# Patient Record
Sex: FEMALE | Race: BLACK OR AFRICAN AMERICAN | Employment: OTHER | URBAN - METROPOLITAN AREA
[De-identification: names, ages, dates, MRNs, and addresses within clinical notes are randomized per-mention and may not be internally consistent; named-entity substitution may affect disease eponyms.]

---

## 2017-02-07 ENCOUNTER — HOSPITAL ENCOUNTER (EMERGENCY)
Age: 67
Discharge: HOME | End: 2017-02-07
Attending: EMERGENCY MEDICINE
Payer: MEDICARE

## 2017-02-07 VITALS
BODY MASS INDEX: 37.49 KG/M2 | SYSTOLIC BLOOD PRESSURE: 134 MMHG | WEIGHT: 225 LBS | DIASTOLIC BLOOD PRESSURE: 61 MMHG | OXYGEN SATURATION: 99 % | RESPIRATION RATE: 17 BRPM | TEMPERATURE: 98.9 F | HEART RATE: 86 BPM | HEIGHT: 65 IN

## 2017-02-07 DIAGNOSIS — N18.6 ESRD ON HEMODIALYSIS (HCC): ICD-10-CM

## 2017-02-07 DIAGNOSIS — Z99.2 ESRD ON HEMODIALYSIS (HCC): ICD-10-CM

## 2017-02-07 DIAGNOSIS — R19.7 DIARRHEA, UNSPECIFIED TYPE: Primary | ICD-10-CM

## 2017-02-07 DIAGNOSIS — R51.9 HEADACHE, UNSPECIFIED HEADACHE TYPE: ICD-10-CM

## 2017-02-07 LAB
ALBUMIN SERPL BCP-MCNC: 4 G/DL (ref 3.2–4.6)
ALBUMIN/GLOB SERPL: 1.1 {RATIO} (ref 1.2–3.5)
ALP SERPL-CCNC: 113 U/L (ref 50–136)
ALT SERPL-CCNC: 15 U/L (ref 12–65)
ANION GAP BLD CALC-SCNC: 11 MMOL/L (ref 7–16)
AST SERPL W P-5'-P-CCNC: 23 U/L (ref 15–37)
BASOPHILS # BLD AUTO: 0 K/UL (ref 0–0.2)
BASOPHILS # BLD: 0 % (ref 0–2)
BILIRUB SERPL-MCNC: 0.8 MG/DL (ref 0.2–1.1)
BUN SERPL-MCNC: 50 MG/DL (ref 8–23)
CALCIUM SERPL-MCNC: 8.7 MG/DL (ref 8.3–10.4)
CHLORIDE SERPL-SCNC: 101 MMOL/L (ref 98–107)
CO2 SERPL-SCNC: 29 MMOL/L (ref 21–32)
CREAT SERPL-MCNC: 8.14 MG/DL (ref 0.6–1)
DIFFERENTIAL METHOD BLD: ABNORMAL
EOSINOPHIL # BLD: 0.2 K/UL (ref 0–0.8)
EOSINOPHIL NFR BLD: 2 % (ref 0.5–7.8)
ERYTHROCYTE [DISTWIDTH] IN BLOOD BY AUTOMATED COUNT: 15.4 % (ref 11.9–14.6)
GLOBULIN SER CALC-MCNC: 3.6 G/DL (ref 2.3–3.5)
GLUCOSE SERPL-MCNC: 168 MG/DL (ref 65–100)
HCT VFR BLD AUTO: 29.4 % (ref 35.8–46.3)
HGB BLD-MCNC: 10.2 G/DL (ref 11.7–15.4)
IMM GRANULOCYTES # BLD: 0 K/UL (ref 0–0.5)
IMM GRANULOCYTES NFR BLD AUTO: 0.3 % (ref 0–5)
LYMPHOCYTES # BLD AUTO: 15 % (ref 13–44)
LYMPHOCYTES # BLD: 1.5 K/UL (ref 0.5–4.6)
MCH RBC QN AUTO: 33.6 PG (ref 26.1–32.9)
MCHC RBC AUTO-ENTMCNC: 34.7 G/DL (ref 31.4–35)
MCV RBC AUTO: 96.7 FL (ref 79.6–97.8)
MONOCYTES # BLD: 0.7 K/UL (ref 0.1–1.3)
MONOCYTES NFR BLD AUTO: 7 % (ref 4–12)
NEUTS SEG # BLD: 7.4 K/UL (ref 1.7–8.2)
NEUTS SEG NFR BLD AUTO: 76 % (ref 43–78)
PLATELET # BLD AUTO: 219 K/UL (ref 150–450)
PMV BLD AUTO: 10.1 FL (ref 10.8–14.1)
POTASSIUM SERPL-SCNC: 3.8 MMOL/L (ref 3.5–5.1)
PROT SERPL-MCNC: 7.6 G/DL (ref 6.3–8.2)
RBC # BLD AUTO: 3.04 M/UL (ref 4.05–5.25)
SODIUM SERPL-SCNC: 141 MMOL/L (ref 136–145)
WBC # BLD AUTO: 9.8 K/UL (ref 4.3–11.1)

## 2017-02-07 PROCEDURE — 96374 THER/PROPH/DIAG INJ IV PUSH: CPT | Performed by: EMERGENCY MEDICINE

## 2017-02-07 PROCEDURE — 80053 COMPREHEN METABOLIC PANEL: CPT | Performed by: EMERGENCY MEDICINE

## 2017-02-07 PROCEDURE — 85025 COMPLETE CBC W/AUTO DIFF WBC: CPT | Performed by: EMERGENCY MEDICINE

## 2017-02-07 PROCEDURE — 74011250637 HC RX REV CODE- 250/637: Performed by: EMERGENCY MEDICINE

## 2017-02-07 PROCEDURE — 74011250636 HC RX REV CODE- 250/636: Performed by: EMERGENCY MEDICINE

## 2017-02-07 PROCEDURE — 99284 EMERGENCY DEPT VISIT MOD MDM: CPT | Performed by: EMERGENCY MEDICINE

## 2017-02-07 PROCEDURE — 96361 HYDRATE IV INFUSION ADD-ON: CPT | Performed by: EMERGENCY MEDICINE

## 2017-02-07 RX ORDER — DICYCLOMINE HYDROCHLORIDE 20 MG/1
20 TABLET ORAL EVERY 6 HOURS
Qty: 20 TAB | Refills: 0 | Status: SHIPPED | OUTPATIENT
Start: 2017-02-07 | End: 2017-02-12

## 2017-02-07 RX ORDER — DICYCLOMINE HYDROCHLORIDE 10 MG/1
20 CAPSULE ORAL
Status: COMPLETED | OUTPATIENT
Start: 2017-02-07 | End: 2017-02-07

## 2017-02-07 RX ORDER — PROCHLORPERAZINE EDISYLATE 5 MG/ML
10 INJECTION INTRAMUSCULAR; INTRAVENOUS
Status: COMPLETED | OUTPATIENT
Start: 2017-02-07 | End: 2017-02-07

## 2017-02-07 RX ORDER — BUTALBITAL, ACETAMINOPHEN AND CAFFEINE 300; 40; 50 MG/1; MG/1; MG/1
1-2 CAPSULE ORAL
Qty: 20 CAP | Refills: 0 | Status: SHIPPED | OUTPATIENT
Start: 2017-02-07 | End: 2018-11-07

## 2017-02-07 RX ORDER — DIPHENOXYLATE HYDROCHLORIDE AND ATROPINE SULFATE 2.5; .025 MG/1; MG/1
2 TABLET ORAL
Qty: 20 TAB | Refills: 0 | Status: SHIPPED | OUTPATIENT
Start: 2017-02-07 | End: 2018-05-10

## 2017-02-07 RX ORDER — SODIUM CHLORIDE 0.9 % (FLUSH) 0.9 %
5-10 SYRINGE (ML) INJECTION AS NEEDED
Status: DISCONTINUED | OUTPATIENT
Start: 2017-02-07 | End: 2017-02-07 | Stop reason: HOSPADM

## 2017-02-07 RX ORDER — PROMETHAZINE HYDROCHLORIDE 25 MG/1
25 TABLET ORAL
Qty: 12 TAB | Refills: 0 | Status: SHIPPED | OUTPATIENT
Start: 2017-02-07 | End: 2018-03-08 | Stop reason: CLARIF

## 2017-02-07 RX ORDER — BUTALBITAL, ACETAMINOPHEN AND CAFFEINE 50; 325; 40 MG/1; MG/1; MG/1
2 TABLET ORAL
Status: COMPLETED | OUTPATIENT
Start: 2017-02-07 | End: 2017-02-07

## 2017-02-07 RX ORDER — DIPHENOXYLATE HYDROCHLORIDE AND ATROPINE SULFATE 2.5; .025 MG/1; MG/1
2 TABLET ORAL
Status: COMPLETED | OUTPATIENT
Start: 2017-02-07 | End: 2017-02-07

## 2017-02-07 RX ORDER — ONDANSETRON HYDROCHLORIDE 8 MG/1
8 TABLET, FILM COATED ORAL
Qty: 10 TAB | Refills: 0 | Status: SHIPPED | OUTPATIENT
Start: 2017-02-07 | End: 2018-03-08 | Stop reason: CLARIF

## 2017-02-07 RX ORDER — SODIUM CHLORIDE 0.9 % (FLUSH) 0.9 %
5-10 SYRINGE (ML) INJECTION EVERY 8 HOURS
Status: DISCONTINUED | OUTPATIENT
Start: 2017-02-07 | End: 2017-02-07 | Stop reason: HOSPADM

## 2017-02-07 RX ADMIN — PROCHLORPERAZINE EDISYLATE 10 MG: 5 INJECTION INTRAMUSCULAR; INTRAVENOUS at 19:00

## 2017-02-07 RX ADMIN — SODIUM CHLORIDE 250 ML: 900 INJECTION, SOLUTION INTRAVENOUS at 19:00

## 2017-02-07 RX ADMIN — DIPHENOXYLATE HYDROCHLORIDE AND ATROPINE SULFATE 2 TABLET: 2.5; .025 TABLET ORAL at 19:37

## 2017-02-07 RX ADMIN — DICYCLOMINE HYDROCHLORIDE 20 MG: 10 CAPSULE ORAL at 19:37

## 2017-02-07 RX ADMIN — BUTALBITAL, ACETAMINOPHEN AND CAFFEINE 2 TABLET: 50; 325; 40 TABLET ORAL at 19:00

## 2017-02-07 NOTE — ED PROVIDER NOTES
HPI Comments: Severe diarrhea for 2 days stomach cramping. No vomiting. Patient had a full run of dialysis yesterday. Grandson had a stomach bug about 2 weeks ago. No recent antibiotics    Patient is a 73 y.o. female presenting with diarrhea. The history is provided by the patient. Diarrhea    This is a new problem. The current episode started 2 days ago. The problem occurs constantly (At least a dozen times per day). The problem has not changed since onset. The pain is moderate. Associated symptoms include diarrhea, nausea and headaches. Pertinent negatives include no fever, no vomiting, no constipation, no dysuria, no frequency, no arthralgias, no chest pain and no back pain. The pain is worsened by eating. The pain is relieved by nothing. The patient's surgical history non-contributory. Past Medical History:   Diagnosis Date    Arthritis     AVF (arteriovenous fistula) ([facility] [address] 12/20/2016 12/6/16 ([facility]) Right AVF revision and thrombectomy    Cancer ([facility] [address] 2015     L kidney    Chronic kidney disease      HD in M-W-F- at [place] [facility]    Degenerative joint disease     Diabetes ([facility] [address]      type 2, average range 100-120 fasting, symptomatic below 90; last A1C?    ESRD (end stage renal disease) ([facility] [address] Nov 2006     ESRD.  MWF dialysis     Hypertension     Obesity     Transient ischemic attack 8/29/2015       Past Surgical History:   Procedure Laterality Date    Hx gyn       stephan    Hx vascular access      Pr breast surgery procedure unlisted Right      cyst removed    Hx other surgical       dialysis fistula, several permcaths    Hx urological Left July 2015     nephrectomy    Hx gi       colon resection resulting in temporary colostomy reversal         Family History:   Problem Relation Age of Onset    Diabetes Mother     Heart Disease Mother     Hypertension Mother     Heart Disease Father     Hypertension Father     Malignant Hyperthermia Neg Hx     Pseudocholinesterase Deficiency Neg Hx     Delayed Awakening Neg Hx     Post-op Nausea/Vomiting Neg Hx     Emergence Delirium Neg Hx     Other Neg Hx     Post-op Cognitive Dysfunction Neg Hx        Social History     Social History    Marital status:      Spouse name: N/A    Number of children: N/A    Years of education: N/A     Occupational History    Not on file. Social History Main Topics    Smoking status: Never Smoker    Smokeless tobacco: Never Used    Alcohol use No    Drug use: No    Sexual activity: Not Currently     Other Topics Concern    Not on file     Social History Narrative         ALLERGIES: Pcn [penicillins] and Vancomycin    Review of Systems   Constitutional: Negative for chills and fever. HENT: Negative for rhinorrhea and sore throat. Eyes: Negative for discharge and redness. Respiratory: Positive for shortness of breath. Negative for cough. Cardiovascular: Negative for chest pain and palpitations. Gastrointestinal: Positive for abdominal pain, diarrhea and nausea. Negative for constipation and vomiting. Genitourinary: Negative for dysuria and frequency. Musculoskeletal: Negative for arthralgias and back pain. Skin: Negative for rash. Neurological: Positive for headaches. Negative for dizziness. All other systems reviewed and are negative. Vitals:    02/07/17 1609   BP: 122/57   Pulse: 89   Resp: 20   Temp: 99 °F (37.2 °C)   SpO2: 98%   Weight: 102.1 kg (225 lb)   Height: 5' 5\" (1.651 m)            Physical Exam   Constitutional: She is oriented to person, place, and time. She appears well-developed and well-nourished. No distress. HENT:   Head: Normocephalic and atraumatic. Eyes: Conjunctivae and EOM are normal. Pupils are equal, round, and reactive to light. Right eye exhibits no discharge. Left eye exhibits no discharge. No scleral icterus. Neck: Normal range of motion. Neck supple. Cardiovascular: Normal rate, regular rhythm and normal heart sounds.   Exam reveals no gallop. No murmur heard. Pulmonary/Chest: Effort normal and breath sounds normal. No respiratory distress. She has no wheezes. She has no rales. Abdominal: Soft. Bowel sounds are normal. There is tenderness. There is no guarding. Musculoskeletal: Normal range of motion. She exhibits no edema. Neurological: She is alert and oriented to person, place, and time. She exhibits normal muscle tone. cni 2-12 grossly   Skin: Skin is warm and dry. She is not diaphoretic. Psychiatric: She has a normal mood and affect. Her behavior is normal.   Nursing note and vitals reviewed. MDM  Number of Diagnoses or Management Options  Diarrhea, unspecified type:   ESRD on hemodialysis [facility]):   Headache, unspecified headache type:   Diagnosis management comments: Medical decision making note:  Dialysis patient with 2 days of moderate to severe diarrhea, no blood  Check labs to include C. Difficile, if she can procure a sample    Treatment headache and nausea for now   we will withhold narcotics and antispasmodics to hopefully not reduce the chances of collecting a stool specimen      This concludes the \"medical decision making note\" part of this emergency department visit note.       ED Course       Procedures

## 2017-02-08 NOTE — DISCHARGE INSTRUCTIONS
Use the Lomotil for diarrhea  Use either Phenergan or Zofran for nausea  Use the Fioricet for headaches  Use the Bentyl for stomach cramps    Follow-up with dialysis tomorrow  Return to the ER if worse in any way           Diarrhea: Care Instructions  Your Care Instructions    Diarrhea is loose, watery stools (bowel movements). The exact cause is often hard to find. Sometimes diarrhea is your body's way of getting rid of what caused an upset stomach. Viruses, food poisoning, and many medicines can cause diarrhea. Some people get diarrhea in response to emotional stress, anxiety, or certain foods. Almost everyone has diarrhea now and then. It usually isn't serious, and your stools will return to normal soon. The important thing to do is replace the fluids you have lost, so you can prevent dehydration. The doctor has checked you carefully, but problems can develop later. If you notice any problems or new symptoms, get medical treatment right away. Follow-up care is a key part of your treatment and safety. Be sure to make and go to all appointments, and call your doctor if you are having problems. It's also a good idea to know your test results and keep a list of the medicines you take. How can you care for yourself at home? · Watch for signs of dehydration, which means your body has lost too much water. Dehydration is a serious condition and should be treated right away. Signs of dehydration are:  ¨ Increasing thirst and dry eyes and mouth. ¨ Feeling faint or lightheaded. ¨ Darker urine, and a smaller amount of urine than normal.  · To prevent dehydration, drink plenty of fluids, enough so that your urine is light yellow or clear like water. Choose water and other caffeine-free clear liquids until you feel better. If you have kidney, heart, or liver disease and have to limit fluids, talk with your doctor before you increase the amount of fluids you drink.   · Begin eating small amounts of mild foods the next day, if you feel like it. ¨ Try yogurt that has live cultures of Lactobacillus. (Check the label.)  ¨ Avoid spicy foods, fruits, alcohol, and caffeine until 48 hours after all symptoms are gone. ¨ Avoid chewing gum that contains sorbitol. ¨ Avoid dairy products (except for yogurt with Lactobacillus) while you have diarrhea and for 3 days after symptoms are gone. · The doctor may recommend that you take over-the-counter medicine, such as loperamide (Imodium), if you still have diarrhea after 6 hours. Read and follow all instructions on the label. Do not use this medicine if you have bloody diarrhea, a high fever, or other signs of serious illness. Call your doctor if you think you are having a problem with your medicine. When should you call for help? Call 911 anytime you think you may need emergency care. For example, call if:  · You passed out (lost consciousness). · Your stools are maroon or very bloody. Call your doctor now or seek immediate medical care if:  · You are dizzy or lightheaded, or you feel like you may faint. · Your stools are black and look like tar, or they have streaks of blood. · You have new or worse belly pain. · You have symptoms of dehydration, such as:  ¨ Dry eyes and a dry mouth. ¨ Passing only a little dark urine. ¨ Feeling thirstier than usual.  · You have a new or higher fever. Watch closely for changes in your health, and be sure to contact your doctor if:  · Your diarrhea is getting worse. · You see pus in the diarrhea. · You are not getting better after 2 days (48 hours). Where can you learn more? Go to http://felisa-radha.info/. Enter R256 in the search box to learn more about \"Diarrhea: Care Instructions. \"  Current as of: May 27, 2016  Content Version: 11.1  © 8123-7596 [employer]. Care instructions adapted under license by [employer] (which disclaims liability or warranty for this information).  If you have questions about a medical condition or this instruction, always ask your healthcare professional. [place] [address] any warranty or liability for your use of this information.

## 2017-02-08 NOTE — ED NOTES
Patient D/Orion with all prescriptions, understands al discharge instructions. Ambulated out of ED steady on feet.

## 2017-04-13 ENCOUNTER — HOSPITAL ENCOUNTER (OUTPATIENT)
Dept: INTERVENTIONAL RADIOLOGY/VASCULAR | Age: 67
Discharge: HOME | End: 2017-04-13
Attending: RADIOLOGY
Payer: MEDICARE

## 2017-04-13 VITALS
OXYGEN SATURATION: 99 % | RESPIRATION RATE: 18 BRPM | HEIGHT: 65 IN | BODY MASS INDEX: 37.49 KG/M2 | DIASTOLIC BLOOD PRESSURE: 43 MMHG | TEMPERATURE: 98.2 F | HEART RATE: 78 BPM | SYSTOLIC BLOOD PRESSURE: 126 MMHG | WEIGHT: 225 LBS

## 2017-04-13 DIAGNOSIS — N18.6 ESRD (END STAGE RENAL DISEASE) (HCC): ICD-10-CM

## 2017-04-13 LAB — GLUCOSE BLD STRIP.AUTO-MCNC: 113 MG/DL (ref 65–100)

## 2017-04-13 PROCEDURE — 74011250636 HC RX REV CODE- 250/636

## 2017-04-13 PROCEDURE — C1725 CATH, TRANSLUMIN NON-LASER: HCPCS

## 2017-04-13 PROCEDURE — 74011636320 HC RX REV CODE- 636/320: Performed by: RADIOLOGY

## 2017-04-13 PROCEDURE — 74011000250 HC RX REV CODE- 250: Performed by: RADIOLOGY

## 2017-04-13 PROCEDURE — C1894 INTRO/SHEATH, NON-LASER: HCPCS

## 2017-04-13 PROCEDURE — C1769 GUIDE WIRE: HCPCS

## 2017-04-13 PROCEDURE — 82962 GLUCOSE BLOOD TEST: CPT

## 2017-04-13 PROCEDURE — 36902 INTRO CATH DIALYSIS CIRCUIT: CPT

## 2017-04-13 PROCEDURE — 74011250636 HC RX REV CODE- 250/636: Performed by: RADIOLOGY

## 2017-04-13 RX ORDER — HEPARIN SODIUM 200 [USP'U]/100ML
1000 INJECTION, SOLUTION INTRAVENOUS CONTINUOUS
Status: DISCONTINUED | OUTPATIENT
Start: 2017-04-13 | End: 2017-04-17 | Stop reason: HOSPADM

## 2017-04-13 RX ORDER — DIPHENHYDRAMINE HYDROCHLORIDE 50 MG/ML
25-50 INJECTION, SOLUTION INTRAMUSCULAR; INTRAVENOUS ONCE
Status: COMPLETED | OUTPATIENT
Start: 2017-04-13 | End: 2017-04-13

## 2017-04-13 RX ORDER — LIDOCAINE HYDROCHLORIDE 20 MG/ML
.5-2 INJECTION, SOLUTION INFILTRATION; PERINEURAL ONCE
Status: COMPLETED | OUTPATIENT
Start: 2017-04-13 | End: 2017-04-13

## 2017-04-13 RX ORDER — HEPARIN SODIUM 1000 [USP'U]/ML
1000-8000 INJECTION, SOLUTION INTRAVENOUS; SUBCUTANEOUS ONCE
Status: DISPENSED | OUTPATIENT
Start: 2017-04-13 | End: 2017-04-13

## 2017-04-13 RX ORDER — MIDAZOLAM HYDROCHLORIDE 1 MG/ML
.5-2 INJECTION, SOLUTION INTRAMUSCULAR; INTRAVENOUS
Status: DISCONTINUED | OUTPATIENT
Start: 2017-04-13 | End: 2017-04-17 | Stop reason: HOSPADM

## 2017-04-13 RX ORDER — FENTANYL CITRATE 50 UG/ML
25-50 INJECTION, SOLUTION INTRAMUSCULAR; INTRAVENOUS
Status: DISCONTINUED | OUTPATIENT
Start: 2017-04-13 | End: 2017-04-17 | Stop reason: HOSPADM

## 2017-04-13 RX ORDER — SODIUM CHLORIDE 9 MG/ML
25 INJECTION, SOLUTION INTRAVENOUS ONCE
Status: ACTIVE | OUTPATIENT
Start: 2017-04-13 | End: 2017-04-13

## 2017-04-13 RX ADMIN — IOPAMIDOL 15 ML: 755 INJECTION, SOLUTION INTRAVENOUS at 08:57

## 2017-04-13 RX ADMIN — HEPARIN SODIUM 2000 UNITS: 200 INJECTION, SOLUTION INTRAVENOUS at 08:54

## 2017-04-13 RX ADMIN — LIDOCAINE HYDROCHLORIDE 150 MG: 20 INJECTION, SOLUTION INFILTRATION; PERINEURAL at 08:51

## 2017-04-13 RX ADMIN — DIPHENHYDRAMINE HYDROCHLORIDE 25 MG: 50 INJECTION, SOLUTION INTRAMUSCULAR; INTRAVENOUS at 08:55

## 2017-04-13 NOTE — PROGRESS NOTES
Recovery period without difficulty. Pt alert and oriented and denies pain. Dressing is clean, dry, and intact. Reviewed discharge instructions with patient, both verbalized understanding. Pt escorted to lobby discharge area via wheelchair. Vital signs and Luis score completed.

## 2017-04-13 NOTE — DISCHARGE INSTRUCTIONS
[address]  Department of Interventional Radiology  [phone] Office  [phone] Fax       ARTERIOVENOUS FISTULA, GRAFT ACCESS, REVISION, OR DECLOT    ACTIVITY:  Limited activity today. Keep access arm straight and raised above the level of your heart for 24 hours or until the swelling goes away. DIET:  May have your usual food, unless told otherwise by your doctor. PAIN:  Use the prescription for pain medicine your doctor gave you. If you do not have one, usual your normal pain medicine. If this does not control your pain, call your doctor. DO NOT TAKE ASPIRIN OR MEDICINE WITH ASPIRIN IN IT, unless your doctor says you may. DRESSING CARE:   Keep your dressing clean and dry. Leave your dressing on until the Dialysis Nurse or your doctor takes it off. BLEEDING:  If you have any bleeding put pressure over the bleeding area and call your doctor. CARE OF YOUR ACCESS ARM:  You may have some bruising, swelling, and discoloration for several weeks. After the swelling has gone down, you will be able to see the outline of the graft. By placing your fingertips over the graft you will be able to feel a vibration (thrill) that means blood is flowing and the graft is working. DO:  1. Make sure your arm is washed with soap and water every day. 2. Feel for the [address] [address] at area marked in the picture. 3. Call your Kidney doctor if you do not feel the [address] or for any problems like swelling, redness, tingling, or numbness in access arm, pus, or fever over 101. DO NOT:  1. Wear tight sleeves, watches, belts, or bracelets over the graft. 2. Carry heavy bags across the graft or fistula. 3. Sleep on your graft side. 4. Let your blood pressure be taken in your access arm. 5. Let blood be drawn from your access arm. For the next 24 hours, DO NOT Drive, Drink Alcoholic Beverages, or Make IMPORTANT Decisions.     Follow-Up Instructions:  Please see your ordering doctor as he/she has requested. To Reach Us:   THROMBECTOMY/FISTULAPLASTY DISCHARGE INSTRUCTIONS    General Information: This procedure has been done in order to improve blood flow through your dialysis access. The procedure is designed to remove clots and/or to enlarge the narrowed areas of your dialysis access. Home Care Instructions: You can resume your regular diet and medication regimen. Do not drink alcohol, drive, or make any important legal decisions in the next 24 hours. Watch the site carefully for signs of infection. You may have some tenderness at your graft site. You make take Tylenol (acetaminophen) for this discomfort. Do not carry anything heavier than a gallon of milk. Do not wear any tight clothing on this arm, including elastic cuffs and/or tight watches. Do not allow anyone to take a blood pressure or draw blood from this extremity. You should elevate this arm on pillows to help with any swelling. Do not sleep on this arm with the graft, or fold it under your head while you sleep. Feel your graft every day to see if you can feel a thrill (pulsation). Call If:     You should call your Physician and/or the Radiology Nurse if you have any signs of infection like fever, drainage, redness, or increased pain at the graft site. Call your doctor or dialysis center immediately if you do not feel a thrill on your graft/fistula, or if you have a change in color of this extremity. Call 911 and seek immediate medical attention if you start bleeding from your graft or fistula. Apply pressure to the graft, but only enough pressure to control the bleeding. Armand the time you start holding pressure and let medical personnel know. Follow-Up Instructions: Please see your ordering doctor as he/she has requested. You may not go to dialysis today, except with special written permission from you doctor. You may go to dialysis tomorrow, and resume your normal dialysis schedule.     To Reach Us: If you have any questions about your procedure, please call the Interventional Radiology department at [phone]. After business hours (5pm) and weekends, call the answering service at [phone] and ask for the Radiologist on call to be paged. Patient Signature:  Date: 4/13/2017  Discharging Nurse:  Lauren Schmitz RN

## 2017-04-13 NOTE — PROGRESS NOTES
TRANSFER - OUT REPORT:    Verbal report given to [facility]) on Kecia Fischer  being transferred to [facility] recovery(unit) for routine progression of care       Report consisted of patients Situation, Background, Assessment and   Recommendations(SBAR). Information from the following report(s) SBAR, Procedure Summary and MAR was reviewed with the receiving nurse. Lines:   Peripheral IV 11/11/16 Left Forearm (Active)        Opportunity for questions and clarification was provided.

## 2017-04-13 NOTE — H&P
Department of Interventional Radiology  [phone]    History and Physical    Patient: Donato Paige MRN:  [id number]  SSN:  [id number]    YOB: 1950  Age:  73 y.o. Sex:  female      Primary Care Provider:  Nu Faith MD  Referring Physician:  Logan Newton MD    Subjective:     Chief Complaint: fistulogram    History of the Present Illness: The patient is a 73 y.o. female who presents for AV fistulogram.  RUE AV fistula with recurrent cephalic vein stenosis. Last dialysis yesterday was uneventful. NPO x BP meds. Past Medical History:   Diagnosis Date    Arthritis     AVF (arteriovenous fistula) ([facility] [address] 12/20/2016 12/6/16 ([facility]) Right AVF revision and thrombectomy    Cancer ([facility] [address] 2015    L kidney    Chronic kidney disease     HD in M-W-F- at [place] [facility]    Degenerative joint disease     Diabetes ([facility] [address]     type 2, average range 100-120 fasting, symptomatic below 90; last A1C?    ESRD (end stage renal disease) ([facility] [address] Nov 2006    ESRD. MWF dialysis     Hypertension     Obesity     Transient ischemic attack 8/29/2015     Past Surgical History:   Procedure Laterality Date    BREAST SURGERY PROCEDURE UNLISTED Right     cyst removed    HX GI      colon resection resulting in temporary colostomy reversal    HX GYN      stephan    HX OTHER SURGICAL      dialysis fistula, several permcaths    HX UROLOGICAL Left July 2015    nephrectomy    HX VASCULAR ACCESS          Review of Systems:    Pertinent items are noted in the History of Present Illness. Current Outpatient Prescriptions   Medication Sig    amLODIPine (NORVASC) 10 mg tablet Take  by mouth daily.  VIT C/VIT E/LUTEIN/MIN/OMEGA-3 (OCUVITE PO) Take  by mouth nightly.  cinacalcet (SENSIPAR) 30 mg tablet Take 30 mg by mouth nightly.  minoxidil (LONITEN) 2.5 mg tablet Take 7.5 mg by mouth two (2) times a day.  Indications: HYPERTENSION    aspirin 81 mg chewable tablet Take 1 Tab by mouth daily. (Patient taking differently: Take 81 mg by mouth every morning.)    sevelamer carbonate (RENVELA) 800 mg tab tab Take 800 mg by mouth three (3) times daily (with meals). 5 tablets with meals/ 2 with snacks  Sometimes only eats 2 meals/d    sitaGLIPtin (JANUVIA) 100 mg tablet Take 50 mg by mouth every morning. Indications: TYPE 2 DIABETES MELLITUS    furosemide (LASIX) 80 mg tablet Take 80 mg by mouth two (2) times a day. Indications: EDEMA, HYPERTENSION    promethazine (PHENERGAN) 25 mg tablet Take 1 Tab by mouth every six (6) hours as needed for Nausea.  diphenoxylate-atropine (LOMOTIL) 2.5-0.025 mg per tablet Take 2 Tabs by mouth four (4) times daily as needed for Diarrhea. Max Daily Amount: 8 Tabs.  ondansetron hcl (ZOFRAN, AS HYDROCHLORIDE,) 8 mg tablet Take 1 Tab by mouth every eight (8) hours as needed for Nausea.  butalbital-acetaminophen-caff (FIORICET) -40 mg per capsule Take 1-2 Caps by mouth every six (6) hours as needed for Pain. Max Daily Amount: 8 Caps.  HYDROcodone-acetaminophen (NORCO) 5-325 mg per tablet Take 1 Tab by mouth every four (4) hours as needed for Pain. Max Daily Amount: 6 Tabs.  lisinopril (PRINIVIL, ZESTRIL) 40 mg tablet Take 40 mg by mouth nightly.  Indications: HYPERTENSION     Current Facility-Administered Medications   Medication Dose Route Frequency    lidocaine (XYLOCAINE) 20 mg/mL (2 %) injection  mg  0.5-20 mL IntraDERMal ONCE    heparin (PF) 2 units/ml in NS infusion 2,000 Units  1,000 mL Irrigation CONTINUOUS    heparin (porcine) 1,000 unit/mL injection 1,000-8,000 Units  1,000-8,000 Units Other ONCE    0.9% sodium chloride infusion  25 mL/hr IntraVENous ONCE    diphenhydrAMINE (BENADRYL) injection 25-50 mg  25-50 mg IntraVENous ONCE    fentaNYL citrate (PF) injection 25-50 mcg  25-50 mcg IntraVENous Multiple    midazolam (VERSED) injection 0.5-2 mg  0.5-2 mg IntraVENous Multiple    iopamidol (ISOVUE-370) 76 % injection 1-300 mL 1-300 mL IntraVENous RAD ONCE        Allergies   Allergen Reactions    Pcn [Penicillins] Rash    Vancomycin Rash       Family History   Problem Relation Age of Onset    Diabetes Mother     Heart Disease Mother     Hypertension Mother     Heart Disease Father     Hypertension Father     Malignant Hyperthermia Neg Hx     Pseudocholinesterase Deficiency Neg Hx     Delayed Awakening Neg Hx     Post-op Nausea/Vomiting Neg Hx     Emergence Delirium Neg Hx     Other Neg Hx     Post-op Cognitive Dysfunction Neg Hx      Social History   Substance Use Topics    Smoking status: Never Smoker    Smokeless tobacco: Never Used    Alcohol use No        Objective:       Physical Examination:    Vitals:    04/13/17 0731   BP: (!) 151/100   Pulse: 80   Temp: 98.2 °F (36.8 °C)   SpO2: 98%   Weight: 102.1 kg (225 lb)   Height: 5' 5\" (1.651 m)     Blood pressure (!) 151/100, pulse 80, temperature 98.2 °F (36.8 °C), height 5' 5\" (1.651 m), weight 102.1 kg (225 lb), SpO2 98 %.   HEART: regular rate and rhythm  LUNG: clear to auscultation bilaterally  ABDOMEN: normal findings: soft, non-tender  EXTREMITIES: warm, + RUE thrill    Laboratory:     Lab Results   Component Value Date/Time    Sodium 141 02/07/2017 04:15 PM    Sodium 140 10/20/2016 04:10 PM    Potassium 3.8 02/07/2017 04:15 PM    Potassium 4.1 10/20/2016 04:10 PM    Chloride 101 02/07/2017 04:15 PM    Chloride 104 10/20/2016 04:10 PM    CO2 29 02/07/2017 04:15 PM    CO2 29 10/20/2016 04:10 PM    Anion gap 11 02/07/2017 04:15 PM    Anion gap 7 10/20/2016 04:10 PM    Glucose 168 02/07/2017 04:15 PM    Glucose 96 10/20/2016 04:10 PM    BUN 50 02/07/2017 04:15 PM    BUN 33 10/20/2016 04:10 PM    Creatinine 8.14 02/07/2017 04:15 PM    Creatinine 6.46 10/20/2016 04:10 PM    GFR est AA 6 02/07/2017 04:15 PM    GFR est AA 8 10/20/2016 04:10 PM    GFR est non-AA 5 02/07/2017 04:15 PM    GFR est non-AA 7 10/20/2016 04:10 PM    Calcium 8.7 02/07/2017 04:15 PM    Calcium 8.7 10/20/2016 04:10 PM    Magnesium 2.3 06/22/2014 04:59 PM    Magnesium 2.0 02/08/2013 05:25 AM    Phosphorus 4.2 06/22/2014 04:59 PM    Phosphorus 4.4 12/12/2008 09:03 PM    Albumin 4.0 02/07/2017 04:15 PM    Albumin 3.9 10/20/2016 04:10 PM    Protein, total 7.6 02/07/2017 04:15 PM    Protein, total 7.5 10/20/2016 04:10 PM    Globulin 3.6 02/07/2017 04:15 PM    Globulin 3.6 10/20/2016 04:10 PM    A-G Ratio 1.1 02/07/2017 04:15 PM    A-G Ratio 1.1 10/20/2016 04:10 PM    AST (SGOT) 23 02/07/2017 04:15 PM    AST (SGOT) 19 10/20/2016 04:10 PM    ALT (SGPT) 15 02/07/2017 04:15 PM    ALT (SGPT) 13 10/20/2016 04:10 PM     Lab Results   Component Value Date/Time    WBC 9.8 02/07/2017 04:15 PM    WBC 7.3 10/20/2016 04:10 PM    HGB 10.2 02/07/2017 04:15 PM    HGB 9.2 10/20/2016 04:10 PM    HCT 29.4 02/07/2017 04:15 PM    HCT 26.6 10/20/2016 04:10 PM    PLATELET 007 47/95/8227 04:15 PM    PLATELET 380 13/89/8153 04:10 PM     Lab Results   Component Value Date/Time    aPTT 22.2 11/16/2015 12:00 AM    aPTT 27.2 01/29/2013 12:20 PM    Prothrombin time 10.2 11/16/2015 12:00 AM    Prothrombin time 10.5 08/29/2015 04:30 PM    INR 0.9 11/16/2015 12:00 AM    INR 1.0 08/29/2015 04:30 PM       Assessment:     ESRD, recurrent AV fistula stenosis    Plan:     Planned Procedure:  fistulogram    Risks, benefits, and alternatives reviewed with patient and she agrees to proceed with the procedure.       Signed By: Linzy Homans, PA-C     April 13, 2017

## 2017-04-13 NOTE — PROCEDURES
Date of Procedure: 4/13/2017    Pre-Procedure Diagnosis: Renal failure with recurrent stenosis at the right upper extremity subclavian outflow    Post-Procedure Diagnosis: SAME    Procedure(s): Right upper extremity dialysis fistulogram and angioplasty    Brief Description of Procedure: 8 mm balloon angioplasty    Performed By: Maikel Hicks MD     Assistants: None    Anesthesia: Local anesthesia    Estimated Blood Loss: Minimal    Specimens: None    Implants: None    Findings: Recurrent stenosis at the right subclavian vein outflow    Complications: None    Recommendations: Technically successful balloon angioplasty    Follow Up: Routine maintenance 3 months.

## 2017-04-13 NOTE — PROGRESS NOTES
Interventional Radiology Prep Area Handoff      Allergies   Allergen Reactions    Pcn [Penicillins] Rash    Vancomycin Rash       IRSummary reviewed. Pt identified with two identifiers. Verified procedure with Patient and IR Provider as fistulogram.    Consent obtained: yes H&P complete/updated: yes MD airway complete: n/a    Sedation:no   NPO: yes   Anticoagulation: no     Pt shaved:     Antibiotics:   Anesthesia notified: n/a    Additional Notes:       Lines:  Peripherally Inserted Central Catheter:     Central Venous Catheter:     Venous Access Device:     Peripheral Intravenous Line:  Peripheral IV 11/11/16 Left Forearm (Active)     Hemodialysis Catheter:     Drain(s):       Labs verified:   Recent Results (from the past 24 hour(s))   GLUCOSE, POC    Collection Time: 04/13/17  7:52 AM   Result Value Ref Range    Glucose (POC) 113 (H) 65 - 100 mg/dL     Patient Vitals for the past 8 hrs:   Temp Pulse BP SpO2   04/13/17 0731 98.2 °F (36.8 °C) 80 (!) 151/100 98 %

## 2017-04-13 NOTE — IP AVS SNAPSHOT
[address] 
[phone] Patient: Jem Fischer MRN: [id number] APK:84/02/[birth year]23 You are allergic to the following Allergen Reactions Pcn (Penicillins) Rash Vancomycin Rash Recent Documentation Height Weight BMI OB Status Smoking Status 1.651 m 102.1 kg 37.44 kg/m2 Hysterectomy Never Smoker Emergency Contacts Name Discharge Info Relation Home Work Mobile Marybel Thornton  Child [2]   [phone] Jeimy Bray  Sister [23] [phone] About your hospitalization You were admitted on:  April 13, 2017 You last received care in the:  [facility] Radiology Specials You were discharged on:  April 13, 2017 Unit phone number:  [phone] Why you were hospitalized Your primary diagnosis was:  Not on File Providers Seen During Your Hospitalizations Provider Role Specialty Primary office phone Juan C Bartlett MD Attending Provider Radiology [phone] Your Primary Care Physician (PCP) Primary Care Physician Office Phone Office Fax Nancy Figueroa [phone] Follow-up Information None Current Discharge Medication List  
  
ASK your doctor about these medications Dose & Instructions Dispensing Information Comments Morning Noon Evening Bedtime  
 amLODIPine 10 mg tablet Commonly known as:  Latoya Suazo Your last dose was: Your next dose is: Take  by mouth daily. Refills:  0  
     
   
   
   
  
 aspirin 81 mg chewable tablet Your last dose was: Your next dose is:    
   
   
 Dose:  81 mg Take 1 Tab by mouth daily. Quantity:  1 Tab Refills:  0  
     
   
   
   
  
 butalbital-acetaminophen-caff -40 mg per capsule Commonly known as:  School[employer] Your last dose was: Your next dose is: Dose:  1-2 Cap Take 1-2 Caps by mouth every six (6) hours as needed for Pain. Max Daily Amount: 8 Caps. Quantity:  20 Cap Refills:  0  
     
   
   
   
  
 cinacalcet 30 mg tablet Commonly known as:  SENSIPAR Your last dose was: Your next dose is:    
   
   
 Dose:  30 mg Take 30 mg by mouth nightly. Refills:  0  
     
   
   
   
  
 diphenoxylate-atropine 2.5-0.025 mg per tablet Commonly known as:  LOMOTIL Your last dose was: Your next dose is:    
   
   
 Dose:  2 Tab Take 2 Tabs by mouth four (4) times daily as needed for Diarrhea. Max Daily Amount: 8 Tabs. Quantity:  20 Tab Refills:  0  
     
   
   
   
  
 furosemide 80 mg tablet Commonly known as:  LASIX Your last dose was: Your next dose is:    
   
   
 Dose:  80 mg Take 80 mg by mouth two (2) times a day. Indications: EDEMA, HYPERTENSION Refills:  0 HYDROcodone-acetaminophen 5-325 mg per tablet Commonly known as:  Simluis fernando Jeaner Your last dose was: Your next dose is:    
   
   
 Dose:  1 Tab Take 1 Tab by mouth every four (4) hours as needed for Pain. Max Daily Amount: 6 Tabs. Quantity:  12 Tab Refills:  0 JANUVIA 100 mg tablet Generic drug:  SITagliptin Your last dose was: Your next dose is:    
   
   
 Dose:  50 mg Take 50 mg by mouth every morning. Indications: TYPE 2 DIABETES MELLITUS Refills:  0  
     
   
   
   
  
 lisinopril 40 mg tablet Commonly known as:  Fang Cordova Your last dose was: Your next dose is:    
   
   
 Dose:  40 mg Take 40 mg by mouth nightly. Indications: HYPERTENSION Refills:  0  
     
   
   
   
  
 minoxidil 2.5 mg tablet Commonly known as:  Joli Cowden Your last dose was: Your next dose is:    
   
   
 Dose:  7.5 mg Take 7.5 mg by mouth two (2) times a day. Indications: HYPERTENSION Refills:  0 OCUVITE PO Your last dose was: Your next dose is: Take  by mouth nightly. Refills:  0  
     
   
   
   
  
 ondansetron hcl 8 mg tablet Commonly known as:  ZOFRAN (AS HYDROCHLORIDE) Your last dose was: Your next dose is:    
   
   
 Dose:  8 mg Take 1 Tab by mouth every eight (8) hours as needed for Nausea. Quantity:  10 Tab Refills:  0  
     
   
   
   
  
 promethazine 25 mg tablet Commonly known as:  PHENERGAN Your last dose was: Your next dose is:    
   
   
 Dose:  25 mg Take 1 Tab by mouth every six (6) hours as needed for Nausea. Quantity:  12 Tab Refills:  0  
     
   
   
   
  
 RENVELA 800 mg Tab tab Generic drug:  sevelamer carbonate Your last dose was: Your next dose is:    
   
   
 Dose:  800 mg Take 800 mg by mouth three (3) times daily (with meals). 5 tablets with meals/ 2 with snacks Sometimes only eats 2 meals/d Refills:  0 Discharge Instructions [place] [address] Department of Interventional Radiology 
[phone] Office 
[phone] Fax ARTERIOVENOUS FISTULA, GRAFT ACCESS, REVISION, OR DECLOT 
 
ACTIVITY: 
Limited activity today. Keep access arm straight and raised above the level of your heart for 24 hours or until the swelling goes away. DIET: 
May have your usual food, unless told otherwise by your doctor. PAIN: 
Use the prescription for pain medicine your doctor gave you. If you do not have one, usual your normal pain medicine. If this does not control your pain, call your doctor. DO NOT TAKE ASPIRIN OR MEDICINE WITH ASPIRIN IN IT, unless your doctor says you may. DRESSING CARE:  
Keep your dressing clean and dry. Leave your dressing on until the Dialysis Nurse or your doctor takes it off. BLEEDING: If you have any bleeding put pressure over the bleeding area and call your doctor. CARE OF YOUR ACCESS ARM: 
You may have some bruising, swelling, and discoloration for several weeks. After the swelling has gone down, you will be able to see the outline of the graft. By placing your fingertips over the graft you will be able to feel a vibration (thrill) that means blood is flowing and the graft is working. DO: 
1. Make sure your arm is washed with soap and water every day. 2. Feel for the [address] at area marked in the picture. 3. Call your Kidney doctor if you do not feel the [address] or for any problems like swelling, redness, tingling, or numbness in access arm, pus, or fever over 101. DO NOT: 
1. Wear tight sleeves, watches, belts, or bracelets over the graft. 2. Carry heavy bags across the graft or fistula. 3. Sleep on your graft side. 4. Let your blood pressure be taken in your access arm. 5. Let blood be drawn from your access arm. For the next 24 hours, DO NOT Drive, Drink Alcoholic Beverages, or Make IMPORTANT Decisions. Follow-Up Instructions:  Please see your ordering doctor as he/she has requested. To Reach Us:  
THROMBECTOMY/FISTULAPLASTY DISCHARGE INSTRUCTIONS General Information: This procedure has been done in order to improve blood flow through your dialysis access. The procedure is designed to remove clots and/or to enlarge the narrowed areas of your dialysis access. Home Care Instructions: You can resume your regular diet and medication regimen. Do not drink alcohol, drive, or make any important legal decisions in the next 24 hours. Watch the site carefully for signs of infection. You may have some tenderness at your graft site. You make take Tylenol (acetaminophen) for this discomfort. Do not carry anything heavier than a gallon of milk. Do not wear any tight clothing on this arm, including elastic cuffs and/or tight watches.  Do not allow anyone to take a blood pressure or draw blood from this extremity. You should elevate this arm on pillows to help with any swelling. Do not sleep on this arm with the graft, or fold it under your head while you sleep. Feel your graft every day to see if you can feel a thrill (pulsation). Call If: 
   You should call your Physician and/or the Radiology Nurse if you have any signs of infection like fever, drainage, redness, or increased pain at the graft site. Call your doctor or dialysis center immediately if you do not feel a thrill on your graft/fistula, or if you have a change in color of this extremity. Call 911 and seek immediate medical attention if you start bleeding from your graft or fistula. Apply pressure to the graft, but only enough pressure to control the bleeding. Armand the time you start holding pressure and let medical personnel know. Follow-Up Instructions: Please see your ordering doctor as he/she has requested. You may not go to dialysis today, except with special written permission from you doctor. You may go to dialysis tomorrow, and resume your normal dialysis schedule. To Reach Us: If you have any questions about your procedure, please call the Interventional Radiology department at [phone]. After business hours (5pm) and weekends, call the answering service at [phone] and ask for the Radiologist on call to be paged. Patient Signature: 
Date: 4/13/2017 Discharging Nurse: Bonnie Thornton RN Discharge Orders None Introducing [facility] SERVICES! Graciela Jimenez introduces T[employer] patient portal. Now you can access parts of your medical record, email your doctor's office, and request medication refills online. 1. In your internet browser, go to [email or web address] 2. Click on the First Time User? Click Here link in the Sign In box. You will see the New Member Sign Up page. 3. Enter your TMS Access Code exactly as it appears below.  You will not need to use this code after youve completed the sign-up process. If you do not sign up before the expiration date, you must request a new code. · [employer] Access Code: [email or web address] Expires: 5/8/2017  4:35 PM 
 
4. Enter the last four digits of your Social Security Number (xxxx) and Date of Birth (mm/dd/yyyy) as indicated and click Submit. You will be taken to the next sign-up page. 5. Create a [employer] ID. This will be your [employer] login ID and cannot be changed, so think of one that is secure and easy to remember. 6. Create a [employer] password. You can change your password at any time. 7. Enter your Password Reset Question and Answer. This can be used at a later time if you forget your password. 8. Enter your e-mail address. You will receive e-mail notification when new information is available in [address] 9. Click Sign Up. You can now view and download portions of your medical record. 10. Click the Download Summary menu link to download a portable copy of your medical information. If you have questions, please visit the Frequently Asked Questions section of the [employer] website. Remember, [employer] is NOT to be used for urgent needs. For medical emergencies, dial 911. Now available from your iPhone and Android! General Information Please provide this summary of care documentation to your next provider. Patient Signature:  ____________________________________________________________ Date:  ____________________________________________________________  
  
Charanjit Harrington Provider Signature:  ____________________________________________________________ Date:  ____________________________________________________________

## 2017-07-25 ENCOUNTER — HOSPITAL ENCOUNTER (OUTPATIENT)
Dept: INTERVENTIONAL RADIOLOGY/VASCULAR | Age: 67
Discharge: HOME | End: 2017-07-25
Attending: RADIOLOGY
Payer: MEDICARE

## 2017-07-25 VITALS
RESPIRATION RATE: 17 BRPM | BODY MASS INDEX: 36.96 KG/M2 | OXYGEN SATURATION: 97 % | WEIGHT: 230 LBS | SYSTOLIC BLOOD PRESSURE: 117 MMHG | DIASTOLIC BLOOD PRESSURE: 58 MMHG | TEMPERATURE: 98.8 F | HEART RATE: 80 BPM | HEIGHT: 66 IN

## 2017-07-25 DIAGNOSIS — N18.6 ESRD (END STAGE RENAL DISEASE) (HCC): ICD-10-CM

## 2017-07-25 PROCEDURE — 99153 MOD SED SAME PHYS/QHP EA: CPT

## 2017-07-25 PROCEDURE — C1894 INTRO/SHEATH, NON-LASER: HCPCS

## 2017-07-25 PROCEDURE — 74011636320 HC RX REV CODE- 636/320: Performed by: RADIOLOGY

## 2017-07-25 PROCEDURE — 36902 INTRO CATH DIALYSIS CIRCUIT: CPT

## 2017-07-25 PROCEDURE — C1725 CATH, TRANSLUMIN NON-LASER: HCPCS

## 2017-07-25 PROCEDURE — 77030021532 HC CATH ANGI DX IMPRS MRTM -B

## 2017-07-25 PROCEDURE — C1769 GUIDE WIRE: HCPCS

## 2017-07-25 PROCEDURE — 74011250636 HC RX REV CODE- 250/636

## 2017-07-25 PROCEDURE — 99152 MOD SED SAME PHYS/QHP 5/>YRS: CPT

## 2017-07-25 PROCEDURE — 74011250636 HC RX REV CODE- 250/636: Performed by: RADIOLOGY

## 2017-07-25 PROCEDURE — 74011000250 HC RX REV CODE- 250: Performed by: RADIOLOGY

## 2017-07-25 RX ORDER — DIPHENHYDRAMINE HYDROCHLORIDE 50 MG/ML
25-50 INJECTION, SOLUTION INTRAMUSCULAR; INTRAVENOUS ONCE
Status: COMPLETED | OUTPATIENT
Start: 2017-07-25 | End: 2017-07-25

## 2017-07-25 RX ORDER — LIDOCAINE HYDROCHLORIDE 20 MG/ML
1-10 INJECTION, SOLUTION INFILTRATION; PERINEURAL ONCE
Status: COMPLETED | OUTPATIENT
Start: 2017-07-25 | End: 2017-07-25

## 2017-07-25 RX ORDER — HYDROCODONE BITARTRATE AND ACETAMINOPHEN 7.5; 325 MG/1; MG/1
1 TABLET ORAL
Status: DISCONTINUED | OUTPATIENT
Start: 2017-07-25 | End: 2017-07-29 | Stop reason: HOSPADM

## 2017-07-25 RX ORDER — FENTANYL CITRATE 50 UG/ML
25-50 INJECTION, SOLUTION INTRAMUSCULAR; INTRAVENOUS
Status: DISCONTINUED | OUTPATIENT
Start: 2017-07-25 | End: 2017-07-29 | Stop reason: HOSPADM

## 2017-07-25 RX ORDER — MIDAZOLAM HYDROCHLORIDE 1 MG/ML
.5-2 INJECTION, SOLUTION INTRAMUSCULAR; INTRAVENOUS
Status: DISCONTINUED | OUTPATIENT
Start: 2017-07-25 | End: 2017-07-29 | Stop reason: HOSPADM

## 2017-07-25 RX ORDER — HEPARIN SODIUM 200 [USP'U]/100ML
1000 INJECTION, SOLUTION INTRAVENOUS CONTINUOUS
Status: DISCONTINUED | OUTPATIENT
Start: 2017-07-25 | End: 2017-07-29 | Stop reason: HOSPADM

## 2017-07-25 RX ADMIN — HEPARIN SODIUM 2000 UNITS: 200 INJECTION, SOLUTION INTRAVENOUS at 12:50

## 2017-07-25 RX ADMIN — MIDAZOLAM HYDROCHLORIDE 1 MG: 1 INJECTION, SOLUTION INTRAMUSCULAR; INTRAVENOUS at 13:06

## 2017-07-25 RX ADMIN — FENTANYL CITRATE 50 MCG: 50 INJECTION, SOLUTION INTRAMUSCULAR; INTRAVENOUS at 12:56

## 2017-07-25 RX ADMIN — MIDAZOLAM HYDROCHLORIDE 1 MG: 1 INJECTION, SOLUTION INTRAMUSCULAR; INTRAVENOUS at 12:56

## 2017-07-25 RX ADMIN — SODIUM BICARBONATE 1 ML: 0.2 INJECTION, SOLUTION INTRAVENOUS at 12:49

## 2017-07-25 RX ADMIN — FENTANYL CITRATE 50 MCG: 50 INJECTION, SOLUTION INTRAMUSCULAR; INTRAVENOUS at 12:46

## 2017-07-25 RX ADMIN — DIPHENHYDRAMINE HYDROCHLORIDE 50 MG: 50 INJECTION, SOLUTION INTRAMUSCULAR; INTRAVENOUS at 12:47

## 2017-07-25 RX ADMIN — FENTANYL CITRATE 50 MCG: 50 INJECTION, SOLUTION INTRAMUSCULAR; INTRAVENOUS at 13:07

## 2017-07-25 RX ADMIN — MIDAZOLAM HYDROCHLORIDE 1 MG: 1 INJECTION, SOLUTION INTRAMUSCULAR; INTRAVENOUS at 12:45

## 2017-07-25 RX ADMIN — LIDOCAINE HYDROCHLORIDE 40 MG: 20 INJECTION, SOLUTION INFILTRATION; PERINEURAL at 12:47

## 2017-07-25 RX ADMIN — IOPAMIDOL 50 ML: 755 INJECTION, SOLUTION INTRAVENOUS at 13:10

## 2017-07-25 NOTE — IP AVS SNAPSHOT
Kal Young 
 
 
 [address] 
[phone] Patient: Ariana Fischer MRN: [id number] VANDANA:41[birth year] You are allergic to the following Allergen Reactions Pcn (Penicillins) Rash Vancomycin Rash Recent Documentation Height Weight BMI OB Status Smoking Status 1.664 m 104.3 kg 37.69 kg/m2 Hysterectomy Never Smoker Emergency Contacts Name Discharge Info Relation Home Work Mobile [place] Luci  Child [2]   [phone] Nicky Cedeno  Sister [23] [phone] About your hospitalization You were admitted on:  July 25, 2017 You last received care in the:  [facility] Radiology Specials You were discharged on:  July 25, 2017 Unit phone number:  [phone] Why you were hospitalized Your primary diagnosis was:  Not on File Providers Seen During Your Hospitalizations Provider Role Specialty Primary office phone Sienna Nielsen MD Attending Provider Radiology [phone] Your Primary Care Physician (PCP) Primary Care Physician Office Phone Office Fax [place] [phone] Follow-up Information None Current Discharge Medication List  
  
ASK your doctor about these medications Dose & Instructions Dispensing Information Comments Morning Noon Evening Bedtime  
 amLODIPine 10 mg tablet Commonly known as:  Yoselin Kaminski Your last dose was: Your next dose is: Take  by mouth daily. Refills:  0  
     
   
   
   
  
 aspirin 81 mg chewable tablet Your last dose was: Your next dose is:    
   
   
 Dose:  81 mg Take 1 Tab by mouth daily. Quantity:  1 Tab Refills:  0  
     
   
   
   
  
 butalbital-acetaminophen-caff -40 mg per capsule Commonly known as:  [employer] Your last dose was: Your next dose is:    
   
   
 Dose:  1-2 Cap Take 1-2 Caps by mouth every six (6) hours as needed for Pain. Max Daily Amount: 8 Caps. Quantity:  20 Cap Refills:  0  
     
   
   
   
  
 cinacalcet 30 mg tablet Commonly known as:  SENSIPAR Your last dose was: Your next dose is:    
   
   
 Dose:  30 mg Take 30 mg by mouth nightly. Refills:  0  
     
   
   
   
  
 diphenoxylate-atropine 2.5-0.025 mg per tablet Commonly known as:  LOMOTIL Your last dose was: Your next dose is:    
   
   
 Dose:  2 Tab Take 2 Tabs by mouth four (4) times daily as needed for Diarrhea. Max Daily Amount: 8 Tabs. Quantity:  20 Tab Refills:  0  
     
   
   
   
  
 furosemide 80 mg tablet Commonly known as:  LASIX Your last dose was: Your next dose is:    
   
   
 Dose:  80 mg Take 80 mg by mouth two (2) times a day. Indications: EDEMA, HYPERTENSION Refills:  0 HYDROcodone-acetaminophen 5-325 mg per tablet Commonly known as:  Marvelyn Code Your last dose was: Your next dose is:    
   
   
 Dose:  1 Tab Take 1 Tab by mouth every four (4) hours as needed for Pain. Max Daily Amount: 6 Tabs. Quantity:  12 Tab Refills:  0 JANUVIA 100 mg tablet Generic drug:  SITagliptin Your last dose was: Your next dose is:    
   
   
 Dose:  50 mg Take 50 mg by mouth every morning. Indications: TYPE 2 DIABETES MELLITUS Refills:  0  
     
   
   
   
  
 lisinopril 40 mg tablet Commonly known as:  [place] Debra Your last dose was: Your next dose is:    
   
   
 Dose:  40 mg Take 40 mg by mouth nightly. Indications: HYPERTENSION Refills:  0  
     
   
   
   
  
 minoxidil 2.5 mg tablet Commonly known as:  Radha American Your last dose was: Your next dose is:    
   
   
 Dose:  7.5 mg Take 7.5 mg by mouth two (2) times a day. Indications: HYPERTENSION  Refills:  0  
     
   
   
   
  
 OCUVITE PO  
 Your last dose was: Your next dose is: Take  by mouth nightly. Refills:  0  
     
   
   
   
  
 ondansetron hcl 8 mg tablet Commonly known as:  ZOFRAN (AS HYDROCHLORIDE) Your last dose was: Your next dose is:    
   
   
 Dose:  8 mg Take 1 Tab by mouth every eight (8) hours as needed for Nausea. Quantity:  10 Tab Refills:  0  
     
   
   
   
  
 promethazine 25 mg tablet Commonly known as:  PHENERGAN Your last dose was: Your next dose is:    
   
   
 Dose:  25 mg Take 1 Tab by mouth every six (6) hours as needed for Nausea. Quantity:  12 Tab Refills:  0  
     
   
   
   
  
 RENVELA 800 mg Tab tab Generic drug:  sevelamer carbonate Your last dose was: Your next dose is:    
   
   
 Dose:  800 mg Take 800 mg by mouth three (3) times daily (with meals). 5 tablets with meals/ 2 with snacks Sometimes only eats 2 meals/d Refills:  0 Discharge Instructions [address] Department of Interventional Radiology 
[phone] Office 
[phone] Fax ARTERIOVENOUS FISTULA, GRAFT ACCESS, REVISION, OR DECLOT 
 
ACTIVITY: 
Limited activity today. Keep access arm straight and raised above the level of your heart for 24 hours or until the swelling goes away. DIET: 
May have your usual food, unless told otherwise by your doctor. PAIN: 
Use the prescription for pain medicine your doctor gave you. If you do not have one, usual your normal pain medicine. If this does not control your pain, call your doctor. DO NOT TAKE ASPIRIN OR MEDICINE WITH ASPIRIN IN IT, unless your doctor says you may. DRESSING CARE:  
Keep your dressing clean and dry. Leave your dressing on until the Dialysis Nurse or your doctor takes it off. BLEEDING: If you have any bleeding put pressure over the bleeding area and call your doctor. CARE OF YOUR ACCESS ARM: 
You may have some bruising, swelling, and discoloration for several weeks. After the swelling has gone down, you will be able to see the outline of the graft. By placing your fingertips over the graft you will be able to feel a vibration (thrill) that means blood is flowing and the graft is working. DO: 
1. Make sure your arm is washed with soap and water every day. 2. Feel for the [address] at area marked in the picture. 3. Call your Kidney doctor if you do not feel the [address] or for any problems like swelling, redness, tingling, or numbness in access arm, pus, or fever over 101. DO NOT: 
1. Wear tight sleeves, watches, belts, or bracelets over the graft. 2. Carry heavy bags across the graft or fistula. 3. Sleep on your graft side. 4. Let your blood pressure be taken in your access arm. 5. Let blood be drawn from your access arm. For the next 24 hours, DO NOT Drive, Drink Alcoholic Beverages, or Make IMPORTANT Decisions. Follow-Up Instructions:  Please see your ordering doctor as he/she has requested. To Reach Us: If you have any questions about your procedure, please call the Interventional Radiology department at [phone]. After business hours (5pm) and weekends, call the answering service at [phone] and ask for the Radiologist on call to be paged. Interventional Radiology General Nurse Discharge After general anesthesia or intravenous sedation, for 24 hours or while taking prescription Narcotics: · Limit your activities · Do not drive and operate hazardous machinery · Do not make important personal or business decisions · Do  not drink alcoholic beverages · If you have not urinated within 8 hours after discharge, please contact your surgeon on call. * Please give a list of your current medications to your Primary Care Provider.  
* Please update this list whenever your medications are discontinued, doses are 
 changed, or new medications (including over-the-counter products) are added. * Please carry medication information at all times in case of emergency situations. These are general instructions for a healthy lifestyle: No smoking/ No tobacco products/ Avoid exposure to second hand smoke Surgeon General's Warning:  Quitting smoking now greatly reduces serious risk to your health. Obesity, smoking, and sedentary lifestyle greatly increases your risk for illness A healthy diet, regular physical exercise & weight monitoring are important for maintaining a healthy lifestyle You may be retaining fluid if you have a history of heart failure or if you experience any of the following symptoms:  Weight gain of 3 pounds or more overnight or 5 pounds in a week, increased swelling in our hands or feet or shortness of breath while lying flat in bed. Please call your doctor as soon as you notice any of these symptoms; do not wait until your next office visit. Recognize signs and symptoms of STROKE: 
F-face looks uneven A-arms unable to move or move unevenly S-speech slurred or non-existent T-time-call 911 as soon as signs and symptoms begin-DO NOT go Back to bed or wait to see if you get better-TIME IS BRAIN. Discharge Orders None Introducing [facility] & HEALTH SERVICES! Alpa Lynch introduces [employer] patient portal. Now you can access parts of your medical record, email your doctor's office, and request medication refills online. 1. In your internet browser, go to [email or web address] 2. Click on the First Time User? Click Here link in the Sign In box. You will see the New Member Sign Up page. 3. Enter your [employer] Access Code exactly as it appears below. You will not need to use this code after youve completed the sign-up process. If you do not sign up before the expiration date, you must request a new code.  
 
· [employer] Access Code: [email or web address] 
 Expires: 8/5/2017  4:20 PM 
 
4. Enter the last four digits of your Social Security Number (xxxx) and Date of Birth (mm/dd/yyyy) as indicated and click Submit. You will be taken to the next sign-up page. 5. Create a [employer] ID. This will be your [employer] login ID and cannot be changed, so think of one that is secure and easy to remember. 6. Create a [employer] password. You can change your password at any time. 7. Enter your Password Reset Question and Answer. This can be used at a later time if you forget your password. 8. Enter your e-mail address. You will receive e-mail notification when new information is available in [address] 9. Click Sign Up. You can now view and download portions of your medical record. 10. Click the Download Summary menu link to download a portable copy of your medical information. If you have questions, please visit the Frequently Asked Questions section of the [employer] website. Remember, [employer] is NOT to be used for urgent needs. For medical emergencies, dial 911. Now available from your iPhone and Android! General Information Please provide this summary of care documentation to your next provider. Patient Signature:  ____________________________________________________________ Date:  ____________________________________________________________  
  
Arna Hossein Provider Signature:  ____________________________________________________________ Date:  ____________________________________________________________

## 2017-07-25 NOTE — H&P
Department of Interventional Radiology  [phone]    History and Physical    Patient: Pita Walls MRN:  [id number]  SSN:  [id number]    YOB: 1950  Age:  73 y.o. Sex:  female      Primary Care Provider:  Cecilia Cintron MD  Referring Physician:  Kaushik Cho MD    Subjective:     Chief Complaint: ESRD    History of the Present Illness: The patient is a 73 y.o. female who presents for RUE fistulogram.  Recurrent outflow stenosis requiring angioplasty. Prolong bleeding yesterday following dialysis. NPO. Past Medical History:   Diagnosis Date    Arthritis     AVF (arteriovenous fistula) ([facility] [address] 12/20/2016 12/6/16 ([facility]) Right AVF revision and thrombectomy    Cancer ([facility] [address] 2015    L kidney    Chronic kidney disease     HD in M-W-F- at [place] dialysis    Degenerative joint disease     Diabetes ([facility] [address]     type 2, average range 100-120 fasting, symptomatic below 90; last A1C?    ESRD (end stage renal disease) ([facility] [address] Nov 2006    ESRD. MWF dialysis     Hypertension     Obesity     Transient ischemic attack 8/29/2015     Past Surgical History:   Procedure Laterality Date    BREAST SURGERY PROCEDURE UNLISTED Right     cyst removed    HX GI      colon resection resulting in temporary colostomy reversal    HX GYN      stephan    HX OTHER SURGICAL      dialysis fistula, several permcaths    HX UROLOGICAL Left July 2015    nephrectomy    HX VASCULAR ACCESS          Review of Systems:    Pertinent items are noted in the History of Present Illness. Current Outpatient Prescriptions   Medication Sig    amLODIPine (NORVASC) 10 mg tablet Take  by mouth daily.  VIT C/VIT E/LUTEIN/MIN/OMEGA-3 (OCUVITE PO) Take  by mouth nightly.  cinacalcet (SENSIPAR) 30 mg tablet Take 30 mg by mouth nightly.  minoxidil (LONITEN) 2.5 mg tablet Take 7.5 mg by mouth two (2) times a day. Indications: HYPERTENSION    aspirin 81 mg chewable tablet Take 1 Tab by mouth daily.  (Patient taking differently: Take 81 mg by mouth every morning.)    sevelamer carbonate (RENVELA) 800 mg tab tab Take 800 mg by mouth three (3) times daily (with meals). 5 tablets with meals/ 2 with snacks  Sometimes only eats 2 meals/d    lisinopril (PRINIVIL, ZESTRIL) 40 mg tablet Take 40 mg by mouth nightly. Indications: HYPERTENSION    sitaGLIPtin (JANUVIA) 100 mg tablet Take 50 mg by mouth every morning. Indications: TYPE 2 DIABETES MELLITUS    furosemide (LASIX) 80 mg tablet Take 80 mg by mouth two (2) times a day. Indications: EDEMA, HYPERTENSION    promethazine (PHENERGAN) 25 mg tablet Take 1 Tab by mouth every six (6) hours as needed for Nausea.  diphenoxylate-atropine (LOMOTIL) 2.5-0.025 mg per tablet Take 2 Tabs by mouth four (4) times daily as needed for Diarrhea. Max Daily Amount: 8 Tabs.  ondansetron hcl (ZOFRAN, AS HYDROCHLORIDE,) 8 mg tablet Take 1 Tab by mouth every eight (8) hours as needed for Nausea.  butalbital-acetaminophen-caff (FIORICET) -40 mg per capsule Take 1-2 Caps by mouth every six (6) hours as needed for Pain. Max Daily Amount: 8 Caps.  HYDROcodone-acetaminophen (NORCO) 5-325 mg per tablet Take 1 Tab by mouth every four (4) hours as needed for Pain. Max Daily Amount: 6 Tabs. No current facility-administered medications for this encounter.          Allergies   Allergen Reactions    Pcn [Penicillins] Rash    Vancomycin Rash       Family History   Problem Relation Age of Onset    Diabetes Mother     Heart Disease Mother     Hypertension Mother     Heart Disease Father     Hypertension Father     Malignant Hyperthermia Neg Hx     Pseudocholinesterase Deficiency Neg Hx     Delayed Awakening Neg Hx     Post-op Nausea/Vomiting Neg Hx     Emergence Delirium Neg Hx     Other Neg Hx     Post-op Cognitive Dysfunction Neg Hx      Social History   Substance Use Topics    Smoking status: Never Smoker    Smokeless tobacco: Never Used    Alcohol use No Objective:       Physical Examination:    Vitals:    07/25/17 1059   BP: 133/69   Pulse: 82   Temp: 98.8 °F (37.1 °C)   SpO2: 97%   Weight: 104.3 kg (230 lb)   Height: 5' 5.5\" (1.664 m)     Blood pressure 133/69, pulse 82, temperature 98.8 °F (37.1 °C), height 5' 5.5\" (1.664 m), weight 104.3 kg (230 lb), SpO2 97 %. HEART: regular rate and rhythm  LUNG: clear to auscultation bilaterally  ABDOMEN: normal findings: soft, non-tender  EXTREMITIES: warm, no edema.      Laboratory:     Lab Results   Component Value Date/Time    Sodium 141 02/07/2017 04:15 PM    Sodium 140 10/20/2016 04:10 PM    Potassium 3.8 02/07/2017 04:15 PM    Potassium 4.1 10/20/2016 04:10 PM    Chloride 101 02/07/2017 04:15 PM    Chloride 104 10/20/2016 04:10 PM    CO2 29 02/07/2017 04:15 PM    CO2 29 10/20/2016 04:10 PM    Anion gap 11 02/07/2017 04:15 PM    Anion gap 7 10/20/2016 04:10 PM    Glucose 168 02/07/2017 04:15 PM    Glucose 96 10/20/2016 04:10 PM    BUN 50 02/07/2017 04:15 PM    BUN 33 10/20/2016 04:10 PM    Creatinine 8.14 02/07/2017 04:15 PM    Creatinine 6.46 10/20/2016 04:10 PM    GFR est AA 6 02/07/2017 04:15 PM    GFR est AA 8 10/20/2016 04:10 PM    GFR est non-AA 5 02/07/2017 04:15 PM    GFR est non-AA 7 10/20/2016 04:10 PM    Calcium 8.7 02/07/2017 04:15 PM    Calcium 8.7 10/20/2016 04:10 PM    Magnesium 2.3 06/22/2014 04:59 PM    Magnesium 2.0 02/08/2013 05:25 AM    Phosphorus 4.2 06/22/2014 04:59 PM    Phosphorus 4.4 12/12/2008 09:03 PM    Albumin 4.0 02/07/2017 04:15 PM    Albumin 3.9 10/20/2016 04:10 PM    Protein, total 7.6 02/07/2017 04:15 PM    Protein, total 7.5 10/20/2016 04:10 PM    Globulin 3.6 02/07/2017 04:15 PM    Globulin 3.6 10/20/2016 04:10 PM    A-G Ratio 1.1 02/07/2017 04:15 PM    A-G Ratio 1.1 10/20/2016 04:10 PM    AST (SGOT) 23 02/07/2017 04:15 PM    AST (SGOT) 19 10/20/2016 04:10 PM    ALT (SGPT) 15 02/07/2017 04:15 PM    ALT (SGPT) 13 10/20/2016 04:10 PM     Lab Results   Component Value Date/Time WBC 9.8 02/07/2017 04:15 PM    WBC 7.3 10/20/2016 04:10 PM    HGB 10.2 02/07/2017 04:15 PM    HGB 9.2 10/20/2016 04:10 PM    HCT 29.4 02/07/2017 04:15 PM    HCT 26.6 10/20/2016 04:10 PM    PLATELET 602 96/84/2560 04:15 PM    PLATELET 448 65/47/4752 04:10 PM     Lab Results   Component Value Date/Time    aPTT 22.2 11/16/2015 12:00 AM    aPTT 27.2 01/29/2013 12:20 PM    Prothrombin time 10.2 11/16/2015 12:00 AM    Prothrombin time 10.5 08/29/2015 04:30 PM    INR 0.9 11/16/2015 12:00 AM    INR 1.0 08/29/2015 04:30 PM       Assessment:     ESRD, AV access malfunction    Plan:     Planned Procedure:  Fistulogram, probable angioplasty    Risks, benefits, and alternatives reviewed with patient and she agrees to proceed with the procedure.       Signed By: Ran Rock PA-C     July 25, 2017

## 2017-07-25 NOTE — IP AVS SNAPSHOT
Dominick Terry 
 
 
 [address] [facility] [address] [facility] 
[phone] Patient: Henna Fischer MRN: [id number] NCV:[id number] Current Discharge Medication List  
  
ASK your doctor about these medications Dose & Instructions Dispensing Information Comments Morning Noon Evening Bedtime  
 amLODIPine 10 mg tablet Commonly known as:  Kei Turner Your last dose was: Your next dose is: Take  by mouth daily. Refills:  0  
     
   
   
   
  
 aspirin 81 mg chewable tablet Your last dose was: Your next dose is:    
   
   
 Dose:  81 mg Take 1 Tab by mouth daily. Quantity:  1 Tab Refills:  0  
     
   
   
   
  
 butalbital-acetaminophen-caff -40 mg per capsule Commonly known as:  [employer] Your last dose was: Your next dose is:    
   
   
 Dose:  1-2 Cap Take 1-2 Caps by mouth every six (6) hours as needed for Pain. Max Daily Amount: 8 Caps. Quantity:  20 Cap Refills:  0  
     
   
   
   
  
 cinacalcet 30 mg tablet Commonly known as:  SENSIPAR Your last dose was: Your next dose is:    
   
   
 Dose:  30 mg Take 30 mg by mouth nightly. Refills:  0  
     
   
   
   
  
 diphenoxylate-atropine 2.5-0.025 mg per tablet Commonly known as:  LOMOTIL Your last dose was: Your next dose is:    
   
   
 Dose:  2 Tab Take 2 Tabs by mouth four (4) times daily as needed for Diarrhea. Max Daily Amount: 8 Tabs. Quantity:  20 Tab Refills:  0  
     
   
   
   
  
 furosemide 80 mg tablet Commonly known as:  LASIX Your last dose was: Your next dose is:    
   
   
 Dose:  80 mg Take 80 mg by mouth two (2) times a day. Indications: EDEMA, HYPERTENSION Refills:  0 HYDROcodone-acetaminophen 5-325 mg per tablet Commonly known as:  Billye Almira Your last dose was: Your next dose is:    
   
   
 Dose:  1 Tab Take 1 Tab by mouth every four (4) hours as needed for Pain. Max Daily Amount: 6 Tabs. Quantity:  12 Tab Refills:  0 JANUVIA 100 mg tablet Generic drug:  SITagliptin Your last dose was: Your next dose is:    
   
   
 Dose:  50 mg Take 50 mg by mouth every morning. Indications: TYPE 2 DIABETES MELLITUS Refills:  0  
     
   
   
   
  
 lisinopril 40 mg tablet Commonly known as:  Roseanna Husbands Your last dose was: Your next dose is:    
   
   
 Dose:  40 mg Take 40 mg by mouth nightly. Indications: HYPERTENSION Refills:  0  
     
   
   
   
  
 minoxidil 2.5 mg tablet Commonly known as:  Giovana Vega Your last dose was: Your next dose is:    
   
   
 Dose:  7.5 mg Take 7.5 mg by mouth two (2) times a day. Indications: HYPERTENSION Refills:  0  
     
   
   
   
  
 OCUVITE PO Your last dose was: Your next dose is: Take  by mouth nightly. Refills:  0  
     
   
   
   
  
 ondansetron hcl 8 mg tablet Commonly known as:  ZOFRAN (AS HYDROCHLORIDE) Your last dose was: Your next dose is:    
   
   
 Dose:  8 mg Take 1 Tab by mouth every eight (8) hours as needed for Nausea. Quantity:  10 Tab Refills:  0  
     
   
   
   
  
 promethazine 25 mg tablet Commonly known as:  PHENERGAN Your last dose was: Your next dose is:    
   
   
 Dose:  25 mg Take 1 Tab by mouth every six (6) hours as needed for Nausea. Quantity:  12 Tab Refills:  0  
     
   
   
   
  
 RENVELA 800 mg Tab tab Generic drug:  sevelamer carbonate Your last dose was: Your next dose is:    
   
   
 Dose:  800 mg Take 800 mg by mouth three (3) times daily (with meals). 5 tablets with meals/ 2 with snacks Sometimes only eats 2 meals/d Refills:  0

## 2017-07-25 NOTE — DISCHARGE INSTRUCTIONS
[address]  Department of Interventional Radiology  [phone] Office  [phone] Fax       ARTERIOVENOUS FISTULA, GRAFT ACCESS, REVISION, OR DECLOT    ACTIVITY:  Limited activity today. Keep access arm straight and raised above the level of your heart for 24 hours or until the swelling goes away. DIET:  May have your usual food, unless told otherwise by your doctor. PAIN:  Use the prescription for pain medicine your doctor gave you. If you do not have one, usual your normal pain medicine. If this does not control your pain, call your doctor. DO NOT TAKE ASPIRIN OR MEDICINE WITH ASPIRIN IN IT, unless your doctor says you may. DRESSING CARE:   Keep your dressing clean and dry. Leave your dressing on until the Dialysis Nurse or your doctor takes it off. BLEEDING:  If you have any bleeding put pressure over the bleeding area and call your doctor. CARE OF YOUR ACCESS ARM:  You may have some bruising, swelling, and discoloration for several weeks. After the swelling has gone down, you will be able to see the outline of the graft. By placing your fingertips over the graft you will be able to feel a vibration (thrill) that means blood is flowing and the graft is working. DO:  1. Make sure your arm is washed with soap and water every day. 2. Feel for the [address] at area marked in the picture. 3. Call your Kidney doctor if you do not feel the [address] or for any problems like swelling, redness, tingling, or numbness in access arm, pus, or fever over 101. DO NOT:  1. Wear tight sleeves, watches, belts, or bracelets over the graft. 2. Carry heavy bags across the graft or fistula. 3. Sleep on your graft side. 4. Let your blood pressure be taken in your access arm. 5. Let blood be drawn from your access arm. For the next 24 hours, DO NOT Drive, Drink Alcoholic Beverages, or Make IMPORTANT Decisions.     Follow-Up Instructions:  Please see your ordering doctor as he/she has requested. To Reach Us: If you have any questions about your procedure, please call the Interventional Radiology department at [phone]. After business hours (5pm) and weekends, call the answering service at [phone] and ask for the Radiologist on call to be paged. Interventional Radiology General Nurse Discharge    After general anesthesia or intravenous sedation, for 24 hours or while taking prescription Narcotics:  · Limit your activities  · Do not drive and operate hazardous machinery  · Do not make important personal or business decisions  · Do  not drink alcoholic beverages  · If you have not urinated within 8 hours after discharge, please contact your surgeon on call. * Please give a list of your current medications to your Primary Care Provider. * Please update this list whenever your medications are discontinued, doses are     changed, or new medications (including over-the-counter products) are added. * Please carry medication information at all times in case of emergency situations. These are general instructions for a healthy lifestyle:    No smoking/ No tobacco products/ Avoid exposure to second hand smoke  Surgeon General's Warning:  Quitting smoking now greatly reduces serious risk to your health. Obesity, smoking, and sedentary lifestyle greatly increases your risk for illness  A healthy diet, regular physical exercise & weight monitoring are important for maintaining a healthy lifestyle    You may be retaining fluid if you have a history of heart failure or if you experience any of the following symptoms:  Weight gain of 3 pounds or more overnight or 5 pounds in a week, increased swelling in our hands or feet or shortness of breath while lying flat in bed. Please call your doctor as soon as you notice any of these symptoms; do not wait until your next office visit.     Recognize signs and symptoms of STROKE:  F-face looks uneven    A-arms unable to move or move unevenly    S-speech slurred or non-existent    T-time-call 911 as soon as signs and symptoms begin-DO NOT go       Back to bed or wait to see if you get better-TIME IS BRAIN.

## 2017-07-25 NOTE — PROGRESS NOTES
IR Nurse Pre-Procedure Checklist Part 2          Consent signed: Yes    H&P complete:  Yes    Antibiotics: Not applicable    Airway/Mallampati Done: Yes    Shaved: Yes    Pregnancy Form:Not applicable    Patient Position: Yes    MD Side: Not applicable     Biopsy Worksheet: Not applicable    Specimen Medium: Not applicable

## 2017-07-25 NOTE — PROCEDURES
Interventional Radiology Brief Procedure Note    Patient: Jayce Douglas MRN: [id number]  SSN: [id number]    YOB: 1950  Age: 73 y.o. Sex: female      Date of Procedure: 7/25/2017    Pre-Procedure Diagnosis: ESRD. Recurrent stenosis in the right cephalic and brachiocephalic veins. Post-Procedure Diagnosis: SAME    Procedure(s): Venogram w PTA    Brief Description of Procedure: as above    Performed By: Josseline Rodriguez MD     Assistants: None    Anesthesia: Moderate Sedation    Estimated Blood Loss: Less than 10ml    Specimens: None    Implants: None    Findings: stenosis in the AVgraft, cephalic vein, brachiocephalic vein. Complications: None    Recommendations: 1 hour bedrest.       Follow Up: 4 months.       Signed By: Josseline Rodriguez MD     July 25, 2017

## 2017-07-25 NOTE — PROGRESS NOTES
After having reviewed written discharge instructions and not having any questions patient was wheeled to lobby to waiting vehicle being driven by family for ride home

## 2017-07-25 NOTE — PROGRESS NOTES
TRANSFER - OUT REPORT:    Verbal report given to Holly Blum RN on [address]  being transferred to IR Recovery for routine post - op       Report consisted of patients Situation, Background, Assessment and   Recommendations(SBAR). Information from the following report(s) SBAR, Kardex, Procedure Summary, Intake/Output and MAR was reviewed with the receiving nurse. Lines:   Peripheral IV 11/11/16 Left Forearm (Active)        Opportunity for questions and clarification was provided.

## 2017-08-29 ENCOUNTER — HOSPITAL ENCOUNTER (EMERGENCY)
Age: 67
Discharge: HOME | End: 2017-08-29
Attending: EMERGENCY MEDICINE
Payer: MEDICARE

## 2017-08-29 VITALS
SYSTOLIC BLOOD PRESSURE: 125 MMHG | WEIGHT: 230 LBS | OXYGEN SATURATION: 97 % | TEMPERATURE: 98.4 F | HEART RATE: 92 BPM | RESPIRATION RATE: 18 BRPM | DIASTOLIC BLOOD PRESSURE: 68 MMHG | BODY MASS INDEX: 38.32 KG/M2 | HEIGHT: 65 IN

## 2017-08-29 DIAGNOSIS — R59.9 ADENOPATHY: Primary | ICD-10-CM

## 2017-08-29 PROCEDURE — 99283 EMERGENCY DEPT VISIT LOW MDM: CPT | Performed by: EMERGENCY MEDICINE

## 2017-08-30 NOTE — DISCHARGE INSTRUCTIONS
Swollen Lymph Nodes: Care Instructions  Your Care Instructions  Lymph nodes are small, bean-shaped glands throughout the body. They help your body fight germs and infections. Lymph nodes often swell when there is a problem such as an injury, infection, or tumor. · The nodes in your neck, under your chin, or behind your ears may swell when you have a cold or sore throat. · An injury or infection in a leg or foot can make the nodes in your groin swell. · Sometimes medicine can make lymph nodes swell, but this is rare. Treatment depends on what caused your nodes to swell. Usually the nodes return to normal size without a problem. Follow-up care is a key part of your treatment and safety. Be sure to make and go to all appointments, and call your doctor if you are having problems. Its also a good idea to know your test results and keep a list of the medicines you take. How can you care for yourself at home? · Take your medicines exactly as prescribed. Call your doctor if you think you are having a problem with your medicine. · Avoid irritation. ¨ Do not squeeze or pick at the lump. ¨ Do not stick a needle in it. · Prevent infection. Do not squeeze, drain, or puncture a painful lump. Doing this can irritate or inflame the lump, push any existing infection deeper into the skin, or cause severe bleeding. · Get extra rest. Slow down just a little from your usual routine. · Drink plenty of fluids, enough so that your urine is light yellow or clear like water. If you have kidney, heart, or liver disease and have to limit fluids, talk with your doctor before you increase the amount of fluids you drink. · Take an over-the-counter pain medicine, such as acetaminophen (Tylenol), ibuprofen (Advil, Motrin), or naproxen (Aleve). Read and follow all instructions on the label. · Do not take two or more pain medicines at the same time unless the doctor told you to.  Many pain medicines have acetaminophen, which is Tylenol. Too much acetaminophen (Tylenol) can be harmful. When should you call for help? Watch closely for changes in your health, and be sure to contact your doctor if:  · Your lymph nodes do not get smaller, or they get bigger. · The area becomes red and feels tender. · The nodes feel hard and do not move when you push on them. · You have a fever of 100° F.  · You have night sweats. · You lose weight without trying. Where can you learn more? Go to http://felisa-radha.info/. Enter O816 in the search box to learn more about \"Swollen Lymph Nodes: Care Instructions. \"  Current as of: March 3, 2017  Content Version: 11.3  © 0714-6211 [employer]. Care instructions adapted under license by [employer] (which disclaims liability or warranty for this information). If you have questions about a medical condition or this instruction, always ask your healthcare professional. [place] [address] any warranty or liability for your use of this information.

## 2017-08-30 NOTE — ED PROVIDER NOTES
HPI Comments: Patient complains of a knot in the back of her neck and scalp that is painful and causing sharp intermittent pains up into her left side of her head. Denies any fever. Denies any injury. Patient is a 73 y.o. female presenting with cyst. The history is provided by the patient. Cyst    This is a new problem. The current episode started more than 1 week ago. The problem has not changed since onset. The problem is associated with an unknown factor. There has been no fever. Affected Location: left side of the neck posteriorly and in the scalp region. The pain is moderate. The pain has been constant since onset. Associated symptoms include pain. Past Medical History:   Diagnosis Date    Arthritis     AVF (arteriovenous fistula) ([facility] [address] 12/20/2016 12/6/16 ([facility]) Right AVF revision and thrombectomy    Cancer ([facility] [address] 2015    L kidney    Chronic kidney disease     HD in M-W-F- at [place] dialysis    Degenerative joint disease     Diabetes ([facility] [address]     type 2, average range 100-120 fasting, symptomatic below 90; last A1C?    ESRD (end stage renal disease) ([facility] [address] Nov 2006    ESRD.  MWF dialysis     Hypertension     Obesity     Transient ischemic attack 8/29/2015       Past Surgical History:   Procedure Laterality Date    BREAST SURGERY PROCEDURE UNLISTED Right     cyst removed    HX GI      colon resection resulting in temporary colostomy reversal    HX GYN      stepahn    HX OTHER SURGICAL      dialysis fistula, several permcaths    HX UROLOGICAL Left July 2015    nephrectomy    HX VASCULAR ACCESS           Family History:   Problem Relation Age of Onset    Diabetes Mother     Heart Disease Mother     Hypertension Mother     Heart Disease Father     Hypertension Father     Malignant Hyperthermia Neg Hx     Pseudocholinesterase Deficiency Neg Hx     Delayed Awakening Neg Hx     Post-op Nausea/Vomiting Neg Hx     Emergence Delirium Neg Hx     Other Neg Hx     Post-op Cognitive Dysfunction Neg Hx        Social History     Social History    Marital status:      Spouse name: N/A    Number of children: N/A    Years of education: N/A     Occupational History    Not on file. Social History Main Topics    Smoking status: Never Smoker    Smokeless tobacco: Never Used    Alcohol use No    Drug use: No    Sexual activity: Not Currently     Other Topics Concern    Not on file     Social History Narrative         ALLERGIES: Pcn [penicillins] and Vancomycin    Review of Systems   Constitutional: Negative for fever. HENT: Negative for congestion and rhinorrhea. Gastrointestinal: Negative for nausea and vomiting. Musculoskeletal: Positive for neck pain. Negative for back pain. Neurological: Positive for headaches. Hematological: Negative for adenopathy (denies other knot's or swollen nodes). Does not bruise/bleed easily. Vitals:    08/29/17 2012   BP: 127/67   Pulse: 94   Resp: 18   Temp: 98.4 °F (36.9 °C)   SpO2: 96%   Weight: 104.3 kg (230 lb)   Height: 5' 5\" (1.651 m)            Physical Exam   Constitutional: She is oriented to person, place, and time. She appears well-developed and well-nourished. Patient in no distress as I approach her in the mooney that seems to appear more distressed when I ask her name and asked her what the problem is. HENT:   Mouth/Throat: Oropharynx is clear and moist. No oropharyngeal exudate. Eyes: Pupils are equal, round, and reactive to light. Neck: Normal range of motion. Neck supple. 1 1.5 cm posterior auricular or occipital lymph node versus cyst present. No warmth or erythema. Tender to palpation. Cardiovascular: Normal rate, regular rhythm, normal heart sounds and intact distal pulses. Pulmonary/Chest: Effort normal and breath sounds normal.   Abdominal: Soft. She exhibits no distension. There is no tenderness. Musculoskeletal: Normal range of motion. Lymphadenopathy:     She has no cervical adenopathy. Neurological: She is alert and oriented to person, place, and time. She has normal strength. No cranial nerve deficit or sensory deficit. Nursing note and vitals reviewed. MDM  Number of Diagnoses or Management Options  Diagnosis management comments: Subcutaneous cyst or inclusion cyst versus isolated lymph node. No other lymphadenitis in cervical spine axilla femoral region or epitrochlear region is noted. No supraclavicular nodes noted. Intermittent sharp pains admitting from this area and radiating to the head but not currently. Needs continued monitoring with her PCP. Tylenol for pain.     ED Course       Procedures

## 2017-08-30 NOTE — ED NOTES
Pt had to be woke up to be discharged. Pt discharged instructions given pt v\u however is not happy with the care she received because she did not get any pain medication. Pt in not acute distress at discharge. Pt ambulatory on discharge.

## 2017-09-01 ENCOUNTER — HOSPITAL ENCOUNTER (EMERGENCY)
Age: 67
Discharge: HOME | End: 2017-09-01
Attending: EMERGENCY MEDICINE
Payer: MEDICARE

## 2017-09-01 VITALS
HEIGHT: 65 IN | DIASTOLIC BLOOD PRESSURE: 89 MMHG | WEIGHT: 230 LBS | BODY MASS INDEX: 38.32 KG/M2 | HEART RATE: 93 BPM | OXYGEN SATURATION: 100 % | RESPIRATION RATE: 20 BRPM | SYSTOLIC BLOOD PRESSURE: 139 MMHG | TEMPERATURE: 98.3 F

## 2017-09-01 DIAGNOSIS — B02.9 HERPES ZOSTER WITHOUT COMPLICATION: Primary | ICD-10-CM

## 2017-09-01 PROCEDURE — 74011250637 HC RX REV CODE- 250/637: Performed by: NURSE PRACTITIONER

## 2017-09-01 PROCEDURE — 99283 EMERGENCY DEPT VISIT LOW MDM: CPT | Performed by: NURSE PRACTITIONER

## 2017-09-01 RX ORDER — HYDROCODONE BITARTRATE AND ACETAMINOPHEN 5; 325 MG/1; MG/1
1 TABLET ORAL
Status: COMPLETED | OUTPATIENT
Start: 2017-09-01 | End: 2017-09-01

## 2017-09-01 RX ORDER — SENNOSIDES 25 MG/1
TABLET, FILM COATED ORAL
Qty: 1 TUBE | Refills: 0 | Status: ON HOLD | OUTPATIENT
Start: 2017-09-01 | End: 2022-02-03

## 2017-09-01 RX ORDER — HYDROCODONE BITARTRATE AND ACETAMINOPHEN 5; 325 MG/1; MG/1
1 TABLET ORAL
Qty: 20 TAB | Refills: 0 | Status: ON HOLD | OUTPATIENT
Start: 2017-09-01 | End: 2017-09-16

## 2017-09-01 RX ORDER — ACYCLOVIR 800 MG/1
800 TABLET ORAL
Qty: 35 TAB | Refills: 0 | Status: SHIPPED | OUTPATIENT
Start: 2017-09-01 | End: 2017-09-08

## 2017-09-01 RX ADMIN — HYDROCODONE BITARTRATE AND ACETAMINOPHEN 1 TABLET: 5; 325 TABLET ORAL at 12:59

## 2017-09-01 NOTE — Clinical Note
Take medication as prescribed Mancil Wing will make you drowsy. Please do not take medication and drive, operate machinery, or try to perform activities that require mental alertness until you know how medication will affect you Follow up with your primary c are provider or return to the Emergency Department for any new or worse symptoms.

## 2017-09-01 NOTE — ED TRIAGE NOTES
Pt arrive c/o skin problem/blisters to left side of neck and left side of head with severe pain. Pt states symptoms started Wednesday.

## 2017-09-01 NOTE — DISCHARGE INSTRUCTIONS
Shingles: Care Instructions  Your Care Instructions    Shingles (herpes zoster) causes pain and a blistered rash. The rash can appear anywhere on the body but will be on only one side of the body, the left or right. It will be in a band, a strip, or a small area. The pain can be very severe. Shingles can also cause tingling or itching in the area of the rash. The blisters scab over after a few days and heal in 2 to 4 weeks. Medicines can help you feel better and may help prevent more serious problems caused by shingles. Shingles is caused by the same virus that causes chickenpox. When you have chickenpox, the virus gets into your nerve roots and stays there (becomes dormant) long after you get over the chickenpox. If the virus becomes active again, it can cause shingles. Follow-up care is a key part of your treatment and safety. Be sure to make and go to all appointments, and call your doctor if you are having problems. It's also a good idea to know your test results and keep a list of the medicines you take. How can you care for yourself at home? · Be safe with medicines. Take your medicines exactly as prescribed. Call your doctor if you think you are having a problem with your medicine. Antiviral medicine helps you get better faster. · Try not to scratch or pick at the blisters. They will crust over and fall off on their own if you leave them alone. · Put cool, wet cloths on the area to relieve pain and itching. You can also use calamine lotion. Try not to use so much lotion that it cakes and is hard to get off. · Put cornstarch or baking soda on the sores to help dry them out so they heal faster. · Do not use thick ointment, such as petroleum jelly, on the sores. This will keep them from drying and healing. · To help remove loose crusts, soak them in tap water. This can help decrease oozing, and dry and soothe the skin.   · Take an over-the-counter pain medicine, such as acetaminophen (Tylenol), ibuprofen (Advil, Motrin), or naproxen (Aleve). Read and follow all instructions on the label. · Avoid close contact with people until the blisters have healed. It is very important for you to avoid contact with anyone who has never had chickenpox or the chickenpox vaccine. Pregnant women, young babies, and anyone else who has a hard time fighting infection (such as someone with HIV, diabetes, or cancer) is especially at risk. When should you call for help? Call your doctor now or seek immediate medical care if:  · You have a new or higher fever. · You have a severe headache and a stiff neck. · You lose the ability to think clearly. · The rash spreads to your forehead, nose, eyes, or eyelids. · You have eye pain, or your vision gets worse. · You have new pain in your face, or you cannot move the muscles in your face. · Blisters spread to new parts of your body. Watch closely for changes in your health, and be sure to contact your doctor if:  · The rash has not healed after 2 to 4 weeks. · You still have pain after the rash has healed. Where can you learn more? Go to http://felisa-radha.info/. Ervin Rust in the search box to learn more about \"Shingles: Care Instructions. \"  Current as of: March 3, 2017  Content Version: 11.3  © 4959-8795 [employer]. Care instructions adapted under license by [employer] (which disclaims liability or warranty for this information). If you have questions about a medical condition or this instruction, always ask your healthcare professional. [place] [address] any warranty or liability for your use of this information.

## 2017-09-01 NOTE — ED PROVIDER NOTES
HPI Comments: Patient presents with linear rash to the right side of her neck and the back of her head. She states rash and pain started yesterday. She denies fever. Patient is a 73 y.o. female presenting with skin problem. The history is provided by the patient. Skin Problem    This is a new problem. The current episode started yesterday. The problem has been gradually worsening. The problem is associated with nothing. There has been no fever. The rash is present on the neck and head. The pain is at a severity of 10/10. The pain is moderate. The pain has been constant since onset. Associated symptoms include blisters and pain. She has tried nothing for the symptoms. Past Medical History:   Diagnosis Date    Arthritis     AVF [facility]arteriovenous fistula) ([facility] [address] 12/20/2016 12/6/16 (S) Right AVF revision and thrombectomy    Cancer ([facility] [address] 2015    L kidney    Chronic kidney disease     HD in M-W-F- at [place] dialysis    Degenerative joint disease     Diabetes ([facility] [address]     type 2, average range 100-120 fasting, symptomatic below 90; last A1C?    ESRD (end stage renal disease) ([facility] [address] Nov 2006    ESRD.  MWF dialysis     Hypertension     Obesity     Transient ischemic attack 8/29/2015       Past Surgical History:   Procedure Laterality Date    BREAST SURGERY PROCEDURE UNLISTED Right     cyst removed    HX GI      colon resection resulting in temporary colostomy reversal    HX GYN      stephan    HX OTHER SURGICAL      dialysis fistula, several permcaths    HX UROLOGICAL Left July 2015    nephrectomy    HX VASCULAR ACCESS           Family History:   Problem Relation Age of Onset    Diabetes Mother     Heart Disease Mother     Hypertension Mother     Heart Disease Father     Hypertension Father     Malignant Hyperthermia Neg Hx     Pseudocholinesterase Deficiency Neg Hx     Delayed Awakening Neg Hx     Post-op Nausea/Vomiting Neg Hx     Emergence Delirium Neg Hx     Other Neg Hx     Post-op Cognitive Dysfunction Neg Hx        Social History     Social History    Marital status:      Spouse name: N/A    Number of children: N/A    Years of education: N/A     Occupational History    Not on file. Social History Main Topics    Smoking status: Never Smoker    Smokeless tobacco: Never Used    Alcohol use No    Drug use: No    Sexual activity: Not Currently     Other Topics Concern    Not on file     Social History Narrative         ALLERGIES: Pcn [penicillins] and Vancomycin    Review of Systems   Constitutional: Negative for chills and fever. Respiratory: Negative for cough and shortness of breath. Cardiovascular: Negative for chest pain. Gastrointestinal: Negative for abdominal pain. Skin: Positive for rash. Neurological: Positive for headaches. Negative for dizziness, syncope and weakness. Psychiatric/Behavioral: The patient is nervous/anxious. Vitals:    09/01/17 1036 09/01/17 1223   BP: 139/89    Pulse: 93    Resp: 20    Temp: 98.3 °F (36.8 °C)    SpO2: 100% 100%   Weight: 104.3 kg (230 lb)    Height: 5' 5\" (1.651 m)             Physical Exam   Constitutional: She appears well-developed and well-nourished. No distress. Neck: Muscular tenderness present. No spinous process tenderness present. No rigidity. Erythema present. No Brudzinski's sign and no Kernig's sign noted. Cardiovascular: Normal rate and regular rhythm. No murmur heard. Pulmonary/Chest: Effort normal and breath sounds normal. No respiratory distress. She has no wheezes. Neurological: She is alert. Skin: Skin is warm, dry and intact. Rash noted. Rash is vesicular. She is not diaphoretic. There is erythema. No pallor. Psychiatric: She has a normal mood and affect. Her behavior is normal.   Nursing note and vitals reviewed.        MDM  Number of Diagnoses or Management Options  Herpes zoster without complication:   Diagnosis management comments: Patient given prescriptions for norco, lidocaine cream, and acyclovir. Amount and/or Complexity of Data Reviewed  Discuss the patient with other providers: yes (ta  )    Patient Progress  Patient progress: stable    ED Course   Attending Note - ATTENDING ATTESTATION:  Patient independently seen by PA. I did not see patient. Was available for discussion and evaluation.   _  < Electronically signed by Rosanna Shone, MD >      Procedures

## 2017-09-12 ENCOUNTER — HOSPITAL ENCOUNTER (INPATIENT)
Age: 67
LOS: 2 days | Discharge: HOME | DRG: 686 | End: 2017-09-16
Attending: EMERGENCY MEDICINE | Admitting: INTERNAL MEDICINE
Payer: MEDICARE

## 2017-09-12 ENCOUNTER — APPOINTMENT (OUTPATIENT)
Dept: ULTRASOUND IMAGING | Age: 67
DRG: 686 | End: 2017-09-12
Attending: INTERNAL MEDICINE
Payer: MEDICARE

## 2017-09-12 ENCOUNTER — APPOINTMENT (OUTPATIENT)
Dept: CT IMAGING | Age: 67
DRG: 686 | End: 2017-09-12
Attending: EMERGENCY MEDICINE
Payer: MEDICARE

## 2017-09-12 DIAGNOSIS — N28.1 RENAL CYST: ICD-10-CM

## 2017-09-12 DIAGNOSIS — N20.9 URINARY CALCULUS: ICD-10-CM

## 2017-09-12 DIAGNOSIS — N28.1 ACQUIRED CYST OF KIDNEY: ICD-10-CM

## 2017-09-12 DIAGNOSIS — R10.9 FLANK PAIN: ICD-10-CM

## 2017-09-12 DIAGNOSIS — C64.9 RENAL CELL CARCINOMA, UNSPECIFIED LATERALITY (HCC): ICD-10-CM

## 2017-09-12 DIAGNOSIS — N28.89 RENAL MASS: ICD-10-CM

## 2017-09-12 DIAGNOSIS — R10.9 RIGHT FLANK PAIN: ICD-10-CM

## 2017-09-12 DIAGNOSIS — R10.84 ABDOMINAL PAIN, GENERALIZED: Primary | ICD-10-CM

## 2017-09-12 DIAGNOSIS — N29 OTHER DISORDERS OF KIDNEY AND URETER IN DISEASES CLASSIFIED ELSEWHERE: ICD-10-CM

## 2017-09-12 DIAGNOSIS — R19.04 LEFT LOWER QUADRANT ABDOMINAL MASS: ICD-10-CM

## 2017-09-12 DIAGNOSIS — R10.9 FLANK PAIN, ACUTE: ICD-10-CM

## 2017-09-12 DIAGNOSIS — R10.9 RT FLANK PAIN: ICD-10-CM

## 2017-09-12 PROBLEM — R19.00 ABDOMINAL MASS: Status: ACTIVE | Noted: 2017-09-12

## 2017-09-12 LAB
ALBUMIN SERPL-MCNC: 3 G/DL (ref 3.2–4.6)
ALBUMIN/GLOB SERPL: 0.6 {RATIO} (ref 1.2–3.5)
ALP SERPL-CCNC: 77 U/L (ref 50–136)
ALT SERPL-CCNC: 39 U/L (ref 12–65)
ANION GAP SERPL CALC-SCNC: 8 MMOL/L (ref 7–16)
AST SERPL-CCNC: 174 U/L (ref 15–37)
BASOPHILS # BLD: 0 K/UL (ref 0–0.2)
BASOPHILS NFR BLD: 0 % (ref 0–2)
BILIRUB SERPL-MCNC: <0.1 MG/DL (ref 0.2–1.1)
BUN SERPL-MCNC: 27 MG/DL (ref 8–23)
CALCIUM SERPL-MCNC: 8.3 MG/DL (ref 8.3–10.4)
CHLORIDE SERPL-SCNC: 103 MMOL/L (ref 98–107)
CO2 SERPL-SCNC: 25 MMOL/L (ref 21–32)
CREAT SERPL-MCNC: 7.05 MG/DL (ref 0.6–1)
DIFFERENTIAL METHOD BLD: ABNORMAL
EOSINOPHIL # BLD: 0.1 K/UL (ref 0–0.8)
EOSINOPHIL NFR BLD: 2 % (ref 0.5–7.8)
ERYTHROCYTE [DISTWIDTH] IN BLOOD BY AUTOMATED COUNT: 18.8 % (ref 11.9–14.6)
EST. AVERAGE GLUCOSE BLD GHB EST-MCNC: ABNORMAL MG/DL
GLOBULIN SER CALC-MCNC: 5.4 G/DL (ref 2.3–3.5)
GLUCOSE BLD STRIP.AUTO-MCNC: 103 MG/DL (ref 65–100)
GLUCOSE SERPL-MCNC: 94 MG/DL (ref 65–100)
HBA1C MFR BLD: 4.7 % (ref 4.8–6)
HCT VFR BLD AUTO: 29.5 % (ref 35.8–46.3)
HGB BLD-MCNC: 9.9 G/DL (ref 11.7–15.4)
IMM GRANULOCYTES # BLD: 0 K/UL (ref 0–0.5)
IMM GRANULOCYTES NFR BLD: 0.5 % (ref 0–5)
LIPASE SERPL-CCNC: 139 U/L (ref 73–393)
LYMPHOCYTES # BLD: 1.9 K/UL (ref 0.5–4.6)
LYMPHOCYTES NFR BLD: 23 % (ref 13–44)
MCH RBC QN AUTO: 34.6 PG (ref 26.1–32.9)
MCHC RBC AUTO-ENTMCNC: 33.6 G/DL (ref 31.4–35)
MCV RBC AUTO: 103.1 FL (ref 79.6–97.8)
MONOCYTES # BLD: 0.5 K/UL (ref 0.1–1.3)
MONOCYTES NFR BLD: 6 % (ref 4–12)
NEUTS SEG # BLD: 5.6 K/UL (ref 1.7–8.2)
NEUTS SEG NFR BLD: 69 % (ref 43–78)
PLATELET # BLD AUTO: 233 K/UL (ref 150–450)
PMV BLD AUTO: 10.9 FL (ref 10.8–14.1)
POTASSIUM SERPL-SCNC: 4.4 MMOL/L (ref 3.5–5.1)
POTASSIUM SERPL-SCNC: >10 MMOL/L (ref 3.5–5.1)
PROT SERPL-MCNC: 8.4 G/DL (ref 6.3–8.2)
RBC # BLD AUTO: 2.86 M/UL (ref 4.05–5.25)
SODIUM SERPL-SCNC: 136 MMOL/L (ref 136–145)
TROPONIN I SERPL-MCNC: <0.02 NG/ML (ref 0.02–0.05)
WBC # BLD AUTO: 8.3 K/UL (ref 4.3–11.1)

## 2017-09-12 PROCEDURE — 74011000258 HC RX REV CODE- 258: Performed by: EMERGENCY MEDICINE

## 2017-09-12 PROCEDURE — 84484 ASSAY OF TROPONIN QUANT: CPT | Performed by: INTERNAL MEDICINE

## 2017-09-12 PROCEDURE — 80053 COMPREHEN METABOLIC PANEL: CPT | Performed by: INTERNAL MEDICINE

## 2017-09-12 PROCEDURE — 93005 ELECTROCARDIOGRAM TRACING: CPT | Performed by: EMERGENCY MEDICINE

## 2017-09-12 PROCEDURE — 74011250636 HC RX REV CODE- 250/636: Performed by: INTERNAL MEDICINE

## 2017-09-12 PROCEDURE — 94762 N-INVAS EAR/PLS OXIMTRY CONT: CPT | Performed by: EMERGENCY MEDICINE

## 2017-09-12 PROCEDURE — 96374 THER/PROPH/DIAG INJ IV PUSH: CPT | Performed by: EMERGENCY MEDICINE

## 2017-09-12 PROCEDURE — 74011636320 HC RX REV CODE- 636/320: Performed by: EMERGENCY MEDICINE

## 2017-09-12 PROCEDURE — 93005 ELECTROCARDIOGRAM TRACING: CPT | Performed by: INTERNAL MEDICINE

## 2017-09-12 PROCEDURE — 96375 TX/PRO/DX INJ NEW DRUG ADDON: CPT | Performed by: EMERGENCY MEDICINE

## 2017-09-12 PROCEDURE — 74011250636 HC RX REV CODE- 250/636: Performed by: EMERGENCY MEDICINE

## 2017-09-12 PROCEDURE — 74178 CT ABD&PLV WO CNTR FLWD CNTR: CPT

## 2017-09-12 PROCEDURE — 96372 THER/PROPH/DIAG INJ SC/IM: CPT | Performed by: EMERGENCY MEDICINE

## 2017-09-12 PROCEDURE — 74011250637 HC RX REV CODE- 250/637: Performed by: INTERNAL MEDICINE

## 2017-09-12 PROCEDURE — 99218 HC RM OBSERVATION: CPT

## 2017-09-12 PROCEDURE — 85025 COMPLETE CBC W/AUTO DIFF WBC: CPT | Performed by: EMERGENCY MEDICINE

## 2017-09-12 PROCEDURE — 82962 GLUCOSE BLOOD TEST: CPT

## 2017-09-12 PROCEDURE — 36415 COLL VENOUS BLD VENIPUNCTURE: CPT | Performed by: EMERGENCY MEDICINE

## 2017-09-12 PROCEDURE — 87641 MR-STAPH DNA AMP PROBE: CPT | Performed by: INTERNAL MEDICINE

## 2017-09-12 PROCEDURE — 83036 HEMOGLOBIN GLYCOSYLATED A1C: CPT | Performed by: INTERNAL MEDICINE

## 2017-09-12 PROCEDURE — 84132 ASSAY OF SERUM POTASSIUM: CPT | Performed by: EMERGENCY MEDICINE

## 2017-09-12 PROCEDURE — 99284 EMERGENCY DEPT VISIT MOD MDM: CPT | Performed by: EMERGENCY MEDICINE

## 2017-09-12 PROCEDURE — 83690 ASSAY OF LIPASE: CPT | Performed by: INTERNAL MEDICINE

## 2017-09-12 RX ORDER — ONDANSETRON 2 MG/ML
4 INJECTION INTRAMUSCULAR; INTRAVENOUS
Status: COMPLETED | OUTPATIENT
Start: 2017-09-12 | End: 2017-09-12

## 2017-09-12 RX ORDER — SODIUM CHLORIDE 0.9 % (FLUSH) 0.9 %
5-10 SYRINGE (ML) INJECTION AS NEEDED
Status: DISCONTINUED | OUTPATIENT
Start: 2017-09-12 | End: 2017-09-16 | Stop reason: HOSPADM

## 2017-09-12 RX ORDER — DIPHENHYDRAMINE HYDROCHLORIDE 50 MG/ML
12.5 INJECTION, SOLUTION INTRAMUSCULAR; INTRAVENOUS
Status: DISCONTINUED | OUTPATIENT
Start: 2017-09-12 | End: 2017-09-16 | Stop reason: HOSPADM

## 2017-09-12 RX ORDER — DIPHENOXYLATE HYDROCHLORIDE AND ATROPINE SULFATE 2.5; .025 MG/1; MG/1
2 TABLET ORAL
Status: DISCONTINUED | OUTPATIENT
Start: 2017-09-12 | End: 2017-09-16 | Stop reason: HOSPADM

## 2017-09-12 RX ORDER — FUROSEMIDE 40 MG/1
80 TABLET ORAL 2 TIMES DAILY
Status: DISCONTINUED | OUTPATIENT
Start: 2017-09-13 | End: 2017-09-16 | Stop reason: HOSPADM

## 2017-09-12 RX ORDER — ONDANSETRON 2 MG/ML
4 INJECTION INTRAMUSCULAR; INTRAVENOUS
Status: DISCONTINUED | OUTPATIENT
Start: 2017-09-12 | End: 2017-09-16 | Stop reason: HOSPADM

## 2017-09-12 RX ORDER — BISACODYL 5 MG
5 TABLET, DELAYED RELEASE (ENTERIC COATED) ORAL DAILY PRN
Status: DISCONTINUED | OUTPATIENT
Start: 2017-09-12 | End: 2017-09-16 | Stop reason: HOSPADM

## 2017-09-12 RX ORDER — LISINOPRIL 20 MG/1
40 TABLET ORAL
Status: DISCONTINUED | OUTPATIENT
Start: 2017-09-12 | End: 2017-09-16 | Stop reason: HOSPADM

## 2017-09-12 RX ORDER — AMLODIPINE BESYLATE 10 MG/1
10 TABLET ORAL
Status: DISCONTINUED | OUTPATIENT
Start: 2017-09-12 | End: 2017-09-16 | Stop reason: HOSPADM

## 2017-09-12 RX ORDER — MINOXIDIL 2.5 MG/1
7.5 TABLET ORAL 2 TIMES DAILY
Status: CANCELLED | OUTPATIENT
Start: 2017-09-12

## 2017-09-12 RX ORDER — HYDROCODONE BITARTRATE AND ACETAMINOPHEN 7.5; 325 MG/1; MG/1
1 TABLET ORAL
Status: DISCONTINUED | OUTPATIENT
Start: 2017-09-12 | End: 2017-09-15

## 2017-09-12 RX ORDER — LORAZEPAM 1 MG/1
1 TABLET ORAL
Status: DISCONTINUED | OUTPATIENT
Start: 2017-09-12 | End: 2017-09-16 | Stop reason: HOSPADM

## 2017-09-12 RX ORDER — PROMETHAZINE HYDROCHLORIDE 25 MG/1
25 TABLET ORAL
Status: DISCONTINUED | OUTPATIENT
Start: 2017-09-12 | End: 2017-09-16 | Stop reason: HOSPADM

## 2017-09-12 RX ORDER — HEPARIN SODIUM 5000 [USP'U]/ML
5000 INJECTION, SOLUTION INTRAVENOUS; SUBCUTANEOUS EVERY 8 HOURS
Status: DISCONTINUED | OUTPATIENT
Start: 2017-09-12 | End: 2017-09-16 | Stop reason: HOSPADM

## 2017-09-12 RX ORDER — SODIUM CHLORIDE 0.9 % (FLUSH) 0.9 %
5-10 SYRINGE (ML) INJECTION EVERY 8 HOURS
Status: DISCONTINUED | OUTPATIENT
Start: 2017-09-12 | End: 2017-09-16 | Stop reason: HOSPADM

## 2017-09-12 RX ORDER — MORPHINE SULFATE 2 MG/ML
2 INJECTION, SOLUTION INTRAMUSCULAR; INTRAVENOUS
Status: DISCONTINUED | OUTPATIENT
Start: 2017-09-12 | End: 2017-09-14

## 2017-09-12 RX ORDER — HYDROMORPHONE HYDROCHLORIDE 1 MG/ML
0.5 INJECTION, SOLUTION INTRAMUSCULAR; INTRAVENOUS; SUBCUTANEOUS
Status: COMPLETED | OUTPATIENT
Start: 2017-09-12 | End: 2017-09-12

## 2017-09-12 RX ORDER — FUROSEMIDE 40 MG/1
80 TABLET ORAL ONCE
Status: COMPLETED | OUTPATIENT
Start: 2017-09-13 | End: 2017-09-12

## 2017-09-12 RX ORDER — ACETAMINOPHEN 325 MG/1
650 TABLET ORAL
Status: DISCONTINUED | OUTPATIENT
Start: 2017-09-12 | End: 2017-09-16 | Stop reason: HOSPADM

## 2017-09-12 RX ORDER — SEVELAMER HYDROCHLORIDE 400 MG/1
800 TABLET, FILM COATED ORAL
Status: DISCONTINUED | OUTPATIENT
Start: 2017-09-13 | End: 2017-09-16 | Stop reason: HOSPADM

## 2017-09-12 RX ORDER — NALOXONE HYDROCHLORIDE 0.4 MG/ML
0.4 INJECTION, SOLUTION INTRAMUSCULAR; INTRAVENOUS; SUBCUTANEOUS AS NEEDED
Status: DISCONTINUED | OUTPATIENT
Start: 2017-09-12 | End: 2017-09-16 | Stop reason: HOSPADM

## 2017-09-12 RX ORDER — SODIUM CHLORIDE 0.9 % (FLUSH) 0.9 %
10 SYRINGE (ML) INJECTION
Status: COMPLETED | OUTPATIENT
Start: 2017-09-12 | End: 2017-09-12

## 2017-09-12 RX ORDER — CINACALCET 30 MG/1
30 TABLET, FILM COATED ORAL
Status: DISCONTINUED | OUTPATIENT
Start: 2017-09-12 | End: 2017-09-16 | Stop reason: HOSPADM

## 2017-09-12 RX ORDER — BUTALBITAL, ACETAMINOPHEN AND CAFFEINE 50; 325; 40 MG/1; MG/1; MG/1
1 TABLET ORAL
Status: DISCONTINUED | OUTPATIENT
Start: 2017-09-12 | End: 2017-09-16 | Stop reason: HOSPADM

## 2017-09-12 RX ORDER — GUAIFENESIN 100 MG/5ML
81 LIQUID (ML) ORAL DAILY
Status: DISCONTINUED | OUTPATIENT
Start: 2017-09-12 | End: 2017-09-16 | Stop reason: HOSPADM

## 2017-09-12 RX ORDER — INSULIN LISPRO 100 [IU]/ML
INJECTION, SOLUTION INTRAVENOUS; SUBCUTANEOUS
Status: DISCONTINUED | OUTPATIENT
Start: 2017-09-12 | End: 2017-09-16 | Stop reason: HOSPADM

## 2017-09-12 RX ADMIN — ONDANSETRON 4 MG: 2 INJECTION INTRAMUSCULAR; INTRAVENOUS at 16:47

## 2017-09-12 RX ADMIN — HYDROMORPHONE HYDROCHLORIDE 0.5 MG: 1 INJECTION, SOLUTION INTRAMUSCULAR; INTRAVENOUS; SUBCUTANEOUS at 16:47

## 2017-09-12 RX ADMIN — Medication 10 ML: at 23:23

## 2017-09-12 RX ADMIN — CINACALCET HYDROCHLORIDE 30 MG: 30 TABLET, COATED ORAL at 23:23

## 2017-09-12 RX ADMIN — HEPARIN SODIUM 5000 UNITS: 5000 INJECTION, SOLUTION INTRAVENOUS; SUBCUTANEOUS at 20:29

## 2017-09-12 RX ADMIN — MORPHINE SULFATE 2 MG: 2 INJECTION, SOLUTION INTRAMUSCULAR; INTRAVENOUS at 20:21

## 2017-09-12 RX ADMIN — LISINOPRIL 40 MG: 20 TABLET ORAL at 23:23

## 2017-09-12 RX ADMIN — IOPAMIDOL 100 ML: 755 INJECTION, SOLUTION INTRAVENOUS at 17:22

## 2017-09-12 RX ADMIN — LORAZEPAM 1 MG: 1 TABLET ORAL at 21:09

## 2017-09-12 RX ADMIN — AMLODIPINE BESYLATE 10 MG: 10 TABLET ORAL at 23:23

## 2017-09-12 RX ADMIN — SODIUM CHLORIDE 100 ML: 900 INJECTION, SOLUTION INTRAVENOUS at 17:22

## 2017-09-12 RX ADMIN — Medication 10 ML: at 17:22

## 2017-09-12 RX ADMIN — FUROSEMIDE 80 MG: 40 TABLET ORAL at 23:45

## 2017-09-12 RX ADMIN — ASPIRIN 81 MG 81 MG: 81 TABLET ORAL at 23:23

## 2017-09-12 NOTE — IP AVS SNAPSHOT
[address] 
[phone] Patient: Devon Fischer MRN: [id number] Current Discharge Medication List  
  
CONTINUE these medications which have CHANGED Dose & Instructions Dispensing Information Comments Morning Noon Evening Bedtime  
 aspirin 81 mg chewable tablet What changed:  when to take this Dose:  81 mg Take 1 Tab by mouth daily. Quantity:  1 Tab Refills:  0  
     
   
   
   
  
 * HYDROcodone-acetaminophen  mg tablet Commonly known as:  Gleda Ridgel What changed: You were already taking a medication with the same name, and this prescription was added. Make sure you understand how and when to take each. Dose:  1 Tab Take 1 Tab by mouth every four (4) hours as needed. Max Daily Amount: 6 Tabs. Quantity:  15 Tab Refills:  0  
     
   
   
   
  
 * HYDROcodone-acetaminophen 5-325 mg per tablet Commonly known as:  Gleda Ridgel Start taking on:  9/19/2017 What changed:  Another medication with the same name was added. Make sure you understand how and when to take each. Dose:  1 Tab Take 1 Tab by mouth every four (4) hours as needed for Pain. Max Daily Amount: 6 Tabs. Quantity:  20 Tab Refills:  0  
     
   
   
   
  
 * lidocaine 5 % topical cream  
What changed:  Another medication with the same name was added. Make sure you understand how and when to take each. Apply  to affected area two (2) times daily as needed for Pain. Quantity:  1 Tube Refills:  0  
     
   
   
   
  
 * lidocaine 5 % Commonly known as:  Jaycee Chriss What changed: You were already taking a medication with the same name, and this prescription was added. Make sure you understand how and when to take each. Dose:  1 Patch 1 Patch by TransDERmal route every twenty-four (24) hours. Apply patch to the affected area for 12 hours a day and remove for 12 hours a day. Quantity:  15 Each Refills:  0  
     
   
   
   
  
 * Notice: This list has 4 medication(s) that are the same as other medications prescribed for you. Read the directions carefully, and ask your doctor or other care provider to review them with you. CONTINUE these medications which have NOT CHANGED Dose & Instructions Dispensing Information Comments Morning Noon Evening Bedtime  
 amLODIPine 10 mg tablet Commonly known as:  Unknown Raysal Take  by mouth daily. Refills:  0  
     
   
   
   
  
 butalbital-acetaminophen-caff -40 mg per capsule Commonly known as:  [employer] Dose:  1-2 Cap Take 1-2 Caps by mouth every six (6) hours as needed for Pain. Max Daily Amount: 8 Caps. Quantity:  20 Cap Refills:  0  
     
   
   
   
  
 cinacalcet 30 mg tablet Commonly known as:  SENSIPAR Dose:  30 mg Take 30 mg by mouth nightly. Refills:  0  
     
   
   
   
  
 diphenoxylate-atropine 2.5-0.025 mg per tablet Commonly known as:  LOMOTIL Dose:  2 Tab Take 2 Tabs by mouth four (4) times daily as needed for Diarrhea. Max Daily Amount: 8 Tabs. Quantity:  20 Tab Refills:  0  
     
   
   
   
  
 furosemide 80 mg tablet Commonly known as:  LASIX Dose:  80 mg Take 80 mg by mouth two (2) times a day. Indications: EDEMA, HYPERTENSION Refills:  0 JANUVIA 100 mg tablet Generic drug:  SITagliptin Dose:  50 mg Take 50 mg by mouth every morning. Indications: TYPE 2 DIABETES MELLITUS Refills:  0  
     
   
   
   
  
 lisinopril 40 mg tablet Commonly known as:  Jalil Parmarver Dose:  40 mg Take 40 mg by mouth nightly. Indications: HYPERTENSION Refills:  0  
     
   
   
   
  
 minoxidil 2.5 mg tablet Commonly known as:  Jackye Baize Dose:  7.5 mg Take 7.5 mg by mouth two (2) times a day. Indications: HYPERTENSION  Refills:  0  
     
   
   
   
  
 OCUVITE PO  
   
 Take  by mouth nightly. Refills:  0  
     
   
   
   
  
 ondansetron hcl 8 mg tablet Commonly known as:  ZOFRAN (AS HYDROCHLORIDE) Dose:  8 mg Take 1 Tab by mouth every eight (8) hours as needed for Nausea. Quantity:  10 Tab Refills:  0  
     
   
   
   
  
 promethazine 25 mg tablet Commonly known as:  PHENERGAN Dose:  25 mg Take 1 Tab by mouth every six (6) hours as needed for Nausea. Quantity:  12 Tab Refills:  0  
     
   
   
   
  
 RENVELA 800 mg Tab tab Generic drug:  sevelamer carbonate Dose:  800 mg Take 800 mg by mouth three (3) times daily (with meals). 5 tablets with meals/ 2 with snacks Sometimes only eats 2 meals/d Refills:  0 Where to Get Your Medications Information on where to get these meds will be given to you by the nurse or doctor. ! Ask your nurse or doctor about these medications HYDROcodone-acetaminophen  mg tablet HYDROcodone-acetaminophen 5-325 mg per tablet  
 lidocaine 5 %

## 2017-09-12 NOTE — ED NOTES
Third attempt of a green tube was sent to lab for potassium. Lab states sample is hemolyzed. Lab will be sending someone down to re stick patient for green top.

## 2017-09-12 NOTE — ED NOTES
PT went over for outpatient ultrasound and is back due to pain in the flank area.   Ultrasound was completed and she is brought back to the dept to be seen by ER MD>

## 2017-09-12 NOTE — ED PROVIDER NOTES
HPI Comments: Patient is a 18-year-old female who presents with right flank pain. Patient states she has had pain since yesterday, states feels like kidney stone she's had in the past, states is located in her right flank around to her right lower abdomen. Denies any chest pain, no shortness of breath, no nausea or vomiting, no fevers or chills, states just severe pain. Patient is a 73 y.o. female presenting with flank pain. The history is provided by the patient. No  was used. Flank Pain    Pertinent negatives include no chest pain, no fever, no headaches, no abdominal pain, no dysuria and no weakness. Past Medical History:   Diagnosis Date    Arthritis     AVF (arteriovenous fistula) ([facility] [address] 12/20/2016 12/6/16 (S) Right AVF revision and thrombectomy    Cancer ([facility] [address] 2015    L kidney    Chronic kidney disease     HD in M-W-F- at [place] dialysis    Degenerative joint disease     Diabetes ([facility] [address]     type 2, average range 100-120 fasting, symptomatic below 90; last A1C?    ESRD (end stage renal disease) ([facility] [address] Nov 2006    ESRD.  MWF dialysis     Hypertension     Obesity     Transient ischemic attack 8/29/2015       Past Surgical History:   Procedure Laterality Date    BREAST SURGERY PROCEDURE UNLISTED Right     cyst removed    HX GI      colon resection resulting in temporary colostomy reversal    HX GYN      stephan    HX OTHER SURGICAL      dialysis fistula, several permcaths    HX UROLOGICAL Left July 2015    nephrectomy    HX VASCULAR ACCESS           Family History:   Problem Relation Age of Onset    Diabetes Mother     Heart Disease Mother     Hypertension Mother     Heart Disease Father     Hypertension Father     Malignant Hyperthermia Neg Hx     Pseudocholinesterase Deficiency Neg Hx     Delayed Awakening Neg Hx     Post-op Nausea/Vomiting Neg Hx     Emergence Delirium Neg Hx     Other Neg Hx     Post-op Cognitive Dysfunction Neg Hx        Social History     Social History    Marital status:      Spouse name: N/A    Number of children: N/A    Years of education: N/A     Occupational History    Not on file. Social History Main Topics    Smoking status: Never Smoker    Smokeless tobacco: Never Used    Alcohol use No    Drug use: No    Sexual activity: Not Currently     Other Topics Concern    Not on file     Social History Narrative         ALLERGIES: Pcn [penicillins] and Vancomycin    Review of Systems   Constitutional: Negative for chills and fever. HENT: Negative for rhinorrhea and sore throat. Eyes: Negative for visual disturbance. Respiratory: Negative for cough and shortness of breath. Cardiovascular: Negative for chest pain and leg swelling. Gastrointestinal: Negative for abdominal pain, diarrhea, nausea and vomiting. Genitourinary: Positive for flank pain. Negative for dysuria. Musculoskeletal: Negative for back pain and neck pain. Skin: Negative for rash. Neurological: Negative for weakness and headaches. Psychiatric/Behavioral: The patient is not nervous/anxious. Vitals:    09/12/17 1529   BP: 128/58   Pulse: 83   SpO2: 97%            Physical Exam   Constitutional: She is oriented to person, place, and time. She appears well-developed and well-nourished. HENT:   Head: Normocephalic. Right Ear: External ear normal.   Left Ear: External ear normal.   Eyes: Conjunctivae and EOM are normal. Pupils are equal, round, and reactive to light. Neck: Normal range of motion. Neck supple. No tracheal deviation present. Cardiovascular: Normal rate, regular rhythm, normal heart sounds and intact distal pulses. No murmur heard. Pulmonary/Chest: Effort normal and breath sounds normal. No respiratory distress. Abdominal: Soft. There is tenderness (mild right lower abdominal pain with right CVA tenderness. ). Musculoskeletal: Normal range of motion.    Neurological: She is alert and oriented to person, place, and time. No cranial nerve deficit. Skin: No rash noted. Nursing note and vitals reviewed. MDM  Number of Diagnoses or Management Options  Abdominal pain, generalized:   Renal mass:   Diagnosis management comments: Lab called me with potassium greater than 10 but they stated it was very hemolyzed we will collect a new sample and get an EKG. Jamie Boyle MD; 9/12/2017 @5:35 PM Voice dictation software was used during the making of this note. This software is not perfect and grammatical and other typographical errors may be present. This note has not been proofread for errors.  ===================================================================   Repeat potassium normal at 4.4 EKG is normal.  Patient likely does have new mass seen in kidney bed. She was uncomfortable going home and is tearful with diagnosis. Jamie Boyle MD 7:35 PM           Amount and/or Complexity of Data Reviewed  Clinical lab tests: ordered and reviewed  Tests in the radiology section of CPT®: ordered and reviewed  Tests in the medicine section of CPT®: ordered and reviewed  Review and summarize past medical records: yes    Risk of Complications, Morbidity, and/or Mortality  Presenting problems: high  Diagnostic procedures: high  Management options: high    Patient Progress  Patient progress: stable    ED Course   Comment By Time   Henry Fischer is a 73 y.o. female here for back pain. Rapid assessment performed. Patient here with an outpatient order for renal US written by Dr. Jessie Moran. Called him, he did not want her in the ED but to go to outpatient US to have an [address] [facility]. --- Orders were placed. --- Patient will be placed in the waiting room.   Signed By: ABBY Reynolds    September 12, 2017 Howei Degrootma 09/12 1317       Procedures    Recent Results (from the past 12 hour(s))   CBC WITH AUTOMATED DIFF    Collection Time: 09/12/17  4:13 PM   Result Value Ref Range    WBC 8.3 4.3 - 11.1 K/uL RBC 2.86 (L) 4.05 - 5.25 M/uL    HGB 9.9 (L) 11.7 - 15.4 g/dL    HCT 29.5 (L) 35.8 - 46.3 %    .1 (H) 79.6 - 97.8 FL    MCH 34.6 (H) 26.1 - 32.9 PG    MCHC 33.6 31.4 - 35.0 g/dL    RDW 18.8 (H) 11.9 - 14.6 %    PLATELET 727 859 - 878 K/uL    MPV 10.9 10.8 - 14.1 FL    DF AUTOMATED      NEUTROPHILS 69 43 - 78 %    LYMPHOCYTES 23 13 - 44 %    MONOCYTES 6 4.0 - 12.0 %    EOSINOPHILS 2 0.5 - 7.8 %    BASOPHILS 0 0.0 - 2.0 %    IMMATURE GRANULOCYTES 0.5 0.0 - 5.0 %    ABS. NEUTROPHILS 5.6 1.7 - 8.2 K/UL    ABS. LYMPHOCYTES 1.9 0.5 - 4.6 K/UL    ABS. MONOCYTES 0.5 0.1 - 1.3 K/UL    ABS. EOSINOPHILS 0.1 0.0 - 0.8 K/UL    ABS. BASOPHILS 0.0 0.0 - 0.2 K/UL    ABS. IMM. GRANS. 0.0 0.0 - 0.5 K/UL     Us Retroperitoneum Comp    Result Date: 9/12/2017  Renal ultrasound. CLINICAL INDICATION:  Right-sided flank pain, status post left nephrectomy PROCEDURE: Realtime grayscale color Doppler evaluation of the kidneys and bladder. COMPARISON: No prior similar studies available for direct comparison. FINDINGS: The right kidney is small in size measuring 7.2 cm with diffuse renal cortical increased echogenicity. There is no hydronephrosis. A 2.5 cm septated cyst is noted off the lower pole. The left kidney has been removed the bladder is not distended. The aorta and IVC are unremarkable. IMPRESSION: 1. Mildly atrophic right kidney with increased renal cortical echogenicity can be seen with medical renal disease. 2. 2.5 cm mildly complex lower pole cyst with a thin internal septation. 3. Status post left nephrectomy      43-year-old female with right flank pain:       Patient handed off to Dr. Pilo Byrd for follow up on results of CT abd and labs for further disposition at that time.

## 2017-09-12 NOTE — ED TRIAGE NOTES
Pt states that she has back and flank pain, similar to when she had to have a kidney removed in the past.

## 2017-09-12 NOTE — IP AVS SNAPSHOT
Chadd Elders 
 
 
 [address] [facility] 322 [facility] 
[phone] Patient: Betty Fischer MRN: [id number] XPL:86/[id number] You are allergic to the following Allergen Reactions Pcn (Penicillins) Rash Vancomycin Rash Recent Documentation Weight Breastfeeding? BMI OB Status Smoking Status 107.4 kg No 39.41 kg/m2 Hysterectomy Never Smoker Emergency Contacts Name Discharge Info Relation Home Work Mobile Jensen Aceves  Child [2]   [phone] Foster Roshni  Sister [23] [phone] About your hospitalization You were admitted on:  September 12, 2017 You last received care in the:  [address] You were discharged on:  September 16, 2017 Unit phone number:  [phone] Why you were hospitalized Your primary diagnosis was:  Abdominal Mass Your diagnoses also included:  Htn (Hypertension), Esrd (End Stage Renal Disease) (Hcc), Dm (Diabetes Mellitus) (Hcc), Renal Cell Carcinoma (Hcc), Flank Pain Providers Seen During Your Hospitalizations Provider Role Specialty Primary office phone Larue Bence, MD Attending Provider Emergency Medicine [phone] Mehran Price MD Attending Provider Emergency Medicine [phone] Bronson Alonzo MD Attending Provider Internal Medicine [phone] Your Primary Care Physician (PCP) Primary Care Physician Office Phone Office Fax Yajaira Lord [phone] Follow-up Information Follow up With Details Comments Contact Info Tati Davis MD  left message for Renata, [address] [address] 
[phone] Thalia Bobo MD   [address] [place] [address] 
[phone] Current Discharge Medication List  
  
CONTINUE these medications which have CHANGED Dose & Instructions Dispensing Information Comments Morning Noon Evening Bedtime  
 aspirin 81 mg chewable tablet What changed:  when to take this Dose:  81 mg Take 1 Tab by mouth daily. Quantity:  1 Tab Refills:  0  
     
   
   
   
  
 * HYDROcodone-acetaminophen  mg tablet Commonly known as:  Kay Altman What changed: You were already taking a medication with the same name, and this prescription was added. Make sure you understand how and when to take each. Dose:  1 Tab Take 1 Tab by mouth every four (4) hours as needed. Max Daily Amount: 6 Tabs. Quantity:  15 Tab Refills:  0  
     
   
   
   
  
 * HYDROcodone-acetaminophen 5-325 mg per tablet Commonly known as:  Kay Altman Start taking on:  9/19/2017 What changed:  Another medication with the same name was added. Make sure you understand how and when to take each. Dose:  1 Tab Take 1 Tab by mouth every four (4) hours as needed for Pain. Max Daily Amount: 6 Tabs. Quantity:  20 Tab Refills:  0  
     
   
   
   
  
 * lidocaine 5 % topical cream  
What changed:  Another medication with the same name was added. Make sure you understand how and when to take each. Apply  to affected area two (2) times daily as needed for Pain. Quantity:  1 Tube Refills:  0  
     
   
   
   
  
 * lidocaine 5 % Commonly known as:  Mara Ortiz What changed: You were already taking a medication with the same name, and this prescription was added. Make sure you understand how and when to take each. Dose:  1 Patch 1 Patch by TransDERmal route every twenty-four (24) hours. Apply patch to the affected area for 12 hours a day and remove for 12 hours a day. Quantity:  15 Each Refills:  0  
     
   
   
   
  
 * Notice: This list has 4 medication(s) that are the same as other medications prescribed for you.  Read the directions carefully, and ask your doctor or other care provider to review them with you. CONTINUE these medications which have NOT CHANGED Dose & Instructions Dispensing Information Comments Morning Noon Evening Bedtime  
 amLODIPine 10 mg tablet Commonly known as:  Marion Rizvi Take  by mouth daily. Refills:  0  
     
   
   
   
  
 butalbital-acetaminophen-caff -40 mg per capsule Commonly known as:  [employer] Dose:  1-2 Cap Take 1-2 Caps by mouth every six (6) hours as needed for Pain. Max Daily Amount: 8 Caps. Quantity:  20 Cap Refills:  0  
     
   
   
   
  
 cinacalcet 30 mg tablet Commonly known as:  SENSIPAR Dose:  30 mg Take 30 mg by mouth nightly. Refills:  0  
     
   
   
   
  
 diphenoxylate-atropine 2.5-0.025 mg per tablet Commonly known as:  LOMOTIL Dose:  2 Tab Take 2 Tabs by mouth four (4) times daily as needed for Diarrhea. Max Daily Amount: 8 Tabs. Quantity:  20 Tab Refills:  0  
     
   
   
   
  
 furosemide 80 mg tablet Commonly known as:  LASIX Dose:  80 mg Take 80 mg by mouth two (2) times a day. Indications: EDEMA, HYPERTENSION Refills:  0 JANUVIA 100 mg tablet Generic drug:  SITagliptin Dose:  50 mg Take 50 mg by mouth every morning. Indications: TYPE 2 DIABETES MELLITUS Refills:  0  
     
   
   
   
  
 lisinopril 40 mg tablet Commonly known as:  Kobi Meagher Dose:  40 mg Take 40 mg by mouth nightly. Indications: HYPERTENSION Refills:  0  
     
   
   
   
  
 minoxidil 2.5 mg tablet Commonly known as:  Naren Morales Dose:  7.5 mg Take 7.5 mg by mouth two (2) times a day. Indications: HYPERTENSION Refills:  0  
     
   
   
   
  
 OCUVITE PO Take  by mouth nightly. Refills:  0  
     
   
   
   
  
 ondansetron hcl 8 mg tablet Commonly known as:  ZOFRAN (AS HYDROCHLORIDE) Dose:  8 mg Take 1 Tab by mouth every eight (8) hours as needed for Nausea. Quantity:  10 Tab Refills:  0  
     
   
   
   
  
 promethazine 25 mg tablet Commonly known as:  PHENERGAN Dose:  25 mg Take 1 Tab by mouth every six (6) hours as needed for Nausea. Quantity:  12 Tab Refills:  0  
     
   
   
   
  
 RENVELA 800 mg Tab tab Generic drug:  sevelamer carbonate Dose:  800 mg Take 800 mg by mouth three (3) times daily (with meals). 5 tablets with meals/ 2 with snacks Sometimes only eats 2 meals/d Refills:  0 Where to Get Your Medications Information on where to get these meds will be given to you by the nurse or doctor. ! Ask your nurse or doctor about these medications HYDROcodone-acetaminophen  mg tablet HYDROcodone-acetaminophen 5-325 mg per tablet  
 lidocaine 5 % Discharge Instructions None Discharge Orders None Adform Announcement We are excited to announce that we are making your provider's discharge notes available to you in Adform. You will see these notes when they are completed and signed by the physician that discharged you from your recent hospital stay. If you have any questions or concerns about any information you see in Adform, please call the Health Information Department where you were seen or reach out to your Primary Care Provider for more information about your plan of care. Introducing [facility]! [facility] introduces Adform patient portal. Now you can access parts of your medical record, email your doctor's office, and request medication refills online. 1. In your internet browser, go to [email or web address] 2. Click on the First Time User? Click Here link in the Sign In box. You will see the New Member Sign Up page. 3. Enter your Adform Access Code exactly as it appears below.  You will not need to use this code after youve completed the sign-up process. If you do not sign up before the expiration date, you must request a new code. · to-BBB Access Code: [email or web address] Expires: 11/18/2017  9:12 AM 
 
4. Enter the last four digits of your Social Security Number (xxxx) and Date of Birth (mm/dd/yyyy) as indicated and click Submit. You will be taken to the next sign-up page. 5. Create a to-BBB ID. This will be your to-BBB login ID and cannot be changed, so think of one that is secure and easy to remember. 6. Create a to-BBB password. You can change your password at any time. 7. Enter your Password Reset Question and Answer. This can be used at a later time if you forget your password. 8. Enter your e-mail address. You will receive e-mail notification when new information is available in [address] 9. Click Sign Up. You can now view and download portions of your medical record. 10. Click the Download Summary menu link to download a portable copy of your medical information. If you have questions, please visit the Frequently Asked Questions section of the to-BBB website. Remember, to-BBB is NOT to be used for urgent needs. For medical emergencies, dial 911. Now available from your iPhone and Android! General Information Please provide this summary of care documentation to your next provider. Patient Signature:  ____________________________________________________________ Date:  ____________________________________________________________  
  
Gearldine Moulds Provider Signature:  ____________________________________________________________ Date:  ____________________________________________________________

## 2017-09-13 LAB
ANION GAP SERPL CALC-SCNC: 11 MMOL/L (ref 7–16)
ATRIAL RATE: 82 BPM
ATRIAL RATE: 83 BPM
BACTERIA SPEC CULT: NORMAL
BUN SERPL-MCNC: 35 MG/DL (ref 8–23)
CALCIUM SERPL-MCNC: 8.7 MG/DL (ref 8.3–10.4)
CALCULATED P AXIS, ECG09: 45 DEGREES
CALCULATED P AXIS, ECG09: 54 DEGREES
CALCULATED R AXIS, ECG10: 44 DEGREES
CALCULATED R AXIS, ECG10: 50 DEGREES
CALCULATED T AXIS, ECG11: 41 DEGREES
CALCULATED T AXIS, ECG11: 41 DEGREES
CHLORIDE SERPL-SCNC: 102 MMOL/L (ref 98–107)
CO2 SERPL-SCNC: 29 MMOL/L (ref 21–32)
CREAT SERPL-MCNC: 8.21 MG/DL (ref 0.6–1)
DIAGNOSIS, 93000: NORMAL
DIAGNOSIS, 93000: NORMAL
ERYTHROCYTE [DISTWIDTH] IN BLOOD BY AUTOMATED COUNT: 13.9 % (ref 11.9–14.6)
GLUCOSE BLD STRIP.AUTO-MCNC: 100 MG/DL (ref 65–100)
GLUCOSE BLD STRIP.AUTO-MCNC: 116 MG/DL (ref 65–100)
GLUCOSE BLD STRIP.AUTO-MCNC: 140 MG/DL (ref 65–100)
GLUCOSE BLD STRIP.AUTO-MCNC: 178 MG/DL (ref 65–100)
GLUCOSE SERPL-MCNC: 88 MG/DL (ref 65–100)
HCT VFR BLD AUTO: 31 % (ref 35.8–46.3)
HGB BLD-MCNC: 11 G/DL (ref 11.7–15.4)
MCH RBC QN AUTO: 34.3 PG (ref 26.1–32.9)
MCHC RBC AUTO-ENTMCNC: 35.5 G/DL (ref 31.4–35)
MCV RBC AUTO: 96.6 FL (ref 79.6–97.8)
P-R INTERVAL, ECG05: 154 MS
P-R INTERVAL, ECG05: 154 MS
PLATELET # BLD AUTO: 222 K/UL (ref 150–450)
PMV BLD AUTO: 9.7 FL (ref 10.8–14.1)
POTASSIUM SERPL-SCNC: 4.1 MMOL/L (ref 3.5–5.1)
Q-T INTERVAL, ECG07: 392 MS
Q-T INTERVAL, ECG07: 412 MS
QRS DURATION, ECG06: 66 MS
QRS DURATION, ECG06: 68 MS
QTC CALCULATION (BEZET), ECG08: 460 MS
QTC CALCULATION (BEZET), ECG08: 481 MS
RBC # BLD AUTO: 3.21 M/UL (ref 4.05–5.25)
SERVICE CMNT-IMP: NORMAL
SODIUM SERPL-SCNC: 142 MMOL/L (ref 136–145)
VENTRICULAR RATE, ECG03: 82 BPM
VENTRICULAR RATE, ECG03: 83 BPM
WBC # BLD AUTO: 5.8 K/UL (ref 4.3–11.1)

## 2017-09-13 PROCEDURE — 99218 HC RM OBSERVATION: CPT

## 2017-09-13 PROCEDURE — 5A1D60Z PERFORMANCE OF URINARY FILTRATION, MULTIPLE: ICD-10-PCS | Performed by: INTERNAL MEDICINE

## 2017-09-13 PROCEDURE — 82962 GLUCOSE BLOOD TEST: CPT

## 2017-09-13 PROCEDURE — 99223 1ST HOSP IP/OBS HIGH 75: CPT | Performed by: INTERNAL MEDICINE

## 2017-09-13 PROCEDURE — 74011636637 HC RX REV CODE- 636/637: Performed by: INTERNAL MEDICINE

## 2017-09-13 PROCEDURE — 85027 COMPLETE CBC AUTOMATED: CPT | Performed by: INTERNAL MEDICINE

## 2017-09-13 PROCEDURE — 90935 HEMODIALYSIS ONE EVALUATION: CPT

## 2017-09-13 PROCEDURE — 76450000000

## 2017-09-13 PROCEDURE — 74011250636 HC RX REV CODE- 250/636: Performed by: INTERNAL MEDICINE

## 2017-09-13 PROCEDURE — 36415 COLL VENOUS BLD VENIPUNCTURE: CPT | Performed by: INTERNAL MEDICINE

## 2017-09-13 PROCEDURE — 80048 BASIC METABOLIC PNL TOTAL CA: CPT | Performed by: INTERNAL MEDICINE

## 2017-09-13 PROCEDURE — 74011250637 HC RX REV CODE- 250/637: Performed by: INTERNAL MEDICINE

## 2017-09-13 RX ORDER — ZOLPIDEM TARTRATE 5 MG/1
5 TABLET ORAL
Status: DISCONTINUED | OUTPATIENT
Start: 2017-09-13 | End: 2017-09-16 | Stop reason: HOSPADM

## 2017-09-13 RX ADMIN — HYDROCODONE BITARTRATE AND ACETAMINOPHEN 1 TABLET: 7.5; 325 TABLET ORAL at 10:03

## 2017-09-13 RX ADMIN — MORPHINE SULFATE 2 MG: 2 INJECTION, SOLUTION INTRAMUSCULAR; INTRAVENOUS at 07:03

## 2017-09-13 RX ADMIN — LISINOPRIL 40 MG: 20 TABLET ORAL at 22:02

## 2017-09-13 RX ADMIN — HEPARIN SODIUM 5000 UNITS: 5000 INJECTION, SOLUTION INTRAVENOUS; SUBCUTANEOUS at 16:52

## 2017-09-13 RX ADMIN — MORPHINE SULFATE 2 MG: 2 INJECTION, SOLUTION INTRAMUSCULAR; INTRAVENOUS at 18:34

## 2017-09-13 RX ADMIN — FUROSEMIDE 80 MG: 40 TABLET ORAL at 08:20

## 2017-09-13 RX ADMIN — INSULIN LISPRO 2 UNITS: 100 INJECTION, SOLUTION INTRAVENOUS; SUBCUTANEOUS at 16:55

## 2017-09-13 RX ADMIN — ZOLPIDEM TARTRATE 5 MG: 5 TABLET ORAL at 22:02

## 2017-09-13 RX ADMIN — RENAGEL 800 MG: 400 TABLET ORAL at 16:52

## 2017-09-13 RX ADMIN — HEPARIN SODIUM 5000 UNITS: 5000 INJECTION, SOLUTION INTRAVENOUS; SUBCUTANEOUS at 22:02

## 2017-09-13 RX ADMIN — Medication 10 ML: at 05:31

## 2017-09-13 RX ADMIN — SITAGLIPTIN 25 MG: 25 TABLET, FILM COATED ORAL at 08:20

## 2017-09-13 RX ADMIN — CINACALCET HYDROCHLORIDE 30 MG: 30 TABLET, COATED ORAL at 22:02

## 2017-09-13 RX ADMIN — FUROSEMIDE 80 MG: 40 TABLET ORAL at 16:52

## 2017-09-13 RX ADMIN — RENAGEL 800 MG: 400 TABLET ORAL at 08:22

## 2017-09-13 RX ADMIN — Medication 10 ML: at 22:03

## 2017-09-13 RX ADMIN — MORPHINE SULFATE 2 MG: 2 INJECTION, SOLUTION INTRAMUSCULAR; INTRAVENOUS at 13:36

## 2017-09-13 RX ADMIN — AMLODIPINE BESYLATE 10 MG: 10 TABLET ORAL at 08:20

## 2017-09-13 RX ADMIN — LORAZEPAM 1 MG: 1 TABLET ORAL at 22:08

## 2017-09-13 RX ADMIN — MORPHINE SULFATE 2 MG: 2 INJECTION, SOLUTION INTRAMUSCULAR; INTRAVENOUS at 22:53

## 2017-09-13 RX ADMIN — HEPARIN SODIUM 5000 UNITS: 5000 INJECTION, SOLUTION INTRAVENOUS; SUBCUTANEOUS at 04:55

## 2017-09-13 NOTE — CONSULTS
[facility]        Inpatient Hematology / Oncology Consult Note    Reason for Consult:  Abdominal mass  Referring Physician:  Lorri Richardson MD    History of Present Illness:  Ms.Bobbie Fischer is a 72 yo with PMH of Left RCC (Clear cell carcinoma) s/p left radical nephrectomy by Dr. Queta Mullins in 2015, ESRD on HD MWF, DM2, HTN was sent to the ER by her doctor for Rt flank pain and to get US. She has had flank pain for 2 days, severe, sharp pain, episodic a/w nausea. No fever, chills, Vomiting, Diarrhea, chest pain, SOB, bladder symptoms. US showed mildly atrophic kidney. CT ordered which revealed 3.4 x 3.5 cm recurrent mass left renal bed. Urology was consulted-no surgical intervention hence consult oncology. Review of Systems:  Constitutional Denies fever, chills, weight loss, appetite changes, fatigue. Crying in pain. HEENT Denies trauma, blurry vision, hearing loss, ear pain, nosebleeds, sore throat, neck pain    Skin Denies lesions or rashes. Lungs Denies dyspnea, cough, sputum production or hemoptysis. Cardiovascular Denies chest pain, palpitations, or lower extremity edema. Gastrointestinal Denies nausea, vomiting, changes in bowel habits, bloody or black stools, abdominal pain.  Denies dysuria, frequency or hesitancy of urination. Neuro Denies headaches, visual changes or ataxia. Denies dizziness, tingling, tremors, sensory change, speech change, focal weakness or headaches. MSK Right flank pain     Psychiatric/Behavioral The patient is  nervous/anxious.          Allergies   Allergen Reactions    Pcn [Penicillins] Rash    Vancomycin Rash     Past Medical History:   Diagnosis Date    Arthritis     AVF (arteriovenous fistula) ([address] 12/20/2016 12/6/16 (S) Right AVF revision and thrombectomy    Cancer ([address] 2015    L kidney    Chronic kidney disease     HD in M-W-F- at [place] dialysis    Degenerative joint disease     Diabetes ([address]     type 2, average range 100-120 fasting, symptomatic below 90; last A1C?    ESRD (end stage renal disease) [facility]) Nov 2006    ESRD. MWF dialysis     Hypertension     Obesity     Transient ischemic attack 8/29/2015     Past Surgical History:   Procedure Laterality Date    BREAST SURGERY PROCEDURE UNLISTED Right     cyst removed    HX GI      colon resection resulting in temporary colostomy reversal    HX GYN      stephan    HX OTHER SURGICAL      dialysis fistula, several permcaths    HX UROLOGICAL Left July 2015    nephrectomy    HX VASCULAR ACCESS       Family History   Problem Relation Age of Onset    Diabetes Mother     Heart Disease Mother     Hypertension Mother     Heart Disease Father     Hypertension Father     Malignant Hyperthermia Neg Hx     Pseudocholinesterase Deficiency Neg Hx     Delayed Awakening Neg Hx     Post-op Nausea/Vomiting Neg Hx     Emergence Delirium Neg Hx     Other Neg Hx     Post-op Cognitive Dysfunction Neg Hx      Social History     Social History    Marital status:      Spouse name: N/A    Number of children: N/A    Years of education: N/A     Occupational History    Not on file.      Social History Main Topics    Smoking status: Never Smoker    Smokeless tobacco: Never Used    Alcohol use No    Drug use: No    Sexual activity: Not Currently     Other Topics Concern    Not on file     Social History Narrative     Current Facility-Administered Medications   Medication Dose Route Frequency Provider Last Rate Last Dose    sodium chloride (NS) flush 5-10 mL  5-10 mL IntraVENous Q8H Lucien Thompson MD   10 mL at 09/13/17 0531    sodium chloride (NS) flush 5-10 mL  5-10 mL IntraVENous PRN Keith Juárez MD        acetaminophen (TYLENOL) tablet 650 mg  650 mg Oral Q4H PRN Keith Juárez MD        HYDROcodone-acetaminophen (NORCO) 7.5-325 mg per tablet 1 Tab  1 Tab Oral Q4H PRROBIN Juárez MD        morphine injection 2 mg  2 mg IntraVENous Q4H PRROBIN Juárez MD   2 mg at 09/13/17 0703    naloxone (NARCAN) injection 0.4 mg  0.4 mg IntraVENous PRN Silvia Rivera MD        diphenhydrAMINE (BENADRYL) injection 12.5 mg  12.5 mg IntraVENous Q4H PRN Kendricki MD Nicole        ondansetron (ZOFRAN) injection 4 mg  4 mg IntraVENous Q4H PRN Kendricki MD Nicole        bisacodyl (DULCOLAX) tablet 5 mg  5 mg Oral DAILY PRN Kendricki MD Nicole        heparin (porcine) injection 5,000 Units  5,000 Units SubCUTAneous Cheo Benton MD   5,000 Units at 09/13/17 0455    amLODIPine (NORVASC) tablet 10 mg  10 mg Oral DAILY AFTER BREAKFAST Silvia Rivera MD   10 mg at 09/13/17 0820    aspirin chewable tablet 81 mg  81 mg Oral DAILY Silvia Rivera MD   81 mg at 09/12/17 2323    butalbital-acetaminophen-caffeine (FIORICET, ESGIC) -40 mg per tablet 1 Tab  1 Tab Oral Q6H PRN Silvia Rivera MD        cinacalcet (SENSIPAR) tablet 30 mg  30 mg Oral QHS Silvia Rivera MD   30 mg at 09/12/17 2323    diphenoxylate-atropine (LOMOTIL) tablet 2 Tab  2 Tab Oral QID PRN Silvia Rivera MD        furosemide (LASIX) tablet 80 mg  80 mg Oral BID Silvai Rivera MD   80 mg at 09/13/17 0820    lisinopril (PRINIVIL, ZESTRIL) tablet 40 mg  40 mg Oral QHS Silvia Rivera MD   40 mg at 09/12/17 2323    promethazine (PHENERGAN) tablet 25 mg  25 mg Oral Q6H PRN Silvia Rivera MD        sevelamer (RENAGEL) tablet 800 mg  800 mg Oral TID WITH MEALS Silvia Rivera MD   800 mg at 09/13/17 0448    SITagliptin (JANUVIA) tablet tab 25 mg  25 mg Oral DAILY Silvia Rivera MD   25 mg at 09/13/17 0820    insulin lispro (HUMALOG) injection   SubCUTAneous AC&HS Silvia Rivera MD   Stopped at 09/12/17 2028    LORazepam (ATIVAN) tablet 1 mg  1 mg Oral BID PRN Silvia Rivera MD   1 mg at 09/12/17 2109    influenza vaccine 2015-16 (36mos+)(PF) (FLUZONE/FLUARIX QUAD) injection 0.5 mL  0.5 mL IntraMUSCular PRIOR TO DISCHARGE Silvia Rivera MD           OBJECTIVE:  Patient Vitals for the past 8 hrs:   BP Temp Pulse Resp SpO2   09/13/17 0725 138/58 98.9 °F (37.2 °C) 83 18 94 %   17 0412 121/55 98.8 °F (37.1 °C) 77 20 95 %     Temp (24hrs), Av.6 °F (37 °C), Min:98.1 °F (36.7 °C), Max:98.9 °F (37.2 °C)         Physical Exam:  Constitutional: Well developed, well nourished female in no acute distress, lying in the hospital bed complaining of pain. HEENT: Normocephalic and atraumatic. Oropharynx is clear, mucous membranes are moist. Extraocular muscles are intact. Sclerae anicteric. Lymph node   No palpable submandibular, cervical, supraclavicular, axillary lymph nodes. Skin Warm and dry. No bruising and no rash noted. No erythema. No pallor. Respiratory Lungs are clear to auscultation bilaterally without wheezes, rales or rhonchi, normal air exchange without accessory muscle use. CVS Normal rate, regular rhythm and normal S1 and S2. No murmurs, gallops, or rubs. Abdomen Soft, nontender and nondistended, normoactive bowel sounds. Right flank pain   Neuro Grossly nonfocal with no obvious sensory or motor deficits. MSK Normal range of motion in general.     Psych Tearful due to pain and concern of new found mass. Labs:    Recent Results (from the past 24 hour(s))   CBC WITH AUTOMATED DIFF    Collection Time: 17  4:13 PM   Result Value Ref Range    WBC 8.3 4.3 - 11.1 K/uL    RBC 2.86 (L) 4.05 - 5.25 M/uL    HGB 9.9 (L) 11.7 - 15.4 g/dL    HCT 29.5 (L) 35.8 - 46.3 %    .1 (H) 79.6 - 97.8 FL    MCH 34.6 (H) 26.1 - 32.9 PG    MCHC 33.6 31.4 - 35.0 g/dL    RDW 18.8 (H) 11.9 - 14.6 %    PLATELET 486 544 - 805 K/uL    MPV 10.9 10.8 - 14.1 FL    DF AUTOMATED      NEUTROPHILS 69 43 - 78 %    LYMPHOCYTES 23 13 - 44 %    MONOCYTES 6 4.0 - 12.0 %    EOSINOPHILS 2 0.5 - 7.8 %    BASOPHILS 0 0.0 - 2.0 %    IMMATURE GRANULOCYTES 0.5 0.0 - 5.0 %    ABS. NEUTROPHILS 5.6 1.7 - 8.2 K/UL    ABS. LYMPHOCYTES 1.9 0.5 - 4.6 K/UL    ABS. MONOCYTES 0.5 0.1 - 1.3 K/UL    ABS. EOSINOPHILS 0.1 0.0 - 0.8 K/UL    ABS. BASOPHILS 0.0 0.0 - 0.2 K/UL    ABS. IMM. GRANS. 0.0 0.0 - 0.5 K/UL   HEMOGLOBIN A1C WITH EAG    Collection Time: 09/12/17  4:35 PM   Result Value Ref Range    Hemoglobin A1c 4.7 (L) 4.8 - 6.0 %    Est. average glucose Cannot be calculated mg/dL   LIPASE    Collection Time: 09/12/17  4:52 PM   Result Value Ref Range    Lipase 139 73 - 079 U/L   METABOLIC PANEL, COMPREHENSIVE    Collection Time: 09/12/17  4:52 PM   Result Value Ref Range    Sodium 136 136 - 145 mmol/L    Potassium >10.0 (HH) 3.5 - 5.1 mmol/L    Chloride 103 98 - 107 mmol/L    CO2 25 21 - 32 mmol/L    Anion gap 8 7 - 16 mmol/L    Glucose 94 65 - 100 mg/dL    BUN 27 (H) 8 - 23 MG/DL    Creatinine 7.05 (H) 0.6 - 1.0 MG/DL    GFR est AA 8 (L) >60 ml/min/1.73m2    GFR est non-AA 6 (L) >60 ml/min/1.73m2    Calcium 8.3 8.3 - 10.4 MG/DL    Bilirubin, total <0.1 (L) 0.2 - 1.1 MG/DL    ALT (SGPT) 39 12 - 65 U/L    AST (SGOT) 174 (H) 15 - 37 U/L    Alk. phosphatase 77 50 - 136 U/L    Protein, total 8.4 (H) 6.3 - 8.2 g/dL    Albumin 3.0 (L) 3.2 - 4.6 g/dL    Globulin 5.4 (H) 2.3 - 3.5 g/dL    A-G Ratio 0.6 (L) 1.2 - 3.5     TROPONIN I    Collection Time: 09/12/17  4:52 PM   Result Value Ref Range    Troponin-I, Qt. <0.02 (L) 0.02 - 0.05 NG/ML   EKG, 12 LEAD, INITIAL    Collection Time: 09/12/17  4:55 PM   Result Value Ref Range    Ventricular Rate 82 BPM    Atrial Rate 82 BPM    P-R Interval 154 ms    QRS Duration 66 ms    Q-T Interval 412 ms    QTC Calculation (Bezet) 481 ms    Calculated P Axis 54 degrees    Calculated R Axis 50 degrees    Calculated T Axis 41 degrees    Diagnosis       !! AGE AND GENDER SPECIFIC ECG ANALYSIS !!   Normal sinus rhythm  Low voltage QRS  Abnormal ECG  When compared with ECG of 12-SEP-2017 16:20,  No significant change was found  Confirmed by LA LANDEROS (), AYSE CRAWFORD ([id number]) on 9/13/2017 8:10:01 AM     EKG, 12 LEAD, SUBSEQUENT    Collection Time: 09/12/17  6:01 PM   Result Value Ref Range    Ventricular Rate 83 BPM    Atrial Rate 83 BPM    P-R Interval 154 ms    QRS Duration 68 ms    Q-T Interval 392 ms    QTC Calculation (Bezet) 460 ms    Calculated P Axis 45 degrees    Calculated R Axis 44 degrees    Calculated T Axis 41 degrees    Diagnosis       !! AGE AND GENDER SPECIFIC ECG ANALYSIS !!   Normal sinus rhythm  Cannot rule out Anterior infarct (cited on or before 12-SEP-2017)  Abnormal ECG  When compared with ECG of 12-SEP-2017 16:55,  No significant change was found  Confirmed by LA LANDEROS (), Latanya Mace ([id number]) on 9/13/2017 8:13:59 AM     POTASSIUM    Collection Time: 09/12/17  7:05 PM   Result Value Ref Range    Potassium 4.4 3.5 - 5.1 mmol/L   GLUCOSE, POC    Collection Time: 09/12/17  8:27 PM   Result Value Ref Range    Glucose (POC) 103 (H) 65 - 100 mg/dL   MRSA SCREEN - PCR (NASAL)    Collection Time: 09/12/17 11:47 PM   Result Value Ref Range    Special Requests: NO SPECIAL REQUESTS      Culture result:        MRSA target DNA is not detected (presumptive not colonized with MRSA)   METABOLIC PANEL, BASIC    Collection Time: 09/13/17  4:19 AM   Result Value Ref Range    Sodium 142 136 - 145 mmol/L    Potassium 4.1 3.5 - 5.1 mmol/L    Chloride 102 98 - 107 mmol/L    CO2 29 21 - 32 mmol/L    Anion gap 11 7 - 16 mmol/L    Glucose 88 65 - 100 mg/dL    BUN 35 (H) 8 - 23 MG/DL    Creatinine 8.21 (H) 0.6 - 1.0 MG/DL    GFR est AA 6 (L) >60 ml/min/1.73m2    GFR est non-AA 5 (L) >60 ml/min/1.73m2    Calcium 8.7 8.3 - 10.4 MG/DL   CBC W/O DIFF    Collection Time: 09/13/17  4:19 AM   Result Value Ref Range    WBC 5.8 4.3 - 11.1 K/uL    RBC 3.21 (L) 4.05 - 5.25 M/uL    HGB 11.0 (L) 11.7 - 15.4 g/dL    HCT 31.0 (L) 35.8 - 46.3 %    MCV 96.6 79.6 - 97.8 FL    MCH 34.3 (H) 26.1 - 32.9 PG    MCHC 35.5 (H) 31.4 - 35.0 g/dL    RDW 13.9 11.9 - 14.6 %    PLATELET 835 038 - 489 K/uL    MPV 9.7 (L) 10.8 - 14.1 FL   GLUCOSE, POC    Collection Time: 09/13/17  7:45 AM   Result Value Ref Range    Glucose (POC) 100 65 - 100 mg/dL         ASSESSMENT:  Problem List  Date Reviewed: 12/20/2016          Codes Class Noted    * (Principal)Abdominal mass ICD-10-CM: R19.00  ICD-9-CM: 789.30  9/12/2017    Overview Signed 9/12/2017  9:01 PM by Shane Franklin MD     Of Left renal bed             Renal cell carcinoma ([facility] ICD-10-CM: C64.9  ICD-9-CM: 189.0  9/12/2017        AVF (arteriovenous fistula) (HCC) (Chronic) ICD-10-CM: I77.0  ICD-9-CM: 447.0  12/20/2016    Overview Signed 12/20/2016  2:19 PM by Rk Castanon NP       12/6/16 ([address]) Right AVF revision and thrombectomy             Transient ischemic attack ICD-10-CM: G45.9  ICD-9-CM: 435.9  8/29/2015        Renal mass ICD-10-CM: N28.89  ICD-9-CM: 593.9  7/2/2015        Chronic renal failure, stage 5 (HCC) ICD-10-CM: N18.5  ICD-9-CM: 585.5  6/15/2015        Hypotension ICD-10-CM: I95.9  ICD-9-CM: 458.9  12/1/2014        Dizziness ICD-10-CM: R42  ICD-9-CM: 780.4  12/1/2014        Osteoarthritis of right knee ICD-10-CM: M17.11  ICD-9-CM: 715.96  2/7/2013        Total knee replacement status ICD-10-CM: S09.371  ICD-9-CM: V43.65  2/7/2013        DM (diabetes mellitus) ([facility] ICD-10-CM: E11.9  ICD-9-CM: 250.00  2/7/2013        ESRD (end stage renal disease) ([facility] ICD-10-CM: N18.6  ICD-9-CM: 585.6  2/7/2013        HTN (hypertension) ICD-10-CM: I10  ICD-9-CM: 401.9  2/7/2013              RECOMMENDATIONS:  Left renal bed mass  9/13 Ask IR for CT guided biopsy    Uncontrolled Pain  9/13 Consult to [facility] for assistance    Lab studies and imaging studies were personally reviewed. Pertinent old records were reviewed. Thank you for allowing us to participate in the care of Ms. Fischer. Plan: Ms. Pavel Amanda has history of left RCC 2015 sp nephrectomy by Dr. Soundra Crigler. We have asked for CT guided biopsy of recurrent left renal bed mass. We have asked PC to assist with her uncontrolled pain. We will follow along closely for biopsy and results.           JENNI Tee [facility]  [address] [place] [address]  Office : [phone]  Fax : [phone] Attending Addendum:  I personally evaluated the patient with ADRIANNE MortensenP.,  and agree with the assessment, findings and plan as documented. Appears stable, she needs a biopsy of her left renal bed mass.               Radha Cartagena MD  [address]  Office : [phone]  Fax : [phone]

## 2017-09-13 NOTE — PROGRESS NOTES
TRANSFER - IN REPORT:    Verbal report received from Buster Chen RN on [address]  being received from ED for routine progression of care      Report consisted of patients Situation, Background, Assessment and   Recommendations(SBAR). Information from the following report(s) SBAR, Kardex, Intake/Output and MAR was reviewed with the receiving nurse. Opportunity for questions and clarification was provided. Assessment will be completed upon patients arrival to unit and care will be assumed.

## 2017-09-13 NOTE — CONSULTS
Discussed with Dr. Karlos Corrigan. He is addressing patient's pain, anxiety and symptom management. Please reconsult as needed.

## 2017-09-13 NOTE — DIALYSIS
Hemodialysis begun via right  Arm fistula without difficulty by Valeria Chavez RN. All machine alarms verified and working properly. Patient is alert and denies complaints. Will continue to monitor while on dialysis.

## 2017-09-13 NOTE — PROGRESS NOTES
's initial visit. Ms. Kim Knowles was receiving care from staff. Provided card to staff for sharing with patient.      Chang Velazquez 69  Board Certified

## 2017-09-13 NOTE — PROGRESS NOTES
Hospitalist Progress Note    2017  Admit Date: 2017  3:16 PM   NAME: Janice Clifton   :  1950   MRN:  [id number]   Attending: Angelica Ruby MD  PCP:  Mariana Nunez MD    SUBJECTIVE:   Patient presented with right flank pain. Ct noted left mass post nephrectomy. Flank pain has improved since admission, unclear etiology. Plans for biopsy in am regarding left mass in renal bed. Review of Systems negative with exception of pertinent positives noted above  PHYSICAL EXAM     Visit Vitals    /57    Pulse 82    Temp 98.9 °F (37.2 °C)    Resp 18    Wt 108 kg (238 lb 3.2 oz)    SpO2 94%    Breastfeeding No    BMI 39.64 kg/m2      Temp (24hrs), Av.6 °F (37 °C), Min:98.1 °F (36.7 °C), Max:98.9 °F (37.2 °C)    Oxygen Therapy  O2 Sat (%): 94 % (17 0725)  Pulse via Oximetry: 96 beats per minute (17 2100)  O2 Device: Room air (17 0725)    Intake/Output Summary (Last 24 hours) at 17 1452  Last data filed at 17 1403   Gross per 24 hour   Intake              600 ml   Output             2000 ml   Net            -1400 ml      General: No acute distress    Lungs:  CTA Bilaterally. Heart:  Regular rate and rhythm,  No murmur, rub, or gallop  Abdomen: Soft, Non distended, Non tender, Positive bowel sounds  Extremities: No cyanosis, clubbing or edema  Neurologic:  No focal deficits    ASSESSMENT      Active Hospital Problems    Diagnosis Date Noted    Abdominal mass 2017     Of Left renal bed      [facility] cell carcinoma [facility] [address] 2017    HTN (hypertension) 2013    ESRD (end stage renal disease) ([facility] [address] 2013    DM (diabetes mellitus) ([facility] [address] 2013     Plan:  · IR consulted for biopsy in am  · Monitor glucose.   Cover with ssi.  Makenzie Morris continued  · HD per nephrology  · norco prn pain   · Ativan for anxiety  · Appreciate specialist input    DVT Prophylaxis: heparin    Signed By: Ajay Louis MD     2017

## 2017-09-13 NOTE — ED NOTES
TRANSFER - OUT REPORT:    Verbal report given to Jalil Albarran RN (name) on Sarah Stafford  being transferred to [address] [place] [address], rm [phone] (unit) for routine progression of care       Report consisted of patients Situation, Background, Assessment and   Recommendations(SBAR). Information from the following report(s) SBAR, Kardex, ED Summary and MAR was reviewed with the receiving nurse. Lines:   Peripheral IV 11/11/16 Left Forearm (Active)       Peripheral IV 09/12/17 Right External jugular (Active)   Site Assessment Clean, dry, & intact 9/12/2017  6:14 PM   Phlebitis Assessment 0 9/12/2017  6:14 PM   Infiltration Assessment 0 9/12/2017  6:14 PM   Dressing Status Clean, dry, & intact 9/12/2017  6:14 PM        Opportunity for questions and clarification was provided.       Patient transported with:   [employer]Art

## 2017-09-13 NOTE — H&P
HOSPITALIST H&P/CONSULT  NAME:  Sarah Stafford   Age:  73 y.o.  :   1950   MRN:   [id number]  PCP: Alexandrea Roy MD  Consulting MD:  Treatment Team: Attending Provider: Julio C Collins MD; Primary Nurse: Federico Edwards RN  HPI:   73 F with PMH of Left RCC (Clear cell carcinoma) s/p left radical nephrectomy by Dr. Nikunj Bailey in , ESRD on HD MWF, DM2, HTN was sent to the ER by her doctor for Rt flank pain and to get US. She has had flank pain for 2 days, severe, sharp pain, episodic a/w nausea. No fever, chills, Vomiting, Diarrhea, chest pain, SOB, bladder symptoms. Had HD yesterday. In ER CT Scan showed Recurrent mass in Left renal bed and no clear Rt sided pathology. She still has some Rt flank pain ajnd very tearful and distressed after knowing the CT results. Hospitalist asked to admit pt for further workup. States pain is better after IV morphine. Takes Lasix Bid and makes urine. Asking if she could get something for anxiety. 10 point ROS done and is negative except as noted in HPI. Past Medical History:   Diagnosis Date    Arthritis     AVF (arteriovenous fistula) ([facility] [address] 2016 (S) Right AVF revision and thrombectomy    Cancer ([facility] [address]     L kidney    Chronic kidney disease     HD in - at [place] [facility]    Degenerative joint disease     Diabetes ([facility] [address]     type 2, average range 100-120 fasting, symptomatic below 90; last A1C?    ESRD (end stage renal disease) ([facility] [address] 2006    ESRD.  MWF dialysis     Hypertension     Obesity     Transient ischemic attack 2015      Past Surgical History:   Procedure Laterality Date    BREAST SURGERY PROCEDURE UNLISTED Right     cyst removed    HX GI      colon resection resulting in temporary colostomy reversal    HX GYN      stephan    HX OTHER SURGICAL      dialysis fistula, several permcaths    HX UROLOGICAL Left 2015    nephrectomy    HX VASCULAR ACCESS        Prior to Admission Medications   Prescriptions Last Dose Informant Patient Reported? Taking? HYDROcodone-acetaminophen (NORCO) 5-325 mg per tablet   No No   Sig: Take 1 Tab by mouth every four (4) hours as needed for Pain. Max Daily Amount: 6 Tabs. VIT C/VIT E/LUTEIN/MIN/OMEGA-3 (OCUVITE PO)   Yes No   Sig: Take  by mouth nightly. amLODIPine (NORVASC) 10 mg tablet   Yes No   Sig: Take  by mouth daily. aspirin 81 mg chewable tablet   No No   Sig: Take 1 Tab by mouth daily. Patient taking differently: Take 81 mg by mouth every morning. butalbital-acetaminophen-caff (FIORICET) -40 mg per capsule   No No   Sig: Take 1-2 Caps by mouth every six (6) hours as needed for Pain. Max Daily Amount: 8 Caps. cinacalcet (SENSIPAR) 30 mg tablet   Yes No   Sig: Take 30 mg by mouth nightly. diphenoxylate-atropine (LOMOTIL) 2.5-0.025 mg per tablet   No No   Sig: Take 2 Tabs by mouth four (4) times daily as needed for Diarrhea. Max Daily Amount: 8 Tabs. furosemide (LASIX) 80 mg tablet  Self Yes No   Sig: Take 80 mg by mouth two (2) times a day. Indications: EDEMA, HYPERTENSION   lidocaine 5 % topical cream   No No   Sig: Apply  to affected area two (2) times daily as needed for Pain. lisinopril (PRINIVIL, ZESTRIL) 40 mg tablet   Yes No   Sig: Take 40 mg by mouth nightly. Indications: HYPERTENSION   minoxidil (LONITEN) 2.5 mg tablet   Yes No   Sig: Take 7.5 mg by mouth two (2) times a day. Indications: HYPERTENSION   ondansetron hcl (ZOFRAN, AS HYDROCHLORIDE,) 8 mg tablet   No No   Sig: Take 1 Tab by mouth every eight (8) hours as needed for Nausea. promethazine (PHENERGAN) 25 mg tablet   No No   Sig: Take 1 Tab by mouth every six (6) hours as needed for Nausea. sevelamer carbonate (RENVELA) 800 mg tab tab   Yes No   Sig: Take 800 mg by mouth three (3) times daily (with meals). 5 tablets with meals/ 2 with snacks  Sometimes only eats 2 meals/d   sitaGLIPtin (JANUVIA) 100 mg tablet   Yes No   Sig: Take 50 mg by mouth every morning.  Indications: TYPE 2 DIABETES MELLITUS      Facility-Administered Medications: None     Home meds reconciled. Allergies   Allergen Reactions    Pcn [Penicillins] Rash    Vancomycin Rash      Social History   Substance Use Topics    Smoking status: Never Smoker    Smokeless tobacco: Never Used    Alcohol use No      Family History   Problem Relation Age of Onset    Diabetes Mother     Heart Disease Mother     Hypertension Mother     Heart Disease Father     Hypertension Father     Malignant Hyperthermia Neg Hx     Pseudocholinesterase Deficiency Neg Hx     Delayed Awakening Neg Hx     Post-op Nausea/Vomiting Neg Hx     Emergence Delirium Neg Hx     Other Neg Hx     Post-op Cognitive Dysfunction Neg Hx       Immunization History   Administered Date(s) Administered    TB Skin Test (PPD) Intradermal 02/08/2013    TD Vaccine 07/01/2008    TDAP Vaccine 03/30/2012     Objective:     Visit Vitals    /70    Pulse 83    Resp 19    SpO2 91%      No data recorded. Oxygen Therapy  O2 Sat (%): 91 % (09/12/17 1829)  Pulse via Oximetry: 84 beats per minute (09/12/17 1829)  Physical Exam:  General:    Alert, cooperative, no distress   Head:   NCAT. No obvious deformity  Nose:  Nares normal. No drainage  Lungs:   CTABL. No wheezing/rhonchi/rales  Heart:   RRR. No m/r/g. Abdomen:   S/nt/nd. Bowel sounds normal.   Extremities: No cyanosis. Rt arm AV Graft  Skin:     No rashes or lesions. Not Jaundiced  Neurologic: Moves all extremities.   no gross focal deficits      Data Review:   Recent Results (from the past 24 hour(s))   CBC WITH AUTOMATED DIFF    Collection Time: 09/12/17  4:13 PM   Result Value Ref Range    WBC 8.3 4.3 - 11.1 K/uL    RBC 2.86 (L) 4.05 - 5.25 M/uL    HGB 9.9 (L) 11.7 - 15.4 g/dL    HCT 29.5 (L) 35.8 - 46.3 %    .1 (H) 79.6 - 97.8 FL    MCH 34.6 (H) 26.1 - 32.9 PG    MCHC 33.6 31.4 - 35.0 g/dL    RDW 18.8 (H) 11.9 - 14.6 %    PLATELET 818 010 - 333 K/uL    MPV 10.9 10.8 - 14.1 FL    DF AUTOMATED      NEUTROPHILS 69 43 - 78 %    LYMPHOCYTES 23 13 - 44 %    MONOCYTES 6 4.0 - 12.0 %    EOSINOPHILS 2 0.5 - 7.8 %    BASOPHILS 0 0.0 - 2.0 %    IMMATURE GRANULOCYTES 0.5 0.0 - 5.0 %    ABS. NEUTROPHILS 5.6 1.7 - 8.2 K/UL    ABS. LYMPHOCYTES 1.9 0.5 - 4.6 K/UL    ABS. MONOCYTES 0.5 0.1 - 1.3 K/UL    ABS. EOSINOPHILS 0.1 0.0 - 0.8 K/UL    ABS. BASOPHILS 0.0 0.0 - 0.2 K/UL    ABS. IMM. GRANS. 0.0 0.0 - 0.5 K/UL   HEMOGLOBIN A1C WITH EAG    Collection Time: 09/12/17  4:35 PM   Result Value Ref Range    Hemoglobin A1c 4.7 (L) 4.8 - 6.0 %    Est. average glucose Cannot be calculated mg/dL   LIPASE    Collection Time: 09/12/17  4:52 PM   Result Value Ref Range    Lipase 139 73 - 037 U/L   METABOLIC PANEL, COMPREHENSIVE    Collection Time: 09/12/17  4:52 PM   Result Value Ref Range    Sodium 136 136 - 145 mmol/L    Potassium >10.0 (HH) 3.5 - 5.1 mmol/L    Chloride 103 98 - 107 mmol/L    CO2 25 21 - 32 mmol/L    Anion gap 8 7 - 16 mmol/L    Glucose 94 65 - 100 mg/dL    BUN 27 (H) 8 - 23 MG/DL    Creatinine 7.05 (H) 0.6 - 1.0 MG/DL    GFR est AA 8 (L) >60 ml/min/1.73m2    GFR est non-AA 6 (L) >60 ml/min/1.73m2    Calcium 8.3 8.3 - 10.4 MG/DL    Bilirubin, total <0.1 (L) 0.2 - 1.1 MG/DL    ALT (SGPT) 39 12 - 65 U/L    AST (SGOT) 174 (H) 15 - 37 U/L    Alk.  phosphatase 77 50 - 136 U/L    Protein, total 8.4 (H) 6.3 - 8.2 g/dL    Albumin 3.0 (L) 3.2 - 4.6 g/dL    Globulin 5.4 (H) 2.3 - 3.5 g/dL    A-G Ratio 0.6 (L) 1.2 - 3.5     TROPONIN I    Collection Time: 09/12/17  4:52 PM   Result Value Ref Range    Troponin-I, Qt. <0.02 (L) 0.02 - 0.05 NG/ML   EKG, 12 LEAD, INITIAL    Collection Time: 09/12/17  4:55 PM   Result Value Ref Range    Ventricular Rate 82 BPM    Atrial Rate 82 BPM    P-R Interval 154 ms    QRS Duration 66 ms    Q-T Interval 412 ms    QTC Calculation (Bezet) 481 ms    Calculated P Axis 54 degrees    Calculated R Axis 50 degrees    Calculated T Axis 41 degrees    Diagnosis       !! AGE AND GENDER SPECIFIC ECG ANALYSIS !! Normal sinus rhythm  Low voltage QRS  Cannot rule out Anterior infarct (cited on or before 12-SEP-2017)  Abnormal ECG  When compared with ECG of 12-SEP-2017 16:20,  No significant change was found     POTASSIUM    Collection Time: 09/12/17  7:05 PM   Result Value Ref Range    Potassium 4.4 3.5 - 5.1 mmol/L   GLUCOSE, POC    Collection Time: 09/12/17  8:27 PM   Result Value Ref Range    Glucose (POC) 103 (H) 65 - 100 mg/dL     Imaging /Procedures /Studies:  I personally reviewed all labs, imaging, and other studies this admission:  CXR Results  (Last 48 hours)    None        CT Results  (Last 48 hours)               09/12/17 1732  CT ABD PELV W WO CONT Final result    Impression:  IMPRESSION:  There is a new 3.4 x 3.5 cm irregular mass in the left nephrectomy   bed. This is suspicious for neoplasm. No acute abnormality to explain the   right-sided flank pain. Other chronic changes as above. Narrative:  CT ABDOMEN WITHOUT AND WITH INTRAVENOUS CONTRAST AND PELVIS WITH CONTRAST. HISTORY: Right-sided flank pain. Status post left nephrectomy. COMPARISON: None       TECHNIQUE: 5 mm axial scans from above the diaphragms to the iliac crest without   contrast were followed by 100 cc intravenous contrast and repeat images from the   diaphragm to the pubic symphysis. Intravenous contrast was given to increase the   sensitivity to acute inflammation. Radiation dose reduction techniques were   used for this study. Our CT scanners use one or more of the following:    Automated exposure control, adjustment of the mA and or kV according to patient   size, iterative reconstruction. FINDINGS:    Abdomen: The lung bases are clear. No radiopaque urinary tract calculi in the   right kidney or upper right ureter. No gross focal parenchymal abnormality   identified within the liver or spleen. No calcified gallstones. The biliary tree   is not dilated. The pancreas is unremarkable.  No free fluid, acute inflammatory   changes or adenopathy. Bowel loops are not dilated. Right kidney enhances and   demonstrates several cysts. No hydronephrosis. In the left renal bed there is a   new 3.4 x 3.5 cm irregular soft tissue density which enhances mildly with   contrast. The adrenal glands are normal size. Aorta is normal caliber and   calcified. Pelvis: No free fluid or acute inflammatory changes. The urinary bladder is   fairly empty and unremarkable. No pelvic adenopathy. No gross bony lesions. Postsurgical changes anterior abdominal wall. Assessment and Plan: Active Hospital Problems    Diagnosis Date Noted    Abdominal mass 09/12/2017     Of Left renal bed      Renal cell carcinoma ([facility] [address] 09/12/2017    HTN (hypertension) 02/07/2013    ESRD (end stage renal disease) ([facility] [address] 02/07/2013    DM (diabetes mellitus) ([facility] 02/07/2013   -Anxiety    PLAN    · Admit to Obs medical floor  · Consult Urology and Onc. She had Left Radical Nephrectomy in 2015 with histology showing Clear Cell Cancer with no extracapsular extension and clear margins. Unsure if this is recurrent RCC or other malignancy. · Rt flank pain etiology is unclear, probably a small stone, will send UA to r/o UTI. Norco and Morphine prn pain. · Nephrology consult for HD needs  · Ativan 1mg PO BID prn anxiety, pt is tearful after knowing about recurrent mass.   · ISS, januvia and diabetic/cardiac diet  · Resume pertinent home meds      DVT ppx:  hsq  Code status:  full  Estimated LOS: less than 48 hrs     Plan of care discussed with: patient     Signed By: Navneet Tong MD     September 12, 2017

## 2017-09-13 NOTE — CONSULTS
Patient admitted with RIGHT flank/back pain. She has ESRD and is on HD. She is s/p LEFT open radical nephrectomy 7/2/15 by Dr. Nick Blanc. Final pathology revealed CLEAR CELL CARCINOMA, ANTONIO GRADE 2/4 MEASURING APPROXIMATELY 4.4 X 3.5 X 3 CM WITH FOCAL NECROSIS, HEMORRHAGE AND FIBROSIS. EXTRACAPSULAR EXTENSION IS NOT DEMONSTRATED. LYMPHOVASCULAR INVASION IS  NOT IDENTIFIED. URETERAL AND VASCULAR MARGINS OF RESECTION ARE NEGATIVE FOR  MALIGNANT TUMOR. pT1 pNX. Patient was lost to follow up soon after surgery despite phone calls and letter from Dr. Nick Blanc. CT abd/pelvis without/with contrast performed 9/12/17 reveals there is a new 3.4 x 3.5 cm irregular mass in the left nephrectomy bed. This is suspicious for neoplasm. No acute abnormality to explain the right-sided flank pain. Other chronic changes as above. Exam: LEFT subcostal incision well healed with no hernia     A: New LEFT sided mass in nephrectomy bed. P: Agree with Medical Oncology consult. There is no role for surgical removal.  I will make Dr. Nick Blanc aware of admission.

## 2017-09-13 NOTE — PROGRESS NOTES
END OF SHIFT NOTE:    Intake/Output  09/12 1901 - 09/13 0700  In: 360 [P.O.:360]  Out: -    Voiding: NO  Catheter: NO  Drain:              Stool:  0 occurrences. Emesis:  0 occurrences. VITAL SIGNS  Patient Vitals for the past 12 hrs:   Temp Pulse Resp BP SpO2   09/13/17 0412 98.8 °F (37.1 °C) 77 20 121/55 95 %   09/12/17 2217 98.1 °F (36.7 °C) 86 20 149/56 96 %   09/12/17 2100 98.4 °F (36.9 °C) 95 20 141/63 93 %   09/12/17 2030 - 96 - 151/65 92 %   09/12/17 1959 - 91 24 155/68 91 %   09/12/17 1929 - 87 14 160/72 90 %   09/12/17 1829 - 83 19 157/70 91 %       Pain Assessment  Pain 1  Pain Scale 1: Visual (09/13/17 0148)  Pain Intensity 1: 0 (09/13/17 0148)  Patient Stated Pain Goal: 0 (09/13/17 0148)  Pain Reassessment 1: Patient sleeping (09/13/17 0148)  Pain Location 1: Flank (09/12/17 2217)  Pain Orientation 1: Posterior (09/12/17 2217)  Pain Description 1: Aching (09/12/17 2217)  Pain Intervention(s) 1: Medication (see MAR) (09/12/17 2217)    Ambulating  Yes with TWO assist. Pt states she does not use a walker or cane at home, denies any falls, lives at home with daughter. Additional Information:   Pt rested well through the night, denies any pain when she came to the floor. Oriented to room and call light. MRSA nares: negative  Red arm band placed to R arm due to having RUE shunt for dialysis. Shift report given to oncoming nurse at the bedside.     Jeana Morgan RN

## 2017-09-13 NOTE — PROGRESS NOTES
Pt's daughter called and provided privacy code. Updated her on pt's condition and plan for biopsy tomorrow.

## 2017-09-13 NOTE — PROGRESS NOTES
Spoke with patient's floor nurse and requested that patient be made NPO and hold 0400 dose of blood thinner.

## 2017-09-13 NOTE — PHYSICIAN ADVISORY
Letter of Determination:  Outpatient states receiving Observation Services    This case was reviewed, and I concur with Outpatient status receiving observation services. This determination may change depending on further medical information, condition changes of the patient, or treatment requirements.       Juanito Salas MD, DIANNA,   Physician [place].

## 2017-09-13 NOTE — DIALYSIS
Hemodialysis ended without problems. B/P- 140/57, HR- 82, 2.0 kg's removed. Patient tolerated treatment well and is awaiting transport to her room.

## 2017-09-13 NOTE — CONSULTS
RENAL H&P/CONSULT    Subjective:       As Per HPI   73 F with PMH of Left RCC (Clear cell carcinoma) s/p left radical nephrectomy by Dr. Adriane Trinidad in 2015, ESRD on HD MWF, DM2, HTN was sent to the ER by her doctor for Rt flank pain and to get US. She has had flank pain for 2 days, severe, sharp pain, episodic a/w nausea. No fever, chills, Vomiting, Diarrhea, chest pain, SOB, bladder symptoms. Had HD yesterday. In ER CT Scan showed Recurrent mass in Left renal bed and no clear Rt sided pathology. She still has some Rt flank pain ajnd very tearful and distressed after knowing the CT results. Hospitalist asked to admit pt for further workup.    s- Nephrology consulted for HD needs   Ms. Fischer very emotional and tearful about the new diagnosis . Past Medical History:   Diagnosis Date    Arthritis     [facility]F ([facility]iovenous fistula) ([facility] 12/20/2016 12/6/16 ([facility]) Right AVF revision and thrombectomy    Cancer ([address] 2015    L kidney    Chronic kidney disease     HD in M-W-F- at [place] [facility]    Degenerative joint disease     Diabetes ([address]     type 2, average range 100-120 fasting, symptomatic below 90; last A1C?    ESRD (end stage renal disease) ([address] Nov 2006    ESRD. MWF dialysis     Hypertension     Obesity     Transient ischemic attack 8/29/2015      Past Surgical History:   Procedure Laterality Date    BREAST SURGERY PROCEDURE UNLISTED Right     cyst removed    HX GI      colon resection resulting in temporary colostomy reversal    HX GYN      stephan    HX OTHER SURGICAL      dialysis fistula, several permcaths    HX UROLOGICAL Left July 2015    nephrectomy    HX VASCULAR ACCESS        Prior to Admission medications    Medication Sig Start Date End Date Taking? Authorizing Provider   HYDROcodone-acetaminophen (NORCO) 5-325 mg per tablet Take 1 Tab by mouth every four (4) hours as needed for Pain. Max Daily Amount: 6 Tabs.  9/1/17  Yes FRANCISCO Stoll   butalbital-acetaminophen-caff (FIORICET) -40 mg per capsule Take 1-2 Caps by mouth every six (6) hours as needed for Pain. Max Daily Amount: 8 Caps. 2/7/17  Yes Arturo Dumont MD   amLODIPine (NORVASC) 10 mg tablet Take  by mouth daily. Yes Historical Provider   VIT C/VIT E/LUTEIN/MIN/OMEGA-3 (OCUVITE PO) Take  by mouth nightly. Yes Historical Provider   cinacalcet (SENSIPAR) 30 mg tablet Take 30 mg by mouth nightly. Yes Historical Provider   minoxidil (LONITEN) 2.5 mg tablet Take 7.5 mg by mouth two (2) times a day. Indications: HYPERTENSION   Yes Jaylon Cool MD   aspirin 81 mg chewable tablet Take 1 Tab by mouth daily. Patient taking differently: Take 81 mg by mouth every morning. 9/1/15  Yes Thao Singh MD   sevelamer carbonate (RENVELA) 800 mg tab tab Take 800 mg by mouth three (3) times daily (with meals). 5 tablets with meals/ 2 with snacks  Sometimes only eats 2 meals/d   Yes Historical Provider   lisinopril (PRINIVIL, ZESTRIL) 40 mg tablet Take 40 mg by mouth nightly. Indications: HYPERTENSION 6/8/15  Yes Historical Provider   sitaGLIPtin (JANUVIA) 100 mg tablet Take 50 mg by mouth every morning. Indications: TYPE 2 DIABETES MELLITUS   Yes Historical Provider   lidocaine 5 % topical cream Apply  to affected area two (2) times daily as needed for Pain. 9/1/17   FRANCISCO Tate   promethazine (PHENERGAN) 25 mg tablet Take 1 Tab by mouth every six (6) hours as needed for Nausea. 2/7/17   Arturo Dumont MD   diphenoxylate-atropine (LOMOTIL) 2.5-0.025 mg per tablet Take 2 Tabs by mouth four (4) times daily as needed for Diarrhea. Max Daily Amount: 8 Tabs. 2/7/17   Arturo Dumont MD   ondansetron hcl (ZOFRAN, AS HYDROCHLORIDE,) 8 mg tablet Take 1 Tab by mouth every eight (8) hours as needed for Nausea. 2/7/17   Arturo Dumont MD   furosemide (LASIX) 80 mg tablet Take 80 mg by mouth two (2) times a day.  Indications: EDEMA, HYPERTENSION    Historical Provider     Allergies   Allergen Reactions    Pcn [Penicillins] Rash  Vancomycin Rash      Social History   Substance Use Topics    Smoking status: Never Smoker    Smokeless tobacco: Never Used    Alcohol use No      Family History   Problem Relation Age of Onset    Diabetes Mother     Heart Disease Mother     Hypertension Mother     Heart Disease Father     Hypertension Father     Malignant Hyperthermia Neg Hx     Pseudocholinesterase Deficiency Neg Hx     Delayed Awakening Neg Hx     Post-op Nausea/Vomiting Neg Hx     Emergence Delirium Neg Hx     Other Neg Hx     Post-op Cognitive Dysfunction Neg Hx           Review of Systems    Constitutional: no fever, negative  Eyes: fair vision, negative  Ears, nose, mouth, throat, and face:fair hearing, negative  Respiratory: no asthma, negative  Cardiovascular:no chest pain, negative  Gastrointestinal:no diarrhea, negative  Genitourinary: no dysuria,negative  Hematologic/lymphatic: no bleeding tendency, negative  Neurological: no seizures, negative  Behvioral/Psych: no psych hospitalization negative  Endocrine: no goiter, negative      Objective:       Visit Vitals    /65    Pulse 65    Temp 98.9 °F (37.2 °C)    Resp 18    Wt 108 kg (238 lb 3.2 oz)    SpO2 94%    Breastfeeding No    BMI 39.64 kg/m2       09/13 0701 - 09/13 1900  In: 240 [P.O.:240]  Out: -   09/11 1901 - 09/13 0700  In: 360 [P.O.:360]  Out: -     Visit Vitals    /65    Pulse 65    Temp 98.9 °F (37.2 °C)    Resp 18    Wt 108 kg (238 lb 3.2 oz)    SpO2 94%    Breastfeeding No    BMI 39.64 kg/m2     General:  Alert, cooperative, no distress, appears stated age. Head:  Normocephalic, without obvious abnormality, atraumatic. Eyes:  Conjunctivae/corneas clear. PERRL, EOMs intact. Ears:  Normal external ear canals both ears. Nose: Nares normal. Septum midline. Mucosa normal. No drainage or sinus tenderness.    Throat: Lips, mucosa, and tongue normal. Teeth and gums normal.   Neck: Supple, symmetrical, trachea midline, no adenopathy, thyroid: no enlargement/tenderness/nodules, no JVD. Back:   Symmetric, no curvature. ROM normal. No CVA tenderness. Lungs:   Clear to auscultation bilaterally. Chest wall:  No tenderness or deformity. Heart:  Regular rate and rhythm, S1, S2 normal, no murmur,  rub or gallop. Abdomen:   Soft, non-tender. Bowel sounds normal. No masses,  No organomegaly. No renal bruit. Extremities: Extremities normal, atraumatic, no cyanosis or edema. Pulses: 2+ and symmetric all extremities. Skin: Skin color, texture, turgor normal. No rashes or lesions. Lymph nodes: Cervical and supraclavicular nodes normal.   Neurologic: Grossly intact. No asterixis. ECG: normal EKG, normal sinus rhythm, unchanged from previous tracings     Data Review:     Recent Results (from the past 24 hour(s))   CBC WITH AUTOMATED DIFF    Collection Time: 09/12/17  4:13 PM   Result Value Ref Range    WBC 8.3 4.3 - 11.1 K/uL    RBC 2.86 (L) 4.05 - 5.25 M/uL    HGB 9.9 (L) 11.7 - 15.4 g/dL    HCT 29.5 (L) 35.8 - 46.3 %    .1 (H) 79.6 - 97.8 FL    MCH 34.6 (H) 26.1 - 32.9 PG    MCHC 33.6 31.4 - 35.0 g/dL    RDW 18.8 (H) 11.9 - 14.6 %    PLATELET 770 234 - 768 K/uL    MPV 10.9 10.8 - 14.1 FL    DF AUTOMATED      NEUTROPHILS 69 43 - 78 %    LYMPHOCYTES 23 13 - 44 %    MONOCYTES 6 4.0 - 12.0 %    EOSINOPHILS 2 0.5 - 7.8 %    BASOPHILS 0 0.0 - 2.0 %    IMMATURE GRANULOCYTES 0.5 0.0 - 5.0 %    ABS. NEUTROPHILS 5.6 1.7 - 8.2 K/UL    ABS. LYMPHOCYTES 1.9 0.5 - 4.6 K/UL    ABS. MONOCYTES 0.5 0.1 - 1.3 K/UL    ABS. EOSINOPHILS 0.1 0.0 - 0.8 K/UL    ABS. BASOPHILS 0.0 0.0 - 0.2 K/UL    ABS. IMM.  GRANS. 0.0 0.0 - 0.5 K/UL   HEMOGLOBIN A1C WITH EAG    Collection Time: 09/12/17  4:35 PM   Result Value Ref Range    Hemoglobin A1c 4.7 (L) 4.8 - 6.0 %    Est. average glucose Cannot be calculated mg/dL   LIPASE    Collection Time: 09/12/17  4:52 PM   Result Value Ref Range    Lipase 139 73 - 724 U/L   METABOLIC PANEL, COMPREHENSIVE Collection Time: 09/12/17  4:52 PM   Result Value Ref Range    Sodium 136 136 - 145 mmol/L    Potassium >10.0 (HH) 3.5 - 5.1 mmol/L    Chloride 103 98 - 107 mmol/L    CO2 25 21 - 32 mmol/L    Anion gap 8 7 - 16 mmol/L    Glucose 94 65 - 100 mg/dL    BUN 27 (H) 8 - 23 MG/DL    Creatinine 7.05 (H) 0.6 - 1.0 MG/DL    GFR est AA 8 (L) >60 ml/min/1.73m2    GFR est non-AA 6 (L) >60 ml/min/1.73m2    Calcium 8.3 8.3 - 10.4 MG/DL    Bilirubin, total <0.1 (L) 0.2 - 1.1 MG/DL    ALT (SGPT) 39 12 - 65 U/L    AST (SGOT) 174 (H) 15 - 37 U/L    Alk. phosphatase 77 50 - 136 U/L    Protein, total 8.4 (H) 6.3 - 8.2 g/dL    Albumin 3.0 (L) 3.2 - 4.6 g/dL    Globulin 5.4 (H) 2.3 - 3.5 g/dL    A-G Ratio 0.6 (L) 1.2 - 3.5     TROPONIN I    Collection Time: 09/12/17  4:52 PM   Result Value Ref Range    Troponin-I, Qt. <0.02 (L) 0.02 - 0.05 NG/ML   EKG, 12 LEAD, INITIAL    Collection Time: 09/12/17  4:55 PM   Result Value Ref Range    Ventricular Rate 82 BPM    Atrial Rate 82 BPM    P-R Interval 154 ms    QRS Duration 66 ms    Q-T Interval 412 ms    QTC Calculation (Bezet) 481 ms    Calculated P Axis 54 degrees    Calculated R Axis 50 degrees    Calculated T Axis 41 degrees    Diagnosis       !! AGE AND GENDER SPECIFIC ECG ANALYSIS !! Normal sinus rhythm  Low voltage QRS  Abnormal ECG  When compared with ECG of 12-SEP-2017 16:20,  No significant change was found  Confirmed by LA LANDEROS (), AYSE CRAWFORD ([id number]) on 9/13/2017 8:10:01 AM     EKG, 12 LEAD, SUBSEQUENT    Collection Time: 09/12/17  6:01 PM   Result Value Ref Range    Ventricular Rate 83 BPM    Atrial Rate 83 BPM    P-R Interval 154 ms    QRS Duration 68 ms    Q-T Interval 392 ms    QTC Calculation (Bezet) 460 ms    Calculated P Axis 45 degrees    Calculated R Axis 44 degrees    Calculated T Axis 41 degrees    Diagnosis       !! AGE AND GENDER SPECIFIC ECG ANALYSIS !!   Normal sinus rhythm  Cannot rule out Anterior infarct (cited on or before 12-SEP-2017)  Abnormal ECG  When compared with ECG of 12-SEP-2017 16:55,  No significant change was found  Confirmed by LA LANDEROS (), Selvin Bond ([id number]) on 9/13/2017 8:13:59 AM     POTASSIUM    Collection Time: 09/12/17  7:05 PM   Result Value Ref Range    Potassium 4.4 3.5 - 5.1 mmol/L   GLUCOSE, POC    Collection Time: 09/12/17  8:27 PM   Result Value Ref Range    Glucose (POC) 103 (H) 65 - 100 mg/dL   MRSA SCREEN - PCR (NASAL)    Collection Time: 09/12/17 11:47 PM   Result Value Ref Range    Special Requests: NO SPECIAL REQUESTS      Culture result:        MRSA target DNA is not detected (presumptive not colonized with MRSA)   METABOLIC PANEL, BASIC    Collection Time: 09/13/17  4:19 AM   Result Value Ref Range    Sodium 142 136 - 145 mmol/L    Potassium 4.1 3.5 - 5.1 mmol/L    Chloride 102 98 - 107 mmol/L    CO2 29 21 - 32 mmol/L    Anion gap 11 7 - 16 mmol/L    Glucose 88 65 - 100 mg/dL    BUN 35 (H) 8 - 23 MG/DL    Creatinine 8.21 (H) 0.6 - 1.0 MG/DL    GFR est AA 6 (L) >60 ml/min/1.73m2    GFR est non-AA 5 (L) >60 ml/min/1.73m2    Calcium 8.7 8.3 - 10.4 MG/DL   CBC W/O DIFF    Collection Time: 09/13/17  4:19 AM   Result Value Ref Range    WBC 5.8 4.3 - 11.1 K/uL    RBC 3.21 (L) 4.05 - 5.25 M/uL    HGB 11.0 (L) 11.7 - 15.4 g/dL    HCT 31.0 (L) 35.8 - 46.3 %    MCV 96.6 79.6 - 97.8 FL    MCH 34.3 (H) 26.1 - 32.9 PG    MCHC 35.5 (H) 31.4 - 35.0 g/dL    RDW 13.9 11.9 - 14.6 %    PLATELET 789 185 - 131 K/uL    MPV 9.7 (L) 10.8 - 14.1 FL   GLUCOSE, POC    Collection Time: 09/13/17  7:45 AM   Result Value Ref Range    Glucose (POC) 100 65 - 100 mg/dL   GLUCOSE, POC    Collection Time: 09/13/17 10:47 AM   Result Value Ref Range    Glucose (POC) 140 (H) 65 - 100 mg/dL       was negative for infiltrate, effusion, pneumothorax, or wide mediastinum    Principal Problem:    Abdominal mass (9/12/2017)      Overview: Of Left renal bed    Active Problems:    DM [facility]di[facility] mellitus[facility] (2/7/2013)      ESRD (end stage renal disease) ([facility] (2/7/2013)      HTN (hypertension) (2/7/2013)      Renal cell carcinoma ([facility] (9/12/2017)        Assessment:     1. ? RCC on the Left renal bed   PMH of Left RCC (Clear cell carcinoma) s/p left radical nephrectomy   Per urology and oncology     2. ESRD on HD   Will do Hd today to keep up with her MWF schedule     3. HTN   Continue home meds     4. DM   Continue home meds     5.  MBD   Continue binders         Plan:

## 2017-09-14 ENCOUNTER — APPOINTMENT (OUTPATIENT)
Dept: CT IMAGING | Age: 67
DRG: 686 | End: 2017-09-14
Attending: NURSE PRACTITIONER
Payer: MEDICARE

## 2017-09-14 PROBLEM — R10.9 FLANK PAIN: Status: ACTIVE | Noted: 2017-09-14

## 2017-09-14 LAB
APPEARANCE UR: ABNORMAL
BACTERIA URNS QL MICRO: ABNORMAL /HPF
BILIRUB UR QL: ABNORMAL
CASTS URNS QL MICRO: 0 /LPF
COLOR UR: YELLOW
CRYSTALS URNS QL MICRO: 0 /LPF
EPI CELLS #/AREA URNS HPF: ABNORMAL /HPF
GLUCOSE BLD STRIP.AUTO-MCNC: 126 MG/DL (ref 65–100)
GLUCOSE BLD STRIP.AUTO-MCNC: 151 MG/DL (ref 65–100)
GLUCOSE BLD STRIP.AUTO-MCNC: 97 MG/DL (ref 65–100)
GLUCOSE UR STRIP.AUTO-MCNC: NEGATIVE MG/DL
HGB UR QL STRIP: NEGATIVE
KETONES UR QL STRIP.AUTO: ABNORMAL MG/DL
LEUKOCYTE ESTERASE UR QL STRIP.AUTO: NEGATIVE
MUCOUS THREADS URNS QL MICRO: 0 /LPF
NITRITE UR QL STRIP.AUTO: NEGATIVE
PH UR STRIP: 6 [PH] (ref 5–9)
PROT UR STRIP-MCNC: 100 MG/DL
RBC #/AREA URNS HPF: 0 /HPF
SP GR UR REFRACTOMETRY: 1.02 (ref 1–1.02)
UROBILINOGEN UR QL STRIP.AUTO: 0.2 EU/DL (ref 0.2–1)
WBC URNS QL MICRO: ABNORMAL /HPF
YEAST URNS QL MICRO: ABNORMAL

## 2017-09-14 PROCEDURE — 74011636637 HC RX REV CODE- 636/637: Performed by: INTERNAL MEDICINE

## 2017-09-14 PROCEDURE — 99233 SBSQ HOSP IP/OBS HIGH 50: CPT | Performed by: INTERNAL MEDICINE

## 2017-09-14 PROCEDURE — 77012 CT SCAN FOR NEEDLE BIOPSY: CPT

## 2017-09-14 PROCEDURE — 99152 MOD SED SAME PHYS/QHP 5/>YRS: CPT

## 2017-09-14 PROCEDURE — 74011000250 HC RX REV CODE- 250: Performed by: RADIOLOGY

## 2017-09-14 PROCEDURE — 74011250636 HC RX REV CODE- 250/636

## 2017-09-14 PROCEDURE — 82962 GLUCOSE BLOOD TEST: CPT

## 2017-09-14 PROCEDURE — 0WBH3ZX EXCISION OF RETROPERITONEUM, PERCUTANEOUS APPROACH, DIAGNOSTIC: ICD-10-PCS | Performed by: RADIOLOGY

## 2017-09-14 PROCEDURE — 74011250637 HC RX REV CODE- 250/637: Performed by: INTERNAL MEDICINE

## 2017-09-14 PROCEDURE — 88305 TISSUE EXAM BY PATHOLOGIST: CPT | Performed by: INTERNAL MEDICINE

## 2017-09-14 PROCEDURE — 74011250636 HC RX REV CODE- 250/636: Performed by: INTERNAL MEDICINE

## 2017-09-14 PROCEDURE — 81003 URINALYSIS AUTO W/O SCOPE: CPT | Performed by: INTERNAL MEDICINE

## 2017-09-14 PROCEDURE — 81015 MICROSCOPIC EXAM OF URINE: CPT | Performed by: INTERNAL MEDICINE

## 2017-09-14 PROCEDURE — 99153 MOD SED SAME PHYS/QHP EA: CPT

## 2017-09-14 PROCEDURE — 65270000029 HC RM PRIVATE

## 2017-09-14 PROCEDURE — 99218 HC RM OBSERVATION: CPT

## 2017-09-14 RX ORDER — HYDROMORPHONE HYDROCHLORIDE 1 MG/ML
1 INJECTION, SOLUTION INTRAMUSCULAR; INTRAVENOUS; SUBCUTANEOUS
Status: DISCONTINUED | OUTPATIENT
Start: 2017-09-14 | End: 2017-09-16 | Stop reason: HOSPADM

## 2017-09-14 RX ORDER — FENTANYL CITRATE 50 UG/ML
25-50 INJECTION, SOLUTION INTRAMUSCULAR; INTRAVENOUS
Status: DISCONTINUED | OUTPATIENT
Start: 2017-09-14 | End: 2017-09-14

## 2017-09-14 RX ORDER — MIDAZOLAM HYDROCHLORIDE 1 MG/ML
.5-2 INJECTION, SOLUTION INTRAMUSCULAR; INTRAVENOUS
Status: DISCONTINUED | OUTPATIENT
Start: 2017-09-14 | End: 2017-09-14

## 2017-09-14 RX ORDER — LIDOCAINE HYDROCHLORIDE 20 MG/ML
2-20 INJECTION, SOLUTION INFILTRATION; PERINEURAL
Status: DISCONTINUED | OUTPATIENT
Start: 2017-09-14 | End: 2017-09-16 | Stop reason: HOSPADM

## 2017-09-14 RX ORDER — MORPHINE SULFATE 2 MG/ML
2 INJECTION, SOLUTION INTRAMUSCULAR; INTRAVENOUS ONCE
Status: COMPLETED | OUTPATIENT
Start: 2017-09-14 | End: 2017-09-14

## 2017-09-14 RX ADMIN — HYDROMORPHONE HYDROCHLORIDE 1 MG: 1 INJECTION, SOLUTION INTRAMUSCULAR; INTRAVENOUS; SUBCUTANEOUS at 20:34

## 2017-09-14 RX ADMIN — AMLODIPINE BESYLATE 10 MG: 10 TABLET ORAL at 13:38

## 2017-09-14 RX ADMIN — MORPHINE SULFATE 2 MG: 2 INJECTION, SOLUTION INTRAMUSCULAR; INTRAVENOUS at 16:56

## 2017-09-14 RX ADMIN — MIDAZOLAM HYDROCHLORIDE 1 MG: 1 INJECTION, SOLUTION INTRAMUSCULAR; INTRAVENOUS at 11:27

## 2017-09-14 RX ADMIN — Medication 10 ML: at 13:49

## 2017-09-14 RX ADMIN — MIDAZOLAM HYDROCHLORIDE 0.5 MG: 1 INJECTION, SOLUTION INTRAMUSCULAR; INTRAVENOUS at 11:36

## 2017-09-14 RX ADMIN — CINACALCET HYDROCHLORIDE 30 MG: 30 TABLET, COATED ORAL at 22:34

## 2017-09-14 RX ADMIN — RENAGEL 800 MG: 400 TABLET ORAL at 13:38

## 2017-09-14 RX ADMIN — FENTANYL CITRATE 25 MCG: 50 INJECTION, SOLUTION INTRAMUSCULAR; INTRAVENOUS at 11:36

## 2017-09-14 RX ADMIN — HEPARIN SODIUM 5000 UNITS: 5000 INJECTION, SOLUTION INTRAVENOUS; SUBCUTANEOUS at 13:40

## 2017-09-14 RX ADMIN — MORPHINE SULFATE 2 MG: 2 INJECTION, SOLUTION INTRAMUSCULAR; INTRAVENOUS at 05:42

## 2017-09-14 RX ADMIN — MORPHINE SULFATE 2 MG: 2 INJECTION, SOLUTION INTRAMUSCULAR; INTRAVENOUS at 12:10

## 2017-09-14 RX ADMIN — MIDAZOLAM HYDROCHLORIDE 1 MG: 1 INJECTION, SOLUTION INTRAMUSCULAR; INTRAVENOUS at 11:14

## 2017-09-14 RX ADMIN — Medication 10 ML: at 22:38

## 2017-09-14 RX ADMIN — SITAGLIPTIN 25 MG: 25 TABLET, FILM COATED ORAL at 13:38

## 2017-09-14 RX ADMIN — LIDOCAINE HYDROCHLORIDE 100 MG: 20 INJECTION, SOLUTION INFILTRATION; PERINEURAL at 11:29

## 2017-09-14 RX ADMIN — FENTANYL CITRATE 50 MCG: 50 INJECTION, SOLUTION INTRAMUSCULAR; INTRAVENOUS at 11:20

## 2017-09-14 RX ADMIN — HYDROCODONE BITARTRATE AND ACETAMINOPHEN 1 TABLET: 7.5; 325 TABLET ORAL at 13:38

## 2017-09-14 RX ADMIN — HEPARIN SODIUM 5000 UNITS: 5000 INJECTION, SOLUTION INTRAVENOUS; SUBCUTANEOUS at 20:34

## 2017-09-14 RX ADMIN — FENTANYL CITRATE 50 MCG: 50 INJECTION, SOLUTION INTRAMUSCULAR; INTRAVENOUS at 11:27

## 2017-09-14 RX ADMIN — FUROSEMIDE 80 MG: 40 TABLET ORAL at 16:59

## 2017-09-14 RX ADMIN — FENTANYL CITRATE 50 MCG: 50 INJECTION, SOLUTION INTRAMUSCULAR; INTRAVENOUS at 11:14

## 2017-09-14 RX ADMIN — INSULIN LISPRO 2 UNITS: 100 INJECTION, SOLUTION INTRAVENOUS; SUBCUTANEOUS at 22:33

## 2017-09-14 RX ADMIN — MIDAZOLAM HYDROCHLORIDE 1 MG: 1 INJECTION, SOLUTION INTRAMUSCULAR; INTRAVENOUS at 11:20

## 2017-09-14 RX ADMIN — MORPHINE SULFATE 2 MG: 2 INJECTION, SOLUTION INTRAMUSCULAR; INTRAVENOUS at 13:46

## 2017-09-14 RX ADMIN — RENAGEL 800 MG: 400 TABLET ORAL at 16:57

## 2017-09-14 RX ADMIN — Medication 10 ML: at 05:42

## 2017-09-14 RX ADMIN — HYDROMORPHONE HYDROCHLORIDE 1 MG: 1 INJECTION, SOLUTION INTRAMUSCULAR; INTRAVENOUS; SUBCUTANEOUS at 23:41

## 2017-09-14 NOTE — PHYSICIAN ADVISORY
Letter of Determination: Upgrade from Observation to Inpatient Status    This patient was originally hospitalized as observation status on 9/12/2017 for flank pain. This patient now meets for Inpatient Admission in accordance with CMS regulation Section 43 .3. Specifically, patient's stay is now over Two Midnights and was medically necessary. The patient's stay was medically necessary based on medical history for prior right nephrectomy for renal cell carcinoma, and end stage renal disease with a creatinine of 8.14 mg/dl. During the hospitalization pt was found to have a new 3.4 x 3.5 cm irregular mass in the left nephrectomy bed conspicuous for neoplasm. Consistent with CMS guidelines, patient meets for inpatient status. It is our recommendation that this patient's hospitalization status should be upgraded from OBSERVATION to INPATIENT status.      The final decision regarding the patient's hospitalization status depends on the attending physician's judgement.     Elvin Burciaga MD, DIANNA,   Physician [place].

## 2017-09-14 NOTE — PROGRESS NOTES
0655-Bedside report received from [facility]. Resting in bed. No needs voiced. No s/s of acute distress. 1806-  END OF SHIFT NOTE:    Intake/Output      Voiding: YES  Catheter: NO  Drain:              Stool:  0 occurrences. Emesis:  0 occurrences. VITAL SIGNS  Patient Vitals for the past 12 hrs:   Temp Pulse Resp BP SpO2   09/14/17 1535 98.3 °F (36.8 °C) 76 19 124/49 92 %   09/14/17 1314 - 83 18 152/61 91 %   09/14/17 1259 - 84 17 160/68 90 %   09/14/17 1244 - 85 17 161/68 90 %   09/14/17 1228 - 84 15 165/70 90 %   09/14/17 1213 - 82 15 134/64 99 %   09/14/17 1208 - 88 16 165/73 92 %   09/14/17 1158 - 92 15 165/53 93 %   09/14/17 1144 - 93 14 115/59 100 %   09/14/17 1139 - 92 14 118/58 100 %   09/14/17 1134 - 87 15 126/62 100 %   09/14/17 1129 - 92 14 121/58 100 %   09/14/17 1124 - 84 15 130/60 100 %   09/14/17 1119 - 88 14 144/69 100 %   09/14/17 1114 - 92 15 151/69 100 %   09/14/17 1109 - 85 16 165/74 99 %   09/14/17 0924 98.3 °F (36.8 °C) 77 18 147/67 99 %   09/14/17 0756 98.4 °F (36.9 °C) 80 17 145/50 97 %       Pain Assessment  Pain 1  Pain Scale 1: Numeric (0 - 10) (09/14/17 1734)  Pain Intensity 1: 2 (09/14/17 1734)  Patient Stated Pain Goal: 0 (09/14/17 1208)  Pain Reassessment 1: Yes (09/14/17 1734)  Pain Location 1: Abdomen (09/14/17 1657)  Pain Orientation 1: Left (09/14/17 1657)  Pain Description 1: Aching (09/14/17 1657)  Pain Intervention(s) 1: Medication (see MAR) (09/14/17 1657)    Ambulating  Yes    Additional Information: Pts VSS and is in no acute distress. Pt had bx of left retroperitoneal mass today in IR. Pt to have dialysis tomorrow. Pt receiving morphine for pain. Shift report given to oncoming nurse at the bedside.     Karma Makayla Murray

## 2017-09-14 NOTE — PROGRESS NOTES
Problem: Falls - Risk of  Goal: *Absence of Falls  Document Loyd Fall Risk and appropriate interventions in the flowsheet.    Outcome: Progressing Towards Goal  Fall Risk Interventions:  Mobility Interventions: Patient to call before getting OOB           Medication Interventions: Patient to call before getting OOB     Elimination Interventions: Call light in reach, Patient to call for help with toileting needs

## 2017-09-14 NOTE — PROGRESS NOTES
TRANSFER - OUT REPORT:    Verbal report given to [id number] RN(name) on Brennen Dow  being transferred to [facility](unit) for routine progression of care       Report consisted of patients Situation, Background, Assessment and   Recommendations(SBAR). Information from the following report(s) SBAR, Procedure Summary and MAR was reviewed with the receiving nurse. Lines:   Peripheral IV 11/11/16 Left Forearm (Active)       Peripheral IV 09/12/17 Right External jugular (Active)   Site Assessment Clean, dry, & intact 9/14/2017  7:10 AM   Phlebitis Assessment 0 9/14/2017  7:10 AM   Infiltration Assessment 0 9/14/2017  7:10 AM   Dressing Status Clean, dry, & intact 9/14/2017  7:10 AM   Dressing Type Transparent;Tape 9/14/2017  7:10 AM   Hub Color/Line Status Capped; Patent;Pink 9/14/2017  7:10 AM   Alcohol Cap Used No 9/13/2017  3:05 PM        Opportunity for questions and clarification was provided.       Patient transported with:   Transport

## 2017-09-14 NOTE — PROGRESS NOTES
END OF SHIFT NOTE:    Intake/Output  09/13 1901 - 09/14 0700  In: -   Out: 5 [Urine:5]   Voiding: YES x1, a very small about (5mls)  Catheter: NO  Drain:              Stool:  0 occurrences. Emesis:  0 occurrences. Pt complaining of constipation, states she wants to wait until she comes back from her biopsy to take anything to help her have a BM. VITAL SIGNS  Patient Vitals for the past 12 hrs:   Temp Pulse Resp BP SpO2   09/14/17 0321 98.7 °F (37.1 °C) 84 16 169/69 95 %   09/14/17 0006 98.2 °F (36.8 °C) 84 18 133/56 92 %   09/13/17 1935 99 °F (37.2 °C) 86 18 123/52 97 %       Pain Assessment  Pain 1  Pain Scale 1: Numeric (0 - 10) (09/14/17 0542)  Pain Intensity 1: 10 (09/14/17 0542)  Patient Stated Pain Goal: 0 (09/14/17 0202)  Pain Reassessment 1: Patient sleeping (09/14/17 0202)  Pain Location 1: Flank (09/14/17 0542)  Pain Orientation 1: Right (09/14/17 0542)  Pain Description 1: Aching;Constant (09/14/17 0542)  Pain Intervention(s) 1: Medication (see MAR) (09/14/17 0542)    Ambulating  Yes with 2 assist.     Additional Information:   Pt rested well through the night, she did take Ativan and Ambien to help her sleep. Pt states rested well. Pt states she needed to void this AM, assisted to the bathroom by this RN and assistant radhames Smalls in a lot of pain located in R flank. Pt able to void a very small amount, urine sent to lab to see if they can run a UA. Pt also attempted to have a BM and she was unable, pt states she usually does an enema at home when she hasn't gone but states she would like to wait until she came back from IR before we attempt an enema. Morphine given this AM for pain, see MAR. Pt denies any needs at this time. Shift report given to oncoming nurse at the bedside.     Karen Dexter RN

## 2017-09-14 NOTE — PROGRESS NOTES
Hospitalist Progress Note    2017  Admit Date: 2017  3:16 PM   NAME: Natacha Gannon   :  1950   MRN:  [id number]   Attending: Chico Liu MD  PCP:  Savi Marcelo MD    SUBJECTIVE:   Patient presented with right flank pain. Ct noted left mass post nephrectomy. Flank pain has improved since admission, unclear etiology. :  Pt s/p biopsy. Notes some pain from site otherwise in good spirits. Review of Systems negative with exception of pertinent positives noted above  PHYSICAL EXAM     Visit Vitals    /61    Pulse 83    Temp 98.3 °F (36.8 °C)    Resp 18    Wt 108.7 kg (239 lb 11.2 oz)    SpO2 91%    Breastfeeding No    BMI 39.89 kg/m2      Temp (24hrs), Av.5 °F (36.9 °C), Min:98.2 °F (36.8 °C), Max:99 °F (37.2 °C)    Oxygen Therapy  O2 Sat (%): 91 % (17 1314)  Pulse via Oximetry: 78 beats per minute (17 1314)  O2 Device: Room air (17 1314)  O2 Flow Rate (L/min): 0 l/min (17 1303)  ETCO2 (mmHg): 49 mmHg (17 1144)    Intake/Output Summary (Last 24 hours) at 17 1454  Last data filed at 17 1323   Gross per 24 hour   Intake                0 ml   Output                5 ml   Net               -5 ml      General: No acute distress    Lungs:  CTA Bilaterally. Heart:  Regular rate and rhythm,  No murmur, rub, or gallop  Abdomen: Soft, Non distended, Non tender, Positive bowel sounds  Extremities: No cyanosis, clubbing or edema  Neurologic:  No focal deficits    ASSESSMENT      Active Hospital Problems    Diagnosis Date Noted    Flank pain 2017    Abdominal mass 2017     Of Left renal bed      [facility]    HTN (hypertension) 2013    ESRD (end stage renal disease) ([facility] 2013    DM (diabetes mellitus) ([facility] 2013     Plan:  · Biopsy performed . · Monitor glucose.   Cover with ssi.  [facility] continued  · HD per nephrology  · norco prn pain  · Ativan for anxiety  · Appreciate specialist input  · If remains stable may go home tomorrow    DVT Prophylaxis: heparin    Signed By: Ian Bansal MD     September 14, 2017

## 2017-09-14 NOTE — PROCEDURES
Interventional Radiology Brief Procedure Note    Patient: Saundra Bosworth MRN: [id number]  SSN: [id number]    YOB: 1950  Age: 73 y.o. Sex: female      Date of Procedure: 9/14/2017    Pre-Procedure Diagnosis: left retroperitoneal mass    Post-Procedure Diagnosis: SAME    Procedure(s): Image Guided Biopsy    Brief Description of Procedure: 18 g core bx    Performed By: Dakota Vázquez MD     Assistants: None    Anesthesia: Moderate Sedation    Estimated Blood Loss: 30 ml.l    Specimens: None    Implants: Gelfoam slurry    Findings: vascular mass.  Cores in formalin    Complications: None    Recommendations: back to room      Follow Up: oncology    Signed By: Dakota Vázquez MD     September 14, 2017

## 2017-09-15 LAB
GLUCOSE BLD STRIP.AUTO-MCNC: 114 MG/DL (ref 65–100)
GLUCOSE BLD STRIP.AUTO-MCNC: 130 MG/DL (ref 65–100)
GLUCOSE BLD STRIP.AUTO-MCNC: 138 MG/DL (ref 65–100)
GLUCOSE BLD STRIP.AUTO-MCNC: 166 MG/DL (ref 65–100)

## 2017-09-15 PROCEDURE — 94760 N-INVAS EAR/PLS OXIMETRY 1: CPT

## 2017-09-15 PROCEDURE — 90935 HEMODIALYSIS ONE EVALUATION: CPT

## 2017-09-15 PROCEDURE — 74011250637 HC RX REV CODE- 250/637: Performed by: INTERNAL MEDICINE

## 2017-09-15 PROCEDURE — 74011250636 HC RX REV CODE- 250/636: Performed by: INTERNAL MEDICINE

## 2017-09-15 PROCEDURE — 74011636637 HC RX REV CODE- 636/637: Performed by: INTERNAL MEDICINE

## 2017-09-15 PROCEDURE — 65270000029 HC RM PRIVATE

## 2017-09-15 PROCEDURE — 77010033678 HC OXYGEN DAILY

## 2017-09-15 PROCEDURE — 99233 SBSQ HOSP IP/OBS HIGH 50: CPT | Performed by: INTERNAL MEDICINE

## 2017-09-15 PROCEDURE — 82962 GLUCOSE BLOOD TEST: CPT

## 2017-09-15 RX ORDER — HYDROCODONE BITARTRATE AND ACETAMINOPHEN 10; 325 MG/1; MG/1
1 TABLET ORAL
Status: DISCONTINUED | OUTPATIENT
Start: 2017-09-15 | End: 2017-09-16 | Stop reason: HOSPADM

## 2017-09-15 RX ORDER — LIDOCAINE 50 MG/G
1 PATCH TOPICAL EVERY 24 HOURS
Status: DISCONTINUED | OUTPATIENT
Start: 2017-09-15 | End: 2017-09-16 | Stop reason: HOSPADM

## 2017-09-15 RX ADMIN — HYDROMORPHONE HYDROCHLORIDE 1 MG: 1 INJECTION, SOLUTION INTRAMUSCULAR; INTRAVENOUS; SUBCUTANEOUS at 12:21

## 2017-09-15 RX ADMIN — RENAGEL 800 MG: 400 TABLET ORAL at 16:00

## 2017-09-15 RX ADMIN — Medication 10 ML: at 05:00

## 2017-09-15 RX ADMIN — DIPHENHYDRAMINE HYDROCHLORIDE 12.5 MG: 50 INJECTION, SOLUTION INTRAMUSCULAR; INTRAVENOUS at 20:17

## 2017-09-15 RX ADMIN — SITAGLIPTIN 25 MG: 25 TABLET, FILM COATED ORAL at 07:08

## 2017-09-15 RX ADMIN — FUROSEMIDE 80 MG: 40 TABLET ORAL at 16:01

## 2017-09-15 RX ADMIN — RENAGEL 800 MG: 400 TABLET ORAL at 12:21

## 2017-09-15 RX ADMIN — RENAGEL 800 MG: 400 TABLET ORAL at 07:08

## 2017-09-15 RX ADMIN — HYDROMORPHONE HYDROCHLORIDE 1 MG: 1 INJECTION, SOLUTION INTRAMUSCULAR; INTRAVENOUS; SUBCUTANEOUS at 08:09

## 2017-09-15 RX ADMIN — CINACALCET HYDROCHLORIDE 30 MG: 30 TABLET, COATED ORAL at 21:41

## 2017-09-15 RX ADMIN — HYDROCODONE BITARTRATE AND ACETAMINOPHEN 1 TABLET: 10; 325 TABLET ORAL at 20:33

## 2017-09-15 RX ADMIN — Medication 10 ML: at 21:42

## 2017-09-15 RX ADMIN — ASPIRIN 81 MG 81 MG: 81 TABLET ORAL at 07:08

## 2017-09-15 RX ADMIN — INSULIN LISPRO 2 UNITS: 100 INJECTION, SOLUTION INTRAVENOUS; SUBCUTANEOUS at 16:30

## 2017-09-15 RX ADMIN — HYDROCODONE BITARTRATE AND ACETAMINOPHEN 1 TABLET: 7.5; 325 TABLET ORAL at 07:08

## 2017-09-15 RX ADMIN — HYDROMORPHONE HYDROCHLORIDE 1 MG: 1 INJECTION, SOLUTION INTRAMUSCULAR; INTRAVENOUS; SUBCUTANEOUS at 03:47

## 2017-09-15 RX ADMIN — HYDROCODONE BITARTRATE AND ACETAMINOPHEN 1 TABLET: 7.5; 325 TABLET ORAL at 14:53

## 2017-09-15 RX ADMIN — FUROSEMIDE 80 MG: 40 TABLET ORAL at 07:08

## 2017-09-15 RX ADMIN — HEPARIN SODIUM 5000 UNITS: 5000 INJECTION, SOLUTION INTRAVENOUS; SUBCUTANEOUS at 04:40

## 2017-09-15 RX ADMIN — HYDROMORPHONE HYDROCHLORIDE 1 MG: 1 INJECTION, SOLUTION INTRAMUSCULAR; INTRAVENOUS; SUBCUTANEOUS at 16:16

## 2017-09-15 RX ADMIN — HEPARIN SODIUM 5000 UNITS: 5000 INJECTION, SOLUTION INTRAVENOUS; SUBCUTANEOUS at 20:17

## 2017-09-15 RX ADMIN — Medication 10 ML: at 07:13

## 2017-09-15 RX ADMIN — AMLODIPINE BESYLATE 10 MG: 10 TABLET ORAL at 07:08

## 2017-09-15 NOTE — PROGRESS NOTES
Hospitalist Progress Note    9/15/2017  Admit Date: 2017  3:16 PM   NAME: Pari Man   :  1950   MRN:  [id number]   Attending: Keith Juárez MD  PCP:  Maris Fairchild MD    SUBJECTIVE:   Patient presented with right flank pain. Ct noted left mass post nephrectomy. Flank pain has improved since admission, unclear etiology. 9/15:  Pt still with significant pain post biopsy. Denies nausea/vomiting. States nervous regarding possible path results. Review of Systems negative with exception of pertinent positives noted above  PHYSICAL EXAM     Visit Vitals    /44 (BP 1 Location: Left arm, BP Patient Position: Sitting)    Pulse 90    Temp 99.5 °F (37.5 °C)    Resp 19    Wt 108.2 kg (238 lb 8.6 oz)    SpO2 97%    Breastfeeding No    BMI 39.69 kg/m2      Temp (24hrs), Av.9 °F (37.2 °C), Min:98.3 °F (36.8 °C), Max:99.5 °F (37.5 °C)    Oxygen Therapy  O2 Sat (%): 97 % (09/15/17 1537)  Pulse via Oximetry: 78 beats per minute (17 1314)  O2 Device: Nasal cannula (09/15/17 1255)  O2 Flow Rate (L/min): 1.5 l/min (09/15/17 1255)  ETCO2 (mmHg): 49 mmHg (17 1144)    Intake/Output Summary (Last 24 hours) at 09/15/17 1607  Last data filed at 09/15/17 1455   Gross per 24 hour   Intake              927 ml   Output             2000 ml   Net            -1073 ml      General: No acute distress    Lungs:  CTA Bilaterally. Heart:  Regular rate and rhythm,  No murmur, rub, or gallop  Abdomen: Soft, Non distended, Non tender, Positive bowel sounds  Extremities: No cyanosis, clubbing or edema  Neurologic:  No focal deficits    ASSESSMENT      Active Hospital Problems    Diagnosis Date Noted    Flank pain 2017    Abdominal mass 2017     Of Left renal bed      [facility]    HTN (hypertension) 2013    [facility]end stage [facility]) ([facility] 2013    DM (diabetes mellitus) ([facility] 2013     Plan:  · Biopsy performed .     · Monitor glucose.   Cover with ssi.  Beth García continued  · HD per nephrology  · Follow up with oncology in 1 week  · Added lidoderm patch and increased norco to 10 mg.    · Ativan for anxiety  · Appreciate specialist input  · If remains stable may go home tomorrow    DVT Prophylaxis: heparin    Signed By: Whit Scruggs MD     September 15, 2017

## 2017-09-15 NOTE — PROGRESS NOTES
[facility]        Inpatient Hematology / Oncology Progress Note      Admission Date: 2017  3:16 PM  Reason for Admission/Hospital Course: Abdominal mass  Flank pain  Abdominal mass      24 Hour Events:  Seen in HD  Waiting on biopsy results  Planning home today       ROS:  Constitutional: negative for fever, chills, weakness, malaise, fatigue. CV: negative for chest pain, palpitations, edema. Respiratory: negative for dyspnea, cough, wheezing. GI: negative for nausea,  diarrhea. 10 point review of systems is otherwise negative with the exception of the elements mentioned above in the HPI. Allergies   Allergen Reactions    Pcn [Penicillins] Rash    Vancomycin Rash       OBJECTIVE:  Patient Vitals for the past 8 hrs:   BP Temp Pulse Resp SpO2   09/15/17 1141 129/50 - 86 - -   09/15/17 1127 (!) 106/36 - 85 - -   09/15/17 1059 117/42 - 87 - -   09/15/17 1027 108/55 - 83 - -   09/15/17 1002 111/50 - 84 - -   09/15/17 0929 119/48 - 82 - -   09/15/17 0901 114/48 - 83 - -   09/15/17 0827 120/55 - 91 - -   09/15/17 0745 119/57 98.8 °F (37.1 °C) 92 16 93 %     Temp (24hrs), Av.7 °F (37.1 °C), Min:98.3 °F (36.8 °C), Max:99.5 °F (37.5 °C)    09/15 07 - 09/15 1900  In: 240 [P.O.:240]  Out: 2000     Physical Exam:  Constitutional: Well developed, well nourished female in no acute distress, lying comfortably in the hospital bed in HD unit   HEENT: Normocephalic and atraumatic. Oropharynx is clear, mucous membranes are moist.   Skin Warm and dry. No bruising and no rash noted. No erythema. No pallor. Respiratory Lungs are clear to auscultation bilaterally without wheezes, rales or rhonchi, normal air exchange without accessory muscle use. CVS Normal rate, regular rhythm and normal S1 and S2. No murmurs, gallops, or rubs. Abdomen Soft, nontender and nondistended, normoactive bowel sounds. Neuro Grossly nonfocal with no obvious sensory or motor deficits.    MSK Normal range of motion in general.     Psych Appropriate mood and affect.         Labs:      Recent Labs      09/13/17   0419 09/12/17   1613   WBC  5.8  8.3   RBC  3.21*  2.86*   HGB  11.0*  9.9*   HCT  31.0*  29.5*   MCV  96.6  103.1*   MCH  34.3*  34.6*   MCHC  35.5*  33.6   RDW  13.9  18.8*   PLT  222  233   GRANS   --   69   LYMPH   --   23   MONOS   --   6   EOS   --   2   BASOS   --   0   IG   --   0.5   DF   --   AUTOMATED   ANEU   --   5.6   ABL   --   1.9   ABM   --   0.5   TIFFANIE   --   0.1   ABB   --   0.0   AIG   --   0.0        Recent Labs      09/13/17 0419 09/12/17   1905  09/12/17   1652   NA  142   --   136   K  4.1  4.4  >10.0*   CL  102   --   103   CO2  29   --   25   AGAP  11   --   8   GLU  88   --   94   BUN  35*   --   27*   CREA  8.21*   --   7.05*   GFRAA  6*   --   8*   GFRNA  5*   --   6*   CA  8.7   --   8.3   SGOT   --    --   174*   AP   --    --   77   TP   --    --   8.4*   ALB   --    --   3.0*   GLOB   --    --   5.4*   AGRAT   --    --   0.6*           ASSESSMENT:    Problem List  Date Reviewed: 12/20/2016          Codes Class Noted    Flank pain ICD-10-CM: R10.9  ICD-9-CM: 789.09  9/14/2017        * (Principal)Abdominal mass ICD-10-CM: R19.00  ICD-9-CM: 789.30  9/12/2017    Overview Signed 9/12/2017  9:01 PM by Fatoumata Hernandez MD     Of Left renal bed             Renal cell carcinoma ([facility] [address] ICD-10-CM: C64.9  ICD-9-CM: 189.0  9/12/2017        AVF (arteriovenous fistula) (HCC) (Chronic) ICD-10-CM: I77.0  ICD-9-CM: 447.0  12/20/2016    Overview Signed 12/20/2016  2:19 PM by Luisa Luciano NP       12/6/16 ([facility]) Right AVF revision and thrombectomy             Transient ischemic attack ICD-10-CM: G45.9  ICD-9-CM: 435.9  8/29/2015        Renal mass ICD-10-CM: N28.89  ICD-9-CM: 593.9  7/2/2015        Chronic renal failure, stage 5 (HCC) ICD-10-CM: N18.5  ICD-9-CM: 585.5  6/15/2015        Hypotension ICD-10-CM: I95.9  ICD-9-CM: 458.9  12/1/2014        Dizziness ICD-10-CM: R42  ICD-9-CM: 780.4  12/1/2014        Osteoarthritis of right knee ICD-10-CM: M17.11  ICD-9-CM: 715.96  2/7/2013        Total knee replacement status ICD-10-CM: Z96.659  ICD-9-CM: V43.65  2/7/2013        DM (diabetes mellitus) ([facility] ICD-10-CM: E11.9  ICD-9-CM: 250.00  2/7/2013        ESRD (end stage renal disease) ([facility] ICD-10-CM: N18.6  ICD-9-CM: 585.6  2/7/2013        HTN (hypertension) ICD-10-CM: I10  ICD-9-CM: 401.9  2/7/2013            Ms. Christine Galarza has history of left RCC 2015 sp nephrectomy by Dr. Wing Woodward. We have asked for CT guided biopsy of recurrent left renal bed mass. We have asked PC to assist with her uncontrolled pain. We will follow along closely for biopsy and results. PLAN:    Left renal bed mass  9/13 Ask IR for CT guided biopsy  9/14 Biopsy today  9/15 Follow up outpatient when resulted     Uncontrolled Pain  9/13 Consult to [facility] for assistance  9/15 Controlled     Lab studies and imaging studies were personally reviewed. Pertinent old records were reviewed.     Thank you for allowing us to participate in the care of Ms. Fischer. She will follow up with Dr. Roland Cevallos next week for biopsy results and to discuss plan of care. We will sign off and be available as needed.                [facility]  [address] [place] [address]  Office : [phone]  Fax : [phone]                   Attending Addendum:  I personally evaluated the patient with Bobby Jimenez, N.P.,  and agree with the assessment, findings and plan as documented. Appears stable, heart regular without murmur, she should follow-up with Dr. Roland Cevallos in clinic after discharge.               Moisés Tao MD  [address]  Office : [phone]  Fax : [phone]

## 2017-09-15 NOTE — PROGRESS NOTES
1469-Bedside report received from \[facility]\"", [address] [place] [address]. Resting in bed. No needs voiced. No s/s of acute distress. 1606-Nurse paged Dr. Ty Knight about patient's continued pain complaints. Dr. Ty Knight stated to increase patient's Norco from 7.5mg to 10 mg q4hr PRN. 1814-  END OF SHIFT NOTE:    Intake/Output  09/15 0701 - 09/15 1900  In: 587 [P.O.:587]  Out: 2000    Voiding: YES  Catheter: NO  Drain:              Stool:  0 occurrences. Emesis:  0 occurrences. VITAL SIGNS  Patient Vitals for the past 12 hrs:   Temp Pulse Resp BP SpO2   09/15/17 1537 99.5 °F (37.5 °C) 90 19 106/44 97 %   09/15/17 1255 98.8 °F (37.1 °C) 94 18 127/53 97 %   09/15/17 1141 - 86 - 129/50 -   09/15/17 1127 - 85 - (!) 106/36 -   09/15/17 1059 - 87 - 117/42 -   09/15/17 1027 - 83 - 108/55 -   09/15/17 1002 - 84 - 111/50 -   09/15/17 0929 - 82 - 119/48 -   09/15/17 0901 - 83 - 114/48 -   09/15/17 0827 - 91 - 120/55 -   09/15/17 0745 98.8 °F (37.1 °C) 92 16 119/57 93 %       Pain Assessment  Pain 1  Pain Scale 1: Numeric (0 - 10) (09/15/17 1654)  Pain Intensity 1: 3 (09/15/17 1654)  Patient Stated Pain Goal: 0 (09/15/17 0347)  Pain Reassessment 1: Yes (09/15/17 1654)  Pain Location 1: Abdomen (09/15/17 1616)  Pain Orientation 1: Left (09/15/17 1616)  Pain Description 1: Aching (09/15/17 1616)  Pain Intervention(s) 1: Medication (see MAR) (09/15/17 1616)    Ambulating  Yes    Additional Information: Pts VSS and is in no acute distress. Pt had dialysis today. Pt is still having intermittent severe pain to left abd. Pt should go home tomorrow pending pain relief. Shift report given to oncoming nurse at the bedside.     Edelmira Murray

## 2017-09-15 NOTE — PROGRESS NOTES
Problem: Falls - Risk of  Goal: *Absence of Falls  Document Loyd Fall Risk and appropriate interventions in the flowsheet.    Outcome: Progressing Towards Goal  Fall Risk Interventions:  Mobility Interventions: Communicate number of staff needed for ambulation/transfer, Patient to call before getting OOB           Medication Interventions: Patient to call before getting OOB     Elimination Interventions: Call light in reach, Toilet paper/wipes in reach, Patient to call for help with toileting needs

## 2017-09-15 NOTE — PROGRESS NOTES
END OF SHIFT NOTE:    Intake/Output      Voiding: NO  Catheter: NO  Drain:              Stool:  0 occurrences. Emesis:  0 occurrences. VITAL SIGNS  Patient Vitals for the past 12 hrs:   Temp Pulse Resp BP SpO2   09/15/17 0347 99.5 °F (37.5 °C) (!) 104 18 141/74 94 %   09/14/17 2347 98.7 °F (37.1 °C) 70 16 126/53 95 %   09/14/17 2200 - 94 - 110/50 -   09/14/17 2003 98.3 °F (36.8 °C) 93 20 142/66 91 %       Pain Assessment  Pain 1  Pain Scale 1: Numeric (0 - 10) (09/15/17 0438)  Pain Intensity 1: 7 (09/15/17 0438)  Patient Stated Pain Goal: 0 (09/15/17 0347)  Pain Reassessment 1: Yes (09/15/17 0438)  Pain Location 1: Flank (09/15/17 0347)  Pain Orientation 1: Left (09/15/17 0347)  Pain Description 1: Joan Nielsen (09/15/17 0347)  Pain Intervention(s) 1: Medication (see MAR) (09/15/17 0347)    Ambulating  Yes    Additional Information: Patient rested well overnight with IV dilaudid for pain. Uneventful night. Shift report given to oncoming nurse at the bedside.     Trinidad Cole

## 2017-09-15 NOTE — PROGRESS NOTES
RENAL H&P/CONSULT    Subjective:       As Per HPI   73 F with PMH of Left RCC (Clear cell carcinoma) s/p left radical nephrectomy by Dr. Aylin Christensen in 2015, ESRD on HD MWF, DM2, HTN was sent to the ER by her doctor for Rt flank pain and to get US. She has had flank pain for 2 days, severe, sharp pain, episodic a/w nausea. No fever, chills, Vomiting, Diarrhea, chest pain, SOB, bladder symptoms. Had HD yesterday. In ER CT Scan showed Recurrent mass in Left renal bed and no clear Rt sided pathology. She still has some Rt flank pain ajnd very tearful and distressed after knowing the CT results. Hospitalist asked to admit pt for further workup.    s- Nephrology consulted for HD needs   Ms. Fischer very emotional and tearful about the new diagnosis . S/p kidney biopsy . Pain is better today     Past Medical History:   Diagnosis Date    Arthritis     AVF (arteriovenous fistula) ([facility] [address] 12/20/2016 12/6/16 ([facility]) Right AVF revision and thrombectomy    Cancer ([facility] [address] 2015    L kidney    Chronic kidney disease     HD in M-W-F- at [place] dialysis    Degenerative joint disease     Diabetes ([address]     type 2, average range 100-120 fasting, symptomatic below 90; last A1C?    ESRD (end stage renal disease) ([facility] [address] Nov 2006    ESRD. MWF dialysis     Hypertension     Obesity     Transient ischemic attack 8/29/2015      Past Surgical History:   Procedure Laterality Date    BREAST SURGERY PROCEDURE UNLISTED Right     cyst removed    HX GI      colon resection resulting in temporary colostomy reversal    HX GYN      stephan    HX OTHER SURGICAL      dialysis fistula, several permcaths    HX UROLOGICAL Left July 2015    nephrectomy    HX VASCULAR ACCESS        Prior to Admission medications    Medication Sig Start Date End Date Taking? Authorizing Provider   HYDROcodone-acetaminophen (NORCO) 5-325 mg per tablet Take 1 Tab by mouth every four (4) hours as needed for Pain. Max Daily Amount: 6 Tabs.  9/1/17  Yes FRANCISCO Manrique butalbital-acetaminophen-caff (FIORICET) -40 mg per capsule Take 1-2 Caps by mouth every six (6) hours as needed for Pain. Max Daily Amount: 8 Caps. 2/7/17  Yes Julissa West MD   amLODIPine (NORVASC) 10 mg tablet Take  by mouth daily. Yes Historical Provider   VIT C/VIT E/LUTEIN/MIN/OMEGA-3 (OCUVITE PO) Take  by mouth nightly. Yes Historical Provider   cinacalcet (SENSIPAR) 30 mg tablet Take 30 mg by mouth nightly. Yes Historical Provider   minoxidil (LONITEN) 2.5 mg tablet Take 7.5 mg by mouth two (2) times a day. Indications: HYPERTENSION   Yes Jaylon Cool MD   aspirin 81 mg chewable tablet Take 1 Tab by mouth daily. Patient taking differently: Take 81 mg by mouth every morning. 9/1/15  Yes Abdoulaye Blake MD   sevelamer carbonate (RENVELA) 800 mg tab tab Take 800 mg by mouth three (3) times daily (with meals). 5 tablets with meals/ 2 with snacks  Sometimes only eats 2 meals/d   Yes Historical Provider   lisinopril (PRINIVIL, ZESTRIL) 40 mg tablet Take 40 mg by mouth nightly. Indications: HYPERTENSION 6/8/15  Yes Historical Provider   sitaGLIPtin (JANUVIA) 100 mg tablet Take 50 mg by mouth every morning. Indications: TYPE 2 DIABETES MELLITUS   Yes Historical Provider   lidocaine 5 % topical cream Apply  to affected area two (2) times daily as needed for Pain. 9/1/17   FRANCISCO Adames   promethazine (PHENERGAN) 25 mg tablet Take 1 Tab by mouth every six (6) hours as needed for Nausea. 2/7/17   Julissa West MD   diphenoxylate-atropine (LOMOTIL) 2.5-0.025 mg per tablet Take 2 Tabs by mouth four (4) times daily as needed for Diarrhea. Max Daily Amount: 8 Tabs. 2/7/17   Julissa West MD   ondansetron hcl (ZOFRAN, AS HYDROCHLORIDE,) 8 mg tablet Take 1 Tab by mouth every eight (8) hours as needed for Nausea. 2/7/17   Julissa West MD   furosemide (LASIX) 80 mg tablet Take 80 mg by mouth two (2) times a day.  Indications: EDEMA, HYPERTENSION    Historical Provider     Allergies Allergen Reactions    Pcn [Penicillins] Rash    Vancomycin Rash      Social History   Substance Use Topics    Smoking status: Never Smoker    Smokeless tobacco: Never Used    Alcohol use No      Family History   Problem Relation Age of Onset    Diabetes Mother     Heart Disease Mother     Hypertension Mother     Heart Disease Father     Hypertension Father     Malignant Hyperthermia Neg Hx     Pseudocholinesterase Deficiency Neg Hx     Delayed Awakening Neg Hx     Post-op Nausea/Vomiting Neg Hx     Emergence Delirium Neg Hx     Other Neg Hx     Post-op Cognitive Dysfunction Neg Hx           Review of Systems    Constitutional: no fever, negative  Eyes: fair vision, negative  Ears, nose, mouth, throat, and face:fair hearing, negative  Respiratory: no asthma, negative  Cardiovascular:no chest pain, negative  Gastrointestinal:no diarrhea, negative  Genitourinary: no dysuria,negative  Hematologic/lymphatic: no bleeding tendency, negative  Neurological: no seizures, negative  Behvioral/Psych: no psych hospitalization negative  Endocrine: no goiter, negative      Objective:       Visit Vitals    /66 (BP 1 Location: Left arm, BP Patient Position: At rest;Head of bed elevated (Comment degrees))    Pulse 93    Temp 98.3 °F (36.8 °C)    Resp 20    Wt 108.7 kg (239 lb 11.2 oz)    SpO2 91%    Breastfeeding No    BMI 39.89 kg/m2          09/13 0701 - 09/14 1900  In: 580 [P.O.:580]  Out: 2005 [Urine:5]    Visit Vitals    /66 (BP 1 Location: Left arm, BP Patient Position: At rest;Head of bed elevated (Comment degrees))    Pulse 93    Temp 98.3 °F (36.8 °C)    Resp 20    Wt 108.7 kg (239 lb 11.2 oz)    SpO2 91%    Breastfeeding No    BMI 39.89 kg/m2     General:  Alert, cooperative, no distress, appears stated age. Head:  Normocephalic, without obvious abnormality, atraumatic. Eyes:  Conjunctivae/corneas clear. PERRL, EOMs intact. Ears:  Normal external ear canals both ears. Nose: Nares normal. Septum midline. Mucosa normal. No drainage or sinus tenderness. Throat: Lips, mucosa, and tongue normal. Teeth and gums normal.   Neck: Supple, symmetrical, trachea midline, no adenopathy, thyroid: no enlargement/tenderness/nodules, no JVD. Back:   Symmetric, no curvature. ROM normal. No CVA tenderness. Lungs:   Clear to auscultation bilaterally. Chest wall:  No tenderness or deformity. Heart:  Regular rate and rhythm, S1, S2 normal, no murmur,  rub or gallop. Abdomen:   Soft, non-tender. Bowel sounds normal. No masses,  No organomegaly. No renal bruit. Extremities: Extremities normal, atraumatic, no cyanosis or edema. Pulses: 2+ and symmetric all extremities. Skin: Skin color, texture, turgor normal. No rashes or lesions. Lymph nodes: Cervical and supraclavicular nodes normal.   Neurologic: Grossly intact. No asterixis.        ECG: normal EKG, normal sinus rhythm, unchanged from previous tracings     Data Review:     Recent Results (from the past 24 hour(s))   GLUCOSE, POC    Collection Time: 09/13/17  9:32 PM   Result Value Ref Range    Glucose (POC) 116 (H) 65 - 100 mg/dL   URINALYSIS W/ RFLX MICROSCOPIC    Collection Time: 09/14/17  5:54 AM   Result Value Ref Range    Color YELLOW      Appearance HAZY      Specific gravity 1.020 1.001 - 1.023      pH (UA) 6.0 5.0 - 9.0      Protein 100 (A) NEG mg/dL    Glucose NEGATIVE  NEG mg/dL    Ketone TRACE (A) NEG mg/dL    Bilirubin SMALL (A) NEG      Blood NEGATIVE  NEG      Urobilinogen 0.2 0.2 - 1.0 EU/dL    Nitrites NEGATIVE  NEG      Leukocyte Esterase NEGATIVE  NEG     URINE MICROSCOPIC    Collection Time: 09/14/17  5:54 AM   Result Value Ref Range    WBC 0-3 0 /hpf    RBC 0 0 /hpf    Epithelial cells  0 /hpf    Bacteria 4+ (H) 0 /hpf    Casts 0 0 /lpf    Crystals, urine 0 0 /LPF    Mucus 0 0 /lpf    Yeast MODERATE     GLUCOSE, POC    Collection Time: 09/14/17  7:29 AM   Result Value Ref Range    Glucose (POC) 126 (H) 65 - 100 mg/dL   GLUCOSE, POC    Collection Time: 09/14/17  4:46 PM   Result Value Ref Range    Glucose (POC) 97 65 - 100 mg/dL       was negative for infiltrate, effusion, pneumothorax, or wide mediastinum    Principal Problem:    Abdominal mass (9/12/2017)      Overview: Of Left renal bed    Active Problems:    DM (diabetes mellitus) ([facility] [address] (2/7/2013)      ESRD (end stage renal disease) ([facility] [address] (2/7/2013)      HTN (hypertension) (2/7/2013)      Renal cell carcinoma ([facility] (9/12/2017)      Flank pain (9/14/2017)        Assessment:     1. ? RCC on the Left renal bed   PMH of Left RCC (Clear cell carcinoma) s/p left radical nephrectomy   Per urology and oncology   S/p kidney biopsy     2. ESRD on HD   Will do Hd today to keep up with her MWF schedule     3. HTN   Continue home meds     4. DM   Continue home meds     5.  MBD   Continue binders         Plan:

## 2017-09-15 NOTE — PROGRESS NOTES
HD completed without complication. 2.0 kg removed. Needles X 2 removed and pressure dressing applied. Pt alert and VS stable. Pt awaiting transport to return to room.

## 2017-09-15 NOTE — PROGRESS NOTES
RENAL H&P/CONSULT    Subjective:       As Per HPI   73 F with PMH of Left RCC (Clear cell carcinoma) s/p left radical nephrectomy by Dr. Meg Nettles in 2015, ESRD on HD MWF, DM2, HTN was sent to the ER by her doctor for Rt flank pain and to get US. She has had flank pain for 2 days, severe, sharp pain, episodic a/w nausea. No fever, chills, Vomiting, Diarrhea, chest pain, SOB, bladder symptoms. Had HD yesterday. In ER CT Scan showed Recurrent mass in Left renal bed and no clear Rt sided pathology. She still has some Rt flank pain ajnd very tearful and distressed after knowing the CT results. Hospitalist asked to admit pt for further workup.    s- Nephrology consulted for HD needs   Ms. Fischer very emotional and tearful about the new diagnosis . S/p kidney biopsy . Pain is better today   Planning for discharge today     Past Medical History:   Diagnosis Date    Arthritis     AVF (arteriovenous fistula) ([facility] 12/20/2016 12/6/16 ([facility] Right AVF revision and thrombectomy    Cancer ([facility] 2015    L kidney    Chronic kidney disease     HD in M-W-F- at [facility]    Degenerative joint disease     Diabetes ([facility]     type 2, average range 100-120 fasting, symptomatic below 90; last A1C?    ESRD (end stage renal disease) ([facility] Nov 2006    ESRD. MWF dialysis     Hypertension     Obesity     Transient ischemic attack 8/29/2015      Past Surgical History:   Procedure Laterality Date    BREAST SURGERY PROCEDURE UNLISTED Right     cyst removed    HX GI      colon resection resulting in temporary colostomy reversal    HX GYN      stephan    HX OTHER SURGICAL      dialysis fistula, several permcaths    HX UROLOGICAL Left July 2015    nephrectomy    HX VASCULAR ACCESS        Prior to Admission medications    Medication Sig Start Date End Date Taking? Authorizing Provider   HYDROcodone-acetaminophen (NORCO) 5-325 mg per tablet Take 1 Tab by mouth every four (4) hours as needed for Pain. Max Daily Amount: 6 Tabs. 9/1/17  Yes FRANCISCO Hansen   butalbital-acetaminophen-caff (FIORICET) -40 mg per capsule Take 1-2 Caps by mouth every six (6) hours as needed for Pain. Max Daily Amount: 8 Caps. 2/7/17  Yes Naresh Dickinson MD   amLODIPine (NORVASC) 10 mg tablet Take  by mouth daily. Yes Historical Provider   VIT C/VIT E/LUTEIN/MIN/OMEGA-3 (OCUVITE PO) Take  by mouth nightly. Yes Historical Provider   cinacalcet (SENSIPAR) 30 mg tablet Take 30 mg by mouth nightly. Yes Historical Provider   minoxidil (LONITEN) 2.5 mg tablet Take 7.5 mg by mouth two (2) times a day. Indications: HYPERTENSION   Yes Jaylon Cool MD   aspirin 81 mg chewable tablet Take 1 Tab by mouth daily. Patient taking differently: Take 81 mg by mouth every morning. 9/1/15  Yes Pepe Ortiz MD   sevelamer carbonate (RENVELA) 800 mg tab tab Take 800 mg by mouth three (3) times daily (with meals). 5 tablets with meals/ 2 with snacks  Sometimes only eats 2 meals/d   Yes Historical Provider   lisinopril (PRINIVIL, ZESTRIL) 40 mg tablet Take 40 mg by mouth nightly. Indications: HYPERTENSION 6/8/15  Yes Historical Provider   sitaGLIPtin (JANUVIA) 100 mg tablet Take 50 mg by mouth every morning. Indications: TYPE 2 DIABETES MELLITUS   Yes Historical Provider   lidocaine 5 % topical cream Apply  to affected area two (2) times daily as needed for Pain. 9/1/17   FRANCISCO Hansen   promethazine (PHENERGAN) 25 mg tablet Take 1 Tab by mouth every six (6) hours as needed for Nausea. 2/7/17   Naresh Dickinson MD   diphenoxylate-atropine (LOMOTIL) 2.5-0.025 mg per tablet Take 2 Tabs by mouth four (4) times daily as needed for Diarrhea. Max Daily Amount: 8 Tabs. 2/7/17   Naresh Dickinson MD   ondansetron hcl (ZOFRAN, AS HYDROCHLORIDE,) 8 mg tablet Take 1 Tab by mouth every eight (8) hours as needed for Nausea. 2/7/17   Naresh Dickinson MD   furosemide (LASIX) 80 mg tablet Take 80 mg by mouth two (2) times a day.  Indications: EDEMA, HYPERTENSION Historical Provider     Allergies   Allergen Reactions    Pcn [Penicillins] Rash    Vancomycin Rash      Social History   Substance Use Topics    Smoking status: Never Smoker    Smokeless tobacco: Never Used    Alcohol use No      Family History   Problem Relation Age of Onset    Diabetes Mother     Heart Disease Mother     Hypertension Mother     Heart Disease Father     Hypertension Father     Malignant Hyperthermia Neg Hx     Pseudocholinesterase Deficiency Neg Hx     Delayed Awakening Neg Hx     Post-op Nausea/Vomiting Neg Hx     Emergence Delirium Neg Hx     Other Neg Hx     Post-op Cognitive Dysfunction Neg Hx           Review of Systems    Constitutional: no fever, negative  Eyes: fair vision, negative  Ears, nose, mouth, throat, and face:fair hearing, negative  Respiratory: no asthma, negative  Cardiovascular:no chest pain, negative  Gastrointestinal:no diarrhea, negative  Genitourinary: no dysuria,negative  Hematologic/lymphatic: no bleeding tendency, negative  Neurological: no seizures, negative  Behvioral/Psych: no psych hospitalization negative  Endocrine: no goiter, negative      Objective:       Visit Vitals    /53    Pulse 94    Temp 98.8 °F (37.1 °C)    Resp 18    Wt 108.2 kg (238 lb 8.6 oz)    SpO2 97%    Breastfeeding No    BMI 39.69 kg/m2       09/15 0701 - 09/15 1900  In: 240 [P.O.:240]  Out: 2000 09/13 1901 - 09/15 0700  In: 340 [P.O.:340]  Out: 5 [Urine:5]    Visit Vitals    /53    Pulse 94    Temp 98.8 °F (37.1 °C)    Resp 18    Wt 108.2 kg (238 lb 8.6 oz)    SpO2 97%    Breastfeeding No    BMI 39.69 kg/m2     General:  Alert, cooperative, no distress, appears stated age. Head:  Normocephalic, without obvious abnormality, atraumatic. Eyes:  Conjunctivae/corneas clear. PERRL, EOMs intact. Ears:  Normal external ear canals both ears. Nose: Nares normal. Septum midline. Mucosa normal. No drainage or sinus tenderness.    Throat: Lips, mucosa, and tongue normal. Teeth and gums normal.   Neck: Supple, symmetrical, trachea midline, no adenopathy, thyroid: no enlargement/tenderness/nodules, no JVD. Back:   Symmetric, no curvature. ROM normal. No CVA tenderness. Lungs:   Clear to auscultation bilaterally. Chest wall:  No tenderness or deformity. Heart:  Regular rate and rhythm, S1, S2 normal, no murmur,  rub or gallop. Abdomen:   Soft, non-tender. Bowel sounds normal. No masses,  No organomegaly. No renal bruit. Extremities: Extremities normal, atraumatic, no cyanosis or edema. Pulses: 2+ and symmetric all extremities. Skin: Skin color, texture, turgor normal. No rashes or lesions. Lymph nodes: Cervical and supraclavicular nodes normal.   Neurologic: Grossly intact. No asterixis. ECG: normal EKG, normal sinus rhythm, unchanged from previous tracings     Data Review:     Recent Results (from the past 24 hour(s))   GLUCOSE, POC    Collection Time: 09/14/17  4:46 PM   Result Value Ref Range    Glucose (POC) 97 65 - 100 mg/dL   GLUCOSE, POC    Collection Time: 09/14/17  9:23 PM   Result Value Ref Range    Glucose (POC) 151 (H) 65 - 100 mg/dL   GLUCOSE, POC    Collection Time: 09/15/17  7:51 AM   Result Value Ref Range    Glucose (POC) 130 (H) 65 - 100 mg/dL   GLUCOSE, POC    Collection Time: 09/15/17 10:24 AM   Result Value Ref Range    Glucose (POC) 114 (H) 65 - 100 mg/dL       was negative for infiltrate, effusion, pneumothorax, or wide mediastinum    Principal Problem:    Abdominal mass (9/12/2017)      Overview: Of Left renal bed    Active Problems:    DM (diabetes mellitus) ([address] (2/7/2013)      ESRD (end stage renal disease) ([address] (2/7/2013)      HTN (hypertension) (2/7/2013)      Renal cell carcinoma ([address] (9/12/2017)      Flank pain (9/14/2017)        Assessment:     1. ? RCC on the Left renal bed   PMH of Left RCC (Clear cell carcinoma) s/p left radical nephrectomy   Per urology and oncology   S/p kidney biopsy     2.  ESRD on HD S/p hd today     3. HTN   Continue home meds     4. DM   Continue home meds     5.  MBD   Continue binders         Plan:

## 2017-09-15 NOTE — DIALYSIS
On HD via right upper arm AVG using 15 ga needles x2 without difficulty by Lee Bowden RN. No heparin used for this treatment. Vitals signs prior to beginning treatment as follows: HR=91, OG=589/55. Pt noted alert and oriented x 4. Will continue to monitor throughout treatment.

## 2017-09-15 NOTE — PROGRESS NOTES
Problem: Falls - Risk of  Goal: *Absence of Falls  Document Loyd Fall Risk and appropriate interventions in the flowsheet.    Outcome: Progressing Towards Goal  Fall Risk Interventions:  Mobility Interventions: Patient to call before getting OOB           Medication Interventions: Patient to call before getting OOB     Elimination Interventions: Call light in reach

## 2017-09-16 VITALS
WEIGHT: 236.8 LBS | OXYGEN SATURATION: 93 % | DIASTOLIC BLOOD PRESSURE: 48 MMHG | BODY MASS INDEX: 39.41 KG/M2 | TEMPERATURE: 98.2 F | HEART RATE: 85 BPM | RESPIRATION RATE: 18 BRPM | SYSTOLIC BLOOD PRESSURE: 105 MMHG

## 2017-09-16 LAB
GLUCOSE BLD STRIP.AUTO-MCNC: 111 MG/DL (ref 65–100)
GLUCOSE BLD STRIP.AUTO-MCNC: 123 MG/DL (ref 65–100)

## 2017-09-16 PROCEDURE — 74011250637 HC RX REV CODE- 250/637: Performed by: INTERNAL MEDICINE

## 2017-09-16 PROCEDURE — 82962 GLUCOSE BLOOD TEST: CPT

## 2017-09-16 PROCEDURE — 74011250636 HC RX REV CODE- 250/636: Performed by: INTERNAL MEDICINE

## 2017-09-16 RX ORDER — HYDROCODONE BITARTRATE AND ACETAMINOPHEN 10; 325 MG/1; MG/1
1 TABLET ORAL
Qty: 15 TAB | Refills: 0 | Status: SHIPPED | OUTPATIENT
Start: 2017-09-16 | End: 2018-04-13

## 2017-09-16 RX ORDER — HYDROCODONE BITARTRATE AND ACETAMINOPHEN 5; 325 MG/1; MG/1
1 TABLET ORAL
Qty: 20 TAB | Refills: 0 | Status: SHIPPED
Start: 2017-09-19 | End: 2018-04-13

## 2017-09-16 RX ORDER — LIDOCAINE 50 MG/G
1 PATCH TOPICAL EVERY 24 HOURS
Qty: 15 EACH | Refills: 0 | Status: SHIPPED | OUTPATIENT
Start: 2017-09-16 | End: 2019-07-23

## 2017-09-16 RX ADMIN — RENAGEL 800 MG: 400 TABLET ORAL at 12:48

## 2017-09-16 RX ADMIN — ASPIRIN 81 MG 81 MG: 81 TABLET ORAL at 07:58

## 2017-09-16 RX ADMIN — HYDROMORPHONE HYDROCHLORIDE 1 MG: 1 INJECTION, SOLUTION INTRAMUSCULAR; INTRAVENOUS; SUBCUTANEOUS at 10:20

## 2017-09-16 RX ADMIN — HEPARIN SODIUM 5000 UNITS: 5000 INJECTION, SOLUTION INTRAVENOUS; SUBCUTANEOUS at 04:13

## 2017-09-16 RX ADMIN — SITAGLIPTIN 25 MG: 25 TABLET, FILM COATED ORAL at 07:57

## 2017-09-16 RX ADMIN — DIPHENHYDRAMINE HYDROCHLORIDE 12.5 MG: 50 INJECTION, SOLUTION INTRAMUSCULAR; INTRAVENOUS at 10:20

## 2017-09-16 RX ADMIN — RENAGEL 800 MG: 400 TABLET ORAL at 07:56

## 2017-09-16 RX ADMIN — HYDROCODONE BITARTRATE AND ACETAMINOPHEN 1 TABLET: 10; 325 TABLET ORAL at 07:55

## 2017-09-16 RX ADMIN — Medication 10 ML: at 05:59

## 2017-09-16 RX ADMIN — FUROSEMIDE 80 MG: 40 TABLET ORAL at 07:58

## 2017-09-16 NOTE — DISCHARGE SUMMARY
Hospitalist Discharge Summary     Patient ID:  Ed Carla  [id number]  73 y.o.  1950  Admit date: 9/12/2017  3:16 PM  Discharge date and time: 9/16/2017  Attending: Diego Bell MD  PCP:  Coleman Jones MD  Treatment Team: Attending Provider: Diego Bell MD    Principal Diagnosis Abdominal mass   Principal Problem:    Abdominal mass (9/12/2017)      Overview: Of Left renal bed    Active Problems:    DM (diabetes mellitus) ([facility] (2/7/2013)      ESRD ([facility] stage [facility] disease) ([facility] (2/7/2013)      HTN (hypertension) (2/7/2013)      Renal cell carcinoma ([facility] (9/12/2017)      Flank pain (9/14/2017)     HPI: 73 F with PMH of Left RCC (Clear cell carcinoma) s/p left radical nephrectomy by Dr. Aylin Christensen in 2015, ESRD on HD MWF, DM2, HTN was sent to the ER by her doctor for Rt flank pain and to get US. She has had flank pain for 2 days, severe, sharp pain, episodic a/w nausea. No fever, chills, Vomiting, Diarrhea, chest pain, SOB, bladder symptoms. Had HD yesterday. In ER CT Scan showed Recurrent mass in Left renal bed and no clear Rt sided pathology. She still has some Rt flank pain ajnd very tearful and distressed after knowing the CT results. Hospitalist asked to admit pt for further workup.     States pain is better after IV morphine. Takes Lasix Bid and makes urine. Asking if she could get something for anxiety. Hospital Course:  Please refer to the admission H&P for details of presentation. In summary, the patient presented with rt flank pain. CT abd noted mass in left renal bed. Pt had prior left nephrectomy for RCC. Pt was seen by oncology and underwent biopsy of mass by IR with pathology pending. Has outpatient follow up with oncology scheduled. Post biopsy pain control improved with lidoderm patches and norco.  Pt is currently stable for dc to home, will follow up with pcp.            Labs: Results:       Chemistry No results for input(s): GLU, NA, K, CL, CO2, BUN, CREA, CA, AGAP, BUCR, TBIL, GPT, AP, TP, ALB, GLOB, AGRAT in the last 72 hours. CBC w/Diff No results for input(s): WBC, RBC, HGB, HCT, PLT, GRANS, LYMPH, EOS, HGBEXT, HCTEXT, PLTEXT in the last 72 hours. Cardiac Enzymes No results for input(s): CPK, CKND1, EDILSON in the last 72 hours. No lab exists for component: CKRMB, TROIP   Coagulation No results for input(s): PTP, INR, APTT in the last 72 hours. No lab exists for component: INREXT    Lipid Panel Lab Results   Component Value Date/Time    Cholesterol, total 104 08/30/2015 05:45 AM    HDL Cholesterol 43 08/30/2015 05:45 AM    LDL, calculated 46.4 08/30/2015 05:45 AM    VLDL, calculated 14.6 08/30/2015 05:45 AM    Triglyceride 73 08/30/2015 05:45 AM    CHOL/HDL Ratio 2.4 08/30/2015 05:45 AM      BNP No results for input(s): BNPP in the last 72 hours. Liver Enzymes No results for input(s): TP, ALB, TBIL, AP, SGOT, GPT in the last 72 hours. No lab exists for component: DBIL   Thyroid Studies Lab Results   Component Value Date/Time    TSH 2.341 03/26/2012 05:25 PM            Discharge Exam:  Visit Vitals    /47 (BP 1 Location: Left arm, BP Patient Position: At rest)    Pulse 80    Temp 98.9 °F (37.2 °C)    Resp 18    Wt 107.4 kg (236 lb 12.8 oz)    SpO2 95%    Breastfeeding No    BMI 39.41 kg/m2     General appearance: alert, cooperative, no distress, appears stated age  Lungs: clear to auscultation bilaterally  Heart: regular rate and rhythm, S1, S2 normal, no murmur, click, rub or gallop  Abdomen: soft, non-tender. Bowel sounds normal. No masses,  no organomegaly  Extremities: no cyanosis or edema  Neurologic: Grossly normal    Disposition: home  Discharge Condition: stable  Patient Instructions:   Current Discharge Medication List      START taking these medications    Details   HYDROcodone-acetaminophen (NORCO)  mg tablet Take 1 Tab by mouth every four (4) hours as needed. Max Daily Amount: 6 Tabs.   Qty: 15 Tab, Refills: 0      lidocaine (LIDODERM) 5 % 1 Patch by TransDERmal route every twenty-four (24) hours. Apply patch to the affected area for 12 hours a day and remove for 12 hours a day. Qty: 15 Each, Refills: 0         CONTINUE these medications which have CHANGED    Details   HYDROcodone-acetaminophen (NORCO) 5-325 mg per tablet Take 1 Tab by mouth every four (4) hours as needed for Pain. Max Daily Amount: 6 Tabs. Qty: 20 Tab, Refills: 0         CONTINUE these medications which have NOT CHANGED    Details   butalbital-acetaminophen-caff (FIORICET) -40 mg per capsule Take 1-2 Caps by mouth every six (6) hours as needed for Pain. Max Daily Amount: 8 Caps. Qty: 20 Cap, Refills: 0      amLODIPine (NORVASC) 10 mg tablet Take  by mouth daily. Associated Diagnoses: Chronic renal failure, stage 5 (HCC)      VIT C/VIT E/LUTEIN/MIN/OMEGA-3 (OCUVITE PO) Take  by mouth nightly. cinacalcet (SENSIPAR) 30 mg tablet Take 30 mg by mouth nightly. minoxidil (LONITEN) 2.5 mg tablet Take 7.5 mg by mouth two (2) times a day. Indications: HYPERTENSION      aspirin 81 mg chewable tablet Take 1 Tab by mouth daily. Qty: 1 Tab, Refills: 0      sevelamer carbonate (RENVELA) 800 mg tab tab Take 800 mg by mouth three (3) times daily (with meals). 5 tablets with meals/ 2 with snacks  Sometimes only eats 2 meals/d      lisinopril (PRINIVIL, ZESTRIL) 40 mg tablet Take 40 mg by mouth nightly. Indications: HYPERTENSION      sitaGLIPtin (JANUVIA) 100 mg tablet Take 50 mg by mouth every morning. Indications: TYPE 2 DIABETES MELLITUS      lidocaine 5 % topical cream Apply  to affected area two (2) times daily as needed for Pain. Qty: 1 Tube, Refills: 0      promethazine (PHENERGAN) 25 mg tablet Take 1 Tab by mouth every six (6) hours as needed for Nausea. Qty: 12 Tab, Refills: 0      diphenoxylate-atropine (LOMOTIL) 2.5-0.025 mg per tablet Take 2 Tabs by mouth four (4) times daily as needed for Diarrhea. Max Daily Amount: 8 Tabs.   Qty: 20 Tab, Refills: 0      ondansetron hcl (ZOFRAN, AS HYDROCHLORIDE,) 8 mg tablet Take 1 Tab by mouth every eight (8) hours as needed for Nausea. Qty: 10 Tab, Refills: 0      furosemide (LASIX) 80 mg tablet Take 80 mg by mouth two (2) times a day.  Indications: EDEMA, HYPERTENSION             Activity: Activity as tolerated  Diet: Regular Diet  Wound Care: None needed    Follow-up  ·   With pcp, oncology  Time spent to discharge patient 35 minutes  Signed:  Araceli Hill MD  9/16/2017  9:02 AM

## 2017-09-16 NOTE — PROGRESS NOTES
Reviewed discharge instructions and meds with patient. Patient verbalized understanding. Patient given written scrips. Pt awaiting daughter to .

## 2017-09-16 NOTE — PROGRESS NOTES
Thanked patient for allowing us to take care of her. Prayer. Patient had praises for her care.     Leisa Gold [phone]  /   [email or web address]

## 2017-09-16 NOTE — PROGRESS NOTES
END OF SHIFT NOTE:    Intake/Output  09/15 1901 - 09/16 0700  In: 48 [P.O.:50]  Out: 0    Voiding: NO  Catheter: NO  Drain:              Stool:  0 occurrences. Emesis:  0 occurrences. VITAL SIGNS  Patient Vitals for the past 12 hrs:   Temp Pulse Resp BP SpO2   09/16/17 0617 99 °F (37.2 °C) - - - -   09/16/17 0538 99.5 °F (37.5 °C) 82 18 126/58 94 %   09/15/17 2329 98.8 °F (37.1 °C) 82 18 118/52 96 %   09/15/17 2143 - 89 - 100/46 -   09/15/17 2013 - - - - 95 %   09/15/17 1920 99.1 °F (37.3 °C) 93 18 102/46 94 %       Pain Assessment  Pain 1  Pain Scale 1: Visual (09/16/17 0140)  Pain Intensity 1: 0 (09/16/17 0140)  Patient Stated Pain Goal: 0 (09/15/17 0347)  Pain Reassessment 1: Patient sleeping (09/15/17 2100)  Pain Location 1: Abdomen (09/15/17 2020)  Pain Orientation 1: Left (09/15/17 2020)  Pain Description 1: Aching (09/15/17 2020)  Pain Intervention(s) 1: Medication (see MAR) (09/15/17 2020)    Ambulating  Yes    Additional Information: Patient rested well through the evening. Uneventful night. Shift report given to oncoming nurse Kayla Nguyen RN at the bedside.     Yanira Lucas

## 2017-09-16 NOTE — PROGRESS NOTES
Problem: Falls - Risk of  Goal: *Absence of Falls  Document Loyd Fall Risk and appropriate interventions in the flowsheet.    Outcome: Progressing Towards Goal  Fall Risk Interventions:  Mobility Interventions: Communicate number of staff needed for ambulation/transfer           Medication Interventions: Patient to call before getting OOB     Elimination Interventions: Call light in reach

## 2017-09-16 NOTE — PROGRESS NOTES
RENAL H&P/CONSULT    Subjective:       As Per HPI   73 F with PMH of Left RCC (Clear cell carcinoma) s/p left radical nephrectomy by Dr. Queta Mullins in 2015, ESRD on HD MWF, DM2, HTN was sent to the ER by her doctor for Rt flank pain and to get US. She has had flank pain for 2 days, severe, sharp pain, episodic a/w nausea. No fever, chills, Vomiting, Diarrhea, chest pain, SOB, bladder symptoms. Had HD yesterday. In ER CT Scan showed Recurrent mass in Left renal bed and no clear Rt sided pathology. She still has some Rt flank pain ajnd very tearful and distressed after knowing the CT results. Hospitalist asked to admit pt for further workup.    s- Nephrology consulted for HD needs - in better spirits - S/p kidney biopsy with expectation that she will need nephrectomy again - much pain - I wrote pain meds for her to keep controlled meds under one provider Pain is better today     Past Medical History:   Diagnosis Date    Arthritis     AVF (arteriovenous fistula) ([facility] [address] 12/20/2016 12/6/16 [facility]) Right AVF revision and thrombectomy    Cancer ([facility] [address] 2015    L kidney    Chronic kidney disease     HD in M-W-F- at [facility]    Degenerative joint disease     Diabetes ([address]     type 2, average range 100-120 fasting, symptomatic below 90; last A1C?    ESRD (end stage renal disease) ([facility] [address] Nov 2006    ESRD. MWF dialysis     Hypertension     Obesity     Transient ischemic attack 8/29/2015      Past Surgical History:   Procedure Laterality Date    BREAST SURGERY PROCEDURE UNLISTED Right     cyst removed    HX GI      colon resection resulting in temporary colostomy reversal    HX GYN      stephan    HX OTHER SURGICAL      dialysis fistula, several permcaths    HX UROLOGICAL Left July 2015    nephrectomy    HX VASCULAR ACCESS        Prior to Admission medications    Medication Sig Start Date End Date Taking?  Authorizing Provider   HYDROcodone-acetaminophen (NORCO)  mg tablet Take 1 Tab by mouth every four (4) hours as needed. Max Daily Amount: 6 Tabs. 9/16/17  Yes Thao Singh MD   lidocaine (LIDODERM) 5 % 1 Patch by TransDERmal route every twenty-four (24) hours. Apply patch to the affected area for 12 hours a day and remove for 12 hours a day. 9/16/17  Yes Thao Singh MD   HYDROcodone-acetaminophen [facility]) 5-325 mg per tablet Take 1 Tab by mouth every four (4) hours as needed for Pain. Max Daily Amount: 6 Tabs. 9/19/17  Yes Thao Singh MD   butalbital-acetaminophen-caff (FIORICET) -40 mg per capsule Take 1-2 Caps by mouth every six (6) hours as needed for Pain. Max Daily Amount: 8 Caps. 2/7/17  Yes Arturo Dumont MD   amLODIPine (NORVASC) 10 mg tablet Take  by mouth daily. Yes Historical Provider   VIT C/VIT E/LUTEIN/MIN/OMEGA-3 (OCUVITE PO) Take  by mouth nightly. Yes Historical Provider   cinacalcet (SENSIPAR) 30 mg tablet Take 30 mg by mouth nightly. Yes Historical Provider   minoxidil (LONITEN) 2.5 mg tablet Take 7.5 mg by mouth two (2) times a day. Indications: HYPERTENSION   Yes Jaylon Cool MD   aspirin 81 mg chewable tablet Take 1 Tab by mouth daily. Patient taking differently: Take 81 mg by mouth every morning. 9/1/15  Yes Thao Singh MD   sevelamer carbonate (RENVELA) 800 mg tab tab Take 800 mg by mouth three (3) times daily (with meals). 5 tablets with meals/ 2 with snacks  Sometimes only eats 2 meals/d   Yes Historical Provider   lisinopril (PRINIVIL, ZESTRIL) 40 mg tablet Take 40 mg by mouth nightly. Indications: HYPERTENSION 6/8/15  Yes Historical Provider   sitaGLIPtin (JANUVIA) 100 mg tablet Take 50 mg by mouth every morning. Indications: TYPE 2 DIABETES MELLITUS   Yes Historical Provider   lidocaine 5 % topical cream Apply  to affected area two (2) times daily as needed for Pain. 9/1/17   FRANCISCO Tate   promethazine (PHENERGAN) 25 mg tablet Take 1 Tab by mouth every six (6) hours as needed for Nausea.  2/7/17   Arturo Dumont MD diphenoxylate-atropine (LOMOTIL) 2.5-0.025 mg per tablet Take 2 Tabs by mouth four (4) times daily as needed for Diarrhea. Max Daily Amount: 8 Tabs. 2/7/17   Karoline Shelton MD   ondansetron hcl (ZOFRAN, AS HYDROCHLORIDE,) 8 mg tablet Take 1 Tab by mouth every eight (8) hours as needed for Nausea. 2/7/17   Karoline Shelton MD   furosemide (LASIX) 80 mg tablet Take 80 mg by mouth two (2) times a day.  Indications: EDEMA, HYPERTENSION    Historical Provider     Allergies   Allergen Reactions    Pcn [Penicillins] Rash    Vancomycin Rash      Social History   Substance Use Topics    Smoking status: Never Smoker    Smokeless tobacco: Never Used    Alcohol use No      Family History   Problem Relation Age of Onset    Diabetes Mother     Heart Disease Mother     Hypertension Mother     Heart Disease Father     Hypertension Father     Malignant Hyperthermia Neg Hx     Pseudocholinesterase Deficiency Neg Hx     Delayed Awakening Neg Hx     Post-op Nausea/Vomiting Neg Hx     Emergence Delirium Neg Hx     Other Neg Hx     Post-op Cognitive Dysfunction Neg Hx           Review of Systems    Constitutional: no fever, negative  Eyes: fair vision, negative  Ears, nose, mouth, throat, and face:fair hearing, negative  Respiratory: no asthma, negative  Cardiovascular:no chest pain, negative  Gastrointestinal:no diarrhea, negative  Genitourinary: no dysuria,negative  Hematologic/lymphatic: no bleeding tendency, negative  Neurological: no seizures, negative  Behvioral/Psych: no psych hospitalization negative  Endocrine: no goiter, negative      Objective:       Visit Vitals    /48 (BP 1 Location: Left arm, BP Patient Position: At rest)    Pulse 85    Temp 98.2 °F (36.8 °C)    Resp 18    Wt 107.4 kg (236 lb 12.8 oz)    SpO2 93%    Breastfeeding No    BMI 39.41 kg/m2       09/16 0701 - 09/16 1900  In: 400 [P.O.:400]  Out: - 09/14 1901 - 09/16 0700  In: 877 [P.O.:877]  Out: 2000       General:  Alert, cooperative, no distress, appears stated age. Head:  Normocephalic, without obvious abnormality, atraumatic. Eyes:  Conjunctivae/corneas clear. EOMs intact. Throat: Lips, mucosa, and tongue normal. Teeth and gums normal.   Neck: Supple, symmetrical, trachea midline, no adenopathy, thyroid: no enlargement/tenderness/nodules, no JVD. Chest wall:  No tenderness or deformity. Heart:  Regular rate and rhythm, S1, S2 normal, no murmur,  rub or gallop. Abdomen:   Soft, non-tender. No masses,  No organomegaly. .   Extremities: Extremities normal, atraumatic, no cyanosis or edema. Access: Good thrill and bruit   Skin: Skin color, texture, turgor normal. No rashes or lesions. Neurologic: Grossly intact. No asterixis. Data Review:     Recent Results (from the past 24 hour(s))   GLUCOSE, POC    Collection Time: 09/15/17  5:12 PM   Result Value Ref Range    Glucose (POC) 166 (H) 65 - 100 mg/dL   GLUCOSE, POC    Collection Time: 09/15/17  8:48 PM   Result Value Ref Range    Glucose (POC) 138 (H) 65 - 100 mg/dL   GLUCOSE, POC    Collection Time: 09/16/17  7:28 AM   Result Value Ref Range    Glucose (POC) 111 (H) 65 - 100 mg/dL   GLUCOSE, POC    Collection Time: 09/16/17 11:41 AM   Result Value Ref Range    Glucose (POC) 123 (H) 65 - 100 mg/dL            Principal Problem:    Abdominal mass (9/12/2017)      Overview: Of Left renal bed    Active Problems:    DM (diabetes mellitus) ([facility] [address] (2/7/2013)      ESRD (end stage renal disease) ([facility] [address] (2/7/2013)      HTN (hypertension) (2/7/2013)      Renal cell carcinoma ([facility] [address] (9/12/2017)      Flank pain (9/14/2017)        Assessment:     1. ? RCC on the Left renal bed   PMH of Left RCC (Clear cell carcinoma) s/p left radical nephrectomy   Per urology and oncology   S/p kidney biopsy     2. ESRD on HD   S/p HD yesterdat  Next due Monday as outpatient     3. HTN   Continue home meds     4. DM   Continue home meds     5. MBD   Continue binders     6.  Pain control -  - rxed Norco 10 # 60 1 BID prn and Lidoderm 5% patch        Plan:     Okay for discharge from renal point of view

## 2017-09-19 ENCOUNTER — PATIENT OUTREACH (OUTPATIENT)
Dept: CASE MANAGEMENT | Age: 67
End: 2017-09-19

## 2017-09-19 NOTE — PROGRESS NOTES
History of Present Illness:  Ms. Hannah Shirley is a 73 y.o. female who returns today for management of recurrent renal cell carcinoma. She was admitted for right flank pain in September 2017, this was presumed to be due to nephrolithiasis. Although she has ESRD, she does still make a small amount of urine, which stopped while she had pain but now has restarted. In the meantime, her CT abdomen and pelvis showed a mass in the left nephrectomy bed, she had a Pratima grade 2 RCC resected in 2015. Unfortunately, CT guided biopsy confirmed recurrent clear cell carcinoma. She saw Dr. Lisa Martin in the hospital who recommended follow-up with one of the medical oncologists. She is feeling better, flank pain has improved. No symptoms from L flank although she is very anxious and states that she has been worried about recurrence. Copied from summary note. 9/19/17 saw pt today with Dr. Griselda Jeffrey for hospital follow up for recurrent kidney cancer to left nephrectomy bed. Will need CT chest to complete staging work up. PO intake is good. She stated pain has improved. Dialysis MWF. She definitely wants treatment for this. If CT chest is negative will consider radiation or cryoablation to the nephrectomy bed. Pt is agreeable with this plan. We will contact pt with results and treatment. Provided opportunity to ask questions and all were discussed. My contact information was provided. Navigation will continue to follow.

## 2017-09-26 ENCOUNTER — HOSPITAL ENCOUNTER (OUTPATIENT)
Dept: CT IMAGING | Age: 67
Discharge: HOME | End: 2017-09-26
Attending: INTERNAL MEDICINE
Payer: MEDICARE

## 2017-09-26 DIAGNOSIS — C64.9 RENAL CELL CARCINOMA, UNSPECIFIED LATERALITY (HCC): ICD-10-CM

## 2017-09-26 DIAGNOSIS — N28.89 RENAL MASS: ICD-10-CM

## 2017-09-26 DIAGNOSIS — C64.2 MALIGNANT NEOPLASM OF LEFT KIDNEY, EXCEPT RENAL PELVIS (HCC): ICD-10-CM

## 2017-09-26 PROCEDURE — 71250 CT THORAX DX C-: CPT

## 2017-10-12 ENCOUNTER — PATIENT OUTREACH (OUTPATIENT)
Dept: CASE MANAGEMENT | Age: 67
End: 2017-10-12

## 2017-10-12 NOTE — PROGRESS NOTES
10/11/17 spoke with pt this morning - CT chest no disease. Will plan on referral to [facility] for cyber knife. Pt is agreeable with this plan. Will arrange follow up after treatment.

## 2017-11-28 ENCOUNTER — HOSPITAL ENCOUNTER (OUTPATIENT)
Dept: INTERVENTIONAL RADIOLOGY/VASCULAR | Age: 67
Discharge: HOME | End: 2017-11-28
Attending: RADIOLOGY
Payer: MEDICARE

## 2017-11-28 VITALS
BODY MASS INDEX: 36.96 KG/M2 | SYSTOLIC BLOOD PRESSURE: 143 MMHG | RESPIRATION RATE: 16 BRPM | OXYGEN SATURATION: 93 % | DIASTOLIC BLOOD PRESSURE: 67 MMHG | TEMPERATURE: 99.3 F | HEIGHT: 66 IN | HEART RATE: 72 BPM | WEIGHT: 230 LBS

## 2017-11-28 DIAGNOSIS — N18.6 ESRD (END STAGE RENAL DISEASE) (HCC): ICD-10-CM

## 2017-11-28 LAB — GLUCOSE BLD STRIP.AUTO-MCNC: 120 MG/DL (ref 65–100)

## 2017-11-28 PROCEDURE — 74011000250 HC RX REV CODE- 250: Performed by: RADIOLOGY

## 2017-11-28 PROCEDURE — C1894 INTRO/SHEATH, NON-LASER: HCPCS

## 2017-11-28 PROCEDURE — 77030011229 HC DIL VESL COON COOK -A

## 2017-11-28 PROCEDURE — C1725 CATH, TRANSLUMIN NON-LASER: HCPCS

## 2017-11-28 PROCEDURE — 99152 MOD SED SAME PHYS/QHP 5/>YRS: CPT

## 2017-11-28 PROCEDURE — 74011636320 HC RX REV CODE- 636/320: Performed by: RADIOLOGY

## 2017-11-28 PROCEDURE — 36902 INTRO CATH DIALYSIS CIRCUIT: CPT

## 2017-11-28 PROCEDURE — 74011250636 HC RX REV CODE- 250/636

## 2017-11-28 PROCEDURE — 82962 GLUCOSE BLOOD TEST: CPT

## 2017-11-28 PROCEDURE — 99153 MOD SED SAME PHYS/QHP EA: CPT

## 2017-11-28 PROCEDURE — C1769 GUIDE WIRE: HCPCS

## 2017-11-28 PROCEDURE — 74011250636 HC RX REV CODE- 250/636: Performed by: RADIOLOGY

## 2017-11-28 PROCEDURE — 77030002916 HC SUT ETHLN J&J -A

## 2017-11-28 PROCEDURE — 77030021532 HC CATH ANGI DX IMPRS MRTM -B

## 2017-11-28 RX ORDER — MIDAZOLAM HYDROCHLORIDE 1 MG/ML
.5-2 INJECTION, SOLUTION INTRAMUSCULAR; INTRAVENOUS
Status: DISCONTINUED | OUTPATIENT
Start: 2017-11-28 | End: 2017-12-02 | Stop reason: HOSPADM

## 2017-11-28 RX ORDER — HEPARIN SODIUM 200 [USP'U]/100ML
2000 INJECTION, SOLUTION INTRAVENOUS ONCE
Status: ACTIVE | OUTPATIENT
Start: 2017-11-28 | End: 2017-11-28

## 2017-11-28 RX ORDER — FENTANYL CITRATE 50 UG/ML
12.5-5 INJECTION, SOLUTION INTRAMUSCULAR; INTRAVENOUS
Status: DISCONTINUED | OUTPATIENT
Start: 2017-11-28 | End: 2017-12-02 | Stop reason: HOSPADM

## 2017-11-28 RX ORDER — DIPHENHYDRAMINE HYDROCHLORIDE 50 MG/ML
50 INJECTION, SOLUTION INTRAMUSCULAR; INTRAVENOUS ONCE
Status: COMPLETED | OUTPATIENT
Start: 2017-11-28 | End: 2017-11-28

## 2017-11-28 RX ORDER — LIDOCAINE HYDROCHLORIDE 20 MG/ML
100-200 INJECTION, SOLUTION INFILTRATION; PERINEURAL ONCE
Status: COMPLETED | OUTPATIENT
Start: 2017-11-28 | End: 2017-11-28

## 2017-11-28 RX ORDER — HYDROCODONE BITARTRATE AND ACETAMINOPHEN 7.5; 325 MG/1; MG/1
1 TABLET ORAL
Status: DISCONTINUED | OUTPATIENT
Start: 2017-11-28 | End: 2017-12-02 | Stop reason: HOSPADM

## 2017-11-28 RX ADMIN — FENTANYL CITRATE 50 MCG: 50 INJECTION, SOLUTION INTRAMUSCULAR; INTRAVENOUS at 08:34

## 2017-11-28 RX ADMIN — IOPAMIDOL 60 ML: 612 INJECTION, SOLUTION INTRAVENOUS at 09:15

## 2017-11-28 RX ADMIN — LIDOCAINE HYDROCHLORIDE 200 MG: 20 INJECTION, SOLUTION INFILTRATION; PERINEURAL at 08:30

## 2017-11-28 RX ADMIN — MIDAZOLAM HYDROCHLORIDE 1 MG: 1 INJECTION, SOLUTION INTRAMUSCULAR; INTRAVENOUS at 08:34

## 2017-11-28 RX ADMIN — DIPHENHYDRAMINE HYDROCHLORIDE 50 MG: 50 INJECTION, SOLUTION INTRAMUSCULAR; INTRAVENOUS at 08:34

## 2017-11-28 RX ADMIN — MIDAZOLAM HYDROCHLORIDE 1 MG: 1 INJECTION, SOLUTION INTRAMUSCULAR; INTRAVENOUS at 08:47

## 2017-11-28 RX ADMIN — FENTANYL CITRATE 50 MCG: 50 INJECTION, SOLUTION INTRAMUSCULAR; INTRAVENOUS at 08:47

## 2017-11-28 NOTE — IP AVS SNAPSHOT
[address] 
[phone] Patient: Navi Fischer MRN: [id number] NIP:[id number] My Medications ASK your physician about these medications Instructions Each Dose to Equal  
 Morning Noon Evening Bedtime  
 amLODIPine 10 mg tablet Commonly known as:  Hugo López Your last dose was: Your next dose is: Take  by mouth daily. aspirin 81 mg chewable tablet Your last dose was: Your next dose is: Take 1 Tab by mouth daily. 81 mg  
    
   
   
   
  
 butalbital-acetaminophen-caff -40 mg per capsule Commonly known as:  BlueArc Your last dose was: Your next dose is: Take 1-2 Caps by mouth every six (6) hours as needed for Pain. Max Daily Amount: 8 Caps. 1-2 Cap  
    
   
   
   
  
 cinacalcet 30 mg tablet Commonly known as:  SENSIPAR Your last dose was: Your next dose is: Take 30 mg by mouth nightly. 30 mg  
    
   
   
   
  
 diphenoxylate-atropine 2.5-0.025 mg per tablet Commonly known as:  LOMOTIL Your last dose was: Your next dose is: Take 2 Tabs by mouth four (4) times daily as needed for Diarrhea. Max Daily Amount: 8 Tabs. 2 Tab  
    
   
   
   
  
 furosemide 80 mg tablet Commonly known as:  LASIX Your last dose was: Your next dose is: Take 80 mg by mouth two (2) times a day. Indications: EDEMA, HYPERTENSION 80 mg  
    
   
   
   
  
 * HYDROcodone-acetaminophen  mg tablet Commonly known as:  Maribeth Pedroza Your last dose was: Your next dose is: Take 1 Tab by mouth every four (4) hours as needed. Max Daily Amount: 6 Tabs. 1 Tab  
    
   
   
   
  
 * HYDROcodone-acetaminophen 5-325 mg per tablet Commonly known as:  Maribeth Pedroza Your last dose was: Your next dose is: Take 1 Tab by mouth every four (4) hours as needed for Pain. Max Daily Amount: 6 Tabs. 1 Tab JANUVIA 100 mg tablet Generic drug:  SITagliptin Your last dose was: Your next dose is: Take 50 mg by mouth every morning. Indications: TYPE 2 DIABETES MELLITUS  
 50 mg  
    
   
   
   
  
 * lidocaine 5 % topical cream  
   
Your last dose was: Your next dose is:    
   
   
 Apply  to affected area two (2) times daily as needed for Pain. * lidocaine 5 % Commonly known as:  Minor Alt Your last dose was: Your next dose is:    
   
   
 1 Patch by TransDERmal route every twenty-four (24) hours. Apply patch to the affected area for 12 hours a day and remove for 12 hours a day. 1 Patch  
    
   
   
   
  
 lisinopril 40 mg tablet Commonly known as:  Jen Primmer Your last dose was: Your next dose is: Take 40 mg by mouth nightly. Indications: HYPERTENSION 40 mg  
    
   
   
   
  
 minoxidil 2.5 mg tablet Commonly known as:  Illa Camps Your last dose was: Your next dose is: Take 7.5 mg by mouth two (2) times a day. Indications: HYPERTENSION  
 7.5 mg  
    
   
   
   
  
 OCUVITE PO Your last dose was: Your next dose is: Take  by mouth nightly. ondansetron hcl 8 mg tablet Commonly known as:  ZOFRAN (AS HYDROCHLORIDE) Your last dose was: Your next dose is: Take 1 Tab by mouth every eight (8) hours as needed for Nausea. 8 mg  
    
   
   
   
  
 promethazine 25 mg tablet Commonly known as:  PHENERGAN Your last dose was: Your next dose is: Take 1 Tab by mouth every six (6) hours as needed for Nausea. 25 mg  
    
   
   
   
  
 RENVELA 800 mg Tab tab Generic drug:  sevelamer carbonate Your last dose was: Your next dose is: Take 800 mg by mouth three (3) times daily (with meals). 5 tablets with meals/ 2 with snacks Sometimes only eats 2 meals/d  
 800 mg * Notice: This list has 4 medication(s) that are the same as other medications prescribed for you. Read the directions carefully, and ask your doctor or other care provider to review them with you.

## 2017-11-28 NOTE — PROCEDURES
Interventional Radiology Brief Procedure Note    Patient: Tiffany Durham MRN: [id number]  SSN: [id number]    YOB: 1950  Age: 78 y.o. Sex: female      Date of Procedure: 11/28/2017    Pre-Procedure Diagnosis: Dysfunctional AVG. Prolonged bleeding. ESRD. Post-Procedure Diagnosis: SAME    Procedure(s): Arteriovenous Graft angioplasty    Brief Description of Procedure: as above    Performed By: Demarco Small MD     Assistants: None    Anesthesia: Moderate Sedation    Estimated Blood Loss: Less than 10ml    Specimens: None    Implants: None    Findings: Stenosis in the cephalic vein and in the brachiocephalic vein. Complications: None    Recommendations: 1 hour bedrest.       Follow Up: 4-5 months.       Signed By: Demarco Small MD     November 28, 2017

## 2017-11-28 NOTE — DISCHARGE INSTRUCTIONS
[address]  Department of Interventional Radiology  [address] [place] [address] [address] [facility]  [phone] Office  [phone] Fax    THROMBECTOMY/FISTULAPLASTY DISCHARGE INSTRUCTIONS    General Information: This procedure has been done in order to improve blood flow through your dialysis access. The procedure is designed to remove clots and/or to enlarge the narrowed areas of your dialysis access. Home Care Instructions: You can resume your regular diet and medication regimen. Do not drink alcohol, drive, or make any important legal decisions in the next 24 hours. Watch the site carefully for signs of infection. You may have some tenderness at your graft site. You make take Tylenol (acetaminophen) for this discomfort. Do not carry anything heavier than a gallon of milk. Do not wear any tight clothing on this arm, including elastic cuffs and/or tight watches. Do not allow anyone to take a blood pressure or draw blood from this extremity. You should elevate this arm on pillows to help with any swelling. Do not sleep on this arm with the graft, or fold it under your head while you sleep. Feel your graft every day to see if you can feel a thrill (pulsation). Call If:     You should call your Physician and/or the Radiology Nurse if you have any signs of infection like fever, drainage, redness, or increased pain at the graft site. Call your doctor or dialysis center immediately if you do not feel a thrill on your graft/fistula, or if you have a change in color of this extremity. Call 911 and seek immediate medical attention if you start bleeding from your graft or fistula. Apply pressure to the graft, but only enough pressure to control the bleeding. Armand the time you start holding pressure and let medical personnel know. Follow-Up Instructions: Please see your ordering doctor as he/she has requested.  You may not go to dialysis today, except with special written permission from you doctor. You may go to dialysis tomorrow, and resume your normal dialysis schedule. To Reach Us: If you have any questions about your procedure, please call the Interventional Radiology department at [phone]. After business hours (5pm) and weekends, call the answering service at [phone] and ask for the Radiologist on call to be paged. Interventional Radiology General Nurse Discharge    After general anesthesia or intravenous sedation, for 24 hours or while taking prescription Narcotics:  · Limit your activities  · Do not drive and operate hazardous machinery  · Do not make important personal or business decisions  · Do  not drink alcoholic beverages  · If you have not urinated within 8 hours after discharge, please contact your surgeon on call. * Please give a list of your current medications to your Primary Care Provider. * Please update this list whenever your medications are discontinued, doses are     changed, or new medications (including over-the-counter products) are added. * Please carry medication information at all times in case of emergency situations. These are general instructions for a healthy lifestyle:    No smoking/ No tobacco products/ Avoid exposure to second hand smoke  Surgeon General's Warning:  Quitting smoking now greatly reduces serious risk to your health. Obesity, smoking, and sedentary lifestyle greatly increases your risk for illness  A healthy diet, regular physical exercise & weight monitoring are important for maintaining a healthy lifestyle    You may be retaining fluid if you have a history of heart failure or if you experience any of the following symptoms:  Weight gain of 3 pounds or more overnight or 5 pounds in a week, increased swelling in our hands or feet or shortness of breath while lying flat in bed.   Please call your doctor as soon as you notice any of these symptoms; do not wait until your next office visit. Recognize signs and symptoms of STROKE:  F-face looks uneven    A-arms unable to move or move unevenly    S-speech slurred or non-existent    T-time-call 911 as soon as signs and symptoms begin-DO NOT go       Back to bed or wait to see if you get better-TIME IS BRAIN.         Patient Signature:  Date: 11/28/2017  Discharging Nurse: Thomas Le RN

## 2017-11-28 NOTE — IP AVS SNAPSHOT
[address] 
[phone] Patient: Cordelia Fischer MRN: [id number] VOC:[id number]/47/[id number] About your hospitalization You were admitted on:  November 28, 2017 You last received care in the:  [facility]iology [facility] You were discharged on:  November 28, 2017 Why you were hospitalized Your primary diagnosis was:  Not on File Things You Need To Do (next 8 weeks) Friday Dec 08, 2017 LAB with Jorge Vazquez at  2:45 PM  
Where:  Jorge Vazquez ([address]) Follow Up with Fifi Hicks MD at  3:15 PM  
Where: [facility]) Discharge Orders None A check bradly indicates which time of day the medication should be taken. My Medications ASK your physician about these medications Instructions Each Dose to Equal  
 Morning Noon Evening Bedtime  
 amLODIPine 10 mg tablet Commonly known as:  Gonzalez Olayinka Your last dose was: Your next dose is: Take  by mouth daily. aspirin 81 mg chewable tablet Your last dose was: Your next dose is: Take 1 Tab by mouth daily. 81 mg  
    
   
   
   
  
 butalbital-acetaminophen-caff -40 mg per capsule Commonly known as:  [employer] Your last dose was: Your next dose is: Take 1-2 Caps by mouth every six (6) hours as needed for Pain. Max Daily Amount: 8 Caps. 1-2 Cap  
    
   
   
   
  
 cinacalcet 30 mg tablet Commonly known as:  SENSIPAR Your last dose was: Your next dose is: Take 30 mg by mouth nightly. 30 mg  
    
   
   
   
  
 diphenoxylate-atropine 2.5-0.025 mg per tablet Commonly known as:  LOMOTIL Your last dose was: Your next dose is: Take 2 Tabs by mouth four (4) times daily as needed for Diarrhea. Max Daily Amount: 8 Tabs. 2 Tab  
    
   
   
   
  
 furosemide 80 mg tablet Commonly known as:  LASIX Your last dose was: Your next dose is: Take 80 mg by mouth two (2) times a day. Indications: EDEMA, HYPERTENSION 80 mg  
    
   
   
   
  
 * HYDROcodone-acetaminophen  mg tablet Commonly known as:  Mitzy Watson Your last dose was: Your next dose is: Take 1 Tab by mouth every four (4) hours as needed. Max Daily Amount: 6 Tabs. 1 Tab  
    
   
   
   
  
 * HYDROcodone-acetaminophen 5-325 mg per tablet Commonly known as:  Mitzy Watson Your last dose was: Your next dose is: Take 1 Tab by mouth every four (4) hours as needed for Pain. Max Daily Amount: 6 Tabs. 1 Tab JANUVIA 100 mg tablet Generic drug:  SITagliptin Your last dose was: Your next dose is: Take 50 mg by mouth every morning. Indications: TYPE 2 DIABETES MELLITUS  
 50 mg  
    
   
   
   
  
 * lidocaine 5 % topical cream  
   
Your last dose was: Your next dose is:    
   
   
 Apply  to affected area two (2) times daily as needed for Pain. * lidocaine 5 % Commonly known as:  Sourav Jimenez Your last dose was: Your next dose is:    
   
   
 1 Patch by TransDERmal route every twenty-four (24) hours. Apply patch to the affected area for 12 hours a day and remove for 12 hours a day. 1 Patch  
    
   
   
   
  
 lisinopril 40 mg tablet Commonly known as:  Maine Shaw Your last dose was: Your next dose is: Take 40 mg by mouth nightly. Indications: HYPERTENSION 40 mg  
    
   
   
   
  
 minoxidil 2.5 mg tablet Commonly known as:  Shaw Miranda Your last dose was: Your next dose is: tenderness at your graft site. You make take Tylenol (acetaminophen) for this discomfort. Do not carry anything heavier than a gallon of milk. Do not wear any tight clothing on this arm, including elastic cuffs and/or tight watches. Do not allow anyone to take a blood pressure or draw blood from this extremity. You should elevate this arm on pillows to help with any swelling. Do not sleep on this arm with the graft, or fold it under your head while you sleep. Feel your graft every day to see if you can feel a thrill (pulsation). Call If: 
   You should call your Physician and/or the Radiology Nurse if you have any signs of infection like fever, drainage, redness, or increased pain at the graft site. Call your doctor or dialysis center immediately if you do not feel a thrill on your graft/fistula, or if you have a change in color of this extremity. Call 911 and seek immediate medical attention if you start bleeding from your graft or fistula. Apply pressure to the graft, but only enough pressure to control the bleeding. Armand the time you start holding pressure and let medical personnel know. Follow-Up Instructions: Please see your ordering doctor as he/she has requested. You may not go to dialysis today, except with special written permission from you doctor. You may go to dialysis tomorrow, and resume your normal dialysis schedule. To Reach Us: If you have any questions about your procedure, please call the Interventional Radiology department at [phone]. After business hours (5pm) and weekends, call the answering service at [phone] and ask for the Radiologist on call to be paged. Interventional Radiology General Nurse Discharge After general anesthesia or intravenous sedation, for 24 hours or while taking prescription Narcotics: · Limit your activities · Do not drive and operate hazardous machinery · Do not make important personal or business decisions · Do  not drink alcoholic beverages · If you have not urinated within 8 hours after discharge, please contact your surgeon on call. * Please give a list of your current medications to your Primary Care Provider. * Please update this list whenever your medications are discontinued, doses are 
   changed, or new medications (including over-the-counter products) are added. * Please carry medication information at all times in case of emergency situations. These are general instructions for a healthy lifestyle: No smoking/ No tobacco products/ Avoid exposure to second hand smoke Surgeon General's Warning:  Quitting smoking now greatly reduces serious risk to your health. Obesity, smoking, and sedentary lifestyle greatly increases your risk for illness A healthy diet, regular physical exercise & weight monitoring are important for maintaining a healthy lifestyle You may be retaining fluid if you have a history of heart failure or if you experience any of the following symptoms:  Weight gain of 3 pounds or more overnight or 5 pounds in a week, increased swelling in our hands or feet or shortness of breath while lying flat in bed. Please call your doctor as soon as you notice any of these symptoms; do not wait until your next office visit. Recognize signs and symptoms of STROKE: 
F-face looks uneven A-arms unable to move or move unevenly S-speech slurred or non-existent T-time-call 911 as soon as signs and symptoms begin-DO NOT go Back to bed or wait to see if you get better-TIME IS BRAIN. Patient Signature: 
Date: 11/28/2017 Discharging Nurse: Rosa Cárdenas RN Introducing hospitals & HEALTH SERVICES! Yobany Pearl introduces [employer] patient portal. Now you can access parts of your medical record, email your doctor's office, and request medication refills online. 1. In your internet browser, go to [email or web address] 2. Click on the First Time User? Click Here link in the Sign In box. You will see the New Member Sign Up page. 3. Enter your [employer] Access Code exactly as it appears below. You will not need to use this code after youve completed the sign-up process. If you do not sign up before the expiration date, you must request a new code. · [employer] Access Code: [email or web address] Expires: 2/22/2018 11:15 AM 
 
4. Enter the last four digits of your Social Security Number (xxxx) and Date of Birth (mm/dd/yyyy) as indicated and click Submit. You will be taken to the next sign-up page. 5. Create a [employer] ID. This will be your [employer] login ID and cannot be changed, so think of one that is secure and easy to remember. 6. Create a [employer] password. You can change your password at any time. 7. Enter your Password Reset Question and Answer. This can be used at a later time if you forget your password. 8. Enter your e-mail address. You will receive e-mail notification when new information is available in [address] 9. Click Sign Up. You can now view and download portions of your medical record. 10. Click the Download Summary menu link to download a portable copy of your medical information. If you have questions, please visit the Frequently Asked Questions section of the [employer] website. Remember, [employer] is NOT to be used for urgent needs. For medical emergencies, dial 911. Now available from your iPhone and Android! Providers Seen During Your Hospitalization Provider Specialty Primary office phone Paige Anaya MD Radiology [phone] Your Primary Care Physician (PCP) Primary Care Physician Office Phone Office Fax Dorina Osorio [phone] You are allergic to the following Allergen Reactions Pcn (Penicillins) Rash Vancomycin Rash Recent Documentation Height Weight Breastfeeding? BMI OB Status Smoking Status 1.676 m 104.3 kg No 37.12 kg/m2 Hysterectomy Never Smoker Emergency Contacts Name Discharge Info Relation Home Work Mobile Trudy Montes De Oca  Child [2] [phone] Pablo Barthel  Sister [23] [phone] Patient Belongings The following personal items are in your possession at time of discharge: 
     Visual Aid: None Please provide this summary of care documentation to your next provider. Signatures-by signing, you are acknowledging that this After Visit Summary has been reviewed with you and you have received a copy. Patient Signature:  ____________________________________________________________ Date:  ____________________________________________________________  
  
[facility] Provider Signature:  ____________________________________________________________ Date:  ____________________________________________________________

## 2017-11-28 NOTE — H&P
Department of Interventional Radiology  [phone]    History and Physical    Patient: Faizan Yepez MRN:  [id number]  SSN:  [id number]    YOB: 1950  Age:  78 y.o. Sex:  female      Primary Care Provider:  Jose Regalado MD  Referring Physician:  Ting Massey MD    Subjective:     Chief Complaint: graft malfunction    History of the Present Illness: The patient is a 78 y.o. female who presents for evaluation of her RUE AV graft. Last dialysis yesterday. Prolonged bleeding recently, post dialysis. Subclavian and central venous stenosis which have responded to angioplasty in past.  NPO. Past Medical History:   Diagnosis Date    Arthritis     AVF (arteriovenous fistula) ([facility] 12/20/2016 12/6/16 ([facility]) Right AVF revision and thrombectomy    Cancer ([facility] [address] [facility] 2015    L kidney    Chronic kidney disease     HD in M-W-F- at [facility]    Degenerative joint disease     Diabetes ([facility] [address] [facility]     type 2, average range 100-120 fasting, symptomatic below 90; last A1C?    ESRD (end stage renal disease) ([facility] [address] [facility] Nov 2006    ESRD. MWF dialysis     Hypertension     Obesity     Transient ischemic attack 8/29/2015     Past Surgical History:   Procedure Laterality Date    BREAST SURGERY PROCEDURE UNLISTED Right     cyst removed    HX GI      colon resection resulting in temporary colostomy reversal    HX GYN      stephan    HX OTHER SURGICAL      dialysis fistula, several permcaths    HX UROLOGICAL Left July 2015    nephrectomy    HX VASCULAR ACCESS          Review of Systems:    Pertinent items are noted in the History of Present Illness. Current Outpatient Prescriptions   Medication Sig    amLODIPine (NORVASC) 10 mg tablet Take  by mouth daily.  VIT C/VIT E/LUTEIN/MIN/OMEGA-3 (OCUVITE PO) Take  by mouth nightly.  aspirin 81 mg chewable tablet Take 1 Tab by mouth daily.  (Patient taking differently: Take 81 mg by mouth every morning.)    lisinopril (Siri Kidd) 40 mg tablet Take 40 mg by mouth nightly. Indications: HYPERTENSION    sitaGLIPtin (JANUVIA) 100 mg tablet Take 50 mg by mouth every morning. Indications: TYPE 2 DIABETES MELLITUS    furosemide (LASIX) 80 mg tablet Take 80 mg by mouth two (2) times a day. Indications: EDEMA, HYPERTENSION    HYDROcodone-acetaminophen (NORCO)  mg tablet Take 1 Tab by mouth every four (4) hours as needed. Max Daily Amount: 6 Tabs.  lidocaine (LIDODERM) 5 % 1 Patch by TransDERmal route every twenty-four (24) hours. Apply patch to the affected area for 12 hours a day and remove for 12 hours a day.  HYDROcodone-acetaminophen (NORCO) 5-325 mg per tablet Take 1 Tab by mouth every four (4) hours as needed for Pain. Max Daily Amount: 6 Tabs.  lidocaine 5 % topical cream Apply  to affected area two (2) times daily as needed for Pain.  promethazine (PHENERGAN) 25 mg tablet Take 1 Tab by mouth every six (6) hours as needed for Nausea.  diphenoxylate-atropine (LOMOTIL) 2.5-0.025 mg per tablet Take 2 Tabs by mouth four (4) times daily as needed for Diarrhea. Max Daily Amount: 8 Tabs.  ondansetron hcl (ZOFRAN, AS HYDROCHLORIDE,) 8 mg tablet Take 1 Tab by mouth every eight (8) hours as needed for Nausea.  butalbital-acetaminophen-caff (FIORICET) -40 mg per capsule Take 1-2 Caps by mouth every six (6) hours as needed for Pain. Max Daily Amount: 8 Caps.  cinacalcet (SENSIPAR) 30 mg tablet Take 30 mg by mouth nightly.  minoxidil (LONITEN) 2.5 mg tablet Take 7.5 mg by mouth two (2) times a day. Indications: HYPERTENSION    sevelamer carbonate (RENVELA) 800 mg tab tab Take 800 mg by mouth three (3) times daily (with meals). 5 tablets with meals/ 2 with snacks  Sometimes only eats 2 meals/d     No current facility-administered medications for this encounter.          Allergies   Allergen Reactions    Pcn [Penicillins] Rash    Vancomycin Rash       Family History   Problem Relation Age of Onset    Diabetes Mother     Heart Disease Mother     Hypertension Mother     Heart Disease Father     Hypertension Father     Malignant Hyperthermia Neg Hx     Pseudocholinesterase Deficiency Neg Hx     Delayed Awakening Neg Hx     Post-op Nausea/Vomiting Neg Hx     Emergence Delirium Neg Hx     Other Neg Hx     Post-op Cognitive Dysfunction Neg Hx      Social History   Substance Use Topics    Smoking status: Never Smoker    Smokeless tobacco: Never Used    Alcohol use No        Objective:       Physical Examination:    Vitals:    11/28/17 0733   BP: 140/63   Pulse: 87   Resp: 18   Temp: 99.3 °F (37.4 °C)   SpO2: 96%   Weight: 104.3 kg (230 lb)   Height: 5' 6\" (1.676 m)     Blood pressure 140/63, pulse 87, temperature 99.3 °F (37.4 °C), resp. rate 18, height 5' 6\" (1.676 m), weight 104.3 kg (230 lb), SpO2 96 %, not currently breastfeeding.   HEART: regular rate and rhythm  LUNG: clear to auscultation bilaterally  ABDOMEN: normal findings: soft, nontender  EXTREMITIES: warm, no edema, RUE AV graft with aneurysmal dilations    Laboratory:     Lab Results   Component Value Date/Time    Sodium 142 09/13/2017 04:19 AM    Sodium 136 09/12/2017 04:52 PM    Potassium 4.1 09/13/2017 04:19 AM    Potassium 4.4 09/12/2017 07:05 PM    Chloride 102 09/13/2017 04:19 AM    Chloride 103 09/12/2017 04:52 PM    CO2 29 09/13/2017 04:19 AM    CO2 25 09/12/2017 04:52 PM    Anion gap 11 09/13/2017 04:19 AM    Anion gap 8 09/12/2017 04:52 PM    Glucose 88 09/13/2017 04:19 AM    Glucose 94 09/12/2017 04:52 PM    BUN 35 09/13/2017 04:19 AM    BUN 27 09/12/2017 04:52 PM    Creatinine 8.21 09/13/2017 04:19 AM    Creatinine 7.05 09/12/2017 04:52 PM    GFR est AA 6 09/13/2017 04:19 AM    GFR est AA 8 09/12/2017 04:52 PM    GFR est non-AA 5 09/13/2017 04:19 AM    GFR est non-AA 6 09/12/2017 04:52 PM    Calcium 8.7 09/13/2017 04:19 AM    Calcium 8.3 09/12/2017 04:52 PM    Magnesium 2.3 06/22/2014 04:59 PM    Magnesium 2.0 02/08/2013 05:25 AM Phosphorus 4.2 06/22/2014 04:59 PM    Phosphorus 4.4 12/12/2008 09:03 PM    Albumin 3.0 09/12/2017 04:52 PM    Albumin 4.0 02/07/2017 04:15 PM    Protein, total 8.4 09/12/2017 04:52 PM    Protein, total 7.6 02/07/2017 04:15 PM    Globulin 5.4 09/12/2017 04:52 PM    Globulin 3.6 02/07/2017 04:15 PM    A-G Ratio 0.6 09/12/2017 04:52 PM    A-G Ratio 1.1 02/07/2017 04:15 PM    AST (SGOT) 174 09/12/2017 04:52 PM    AST (SGOT) 23 02/07/2017 04:15 PM    ALT (SGPT) 39 09/12/2017 04:52 PM    ALT (SGPT) 15 02/07/2017 04:15 PM     Lab Results   Component Value Date/Time    WBC 5.8 09/13/2017 04:19 AM    WBC 8.3 09/12/2017 04:13 PM    HGB 11.0 09/13/2017 04:19 AM    HGB 9.9 09/12/2017 04:13 PM    HCT 31.0 09/13/2017 04:19 AM    HCT 29.5 09/12/2017 04:13 PM    PLATELET 345 81/30/9553 04:19 AM    PLATELET 298 50/99/3053 04:13 PM     Lab Results   Component Value Date/Time    aPTT 22.2 11/16/2015 12:00 AM    aPTT 27.2 01/29/2013 12:20 PM    Prothrombin time 10.2 11/16/2015 12:00 AM    Prothrombin time 10.5 08/29/2015 04:30 PM    INR 0.9 11/16/2015 12:00 AM    INR 1.0 08/29/2015 04:30 PM       Assessment:     ESRD, graft malfunction    Plan:     Planned Procedure:  AV graft-o-gram, balloon angioplasty    Risks, benefits, and alternatives reviewed with patient and she agrees to proceed with the procedure.       Signed By: Karoline Mccoy PA-C     November 28, 2017

## 2017-11-28 NOTE — PROGRESS NOTES
TRANSFER - OUT REPORT:    Verbal report given to Inna Whittington RN(name) on Kecia Fischer  being transferred to [facility] recovery(unit) for routine progression of care       Report consisted of patients Situation, Background, Assessment and   Recommendations(SBAR). Information from the following report(s) Procedure Summary and MAR was reviewed with the receiving nurse. Lines:       Opportunity for questions and clarification was provided.       Patient transported with:   Registered Nurse

## 2017-11-28 NOTE — PROGRESS NOTES
Recovery period without difficulty. Pt alert and oriented and denies pain. Dressing is clean, dry, and intact. Reviewed discharge instructions with patient and daughter, both verbalized understanding. Pt escorted to lobby discharge area via wheelchair. Vital signs and Luis score completed.

## 2017-11-28 NOTE — PROGRESS NOTES
IR Nurse Pre-Procedure Checklist Part 2          Consent signed: Pending    H&P complete:  Yes    Antibiotics: Not applicable    Airway/Mallampati Done: Yes    Shaved: Not applicable    Pregnancy Form:Not applicable    Patient Position: supine    MD Side: right    Biopsy Worksheet: Not applicable    Specimen Medium: Not applicable

## 2017-12-08 ENCOUNTER — PATIENT OUTREACH (OUTPATIENT)
Dept: CASE MANAGEMENT | Age: 67
End: 2017-12-08

## 2017-12-08 ENCOUNTER — HOSPITAL ENCOUNTER (OUTPATIENT)
Dept: LAB | Age: 67
Discharge: HOME | End: 2017-12-08
Payer: MEDICARE

## 2017-12-08 DIAGNOSIS — C64.9 RENAL CELL CARCINOMA, UNSPECIFIED LATERALITY (HCC): ICD-10-CM

## 2017-12-08 LAB
ALBUMIN SERPL-MCNC: 3.7 G/DL (ref 3.2–4.6)
ALBUMIN/GLOB SERPL: 0.9 {RATIO} (ref 1.2–3.5)
ALP SERPL-CCNC: 100 U/L (ref 50–136)
ALT SERPL-CCNC: 15 U/L (ref 12–65)
ANION GAP SERPL CALC-SCNC: 6 MMOL/L (ref 7–16)
AST SERPL-CCNC: 15 U/L (ref 15–37)
BASOPHILS # BLD: 0.1 K/UL (ref 0–0.2)
BASOPHILS NFR BLD: 1 % (ref 0–2)
BILIRUB SERPL-MCNC: 0.3 MG/DL (ref 0.2–1.1)
BUN SERPL-MCNC: 12 MG/DL (ref 8–23)
CALCIUM SERPL-MCNC: 8.7 MG/DL (ref 8.3–10.4)
CHLORIDE SERPL-SCNC: 103 MMOL/L (ref 98–107)
CO2 SERPL-SCNC: 35 MMOL/L (ref 21–32)
CREAT SERPL-MCNC: 3.67 MG/DL (ref 0.6–1)
DIFFERENTIAL METHOD BLD: ABNORMAL
EOSINOPHIL # BLD: 0.2 K/UL (ref 0–0.8)
EOSINOPHIL NFR BLD: 2 % (ref 0.5–7.8)
ERYTHROCYTE [DISTWIDTH] IN BLOOD BY AUTOMATED COUNT: 15.4 % (ref 11.9–14.6)
GLOBULIN SER CALC-MCNC: 4.1 G/DL (ref 2.3–3.5)
GLUCOSE SERPL-MCNC: 162 MG/DL (ref 65–100)
HCT VFR BLD AUTO: 27.4 % (ref 35.8–46.3)
HGB BLD-MCNC: 9.7 G/DL (ref 11.7–15.4)
LYMPHOCYTES # BLD: 0.8 K/UL (ref 0.5–4.6)
LYMPHOCYTES NFR BLD: 12 % (ref 13–44)
MCH RBC QN AUTO: 33.6 PG (ref 26.1–32.9)
MCHC RBC AUTO-ENTMCNC: 35.4 G/DL (ref 31.4–35)
MCV RBC AUTO: 94.8 FL (ref 79.6–97.8)
MONOCYTES # BLD: 0.6 K/UL (ref 0.1–1.3)
MONOCYTES NFR BLD: 9 % (ref 4–12)
NEUTS SEG # BLD: 4.7 K/UL (ref 1.7–8.2)
NEUTS SEG NFR BLD: 76 % (ref 43–78)
NRBC # BLD: 0 K/UL (ref 0–0.2)
PLATELET # BLD AUTO: 204 K/UL (ref 150–450)
PMV BLD AUTO: 9.7 FL (ref 10.8–14.1)
POTASSIUM SERPL-SCNC: 3.2 MMOL/L (ref 3.5–5.1)
PROT SERPL-MCNC: 7.8 G/DL (ref 6.3–8.2)
RBC # BLD AUTO: 2.89 M/UL (ref 4.05–5.25)
SODIUM SERPL-SCNC: 144 MMOL/L (ref 136–145)
WBC # BLD AUTO: 6.3 K/UL (ref 4.3–11.1)

## 2017-12-08 PROCEDURE — 80053 COMPREHEN METABOLIC PANEL: CPT | Performed by: INTERNAL MEDICINE

## 2017-12-08 PROCEDURE — 36415 COLL VENOUS BLD VENIPUNCTURE: CPT | Performed by: INTERNAL MEDICINE

## 2017-12-08 PROCEDURE — 85025 COMPLETE CBC W/AUTO DIFF WBC: CPT | Performed by: INTERNAL MEDICINE

## 2017-12-08 NOTE — PROGRESS NOTES
12/8/17 saw pt today with Dr. Celsa Street for follow up renal cancer post radiation. She tolerated radiation well. Plan is to repeat CT mid January. PO intake is good. She continues on dialysis MWF. Encouraged to call with any concerns. Navigation will continue to follow.

## 2017-12-15 PROBLEM — E11.21 TYPE 2 DIABETES MELLITUS WITH NEPHROPATHY (HCC): Status: ACTIVE | Noted: 2017-12-15

## 2018-01-16 ENCOUNTER — HOSPITAL ENCOUNTER (OUTPATIENT)
Dept: CT IMAGING | Age: 68
Discharge: HOME | End: 2018-01-16
Attending: INTERNAL MEDICINE
Payer: MEDICARE

## 2018-01-16 VITALS — BODY MASS INDEX: 36.96 KG/M2 | HEIGHT: 66 IN | WEIGHT: 230 LBS

## 2018-01-16 DIAGNOSIS — C64.2 MALIGNANT NEOPLASM OF LEFT KIDNEY, EXCEPT RENAL PELVIS (HCC): ICD-10-CM

## 2018-01-16 DIAGNOSIS — C64.9 RENAL CELL CARCINOMA, UNSPECIFIED LATERALITY (HCC): ICD-10-CM

## 2018-01-16 PROCEDURE — 74177 CT ABD & PELVIS W/CONTRAST: CPT

## 2018-01-16 PROCEDURE — 74011000258 HC RX REV CODE- 258: Performed by: INTERNAL MEDICINE

## 2018-01-16 PROCEDURE — 74011636320 HC RX REV CODE- 636/320: Performed by: INTERNAL MEDICINE

## 2018-01-16 RX ORDER — SODIUM CHLORIDE 0.9 % (FLUSH) 0.9 %
10 SYRINGE (ML) INJECTION
Status: COMPLETED | OUTPATIENT
Start: 2018-01-16 | End: 2018-01-16

## 2018-01-16 RX ADMIN — SODIUM CHLORIDE 100 ML: 900 INJECTION, SOLUTION INTRAVENOUS at 13:37

## 2018-01-16 RX ADMIN — IOPAMIDOL 100 ML: 755 INJECTION, SOLUTION INTRAVENOUS at 13:37

## 2018-01-16 RX ADMIN — Medication 10 ML: at 13:37

## 2018-01-16 RX ADMIN — DIATRIZOATE MEGLUMINE AND DIATRIZOATE SODIUM 15 ML: 660; 100 LIQUID ORAL; RECTAL at 13:37

## 2018-01-18 ENCOUNTER — HOSPITAL ENCOUNTER (EMERGENCY)
Age: 68
Discharge: HOME | End: 2018-01-19
Attending: EMERGENCY MEDICINE
Payer: MEDICARE

## 2018-01-18 ENCOUNTER — APPOINTMENT (OUTPATIENT)
Dept: GENERAL RADIOLOGY | Age: 68
End: 2018-01-18
Attending: EMERGENCY MEDICINE
Payer: MEDICARE

## 2018-01-18 DIAGNOSIS — J20.9 ACUTE BRONCHITIS WITH BRONCHOSPASM: Primary | ICD-10-CM

## 2018-01-18 LAB
ALBUMIN SERPL-MCNC: 3.9 G/DL (ref 3.2–4.6)
ALBUMIN/GLOB SERPL: 1 {RATIO} (ref 1.2–3.5)
ALP SERPL-CCNC: 88 U/L (ref 50–136)
ALT SERPL-CCNC: 12 U/L (ref 12–65)
ANION GAP SERPL CALC-SCNC: 11 MMOL/L (ref 7–16)
AST SERPL-CCNC: 20 U/L (ref 15–37)
BASOPHILS # BLD: 0.1 K/UL (ref 0–0.2)
BASOPHILS NFR BLD: 1 % (ref 0–2)
BILIRUB SERPL-MCNC: 0.7 MG/DL (ref 0.2–1.1)
BUN SERPL-MCNC: 25 MG/DL (ref 8–23)
CALCIUM SERPL-MCNC: 7.7 MG/DL (ref 8.3–10.4)
CHLORIDE SERPL-SCNC: 100 MMOL/L (ref 98–107)
CO2 SERPL-SCNC: 27 MMOL/L (ref 21–32)
CREAT SERPL-MCNC: 6.87 MG/DL (ref 0.6–1)
DIFFERENTIAL METHOD BLD: ABNORMAL
EOSINOPHIL # BLD: 0.2 K/UL (ref 0–0.8)
EOSINOPHIL NFR BLD: 4 % (ref 0.5–7.8)
ERYTHROCYTE [DISTWIDTH] IN BLOOD BY AUTOMATED COUNT: 17.9 % (ref 11.9–14.6)
FLUAV AG NPH QL IA: NEGATIVE
FLUBV AG NPH QL IA: NEGATIVE
GLOBULIN SER CALC-MCNC: 4.1 G/DL (ref 2.3–3.5)
GLUCOSE SERPL-MCNC: 87 MG/DL (ref 65–100)
HCT VFR BLD AUTO: 29.7 % (ref 35.8–46.3)
HGB BLD-MCNC: 10.3 G/DL (ref 11.7–15.4)
IMM GRANULOCYTES # BLD: 0 K/UL (ref 0–0.5)
IMM GRANULOCYTES NFR BLD AUTO: 1 % (ref 0–5)
LYMPHOCYTES # BLD: 0.7 K/UL (ref 0.5–4.6)
LYMPHOCYTES NFR BLD: 11 % (ref 13–44)
MCH RBC QN AUTO: 32.4 PG (ref 26.1–32.9)
MCHC RBC AUTO-ENTMCNC: 34.7 G/DL (ref 31.4–35)
MCV RBC AUTO: 93.4 FL (ref 79.6–97.8)
MONOCYTES # BLD: 0.6 K/UL (ref 0.1–1.3)
MONOCYTES NFR BLD: 10 % (ref 4–12)
NEUTS SEG # BLD: 4.2 K/UL (ref 1.7–8.2)
NEUTS SEG NFR BLD: 73 % (ref 43–78)
PLATELET # BLD AUTO: 192 K/UL (ref 150–450)
PMV BLD AUTO: 9.3 FL (ref 10.8–14.1)
POTASSIUM SERPL-SCNC: 3.7 MMOL/L (ref 3.5–5.1)
PROT SERPL-MCNC: 8 G/DL (ref 6.3–8.2)
RBC # BLD AUTO: 3.18 M/UL (ref 4.05–5.25)
SODIUM SERPL-SCNC: 138 MMOL/L (ref 136–145)
WBC # BLD AUTO: 5.8 K/UL (ref 4.3–11.1)

## 2018-01-18 PROCEDURE — 74011000250 HC RX REV CODE- 250: Performed by: EMERGENCY MEDICINE

## 2018-01-18 PROCEDURE — 87804 INFLUENZA ASSAY W/OPTIC: CPT | Performed by: EMERGENCY MEDICINE

## 2018-01-18 PROCEDURE — 85025 COMPLETE CBC W/AUTO DIFF WBC: CPT | Performed by: EMERGENCY MEDICINE

## 2018-01-18 PROCEDURE — 71045 X-RAY EXAM CHEST 1 VIEW: CPT

## 2018-01-18 PROCEDURE — 99283 EMERGENCY DEPT VISIT LOW MDM: CPT | Performed by: EMERGENCY MEDICINE

## 2018-01-18 PROCEDURE — 94640 AIRWAY INHALATION TREATMENT: CPT

## 2018-01-18 PROCEDURE — 80053 COMPREHEN METABOLIC PANEL: CPT | Performed by: EMERGENCY MEDICINE

## 2018-01-18 RX ORDER — ALBUTEROL SULFATE 0.83 MG/ML
2.5 SOLUTION RESPIRATORY (INHALATION)
Status: COMPLETED | OUTPATIENT
Start: 2018-01-18 | End: 2018-01-18

## 2018-01-18 RX ORDER — SODIUM CHLORIDE 0.9 % (FLUSH) 0.9 %
5-10 SYRINGE (ML) INJECTION AS NEEDED
Status: DISCONTINUED | OUTPATIENT
Start: 2018-01-18 | End: 2018-01-19 | Stop reason: HOSPADM

## 2018-01-18 RX ORDER — SODIUM CHLORIDE 0.9 % (FLUSH) 0.9 %
5-10 SYRINGE (ML) INJECTION EVERY 8 HOURS
Status: DISCONTINUED | OUTPATIENT
Start: 2018-01-18 | End: 2018-01-19 | Stop reason: HOSPADM

## 2018-01-18 RX ADMIN — ALBUTEROL SULFATE 2.5 MG: 2.5 SOLUTION RESPIRATORY (INHALATION) at 23:58

## 2018-01-19 VITALS
HEIGHT: 66 IN | HEART RATE: 94 BPM | OXYGEN SATURATION: 95 % | DIASTOLIC BLOOD PRESSURE: 53 MMHG | RESPIRATION RATE: 20 BRPM | SYSTOLIC BLOOD PRESSURE: 126 MMHG | BODY MASS INDEX: 36.16 KG/M2 | TEMPERATURE: 98.5 F | WEIGHT: 225 LBS

## 2018-01-19 RX ORDER — CODEINE PHOSPHATE AND GUAIFENESIN 10; 100 MG/5ML; MG/5ML
5-10 SOLUTION ORAL
Qty: 150 ML | Refills: 0 | Status: SHIPPED | OUTPATIENT
Start: 2018-01-19 | End: 2018-11-07

## 2018-01-19 RX ORDER — ALBUTEROL SULFATE 90 UG/1
2 AEROSOL, METERED RESPIRATORY (INHALATION)
Qty: 1 INHALER | Refills: 0 | Status: SHIPPED | OUTPATIENT
Start: 2018-01-19 | End: 2018-01-26

## 2018-01-19 NOTE — ED NOTES
I have reviewed medications, follow up provider options, and discharge instructions with the patient. The patient verbalized understanding. Copy of discharge information given to patient upon discharge. Prescription(s) given to patient. Patient discharged in no distress. Patient instructed not to drive while under influence of narcotic cough syrup. Patient ambulatory to waiting area. No questions at this time.

## 2018-01-19 NOTE — ED TRIAGE NOTES
Pt arrived ambulatory to ED with cough x 1 week. Pt states she went to PCP and was dx with a URI and was given and antibiotic.

## 2018-01-19 NOTE — DISCHARGE INSTRUCTIONS
Bronchitis: Care Instructions  Your Care Instructions    Bronchitis is inflammation of the bronchial tubes, which carry air to the lungs. The tubes swell and produce mucus, or phlegm. The mucus and inflamed bronchial tubes make you cough. You may have trouble breathing. Most cases of bronchitis are caused by viruses like those that cause colds. Antibiotics usually do not help and they may be harmful. Bronchitis usually develops rapidly and lasts about 2 to 3 weeks in otherwise healthy people. Follow-up care is a key part of your treatment and safety. Be sure to make and go to all appointments, and call your doctor if you are having problems. It's also a good idea to know your test results and keep a list of the medicines you take. How can you care for yourself at home? · Take all medicines exactly as prescribed. Call your doctor if you think you are having a problem with your medicine. · Get some extra rest.  · Take an over-the-counter pain medicine, such as acetaminophen (Tylenol), ibuprofen (Advil, Motrin), or naproxen (Aleve) to reduce fever and relieve body aches. Read and follow all instructions on the label. · Do not take two or more pain medicines at the same time unless the doctor told you to. Many pain medicines have acetaminophen, which is Tylenol. Too much acetaminophen (Tylenol) can be harmful. · Take an over-the-counter cough medicine that contains dextromethorphan to help quiet a dry, hacking cough so that you can sleep. Avoid cough medicines that have more than one active ingredient. Read and follow all instructions on the label. · Breathe moist air from a humidifier, hot shower, or sink filled with hot water. The heat and moisture will thin mucus so you can cough it out. · Do not smoke. Smoking can make bronchitis worse. If you need help quitting, talk to your doctor about stop-smoking programs and medicines. These can increase your chances of quitting for good.   When should you call for help? Call 911 anytime you think you may need emergency care. For example, call if:  ? · You have severe trouble breathing. ?Call your doctor now or seek immediate medical care if:  ? · You have new or worse trouble breathing. ? · You cough up dark brown or bloody mucus (sputum). ? · You have a new or higher fever. ? · You have a new rash. ? Watch closely for changes in your health, and be sure to contact your doctor if:  ? · You cough more deeply or more often, especially if you notice more mucus or a change in the color of your mucus. ? · You are not getting better as expected. Where can you learn more? Go to http://felisa-radha.info/. Enter H333 in the search box to learn more about \"Bronchitis: Care Instructions. \"  Current as of: May 12, 2017  Content Version: 11.4  © 1387-0170 [employer]. Care instructions adapted under license by [employer] (which disclaims liability or warranty for this information). If you have questions about a medical condition or this instruction, always ask your healthcare professional. [place] [address] any warranty or liability for your use of this information. Reactive Airway Disease: Care Instructions  Your Care Instructions    Reactive airway disease is a breathing problem that appears as wheezing, a whistling noise in your airways. It may be caused by a viral or bacterial infection, allergies, tobacco smoke, or something else in the environment. When you are around these triggers, your body releases chemicals that make the airways get tight. Reactive airway disease is a lot like asthma. Both can cause wheezing. But asthma is ongoing, while reactive airway disease may occur only now and then. Tests can be done to tell whether you have asthma. You may take the same medicines used to treat asthma. Good home care and follow-up care with your doctor can help you recover.   Follow-up care is a key part of your treatment and safety. Be sure to make and go to all appointments, and call your doctor if you are having problems. It's also a good idea to know your test results and keep a list of the medicines you take. How can you care for yourself at home? · Take your medicines exactly as prescribed. Call your doctor if you think you are having a problem with your medicine. · Do not smoke or allow others to smoke around you. If you need help quitting, talk to your doctor about stop-smoking programs and medicines. These can increase your chances of quitting for good. · If you know what caused your wheezing (such as perfume or the odor of household chemicals), try to avoid it in the future. · Wash your hands several times a day, and consider using hand gels or wipes that contain alcohol. This can prevent colds and other infections. When should you call for help? Call 911 anytime you think you may need emergency care. For example, call if:  ? · You have severe trouble breathing. ? Watch closely for changes in your health, and be sure to contact your doctor if:  ? · You cough up yellow, dark brown, or bloody mucus. ? · You have a fever. ? · Your wheezing gets worse. Where can you learn more? Go to http://felisa-radha.info/. Enter R273 in the search box to learn more about \"Reactive Airway Disease: Care Instructions. \"  Current as of: May 12, 2017  Content Version: 11.4  © 8966-9058 [employer]. Care instructions adapted under license by [employer] (which disclaims liability or warranty for this information). If you have questions about a medical condition or this instruction, always ask your healthcare professional. [place] [address] any warranty or liability for your use of this information.

## 2018-01-19 NOTE — ED PROVIDER NOTES
Patient is a 78 y.o. female presenting with cough. The history is provided by the patient. Cough   This is a new problem. The current episode started more than 1 week ago. The problem occurs every few minutes. The problem has been gradually worsening. The cough is productive of sputum. There has been no fever. Associated symptoms include sweats, shortness of breath and wheezing. Pertinent negatives include no chest pain, no chills, no weight loss, no eye redness, no ear congestion, no ear pain, no headaches, no rhinorrhea, no sore throat, no myalgias, no nausea, no vomiting and no confusion. She has tried antibiotics for the symptoms. The treatment provided no relief. She is not a smoker. Her past medical history is significant for bronchitis. Her past medical history does not include pneumonia, bronchiectasis, COPD, asthma, cancer, heart failure or CHF. Past Medical History:   Diagnosis Date    Arthritis     AVF (arteriovenous fistula) ([facility] 12/20/2016 [facility]/16 [facility] Right AVF revision and thrombectomy    Cancer ([facility] [address] 2015    L kidney    Chronic kidney disease     HD in M-W-F- at [facility]    Degenerative joint disease     Diabetes ([facility] [address]     type 2, average range 100-120 fasting, symptomatic below 90; last A1C?    ESRD (end stage renal disease) ([facility] [address] Nov 2006    ESRD.  MWF dialysis     Hypertension     Obesity     Transient ischemic attack 8/29/2015       Past Surgical History:   Procedure Laterality Date    BREAST SURGERY PROCEDURE UNLISTED Right     cyst removed    HX GI      colon resection resulting in temporary colostomy reversal    HX GYN      stephan    HX OTHER SURGICAL      dialysis fistula, several permcaths    HX UROLOGICAL Left July 2015    nephrectomy    HX VASCULAR ACCESS           Family History:   Problem Relation Age of Onset    Diabetes Mother     Heart Disease Mother     Hypertension Mother     Heart Disease Father     Hypertension Father     Malignant Hyperthermia Neg Hx     Pseudocholinesterase Deficiency Neg Hx     Delayed Awakening Neg Hx     Post-op Nausea/Vomiting Neg Hx     Emergence Delirium Neg Hx     Other Neg Hx     Post-op Cognitive Dysfunction Neg Hx        Social History     Social History    Marital status:      Spouse name: N/A    Number of children: N/A    Years of education: N/A     Occupational History    Not on file. Social History Main Topics    Smoking status: Never Smoker    Smokeless tobacco: Never Used    Alcohol use No    Drug use: No    Sexual activity: Not Currently     Other Topics Concern    Not on file     Social History Narrative         ALLERGIES: Pcn [penicillins] and Vancomycin    Review of Systems   Constitutional: Positive for fatigue. Negative for chills, fever and weight loss. HENT: Positive for congestion. Negative for ear pain, rhinorrhea, sore throat and trouble swallowing. Eyes: Negative for redness. Respiratory: Positive for cough, shortness of breath and wheezing. Cardiovascular: Negative for chest pain. Gastrointestinal: Negative for abdominal pain, nausea and vomiting. Musculoskeletal: Negative for myalgias. Neurological: Negative for headaches. Psychiatric/Behavioral: Negative for confusion. All other systems reviewed and are negative. Vitals:    01/18/18 2158 01/18/18 2358 01/19/18 0132   BP: 108/68  126/53   Pulse: 83  94   Resp: 20  20   Temp: 98.5 °F (36.9 °C)  98.5 °F (36.9 °C)   SpO2: 97% 94% 95%   Weight: 102.1 kg (225 lb)     Height: 5' 5.5\" (1.664 m)              Physical Exam   Constitutional: She is oriented to person, place, and time. She appears well-developed and well-nourished. She appears distressed. HENT:   Head: Normocephalic and atraumatic.    Right Ear: Tympanic membrane and external ear normal.   Left Ear: Tympanic membrane and external ear normal.   Mouth/Throat: Oropharynx is clear and moist.   Eyes: Conjunctivae and EOM are normal. Pupils are equal, round, and reactive to light. Neck: Normal range of motion. Neck supple. No tracheal deviation present. Cardiovascular: Normal rate, regular rhythm, normal heart sounds and intact distal pulses. Exam reveals no gallop and no friction rub. No murmur heard. Pulmonary/Chest: Effort normal. No respiratory distress. She has wheezes (mild and scattered). Abdominal: Soft. Bowel sounds are normal. She exhibits no distension and no mass. There is no hepatosplenomegaly. There is no tenderness. There is no rebound and no guarding. Musculoskeletal: Normal range of motion. She exhibits no edema. Lymphadenopathy:     She has no cervical adenopathy. Neurological: She is alert and oriented to person, place, and time. No cranial nerve deficit. Skin: Skin is warm and dry. No rash noted. She is not diaphoretic. No erythema. Psychiatric: She has a normal mood and affect. Nursing note and vitals reviewed.        MDM  Number of Diagnoses or Management Options  Acute bronchitis with bronchospasm: new and requires workup     Amount and/or Complexity of Data Reviewed  Clinical lab tests: reviewed and ordered  Tests in the radiology section of CPT®: ordered and reviewed  Review and summarize past medical records: yes    Risk of Complications, Morbidity, and/or Mortality  Presenting problems: high  Diagnostic procedures: moderate  Management options: moderate    Patient Progress  Patient progress: improved    ED Course       Procedures      Results Reviewed:      Recent Results (from the past 24 hour(s))   INFLUENZA A & B AG (RAPID TEST)    Collection Time: 01/18/18 10:02 PM   Result Value Ref Range    Influenza A Ag NEGATIVE  NEG      Influenza B Ag NEGATIVE  NEG     CBC WITH AUTOMATED DIFF    Collection Time: 01/18/18 11:10 PM   Result Value Ref Range    WBC 5.8 4.3 - 11.1 K/uL    RBC 3.18 (L) 4.05 - 5.25 M/uL    HGB 10.3 (L) 11.7 - 15.4 g/dL    HCT 29.7 (L) 35.8 - 46.3 %    MCV 93.4 79.6 - 97.8 FL    MCH 32.4 26.1 - 32.9 PG    MCHC 34.7 31.4 - 35.0 g/dL    RDW 17.9 (H) 11.9 - 14.6 %    PLATELET 465 077 - 460 K/uL    MPV 9.3 (L) 10.8 - 14.1 FL    DF AUTOMATED      NEUTROPHILS 73 43 - 78 %    LYMPHOCYTES 11 (L) 13 - 44 %    MONOCYTES 10 4.0 - 12.0 %    EOSINOPHILS 4 0.5 - 7.8 %    BASOPHILS 1 0.0 - 2.0 %    IMMATURE GRANULOCYTES 1 0.0 - 5.0 %    ABS. NEUTROPHILS 4.2 1.7 - 8.2 K/UL    ABS. LYMPHOCYTES 0.7 0.5 - 4.6 K/UL    ABS. MONOCYTES 0.6 0.1 - 1.3 K/UL    ABS. EOSINOPHILS 0.2 0.0 - 0.8 K/UL    ABS. BASOPHILS 0.1 0.0 - 0.2 K/UL    ABS. IMM. GRANS. 0.0 0.0 - 0.5 K/UL   METABOLIC PANEL, COMPREHENSIVE    Collection Time: 01/18/18 11:10 PM   Result Value Ref Range    Sodium 138 136 - 145 mmol/L    Potassium 3.7 3.5 - 5.1 mmol/L    Chloride 100 98 - 107 mmol/L    CO2 27 21 - 32 mmol/L    Anion gap 11 7 - 16 mmol/L    Glucose 87 65 - 100 mg/dL    BUN 25 (H) 8 - 23 MG/DL    Creatinine 6.87 (H) 0.6 - 1.0 MG/DL    GFR est AA 8 (L) >60 ml/min/1.73m2    GFR est non-AA 6 (L) >60 ml/min/1.73m2    Calcium 7.7 (L) 8.3 - 10.4 MG/DL    Bilirubin, total 0.7 0.2 - 1.1 MG/DL    ALT (SGPT) 12 12 - 65 U/L    AST (SGOT) 20 15 - 37 U/L    Alk. phosphatase 88 50 - 136 U/L    Protein, total 8.0 6.3 - 8.2 g/dL    Albumin 3.9 3.2 - 4.6 g/dL    Globulin 4.1 (H) 2.3 - 3.5 g/dL    A-G Ratio 1.0 (L) 1.2 - 3.5       XR CHEST PORT   Final Result   IMPRESSION: Normal chest.                I discussed the results of all labs, procedures, radiographs, and treatments with the patient and available family. Treatment plan is agreed upon and the patient is ready for discharge. All voiced understanding of the discharge plan and medication instructions or changes as appropriate. Questions about treatment in the ED were answered. All were encouraged to return should symptoms worsen or new problems develop.

## 2018-03-08 ENCOUNTER — HOSPITAL ENCOUNTER (OUTPATIENT)
Dept: INTERVENTIONAL RADIOLOGY/VASCULAR | Age: 68
Discharge: HOME | End: 2018-03-08
Attending: RADIOLOGY
Payer: MEDICARE

## 2018-03-08 VITALS
SYSTOLIC BLOOD PRESSURE: 161 MMHG | HEIGHT: 65 IN | WEIGHT: 220 LBS | TEMPERATURE: 98 F | BODY MASS INDEX: 36.65 KG/M2 | HEART RATE: 94 BPM | DIASTOLIC BLOOD PRESSURE: 70 MMHG | OXYGEN SATURATION: 94 % | RESPIRATION RATE: 16 BRPM

## 2018-03-08 DIAGNOSIS — N18.6 ESRD (END STAGE RENAL DISEASE) (HCC): ICD-10-CM

## 2018-03-08 PROCEDURE — 77030002916 HC SUT ETHLN J&J -A

## 2018-03-08 PROCEDURE — C1725 CATH, TRANSLUMIN NON-LASER: HCPCS

## 2018-03-08 PROCEDURE — C1769 GUIDE WIRE: HCPCS

## 2018-03-08 PROCEDURE — 74011250636 HC RX REV CODE- 250/636: Performed by: RADIOLOGY

## 2018-03-08 PROCEDURE — 74011250636 HC RX REV CODE- 250/636

## 2018-03-08 PROCEDURE — C1894 INTRO/SHEATH, NON-LASER: HCPCS

## 2018-03-08 PROCEDURE — 76937 US GUIDE VASCULAR ACCESS: CPT

## 2018-03-08 PROCEDURE — 77030021532 HC CATH ANGI DX IMPRS MRTM -B

## 2018-03-08 PROCEDURE — 77030011229 HC DIL VESL COON COOK -A

## 2018-03-08 PROCEDURE — 74011636320 HC RX REV CODE- 636/320: Performed by: RADIOLOGY

## 2018-03-08 PROCEDURE — 74011000250 HC RX REV CODE- 250: Performed by: RADIOLOGY

## 2018-03-08 RX ORDER — LIDOCAINE HYDROCHLORIDE 20 MG/ML
20-300 INJECTION, SOLUTION INFILTRATION; PERINEURAL
Status: DISCONTINUED | OUTPATIENT
Start: 2018-03-08 | End: 2018-03-12 | Stop reason: HOSPADM

## 2018-03-08 RX ORDER — MIDAZOLAM HYDROCHLORIDE 1 MG/ML
.5-2 INJECTION, SOLUTION INTRAMUSCULAR; INTRAVENOUS
Status: DISCONTINUED | OUTPATIENT
Start: 2018-03-08 | End: 2018-03-12 | Stop reason: HOSPADM

## 2018-03-08 RX ORDER — OXYCODONE AND ACETAMINOPHEN 5; 325 MG/1; MG/1
1 TABLET ORAL
Status: DISCONTINUED | OUTPATIENT
Start: 2018-03-08 | End: 2018-03-12 | Stop reason: HOSPADM

## 2018-03-08 RX ORDER — HEPARIN SODIUM 200 [USP'U]/100ML
1000 INJECTION, SOLUTION INTRAVENOUS AS NEEDED
Status: DISCONTINUED | OUTPATIENT
Start: 2018-03-08 | End: 2018-03-12 | Stop reason: HOSPADM

## 2018-03-08 RX ORDER — FENTANYL CITRATE 50 UG/ML
25-100 INJECTION, SOLUTION INTRAMUSCULAR; INTRAVENOUS
Status: DISCONTINUED | OUTPATIENT
Start: 2018-03-08 | End: 2018-03-12 | Stop reason: HOSPADM

## 2018-03-08 RX ORDER — DIPHENHYDRAMINE HYDROCHLORIDE 50 MG/ML
25-50 INJECTION, SOLUTION INTRAMUSCULAR; INTRAVENOUS
Status: DISCONTINUED | OUTPATIENT
Start: 2018-03-08 | End: 2018-03-12 | Stop reason: HOSPADM

## 2018-03-08 RX ADMIN — SODIUM BICARBONATE 2 ML: 0.2 INJECTION, SOLUTION INTRAVENOUS at 09:25

## 2018-03-08 RX ADMIN — MIDAZOLAM HYDROCHLORIDE 1 MG: 1 INJECTION, SOLUTION INTRAMUSCULAR; INTRAVENOUS at 09:24

## 2018-03-08 RX ADMIN — FENTANYL CITRATE 50 MCG: 50 INJECTION, SOLUTION INTRAMUSCULAR; INTRAVENOUS at 09:24

## 2018-03-08 RX ADMIN — MIDAZOLAM HYDROCHLORIDE 1 MG: 1 INJECTION, SOLUTION INTRAMUSCULAR; INTRAVENOUS at 09:32

## 2018-03-08 RX ADMIN — IOPAMIDOL 50 ML: 612 INJECTION, SOLUTION INTRAVENOUS at 10:05

## 2018-03-08 RX ADMIN — MIDAZOLAM HYDROCHLORIDE 1 MG: 1 INJECTION, SOLUTION INTRAMUSCULAR; INTRAVENOUS at 09:44

## 2018-03-08 RX ADMIN — FENTANYL CITRATE 50 MCG: 50 INJECTION, SOLUTION INTRAMUSCULAR; INTRAVENOUS at 09:44

## 2018-03-08 RX ADMIN — MIDAZOLAM HYDROCHLORIDE 1 MG: 1 INJECTION, SOLUTION INTRAMUSCULAR; INTRAVENOUS at 09:53

## 2018-03-08 RX ADMIN — HEPARIN SODIUM 2000 UNITS: 200 INJECTION, SOLUTION INTRAVENOUS at 09:24

## 2018-03-08 RX ADMIN — FENTANYL CITRATE 50 MCG: 50 INJECTION, SOLUTION INTRAMUSCULAR; INTRAVENOUS at 09:32

## 2018-03-08 RX ADMIN — FENTANYL CITRATE 50 MCG: 50 INJECTION, SOLUTION INTRAMUSCULAR; INTRAVENOUS at 09:54

## 2018-03-08 RX ADMIN — DIPHENHYDRAMINE HYDROCHLORIDE 50 MG: 50 INJECTION, SOLUTION INTRAMUSCULAR; INTRAVENOUS at 09:23

## 2018-03-08 RX ADMIN — LIDOCAINE HYDROCHLORIDE 80 MG: 20 INJECTION, SOLUTION INFILTRATION; PERINEURAL at 09:24

## 2018-03-08 NOTE — PROCEDURES
Interventional Radiology Brief Procedure Note    Patient: Demarco Jin MRN: [id number]  SSN: [id number]    YOB: 1950  Age: 78 y.o. Sex: female      Date of Procedure: 3/8/2018    Pre-Procedure Diagnosis: Prolonged hemorrhage after decanalization. Post-Procedure Diagnosis: SAME    Procedure(s): Arteriovenous Fistulogram with PTA    Brief Description of Procedure: as above    Performed By: Maryanne Cardenas MD     Assistants: None    Anesthesia: Moderate Sedation    Estimated Blood Loss: Less than 10ml    Specimens: None    Implants: None    Findings: Mild stenosis in the R Cephalic V, Severe stenosis in the R Brachiocephalic V. Complications: None    Recommendations: 1 hour bedrest.       Follow Up: AVFistulogram in about 4 months.       Signed By: Maryanne Cardenas MD     March 8, 2018

## 2018-03-08 NOTE — IP AVS SNAPSHOT
Guanakito Goodwin 
 
 
 [address] 
[phone] Patient: Yajaira Fischer MRN: [id number] About your hospitalization You were admitted on:  March 8, 2018 You last received care in the:  [facility] Radiology Specials You were discharged on:  March 8, 2018 Why you were hospitalized Your primary diagnosis was:  Not on File Follow-up Information None Your Scheduled Appointments Friday March 09, 2018  1:45 PM EST  
LAB with [address]  
1808 [facility] OUTREACH INSURANCE ([address]) Niyah Pazeddaon [address] [place] [address]  
[phone] Friday March 09, 2018  2:15 PM EST Follow Up with MD Jose Hernandez Going Hematology and Oncology [facility]) WIL/ [address] Gaby Jennifer [address] [id number]  
[phone] Discharge Orders None A check bradly indicates which time of day the medication should be taken. My Medications ASK your doctor about these medications Instructions Each Dose to Equal  
 Morning Noon Evening Bedtime  
 amLODIPine 10 mg tablet Commonly known as:  Aylin Callander Your last dose was: Your next dose is: Take  by mouth daily. aspirin 81 mg chewable tablet Your last dose was: Your next dose is: Take 1 Tab by mouth daily. 81 mg  
    
   
   
   
  
 butalbital-acetaminophen-caff -40 mg per capsule Commonly known as:  [employer] Your last dose was: Your next dose is: Take 1-2 Caps by mouth every six (6) hours as needed for Pain. Max Daily Amount: 8 Caps. 1-2 Cap  
    
   
   
   
  
 cinacalcet 30 mg tablet Commonly known as:  SENSIPAR Your last dose was: Your next dose is: Take 60 mg by mouth nightly.   
 60 mg  
    
   
   
   
  
 diphenoxylate-atropine 2.5-0.025 mg per tablet Commonly known as:  LOMOTIL Your last dose was: Your next dose is: Take 2 Tabs by mouth four (4) times daily as needed for Diarrhea. Max Daily Amount: 8 Tabs. 2 Tab  
    
   
   
   
  
 furosemide 80 mg tablet Commonly known as:  LASIX Your last dose was: Your next dose is: Take 80 mg by mouth two (2) times a day. Indications: EDEMA, HYPERTENSION 80 mg  
    
   
   
   
  
 guaiFENesin-codeine 100-10 mg/5 mL solution Commonly known as:  [employer] Your last dose was: Your next dose is: Take 5-10 mL by mouth three (3) times daily as needed for Cough or Congestion. Max Daily Amount: 30 mL. 5-10 mL  
    
   
   
   
  
 * HYDROcodone-acetaminophen  mg tablet Commonly known as:  Sophia Pont Your last dose was: Your next dose is: Take 1 Tab by mouth every four (4) hours as needed. Max Daily Amount: 6 Tabs. 1 Tab  
    
   
   
   
  
 * HYDROcodone-acetaminophen 5-325 mg per tablet Commonly known as:  Sophia Pont Your last dose was: Your next dose is: Take 1 Tab by mouth every four (4) hours as needed for Pain. Max Daily Amount: 6 Tabs. 1 Tab JANUVIA 100 mg tablet Generic drug:  SITagliptin Your last dose was: Your next dose is: Take 50 mg by mouth every morning. Indications: TYPE 2 DIABETES MELLITUS  
 50 mg  
    
   
   
   
  
 * lidocaine 5 % topical cream  
   
Your last dose was: Your next dose is:    
   
   
 Apply  to affected area two (2) times daily as needed for Pain. * lidocaine 5 % Commonly known as:  Noé Garcia Your last dose was: Your next dose is:    
   
   
 1 Patch by TransDERmal route every twenty-four (24) hours. Apply patch to the affected area for 12 hours a day and remove for 12 hours a day. 1 Patch  
    
   
   
   
  
 lisinopril 40 mg tablet Commonly known as:  Ana Melvin Your last dose was: Your next dose is: Take 40 mg by mouth nightly. Indications: HYPERTENSION 40 mg  
    
   
   
   
  
 minoxidil 2.5 mg tablet Commonly known as:  Arva Heading Your last dose was: Your next dose is: Take 7.5 mg by mouth two (2) times a day. Indications: HYPERTENSION  
 7.5 mg  
    
   
   
   
  
 OCUVITE PO Your last dose was: Your next dose is: Take  by mouth nightly. RENVELA 800 mg Tab tab Generic drug:  sevelamer carbonate Your last dose was: Your next dose is: Take 800 mg by mouth three (3) times daily (with meals). 5 tablets with meals/ 2 with snacks Sometimes only eats 2 meals/d  
 800 mg * Notice: This list has 4 medication(s) that are the same as other medications prescribed for you. Read the directions carefully, and ask your doctor or other care provider to review them with you. Discharge Instructions [address] Department of Interventional Radiology 
[phone] Office 
[phone] Fax ARTERIOVENOUS FISTULA, GRAFT ACCESS, REVISION, OR DECLOT 
 
ACTIVITY: 
Limited activity today. Keep access arm straight and raised above the level of your heart for 24 hours or until the swelling goes away. DIET: 
May have your usual food, unless told otherwise by your doctor. PAIN: 
Use the prescription for pain medicine your doctor gave you. If you do not have one, usual your normal pain medicine. If this does not control your pain, call your doctor. DO NOT TAKE ASPIRIN OR MEDICINE WITH ASPIRIN IN IT, unless your doctor says you may. DRESSING CARE:  
Keep your dressing clean and dry. Leave your dressing on until the Dialysis Nurse or your doctor takes it off. BLEEDING: If you have any bleeding put pressure over the bleeding area and call your doctor. CARE OF YOUR ACCESS ARM: 
You may have some bruising, swelling, and discoloration for several weeks. After the swelling has gone down, you will be able to see the outline of the graft. By placing your fingertips over the graft you will be able to feel a vibration (thrill) that means blood is flowing and the graft is working. DO: 
1. Make sure your arm is washed with soap and water every day. 2. Feel for the [address] [address] at area marked in the picture. 3. Call your Kidney doctor if you do not feel the [address] or for any problems like swelling, redness, tingling, or numbness in access arm, pus, or fever over 101. DO NOT: 
1. Wear tight sleeves, watches, belts, or bracelets over the graft. 2. Carry heavy bags across the graft or fistula. 3. Sleep on your graft side. 4. Let your blood pressure be taken in your access arm. 5. Let blood be drawn from your access arm. For the next 24 hours, DO NOT Drive, Drink Alcoholic Beverages, or Make IMPORTANT Decisions. Follow-Up Instructions:  Please see your ordering doctor as he/she has requested. To Reach Us:   
[address] [phone] Interventional Radiology General Nurse Discharge After general anesthesia or intravenous sedation, for 24 hours or while taking prescription Narcotics: · Limit your activities · Do not drive and operate hazardous machinery · Do not make important personal or business decisions · Do  not drink alcoholic beverages · If you have not urinated within 8 hours after discharge, please contact your surgeon on call. * Please give a list of your current medications to your Primary Care Provider. * Please update this list whenever your medications are discontinued, doses are 
   changed, or new medications (including over-the-counter products) are added. * Please carry medication information at all times in case of emergency situations. These are general instructions for a healthy lifestyle: No smoking/ No tobacco products/ Avoid exposure to second hand smoke Surgeon General's Warning:  Quitting smoking now greatly reduces serious risk to your health. Obesity, smoking, and sedentary lifestyle greatly increases your risk for illness A healthy diet, regular physical exercise & weight monitoring are important for maintaining a healthy lifestyle You may be retaining fluid if you have a history of heart failure or if you experience any of the following symptoms:  Weight gain of 3 pounds or more overnight or 5 pounds in a week, increased swelling in our hands or feet or shortness of breath while lying flat in bed. Please call your doctor as soon as you notice any of these symptoms; do not wait until your next office visit. Recognize signs and symptoms of STROKE: 
F-face looks uneven A-arms unable to move or move unevenly S-speech slurred or non-existent T-time-call 911 as soon as signs and symptoms begin-DO NOT go Back to bed or wait to see if you get better-TIME IS BRAIN. Introducing [facility] [place] [facility]! Debbie Morris introduces [employer] patient portal. Now you can access parts of your medical record, email your doctor's office, and request medication refills online. 1. In your internet browser, go to [email or web address] 2. Click on the First Time User? Click Here link in the Sign In box. You will see the New Member Sign Up page. 3. Enter your [employer] Access Code exactly as it appears below. You will not need to use this code after youve completed the sign-up process. If you do not sign up before the expiration date, you must request a new code. · [employer] Access Code: [email or web address] Expires: 6/3/2018  3:58 PM 
 
4.  Enter the last four digits of your Social Security Number (xxxx) and Date of Birth (mm/dd/yyyy) as indicated and click Submit. You will be taken to the next sign-up page. 5. Create a [employer] ID. This will be your [employer] login ID and cannot be changed, so think of one that is secure and easy to remember. 6. Create a [employer] password. You can change your password at any time. 7. Enter your Password Reset Question and Answer. This can be used at a later time if you forget your password. 8. Enter your e-mail address. You will receive e-mail notification when new information is available in [address] 9. Click Sign Up. You can now view and download portions of your medical record. 10. Click the Download Summary menu link to download a portable copy of your medical information. If you have questions, please visit the Frequently Asked Questions section of the [employer] website. Remember, [employer] is NOT to be used for urgent needs. For medical emergencies, dial 911. Now available from your iPhone and Android! Providers Seen During Your Hospitalization Provider Specialty Primary office phone Miguel Cullen MD Radiology [phone] Your Primary Care Physician (PCP) Primary Care Physician Office Phone Office Fax Silva Pacheco [phone] You are allergic to the following Allergen Reactions Pcn (Penicillins) Rash Vancomycin Rash Recent Documentation Height Weight Breastfeeding? BMI OB Status Smoking Status 1.651 m 99.8 kg No 36.61 kg/m2 Hysterectomy Never Smoker Emergency Contacts Name Discharge Info Relation Home Work Mobile Venkat Carranza  Child [2] [phone] Katina Bonilla  Sister [23] [phone] Patient Belongings The following personal items are in your possession at time of discharge: 
     Visual Aid: Glasses, At home Please provide this summary of care documentation to your next provider. Signatures-by signing, you are acknowledging that this After Visit Summary has been reviewed with you and you have received a copy. Patient Signature:  ____________________________________________________________ Date:  ____________________________________________________________  
  
Radha Keas Provider Signature:  ____________________________________________________________ Date:  ____________________________________________________________

## 2018-03-08 NOTE — DISCHARGE INSTRUCTIONS
[address]  Department of Interventional Radiology  [phone] Office  [phone] Fax       ARTERIOVENOUS FISTULA, GRAFT ACCESS, REVISION, OR DECLOT    ACTIVITY:  Limited activity today. Keep access arm straight and raised above the level of your heart for 24 hours or until the swelling goes away. DIET:  May have your usual food, unless told otherwise by your doctor. PAIN:  Use the prescription for pain medicine your doctor gave you. If you do not have one, usual your normal pain medicine. If this does not control your pain, call your doctor. DO NOT TAKE ASPIRIN OR MEDICINE WITH ASPIRIN IN IT, unless your doctor says you may. DRESSING CARE:   Keep your dressing clean and dry. Leave your dressing on until the Dialysis Nurse or your doctor takes it off. BLEEDING:  If you have any bleeding put pressure over the bleeding area and call your doctor. CARE OF YOUR ACCESS ARM:  You may have some bruising, swelling, and discoloration for several weeks. After the swelling has gone down, you will be able to see the outline of the graft. By placing your fingertips over the graft you will be able to feel a vibration (thrill) that means blood is flowing and the graft is working. DO:  1. Make sure your arm is washed with soap and water every day. 2. Feel for the [address] [address] at area marked in the picture. 3. Call your Kidney doctor if you do not feel the [address] or for any problems like swelling, redness, tingling, or numbness in access arm, pus, or fever over 101. DO NOT:  1. Wear tight sleeves, watches, belts, or bracelets over the graft. 2. Carry heavy bags across the graft or fistula. 3. Sleep on your graft side. 4. Let your blood pressure be taken in your access arm. 5. Let blood be drawn from your access arm. For the next 24 hours, DO NOT Drive, Drink Alcoholic Beverages, or Make IMPORTANT Decisions.     Follow-Up Instructions:  Please see your ordering doctor as he/she has requested. To Reach Us:    [address] [phone]    Interventional Radiology General Nurse Discharge    After general anesthesia or intravenous sedation, for 24 hours or while taking prescription Narcotics:  · Limit your activities  · Do not drive and operate hazardous machinery  · Do not make important personal or business decisions  · Do  not drink alcoholic beverages  · If you have not urinated within 8 hours after discharge, please contact your surgeon on call. * Please give a list of your current medications to your Primary Care Provider. * Please update this list whenever your medications are discontinued, doses are     changed, or new medications (including over-the-counter products) are added. * Please carry medication information at all times in case of emergency situations. These are general instructions for a healthy lifestyle:    No smoking/ No tobacco products/ Avoid exposure to second hand smoke  Surgeon General's Warning:  Quitting smoking now greatly reduces serious risk to your health. Obesity, smoking, and sedentary lifestyle greatly increases your risk for illness  A healthy diet, regular physical exercise & weight monitoring are important for maintaining a healthy lifestyle    You may be retaining fluid if you have a history of heart failure or if you experience any of the following symptoms:  Weight gain of 3 pounds or more overnight or 5 pounds in a week, increased swelling in our hands or feet or shortness of breath while lying flat in bed. Please call your doctor as soon as you notice any of these symptoms; do not wait until your next office visit. Recognize signs and symptoms of STROKE:  F-face looks uneven    A-arms unable to move or move unevenly    S-speech slurred or non-existent    T-time-call 911 as soon as signs and symptoms begin-DO NOT go       Back to bed or wait to see if you get better-TIME IS BRAIN.

## 2018-03-08 NOTE — PROGRESS NOTES
TRANSFER - IN REPORT:    Verbal report received from Francisco Crandall RN (name) on Shahla Peer  being received from IR (unit) for routine progression of care      Report consisted of patients Situation, Background, Assessment and   Recommendations(SBAR). Information from the following report(s) Procedure Summary and MAR was reviewed with the receiving nurse. Opportunity for questions and clarification was provided. Assessment completed upon patients arrival to unit and care assumed.

## 2018-03-08 NOTE — PROGRESS NOTES
TRANSFER - OUT REPORT:    Verbal report given to Cy Li RN(name) on Kecia Fischer  being transferred to [facility] recovery(unit) for routine progression of care       Report consisted of patients Situation, Background, Assessment and   Recommendations(SBAR). Information from the following report(s) SBAR, Procedure Summary and MAR was reviewed with the receiving nurse. Lines:       Opportunity for questions and clarification was provided.       Patient transported with:   Registered Nurse

## 2018-03-08 NOTE — H&P
Department of Interventional Radiology  [phone]    History and Physical    Patient: Ross Flores MRN:  [id number]  SSN:  [id number]    YOB: 1950  Age:  78 y.o. Sex:  female      Primary Care Provider:  Rekha Hoyos MD  Referring Physician:  Camelia Rangel MD    Subjective:     Chief Complaint: ESRD    History of the Present Illness: The patient is a 78 y.o. female who presents for evaluation of her RUE AV graft. Prolonged bleeding proximally with dialysis. Last dialysis yesterday. NPO, forgot to take her medications. Past Medical History:   Diagnosis Date    Arthritis     AVF (arteriovenous fistula) ([facility] 12/20/2016 12/6/16 ([facility]) Right AVF revision and thrombectomy    Cancer ([facility] [address] [facility] 2015    L kidney    Chronic kidney disease     HD in M-W-F- at [place] [facility]    Degenerative joint disease     Diabetes ([facility] [address] [facility]     type 2, average range 100-120 fasting, symptomatic below 90; last A1C?    ESRD (end stage renal disease) ([facility] [address] [facility] Nov 2006    ESRD. MWF dialysis     Hypertension     Obesity     Transient ischemic attack 8/29/2015     Past Surgical History:   Procedure Laterality Date    BREAST SURGERY PROCEDURE UNLISTED Right     cyst removed    HX GI      colon resection resulting in temporary colostomy reversal    HX GYN      stephan    HX OTHER SURGICAL      dialysis fistula, several permcaths    HX UROLOGICAL Left July 2015    nephrectomy    HX VASCULAR ACCESS          Review of Systems:    Pertinent items are noted in the History of Present Illness. Current Outpatient Prescriptions   Medication Sig    amLODIPine (NORVASC) 10 mg tablet Take  by mouth daily.  VIT C/VIT E/LUTEIN/MIN/OMEGA-3 (OCUVITE PO) Take  by mouth nightly.  aspirin 81 mg chewable tablet Take 1 Tab by mouth daily.  (Patient taking differently: Take 81 mg by mouth every morning.)    sevelamer carbonate (RENVELA) 800 mg tab tab Take 800 mg by mouth three (3) times daily (with meals). 5 tablets with meals/ 2 with snacks  Sometimes only eats 2 meals/d    lisinopril (PRINIVIL, ZESTRIL) 40 mg tablet Take 40 mg by mouth nightly. Indications: HYPERTENSION    sitaGLIPtin (JANUVIA) 100 mg tablet Take 50 mg by mouth every morning. Indications: TYPE 2 DIABETES MELLITUS    furosemide (LASIX) 80 mg tablet Take 80 mg by mouth two (2) times a day. Indications: EDEMA, HYPERTENSION    guaiFENesin-codeine (CHERATUSSIN AC) 100-10 mg/5 mL solution Take 5-10 mL by mouth three (3) times daily as needed for Cough or Congestion. Max Daily Amount: 30 mL.  HYDROcodone-acetaminophen (NORCO)  mg tablet Take 1 Tab by mouth every four (4) hours as needed. Max Daily Amount: 6 Tabs.  lidocaine (LIDODERM) 5 % 1 Patch by TransDERmal route every twenty-four (24) hours. Apply patch to the affected area for 12 hours a day and remove for 12 hours a day.  HYDROcodone-acetaminophen (NORCO) 5-325 mg per tablet Take 1 Tab by mouth every four (4) hours as needed for Pain. Max Daily Amount: 6 Tabs.  lidocaine 5 % topical cream Apply  to affected area two (2) times daily as needed for Pain.  diphenoxylate-atropine (LOMOTIL) 2.5-0.025 mg per tablet Take 2 Tabs by mouth four (4) times daily as needed for Diarrhea. Max Daily Amount: 8 Tabs.  butalbital-acetaminophen-caff (FIORICET) -40 mg per capsule Take 1-2 Caps by mouth every six (6) hours as needed for Pain. Max Daily Amount: 8 Caps.  cinacalcet (SENSIPAR) 30 mg tablet Take 60 mg by mouth nightly.  minoxidil (LONITEN) 2.5 mg tablet Take 7.5 mg by mouth two (2) times a day.  Indications: HYPERTENSION     Current Facility-Administered Medications   Medication Dose Route Frequency    lidocaine (XYLOCAINE) 20 mg/mL (2 %) injection  mg   mg SubCUTAneous Multiple    sodium bicarbonate (4%) (NEUT) injection 2 mL  2 mL SubCUTAneous ONCE    diphenhydrAMINE (BENADRYL) injection 25-50 mg  25-50 mg IntraVENous Multiple    fentaNYL citrate (PF) injection  mcg   mcg IntraVENous Multiple    midazolam (VERSED) injection 0.5-2 mg  0.5-2 mg IntraVENous Multiple    heparin (PF) 2 units/ml in NS infusion 2,000 Units  1,000 mL Irrigation PRN        Allergies   Allergen Reactions    Pcn [Penicillins] Rash    Vancomycin Rash       Family History   Problem Relation Age of Onset    Diabetes Mother     Heart Disease Mother     Hypertension Mother     Heart Disease Father     Hypertension Father     Malignant Hyperthermia Neg Hx     Pseudocholinesterase Deficiency Neg Hx     Delayed Awakening Neg Hx     Post-op Nausea/Vomiting Neg Hx     Emergence Delirium Neg Hx     Other Neg Hx     Post-op Cognitive Dysfunction Neg Hx      Social History   Substance Use Topics    Smoking status: Never Smoker    Smokeless tobacco: Never Used    Alcohol use No        Objective:       Physical Examination:    Vitals:    03/08/18 0813   BP: 146/90   Pulse: 82   Resp: 19   Temp: 98 °F (36.7 °C)   SpO2: 99%   Weight: 99.8 kg (220 lb)   Height: 5' 5\" (1.651 m)     Blood pressure 146/90, pulse 82, temperature 98 °F (36.7 °C), resp. rate 19, height 5' 5\" (1.651 m), weight 99.8 kg (220 lb), SpO2 99 %, not currently breastfeeding. HEART: regular rate and rhythm  LUNG: clear to auscultation bilaterally  ABDOMEN: normal findings: soft, non-tender  EXTREMITIES: warm, no edema.  RUE AV access with thrill, aneurysmal.    Laboratory:     Lab Results   Component Value Date/Time    Sodium 138 01/18/2018 11:10 PM    Sodium 144 12/08/2017 02:35 PM    Potassium 3.7 01/18/2018 11:10 PM    Potassium 3.2 (L) 12/08/2017 02:35 PM    Chloride 100 01/18/2018 11:10 PM    Chloride 103 12/08/2017 02:35 PM    CO2 27 01/18/2018 11:10 PM    CO2 35 (H) 12/08/2017 02:35 PM    Anion gap 11 01/18/2018 11:10 PM    Anion gap 6 (L) 12/08/2017 02:35 PM    Glucose 87 01/18/2018 11:10 PM    Glucose 162 (H) 12/08/2017 02:35 PM    BUN 25 (H) 01/18/2018 11:10 PM    BUN 12 12/08/2017 02:35 PM    Creatinine 6.87 (H) 01/18/2018 11:10 PM    Creatinine 3.67 (H) 12/08/2017 02:35 PM    GFR est AA 8 (L) 01/18/2018 11:10 PM    GFR est AA 16 (L) 12/08/2017 02:35 PM    GFR est non-AA 6 (L) 01/18/2018 11:10 PM    GFR est non-AA 13 (L) 12/08/2017 02:35 PM    Calcium 7.7 (L) 01/18/2018 11:10 PM    Calcium 8.7 12/08/2017 02:35 PM    Magnesium 2.3 06/22/2014 04:59 PM    Magnesium 2.0 02/08/2013 05:25 AM    Phosphorus 4.2 (H) 06/22/2014 04:59 PM    Phosphorus 4.4 12/12/2008 09:03 PM    Albumin 3.9 01/18/2018 11:10 PM    Albumin 3.7 12/08/2017 02:35 PM    Protein, total 8.0 01/18/2018 11:10 PM    Protein, total 7.8 12/08/2017 02:35 PM    Globulin 4.1 (H) 01/18/2018 11:10 PM    Globulin 4.1 (H) 12/08/2017 02:35 PM    A-G Ratio 1.0 (L) 01/18/2018 11:10 PM    A-G Ratio 0.9 (L) 12/08/2017 02:35 PM    AST (SGOT) 20 01/18/2018 11:10 PM    AST (SGOT) 15 12/08/2017 02:35 PM    ALT (SGPT) 12 01/18/2018 11:10 PM    ALT (SGPT) 15 12/08/2017 02:35 PM     Lab Results   Component Value Date/Time    WBC 5.8 01/18/2018 11:10 PM    WBC 6.3 12/08/2017 02:35 PM    HGB 10.3 (L) 01/18/2018 11:10 PM    HGB 9.7 (L) 12/08/2017 02:35 PM    HCT 29.7 (L) 01/18/2018 11:10 PM    HCT 27.4 (L) 12/08/2017 02:35 PM    PLATELET 320 69/38/4903 11:10 PM    PLATELET 776 64/35/5870 02:35 PM     Lab Results   Component Value Date/Time    aPTT 22.2 (L) 11/16/2015 12:00 AM    aPTT 27.2 01/29/2013 12:20 PM    Prothrombin time 10.2 11/16/2015 12:00 AM    Prothrombin time 10.5 08/29/2015 04:30 PM    INR 0.9 11/16/2015 12:00 AM    INR 1.0 08/29/2015 04:30 PM       Assessment:     ESRD, Graft malfunction    Plan:     Planned Procedure:  AV graft evaluation, angioplasty    Risks, benefits, and alternatives reviewed with patient and she agrees to proceed with the procedure.       Signed By: Cortney Lira PA-C     March 8, 2018

## 2018-03-09 ENCOUNTER — PATIENT OUTREACH (OUTPATIENT)
Dept: CASE MANAGEMENT | Age: 68
End: 2018-03-09

## 2018-03-09 ENCOUNTER — HOSPITAL ENCOUNTER (OUTPATIENT)
Dept: LAB | Age: 68
Discharge: HOME | End: 2018-03-09
Payer: MEDICARE

## 2018-03-09 DIAGNOSIS — C64.9 RENAL CELL CARCINOMA, UNSPECIFIED LATERALITY (HCC): ICD-10-CM

## 2018-03-09 LAB
ALBUMIN SERPL-MCNC: 3.6 G/DL (ref 3.2–4.6)
ALBUMIN/GLOB SERPL: 1 {RATIO} (ref 1.2–3.5)
ALP SERPL-CCNC: 101 U/L (ref 50–136)
ALT SERPL-CCNC: 13 U/L (ref 12–65)
ANION GAP SERPL CALC-SCNC: 10 MMOL/L (ref 7–16)
AST SERPL-CCNC: 21 U/L (ref 15–37)
BASOPHILS # BLD: 0.1 K/UL (ref 0–0.2)
BASOPHILS NFR BLD: 1 % (ref 0–2)
BILIRUB SERPL-MCNC: 0.2 MG/DL (ref 0.2–1.1)
BUN SERPL-MCNC: 15 MG/DL (ref 8–23)
CALCIUM SERPL-MCNC: 8.7 MG/DL (ref 8.3–10.4)
CHLORIDE SERPL-SCNC: 101 MMOL/L (ref 98–107)
CO2 SERPL-SCNC: 29 MMOL/L (ref 21–32)
CREAT SERPL-MCNC: 4.2 MG/DL (ref 0.6–1)
DIFFERENTIAL METHOD BLD: ABNORMAL
EOSINOPHIL # BLD: 0.1 K/UL (ref 0–0.8)
EOSINOPHIL NFR BLD: 1 % (ref 0.5–7.8)
ERYTHROCYTE [DISTWIDTH] IN BLOOD BY AUTOMATED COUNT: 15.7 % (ref 11.9–14.6)
GLOBULIN SER CALC-MCNC: 3.7 G/DL (ref 2.3–3.5)
GLUCOSE SERPL-MCNC: 197 MG/DL (ref 65–100)
HCT VFR BLD AUTO: 25 % (ref 35.8–46.3)
HGB BLD-MCNC: 9.3 G/DL (ref 11.7–15.4)
LYMPHOCYTES # BLD: 1.1 K/UL (ref 0.5–4.6)
LYMPHOCYTES NFR BLD: 14 % (ref 13–44)
MCH RBC QN AUTO: 33.5 PG (ref 26.1–32.9)
MCHC RBC AUTO-ENTMCNC: 37.2 G/DL (ref 31.4–35)
MCV RBC AUTO: 89.9 FL (ref 79.6–97.8)
MONOCYTES # BLD: 0.5 K/UL (ref 0.1–1.3)
MONOCYTES NFR BLD: 6 % (ref 4–12)
NEUTS SEG # BLD: 6.4 K/UL (ref 1.7–8.2)
NEUTS SEG NFR BLD: 79 % (ref 43–78)
NRBC # BLD: 0.01 K/UL (ref 0–0.2)
NRBC BLD-RTO: 0.1 PER 100 WBC (ref 0–2)
PLATELET # BLD AUTO: 173 K/UL (ref 150–450)
PMV BLD AUTO: 10.7 FL (ref 10.8–14.1)
POTASSIUM SERPL-SCNC: 3.8 MMOL/L (ref 3.5–5.1)
PROT SERPL-MCNC: 7.3 G/DL (ref 6.3–8.2)
RBC # BLD AUTO: 2.78 M/UL (ref 4.05–5.25)
SODIUM SERPL-SCNC: 140 MMOL/L (ref 136–145)
WBC # BLD AUTO: 8.1 K/UL (ref 4.3–11.1)

## 2018-03-09 PROCEDURE — 36415 COLL VENOUS BLD VENIPUNCTURE: CPT | Performed by: INTERNAL MEDICINE

## 2018-03-09 PROCEDURE — 80053 COMPREHEN METABOLIC PANEL: CPT | Performed by: INTERNAL MEDICINE

## 2018-03-09 PROCEDURE — 85025 COMPLETE CBC W/AUTO DIFF WBC: CPT | Performed by: INTERNAL MEDICINE

## 2018-03-09 NOTE — PROGRESS NOTES
3/9/18 saw pt today with Dr. Catalino Travis for follow up renal cancer. She is feeling well. PO intake is good. Denies any issues. Plan is to repeat scans the end of April. Encouraged to call with any concerns. Navigation will continue to follow.

## 2018-04-13 ENCOUNTER — APPOINTMENT (OUTPATIENT)
Dept: CT IMAGING | Age: 68
End: 2018-04-13
Attending: EMERGENCY MEDICINE
Payer: MEDICARE

## 2018-04-13 ENCOUNTER — HOSPITAL ENCOUNTER (EMERGENCY)
Age: 68
Discharge: HOME | End: 2018-04-13
Attending: EMERGENCY MEDICINE
Payer: MEDICARE

## 2018-04-13 VITALS
RESPIRATION RATE: 18 BRPM | BODY MASS INDEX: 36.96 KG/M2 | TEMPERATURE: 98.4 F | HEART RATE: 90 BPM | OXYGEN SATURATION: 99 % | WEIGHT: 230 LBS | DIASTOLIC BLOOD PRESSURE: 76 MMHG | SYSTOLIC BLOOD PRESSURE: 126 MMHG | HEIGHT: 66 IN

## 2018-04-13 DIAGNOSIS — C64.2 CLEAR CELL CARCINOMA OF LEFT KIDNEY (HCC): ICD-10-CM

## 2018-04-13 DIAGNOSIS — K29.00 ACUTE GASTRITIS WITHOUT HEMORRHAGE, UNSPECIFIED GASTRITIS TYPE: Primary | ICD-10-CM

## 2018-04-13 LAB
ALBUMIN SERPL-MCNC: 3.8 G/DL (ref 3.2–4.6)
ALBUMIN/GLOB SERPL: 0.8 {RATIO} (ref 1.2–3.5)
ALP SERPL-CCNC: 100 U/L (ref 50–136)
ALT SERPL-CCNC: 28 U/L (ref 12–65)
ANION GAP SERPL CALC-SCNC: 9 MMOL/L (ref 7–16)
AST SERPL-CCNC: 89 U/L (ref 15–37)
BASOPHILS # BLD: 0 K/UL (ref 0–0.2)
BASOPHILS NFR BLD: 1 % (ref 0–2)
BILIRUB SERPL-MCNC: 0.3 MG/DL (ref 0.2–1.1)
BUN SERPL-MCNC: 12 MG/DL (ref 8–23)
CALCIUM SERPL-MCNC: 9.1 MG/DL (ref 8.3–10.4)
CHLORIDE SERPL-SCNC: 99 MMOL/L (ref 98–107)
CO2 SERPL-SCNC: 29 MMOL/L (ref 21–32)
CREAT SERPL-MCNC: 3.53 MG/DL (ref 0.6–1)
DIFFERENTIAL METHOD BLD: ABNORMAL
EOSINOPHIL # BLD: 0.1 K/UL (ref 0–0.8)
EOSINOPHIL NFR BLD: 1 % (ref 0.5–7.8)
ERYTHROCYTE [DISTWIDTH] IN BLOOD BY AUTOMATED COUNT: 18.5 % (ref 11.9–14.6)
GLOBULIN SER CALC-MCNC: 4.9 G/DL (ref 2.3–3.5)
GLUCOSE SERPL-MCNC: 164 MG/DL (ref 65–100)
HCT VFR BLD AUTO: 28.9 % (ref 35.8–46.3)
HGB BLD-MCNC: 10.2 G/DL (ref 11.7–15.4)
IMM GRANULOCYTES # BLD: 0 K/UL (ref 0–0.5)
IMM GRANULOCYTES NFR BLD AUTO: 0 % (ref 0–5)
LYMPHOCYTES # BLD: 0.9 K/UL (ref 0.5–4.6)
LYMPHOCYTES NFR BLD: 15 % (ref 13–44)
MCH RBC QN AUTO: 33.6 PG (ref 26.1–32.9)
MCHC RBC AUTO-ENTMCNC: 35.3 G/DL (ref 31.4–35)
MCV RBC AUTO: 95.1 FL (ref 79.6–97.8)
MONOCYTES # BLD: 0.5 K/UL (ref 0.1–1.3)
MONOCYTES NFR BLD: 9 % (ref 4–12)
NEUTS SEG # BLD: 4.7 K/UL (ref 1.7–8.2)
NEUTS SEG NFR BLD: 74 % (ref 43–78)
PLATELET # BLD AUTO: 266 K/UL (ref 150–450)
PMV BLD AUTO: 10.5 FL (ref 10.8–14.1)
POTASSIUM SERPL-SCNC: 5.7 MMOL/L (ref 3.5–5.1)
PROT SERPL-MCNC: 8.7 G/DL (ref 6.3–8.2)
RBC # BLD AUTO: 3.04 M/UL (ref 4.05–5.25)
SODIUM SERPL-SCNC: 137 MMOL/L (ref 136–145)
WBC # BLD AUTO: 6.3 K/UL (ref 4.3–11.1)

## 2018-04-13 PROCEDURE — 96375 TX/PRO/DX INJ NEW DRUG ADDON: CPT | Performed by: EMERGENCY MEDICINE

## 2018-04-13 PROCEDURE — 99283 EMERGENCY DEPT VISIT LOW MDM: CPT | Performed by: EMERGENCY MEDICINE

## 2018-04-13 PROCEDURE — 74011250636 HC RX REV CODE- 250/636: Performed by: EMERGENCY MEDICINE

## 2018-04-13 PROCEDURE — 96374 THER/PROPH/DIAG INJ IV PUSH: CPT | Performed by: EMERGENCY MEDICINE

## 2018-04-13 PROCEDURE — 80053 COMPREHEN METABOLIC PANEL: CPT | Performed by: EMERGENCY MEDICINE

## 2018-04-13 PROCEDURE — 74011636320 HC RX REV CODE- 636/320: Performed by: EMERGENCY MEDICINE

## 2018-04-13 PROCEDURE — 74011000258 HC RX REV CODE- 258: Performed by: EMERGENCY MEDICINE

## 2018-04-13 PROCEDURE — 74177 CT ABD & PELVIS W/CONTRAST: CPT

## 2018-04-13 PROCEDURE — 85025 COMPLETE CBC W/AUTO DIFF WBC: CPT | Performed by: EMERGENCY MEDICINE

## 2018-04-13 RX ORDER — MORPHINE SULFATE 4 MG/ML
4 INJECTION, SOLUTION INTRAMUSCULAR; INTRAVENOUS ONCE
Status: COMPLETED | OUTPATIENT
Start: 2018-04-13 | End: 2018-04-13

## 2018-04-13 RX ORDER — ONDANSETRON 2 MG/ML
4 INJECTION INTRAMUSCULAR; INTRAVENOUS
Status: COMPLETED | OUTPATIENT
Start: 2018-04-13 | End: 2018-04-13

## 2018-04-13 RX ORDER — SODIUM CHLORIDE 0.9 % (FLUSH) 0.9 %
10 SYRINGE (ML) INJECTION
Status: COMPLETED | OUTPATIENT
Start: 2018-04-13 | End: 2018-04-13

## 2018-04-13 RX ORDER — HYDROGEN PEROXIDE 3 %
40 SOLUTION, NON-ORAL MISCELLANEOUS DAILY
Qty: 20 CAP | Refills: 0 | Status: SHIPPED | OUTPATIENT
Start: 2018-04-13 | End: 2019-02-13

## 2018-04-13 RX ORDER — HYDROCODONE BITARTRATE AND ACETAMINOPHEN 10; 325 MG/1; MG/1
1 TABLET ORAL
Qty: 15 TAB | Refills: 0 | Status: SHIPPED | OUTPATIENT
Start: 2018-04-13 | End: 2018-08-09 | Stop reason: SDUPTHER

## 2018-04-13 RX ADMIN — DIATRIZOATE MEGLUMINE AND DIATRIZOATE SODIUM 15 ML: 600; 100 SOLUTION ORAL; RECTAL at 15:54

## 2018-04-13 RX ADMIN — Medication 10 ML: at 17:44

## 2018-04-13 RX ADMIN — MORPHINE SULFATE 4 MG: 4 INJECTION, SOLUTION INTRAMUSCULAR; INTRAVENOUS at 15:56

## 2018-04-13 RX ADMIN — SODIUM CHLORIDE 100 ML: 900 INJECTION, SOLUTION INTRAVENOUS at 17:44

## 2018-04-13 RX ADMIN — ONDANSETRON 4 MG: 2 INJECTION INTRAMUSCULAR; INTRAVENOUS at 15:56

## 2018-04-13 RX ADMIN — IOPAMIDOL 100 ML: 755 INJECTION, SOLUTION INTRAVENOUS at 17:44

## 2018-04-13 NOTE — ED NOTES
I have reviewed discharge instructions with the patient. The patient verbalized understanding. Patient left ED via Discharge Method: ambulatory to Home with self. Opportunity for questions and clarification provided. Patient given 1 scripts. To continue your aftercare when you leave the hospital, you may receive an automated call from our care team to check in on how you are doing. This is a free service and part of our promise to provide the best care and service to meet your aftercare needs.  If you have questions, or wish to unsubscribe from this service please call [phone]. Thank you for Choosing our [facility] Department.

## 2018-04-13 NOTE — ED PROVIDER NOTES
HPI Comments: Patient with past medical history is for renal carcinoma and end-stage renal disease presents complaining of left flank pain sharp and stabbing in nature that began yesterday. She states this is similar pain she's had in the past with her renal cell tumor in the left renal bed. She had recurrence of this tumor last year and subsequently underwent radiation therapy. Repeat CAT scan in March of this year did not demonstrate any recurrence of the tumor at that time. She states the pain is worse with movement she denies any fever or chills she denies any vomiting diarrhea or constipation. Patient is a 78 y.o. female presenting with flank pain. The history is provided by the patient. Flank Pain    This is a recurrent problem. The current episode started yesterday. The problem has not changed since onset. The problem occurs constantly. Patient reports not work related injury. The pain is associated with no known injury. The pain is present in the left side. The quality of the pain is described as stabbing and sharp. The pain does not radiate. The pain is at a severity of 10/10. The pain is severe. The pain is the same all the time. Pertinent negatives include no fever. Treatments tried: patient was given Tylenol at dialysis with no relief. The treatment provided no relief. History of left nephrectomy       Past Medical History:   Diagnosis Date    Arthritis     AVF [facility]arteriovenous fistula) [facility] [address] 12/20/2016 12/6/16 ([facility]) Right AVF revision and thrombectomy    Cancer ([facility] [address] 2015    L kidney    Chronic kidney disease     HD in M-W-F- at [place] [facility]    Degenerative joint disease     Diabetes ([facility] [address]     type 2, average range 100-120 fasting, symptomatic below 90; last A1C?    ESRD (end stage renal disease) ([facility] [address] Nov 2006    ESRD.  MWF dialysis     Hypertension     Obesity     Transient ischemic attack 8/29/2015       Past Surgical History:   Procedure Laterality Date    BREAST SURGERY PROCEDURE UNLISTED Right     cyst removed    HX GI      colon resection resulting in temporary colostomy reversal    HX GYN      stephan    HX OTHER SURGICAL      dialysis fistula, several permcaths    HX UROLOGICAL Left July 2015    nephrectomy    HX VASCULAR ACCESS           Family History:   Problem Relation Age of Onset    Diabetes Mother     Heart Disease Mother     Hypertension Mother     Heart Disease Father     Hypertension Father     Malignant Hyperthermia Neg Hx     Pseudocholinesterase Deficiency Neg Hx     Delayed Awakening Neg Hx     Post-op Nausea/Vomiting Neg Hx     Emergence Delirium Neg Hx     Other Neg Hx     Post-op Cognitive Dysfunction Neg Hx        Social History     Social History    Marital status:      Spouse name: N/A    Number of children: N/A    Years of education: N/A     Occupational History    Not on file. Social History Main Topics    Smoking status: Never Smoker    Smokeless tobacco: Never Used    Alcohol use No    Drug use: No    Sexual activity: Not Currently     Other Topics Concern    Not on file     Social History Narrative         ALLERGIES: Pcn [penicillins] and Vancomycin    Review of Systems   Constitutional: Negative for chills and fever. Genitourinary: Positive for flank pain. All other systems reviewed and are negative. Vitals:    04/13/18 1222   BP: 118/77   Pulse: 93   Resp: 18   Temp: 98.5 °F (36.9 °C)   SpO2: 99%   Weight: 104.3 kg (230 lb)   Height: 5' 5.5\" (1.664 m)            Physical Exam   Constitutional: She is oriented to person, place, and time. She appears well-developed and well-nourished. No distress. HENT:   Head: Normocephalic and atraumatic. Eyes: Conjunctivae and EOM are normal. Pupils are equal, round, and reactive to light. Neck: Normal range of motion. Neck supple. Cardiovascular: Normal rate, regular rhythm and normal heart sounds.     Pulmonary/Chest: Effort normal and breath sounds normal. Abdominal: Soft. Bowel sounds are normal. She exhibits no distension. There is no tenderness. There is no rebound. Musculoskeletal: Normal range of motion. She exhibits tenderness. She exhibits no edema. Positive left flank tenderness   Neurological: She is alert and oriented to person, place, and time. Skin: Skin is warm and dry. Psychiatric: She has a normal mood and affect. Her behavior is normal.   Nursing note and vitals reviewed.        MDM  Number of Diagnoses or Management Options     Amount and/or Complexity of Data Reviewed  Clinical lab tests: ordered and reviewed  Tests in the radiology section of CPT®: ordered and reviewed    Risk of Complications, Morbidity, and/or Mortality  Presenting problems: moderate  Diagnostic procedures: moderate  Management options: moderate    Patient Progress  Patient progress: stable        ED Course       Procedures

## 2018-04-13 NOTE — PROGRESS NOTES
Visit with patient in ER. Calm.     Jf Ayers, staff   C: [phone] /  [email or web address]

## 2018-04-13 NOTE — PROGRESS NOTES
Initial visit to assess pt's spiritual needs. Ministry of presence & prayer to demonstrate caring & concern, convey emotional & spiritual support.     nils Lozoya MDiv,ThM,PhD

## 2018-04-13 NOTE — ED TRIAGE NOTES
Pt to ED c/o left flank pain. States left nephrectomy 3 years ago and she had chemo/radiation tx. Pain started yesterday. States \"the tumor came back. \" Pt went to dialysis and finished her run this morning. No relief with tylenol.

## 2018-04-13 NOTE — DISCHARGE INSTRUCTIONS
Gastritis: Care Instructions  Your Care Instructions    Gastritis is a sore and upset stomach. It happens when something irritates the stomach lining. Many things can cause it. These include an infection such as the flu or something you ate or drank. Medicines or a sore on the lining of the stomach (ulcer) also can cause it. Your belly may bloat and ache. You may belch, vomit, and feel sick to your stomach. You should be able to relieve the problem by taking medicine. And it may help to change your diet. If gastritis lasts, your doctor may prescribe medicine. Follow-up care is a key part of your treatment and safety. Be sure to make and go to all appointments, and call your doctor if you are having problems. It's also a good idea to know your test results and keep a list of the medicines you take. How can you care for yourself at home? · If your doctor prescribed antibiotics, take them as directed. Do not stop taking them just because you feel better. You need to take the full course of antibiotics. · Be safe with medicines. If your doctor prescribed medicine to decrease stomach acid, take it as directed. Call your doctor if you think you are having a problem with your medicine. · Do not take any other medicine, including over-the-counter pain relievers, without talking to your doctor first.  · If your doctor recommends over-the-counter medicine to reduce stomach acid, such as Pepcid AC, Prilosec, Tagamet HB, or Zantac 75, follow the directions on the label. · Drink plenty of fluids (enough so that your urine is light yellow or clear like water) to prevent dehydration. Choose water and other caffeine-free clear liquids. If you have kidney, heart, or liver disease and have to limit fluids, talk with your doctor before you increase the amount of fluids you drink. · Limit how much alcohol you drink. · Avoid coffee, tea, cola drinks, chocolate, and other foods with caffeine.  They increase stomach acid.  When should you call for help? Call 911 anytime you think you may need emergency care. For example, call if:  ? · You vomit blood or what looks like coffee grounds. ? · You pass maroon or very bloody stools. ?Call your doctor now or seek immediate medical care if:  ? · You start breathing fast and have not produced urine in the last 8 hours. ? · You cannot keep fluids down. ? Watch closely for changes in your health, and be sure to contact your doctor if:  ? · You do not get better as expected. Where can you learn more? Go to http://felisa-radha.info/. Enter [id number] in the search box to learn more about \"Gastritis: Care Instructions. \"  Current as of: May 12, 2017  Content Version: 11.4  © 6754-3514 [employer]. Care instructions adapted under license by [employer] (which disclaims liability or warranty for this information). If you have questions about a medical condition or this instruction, always ask your healthcare professional. [place] [address] any warranty or liability for your use of this information. Kidney Cancer: Care Instructions  Your Care Instructions  Kidney cancer is when abnormal cells grow out of control in one or both of the kidneys. Your doctor will do tests to see if the cancer is in the kidney only or has spread to other parts of the body. Then you and your doctor can decide on treatment. Surgery may be used to remove the cancer and all or part of the kidney. If you cannot have surgery, you may have radiation or treatment that cuts off the blood supply to the cancer (arterial embolization). You also may have medicine that kills cancer cells (chemotherapy). And your doctor may recommend immunotherapy, which uses the body's own immune system to treat an illness. Many people still have the use of one or both kidneys after treatment for early kidney cancer.  If you do not have a working kidney after treatment, you will need to have your blood cleaned by a machine (dialysis) or have a kidney transplant. Being treated for cancer can weaken your body, and you may feel very tired. Home treatment can help you feel better. Finding out that you have cancer is scary. You may feel many emotions and may need some help coping. Seek out family, friends, and counselors for support. You also can do things at home to make yourself feel better while you go through treatment. Call the [employer] (6-504.539.8861) or visit its website at [address] [employer] for more information. Follow-up care is a key part of your treatment and safety. Be sure to make and go to all appointments, and call your doctor if you are having problems. It's also a good idea to know your test results and keep a list of the medicines you take. How can you care for yourself at home? · Take your medicines exactly as prescribed. Call your doctor if you think you are having a problem with your medicine. You may get medicine for nausea and vomiting if you have these side effects. · Follow your doctor's instructions to relieve pain. Pain from cancer and surgery can almost always be controlled. Use pain medicine when you first notice pain, before it becomes severe. · Eat healthy food. If you do not feel like eating, try to eat food that has protein and extra calories to keep up your strength and prevent weight loss. Drink liquid meal replacements for extra calories and protein. Try to eat your main meal early. Your doctor also may recommend a special diet. · Get some physical activity every day, but do not get too tired. Keep doing the hobbies you enjoy as your energy allows. · Get enough sleep. Try using a sleep mask and earplugs at night, and keep your bedroom dark, cool, and quiet. · Do not smoke. Smoking can make kidney cancer worse. If you need help quitting, talk to your doctor about stop-smoking programs and medicines.  These can increase your chances of quitting for good. · Take steps to control your stress and workload. Learn relaxation techniques. ¨ Share your feelings. Stress and tension affect our emotions. By expressing your feelings to others, you may be able to understand and cope with them. ¨ Consider joining a support group. Talking about a problem with your spouse, a good friend, or other people with similar problems is a good way to reduce tension and stress. ¨ Express yourself through art. Try writing, crafts, dance, or art to relieve stress. Some dance, writing, or art groups may be available just for people who have cancer. ¨ Be kind to your body and mind. Getting enough sleep, eating a healthy diet, and taking time to do things you enjoy can contribute to an overall feeling of balance in your life and help reduce stress. ¨ Get help if you need it. Discuss your concerns with your doctor or counselor. · If you are vomiting or have diarrhea:  ¨ Drink plenty of fluids (enough so that your urine is light yellow or clear like water) to prevent dehydration. Choose water and other caffeine-free clear liquids. If you have kidney, heart, or liver disease and have to limit fluids, talk with your doctor before you increase the amount of fluids you drink. ¨ When you are able to eat, try clear soups, mild foods, and liquids until all symptoms are gone for 12 to 48 hours. Other good choices include dry toast, crackers, cooked cereal, and gelatin dessert, such as Jell-O.  · If you have not already done so, prepare a list of advance directives. Advance directives are instructions to your doctor and family members about what kind of care you want if you become unable to speak or express yourself. When should you call for help? Call your doctor now or seek immediate medical care if:  ? · You have pain that does not get better after you take pain medicine. ? · You have symptoms of a urinary infection.  These may include:  ¨ Pain or burning when you urinate. ¨ A frequent need to urinate without being able to pass much urine. ¨ Pain in the flank, which is just below the rib cage and above the waist on either side of the back. ¨ Blood in your urine. ¨ A fever. ? Watch closely for changes in your health, and be sure to contact your doctor if:  ? · You do not get better as expected. Where can you learn more? Go to http://felisa-radha.info/. Enter N160 in the search box to learn more about \"Kidney Cancer: Care Instructions. \"  Current as of: May 12, 2017  Content Version: 11.4  © 4165-6500 [employer]. Care instructions adapted under license by [employer] (which disclaims liability or warranty for this information). If you have questions about a medical condition or this instruction, always ask your healthcare professional. [address] any warranty or liability for your use of this information.

## 2018-04-26 ENCOUNTER — HOSPITAL ENCOUNTER (OUTPATIENT)
Dept: CT IMAGING | Age: 68
Discharge: HOME | End: 2018-04-26
Attending: INTERNAL MEDICINE
Payer: MEDICARE

## 2018-04-26 VITALS — WEIGHT: 230 LBS | BODY MASS INDEX: 38.32 KG/M2 | HEIGHT: 65 IN

## 2018-04-26 DIAGNOSIS — C64.2 MALIGNANT NEOPLASM OF LEFT KIDNEY, EXCEPT RENAL PELVIS (HCC): ICD-10-CM

## 2018-04-26 DIAGNOSIS — N28.89 RENAL MASS: ICD-10-CM

## 2018-04-26 DIAGNOSIS — C64.9 RENAL CELL CARCINOMA, UNSPECIFIED LATERALITY (HCC): ICD-10-CM

## 2018-04-26 PROCEDURE — 74011636320 HC RX REV CODE- 636/320: Performed by: INTERNAL MEDICINE

## 2018-04-26 PROCEDURE — 74176 CT ABD & PELVIS W/O CONTRAST: CPT

## 2018-04-26 RX ORDER — SODIUM CHLORIDE 0.9 % (FLUSH) 0.9 %
10 SYRINGE (ML) INJECTION
Status: COMPLETED | OUTPATIENT
Start: 2018-04-26 | End: 2018-04-26

## 2018-04-26 RX ADMIN — Medication 10 ML: at 10:00

## 2018-04-26 RX ADMIN — DIATRIZOATE MEGLUMINE AND DIATRIZOATE SODIUM 15 ML: 660; 100 LIQUID ORAL; RECTAL at 10:00

## 2018-04-26 NOTE — PROGRESS NOTES
Asked to start a PIV for CT. Using U/S assistance, attempted to access left upper arm brachial vein. Unsuccessful x 2. Ginny Cooley RN informed that access was not possible.   Gaudencio Aguilar RN, VAT

## 2018-05-04 ENCOUNTER — PATIENT OUTREACH (OUTPATIENT)
Dept: CASE MANAGEMENT | Age: 68
End: 2018-05-04

## 2018-05-04 PROBLEM — E66.01 SEVERE OBESITY (BMI 35.0-39.9) WITH COMORBIDITY (HCC): Status: ACTIVE | Noted: 2018-05-04

## 2018-05-04 NOTE — PROGRESS NOTES
5/4/2018 Saw the patient with Dr Isabel Ortiz. She recently had a CT and it does show improvement. She will have a CT end of August/early Sept. Will continue to follow.

## 2018-05-10 ENCOUNTER — HOSPITAL ENCOUNTER (EMERGENCY)
Age: 68
Discharge: HOME | End: 2018-05-10
Attending: EMERGENCY MEDICINE
Payer: MEDICARE

## 2018-05-10 VITALS
OXYGEN SATURATION: 98 % | WEIGHT: 213 LBS | DIASTOLIC BLOOD PRESSURE: 59 MMHG | SYSTOLIC BLOOD PRESSURE: 124 MMHG | BODY MASS INDEX: 35.49 KG/M2 | TEMPERATURE: 98.4 F | HEIGHT: 65 IN | RESPIRATION RATE: 20 BRPM | HEART RATE: 81 BPM

## 2018-05-10 DIAGNOSIS — K52.9 GASTROENTERITIS, ACUTE: Primary | ICD-10-CM

## 2018-05-10 LAB
ALBUMIN SERPL-MCNC: 3.8 G/DL (ref 3.2–4.6)
ALBUMIN/GLOB SERPL: 0.9 {RATIO} (ref 1.2–3.5)
ALP SERPL-CCNC: 106 U/L (ref 50–136)
ALT SERPL-CCNC: 13 U/L (ref 12–65)
ANION GAP SERPL CALC-SCNC: 11 MMOL/L (ref 7–16)
AST SERPL-CCNC: 21 U/L (ref 15–37)
BASOPHILS # BLD: 0 K/UL (ref 0–0.2)
BASOPHILS NFR BLD: 1 % (ref 0–2)
BILIRUB SERPL-MCNC: 0.5 MG/DL (ref 0.2–1.1)
BUN SERPL-MCNC: 17 MG/DL (ref 8–23)
CALCIUM SERPL-MCNC: 9.4 MG/DL (ref 8.3–10.4)
CHLORIDE SERPL-SCNC: 100 MMOL/L (ref 98–107)
CO2 SERPL-SCNC: 28 MMOL/L (ref 21–32)
CREAT SERPL-MCNC: 6.65 MG/DL (ref 0.6–1)
DIFFERENTIAL METHOD BLD: ABNORMAL
EOSINOPHIL # BLD: 0.2 K/UL (ref 0–0.8)
EOSINOPHIL NFR BLD: 2 % (ref 0.5–7.8)
ERYTHROCYTE [DISTWIDTH] IN BLOOD BY AUTOMATED COUNT: 16.7 % (ref 11.9–14.6)
GLOBULIN SER CALC-MCNC: 4.4 G/DL (ref 2.3–3.5)
GLUCOSE SERPL-MCNC: 151 MG/DL (ref 65–100)
HCT VFR BLD AUTO: 32.1 % (ref 35.8–46.3)
HGB BLD-MCNC: 11.1 G/DL (ref 11.7–15.4)
IMM GRANULOCYTES # BLD: 0 K/UL (ref 0–0.5)
IMM GRANULOCYTES NFR BLD AUTO: 0 % (ref 0–5)
LIPASE SERPL-CCNC: 213 U/L (ref 73–393)
LYMPHOCYTES # BLD: 1.1 K/UL (ref 0.5–4.6)
LYMPHOCYTES NFR BLD: 12 % (ref 13–44)
MCH RBC QN AUTO: 33.4 PG (ref 26.1–32.9)
MCHC RBC AUTO-ENTMCNC: 34.6 G/DL (ref 31.4–35)
MCV RBC AUTO: 96.7 FL (ref 79.6–97.8)
MONOCYTES # BLD: 0.7 K/UL (ref 0.1–1.3)
MONOCYTES NFR BLD: 8 % (ref 4–12)
NEUTS SEG # BLD: 6.9 K/UL (ref 1.7–8.2)
NEUTS SEG NFR BLD: 77 % (ref 43–78)
PLATELET # BLD AUTO: 232 K/UL (ref 150–450)
PMV BLD AUTO: 9.9 FL (ref 10.8–14.1)
POTASSIUM SERPL-SCNC: 3.9 MMOL/L (ref 3.5–5.1)
PROT SERPL-MCNC: 8.2 G/DL (ref 6.3–8.2)
RBC # BLD AUTO: 3.32 M/UL (ref 4.05–5.25)
SODIUM SERPL-SCNC: 139 MMOL/L (ref 136–145)
WBC # BLD AUTO: 8.9 K/UL (ref 4.3–11.1)

## 2018-05-10 PROCEDURE — 74011250637 HC RX REV CODE- 250/637: Performed by: EMERGENCY MEDICINE

## 2018-05-10 PROCEDURE — 80053 COMPREHEN METABOLIC PANEL: CPT | Performed by: EMERGENCY MEDICINE

## 2018-05-10 PROCEDURE — 85025 COMPLETE CBC W/AUTO DIFF WBC: CPT | Performed by: EMERGENCY MEDICINE

## 2018-05-10 PROCEDURE — 96374 THER/PROPH/DIAG INJ IV PUSH: CPT | Performed by: EMERGENCY MEDICINE

## 2018-05-10 PROCEDURE — 99283 EMERGENCY DEPT VISIT LOW MDM: CPT | Performed by: EMERGENCY MEDICINE

## 2018-05-10 PROCEDURE — 83690 ASSAY OF LIPASE: CPT | Performed by: EMERGENCY MEDICINE

## 2018-05-10 PROCEDURE — 74011250636 HC RX REV CODE- 250/636: Performed by: EMERGENCY MEDICINE

## 2018-05-10 RX ORDER — ONDANSETRON 8 MG/1
8 TABLET, ORALLY DISINTEGRATING ORAL
Qty: 12 TAB | Refills: 0 | Status: SHIPPED | OUTPATIENT
Start: 2018-05-10 | End: 2019-02-13

## 2018-05-10 RX ORDER — ONDANSETRON 2 MG/ML
4 INJECTION INTRAMUSCULAR; INTRAVENOUS
Status: COMPLETED | OUTPATIENT
Start: 2018-05-10 | End: 2018-05-10

## 2018-05-10 RX ORDER — HYOSCYAMINE SULFATE 0.125 MG
125 TABLET ORAL
Qty: 20 TAB | Refills: 0 | Status: SHIPPED | OUTPATIENT
Start: 2018-05-10 | End: 2018-11-07

## 2018-05-10 RX ORDER — HYOSCYAMINE SULFATE 0.12 MG/1
0.12 TABLET SUBLINGUAL
Status: COMPLETED | OUTPATIENT
Start: 2018-05-10 | End: 2018-05-10

## 2018-05-10 RX ADMIN — ONDANSETRON 4 MG: 2 INJECTION INTRAMUSCULAR; INTRAVENOUS at 09:59

## 2018-05-10 RX ADMIN — HYOSCYAMINE SULFATE 0.12 MG: 0.12 TABLET ORAL at 10:00

## 2018-05-10 NOTE — ED PROVIDER NOTES
HPI Comments: Patient presents to the ER  Plan of generalized abdominal pain for the past week. Reports crampy abdominal pain associated with some nausea, vomiting as well as diarrhea. Denies any fevers, hematemesis or melena. Reports abdominal pain is made worse with trying to eat. Patient is a 78 y.o. female presenting with abdominal pain. The history is provided by the patient. Abdominal Pain    This is a new problem. The current episode started more than 1 week ago. The problem occurs constantly. The problem has not changed since onset. The pain is located in the generalized abdominal region. The quality of the pain is aching and cramping. The pain is at a severity of 5/10. The pain is moderate. Associated symptoms include diarrhea, nausea and vomiting. Pertinent negatives include no fever, no hematochezia, no melena, no frequency and no back pain. Nothing worsens the pain. The pain is relieved by nothing. Past Medical History:   Diagnosis Date    Arthritis     AVF (arteriovenous fistula) ([facility] [address] 12/20/2016 [facility]/16 ([facility]) Right AVF revision and thrombectomy    Cancer ([address] 2015    L kidney    Chronic kidney disease     HD in M-W-F- at [place] [facility]    Degenerative joint disease     Diabetes ([address]     type 2, average range 100-120 fasting, symptomatic below 90; last A1C?    ESRD (end stage renal disease) ([address] Nov 2006    ESRD.  MWF dialysis     Hypertension     Obesity     Transient ischemic attack 8/29/2015       Past Surgical History:   Procedure Laterality Date    BREAST SURGERY PROCEDURE UNLISTED Right     cyst removed    HX GI      colon resection resulting in temporary colostomy reversal    HX GYN      stephan    HX OTHER SURGICAL      dialysis fistula, several permcaths    HX UROLOGICAL Left July 2015    nephrectomy    HX VASCULAR ACCESS           Family History:   Problem Relation Age of Onset    Diabetes Mother     Heart Disease Mother     Hypertension Mother    [facility] Heart Disease Father     Hypertension Father     Malignant Hyperthermia Neg Hx     Pseudocholinesterase Deficiency Neg Hx     Delayed Awakening Neg Hx     Post-op Nausea/Vomiting Neg Hx     Emergence Delirium Neg Hx     Other Neg Hx     Post-op Cognitive Dysfunction Neg Hx        Social History     Social History    Marital status:      Spouse name: N/A    Number of children: N/A    Years of education: N/A     Occupational History    Not on file. Social History Main Topics    Smoking status: Never Smoker    Smokeless tobacco: Never Used    Alcohol use No    Drug use: No    Sexual activity: Not Currently     Other Topics Concern    Not on file     Social History Narrative         ALLERGIES: Pcn [penicillins] and Vancomycin    Review of Systems   Constitutional: Negative for fatigue, fever and unexpected weight change. HENT: Negative for congestion and dental problem. Eyes: Negative for photophobia, redness and visual disturbance. Respiratory: Negative for cough and chest tightness. Cardiovascular: Negative for palpitations and leg swelling. Gastrointestinal: Positive for abdominal pain, diarrhea, nausea and vomiting. Negative for hematochezia and melena. Endocrine: Negative for polydipsia, polyphagia and polyuria. Genitourinary: Negative for flank pain, frequency and urgency. Musculoskeletal: Negative for back pain and gait problem. Skin: Negative for color change and pallor. Allergic/Immunologic: Negative for food allergies and immunocompromised state. Neurological: Negative for light-headedness. Hematological: Negative for adenopathy. Does not bruise/bleed easily. Psychiatric/Behavioral: Negative for behavioral problems and confusion. All other systems reviewed and are negative.       Vitals:    05/10/18 0946 05/10/18 1004   BP: 122/67    Resp: 20    Temp: 98.9 °F (37.2 °C)    SpO2: 98% 98%   Weight: 96.6 kg (213 lb)    Height: 5' 5\" (1.651 m) Physical Exam   Constitutional: She is oriented to person, place, and time. She appears well-developed and well-nourished. HENT:   Head: Normocephalic and atraumatic. Mouth/Throat: Oropharynx is clear and moist.   Eyes: Conjunctivae and EOM are normal. Pupils are equal, round, and reactive to light. No scleral icterus. Neck: Normal range of motion. Neck supple. No tracheal deviation present. No thyromegaly present. Cardiovascular: Normal rate, regular rhythm and intact distal pulses. Pulmonary/Chest: Effort normal and breath sounds normal. No respiratory distress. She has no rales. Abdominal: Soft. Bowel sounds are normal. She exhibits no distension. There is generalized tenderness. Musculoskeletal: Normal range of motion. She exhibits no edema or deformity. Neurological: She is alert and oriented to person, place, and time. She has normal reflexes. Nursing note and vitals reviewed. MDM  Number of Diagnoses or Management Options  Diagnosis management comments: We'll obtain basic labs here as well as lipase  And LFTs  Treat symptomatically    11:18 AM  Labs fairly stable. Normal white blood cell count. Normal hemoglobin. Normal chemistry panel and normal lipase    She reports symptoms have improved. She is tolerating by mouth. We'll discharge home.        Amount and/or Complexity of Data Reviewed  Clinical lab tests: ordered and reviewed  Tests in the radiology section of CPT®: ordered and reviewed    Risk of Complications, Morbidity, and/or Mortality  Presenting problems: moderate  Diagnostic procedures: moderate  Management options: moderate    Patient Progress  Patient progress: stable        ED Course       Procedures

## 2018-05-10 NOTE — DISCHARGE INSTRUCTIONS
Gastroenteritis: Care Instructions  Your Care Instructions    Gastroenteritis is an illness that may cause nausea, vomiting, and diarrhea. It is sometimes called \"stomach flu. \" It can be caused by bacteria or a virus. You will probably begin to feel better in 1 to 2 days. In the meantime, get plenty of rest and make sure you do not become dehydrated. Dehydration occurs when your body loses too much fluid. Follow-up care is a key part of your treatment and safety. Be sure to make and go to all appointments, and call your doctor if you are having problems. It's also a good idea to know your test results and keep a list of the medicines you take. How can you care for yourself at home? · If your doctor prescribed antibiotics, take them as directed. Do not stop taking them just because you feel better. You need to take the full course of antibiotics. · Drink plenty of fluids to prevent dehydration, enough so that your urine is light yellow or clear like water. Choose water and other caffeine-free clear liquids until you feel better. If you have kidney, heart, or liver disease and have to limit fluids, talk with your doctor before you increase your fluid intake. · Drink fluids slowly, in frequent, small amounts, because drinking too much too fast can cause vomiting. · Begin eating mild foods, such as dry toast, yogurt, applesauce, bananas, and rice. Avoid spicy, hot, or high-fat foods, and do not drink alcohol or caffeine for a day or two. Do not drink milk or eat ice cream until you are feeling better. How to prevent gastroenteritis  · Keep hot foods hot and cold foods cold. · Do not eat meats, dressings, salads, or other foods that have been kept at room temperature for more than 2 hours. · Use a thermometer to check your refrigerator. It should be between 34°F and 40°F.  · Defrost meats in the refrigerator or microwave, not on the kitchen counter. · Keep your hands and your kitchen clean.  Wash your hands, cutting boards, and countertops with hot soapy water frequently. · Cook meat until it is well done. · Do not eat raw eggs or uncooked sauces made with raw eggs. · Do not take chances. If food looks or tastes spoiled, throw it out. When should you call for help? Call 911 anytime you think you may need emergency care. For example, call if:  ? · You vomit blood or what looks like coffee grounds. ? · You passed out (lost consciousness). ? · You pass maroon or very bloody stools. ?Call your doctor now or seek immediate medical care if:  ? · You have severe belly pain. ? · You have signs of needing more fluids. You have sunken eyes, a dry mouth, and pass only a little dark urine. ? · You feel like you are going to faint. ? · You have increased belly pain that does not go away in 1 to 2 days. ? · You have new or increased nausea, or you are vomiting. ? · You have a new or higher fever. ? · Your stools are black and tarlike or have streaks of blood. ? Watch closely for changes in your health, and be sure to contact your doctor if:  ? · You are dizzy or lightheaded. ? · You urinate less than usual, or your urine is dark yellow or brown. ? · You do not feel better with each day that goes by. Where can you learn more? Go to http://felisa-radha.info/. Enter N142 in the search box to learn more about \"Gastroenteritis: Care Instructions. \"  Current as of: March 3, 2017  Content Version: 11.4  © 6199-7427 [employer]. Care instructions adapted under license by [employer] (which disclaims liability or warranty for this information). If you have questions about a medical condition or this instruction, always ask your healthcare professional. [address] any warranty or liability for your use of this information.

## 2018-05-10 NOTE — ED NOTES
I have reviewed discharge instructions with the patient. The patient verbalized understanding. Patient left ED via Discharge Method: ambulatory to Home with son in law. Opportunity for questions and clarification provided. Patient given 2 scripts. To continue your aftercare when you leave the hospital, you may receive an automated call from our care team to check in on how you are doing. This is a free service and part of our promise to provide the best care and service to meet your aftercare needs.  If you have questions, or wish to unsubscribe from this service please call [phone]. Thank you for Choosing our [facility].

## 2018-05-10 NOTE — ED TRIAGE NOTES
Patient states she thinks she has food poisoning. She said she ate a hotdog last Thursay and diarrhea began along with stomach cramps.

## 2018-07-12 ENCOUNTER — HOSPITAL ENCOUNTER (OUTPATIENT)
Dept: INTERVENTIONAL RADIOLOGY/VASCULAR | Age: 68
Discharge: HOME | End: 2018-07-12
Attending: RADIOLOGY
Payer: MEDICARE

## 2018-07-12 VITALS
SYSTOLIC BLOOD PRESSURE: 147 MMHG | TEMPERATURE: 97.5 F | WEIGHT: 225 LBS | DIASTOLIC BLOOD PRESSURE: 66 MMHG | BODY MASS INDEX: 37.49 KG/M2 | HEART RATE: 84 BPM | RESPIRATION RATE: 16 BRPM | HEIGHT: 65 IN | OXYGEN SATURATION: 93 %

## 2018-07-12 DIAGNOSIS — N18.6 ESRD (END STAGE RENAL DISEASE) (HCC): ICD-10-CM

## 2018-07-12 PROCEDURE — C1894 INTRO/SHEATH, NON-LASER: HCPCS

## 2018-07-12 PROCEDURE — 74011250636 HC RX REV CODE- 250/636: Performed by: RADIOLOGY

## 2018-07-12 PROCEDURE — 74011000250 HC RX REV CODE- 250: Performed by: RADIOLOGY

## 2018-07-12 PROCEDURE — 74011636320 HC RX REV CODE- 636/320: Performed by: RADIOLOGY

## 2018-07-12 PROCEDURE — 76937 US GUIDE VASCULAR ACCESS: CPT

## 2018-07-12 PROCEDURE — 36901 INTRO CATH DIALYSIS CIRCUIT: CPT

## 2018-07-12 PROCEDURE — C1725 CATH, TRANSLUMIN NON-LASER: HCPCS

## 2018-07-12 PROCEDURE — 74011250636 HC RX REV CODE- 250/636

## 2018-07-12 PROCEDURE — C1769 GUIDE WIRE: HCPCS

## 2018-07-12 RX ORDER — LIDOCAINE HYDROCHLORIDE 20 MG/ML
20-200 INJECTION, SOLUTION INFILTRATION; PERINEURAL
Status: DISCONTINUED | OUTPATIENT
Start: 2018-07-12 | End: 2018-07-16 | Stop reason: HOSPADM

## 2018-07-12 RX ORDER — DIPHENHYDRAMINE HYDROCHLORIDE 50 MG/ML
12.5-5 INJECTION, SOLUTION INTRAMUSCULAR; INTRAVENOUS ONCE
Status: COMPLETED | OUTPATIENT
Start: 2018-07-12 | End: 2018-07-12

## 2018-07-12 RX ORDER — HEPARIN SODIUM 200 [USP'U]/100ML
1000 INJECTION, SOLUTION INTRAVENOUS
Status: DISCONTINUED | OUTPATIENT
Start: 2018-07-12 | End: 2018-07-16 | Stop reason: HOSPADM

## 2018-07-12 RX ORDER — HEPARIN SODIUM 1000 [USP'U]/ML
3000 INJECTION, SOLUTION INTRAVENOUS; SUBCUTANEOUS ONCE
Status: COMPLETED | OUTPATIENT
Start: 2018-07-12 | End: 2018-07-12

## 2018-07-12 RX ADMIN — HEPARIN SODIUM 2000 UNITS: 200 INJECTION, SOLUTION INTRAVENOUS at 08:46

## 2018-07-12 RX ADMIN — IOPAMIDOL 35 ML: 612 INJECTION, SOLUTION INTRAVENOUS at 09:04

## 2018-07-12 RX ADMIN — LIDOCAINE HYDROCHLORIDE 20 MG: 20 INJECTION, SOLUTION INFILTRATION; PERINEURAL at 08:35

## 2018-07-12 RX ADMIN — SODIUM BICARBONATE 1 ML: 0.2 INJECTION, SOLUTION INTRAVENOUS at 08:35

## 2018-07-12 RX ADMIN — DIPHENHYDRAMINE HYDROCHLORIDE 50 MG: 50 INJECTION, SOLUTION INTRAMUSCULAR; INTRAVENOUS at 08:39

## 2018-07-12 RX ADMIN — HEPARIN SODIUM 3000 UNITS: 1000 INJECTION, SOLUTION INTRAVENOUS; SUBCUTANEOUS at 08:42

## 2018-07-12 NOTE — PROCEDURES
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

## 2018-07-12 NOTE — PROGRESS NOTES
I have reviewed discharge instructions with the patient. The patient verbalized understanding. Opportunities given for questions and clarification. Patient assisted to lobby via wheelchair to wait for her family to pick her up.

## 2018-07-12 NOTE — IP AVS SNAPSHOT
[address] [facility] 
 
 
 [address] 
[phone] Patient: Kecia Fischer MRN: [id number] IPL:[id number] About your hospitalization You were admitted on:  July 12, 2018 You last received care in the:  [facility] Radiology Specials You were discharged on:  July 12, 2018 Why you were hospitalized Your primary diagnosis was:  Not on File Follow-up Information None Your Scheduled Appointments Tuesday September 04, 2018  9:00 AM EDT  
CT CHEST WO CONT with [facility] CT 59 SLICE UNIT 1  
[facility] RADIOLOGY CT SCAN ([address] [place] [address]) [address]  
[phone] PATIENT ARRIVAL Please report 30 minutes early to check in.  
  
    
[facility] [address] Griffin, [address] [place] [address]- 2nd floor of Outpatient Medical Office Building Discharge Orders None A check bradly indicates which time of day the medication should be taken. My Medications ASK your doctor about these medications Instructions Each Dose to Equal  
 Morning Noon Evening Bedtime  
 amLODIPine 10 mg tablet Commonly known as:  Sonia Bello Your last dose was: Your next dose is: Take  by mouth daily. aspirin 81 mg chewable tablet Your last dose was: Your next dose is: Take 1 Tab by mouth daily. 81 mg  
    
   
   
   
  
 butalbital-acetaminophen-caff -40 mg per capsule Commonly known as:  [employer] Your last dose was: Your next dose is: Take 1-2 Caps by mouth every six (6) hours as needed for Pain. Max Daily Amount: 8 Caps. 1-2 Cap  
    
   
   
   
  
 cinacalcet 30 mg tablet Commonly known as:  SENSIPAR Your last dose was: Your next dose is: Take 60 mg by mouth nightly. 60 mg  
    
   
   
   
  
 esomeprazole 20 mg capsule Commonly known as:  Julio Patricia Your last dose was: Your next dose is: Take 2 Caps by mouth daily. 40 mg  
    
   
   
   
  
 furosemide 80 mg tablet Commonly known as:  LASIX Your last dose was: Your next dose is: Take 80 mg by mouth two (2) times a day. Indications: EDEMA, HYPERTENSION 80 mg  
    
   
   
   
  
 guaiFENesin-codeine 100-10 mg/5 mL solution Commonly known as:  Qi Buitrago Your last dose was: Your next dose is: Take 5-10 mL by mouth three (3) times daily as needed for Cough or Congestion. Max Daily Amount: 30 mL. 5-10 mL HYDROcodone-acetaminophen  mg tablet Commonly known as:  Christine Salinas Your last dose was: Your next dose is: Take 1 Tab by mouth every four (4) hours as needed. Max Daily Amount: 6 Tabs. 1 Tab  
    
   
   
   
  
 hyoscyamine 0.125 mg tablet Commonly known as:  Isaias Co Your last dose was: Your next dose is: Take 1 Tab by mouth every six (6) hours as needed for Cramping or Diarrhea. 125 mcg JANUVIA 100 mg tablet Generic drug:  SITagliptin Your last dose was: Your next dose is: Take 50 mg by mouth every morning. Indications: TYPE 2 DIABETES MELLITUS  
 50 mg  
    
   
   
   
  
 * lidocaine 5 % topical cream  
   
Your last dose was: Your next dose is:    
   
   
 Apply  to affected area two (2) times daily as needed for Pain. * lidocaine 5 % Commonly known as:  Matt Halt Your last dose was: Your next dose is:    
   
   
 1 Patch by TransDERmal route every twenty-four (24) hours. Apply patch to the affected area for 12 hours a day and remove for 12 hours a day. 1 Patch  
    
   
   
   
  
 lisinopril 40 mg tablet Commonly known as:  Kavitha Fletcher Your last dose was: Your next dose is: Take 40 mg by mouth nightly. Indications: HYPERTENSION 40 mg  
    
   
   
   
  
 minoxidil 2.5 mg tablet Commonly known as:  Joon Alexandrea Your last dose was: Your next dose is: Take 7.5 mg by mouth two (2) times a day. Indications: HYPERTENSION  
 7.5 mg  
    
   
   
   
  
 OCUVITE PO Your last dose was: Your next dose is: Take  by mouth nightly. ondansetron 8 mg disintegrating tablet Commonly known as:  ZOFRAN ODT Your last dose was: Your next dose is: Take 1 Tab by mouth every eight (8) hours as needed for Nausea. 8 mg RENVELA 800 mg Tab tab Generic drug:  sevelamer carbonate Your last dose was: Your next dose is: Take 800 mg by mouth three (3) times daily (with meals). 5 tablets with meals/ 2 with snacks Sometimes only eats 2 meals/d  
 800 mg * Notice: This list has 2 medication(s) that are the same as other medications prescribed for you. Read the directions carefully, and ask your doctor or other care provider to review them with you. Opioid Education Prescription Opioids: What You Need to Know: 
 
Prescription opioids can be used to help relieve moderate-to-severe pain and are often prescribed following a surgery or injury, or for certain health conditions. These medications can be an important part of treatment but also come with serious risks. Opioids are strong pain medicines. Examples include hydrocodone, oxycodone, fentanyl, and morphine. Heroin is an example of an illegal opioid. It is important to work with your health care provider to make sure you are getting the safest, most effective care. WHAT ARE THE RISKS AND SIDE EFFECTS OF OPIOID USE? Prescription opioids carry serious risks of addiction and overdose, especially with prolonged use.   An opioid overdose, often marked by slow breathing, can cause sudden death. The use of prescription opioids can have a number of side effects as well, even when taken as directed. · Tolerance-meaning you might need to take more of a medication for the same pain relief · Physical dependence-meaning you have symptoms of withdrawal when the medication is stopped. Withdrawal symptoms can include nausea, sweating, chills, diarrhea, stomach cramps, and muscle aches. Withdrawal can last up to several weeks, depending on which drug you took and how long you took it. · Increased sensitivity to pain · Constipation · Nausea, vomiting, and dry mouth · Sleepiness and dizziness · Confusion · Depression · Low levels of testosterone that can result in lower sex drive, energy, and strength · Itching and sweating RISKS ARE GREATER WITH:      
· History of drug misuse, substance use disorder, or overdose · Mental health conditions (such as depression or anxiety) · Sleep apnea · Older age (72 years or older) · Pregnancy Avoid alcohol while taking prescription opioids. Also, unless specifically advised by your health care provider, medications to avoid include: · Benzodiazepines (such as Xanax or Valium) · Muscle relaxants (such as Soma or Flexeril) · Hypnotics (such as Ambien or Lunesta) · Other prescription opioids KNOW YOUR OPTIONS Talk to your health care provider about ways to manage your pain that don't involve prescription opioids. Some of these options may actually work better and have fewer risks and side effects. Options may include: 
· Pain relievers such as acetaminophen, ibuprofen, and naproxen · Some medications that are also used for depression or seizures · Physical therapy and exercise · Counseling to help patients learn how to cope better with triggers of pain and stress. · Application of heat or cold compress · Massage therapy · Relaxation techniques Be Informed Make sure you know the name of your medication, how much and how often to take it, and its potential risks & side effects. IF YOU ARE PRESCRIBED OPIOIDS FOR PAIN: 
· Never take opioids in greater amounts or more often than prescribed. Remember the goal is not to be pain-free but to manage your pain at a tolerable level. · Follow up with your primary care provider to: · Work together to create a plan on how to manage your pain. · Talk about ways to help manage your pain that don't involve prescription opioids. · Talk about any and all concerns and side effects. · Help prevent misuse and abuse. · Never sell or share prescription opioids · Help prevent misuse and abuse. · Store prescription opioids in a secure place and out of reach of others (this may include visitors, children, friends, and family). · Safely dispose of unused/unwanted prescription opioids: Find your community drug take-back program or your pharmacy mail-back program, or flush them down the toilet, following guidance from the Food and Drug Administration (www.fda.gov/Drugs/ResourcesForYou). · Visit www.cdc.gov/drugoverdose to learn about the risks of opioid abuse and overdose. · If you believe you may be struggling with addiction, tell your health care provider and ask for guidance or call [address] [place] [address] at 0-688-196-RIOB. Discharge Instructions [address] Department of Interventional Radiology [address] [place] [address] [facility] 
[phone] Office 
[phone] Fax THROMBECTOMY/FISTULAPLASTY DISCHARGE INSTRUCTIONS General Information: This procedure has been done in order to improve blood flow through your dialysis access. The procedure is designed to remove clots and/or to enlarge the narrowed areas of your dialysis access. Home Care Instructions: You can resume your regular diet and medication regimen. Do not drink alcohol, drive, or make any important legal decisions in the next 24 hours. Watch the site carefully for signs of infection. You may have some tenderness at your graft site. You make take Tylenol (acetaminophen) for this discomfort. Do not carry anything heavier than a gallon of milk. Do not wear any tight clothing on this arm, including elastic cuffs and/or tight watches. Do not allow anyone to take a blood pressure or draw blood from this extremity. You should elevate this arm on pillows to help with any swelling. Do not sleep on this arm with the graft, or fold it under your head while you sleep. Feel your graft every day to see if you can feel a thrill (pulsation). Call If: 
   You should call your Physician and/or the Radiology Nurse if you have any signs of infection like fever, drainage, redness, or increased pain at the graft site. Call your doctor or dialysis center immediately if you do not feel a thrill on your graft/fistula, or if you have a change in color of this extremity. Call 911 and seek immediate medical attention if you start bleeding from your graft or fistula. Apply pressure to the graft, but only enough pressure to control the bleeding. Armand the time you start holding pressure and let medical personnel know. Follow-Up Instructions: Please see your ordering doctor as he/she has requested. You may not go to dialysis today, except with special written permission from you doctor. You may go to dialysis tomorrow, and resume your normal dialysis schedule. To Reach Us: If you have any questions about your procedure, please call the Interventional Radiology department at [phone]. After business hours (5pm) and weekends, call the answering service at [phone] and ask for the Radiologist on call to be paged. Interventional Radiology General Nurse Discharge After general anesthesia or intravenous sedation, for 24 hours or while taking prescription Narcotics: · Limit your activities · Do not drive and operate hazardous machinery · Do not make important personal or business decisions · Do  not drink alcoholic beverages · If you have not urinated within 8 hours after discharge, please contact your surgeon on call. * Please give a list of your current medications to your Primary Care Provider. * Please update this list whenever your medications are discontinued, doses are 
   changed, or new medications (including over-the-counter products) are added. * Please carry medication information at all times in case of emergency situations. These are general instructions for a healthy lifestyle: No smoking/ No tobacco products/ Avoid exposure to second hand smoke Surgeon General's Warning:  Quitting smoking now greatly reduces serious risk to your health. Obesity, smoking, and sedentary lifestyle greatly increases your risk for illness A healthy diet, regular physical exercise & weight monitoring are important for maintaining a healthy lifestyle You may be retaining fluid if you have a history of heart failure or if you experience any of the following symptoms:  Weight gain of 3 pounds or more overnight or 5 pounds in a week, increased swelling in our hands or feet or shortness of breath while lying flat in bed. Please call your doctor as soon as you notice any of these symptoms; do not wait until your next office visit. Recognize signs and symptoms of STROKE: 
F-face looks uneven A-arms unable to move or move unevenly S-speech slurred or non-existent T-time-call 911 as soon as signs and symptoms begin-DO NOT go Back to bed or wait to see if you get better-TIME IS BRAIN. Patient Signature: 
Date: 7/12/2018 Discharging Nurse: Juma Bass RN 
 
 
O Transitions of Care Introducing [facility] [address] Chichi Andino offers a voluntary care coordination program to provide high quality service and care to [facility] fee-for-service beneficiaries. Rosalia Lan was designed to help you enhance your health and well-being through the following services: ? Transitions of Care  support for individuals who are transitioning from one care setting to another (example: Hospital to home). ? Chronic and Complex Care Coordination  support for individuals and caregivers of those with serious or chronic illnesses or with more than one chronic (ongoing) condition and those who take a number of different medications. If you meet specific medical criteria, a [address] [place] [address] Rd may call you directly to coordinate your care with your primary care physician and your other care providers. For questions about the [facility] programs, please, contact your physicians office. For general questions or additional information about Accountable Care Organizations: 
Please visit www.medicare.gov/acos. html or call 1-800-MEDICARE (4-756.501.5499) TTY users should call 4-545.651.9912. Introducing [facility]! Shruthi Ramos introduces Me[employer] patient portal. Now you can access parts of your medical record, email your doctor's office, and request medication refills online. 1. In your internet browser, go to [email or web address]. [email or web address]/[employer] 2. Click on the First Time User? Click Here link in the Sign In box. You will see the New Member Sign Up page. 3. Enter your Meal Sharing Access Code exactly as it appears below. You will not need to use this code after youve completed the sign-up process. If you do not sign up before the expiration date, you must request a new code. · Me[employer] Access Code: [email or web address] Expires: 9/1/2018  5:20 AM 
 
 4. Enter the last four digits of your Social Security Number (xxxx) and Date of Birth (mm/dd/yyyy) as indicated and click Submit. You will be taken to the next sign-up page. 5. Create a [employer] ID. This will be your [employer] login ID and cannot be changed, so think of one that is secure and easy to remember. 6. Create a [employer] password. You can change your password at any time. 7. Enter your Password Reset Question and Answer. This can be used at a later time if you forget your password. 8. Enter your e-mail address. You will receive e-mail notification when new information is available in [address] 9. Click Sign Up. You can now view and download portions of your medical record. 10. Click the Download Summary menu link to download a portable copy of your medical information. If you have questions, please visit the Frequently Asked Questions section of the [employer] website. Remember, [employer] is NOT to be used for urgent needs. For medical emergencies, dial 911. Now available from your iPhone and Android! Introducing Josue Lee As a [employer] [facility] patient, I wanted to make you aware of our electronic visit tool called Josue Hewittfin. [employer] [facility] 24/7 allows you to connect within minutes with a medical provider 24 hours a day, seven days a week via a mobile device or tablet or logging into a secure website from your computer. You can access Josue Tyejaydenfin from anywhere in the [place]. A virtual visit might be right for you when you have a simple condition and feel like you just dont want to get out of bed, or cant get away from work for an appointment, when your regular [facility] provider is not available (evenings, weekends or holidays), or when youre out of town and need minor care.   Electronic visits cost only $49 and if the Josue Lee provider determines a prescription is needed to treat your condition, one can be electronically transmitted to a nearby pharmacy*. Please take a moment to enroll today if you have not already done so. The enrollment process is free and takes just a few minutes. To enroll, please download the [employer] 24/7 salty to your tablet or phone, or visit [email or web address] to enroll on your computer. And, as an [address] [place] [address] patient with a [employer] account, the results of your visits will be scanned into your electronic medical record and your primary care provider will be able to view the scanned results. We urge you to continue to see your regular [employer] provider for your ongoing medical care. And while your primary care provider may not be the one available when you seek a [employer] virtual visit, the peace of mind you get from getting a real diagnosis real time can be priceless. For more information on [employer], view our Frequently Asked Questions (FAQs) at [email or web address]. Sincerely, 
 
Lupe Rondon MD 
Chief Medical Officer [address] [place] [address] *:  certain medications cannot be prescribed via [employer] Unresulted Labs-Please follow up with your PCP about these lab tests Order Current Status IR ANGIO AV SHNT HEMODYALYSIS VIA EXT CATH In process Providers Seen During Your Hospitalization Provider Specialty Primary office phone Lionel Gonsalves MD Radiology [phone] Your Primary Care Physician (PCP) Primary Care Physician Office Phone Office Fax Rik Cano [phone] You are allergic to the following Allergen Reactions Pcn (Penicillins) Rash Vancomycin Rash Recent Documentation Height Weight Breastfeeding? BMI OB Status Smoking Status 1.651 m 102.1 kg No 37.44 kg/m2 Hysterectomy Never Smoker Emergency Contacts Name Discharge Info Relation Home Work Mobile Santhosh Lantigua  Child [2] [phone] Tori Anaya  Sister [23] [phone] Patient Belongings The following personal items are in your possession at time of discharge: 
     Visual Aid: None Please provide this summary of care documentation to your next provider. Signatures-by signing, you are acknowledging that this After Visit Summary has been reviewed with you and you have received a copy. Patient Signature:  ____________________________________________________________ Date:  ____________________________________________________________  
  
[facility] Provider Signature:  ____________________________________________________________ Date:  ____________________________________________________________

## 2018-07-12 NOTE — DISCHARGE INSTRUCTIONS
[address]  Department of Interventional Radiology  [facility]  [phone] Office  [phone] Fax    THROMBECTOMY/FISTULAPLASTY DISCHARGE INSTRUCTIONS    General Information: This procedure has been done in order to improve blood flow through your dialysis access. The procedure is designed to remove clots and/or to enlarge the narrowed areas of your dialysis access. Home Care Instructions: You can resume your regular diet and medication regimen. Do not drink alcohol, drive, or make any important legal decisions in the next 24 hours. Watch the site carefully for signs of infection. You may have some tenderness at your graft site. You make take Tylenol (acetaminophen) for this discomfort. Do not carry anything heavier than a gallon of milk. Do not wear any tight clothing on this arm, including elastic cuffs and/or tight watches. Do not allow anyone to take a blood pressure or draw blood from this extremity. You should elevate this arm on pillows to help with any swelling. Do not sleep on this arm with the graft, or fold it under your head while you sleep. Feel your graft every day to see if you can feel a thrill (pulsation). Call If:     You should call your Physician and/or the Radiology Nurse if you have any signs of infection like fever, drainage, redness, or increased pain at the graft site. Call your doctor or dialysis center immediately if you do not feel a thrill on your graft/fistula, or if you have a change in color of this extremity. Call 911 and seek immediate medical attention if you start bleeding from your graft or fistula. Apply pressure to the graft, but only enough pressure to control the bleeding. Armand the time you start holding pressure and let medical personnel know. Follow-Up Instructions: Please see your ordering doctor as he/she has requested.  You may not go to dialysis today, except with special written permission from you doctor. You may go to dialysis tomorrow, and resume your normal dialysis schedule. To Reach Us: If you have any questions about your procedure, please call the Interventional Radiology department at [phone]. After business hours (5pm) and weekends, call the answering service at [phone] and ask for the Radiologist on call to be paged. Interventional Radiology General Nurse Discharge    After general anesthesia or intravenous sedation, for 24 hours or while taking prescription Narcotics:  · Limit your activities  · Do not drive and operate hazardous machinery  · Do not make important personal or business decisions  · Do  not drink alcoholic beverages  · If you have not urinated within 8 hours after discharge, please contact your surgeon on call. * Please give a list of your current medications to your Primary Care Provider. * Please update this list whenever your medications are discontinued, doses are     changed, or new medications (including over-the-counter products) are added. * Please carry medication information at all times in case of emergency situations. These are general instructions for a healthy lifestyle:    No smoking/ No tobacco products/ Avoid exposure to second hand smoke  Surgeon General's Warning:  Quitting smoking now greatly reduces serious risk to your health. Obesity, smoking, and sedentary lifestyle greatly increases your risk for illness  A healthy diet, regular physical exercise & weight monitoring are important for maintaining a healthy lifestyle    You may be retaining fluid if you have a history of heart failure or if you experience any of the following symptoms:  Weight gain of 3 pounds or more overnight or 5 pounds in a week, increased swelling in our hands or feet or shortness of breath while lying flat in bed.   Please call your doctor as soon as you notice any of these symptoms; do not wait until your next office visit. Recognize signs and symptoms of STROKE:  F-face looks uneven    A-arms unable to move or move unevenly    S-speech slurred or non-existent    T-time-call 911 as soon as signs and symptoms begin-DO NOT go       Back to bed or wait to see if you get better-TIME IS BRAIN.          Patient Signature:  Date: 7/12/2018  Discharging Nurse: Avinash Esquivel RN

## 2018-07-12 NOTE — PROGRESS NOTES
TRANSFER - OUT REPORT:    Verbal report given to Isra Reed RN (name) on Heather Girard  being transferred to IR recovery (unit) for routine progression of care       Report consisted of patients Situation, Background, Assessment and   Recommendations(SBAR). Information from the following report(s) Procedure Summary and MAR was reviewed with the receiving nurse. Lines:       Opportunity for questions and clarification was provided.       Patient transported with:   Registered Nurse

## 2018-08-03 ENCOUNTER — APPOINTMENT (OUTPATIENT)
Dept: GENERAL RADIOLOGY | Age: 68
DRG: 356 | End: 2018-08-03
Attending: EMERGENCY MEDICINE
Payer: MEDICARE

## 2018-08-03 ENCOUNTER — APPOINTMENT (OUTPATIENT)
Dept: CT IMAGING | Age: 68
DRG: 356 | End: 2018-08-03
Attending: EMERGENCY MEDICINE
Payer: MEDICARE

## 2018-08-03 ENCOUNTER — HOSPITAL ENCOUNTER (INPATIENT)
Age: 68
LOS: 5 days | Discharge: HOME HEALTH | DRG: 356 | End: 2018-08-08
Attending: EMERGENCY MEDICINE | Admitting: HOSPITALIST
Payer: MEDICARE

## 2018-08-03 DIAGNOSIS — K85.90 ACUTE PANCREATITIS, UNSPECIFIED COMPLICATION STATUS, UNSPECIFIED PANCREATITIS TYPE: ICD-10-CM

## 2018-08-03 DIAGNOSIS — K56.600 PARTIAL INTESTINAL OBSTRUCTION, UNSPECIFIED CAUSE (HCC): Primary | ICD-10-CM

## 2018-08-03 PROBLEM — K56.609 SMALL BOWEL OBSTRUCTION (HCC): Status: ACTIVE | Noted: 2018-08-03

## 2018-08-03 LAB
ALBUMIN SERPL-MCNC: 4.1 G/DL (ref 3.2–4.6)
ALBUMIN/GLOB SERPL: 0.9 {RATIO} (ref 1.2–3.5)
ALP SERPL-CCNC: 121 U/L (ref 50–136)
ALT SERPL-CCNC: 24 U/L (ref 12–65)
ANION GAP SERPL CALC-SCNC: 9 MMOL/L (ref 7–16)
AST SERPL-CCNC: 68 U/L (ref 15–37)
ATRIAL RATE: 100 BPM
BASOPHILS # BLD: 0 K/UL (ref 0–0.2)
BASOPHILS NFR BLD: 0 % (ref 0–2)
BILIRUB SERPL-MCNC: 0.6 MG/DL (ref 0.2–1.1)
BUN SERPL-MCNC: 12 MG/DL (ref 8–23)
CALCIUM SERPL-MCNC: 9.3 MG/DL (ref 8.3–10.4)
CALCULATED P AXIS, ECG09: 72 DEGREES
CALCULATED R AXIS, ECG10: 43 DEGREES
CALCULATED T AXIS, ECG11: 52 DEGREES
CHLORIDE SERPL-SCNC: 98 MMOL/L (ref 98–107)
CO2 SERPL-SCNC: 32 MMOL/L (ref 21–32)
CREAT SERPL-MCNC: 3.17 MG/DL (ref 0.6–1)
DIAGNOSIS, 93000: NORMAL
DIFFERENTIAL METHOD BLD: ABNORMAL
EOSINOPHIL # BLD: 0 K/UL (ref 0–0.8)
EOSINOPHIL NFR BLD: 0 % (ref 0.5–7.8)
ERYTHROCYTE [DISTWIDTH] IN BLOOD BY AUTOMATED COUNT: 16.2 % (ref 11.9–14.6)
GLOBULIN SER CALC-MCNC: 4.7 G/DL (ref 2.3–3.5)
GLUCOSE BLD STRIP.AUTO-MCNC: 129 MG/DL (ref 65–100)
GLUCOSE BLD STRIP.AUTO-MCNC: 154 MG/DL (ref 65–100)
GLUCOSE SERPL-MCNC: 142 MG/DL (ref 65–100)
HCT VFR BLD AUTO: 29.4 % (ref 35.8–46.3)
HGB BLD-MCNC: 10.1 G/DL (ref 11.7–15.4)
IMM GRANULOCYTES # BLD: 0 K/UL (ref 0–0.5)
IMM GRANULOCYTES NFR BLD AUTO: 0 % (ref 0–5)
LACTATE SERPL-SCNC: 2.5 MMOL/L (ref 0.4–2)
LIPASE SERPL-CCNC: 521 U/L (ref 73–393)
LYMPHOCYTES # BLD: 1.3 K/UL (ref 0.5–4.6)
LYMPHOCYTES NFR BLD: 12 % (ref 13–44)
MAGNESIUM SERPL-MCNC: 2.3 MG/DL (ref 1.8–2.4)
MCH RBC QN AUTO: 32.9 PG (ref 26.1–32.9)
MCHC RBC AUTO-ENTMCNC: 34.4 G/DL (ref 31.4–35)
MCV RBC AUTO: 95.8 FL (ref 79.6–97.8)
MONOCYTES # BLD: 0.4 K/UL (ref 0.1–1.3)
MONOCYTES NFR BLD: 4 % (ref 4–12)
NEUTS SEG # BLD: 9.1 K/UL (ref 1.7–8.2)
NEUTS SEG NFR BLD: 84 % (ref 43–78)
P-R INTERVAL, ECG05: 142 MS
PHOSPHATE SERPL-MCNC: 2.1 MG/DL (ref 2.3–3.7)
PLATELET # BLD AUTO: 219 K/UL (ref 150–450)
PMV BLD AUTO: 9.8 FL (ref 10.8–14.1)
POTASSIUM SERPL-SCNC: 5.3 MMOL/L (ref 3.5–5.1)
PROT SERPL-MCNC: 8.8 G/DL (ref 6.3–8.2)
Q-T INTERVAL, ECG07: 314 MS
QRS DURATION, ECG06: 62 MS
QTC CALCULATION (BEZET), ECG08: 405 MS
RBC # BLD AUTO: 3.07 M/UL (ref 4.05–5.25)
SODIUM SERPL-SCNC: 139 MMOL/L (ref 136–145)
VENTRICULAR RATE, ECG03: 100 BPM
WBC # BLD AUTO: 10.9 K/UL (ref 4.3–11.1)

## 2018-08-03 PROCEDURE — 74011250636 HC RX REV CODE- 250/636: Performed by: NURSE PRACTITIONER

## 2018-08-03 PROCEDURE — 74011250636 HC RX REV CODE- 250/636: Performed by: INTERNAL MEDICINE

## 2018-08-03 PROCEDURE — 77030008771 HC TU NG SALEM SUMP -A

## 2018-08-03 PROCEDURE — 86580 TB INTRADERMAL TEST: CPT | Performed by: NURSE PRACTITIONER

## 2018-08-03 PROCEDURE — 74011636320 HC RX REV CODE- 636/320: Performed by: EMERGENCY MEDICINE

## 2018-08-03 PROCEDURE — 96374 THER/PROPH/DIAG INJ IV PUSH: CPT | Performed by: EMERGENCY MEDICINE

## 2018-08-03 PROCEDURE — 96361 HYDRATE IV INFUSION ADD-ON: CPT | Performed by: EMERGENCY MEDICINE

## 2018-08-03 PROCEDURE — 65270000029 HC RM PRIVATE

## 2018-08-03 PROCEDURE — 74176 CT ABD & PELVIS W/O CONTRAST: CPT

## 2018-08-03 PROCEDURE — 84100 ASSAY OF PHOSPHORUS: CPT

## 2018-08-03 PROCEDURE — 74011250636 HC RX REV CODE- 250/636: Performed by: EMERGENCY MEDICINE

## 2018-08-03 PROCEDURE — 93005 ELECTROCARDIOGRAM TRACING: CPT | Performed by: EMERGENCY MEDICINE

## 2018-08-03 PROCEDURE — 83605 ASSAY OF LACTIC ACID: CPT

## 2018-08-03 PROCEDURE — 96375 TX/PRO/DX INJ NEW DRUG ADDON: CPT | Performed by: EMERGENCY MEDICINE

## 2018-08-03 PROCEDURE — 85025 COMPLETE CBC W/AUTO DIFF WBC: CPT

## 2018-08-03 PROCEDURE — 99284 EMERGENCY DEPT VISIT MOD MDM: CPT | Performed by: EMERGENCY MEDICINE

## 2018-08-03 PROCEDURE — 82962 GLUCOSE BLOOD TEST: CPT

## 2018-08-03 PROCEDURE — 0D9770Z DRAINAGE OF STOMACH, PYLORUS WITH DRAINAGE DEVICE, VIA NATURAL OR ARTIFICIAL OPENING: ICD-10-PCS | Performed by: SURGERY

## 2018-08-03 PROCEDURE — 36415 COLL VENOUS BLD VENIPUNCTURE: CPT

## 2018-08-03 PROCEDURE — 71045 X-RAY EXAM CHEST 1 VIEW: CPT

## 2018-08-03 PROCEDURE — 80053 COMPREHEN METABOLIC PANEL: CPT

## 2018-08-03 PROCEDURE — 74011000302 HC RX REV CODE- 302: Performed by: NURSE PRACTITIONER

## 2018-08-03 PROCEDURE — 77030020263 HC SOL INJ SOD CL0.9% LFCR 1000ML

## 2018-08-03 PROCEDURE — 77030033269 HC SLV COMPR SCD KNE2 CARD -B

## 2018-08-03 PROCEDURE — 83735 ASSAY OF MAGNESIUM: CPT

## 2018-08-03 PROCEDURE — 83690 ASSAY OF LIPASE: CPT

## 2018-08-03 RX ORDER — HYDROMORPHONE HYDROCHLORIDE 2 MG/ML
1 INJECTION, SOLUTION INTRAMUSCULAR; INTRAVENOUS; SUBCUTANEOUS
Status: COMPLETED | OUTPATIENT
Start: 2018-08-03 | End: 2018-08-03

## 2018-08-03 RX ORDER — NALOXONE HYDROCHLORIDE 0.4 MG/ML
0.4 INJECTION, SOLUTION INTRAMUSCULAR; INTRAVENOUS; SUBCUTANEOUS AS NEEDED
Status: DISCONTINUED | OUTPATIENT
Start: 2018-08-03 | End: 2018-08-08 | Stop reason: HOSPADM

## 2018-08-03 RX ORDER — ONDANSETRON 2 MG/ML
4 INJECTION INTRAMUSCULAR; INTRAVENOUS
Status: COMPLETED | OUTPATIENT
Start: 2018-08-03 | End: 2018-08-03

## 2018-08-03 RX ORDER — SODIUM CHLORIDE 9 MG/ML
150 INJECTION, SOLUTION INTRAVENOUS CONTINUOUS
Status: DISCONTINUED | OUTPATIENT
Start: 2018-08-03 | End: 2018-08-03

## 2018-08-03 RX ORDER — SODIUM CHLORIDE 0.9 % (FLUSH) 0.9 %
5-10 SYRINGE (ML) INJECTION AS NEEDED
Status: DISCONTINUED | OUTPATIENT
Start: 2018-08-03 | End: 2018-08-08 | Stop reason: HOSPADM

## 2018-08-03 RX ORDER — MORPHINE SULFATE 2 MG/ML
2 INJECTION, SOLUTION INTRAMUSCULAR; INTRAVENOUS
Status: DISCONTINUED | OUTPATIENT
Start: 2018-08-03 | End: 2018-08-03

## 2018-08-03 RX ORDER — MORPHINE SULFATE 2 MG/ML
4 INJECTION, SOLUTION INTRAMUSCULAR; INTRAVENOUS
Status: DISCONTINUED | OUTPATIENT
Start: 2018-08-03 | End: 2018-08-08 | Stop reason: HOSPADM

## 2018-08-03 RX ORDER — INSULIN LISPRO 100 [IU]/ML
INJECTION, SOLUTION INTRAVENOUS; SUBCUTANEOUS
Status: DISCONTINUED | OUTPATIENT
Start: 2018-08-03 | End: 2018-08-08 | Stop reason: HOSPADM

## 2018-08-03 RX ORDER — ONDANSETRON 2 MG/ML
4 INJECTION INTRAMUSCULAR; INTRAVENOUS
Status: DISCONTINUED | OUTPATIENT
Start: 2018-08-03 | End: 2018-08-08 | Stop reason: HOSPADM

## 2018-08-03 RX ORDER — SODIUM CHLORIDE 0.9 % (FLUSH) 0.9 %
5-10 SYRINGE (ML) INJECTION EVERY 8 HOURS
Status: DISCONTINUED | OUTPATIENT
Start: 2018-08-03 | End: 2018-08-07

## 2018-08-03 RX ADMIN — DIATRIZOATE MEGLUMINE AND DIATRIZOATE SODIUM 30 ML: 660; 100 LIQUID ORAL; RECTAL at 12:04

## 2018-08-03 RX ADMIN — HYDROMORPHONE HYDROCHLORIDE 1 MG: 2 INJECTION, SOLUTION INTRAMUSCULAR; INTRAVENOUS; SUBCUTANEOUS at 12:04

## 2018-08-03 RX ADMIN — ONDANSETRON 4 MG: 2 INJECTION, SOLUTION INTRAMUSCULAR; INTRAVENOUS at 12:04

## 2018-08-03 RX ADMIN — MORPHINE SULFATE 4 MG: 2 INJECTION, SOLUTION INTRAMUSCULAR; INTRAVENOUS at 22:39

## 2018-08-03 RX ADMIN — ONDANSETRON 4 MG: 2 INJECTION, SOLUTION INTRAMUSCULAR; INTRAVENOUS at 22:06

## 2018-08-03 RX ADMIN — Medication 10 ML: at 22:00

## 2018-08-03 RX ADMIN — Medication 10 ML: at 15:55

## 2018-08-03 RX ADMIN — MORPHINE SULFATE 4 MG: 2 INJECTION, SOLUTION INTRAMUSCULAR; INTRAVENOUS at 19:15

## 2018-08-03 RX ADMIN — SODIUM CHLORIDE 150 ML/HR: 900 INJECTION, SOLUTION INTRAVENOUS at 12:06

## 2018-08-03 RX ADMIN — TUBERCULIN PURIFIED PROTEIN DERIVATIVE 5 UNITS: 5 INJECTION, SOLUTION INTRADERMAL at 15:55

## 2018-08-03 RX ADMIN — MORPHINE SULFATE 2 MG: 2 INJECTION, SOLUTION INTRAMUSCULAR; INTRAVENOUS at 15:32

## 2018-08-03 NOTE — CONSULTS
RENAL H&P/CONSULT    Subjective:     Patient is a 79 y/o AAF with ESRD on HD MWF at [facility] under my medical care due to diabetic nephropathy,  HTN,  history of Left RCC (Clear cell carcinoma) s/p left radical nephrectomy by Dr. Kimmie Pal in 2015 with history of recurrent mass at the site of nephrectomy was in her usual state of health until last night when she developed abdominal pain. She had nausea, vomiting and worsening of abdominal pain on HD, but completed her treatment today. No fever or chills, no diarrhea, no chest pain,no dyspnea. She went to the ED when her symptoms progressed after she returned home from dialysis. She c/o morphine not providing adequate pain relief. NGT was placed and she is NPO. She c/o feeling constipated, but reports having had a BM this am.     Past Medical History:   Diagnosis Date    Arthritis     AVF (arteriovenous fistula) ([facility] 12/20/2016 12/6/16 (S) Right AVF revision and thrombectomy    Cancer ([facility] [address] 2015    L kidney    Chronic kidney disease     HD in M-W-F- at [facility]    Degenerative joint disease     Diabetes ([facility]     type 2, average range 100-120 fasting, symptomatic below 90; last A1C?    ESRD (end stage renal disease) ([facility] Nov 2006    ESRD. MWF dialysis     Hypertension     Obesity     Transient ischemic attack 8/29/2015      Past Surgical History:   Procedure Laterality Date    BREAST SURGERY PROCEDURE UNLISTED Right     cyst removed    HX GI      colon resection resulting in temporary colostomy reversal    HX GYN      stephan    HX OTHER SURGICAL      dialysis fistula, several permcaths    HX UROLOGICAL Left July 2015    nephrectomy    HX VASCULAR ACCESS        Prior to Admission medications    Medication Sig Start Date End Date Taking? Authorizing Provider   hyoscyamine (LEVSIN) 0.125 mg tablet Take 1 Tab by mouth every six (6) hours as needed for Cramping or Diarrhea.  5/10/18   Madan Fuller MD   ondansetron (ZOFRAN ODT) 8 mg disintegrating tablet Take 1 Tab by mouth every eight (8) hours as needed for Nausea. 5/10/18   Portia Greco MD   HYDROcodone-acetaminophen [facility])  mg tablet Take 1 Tab by mouth every four (4) hours as needed. Max Daily Amount: 6 Tabs. 4/13/18   Mitzi Moreno, DO   esomeprazole (NEXIUM) 20 mg capsule Take 2 Caps by mouth daily. 4/13/18   Mitzi Moreno, DO   guaiFENesin-codeine (CHERATUSSIN AC) 100-10 mg/5 mL solution Take 5-10 mL by mouth three (3) times daily as needed for Cough or Congestion. Max Daily Amount: 30 mL. 1/19/18   Skye Mejia MD   lidocaine (LIDODERM) 5 % 1 Patch by TransDERmal route every twenty-four (24) hours. Apply patch to the affected area for 12 hours a day and remove for 12 hours a day. 9/16/17   Luba Vazquez MD   lidocaine 5 % topical cream Apply  to affected area two (2) times daily as needed for Pain. 9/1/17   FRANCISCO Farrell   butalbital-acetaminophen-caff (FIORICET) -40 mg per capsule Take 1-2 Caps by mouth every six (6) hours as needed for Pain. Max Daily Amount: 8 Caps. 2/7/17   Cindy Torres MD   amLODIPine (NORVASC) 10 mg tablet Take  by mouth daily. Historical Provider   VIT C/VIT E/LUTEIN/MIN/OMEGA-3 (OCUVITE PO) Take  by mouth nightly. Historical Provider   cinacalcet (SENSIPAR) 30 mg tablet Take 60 mg by mouth nightly. Historical Provider   minoxidil (LONITEN) 2.5 mg tablet Take 7.5 mg by mouth two (2) times a day. Indications: HYPERTENSION    Jaylon Cool, MD   aspirin 81 mg chewable tablet Take 1 Tab by mouth daily. Patient taking differently: Take 81 mg by mouth every morning. 9/1/15   Luba Vazquez MD   sevelamer carbonate (RENVELA) 800 mg tab tab Take 800 mg by mouth three (3) times daily (with meals). 5 tablets with meals/ 2 with snacks  Sometimes only eats 2 meals/d    Historical Provider   lisinopril (PRINIVIL, ZESTRIL) 40 mg tablet Take 40 mg by mouth nightly.  Indications: HYPERTENSION 6/8/15 Historical Provider   sitaGLIPtin (JANUVIA) 100 mg tablet Take 50 mg by mouth every morning. Indications: TYPE 2 DIABETES MELLITUS    Historical Provider   furosemide (LASIX) 80 mg tablet Take 80 mg by mouth two (2) times a day. Indications: EDEMA, HYPERTENSION    Historical Provider     Allergies   Allergen Reactions    Pcn [Penicillins] Rash    Vancomycin Rash      Social History   Substance Use Topics    Smoking status: Never Smoker    Smokeless tobacco: Never Used    Alcohol use No      Family History   Problem Relation Age of Onset    Diabetes Mother     Heart Disease Mother     Hypertension Mother     Heart Disease Father     Hypertension Father     Malignant Hyperthermia Neg Hx     Pseudocholinesterase Deficiency Neg Hx     Delayed Awakening Neg Hx     Post-op Nausea/Vomiting Neg Hx     Emergence Delirium Neg Hx     Other Neg Hx     Post-op Cognitive Dysfunction Neg Hx           Review of Systems    Constitutional: no fever, negative  Eyes: fair vision, negative  Ears, nose, mouth, throat, and face:fair hearing, negative  Respiratory: no asthma, negative  Cardiovascular:no chest pain, negative  Gastrointestinal:no diarrhea, see HPI  Genitourinary: no dysuria, no hematuria  Hematologic/lymphatic: no bleeding tendency,    Neurological: no seizures,    Behvioral/Psych: no psych hospitalization   Endocrine: no goiter,  The remainder is negative      Objective:       Visit Vitals    /55    Pulse (!) 103    Temp 97.6 °F (36.4 °C)    Resp 22    Ht 5' 5\" (1.651 m)    Wt 102.6 kg (226 lb 1.6 oz)    SpO2 94%    BMI 37.63 kg/m2     08/03 0701 - 08/03 1900  In: -   Out: 500        General:  Alert, cooperative, no distress, appears stated age. Head:  Normocephalic, without obvious abnormality, atraumatic. Eyes:  Conjunctivae/corneas clear. EOMs intact. Ears:  Normal external ear canals both ears. Nose: Nares normal. Septum midline. Mucosa normal. No drainage or sinus tenderness. NGT in place. Throat: Lips, mucosa, and tongue normal. Teeth and gums normal.   Neck: Supple, symmetrical, trachea midline, no adenopathy,  no JVD. Back:   Symmetric, no curvature. ROM normal. No CVA tenderness. Lungs:   Clear to auscultation bilaterally. Chest wall:  No tenderness or deformity. Heart:  Regular rate and rhythm, S1, S2 normal, no murmur,  rub or gallop. Abdomen:   Soft,  Diffusely tender with voluntary guarding. Obese. No masses,  No organomegaly. No renal bruit. Extremities: Extremities normal, atraumatic, no cyanosis or edema. Access: AV fistula in JACOB is open and examines well. Skin: Skin color, texture, turgor normal. No rashes or lesions. Lymph nodes: Cervical and supraclavicular nodes normal.   Neurologic: Grossly intact. No asterixis. Data Review:     Recent Results (from the past 24 hour(s))   CBC WITH AUTOMATED DIFF    Collection Time: 08/03/18 11:11 AM   Result Value Ref Range    WBC 10.9 4.3 - 11.1 K/uL    RBC 3.07 (L) 4.05 - 5.25 M/uL    HGB 10.1 (L) 11.7 - 15.4 g/dL    HCT 29.4 (L) 35.8 - 46.3 %    MCV 95.8 79.6 - 97.8 FL    MCH 32.9 26.1 - 32.9 PG    MCHC 34.4 31.4 - 35.0 g/dL    RDW 16.2 (H) 11.9 - 14.6 %    PLATELET 160 225 - 438 K/uL    MPV 9.8 (L) 10.8 - 14.1 FL    DF AUTOMATED      NEUTROPHILS 84 (H) 43 - 78 %    LYMPHOCYTES 12 (L) 13 - 44 %    MONOCYTES 4 4.0 - 12.0 %    EOSINOPHILS 0 (L) 0.5 - 7.8 %    BASOPHILS 0 0.0 - 2.0 %    IMMATURE GRANULOCYTES 0 0.0 - 5.0 %    ABS. NEUTROPHILS 9.1 (H) 1.7 - 8.2 K/UL    ABS. LYMPHOCYTES 1.3 0.5 - 4.6 K/UL    ABS. MONOCYTES 0.4 0.1 - 1.3 K/UL    ABS. EOSINOPHILS 0.0 0.0 - 0.8 K/UL    ABS. BASOPHILS 0.0 0.0 - 0.2 K/UL    ABS. IMM.  GRANS. 0.0 0.0 - 0.5 K/UL   METABOLIC PANEL, COMPREHENSIVE    Collection Time: 08/03/18 11:11 AM   Result Value Ref Range    Sodium 139 136 - 145 mmol/L    Potassium 5.3 (H) 3.5 - 5.1 mmol/L    Chloride 98 98 - 107 mmol/L    CO2 32 21 - 32 mmol/L    Anion gap 9 7 - 16 mmol/L    Glucose 142 (H) 65 - 100 mg/dL    BUN 12 8 - 23 MG/DL    Creatinine 3.17 (H) 0.6 - 1.0 MG/DL    GFR est AA 19 (L) >60 ml/min/1.73m2    GFR est non-AA 16 (L) >60 ml/min/1.73m2    Calcium 9.3 8.3 - 10.4 MG/DL    Bilirubin, total 0.6 0.2 - 1.1 MG/DL    ALT (SGPT) 24 12 - 65 U/L    AST (SGOT) 68 (H) 15 - 37 U/L    Alk. phosphatase 121 50 - 136 U/L    Protein, total 8.8 (H) 6.3 - 8.2 g/dL    Albumin 4.1 3.2 - 4.6 g/dL    Globulin 4.7 (H) 2.3 - 3.5 g/dL    A-G Ratio 0.9 (L) 1.2 - 3.5     LIPASE    Collection Time: 08/03/18 11:11 AM   Result Value Ref Range    Lipase 521 (H) 73 - 393 U/L   MAGNESIUM    Collection Time: 08/03/18 11:11 AM   Result Value Ref Range    Magnesium 2.3 1.8 - 2.4 mg/dL   PHOSPHORUS    Collection Time: 08/03/18 11:11 AM   Result Value Ref Range    Phosphorus 2.1 (L) 2.3 - 3.7 MG/DL   EKG, 12 LEAD, INITIAL    Collection Time: 08/03/18 11:22 AM   Result Value Ref Range    Ventricular Rate 100 BPM    Atrial Rate 100 BPM    P-R Interval 142 ms    QRS Duration 62 ms    Q-T Interval 314 ms    QTC Calculation (Bezet) 405 ms    Calculated P Axis 72 degrees    Calculated R Axis 43 degrees    Calculated T Axis 52 degrees    Diagnosis       !! AGE AND GENDER SPECIFIC ECG ANALYSIS !! Normal sinus rhythm  Nonspecific ST and T wave abnormality  Abnormal ECG  When compared with ECG of 12-SEP-2017 18:01,  Nonspecific T wave abnormality now evident in Inferior leads  QT has shortened  Confirmed by LA LANDEROS (), Lisandro Jean ([id number]) on 8/3/2018 1:21:51 PM     GLUCOSE, POC    Collection Time: 08/03/18  4:51 PM   Result Value Ref Range    Glucose (POC) 129 (H) 65 - 100 mg/dL       CT abdomen -  Basilar lungs and pleural spaces clear except for minimalatelectasis. There is abnormal dilatation of proximal mid small bowel loops. The small bowel  loops are dilated to the ventral hernia lower abdominal wall with primarily  collapsed bowel loops distally. This appears to be a site of relative  obstruction.  There is gas and stool in the colon suggesting early or partial  obstruction. There is no free intraperitoneal air. The soft tissue nodule at the left nephrectomy bed is unchanged. Atrophic right  kidney. CXR - Impression:    No acute findings or changes in the chest.      Principal Problem:    Small bowel obstruction ([facility] [address] (8/3/2018)    Active Problems:    DM (diabetes mellitus) ([facility] [address] (2/7/2013)      HTN (hypertension) (2/7/2013)      Chronic renal failure, stage 5 (HCC) (6/15/2015)      Severe obesity (BMI 35.0-39. 9) with comorbidity ([facility] [address] (5/4/2018)        Assessment:     1. Abdominal pain -  - small bowel obstruction, working on decompression and medical management  - adjust pain meds    2. ESRD -  - S/P HD earlier today  - will consider HD tomorrow if potassium is truly increased    3. DM II -  - continue current meds    4. HTN -  - may need transdermal meds if BP increases    5. Elevated lipase -  - this level is within normal for a renal patient with vomiting    6. An emia -  - adequate control    7.  Hypophosphatemia -  - as expected for S/P dialysis today    Plan:     As above    Ki Metzger M.D.

## 2018-08-03 NOTE — PROGRESS NOTES
1945: received critical lactic acid of 2.5 from lab. Notified hospitalist, Dr Judy Miranda, ordered repeat lactic acid in 6 hours (0200). Afebrile, V.S.S., Will continue to monitor S/S of sepsis/infection.

## 2018-08-03 NOTE — CONSULTS
H&P/Consult Note/Progress Note/Office Note:   Diana Marmolejo  MRN: [id number]  :1950  Age:67 y.o. General Surgery Consult ordered by: Theron Diaz NP  Reason for Consult: Abd pain/ SBO    As previously noted HPI: Diana Marmolejo is a 78 y.o. female with PMH of ESRD on MWF HD (Dr. Meredith Zurita), HTN, DM II, hx SBO, hx nephrectomy, colon resection with colostomy (then reversal), TIA, renal cell carcinoma who presented with c/o abd pain, n/v.  Reports had onset of mid abd pain sharp in nature starting suddenly around 0200 this morning. She started vomiting in dialysis. She did complete her HD session completely. She was found to have evidence of a mechanical small bowel obstruction, lower abd ventral hernia. Gas and stool in the colon indicate obstruction is partial or early. Last BM this morning that is described as \"constipated stool\". Pt denies CP, SOB.      8/3/18: Awake in bed. C/o pain epigastric region. Denies Hx of bowel obstruction. NG to LIS. Past Medical History:   Diagnosis Date    Arthritis     AVF (arteriovenous fistula) ([facility] [address] 2016 ([facility]) Right AVF revision and thrombectomy    Cancer ([facility] [address]     L kidney    Chronic kidney disease     HD in - at [place] [facility]    Degenerative joint disease     Diabetes ([facility] [address]     type 2, average range 100-120 fasting, symptomatic below 90; last A1C?    ESRD (end stage renal disease) ([facility] [address] 2006    ESRD.  MWF dialysis     Hypertension     Obesity     Transient ischemic attack 2015     Past Surgical History:   Procedure Laterality Date    BREAST SURGERY PROCEDURE UNLISTED Right     cyst removed    HX GI      colon resection resulting in temporary colostomy reversal    HX GYN      stephan    HX OTHER SURGICAL      dialysis fistula, several permcaths    HX UROLOGICAL Left 2015    nephrectomy    HX VASCULAR ACCESS       Current Facility-Administered Medications   Medication Dose Route Frequency    0.9% sodium chloride infusion  150 mL/hr IntraVENous CONTINUOUS    sodium chloride (NS) flush 5-10 mL  5-10 mL IntraVENous Q8H    sodium chloride (NS) flush 5-10 mL  5-10 mL IntraVENous PRN    morphine injection 2 mg  2 mg IntraVENous Q4H PRN    naloxone (NARCAN) injection 0.4 mg  0.4 mg IntraVENous PRN    ondansetron (ZOFRAN) injection 4 mg  4 mg IntraVENous Q4H PRN    tuberculin injection 5 Units  5 Units IntraDERMal ONCE    benzocaine (HURRICANE) 20 % spray   Mucous Membrane PRN    insulin lispro (HUMALOG) injection   SubCUTAneous TIDAC     Pcn [penicillins] and Vancomycin  Social History     Social History    Marital status:      Spouse name: N/A    Number of children: N/A    Years of education: N/A     Social History Main Topics    Smoking status: Never Smoker    Smokeless tobacco: Never Used    Alcohol use No    Drug use: No    Sexual activity: Not Currently     Other Topics Concern    None     Social History Narrative     History   Smoking Status    Never Smoker   Smokeless Tobacco    Never Used     Family History   Problem Relation Age of Onset    Diabetes Mother     Heart Disease Mother     Hypertension Mother     Heart Disease Father     Hypertension Father     Malignant Hyperthermia Neg Hx     Pseudocholinesterase Deficiency Neg Hx     Delayed Awakening Neg Hx     Post-op Nausea/Vomiting Neg Hx     Emergence Delirium Neg Hx     Other Neg Hx     Post-op Cognitive Dysfunction Neg Hx      ROS:  Comprehensive review of systems was otherwise unremarkable except as noted above. Physical Exam:   Visit Vitals    /55    Pulse (!) 103    Temp 97.6 °F (36.4 °C)    Resp 22    Ht 5' 5\" (1.651 m)    Wt 220 lb (99.8 kg)    SpO2 94%    BMI 36.61 kg/m2     Constitutional: Alert, cooperative, no acute distress; appears stated age    Eyes:Sclera are clear.    ENMT: no external lesions gross hearing normal; no obvious neck masses, no ear or lip lesions, nares normal, NG  CV: RRR. Normal perfusion  Resp: No JVD. Breathing is  non-labored; no audible wheezing. GI: soft, non-distended, ttp epigastric region, BS hypo   Musculoskeletal: No embolic signs or cyanosis. Neuro:  Oriented; no focal deficits  Psychiatric: normal affect and mood    Recent vitals (if inpt):  Patient Vitals for the past 24 hrs:   BP Temp Pulse Resp SpO2 Height Weight   08/03/18 1541 151/55 97.6 °F (36.4 °C) (!) 103 22 94 % - -   08/03/18 1059 133/61 98.5 °F (36.9 °C) (!) 109 18 100 % 5' 5\" (1.651 m) 220 lb (99.8 kg)       Labs:  Recent Labs      08/03/18   1111   WBC  10.9   HGB  10.1*   PLT  219   NA  139   K  5.3*   CL  98   CO2  32   BUN  12   CREA  3.17*   GLU  142*   TBILI  0.6   SGOT  68*   ALT  24   AP  121   LPSE  521*       Lab Results   Component Value Date/Time    WBC 10.9 08/03/2018 11:11 AM    HGB 10.1 (L) 08/03/2018 11:11 AM    PLATELET 262 22/89/8986 11:11 AM    Sodium 139 08/03/2018 11:11 AM    Potassium 5.3 (H) 08/03/2018 11:11 AM    Chloride 98 08/03/2018 11:11 AM    CO2 32 08/03/2018 11:11 AM    BUN 12 08/03/2018 11:11 AM    Creatinine 3.17 (H) 08/03/2018 11:11 AM    Glucose 142 (H) 08/03/2018 11:11 AM    INR 0.9 11/16/2015 12:00 AM    aPTT 22.2 (L) 11/16/2015 12:00 AM    Bilirubin, total 0.6 08/03/2018 11:11 AM    AST (SGOT) 68 (H) 08/03/2018 11:11 AM    ALT (SGPT) 24 08/03/2018 11:11 AM    Alk. phosphatase 121 08/03/2018 11:11 AM    Lipase 521 (H) 08/03/2018 11:11 AM    Lactic acid 1.2 08/21/2015 08:22 PM    Troponin-I <0.05 03/26/2012 05:50 PM    Troponin-I, Qt. <0.02 (L) 09/12/2017 04:52 PM     8/3/18: CT  ABDOMEN AND PELVIS WITHOUT CONTRAST      HISTORY:  Severe abdominal pain. Renal dialysis patient.     COMPARISON: April 26, 2018      TECHNIQUE:  Helical scans through the abdomen and pelvis without contrast.   Radiation dose reduction techniques were used for this study:   All CT scans  performed at this facility use one or all of the following: Automated exposure  control, adjustment of the mA and/or kVp according to patient's size, iterative  reconstruction.        CT ABDOMEN:  Basilar lungs and pleural spaces clear except for minimal  atelectasis. There is abnormal dilatation of proximal mid small bowel loops. The small bowel  loops are dilated to the ventral hernia lower abdominal wall with primarily  collapsed bowel loops distally. This appears to be a site of relative  obstruction. There is gas and stool in the colon suggesting early or partial  obstruction. There is no free intraperitoneal air. The soft tissue nodule at the left nephrectomy bed is unchanged. Atrophic right  kidney.     CT PELVIS:   No free fluid.     IMPRESSION:    1. Evidence of mechanical small bowel obstruction, the site of the obstruction  appears to be the lower abdominal ventral hernia. 2. Gas and stool in the colon indicates obstruction is partial or early.         I reviewed recent labs and recent radiologic studies. I independently reviewed radiology images for studies I described above or studies I have ordered. Admission date (for inpatients): 8/3/2018   * No surgery found *  * No surgery found *    ASSESSMENT/PLAN:  Problem List  Date Reviewed: 5/4/2018          Codes Class Noted    * (Principal)Small bowel obstruction ([facility] [address] ICD-10-CM: H57.951  ICD-9-CM: 560.9  8/3/2018        Severe obesity (BMI 35.0-39. 9) with comorbidity ([facility] [address] ICD-10-CM: E66.01  ICD-9-CM: 278.01  5/4/2018        Type 2 diabetes mellitus with nephropathy ([facility] [address] ICD-10-CM: E11.21  ICD-9-CM: 250.40, 583.81  12/15/2017        Flank pain ICD-10-CM: R10.9  ICD-9-CM: 789.09  9/14/2017        Abdominal mass ICD-10-CM: R19.00  ICD-9-CM: 789.30  9/12/2017    Overview Signed 9/12/2017  9:01 PM by Warden Hawk MD     Of Left renal bed             Renal cell carcinoma ([facility] [address] ICD-10-CM: C64.9  ICD-9-CM: 189.0  9/12/2017        AVF (arteriovenous fistula) (HCC) (Chronic) ICD-10-CM: I77.0  ICD-9-CM: 447.0  12/20/2016    Overview Signed 12/20/2016  2:19 PM by Junior Mckinney NP       12/6/16 ([facility]) Right AVF revision and thrombectomy             Transient ischemic attack ICD-10-CM: G45.9  ICD-9-CM: 435.9  8/29/2015        Renal mass ICD-10-CM: N28.89  ICD-9-CM: 593.9  7/2/2015        Chronic renal failure, stage 5 (HCC) ICD-10-CM: N18.5  ICD-9-CM: 585.5  6/15/2015        Hypotension ICD-10-CM: I95.9  ICD-9-CM: 458.9  12/1/2014        Dizziness ICD-10-CM: G28  ICD-9-CM: 780.4  12/1/2014        Osteoarthritis of right knee ICD-10-CM: M17.11  ICD-9-CM: 715.96  2/7/2013        Total knee replacement status ICD-10-CM: S59.857  ICD-9-CM: V43.65  2/7/2013        DM (diabetes mellitus) ([facility] ICD-10-CM: E11.9  ICD-9-CM: 250.00  2/7/2013        ESRD (end stage renal disease) ([facility] [address] ICD-10-CM: N18.6  ICD-9-CM: 585.6  2/7/2013        HTN (hypertension) ICD-10-CM: I10  ICD-9-CM: 401.9  2/7/2013            Principal Problem:    Small bowel obstruction ([facility] [address] (8/3/2018)    Active Problems:    DM (diabetes mellitus) ([facility] [address] (2/7/2013)      HTN (hypertension) (2/7/2013)      Chronic renal failure, stage 5 (HCC) (6/15/2015)      Severe obesity (BMI 35.0-39. 9) with comorbidity ([facility] [address] (5/4/2018)       Plan:  Care management per Hospitalist  Nephrology consulted - ESRD on HD  NPO  NG to LIS  IVF  Will Follow      Signed:  NALLELY Moon-BC    CT reviewed. Continue NG tube decompression.   Will see in AM.    Geo Stella Robertson, DO

## 2018-08-03 NOTE — H&P
History & Physcial   NAME:  Lars Fischer   Age:  78 y.o.  :   1950   MRN:   [id number]  PCP: Cary Hanna MD  Treatment Team: Attending Provider: Jose Juan Rey MD; Consulting Provider: Conchita Panda DO; Consulting Provider: Cary Hanna MD  HPI:   Ms. Marcy Orantes is a 79 yo female with PMH of ESRD on MWF HD (Dr. Nara Collins), HTN, DM II, hx SBO, hx nephrectomy, colon resection with colostomy (then reversal), TIA, renal cell carcinoma who presented with c/o abd pain, n/v.  Reports had onset of mid abd pain sharp in nature starting suddenly around 0200 this morning. She started vomiting in dialysis. She did complete her HD session completely. She was found to have evidence of a mechanical small bowel obstruction, lower abd ventral hernia. Gas and stool in the colon indicate obstruction is partial or early. Last BM this morning that is described as \"constipated stool\". Pt denies CP, SOB. Results summary of Diagnostic Studies/Procedures copied from within [facility] EMR:  CT Results  (Last 48 hours)               18 1400  CT ABD PELV WO CONT Final result    Impression:  IMPRESSION:     1. Evidence of mechanical small bowel obstruction, the site of the obstruction   appears to be the lower abdominal ventral hernia. 2. Gas and stool in the colon indicates obstruction is partial or early. Narrative:  CT  ABDOMEN AND PELVIS WITHOUT CONTRAST        HISTORY:  Severe abdominal pain. Renal dialysis patient. COMPARISON: 2018        TECHNIQUE:  Helical scans through the abdomen and pelvis without contrast.    Radiation dose reduction techniques were used for this study: All CT scans   performed at this facility use one or all of the following: Automated exposure   control, adjustment of the mA and/or kVp according to patient's size, iterative   reconstruction.            CT ABDOMEN:  Basilar lungs and pleural spaces clear except for minimal atelectasis. There is abnormal dilatation of proximal mid small bowel loops. The small bowel   loops are dilated to the ventral hernia lower abdominal wall with primarily   collapsed bowel loops distally. This appears to be a site of relative   obstruction. There is gas and stool in the colon suggesting early or partial   obstruction. There is no free intraperitoneal air. The soft tissue nodule at the left nephrectomy bed is unchanged. Atrophic right   kidney. CT PELVIS:   No free fluid. Complete ROS done and is as stated in HPI or otherwise negative  Past Medical History:   Diagnosis Date    Arthritis     AVF (arteriovenous fistula) ([facility] 12/20/2016 12/6/16 ([facility]) Right AVF revision and thrombectomy    Cancer ([facility] [address] 2015    L kidney    Chronic kidney disease     HD in M-W-F- at [place] dialysis    Degenerative joint disease     Diabetes ([facility] [address]     type 2, average range 100-120 fasting, symptomatic below 90; last A1C?    ESRD (end stage renal disease) ([facility] [facility] Nov 2006    ESRD.  MWF dialysis     Hypertension     Obesity     Transient ischemic attack 8/29/2015      Past Surgical History:   Procedure Laterality Date    BREAST SURGERY PROCEDURE UNLISTED Right     cyst removed    HX GI      colon resection resulting in temporary colostomy reversal    HX GYN      stephan    HX OTHER SURGICAL      dialysis fistula, several permcaths    HX UROLOGICAL Left July 2015    nephrectomy    HX VASCULAR ACCESS        Allergies   Allergen Reactions    Pcn [Penicillins] Rash    Vancomycin Rash      Social History   Substance Use Topics    Smoking status: Never Smoker    Smokeless tobacco: Never Used    Alcohol use No      Family History   Problem Relation Age of Onset    Diabetes Mother     Heart Disease Mother     Hypertension Mother     Heart Disease Father     Hypertension Father     Malignant Hyperthermia Neg Hx     Pseudocholinesterase Deficiency Neg Hx     Delayed Awakening Neg Hx     Post-op Nausea/Vomiting Neg Hx     Emergence Delirium Neg Hx     Other Neg Hx     Post-op Cognitive Dysfunction Neg Hx         Objective:     Visit Vitals    /55    Pulse (!) 103    Temp 97.6 °F (36.4 °C)    Resp 22    Ht 5' 5\" (1.651 m)    Wt 99.8 kg (220 lb)    SpO2 94%    BMI 36.61 kg/m2      Temp (24hrs), Av.1 °F (36.7 °C), Min:97.6 °F (36.4 °C), Max:98.5 °F (36.9 °C)    Oxygen Therapy  O2 Sat (%): 94 % (18 1541)  O2 Device: Room air (18 1600)  Physical Exam:  General:    Alert, cooperative, no distress, appears stated age. Head:   Normocephalic, without obvious abnormality, atraumatic. Nose:  Nares normal. No drainage or sinus tenderness. Lungs:   CTA, resp even and non labored  Heart:  S1S2 present without murmurs rubs gallops. RRR. No edema  Abdomen:   Soft, mid abd tenderness on palpation. Not distended. Bowel sounds decreased. No masses  Extremities: No cyanosis. Moves ext spontaneously   Skin:     Texture, turgor normal. No rashes or lesions. Not Jaundiced  Neurologic: Alert and oriented X4. No focal deficits    Data Review:   Recent Results (from the past 24 hour(s))   CBC WITH AUTOMATED DIFF    Collection Time: 18 11:11 AM   Result Value Ref Range    WBC 10.9 4.3 - 11.1 K/uL    RBC 3.07 (L) 4.05 - 5.25 M/uL    HGB 10.1 (L) 11.7 - 15.4 g/dL    HCT 29.4 (L) 35.8 - 46.3 %    MCV 95.8 79.6 - 97.8 FL    MCH 32.9 26.1 - 32.9 PG    MCHC 34.4 31.4 - 35.0 g/dL    RDW 16.2 (H) 11.9 - 14.6 %    PLATELET 384 193 - 886 K/uL    MPV 9.8 (L) 10.8 - 14.1 FL    DF AUTOMATED      NEUTROPHILS 84 (H) 43 - 78 %    LYMPHOCYTES 12 (L) 13 - 44 %    MONOCYTES 4 4.0 - 12.0 %    EOSINOPHILS 0 (L) 0.5 - 7.8 %    BASOPHILS 0 0.0 - 2.0 %    IMMATURE GRANULOCYTES 0 0.0 - 5.0 %    ABS. NEUTROPHILS 9.1 (H) 1.7 - 8.2 K/UL    ABS. LYMPHOCYTES 1.3 0.5 - 4.6 K/UL    ABS. MONOCYTES 0.4 0.1 - 1.3 K/UL    ABS. EOSINOPHILS 0.0 0.0 - 0.8 K/UL    ABS.  BASOPHILS 0.0 0.0 - 0.2 K/UL    ABS. IMM. GRANS. 0.0 0.0 - 0.5 K/UL   METABOLIC PANEL, COMPREHENSIVE    Collection Time: 08/03/18 11:11 AM   Result Value Ref Range    Sodium 139 136 - 145 mmol/L    Potassium 5.3 (H) 3.5 - 5.1 mmol/L    Chloride 98 98 - 107 mmol/L    CO2 32 21 - 32 mmol/L    Anion gap 9 7 - 16 mmol/L    Glucose 142 (H) 65 - 100 mg/dL    BUN 12 8 - 23 MG/DL    Creatinine 3.17 (H) 0.6 - 1.0 MG/DL    GFR est AA 19 (L) >60 ml/min/1.73m2    GFR est non-AA 16 (L) >60 ml/min/1.73m2    Calcium 9.3 8.3 - 10.4 MG/DL    Bilirubin, total 0.6 0.2 - 1.1 MG/DL    ALT (SGPT) 24 12 - 65 U/L    AST (SGOT) 68 (H) 15 - 37 U/L    Alk. phosphatase 121 50 - 136 U/L    Protein, total 8.8 (H) 6.3 - 8.2 g/dL    Albumin 4.1 3.2 - 4.6 g/dL    Globulin 4.7 (H) 2.3 - 3.5 g/dL    A-G Ratio 0.9 (L) 1.2 - 3.5     LIPASE    Collection Time: 08/03/18 11:11 AM   Result Value Ref Range    Lipase 521 (H) 73 - 393 U/L   MAGNESIUM    Collection Time: 08/03/18 11:11 AM   Result Value Ref Range    Magnesium 2.3 1.8 - 2.4 mg/dL   PHOSPHORUS    Collection Time: 08/03/18 11:11 AM   Result Value Ref Range    Phosphorus 2.1 (L) 2.3 - 3.7 MG/DL   EKG, 12 LEAD, INITIAL    Collection Time: 08/03/18 11:22 AM   Result Value Ref Range    Ventricular Rate 100 BPM    Atrial Rate 100 BPM    P-R Interval 142 ms    QRS Duration 62 ms    Q-T Interval 314 ms    QTC Calculation (Bezet) 405 ms    Calculated P Axis 72 degrees    Calculated R Axis 43 degrees    Calculated T Axis 52 degrees    Diagnosis       !! AGE AND GENDER SPECIFIC ECG ANALYSIS !! Normal sinus rhythm  Nonspecific ST and T wave abnormality  Abnormal ECG  When compared with ECG of 12-SEP-2017 18:01,  Nonspecific T wave abnormality now evident in Inferior leads  QT has shortened  Confirmed by LA LANDEROS (), Maty Lopess ([id number]) on 8/3/2018 1:21:51 PM             Assessment and Plan: Active Hospital Problems    Diagnosis Date Noted    Small bowel obstruction ([address] 08/03/2018    Severe obesity (BMI 35.0-39. 9) with comorbidity ([facility] 05/04/2018    Chronic renal failure, stage 5 ([facility] 06/15/2015    DM (diabetes mellitus) ([facility] 02/07/2013    HTN (hypertension) 02/07/2013     Admit to surgical bed    SBO:  General Surgery consulted, confirmed with Surgery that Ivis Maurer NP was contacted. Place NGT to LIS. NPO. IVF. HTN:  Holding anti-hypertensives, pt NPO. Monitor    DM II:  SSI    ESRD on HD:  Consult Nephrology for MWF HD management.  Pt sees Dr. Joni Rodriguez      Notes, labs, VS, diagnostic testing reviewed  Time spent with pt 30 min  DVT prophylaxis:  SCDs  Case discussed with pt, care team, Dr. Rosales Eye  Code full  Surrogate decision maker:  daughter  Estimated length of stay: >2MN      Alen Sibley NP

## 2018-08-03 NOTE — IP AVS SNAPSHOT
303 [facility] 
 
 
 [address] 
[phone] Patient: Kecia Fischer MRN: [id number] SBO:14[birth year] About your hospitalization You were admitted on:  August 3, 2018 You last received care in the:  [facility] 2 SURGICAL You were discharged on:  August 8, 2018 Why you were hospitalized Your primary diagnosis was:  Small Bowel Obstruction (Hcc) Your diagnoses also included:  Chronic Renal Failure, Stage 5 (Hcc), Dm (Diabetes Mellitus) (Hcc), Htn (Hypertension), Severe Obesity (Bmi 35.0-39. 9) With Comorbidity (Hcc) Follow-up Information Follow up With Details Comments Contact Info Dat Medley MD   [address] [address] 
[phone] Carolina Robertson DO On 8/16/2018 2:15 2700 [facility] [address] [facility] [address] 
[phone] Your Scheduled Appointments Thursday August 16, 2018  2:15 PM EDT Global Post Op with Carolina Robertson DO  
[facility] ([facility]) [address] [place] [id number] [phone] Tuesday September 04, 2018  9:00 AM EDT  
CT CHEST WO CONT with S[facility] CT 59 SLICE UNIT 1  
SFD RADIOLOGY CT SCAN ([address] [place] [address]) [address]  
[phone] PATIENT ARRIVAL Please report 30 minutes early to check in.  
  
    
5507 Aroldo Griffin, [address] [place] [address]- 2nd floor of Outpatient Medical Office Building Friday September 07, 2018 10:45 AM EDT  
LAB with [address]  
7588 [facility] OUTREACH INSURANCE [facility]) Niyah Dalalon [address] [place] [address]  
[phone] Friday September 07, 2018 11:15 AM EDT Follow Up with MD Vinicius Bejarano Hematology and Oncology [facility] KIAH Te[address] [facility] [address] [phone] Discharge Orders None A check bradly indicates which time of day the medication should be taken. My Medications CHANGE how you take these medications Instructions Each Dose to Equal  
 Morning Noon Evening Bedtime  
 aspirin 81 mg chewable tablet What changed:  when to take this Take 1 Tab by mouth daily. 81 mg CONTINUE taking these medications Instructions Each Dose to Equal  
 Morning Noon Evening Bedtime  
 amLODIPine 10 mg tablet Commonly known as:  Comfort Pace Take  by mouth daily. butalbital-acetaminophen-caff -40 mg per capsule Commonly known as:  EXPO Notes to Patient:  Take on as needed schedule Take 1-2 Caps by mouth every six (6) hours as needed for Pain. Max Daily Amount: 8 Caps. 1-2 Cap  
    
   
   
   
  
 cinacalcet 30 mg tablet Commonly known as:  SENSIPAR Take 60 mg by mouth nightly. 60 mg  
    
   
   
   
  
  
 esomeprazole 20 mg capsule Commonly known as:  Reanna Calkin Take 2 Caps by mouth daily. 40 mg  
    
  
   
   
   
  
 furosemide 80 mg tablet Commonly known as:  LASIX Take 80 mg by mouth two (2) times a day. Indications: EDEMA, HYPERTENSION 80 mg  
    
  
   
   
  
   
  
 guaiFENesin-codeine 100-10 mg/5 mL solution Commonly known as:  Sharri Verde Notes to Patient:  Take on as needed schedule Take 5-10 mL by mouth three (3) times daily as needed for Cough or Congestion. Max Daily Amount: 30 mL. 5-10 mL HYDROcodone-acetaminophen  mg tablet Commonly known as:  Nadiaher Aldana Notes to Patient:  Take on as needed schedule Take 1 Tab by mouth every four (4) hours as needed. Max Daily Amount: 6 Tabs. 1 Tab  
    
   
   
   
  
 hyoscyamine 0.125 mg tablet Commonly known as:  [place] Notes to Patient:  Take on as needed schedule Take 1 Tab by mouth every six (6) hours as needed for Cramping or Diarrhea. 125 mcg JANUVIA 100 mg tablet Generic drug:  SITagliptin Take 50 mg by mouth every morning. Indications: TYPE 2 DIABETES MELLITUS  
 50 mg  
    
  
   
   
   
  
 * lidocaine 5 % topical cream  
Notes to Patient:  As directed Apply  to affected area two (2) times daily as needed for Pain. * lidocaine 5 % Commonly known as:  LIDODERM  
   
 1 Patch by TransDERmal route every twenty-four (24) hours. Apply patch to the affected area for 12 hours a day and remove for 12 hours a day. 1 Patch  
    
   
   
   
  
 lisinopril 40 mg tablet Commonly known as:  Sarha Escort Take 40 mg by mouth nightly. Indications: HYPERTENSION 40 mg  
    
   
   
   
  
  
 minoxidil 2.5 mg tablet Commonly known as:  Jesse Emms Take 7.5 mg by mouth two (2) times a day. Indications: HYPERTENSION  
 7.5 mg  
    
  
   
   
  
   
  
 OCUVITE PO Take  by mouth nightly. ondansetron 8 mg disintegrating tablet Commonly known as:  ZOFRAN ODT Notes to Patient:  Take on as needed schedule Take 1 Tab by mouth every eight (8) hours as needed for Nausea. 8 mg RENVELA 800 mg Tab tab Generic drug:  sevelamer carbonate Take 800 mg by mouth three (3) times daily (with meals). 5 tablets with meals/ 2 with snacks Sometimes only eats 2 meals/d  
 800 mg * Notice: This list has 2 medication(s) that are the same as other medications prescribed for you. Read the directions carefully, and ask your doctor or other care provider to review them with you. Opioid Education  Prescription Opioids: What You Need to Know: 
 
 
After general anesthesia or intravenous sedation, for 24 hours or while taking prescription Narcotics: · Limit your activities · Do not drive and operate hazardous machinery · Do not make important personal or business decisions · Do  not drink alcoholic beverages · If you have not urinated within 8 hours after discharge, please contact your surgeon on call. Report the following to your surgeon: 
· Excessive pain, swelling, redness or odor of or around the surgical area · Temperature over 100.5 · Nausea and vomiting lasting longer than 4 hours or if unable to take medications · Any signs of decreased circulation or nerve impairment to extremity: change in color, persistent  numbness, tingling, coldness or increase pain · Any questions What to do at Home: 
Recommended activity: Activity as tolerated, per MD instructions If you experience any of the following symptoms fever > 100.5, nausea, vomiting, pain, chest pain and/or shortness of breath to ER please follow up with MD. 
 
*  Please give a list of your current medications to your Primary Care Provider. *  Please update this list whenever your medications are discontinued, doses are 
    changed, or new medications (including over-the-counter products) are added. *  Please carry medication information at all times in case of emergency situations. These are general instructions for a healthy lifestyle: No smoking/ No tobacco products/ Avoid exposure to second hand smoke Surgeon General's Warning:  Quitting smoking now greatly reduces serious risk to your health. Obesity, smoking, and sedentary lifestyle greatly increases your risk for illness A healthy diet, regular physical exercise & weight monitoring are important for maintaining a healthy lifestyle You may be retaining fluid if you have a history of heart failure or if you experience any of the following symptoms:  Weight gain of 3 pounds or more overnight or 5 pounds in a week, increased swelling in our hands or feet or shortness of breath while lying flat in bed. Please call your doctor as soon as you notice any of these symptoms; do not wait until your next office visit. Recognize signs and symptoms of STROKE: 
 
F-face looks uneven A-arms unable to move or move unevenly S-speech slurred or non-existent T-time-call 911 as soon as signs and symptoms begin-DO NOT go Back to bed or wait to see if you get better-TIME IS BRAIN. Warning Signs of HEART ATTACK Call 911 if you have these symptoms: 
? Chest discomfort. Most heart attacks involve discomfort in the center of the chest that lasts more than a few minutes, or that goes away and comes back. It can feel like uncomfortable pressure, squeezing, fullness, or pain. ? Discomfort in other areas of the upper body. Symptoms can include pain or discomfort in one or both arms, the back, neck, jaw, or stomach. ? Shortness of breath with or without chest discomfort. ? Other signs may include breaking out in a cold sweat, nausea, or lightheadedness. Don't wait more than five minutes to call [address]! Fast action can save your life. Calling 911 is almost always the fastest way to get lifesaving treatment. Emergency Medical Services staff can begin treatment when they arrive  up to an hour sooner than if someone gets to the hospital by car. The discharge information has been reviewed with the patient. The patient verbalized understanding. Discharge medications reviewed with the patient and appropriate educational materials and side effects teaching were provided. ___________________________________________________________________________________________________________________________________ ACO Transitions of Care Introducing [employer] [address] Chichi Andino offers a voluntary care coordination program to provide high quality service and care to [facility] fee-for-service beneficiaries. Marlenmirlande Smith was designed to help you enhance your health and well-being through the following services: ? Transitions of Care  support for individuals who are transitioning from one care setting to another (example: Hospital to home). ? Chronic and Complex Care Coordination  support for individuals and caregivers of those with serious or chronic illnesses or with more than one chronic (ongoing) condition and those who take a number of different medications. If you meet specific medical criteria, a Critical access hospital [address] may call you directly to coordinate your care with your primary care physician and your other care providers. For questions about the [facility] programs, please, contact your physicians office. For general questions or additional information about Accountable Care Organizations: 
Please visit www.medicare.gov/acos. html or call 1-800-MEDICARE (3-702.449.6424) TTY users should call 6-157.668.1819. SosseeharPikum Announcement We are excited to announce that we are making your provider's discharge notes available to you in Sosseehart. You will see these notes when they are completed and signed by the physician that discharged you from your recent hospital stay. If you have any questions or concerns about any information you see in Sosseehart, please call the Health Information Department where you were seen or reach out to your Primary Care Provider for more information about your plan of care. Introducing [facility] HE[facility]! [employer] introduces Finderlyt patient portal. Now you can access parts of your medical record, email your doctor's office, and request medication refills online. 1. In your internet browser, go to [email or web address] 2. Click on the First Time User? Click Here link in the Sign In box. You will see the New Member Sign Up page. 3. Enter your Friend Trusted Access Code exactly as it appears below. You will not need to use this code after youve completed the sign-up process. If you do not sign up before the expiration date, you must request a new code. · Friend Trusted Access Code: [email or web address] Expires: 9/1/2018  5:20 AM 
 
4. Enter the last four digits of your Social Security Number (xxxx) and Date of Birth (mm/dd/yyyy) as indicated and click Submit. You will be taken to the next sign-up page. 5. Create a Friend Trusted ID. This will be your Friend Trusted login ID and cannot be changed, so think of one that is secure and easy to remember. 6. Create a Friend Trusted password. You can change your password at any time. 7. Enter your Password Reset Question and Answer. This can be used at a later time if you forget your password. 8. Enter your e-mail address. You will receive e-mail notification when new information is available in [address] 9. Click Sign Up. You can now view and download portions of your medical record. 10. Click the Download Summary menu link to download a portable copy of your medical information. If you have questions, please visit the Frequently Asked Questions section of the Friend Trusted website. Remember, Friend Trusted is NOT to be used for urgent needs. For medical emergencies, dial 911. Now available from your iPhone and Android! Introducing Josue Lee As a [employer] patient, I wanted to make you aware of our electronic visit tool called Josue Lee. [employer] 24/7 allows you to connect within minutes with a medical provider 24 hours a day, seven days a week via a mobile device or tablet or logging into a secure website from your computer. You can access Twitter from anywhere in the [place]. A virtual visit might be right for you when you have a simple condition and feel like you just dont want to get out of bed, or cant get away from work for an appointment, when your regular [facility] provider is not available (evenings, weekends or holidays), or when youre out of town and need minor care. Electronic visits cost only $49 and if the [employer] 24/Sunbeam provider determines a prescription is needed to treat your condition, one can be electronically transmitted to a nearby pharmacy*. Please take a moment to enroll today if you have not already done so. The enrollment process is free and takes just a few minutes. To enroll, please download the Viddyad salty to your tablet or phone, or visit [email or web address] to enroll on your computer. And, as an [address] [place] [address] patient with a [employer] account, the results of your visits will be scanned into your electronic medical record and your primary care provider will be able to view the scanned results. We urge you to continue to see your regular [facility] provider for your ongoing medical care. And while your primary care provider may not be the one available when you seek a Josue Lee virtual visit, the peace of mind you get from getting a real diagnosis real time can be priceless. For more information on Josue COSMIC COLORjaydenfin, view our Frequently Asked Questions (FAQs) at [email or web address]. Sincerely, 
 
Jocelyn Christina MD 
Chief Medical Officer Chayo Andino *:  certain medications cannot be prescribed via COVEGAsarahi Providers Seen During Your Hospitalization Provider Specialty Primary office phone Brian Delvalle MD Emergency Medicine [phone] Luisito Soto MD [facility] [phone] Immunizations Administered for This Admission Name Date  
 TB Skin Test (PPD) Intradermal 8/3/2018 Your Primary Care Physician (PCP) Primary Care Physician Office Phone Office Fax Jessika Chambers [phone] You are allergic to the following Allergen Reactions Pcn (Penicillins) Rash Vancomycin Rash Recent Documentation Height Weight BMI OB Status Smoking Status 1.651 m 99.2 kg 36.38 kg/m2 Hysterectomy Never Smoker Emergency Contacts Name Discharge Info Relation Home Work Mobile Trudy Montes De Oca  Child [2] [phone] Pablo Barthel  Sister [23] [phone] Patient Belongings The following personal items are in your possession at time of discharge: 
  Dental Appliances: None  Visual Aid: Glasses      Home Medications: None   Jewelry: None  Clothing: None    Other Valuables: None Please provide this summary of care documentation to your next provider. Signatures-by signing, you are acknowledging that this After Visit Summary has been reviewed with you and you have received a copy. Patient Signature:  ____________________________________________________________ Date:  ____________________________________________________________  
  
Jennifer Peñaloza Provider Signature:  ____________________________________________________________ Date:  ____________________________________________________________

## 2018-08-03 NOTE — ED PROVIDER NOTES
Patient is a 78 y.o. female presenting with abdominal pain. The history is provided by the patient. Abdominal Pain    This is a new problem. The current episode started 6 to 12 hours ago. The problem occurs constantly. The problem has been gradually worsening. The pain is associated with vomiting (pain increased when the Patient ate some pepperoni during dialysis). The pain is located in the epigastric region and RUQ. The quality of the pain is cramping, shooting and colicky. The pain is severe. Associated symptoms include nausea and vomiting. Pertinent negatives include no fever, no diarrhea, no constipation, no dysuria, no headaches, no arthralgias, no myalgias, no trauma, no chest pain and no back pain. The pain is worsened by activity and eating. The pain is relieved by nothing. Her past medical history is significant for diverticulitis. The patient's surgical history includes colectomy. Past Medical History:   Diagnosis Date    Arthritis     AVF (arteriovenous fistula) ([facility] [address] [facility] 12/20/2016 12/6/16 ([facility]) Right AVF revision and thrombectomy    Cancer ([facility] [address] 2015    L kidney    Chronic kidney disease     HD in M-W-F- at [facility]    Degenerative joint disease     Diabetes ([facility] [address]     type 2, average range 100-120 fasting, symptomatic below 90; last A1C?    ESRD (end stage renal disease) ([facility] [address] Nov 2006    ESRD.  MWF dialysis     Hypertension     Obesity     Transient ischemic attack 8/29/2015       Past Surgical History:   Procedure Laterality Date    BREAST SURGERY PROCEDURE UNLISTED Right     cyst removed    HX GI      colon resection resulting in temporary colostomy reversal    HX GYN      stephan    HX OTHER SURGICAL      dialysis fistula, several permcaths    HX UROLOGICAL Left July 2015    nephrectomy    HX VASCULAR ACCESS           Family History:   Problem Relation Age of Onset    Diabetes Mother     Heart Disease Mother     Hypertension Mother     Heart Disease Father    Karlamaxwell Hernandezlana Hypertension Father     Malignant Hyperthermia Neg Hx     Pseudocholinesterase Deficiency Neg Hx     Delayed Awakening Neg Hx     Post-op Nausea/Vomiting Neg Hx     Emergence Delirium Neg Hx     Other Neg Hx     Post-op Cognitive Dysfunction Neg Hx        Social History     Social History    Marital status:      Spouse name: N/A    Number of children: N/A    Years of education: N/A     Occupational History    Not on file. Social History Main Topics    Smoking status: Never Smoker    Smokeless tobacco: Never Used    Alcohol use No    Drug use: No    Sexual activity: Not Currently     Other Topics Concern    Not on file     Social History Narrative         ALLERGIES: Pcn [penicillins] and Vancomycin    Review of Systems   Constitutional: Negative for chills and fever. HENT: Negative for congestion, ear pain and rhinorrhea. Eyes: Negative for photophobia and discharge. Respiratory: Negative for cough and shortness of breath. Cardiovascular: Negative for chest pain and palpitations. Gastrointestinal: Positive for abdominal pain, nausea and vomiting. Negative for constipation and diarrhea. Endocrine: Negative for cold intolerance and heat intolerance. Genitourinary: Negative for dysuria and flank pain. Musculoskeletal: Negative for arthralgias, back pain, myalgias and neck pain. Skin: Negative for rash and wound. Allergic/Immunologic: Negative for environmental allergies and food allergies. Neurological: Negative for syncope and headaches. Hematological: Negative for adenopathy. Does not bruise/bleed easily. Psychiatric/Behavioral: Negative for dysphoric mood. The patient is not nervous/anxious. All other systems reviewed and are negative.       Vitals:    08/03/18 1059   BP: 133/61   Pulse: (!) 109   Resp: 18   Temp: 98.5 °F (36.9 °C)   SpO2: 100%   Weight: 99.8 kg (220 lb)   Height: 5' 5\" (1.651 m)            Physical Exam   Constitutional: She is oriented to person, place, and time. She appears well-developed and well-nourished. She appears distressed. HENT:   Head: Normocephalic and atraumatic. Mouth/Throat: Oropharynx is clear and moist.   Eyes: Conjunctivae and EOM are normal. Pupils are equal, round, and reactive to light. Right eye exhibits no discharge. Left eye exhibits no discharge. No scleral icterus. Neck: Normal range of motion. Neck supple. No JVD present. No thyromegaly present. Cardiovascular: Normal rate, regular rhythm, normal heart sounds and intact distal pulses. Exam reveals no gallop and no friction rub. No murmur heard. Pulmonary/Chest: Effort normal and breath sounds normal. No respiratory distress. She has no wheezes. She exhibits no tenderness. Abdominal: Soft. Normal appearance and bowel sounds are normal. She exhibits no distension. There is no hepatosplenomegaly. There is tenderness in the right upper quadrant and epigastric area. There is no rebound and no guarding. No hernia. Musculoskeletal: Normal range of motion. She exhibits no edema or tenderness. Neurological: She is alert and oriented to person, place, and time. No cranial nerve deficit. She exhibits normal muscle tone. Skin: Skin is warm and dry. No rash noted. She is not diaphoretic. No erythema. Psychiatric: She has a normal mood and affect. Her behavior is normal. Judgment and thought content normal.   Nursing note and vitals reviewed.        MDM  Number of Diagnoses or Management Options  Acute pancreatitis, unspecified complication status, unspecified pancreatitis type: new and requires workup  Partial intestinal obstruction, unspecified cause [facility]): new and requires workup  Diagnosis management comments: EKG reviewed  Sinus rhythm,, normal intervals, axis, no ectopy  No acute ischemic changes    ccase discussed with hospitalist  We'll evaluate for admission       Amount and/or Complexity of Data Reviewed  Clinical lab tests: ordered and reviewed  Tests in the radiology section of CPT®: ordered and reviewed  Tests in the medicine section of CPT®: reviewed and ordered  Review and summarize past medical records: yes  Discuss the patient with other providers: yes  Independent visualization of images, tracings, or specimens: yes    Risk of Complications, Morbidity, and/or Mortality  Presenting problems: high  Diagnostic procedures: high  Management options: moderate  General comments: Elements of this note have been dictated via voice recognition software. Text and phrases may be limited by the accuracy of the software. The chart has been reviewed, but errors may still be present.       Patient Progress  Patient progress: improved        ED Course       Procedures

## 2018-08-03 NOTE — ED TRIAGE NOTES
Pt states upper abdominal pain midline that started last night. States she vomited x3 at dialysis today, but was able to complete her full run of dialysis. Pt denies diarrhea/constipation or recent fevers. Pt states she does not make much urine. States she still has all of her abdominal organs except left kidney.

## 2018-08-03 NOTE — PROGRESS NOTES
TRANSFER - IN REPORT:    Verbal report received from [facility] (name) on [address]  being received from ED (unit) for routine progression of care      Report consisted of patients Situation, Background, Assessment and   Recommendations(SBAR). Information from the following report(s) SBAR was reviewed with the receiving nurse. Opportunity for questions and clarification was provided. Assessment completed upon patients arrival to unit and care assumed.

## 2018-08-03 NOTE — PROGRESS NOTES
08/03/18 1600 Dual Skin Pressure Injury Assessment Dual Skin Pressure Injury Assessment [facility] Second Care Provider (Based on [address] [place] [address]) Lorelee Bence

## 2018-08-03 NOTE — ED NOTES
TRANSFER - OUT REPORT:    Verbal report given to Nilay Brush on [address]  being transferred to [facility]  for routine progression of care       Report consisted of patients Situation, Background, Assessment and   Recommendations(SBAR). Information from the following report(s) SBAR, MAR and Recent Results was reviewed with the receiving nurse. Lines:   Peripheral IV Left External jugular (Active)   Site Assessment Clean, dry, & intact 8/3/2018 12:15 PM   Phlebitis Assessment 0 8/3/2018 12:15 PM   Infiltration Assessment 0 8/3/2018 12:15 PM   Dressing Status Clean, dry, & intact 8/3/2018 12:15 PM   Dressing Type Transparent 8/3/2018 12:15 PM        Opportunity for questions and clarification was provided.

## 2018-08-04 ENCOUNTER — APPOINTMENT (OUTPATIENT)
Dept: GENERAL RADIOLOGY | Age: 68
DRG: 356 | End: 2018-08-04
Attending: NURSE PRACTITIONER
Payer: MEDICARE

## 2018-08-04 LAB
ALBUMIN SERPL-MCNC: 3.3 G/DL (ref 3.2–4.6)
ALBUMIN/GLOB SERPL: 0.8 {RATIO} (ref 1.2–3.5)
ALP SERPL-CCNC: 100 U/L (ref 50–136)
ALT SERPL-CCNC: 19 U/L (ref 12–65)
ANION GAP SERPL CALC-SCNC: 6 MMOL/L (ref 7–16)
AST SERPL-CCNC: 27 U/L (ref 15–37)
BASOPHILS # BLD: 0 K/UL (ref 0–0.2)
BASOPHILS NFR BLD: 0 % (ref 0–2)
BILIRUB SERPL-MCNC: 0.6 MG/DL (ref 0.2–1.1)
BUN SERPL-MCNC: 16 MG/DL (ref 8–23)
CALCIUM SERPL-MCNC: 9.2 MG/DL (ref 8.3–10.4)
CHLORIDE SERPL-SCNC: 100 MMOL/L (ref 98–107)
CO2 SERPL-SCNC: 37 MMOL/L (ref 21–32)
CREAT SERPL-MCNC: 5.07 MG/DL (ref 0.6–1)
DIFFERENTIAL METHOD BLD: ABNORMAL
EOSINOPHIL # BLD: 0.1 K/UL (ref 0–0.8)
EOSINOPHIL NFR BLD: 1 % (ref 0.5–7.8)
ERYTHROCYTE [DISTWIDTH] IN BLOOD BY AUTOMATED COUNT: 16.3 % (ref 11.9–14.6)
GLOBULIN SER CALC-MCNC: 4 G/DL (ref 2.3–3.5)
GLUCOSE BLD STRIP.AUTO-MCNC: 112 MG/DL (ref 65–100)
GLUCOSE BLD STRIP.AUTO-MCNC: 115 MG/DL (ref 65–100)
GLUCOSE BLD STRIP.AUTO-MCNC: 115 MG/DL (ref 65–100)
GLUCOSE BLD STRIP.AUTO-MCNC: 130 MG/DL (ref 65–100)
GLUCOSE SERPL-MCNC: 122 MG/DL (ref 65–100)
HCT VFR BLD AUTO: 27.4 % (ref 35.8–46.3)
HGB BLD-MCNC: 9.4 G/DL (ref 11.7–15.4)
IMM GRANULOCYTES # BLD: 0 K/UL (ref 0–0.5)
IMM GRANULOCYTES NFR BLD AUTO: 0 % (ref 0–5)
LACTATE SERPL-SCNC: 1 MMOL/L (ref 0.4–2)
LYMPHOCYTES # BLD: 1.4 K/UL (ref 0.5–4.6)
LYMPHOCYTES NFR BLD: 15 % (ref 13–44)
MCH RBC QN AUTO: 33.3 PG (ref 26.1–32.9)
MCHC RBC AUTO-ENTMCNC: 34.3 G/DL (ref 31.4–35)
MCV RBC AUTO: 97.2 FL (ref 79.6–97.8)
MONOCYTES # BLD: 0.5 K/UL (ref 0.1–1.3)
MONOCYTES NFR BLD: 6 % (ref 4–12)
NEUTS SEG # BLD: 7.6 K/UL (ref 1.7–8.2)
NEUTS SEG NFR BLD: 78 % (ref 43–78)
PLATELET # BLD AUTO: 218 K/UL (ref 150–450)
PMV BLD AUTO: 9.6 FL (ref 10.8–14.1)
POTASSIUM SERPL-SCNC: 4.3 MMOL/L (ref 3.5–5.1)
PROT SERPL-MCNC: 7.3 G/DL (ref 6.3–8.2)
RBC # BLD AUTO: 2.82 M/UL (ref 4.05–5.25)
SODIUM SERPL-SCNC: 143 MMOL/L (ref 136–145)
WBC # BLD AUTO: 9.6 K/UL (ref 4.3–11.1)

## 2018-08-04 PROCEDURE — 65270000029 HC RM PRIVATE

## 2018-08-04 PROCEDURE — 82962 GLUCOSE BLOOD TEST: CPT

## 2018-08-04 PROCEDURE — 36415 COLL VENOUS BLD VENIPUNCTURE: CPT

## 2018-08-04 PROCEDURE — 74011250636 HC RX REV CODE- 250/636: Performed by: INTERNAL MEDICINE

## 2018-08-04 PROCEDURE — 83605 ASSAY OF LACTIC ACID: CPT

## 2018-08-04 PROCEDURE — 80053 COMPREHEN METABOLIC PANEL: CPT

## 2018-08-04 PROCEDURE — 85025 COMPLETE CBC W/AUTO DIFF WBC: CPT

## 2018-08-04 PROCEDURE — 74018 RADEX ABDOMEN 1 VIEW: CPT

## 2018-08-04 PROCEDURE — 74011250636 HC RX REV CODE- 250/636: Performed by: FAMILY MEDICINE

## 2018-08-04 RX ORDER — DIPHENHYDRAMINE HYDROCHLORIDE 50 MG/ML
25 INJECTION, SOLUTION INTRAMUSCULAR; INTRAVENOUS
Status: DISCONTINUED | OUTPATIENT
Start: 2018-08-04 | End: 2018-08-08 | Stop reason: HOSPADM

## 2018-08-04 RX ADMIN — MORPHINE SULFATE 4 MG: 2 INJECTION, SOLUTION INTRAMUSCULAR; INTRAVENOUS at 09:21

## 2018-08-04 RX ADMIN — MORPHINE SULFATE 4 MG: 2 INJECTION, SOLUTION INTRAMUSCULAR; INTRAVENOUS at 21:06

## 2018-08-04 RX ADMIN — DIPHENHYDRAMINE HYDROCHLORIDE 25 MG: 50 INJECTION, SOLUTION INTRAMUSCULAR; INTRAVENOUS at 21:05

## 2018-08-04 RX ADMIN — MORPHINE SULFATE 2 MG: 2 INJECTION, SOLUTION INTRAMUSCULAR; INTRAVENOUS at 14:45

## 2018-08-04 RX ADMIN — Medication 10 ML: at 06:00

## 2018-08-04 RX ADMIN — Medication 10 ML: at 21:06

## 2018-08-04 NOTE — PROGRESS NOTES
Hospitalist Progress Note    Subjective:   Daily Progress Note: 8/4/2018 3:50 PM    HD patient with sudden onset early am 8/3 of epigastric and right upper abdominal pain described as sharp, shooting, crampy and colicky, awakened from sound sleep by same. Also reports nausea and vomiting x 3 presented to ER 8/3 late am after dialysis. Pain increased with eating (pepperoni during HD) and motion. No prior history of same. History of diverticulitis with colostomy and reversal.  Also history of left renal cancer with nephrectomy and recurrent mass at site of the nephrectomy. CT with partial vs early SBO and ventral hernia as noted below. Last BM 8/3 am, constipated. Nephrology and General surgery consulted. Admitted with N/G to low intermittent suction, NPO, IVF, pain meds. Lipase 521 on arrival, lactic acid: 2.5. Today:  Resting quietly in bed talking with sister. Approx 900 ml dark gastric output from N/G today. Still having a lot of epigastric pain, miserable. Nausea fairly well controlled. Unable to auscultate bowel sounds, abdomen with multiple old surgical scars, approx 8 cm hernia palpable. States she is \"ready to get it over with. \"     ADDITIONAL HISTORY:  ESRD 2006 on HD M-W-F at [place] dialysis, left nephrectomy due to cancer, diverticulitis with colostomy and reversal 2002, AVF with revision, NIDDM on januvia with A1C 4.7 9/17, DJD, hypertension, TIA, obesity.      Current Facility-Administered Medications   Medication Dose Route Frequency    sodium chloride (NS) flush 5-10 mL  5-10 mL IntraVENous Q8H    sodium chloride (NS) flush 5-10 mL  5-10 mL IntraVENous PRN    naloxone (NARCAN) injection 0.4 mg  0.4 mg IntraVENous PRN    ondansetron (ZOFRAN) injection 4 mg  4 mg IntraVENous Q4H PRN    tuberculin injection 5 Units  5 Units IntraDERMal ONCE    benzocaine (HURRICANE) 20 % spray   Mucous Membrane PRN    insulin lispro (HUMALOG) injection   SubCUTAneous TIDAC    morphine injection 4 mg  4 mg IntraVENous Q4H PRN        Review of Systems  A comprehensive review of systems was negative except for that written in the HPI. Objective:     Visit Vitals    /74    Pulse 94    Temp 98.6 °F (37 °C)    Resp 18    Ht 5' 5\" (1.651 m)    Wt 102.6 kg (226 lb 1.6 oz)    SpO2 92%    BMI 37.63 kg/m2      O2 Device: Room air    Temp (24hrs), Av.7 °F (37.1 °C), Min:98.4 °F (36.9 °C), Max:99.1 °F (37.3 °C)     190 -  0700  In: -   Out: 0014 [Urine:10]    General appearance: Oriented and alert, cooperative, still having a good deal of pain. Morbidly obese. Sister at bedside. Head: Normocephalic, without obvious abnormality, atraumatic  Neck: supple, symmetrical, trachea midline, no JVD  Lungs: clear to auscultation bilaterally, slightly diminished in bases. Heart: regular rate and rhythm, S1, S2 normal, no murmur, click, rub or gallop  Abdomen: soft, very mild tenderness. Continues to complain of midline upper abdominal pain. See above. Mass just superior to umbilicus.   No additional masses,  no organomegaly  Extremities: extremities normal, atraumatic, no cyanosis, dependent edema  Skin: Skin color, texture, turgor normal. No rashes or lesions  Neurologic: Grossly normal, no unilateral deficit    Additional comments: Notes,orders, test results, vitals reviewed    Data Review  Recent Results (from the past 24 hour(s))   GLUCOSE, POC    Collection Time: 18  4:51 PM   Result Value Ref Range    Glucose (POC) 129 (H) 65 - 100 mg/dL   LACTIC ACID    Collection Time: 18  6:54 PM   Result Value Ref Range    Lactic acid 2.5 (HH) 0.4 - 2.0 MMOL/L   GLUCOSE, POC    Collection Time: 18  9:19 PM   Result Value Ref Range    Glucose (POC) 154 (H) 65 - 579 mg/dL   METABOLIC PANEL, COMPREHENSIVE    Collection Time: 18  4:16 AM   Result Value Ref Range    Sodium 143 136 - 145 mmol/L    Potassium 4.3 3.5 - 5.1 mmol/L    Chloride 100 98 - 107 mmol/L    CO2 37 (H) 21 - 32 mmol/L    Anion gap 6 (L) 7 - 16 mmol/L    Glucose 122 (H) 65 - 100 mg/dL    BUN 16 8 - 23 MG/DL    Creatinine 5.07 (H) 0.6 - 1.0 MG/DL    GFR est AA 11 (L) >60 ml/min/1.73m2    GFR est non-AA 9 (L) >60 ml/min/1.73m2    Calcium 9.2 8.3 - 10.4 MG/DL    Bilirubin, total 0.6 0.2 - 1.1 MG/DL    ALT (SGPT) 19 12 - 65 U/L    AST (SGOT) 27 15 - 37 U/L    Alk. phosphatase 100 50 - 136 U/L    Protein, total 7.3 6.3 - 8.2 g/dL    Albumin 3.3 3.2 - 4.6 g/dL    Globulin 4.0 (H) 2.3 - 3.5 g/dL    A-G Ratio 0.8 (L) 1.2 - 3.5     CBC WITH AUTOMATED DIFF    Collection Time: 08/04/18  4:16 AM   Result Value Ref Range    WBC 9.6 4.3 - 11.1 K/uL    RBC 2.82 (L) 4.05 - 5.25 M/uL    HGB 9.4 (L) 11.7 - 15.4 g/dL    HCT 27.4 (L) 35.8 - 46.3 %    MCV 97.2 79.6 - 97.8 FL    MCH 33.3 (H) 26.1 - 32.9 PG    MCHC 34.3 31.4 - 35.0 g/dL    RDW 16.3 (H) 11.9 - 14.6 %    PLATELET 676 852 - 266 K/uL    MPV 9.6 (L) 10.8 - 14.1 FL    DF AUTOMATED      NEUTROPHILS 78 43 - 78 %    LYMPHOCYTES 15 13 - 44 %    MONOCYTES 6 4.0 - 12.0 %    EOSINOPHILS 1 0.5 - 7.8 %    BASOPHILS 0 0.0 - 2.0 %    IMMATURE GRANULOCYTES 0 0.0 - 5.0 %    ABS. NEUTROPHILS 7.6 1.7 - 8.2 K/UL    ABS. LYMPHOCYTES 1.4 0.5 - 4.6 K/UL    ABS. MONOCYTES 0.5 0.1 - 1.3 K/UL    ABS. EOSINOPHILS 0.1 0.0 - 0.8 K/UL    ABS. BASOPHILS 0.0 0.0 - 0.2 K/UL    ABS. IMM. GRANS. 0.0 0.0 - 0.5 K/UL   LACTIC ACID    Collection Time: 08/04/18  4:16 AM   Result Value Ref Range    Lactic acid 1.0 0.4 - 2.0 MMOL/L   GLUCOSE, POC    Collection Time: 08/04/18  6:26 AM   Result Value Ref Range    Glucose (POC) 115 (H) 65 - 100 mg/dL   GLUCOSE, POC    Collection Time: 08/04/18 12:15 PM   Result Value Ref Range    Glucose (POC) 115 (H) 65 - 100 mg/dL     8/3:  CXR;  No acute findings or changes in the chest.     8/3:  CT ABDOMEN AND PELVIS:    CT ABDOMEN:  Basilar lungs and pleural spaces clear except for minimal atelectasis. There is abnormal dilatation of proximal mid small bowel loops.  The small bowel loops are dilated to the ventral hernia lower abdominal wall with primarily collapsed bowel loops distally. This appears to be a site of relative obstruction. There is gas and stool in the colon suggesting early or partial  obstruction. There is no free intraperitoneal air. The soft tissue nodule at the left nephrectomy bed is unchanged. Atrophi  right  kidney. CT PELVIS:   No free fluid. IMPRESSION:   Evidence of mechanical small bowel obstruction, the site of the obstruction appears to be the lower abdominal ventral hernia. Gas and stool in the colon indicates obstruction is partial or early.     8/4:  KUB:  Esophageal tube appears to terminate in the first portion of the duodenum. Oral contrast is seen throughout the colon. No free air is evident. The bowel gas pattern is nonspecific and there is no evidence of ileus or obstruction. No suspicious renal or ureteral calculi are evident. Visualized osseous structures unremarkable.   impression: No acute pathology identified    Assessment/Plan:   Acute onset epigastric pain, nausea, and vomiting with etiology per below  Small bowel obstruction vs partial SBO:  Appears to be caused by ventral hernia   Surgery on board   Decompression   Bowel rest   Analgesics, Antiemetics, IVF with caution  Elevated lipase    History of diverticulitis with colostomy and reversal 2002  History of renal cell cancer with left nephrectomy and recurrent mass at the site  ESRD on HD   Dr Jacob Mueller on board   Monday, weds fri dialysis   Labs tomorrow  NIDDM taking januvia  HTN   Monitor, may need prn antihypertensives  Severe obesity  Abdominal mass  History of TIA  Anemia of chronic disease    Daily labs     Care Plan discussed with: Patient/sister and Nurse    Signed By: Dustin Aguiar NP     August 4, 2018

## 2018-08-04 NOTE — PROGRESS NOTES
PT Note:  PT/OT orders received/reviewed and evaluation attempted. Patient was off the floor. Evaluation will be re-attempted as schedule and patient's status allow.   Thanks,  Karen Becerril, PT, DPT

## 2018-08-04 NOTE — PROGRESS NOTES
Problem: Falls - Risk of  Goal: *Absence of Falls  Document Loyd Fall Risk and appropriate interventions in the flowsheet.    Outcome: Progressing Towards Goal  Fall Risk Interventions:  Mobility Interventions: Patient to call before getting OOB         Medication Interventions: Patient to call before getting OOB

## 2018-08-04 NOTE — PROGRESS NOTES
END OF SHIFT NOTE:    INTAKE/OUTPUT  08/03 0701 - 08/04 0700  In: -   Out: 3897 [Urine:10]  Voiding: YES  Catheter: NO  Drain:   Nasogastric Tube 08/03/18 (Active)   Site Assessment Clean, dry, & intact 8/3/2018 10:43 PM   Dressing Status Clean, dry, & intact 8/3/2018  4:58 PM   G Port Status Intermittent Suction 8/3/2018  4:58 PM   External Insertion Armand (cms) 60 cms 8/3/2018  4:58 PM   Action Taken Tubing changed 8/4/2018 12:00 AM   Drainage Description Green 8/4/2018 12:00 AM   Drainage Chamber Level (ml) 900 ml 8/4/2018  2:51 PM   Output (ml) 500 ml 8/4/2018  2:51 PM               Flatus: Patient does not have flatus present. Stool:  0 occurrences. Characteristics:       Emesis: 0 occurrences. Characteristics:   Emesis Assessment  Appearance: Green    VITAL SIGNS  Patient Vitals for the past 12 hrs:   Temp Pulse Resp BP SpO2   08/04/18 1451 98.8 °F (37.1 °C) 96 18 129/77 93 %   08/04/18 1210 98.6 °F (37 °C) 94 18 128/74 92 %       Pain Assessment  Pain Intensity 1: 3 (08/04/18 1530)  Pain Location 1: Abdomen  Pain Intervention(s) 1: Medication (see MAR)  Patient Stated Pain Goal: 0    Ambulating  No    Shift report given to oncoming nurse at the bedside. adequate pain control with morphine.     Luis Armando Brambila RN

## 2018-08-04 NOTE — CONSULTS
RENAL PROGRESS NOTE    Subjective:     Patient is a 79 y/o AAF with ESRD on HD MWF at [facility] under my medical care due to diabetic nephropathy,  HTN,  history of Left RCC (Clear cell carcinoma) s/p left radical nephrectomy by Dr. Ibis Manzanares in 2015 with history of recurrent mass at the site of nephrectomy was in her usual state of health until last night when she developed abdominal pain. She had nausea, vomiting and worsening of abdominal pain on HD, but completed her treatment today. No fever or chills, no diarrhea, no chest pain,no dyspnea. She went to the ED when her symptoms progressed after she returned home from dialysis. She c/o morphine not providing adequate pain relief. NGT was placed and she is NPO. She c/o feeling constipated, but reports having had a BM this am.   8/4/18 -     Past Medical History:   Diagnosis Date    Arthritis     AVF (arteriovenous fistula) ([address] 12/20/2016 12/6/16 (S) Right AVF revision and thrombectomy    Cancer ([address] 2015    L kidney    Chronic kidney disease     HD in M-W-F- at [address] [place] [address] dialysis    Degenerative joint disease     Diabetes ([address]     type 2, average range 100-120 fasting, symptomatic below 90; last A1C?    ESRD (end stage renal disease) ([address] Nov 2006    ESRD. MWF dialysis     Hypertension     Obesity     Transient ischemic attack 8/29/2015      Past Surgical History:   Procedure Laterality Date    BREAST SURGERY PROCEDURE UNLISTED Right     cyst removed    HX GI      colon resection resulting in temporary colostomy reversal    HX GYN      stephan    HX OTHER SURGICAL      dialysis fistula, several permcaths    HX UROLOGICAL Left July 2015    nephrectomy    HX VASCULAR ACCESS        Prior to Admission medications    Medication Sig Start Date End Date Taking? Authorizing Provider   hyoscyamine (LEVSIN) 0.125 mg tablet Take 1 Tab by mouth every six (6) hours as needed for Cramping or Diarrhea.  5/10/18   Laya Reis MD ondansetron (ZOFRAN ODT) 8 mg disintegrating tablet Take 1 Tab by mouth every eight (8) hours as needed for Nausea. 5/10/18   Moncho Arreola MD   HYDROcodone-acetaminophen [facility])  mg tablet Take 1 Tab by mouth every four (4) hours as needed. Max Daily Amount: 6 Tabs. 4/13/18   Kiya Tsang,    esomeprazole (NEXIUM) 20 mg capsule Take 2 Caps by mouth daily. 4/13/18   Kiya Tsang, DO   guaiFENesin-codeine (CHERATUSSIN AC) 100-10 mg/5 mL solution Take 5-10 mL by mouth three (3) times daily as needed for Cough or Congestion. Max Daily Amount: 30 mL. 1/19/18   Vitor Cifuentes MD   lidocaine (LIDODERM) 5 % 1 Patch by TransDERmal route every twenty-four (24) hours. Apply patch to the affected area for 12 hours a day and remove for 12 hours a day. 9/16/17   Shelbi Bartlett MD   lidocaine 5 % topical cream Apply  to affected area two (2) times daily as needed for Pain. 9/1/17   FRANCISCO Ascencio   butalbital-acetaminophen-caff (FIORICET) -40 mg per capsule Take 1-2 Caps by mouth every six (6) hours as needed for Pain. Max Daily Amount: 8 Caps. 2/7/17   Silvia Wade MD   amLODIPine (NORVASC) 10 mg tablet Take  by mouth daily. Historical Provider   VIT C/VIT E/LUTEIN/MIN/OMEGA-3 (OCUVITE PO) Take  by mouth nightly. Historical Provider   cinacalcet (SENSIPAR) 30 mg tablet Take 60 mg by mouth nightly. Historical Provider   minoxidil (LONITEN) 2.5 mg tablet Take 7.5 mg by mouth two (2) times a day. Indications: HYPERTENSION    Jaylon Cool MD   aspirin 81 mg chewable tablet Take 1 Tab by mouth daily. Patient taking differently: Take 81 mg by mouth every morning. 9/1/15   Shelbi Bartlett MD   sevelamer carbonate (RENVELA) 800 mg tab tab Take 800 mg by mouth three (3) times daily (with meals). 5 tablets with meals/ 2 with snacks  Sometimes only eats 2 meals/d    Historical Provider   lisinopril (PRINIVIL, ZESTRIL) 40 mg tablet Take 40 mg by mouth nightly.  Indications: HYPERTENSION 6/8/15   Historical Provider   sitaGLIPtin (JANUVIA) 100 mg tablet Take 50 mg by mouth every morning. Indications: TYPE 2 DIABETES MELLITUS    Historical Provider   furosemide (LASIX) 80 mg tablet Take 80 mg by mouth two (2) times a day. Indications: EDEMA, HYPERTENSION    Historical Provider     Allergies   Allergen Reactions    Pcn [Penicillins] Rash    Vancomycin Rash      Social History   Substance Use Topics    Smoking status: Never Smoker    Smokeless tobacco: Never Used    Alcohol use No      Family History   Problem Relation Age of Onset    Diabetes Mother     Heart Disease Mother     Hypertension Mother     Heart Disease Father     Hypertension Father     Malignant Hyperthermia Neg Hx     Pseudocholinesterase Deficiency Neg Hx     Delayed Awakening Neg Hx     Post-op Nausea/Vomiting Neg Hx     Emergence Delirium Neg Hx     Other Neg Hx     Post-op Cognitive Dysfunction Neg Hx           Review of Systems    Constitutional: no fever, negative  Eyes: fair vision, negative  Ears, nose, mouth, throat, and face:fair hearing, negative  Respiratory: no asthma, negative  Cardiovascular:no chest pain, negative  Gastrointestinal:no diarrhea, see HPI  Genitourinary: no dysuria, no hematuria  Hematologic/lymphatic: no bleeding tendency,    Neurological: no seizures,    Behvioral/Psych: no psych hospitalization   Endocrine: no goiter,  The remainder is negative      Objective:       Visit Vitals    /74    Pulse 94    Temp 98.6 °F (37 °C)    Resp 18    Ht 5' 5\" (1.651 m)    Wt 102.6 kg (226 lb 1.6 oz)    SpO2 92%    BMI 37.63 kg/m2        08/02 1901 - 08/04 0700  In: -   Out: 1460 [Urine:10]    General:  Alert, cooperative, no distress, appears stated age. Head:  Normocephalic, without obvious abnormality, atraumatic. Eyes:  Conjunctivae/corneas clear. EOMs intact. Ears:  Normal external ear canals both ears. Nose: Nares normal. Septum midline.  Mucosa normal. No drainage or sinus tenderness. NGT in place. Throat: Lips, mucosa, and tongue normal. Teeth and gums normal.   Neck: Supple, symmetrical, trachea midline, no adenopathy,  no JVD. Back:   Symmetric, no curvature. ROM normal. No CVA tenderness. Lungs:   Clear to auscultation bilaterally. Chest wall:  No tenderness or deformity. Heart:  Regular rate and rhythm, S1, S2 normal, no murmur,  rub or gallop. Abdomen:   Soft,  Diffusely tender with voluntary guarding. Obese. No masses,  No organomegaly. No renal bruit. Extremities: Extremities normal, atraumatic, no cyanosis or edema. Access: AV fistula in JACOB is open and examines well. Skin: Skin color, texture, turgor normal. No rashes or lesions. Lymph nodes: Cervical and supraclavicular nodes normal.   Neurologic: Grossly intact. No asterixis. Data Review:     Recent Results (from the past 24 hour(s))   GLUCOSE, POC    Collection Time: 08/03/18  4:51 PM   Result Value Ref Range    Glucose (POC) 129 (H) 65 - 100 mg/dL   LACTIC ACID    Collection Time: 08/03/18  6:54 PM   Result Value Ref Range    Lactic acid 2.5 (HH) 0.4 - 2.0 MMOL/L   GLUCOSE, POC    Collection Time: 08/03/18  9:19 PM   Result Value Ref Range    Glucose (POC) 154 (H) 65 - 973 mg/dL   METABOLIC PANEL, COMPREHENSIVE    Collection Time: 08/04/18  4:16 AM   Result Value Ref Range    Sodium 143 136 - 145 mmol/L    Potassium 4.3 3.5 - 5.1 mmol/L    Chloride 100 98 - 107 mmol/L    CO2 37 (H) 21 - 32 mmol/L    Anion gap 6 (L) 7 - 16 mmol/L    Glucose 122 (H) 65 - 100 mg/dL    BUN 16 8 - 23 MG/DL    Creatinine 5.07 (H) 0.6 - 1.0 MG/DL    GFR est AA 11 (L) >60 ml/min/1.73m2    GFR est non-AA 9 (L) >60 ml/min/1.73m2    Calcium 9.2 8.3 - 10.4 MG/DL    Bilirubin, total 0.6 0.2 - 1.1 MG/DL    ALT (SGPT) 19 12 - 65 U/L    AST (SGOT) 27 15 - 37 U/L    Alk.  phosphatase 100 50 - 136 U/L    Protein, total 7.3 6.3 - 8.2 g/dL    Albumin 3.3 3.2 - 4.6 g/dL    Globulin 4.0 (H) 2.3 - 3.5 g/dL    A-G Ratio 0.8 (L) 1.2 - 3. 5     CBC WITH AUTOMATED DIFF    Collection Time: 08/04/18  4:16 AM   Result Value Ref Range    WBC 9.6 4.3 - 11.1 K/uL    RBC 2.82 (L) 4.05 - 5.25 M/uL    HGB 9.4 (L) 11.7 - 15.4 g/dL    HCT 27.4 (L) 35.8 - 46.3 %    MCV 97.2 79.6 - 97.8 FL    MCH 33.3 (H) 26.1 - 32.9 PG    MCHC 34.3 31.4 - 35.0 g/dL    RDW 16.3 (H) 11.9 - 14.6 %    PLATELET 711 517 - 277 K/uL    MPV 9.6 (L) 10.8 - 14.1 FL    DF AUTOMATED      NEUTROPHILS 78 43 - 78 %    LYMPHOCYTES 15 13 - 44 %    MONOCYTES 6 4.0 - 12.0 %    EOSINOPHILS 1 0.5 - 7.8 %    BASOPHILS 0 0.0 - 2.0 %    IMMATURE GRANULOCYTES 0 0.0 - 5.0 %    ABS. NEUTROPHILS 7.6 1.7 - 8.2 K/UL    ABS. LYMPHOCYTES 1.4 0.5 - 4.6 K/UL    ABS. MONOCYTES 0.5 0.1 - 1.3 K/UL    ABS. EOSINOPHILS 0.1 0.0 - 0.8 K/UL    ABS. BASOPHILS 0.0 0.0 - 0.2 K/UL    ABS. IMM. GRANS. 0.0 0.0 - 0.5 K/UL   LACTIC ACID    Collection Time: 08/04/18  4:16 AM   Result Value Ref Range    Lactic acid 1.0 0.4 - 2.0 MMOL/L   GLUCOSE, POC    Collection Time: 08/04/18  6:26 AM   Result Value Ref Range    Glucose (POC) 115 (H) 65 - 100 mg/dL   GLUCOSE, POC    Collection Time: 08/04/18 12:15 PM   Result Value Ref Range    Glucose (POC) 115 (H) 65 - 100 mg/dL       CT abdomen -  Basilar lungs and pleural spaces clear except for minimalatelectasis. There is abnormal dilatation of proximal mid small bowel loops. The small bowel  loops are dilated to the ventral hernia lower abdominal wall with primarily  collapsed bowel loops distally. This appears to be a site of relative  obstruction. There is gas and stool in the colon suggesting early or partial  obstruction. There is no free intraperitoneal air. The soft tissue nodule at the left nephrectomy bed is unchanged. Atrophic right  kidney. CT PELVIS:   No free fluid.    IMPRESSION:    1. Evidence of mechanical small bowel obstruction, the site of the obstruction  appears to be the lower abdominal ventral hernia.   2. Gas and stool in the colon indicates obstruction is partial or early. CXR - Impression:    No acute findings or changes in the chest.      Principal Problem:    Small bowel obstruction ([facility] (8/3/2018)    Active Problems:    DM (diabetes mellitus) ([facility] (2/7/2013)      HTN (hypertension) (2/7/2013)      Chronic renal failure, stage 5 (HCC) (6/15/2015)      Severe obesity (BMI 35.0-39. 9) with comorbidity ([facility] (5/4/2018)        Assessment:     1. Abdominal pain -  - small bowel obstruction, working on decompression and medical management  - adjust pain meds   -lower abdominal Ventral hernia appears culprit of obstruction  - followed by Dr. Delvin Hunt and colleagues from surgery     2. ESRD -  - S/P HD yesterday  - no HD needed today as potassium is improved  - plan next HD Monday    3. DM II -  - continue current meds    4. HTN -  - may need transdermal meds if BP increases    5. Elevated lipase -  - this level is within normal for a renal patient with vomiting    6.  Anemia -  - adequate control      Plan:     As above    Yusef Mendez M.D.

## 2018-08-04 NOTE — PROGRESS NOTES
Progress Note:   Georgina Beth  MRN: [id number]  :1950  Age:67 y.o. As previously noted HPI: Georgina Beth is a 78 y.o. female with PMH of ESRD on MWF HD (Dr. Clifford Cee), HTN, DM II, hx SBO, hx nephrectomy, colon resection with colostomy (then reversal), TIA, renal cell carcinoma who presented with c/o abd pain, n/v.  Reports had onset of mid abd pain sharp in nature starting suddenly around 0200 this morning. She started vomiting in dialysis. She did complete her HD session completely. She was found to have evidence of a mechanical small bowel obstruction, lower abd ventral hernia. Gas and stool in the colon indicate obstruction is partial or early. Last BM this morning that is described as \"constipated stool\". Pt denies CP, SOB.      8/3/18: Awake in bed. No new complaints. NG to LIS 1250ml 24/hr. AF, VSS. Past Medical History:   Diagnosis Date    Arthritis     AVF (arteriovenous fistula) ([facility] 2016 ([facility]) Right AVF revision and thrombectomy    Cancer ([facility]     L kidney    Chronic kidney disease     HD in --- at [facility] dialysis    Degenerative joint disease     Diabetes ([facility]     type 2, average range 100-120 fasting, symptomatic below 90; last A1C?    ESRD (end stage renal disease) ([facility] 2006    ESRD.  MWF dialysis     Hypertension     Obesity     Transient ischemic attack 2015     Past Surgical History:   Procedure Laterality Date    BREAST SURGERY PROCEDURE UNLISTED Right     cyst removed    HX GI      colon resection resulting in temporary colostomy reversal    HX GYN      stephan    HX OTHER SURGICAL      dialysis fistula, several permcaths    HX UROLOGICAL Left 2015    nephrectomy    HX VASCULAR ACCESS       Current Facility-Administered Medications   Medication Dose Route Frequency    sodium chloride (NS) flush 5-10 mL  5-10 mL IntraVENous Q8H    sodium chloride (NS) flush 5-10 mL  5-10 mL IntraVENous PRN    naloxone (NARCAN) injection 0.4 mg  0.4 mg IntraVENous PRN    ondansetron (ZOFRAN) injection 4 mg  4 mg IntraVENous Q4H PRN    benzocaine (HURRICANE) 20 % spray   Mucous Membrane PRN    insulin lispro (HUMALOG) injection   SubCUTAneous TIDAC    morphine injection 4 mg  4 mg IntraVENous Q4H PRN     Pcn [penicillins] and Vancomycin  Social History     Social History    Marital status:      Spouse name: N/A    Number of children: N/A    Years of education: N/A     Social History Main Topics    Smoking status: Never Smoker    Smokeless tobacco: Never Used    Alcohol use No    Drug use: No    Sexual activity: Not Currently     Other Topics Concern    None     Social History Narrative     History   Smoking Status    Never Smoker   Smokeless Tobacco    Never Used     Family History   Problem Relation Age of Onset    Diabetes Mother     Heart Disease Mother     Hypertension Mother     Heart Disease Father     Hypertension Father     Malignant Hyperthermia Neg Hx     Pseudocholinesterase Deficiency Neg Hx     Delayed Awakening Neg Hx     Post-op Nausea/Vomiting Neg Hx     Emergence Delirium Neg Hx     Other Neg Hx     Post-op Cognitive Dysfunction Neg Hx      ROS:  Comprehensive review of systems was otherwise unremarkable except as noted above. Physical Exam:   Visit Vitals    /77    Pulse 96    Temp 98.8 °F (37.1 °C)    Resp 18    Ht 5' 5\" (1.651 m)    Wt 226 lb 1.6 oz (102.6 kg)    SpO2 93%    BMI 37.63 kg/m2     Constitutional: Alert, cooperative, no acute distress; appears stated age    Eyes:Sclera are clear. ENMT: no external lesions gross hearing normal; no obvious neck masses, no ear or lip lesions, nares normal, NG  CV: RRR. Normal perfusion  Resp: No JVD. Breathing is  non-labored; no audible wheezing. GI: Obese, soft, non-distended, ttp epigastric region, BS hypo   Musculoskeletal: No embolic signs or cyanosis.    Neuro:  Oriented; no focal deficits  Psychiatric: normal affect and mood    Recent vitals (if inpt):  Patient Vitals for the past 24 hrs:   BP Temp Pulse Resp SpO2 Weight   08/04/18 1451 129/77 98.8 °F (37.1 °C) 96 18 93 % -   08/04/18 1210 128/74 98.6 °F (37 °C) 94 18 92 % -   08/04/18 0700 93/56 98.4 °F (36.9 °C) 94 18 90 % -   08/04/18 0300 115/69 99.1 °F (37.3 °C) 96 18 93 % -   08/03/18 2300 134/75 98.9 °F (37.2 °C) (!) 104 20 91 % -   08/03/18 1900 153/77 98.4 °F (36.9 °C) 97 20 96 % -   08/03/18 1733 - - - - - 226 lb 1.6 oz (102.6 kg)       Labs:  Recent Labs      08/04/18   0416  08/03/18   1111   WBC  9.6  10.9   HGB  9.4*  10.1*   PLT  218  219   NA  143  139   K  4.3  5.3*   CL  100  98   CO2  37*  32   BUN  16  12   CREA  5.07*  3.17*   GLU  122*  142*   TBILI  0.6  0.6   SGOT  27  68*   ALT  19  24   AP  100  121   LPSE   --   521*       Lab Results   Component Value Date/Time    WBC 9.6 08/04/2018 04:16 AM    HGB 9.4 (L) 08/04/2018 04:16 AM    PLATELET 726 10/59/4353 04:16 AM    Sodium 143 08/04/2018 04:16 AM    Potassium 4.3 08/04/2018 04:16 AM    Chloride 100 08/04/2018 04:16 AM    CO2 37 (H) 08/04/2018 04:16 AM    BUN 16 08/04/2018 04:16 AM    Creatinine 5.07 (H) 08/04/2018 04:16 AM    Glucose 122 (H) 08/04/2018 04:16 AM    INR 0.9 11/16/2015 12:00 AM    aPTT 22.2 (L) 11/16/2015 12:00 AM    Bilirubin, total 0.6 08/04/2018 04:16 AM    AST (SGOT) 27 08/04/2018 04:16 AM    ALT (SGPT) 19 08/04/2018 04:16 AM    Alk. phosphatase 100 08/04/2018 04:16 AM    Lipase 521 (H) 08/03/2018 11:11 AM    Lactic acid 1.2 08/21/2015 08:22 PM    Troponin-I <0.05 03/26/2012 05:50 PM    Troponin-I, Qt. <0.02 (L) 09/12/2017 04:52 PM     8/3/18: CT  ABDOMEN AND PELVIS WITHOUT CONTRAST      HISTORY:  Severe abdominal pain. Renal dialysis patient.     COMPARISON: April 26, 2018      TECHNIQUE:  Helical scans through the abdomen and pelvis without contrast.   Radiation dose reduction techniques were used for this study:   All CT scans  performed at this facility use one or all of the following: Automated exposure  control, adjustment of the mA and/or kVp according to patient's size, iterative  reconstruction.        CT ABDOMEN:  Basilar lungs and pleural spaces clear except for minimal  atelectasis. There is abnormal dilatation of proximal mid small bowel loops. The small bowel  loops are dilated to the ventral hernia lower abdominal wall with primarily  collapsed bowel loops distally. This appears to be a site of relative  obstruction. There is gas and stool in the colon suggesting early or partial  obstruction. There is no free intraperitoneal air. The soft tissue nodule at the left nephrectomy bed is unchanged. Atrophic right  kidney.     CT PELVIS:   No free fluid.     IMPRESSION:    1. Evidence of mechanical small bowel obstruction, the site of the obstruction  appears to be the lower abdominal ventral hernia. 2. Gas and stool in the colon indicates obstruction is partial or early.         I reviewed recent labs and recent radiologic studies. I independently reviewed radiology images for studies I described above or studies I have ordered. Admission date (for inpatients): 8/3/2018   * No surgery found *  * No surgery found *    ASSESSMENT/PLAN:  Problem List  Date Reviewed: 5/4/2018          Codes Class Noted    * (Principal)Small bowel obstruction ([facility] ICD-10-CM: T82.390  ICD-9-CM: 560.9  8/3/2018        Severe obesity (BMI 35.0-39. 9) with comorbidity ([facility] ICD-10-CM: E66.01  ICD-9-CM: 278.01  5/4/2018        Type 2 diabetes mellitus with nephropathy ([facility] ICD-10-CM: E11.21  ICD-9-CM: 250.40, 583.81  12/15/2017        Flank pain ICD-10-CM: R10.9  ICD-9-CM: 789.09  9/14/2017        Abdominal mass ICD-10-CM: R19.00  ICD-9-CM: 789.30  9/12/2017    Overview Signed 9/12/2017  9:01 PM by Jaleel Mcnulty MD     Of Left renal bed             Renal cell carcinoma ([facility] ICD-10-CM: C64.9  ICD-9-CM: 189.0  9/12/2017        AVF (arteriovenous fistula) (HCC) (Chronic) ICD-10-CM: I77.0  ICD-9-CM: 447.0  12/20/2016    Overview Signed 12/20/2016  2:19 PM by Florence Anguiano NP       12/6/16 (S) Right AVF revision and thrombectomy             Transient ischemic attack ICD-10-CM: G45.9  ICD-9-CM: 435.9  8/29/2015        Renal mass ICD-10-CM: N28.89  ICD-9-CM: 593.9  7/2/2015        Chronic renal failure, stage 5 (HCC) ICD-10-CM: N18.5  ICD-9-CM: 585.5  6/15/2015        Hypotension ICD-10-CM: I95.9  ICD-9-CM: 458.9  12/1/2014        Dizziness ICD-10-CM: A26  ICD-9-CM: 780.4  12/1/2014        Osteoarthritis of right knee ICD-10-CM: M17.11  ICD-9-CM: 715.96  2/7/2013        Total knee replacement status ICD-10-CM: B06.999  ICD-9-CM: V43.65  2/7/2013        DM (diabetes mellitus) ([facility] [address] ICD-10-CM: E11.9  ICD-9-CM: 250.00  2/7/2013        ESRD (end stage renal disease) ([facility] ICD-10-CM: N18.6  ICD-9-CM: 585.6  2/7/2013        HTN (hypertension) ICD-10-CM: I10  ICD-9-CM: 401.9  2/7/2013            Principal Problem:    Small bowel obstruction ([facility] (8/3/2018)    Active Problems:    DM (diabetes mellitus) ([facility] (2/7/2013)      HTN (hypertension) (2/7/2013)      Chronic renal failure, stage 5 (HCC) (6/15/2015)      Severe obesity (BMI 35.0-39. 9) with comorbidity ([facility] (5/4/2018)       Plan:  Care management per Hospitalist  Nephrology following- ESRD on HD  NPO  NG to DAVID FIGUEROA  KUB today      Signed:  BING Moon    Patient was seen and examined with NP. I agree with above assessment and plan. Mild abdominal pain. Reviewed CT. Will check KUB and re-examine on Sunday. If no improvement then consider surgery.     Guadelupe Pallas DePriest DO

## 2018-08-05 LAB
ALBUMIN SERPL-MCNC: 3.7 G/DL (ref 3.2–4.6)
ALBUMIN/GLOB SERPL: 0.9 {RATIO} (ref 1.2–3.5)
ALP SERPL-CCNC: 110 U/L (ref 50–136)
ALT SERPL-CCNC: 17 U/L (ref 12–65)
ANION GAP SERPL CALC-SCNC: 9 MMOL/L (ref 7–16)
AST SERPL-CCNC: 23 U/L (ref 15–37)
BASOPHILS # BLD: 0 K/UL (ref 0–0.2)
BASOPHILS NFR BLD: 0 % (ref 0–2)
BILIRUB SERPL-MCNC: 0.6 MG/DL (ref 0.2–1.1)
BUN SERPL-MCNC: 27 MG/DL (ref 8–23)
CALCIUM SERPL-MCNC: 9.4 MG/DL (ref 8.3–10.4)
CHLORIDE SERPL-SCNC: 97 MMOL/L (ref 98–107)
CO2 SERPL-SCNC: 39 MMOL/L (ref 21–32)
CREAT SERPL-MCNC: 7.58 MG/DL (ref 0.6–1)
DIFFERENTIAL METHOD BLD: ABNORMAL
EOSINOPHIL # BLD: 0.2 K/UL (ref 0–0.8)
EOSINOPHIL NFR BLD: 2 % (ref 0.5–7.8)
ERYTHROCYTE [DISTWIDTH] IN BLOOD BY AUTOMATED COUNT: 16.1 % (ref 11.9–14.6)
GLOBULIN SER CALC-MCNC: 4.1 G/DL (ref 2.3–3.5)
GLUCOSE BLD STRIP.AUTO-MCNC: 109 MG/DL (ref 65–100)
GLUCOSE BLD STRIP.AUTO-MCNC: 128 MG/DL (ref 65–100)
GLUCOSE BLD STRIP.AUTO-MCNC: 129 MG/DL (ref 65–100)
GLUCOSE BLD STRIP.AUTO-MCNC: 130 MG/DL (ref 65–100)
GLUCOSE SERPL-MCNC: 115 MG/DL (ref 65–100)
HCT VFR BLD AUTO: 28.5 % (ref 35.8–46.3)
HGB BLD-MCNC: 9.8 G/DL (ref 11.7–15.4)
IMM GRANULOCYTES # BLD: 0 K/UL (ref 0–0.5)
IMM GRANULOCYTES NFR BLD AUTO: 0 % (ref 0–5)
LIPASE SERPL-CCNC: 138 U/L (ref 73–393)
LYMPHOCYTES # BLD: 1.3 K/UL (ref 0.5–4.6)
LYMPHOCYTES NFR BLD: 15 % (ref 13–44)
MAGNESIUM SERPL-MCNC: 2.6 MG/DL (ref 1.8–2.4)
MCH RBC QN AUTO: 34 PG (ref 26.1–32.9)
MCHC RBC AUTO-ENTMCNC: 34.4 G/DL (ref 31.4–35)
MCV RBC AUTO: 99 FL (ref 79.6–97.8)
MONOCYTES # BLD: 0.5 K/UL (ref 0.1–1.3)
MONOCYTES NFR BLD: 6 % (ref 4–12)
NEUTS SEG # BLD: 6.4 K/UL (ref 1.7–8.2)
NEUTS SEG NFR BLD: 77 % (ref 43–78)
PHOSPHATE SERPL-MCNC: 5.2 MG/DL (ref 2.3–3.7)
PLATELET # BLD AUTO: 218 K/UL (ref 150–450)
PMV BLD AUTO: 10 FL (ref 10.8–14.1)
POTASSIUM SERPL-SCNC: 4.1 MMOL/L (ref 3.5–5.1)
PROT SERPL-MCNC: 7.8 G/DL (ref 6.3–8.2)
RBC # BLD AUTO: 2.88 M/UL (ref 4.05–5.25)
SODIUM SERPL-SCNC: 145 MMOL/L (ref 136–145)
WBC # BLD AUTO: 8.4 K/UL (ref 4.3–11.1)

## 2018-08-05 PROCEDURE — 83690 ASSAY OF LIPASE: CPT

## 2018-08-05 PROCEDURE — 82962 GLUCOSE BLOOD TEST: CPT

## 2018-08-05 PROCEDURE — 85025 COMPLETE CBC W/AUTO DIFF WBC: CPT

## 2018-08-05 PROCEDURE — 84100 ASSAY OF PHOSPHORUS: CPT

## 2018-08-05 PROCEDURE — 80053 COMPREHEN METABOLIC PANEL: CPT

## 2018-08-05 PROCEDURE — 36415 COLL VENOUS BLD VENIPUNCTURE: CPT

## 2018-08-05 PROCEDURE — 65270000029 HC RM PRIVATE

## 2018-08-05 PROCEDURE — 74011250636 HC RX REV CODE- 250/636: Performed by: FAMILY MEDICINE

## 2018-08-05 PROCEDURE — 83735 ASSAY OF MAGNESIUM: CPT

## 2018-08-05 PROCEDURE — 74011250636 HC RX REV CODE- 250/636: Performed by: INTERNAL MEDICINE

## 2018-08-05 RX ADMIN — MORPHINE SULFATE 4 MG: 2 INJECTION, SOLUTION INTRAMUSCULAR; INTRAVENOUS at 20:18

## 2018-08-05 RX ADMIN — MORPHINE SULFATE 4 MG: 2 INJECTION, SOLUTION INTRAMUSCULAR; INTRAVENOUS at 01:51

## 2018-08-05 RX ADMIN — DIPHENHYDRAMINE HYDROCHLORIDE 25 MG: 50 INJECTION, SOLUTION INTRAMUSCULAR; INTRAVENOUS at 17:06

## 2018-08-05 RX ADMIN — Medication 10 ML: at 06:00

## 2018-08-05 RX ADMIN — MORPHINE SULFATE 4 MG: 2 INJECTION, SOLUTION INTRAMUSCULAR; INTRAVENOUS at 14:39

## 2018-08-05 RX ADMIN — Medication 10 ML: at 17:13

## 2018-08-05 RX ADMIN — DIPHENHYDRAMINE HYDROCHLORIDE 25 MG: 50 INJECTION, SOLUTION INTRAMUSCULAR; INTRAVENOUS at 22:29

## 2018-08-05 RX ADMIN — DIPHENHYDRAMINE HYDROCHLORIDE 25 MG: 50 INJECTION, SOLUTION INTRAMUSCULAR; INTRAVENOUS at 09:40

## 2018-08-05 RX ADMIN — DIPHENHYDRAMINE HYDROCHLORIDE 25 MG: 50 INJECTION, SOLUTION INTRAMUSCULAR; INTRAVENOUS at 01:51

## 2018-08-05 RX ADMIN — Medication 10 ML: at 22:30

## 2018-08-05 RX ADMIN — MORPHINE SULFATE 4 MG: 2 INJECTION, SOLUTION INTRAMUSCULAR; INTRAVENOUS at 09:39

## 2018-08-05 NOTE — PROGRESS NOTES
PT Note:  PT orders received/reviewed and evaluation attempted. Patient was refusing to participate today but requested PT come back another time. Evaluation will be re-attempted tomorrow as schedule and patient's status allow.   Thanks,  Lalita Kumar, PT, DPT

## 2018-08-05 NOTE — PROGRESS NOTES
Ng tube clamped per order and started on clear liquids. Advised to take liquids slowly and to call if she became nauseated . Agreed.

## 2018-08-05 NOTE — PROGRESS NOTES
Progress Note:   Mar Richmond  MRN: [id number]  :1950  Age:67 y.o. As previously noted HPI: Mar Richmond is a 78 y.o. female with PMH of ESRD on MWF HD (Dr. Coleman Kc), HTN, DM II, hx SBO, hx nephrectomy, colon resection with colostomy (then reversal), TIA, renal cell carcinoma who presented with c/o abd pain, n/v.  Reports had onset of mid abd pain sharp in nature starting suddenly around 0200 this morning. She started vomiting in dialysis. She did complete her HD session completely. She was found to have evidence of a mechanical small bowel obstruction, lower abd ventral hernia. Gas and stool in the colon indicate obstruction is partial or early. Last BM this morning that is described as \"constipated stool\". Pt denies CP, SOB.      18: Awake in bed. States she wants NG tube removed. Reports +flatus and abd pain improved. NG tube 1200ml 24/hr. AF. VSS. Past Medical History:   Diagnosis Date    Arthritis     AVF (arteriovenous fistula) ([facility] 2016 ([facility]) Right AVF revision and thrombectomy    Cancer ([facility] [facility]     L kidney    Chronic kidney disease     HD in - at [place] dialysis    Degenerative joint disease     Diabetes ([facility]     type 2, average range 100-120 fasting, symptomatic below 90; last A1C?    ESRD (end stage renal disease) ([facility] 2006    ESRD.  MWF dialysis     Hypertension     Obesity     Transient ischemic attack 2015     Past Surgical History:   Procedure Laterality Date    BREAST SURGERY PROCEDURE UNLISTED Right     cyst removed    HX GI      colon resection resulting in temporary colostomy reversal    HX GYN      stephan    HX OTHER SURGICAL      dialysis fistula, several permcaths    HX UROLOGICAL Left 2015    nephrectomy    HX VASCULAR ACCESS       Current Facility-Administered Medications   Medication Dose Route Frequency    diphenhydrAMINE (BENADRYL) injection 25 mg  25 mg IntraVENous Q6H PRN    sodium chloride (NS) flush 5-10 mL  5-10 mL IntraVENous Q8H    sodium chloride (NS) flush 5-10 mL  5-10 mL IntraVENous PRN    naloxone (NARCAN) injection 0.4 mg  0.4 mg IntraVENous PRN    ondansetron (ZOFRAN) injection 4 mg  4 mg IntraVENous Q4H PRN    benzocaine (HURRICANE) 20 % spray   Mucous Membrane PRN    insulin lispro (HUMALOG) injection   SubCUTAneous TIDAC    morphine injection 4 mg  4 mg IntraVENous Q4H PRN     Pcn [penicillins] and Vancomycin  Social History     Social History    Marital status:      Spouse name: N/A    Number of children: N/A    Years of education: N/A     Social History Main Topics    Smoking status: Never Smoker    Smokeless tobacco: Never Used    Alcohol use No    Drug use: No    Sexual activity: Not Currently     Other Topics Concern    None     Social History Narrative     History   Smoking Status    Never Smoker   Smokeless Tobacco    Never Used     Family History   Problem Relation Age of Onset    Diabetes Mother     Heart Disease Mother     Hypertension Mother     Heart Disease Father     Hypertension Father     Malignant Hyperthermia Neg Hx     Pseudocholinesterase Deficiency Neg Hx     Delayed Awakening Neg Hx     Post-op Nausea/Vomiting Neg Hx     Emergence Delirium Neg Hx     Other Neg Hx     Post-op Cognitive Dysfunction Neg Hx      ROS:  Comprehensive review of systems was otherwise unremarkable except as noted above. Physical Exam:   Visit Vitals    /68    Pulse 91    Temp 99.2 °F (37.3 °C)    Resp 18    Ht 5' 5\" (1.651 m)    Wt 218 lb 9.6 oz (99.2 kg)    SpO2 91%    BMI 36.38 kg/m2     Constitutional: Alert, cooperative, no acute distress; appears stated age    Eyes:Sclera are clear. ENMT: no external lesions gross hearing normal; no obvious neck masses, no ear or lip lesions, nares normal, NG  CV: RRR. Normal perfusion  Resp: No JVD. Breathing is  non-labored; no audible wheezing.     GI: Obese, soft, non-distended, non-tender  Musculoskeletal: No embolic signs or cyanosis. Neuro:  Oriented; no focal deficits  Psychiatric: normal affect and mood    Recent vitals (if inpt):  Patient Vitals for the past 24 hrs:   BP Temp Pulse Resp SpO2 Weight   08/05/18 0700 112/68 99.2 °F (37.3 °C) 91 18 91 % -   08/05/18 0408 - - - - - 218 lb 9.6 oz (99.2 kg)   08/05/18 0406 145/64 97.3 °F (36.3 °C) (!) 101 18 91 % -   08/04/18 2300 130/73 99 °F (37.2 °C) (!) 101 18 95 % -   08/04/18 1900 124/69 98.6 °F (37 °C) 97 18 97 % -   08/04/18 1451 129/77 98.8 °F (37.1 °C) 96 18 93 % -   08/04/18 1210 128/74 98.6 °F (37 °C) 94 18 92 % -       Labs:  Recent Labs      08/05/18   0430   WBC  8.4   HGB  9.8*   PLT  218   NA  145   K  4.1   CL  97*   CO2  39*   BUN  27*   CREA  7.58*   GLU  115*   TBILI  0.6   SGOT  23   ALT  17   AP  110   LPSE  138       Lab Results   Component Value Date/Time    WBC 8.4 08/05/2018 04:30 AM    HGB 9.8 (L) 08/05/2018 04:30 AM    PLATELET 448 80/41/1459 04:30 AM    Sodium 145 08/05/2018 04:30 AM    Potassium 4.1 08/05/2018 04:30 AM    Chloride 97 (L) 08/05/2018 04:30 AM    CO2 39 (H) 08/05/2018 04:30 AM    BUN 27 (H) 08/05/2018 04:30 AM    Creatinine 7.58 (H) 08/05/2018 04:30 AM    Glucose 115 (H) 08/05/2018 04:30 AM    INR 0.9 11/16/2015 12:00 AM    aPTT 22.2 (L) 11/16/2015 12:00 AM    Bilirubin, total 0.6 08/05/2018 04:30 AM    AST (SGOT) 23 08/05/2018 04:30 AM    ALT (SGPT) 17 08/05/2018 04:30 AM    Alk. phosphatase 110 08/05/2018 04:30 AM    Lipase 138 08/05/2018 04:30 AM    Lactic acid 1.2 08/21/2015 08:22 PM    Troponin-I <0.05 03/26/2012 05:50 PM    Troponin-I, Qt. <0.02 (L) 09/12/2017 04:52 PM     8/4/18: KUB supine views     History:  mechanical small bowel obstruction, lower abd ventral hernia, 67 years  Female     Comparison:  CT abdomen pelvis yesterday     Findings:  Esophageal tube appears to terminate in the first portion of the  duodenum. Oral contrast is seen throughout the colon.   No free air is evident. The bowel gas pattern is nonspecific and there is no evidence of ileus or  obstruction. No suspicious renal or ureteral calculi are evident. Visualized  osseous structures unremarkable.     IMPRESSION  Impression: No acute pathology identified      I reviewed recent labs and recent radiologic studies. I independently reviewed radiology images for studies I described above or studies I have ordered. Admission date (for inpatients): 8/3/2018   * No surgery found *  * No surgery found *    ASSESSMENT/PLAN:  Problem List  Date Reviewed: 5/4/2018          Codes Class Noted    * (Principal)Small bowel obstruction ([facility] [address] [address] ICD-10-CM: S44.515  ICD-9-CM: 560.9  8/3/2018        Severe obesity (BMI 35.0-39. 9) with comorbidity ([facility] ICD-10-CM: E66.01  ICD-9-CM: 278.01  5/4/2018        Type 2 diabetes mellitus with nephropathy ([facility] ICD-10-CM: E11.21  ICD-9-CM: 250.40, 583.81  12/15/2017        Flank pain ICD-10-CM: R10.9  ICD-9-CM: 789.09  9/14/2017        Abdominal mass ICD-10-CM: R19.00  ICD-9-CM: 789.30  9/12/2017    Overview Signed 9/12/2017  9:01 PM by Dominick Gaona MD     Of Left renal bed             Renal cell carcinoma ([facility] ICD-10-CM: C64.9  ICD-9-CM: 189.0  9/12/2017        AVF (arteriovenous fistula) (HCC) (Chronic) ICD-10-CM: I77.0  ICD-9-CM: 447.0  12/20/2016    Overview Signed 12/20/2016  2:19 PM by Jaya Anguiano NP       12/6/16 ([facility]) Right AVF revision and thrombectomy             Transient ischemic attack ICD-10-CM: G45.9  ICD-9-CM: 435.9  8/29/2015        Renal mass ICD-10-CM: N28.89  ICD-9-CM: 593.9  7/2/2015        Chronic renal failure, stage 5 (HCC) ICD-10-CM: N18.5  ICD-9-CM: 585.5  6/15/2015        Hypotension ICD-10-CM: I95.9  ICD-9-CM: 458.9  12/1/2014        Dizziness ICD-10-CM: R42  ICD-9-CM: 780.4  12/1/2014        Osteoarthritis of right knee ICD-10-CM: M17.11  ICD-9-CM: 715.96  2/7/2013        Total knee replacement status ICD-10-CM: M18.929  ICD-9-CM: V43.65  2/7/2013        DM (diabetes mellitus) ([facility] ICD-10-CM: E11.9  ICD-9-CM: 250.00  2/7/2013        ESRD (end stage renal disease) ([facility] ICD-10-CM: N18.6  ICD-9-CM: 585.6  2/7/2013        HTN (hypertension) ICD-10-CM: I10  ICD-9-CM: 401.9  2/7/2013            Principal Problem:    Small bowel obstruction ([facility] (8/3/2018)    Active Problems:    DM (diabetes mellitus) ([facility] (2/7/2013)      HTN (hypertension) (2/7/2013)      Chronic renal failure, stage 5 (HCC) (6/15/2015)      Severe obesity (BMI 35.0-39. 9) with comorbidity ([facility] (5/4/2018)       Plan:  Care management per Hospitalist  Nephrology following- ESRD on HD  Clamp NG  Start Clears  IVF      Signed:  BING Wall    Patient was seen and examined with NP. I agree with above assessment and plan. Patient did not tolerate NG tube being clamped.   Will discuss surgery in am.     Soraida Robertson DO

## 2018-08-05 NOTE — PROGRESS NOTES
END OF SHIFT NOTE:    INTAKE/OUTPUT  08/04 0701 - 08/05 0700  In: -   Out: 1200   Voiding: NO  Catheter: NO  Drain:   Nasogastric Tube 08/03/18 (Active)   Site Assessment Clean, dry, & intact 8/5/2018  2:42 PM   Dressing Status Clean, dry, & intact 8/5/2018  2:42 PM   G Port Status Intermittent Suction 8/5/2018  2:42 PM   External Insertion Armand (cms) 60 cms 8/3/2018  4:58 PM   Action Taken Tubing changed 8/4/2018 12:00 AM   Drainage Description Derrick Countess 8/5/2018  7:50 AM   Drainage Chamber Level (ml) 900 ml 8/4/2018  2:51 PM   Output (ml) 700 ml 8/5/2018  5:26 AM               Flatus: Patient does not have flatus present. Stool:  0 occurrences. Characteristics:       Emesis: 0 occurrences. Characteristics:   Emesis Assessment  Appearance: Green    VITAL SIGNS  Patient Vitals for the past 12 hrs:   Temp Pulse Resp BP SpO2   08/05/18 1500 99 °F (37.2 °C) (!) 102 18 132/77 91 %   08/05/18 1137 98.6 °F (37 °C) 94 18 126/77 91 %       Pain Assessment  Pain Intensity 1: 8 (08/05/18 1442)  Pain Location 1: Abdomen  Pain Intervention(s) 1: Medication (see MAR)  Patient Stated Pain Goal: 0    Ambulating  No    Shift report given to oncoming nurse at the bedside. Less pain with ng tube to lis.     Ashwin Banda RN

## 2018-08-05 NOTE — PROGRESS NOTES
Pt c/o increased abdominal pain and nausea with intake of clear liquids. NG tube returned to low intermittent suction. Pt NPO.      Signed: NALLELY Monroe-BC

## 2018-08-05 NOTE — CONSULTS
RENAL PROGRESS NOTE    Subjective:     Patient is a 79 y/o AAF with ESRD on HD MWF at [facility] under my medical care due to diabetic nephropathy,  HTN,  history of Left RCC (Clear cell carcinoma) s/p left radical nephrectomy by Dr. Yahir Hayes in 2015 with history of recurrent mass at the site of nephrectomy was in her usual state of health until last night when she developed abdominal pain. She had nausea, vomiting and worsening of abdominal pain on HD, but completed her treatment today. No fever or chills, no diarrhea, no chest pain,no dyspnea. She went to the ED when her symptoms progressed after she returned home from dialysis. She c/o morphine not providing adequate pain relief. NGT was placed and she is NPO. She c/o feeling constipated, but reports having had a BM this am.   8/4/18 - still with NGT in place, but much better pain control - in better spirits  8/5/18 - attempt at having breakfast resulted in resumption of abdominal symptoms - NGT was replaced and NPO status reinstituted - plan for HD tomorrow discussed    Past Medical History:   Diagnosis Date    Arthritis     AVF (arteriovenous fistula) ([address] 12/20/2016 12/6/16 (S) Right AVF revision and thrombectomy    Cancer ([address] 2015    L kidney    Chronic kidney disease     HD in M-W-F- at [place] [facility]    Degenerative joint disease     Diabetes ([address]     type 2, average range 100-120 fasting, symptomatic below 90; last A1C?    ESRD (end stage renal disease) ([address] Nov 2006    ESRD.  MWF dialysis     Hypertension     Obesity     Transient ischemic attack 8/29/2015      Past Surgical History:   Procedure Laterality Date    BREAST SURGERY PROCEDURE UNLISTED Right     cyst removed    HX GI      colon resection resulting in temporary colostomy reversal    HX GYN      stephan    HX OTHER SURGICAL      dialysis fistula, several permcaths    HX UROLOGICAL Left July 2015    nephrectomy    HX VASCULAR ACCESS        Prior to Admission medications    Medication Sig Start Date End Date Taking? Authorizing Provider   amLODIPine (NORVASC) 10 mg tablet Take  by mouth daily. Yes Historical Provider   hyoscyamine (LEVSIN) 0.125 mg tablet Take 1 Tab by mouth every six (6) hours as needed for Cramping or Diarrhea. 5/10/18   Alen Cody MD   ondansetron (ZOFRAN ODT) 8 mg disintegrating tablet Take 1 Tab by mouth every eight (8) hours as needed for Nausea. 5/10/18   Alen Cody MD   HYDROcodone-acetaminophen [facility])  mg tablet Take 1 Tab by mouth every four (4) hours as needed. Max Daily Amount: 6 Tabs. 4/13/18   Megan Hope DO   esomeprazole (NEXIUM) 20 mg capsule Take 2 Caps by mouth daily. 4/13/18   Megan Hope DO   guaiFENesin-codeine (CHERATUSSIN AC) 100-10 mg/5 mL solution Take 5-10 mL by mouth three (3) times daily as needed for Cough or Congestion. Max Daily Amount: 30 mL. 1/19/18   Ale Charles MD   lidocaine (LIDODERM) 5 % 1 Patch by TransDERmal route every twenty-four (24) hours. Apply patch to the affected area for 12 hours a day and remove for 12 hours a day. 9/16/17   Johann Archuleta MD   lidocaine 5 % topical cream Apply  to affected area two (2) times daily as needed for Pain. 9/1/17   FRANCICSO Naidu   butalbital-acetaminophen-caff (FIORICET) -40 mg per capsule Take 1-2 Caps by mouth every six (6) hours as needed for Pain. Max Daily Amount: 8 Caps. 2/7/17   Miryam Yuan MD   VIT C/VIT E/LUTEIN/MIN/OMEGA-3 (OCUVITE PO) Take  by mouth nightly. Historical Provider   cinacalcet (SENSIPAR) 30 mg tablet Take 60 mg by mouth nightly. Historical Provider   minoxidil (LONITEN) 2.5 mg tablet Take 7.5 mg by mouth two (2) times a day. Indications: HYPERTENSION    Phys Other, MD   aspirin 81 mg chewable tablet Take 1 Tab by mouth daily. Patient taking differently: Take 81 mg by mouth every morning.  9/1/15   Johann Archuleta MD   sevelamer carbonate (RENVELA) 800 mg tab tab Take 800 mg by mouth three (3) times daily (with meals). 5 tablets with meals/ 2 with snacks  Sometimes only eats 2 meals/d    Historical Provider   lisinopril (PRINIVIL, ZESTRIL) 40 mg tablet Take 40 mg by mouth nightly. Indications: HYPERTENSION 6/8/15   Historical Provider   sitaGLIPtin (JANUVIA) 100 mg tablet Take 50 mg by mouth every morning. Indications: TYPE 2 DIABETES MELLITUS    Historical Provider   furosemide (LASIX) 80 mg tablet Take 80 mg by mouth two (2) times a day. Indications: EDEMA, HYPERTENSION    Historical Provider     Allergies   Allergen Reactions    Pcn [Penicillins] Rash    Vancomycin Rash      Social History   Substance Use Topics    Smoking status: Never Smoker    Smokeless tobacco: Never Used    Alcohol use No      Family History   Problem Relation Age of Onset    Diabetes Mother     Heart Disease Mother     Hypertension Mother     Heart Disease Father     Hypertension Father     Malignant Hyperthermia Neg Hx     Pseudocholinesterase Deficiency Neg Hx     Delayed Awakening Neg Hx     Post-op Nausea/Vomiting Neg Hx     Emergence Delirium Neg Hx     Other Neg Hx     Post-op Cognitive Dysfunction Neg Hx           Review of Systems    Constitutional: no fever, negative  Eyes: fair vision, negative  Ears, nose, mouth, throat, and face:fair hearing, negative  Respiratory: no asthma, negative  Cardiovascular:no chest pain, negative  Gastrointestinal:no diarrhea, see HPI  Genitourinary: no dysuria, no hematuria  Hematologic/lymphatic: no bleeding tendency,    Neurological: no seizures,    Behvioral/Psych: no psych hospitalization   Endocrine: no goiter,  The remainder is negative      Objective:       Visit Vitals    /77    Pulse 94    Temp 98.6 °F (37 °C)    Resp 18    Ht 5' 5\" (1.651 m)    Wt 99.2 kg (218 lb 9.6 oz)    SpO2 91%    BMI 36.38 kg/m2        08/03 1901 - 08/05 0700  In: -   Out: 2160 [Urine:10]    General:  Alert, cooperative, no distress, appears stated age.  NGT in place.    Head:  Normocephalic, without obvious abnormality, atraumatic. Neck: Supple, symmetrical, trachea midline, no adenopathy,  no JVD. Lungs:   Clear to auscultation bilaterally. Heart:  Regular rate and rhythm, S1, S2 normal, no murmur,  rub or gallop. Abdomen:   Soft,  Diffusely tender with voluntary guarding. Obese. No masses,  No organomegaly. Extremities: Extremities normal, atraumatic, no cyanosis or edema. Access: AV fistula in JACOB is open and examines well. Skin: Skin color, texture, turgor normal. No rashes or lesions. Neurologic: Grossly intact. No asterixis. Data Review:     Results for Lisset Santos (MRN [id number]) as of 8/5/2018 16:10   Ref.  Range 8/3/2018 11:11 8/4/2018 04:16 8/5/2018 04:30   WBC Latest Ref Range: 4.3 - 11.1 K/uL 10.9 9.6 8.4   RBC Latest Ref Range: 4.05 - 5.25 M/uL 3.07 (L) 2.82 (L) 2.88 (L)   HGB Latest Ref Range: 11.7 - 15.4 g/dL 10.1 (L) 9.4 (L) 9.8 (L)   HCT Latest Ref Range: 35.8 - 46.3 % 29.4 (L) 27.4 (L) 28.5 (L)   MCV Latest Ref Range: 79.6 - 97.8 FL 95.8 97.2 99.0 (H)   MCH Latest Ref Range: 26.1 - 32.9 PG 32.9 33.3 (H) 34.0 (H)   MCHC Latest Ref Range: 31.4 - 35.0 g/dL 34.4 34.3 34.4   RDW Latest Ref Range: 11.9 - 14.6 % 16.2 (H) 16.3 (H) 16.1 (H)   PLATELET Latest Ref Range: 150 - 450 K/uL 219 218 218       Recent Results (from the past 24 hour(s))   GLUCOSE, POC    Collection Time: 08/04/18  5:17 PM   Result Value Ref Range    Glucose (POC) 112 (H) 65 - 100 mg/dL   GLUCOSE, POC    Collection Time: 08/04/18  9:25 PM   Result Value Ref Range    Glucose (POC) 130 (H) 65 - 100 mg/dL   CBC WITH AUTOMATED DIFF    Collection Time: 08/05/18  4:30 AM   Result Value Ref Range    WBC 8.4 4.3 - 11.1 K/uL    RBC 2.88 (L) 4.05 - 5.25 M/uL    HGB 9.8 (L) 11.7 - 15.4 g/dL    HCT 28.5 (L) 35.8 - 46.3 %    MCV 99.0 (H) 79.6 - 97.8 FL    MCH 34.0 (H) 26.1 - 32.9 PG    MCHC 34.4 31.4 - 35.0 g/dL    RDW 16.1 (H) 11.9 - 14.6 %    PLATELET 636 279 - 972 K/uL MPV 10.0 (L) 10.8 - 14.1 FL    DF AUTOMATED      NEUTROPHILS 77 43 - 78 %    LYMPHOCYTES 15 13 - 44 %    MONOCYTES 6 4.0 - 12.0 %    EOSINOPHILS 2 0.5 - 7.8 %    BASOPHILS 0 0.0 - 2.0 %    IMMATURE GRANULOCYTES 0 0.0 - 5.0 %    ABS. NEUTROPHILS 6.4 1.7 - 8.2 K/UL    ABS. LYMPHOCYTES 1.3 0.5 - 4.6 K/UL    ABS. MONOCYTES 0.5 0.1 - 1.3 K/UL    ABS. EOSINOPHILS 0.2 0.0 - 0.8 K/UL    ABS. BASOPHILS 0.0 0.0 - 0.2 K/UL    ABS. IMM. GRANS. 0.0 0.0 - 0.5 K/UL   MAGNESIUM    Collection Time: 08/05/18  4:30 AM   Result Value Ref Range    Magnesium 2.6 (H) 1.8 - 2.4 mg/dL   PHOSPHORUS    Collection Time: 08/05/18  4:30 AM   Result Value Ref Range    Phosphorus 5.2 (H) 2.3 - 3.7 MG/DL   LIPASE    Collection Time: 08/05/18  4:30 AM   Result Value Ref Range    Lipase 138 73 - 569 U/L   METABOLIC PANEL, COMPREHENSIVE    Collection Time: 08/05/18  4:30 AM   Result Value Ref Range    Sodium 145 136 - 145 mmol/L    Potassium 4.1 3.5 - 5.1 mmol/L    Chloride 97 (L) 98 - 107 mmol/L    CO2 39 (H) 21 - 32 mmol/L    Anion gap 9 7 - 16 mmol/L    Glucose 115 (H) 65 - 100 mg/dL    BUN 27 (H) 8 - 23 MG/DL    Creatinine 7.58 (H) 0.6 - 1.0 MG/DL    GFR est AA 7 (L) >60 ml/min/1.73m2    GFR est non-AA 6 (L) >60 ml/min/1.73m2    Calcium 9.4 8.3 - 10.4 MG/DL    Bilirubin, total 0.6 0.2 - 1.1 MG/DL    ALT (SGPT) 17 12 - 65 U/L    AST (SGOT) 23 15 - 37 U/L    Alk. phosphatase 110 50 - 136 U/L    Protein, total 7.8 6.3 - 8.2 g/dL    Albumin 3.7 3.2 - 4.6 g/dL    Globulin 4.1 (H) 2.3 - 3.5 g/dL    A-G Ratio 0.9 (L) 1.2 - 3.5     GLUCOSE, POC    Collection Time: 08/05/18  6:01 AM   Result Value Ref Range    Glucose (POC) 128 (H) 65 - 100 mg/dL   GLUCOSE, POC    Collection Time: 08/05/18 11:46 AM   Result Value Ref Range    Glucose (POC) 109 (H) 65 - 100 mg/dL       CT abdomen -  Basilar lungs and pleural spaces clear except for minimalatelectasis. There is abnormal dilatation of proximal mid small bowel loops.  The small bowel  loops are dilated to the ventral hernia lower abdominal wall with primarily  collapsed bowel loops distally. This appears to be a site of relative  obstruction. There is gas and stool in the colon suggesting early or partial  obstruction. There is no free intraperitoneal air. The soft tissue nodule at the left nephrectomy bed is unchanged. Atrophic right  kidney. CT PELVIS:   No free fluid.    IMPRESSION:    1. Evidence of mechanical small bowel obstruction, the site of the obstruction  appears to be the lower abdominal ventral hernia. 2. Gas and stool in the colon indicates obstruction is partial or early. CXR - Impression:    No acute findings or changes in the chest.      Principal Problem:    Small bowel obstruction ([facility] (8/3/2018)    Active Problems:    DM (diabetes mellitus) ([facility] (2/7/2013)      HTN (hypertension) (2/7/2013)      Chronic renal failure, stage 5 (HCC) (6/15/2015)      Severe obesity (BMI 35.0-39. 9) with comorbidity ([facility] (5/4/2018)        Assessment:     1. Abdominal pain -  - small bowel obstruction, working on decompression and medical management  - improved pain control  - failed attempt at PO intake  - lower abdominal Ventral hernia appears culprit of obstruction  - followed by Dr. Livia Romeo and colleagues from surgery     2. ESRD -  - S/P HD Friday as outpatient  - no HD needed today as potassium is improved  - plan next HD tomorrow first shift preferred    3. DM II -  - continue current meds    4. HTN -  - may need transdermal meds if BP increases    5. Elevated lipase -  - this level is within normal for a renal patient with vomiting    6.  Anemia -  - Hb is dropping  - start JAMES      Plan:     As above    Loreto Aparicio M.D.

## 2018-08-05 NOTE — PROGRESS NOTES
Hospitalist Progress Note    Subjective:   Daily Progress Note: 8/5/2018 2:35 PM    HD patient with sudden onset early am 8/3 of epigastric and right upper abdominal pain described as sharp, shooting, crampy and colicky, awakened from sound sleep by same. Also reports nausea and vomiting x 3 presented to ER 8/3 late am after dialysis. Pain increased with eating (pepperoni during HD) and motion. No prior history of same. History of diverticulitis with colostomy and reversal.  Also history of left renal cancer with nephrectomy and recurrent mass at site of the nephrectomy. CT with partial vs early SBO and ventral hernia as noted below. Last BM 8/3 am, constipated. Nephrology and General surgery consulted. Admitted with N/G to low intermittent suction, NPO, IVF, pain meds. Lipase 521 on arrival, lactic acid: 2.5.    8//4:  Resting quietly in bed talking with sister. Approx 900 ml dark gastric output from N/G today. Still having a lot of epigastric pain, miserable. Nausea fairly well controlled. Unable to auscultate bowel sounds, abdomen with multiple old surgical scars, approx 8 cm hernia palpable. States she is \"ready to get it over with. \"  Nephrology in.      8/5:  N/G with 1200 ml output x 24 hours. Abdominal pain improved, passing flatus so N/G clamped per surgery this am.  Did well until she consumed clear liquids with immediate 10/10 pain, mostly in the left lower quad both at breakfast and lunch.   Surgery notified by RNs with instructions to unclamp N/G.  500 ml dark green fluid accumulated in NG collection bottle.      ADDITIONAL HISTORY:  ESRD 2006 on HD M-W-F at [place] [facility], left nephrectomy due to clear cell carcinoma, diverticulitis with colostomy and reversal 2002, AVF with revision, NIDDM on januvia with A1C 4.7 9/17, DJD, hypertension, TIA, obesity.      Current Facility-Administered Medications   Medication Dose Route Frequency    diphenhydrAMINE (BENADRYL) injection 25 mg  25 mg IntraVENous Q6H PRN    sodium chloride (NS) flush 5-10 mL  5-10 mL IntraVENous Q8H    sodium chloride (NS) flush 5-10 mL  5-10 mL IntraVENous PRN    naloxone (NARCAN) injection 0.4 mg  0.4 mg IntraVENous PRN    ondansetron (ZOFRAN) injection 4 mg  4 mg IntraVENous Q4H PRN    benzocaine (HURRICANE) 20 % spray   Mucous Membrane PRN    insulin lispro (HUMALOG) injection   SubCUTAneous TIDAC    morphine injection 4 mg  4 mg IntraVENous Q4H PRN      Review of Systems  A comprehensive review of systems was negative except for that written in the HPI. Objective:     Visit Vitals    /77    Pulse 94    Temp 98.6 °F (37 °C)    Resp 18    Ht 5' 5\" (1.651 m)    Wt 99.2 kg (218 lb 9.6 oz)    SpO2 91%    BMI 36.38 kg/m2      O2 Device: Room air    Temp (24hrs), Av.6 °F (37 °C), Min:97.3 °F (36.3 °C), Max:99.2 °F (37.3 °C)     1901 -  0700  In: -   Out: 2160 [Urine:10]    General appearance: Oriented and alert, cooperative, pain improved with opening N/G. Upset that she may need surgery. Morbidly obese. Friend at bedside. Head: Normocephalic, without obvious abnormality, atraumatic  Neck: supple, symmetrical, trachea midline, no JVD  Lungs: clear to auscultation bilaterally, slightly diminished in bases. Heart: regular rate and rhythm, S1, S2 normal, no murmur, click, rub or gallop  Abdomen: soft, very mild tenderness. mild distention. Pain now centered in LLQ, improved with opening N/G.   No additional masses,  no organomegaly  Extremities: extremities normal, atraumatic, no cyanosis, dependent edema  Skin: Skin color, texture, turgor normal. No rashes or lesions  Neurologic: Grossly normal, no unilateral deficit     Additional comments: Notes,orders, test results, vitals reviewed    Data Review  Recent Results (from the past 24 hour(s))   GLUCOSE, POC    Collection Time: 18  5:17 PM   Result Value Ref Range    Glucose (POC) 112 (H) 65 - 100 mg/dL   GLUCOSE, POC    Collection Time: 08/04/18  9:25 PM   Result Value Ref Range    Glucose (POC) 130 (H) 65 - 100 mg/dL   CBC WITH AUTOMATED DIFF    Collection Time: 08/05/18  4:30 AM   Result Value Ref Range    WBC 8.4 4.3 - 11.1 K/uL    RBC 2.88 (L) 4.05 - 5.25 M/uL    HGB 9.8 (L) 11.7 - 15.4 g/dL    HCT 28.5 (L) 35.8 - 46.3 %    MCV 99.0 (H) 79.6 - 97.8 FL    MCH 34.0 (H) 26.1 - 32.9 PG    MCHC 34.4 31.4 - 35.0 g/dL    RDW 16.1 (H) 11.9 - 14.6 %    PLATELET 037 474 - 265 K/uL    MPV 10.0 (L) 10.8 - 14.1 FL    DF AUTOMATED      NEUTROPHILS 77 43 - 78 %    LYMPHOCYTES 15 13 - 44 %    MONOCYTES 6 4.0 - 12.0 %    EOSINOPHILS 2 0.5 - 7.8 %    BASOPHILS 0 0.0 - 2.0 %    IMMATURE GRANULOCYTES 0 0.0 - 5.0 %    ABS. NEUTROPHILS 6.4 1.7 - 8.2 K/UL    ABS. LYMPHOCYTES 1.3 0.5 - 4.6 K/UL    ABS. MONOCYTES 0.5 0.1 - 1.3 K/UL    ABS. EOSINOPHILS 0.2 0.0 - 0.8 K/UL    ABS. BASOPHILS 0.0 0.0 - 0.2 K/UL    ABS. IMM. GRANS. 0.0 0.0 - 0.5 K/UL   MAGNESIUM    Collection Time: 08/05/18  4:30 AM   Result Value Ref Range    Magnesium 2.6 (H) 1.8 - 2.4 mg/dL   PHOSPHORUS    Collection Time: 08/05/18  4:30 AM   Result Value Ref Range    Phosphorus 5.2 (H) 2.3 - 3.7 MG/DL   LIPASE    Collection Time: 08/05/18  4:30 AM   Result Value Ref Range    Lipase 138 73 - 606 U/L   METABOLIC PANEL, COMPREHENSIVE    Collection Time: 08/05/18  4:30 AM   Result Value Ref Range    Sodium 145 136 - 145 mmol/L    Potassium 4.1 3.5 - 5.1 mmol/L    Chloride 97 (L) 98 - 107 mmol/L    CO2 39 (H) 21 - 32 mmol/L    Anion gap 9 7 - 16 mmol/L    Glucose 115 (H) 65 - 100 mg/dL    BUN 27 (H) 8 - 23 MG/DL    Creatinine 7.58 (H) 0.6 - 1.0 MG/DL    GFR est AA 7 (L) >60 ml/min/1.73m2    GFR est non-AA 6 (L) >60 ml/min/1.73m2    Calcium 9.4 8.3 - 10.4 MG/DL    Bilirubin, total 0.6 0.2 - 1.1 MG/DL    ALT (SGPT) 17 12 - 65 U/L    AST (SGOT) 23 15 - 37 U/L    Alk.  phosphatase 110 50 - 136 U/L    Protein, total 7.8 6.3 - 8.2 g/dL    Albumin 3.7 3.2 - 4.6 g/dL    Globulin 4.1 (H) 2.3 - 3.5 g/dL    A-G Ratio 0.9 (L) 1.2 - 3.5     GLUCOSE, POC    Collection Time: 08/05/18  6:01 AM   Result Value Ref Range    Glucose (POC) 128 (H) 65 - 100 mg/dL   GLUCOSE, POC    Collection Time: 08/05/18 11:46 AM   Result Value Ref Range    Glucose (POC) 109 (H) 65 - 100 mg/dL       8/3:  CXR;  No acute findings or changes in the chest.     8/3:  CT ABDOMEN AND PELVIS:    CT ABDOMEN:  Basilar lungs and pleural spaces clear except for minimal atelectasis. There is abnormal dilatation of proximal mid small bowel loops. The small bowel loops are dilated to the ventral hernia lower abdominal wall with primarily collapsed bowel loops distally. This appears to be a site of relative obstruction. There is gas and stool in the colon suggesting early or partial  obstruction. There is no free intraperitoneal air. The soft tissue nodule at the left nephrectomy bed is unchanged. Atrophi  right  kidney. CT PELVIS:   No free fluid. IMPRESSION:   Evidence of mechanical small bowel obstruction, the site of the obstruction appears to be the lower abdominal ventral hernia. Gas and stool in the colon indicates obstruction is partial or early.     8/4:  KUB:  Esophageal tube appears to terminate in the first portion of the duodenum.  Oral contrast is seen throughout the colon.  No free air is evident. The bowel gas pattern is nonspecific and there is no evidence of ileus or obstruction.  No suspicious renal or ureteral calculi are evident.  Visualized osseous structures unremarkable.   impression: No acute pathology identified     Assessment/Plan:    Acute onset epigastric pain, nausea, and vomiting with etiology per below  Small bowel obstruction vs partial SBO:  Appears to be caused by ventral hernia                        Surgery on board                        Decompression with N/G                        Bowel rest                        Analgesics, Antiemetics, IVF with caution  Elevated lipase    History of diverticulitis with colostomy and reversal 2002  History of renal cell cancer with left nephrectomy and recurrent mass at the site  ESRD on HD                        Dr Jacob Mueller on board                        Monday, weds fri dialysis                        Labs tomorrow  NIDDM taking januvia  HTN                        Monitor, may need prn antihypertensives  Severe obesity  Abdominal mass  History of TIA  Anemia of chronic disease     Daily labs     Care Plan discussed with: Patient/Family and Nurse    Signed By: Dustin Aguiar NP     August 5, 2018

## 2018-08-06 ENCOUNTER — ANESTHESIA EVENT (OUTPATIENT)
Dept: SURGERY | Age: 68
DRG: 356 | End: 2018-08-06
Payer: MEDICARE

## 2018-08-06 ENCOUNTER — ANESTHESIA (OUTPATIENT)
Dept: SURGERY | Age: 68
DRG: 356 | End: 2018-08-06
Payer: MEDICARE

## 2018-08-06 LAB
ALBUMIN SERPL-MCNC: 3.4 G/DL (ref 3.2–4.6)
ALBUMIN/GLOB SERPL: 0.8 {RATIO} (ref 1.2–3.5)
ALP SERPL-CCNC: 98 U/L (ref 50–136)
ALT SERPL-CCNC: 14 U/L (ref 12–65)
ANION GAP SERPL CALC-SCNC: 10 MMOL/L (ref 7–16)
AST SERPL-CCNC: 23 U/L (ref 15–37)
BILIRUB SERPL-MCNC: 0.4 MG/DL (ref 0.2–1.1)
BUN SERPL-MCNC: 36 MG/DL (ref 8–23)
CALCIUM SERPL-MCNC: 9.4 MG/DL (ref 8.3–10.4)
CHLORIDE SERPL-SCNC: 94 MMOL/L (ref 98–107)
CO2 SERPL-SCNC: 39 MMOL/L (ref 21–32)
CREAT SERPL-MCNC: 10 MG/DL (ref 0.6–1)
ERYTHROCYTE [DISTWIDTH] IN BLOOD BY AUTOMATED COUNT: 15.3 % (ref 11.9–14.6)
GLOBULIN SER CALC-MCNC: 4.2 G/DL (ref 2.3–3.5)
GLUCOSE BLD STRIP.AUTO-MCNC: 111 MG/DL (ref 65–100)
GLUCOSE BLD STRIP.AUTO-MCNC: 114 MG/DL (ref 65–100)
GLUCOSE BLD STRIP.AUTO-MCNC: 117 MG/DL (ref 65–100)
GLUCOSE BLD STRIP.AUTO-MCNC: 121 MG/DL (ref 65–100)
GLUCOSE BLD STRIP.AUTO-MCNC: 124 MG/DL (ref 65–100)
GLUCOSE BLD STRIP.AUTO-MCNC: 94 MG/DL (ref 65–100)
GLUCOSE SERPL-MCNC: 117 MG/DL (ref 65–100)
HCT VFR BLD AUTO: 27.7 % (ref 35.8–46.3)
HGB BLD-MCNC: 9.5 G/DL (ref 11.7–15.4)
MAGNESIUM SERPL-MCNC: 2.7 MG/DL (ref 1.8–2.4)
MCH RBC QN AUTO: 33.8 PG (ref 26.1–32.9)
MCHC RBC AUTO-ENTMCNC: 34.3 G/DL (ref 31.4–35)
MCV RBC AUTO: 98.6 FL (ref 79.6–97.8)
MM INDURATION POC: 0 MM (ref 0–5)
PLATELET # BLD AUTO: 204 K/UL (ref 150–450)
PMV BLD AUTO: 10.6 FL (ref 10.8–14.1)
POTASSIUM SERPL-SCNC: 4.2 MMOL/L (ref 3.5–5.1)
PPD POC: NEGATIVE NEGATIVE
PROT SERPL-MCNC: 7.6 G/DL (ref 6.3–8.2)
RBC # BLD AUTO: 2.81 M/UL (ref 4.05–5.25)
SODIUM SERPL-SCNC: 143 MMOL/L (ref 136–145)
WBC # BLD AUTO: 8.8 K/UL (ref 4.3–11.1)

## 2018-08-06 PROCEDURE — 74011250637 HC RX REV CODE- 250/637: Performed by: ANESTHESIOLOGY

## 2018-08-06 PROCEDURE — 82962 GLUCOSE BLOOD TEST: CPT

## 2018-08-06 PROCEDURE — 77030020782 HC GWN BAIR PAWS FLX 3M -B: Performed by: ANESTHESIOLOGY

## 2018-08-06 PROCEDURE — 80053 COMPREHEN METABOLIC PANEL: CPT

## 2018-08-06 PROCEDURE — 76060000033 HC ANESTHESIA 1 TO 1.5 HR: Performed by: SURGERY

## 2018-08-06 PROCEDURE — 74011250636 HC RX REV CODE- 250/636: Performed by: INTERNAL MEDICINE

## 2018-08-06 PROCEDURE — 74011250636 HC RX REV CODE- 250/636: Performed by: FAMILY MEDICINE

## 2018-08-06 PROCEDURE — 74011250636 HC RX REV CODE- 250/636

## 2018-08-06 PROCEDURE — 5A1D70Z PERFORMANCE OF URINARY FILTRATION, INTERMITTENT, LESS THAN 6 HOURS PER DAY: ICD-10-PCS | Performed by: HOSPITALIST

## 2018-08-06 PROCEDURE — 77030008477 HC STYL SATN SLP COVD -A: Performed by: ANESTHESIOLOGY

## 2018-08-06 PROCEDURE — 83735 ASSAY OF MAGNESIUM: CPT

## 2018-08-06 PROCEDURE — 77030018836 HC SOL IRR NACL ICUM -A: Performed by: SURGERY

## 2018-08-06 PROCEDURE — 74011250636 HC RX REV CODE- 250/636: Performed by: NURSE PRACTITIONER

## 2018-08-06 PROCEDURE — 77030008703 HC TU ET UNCUF COVD -A: Performed by: ANESTHESIOLOGY

## 2018-08-06 PROCEDURE — 90935 HEMODIALYSIS ONE EVALUATION: CPT

## 2018-08-06 PROCEDURE — 77030002912 HC SUT ETHBND J&J -A: Performed by: SURGERY

## 2018-08-06 PROCEDURE — 77030031139 HC SUT VCRL2 J&J -A: Performed by: SURGERY

## 2018-08-06 PROCEDURE — 74011000250 HC RX REV CODE- 250

## 2018-08-06 PROCEDURE — 85027 COMPLETE CBC AUTOMATED: CPT

## 2018-08-06 PROCEDURE — 76010000160 HC OR TIME 0.5 TO 1 HR INTENSV-TIER 1: Performed by: SURGERY

## 2018-08-06 PROCEDURE — 77030032490 HC SLV COMPR SCD KNE COVD -B: Performed by: SURGERY

## 2018-08-06 PROCEDURE — 36415 COLL VENOUS BLD VENIPUNCTURE: CPT

## 2018-08-06 PROCEDURE — 76210000006 HC OR PH I REC 0.5 TO 1 HR: Performed by: SURGERY

## 2018-08-06 PROCEDURE — 77030020255 HC SOL INJ LR 1000ML BG

## 2018-08-06 PROCEDURE — 65270000029 HC RM PRIVATE

## 2018-08-06 PROCEDURE — 74011250636 HC RX REV CODE- 250/636: Performed by: ANESTHESIOLOGY

## 2018-08-06 RX ORDER — FENTANYL CITRATE 50 UG/ML
100 INJECTION, SOLUTION INTRAMUSCULAR; INTRAVENOUS ONCE
Status: ACTIVE | OUTPATIENT
Start: 2018-08-06 | End: 2018-08-07

## 2018-08-06 RX ORDER — LIDOCAINE HYDROCHLORIDE 10 MG/ML
0.1 INJECTION INFILTRATION; PERINEURAL AS NEEDED
Status: DISCONTINUED | OUTPATIENT
Start: 2018-08-06 | End: 2018-08-08 | Stop reason: HOSPADM

## 2018-08-06 RX ORDER — ALBUTEROL SULFATE 0.83 MG/ML
2.5 SOLUTION RESPIRATORY (INHALATION) AS NEEDED
Status: DISCONTINUED | OUTPATIENT
Start: 2018-08-06 | End: 2018-08-06 | Stop reason: HOSPADM

## 2018-08-06 RX ORDER — FENTANYL CITRATE 50 UG/ML
INJECTION, SOLUTION INTRAMUSCULAR; INTRAVENOUS AS NEEDED
Status: DISCONTINUED | OUTPATIENT
Start: 2018-08-06 | End: 2018-08-06 | Stop reason: HOSPADM

## 2018-08-06 RX ORDER — SODIUM CHLORIDE 0.9 % (FLUSH) 0.9 %
5-10 SYRINGE (ML) INJECTION AS NEEDED
Status: DISCONTINUED | OUTPATIENT
Start: 2018-08-06 | End: 2018-08-06 | Stop reason: HOSPADM

## 2018-08-06 RX ORDER — CEFAZOLIN SODIUM/WATER 2 G/20 ML
2 SYRINGE (ML) INTRAVENOUS ONCE
Status: COMPLETED | OUTPATIENT
Start: 2018-08-06 | End: 2018-08-06

## 2018-08-06 RX ORDER — MIDAZOLAM HYDROCHLORIDE 1 MG/ML
2 INJECTION, SOLUTION INTRAMUSCULAR; INTRAVENOUS
Status: COMPLETED | OUTPATIENT
Start: 2018-08-06 | End: 2018-08-06

## 2018-08-06 RX ORDER — PROPOFOL 10 MG/ML
INJECTION, EMULSION INTRAVENOUS AS NEEDED
Status: DISCONTINUED | OUTPATIENT
Start: 2018-08-06 | End: 2018-08-06 | Stop reason: HOSPADM

## 2018-08-06 RX ORDER — ONDANSETRON 2 MG/ML
INJECTION INTRAMUSCULAR; INTRAVENOUS AS NEEDED
Status: DISCONTINUED | OUTPATIENT
Start: 2018-08-06 | End: 2018-08-06 | Stop reason: HOSPADM

## 2018-08-06 RX ORDER — MIDAZOLAM HYDROCHLORIDE 1 MG/ML
2 INJECTION, SOLUTION INTRAMUSCULAR; INTRAVENOUS ONCE
Status: ACTIVE | OUTPATIENT
Start: 2018-08-06 | End: 2018-08-07

## 2018-08-06 RX ORDER — HYDROMORPHONE HYDROCHLORIDE 2 MG/ML
0.5 INJECTION, SOLUTION INTRAMUSCULAR; INTRAVENOUS; SUBCUTANEOUS
Status: DISCONTINUED | OUTPATIENT
Start: 2018-08-06 | End: 2018-08-06 | Stop reason: HOSPADM

## 2018-08-06 RX ORDER — SODIUM CHLORIDE 0.9 % (FLUSH) 0.9 %
5-10 SYRINGE (ML) INJECTION EVERY 8 HOURS
Status: DISCONTINUED | OUTPATIENT
Start: 2018-08-06 | End: 2018-08-08 | Stop reason: HOSPADM

## 2018-08-06 RX ORDER — OXYCODONE HYDROCHLORIDE 5 MG/1
5 TABLET ORAL
Status: COMPLETED | OUTPATIENT
Start: 2018-08-06 | End: 2018-08-06

## 2018-08-06 RX ORDER — LIDOCAINE HYDROCHLORIDE 20 MG/ML
INJECTION, SOLUTION EPIDURAL; INFILTRATION; INTRACAUDAL; PERINEURAL AS NEEDED
Status: DISCONTINUED | OUTPATIENT
Start: 2018-08-06 | End: 2018-08-06 | Stop reason: HOSPADM

## 2018-08-06 RX ORDER — SODIUM CHLORIDE, SODIUM LACTATE, POTASSIUM CHLORIDE, CALCIUM CHLORIDE 600; 310; 30; 20 MG/100ML; MG/100ML; MG/100ML; MG/100ML
75 INJECTION, SOLUTION INTRAVENOUS CONTINUOUS
Status: DISPENSED | OUTPATIENT
Start: 2018-08-06 | End: 2018-08-07

## 2018-08-06 RX ORDER — ROCURONIUM BROMIDE 10 MG/ML
INJECTION, SOLUTION INTRAVENOUS AS NEEDED
Status: DISCONTINUED | OUTPATIENT
Start: 2018-08-06 | End: 2018-08-06 | Stop reason: HOSPADM

## 2018-08-06 RX ORDER — SODIUM CHLORIDE 0.9 % (FLUSH) 0.9 %
5-10 SYRINGE (ML) INJECTION AS NEEDED
Status: DISCONTINUED | OUTPATIENT
Start: 2018-08-06 | End: 2018-08-08 | Stop reason: HOSPADM

## 2018-08-06 RX ORDER — SODIUM CHLORIDE, SODIUM LACTATE, POTASSIUM CHLORIDE, CALCIUM CHLORIDE 600; 310; 30; 20 MG/100ML; MG/100ML; MG/100ML; MG/100ML
50 INJECTION, SOLUTION INTRAVENOUS CONTINUOUS
Status: DISCONTINUED | OUTPATIENT
Start: 2018-08-06 | End: 2018-08-06 | Stop reason: HOSPADM

## 2018-08-06 RX ORDER — SODIUM CHLORIDE 0.9 % (FLUSH) 0.9 %
5-10 SYRINGE (ML) INJECTION EVERY 8 HOURS
Status: DISCONTINUED | OUTPATIENT
Start: 2018-08-06 | End: 2018-08-07

## 2018-08-06 RX ORDER — CELECOXIB 100 MG/1
200 CAPSULE ORAL
Status: ACTIVE | OUTPATIENT
Start: 2018-08-06 | End: 2018-08-07

## 2018-08-06 RX ADMIN — Medication 5 ML: at 06:06

## 2018-08-06 RX ADMIN — MORPHINE SULFATE 4 MG: 2 INJECTION, SOLUTION INTRAMUSCULAR; INTRAVENOUS at 23:05

## 2018-08-06 RX ADMIN — PROPOFOL 150 MG: 10 INJECTION, EMULSION INTRAVENOUS at 19:48

## 2018-08-06 RX ADMIN — SODIUM CHLORIDE, SODIUM LACTATE, POTASSIUM CHLORIDE, AND CALCIUM CHLORIDE: 600; 310; 30; 20 INJECTION, SOLUTION INTRAVENOUS at 19:37

## 2018-08-06 RX ADMIN — MIDAZOLAM 2 MG: 1 INJECTION INTRAMUSCULAR; INTRAVENOUS at 18:07

## 2018-08-06 RX ADMIN — DIPHENHYDRAMINE HYDROCHLORIDE 25 MG: 50 INJECTION, SOLUTION INTRAMUSCULAR; INTRAVENOUS at 06:05

## 2018-08-06 RX ADMIN — DIPHENHYDRAMINE HYDROCHLORIDE 25 MG: 50 INJECTION, SOLUTION INTRAMUSCULAR; INTRAVENOUS at 14:29

## 2018-08-06 RX ADMIN — LIDOCAINE HYDROCHLORIDE 100 MG: 20 INJECTION, SOLUTION EPIDURAL; INFILTRATION; INTRACAUDAL; PERINEURAL at 19:48

## 2018-08-06 RX ADMIN — SODIUM CHLORIDE, SODIUM LACTATE, POTASSIUM CHLORIDE, AND CALCIUM CHLORIDE 75 ML/HR: 600; 310; 30; 20 INJECTION, SOLUTION INTRAVENOUS at 23:33

## 2018-08-06 RX ADMIN — DIPHENHYDRAMINE HYDROCHLORIDE 25 MG: 50 INJECTION, SOLUTION INTRAMUSCULAR; INTRAVENOUS at 23:14

## 2018-08-06 RX ADMIN — MORPHINE SULFATE 4 MG: 2 INJECTION, SOLUTION INTRAMUSCULAR; INTRAVENOUS at 14:29

## 2018-08-06 RX ADMIN — MORPHINE SULFATE 4 MG: 2 INJECTION, SOLUTION INTRAMUSCULAR; INTRAVENOUS at 06:06

## 2018-08-06 RX ADMIN — ONDANSETRON 4 MG: 2 INJECTION INTRAMUSCULAR; INTRAVENOUS at 20:23

## 2018-08-06 RX ADMIN — FENTANYL CITRATE 100 MCG: 50 INJECTION, SOLUTION INTRAMUSCULAR; INTRAVENOUS at 19:48

## 2018-08-06 RX ADMIN — ROCURONIUM BROMIDE 60 MG: 10 INJECTION, SOLUTION INTRAVENOUS at 19:49

## 2018-08-06 RX ADMIN — OXYCODONE HYDROCHLORIDE 5 MG: 5 TABLET ORAL at 21:28

## 2018-08-06 RX ADMIN — Medication 10 ML: at 14:32

## 2018-08-06 RX ADMIN — Medication 10 ML: at 23:15

## 2018-08-06 RX ADMIN — Medication 2 G: at 19:43

## 2018-08-06 NOTE — DIALYSIS
TRANSFER IN - DIALYSIS    Received patient in dialysis unit from [facility] (unit) for ordered procedure. Consent verified for renal replacement therapy. Patient alert and vital signs stable. /51 P81    Hemodialysis initiated using right upper arm AVG and 15 g g needles. Machine settings per MD order. Will monitor during treatment.

## 2018-08-06 NOTE — INTERVAL H&P NOTE
H&P Update: Kecia Fischer was seen and examined. History and physical has been reviewed. The patient has been examined.  There have been no significant clinical changes since the completion of the originally dated History and Physical.    Signed By: Felisha Stubbs DO     August 6, 2018 7:09 PM

## 2018-08-06 NOTE — PROGRESS NOTES
Hospitalist Progress Note    Subjective:   Daily Progress Note: 8/6/2018 1100    HD patient with sudden onset early am 8/3 of epigastric and right upper abdominal pain described as sharp, shooting, crampy and colicky, awakened from sound sleep by same. Blaire Scruggs reports nausea and vomiting x 3 presented to ER 8/3 late am after dialysis. Gael Purpura increased with eating (pepperoni during HD) and motion.  No prior history of same.  History of diverticulitis with colostomy and reversal.  Also history of left renal cancer with nephrectomy and recurrent mass at site of the nephrectomy.  CT with partial vs early SBO and ventral hernia as noted below.  Last BM 8/3 am, constipated.  Nephrology and General surgery consulted.  Admitted with N/G to low intermittent suction, NPO, IVF, pain meds.  Lipase 521 on arrival, lactic acid: 2.5.    8//4:  Resting quietly in bed talking with sister. Approx 900 ml dark gastric output from N/G today.  Still having a lot of epigastric pain, miserable.  Nausea fairly well controlled.  Unable to auscultate bowel sounds, abdomen with multiple old surgical scars, approx 8 cm hernia palpable.  States she is \"ready to get it over with. \"  Nephrology in.    8/5:  N/G with 1200 ml output x 24 hours. Abdominal pain improved, passing flatus so N/G clamped per surgery this am.  Did well until she consumed clear liquids with immediate 10/10 pain, mostly in the left lower quad both at breakfast and lunch. Surgery notified by RNs with instructions to unclamp N/G.  500 ml dark green fluid accumulated in NG collection bottle immediately. Some relief with evacuation of fluid.       Today:  Dialysis early this am.  Abdominal pain worse. 950 ml N/G output in 24 hours. Plans for ventral hernia repair this afternoon.    2000:  No hernia found, negative laparotomy with no findings of obstruction per Dr Jazmyn Rondon.     ADDITIONAL HISTORY:  ESRD 2006 on HD M-W-F at [facility] dialysis, left nephrectomy due to clear cell carcinoma, diverticulitis with colostomy and reversal , AVF with revision, NIDDM on januvia with A1C 4.7 , DJD, hypertension, TIA, obesity.       Current Facility-Administered Medications   Medication Dose Route Frequency    phenol throat spray (CHLORASEPTIC) 1 Spray  1 Spray Oral PRN    epoetin mary lou (EPOGEN;PROCRIT) injection 10,000 Units  10,000 Units SubCUTAneous Q MON, WED & FRI    diphenhydrAMINE (BENADRYL) injection 25 mg  25 mg IntraVENous Q6H PRN    sodium chloride (NS) flush 5-10 mL  5-10 mL IntraVENous Q8H    sodium chloride (NS) flush 5-10 mL  5-10 mL IntraVENous PRN    naloxone (NARCAN) injection 0.4 mg  0.4 mg IntraVENous PRN    ondansetron (ZOFRAN) injection 4 mg  4 mg IntraVENous Q4H PRN    benzocaine (HURRICANE) 20 % spray   Mucous Membrane PRN    insulin lispro (HUMALOG) injection   SubCUTAneous TIDAC    morphine injection 4 mg  4 mg IntraVENous Q4H PRN        Review of Systems  A comprehensive review of systems was negative except for that written in the HPI. Objective:     Visit Vitals    /67    Pulse 97    Temp 99.8 °F (37.7 °C)    Resp 17    Ht 5' 5\" (1.651 m)    Wt 99.3 kg (219 lb)    SpO2 99%    BMI 36.44 kg/m2      O2 Device: Room air    Temp (24hrs), Av °F (37.2 °C), Min:98.1 °F (36.7 °C), Max:99.8 °F (37.7 °C)    701 -  190  In: -   Out: 0598   1901 -  07  In: -   Out: 2559     General appearance: Oriented and alert, cooperative, pain improved with opening N/G. Upset that she may need surgery. Morbidly obese.  Friend at bedside.    Head: Normocephalic, without obvious abnormality, atraumatic  Neck: supple, symmetrical, trachea midline, no JVD  Lungs: clear to auscultation bilaterally, slightly diminished in bases.    Heart: regular rate and rhythm, S1, S2 normal, no murmur, click, rub or gallop  Abdomen: soft, very mild tenderness.  mild distention. Pain now centered in LLQ, improved with opening N/G.   No additional masses,  no organomegaly  Extremities: extremities normal, atraumatic, no cyanosis, dependent edema  Skin: Skin color, texture, turgor normal. No rashes or lesions  Neurologic: Grossly normal, no unilateral deficit      Additional comments: Notes,orders, test results, vitals reviewed    Data Review  Recent Results (from the past 24 hour(s))   GLUCOSE, POC    Collection Time: 08/05/18  9:00 PM   Result Value Ref Range    Glucose (POC) 130 (H) 65 - 729 mg/dL   METABOLIC PANEL, COMPREHENSIVE    Collection Time: 08/06/18  4:37 AM   Result Value Ref Range    Sodium 143 136 - 145 mmol/L    Potassium 4.2 3.5 - 5.1 mmol/L    Chloride 94 (L) 98 - 107 mmol/L    CO2 39 (H) 21 - 32 mmol/L    Anion gap 10 7 - 16 mmol/L    Glucose 117 (H) 65 - 100 mg/dL    BUN 36 (H) 8 - 23 MG/DL    Creatinine 10.00 (H) 0.6 - 1.0 MG/DL    GFR est AA 5 (L) >60 ml/min/1.73m2    GFR est non-AA 4 (L) >60 ml/min/1.73m2    Calcium 9.4 8.3 - 10.4 MG/DL    Bilirubin, total 0.4 0.2 - 1.1 MG/DL    ALT (SGPT) 14 12 - 65 U/L    AST (SGOT) 23 15 - 37 U/L    Alk.  phosphatase 98 50 - 136 U/L    Protein, total 7.6 6.3 - 8.2 g/dL    Albumin 3.4 3.2 - 4.6 g/dL    Globulin 4.2 (H) 2.3 - 3.5 g/dL    A-G Ratio 0.8 (L) 1.2 - 3.5     MAGNESIUM    Collection Time: 08/06/18  4:37 AM   Result Value Ref Range    Magnesium 2.7 (H) 1.8 - 2.4 mg/dL   CBC W/O DIFF    Collection Time: 08/06/18  4:37 AM   Result Value Ref Range    WBC 8.8 4.3 - 11.1 K/uL    RBC 2.81 (L) 4.05 - 5.25 M/uL    HGB 9.5 (L) 11.7 - 15.4 g/dL    HCT 27.7 (L) 35.8 - 46.3 %    MCV 98.6 (H) 79.6 - 97.8 FL    MCH 33.8 (H) 26.1 - 32.9 PG    MCHC 34.3 31.4 - 35.0 g/dL    RDW 15.3 (H) 11.9 - 14.6 %    PLATELET 185 238 - 519 K/uL    MPV 10.6 (L) 10.8 - 14.1 FL   GLUCOSE, POC    Collection Time: 08/06/18  5:41 AM   Result Value Ref Range    Glucose (POC) 124 (H) 65 - 100 mg/dL   GLUCOSE, POC    Collection Time: 08/06/18 10:14 AM   Result Value Ref Range    Glucose (POC) 121 (H) 65 - 100 mg/dL   GLUCOSE, POC Collection Time: 08/06/18 11:08 AM   Result Value Ref Range    Glucose (POC) 94 65 - 100 mg/dL   GLUCOSE, POC    Collection Time: 08/06/18  4:39 PM   Result Value Ref Range    Glucose (POC) 114 (H) 65 - 100 mg/dL     8/3:  CXR;  No acute findings or changes in the chest.      8/3:  CT ABDOMEN AND PELVIS:    CT ABDOMEN:  Basilar lungs and pleural spaces clear except for minimal atelectasis.  There is abnormal dilatation of proximal mid small bowel loops. The small bowel loops are dilated to the ventral hernia lower abdominal wall with primarily collapsed bowel loops distally. This appears to be a site of relative obstruction. There is gas and stool in the colon suggesting early or partial  obstruction. There is no free intraperitoneal air. The soft tissue nodule at the left nephrectomy bed is unchanged. [place]  right  kidney. CT PELVIS:   No free fluid. IMPRESSION:   Evidence of mechanical small bowel obstruction, the site of the obstruction appears to be the lower abdominal ventral hernia. Gas and stool in the colon indicates obstruction is partial or early.      8/4:  KUB:  Esophageal tube appears to terminate in the first portion of the duodenum.  Oral contrast is seen throughout the colon.  No free air is evident. The bowel gas pattern is nonspecific and there is no evidence of ileus or obstruction.  No suspicious renal or ureteral calculi are evident.  Visualized osseous structures unremarkable.   impression: No acute pathology identified     Assessment/Plan:    Acute onset epigastric pain, nausea, and vomiting with etiology per below  Small bowel obstruction vs partial SBO:  Appears to be caused by ventral hernia                        Surgery on board    OR today with no blockage found                        Decompression with N/G                        Bowel rest                        Analgesics, Antiemetics, IVF with caution  Elevated lipase    History of diverticulitis with colostomy and reversal 2002  History of renal cell cancer with left nephrectomy and recurrent mass at the site  ESRD on HD                        Dr Luis on board                        Monday, weds fri dialysis                        Labs tomorrow  NIDDM taking januvia  HTN                Monitor, may need prn antihypertensives  Severe obesity  Abdominal mass  History of TIA  Anemia of chronic disease      Daily labs     Care Plan discussed with: Patient/Family, Surgery, and Nurse    Signed By: Isidor Hatchet, NP     August 6, 2018

## 2018-08-06 NOTE — PERIOP NOTES
TRANSFER - IN REPORT:    Verbal report received from [address] (name) on Diana Poser  being received from 2nd floor (unit) for ordered procedure      Report consisted of patients Situation, Background, Assessment and   Recommendations(SBAR). Information from the following report(s) SBAR and MAR was reviewed with the receiving nurse. Opportunity for questions and clarification was provided. Assessment completed upon patients arrival to unit and care assumed. Patient arrived to pre op alert and oriented with sister, Taylor Raza, at bedside.

## 2018-08-06 NOTE — PROGRESS NOTES
Hemodialysis treatment completed. Removed 1.4 kg. Removed two 15 gauge needles, secured sites with gauze and tape, no bleeding visible. VS stable. Pt awaiting transfer to room via transport.

## 2018-08-06 NOTE — ANESTHESIA PREPROCEDURE EVALUATION
Anesthetic History               Review of Systems / Medical History  Nursing notes reviewed and pertinent labs reviewed    Pulmonary                   Neuro/Psych         TIA (Aug 2015)     Cardiovascular    Hypertension              Exercise tolerance: >4 METS  Comments: Echo 2015- EF 55%   GI/Hepatic/Renal     GERD    Renal disease: dialysis and ESRD       Endo/Other    Diabetes: type 2    Obesity and arthritis     Other Findings              Physical Exam    Airway  Mallampati: II  TM Distance: 4 - 6 cm  Neck ROM: normal range of motion   Mouth opening: Normal     Cardiovascular  Regular rate and rhythm,  S1 and S2 normal,  no murmur, click, rub, or gallop             Dental    Dentition: Edentulous     Pulmonary  Breath sounds clear to auscultation               Abdominal  GI exam deferred       Other Findings            Anesthetic Plan    ASA: 3  Anesthesia type: general          Induction: RSI  Anesthetic plan and risks discussed with: Patient and Family

## 2018-08-06 NOTE — PROGRESS NOTES
RENAL PROGRESS NOTE    Subjective:     Patient is a 77 y/o AAF with ESRD on HD MWF at [facility] under my medical care due to diabetic nephropathy,  HTN,  history of Left RCC (Clear cell carcinoma) s/p left radical nephrectomy by Dr. Ren Eastman in 2015 with history of recurrent mass at the site of nephrectomy was in her usual state of health until last night when she developed abdominal pain. She had nausea, vomiting and worsening of abdominal pain on HD, but completed her treatment today. No fever or chills, no diarrhea, no chest pain,no dyspnea. She went to the ED when her symptoms progressed after she returned home from dialysis. She c/o morphine not providing adequate pain relief. NGT was placed and she is NPO. She c/o feeling constipated, but reports having had a BM this am.   8/4/18 - still with NGT in place, but much better pain control - in better spirits  8/5/18 - attempt at having breakfast resulted in resumption of abdominal symptoms - NGT was replaced and NPO status reinstituted - plan for HD tomorrow discussed  8/6/18 - progressing well so far except being bother by NGT - UF goals for later on HD discussed with patient    Past Medical History:   Diagnosis Date    Arthritis     AVF (arteriovenous fistula) ([address] 12/20/2016 12/6/16 ([facility]) Right AVF revision and thrombectomy    Cancer ([address] 2015    L kidney    Chronic kidney disease     HD in M-W-F- at [place] dialysis    Degenerative joint disease     Diabetes ([address]     type 2, average range 100-120 fasting, symptomatic below 90; last A1C?    ESRD (end stage renal disease) ([address] Nov 2006    ESRD.  MWF dialysis     Hypertension     Obesity     Transient ischemic attack 8/29/2015      Past Surgical History:   Procedure Laterality Date    BREAST SURGERY PROCEDURE UNLISTED Right     cyst removed    HX GI      colon resection resulting in temporary colostomy reversal    HX GYN      stephan    HX OTHER SURGICAL      dialysis fistula, several permcaths    HX UROLOGICAL Left July 2015    nephrectomy    HX VASCULAR ACCESS        Prior to Admission medications    Medication Sig Start Date End Date Taking? Authorizing Provider   amLODIPine (NORVASC) 10 mg tablet Take  by mouth daily. Yes Historical Provider   hyoscyamine (LEVSIN) 0.125 mg tablet Take 1 Tab by mouth every six (6) hours as needed for Cramping or Diarrhea. 5/10/18   Soraya Sewlel MD   ondansetron (ZOFRAN ODT) 8 mg disintegrating tablet Take 1 Tab by mouth every eight (8) hours as needed for Nausea. 5/10/18   Soraya Sewell MD   HYDROcodone-acetaminophen [facility])  mg tablet Take 1 Tab by mouth every four (4) hours as needed. Max Daily Amount: 6 Tabs. 4/13/18   Aram Pereira DO   esomeprazole (NEXIUM) 20 mg capsule Take 2 Caps by mouth daily. 4/13/18   Aram Pereira DO   guaiFENesin-codeine (CHERATUSSIN AC) 100-10 mg/5 mL solution Take 5-10 mL by mouth three (3) times daily as needed for Cough or Congestion. Max Daily Amount: 30 mL. 1/19/18   Andry Flynn MD   lidocaine (LIDODERM) 5 % 1 Patch by TransDERmal route every twenty-four (24) hours. Apply patch to the affected area for 12 hours a day and remove for 12 hours a day. 9/16/17   Aureliano Martínez MD   lidocaine 5 % topical cream Apply  to affected area two (2) times daily as needed for Pain. 9/1/17   FRANCISCO Long   butalbital-acetaminophen-caff (FIORICET) -40 mg per capsule Take 1-2 Caps by mouth every six (6) hours as needed for Pain. Max Daily Amount: 8 Caps. 2/7/17   Domo Wise MD   VIT C/VIT E/LUTEIN/MIN/OMEGA-3 (OCUVITE PO) Take  by mouth nightly. Historical Provider   cinacalcet (SENSIPAR) 30 mg tablet Take 60 mg by mouth nightly. Historical Provider   minoxidil (LONITEN) 2.5 mg tablet Take 7.5 mg by mouth two (2) times a day. Indications: HYPERTENSION    Phys Other, MD   aspirin 81 mg chewable tablet Take 1 Tab by mouth daily.   Patient taking differently: Take 81 mg by mouth every morning. 9/1/15   Brijesh Rao MD   sevelamer carbonate (RENVELA) 800 mg tab tab Take 800 mg by mouth three (3) times daily (with meals). 5 tablets with meals/ 2 with snacks  Sometimes only eats 2 meals/d    Historical Provider   lisinopril (PRINIVIL, ZESTRIL) 40 mg tablet Take 40 mg by mouth nightly. Indications: HYPERTENSION 6/8/15   Historical Provider   sitaGLIPtin (JANUVIA) 100 mg tablet Take 50 mg by mouth every morning. Indications: TYPE 2 DIABETES MELLITUS    Historical Provider   furosemide (LASIX) 80 mg tablet Take 80 mg by mouth two (2) times a day.  Indications: EDEMA, HYPERTENSION    Historical Provider     Allergies   Allergen Reactions    Pcn [Penicillins] Rash    Vancomycin Rash      Social History   Substance Use Topics    Smoking status: Never Smoker    Smokeless tobacco: Never Used    Alcohol use No      Family History   Problem Relation Age of Onset    Diabetes Mother     Heart Disease Mother     Hypertension Mother     Heart Disease Father     Hypertension Father     Malignant Hyperthermia Neg Hx     Pseudocholinesterase Deficiency Neg Hx     Delayed Awakening Neg Hx     Post-op Nausea/Vomiting Neg Hx     Emergence Delirium Neg Hx     Other Neg Hx     Post-op Cognitive Dysfunction Neg Hx           Review of Systems    Constitutional: no fever, negative  Eyes: fair vision, negative  Ears, nose, mouth, throat, and face:fair hearing, negative  Respiratory: no asthma, negative  Cardiovascular:no chest pain, negative  Gastrointestinal:no diarrhea, see HPI  Genitourinary: no dysuria, no hematuria  Hematologic/lymphatic: no bleeding tendency,    Neurological: no seizures,    Behvioral/Psych: no psych hospitalization   Endocrine: no goiter,  The remainder is negative      Objective:       Visit Vitals    /77    Pulse 97    Temp 98.7 °F (37.1 °C)    Resp 18    Ht 5' 5\" (1.651 m)    Wt 99.3 kg (219 lb)    SpO2 92%    BMI 36.44 kg/m2     08/05 1901 - 08/06 0700  In: -   Out: 950   08/04 0701 - 08/05 1900  In: -   Out: 1200     General:  Alert, cooperative, no distress, appears stated age. NGT in place. Head:  Normocephalic, without obvious abnormality, atraumatic. Neck: Supple, symmetrical, trachea midline, no adenopathy,  no JVD. Lungs:   Clear to auscultation bilaterally. Heart:  Regular rate and rhythm, S1, S2 normal, no murmur,  rub or gallop. Abdomen:   Soft,  Diffusely tender with voluntary guarding. Obese. No masses,  No organomegaly. Extremities: Extremities normal, atraumatic, no cyanosis or edema. Access: AV fistula in JACOB is open and examines well. Skin: Skin color, texture, turgor normal. No rashes or lesions. Neurologic: Grossly intact. No asterixis. Data Review:     Results for Lisset Santos (MRN [id number]) as of 8/6/2018 05:59   Ref.  Range 5/10/2018 09:52 8/3/2018 11:11 8/3/2018 18:54 8/4/2018 04:16 8/5/2018 04:30   Sodium Latest Ref Range: 136 - 145 mmol/L 139 139  143 145   Potassium Latest Ref Range: 3.5 - 5.1 mmol/L 3.9 5.3 (H)  4.3 4.1   Chloride Latest Ref Range: 98 - 107 mmol/L 100 98  100 97 (L)   CO2 Latest Ref Range: 21 - 32 mmol/L 28 32  37 (H) 39 (H)   Anion gap Latest Ref Range: 7 - 16 mmol/L 11 9  6 (L) 9   Glucose Latest Ref Range: 65 - 100 mg/dL 151 (H) 142 (H)  122 (H) 115 (H)   BUN Latest Ref Range: 8 - 23 MG/DL 17 12  16 27 (H)   Creatinine Latest Ref Range: 0.6 - 1.0 MG/DL 6.65 (H) 3.17 (H)  5.07 (H) 7.58 (H)   Calcium Latest Ref Range: 8.3 - 10.4 MG/DL 9.4 9.3  9.2 9.4   Phosphorus Latest Ref Range: 2.3 - 3.7 MG/DL  2.1 (L)   5.2 (H)   Magnesium Latest Ref Range: 1.8 - 2.4 mg/dL  2.3   2.6 (H)   GFR est non-AA Latest Ref Range: >60 ml/min/1.73m2 7 (L) 16 (L)  9 (L) 6 (L)   GFR est AA Latest Ref Range: >60 ml/min/1.73m2 8 (L) 19 (L)  11 (L) 7 (L)   Bilirubin, total Latest Ref Range: 0.2 - 1.1 MG/DL 0.5 0.6  0.6 0.6   Protein, total Latest Ref Range: 6.3 - 8.2 g/dL 8.2 8.8 (H)  7.3 7.8   Albumin Latest Ref Range: 3.2 - 4.6 g/dL 3.8 4.1  3.3 3.7   Globulin Latest Ref Range: 2.3 - 3.5 g/dL 4.4 (H) 4.7 (H)  4.0 (H) 4.1 (H)   A-G Ratio Latest Ref Range: 1.2 - 3.5   0.9 (L) 0.9 (L)  0.8 (L) 0.9 (L)   ALT (SGPT) Latest Ref Range: 12 - 65 U/L 13 24  19 17   AST Latest Ref Range: 15 - 37 U/L 21 68 (H)  27 23   Alk. phosphatase Latest Ref Range: 50 - 136 U/L 106 121  100 110   Lipase Latest Ref Range: 73 - 393 U/L 213 521 (H)   138       Results for Silvia LIZAMA (MRN [id number]) as of 8/5/2018 16:10   Ref. Range 8/3/2018 11:11 8/4/2018 04:16 8/5/2018 04:30   WBC Latest Ref Range: 4.3 - 11.1 K/uL 10.9 9.6 8.4   RBC Latest Ref Range: 4.05 - 5.25 M/uL 3.07 (L) 2.82 (L) 2.88 (L)   HGB Latest Ref Range: 11.7 - 15.4 g/dL 10.1 (L) 9.4 (L) 9.8 (L)   HCT Latest Ref Range: 35.8 - 46.3 % 29.4 (L) 27.4 (L) 28.5 (L)   MCV Latest Ref Range: 79.6 - 97.8 FL 95.8 97.2 99.0 (H)   MCH Latest Ref Range: 26.1 - 32.9 PG 32.9 33.3 (H) 34.0 (H)   MCHC Latest Ref Range: 31.4 - 35.0 g/dL 34.4 34.3 34.4   RDW Latest Ref Range: 11.9 - 14.6 % 16.2 (H) 16.3 (H) 16.1 (H)   PLATELET Latest Ref Range: 150 - 450 K/uL 219 218 218       Recent Results (from the past 24 hour(s))   GLUCOSE, POC    Collection Time: 08/05/18  6:01 AM   Result Value Ref Range    Glucose (POC) 128 (H) 65 - 100 mg/dL   GLUCOSE, POC    Collection Time: 08/05/18 11:46 AM   Result Value Ref Range    Glucose (POC) 109 (H) 65 - 100 mg/dL   GLUCOSE, POC    Collection Time: 08/05/18  4:42 PM   Result Value Ref Range    Glucose (POC) 129 (H) 65 - 100 mg/dL   GLUCOSE, POC    Collection Time: 08/05/18  9:00 PM   Result Value Ref Range    Glucose (POC) 130 (H) 65 - 100 mg/dL       CT abdomen -  Basilar lungs and pleural spaces clear except for minimalatelectasis. There is abnormal dilatation of proximal mid small bowel loops. The small bowel  loops are dilated to the ventral hernia lower abdominal wall with primarily  collapsed bowel loops distally.  This appears to be a site of relative  obstruction. There is gas and stool in the colon suggesting early or partial  obstruction. There is no free intraperitoneal air. The soft tissue nodule at the left nephrectomy bed is unchanged. Atrophic right  kidney. CT PELVIS:   No free fluid.    IMPRESSION:    1. Evidence of mechanical small bowel obstruction, the site of the obstruction  appears to be the lower abdominal ventral hernia. 2. Gas and stool in the colon indicates obstruction is partial or early. CXR - Impression:    No acute findings or changes in the chest.      Principal Problem:    Small bowel obstruction ([facility] (8/3/2018)    Active Problems:    DM (diabetes mellitus) ([facility] (2/7/2013)      HTN (hypertension) (2/7/2013)      Chronic renal failure, stage 5 (HCC) (6/15/2015)      Severe obesity (BMI 35.0-39. 9) with comorbidity ([facility] (5/4/2018)        Assessment:     1. Abdominal pain -  - small bowel obstruction, working on decompression and medical management  - improved pain control  - failed attempt at PO intake  - lower abdominal Ventral hernia appears culprit of obstruction  - followed by Dr. Mahendra Deshpande and colleagues from surgery     2. ESRD -  - S/P HD Friday as outpatient  - no HD needed today as potassium is improved  - plan next HD today first shift preferred    3. DM II -  - continue current meds    4. HTN -  - may need transdermal meds if BP increases    5. Elevated lipase -  - this level is normal now as GI system is being rested with NGT decompression     6.  Anemia -  - Hb is dropping  - treat with JAMES      Plan:     As above    Joshua Kelly M.D.

## 2018-08-06 NOTE — H&P (VIEW-ONLY)
H&P/Consult Note/Progress Note/Office Note:   Jim Odell  MRN: [id number]  :1950  Age:67 y.o. General Surgery Consult ordered by: Caleb Edwards NP  Reason for Consult: Abd pain/ SBO    As previously noted HPI: Jim Odell is a 78 y.o. female with PMH of ESRD on MWF HD (Dr. Jung Artist), HTN, DM II, hx SBO, hx nephrectomy, colon resection with colostomy (then reversal), TIA, renal cell carcinoma who presented with c/o abd pain, n/v.  Reports had onset of mid abd pain sharp in nature starting suddenly around 0200 this morning. She started vomiting in dialysis. She did complete her HD session completely. She was found to have evidence of a mechanical small bowel obstruction, lower abd ventral hernia. Gas and stool in the colon indicate obstruction is partial or early. Last BM this morning that is described as \"constipated stool\". Pt denies CP, SOB.      8/3/18: Awake in bed. C/o pain epigastric region. Denies Hx of bowel obstruction. NG to LIS. Past Medical History:   Diagnosis Date    Arthritis     AVF (arteriovenous fistula) ([facility] 2016 ([facility]) Right AVF revision and thrombectomy    Cancer ([facility] [address] [facility]     L kidney    Chronic kidney disease     HD in --- at [place] [facility]    Degenerative joint disease     Diabetes ([facility] [address]     type 2, average range 100-120 fasting, symptomatic below 90; last A1C?    ESRD (end stage renal disease) ([facility] [address] 2006    ESRD.  MWF dialysis     Hypertension     Obesity     Transient ischemic attack 2015     Past Surgical History:   Procedure Laterality Date    BREAST SURGERY PROCEDURE UNLISTED Right     cyst removed    HX GI      colon resection resulting in temporary colostomy reversal    HX GYN      stephan    HX OTHER SURGICAL      dialysis fistula, several permcaths    HX UROLOGICAL Left 2015    nephrectomy    HX VASCULAR ACCESS       Current Facility-Administered Medications   Medication Dose Route Frequency    0.9% sodium chloride infusion  150 mL/hr IntraVENous CONTINUOUS    sodium chloride (NS) flush 5-10 mL  5-10 mL IntraVENous Q8H    sodium chloride (NS) flush 5-10 mL  5-10 mL IntraVENous PRN    morphine injection 2 mg  2 mg IntraVENous Q4H PRN    naloxone (NARCAN) injection 0.4 mg  0.4 mg IntraVENous PRN    ondansetron (ZOFRAN) injection 4 mg  4 mg IntraVENous Q4H PRN    tuberculin injection 5 Units  5 Units IntraDERMal ONCE    benzocaine (HURRICANE) 20 % spray   Mucous Membrane PRN    insulin lispro (HUMALOG) injection   SubCUTAneous TIDAC     Pcn [penicillins] and Vancomycin  Social History     Social History    Marital status:      Spouse name: N/A    Number of children: N/A    Years of education: N/A     Social History Main Topics    Smoking status: Never Smoker    Smokeless tobacco: Never Used    Alcohol use No    Drug use: No    Sexual activity: Not Currently     Other Topics Concern    None     Social History Narrative     History   Smoking Status    Never Smoker   Smokeless Tobacco    Never Used     Family History   Problem Relation Age of Onset    Diabetes Mother     Heart Disease Mother     Hypertension Mother     Heart Disease Father     Hypertension Father     Malignant Hyperthermia Neg Hx     Pseudocholinesterase Deficiency Neg Hx     Delayed Awakening Neg Hx     Post-op Nausea/Vomiting Neg Hx     Emergence Delirium Neg Hx     Other Neg Hx     Post-op Cognitive Dysfunction Neg Hx      ROS:  Comprehensive review of systems was otherwise unremarkable except as noted above. Physical Exam:   Visit Vitals    /55    Pulse (!) 103    Temp 97.6 °F (36.4 °C)    Resp 22    Ht 5' 5\" (1.651 m)    Wt 220 lb (99.8 kg)    SpO2 94%    BMI 36.61 kg/m2     Constitutional: Alert, cooperative, no acute distress; appears stated age    Eyes:Sclera are clear.    ENMT: no external lesions gross hearing normal; no obvious neck masses, no ear or lip lesions, nares normal, NG  CV: RRR. Normal perfusion  Resp: No JVD. Breathing is  non-labored; no audible wheezing. GI: soft, non-distended, ttp epigastric region, BS hypo   Musculoskeletal: No embolic signs or cyanosis. Neuro:  Oriented; no focal deficits  Psychiatric: normal affect and mood    Recent vitals (if inpt):  Patient Vitals for the past 24 hrs:   BP Temp Pulse Resp SpO2 Height Weight   08/03/18 1541 151/55 97.6 °F (36.4 °C) (!) 103 22 94 % - -   08/03/18 1059 133/61 98.5 °F (36.9 °C) (!) 109 18 100 % 5' 5\" (1.651 m) 220 lb (99.8 kg)       Labs:  Recent Labs      08/03/18   1111   WBC  10.9   HGB  10.1*   PLT  219   NA  139   K  5.3*   CL  98   CO2  32   BUN  12   CREA  3.17*   GLU  142*   TBILI  0.6   SGOT  68*   ALT  24   AP  121   LPSE  521*       Lab Results   Component Value Date/Time    WBC 10.9 08/03/2018 11:11 AM    HGB 10.1 (L) 08/03/2018 11:11 AM    PLATELET 443 65/12/9316 11:11 AM    Sodium 139 08/03/2018 11:11 AM    Potassium 5.3 (H) 08/03/2018 11:11 AM    Chloride 98 08/03/2018 11:11 AM    CO2 32 08/03/2018 11:11 AM    BUN 12 08/03/2018 11:11 AM    Creatinine 3.17 (H) 08/03/2018 11:11 AM    Glucose 142 (H) 08/03/2018 11:11 AM    INR 0.9 11/16/2015 12:00 AM    aPTT 22.2 (L) 11/16/2015 12:00 AM    Bilirubin, total 0.6 08/03/2018 11:11 AM    AST (SGOT) 68 (H) 08/03/2018 11:11 AM    ALT (SGPT) 24 08/03/2018 11:11 AM    Alk. phosphatase 121 08/03/2018 11:11 AM    Lipase 521 (H) 08/03/2018 11:11 AM    Lactic acid 1.2 08/21/2015 08:22 PM    Troponin-I <0.05 03/26/2012 05:50 PM    Troponin-I, Qt. <0.02 (L) 09/12/2017 04:52 PM     8/3/18: CT  ABDOMEN AND PELVIS WITHOUT CONTRAST      HISTORY:  Severe abdominal pain. Renal dialysis patient.     COMPARISON: April 26, 2018      TECHNIQUE:  Helical scans through the abdomen and pelvis without contrast.   Radiation dose reduction techniques were used for this study:   All CT scans  performed at this facility use one or all of the following: Automated exposure  control, adjustment of the mA and/or kVp according to patient's size, iterative  reconstruction.        CT ABDOMEN:  Basilar lungs and pleural spaces clear except for minimal  atelectasis. There is abnormal dilatation of proximal mid small bowel loops. The small bowel  loops are dilated to the ventral hernia lower abdominal wall with primarily  collapsed bowel loops distally. This appears to be a site of relative  obstruction. There is gas and stool in the colon suggesting early or partial  obstruction. There is no free intraperitoneal air. The soft tissue nodule at the left nephrectomy bed is unchanged. Atrophic right  kidney.     CT PELVIS:   No free fluid.     IMPRESSION:    1. Evidence of mechanical small bowel obstruction, the site of the obstruction  appears to be the lower abdominal ventral hernia. 2. Gas and stool in the colon indicates obstruction is partial or early.         I reviewed recent labs and recent radiologic studies. I independently reviewed radiology images for studies I described above or studies I have ordered. Admission date (for inpatients): 8/3/2018   * No surgery found *  * No surgery found *    ASSESSMENT/PLAN:  Problem List  Date Reviewed: 5/4/2018          Codes Class Noted    * (Principal)Small bowel obstruction ([facility] [address] ICD-10-CM: M43.466  ICD-9-CM: 560.9  8/3/2018        Severe obesity (BMI 35.0-39. 9) with comorbidity ([facility] [address] ICD-10-CM: E66.01  ICD-9-CM: 278.01  5/4/2018        Type 2 diabetes mellitus with nephropathy ([facility] [address] ICD-10-CM: E11.21  ICD-9-CM: 250.40, 583.81  12/15/2017        Flank pain ICD-10-CM: R10.9  ICD-9-CM: 789.09  9/14/2017        Abdominal mass ICD-10-CM: R19.00  ICD-9-CM: 789.30  9/12/2017    Overview Signed 9/12/2017  9:01 PM by Pablito Rocha MD     Of Left renal bed             Renal cell carcinoma ([facility] [address] ICD-10-CM: C64.9  ICD-9-CM: 189.0  9/12/2017        AVF (arteriovenous fistula) (HCC) (Chronic) ICD-10-CM: I77.0  ICD-9-CM: 447.0  12/20/2016    Overview Signed 12/20/2016  2:19 PM by Radha Ceja, NP       12/6/16 ([facility]) Right AVF revision and thrombectomy             Transient ischemic attack ICD-10-CM: G45.9  ICD-9-CM: 435.9  8/29/2015        Renal mass ICD-10-CM: N28.89  ICD-9-CM: 593.9  7/2/2015        Chronic renal failure, stage 5 (HCC) ICD-10-CM: N18.5  ICD-9-CM: 585.5  6/15/2015        Hypotension ICD-10-CM: I95.9  ICD-9-CM: 458.9  12/1/2014        Dizziness ICD-10-CM: X43  ICD-9-CM: 780.4  12/1/2014        Osteoarthritis of right knee ICD-10-CM: M17.11  ICD-9-CM: 715.96  2/7/2013        Total knee replacement status ICD-10-CM: I46.363  ICD-9-CM: V43.65  2/7/2013        DM (diabetes mellitus) ([facility] ICD-10-CM: E11.9  ICD-9-CM: 250.00  2/7/2013        ESRD (end stage renal disease) ([facility] [address] ICD-10-CM: N18.6  ICD-9-CM: 585.6  2/7/2013        HTN (hypertension) ICD-10-CM: I10  ICD-9-CM: 401.9  2/7/2013            Principal Problem:    Small bowel obstruction ([facility] [address] (8/3/2018)    Active Problems:    DM (diabetes mellitus) ([facility] [address] (2/7/2013)      HTN (hypertension) (2/7/2013)      Chronic renal failure, stage 5 (HCC) (6/15/2015)      Severe obesity (BMI 35.0-39. 9) with comorbidity ([facility] [address] (5/4/2018)       Plan:  Care management per Hospitalist  Nephrology consulted - ESRD on HD  NPO  NG to LIS  IVF  Will Follow      Signed:  Scherrie Gilford, FNP-BC    CT reviewed. Continue NG tube decompression.   Will see in AM.    Daniela Robertson,

## 2018-08-06 NOTE — PROGRESS NOTES
PT Note:  PT orders received/reviewed and evaluation attempted. Patient was again refusing to participate until after her surgery. Evaluation will be re-attempted tomorrow as schedule and patient's status allow.   Thanks,  Drew Werner, PT, DPT

## 2018-08-06 NOTE — PROGRESS NOTES
OT Note:  Occupational Therapy Evaluation order received; chart reviewed. Patient declined evaluation this date, stating that she wants to wait until after her surgery to do therapy.     Valeria Haywood, OT

## 2018-08-06 NOTE — PROGRESS NOTES
Progress Note:   Duong Priest  MRN: [id number]  :1950  Age:67 y.o. As previously noted HPI: Duong Priest is a 78 y.o. female with PMH of ESRD on MWF HD (Dr. Robby Mcintyre), HTN, DM II, hx SBO, hx nephrectomy, colon resection with colostomy (then reversal), TIA, renal cell carcinoma who presented with c/o abd pain, n/v.  Reports had onset of mid abd pain sharp in nature starting suddenly around 0200 this morning. She started vomiting in dialysis. She did complete her HD session completely. She was found to have evidence of a mechanical small bowel obstruction, lower abd ventral hernia. Gas and stool in the colon indicate obstruction is partial or early. Last BM this morning that is described as \"constipated stool\". Pt denies CP, SOB.      18: Awake in bed. States she wants NG tube removed. Reports +flatus and abd pain improved. NG tube 1200ml 24/hr. AF. VSS.    18: Pt seen in dialysis. -flatus/-BM. Increased abd pain. NGT with 950mL/24h out. AF, VSS. She did not tolerate clamping NGT yesterday. Past Medical History:   Diagnosis Date    Arthritis     AVF (arteriovenous fistula) ([facility] [address] 2016 (S) Right AVF revision and thrombectomy    Cancer ([address] 2015    L kidney    Chronic kidney disease     HD in --- at [place] [facility]    Degenerative joint disease     Diabetes ([address]     type 2, average range 100-120 fasting, symptomatic below 90; last A1C?    ESRD (end stage renal disease) ([address] 2006    ESRD.  MWF dialysis     Hypertension     Obesity     Transient ischemic attack 2015     Past Surgical History:   Procedure Laterality Date    BREAST SURGERY PROCEDURE UNLISTED Right     cyst removed    HX GI      colon resection resulting in temporary colostomy reversal    HX GYN      stephan    HX OTHER SURGICAL      dialysis fistula, several permcaths    HX UROLOGICAL Left 2015    nephrectomy    HX VASCULAR ACCESS       Current Facility-Administered Medications   Medication Dose Route Frequency    phenol throat spray (CHLORASEPTIC) 1 Spray  1 Spray Oral PRN    epoetin mary lou (EPOGEN;PROCRIT) injection 10,000 Units  10,000 Units SubCUTAneous Q MON, WED & FRI    diphenhydrAMINE (BENADRYL) injection 25 mg  25 mg IntraVENous Q6H PRN    sodium chloride (NS) flush 5-10 mL  5-10 mL IntraVENous Q8H    sodium chloride (NS) flush 5-10 mL  5-10 mL IntraVENous PRN    naloxone (NARCAN) injection 0.4 mg  0.4 mg IntraVENous PRN    ondansetron (ZOFRAN) injection 4 mg  4 mg IntraVENous Q4H PRN    benzocaine (HURRICANE) 20 % spray   Mucous Membrane PRN    insulin lispro (HUMALOG) injection   SubCUTAneous TIDAC    morphine injection 4 mg  4 mg IntraVENous Q4H PRN     Pcn [penicillins] and Vancomycin  Social History     Social History    Marital status:      Spouse name: N/A    Number of children: N/A    Years of education: N/A     Social History Main Topics    Smoking status: Never Smoker    Smokeless tobacco: Never Used    Alcohol use No    Drug use: No    Sexual activity: Not Currently     Other Topics Concern    None     Social History Narrative     History   Smoking Status    Never Smoker   Smokeless Tobacco    Never Used     Family History   Problem Relation Age of Onset    Diabetes Mother     Heart Disease Mother     Hypertension Mother     Heart Disease Father     Hypertension Father     Malignant Hyperthermia Neg Hx     Pseudocholinesterase Deficiency Neg Hx     Delayed Awakening Neg Hx     Post-op Nausea/Vomiting Neg Hx     Emergence Delirium Neg Hx     Other Neg Hx     Post-op Cognitive Dysfunction Neg Hx      ROS:  Comprehensive review of systems was otherwise unremarkable except as noted above.     Physical Exam:   Visit Vitals    /76    Pulse 86    Temp 98.7 °F (37.1 °C)    Resp 18    Ht 5' 5\" (1.651 m)    Wt 219 lb (99.3 kg)    SpO2 92%    BMI 36.44 kg/m2     Constitutional: Alert, cooperative, no acute distress; appears stated age    Eyes:Sclera are clear. ENMT: no external lesions gross hearing normal; no obvious neck masses, no ear or lip lesions, nares normal, NG  CV: RRR. Normal perfusion  Resp: No JVD. Breathing is  non-labored; no audible wheezing. GI: Obese, soft, non-distended, mild diffuse tenderness  Musculoskeletal: No embolic signs or cyanosis.    Neuro:  Oriented; no focal deficits  Psychiatric: normal affect and mood    Recent vitals (if inpt):  Patient Vitals for the past 24 hrs:   BP Temp Pulse Resp SpO2 Weight   08/06/18 0952 157/76 - 86 - - -   08/06/18 0940 (!) 165/92 - 96 - - -   08/06/18 0908 172/59 - - - - -   08/06/18 0826 (!) 149/100 - 70 - - -   08/06/18 0751 159/71 - 80 - - -   08/06/18 0731 142/58 - 76 - - -   08/06/18 0656 129/51 - 87 - - -   08/06/18 0455 - - - - - 219 lb (99.3 kg)   08/06/18 0350 137/77 98.7 °F (37.1 °C) 97 18 92 % -   08/05/18 2246 111/67 99.3 °F (37.4 °C) 92 18 91 % -   08/05/18 1939 132/75 99.1 °F (37.3 °C) 94 18 96 % -   08/05/18 1500 132/77 99 °F (37.2 °C) (!) 102 18 91 % -   08/05/18 1137 126/77 98.6 °F (37 °C) 94 18 91 % -       Labs:  Recent Labs      08/06/18   0437  08/05/18   0430   WBC  8.8  8.4   HGB  9.5*  9.8*   PLT  204  218   NA  143  145   K  4.2  4.1   CL  94*  97*   CO2  39*  39*   BUN  36*  27*   CREA  10.00*  7.58*   GLU  117*  115*   TBILI  0.4  0.6   SGOT  23  23   ALT  14  17   AP  98  110   LPSE   --   138       Lab Results   Component Value Date/Time    WBC 8.8 08/06/2018 04:37 AM    HGB 9.5 (L) 08/06/2018 04:37 AM    PLATELET 449 39/32/7702 04:37 AM    Sodium 143 08/06/2018 04:37 AM    Potassium 4.2 08/06/2018 04:37 AM    Chloride 94 (L) 08/06/2018 04:37 AM    CO2 39 (H) 08/06/2018 04:37 AM    BUN 36 (H) 08/06/2018 04:37 AM    Creatinine 10.00 (H) 08/06/2018 04:37 AM    Glucose 117 (H) 08/06/2018 04:37 AM    INR 0.9 11/16/2015 12:00 AM    aPTT 22.2 (L) 11/16/2015 12:00 AM    Bilirubin, total 0.4 08/06/2018 04:37 AM    AST (SGOT) 23 08/06/2018 04:37 AM    ALT (SGPT) 14 08/06/2018 04:37 AM    Alk. phosphatase 98 08/06/2018 04:37 AM    Lipase 138 08/05/2018 04:30 AM    Lactic acid 1.2 08/21/2015 08:22 PM    Troponin-I <0.05 03/26/2012 05:50 PM    Troponin-I, Qt. <0.02 (L) 09/12/2017 04:52 PM     8/4/18: KUB supine views     History:  mechanical small bowel obstruction, lower abd ventral hernia, 67 years  Female     Comparison:  CT abdomen pelvis yesterday     Findings:  Esophageal tube appears to terminate in the first portion of the  duodenum. Oral contrast is seen throughout the colon. No free air is evident. The bowel gas pattern is nonspecific and there is no evidence of ileus or  obstruction. No suspicious renal or ureteral calculi are evident. Visualized  osseous structures unremarkable.     IMPRESSION  Impression: No acute pathology identified      I reviewed recent labs and recent radiologic studies. I independently reviewed radiology images for studies I described above or studies I have ordered. Admission date (for inpatients): 8/3/2018   * No surgery found *  Procedure(s): HERNIA VENTRAL REPAIR    ASSESSMENT/PLAN:  Problem List  Date Reviewed: 5/4/2018          Codes Class Noted    * (Principal)Small bowel obstruction ([facility] ICD-10-CM: K53.383  ICD-9-CM: 560.9  8/3/2018        Severe obesity (BMI 35.0-39. 9) with comorbidity ([facility] ICD-10-CM: E66.01  ICD-9-CM: 278.01  5/4/2018        Type 2 diabetes mellitus with nephropathy ([facility] ICD-10-CM: E11.21  ICD-9-CM: 250.40, 583.81  12/15/2017        Flank pain ICD-10-CM: R10.9  ICD-9-CM: 789.09  9/14/2017        Abdominal mass ICD-10-CM: R19.00  ICD-9-CM: 789.30  9/12/2017    Overview Signed 9/12/2017  9:01 PM by Chica Watson MD     Of Left renal bed             Renal cell carcinoma ([facility] [address] ICD-10-CM: C64.9  ICD-9-CM: 189.0  9/12/2017        AVF (arteriovenous fistula) (HCC) (Chronic) ICD-10-CM: I77.0  ICD-9-CM: 447.0  12/20/2016    Overview Signed 12/20/2016  2:19 PM by Katey Collins NP 12/6/16 (S) Right AVF revision and thrombectomy             Transient ischemic attack ICD-10-CM: G45.9  ICD-9-CM: 435.9  8/29/2015        Renal mass ICD-10-CM: N28.89  ICD-9-CM: 593.9  7/2/2015        Chronic renal failure, stage 5 (HCC) ICD-10-CM: N18.5  ICD-9-CM: 585.5  6/15/2015        Hypotension ICD-10-CM: I95.9  ICD-9-CM: 458.9  12/1/2014        Dizziness ICD-10-CM: H75  ICD-9-CM: 780.4  12/1/2014        Osteoarthritis of right knee ICD-10-CM: M17.11  ICD-9-CM: 715.96  2/7/2013        Total knee replacement status ICD-10-CM: A54.839  ICD-9-CM: V43.65  2/7/2013        DM (diabetes mellitus) ([facility] ICD-10-CM: E11.9  ICD-9-CM: 250.00  2/7/2013        ESRD (end stage renal disease) ([facility] ICD-10-CM: N18.6  ICD-9-CM: 585.6  2/7/2013        HTN (hypertension) ICD-10-CM: I10  ICD-9-CM: 401.9  2/7/2013            Principal Problem:    Small bowel obstruction ([facility] (8/3/2018)    Active Problems:    DM (diabetes mellitus) ([facility] (2/7/2013)      HTN (hypertension) (2/7/2013)      Chronic renal failure, stage 5 ([facility] (6/15/2015)      Severe obesity (BMI 35.0-39. 9) with comorbidity ([facility] (5/4/2018)       Plan:  Care management per Hospitalist  NPO  Nephrology following- ESRD on HD  To OR this afternoon with Dr. Karina Dewitt    Symptoms are failing to resolve with conservative management. Discussed surgical and non surgical options with patient and she agreed to proceed with ventral hernia repair. Discussed the patient's condition and treatment options with the patient. Discussed risks of surgery in language the patient could understand. The patient voiced understanding of all this and all questions were answered. Alternatives to surgery were discussed also and risks of the alternatives. The patient requested that we proceed with surgery. Informed consent was obtained.        Signed:  Suzen Duane, NP

## 2018-08-07 ENCOUNTER — HOME HEALTH ADMISSION (OUTPATIENT)
Dept: HOME HEALTH SERVICES | Facility: HOME HEALTH | Age: 68
End: 2018-08-07

## 2018-08-07 LAB
ANION GAP SERPL CALC-SCNC: 7 MMOL/L (ref 7–16)
BUN SERPL-MCNC: 29 MG/DL (ref 8–23)
CALCIUM SERPL-MCNC: 9 MG/DL (ref 8.3–10.4)
CHLORIDE SERPL-SCNC: 98 MMOL/L (ref 98–107)
CO2 SERPL-SCNC: 35 MMOL/L (ref 21–32)
CREAT SERPL-MCNC: 7.18 MG/DL (ref 0.6–1)
EST. AVERAGE GLUCOSE BLD GHB EST-MCNC: ABNORMAL MG/DL
GLUCOSE BLD STRIP.AUTO-MCNC: 101 MG/DL (ref 65–100)
GLUCOSE BLD STRIP.AUTO-MCNC: 105 MG/DL (ref 65–100)
GLUCOSE BLD STRIP.AUTO-MCNC: 107 MG/DL (ref 65–100)
GLUCOSE BLD STRIP.AUTO-MCNC: 145 MG/DL (ref 65–100)
GLUCOSE SERPL-MCNC: 145 MG/DL (ref 65–100)
HBA1C MFR BLD: 4.7 % (ref 4.8–6)
POTASSIUM SERPL-SCNC: 4.3 MMOL/L (ref 3.5–5.1)
SODIUM SERPL-SCNC: 140 MMOL/L (ref 136–145)

## 2018-08-07 PROCEDURE — 77030019605

## 2018-08-07 PROCEDURE — 97165 OT EVAL LOW COMPLEX 30 MIN: CPT

## 2018-08-07 PROCEDURE — 80048 BASIC METABOLIC PNL TOTAL CA: CPT

## 2018-08-07 PROCEDURE — 74011250636 HC RX REV CODE- 250/636: Performed by: FAMILY MEDICINE

## 2018-08-07 PROCEDURE — 97535 SELF CARE MNGMENT TRAINING: CPT

## 2018-08-07 PROCEDURE — 83036 HEMOGLOBIN GLYCOSYLATED A1C: CPT

## 2018-08-07 PROCEDURE — 36415 COLL VENOUS BLD VENIPUNCTURE: CPT

## 2018-08-07 PROCEDURE — 65270000029 HC RM PRIVATE

## 2018-08-07 PROCEDURE — 0WJG0ZZ INSPECTION OF PERITONEAL CAVITY, OPEN APPROACH: ICD-10-PCS | Performed by: SURGERY

## 2018-08-07 PROCEDURE — 74011250636 HC RX REV CODE- 250/636: Performed by: ANESTHESIOLOGY

## 2018-08-07 PROCEDURE — 77030020255 HC SOL INJ LR 1000ML BG

## 2018-08-07 PROCEDURE — 82962 GLUCOSE BLOOD TEST: CPT

## 2018-08-07 PROCEDURE — 74011250636 HC RX REV CODE- 250/636: Performed by: INTERNAL MEDICINE

## 2018-08-07 RX ADMIN — MORPHINE SULFATE 4 MG: 2 INJECTION, SOLUTION INTRAMUSCULAR; INTRAVENOUS at 10:02

## 2018-08-07 RX ADMIN — DIPHENHYDRAMINE HYDROCHLORIDE 25 MG: 50 INJECTION, SOLUTION INTRAMUSCULAR; INTRAVENOUS at 16:59

## 2018-08-07 RX ADMIN — SODIUM CHLORIDE, SODIUM LACTATE, POTASSIUM CHLORIDE, AND CALCIUM CHLORIDE 75 ML/HR: 600; 310; 30; 20 INJECTION, SOLUTION INTRAVENOUS at 13:14

## 2018-08-07 RX ADMIN — MORPHINE SULFATE 4 MG: 2 INJECTION, SOLUTION INTRAMUSCULAR; INTRAVENOUS at 16:53

## 2018-08-07 NOTE — ANESTHESIA POSTPROCEDURE EVALUATION
Post-Anesthesia Evaluation and Assessment    Patient: Aquilino Briscoe MRN: [id number]  SSN: [id number]    YOB: 1950  Age: 78 y.o. Sex: female       Cardiovascular Function/Vital Signs  Visit Vitals    /65    Pulse 88    Temp 36.6 °C (97.9 °F)    Resp 13    Ht 5' 5\" (1.651 m)    Wt 99.3 kg (219 lb)    SpO2 95%    BMI 36.44 kg/m2       Patient is status post general anesthesia for Procedure(s):  negative laparotomy. Nausea/Vomiting: None    Postoperative hydration reviewed and adequate. Pain:  Pain Scale 1: Visual (08/06/18 2044)  Pain Intensity 1: 0 (08/06/18 2044)   Managed    Neurological Status:   Neuro (WDL): Within Defined Limits (08/06/18 2044)  Neuro  LUE Motor Response: Purposeful (08/06/18 2044)  LLE Motor Response: Purposeful (08/06/18 2044)  RUE Motor Response: Purposeful (08/06/18 2044)  RLE Motor Response: Purposeful (08/06/18 2044)   At baseline    Mental Status and Level of Consciousness: Arousable    Pulmonary Status:   O2 Device: Nasal cannula (08/06/18 2044)   Adequate oxygenation and airway patent    Complications related to anesthesia: None    Post-anesthesia assessment completed.  No concerns    Signed By: Juan Manuel Boyle MD     August 6, 2018

## 2018-08-07 NOTE — PROGRESS NOTES
Problem: Self Care Deficits Care Plan (Adult)  Goal: *Acute Goals and Plan of Care (Insert Text)    1. Pt will complete lower body dressing with SBA using AE as needed (MET)  2. Pt will complete grooming and hygiene at sink with SBA (MET)  3. Pt will complete bed mobility with SBA in prep for ADLs (MET)    Timeframe: 7 days      OCCUPATIONAL THERAPY: Initial Assessment, Discharge, Treatment Day: Day of Assessment and AM 8/7/2018  INPATIENT: Hospital Day: 5  [place] / [place] / Product Type: Medicare /      NAME/AGE/GENDER: Reba Rowland is a 78 y.o. female   PRIMARY DIAGNOSIS:  Small bowel obstruction (HCC)  VENTRAL HERNIA Small bowel obstruction (HCC) Small bowel obstruction (HCC)  Procedure(s) (LRB):  negative laparotomy (N/A)  1 Day Post-Op  ICD-10: Treatment Diagnosis:    · Generalized Muscle Weakness (M62.81)   Precautions/Allergies:    abdominal Pcn [penicillins] and Vancomycin      ASSESSMENT:     Ms. Morgan Parra is s/p abdominal surgery d/t SBO, ventral hernia. Pt lives with her family and was independent with ADLs at baseline, does not use an AD for mobility. Pt does have a tub shower, does not have any grab bars or a shower chair. This session, pt educated on adaptive techniques to maintain abdominal precautions and for ease of tasks during ADLs and mobility during ADLs. Pt completed bed mobility with SBA following instruction for log roll technique, donned socks with SBA using cross leg technique. Pt completed hygiene at sink with SBA, stated that she has been completing bathing independently. Pt required CGA for mobility, demonstrated increased balance with UE support and encouraged/ agreeable to use her RW for mobility and ADLs at home for fall prevention. Pt does not require further skilled OT services in this setting, recommend d/c home with family support.      This section established at most recent assessment   PROBLEM LIST (Impairments causing functional limitations):  1. Decreased Balance  2. Decreased Activity Tolerance   INTERVENTIONS PLANNED: (Benefits and precautions of occupational therapy have been discussed with the patient.)  1. n/a     TREATMENT PLAN: Frequency/Duration: Follow patient 0 to address above goals. Rehabilitation Potential For Stated Goals: n/a     RECOMMENDED REHABILITATION/EQUIPMENT: (at time of discharge pending progress): Due to the probability of continued deficits (see above) this patient will likely need continued skilled occupational therapy after discharge. Equipment:    3:1 [facility]              OCCUPATIONAL PROFILE AND HISTORY:   History of Present Injury/Illness (Reason for Referral):  See H&P  Past Medical History/Comorbidities:   Ms. Geneva Blancas  has a past medical history of Arthritis; AVF (arteriovenous fistula) ([facility] (12/20/2016); Cancer ([facility] [address] [facility] (2015); Chronic kidney disease; Degenerative joint disease; Diabetes ([facility] [address] ESRD (end stage renal disease) [facility]) (Nov 2006); Hypertension; Obesity; and Transient ischemic attack (8/29/2015). She also has no past medical history of Arrhythmia; Asthma; Autoimmune disease ([address] CAD (coronary artery disease); COPD; Liver disease; PUD (peptic ulcer disease); Seizures ([facility] [address] [facility] Thromboembolus ([facility] [address] [facility] or Thyroid disease. Ms. Geneva Blancas  has a past surgical history that includes hx gyn; hx vascular access; pr breast surgery procedure unlisted (Right); hx other surgical; hx urological (Left, July 2015); and hx gi.   Social History/Living Environment:   Home Environment: Private residence  One/Two Story Residence: One story  Living Alone: No  Support Systems: Family member(s), Child(joan)  Patient Expects to be Discharged to[de-identified] Private residence  Current DME Used/Available at Home: Cane, straight, Walker, rolling, Raised toilet seat  Tub or Shower Type: Tub/Shower combination  Prior Level of Function/Work/Activity:  Independent, lives with family, does not use an AD     Number of Personal Factors/Comorbidities that affect the Plan of Care: Brief history (0):  LOW COMPLEXITY   ASSESSMENT OF OCCUPATIONAL PERFORMANCE[de-identified]   Activities of Daily Living:   Basic ADLs (From Assessment) Complex ADLs (From Assessment)         Grooming/Bathing/Dressing Activities of Daily Living   Grooming  Grooming Assistance: Stand-by assistance Cognitive Retraining  Safety/Judgement: Fall prevention                     Lower Body Dressing Assistance  Socks: Stand-by assistance         Most Recent Physical Functioning:   Gross Assessment:  AROM: Within functional limits  Strength: Within functional limits               Posture:     Balance:    Bed Mobility:     Wheelchair Mobility:     Transfers:               Patient Vitals for the past 6 hrs:   BP SpO2 Pulse   18 0648 114/71 100 % 79       Mental Status  Neurologic State: Alert  Orientation Level: Oriented X4  Cognition: Appropriate decision making, Follows commands  Perception: Appears intact  Perseveration: No perseveration noted  Safety/Judgement: Fall prevention                          Physical Skills Involved:  1. Balance  2. Activity Tolerance Cognitive Skills Affected (resulting in the inability to perform in a timely and safe manner): 1. none Psychosocial Skills Affected:  1. Habits/Routines  2. Environmental Adaptation   Number of elements that affect the Plan of Care: 1-3:  LOW COMPLEXITY   CLINICAL DECISION MAKIN [facility] [address] [id number] AM-PAC 6 Clicks   Daily Activity Inpatient Short Form  How much help from another person does the patient currently need. .. Total A Lot A Little None   1. Putting on and taking off regular lower body clothing? [] 1   [] 2   [] 3   [x] 4   2. Bathing (including washing, rinsing, drying)? [] 1   [] 2   [] 3   [x] 4   3. Toileting, which includes using toilet, bedpan or urinal?   [] 1   [] 2   [] 3   [x] 4   4. Putting on and taking off regular upper body clothing? [] 1   [] 2   [] 3   [x] 4   5.   Taking care of personal grooming such as brushing teeth? [] 1   [] 2   [] 3   [x] 4   6. Eating meals? [] 1   [] 2   [] 3   [x] 4   © 2007, Trustees of [address] [place] [address] [address] [address], under license to [employer]. All rights reserved      Score:  Initial: 24 Most Recent: X (Date: -- )    Interpretation of Tool:  Represents activities that are increasingly more difficult (i.e. Bed mobility, Transfers, Gait). Score 24 23 22-20 19-15 14-10 9-7 6     Modifier CH CI CJ CK CL CM CN      ? Self Care:     - CURRENT STATUS: CH - 0% impaired, limited or restricted    - GOAL STATUS: CH - 0% impaired, limited or restricted    - D/C STATUS:  CH - 0% impaired, limited or restricted  [place] / [place] / Product Type: Medicare /      Medical Necessity:     · n/a. Reason for Services/Other Comments:  · n/a. Use of outcome tool(s) and clinical judgement create a POC that gives a: LOW COMPLEXITY         TREATMENT:   (In addition to Assessment/Re-Assessment sessions the following treatments were rendered)     Pre-treatment Symptoms/Complaints:    Pain: Initial:   Pain Intensity 1: 7  Pain Location 1: Abdomen  Pain Intervention(s) 1:  (pre-medicated)  Post Session:  same     Self Care: (8 min): Procedure(s) (per grid) utilized to improve and/or restore self-care/home management as related to dressing and grooming. Required no   cueing to facilitate activities of daily living skills and compensatory activities.   Assessment/Reassessment     Braces/Orthotics/Lines/Etc:   · IV  · O2 Device: Nasal cannula  Treatment/Session Assessment:    · Response to Treatment:  No adverse reaction   · Interdisciplinary Collaboration:   o Occupational Therapist  o Registered Nurse  · After treatment position/precautions:   o Supine in bed  o Bed/Chair-wheels locked  o Bed in low position  o Call light within reach  o RN notified     Total Treatment Duration:  OT Patient Time In/Time Out  Time In: 1103  Time Out: 833 [facility]

## 2018-08-07 NOTE — PROGRESS NOTES
[facility]  Face to Face Encounter    Patients Name: Eriberto Fischer    YOB: 1950    Ordering Physician: Dr. Slime Ramirez    Primary Diagnosis: Small bowel obstruction ([facility]  VENTRAL HERNIA    Date of Face to Face:   8/7/2018                                  Face to Face Encounter findings are related to primary reason for home care:   yes. 1. I certify that the patient needs intermittent care as follows: physical therapy: strengthening, stretching/ROM, gait/stair training, balance training and pt/caregiver education  occupational therapy:  ADL safety (ie. cooking, bathing, dressing), ROM and pt/caregiver education    2. I certify that this patient is homebound, that is: 1) patient requires the use of a cane and walker device, special transportation, or assistance of another to leave the home; or 2) patient's condition makes leaving the home medically contraindicated; and 3) patient has a normal inability to leave the home and leaving the home requires considerable and taxing effort. Patient may leave the home for infrequent and short duration for medical reasons, and occasional absences for non-medical reasons. Homebound status is due to the following functional limitations: Patient currently under activity restrictions secondary to recent surgical procedure, this hinders their ability to safely leave the home. Patient with strength deficits limiting the performance of all ADL's without caregiver assistance or the use of an assistive device. 3. I certify that this patient is under my care and that I, or a nurse practitioner or  [id number], or clinical nurse specialist, or certified nurse midwife, working with me, had a Face-to-Face Encounter that meets the physician Face-to-Face Encounter requirements.   The following are the clinical findings from the [address] [place] [address] encounter that support the need for skilled services and is a summary of the encounter: see [facility] chart    See discharge summary, see hospital chart      Elizabeth Cordova  8/7/2018      THE FOLLOWING TO BE COMPLETED BY THE COMMUNITY PHYSICIAN:    I concur with the findings described above from the F2F encounter that this patient is homebound and in need of a skilled service.     Certifying Physician: _____________________________________      Printed Certifying Physician Name: _____________________________________    Date: _________________

## 2018-08-07 NOTE — PROGRESS NOTES
I met with patient for discharge planning. Patient lives with her daughter Ganga Schroeder and son in law. Per patient multiple other family members also live with them. Patient lives in a trailer with 6 steps to get inside. Per patient there is a handicap ramp on the backside of the home that she uses. Patient has a cane, RW, and elevated toilet seat at home. Patient stated that her 15 y.o. grandaughter helps with her bathing and getting dressed. Patient goes to dialysis on M-W-F at [facility] on [address]. Patient denies difficulty filling prescriptions. Patient use mail order for most prescriptions and [facility] for short term prescriptions. Pending PT/OT evaluations. Patient would like to discharge home as she has many family members to help care for her. May benefit from H/H if needed. Care Management Interventions  PCP Verified by CM: Yes (Dr. Anita Carroll)  Mode of Transport at Discharge: Other (see comment) (Daughter)  Transition of Care Consult (CM Consult): Discharge Planning  Discharge Durable Medical Equipment:  (Has a cane, RW, and elevated toilet seat)  Physical Therapy Consult: Yes  Occupational Therapy Consult: Yes  Current Support Network:  Other (Family)  Confirm Follow Up Transport: Family  Plan discussed with Pt/Family/Caregiver: Yes  Freedom of Choice Offered: Yes  Discharge Location  Discharge Placement: Unable to determine at this time

## 2018-08-07 NOTE — PROGRESS NOTES
Therapy is recommending H/H for PT/OT and a 3 in 1 BSC. Order placed, face to face completed, and referral sent to [facility]. Patient in agreement. BSC ordered and sent to Joce([phone]). Per patient she is to be discharged on Thursday. Asked for [facility] to be delivered to patient room on Thursday.

## 2018-08-07 NOTE — PROGRESS NOTES
TRANSFER - IN REPORT:    Verbal report received from Primo Hassan RN(name) on [address]  being received from [employer]) for routine progression of care      Report consisted of patients Situation, Background, Assessment and   Recommendations(SBAR). Information from the following report(s) SBAR, Kardex, OR Summary, Procedure Summary, Intake/Output and MAR was reviewed with the receiving nurse. Opportunity for questions and clarification was provided. Assessment to be completed upon patients arrival to unit and care assumed.

## 2018-08-07 NOTE — PROGRESS NOTES
END OF SHIFT NOTE:    INTAKE/OUTPUT  08/06 0701 - 08/07 0700  In: 400 [I.V.:400]  Out: 2063   Voiding: YES  Catheter: NO  Drain:              Flatus: Patient does not have flatus present. Stool:  0 occurrences. Characteristics:       Emesis: 0 occurrences. Characteristics:   Emesis Assessment  Appearance: Green    VITAL SIGNS  Patient Vitals for the past 12 hrs:   Temp Pulse Resp BP SpO2   08/07/18 0300 98.7 °F (37.1 °C) 83 17 107/63 100 %   08/06/18 2300 98.6 °F (37 °C) 92 17 124/70 99 %   08/06/18 2149 98.7 °F (37.1 °C) 93 16 136/58 100 %   08/06/18 2138 98.9 °F (37.2 °C) 93 12 140/63 96 %   08/06/18 2133 - 91 11 144/65 -   08/06/18 2128 - 91 11 136/62 94 %   08/06/18 2123 - 93 12 141/64 96 %   08/06/18 2118 - 100 22 133/61 94 %   08/06/18 2113 - 99 11 142/64 95 %   08/06/18 2108 - 88 13 146/65 95 %   08/06/18 2103 - 94 14 143/63 95 %   08/06/18 2058 - 93 9 152/65 94 %   08/06/18 2053 - 100 14 138/63 94 %   08/06/18 2048 - 93 12 154/67 97 %   08/06/18 2044 97.9 °F (36.6 °C) 98 18 160/65 98 %   08/06/18 2043 - 98 10 160/65 91 %   08/06/18 2042 - 88 - - 92 %       Pain Assessment  Pain Intensity 1: 0 (08/07/18 0000)  Pain Location 1: Abdomen  Pain Intervention(s) 1: Medication (see MAR)  Patient Stated Pain Goal: 3    Ambulating  No    Shift report given to oncoming nurse at the bedside.     Praveen Farfan RN

## 2018-08-07 NOTE — PROGRESS NOTES
Hospitalist Progress Note    Subjective:   Daily Progress Note: 8/7/2018 12:57 PM    HD patient with sudden onset early am 8/3 of epigastric and right upper abdominal pain described as sharp, shooting, crampy and colicky, awakened from sound sleep by same. Denisa Nicholson reports nausea and vomiting x 3 presented to ER 8/3 late am after dialysis. Alisa Martino increased with eating (pepperoni during HD) and motion.  No prior history of same.  History of diverticulitis with colostomy and reversal.  Also history of left renal cancer with nephrectomy and recurrent mass at site of the nephrectomy.  CT with partial vs early SBO and ventral hernia as noted below.  Last BM 8/3 am, constipated.  Nephrology and General surgery consulted.  Admitted with N/G to low intermittent suction, NPO, IVF, pain meds.  Lipase 521 on arrival, lactic acid: 2.5.      8//4:  Resting quietly in bed talking with sister. Approx 900 ml dark gastric output from N/G today.  Still having a lot of epigastric pain, miserable.  Nausea fairly well controlled.  Unable to auscultate bowel sounds, abdomen with multiple old surgical scars, approx 8 cm hernia palpable.  States she is \"ready to get it over with. \"  Nephrology in.      8/5:  N/G with 1200 ml output x 24 hours.  Abdominal pain improved, passing flatus so N/G clamped per surgery this am.  Did well until she consumed clear liquids with immediate 10/10 pain, mostly in the left lower quad both at breakfast and lunch.  Surgery notified by RNs with instructions to unclamp N/G.  500 ml dark green fluid accumulated in NG collection bottle immediately. Some relief with evacuation of fluid.       8/6:  Dialysis this am.  Abdominal pain worse. 950 ml N/G output in 24 hours. Plans for ventral hernia repair this afternoon. 2000:  No hernia found, negative laparotomy with no findings of obstruction per Dr Khalida Cosme.     8/7:  Doing well post op, tolerating clear liquids well, passing gas. Asking for regular diet.   Bowel sounds auscultated throughout.      ADDITIONAL HISTORY:  ESRD  on HD -- at [facility] dialysis, left nephrectomy due to clear cell carcinoma, diverticulitis with colostomy and reversal , AVF with revision, NIDDM on januvia with A1C 4.7 , DJD, hypertension, TIA, obesity. Current Facility-Administered Medications   Medication Dose Route Frequency    sodium chloride (NS) flush 5-10 mL  5-10 mL IntraVENous Q8H    sodium chloride (NS) flush 5-10 mL  5-10 mL IntraVENous PRN    lidocaine (XYLOCAINE) 10 mg/mL (1 %) injection 0.1 mL  0.1 mL SubCUTAneous PRN    lactated Ringers infusion  75 mL/hr IntraVENous CONTINUOUS    sodium chloride (NS) flush 5-10 mL  5-10 mL IntraVENous Q8H    sodium chloride (NS) flush 5-10 mL  5-10 mL IntraVENous PRN    celecoxib (CELEBREX) capsule 200 mg  200 mg Oral ONCE PRN    phenol throat spray (CHLORASEPTIC) 1 Spray  1 Spray Oral PRN    epoetin mary lou (EPOGEN;PROCRIT) injection 10,000 Units  10,000 Units SubCUTAneous Q MON, WED & FRI    diphenhydrAMINE (BENADRYL) injection 25 mg  25 mg IntraVENous Q6H PRN    sodium chloride (NS) flush 5-10 mL  5-10 mL IntraVENous Q8H    sodium chloride (NS) flush 5-10 mL  5-10 mL IntraVENous PRN    naloxone (NARCAN) injection 0.4 mg  0.4 mg IntraVENous PRN    ondansetron (ZOFRAN) injection 4 mg  4 mg IntraVENous Q4H PRN    benzocaine (HURRICANE) 20 % spray   Mucous Membrane PRN    insulin lispro (HUMALOG) injection   SubCUTAneous TIDAC    morphine injection 4 mg  4 mg IntraVENous Q4H PRN        Review of Systems  A comprehensive review of systems was negative except for that written in the HPI.     Objective:     Visit Vitals    /68    Pulse 86    Temp 99.5 °F (37.5 °C)    Resp 17    Ht 5' 5\" (1.651 m)    Wt 98.2 kg (216 lb 6.4 oz)    SpO2 99%    BMI 36.01 kg/m2    O2 Flow Rate (L/min): 3 l/min O2 Device: Nasal cannula    Temp (24hrs), Av.1 °F (37.3 °C), Min:97.9 °F (36.6 °C), Max:100.7 °F (38.2 °C)     1901 - 08/07 0700  In: 690 [I.V.:690]  Out: 3013     General appearance: Oriented and alert, cooperative, pain gone with the exception of mild post op pain. Tolerating clear liquids, passing gas, requesting regular food.   Morbidly obese.      Head: Normocephalic, without obvious abnormality, atraumatic  Neck: supple, symmetrical, trachea midline, no JVD  Lungs: clear to auscultation bilaterally, slightly diminished in bases.    Heart: regular rate and rhythm, S1, S2 normal, no murmur, click, rub or gallop  Abdomen: soft, very mild tenderness over surgical site. Active bowel sounds, passing gas.    Extremities: extremities normal, atraumatic, no cyanosis, dependent edema  Skin: Skin color, texture, turgor normal. No rashes or lesions  Neurologic: Grossly normal, no unilateral deficit      Additional comments: Notes,orders, test results, vitals reviewed    Data Review  Recent Results (from the past 24 hour(s))   GLUCOSE, POC    Collection Time: 08/06/18  4:39 PM   Result Value Ref Range    Glucose (POC) 114 (H) 65 - 100 mg/dL   GLUCOSE, POC    Collection Time: 08/06/18  5:06 PM   Result Value Ref Range    Glucose (POC) 117 (H) 65 - 100 mg/dL   PLEASE READ & DOCUMENT PPD TEST IN 72 HRS    Collection Time: 08/06/18  5:39 PM   Result Value Ref Range    PPD negative Negative    mm Induration 0 mm   GLUCOSE, POC    Collection Time: 08/06/18  9:02 PM   Result Value Ref Range    Glucose (POC) 111 (H) 65 - 100 mg/dL   GLUCOSE, POC    Collection Time: 08/07/18  5:45 AM   Result Value Ref Range    Glucose (POC) 105 (H) 65 - 442 mg/dL   METABOLIC PANEL, BASIC    Collection Time: 08/07/18  9:23 AM   Result Value Ref Range    Sodium 140 136 - 145 mmol/L    Potassium 4.3 3.5 - 5.1 mmol/L    Chloride 98 98 - 107 mmol/L    CO2 35 (H) 21 - 32 mmol/L    Anion gap 7 7 - 16 mmol/L    Glucose 145 (H) 65 - 100 mg/dL    BUN 29 (H) 8 - 23 MG/DL    Creatinine 7.18 (H) 0.6 - 1.0 MG/DL    GFR est AA 7 (L) >60 ml/min/1.73m2    GFR est non-AA 6 (L) >60 ml/min/1.73m2    Calcium 9.0 8.3 - 10.4 MG/DL   GLUCOSE, POC    Collection Time: 08/07/18 11:34 AM   Result Value Ref Range    Glucose (POC) 107 (H) 65 - 100 mg/dL        8/3:  CXR;  No acute findings or changes in the chest.      8/3:  CT ABDOMEN AND PELVIS:    CT ABDOMEN:  Basilar lungs and pleural spaces clear except for minimal atelectasis.  There is abnormal dilatation of proximal mid small bowel loops. The small bowel loops are dilated to the ventral hernia lower abdominal wall with primarily collapsed bowel loops distally. This appears to be a site of relative obstruction. There is gas and stool in the colon suggesting early or partial  obstruction. There is no free intraperitoneal air. The soft tissue nodule at the left nephrectomy bed is unchanged. [place]  right  kidney. CT PELVIS:   No free fluid. IMPRESSION:   Evidence of mechanical small bowel obstruction, the site of the obstruction appears to be the lower abdominal ventral hernia. Gas and stool in the colon indicates obstruction is partial or early.      8/4:  KUB:  Esophageal tube appears to terminate in the first portion of the duodenum.  Oral contrast is seen throughout the colon.  No free air is evident. The bowel gas pattern is nonspecific and there is no evidence of ileus or obstruction.  No suspicious renal or ureteral calculi are evident.  Visualized osseous structures unremarkable.   impression: No acute pathology identified      Assessment/Plan:    Acute onset epigastric pain, nausea, and vomiting with etiology per below  Small bowel obstruction vs partial SBO: not caused by ventral hernia:  Appears to be resolved                        Surgery on board                        OR 8/6/18 with no blockage found                                           Analgesics, Antiemetics, IVF with caution  S/P Exploratory laparotomy per Dr Mariela Triplett  Elevated lipase:  resolved  History of diverticulitis with colostomy and reversal 2002  History of renal cell cancer with left nephrectomy and recurrent mass at the site  ESRD on HD                        Dr Luis on board                        Monday, weds fri dialysis                        Labs daily  NIDDM taking januvia: A1C: 4.7  HTN                Monitor, may need prn antihypertensives  Severe obesity  Abdominal mass  History of TIA  Anemia of chronic disease      Care Plan discussed with: Patient/Family/ Aliyah from surgery and Nurse    Signed By: Marta Ibarra NP     August 7, 2018

## 2018-08-07 NOTE — PROGRESS NOTES
Progress Note:   Jacob Carbajal  MRN: [id number]  :1950  Age:67 y.o. As previously noted HPI: Jacob Carbajal is a 78 y.o. female with PMH of ESRD on MWF HD (Dr. Monica Jones), HTN, DM II, hx SBO, hx nephrectomy, colon resection with colostomy (then reversal), TIA, renal cell carcinoma who presented with c/o abd pain, n/v.  Reports had onset of mid abd pain sharp in nature starting suddenly around 0200 this morning. She started vomiting in dialysis. She did complete her HD session completely. She was found to have evidence of a mechanical small bowel obstruction, lower abd ventral hernia. Gas and stool in the colon indicate obstruction is partial or early. Last BM this morning that is described as \"constipated stool\". Pt denies CP, SOB.      18: Awake in bed. States she wants NG tube removed. Reports +flatus and abd pain improved. NG tube 1200ml 24/hr. AF. VSS.    18: Pt seen in dialysis. -flatus/-BM. Increased abd pain. NGT with 950mL/24h out. AF, VSS. She did not tolerate clamping NGT yesterday. 18: POD1 laparotomy; Awake in bed, no complaints. +flatus/No BM. NGT out. No n/v. Tolerating clears. Past Medical History:   Diagnosis Date    Arthritis     AVF (arteriovenous fistula) ([facility] 2016 (S) Right AVF revision and thrombectomy    Cancer ([address] 2015    L kidney    Chronic kidney disease     HD in --- at [place] [facility]    Degenerative joint disease     Diabetes ([facility]     type 2, average range 100-120 fasting, symptomatic below 90; last A1C?    ESRD (end stage renal disease) ([address] 2006    ESRD.  MWF dialysis     Hypertension     Obesity     Transient ischemic attack 2015     Past Surgical History:   Procedure Laterality Date    BREAST SURGERY PROCEDURE UNLISTED Right     cyst removed    HX GI      colon resection resulting in temporary colostomy reversal    HX GYN      stephan    HX OTHER SURGICAL      dialysis fistula, several permcaths    HX UROLOGICAL Left July 2015    nephrectomy    HX VASCULAR ACCESS       Current Facility-Administered Medications   Medication Dose Route Frequency    sodium chloride (NS) flush 5-10 mL  5-10 mL IntraVENous Q8H    sodium chloride (NS) flush 5-10 mL  5-10 mL IntraVENous PRN    lidocaine (XYLOCAINE) 10 mg/mL (1 %) injection 0.1 mL  0.1 mL SubCUTAneous PRN    lactated Ringers infusion  75 mL/hr IntraVENous CONTINUOUS    sodium chloride (NS) flush 5-10 mL  5-10 mL IntraVENous Q8H    sodium chloride (NS) flush 5-10 mL  5-10 mL IntraVENous PRN    celecoxib (CELEBREX) capsule 200 mg  200 mg Oral ONCE PRN    phenol throat spray (CHLORASEPTIC) 1 Spray  1 Spray Oral PRN    epoetin mary lou (EPOGEN;PROCRIT) injection 10,000 Units  10,000 Units SubCUTAneous Q MON, WED & FRI    diphenhydrAMINE (BENADRYL) injection 25 mg  25 mg IntraVENous Q6H PRN    sodium chloride (NS) flush 5-10 mL  5-10 mL IntraVENous Q8H    sodium chloride (NS) flush 5-10 mL  5-10 mL IntraVENous PRN    naloxone (NARCAN) injection 0.4 mg  0.4 mg IntraVENous PRN    ondansetron (ZOFRAN) injection 4 mg  4 mg IntraVENous Q4H PRN    benzocaine (HURRICANE) 20 % spray   Mucous Membrane PRN    insulin lispro (HUMALOG) injection   SubCUTAneous TIDAC    morphine injection 4 mg  4 mg IntraVENous Q4H PRN     Pcn [penicillins] and Vancomycin  Social History     Social History    Marital status:      Spouse name: N/A    Number of children: N/A    Years of education: N/A     Social History Main Topics    Smoking status: Never Smoker    Smokeless tobacco: Never Used    Alcohol use No    Drug use: No    Sexual activity: Not Currently     Other Topics Concern    None     Social History Narrative     History   Smoking Status    Never Smoker   Smokeless Tobacco    Never Used     Family History   Problem Relation Age of Onset    Diabetes Mother     Heart Disease Mother     Hypertension Mother     Heart Disease Father     Hypertension Father  Malignant Hyperthermia Neg Hx     Pseudocholinesterase Deficiency Neg Hx     Delayed Awakening Neg Hx     Post-op Nausea/Vomiting Neg Hx     Emergence Delirium Neg Hx     Other Neg Hx     Post-op Cognitive Dysfunction Neg Hx      ROS:  Comprehensive review of systems was otherwise unremarkable except as noted above. Physical Exam:   Visit Vitals    /71    Pulse 79    Temp 98.7 °F (37.1 °C)    Resp 16    Ht 5' 5\" (1.651 m)    Wt 216 lb 6.4 oz (98.2 kg)    SpO2 100%    BMI 36.01 kg/m2     Constitutional: Alert, cooperative, no acute distress; appears stated age    Eyes:Sclera are clear. ENMT: no external lesions gross hearing normal; no obvious neck masses, no ear or lip lesions, nares normal, NG  CV: RRR. Normal perfusion  Resp: No JVD. Breathing is  non-labored; no audible wheezing. GI: Obese, soft, non-distended, mild diffuse tenderness, post op dressing with some bloody drainage. Musculoskeletal: No embolic signs or cyanosis.    Neuro:  Oriented; no focal deficits  Psychiatric: normal affect and mood    Recent vitals (if inpt):  Patient Vitals for the past 24 hrs:   BP Temp Pulse Resp SpO2 Weight   08/07/18 0648 114/71 98.7 °F (37.1 °C) 79 16 100 % -   08/07/18 0300 107/63 98.7 °F (37.1 °C) 83 17 100 % 216 lb 6.4 oz (98.2 kg)   08/06/18 2300 124/70 98.6 °F (37 °C) 92 17 99 % -   08/06/18 2149 136/58 98.7 °F (37.1 °C) 93 16 100 % -   08/06/18 2138 140/63 98.9 °F (37.2 °C) 93 12 96 % -   08/06/18 2133 144/65 - 91 11 - -   08/06/18 2128 136/62 - 91 11 94 % -   08/06/18 2123 141/64 - 93 12 96 % -   08/06/18 2118 133/61 - 100 22 94 % -   08/06/18 2113 142/64 - 99 11 95 % -   08/06/18 2108 146/65 - 88 13 95 % -   08/06/18 2103 143/63 - 94 14 95 % -   08/06/18 2058 152/65 - 93 9 94 % -   08/06/18 2053 138/63 - 100 14 94 % -   08/06/18 2048 154/67 - 93 12 97 % -   08/06/18 2044 160/65 97.9 °F (36.6 °C) 98 18 98 % -   08/06/18 2043 160/65 - 98 10 91 % -   08/06/18 2042 - - 88 - 92 % - 08/06/18 1658 160/66 (!) 100.7 °F (38.2 °C) 99 16 100 % -   08/06/18 1523 107/67 99.8 °F (37.7 °C) 97 17 99 % -   08/06/18 1059 152/81 98.1 °F (36.7 °C) 94 19 100 % -   08/06/18 1034 150/41 - 85 - - -   08/06/18 0952 157/76 - 86 - - -   08/06/18 0940 (!) 165/92 - 96 - - -       Labs:  Recent Labs      08/06/18   0437  08/05/18   0430   WBC  8.8  8.4   HGB  9.5*  9.8*   PLT  204  218   NA  143  145   K  4.2  4.1   CL  94*  97*   CO2  39*  39*   BUN  36*  27*   CREA  10.00*  7.58*   GLU  117*  115*   TBILI  0.4  0.6   SGOT  23  23   ALT  14  17   AP  98  110   LPSE   --   138       Lab Results   Component Value Date/Time    WBC 8.8 08/06/2018 04:37 AM    HGB 9.5 (L) 08/06/2018 04:37 AM    PLATELET 350 22/81/6847 04:37 AM    Sodium 143 08/06/2018 04:37 AM    Potassium 4.2 08/06/2018 04:37 AM    Chloride 94 (L) 08/06/2018 04:37 AM    CO2 39 (H) 08/06/2018 04:37 AM    BUN 36 (H) 08/06/2018 04:37 AM    Creatinine 10.00 (H) 08/06/2018 04:37 AM    Glucose 117 (H) 08/06/2018 04:37 AM    INR 0.9 11/16/2015 12:00 AM    aPTT 22.2 (L) 11/16/2015 12:00 AM    Bilirubin, total 0.4 08/06/2018 04:37 AM    AST (SGOT) 23 08/06/2018 04:37 AM    ALT (SGPT) 14 08/06/2018 04:37 AM    Alk. phosphatase 98 08/06/2018 04:37 AM    Lipase 138 08/05/2018 04:30 AM    Lactic acid 1.2 08/21/2015 08:22 PM    Troponin-I <0.05 03/26/2012 05:50 PM    Troponin-I, Qt. <0.02 (L) 09/12/2017 04:52 PM     8/4/18: KUB supine views     History:  mechanical small bowel obstruction, lower abd ventral hernia, 67 years  Female     Comparison:  CT abdomen pelvis yesterday     Findings:  Esophageal tube appears to terminate in the first portion of the  duodenum. Oral contrast is seen throughout the colon. No free air is evident. The bowel gas pattern is nonspecific and there is no evidence of ileus or  obstruction. No suspicious renal or ureteral calculi are evident.   Visualized  osseous structures unremarkable.     IMPRESSION  Impression: No acute pathology identified      I reviewed recent labs and recent radiologic studies. I independently reviewed radiology images for studies I described above or studies I have ordered. Admission date (for inpatients): 8/3/2018   * No surgery found *  Procedure(s):  negative laparotomy    ASSESSMENT/PLAN:  Problem List  Date Reviewed: 5/4/2018          Codes Class Noted    * (Principal)Small bowel obstruction ([facility] ICD-10-CM: I02.010  ICD-9-CM: 560.9  8/3/2018        Severe obesity (BMI 35.0-39. 9) with comorbidity ([facility] ICD-10-CM: E66.01  ICD-9-CM: 278.01  5/4/2018        Type 2 diabetes mellitus with nephropathy ([facility] ICD-10-CM: E11.21  ICD-9-CM: 250.40, 583.81  12/15/2017        Flank pain ICD-10-CM: R10.9  ICD-9-CM: 789.09  9/14/2017        Abdominal mass ICD-10-CM: R19.00  ICD-9-CM: 789.30  9/12/2017    Overview Signed 9/12/2017  9:01 PM by Natty Prakash MD     Of Left renal bed             Renal cell carcinoma ([facility] ICD-10-CM: C64.9  ICD-9-CM: 189.0  9/12/2017        AVF (arteriovenous fistula) (HCC) (Chronic) ICD-10-CM: I77.0  ICD-9-CM: 447.0  12/20/2016    Overview Signed 12/20/2016  2:19 PM by Mariah Anguiano NP       12/6/16 ([facility]) Right AVF revision and thrombectomy             Transient ischemic attack ICD-10-CM: G45.9  ICD-9-CM: 435.9  8/29/2015        Renal mass ICD-10-CM: N28.89  ICD-9-CM: 593.9  7/2/2015        Chronic renal failure, stage 5 (HCC) ICD-10-CM: N18.5  ICD-9-CM: 585.5  6/15/2015        Hypotension ICD-10-CM: I95.9  ICD-9-CM: 458.9  12/1/2014        Dizziness ICD-10-CM: R42  ICD-9-CM: 780.4  12/1/2014        Osteoarthritis of right knee ICD-10-CM: M17.11  ICD-9-CM: 715.96  2/7/2013        Total knee replacement status ICD-10-CM: R13.995  ICD-9-CM: V43.65  2/7/2013        DM (diabetes mellitus) ([facility] ICD-10-CM: E11.9  ICD-9-CM: 250.00  2/7/2013        ESRD (end stage renal disease) ([facility] ICD-10-CM: N18.6  ICD-9-CM: 585.6  2/7/2013        HTN (hypertension) ICD-10-CM: I10  ICD-9-CM: 401.9  2/7/2013 Principal Problem:    Small bowel obstruction ([facility] (8/3/2018)    Active Problems:    DM (diabetes mellitus) ([facility] [facility] (2/7/2013)      HTN (hypertension) (2/7/2013)      Chronic renal failure, stage 5 (HCC) (6/15/2015)      Severe obesity (BMI 35.0-39. 9) with comorbidity ([facility] [facility] (5/4/2018)       Plan:  Care management per Hospitalist  Continue CLD  Nephrology following- ESRD on HD  Await return of bowel function    Signed:  Carlos Brown NP

## 2018-08-07 NOTE — PERIOP NOTES
TRANSFER - OUT REPORT:    Verbal report given to 2nd floor rn  (name) on Ciarra Pain  being transferred to room 236 (unit) for routine post - op       Report consisted of patients Situation, Background, Assessment and   Recommendations(SBAR). Information from the following report(s) SBAR, Kardex, OR Summary, Intake/Output and MAR was reviewed with the receiving nurse. Lines:   Peripheral IV Left External jugular (Active)   Site Assessment Clean, dry, & intact 8/6/2018  8:44 PM   Phlebitis Assessment 0 8/6/2018  8:44 PM   Infiltration Assessment 0 8/6/2018  8:44 PM   Dressing Status Clean, dry, & intact 8/6/2018  8:44 PM   Dressing Type Transparent 8/6/2018  8:44 PM   Hub Color/Line Status Green; Infusing 8/6/2018  8:44 PM        Opportunity for questions and clarification was provided. Patient transported with:   O2 @ 3 liters    VTE prophylaxis orders have been written for [employer]. Patient and family given floor number and nurses name. Family updated re: pt status after security code verified.

## 2018-08-07 NOTE — PROGRESS NOTES
RENAL PROGRESS NOTE    Subjective:     Patient is a 79 y/o AAF with ESRD on HD MWF at [facility] under my medical care due to diabetic nephropathy,  HTN,  history of Left RCC (Clear cell carcinoma) s/p left radical nephrectomy by Dr. Lj Morton in 2015 with history of recurrent mass at the site of nephrectomy was in her usual state of health until last night when she developed abdominal pain. She had nausea, vomiting and worsening of abdominal pain on HD, but completed her treatment today. No fever or chills, no diarrhea, no chest pain,no dyspnea. She went to the ED when her symptoms progressed after she returned home from dialysis. She c/o morphine not providing adequate pain relief. NGT was placed and she is NPO. She c/o feeling constipated, but reports having had a BM this am.   8/4/18 - still with NGT in place, but much better pain control - in better spirits  8/5/18 - attempt at having breakfast resulted in resumption of abdominal symptoms - NGT was replaced and NPO status reinstituted - plan for HD tomorrow discussed  8/6/18 - progressing well so far except being bother by NGT - UF goals for later on HD discussed with patient  8/7/18 - tolerated surgery well lest night - in good spirits - no definite HD tomorrow, will evaluate before we decide to give her an extra day to recover post OP - patient understands    Past Medical History:   Diagnosis Date    Arthritis     AVF (arteriovenous fistula) ([address] 12/20/2016 12/6/16 (S) Right AVF revision and thrombectomy    Cancer ([address] 2015    L kidney    Chronic kidney disease     HD in M-W-F- at [place] [facility]    Degenerative joint disease     Diabetes ([address]     type 2, average range 100-120 fasting, symptomatic below 90; last A1C?    ESRD (end stage renal disease) ([address] Nov 2006    ESRD.  MWF dialysis     Hypertension     Obesity     Transient ischemic attack 8/29/2015      Past Surgical History:   Procedure Laterality Date    BREAST SURGERY PROCEDURE UNLISTED Right     cyst removed    HX GI      colon resection resulting in temporary colostomy reversal    HX GYN      stephan    HX OTHER SURGICAL      dialysis fistula, several permcaths    HX UROLOGICAL Left July 2015    nephrectomy    HX VASCULAR ACCESS        Prior to Admission medications    Medication Sig Start Date End Date Taking? Authorizing Provider   amLODIPine (NORVASC) 10 mg tablet Take  by mouth daily. Yes Historical Provider   hyoscyamine (LEVSIN) 0.125 mg tablet Take 1 Tab by mouth every six (6) hours as needed for Cramping or Diarrhea. 5/10/18   Yamileth Burroughs MD   ondansetron (ZOFRAN ODT) 8 mg disintegrating tablet Take 1 Tab by mouth every eight (8) hours as needed for Nausea. 5/10/18   Yamileth Burroughs MD   HYDROcodone-acetaminophen [facility])  mg tablet Take 1 Tab by mouth every four (4) hours as needed. Max Daily Amount: 6 Tabs. 4/13/18   My Hector, DO   esomeprazole (NEXIUM) 20 mg capsule Take 2 Caps by mouth daily. 4/13/18   My Esquivelash, DO   guaiFENesin-codeine (CHERATUSSIN AC) 100-10 mg/5 mL solution Take 5-10 mL by mouth three (3) times daily as needed for Cough or Congestion. Max Daily Amount: 30 mL. 1/19/18   Gabriella Guerrero MD   lidocaine (LIDODERM) 5 % 1 Patch by TransDERmal route every twenty-four (24) hours. Apply patch to the affected area for 12 hours a day and remove for 12 hours a day. 9/16/17   Gali Soliman MD   lidocaine 5 % topical cream Apply  to affected area two (2) times daily as needed for Pain. 9/1/17   Sonali Pontiff, APRN   butalbital-acetaminophen-caff (FIORICET) -40 mg per capsule Take 1-2 Caps by mouth every six (6) hours as needed for Pain. Max Daily Amount: 8 Caps. 2/7/17   Peter Green MD   VIT C/VIT E/LUTEIN/MIN/OMEGA-3 (OCUVITE PO) Take  by mouth nightly. Historical Provider   cinacalcet (SENSIPAR) 30 mg tablet Take 60 mg by mouth nightly.     Historical Provider   minoxidil (LONITEN) 2.5 mg tablet Take 7.5 mg by mouth two (2) times a day. Indications: HYPERTENSION    Phys Travon, MD   aspirin 81 mg chewable tablet Take 1 Tab by mouth daily. Patient taking differently: Take 81 mg by mouth every morning. 9/1/15   Maico Dobson MD   sevelamer carbonate (RENVELA) 800 mg tab tab Take 800 mg by mouth three (3) times daily (with meals). 5 tablets with meals/ 2 with snacks  Sometimes only eats 2 meals/d    Historical Provider   lisinopril (PRINIVIL, ZESTRIL) 40 mg tablet Take 40 mg by mouth nightly. Indications: HYPERTENSION 6/8/15   Historical Provider   sitaGLIPtin (JANUVIA) 100 mg tablet Take 50 mg by mouth every morning. Indications: TYPE 2 DIABETES MELLITUS    Historical Provider   furosemide (LASIX) 80 mg tablet Take 80 mg by mouth two (2) times a day.  Indications: EDEMA, HYPERTENSION    Historical Provider     Allergies   Allergen Reactions    Pcn [Penicillins] Rash    Vancomycin Rash      Social History   Substance Use Topics    Smoking status: Never Smoker    Smokeless tobacco: Never Used    Alcohol use No      Family History   Problem Relation Age of Onset    Diabetes Mother     Heart Disease Mother     Hypertension Mother     Heart Disease Father     Hypertension Father     Malignant Hyperthermia Neg Hx     Pseudocholinesterase Deficiency Neg Hx     Delayed Awakening Neg Hx     Post-op Nausea/Vomiting Neg Hx     Emergence Delirium Neg Hx     Other Neg Hx     Post-op Cognitive Dysfunction Neg Hx           Review of Systems    Constitutional: no fever, negative  Eyes: fair vision, negative  Ears, nose, mouth, throat, and face:fair hearing, negative  Respiratory: no asthma, negative  Cardiovascular:no chest pain, negative  Gastrointestinal:no diarrhea, see HPI  Genitourinary: no dysuria, no hematuria  Hematologic/lymphatic: no bleeding tendency,    Neurological: no seizures,    Behvioral/Psych: no psych hospitalization   Endocrine: no goiter,  The remainder is negative      Objective: Visit Vitals    /63 (BP 1 Location: Left arm, BP Patient Position: At rest)    Pulse 83    Temp 98.7 °F (37.1 °C)    Resp 17    Ht 5' 5\" (1.651 m)    Wt 98.2 kg (216 lb 6.4 oz)    SpO2 100%    BMI 36.01 kg/m2     08/06 1901 - 08/07 0700  In: 400 [I.V.:400]  Out: 0   08/05 0701 - 08/06 1900  In: -   Out: 3013     General:  Alert, cooperative, no distress, appears stated age. NGT in place. Head:  Normocephalic, without obvious abnormality, atraumatic. Neck: Supple, symmetrical, trachea midline, no adenopathy,  no JVD. Lungs:   Clear to auscultation bilaterally. Heart:  Regular rate and rhythm, S1, S2 normal, no murmur,  rub or gallop. Abdomen:   Soft,  Diffusely tender with voluntary guarding. Obese. No masses,  No organomegaly. Extremities: Extremities normal, atraumatic, no cyanosis or edema. Access: AV fistula in JACOB is open and examines well. Skin: Skin color, texture, turgor normal. No rashes or lesions. Neurologic: Grossly intact. No asterixis. Data Review:     Results for Chadd Chavez (MRN [id number]) as of 8/6/2018 05:59   Ref.  Range 5/10/2018 09:52 8/3/2018 11:11 8/3/2018 18:54 8/4/2018 04:16 8/5/2018 04:30   Sodium Latest Ref Range: 136 - 145 mmol/L 139 139  143 145   Potassium Latest Ref Range: 3.5 - 5.1 mmol/L 3.9 5.3 (H)  4.3 4.1   Chloride Latest Ref Range: 98 - 107 mmol/L 100 98  100 97 (L)   CO2 Latest Ref Range: 21 - 32 mmol/L 28 32  37 (H) 39 (H)   Anion gap Latest Ref Range: 7 - 16 mmol/L 11 9  6 (L) 9   Glucose Latest Ref Range: 65 - 100 mg/dL 151 (H) 142 (H)  122 (H) 115 (H)   BUN Latest Ref Range: 8 - 23 MG/DL 17 12  16 27 (H)   Creatinine Latest Ref Range: 0.6 - 1.0 MG/DL 6.65 (H) 3.17 (H)  5.07 (H) 7.58 (H)   Calcium Latest Ref Range: 8.3 - 10.4 MG/DL 9.4 9.3  9.2 9.4   Phosphorus Latest Ref Range: 2.3 - 3.7 MG/DL  2.1 (L)   5.2 (H)   Magnesium Latest Ref Range: 1.8 - 2.4 mg/dL  2.3   2.6 (H)   GFR est non-AA Latest Ref Range: >60 ml/min/1.73m2 7 (L) 16 (L)  9 (L) 6 (L)   GFR est AA Latest Ref Range: >60 ml/min/1.73m2 8 (L) 19 (L)  11 (L) 7 (L)   Bilirubin, total Latest Ref Range: 0.2 - 1.1 MG/DL 0.5 0.6  0.6 0.6   Protein, total Latest Ref Range: 6.3 - 8.2 g/dL 8.2 8.8 (H)  7.3 7.8   Albumin Latest Ref Range: 3.2 - 4.6 g/dL 3.8 4.1  3.3 3.7   Globulin Latest Ref Range: 2.3 - 3.5 g/dL 4.4 (H) 4.7 (H)  4.0 (H) 4.1 (H)   A-G Ratio Latest Ref Range: 1.2 - 3.5   0.9 (L) 0.9 (L)  0.8 (L) 0.9 (L)   ALT (SGPT) Latest Ref Range: 12 - 65 U/L 13 24  19 17   AST Latest Ref Range: 15 - 37 U/L 21 68 (H)  27 23   Alk. phosphatase Latest Ref Range: 50 - 136 U/L 106 121  100 110   Lipase Latest Ref Range: 73 - 393 U/L 213 521 (H)   138       Results for Robi LIZAMA Sjogren (MRN [id number]) as of 8/5/2018 16:10   Ref.  Range 8/3/2018 11:11 8/4/2018 04:16 8/5/2018 04:30   WBC Latest Ref Range: 4.3 - 11.1 K/uL 10.9 9.6 8.4   RBC Latest Ref Range: 4.05 - 5.25 M/uL 3.07 (L) 2.82 (L) 2.88 (L)   HGB Latest Ref Range: 11.7 - 15.4 g/dL 10.1 (L) 9.4 (L) 9.8 (L)   HCT Latest Ref Range: 35.8 - 46.3 % 29.4 (L) 27.4 (L) 28.5 (L)   MCV Latest Ref Range: 79.6 - 97.8 FL 95.8 97.2 99.0 (H)   MCH Latest Ref Range: 26.1 - 32.9 PG 32.9 33.3 (H) 34.0 (H)   MCHC Latest Ref Range: 31.4 - 35.0 g/dL 34.4 34.3 34.4   RDW Latest Ref Range: 11.9 - 14.6 % 16.2 (H) 16.3 (H) 16.1 (H)   PLATELET Latest Ref Range: 150 - 450 K/uL 219 218 218       Recent Results (from the past 24 hour(s))   GLUCOSE, POC    Collection Time: 08/06/18 10:14 AM   Result Value Ref Range    Glucose (POC) 121 (H) 65 - 100 mg/dL   GLUCOSE, POC    Collection Time: 08/06/18 11:08 AM   Result Value Ref Range    Glucose (POC) 94 65 - 100 mg/dL   GLUCOSE, POC    Collection Time: 08/06/18  4:39 PM   Result Value Ref Range    Glucose (POC) 114 (H) 65 - 100 mg/dL   GLUCOSE, POC    Collection Time: 08/06/18  5:06 PM   Result Value Ref Range    Glucose (POC) 117 (H) 65 - 100 mg/dL   PLEASE READ & DOCUMENT PPD TEST IN 72 HRS Collection Time: 08/06/18  5:39 PM   Result Value Ref Range    PPD negative Negative    mm Induration 0 mm   GLUCOSE, POC    Collection Time: 08/06/18  9:02 PM   Result Value Ref Range    Glucose (POC) 111 (H) 65 - 100 mg/dL   GLUCOSE, POC    Collection Time: 08/07/18  5:45 AM   Result Value Ref Range    Glucose (POC) 105 (H) 65 - 100 mg/dL       CT abdomen -  Basilar lungs and pleural spaces clear except for minimalatelectasis. There is abnormal dilatation of proximal mid small bowel loops. The small bowel  loops are dilated to the ventral hernia lower abdominal wall with primarily  collapsed bowel loops distally. This appears to be a site of relative  obstruction. There is gas and stool in the colon suggesting early or partial  obstruction. There is no free intraperitoneal air. The soft tissue nodule at the left nephrectomy bed is unchanged. Atrophic right  kidney. CT PELVIS:   No free fluid.    IMPRESSION:    1. Evidence of mechanical small bowel obstruction, the site of the obstruction  appears to be the lower abdominal ventral hernia. 2. Gas and stool in the colon indicates obstruction is partial or early. CXR - Impression:    No acute findings or changes in the chest.      Principal Problem:    Small bowel obstruction ([address] (8/3/2018)    Active Problems:    DM (diabetes mellitus) ([address] (2/7/2013)      HTN (hypertension) (2/7/2013)      Chronic renal failure, stage 5 (HCC) (6/15/2015)      Severe obesity (BMI 35.0-39. 9) with comorbidity ([address] (5/4/2018)        Assessment:     1. Abdominal pain -  - small bowel obstruction, working on decompression and medical management  - improved pain control  - failed attempt at PO intake  - lower abdominal Ventral hernia appears culprit of obstruction  - S/P surgery for incarcerated hernia surgery     2. ESRD -  - S/P HD Friday as outpatient  - no HD needed today as potassium is improved  - plan next HD tomorrow or Thursday    3.  DM II -  - continue current meds    4. HTN -  - may need transdermal meds if BP increases    5. Elevated lipase -  - this level is normal now as GI system is being rested with NGT decompression     6.  Anemia -  - Hb is dropping  - treat with JAMES      Plan:     As above    Loreto Aparicio M.D.

## 2018-08-07 NOTE — OP NOTES
[facility] REPORT    Name:Perri LIZAMA  MR#: [id number]  : 1950  ACCOUNT #: [de-identified]   DATE OF SERVICE: 2018    PREOPERATIVE DIAGNOSIS:  Incarcerated ventral hernia. POSTOPERATIVE DIAGNOSIS:  Negative laparotomy. PROCEDURE PERFORMED:  Exploratory laparotomy. ANESTHESIA:  General endotracheal.    SURGEON:  Mariana Schaumann, DO    ASSISTANT:  None. COMPLICATIONS:  None. CONDITION:  Stable. COUNTS:  Sponge count, needle count and instrument count all correct x3. SPECIMENS REMOVED:  None. IMPLANTS:  None. ESTIMATED BLOOD LOSS:  Minimal.    DESCRIPTION OF PROCEDURE:  This is a 81-year-old female with end-stage renal disease, complaining of abdominal pain with CT evidence of incarcerated ventral hernia. The patient was examined for 2 days. NG tube was clamped yesterday, but the patient began having signs and symptoms of abdominal pain and exploration is proposed. A KUB yesterday showed no evidence of bowel obstruction. Consent was obtained by describing the procedure to the patient including potential complications to include infection, bleeding, use of mesh. Consent was signed and placed upon the chart. She was administered Ancef 2 grams IV preoperatively, taken to the operative suite, placed in a supine position and general anesthesia was initiated without complications. She was then prepped and draped in usual sterile fashion and a timeout was taken to confirm the patient and the proper procedure. Following this, an infraumbilical incision was planned. A #15 scalpel shannon was used to make a skin incision through her old scar from old laparotomy incision. Bovie cauterization was used to dissect down to subcutaneous tissue to the fascia, where the fascia was opened just above the umbilicus and down to the pubic tubercle. I was unable to identify any incarcerated hernia.   I was unable to identify any fascial defect or true hernia. There was an abundance of scar tissue from previous surgeries, but no evidence of obstruction. The patient's abdomen was completely soft. No evidence of obstruction on KUB. I decided to discontinue the surgery at this point and examine the patient longer before opening her entire abdomen, with the history of her previous surgeries. At this point, we concluded. We irrigated with saline until clear, reapproximated the skin edges with a skin stapler. Patient will be extubated and transferred to recovery, stable. FINDINGS:  A 79-year-old female with nausea, vomiting and evidence of incarcerated on CT. There was no evidence of hernia on today's surgery. No incarcerated bowel. Negative exploratory laparotomy. DO RUPA HARRINGTON /   D: 08/06/2018 20:38     T: 08/07/2018 09:39  JOB #: [id number]

## 2018-08-07 NOTE — BRIEF OP NOTE
BRIEF OPERATIVE NOTE    Date of Procedure: 8/6/2018   Preoperative Diagnosis: VENTRAL HERNIA  Postoperative Diagnosis: negative laparotomy    Procedure(s):  negative laparotomy  Surgeon(s) and Role:     * Letty March DO - Primary         Surgical Assistant: none    Surgical Staff:  Circ-1: Mayur Olsen RN  Scrub Tech-1: Jonnathan De Los Santos CNA  Event Time In   Incision Start 2004   Incision Close 2025     Anesthesia: General   Estimated Blood Loss: Minimal  Specimens: * No specimens in log *   Findings: No hernia found. No fascial defect. No evidence of obstruction.      Complications: None  Implants: * No implants in log *  Letty March, [address] [facility]

## 2018-08-08 VITALS
BODY MASS INDEX: 36.42 KG/M2 | SYSTOLIC BLOOD PRESSURE: 141 MMHG | HEART RATE: 71 BPM | RESPIRATION RATE: 18 BRPM | HEIGHT: 65 IN | OXYGEN SATURATION: 92 % | WEIGHT: 218.6 LBS | TEMPERATURE: 98 F | DIASTOLIC BLOOD PRESSURE: 62 MMHG

## 2018-08-08 LAB
ALBUMIN SERPL-MCNC: 3 G/DL (ref 3.2–4.6)
ALBUMIN/GLOB SERPL: 0.8 {RATIO} (ref 1.2–3.5)
ALP SERPL-CCNC: 87 U/L (ref 50–136)
ALT SERPL-CCNC: 8 U/L (ref 12–65)
ANION GAP SERPL CALC-SCNC: 9 MMOL/L (ref 7–16)
AST SERPL-CCNC: 17 U/L (ref 15–37)
BILIRUB SERPL-MCNC: 0.3 MG/DL (ref 0.2–1.1)
BUN SERPL-MCNC: 43 MG/DL (ref 8–23)
CALCIUM SERPL-MCNC: 9 MG/DL (ref 8.3–10.4)
CHLORIDE SERPL-SCNC: 96 MMOL/L (ref 98–107)
CO2 SERPL-SCNC: 35 MMOL/L (ref 21–32)
CREAT SERPL-MCNC: 8.32 MG/DL (ref 0.6–1)
ERYTHROCYTE [DISTWIDTH] IN BLOOD BY AUTOMATED COUNT: 14.4 %
GLOBULIN SER CALC-MCNC: 3.9 G/DL (ref 2.3–3.5)
GLUCOSE BLD STRIP.AUTO-MCNC: 128 MG/DL (ref 65–100)
GLUCOSE BLD STRIP.AUTO-MCNC: 97 MG/DL (ref 65–100)
GLUCOSE SERPL-MCNC: 95 MG/DL (ref 65–100)
HCT VFR BLD AUTO: 25.1 % (ref 35.8–46.3)
HGB BLD-MCNC: 8.7 G/DL (ref 11.7–15.4)
MAGNESIUM SERPL-MCNC: 2.4 MG/DL (ref 1.8–2.4)
MCH RBC QN AUTO: 34.1 PG (ref 26.1–32.9)
MCHC RBC AUTO-ENTMCNC: 34.7 G/DL (ref 31.4–35)
MCV RBC AUTO: 98.4 FL (ref 79.6–97.8)
NRBC # BLD: 0 K/UL (ref 0–0.2)
PLATELET # BLD AUTO: 194 K/UL (ref 150–450)
PMV BLD AUTO: 10.3 FL (ref 9.4–12.3)
POTASSIUM SERPL-SCNC: 4.4 MMOL/L (ref 3.5–5.1)
PROT SERPL-MCNC: 6.9 G/DL (ref 6.3–8.2)
RBC # BLD AUTO: 2.55 M/UL (ref 4.05–5.2)
SODIUM SERPL-SCNC: 140 MMOL/L (ref 136–145)
WBC # BLD AUTO: 8.6 K/UL (ref 4.3–11.1)

## 2018-08-08 PROCEDURE — 74011250636 HC RX REV CODE- 250/636: Performed by: INTERNAL MEDICINE

## 2018-08-08 PROCEDURE — 90935 HEMODIALYSIS ONE EVALUATION: CPT

## 2018-08-08 PROCEDURE — 82962 GLUCOSE BLOOD TEST: CPT

## 2018-08-08 PROCEDURE — 83735 ASSAY OF MAGNESIUM: CPT

## 2018-08-08 PROCEDURE — 80053 COMPREHEN METABOLIC PANEL: CPT

## 2018-08-08 PROCEDURE — 36415 COLL VENOUS BLD VENIPUNCTURE: CPT

## 2018-08-08 PROCEDURE — 77030020255 HC SOL INJ LR 1000ML BG

## 2018-08-08 PROCEDURE — 74011250636 HC RX REV CODE- 250/636: Performed by: FAMILY MEDICINE

## 2018-08-08 PROCEDURE — 85027 COMPLETE CBC AUTOMATED: CPT

## 2018-08-08 RX ADMIN — DIPHENHYDRAMINE HYDROCHLORIDE 25 MG: 50 INJECTION, SOLUTION INTRAMUSCULAR; INTRAVENOUS at 07:24

## 2018-08-08 RX ADMIN — MORPHINE SULFATE 4 MG: 2 INJECTION, SOLUTION INTRAMUSCULAR; INTRAVENOUS at 07:24

## 2018-08-08 RX ADMIN — Medication 10 ML: at 06:29

## 2018-08-08 NOTE — DIALYSIS
TRANSFER OUT -DIALYSIS    Hemodialysis treatment completed without complications. Patient alert and oriented and VS  /62  P 71       2 Kgs removed. Needles X2 removed from access and manual pressure held until hemostasis complete and pressure dressing applied. Patient to return to 236 after dialysis.

## 2018-08-08 NOTE — PROGRESS NOTES
"Jenna Russell (76 y.o. Female)     Lita Montalvo, RN  [phone]    Date of Birth Social Security Number Address Home Phone MRN    1941  [address] [place] [address]    [place] [id number] [phone] [id number]    Sabianism Marital Status          None        Admission Date Admission Type Admitting Provider Attending Provider Department, Room/Bed    18 Emergency Kingsley Walsh MD Russell, Marc P, MD [facility] 3F, S312/    Discharge Date Discharge Disposition Discharge Destination         Home or Self Care             Attending Provider: Kingsley Walsh MD     Allergies:  Penicillins    Isolation:  None   Infection:  None   Code Status:  FULL    Ht:  165.1 cm (65\")   Wt:  55.3 kg (122 lb)    Admission Cmt:  None   Principal Problem:  Aphasia [R47.01]                 Active Insurance as of 2018     Primary Coverage     Payor Plan Insurance Group Employer/Plan Group    HUMANA MEDICARE REPLACEMENT HUMANA MEDICARE REPL [id number]     Payor Plan Address Payor Plan Phone Number Effective From Effective To    [address] [address] [phone] 2018     [place] [address]       Subscriber Name Subscriber Birth Date Member ID       JENNA RUSSELL 1941 [id number]                 Emergency Contacts      (Rel.) Home Phone Work Phone Mobile Phone    Lianna Martinez (Spouse) [phone] -- [phone]               Discharge Summary      Kingsley Walsh MD at 2018 10:55 AM              [facility]  DISCHARGE SUMMARY    Patient Name: Jenna Russell  : 1941  MRN: [id number]    Date of Admission: 2018  Date of Discharge:  2018  Primary Care Physician: Henrietta Torres MD    Consults     Date and Time Order Name Status Description    2018 1349 Inpatient Consult to Neurology      2018 1121 Consult Interventional Neurologist and/or Stroke Team Completed         Hospital Course " RENAL PROGRESS NOTE    Subjective:     Patient is a 77 y/o AAF with ESRD on HD MWF at [facility] under my medical care due to diabetic nephropathy,  HTN,  history of Left RCC (Clear cell carcinoma) s/p left radical nephrectomy by Dr. Riddhi Martinez in 2015 with history of recurrent mass at the site of nephrectomy was in her usual state of health until last night when she developed abdominal pain. She had nausea, vomiting and worsening of abdominal pain on HD, but completed her treatment today. No fever or chills, no diarrhea, no chest pain,no dyspnea. She went to the ED when her symptoms progressed after she returned home from dialysis. She c/o morphine not providing adequate pain relief. NGT was placed and she is NPO. She c/o feeling constipated, but reports having had a BM this am.   8/4/18 - still with NGT in place, but much better pain control - in better spirits  8/5/18 - attempt at having breakfast resulted in resumption of abdominal symptoms - NGT was replaced and NPO status reinstituted - plan for HD tomorrow discussed  8/6/18 - progressing well so far except being bother by NGT - UF goals for later on HD discussed with patient  8/7/18 - tolerated surgery well lest night - in good spirits - no definite HD tomorrow, will evaluate before we decide to give her an extra day to recover post OP - patient understands  8/8/18 - no BM, but passed gas - feels better, for HD this am - no dyspnea, no CP, no N/V -    Past Medical History:   Diagnosis Date    Arthritis     AVF (arteriovenous fistula) ([address] 12/20/2016 12/6/16 (S) Right AVF revision and thrombectomy    Cancer ([address] 2015    L kidney    Chronic kidney disease     HD in M[facility]W-F- at [facility] dialysis    Degenerative joint disease     Diabetes ([address]     type 2, average range 100-120 fasting, symptomatic below 90; last A1C?    ESRD (end stage renal disease) ([address] Nov 2006    ESRD.  MWF dialysis     Hypertension     Obesity         Presenting Problem:   Combined receptive and expressive aphasia [R47.01]  Combined receptive and expressive aphasia [R47.01]    Active Hospital Problems (** Indicates Principal Problem)    Diagnosis Date Noted   • **Aphasia [R47.01] 01/20/2018   • History of stroke [Z86.73] 01/20/2018   • Combined receptive and expressive aphasia [R47.01] 01/20/2018   • Hypertension [I10] 09/16/2016   • PAF (paroxysmal atrial fibrillation) [I48.0] 09/08/2016      Resolved Hospital Problems    Diagnosis Date Noted Date Resolved   No resolved problems to display.          Hospital Course:  Jenna Russell is a 76 y.o. female with known atrial fibrillation, now in NSR s/p PVI ablation, with history of L temporal CVA with some residual aphasia, presented with difficulty speaking. Her initial work up revealed no CVA. She was evaluated by neurology, and Dr. Godwin felt that this likely represented new onset partial seizures. She was initially started on Keppra, but eventually transitioned to Depakote. She seemed to tolerate this well and her symptoms have resolved. She will follow up with Dr. Groves in 1 week.    She has a history of atrial fibrillation and despite sinus rhythm and ablation has remained, and will remain on Eliquis therapy with aspirin.           Day of Discharge     HPI:   No complaints. Wants to go.    Review of Systems      Otherwise ROS is negative except as mentioned in the HPI.    Vital Signs:   Temp:  [97.8 °F (36.6 °C)-98 °F (36.7 °C)] 97.8 °F (36.6 °C)  Heart Rate:  [58-74] 74  Resp:  [18] 18  BP: (125-134)/(63-80) 130/80     Physical Exam:  NAD, alert and oriented  Seen ambulating in halls  OP clear, MMM  PERRL  Neck supple  RRR  CTAB  +BS, ND, NT  LOREDO  No rashes  Mild speech delay, otherwise fluent and clear    Pertinent  and/or Most Recent Results       Results from last 7 days  Lab Units 01/21/18  0459 01/20/18  1124 01/20/18  1123   WBC 10*3/mm3 11.24* 9.84  --    HEMOGLOBIN g/dL 11.2* 12.8  --   Transient ischemic attack 8/29/2015      Past Surgical History:   Procedure Laterality Date    BREAST SURGERY PROCEDURE UNLISTED Right     cyst removed    HX GI      colon resection resulting in temporary colostomy reversal    HX GYN      stephan    HX OTHER SURGICAL      dialysis fistula, several permcaths    HX UROLOGICAL Left July 2015    nephrectomy    HX VASCULAR ACCESS        Prior to Admission medications    Medication Sig Start Date End Date Taking? Authorizing Provider   amLODIPine (NORVASC) 10 mg tablet Take  by mouth daily. Yes Historical Provider   hyoscyamine (LEVSIN) 0.125 mg tablet Take 1 Tab by mouth every six (6) hours as needed for Cramping or Diarrhea. 5/10/18   Yamileth Burroughs MD   ondansetron (ZOFRAN ODT) 8 mg disintegrating tablet Take 1 Tab by mouth every eight (8) hours as needed for Nausea. 5/10/18   Yamileth Burroughs MD   HYDROcodone-acetaminophen [facility])  mg tablet Take 1 Tab by mouth every four (4) hours as needed. Max Daily Amount: 6 Tabs. 4/13/18   My Hector,    esomeprazole (NEXIUM) 20 mg capsule Take 2 Caps by mouth daily. 4/13/18   My Hector, DO   guaiFENesin-codeine (CHERATUSSIN AC) 100-10 mg/5 mL solution Take 5-10 mL by mouth three (3) times daily as needed for Cough or Congestion. Max Daily Amount: 30 mL. 1/19/18   Gabriella Guerrero MD   lidocaine (LIDODERM) 5 % 1 Patch by TransDERmal route every twenty-four (24) hours. Apply patch to the affected area for 12 hours a day and remove for 12 hours a day. 9/16/17   Gali Soliman MD   lidocaine 5 % topical cream Apply  to affected area two (2) times daily as needed for Pain. 9/1/17   Sonali Pontiff, APRN   butalbital-acetaminophen-caff (FIORICET) -40 mg per capsule Take 1-2 Caps by mouth every six (6) hours as needed for Pain. Max Daily Amount: 8 Caps. 2/7/17   Peter Green MD   VIT C/VIT E/LUTEIN/MIN/OMEGA-3 (OCUVITE PO) Take  by mouth nightly.     Historical Provider   cinacalcet (SENSIPAR) 30 mg   HEMOGLOBIN, POC g/dL  --   --  12.9   HEMATOCRIT % 34.8 39.2  --    HEMATOCRIT POC %  --   --  38   PLATELETS 10*3/mm3 215 229  --    SODIUM mmol/L 142  --   --    POTASSIUM mmol/L 3.3*  --   --    CHLORIDE mmol/L 107  --   --    CO2 mmol/L 23.0  --   --    BUN mg/dL 8*  --   --    CREATININE mg/dL 0.50*  --  0.50*   GLUCOSE mg/dL 75  --   --    CALCIUM mg/dL 8.9  --   --        Results from last 7 days  Lab Units 01/20/18  1124 01/20/18  1122   ALT (SGPT) U/L 23  --    AST (SGOT) U/L 27  --    PROTIME seconds  --  12.9   INR   --  1.1   APTT seconds 28.3  --        Results from last 7 days  Lab Units 01/21/18  0459   CHOLESTEROL mg/dL 147   TRIGLYCERIDES mg/dL 80   HDL CHOL mg/dL 62*   LDL CHOL mg/dL 69       Results from last 7 days  Lab Units 01/21/18  0459   HEMOGLOBIN A1C % 5.60     Brief Urine Lab Results     None          Microbiology Results Abnormal     None          Imaging Results (all)     Procedure Component Value Units Date/Time    CT Angiogram Neck With & Without Contrast [[id number]] Collected:  01/20/18 1543     Updated:  01/20/18 1613    Narrative:       EXAMINATION: CT ANGIOGRAM HEAD W WO CONTRAST, CT ANGIOGRAM NECK W WO  CONTRAST - 01/20/2018     INDICATION: Stroke.      TECHNIQUE: CT angiogram head and neck with and without intravenous  contrast. 2D reconstructions performed.     The radiation dose reduction device was turned on for each scan per the  ALARA (As Low as Reasonably Achievable) protocol.     COMPARISON: None.     FINDINGS:      NECK: Normal 3 vessel arch with patent great vessel origins despite mild  to moderate atherosclerotic involvement of the left subclavian takeoff.  Lung apices grossly clear. Right dominant vertebral artery system  demonstrating patency without evidence for hemodynamically significant  stenosis, aneurysm or occlusion of the cervical segments. Cervical  carotid systems in normal course and branching pattern with minimal  atherosclerotic involvement of the  tablet Take 60 mg by mouth nightly. Historical Provider   minoxidil (LONITEN) 2.5 mg tablet Take 7.5 mg by mouth two (2) times a day. Indications: HYPERTENSION    Jaylon Cool MD   aspirin 81 mg chewable tablet Take 1 Tab by mouth daily. Patient taking differently: Take 81 mg by mouth every morning. 9/1/15   Jim Young MD   sevelamer carbonate (RENVELA) 800 mg tab tab Take 800 mg by mouth three (3) times daily (with meals). 5 tablets with meals/ 2 with snacks  Sometimes only eats 2 meals/d    Historical Provider   lisinopril (PRINIVIL, ZESTRIL) 40 mg tablet Take 40 mg by mouth nightly. Indications: HYPERTENSION 6/8/15   Historical Provider   sitaGLIPtin (JANUVIA) 100 mg tablet Take 50 mg by mouth every morning. Indications: TYPE 2 DIABETES MELLITUS    Historical Provider   furosemide (LASIX) 80 mg tablet Take 80 mg by mouth two (2) times a day.  Indications: EDEMA, HYPERTENSION    Historical Provider     Allergies   Allergen Reactions    Pcn [Penicillins] Rash    Vancomycin Rash      Social History   Substance Use Topics    Smoking status: Never Smoker    Smokeless tobacco: Never Used    Alcohol use No      Family History   Problem Relation Age of Onset    Diabetes Mother     Heart Disease Mother     Hypertension Mother     Heart Disease Father     Hypertension Father     Malignant Hyperthermia Neg Hx     Pseudocholinesterase Deficiency Neg Hx     Delayed Awakening Neg Hx     Post-op Nausea/Vomiting Neg Hx     Emergence Delirium Neg Hx     Other Neg Hx     Post-op Cognitive Dysfunction Neg Hx           Review of Systems    Constitutional: no fever, negative  Eyes: fair vision, negative  Ears, nose, mouth, throat, and face:fair hearing, negative  Respiratory: no asthma, negative  Cardiovascular:no chest pain, negative  Gastrointestinal:no diarrhea, see HPI  Genitourinary: no dysuria, no hematuria  Hematologic/lymphatic: no bleeding tendency,    Neurological: no seizures, Behvioral/Psych: no psych hospitalization   Endocrine: no goiter,  The remainder is negative      Objective:       Visit Vitals    /58 (BP 1 Location: Left arm, BP Patient Position: At rest)    Pulse (!) 105    Temp 98.1 °F (36.7 °C)    Resp 17    Ht 5' 5\" (1.651 m)    Wt 99.2 kg (218 lb 9.6 oz)    SpO2 90%    BMI 36.38 kg/m2        08/06 1901 - 08/08 0700  In: 1889 [I.V.:1889]  Out: 75 [Urine:75]    General:  Alert, cooperative, no distress, appears stated age. Head:  Normocephalic, without obvious abnormality, atraumatic. Neck: Supple, symmetrical, trachea midline, no adenopathy,  no JVD. Lungs:   Clear to auscultation bilaterally. Heart:  Regular rate and rhythm, S1, S2 normal, no murmur,  rub or gallop. Abdomen:   Soft,  Some bowel sounds heard. Obese. No masses,  No organomegaly. Extremities: Extremities normal, atraumatic, no cyanosis or edema. Access: AV fistula in JACOB is open and examines well. Skin: Skin color, texture, turgor normal. No rashes or lesions. Neurologic: Grossly intact. No asterixis. Data Review:     Results for Nic Adam (MRN [id number]) as of 8/6/2018 05:59   Ref.  Range 5/10/2018 09:52 8/3/2018 11:11 8/3/2018 18:54 8/4/2018 04:16 8/5/2018 04:30   Sodium Latest Ref Range: 136 - 145 mmol/L 139 139  143 145   Potassium Latest Ref Range: 3.5 - 5.1 mmol/L 3.9 5.3 (H)  4.3 4.1   Chloride Latest Ref Range: 98 - 107 mmol/L 100 98  100 97 (L)   CO2 Latest Ref Range: 21 - 32 mmol/L 28 32  37 (H) 39 (H)   Anion gap Latest Ref Range: 7 - 16 mmol/L 11 9  6 (L) 9   Glucose Latest Ref Range: 65 - 100 mg/dL 151 (H) 142 (H)  122 (H) 115 (H)   BUN Latest Ref Range: 8 - 23 MG/DL 17 12  16 27 (H)   Creatinine Latest Ref Range: 0.6 - 1.0 MG/DL 6.65 (H) 3.17 (H)  5.07 (H) 7.58 (H)   Calcium Latest Ref Range: 8.3 - 10.4 MG/DL 9.4 9.3  9.2 9.4   Phosphorus Latest Ref Range: 2.3 - 3.7 MG/DL  2.1 (L)   5.2 (H)   Magnesium Latest Ref Range: 1.8 - 2.4 mg/dL  2.3 carotid bulbs bilaterally with  approximately 10% luminal narrowing right and 0% left by NASCET  criteria. Noted heterogeneous enhancing thyroid with 17 mm right thyroid  nodule.     HEAD: Distal internal carotid arteries demonstrate minimal  atherosclerotic involvement and calcifications however patent without  aneurysm, hemodynamically significant stenosis or occlusion. [place] of  [place] branch vessels including anterior cerebral, middle cerebral and  posterior cerebral arteries without hemodynamically significant  stenosis, aneurysm or occlusion. Vertebrobasilar system demonstrates  atherosclerotic involvement of the V4 segments bilateral vertebral  arteries with only moderate luminal narrowing however no hematemesis,  significant stenosis, aneurysm or occlusion otherwise identified.       Impression:       CTA of the head and neck without hemodynamically significant  stenosis, aneurysm or occlusion identified. Minimal atherosclerotic  involvement of the carotid bulbs and distal V4 segments of the vertebral  arteries without hemodynamically significant stenosis noted.     DICTATED:     01/20/2018  EDITED    :     01/20/2018      This report was finalized on 1/20/2018 4:10 PM by Dr. Sunny Ramirez.       CT Head Without Contrast Stroke Protocol [[id number]] Collected:  01/20/18 1524     Updated:  01/20/18 1613    Narrative:       EXAMINATION: CT HEAD WO CONTRAST - 01/20/2018     INDICATION: Ataxia.      TECHNIQUE: Axial CT of the head without intravenous contrast  administration.     The radiation dose reduction device was turned on for each scan per the  ALARA (As Low as Reasonably Achievable) protocol.     COMPARISON: MRI 10/23/2015.     FINDINGS: Encephalomalacia left frontotemporal region consistent with  prior insult however no new area of infarct,  intracranial hemorrhage or  extra-axial fluid collection is identified with midline structures  symmetric. No evidence for midline shift. Globes and  2.6 (H)   GFR est non-AA Latest Ref Range: >60 ml/min/1.73m2 7 (L) 16 (L)  9 (L) 6 (L)   GFR est AA Latest Ref Range: >60 ml/min/1.73m2 8 (L) 19 (L)  11 (L) 7 (L)   Bilirubin, total Latest Ref Range: 0.2 - 1.1 MG/DL 0.5 0.6  0.6 0.6   Protein, total Latest Ref Range: 6.3 - 8.2 g/dL 8.2 8.8 (H)  7.3 7.8   Albumin Latest Ref Range: 3.2 - 4.6 g/dL 3.8 4.1  3.3 3.7   Globulin Latest Ref Range: 2.3 - 3.5 g/dL 4.4 (H) 4.7 (H)  4.0 (H) 4.1 (H)   A-G Ratio Latest Ref Range: 1.2 - 3.5   0.9 (L) 0.9 (L)  0.8 (L) 0.9 (L)   ALT (SGPT) Latest Ref Range: 12 - 65 U/L 13 24  19 17   AST Latest Ref Range: 15 - 37 U/L 21 68 (H)  27 23   Alk. phosphatase Latest Ref Range: 50 - 136 U/L 106 121  100 110   Lipase Latest Ref Range: 73 - 393 U/L 213 521 (H)   138       Results for Bert LIZAMA (MRN [id number]) as of 8/5/2018 16:10   Ref.  Range 8/3/2018 11:11 8/4/2018 04:16 8/5/2018 04:30   WBC Latest Ref Range: 4.3 - 11.1 K/uL 10.9 9.6 8.4   RBC Latest Ref Range: 4.05 - 5.25 M/uL 3.07 (L) 2.82 (L) 2.88 (L)   HGB Latest Ref Range: 11.7 - 15.4 g/dL 10.1 (L) 9.4 (L) 9.8 (L)   HCT Latest Ref Range: 35.8 - 46.3 % 29.4 (L) 27.4 (L) 28.5 (L)   MCV Latest Ref Range: 79.6 - 97.8 FL 95.8 97.2 99.0 (H)   MCH Latest Ref Range: 26.1 - 32.9 PG 32.9 33.3 (H) 34.0 (H)   MCHC Latest Ref Range: 31.4 - 35.0 g/dL 34.4 34.3 34.4   RDW Latest Ref Range: 11.9 - 14.6 % 16.2 (H) 16.3 (H) 16.1 (H)   PLATELET Latest Ref Range: 150 - 450 K/uL 219 218 218       Recent Results (from the past 24 hour(s))   HEMOGLOBIN A1C WITH EAG    Collection Time: 08/07/18  9:22 AM   Result Value Ref Range    Hemoglobin A1c 4.7 (L) 4.8 - 6.0 %    Est. average glucose Cannot be calculated mg/dL   METABOLIC PANEL, BASIC    Collection Time: 08/07/18  9:23 AM   Result Value Ref Range    Sodium 140 136 - 145 mmol/L    Potassium 4.3 3.5 - 5.1 mmol/L    Chloride 98 98 - 107 mmol/L    CO2 35 (H) 21 - 32 mmol/L    Anion gap 7 7 - 16 mmol/L    Glucose 145 (H) 65 - 100 mg/dL    BUN 29 (H) 8 orbits  unremarkable. Visualized paranasal sinuses and mastoid air cells are  grossly clear with calvarium intact.       Impression:       1. No acute intracranial abnormality.  2. Encephalomalacia from prior left MCA infarction involving the  frontotemporal region.     Patient scanned 1/20/2018 at 1111 hours. Scan report given to ER  physician and team at 1114 hours on 1/20/2018.     DICTATED:     01/20/2018  EDITED    :     01/20/2018         This report was finalized on 1/20/2018 4:10 PM by Dr. Sunny Ramirez.       CT Cerebral Perfusion With & Without Contrast [[id number]] Collected:  01/20/18 1540     Updated:  01/20/18 1613    Narrative:       EXAMINATION: CT CEREBRAL PERFUSION W WO CONTRAST - 01/20/2018     INDICATION: Neuro deficit(s).     TECHNIQUE: CT cerebral perfusion with and without intravenous contrast  administration. Multiparametric maps including mean transit time, time  to drain, cerebral blood flow and cerebral blood volume were calculated.     The radiation dose reduction device was turned on for each scan per the  ALARA (As Low as Reasonably Achievable) protocol.     COMPARISON: CT stroke protocol performed earlier same day.     FINDINGS: No focal area of prolongation of mean transit time or time to  drain. Cerebral blood flow and cerebral blood volume are within normal  limits and without defect.       Impression:       Normal CT cerebral perfusion.     DICTATED:     01/20/2018  EDITED    :     01/20/2018      This report was finalized on 1/20/2018 4:10 PM by Dr. Sunny Ramirez.       CT Angiogram Head With & Without Contrast [[id number]] Collected:  01/20/18 1543     Updated:  01/20/18 1613    Narrative:       EXAMINATION: CT ANGIOGRAM HEAD W WO CONTRAST, CT ANGIOGRAM NECK W WO  CONTRAST - 01/20/2018     INDICATION: Stroke.      TECHNIQUE: CT angiogram head and neck with and without intravenous  contrast. 2D reconstructions performed.     The radiation dose reduction device was turned on for each  - 23 MG/DL    Creatinine 7.18 (H) 0.6 - 1.0 MG/DL    GFR est AA 7 (L) >60 ml/min/1.73m2    GFR est non-AA 6 (L) >60 ml/min/1.73m2    Calcium 9.0 8.3 - 10.4 MG/DL   GLUCOSE, POC    Collection Time: 08/07/18 11:34 AM   Result Value Ref Range    Glucose (POC) 107 (H) 65 - 100 mg/dL   GLUCOSE, POC    Collection Time: 08/07/18  4:41 PM   Result Value Ref Range    Glucose (POC) 101 (H) 65 - 100 mg/dL   GLUCOSE, POC    Collection Time: 08/07/18  8:54 PM   Result Value Ref Range    Glucose (POC) 145 (H) 65 - 798 mg/dL   METABOLIC PANEL, COMPREHENSIVE    Collection Time: 08/08/18  3:57 AM   Result Value Ref Range    Sodium 140 136 - 145 mmol/L    Potassium 4.4 3.5 - 5.1 mmol/L    Chloride 96 (L) 98 - 107 mmol/L    CO2 35 (H) 21 - 32 mmol/L    Anion gap 9 7 - 16 mmol/L    Glucose 95 65 - 100 mg/dL    BUN 43 (H) 8 - 23 MG/DL    Creatinine 8.32 (H) 0.6 - 1.0 MG/DL    GFR est AA 6 (L) >60 ml/min/1.73m2    GFR est non-AA 5 (L) >60 ml/min/1.73m2    Calcium 9.0 8.3 - 10.4 MG/DL    Bilirubin, total 0.3 0.2 - 1.1 MG/DL    ALT (SGPT) 8 (L) 12 - 65 U/L    AST (SGOT) 17 15 - 37 U/L    Alk.  phosphatase 87 50 - 136 U/L    Protein, total 6.9 6.3 - 8.2 g/dL    Albumin 3.0 (L) 3.2 - 4.6 g/dL    Globulin 3.9 (H) 2.3 - 3.5 g/dL    A-G Ratio 0.8 (L) 1.2 - 3.5     MAGNESIUM    Collection Time: 08/08/18  3:57 AM   Result Value Ref Range    Magnesium 2.4 1.8 - 2.4 mg/dL   CBC W/O DIFF    Collection Time: 08/08/18  3:57 AM   Result Value Ref Range    WBC 8.6 4.3 - 11.1 K/uL    RBC 2.55 (L) 4.05 - 5.2 M/uL    HGB 8.7 (L) 11.7 - 15.4 g/dL    HCT 25.1 (L) 35.8 - 46.3 %    MCV 98.4 (H) 79.6 - 97.8 FL    MCH 34.1 (H) 26.1 - 32.9 PG    MCHC 34.7 31.4 - 35.0 g/dL    RDW 14.4 %    PLATELET 203 331 - 694 K/uL    MPV 10.3 9.4 - 12.3 FL    ABSOLUTE NRBC 0.00 0.0 - 0.2 K/uL   GLUCOSE, POC    Collection Time: 08/08/18  6:02 AM   Result Value Ref Range    Glucose (POC) 97 65 - 100 mg/dL       CT abdomen -  Basilar lungs and pleural spaces clear except for scan per the  ALARA (As Low as Reasonably Achievable) protocol.     COMPARISON: None.     FINDINGS:      NECK: Normal 3 vessel arch with patent great vessel origins despite mild  to moderate atherosclerotic involvement of the left subclavian takeoff.  Lung apices grossly clear. Right dominant vertebral artery system  demonstrating patency without evidence for hemodynamically significant  stenosis, aneurysm or occlusion of the cervical segments. Cervical  carotid systems in normal course and branching pattern with minimal  atherosclerotic involvement of the carotid bulbs bilaterally with  approximately 10% luminal narrowing right and 0% left by NASCET  criteria. Noted heterogeneous enhancing thyroid with 17 mm right thyroid  nodule.     HEAD: Distal internal carotid arteries demonstrate minimal  atherosclerotic involvement and calcifications however patent without  aneurysm, hemodynamically significant stenosis or occlusion. [place] of  [place] branch vessels including anterior cerebral, middle cerebral and  posterior cerebral arteries without hemodynamically significant  stenosis, aneurysm or occlusion. Vertebrobasilar system demonstrates  atherosclerotic involvement of the V4 segments bilateral vertebral  arteries with only moderate luminal narrowing however no hematemesis,  significant stenosis, aneurysm or occlusion otherwise identified.       Impression:       CTA of the head and neck without hemodynamically significant  stenosis, aneurysm or occlusion identified. Minimal atherosclerotic  involvement of the carotid bulbs and distal V4 segments of the vertebral  arteries without hemodynamically significant stenosis noted.     DICTATED:     01/20/2018  EDITED    :     01/20/2018      This report was finalized on 1/20/2018 4:10 PM by Dr. Sunny Ramirez.       MRI Brain Without Contrast [[id number]] Collected:  01/20/18 1648     Updated:  01/20/18 1707    Narrative:       EXAMINATION: MRI BRAIN WO CONTRAST - 01/20/2018      INDICATION: R47.01-Aphasia; Z86.73-Personal history of transient  ischemic attack (TIA), and cerebral infarction without residual  deficits; Z86.79-Personal history of other diseases of the circulatory  system; E78.00-Pure hypercholesterolemia, unspecified; I10-Essential  (primary) hypertension.      TECHNIQUE: Multiplanar MRI of the brain without intravenous contrast.     COMPARISON: CT and CT cerebral perfusion performed earlier same day.     FINDINGS: No restriction on diffusion-weighted sequences. Midline  structures are symmetric without evidence of mass, mass effect or  midline shift. Pituitary and sella within normal limits.  Cervicomedullary junction widely patent. Encephalomalacia is noted  within the left frontotemporal region consistent with prior insult  creating prominence of the sulci in this region. Ventricles and sulci  otherwise within normal limits. Globes and orbits unremarkable.  Visualized paranasal sinuses and mastoid air cells are grossly clear and  well-pneumatized. Normal signal flow voids of the distal internal  carotid and basilar arteries.       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     DICTATED:     01/20/2018  EDITED    :     01/20/2018          This report was finalized on 1/20/2018 5:05 PM by Dr. Sunny Ramirez.       XR Chest 1 View [[id number]] Collected:  01/20/18 1823     Updated:  01/20/18 1830    Narrative:          EXAMINATION: XR CHEST 1 VW - 01/20/2018     INDICATION: Stroke protocol.     COMPARISON: Chest x-ray 12/15/2016.     FINDINGS: Cardiac size is borderline enlarged with pulmonary vascularity  within normal limits. Tortuous appearance of the thoracic aorta  concerning for ectasia. No focal consolidation, pneumothorax or pleural  effusion.           Impression:       Borderline cardiomegaly with tortuous ectatic appearance of  the thoracic aorta however no  minimalatelectasis. There is abnormal dilatation of proximal mid small bowel loops. The small bowel  loops are dilated to the ventral hernia lower abdominal wall with primarily  collapsed bowel loops distally. This appears to be a site of relative  obstruction. There is gas and stool in the colon suggesting early or partial  obstruction. There is no free intraperitoneal air. The soft tissue nodule at the left nephrectomy bed is unchanged. Atrophic right  kidney. CT PELVIS:   No free fluid.    IMPRESSION:    1. Evidence of mechanical small bowel obstruction, the site of the obstruction  appears to be the lower abdominal ventral hernia. 2. Gas and stool in the colon indicates obstruction is partial or early. CXR - Impression:    No acute findings or changes in the chest.      Principal Problem:    Small bowel obstruction ([facility] [address] (8/3/2018)    Active Problems:    DM (diabetes mellitus) ([facility] [address] (2/7/2013)      HTN (hypertension) (2/7/2013)      Chronic renal failure, stage 5 (HCC) (6/15/2015)      Severe obesity (BMI 35.0-39. 9) with comorbidity ([facility] [address] (5/4/2018)        Assessment:     1. Abdominal pain -  - small bowel obstruction, working on decompression and medical management  - improved pain control  - failed attempt at PO intake  - lower abdominal Ventral hernia appears culprit of obstruction  - S/P surgery for incarcerated hernia surgery  - clinically improving     2. ESRD -  - HD today per routine schedule    3. DM II -  - continue current meds    4. HTN -  - may need transdermal meds if BP increases    5. Elevated lipase -  - this level is normal now as GI system is being rested     6.  Anemia -  - Hb is dropping  - treat with JAMES      Plan:     As above    Joshua Kelly M.D. focal consolidation or parenchymal  disease.     DICTATED:     2018  EDITED    :     2018      This report was finalized on 2018 6:28 PM by Dr. Sunny Ramirez.                           [facility] NONINVASIVE LAB  [address] [place], [place] [address] [phone]                 Jenna Russell   Echocardiogram   Order# [id number]    Reading physician: Levy Yan MD   Ordering physician: Junie Bai MD   Study date: 18         Patient Information      Patient Name MRN Sex  (Age)     Jenna Russell [id number] Female 1941 (76 y.o.)       Admission Information      Admission Date/Time Discharge Date/Time Room/Bed     18  1105  S312/1       Sedation Narrator Report      Sedation Narrator Report       Interpretation Summary      · Left ventricular wall thickness is consistent with hypertrophy.  · Left ventricular systolic function is normal.  · Moderate aortic valve regurgitation is present.  · Mild mitral valve regurgitation is present  · Mild tricuspid valve regurgitation is present.  · Mild pulmonic valve regurgitation is present.     Results    EEG (Order [id number])           Procedure Abnormality Status     EEG     EEG   Status:  Final result   Visible to patient:  No (Not Released) Order: [id number]     Details      Reading Physician Reading Date Result Priority     José Miguel Pugh MD 2018 Routine         Narrative & Impression           Reason for referral:     76 y.o.female with new onset seizure, aphasia     Technical Summary:      A 19 channel digital EEG was performed using the international 10-20 placement system, including eye leads and EKG leads.     Findings:     The awake tracing demonstrates a moderate amplitude 11 Hz posterior rhythm seen symmetrically over the occipital head leads.  Moderate amplitude theta and intermixed EMG artifact are seen over the anterior head leads on the right.  Slower 2-5 Hz intermixed delta  and theta activity seen over the left frontal temporal head leads.  Photic stimulation does not change the background.  Hyperventilation does not elicit abnormality.  Sleep is seen with mild slowing of the background and sleep spindles.     IMPRESSION:     Left frontotemporal slowing     No epileptiform activity is seen               Discharge Details      Jenna Russell   Home Medication Instructions DELTA:[id number]    Printed on:01/23/18 9796   Medication Information                      ALPRAZolam (XANAX) 0.5 MG tablet  Take 0.25 mg by mouth 2 (Two) Times a Day As Needed.             apixaban (ELIQUIS) 5 MG tablet tablet  Take 5 mg by mouth 2 (two) times a day.             aspirin 81 MG EC tablet  Take 81 mg by mouth daily.             atorvastatin (LIPITOR) 40 MG tablet  Take 40 mg by mouth Every Night.             calcium carbonate EX (TUMS EX) 750 MG chewable tablet  Chew 750 mg 2 (Two) Times a Day.             Cholecalciferol (VITAMIN D) 2000 UNITS capsule  Take 2,000 Units by mouth Daily.             diclofenac (FLECTOR) 1.3 % patch patch  Apply 1 patch topically 2 (Two) Times a Day.             divalproex (DEPAKOTE) 250 MG DR tablet  Take 1 tablet by mouth Every 8 (Eight) Hours.             folic acid (FOLVITE) 1 MG tablet  Take 1 mg by mouth Daily.             HYDROcodone-acetaminophen (NORCO)  MG per tablet  Take 1 tablet by mouth Every 6 (Six) Hours As Needed for moderate pain (4-6) for up to 20 doses.             hyoscyamine (ANASPAZ,LEVSIN) 0.125 MG tablet  Take 0.125 mg by mouth every 4 (four) hours as needed for cramping.                   Discharge Disposition:  Home or Self Care    Discharge Diet:  Diet Instructions     Advance Diet As Tolerated                     Discharge Activity:   Activity Instructions     Activity as Tolerated                     Special Instructions:  None  No future appointments.    Additional Instructions for the Follow-ups that You Need to Schedule     Discharge  Follow-up with PCP    As directed    Follow Up Details:  1-2 weeks           Discharge Follow-up with Specified Provider: Germain 1 week    As directed    To:  Germain 1 week                     Time Spent on Discharge:  40 minutes    Kingsley Walsh MD  01/23/18  10:55 AM       Electronically signed by Kingsley Walsh MD at 1/23/2018 10:59 AM

## 2018-08-08 NOTE — PROGRESS NOTES
Pt's D/C instructions completed. Verbalized understanding of all instructions including diet, activity, s/sx to alert MD, medications, wound care, and f/u appointment. Family at [facility].

## 2018-08-08 NOTE — DISCHARGE SUMMARY
Hospitalist Discharge Summary     Admit Date:  8/3/2018 10:59 AM   Name:  Prashant Sanchez   Age:  78 y.o.  :  1950   MRN:  [id number]   PCP:  Linda Chung MD  Treatment Team: Attending Provider: Phoenix Raphael MD; Consulting Provider: Linda Chung MD; Care Manager: Brooklyn Davila RN, Devan Leos, [facility]    Problem List for this Hospitalization:  Acute onset epigastric pain, nausea, and vomiting   Small bowel obstruction vs partial SBO: not caused by ventral hernia:  Resolved on discharge  S/P Exploratory laparotomy per Dr Elisabet Natarajan with relief of obstruction  Elevated lipase:  resolved  History of diverticulitis with colostomy and reversal   History of renal cancer with left nephrectomy and recurrent mass at the site  ESRD on HD, continuing Monday,  dialysis  NIDDM taking januvia: A1C: 4.7  HTN:  Home meds  Severe obesity  Abdominal mass  History of TIA  Anemia of chronic disease    Hospital Course:  HD patient with sudden onset early am 8/3 epigastric and right upper abdominal pain described as sharp, shooting, crampy and colicky, awakened from sound sleep by same. Burton Fabry reports nausea and vomiting x 3 presented to ER 8/3 late am after dialysis. Kasey Duff increased with eating and motion.  No prior history of same.  History of diverticulitis with colostomy and reversal.  Also history of left renal cancer with nephrectomy and recurrent mass at site of the nephrectomy.  CT with partial vs early SBO and ventral hernia. Last BM 8/3 am, constipated.  Nephrology and General surgery consulted.  Admitted with N/G to low intermittent suction, NPO, IVF, pain meds.  Lipase 521 on arrival, lactic acid: 2.5.  : Approx 900 ml dark gastric output from N/G x 24 hours. Continued pain. Unable to auscultate bowel sounds.  :  N/G with 1200 ml output x 24 hours.  Abdominal pain improved, passing flatus so N/G clamped per surgery.   Did well until she consumed clear liquids with immediate 10/10 pain, mostly in the left lower quad both at breakfast and lunch.  N/G unclamped with 500 ml dark green fluid evacuated immediately. Some relief with evacuation of fluid.  8/6:  Dialysis. Abdominal pain worse.  950 ml N/G output in 24 hours. Exploratory lap per Dr Guilherme Jesus without findings of hernia or obstruction. Immediately felt better post op. Tolerated clear liquids. 8/7:  Passing gas, no pain, tolerated GI soft diet well. Bowel sounds auscultated throughout. Anxious to go home. 8/8:  Back to baseline, no BM yet but passing gas, feels much better. Follow up instructions and discharge meds at bottom of this note. Plan was discussed with patient, family, RN. All questions answered. Patient was stable at time of discharge. Diagnostic Imaging/Tests:   KUB:  8/4/2018: Esophageal tube appears to terminate in the first portion of the duodenum. Oral contrast is seen throughout the colon. No free air is evident. The bowel gas pattern is nonspecific and there is no evidence of ileus or obstruction. No suspicious renal or ureteral calculi are evident. Visualized osseous structures unremarkable. Impression: No acute pathology identified. CT ABDOMEN AND PELVIS;  8/3/18:    CT  ABDOMEN:  Basilar lungs and pleural spaces clear except for minimal atelectasis. There is abnormal dilatation of proximal mid small bowel loops. The small bowel loops are dilated to the ventral hernia lower abdominal wall with primarily collapsed bowel loops distally. This appears to be a site of relative obstruction. There is gas and stool in the colon suggesting early or partial obstruction. There is no free intraperitoneal air. The soft tissue nodule at the left nephrectomy bed is unchanged. Atrophic right kidney. CT PELVIS:   No free fluid. IMPRESSION:  Evidence of mechanical small bowel obstruction, the site of the obstruction appears to be the lower abdominal ventral hernia.  Gas and stool in the colon indicates obstruction is partial or early. CXR:  8/3/2018:  Lungs and pleural spaces clear. Negative for pneumothorax. No free air beneath hemidiaphragms. Vascular stent right infraclavicular region.    Impression:  No acute findings or changes in the chest.       Labs: Results:       BMP, Mg, Phos Recent Labs      08/08/18 0357  08/07/18   0923  08/06/18   0437   NA  140  140  143   K  4.4  4.3  4.2   CL  96*  98  94*   CO2  35*  35*  39*   AGAP  9  7  10   BUN  43*  29*  36*   CREA  8.32*  7.18*  10.00*   CA  9.0  9.0  9.4   GLU  95  145*  117*   MG  2.4   --   2.7*      CBC Recent Labs      08/08/18 0357 08/06/18 0437   WBC  8.6  8.8   RBC  2.55*  2.81*   HGB  8.7*  9.5*   HCT  25.1*  27.7*   PLT  194  204      LFT Recent Labs      08/08/18 0357 08/06/18 0437   SGOT  17  23   ALT  8*  14   AP  87  98   TP  6.9  7.6   ALB  3.0*  3.4   GLOB  3.9*  4.2*   AGRAT  0.8*  0.8*      Cardiac Testing Lab Results   Component Value Date/Time    BNP 80 02/12/2011 04:39 PM    Troponin-I <0.05 03/26/2012 05:50 PM    Troponin-I <0.05 02/12/2011 04:39 PM    Troponin-I, Qt. <0.02 (L) 09/12/2017 04:52 PM    Troponin-I, Qt. <0.02 (L) 10/02/2016 11:10 PM    Troponin-I, Qt. <0.02 (L) 05/04/2015 12:00 AM      Coagulation Tests Lab Results   Component Value Date/Time    Prothrombin time 10.2 11/16/2015 12:00 AM    Prothrombin time 10.5 08/29/2015 04:30 PM    Prothrombin time 10.0 01/29/2013 12:20 PM    INR 0.9 11/16/2015 12:00 AM    INR 1.0 08/29/2015 04:30 PM    INR 1.0 01/29/2013 12:20 PM    aPTT 22.2 (L) 11/16/2015 12:00 AM    aPTT 27.2 01/29/2013 12:20 PM    aPTT 25.7 03/26/2012 05:25 PM      A1c Lab Results   Component Value Date/Time    Hemoglobin A1c 4.7 (L) 08/07/2018 09:22 AM    Hemoglobin A1c 4.7 (L) 09/12/2017 04:35 PM    Hemoglobin A1c 4.6 (L) 08/30/2015 05:45 AM      Lipid Panel Lab Results   Component Value Date/Time    Cholesterol, total 104 08/30/2015 05:45 AM    HDL Cholesterol 43 08/30/2015 05:45 AM    LDL, calculated 46.4 08/30/2015 05:45 AM    VLDL, calculated 14.6 08/30/2015 05:45 AM    Triglyceride 73 08/30/2015 05:45 AM    CHOL/HDL Ratio 2.4 08/30/2015 05:45 AM      Thyroid Panel Lab Results   Component Value Date/Time    TSH 2.341 03/26/2012 05:25 PM    T4, Free 1.1 03/26/2012 05:25 PM        Most Recent UA Lab Results   Component Value Date/Time    Color YELLOW 09/14/2017 05:54 AM    Appearance HAZY 09/14/2017 05:54 AM    pH (UA) 6.0 09/14/2017 05:54 AM    Protein 100 (A) 09/14/2017 05:54 AM    Glucose NEGATIVE  09/14/2017 05:54 AM    Ketone TRACE (A) 09/14/2017 05:54 AM    Bilirubin SMALL (A) 09/14/2017 05:54 AM    Blood NEGATIVE  09/14/2017 05:54 AM    Urobilinogen 0.2 09/14/2017 05:54 AM    Nitrites NEGATIVE  09/14/2017 05:54 AM    Leukocyte Esterase NEGATIVE  09/14/2017 05:54 AM        Allergies   Allergen Reactions    Pcn [Penicillins] Rash    Vancomycin Rash     Immunization History   Administered Date(s) Administered    TB Skin Test (PPD) Intradermal 02/08/2013, 08/03/2018    TD Vaccine 07/01/2008    TDAP Vaccine 03/30/2012     All Labs from Last 24 Hrs:  Recent Results (from the past 24 hour(s))   GLUCOSE, POC    Collection Time: 08/07/18 11:34 AM   Result Value Ref Range    Glucose (POC) 107 (H) 65 - 100 mg/dL   GLUCOSE, POC    Collection Time: 08/07/18  4:41 PM   Result Value Ref Range    Glucose (POC) 101 (H) 65 - 100 mg/dL   GLUCOSE, POC    Collection Time: 08/07/18  8:54 PM   Result Value Ref Range    Glucose (POC) 145 (H) 65 - 401 mg/dL   METABOLIC PANEL, COMPREHENSIVE    Collection Time: 08/08/18  3:57 AM   Result Value Ref Range    Sodium 140 136 - 145 mmol/L    Potassium 4.4 3.5 - 5.1 mmol/L    Chloride 96 (L) 98 - 107 mmol/L    CO2 35 (H) 21 - 32 mmol/L    Anion gap 9 7 - 16 mmol/L    Glucose 95 65 - 100 mg/dL    BUN 43 (H) 8 - 23 MG/DL    Creatinine 8.32 (H) 0.6 - 1.0 MG/DL    GFR est AA 6 (L) >60 ml/min/1.73m2    GFR est non-AA 5 (L) >60 ml/min/1.73m2    Calcium 9.0 8.3 - 10.4 MG/DL Bilirubin, total 0.3 0.2 - 1.1 MG/DL    ALT (SGPT) 8 (L) 12 - 65 U/L    AST (SGOT) 17 15 - 37 U/L    Alk. phosphatase 87 50 - 136 U/L    Protein, total 6.9 6.3 - 8.2 g/dL    Albumin 3.0 (L) 3.2 - 4.6 g/dL    Globulin 3.9 (H) 2.3 - 3.5 g/dL    A-G Ratio 0.8 (L) 1.2 - 3.5     MAGNESIUM    Collection Time: 08/08/18  3:57 AM   Result Value Ref Range    Magnesium 2.4 1.8 - 2.4 mg/dL   CBC W/O DIFF    Collection Time: 08/08/18  3:57 AM   Result Value Ref Range    WBC 8.6 4.3 - 11.1 K/uL    RBC 2.55 (L) 4.05 - 5.2 M/uL    HGB 8.7 (L) 11.7 - 15.4 g/dL    HCT 25.1 (L) 35.8 - 46.3 %    MCV 98.4 (H) 79.6 - 97.8 FL    MCH 34.1 (H) 26.1 - 32.9 PG    MCHC 34.7 31.4 - 35.0 g/dL    RDW 14.4 %    PLATELET 962 091 - 307 K/uL    MPV 10.3 9.4 - 12.3 FL    ABSOLUTE NRBC 0.00 0.0 - 0.2 K/uL   GLUCOSE, POC    Collection Time: 08/08/18  6:02 AM   Result Value Ref Range    Glucose (POC) 97 65 - 100 mg/dL   GLUCOSE, POC    Collection Time: 08/08/18 10:41 AM   Result Value Ref Range    Glucose (POC) 128 (H) 65 - 100 mg/dL     Discharge Exam:    General:    Oriented and alert, anxious to go home. Feeling much better. Passing gas. Well nourished. Alert. completed HD   prior to discharge  Eyes:   Normal sclera. Extraocular movements intact. ENT:  Normocephalic, atraumatic. Moist mucous membranes  CV:   Regular rate and rhythm. No murmur, rub, or gallop. Lungs:  Clear to auscultation bilaterally. No wheezing, rhonchi, or rales. Abdomen: Soft, nontender, nondistended. Dry dressing over lap site. Passing gas. Bowel sounds normal.   Extremities: Warm and dry. No cyanosis or edema. Neurologic: CN II-XII grossly intact. Sensation intact. Skin:     No rashes or jaundice. Psych:  Normal mood and affect. Discharge Info:   Current Discharge Medication List      CONTINUE these medications which have NOT CHANGED    Details   amLODIPine (NORVASC) 10 mg tablet Take  by mouth daily.     Associated Diagnoses: Chronic renal failure, stage 5 (HCC)      hyoscyamine (LEVSIN) 0.125 mg tablet Take 1 Tab by mouth every six (6) hours as needed for Cramping or Diarrhea. Qty: 20 Tab, Refills: 0      ondansetron (ZOFRAN ODT) 8 mg disintegrating tablet Take 1 Tab by mouth every eight (8) hours as needed for Nausea. Qty: 12 Tab, Refills: 0      HYDROcodone-acetaminophen (NORCO)  mg tablet Take 1 Tab by mouth every four (4) hours as needed. Max Daily Amount: 6 Tabs. 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 Qty: 20 Cap, Refills: 0      guaiFENesin-codeine (CHERATUSSIN AC) 100-10 mg/5 mL solution Take 5-10 mL by mouth three (3) times daily as needed for Cough or Congestion. Max Daily Amount: 30 mL. Qty: 150 mL, Refills: 0    Associated Diagnoses: Acute bronchitis with bronchospasm      lidocaine (LIDODERM) 5 % 1 Patch by TransDERmal route every twenty-four (24) hours. Apply patch to the affected area for 12 hours a day and remove for 12 hours a day. Qty: 15 Each, Refills: 0      lidocaine 5 % topical cream Apply  to affected area two (2) times daily as needed for Pain. Qty: 1 Tube, Refills: 0      butalbital-acetaminophen-caff (FIORICET) -40 mg per capsule Take 1-2 Caps by mouth every six (6) hours as needed for Pain. Max Daily Amount: 8 Caps. Qty: 20 Cap, Refills: 0      VIT C/VIT E/LUTEIN/MIN/OMEGA-3 (OCUVITE PO) Take  by mouth nightly. cinacalcet (SENSIPAR) 30 mg tablet Take 60 mg by mouth nightly. minoxidil (LONITEN) 2.5 mg tablet Take 7.5 mg by mouth two (2) times a day. Indications: HYPERTENSION      aspirin 81 mg chewable tablet Take 1 Tab by mouth daily. Qty: 1 Tab, Refills: 0      sevelamer carbonate (RENVELA) 800 mg tab tab Take 800 mg by mouth three (3) times daily (with meals).  5 tablets with meals/ 2 with snacks  Sometimes only eats 2 meals/d      lisinopril (PRINIVIL, ZESTRIL) 40 mg tablet Take 40 mg by mouth nightly. Indications: HYPERTENSION      sitaGLIPtin (JANUVIA) 100 mg tablet Take 50 mg by mouth every morning. Indications: TYPE 2 DIABETES MELLITUS      furosemide (LASIX) 80 mg tablet Take 80 mg by mouth two (2) times a day. Indications: EDEMA, HYPERTENSION           Disposition: home    Activity: Activity as tolerated or as instructed by Surgery  Diet: DIET GI SOFT No options chosen    Follow-up Appointments   Procedures    FOLLOW UP VISIT Appointment in: One Week Dr. Liliana Koo s/p ex lap     Dr. Liliana Koo s/p ex lap     Standing Status:   Standing     Number of Occurrences:   1     Order Specific Question:   Appointment in     Answer: One Week       Follow-up Information     Follow up With Details Comments Contact Info    Nieves Delaney MD As usual  Dialysis as usual also  Ayden Henderson  [id number] [facility] , DO   [address] [place] [address]  [phone]          Time spent in patient discharge planning and coordination 45 minutes.     Signed:  Dustin Aguiar NP

## 2018-08-08 NOTE — DIALYSIS
TRANSFER IN - DIALYSIS    Received patient in dialysis unit from [facility] (unit) for ordered procedure. Consent verified for renal replacement therapy. Patient alert and vital signs stable. /[id number] [id number]    Hemodialysis initiated using left upper arm AVG and 15 g needles. Machine settings per MD order. Will monitor during treatment.

## 2018-08-08 NOTE — PROGRESS NOTES
Dispo update:  Spoke to Ms. Socorro Calix NP and then to Ms. Fischer in HD unit. Ms. Chidi Phillips says that she does not home health (OT and PT had been ordered), and [facility] called and said that [facility] delivered a BSC in 2016, and thus she is not eligible for another one. Ms. Chidi Phillips declines to pay cash for it. Called [facility] at [phone] and updated them to expect Ms. Fischer on Friday August 10, 2018 at her usual chair time.

## 2018-08-08 NOTE — PROGRESS NOTES
Progress Note:   Anali Jaime  MRN: [id number]  :1950  Age:67 y.o. As previously noted HPI: Anali Jaime is a 78 y.o. female with PMH of ESRD on MWF HD (Dr. Chantell Villaseñor), HTN, DM II, hx SBO, hx nephrectomy, colon resection with colostomy (then reversal), TIA, renal cell carcinoma who presented with c/o abd pain, n/v.  Reports had onset of mid abd pain sharp in nature starting suddenly around 0200 this morning. She started vomiting in dialysis. She did complete her HD session completely. She was found to have evidence of a mechanical small bowel obstruction, lower abd ventral hernia. Gas and stool in the colon indicate obstruction is partial or early. Last BM this morning that is described as \"constipated stool\". Pt denies CP, SOB.      18: Awake in bed. States she wants NG tube removed. Reports +flatus and abd pain improved. NG tube 1200ml 24/hr. AF. VSS.    18: Pt seen in dialysis. -flatus/-BM. Increased abd pain. NGT with 950mL/24h out. AF, VSS. She did not tolerate clamping NGT yesterday. 18: POD1 laparotomy; Awake in bed, no complaints. +flatus/No BM. NGT out. No n/v. Tolerating clears. 18: POD2; seen in dialysis. +flatus, tolerating GI soft diet. Wants to go home    Past Medical History:   Diagnosis Date    Arthritis     AVF (arteriovenous fistula) ([address] 2016 (S) Right AVF revision and thrombectomy    Cancer ([address] 2015    L kidney    Chronic kidney disease     HD in [facility] at [facility]    Degenerative joint disease     Diabetes ([address]     type 2, average range 100-120 fasting, symptomatic below 90; last A1C?    ESRD (end stage renal disease) ([address] 2006    ESRD.  MWF dialysis     Hypertension     Obesity     Transient ischemic attack 2015     Past Surgical History:   Procedure Laterality Date    BREAST SURGERY PROCEDURE UNLISTED Right     cyst removed    HX GI      colon resection resulting in temporary colostomy reversal    HX GYN      stephan    HX OTHER SURGICAL      dialysis fistula, several permcaths    HX UROLOGICAL Left July 2015    nephrectomy    HX VASCULAR ACCESS       Current Facility-Administered Medications   Medication Dose Route Frequency    sodium chloride (NS) flush 5-10 mL  5-10 mL IntraVENous PRN    lidocaine (XYLOCAINE) 10 mg/mL (1 %) injection 0.1 mL  0.1 mL SubCUTAneous PRN    sodium chloride (NS) flush 5-10 mL  5-10 mL IntraVENous Q8H    sodium chloride (NS) flush 5-10 mL  5-10 mL IntraVENous PRN    phenol throat spray (CHLORASEPTIC) 1 Spray  1 Spray Oral PRN    epoetin mary lou (EPOGEN;PROCRIT) injection 10,000 Units  10,000 Units SubCUTAneous Q MON, WED & FRI    diphenhydrAMINE (BENADRYL) injection 25 mg  25 mg IntraVENous Q6H PRN    sodium chloride (NS) flush 5-10 mL  5-10 mL IntraVENous PRN    naloxone (NARCAN) injection 0.4 mg  0.4 mg IntraVENous PRN    ondansetron (ZOFRAN) injection 4 mg  4 mg IntraVENous Q4H PRN    benzocaine (HURRICANE) 20 % spray   Mucous Membrane PRN    insulin lispro (HUMALOG) injection   SubCUTAneous TIDAC    morphine injection 4 mg  4 mg IntraVENous Q4H PRN     Pcn [penicillins] and Vancomycin  Social History     Social History    Marital status:      Spouse name: N/A    Number of children: N/A    Years of education: N/A     Social History Main Topics    Smoking status: Never Smoker    Smokeless tobacco: Never Used    Alcohol use No    Drug use: No    Sexual activity: Not Currently     Other Topics Concern    None     Social History Narrative     History   Smoking Status    Never Smoker   Smokeless Tobacco    Never Used     Family History   Problem Relation Age of Onset    Diabetes Mother     Heart Disease Mother     Hypertension Mother     Heart Disease Father     Hypertension Father     Malignant Hyperthermia Neg Hx     Pseudocholinesterase Deficiency Neg Hx     Delayed Awakening Neg Hx     Post-op Nausea/Vomiting Neg Hx     Emergence Delirium Neg Hx     Other Neg Hx     Post-op Cognitive Dysfunction Neg Hx      ROS:  Comprehensive review of systems was otherwise unremarkable except as noted above. Physical Exam:   Visit Vitals    /61    Pulse 79    Temp 98 °F (36.7 °C)    Resp 18    Ht 5' 5\" (1.651 m)    Wt 218 lb 9.6 oz (99.2 kg)    SpO2 92%    BMI 36.38 kg/m2     Constitutional: Alert, cooperative, no acute distress; appears stated age    Eyes:Sclera are clear. ENMT: no external lesions gross hearing normal; no obvious neck masses, no ear or lip lesions, nares normal, NG  CV: RRR. Normal perfusion  Resp: No JVD. Breathing is  non-labored; no audible wheezing. GI: Obese, soft, non-distended, mild diffuse tenderness, post op dressing with some bloody drainage. Musculoskeletal: No embolic signs or cyanosis.    Neuro:  Oriented; no focal deficits  Psychiatric: normal affect and mood    Recent vitals (if inpt):  Patient Vitals for the past 24 hrs:   BP Temp Pulse Resp SpO2 Weight   08/08/18 1031 143/61 - 79 - - -   08/08/18 1000 151/83 - 77 - - -   08/08/18 0939 175/68 - 81 - - -   08/08/18 0904 148/76 - 71 - - -   08/08/18 0826 134/61 - 72 - - -   08/08/18 0820 139/57 - 74 - - -   08/08/18 0653 115/54 98 °F (36.7 °C) 77 18 92 % -   08/08/18 0300 107/58 98.1 °F (36.7 °C) (!) 105 17 90 % 218 lb 9.6 oz (99.2 kg)   08/07/18 2300 111/63 98 °F (36.7 °C) 82 17 92 % -   08/07/18 1900 90/54 98.7 °F (37.1 °C) 87 16 90 % -   08/07/18 1517 (!) 87/50 99 °F (37.2 °C) 83 16 97 % -   08/07/18 1123 107/68 99.5 °F (37.5 °C) 86 17 99 % -       Labs:  Recent Labs      08/08/18   0357   WBC  8.6   HGB  8.7*   PLT  194   NA  140   K  4.4   CL  96*   CO2  35*   BUN  43*   CREA  8.32*   GLU  95   TBILI  0.3   SGOT  17   ALT  8*   AP  87       Lab Results   Component Value Date/Time    WBC 8.6 08/08/2018 03:57 AM    HGB 8.7 (L) 08/08/2018 03:57 AM    PLATELET 847 27/14/2217 03:57 AM    Sodium 140 08/08/2018 03:57 AM    Potassium 4.4 08/08/2018 03:57 AM Chloride 96 (L) 08/08/2018 03:57 AM    CO2 35 (H) 08/08/2018 03:57 AM    BUN 43 (H) 08/08/2018 03:57 AM    Creatinine 8.32 (H) 08/08/2018 03:57 AM    Glucose 95 08/08/2018 03:57 AM    INR 0.9 11/16/2015 12:00 AM    aPTT 22.2 (L) 11/16/2015 12:00 AM    Bilirubin, total 0.3 08/08/2018 03:57 AM    AST (SGOT) 17 08/08/2018 03:57 AM    ALT (SGPT) 8 (L) 08/08/2018 03:57 AM    Alk. phosphatase 87 08/08/2018 03:57 AM    Lipase 138 08/05/2018 04:30 AM    Lactic acid 1.2 08/21/2015 08:22 PM    Troponin-I <0.05 03/26/2012 05:50 PM    Troponin-I, Qt. <0.02 (L) 09/12/2017 04:52 PM     8/4/18: KUB supine views     History:  mechanical small bowel obstruction, lower abd ventral hernia, 67 years  Female     Comparison:  CT abdomen pelvis yesterday     Findings:  Esophageal tube appears to terminate in the first portion of the  duodenum. Oral contrast is seen throughout the colon. No free air is evident. The bowel gas pattern is nonspecific and there is no evidence of ileus or  obstruction. No suspicious renal or ureteral calculi are evident. Visualized  osseous structures unremarkable.     IMPRESSION  Impression: No acute pathology identified      I reviewed recent labs and recent radiologic studies. I independently reviewed radiology images for studies I described above or studies I have ordered. Admission date (for inpatients): 8/3/2018   * No surgery found *  Procedure(s):  negative laparotomy    ASSESSMENT/PLAN:  Problem List  Date Reviewed: 5/4/2018          Codes Class Noted    * (Principal)Small bowel obstruction ([facility] ICD-10-CM: H03.612  ICD-9-CM: 560.9  8/3/2018        Severe obesity (BMI 35.0-39. 9) with comorbidity ([facility] ICD-10-CM: E66.01  ICD-9-CM: 278.01  5/4/2018        Type 2 diabetes mellitus with nephropathy ([facility] ICD-10-CM: E11.21  ICD-9-CM: 250.40, 583.81  12/15/2017        Flank pain ICD-10-CM: R10.9  ICD-9-CM: 789.09  9/14/2017        Abdominal mass ICD-10-CM: R19.00  ICD-9-CM: 789.30  9/12/2017 Overview Signed 9/12/2017  9:01 PM by Pablito Rocha MD     Of Left renal bed             Renal cell carcinoma ([facility] ICD-10-CM: C64.9  ICD-9-CM: 189.0  9/12/2017        AVF (arteriovenous fistula) (HCC) (Chronic) ICD-10-CM: I77.0  ICD-9-CM: 447.0  12/20/2016    Overview Signed 12/20/2016  2:19 PM by Garland Anguiano NP       12/6/16 ([facility]) Right AVF revision and thrombectomy             Transient ischemic attack ICD-10-CM: G45.9  ICD-9-CM: 435.9  8/29/2015        Renal mass ICD-10-CM: N28.89  ICD-9-CM: 593.9  7/2/2015        Chronic renal failure, stage 5 (HCC) ICD-10-CM: N18.5  ICD-9-CM: 585.5  6/15/2015        Hypotension ICD-10-CM: I95.9  ICD-9-CM: 458.9  12/1/2014        Dizziness ICD-10-CM: R42  ICD-9-CM: 780.4  12/1/2014        Osteoarthritis of right knee ICD-10-CM: M17.11  ICD-9-CM: 715.96  2/7/2013        Total knee replacement status ICD-10-CM: Y70.331  ICD-9-CM: V43.65  2/7/2013        DM (diabetes mellitus) ([facility] ICD-10-CM: E11.9  ICD-9-CM: 250.00  2/7/2013        ESRD (end stage renal disease) ([facility] ICD-10-CM: N18.6  ICD-9-CM: 585.6  2/7/2013        HTN (hypertension) ICD-10-CM: I10  ICD-9-CM: 401.9  2/7/2013            Principal Problem:    Small bowel obstruction ([facility] (8/3/2018)    Active Problems:    DM (diabetes mellitus) ([facility] (2/7/2013)      HTN (hypertension) (2/7/2013)      Chronic renal failure, stage 5 (HCC) (6/15/2015)      Severe obesity (BMI 35.0-39. 9) with comorbidity ([address] (5/4/2018)       Plan:  Care management per Hospitalist  GI soft diet  May remove dressing and leave open to air  May shower, no tub bath  Follow up in 1 week with Dr. Delvin Hunt     Signed:  Daxa Price NP

## 2018-08-08 NOTE — DISCHARGE INSTRUCTIONS
GI soft diet  May remove dressing and leave open to air  May shower, no tub bath  Follow up in 1 week with Dr. George Vázquez from Nurse    PATIENT INSTRUCTIONS:    After general anesthesia or intravenous sedation, for 24 hours or while taking prescription Narcotics:  · Limit your activities  · Do not drive and operate hazardous machinery  · Do not make important personal or business decisions  · Do  not drink alcoholic beverages  · If you have not urinated within 8 hours after discharge, please contact your surgeon on call. Report the following to your surgeon:  · Excessive pain, swelling, redness or odor of or around the surgical area  · Temperature over 100.5  · Nausea and vomiting lasting longer than 4 hours or if unable to take medications  · Any signs of decreased circulation or nerve impairment to extremity: change in color, persistent  numbness, tingling, coldness or increase pain  · Any questions    What to do at Home:  Recommended activity: Activity as tolerated, per MD instructions    If you experience any of the following symptoms fever > 100.5, nausea, vomiting, pain, chest pain and/or shortness of breath to ER please follow up with MD.    *  Please give a list of your current medications to your Primary Care Provider. *  Please update this list whenever your medications are discontinued, doses are      changed, or new medications (including over-the-counter products) are added. *  Please carry medication information at all times in case of emergency situations. These are general instructions for a healthy lifestyle:    No smoking/ No tobacco products/ Avoid exposure to second hand smoke  Surgeon General's Warning:  Quitting smoking now greatly reduces serious risk to your health.     Obesity, smoking, and sedentary lifestyle greatly increases your risk for illness    A healthy diet, regular physical exercise & weight monitoring are important for maintaining a healthy lifestyle    You may be retaining fluid if you have a history of heart failure or if you experience any of the following symptoms:  Weight gain of 3 pounds or more overnight or 5 pounds in a week, increased swelling in our hands or feet or shortness of breath while lying flat in bed. Please call your doctor as soon as you notice any of these symptoms; do not wait until your next office visit. Recognize signs and symptoms of STROKE:    F-face looks uneven    A-arms unable to move or move unevenly    S-speech slurred or non-existent    T-time-call 911 as soon as signs and symptoms begin-DO NOT go       Back to bed or wait to see if you get better-TIME IS BRAIN. Warning Signs of HEART ATTACK     Call 911 if you have these symptoms:   Chest discomfort. Most heart attacks involve discomfort in the center of the chest that lasts more than a few minutes, or that goes away and comes back. It can feel like uncomfortable pressure, squeezing, fullness, or pain.  Discomfort in other areas of the upper body. Symptoms can include pain or discomfort in one or both arms, the back, neck, jaw, or stomach.  Shortness of breath with or without chest discomfort.  Other signs may include breaking out in a cold sweat, nausea, or lightheadedness. Don't wait more than five minutes to call 911 - MINUTES MATTER! Fast action can save your life. Calling 911 is almost always the fastest way to get lifesaving treatment. Emergency Medical Services staff can begin treatment when they arrive -- up to an hour sooner than if someone gets to the hospital by car. The discharge information has been reviewed with the patient. The patient verbalized understanding. Discharge medications reviewed with the patient and appropriate educational materials and side effects teaching were provided.   ___________________________________________________________________________________________________________________________________

## 2018-08-09 ENCOUNTER — PATIENT OUTREACH (OUTPATIENT)
Dept: CASE MANAGEMENT | Age: 68
End: 2018-08-09

## 2018-08-09 NOTE — PROGRESS NOTES
Transition of Care Discharge Follow-up Questionnaire   Date/Time of Call:   August 9, 2018 2:32PM   What was the patient hospitalized for? Small bowel obstruction       Does the patient understand his/her diagnosis and/or treatment and what happened during the hospitalization? Care Coordinator spoke with patient who agreed to Transitions of Care Outreach Call. Patient states understanding of diagnosis and treatment plan during hospitalization. Did the patient receive discharge instructions? Yes   CM Assessed Risk for Readmission:           Patient stated Risk for Readmission:      Low to Moderate risk for readmit related to complications from small bowel obstruction and other comorbidities. Patient states she is doing fine, states pain and discomfort to incision site/area. Review any discharge instructions (see discharge instructions/AVS in [facility]). Ask patient if they understand these. Do they have any questions? Patient states understanding of discharge instructions, patient states no questions. Were home services ordered (nursing, PT, OT, ST, etc.)? Yes, home health services ordered with [facility], patient declined. If so, has the first visit occurred? If not, why? (Assist with coordination of services if necessary. )   NA   Was any DME ordered? Yes, (Bedside Commode) ordered through [facility], patient was not eligible for Bedside Commode. Patient states she is managing without Bedside Commode. Patient states he family is providing assistance. If so, has it been received? If not, why?  (Assist patient in obtaining DME orders &/or equipment if necessary. ) NA         Complete a review of all medications (new, continued and discontinued meds per the D/C instructions and medication tab in [facility]). Care Coordinator reviewed medications with patient per connect care who verbalized understanding.  Patient has had no changes with medications since patient was discharged from the hospital. No new medications ordered. Were all new prescriptions filled? If not, why?  (Assist patient in obtaining medications if necessary  escalate for CCM &/or SW if ongoing issues are verbalized by pt or anticipated)   NA         Does the patient understand the purpose and dosing instructions for all medications? (If patient has questions, provide explanation and education.)   Patient states understanding of current medications and dosing instructions. Care Coordinator educated patient on the importance of medication compliance and reporting medication side effects to PCP/Specialist.      Does the patient have any problems in performing ADLs? (If patient is unable to perform ADLs  what is the limiting factor(s)? Do they have a support system that can assist? If no support system is present, discuss possible assistance that they may be able to obtain. Escalate for CCM/SW if ongoing issues are verbalized by pt or anticipated)   Patient states she is independent with ADL's  and ambulation. Patient states she is doing okay and states pain to incision site and area. Patient states she is taking over the counter po Extra Strength Tylenol but is still having pain. Patient states she was not given any prescriptions for pain. Care Coordinator advised patient to call Dr. Fredi Delaney (General Surgeon) to report pain concerns. Patient states she will call doctor after [address] \[facility] Call. Does the patient have all follow-up appointments scheduled? 7 day f/up with PCP?   (f/up with PCP may be w/in 14 days if patient has a f/up with their specialist w/in 7 days)    7-14 day f/up with specialist?   (or per discharge instructions)    If f/up has not been made  what actions has the care coordinator made to accomplish this? Has transportation been arranged?       Care Coordinator educated patient on the importance of scheduling follow up appointment with PCP within 7 days of hospital discharge. Patient states she only follow up with PCP for yearly check ups and physicals only. Patient states she is being followed by Dr. Destini Bragg (Nephrology). 8/16/2018 2:[place] [address]         Yes   Any other questions or concerns expressed by the patient? No further needs identified: Patient instructed to call Care Coordinator if further questions or concerns arise   Schedule next appointment with [facility] Coordinator or refer to RN Case Manager/ per the workflow guidelines. When is care coordinators next follow-up call scheduled? If referred for CCM  what RN care manager was the referral assigned? NA            Care Coordinator provided contact information if assistance is needed for concerns or questions. Please note patient offered a CCM referral due to frequent readmissions and declined. JULIANO Call Completed By: JUAN MANUEL [facility]  Care Coordinator         This note will not be viewable in [address]

## 2018-08-11 ENCOUNTER — HOSPITAL ENCOUNTER (EMERGENCY)
Age: 68
Discharge: HOME | End: 2018-08-11
Attending: EMERGENCY MEDICINE
Payer: MEDICARE

## 2018-08-11 VITALS
TEMPERATURE: 98.6 F | SYSTOLIC BLOOD PRESSURE: 103 MMHG | HEART RATE: 83 BPM | DIASTOLIC BLOOD PRESSURE: 51 MMHG | RESPIRATION RATE: 18 BRPM | OXYGEN SATURATION: 98 %

## 2018-08-11 DIAGNOSIS — T14.8XXA BLEEDING FROM WOUND: Primary | ICD-10-CM

## 2018-08-11 PROCEDURE — 99284 EMERGENCY DEPT VISIT MOD MDM: CPT | Performed by: EMERGENCY MEDICINE

## 2018-08-12 NOTE — DISCHARGE INSTRUCTIONS
Wound Care: After Your Visit  Your Care Instructions  Taking good care of your wound at home will help it heal quickly and reduce your chance of infection. The doctor has checked you carefully, but problems can develop later. If you notice any problems or new symptoms, get medical treatment right away. Follow-up care is a key part of your treatment and safety. Be sure to make and go to all appointments, and call your doctor if you are having problems. It's also a good idea to know your test results and keep a list of the medicines you take. How can you care for yourself at home? · Clean the area with soap and water 2 times a day unless your doctor gives you different instructions. Don't use hydrogen peroxide or alcohol, which can slow healing. ¨ You may cover the wound with a thin layer of antibiotic ointment, such as bacitracin, and a nonstick bandage. ¨ Apply more ointment and replace the bandage as needed. · Take pain medicines exactly as directed. Some pain is normal with a wound, but do not ignore pain that is getting worse instead of better. You could have an infection. ¨ If the doctor gave you a prescription medicine for pain, take it as prescribed. ¨ If you are not taking a prescription pain medicine, ask your doctor if you can take an over-the-counter medicine. · Your doctor may have closed your wound with stitches (sutures), staples, or skin glue. ¨ If you have stitches, your doctor may remove them after several days to 2 weeks. Or you may have stitches that dissolve on their own. ¨ If you have staples, your doctor may remove them after 7 to 10 days. ¨ If your wound was closed with skin glue, the glue will wear off in a few days to 2 weeks. When should you call for help? Call your doctor now or seek immediate medical care if:  · You have signs of infection, such as:  ¨ Increased pain, swelling, warmth, or redness near the wound. ¨ Red streaks leading from the wound.   ¨ Pus draining from the wound. ¨ A fever. · You bleed so much from your incision that you soak one or more bandages over 2 to 4 hours. Watch closely for changes in your health, and be sure to contact your doctor if:  · The wound is not getting better each day. Where can you learn more? Go to [employer]  Enter M973 in the search box to learn more about \"Wound Care: After Your Visit. \"   © 1141-1086 [employer]. Care instructions adapted under license by [facility] (which disclaims liability or warranty for this information). This care instruction is for use with your licensed healthcare professional. If you have questions about a medical condition or this instruction, always ask your healthcare professional. [address] any warranty or liability for your use of this information. Content Version: 50.1.935085;  Last Revised: April 23, 2012

## 2018-08-12 NOTE — ED PROVIDER NOTES
HPI Comments:  Monday she had operative procedure with midline incision into the abdomen. She has done well since discharge from the hospital.  She went to her usual hemodialysis on yesterday. Had gotten up to get something to drink and was walking back when she thought maybe she had leak some urine only see some red blood on her nightgown. No fever or infectious drainage. No bleeding at the time of ER presentation. She has had no unusual pain to the incision region. No other abnormal bleeding or bruising. Patient is a 78 y.o. female presenting with post-operative complications. The history is provided by the patient. Post OP Complication   This is a new problem. The current episode started 1 to 2 hours ago. The problem has been resolved. Pertinent negatives include no chest pain and no abdominal pain. Nothing aggravates the symptoms. Nothing relieves the symptoms. Past Medical History:   Diagnosis Date    Arthritis     AVF [facility]arteriovenous fistula) [address] [address] 12/20/2016 12/6/16 ([facility]) Right AVF revision and thrombectomy    Cancer ([address] 2015    L kidney    Chronic kidney disease     HD in M-W-F- at [facility] dialysis    Degenerative joint disease     Diabetes ([address]     type 2, average range 100-120 fasting, symptomatic below 90; last A1C?    ESRD (end stage renal disease) ([address] Nov 2006    ESRD.  MWF dialysis     Hypertension     Obesity     Transient ischemic attack 8/29/2015       Past Surgical History:   Procedure Laterality Date    BREAST SURGERY PROCEDURE UNLISTED Right     cyst removed    HX GI  06/01/2002    colon resection resulting in temporary colostomy reversal    HX GI  08/06/2018    \"my small intestine was twisted\"     HX GYN      stephan    HX OTHER SURGICAL      dialysis fistula, several permcaths    HX UROLOGICAL Left July 2015    nephrectomy    HX VASCULAR ACCESS           Family History:   Problem Relation Age of Onset    Diabetes Mother     Heart Disease Mother    Job Harney Hypertension Mother     Heart Disease Father     Hypertension Father     Malignant Hyperthermia Neg Hx     Pseudocholinesterase Deficiency Neg Hx     Delayed Awakening Neg Hx     Post-op Nausea/Vomiting Neg Hx     Emergence Delirium Neg Hx     Other Neg Hx     Post-op Cognitive Dysfunction Neg Hx        Social History     Social History    Marital status:      Spouse name: N/A    Number of children: N/A    Years of education: N/A     Occupational History    Not on file. Social History Main Topics    Smoking status: Never Smoker    Smokeless tobacco: Never Used    Alcohol use No    Drug use: No    Sexual activity: Not Currently     Other Topics Concern    Not on file     Social History Narrative         ALLERGIES: Pcn [penicillins] and Vancomycin    Review of Systems   Constitutional: Negative for chills and fever. Respiratory: Negative. Cardiovascular: Negative for chest pain. Gastrointestinal: Negative for abdominal pain. Genitourinary: Negative. All other systems reviewed and are negative. Vitals:    08/11/18 2108 08/11/18 2238   BP: 108/61    Pulse: 95    Resp: 18    Temp: 98.6 °F (37 °C)    SpO2: 97% 95%            Physical Exam   Constitutional: She appears well-developed and well-nourished. No distress. HENT:   Head: Atraumatic. Eyes: No scleral icterus. Neck: Neck supple. Cardiovascular: Normal rate. Pulmonary/Chest: Effort normal. No respiratory distress. Abdominal: Soft. There is no tenderness. Small amount of red blood on down but otherwise minimal manifestations of significant bleed. Incision and otherwise appears to be healthy all staples are intact no expressible drainage. No bleeding at present. No purulence or redness. is gently cleansed adjacent to the area since it was open on arrival patient was redressed and discharged   Musculoskeletal: Normal range of motion. Neurological: She is alert. She exhibits normal muscle tone. Coordination normal.   Skin: Skin is warm and dry. Psychiatric: Her behavior is normal. Thought content normal.   Nursing note and vitals reviewed. MDM  Number of Diagnoses or Management Options  Bleeding from wound:   Diagnosis management comments: Incisional bleed possible small wound hematoma/seroma combination that has decompressed. No evidence of infection.        Amount and/or Complexity of Data Reviewed  Decide to obtain previous medical records or to obtain history from someone other than the patient: yes    Risk of Complications, Morbidity, and/or Mortality  Presenting problems: moderate  Diagnostic procedures: minimal  Management options: low    Patient Progress  Patient progress: stable        ED Course       Procedures

## 2018-08-12 NOTE — ED NOTES
I have reviewed discharge instructions with the patient. The patient verbalized understanding. Patient left ED via Discharge Method: ambulatory to Home with self. Opportunity for questions and clarification provided. Patient given 0 scripts. To continue your aftercare when you leave the hospital, you may receive an automated call from our care team to check in on how you are doing. This is a free service and part of our promise to provide the best care and service to meet your aftercare needs.  If you have questions, or wish to unsubscribe from this service please call [phone]. Thank you for Choosing our [facility] Emergency Department.

## 2018-08-12 NOTE — ED NOTES
Dr Lily Olmos at bedside, assessed incision and verbal order to clean incision and place sterile dressing. Wound cleaned, sterile dressing applied.

## 2018-08-12 NOTE — ED TRIAGE NOTES
Pt states she had a surgery done Monday for a bile blockage. Was sent home Wednesday. Noticed bleeding to surgical site a few hours ago. Surgical site does not appear to be actively bleeding at time of triage. Old blood noted to patient's shirt.

## 2018-08-16 ENCOUNTER — HOSPITAL ENCOUNTER (EMERGENCY)
Age: 68
Discharge: HOME | End: 2018-08-16
Attending: EMERGENCY MEDICINE
Payer: MEDICARE

## 2018-08-16 VITALS
DIASTOLIC BLOOD PRESSURE: 68 MMHG | WEIGHT: 219 LBS | SYSTOLIC BLOOD PRESSURE: 149 MMHG | OXYGEN SATURATION: 96 % | RESPIRATION RATE: 18 BRPM | HEART RATE: 92 BPM | BODY MASS INDEX: 35.2 KG/M2 | HEIGHT: 66 IN | TEMPERATURE: 99.3 F

## 2018-08-16 DIAGNOSIS — T81.30XA WOUND DEHISCENCE: Primary | ICD-10-CM

## 2018-08-16 PROCEDURE — 99284 EMERGENCY DEPT VISIT MOD MDM: CPT | Performed by: EMERGENCY MEDICINE

## 2018-08-17 NOTE — ED NOTES
I have reviewed discharge instructions with the patient. The patient verbalized understanding. Patient left ED via Discharge Method: ambulatory to Home with (daughter). Opportunity for questions and clarification provided. Patient given 0 scripts. No e-sign        To continue your aftercare when you leave the hospital, you may receive an automated call from our care team to check in on how you are doing. This is a free service and part of our promise to provide the best care and service to meet your aftercare needs.  If you have questions, or wish to unsubscribe from this service please call [phone]. Thank you for Choosing our [facility] Emergency Department.

## 2018-08-17 NOTE — DISCHARGE INSTRUCTIONS
Follow-up with your surgeon tomorrow for reevaluation. Please return to the emergency department with any fevers, redness, swelling, worsening symptoms, or additional concerns.

## 2018-08-17 NOTE — ED TRIAGE NOTES
Per ems: staples out today. Sitting on toilet, reached down, saw blood on hand. Per patient there is a \"hole at the bottom of the incision that you can see into\". Bleeding controlled with abd pad taped . 104/40, , 99.7F. Per patient her pressure has been low since her surgery (bowel blockage) on Aug 6th.

## 2018-08-17 NOTE — ED PROVIDER NOTES
HPI Comments: 79-year-old lady presents with concerns about a small area of dehisced since on an abdominal surgical incision. She reports having had these staples removed today at the surgeon's office this evening in the middle of the incision site a small wound popped open. She said she's had no significant bleeding and no drainage from it. Elements of this note were created using speech recognition software. As such, errors of speech recognition may be present. Patient is a 78 y.o. female presenting with wound dehiscence. The history is provided by the patient. Wound Dehiscence    Pertinent negatives include no fever and no arthralgias. Past Medical History:   Diagnosis Date    Arthritis     AVF (arteriovenous fistula) ([facility] [address] 12/20/2016 12/6/16 ([facility]) Right AVF revision and thrombectomy    Cancer ([facility] [address] 2015    L kidney    Chronic kidney disease     HD in M-W-F- at [place] dialysis    Degenerative joint disease     Diabetes ([facility] [address]     type 2, average range 100-120 fasting, symptomatic below 90; last A1C?    ESRD (end stage renal disease) ([facility] [address] Nov 2006    ESRD.  MWF dialysis     Hypertension     Obesity     Transient ischemic attack 8/29/2015       Past Surgical History:   Procedure Laterality Date    BREAST SURGERY PROCEDURE UNLISTED Right     cyst removed    HX GI  06/01/2002    colon resection resulting in temporary colostomy reversal    HX GI  08/06/2018    exploratory laparotomy    HX GYN      stephan    HX OTHER SURGICAL      dialysis fistula, several permcaths    HX UROLOGICAL Left July 2015    nephrectomy    HX VASCULAR ACCESS           Family History:   Problem Relation Age of Onset    Diabetes Mother     Heart Disease Mother     Hypertension Mother     Heart Disease Father     Hypertension Father     Malignant Hyperthermia Neg Hx     Pseudocholinesterase Deficiency Neg Hx     Delayed Awakening Neg Hx     Post-op Nausea/Vomiting Neg Hx     Emergence Delirium Neg Hx     Other Neg Hx     Post-op Cognitive Dysfunction Neg Hx        Social History     Social History    Marital status:      Spouse name: N/A    Number of children: N/A    Years of education: N/A     Occupational History    Not on file. Social History Main Topics    Smoking status: Never Smoker    Smokeless tobacco: Never Used    Alcohol use No    Drug use: No    Sexual activity: Not Currently     Other Topics Concern    Not on file     Social History Narrative         ALLERGIES: Pcn [penicillins] and Vancomycin    Review of Systems   Constitutional: Negative for chills and fever. Musculoskeletal: Negative for arthralgias and joint swelling. Skin: Positive for wound. Negative for color change. Vitals:    08/16/18 2005 08/16/18 2045 08/16/18 2134 08/16/18 2200   BP: 136/60   149/68   Pulse: (!) 104 98 95 92   Resp: 18      Temp: 99.3 °F (37.4 °C)      SpO2: 95% 95% 93% 96%   Weight: 99.3 kg (219 lb)      Height: 5' 5.5\" (1.664 m)               Physical Exam   Constitutional: She is oriented to person, place, and time. She appears well-developed and well-nourished. Neurological: She is alert and oriented to person, place, and time. Skin:   Incision site is clean and dry. There is a 1-1/2 cm dehiscence in the middle of the incision. Nursing note and vitals reviewed. MDM  Number of Diagnoses or Management Options  Wound dehiscence:   Diagnosis management comments: Placement of Steri-Strips across the wound to provide some additional support to help further dehiscence  Occurring. I will encourage her to follow up with her surgeon tomorrow.         ED Course       Procedures

## 2018-09-10 ENCOUNTER — APPOINTMENT (OUTPATIENT)
Dept: CT IMAGING | Age: 68
End: 2018-09-10
Attending: EMERGENCY MEDICINE
Payer: MEDICARE

## 2018-09-10 ENCOUNTER — HOSPITAL ENCOUNTER (EMERGENCY)
Age: 68
Discharge: HOME | End: 2018-09-10
Attending: EMERGENCY MEDICINE
Payer: MEDICARE

## 2018-09-10 VITALS
HEART RATE: 92 BPM | TEMPERATURE: 97.9 F | WEIGHT: 219 LBS | BODY MASS INDEX: 35.2 KG/M2 | RESPIRATION RATE: 20 BRPM | DIASTOLIC BLOOD PRESSURE: 78 MMHG | SYSTOLIC BLOOD PRESSURE: 145 MMHG | HEIGHT: 66 IN | OXYGEN SATURATION: 98 %

## 2018-09-10 DIAGNOSIS — R10.9 FLANK PAIN: Primary | ICD-10-CM

## 2018-09-10 LAB
ALBUMIN SERPL-MCNC: 4.1 G/DL (ref 3.2–4.6)
ALBUMIN/GLOB SERPL: 1 {RATIO} (ref 1.2–3.5)
ALP SERPL-CCNC: 102 U/L (ref 50–136)
ALT SERPL-CCNC: 15 U/L (ref 12–65)
AMORPH CRY URNS QL MICRO: ABNORMAL
ANION GAP SERPL CALC-SCNC: 14 MMOL/L (ref 7–16)
AST SERPL-CCNC: 41 U/L (ref 15–37)
BACTERIA URNS QL MICRO: ABNORMAL /HPF
BASOPHILS # BLD: 0.1 K/UL (ref 0–0.2)
BASOPHILS NFR BLD: 1 % (ref 0–2)
BILIRUB SERPL-MCNC: 0.5 MG/DL (ref 0.2–1.1)
BUN SERPL-MCNC: 37 MG/DL (ref 8–23)
CALCIUM SERPL-MCNC: 9 MG/DL (ref 8.3–10.4)
CASTS URNS QL MICRO: ABNORMAL /LPF
CHLORIDE SERPL-SCNC: 99 MMOL/L (ref 98–107)
CO2 SERPL-SCNC: 24 MMOL/L (ref 21–32)
CREAT SERPL-MCNC: 9.08 MG/DL (ref 0.6–1)
CRYSTALS URNS QL MICRO: 0 /LPF
DIFFERENTIAL METHOD BLD: ABNORMAL
EOSINOPHIL # BLD: 0.1 K/UL (ref 0–0.8)
EOSINOPHIL NFR BLD: 2 % (ref 0.5–7.8)
EPI CELLS #/AREA URNS HPF: ABNORMAL /HPF
ERYTHROCYTE [DISTWIDTH] IN BLOOD BY AUTOMATED COUNT: 17.2 %
GLOBULIN SER CALC-MCNC: 4.3 G/DL (ref 2.3–3.5)
GLUCOSE SERPL-MCNC: 129 MG/DL (ref 65–100)
HCT VFR BLD AUTO: 27.1 % (ref 35.8–46.3)
HGB BLD-MCNC: 9.5 G/DL (ref 11.7–15.4)
IMM GRANULOCYTES # BLD: 0 K/UL (ref 0–0.5)
IMM GRANULOCYTES NFR BLD AUTO: 0 % (ref 0–5)
LYMPHOCYTES # BLD: 1.2 K/UL (ref 0.5–4.6)
LYMPHOCYTES NFR BLD: 14 % (ref 13–44)
MCH RBC QN AUTO: 34.8 PG (ref 26.1–32.9)
MCHC RBC AUTO-ENTMCNC: 35.1 G/DL (ref 31.4–35)
MCV RBC AUTO: 99.3 FL (ref 79.6–97.8)
MONOCYTES # BLD: 0.5 K/UL (ref 0.1–1.3)
MONOCYTES NFR BLD: 6 % (ref 4–12)
MUCOUS THREADS URNS QL MICRO: 0 /LPF
NEUTS SEG # BLD: 6.6 K/UL (ref 1.7–8.2)
NEUTS SEG NFR BLD: 77 % (ref 43–78)
NRBC # BLD: 0 K/UL (ref 0–0.2)
PLATELET # BLD AUTO: 290 K/UL (ref 150–450)
PMV BLD AUTO: 10.5 FL (ref 9.4–12.3)
POTASSIUM SERPL-SCNC: 5.1 MMOL/L (ref 3.5–5.1)
PROT SERPL-MCNC: 8.4 G/DL (ref 6.3–8.2)
RBC # BLD AUTO: 2.73 M/UL (ref 4.05–5.2)
RBC #/AREA URNS HPF: ABNORMAL /HPF
SODIUM SERPL-SCNC: 137 MMOL/L (ref 136–145)
WBC # BLD AUTO: 8.5 K/UL (ref 4.3–11.1)
WBC URNS QL MICRO: ABNORMAL /HPF

## 2018-09-10 PROCEDURE — 81015 MICROSCOPIC EXAM OF URINE: CPT

## 2018-09-10 PROCEDURE — 74011250636 HC RX REV CODE- 250/636: Performed by: EMERGENCY MEDICINE

## 2018-09-10 PROCEDURE — 80053 COMPREHEN METABOLIC PANEL: CPT

## 2018-09-10 PROCEDURE — 85025 COMPLETE CBC W/AUTO DIFF WBC: CPT

## 2018-09-10 PROCEDURE — 96374 THER/PROPH/DIAG INJ IV PUSH: CPT | Performed by: EMERGENCY MEDICINE

## 2018-09-10 PROCEDURE — 96375 TX/PRO/DX INJ NEW DRUG ADDON: CPT | Performed by: EMERGENCY MEDICINE

## 2018-09-10 PROCEDURE — 74176 CT ABD & PELVIS W/O CONTRAST: CPT

## 2018-09-10 PROCEDURE — 81003 URINALYSIS AUTO W/O SCOPE: CPT | Performed by: EMERGENCY MEDICINE

## 2018-09-10 PROCEDURE — 74011250636 HC RX REV CODE- 250/636

## 2018-09-10 PROCEDURE — 99284 EMERGENCY DEPT VISIT MOD MDM: CPT | Performed by: EMERGENCY MEDICINE

## 2018-09-10 RX ORDER — HYDROMORPHONE HYDROCHLORIDE 1 MG/ML
1 INJECTION, SOLUTION INTRAMUSCULAR; INTRAVENOUS; SUBCUTANEOUS ONCE
Status: DISCONTINUED | OUTPATIENT
Start: 2018-09-10 | End: 2018-09-10

## 2018-09-10 RX ORDER — ONDANSETRON 2 MG/ML
4 INJECTION INTRAMUSCULAR; INTRAVENOUS
Status: COMPLETED | OUTPATIENT
Start: 2018-09-10 | End: 2018-09-10

## 2018-09-10 RX ORDER — HYDROMORPHONE HYDROCHLORIDE 1 MG/ML
1 INJECTION, SOLUTION INTRAMUSCULAR; INTRAVENOUS; SUBCUTANEOUS ONCE
Status: COMPLETED | OUTPATIENT
Start: 2018-09-10 | End: 2018-09-10

## 2018-09-10 RX ORDER — MORPHINE SULFATE 2 MG/ML
INJECTION, SOLUTION INTRAMUSCULAR; INTRAVENOUS
Status: COMPLETED
Start: 2018-09-10 | End: 2018-09-10

## 2018-09-10 RX ORDER — OXYCODONE AND ACETAMINOPHEN 5; 325 MG/1; MG/1
1 TABLET ORAL
Qty: 20 TAB | Refills: 0 | Status: SHIPPED | OUTPATIENT
Start: 2018-09-10 | End: 2018-11-07

## 2018-09-10 RX ORDER — MORPHINE SULFATE 2 MG/ML
4 INJECTION, SOLUTION INTRAMUSCULAR; INTRAVENOUS
Status: COMPLETED | OUTPATIENT
Start: 2018-09-10 | End: 2018-09-10

## 2018-09-10 RX ADMIN — MORPHINE SULFATE 4 MG: 2 INJECTION, SOLUTION INTRAMUSCULAR; INTRAVENOUS at 06:09

## 2018-09-10 RX ADMIN — ONDANSETRON 4 MG: 2 INJECTION INTRAMUSCULAR; INTRAVENOUS at 06:03

## 2018-09-10 RX ADMIN — HYDROMORPHONE HYDROCHLORIDE 1 MG: 1 INJECTION, SOLUTION INTRAMUSCULAR; INTRAVENOUS; SUBCUTANEOUS at 06:44

## 2018-09-10 NOTE — ED NOTES
I have reviewed discharge instructions with the patient. The patient verbalized understanding. Patient left ED via Discharge Method: ambulatory to Home with self. Opportunity for questions and clarification provided. Patient given 1 scripts. To continue your aftercare when you leave the hospital, you may receive an automated call from our care team to check in on how you are doing. This is a free service and part of our promise to provide the best care and service to meet your aftercare needs.  If you have questions, or wish to unsubscribe from this service please call [phone]. Thank you for Choosing our [facility] Emergency Department.

## 2018-09-10 NOTE — ED PROVIDER NOTES
HPI Comments: 81-year-old female presenting for sudden onset left flank pain. Symptoms woke her from sleep. She describes it as stabbing and sharp. She is not taking anything for it. It associated with nausea. Made worse by movement. She is concerned because she has a history of kidney cancer and nephrectomy on the side. She does dialysis Monday Wednesday Friday her last treatment was on Friday and today is Monday. Patient is a 78 y.o. female presenting with back pain. The history is provided by the patient. Back Pain This is a recurrent problem. The current episode started 3 to 5 hours ago. The problem has not changed since onset. The problem occurs constantly. Patient reports not work related injury. The pain is associated with no known injury. The pain is present in the thoracic spine and lumbar spine. The quality of the pain is described as shooting. The pain does not radiate. The pain is at a severity of 5/10. The pain is moderate. The symptoms are aggravated by bending and certain positions. Pertinent negatives include no chest pain, no fever, no numbness, no weight loss, no headaches, no abdominal pain, no abdominal swelling, no bowel incontinence, no perianal numbness, no bladder incontinence, no dysuria, no pelvic pain, no leg pain, no paresthesias, no paresis, no tingling and no weakness. Past Medical History:  
Diagnosis Date  Arthritis  AVF (arteriovenous fistula) ([address] 12/20/2016 12/6/16 ([facility]) Right AVF revision and thrombectomy  Cancer ([address] 2015 L kidney  Chronic kidney disease HD in M-W-F- at [facility]  Degenerative joint disease  Diabetes ([address] type 2, average range 100-120 fasting, symptomatic below 90; last A1C?  
 ESRD (end stage renal disease) [facility]) Nov 2006 ESRD. MWF dialysis  Hypertension  Obesity  Transient ischemic attack 8/29/2015 Past Surgical History:  
Procedure Laterality Date  BREAST SURGERY PROCEDURE UNLISTED Right   
 cyst removed  HX GI  06/01/2002  
 colon resection resulting in temporary colostomy reversal  
 HX GI  08/06/2018  
 exploratory laparotomy  HX GYN    
 stpehan  HX OTHER SURGICAL    
 dialysis fistula, several permcaths  HX UROLOGICAL Left July 2015  
 nephrectomy  HX VASCULAR ACCESS Family History:  
Problem Relation Age of Onset  Diabetes Mother  Heart Disease Mother  Hypertension Mother  Heart Disease Father  Hypertension Father  Malignant Hyperthermia Neg Hx  Pseudocholinesterase Deficiency Neg Hx  Delayed Awakening Neg Hx  Post-op Nausea/Vomiting Neg Hx  Emergence Delirium Neg Hx   
 Other Neg Hx  Post-op Cognitive Dysfunction Neg Hx Social History Social History  Marital status:  Spouse name: N/A  
 Number of children: N/A  
 Years of education: N/A Occupational History  Not on file. Social History Main Topics  Smoking status: Never Smoker  Smokeless tobacco: Never Used  Alcohol use No  
 Drug use: No  
 Sexual activity: Not Currently Other Topics Concern  Not on file Social History Narrative ALLERGIES: Pcn [penicillins] and Vancomycin Review of Systems Constitutional: Negative for fever and weight loss. Cardiovascular: Negative for chest pain. Gastrointestinal: Negative for abdominal pain and bowel incontinence. Genitourinary: Negative for bladder incontinence, dysuria and pelvic pain. Musculoskeletal: Positive for back pain. Neurological: Negative for tingling, weakness, numbness, headaches and paresthesias. All other systems reviewed and are negative. Vitals:  
 09/10/18 0549 BP: 156/84 Pulse: 95 Resp: 20 Temp: 97.9 °F (36.6 °C) SpO2: 97% Weight: 99.3 kg (219 lb) Height: 5' 5.5\" (1.664 m) Physical Exam  
Constitutional: She is oriented to person, place, and time.  She appears well-developed and well-nourished. HENT:  
Head: Normocephalic and atraumatic. Eyes: Conjunctivae are normal. Pupils are equal, round, and reactive to light. Neck: Normal range of motion. Neck supple. Cardiovascular: Normal rate and regular rhythm. Pulmonary/Chest: Effort normal and breath sounds normal.  
Abdominal: Soft. Bowel sounds are normal.  
Musculoskeletal: Normal range of motion. Tenderness to palpation along the paraspinous thoracic and lumbar muscles, no rashes, no deformity, no crepitus, no ecchymosis Neurological: She is alert and oriented to person, place, and time. Skin: Skin is warm and dry. Nursing note and vitals reviewed. MDM 
 
 
ED Course Comment By Time I informed the patient about findings which she found a reassuring. Kayode Reed MD 09/10 9902 Consulting nephrology to schedule the patient for dialysis later today or tomorrow Kayode Reed MD 09/10 4398 I discussed the patient with her nephrology group and recommended that she just like a phone call to ask for a quick care time\" and they will schedule her for dialysis. Kayode Reed MD 09/10 [id number] Procedures

## 2018-10-16 ENCOUNTER — HOSPITAL ENCOUNTER (OUTPATIENT)
Dept: INTERVENTIONAL RADIOLOGY/VASCULAR | Age: 68
Discharge: HOME | End: 2018-10-16
Attending: RADIOLOGY
Payer: MEDICARE

## 2018-10-16 VITALS
SYSTOLIC BLOOD PRESSURE: 163 MMHG | HEART RATE: 86 BPM | RESPIRATION RATE: 16 BRPM | BODY MASS INDEX: 35.2 KG/M2 | DIASTOLIC BLOOD PRESSURE: 72 MMHG | WEIGHT: 219 LBS | TEMPERATURE: 98.2 F | OXYGEN SATURATION: 97 % | HEIGHT: 66 IN

## 2018-10-16 DIAGNOSIS — N18.6 ESRD (END STAGE RENAL DISEASE) (HCC): ICD-10-CM

## 2018-10-16 PROCEDURE — 74011250636 HC RX REV CODE- 250/636

## 2018-10-16 PROCEDURE — 76937 US GUIDE VASCULAR ACCESS: CPT

## 2018-10-16 PROCEDURE — 74011250636 HC RX REV CODE- 250/636: Performed by: RADIOLOGY

## 2018-10-16 PROCEDURE — C1769 GUIDE WIRE: HCPCS

## 2018-10-16 PROCEDURE — 74011636320 HC RX REV CODE- 636/320: Performed by: RADIOLOGY

## 2018-10-16 PROCEDURE — C1894 INTRO/SHEATH, NON-LASER: HCPCS

## 2018-10-16 PROCEDURE — C1725 CATH, TRANSLUMIN NON-LASER: HCPCS

## 2018-10-16 PROCEDURE — 77030022017 HC DRSG HEMO QCLOT ZMED -A

## 2018-10-16 PROCEDURE — 77030021532 HC CATH ANGI DX IMPRS MRTM -B

## 2018-10-16 PROCEDURE — 77030011746 HC SEAL HEMSTAT TELE -B

## 2018-10-16 PROCEDURE — 77030002916 HC SUT ETHLN J&J -A

## 2018-10-16 RX ORDER — SODIUM CHLORIDE 9 MG/ML
25 INJECTION, SOLUTION INTRAVENOUS ONCE
Status: ACTIVE | OUTPATIENT
Start: 2018-10-16 | End: 2018-10-16

## 2018-10-16 RX ORDER — MIDAZOLAM HYDROCHLORIDE 1 MG/ML
.5-2 INJECTION, SOLUTION INTRAMUSCULAR; INTRAVENOUS
Status: DISCONTINUED | OUTPATIENT
Start: 2018-10-16 | End: 2018-10-20 | Stop reason: HOSPADM

## 2018-10-16 RX ORDER — HEPARIN SODIUM 200 [USP'U]/100ML
1000 INJECTION, SOLUTION INTRAVENOUS ONCE
Status: COMPLETED | OUTPATIENT
Start: 2018-10-16 | End: 2018-10-16

## 2018-10-16 RX ORDER — LIDOCAINE HYDROCHLORIDE 20 MG/ML
20-200 INJECTION, SOLUTION INFILTRATION; PERINEURAL ONCE
Status: COMPLETED | OUTPATIENT
Start: 2018-10-16 | End: 2018-10-16

## 2018-10-16 RX ORDER — FENTANYL CITRATE 50 UG/ML
12.5-1 INJECTION, SOLUTION INTRAMUSCULAR; INTRAVENOUS
Status: DISCONTINUED | OUTPATIENT
Start: 2018-10-16 | End: 2018-10-20 | Stop reason: HOSPADM

## 2018-10-16 RX ORDER — HYDROCODONE BITARTRATE AND ACETAMINOPHEN 7.5; 325 MG/1; MG/1
1 TABLET ORAL
Status: DISCONTINUED | OUTPATIENT
Start: 2018-10-16 | End: 2018-10-20 | Stop reason: HOSPADM

## 2018-10-16 RX ADMIN — MIDAZOLAM HYDROCHLORIDE 1 MG: 1 INJECTION, SOLUTION INTRAMUSCULAR; INTRAVENOUS at 12:15

## 2018-10-16 RX ADMIN — LIDOCAINE HYDROCHLORIDE 80 MG: 20 INJECTION, SOLUTION INFILTRATION; PERINEURAL at 12:15

## 2018-10-16 RX ADMIN — FENTANYL CITRATE 50 MCG: 50 INJECTION, SOLUTION INTRAMUSCULAR; INTRAVENOUS at 12:33

## 2018-10-16 RX ADMIN — MIDAZOLAM HYDROCHLORIDE 1 MG: 1 INJECTION, SOLUTION INTRAMUSCULAR; INTRAVENOUS at 12:25

## 2018-10-16 RX ADMIN — FENTANYL CITRATE 50 MCG: 50 INJECTION, SOLUTION INTRAMUSCULAR; INTRAVENOUS at 12:25

## 2018-10-16 RX ADMIN — HEPARIN SODIUM 2000 UNITS: 5000 INJECTION, SOLUTION INTRAVENOUS; SUBCUTANEOUS at 12:12

## 2018-10-16 RX ADMIN — FENTANYL CITRATE 50 MCG: 50 INJECTION, SOLUTION INTRAMUSCULAR; INTRAVENOUS at 12:15

## 2018-10-16 RX ADMIN — IOPAMIDOL 35 ML: 612 INJECTION, SOLUTION INTRAVENOUS at 12:45

## 2018-10-16 RX ADMIN — MIDAZOLAM HYDROCHLORIDE 1 MG: 1 INJECTION, SOLUTION INTRAMUSCULAR; INTRAVENOUS at 12:33

## 2018-10-16 NOTE — PROCEDURES
Department of Interventional Radiology  [phone]        Interventional Radiology Brief Procedure Note    Patient: Anita Carrillo MRN: [id number]  SSN: [id number]    YOB: 1950  Age: 78 y.o. Sex: female      Date of Procedure: 10/16/2018    Pre-Procedure Diagnosis: ESRD. Dysfunctional AVF. RUE swelling. Post-Procedure Diagnosis: SAME    Procedure(s): Arteriovenous fistulogram w PTA    Brief Description of Procedure: RUE AVFistulogram with 8 and 12 mm PTA. Performed By: Tiara Thomas MD     Assistants: None    Anesthesia:Moderate Sedation    Estimated Blood Loss: Less than 10ml    Specimens: None    Implants: None    Findings: Stenosis in the R cephalic and brachiocephalic veins.      Complications: None    Recommendations: 1 hour bedrest.       Follow Up: 4 months    Signed By: Tiara Thomas MD     October 16, 2018

## 2018-10-16 NOTE — PROGRESS NOTES
TRANSFER - OUT REPORT:    Verbal report given to Tracey RN(name) on [address]  being transferred to IR(unit) for routine progression of care       Report consisted of patients Situation, Background, Assessment and   Recommendations(SBAR). Information from the following report(s) Procedure Summary and MAR was reviewed with the receiving nurse. Lines:       Opportunity for questions and clarification was provided.       Patient transported with:   Registered Nurse

## 2018-10-16 NOTE — PROGRESS NOTES
TRANSFER - IN REPORT:    Verbal report received from COURTNEY Johns on [address]  being received from for routine progression of care      Report consisted of patients Situation, Background, Assessment and   Recommendations(SBAR). Information from the following report(s) Procedure Summary and MAR was reviewed with the receiving nurse. Assessment completed upon patients arrival to unit and care assumed. RUE with dressing intact; no bleeding to dressing or surrounding hematoma.

## 2018-10-16 NOTE — IP AVS SNAPSHOT
[address] 
 
 
 [address] 
[phone] Patient: Kecia Fischer MRN: [id number] About your hospitalization You were admitted on:  October 16, 2018 You last received care in the:  [facility] Radiology Specials You were discharged on:  October 16, 2018 Why you were hospitalized Your primary diagnosis was:  Not on File Follow-up Information None Your Scheduled Appointments Tuesday October 16, 2018 10:00 AM EDT  
IR ANG AV SHNT HEMO with [facility] IR UNIT 2, [facility] IR RADIOLOGIST RESOURCE, [facility] IR ANES NOT REQUIRED  
[facility] Radiology Specials ([address] [place] [address]) [address] [place] [address]  
[phone] Referring Physicians: 1) Fax H&P/recent office notes and lab work, no older than 30 days (CBC, BMP, PT/PTT) to Interventional Radiology to facilitate prompt scheduling. 2) Obtain clearance to hold blood thinners from prescribing Physician and give Patient instructions prior to arrival. 3)Hold oral diabetic medications the day of the procedure. If Insulin is required, take 1/2 dose the day of the procedure. 4) Pt should not eat or drink anything past midnight 5) Pt to arrive 1 hour to 1.5 hours early depending upon sedation method. 6) Responsible adult  required to drive Patient home after recovery period. Recovery period can vary depending on sedation and patient condition. 7) Interventional Radiology Scheduling can be contacted at 78 [phone] [facility] [address] rGiffin, [address] [place] [address]- 2nd floor of Outpatient Medical Office Building Thursday November 01, 2018 12:30 PM EDT  
[facility] PHONE ASSESSMENT with [facility] PHONE APPOINTMENT [facility] Pre Assessment [facility] OR PRE ASSESSMENT) [address] [place] [address]  
[phone] Date/time displayed does not reflect when your phone assessment will be completed. Thursday November 08, 2018 COLONOSCOPY with Ren Almodovar MD  
[facility] ENDOSCOPY ([address] [place] [address]) [address] [place] [address]  
[phone] Discharge Orders None A check bradly indicates which time of day the medication should be taken. My Medications ASK your doctor about these medications Instructions Each Dose to Equal  
 Morning Noon Evening Bedtime  
 amLODIPine 10 mg tablet Commonly known as:  Ignacio Castaneda Your last dose was: Your next dose is: Take  by mouth daily. aspirin 81 mg chewable tablet Your last dose was: Your next dose is: Take 1 Tab by mouth daily. 81 mg  
    
   
   
   
  
 butalbital-acetaminophen-caff -40 mg per capsule Commonly known as:  Droplr Your last dose was: Your next dose is: Take 1-2 Caps by mouth every six (6) hours as needed for Pain. Max Daily Amount: 8 Caps. 1-2 Cap  
    
   
   
   
  
 cinacalcet 30 mg tablet Commonly known as:  SENSIPAR Your last dose was: Your next dose is: Take 60 mg by mouth nightly. 60 mg  
    
   
   
   
  
 esomeprazole 20 mg capsule Commonly known as:  Jose Luis Pittmanr Your last dose was: Your next dose is: Take 2 Caps by mouth daily. 40 mg  
    
   
   
   
  
 furosemide 80 mg tablet Commonly known as:  LASIX Your last dose was: Your next dose is: Take 80 mg by mouth two (2) times a day. Indications: EDEMA, HYPERTENSION 80 mg  
    
   
   
   
  
 guaiFENesin-codeine 100-10 mg/5 mL solution Commonly known as:  Danika Nikita Your last dose was: Your next dose is: Take 5-10 mL by mouth three (3) times daily as needed for Cough or Congestion. Max Daily Amount: 30 mL. 5-10 mL HYDROcodone-acetaminophen  mg tablet Commonly known as:  Nataly Almaguer Your last dose was: Your next dose is: Take 1 Tab by mouth every four (4) hours as needed. Max Daily Amount: 6 Tabs. 1 Tab  
    
   
   
   
  
 hyoscyamine 0.125 mg tablet Commonly known as:  Skyler Calloway Your last dose was: Your next dose is: Take 1 Tab by mouth every six (6) hours as needed for Cramping or Diarrhea. 125 mcg JANUVIA 100 mg tablet Generic drug:  SITagliptin Your last dose was: Your next dose is: Take 50 mg by mouth every morning. Indications: TYPE 2 DIABETES MELLITUS  
 50 mg  
    
   
   
   
  
 * lidocaine 5 % topical cream  
   
Your last dose was: Your next dose is:    
   
   
 Apply  to affected area two (2) times daily as needed for Pain. * lidocaine 5 % Commonly known as:  Dez Das Your last dose was: Your next dose is:    
   
   
 1 Patch by TransDERmal route every twenty-four (24) hours. Apply patch to the affected area for 12 hours a day and remove for 12 hours a day. 1 Patch  
    
   
   
   
  
 lisinopril 40 mg tablet Commonly known as:  Makeda Bustillos Your last dose was: Your next dose is: Take 40 mg by mouth nightly. Indications: HYPERTENSION 40 mg  
    
   
   
   
  
 minoxidil 2.5 mg tablet Commonly known as:  Lucía Lips Your last dose was: Your next dose is: Take 7.5 mg by mouth two (2) times a day. Indications: HYPERTENSION  
 7.5 mg  
    
   
   
   
  
 OCUVITE PO Your last dose was: Your next dose is: Take  by mouth nightly. ondansetron 8 mg disintegrating tablet Commonly known as:  ZOFRAN ODT Your last dose was: Your next dose is: Take 1 Tab by mouth every eight (8) hours as needed for Nausea. 8 mg oxyCODONE-acetaminophen 5-325 mg per tablet Commonly known as:  PERCOCET Your last dose was: Your next dose is: Take 1 Tab by mouth every six (6) hours as needed for Pain. Max Daily Amount: 4 Tabs. 1 Tab RENVELA 800 mg Tab tab Generic drug:  sevelamer carbonate Your last dose was: Your next dose is: Take 800 mg by mouth three (3) times daily (with meals). 5 tablets with meals/ 2 with snacks Sometimes only eats 2 meals/d  
 800 mg * Notice: This list has 2 medication(s) that are the same as other medications prescribed for you. Read the directions carefully, and ask your doctor or other care provider to review them with you. Opioid Education Prescription Opioids: What You Need to Know: 
 
Prescription opioids can be used to help relieve moderate-to-severe pain and are often prescribed following a surgery or injury, or for certain health conditions. These medications can be an important part of treatment but also come with serious risks. Opioids are strong pain medicines. Examples include hydrocodone, oxycodone, fentanyl, and morphine. Heroin is an example of an illegal opioid. It is important to work with your health care provider to make sure you are getting the safest, most effective care. WHAT ARE THE RISKS AND SIDE EFFECTS OF OPIOID USE? Prescription opioids carry serious risks of addiction and overdose, especially with prolonged use. An opioid overdose, often marked by slow breathing, can cause sudden death. The use of prescription opioids can have a number of side effects as well, even when taken as directed. · Tolerance-meaning you might need to take more of a medication for the same pain relief · Physical dependence-meaning you have symptoms of withdrawal when the medication is stopped.   Withdrawal symptoms can include nausea, sweating, chills, diarrhea, stomach cramps, and muscle aches. Withdrawal can last up to several weeks, depending on which drug you took and how long you took it. · Increased sensitivity to pain · Constipation · Nausea, vomiting, and dry mouth · Sleepiness and dizziness · Confusion · Depression · Low levels of testosterone that can result in lower sex drive, energy, and strength · Itching and sweating RISKS ARE GREATER WITH:      
· History of drug misuse, substance use disorder, or overdose · Mental health conditions (such as depression or anxiety) · Sleep apnea · Older age (72 years or older) · Pregnancy Avoid alcohol while taking prescription opioids. Also, unless specifically advised by your health care provider, medications to avoid include: · Benzodiazepines (such as Xanax or Valium) · Muscle relaxants (such as Soma or Flexeril) · Hypnotics (such as Ambien or Lunesta) · Other prescription opioids KNOW YOUR OPTIONS Talk to your health care provider about ways to manage your pain that don't involve prescription opioids. Some of these options may actually work better and have fewer risks and side effects. Consult your physician before adding or stopping any medications, treatments, or physical activity. Options may include: 
· Pain relievers such as acetaminophen, ibuprofen, and naproxen · Some medications that are also used for depression or seizures · Physical therapy and exercise · Counseling to help patients learn how to cope better with triggers of pain and stress. · Application of heat or cold compress · Massage therapy · Relaxation techniques Be Informed Make sure you know the name of your medication, how much and how often to take it, and its potential risks & side effects. IF YOU ARE PRESCRIBED OPIOIDS FOR PAIN: 
· Never take opioids in greater amounts or more often than prescribed. Remember the goal is not to be pain-free but to manage your pain at a tolerable level. · Follow up with your primary care provider to: · Work together to create a plan on how to manage your pain. · Talk about ways to help manage your pain that don't involve prescription opioids. · Talk about any and all concerns and side effects. · Help prevent misuse and abuse. · Never sell or share prescription opioids · Help prevent misuse and abuse. · Store prescription opioids in a secure place and out of reach of others (this may include visitors, children, friends, and family). · Safely dispose of unused/unwanted prescription opioids: Find your community drug take-back program or your pharmacy mail-back program, or flush them down the toilet, following guidance from the Food and Drug Administration (www.fda.gov/Drugs/ResourcesForYou). · Visit www.cdc.gov/drugoverdose to learn about the risks of opioid abuse and overdose. · If you believe you may be struggling with addiction, tell your health care provider and ask for guidance or call [facility] at 8-968-092-QAVB. Discharge Instructions Shameka [address] Department of Interventional Radiology [facility] 
[phone] Office 
[phone] Fax THROMBECTOMY/FISTULAPLASTY DISCHARGE INSTRUCTIONS General Information: This procedure has been done in order to improve blood flow through your dialysis access. The procedure is designed to remove clots and/or to enlarge the narrowed areas of your dialysis access. Home Care Instructions: You can resume your regular diet and medication regimen. Do not drink alcohol, drive, or make any important legal decisions in the next 24 hours. Watch the site carefully for signs of infection. You may have some tenderness at your graft site. You make take Tylenol (acetaminophen) for this discomfort. Do not carry anything heavier than a gallon of milk.  Do not wear any tight clothing on this arm, including elastic cuffs and/or tight watches. Do not allow anyone to take a blood pressure or draw blood from this extremity. You should elevate this arm on pillows to help with any swelling. Do not sleep on this arm with the graft, or fold it under your head while you sleep. Feel your graft every day to see if you can feel a thrill (pulsation). Call If: 
   You should call your Physician and/or the Radiology Nurse if you have any signs of infection like fever, drainage, redness, or increased pain at the graft site. Call your doctor or dialysis center immediately if you do not feel a thrill on your graft/fistula, or if you have a change in color of this extremity. Call 911 and seek immediate medical attention if you start bleeding from your graft or fistula. Apply pressure to the graft, but only enough pressure to control the bleeding. Armand the time you start holding pressure and let medical personnel know. Follow-Up Instructions: Please see your ordering doctor as he/she has requested. You may not go to dialysis today, except with special written permission from you doctor. You may go to dialysis tomorrow, and resume your normal dialysis schedule. To Reach Us: If you have any questions about your procedure, please call the Interventional Radiology department at [phone]. After business hours (5pm) and weekends, call the answering service at [phone] and ask for the Radiologist on call to be paged. Interventional Radiology General Nurse Discharge After general anesthesia or intravenous sedation, for 24 hours or while taking prescription Narcotics: · Limit your activities · Do not drive and operate hazardous machinery · Do not make important personal or business decisions · Do  not drink alcoholic beverages · If you have not urinated within 8 hours after discharge, please contact your surgeon on call. * Please give a list of your current medications to your Primary Care Provider. * Please update this list whenever your medications are discontinued, doses are 
   changed, or new medications (including over-the-counter products) are added. * Please carry medication information at all times in case of emergency situations. These are general instructions for a healthy lifestyle: No smoking/ No tobacco products/ Avoid exposure to second hand smoke Surgeon General's Warning:  Quitting smoking now greatly reduces serious risk to your health. Obesity, smoking, and sedentary lifestyle greatly increases your risk for illness A healthy diet, regular physical exercise & weight monitoring are important for maintaining a healthy lifestyle You may be retaining fluid if you have a history of heart failure or if you experience any of the following symptoms:  Weight gain of 3 pounds or more overnight or 5 pounds in a week, increased swelling in our hands or feet or shortness of breath while lying flat in bed. Please call your doctor as soon as you notice any of these symptoms; do not wait until your next office visit. Recognize signs and symptoms of STROKE: 
F-face looks uneven A-arms unable to move or move unevenly S-speech slurred or non-existent T-time-call 911 as soon as signs and symptoms begin-DO NOT go Back to bed or wait to see if you get better-TIME IS BRAIN. Patient Signature: 
Date: 10/16/2018 Discharging Nurse: [address] Transitions of Care Introducing [employer] [address] offers a voluntary care coordination program to provide high quality service and care to [facility] fee-for-service beneficiaries. Brian Capps was designed to help you enhance your health and well-being through the following services: ? Transitions of Care  support for individuals who are transitioning from one care setting to another (example: Hospital to home). ? Chronic and Complex Care Coordination  support for individuals and caregivers of those with serious or chronic illnesses or with more than one chronic (ongoing) condition and those who take a number of different medications. If you meet specific medical criteria, a [address] may call you directly to coordinate your care with your primary care physician and your other care providers. For questions about the [facility] programs, please, contact your physicians office. For general questions or additional information about Accountable Care Organizations: 
Please visit www.medicare.gov/acos. html or call 1-800-MEDICARE (1-484.173.7887) TTY users should call 0-730.879.4452. Introducing [facility]! [facility] introduces Traklight patient portal. Now you can access parts of your medical record, email your doctor's office, and request medication refills online. 1. In your internet browser, go to [email or web address] IP 2. Click on the First Time User? Click Here link in the Sign In box. You will see the New Member Sign Up page. 3. Enter your Traklight Access Code exactly as it appears below. You will not need to use this code after youve completed the sign-up process. If you do not sign up before the expiration date, you must request a new code. · Traklight Access Code: [email or web address] Expires: 11/30/2018  5:22 AM 
 
4. Enter the last four digits of your Social Security Number (xxxx) and Date of Birth (mm/dd/yyyy) as indicated and click Submit. You will be taken to the next sign-up page. 5. Create a Traklight ID. This will be your Traklight login ID and cannot be changed, so think of one that is secure and easy to remember. 6. Create a Traklight password. You can change your password at any time. 7. Enter your Password Reset Question and Answer.  This can be used at a later time if you forget your password. 8. Enter your e-mail address. You will receive e-mail notification when new information is available in [address] 9. Click Sign Up. You can now view and download portions of your medical record. 10. Click the Download Summary menu link to download a portable copy of your medical information. If you have questions, please visit the Frequently Asked Questions section of the [employer] website. Remember, [employer] is NOT to be used for urgent needs. For medical emergencies, dial 911. Now available from your iPhone and Android! Introducing Josue Lee As a [employer] patient, I wanted to make you aware of our electronic visit tool called Josue Lee. [employer] 24/7 allows you to connect within minutes with a medical provider 24 hours a day, seven days a week via a mobile device or tablet or logging into a secure website from your computer. You can access Josue Lee from anywhere in the [place]. A virtual visit might be right for you when you have a simple condition and feel like you just dont want to get out of bed, or cant get away from work for an appointment, when your regular [employer] provider is not available (evenings, weekends or holidays), or when youre out of town and need minor care. Electronic visits cost only $49 and if the [employer] 24/7 provider determines a prescription is needed to treat your condition, one can be electronically transmitted to a nearby pharmacy*. Please take a moment to enroll today if you have not already done so. The enrollment process is free and takes just a few minutes. To enroll, please download the [employer] 24/7 salty to your tablet or phone, or visit [email or web address] to enroll on your computer.    
And, as an [address] [place] [address] patient with a fishfishme account, the results of your visits will be scanned into your electronic medical record and your primary care provider will be able to view the scanned results. We urge you to continue to see your regular [employer] provider for your ongoing medical care. And while your primary care provider may not be the one available when you seek a Josue Hewittfin virtual visit, the peace of mind you get from getting a real diagnosis real time can be priceless. For more information on Josue exsulinjaydenfin, view our Frequently Asked Questions (FAQs) at [email or web address]. Sincerely, 
 
Sandra Garcia MD 
Chief Medical Officer [address] *:  certain medications cannot be prescribed via ftopiajaydenfin Providers Seen During Your Hospitalization Provider Specialty Primary office phone An Boo MD Radiology [phone] Your Primary Care Physician (PCP) Primary Care Physician Office Phone Office Fax Susan Ibarra [phone] You are allergic to the following Allergen Reactions Pcn (Penicillins) Rash Vancomycin Rash Recent Documentation Height Weight Breastfeeding? BMI OB Status Smoking Status 1.664 m 99.3 kg No 35.89 kg/m2 Hysterectomy Never Smoker Emergency Contacts Name Discharge Info Relation Home Work Mobile Daisy Boyer  Child [2]  Ryanne Romano  Sister [23] [phone] Patient Belongings The following personal items are in your possession at time of discharge: 
     Visual Aid: None Please provide this summary of care documentation to your next provider. Signatures-by signing, you are acknowledging that this After Visit Summary has been reviewed with you and you have received a copy. Patient Signature:  ____________________________________________________________  Date:  ____________________________________________________________  
  
Mickeal Old    
    
 Provider Signature:  ____________________________________________________________ Date:  ____________________________________________________________

## 2018-10-16 NOTE — PROGRESS NOTES
Recovery period without difficulty. Pt alert and oriented and denies pain. Dressing is clean, dry, and intact. Reviewed discharge instructions with patient and she verbalized understanding. Pt escorted to lobby discharge area via wheelchair. Vital signs and Luis score completed.

## 2018-10-16 NOTE — PROGRESS NOTES
Columbus Dubin aware that patients most recent labwork is from 9/10/18 and is ok to proceed with sedation.

## 2018-10-16 NOTE — DISCHARGE INSTRUCTIONS
Shame[address]  Department of Interventional Radiology  [facility]  [phone] Office  [phone] Fax    THROMBECTOMY/FISTULAPLASTY DISCHARGE INSTRUCTIONS    General Information: This procedure has been done in order to improve blood flow through your dialysis access. The procedure is designed to remove clots and/or to enlarge the narrowed areas of your dialysis access. Home Care Instructions: You can resume your regular diet and medication regimen. Do not drink alcohol, drive, or make any important legal decisions in the next 24 hours. Watch the site carefully for signs of infection. You may have some tenderness at your graft site. You make take Tylenol (acetaminophen) for this discomfort. Do not carry anything heavier than a gallon of milk. Do not wear any tight clothing on this arm, including elastic cuffs and/or tight watches. Do not allow anyone to take a blood pressure or draw blood from this extremity. You should elevate this arm on pillows to help with any swelling. Do not sleep on this arm with the graft, or fold it under your head while you sleep. Feel your graft every day to see if you can feel a thrill (pulsation). Call If:     You should call your Physician and/or the Radiology Nurse if you have any signs of infection like fever, drainage, redness, or increased pain at the graft site. Call your doctor or dialysis center immediately if you do not feel a thrill on your graft/fistula, or if you have a change in color of this extremity. Call 911 and seek immediate medical attention if you start bleeding from your graft or fistula. Apply pressure to the graft, but only enough pressure to control the bleeding. Armand the time you start holding pressure and let medical personnel know. Follow-Up Instructions: Please see your ordering doctor as he/she has requested.  You may not go to dialysis today, except with special written permission from you doctor. You may go to dialysis tomorrow, and resume your normal dialysis schedule. To Reach Us: If you have any questions about your procedure, please call the Interventional Radiology department at [phone]. After business hours (5pm) and weekends, call the answering service at [phone] and ask for the Radiologist on call to be paged. Interventional Radiology General Nurse Discharge    After general anesthesia or intravenous sedation, for 24 hours or while taking prescription Narcotics:  · Limit your activities  · Do not drive and operate hazardous machinery  · Do not make important personal or business decisions  · Do  not drink alcoholic beverages  · If you have not urinated within 8 hours after discharge, please contact your surgeon on call. * Please give a list of your current medications to your Primary Care Provider. * Please update this list whenever your medications are discontinued, doses are     changed, or new medications (including over-the-counter products) are added. * Please carry medication information at all times in case of emergency situations. These are general instructions for a healthy lifestyle:    No smoking/ No tobacco products/ Avoid exposure to second hand smoke  Surgeon General's Warning:  Quitting smoking now greatly reduces serious risk to your health. Obesity, smoking, and sedentary lifestyle greatly increases your risk for illness  A healthy diet, regular physical exercise & weight monitoring are important for maintaining a healthy lifestyle    You may be retaining fluid if you have a history of heart failure or if you experience any of the following symptoms:  Weight gain of 3 pounds or more overnight or 5 pounds in a week, increased swelling in our hands or feet or shortness of breath while lying flat in bed.   Please call your doctor as soon as you notice any of these symptoms; do not wait until your next office visit. Recognize signs and symptoms of STROKE:  F-face looks uneven    A-arms unable to move or move unevenly    S-speech slurred or non-existent    T-time-call 911 as soon as signs and symptoms begin-DO NOT go       Back to bed or wait to see if you get better-TIME IS BRAIN.           Patient Signature:  Date: 10/16/2018  Discharging Nurse: Savanna Ludwig

## 2018-10-16 NOTE — H&P
Department of Interventional Radiology  [phone]    History and Physical    Patient: Vera Pool MRN:  [id number]  SSN:  [id number]    YOB: 1950  Age:  78 y.o. Sex:  female      Primary Care Provider:  Linda Prado MD  Referring Physician:  Niraj Jean MD    Subjective:     Chief Complaint: fistula malfunction    History of the Present Illness: The patient is a 78 y.o. female who presents for fistulogram. Prolonged bleeding post HD x 2 weeks, pain proximal RUE.  NPO. Last HD yesterday. Past Medical History:   Diagnosis Date    Arthritis     AVF (arteriovenous fistula) ([facility] [address] 12/20/2016 12/6/16 ([facility]) Right AVF revision and thrombectomy    Cancer ([facility] [address] 2015    L kidney    Chronic kidney disease     HD in [facility] at [facility]    Degenerative joint disease     Diabetes ([facility] [address]     type 2, average range 100-120 fasting, symptomatic below 90; last A1C?    ESRD (end stage renal disease) ([facility] [address] Nov 2006    ESRD. MWF dialysis     Hypertension     Obesity     Transient ischemic attack 8/29/2015     Past Surgical History:   Procedure Laterality Date    BREAST SURGERY PROCEDURE UNLISTED Right     cyst removed    HX GI  06/01/2002    colon resection resulting in temporary colostomy reversal    HX GI  08/06/2018    exploratory laparotomy    HX GYN      stephan    HX OTHER SURGICAL      dialysis fistula, several permcaths    HX UROLOGICAL Left July 2015    nephrectomy    HX VASCULAR ACCESS          Review of Systems:    Pertinent items are noted in the History of Present Illness. Current Outpatient Prescriptions   Medication Sig    HYDROcodone-acetaminophen (NORCO)  mg tablet Take 1 Tab by mouth every four (4) hours as needed. Max Daily Amount: 6 Tabs.  butalbital-acetaminophen-caff (FIORICET) -40 mg per capsule Take 1-2 Caps by mouth every six (6) hours as needed for Pain. Max Daily Amount: 8 Caps.     amLODIPine (NORVASC) 10 mg tablet Take  by mouth daily.  VIT C/VIT E/LUTEIN/MIN/OMEGA-3 (OCUVITE PO) Take  by mouth nightly.  cinacalcet (SENSIPAR) 30 mg tablet Take 60 mg by mouth nightly.  minoxidil (LONITEN) 2.5 mg tablet Take 7.5 mg by mouth two (2) times a day. Indications: HYPERTENSION    sevelamer carbonate (RENVELA) 800 mg tab tab Take 800 mg by mouth three (3) times daily (with meals). 5 tablets with meals/ 2 with snacks  Sometimes only eats 2 meals/d    lisinopril (PRINIVIL, ZESTRIL) 40 mg tablet Take 40 mg by mouth nightly. Indications: HYPERTENSION    sitaGLIPtin (JANUVIA) 100 mg tablet Take 50 mg by mouth every morning. Indications: TYPE 2 DIABETES MELLITUS    furosemide (LASIX) 80 mg tablet Take 80 mg by mouth two (2) times a day. Indications: EDEMA, HYPERTENSION    oxyCODONE-acetaminophen (PERCOCET) 5-325 mg per tablet Take 1 Tab by mouth every six (6) hours as needed for Pain. Max Daily Amount: 4 Tabs.  hyoscyamine (LEVSIN) 0.125 mg tablet Take 1 Tab by mouth every six (6) hours as needed for Cramping or Diarrhea.  ondansetron (ZOFRAN ODT) 8 mg disintegrating tablet Take 1 Tab by mouth every eight (8) hours as needed for Nausea.  esomeprazole (NEXIUM) 20 mg capsule Take 2 Caps by mouth daily.  guaiFENesin-codeine (CHERATUSSIN AC) 100-10 mg/5 mL solution Take 5-10 mL by mouth three (3) times daily as needed for Cough or Congestion. Max Daily Amount: 30 mL.  lidocaine (LIDODERM) 5 % 1 Patch by TransDERmal route every twenty-four (24) hours. Apply patch to the affected area for 12 hours a day and remove for 12 hours a day.  lidocaine 5 % topical cream Apply  to affected area two (2) times daily as needed for Pain.  aspirin 81 mg chewable tablet Take 1 Tab by mouth daily. (Patient taking differently: Take 81 mg by mouth every morning.)     No current facility-administered medications for this encounter.          Allergies   Allergen Reactions    Pcn [Penicillins] Rash    Vancomycin Rash Family History   Problem Relation Age of Onset    Diabetes Mother     Heart Disease Mother     Hypertension Mother     Heart Disease Father     Hypertension Father     Malignant Hyperthermia Neg Hx     Pseudocholinesterase Deficiency Neg Hx     Delayed Awakening Neg Hx     Post-op Nausea/Vomiting Neg Hx     Emergence Delirium Neg Hx     Other Neg Hx     Post-op Cognitive Dysfunction Neg Hx      Social History   Substance Use Topics    Smoking status: Never Smoker    Smokeless tobacco: Never Used    Alcohol use No        Objective:       Physical Examination:    Vitals:    10/16/18 0906   BP: 121/59   Pulse: 77   Resp: 16   Temp: 98.2 °F (36.8 °C)   SpO2: 96%   Weight: 99.3 kg (219 lb)   Height: 5' 5.5\" (1.664 m)     Blood pressure 121/59, pulse 77, temperature 98.2 °F (36.8 °C), resp. rate 16, height 5' 5.5\" (1.664 m), weight 99.3 kg (219 lb), SpO2 96 %, not currently breastfeeding. HEART: regular rate and rhythm  LUNG: clear to auscultation bilaterally  ABDOMEN: normal findings: soft, non-tender  EXTREMITIES: warm, no edema.   Fistula RUE with pulse    Laboratory:     Lab Results   Component Value Date/Time    Sodium 137 09/10/2018 05:59 AM    Sodium 140 08/08/2018 03:57 AM    Potassium 5.1 09/10/2018 05:59 AM    Potassium 4.4 08/08/2018 03:57 AM    Chloride 99 09/10/2018 05:59 AM    Chloride 96 (L) 08/08/2018 03:57 AM    CO2 24 09/10/2018 05:59 AM    CO2 35 (H) 08/08/2018 03:57 AM    Anion gap 14 09/10/2018 05:59 AM    Anion gap 9 08/08/2018 03:57 AM    Glucose 129 (H) 09/10/2018 05:59 AM    Glucose 95 08/08/2018 03:57 AM    BUN 37 (H) 09/10/2018 05:59 AM    BUN 43 (H) 08/08/2018 03:57 AM    Creatinine 9.08 (H) 09/10/2018 05:59 AM    Creatinine 8.32 (H) 08/08/2018 03:57 AM    GFR est AA 6 (L) 09/10/2018 05:59 AM    GFR est AA 6 (L) 08/08/2018 03:57 AM    GFR est non-AA 5 (L) 09/10/2018 05:59 AM    GFR est non-AA 5 (L) 08/08/2018 03:57 AM    Calcium 9.0 09/10/2018 05:59 AM    Calcium 9.0 08/08/2018 03:57 AM    Magnesium 2.4 08/08/2018 03:57 AM    Magnesium 2.7 (H) 08/06/2018 04:37 AM    Phosphorus 5.2 (H) 08/05/2018 04:30 AM    Phosphorus 2.1 (L) 08/03/2018 11:11 AM    Albumin 4.1 09/10/2018 05:59 AM    Albumin 3.0 (L) 08/08/2018 03:57 AM    Protein, total 8.4 (H) 09/10/2018 05:59 AM    Protein, total 6.9 08/08/2018 03:57 AM    Globulin 4.3 (H) 09/10/2018 05:59 AM    Globulin 3.9 (H) 08/08/2018 03:57 AM    A-G Ratio 1.0 (L) 09/10/2018 05:59 AM    A-G Ratio 0.8 (L) 08/08/2018 03:57 AM    AST (SGOT) 41 (H) 09/10/2018 05:59 AM    AST (SGOT) 17 08/08/2018 03:57 AM    ALT (SGPT) 15 09/10/2018 05:59 AM    ALT (SGPT) 8 (L) 08/08/2018 03:57 AM     Lab Results   Component Value Date/Time    WBC 8.5 09/10/2018 05:59 AM    WBC 8.6 08/08/2018 03:57 AM    HGB 9.5 (L) 09/10/2018 05:59 AM    HGB 8.7 (L) 08/08/2018 03:57 AM    HCT 27.1 (L) 09/10/2018 05:59 AM    HCT 25.1 (L) 08/08/2018 03:57 AM    PLATELET 038 67/76/0292 05:59 AM    PLATELET 932 80/23/0206 03:57 AM     Lab Results   Component Value Date/Time    aPTT 22.2 (L) 11/16/2015 12:00 AM    aPTT 27.2 01/29/2013 12:20 PM    Prothrombin time 10.2 11/16/2015 12:00 AM    Prothrombin time 10.5 08/29/2015 04:30 PM    INR 0.9 11/16/2015 12:00 AM    INR 1.0 08/29/2015 04:30 PM       Assessment:     ESRD, fistula malfunction/stenosis    Plan:     Planned Procedure:  Fistulogram, angioplasty    Risks, benefits, and alternatives reviewed with patient and she agrees to proceed with the procedure.       Signed By: Rell Amin PA-C     October 16, 2018

## 2018-11-07 ENCOUNTER — ANESTHESIA EVENT (OUTPATIENT)
Dept: ENDOSCOPY | Age: 68
End: 2018-11-07
Payer: MEDICARE

## 2018-11-07 RX ORDER — SODIUM CHLORIDE, SODIUM LACTATE, POTASSIUM CHLORIDE, CALCIUM CHLORIDE 600; 310; 30; 20 MG/100ML; MG/100ML; MG/100ML; MG/100ML
100 INJECTION, SOLUTION INTRAVENOUS CONTINUOUS
Status: CANCELLED | OUTPATIENT
Start: 2018-11-07

## 2018-11-07 RX ORDER — SODIUM CHLORIDE 0.9 % (FLUSH) 0.9 %
5-10 SYRINGE (ML) INJECTION AS NEEDED
Status: CANCELLED | OUTPATIENT
Start: 2018-11-07

## 2018-11-08 ENCOUNTER — HOSPITAL ENCOUNTER (OUTPATIENT)
Age: 68
Setting detail: OUTPATIENT SURGERY
Discharge: HOME | End: 2018-11-08
Attending: INTERNAL MEDICINE | Admitting: INTERNAL MEDICINE
Payer: MEDICARE

## 2018-11-08 ENCOUNTER — ANESTHESIA (OUTPATIENT)
Dept: ENDOSCOPY | Age: 68
End: 2018-11-08
Payer: MEDICARE

## 2018-11-08 VITALS
WEIGHT: 219 LBS | HEART RATE: 76 BPM | BODY MASS INDEX: 36.49 KG/M2 | HEIGHT: 65 IN | RESPIRATION RATE: 12 BRPM | SYSTOLIC BLOOD PRESSURE: 170 MMHG | OXYGEN SATURATION: 96 % | DIASTOLIC BLOOD PRESSURE: 72 MMHG | TEMPERATURE: 97.7 F

## 2018-11-08 LAB
GLUCOSE BLD STRIP.AUTO-MCNC: 104 MG/DL (ref 65–100)
POTASSIUM BLD-SCNC: 4.2 MMOL/L (ref 3.5–5.1)

## 2018-11-08 PROCEDURE — 74011250636 HC RX REV CODE- 250/636: Performed by: ANESTHESIOLOGY

## 2018-11-08 PROCEDURE — 74011250636 HC RX REV CODE- 250/636: Performed by: INTERNAL MEDICINE

## 2018-11-08 PROCEDURE — 76040000026: Performed by: INTERNAL MEDICINE

## 2018-11-08 PROCEDURE — 74011250636 HC RX REV CODE- 250/636

## 2018-11-08 PROCEDURE — 76060000032 HC ANESTHESIA 0.5 TO 1 HR: Performed by: INTERNAL MEDICINE

## 2018-11-08 PROCEDURE — 84132 ASSAY OF SERUM POTASSIUM: CPT

## 2018-11-08 PROCEDURE — 82962 GLUCOSE BLOOD TEST: CPT

## 2018-11-08 RX ORDER — SODIUM CHLORIDE 9 MG/ML
1000 INJECTION, SOLUTION INTRAVENOUS CONTINUOUS
Status: DISCONTINUED | OUTPATIENT
Start: 2018-11-08 | End: 2018-11-08 | Stop reason: HOSPADM

## 2018-11-08 RX ORDER — PROPOFOL 10 MG/ML
INJECTION, EMULSION INTRAVENOUS
Status: DISCONTINUED | OUTPATIENT
Start: 2018-11-08 | End: 2018-11-08 | Stop reason: HOSPADM

## 2018-11-08 RX ORDER — LIDOCAINE HYDROCHLORIDE 20 MG/ML
INJECTION, SOLUTION EPIDURAL; INFILTRATION; INTRACAUDAL; PERINEURAL AS NEEDED
Status: DISCONTINUED | OUTPATIENT
Start: 2018-11-08 | End: 2018-11-08 | Stop reason: HOSPADM

## 2018-11-08 RX ORDER — SODIUM CHLORIDE, SODIUM LACTATE, POTASSIUM CHLORIDE, CALCIUM CHLORIDE 600; 310; 30; 20 MG/100ML; MG/100ML; MG/100ML; MG/100ML
100 INJECTION, SOLUTION INTRAVENOUS CONTINUOUS
Status: DISCONTINUED | OUTPATIENT
Start: 2018-11-08 | End: 2018-11-08 | Stop reason: HOSPADM

## 2018-11-08 RX ORDER — PROPOFOL 10 MG/ML
INJECTION, EMULSION INTRAVENOUS AS NEEDED
Status: DISCONTINUED | OUTPATIENT
Start: 2018-11-08 | End: 2018-11-08 | Stop reason: HOSPADM

## 2018-11-08 RX ADMIN — PROPOFOL 140 MCG/KG/MIN: 10 INJECTION, EMULSION INTRAVENOUS at 09:29

## 2018-11-08 RX ADMIN — SODIUM CHLORIDE, SODIUM LACTATE, POTASSIUM CHLORIDE, AND CALCIUM CHLORIDE: 600; 310; 30; 20 INJECTION, SOLUTION INTRAVENOUS at 09:19

## 2018-11-08 RX ADMIN — PROPOFOL 50 MG: 10 INJECTION, EMULSION INTRAVENOUS at 09:29

## 2018-11-08 RX ADMIN — SODIUM CHLORIDE 1000 ML: 900 INJECTION, SOLUTION INTRAVENOUS at 08:36

## 2018-11-08 RX ADMIN — LIDOCAINE HYDROCHLORIDE 60 MG: 20 INJECTION, SOLUTION EPIDURAL; INFILTRATION; INTRACAUDAL; PERINEURAL at 09:29

## 2018-11-08 NOTE — H&P
Gastroenterology Outpatient History and Physical 
 
Patient: Kecia Fischer Physician: Del Cohen MD 
 
Vital Signs: Blood pressure 142/65, pulse 83, resp. rate 18, height 5' 5\" (1.651 m), weight 99.3 kg (219 lb), SpO2 98 %. Allergies: Allergies Allergen Reactions  Pcn [Penicillins] Rash  Vancomycin Rash Chief Complaint: Anemia, heme + and hx of polyps History of Present Illness: As Above Justification for Procedure: As Above History: 
Past Medical History:  
Diagnosis Date  Arthritis  AVF (arteriovenous fistula) ([facility] [address] 12/20/2016 12/6/16 ([facility]) Right AVF revision and thrombectomy  Cancer ([facility] [address] 2015 L kidney  Chronic kidney disease HD in M-W-F- at [place] dialysis  Degenerative joint disease  Diabetes ([facility] [address] checks QD, normal 120-130, hyposymptoms at 90  ESRD (end stage renal disease) [facility]) Nov 2006 ESRD. MWF dialysis  Hypertension  Obesity  Transient ischemic attack 8/29/2015 Past Surgical History:  
Procedure Laterality Date  BREAST SURGERY PROCEDURE UNLISTED Right   
 cyst removed  HX GI  06/01/2002  
 colon resection resulting in temporary colostomy reversal  
 HX GI  08/06/2018  
 exploratory laparotomy  HX GYN    
 stephan  HX OTHER SURGICAL    
 dialysis fistula, several permcaths  HX UROLOGICAL Left July 2015  
 nephrectomy  HX VASCULAR ACCESS Social History Socioeconomic History  Marital status:  Spouse name: Not on file  Number of children: Not on file  Years of education: Not on file  Highest education level: Not on file Social Needs  Financial resource strain: Not on file  Food insecurity - worry: Not on file  Food insecurity - inability: Not on file  Transportation needs - medical: Not on file  Transportation needs - non-medical: Not on file Occupational History  Not on file Tobacco Use  Smoking status: Never Smoker  Smokeless tobacco: Never Used Substance and Sexual Activity  Alcohol use: No  
 Drug use: No  
 Sexual activity: Not Currently Other Topics Concern  Not on file Social History Narrative  Not on file Family History Problem Relation Age of Onset  Diabetes Mother  Heart Disease Mother  Hypertension Mother  Heart Disease Father  Hypertension Father  Malignant Hyperthermia Neg Hx  Pseudocholinesterase Deficiency Neg Hx  Delayed Awakening Neg Hx  Post-op Nausea/Vomiting Neg Hx  Emergence Delirium Neg Hx   
 Other Neg Hx  Post-op Cognitive Dysfunction Neg Hx Medications:  
Prior to Admission medications Medication Sig Start Date End Date Taking? Authorizing Provider  
amLODIPine (NORVASC) 10 mg tablet Take  by mouth daily. Yes Provider, Historical  
VIT C/VIT E/LUTEIN/MIN/OMEGA-3 (OCUVITE PO) Take  by mouth nightly. Yes Provider, Historical  
cinacalcet (SENSIPAR) 30 mg tablet Take 60 mg by mouth nightly. Yes Provider, Historical  
minoxidil (LONITEN) 2.5 mg tablet Take 7.5 mg by mouth two (2) times a day. Indications: HYPERTENSION   Yes Other, MD Jaylon  
sevelamer carbonate (RENVELA) 800 mg tab tab Take 800 mg by mouth three (3) times daily (with meals). 5 tablets with meals/ 2 with snacks Sometimes only eats 2 meals/d   Yes Provider, Historical  
lisinopril (PRINIVIL, ZESTRIL) 40 mg tablet Take 40 mg by mouth nightly. Indications: HYPERTENSION 6/8/15  Yes Provider, Historical  
sitaGLIPtin (JANUVIA) 100 mg tablet Take 50 mg by mouth every morning. Indications: TYPE 2 DIABETES MELLITUS   Yes Provider, Historical  
furosemide (LASIX) 80 mg tablet Take 80 mg by mouth two (2) times a day. Indications: EDEMA, HYPERTENSION   Yes Provider, Historical  
ondansetron (ZOFRAN ODT) 8 mg disintegrating tablet Take 1 Tab by mouth every eight (8) hours as needed for Nausea.  5/10/18   Hobert Hatchet, MD  
 esomeprazole (NEXIUM) 20 mg capsule Take 2 Caps by mouth daily. 4/13/18   Zoila Avitia,   
lidocaine (LIDODERM) 5 % 1 Patch by TransDERmal route every twenty-four (24) hours. Apply patch to the affected area for 12 hours a day and remove for 12 hours a day. 9/16/17   Sally Dotson MD  
lidocaine 5 % topical cream Apply  to affected area two (2) times daily as needed for Pain. 9/1/17   FRANCISCO Moreno Physical Exam:  
  
  
Heart: regular rate and rhythm, S1, S2 normal, no murmur, click, rub or gallop Lungs: chest clear, no wheezing, rales, normal symmetric air entry, Heart exam - S1, S2 normal, no murmur, no gallop, rate regular Abdominal: Bowel sounds are normal, liver is not enlarged, spleen is not enlarged Findings/Diagnosis: Anemia, heme + and hx of polyps Signed: 
Balaji Farias MD 11/8/2018

## 2018-11-08 NOTE — PROCEDURES
Colonoscopy Procedure Note    Indications: Heme +, gi blood loss anemia, hx of colectomy for complications from diverticular ds    Anesthesia/Sedation: TIVA     Pre-Procedure Physical:  Current Facility-Administered Medications   Medication Dose Route Frequency    lactated Ringers infusion  100 mL/hr IntraVENous CONTINUOUS    0.9% sodium chloride infusion 1,000 mL  1,000 mL IntraVENous CONTINUOUS      Pcn [penicillins] and Vancomycin    Patient Vitals for the past 8 hrs:   BP Temp Pulse Resp SpO2 Height Weight   11/08/18 1016 155/74  82 14 95 %     11/08/18 1006 149/67 97.7 °F (36.5 °C) 82 14 96 %     11/08/18 1005 149/67  82 12 96 %     11/08/18 0819 142/65  83 18 98 %     11/08/18 0806      5' 5\" (1.651 m) 99.3 kg (219 lb)       Exam:    Airway: clear   Heart: normal S1and S2    Lungs: clear bilateral  Abdomen: soft, nontender, bowel sounds present and normal in all quads   Mental Status: awake, alert and oriented to person, place and time        Procedure Details      Informed consent was obtained for the procedure, including sedation. Risks of perforation, hemorrhage, adverse drug reaction and aspiration were discussed. The patient was placed in the left lateral decubitus position. Based on the pre-procedure assessment, including review of the patient's medical history, medications, allergies, and review of systems, she had been deemed to be an appropriate candidate for conscious sedation; she was therefore sedated with the medications listed below. The patient was monitored continuously with ECG tracing, pulse oximetry, blood pressure monitoring, and direct observations. A rectal examination was performed. The colonoscope was inserted into the rectum and advanced under direct vision to the cecum and then the TI. The quality of the colonic preparation was fair.  A careful inspection was made as the colonoscope was withdrawn, including a retroflexed view of the rectum; findings and interventions are described below. Findings: 1) 2+ internal hemorrhoids 2) S/P sigmoid colectomy. Normal anastomosis. 3) Extensive diverticulosis in the distal TC and the DC. 4) The colonic mucosa was normal as was the TI. No blood or bleeding seen. Specimens: None    Estimated Blood Loss: 0 cc           Complications: None; patient tolerated the procedure well. Attending Attestation: I performed the procedure. Impression:  1) S/P sigmoid colectomy  2) diverticulosis  3) Internal hemorrhoids    Recommendations:  High fiber diet, Consider EGD +/- SB studies.     Briseyda Buitrago MD

## 2018-11-08 NOTE — DISCHARGE INSTRUCTIONS
Gastrointestinal Colonoscopy/Flexible Sigmoidoscopy - Lower Exam Discharge Instructions  1. Call Dr. Donavon Lieberman at [phone] for any problems or questions. 2. Contact the doctors office for follow up appointment as directed  3. Medication may cause drowsiness for several hours, therefore, do not drive or operate machinery for remainder of the day. 4. No alcohol today. 5. Ordinarily, you may resume regular diet and activity after exam unless otherwise specified by your physician. 6. Because of air put into your colon during exam, you may experience some abdominal distension, relieved by the passage of gas, for several hours. 7. Contact your physician if you have any of the following:  a. Excessive amount of bleeding - large amount when having a bowel movement. Occasional specks of blood with bowel movement would not be unusual.  b. Severe abdominal pain  c. Fever or Chills  Any additional instructions: High fiber diet; Consider EGD and/or small bowel studies; follow up in office in 2-4 weeks (call to make appointment)          Instructions given to Nasreen Mciknnon and other family members.

## 2018-11-08 NOTE — ROUTINE PROCESS
VSS. No complaints noted. Education given and reviewed with friends who voiced understanding. Pt wheeled out via wheelchair by Dali Lloyd.

## 2018-11-08 NOTE — ANESTHESIA POSTPROCEDURE EVALUATION
Procedure(s): 
COLONOSCOPY  BMI 36. Anesthesia Post Evaluation Multimodal analgesia: multimodal analgesia used between 6 hours prior to anesthesia start to PACU discharge Patient location during evaluation: PACU Patient participation: complete - patient participated Level of consciousness: responsive to verbal stimuli and awake Pain management: adequate Airway patency: patent Anesthetic complications: no 
Cardiovascular status: acceptable Respiratory status: acceptable, spontaneous ventilation and nonlabored ventilation Hydration status: acceptable Comments: No Nausea Visit Vitals /72 Pulse 76 Temp 36.5 °C (97.7 °F) Resp 12 Ht 5' 5\" (1.651 m) Wt 99.3 kg (219 lb) SpO2 96% BMI 36.44 kg/m²

## 2018-11-08 NOTE — ANESTHESIA PREPROCEDURE EVALUATION
Anesthetic History Review of Systems / Medical History Nursing notes reviewed and pertinent labs reviewed Pulmonary Neuro/Psych  
 
 
 
TIA (Aug 2015) Cardiovascular Hypertension Exercise tolerance: >4 METS Comments: Echo 2015- EF 55% GI/Hepatic/Renal 
  
GERD Renal disease: dialysis and ESRD Endo/Other Diabetes: type 2 Obesity and arthritis Other Findings Physical Exam 
 
Airway Mallampati: II 
TM Distance: 4 - 6 cm Neck ROM: normal range of motion Mouth opening: Normal 
 
 Cardiovascular Regular rate and rhythm,  S1 and S2 normal,  no murmur, click, rub, or gallop Dental 
 
Dentition: Edentulous Pulmonary Breath sounds clear to auscultation Abdominal 
GI exam deferred Other Findings Anesthetic Plan ASA: 3 Anesthesia type: total IV anesthesia Anesthetic plan and risks discussed with: Patient and Family

## 2018-12-17 ENCOUNTER — HOSPITAL ENCOUNTER (OUTPATIENT)
Dept: LAB | Age: 68
Discharge: HOME | End: 2018-12-17
Payer: MEDICARE

## 2018-12-17 PROCEDURE — 36415 COLL VENOUS BLD VENIPUNCTURE: CPT

## 2018-12-17 PROCEDURE — 86901 BLOOD TYPING SEROLOGIC RH(D): CPT

## 2018-12-17 PROCEDURE — 86923 COMPATIBILITY TEST ELECTRIC: CPT

## 2018-12-18 ENCOUNTER — HOSPITAL ENCOUNTER (OUTPATIENT)
Dept: INFUSION THERAPY | Age: 68
Discharge: HOME | End: 2018-12-18
Payer: MEDICARE

## 2018-12-18 VITALS
TEMPERATURE: 98.7 F | RESPIRATION RATE: 18 BRPM | SYSTOLIC BLOOD PRESSURE: 117 MMHG | DIASTOLIC BLOOD PRESSURE: 55 MMHG | HEART RATE: 89 BPM

## 2018-12-18 PROCEDURE — 36430 TRANSFUSION BLD/BLD COMPNT: CPT

## 2018-12-18 PROCEDURE — 77030018667 ADMN ST IV BLD FENW -A

## 2018-12-18 PROCEDURE — 74011250636 HC RX REV CODE- 250/636: Performed by: INTERNAL MEDICINE

## 2018-12-18 PROCEDURE — 74011250637 HC RX REV CODE- 250/637: Performed by: INTERNAL MEDICINE

## 2018-12-18 PROCEDURE — P9016 RBC LEUKOCYTES REDUCED: HCPCS

## 2018-12-18 RX ORDER — SODIUM CHLORIDE 9 MG/ML
250 INJECTION, SOLUTION INTRAVENOUS AS NEEDED
Status: DISCONTINUED | OUTPATIENT
Start: 2018-12-18 | End: 2018-12-22 | Stop reason: HOSPADM

## 2018-12-18 RX ORDER — DIPHENHYDRAMINE HCL 25 MG
25 CAPSULE ORAL
Status: DISCONTINUED | OUTPATIENT
Start: 2018-12-18 | End: 2018-12-22 | Stop reason: HOSPADM

## 2018-12-18 RX ORDER — HEPARIN 100 UNIT/ML
300 SYRINGE INTRAVENOUS AS NEEDED
Status: ACTIVE | OUTPATIENT
Start: 2018-12-18 | End: 2018-12-18

## 2018-12-18 RX ORDER — ACETAMINOPHEN 325 MG/1
650 TABLET ORAL ONCE
Status: COMPLETED | OUTPATIENT
Start: 2018-12-18 | End: 2018-12-18

## 2018-12-18 RX ADMIN — SODIUM CHLORIDE 250 ML: 900 INJECTION, SOLUTION INTRAVENOUS at 08:00

## 2018-12-18 RX ADMIN — ACETAMINOPHEN 650 MG: 325 TABLET, FILM COATED ORAL at 07:55

## 2018-12-18 RX ADMIN — DIPHENHYDRAMINE HYDROCHLORIDE 25 MG: 25 CAPSULE ORAL at 07:55

## 2018-12-19 LAB
ABO + RH BLD: NORMAL
BLD PROD TYP BPU: NORMAL
BLD PROD TYP BPU: NORMAL
BLOOD GROUP ANTIBODIES SERPL: NORMAL
BPU ID: NORMAL
BPU ID: NORMAL
CROSSMATCH RESULT,%XM: NORMAL
CROSSMATCH RESULT,%XM: NORMAL
SPECIMEN EXP DATE BLD: NORMAL
STATUS OF UNIT,%ST: NORMAL
STATUS OF UNIT,%ST: NORMAL
UNIT DIVISION, %UDIV: 0
UNIT DIVISION, %UDIV: 0

## 2019-01-15 ENCOUNTER — HOSPITAL ENCOUNTER (OUTPATIENT)
Dept: CT IMAGING | Age: 69
Discharge: HOME | End: 2019-01-15
Attending: INTERNAL MEDICINE
Payer: MEDICARE

## 2019-01-15 DIAGNOSIS — C64.9 RENAL CELL CARCINOMA, UNSPECIFIED LATERALITY (HCC): ICD-10-CM

## 2019-01-15 DIAGNOSIS — C64.2 RENAL CELL CARCINOMA, LEFT (HCC): ICD-10-CM

## 2019-01-15 PROCEDURE — 74176 CT ABD & PELVIS W/O CONTRAST: CPT

## 2019-01-15 PROCEDURE — 74011636320 HC RX REV CODE- 636/320: Performed by: INTERNAL MEDICINE

## 2019-01-15 RX ADMIN — DIATRIZOATE MEGLUMINE AND DIATRIZOATE SODIUM 15 ML: 660; 100 LIQUID ORAL; RECTAL at 12:46

## 2019-01-22 ENCOUNTER — PATIENT OUTREACH (OUTPATIENT)
Dept: CASE MANAGEMENT | Age: 69
End: 2019-01-22

## 2019-01-22 ENCOUNTER — HOSPITAL ENCOUNTER (OUTPATIENT)
Dept: LAB | Age: 69
Discharge: HOME | End: 2019-01-22
Payer: MEDICARE

## 2019-01-22 DIAGNOSIS — C64.9 RENAL CELL CARCINOMA, UNSPECIFIED LATERALITY (HCC): ICD-10-CM

## 2019-01-22 LAB
ALBUMIN SERPL-MCNC: 4.1 G/DL (ref 3.2–4.6)
ALBUMIN/GLOB SERPL: 1 {RATIO} (ref 1.2–3.5)
ALP SERPL-CCNC: 124 U/L (ref 50–136)
ALT SERPL-CCNC: 9 U/L (ref 12–65)
ANION GAP SERPL CALC-SCNC: 9 MMOL/L (ref 7–16)
AST SERPL-CCNC: 9 U/L (ref 15–37)
BASOPHILS # BLD: 0.1 K/UL (ref 0–0.2)
BASOPHILS NFR BLD: 1 % (ref 0–2)
BILIRUB SERPL-MCNC: 0.4 MG/DL (ref 0.2–1.1)
BUN SERPL-MCNC: 38 MG/DL (ref 8–23)
CALCIUM SERPL-MCNC: 8.9 MG/DL (ref 8.3–10.4)
CHLORIDE SERPL-SCNC: 98 MMOL/L (ref 98–107)
CO2 SERPL-SCNC: 31 MMOL/L (ref 21–32)
CREAT SERPL-MCNC: 6.86 MG/DL (ref 0.6–1)
DIFFERENTIAL METHOD BLD: ABNORMAL
EOSINOPHIL # BLD: 0.1 K/UL (ref 0–0.8)
EOSINOPHIL NFR BLD: 2 % (ref 0.5–7.8)
ERYTHROCYTE [DISTWIDTH] IN BLOOD BY AUTOMATED COUNT: 14.3 % (ref 11.9–14.6)
GLOBULIN SER CALC-MCNC: 4 G/DL (ref 2.3–3.5)
GLUCOSE SERPL-MCNC: 148 MG/DL (ref 65–100)
HCT VFR BLD AUTO: 29.1 % (ref 35.8–46.3)
HGB BLD-MCNC: 10.2 G/DL (ref 11.7–15.4)
IMM GRANULOCYTES # BLD AUTO: 0 K/UL (ref 0–0.5)
IMM GRANULOCYTES NFR BLD AUTO: 0 % (ref 0–5)
LYMPHOCYTES # BLD: 1.5 K/UL (ref 0.5–4.6)
LYMPHOCYTES NFR BLD: 20 % (ref 13–44)
MCH RBC QN AUTO: 34.2 PG (ref 26.1–32.9)
MCHC RBC AUTO-ENTMCNC: 35.1 G/DL (ref 31.4–35)
MCV RBC AUTO: 97.7 FL (ref 79.6–97.8)
MONOCYTES # BLD: 0.6 K/UL (ref 0.1–1.3)
MONOCYTES NFR BLD: 8 % (ref 4–12)
NEUTS SEG # BLD: 5.1 K/UL (ref 1.7–8.2)
NEUTS SEG NFR BLD: 69 % (ref 43–78)
NRBC # BLD: 0 K/UL (ref 0–0.2)
PLATELET # BLD AUTO: 148 K/UL (ref 150–450)
PMV BLD AUTO: 10.6 FL (ref 9.4–12.3)
POTASSIUM SERPL-SCNC: 3.7 MMOL/L (ref 3.5–5.1)
PROT SERPL-MCNC: 8.1 G/DL (ref 6.3–8.2)
RBC # BLD AUTO: 2.98 M/UL (ref 4.05–5.25)
SODIUM SERPL-SCNC: 138 MMOL/L (ref 136–145)
WBC # BLD AUTO: 7.4 K/UL (ref 4.3–11.1)

## 2019-01-22 PROCEDURE — 80053 COMPREHEN METABOLIC PANEL: CPT

## 2019-01-22 PROCEDURE — 36415 COLL VENOUS BLD VENIPUNCTURE: CPT

## 2019-01-22 PROCEDURE — 85025 COMPLETE CBC W/AUTO DIFF WBC: CPT

## 2019-01-22 NOTE — PROGRESS NOTES
1/22/19 saw pt today with Dr. John Edwards for follow up renal cancer. Restaging CT shows slight decrease in tumor. PO intake is good. GI is working her up for bleeding with upper and lower endoscopy. Plan is to follow up in 6 months with restaging scan and labs. Encouraged to call with any concerns. Navigation will sign off.

## 2019-02-14 ENCOUNTER — ANESTHESIA EVENT (OUTPATIENT)
Dept: ENDOSCOPY | Age: 69
End: 2019-02-14
Payer: MEDICARE

## 2019-02-14 ENCOUNTER — HOSPITAL ENCOUNTER (OUTPATIENT)
Age: 69
Setting detail: OUTPATIENT SURGERY
Discharge: HOME | End: 2019-02-14
Attending: INTERNAL MEDICINE | Admitting: INTERNAL MEDICINE
Payer: MEDICARE

## 2019-02-14 ENCOUNTER — ANESTHESIA (OUTPATIENT)
Dept: ENDOSCOPY | Age: 69
End: 2019-02-14
Payer: MEDICARE

## 2019-02-14 VITALS
HEIGHT: 66 IN | BODY MASS INDEX: 35.36 KG/M2 | RESPIRATION RATE: 12 BRPM | TEMPERATURE: 98.6 F | SYSTOLIC BLOOD PRESSURE: 160 MMHG | OXYGEN SATURATION: 97 % | HEART RATE: 73 BPM | WEIGHT: 220 LBS | DIASTOLIC BLOOD PRESSURE: 70 MMHG

## 2019-02-14 LAB — GLUCOSE BLD STRIP.AUTO-MCNC: 91 MG/DL (ref 65–100)

## 2019-02-14 PROCEDURE — 74011250636 HC RX REV CODE- 250/636

## 2019-02-14 PROCEDURE — 76060000031 HC ANESTHESIA FIRST 0.5 HR: Performed by: INTERNAL MEDICINE

## 2019-02-14 PROCEDURE — 77030009426 HC FCPS BIOP ENDOSC BSC -B: Performed by: INTERNAL MEDICINE

## 2019-02-14 PROCEDURE — 88305 TISSUE EXAM BY PATHOLOGIST: CPT

## 2019-02-14 PROCEDURE — 76040000025: Performed by: INTERNAL MEDICINE

## 2019-02-14 PROCEDURE — 82962 GLUCOSE BLOOD TEST: CPT

## 2019-02-14 PROCEDURE — 88312 SPECIAL STAINS GROUP 1: CPT

## 2019-02-14 RX ORDER — SODIUM CHLORIDE, SODIUM LACTATE, POTASSIUM CHLORIDE, CALCIUM CHLORIDE 600; 310; 30; 20 MG/100ML; MG/100ML; MG/100ML; MG/100ML
1000 INJECTION, SOLUTION INTRAVENOUS CONTINUOUS
Status: DISCONTINUED | OUTPATIENT
Start: 2019-02-14 | End: 2019-02-14 | Stop reason: HOSPADM

## 2019-02-14 RX ORDER — PROPOFOL 10 MG/ML
INJECTION, EMULSION INTRAVENOUS AS NEEDED
Status: DISCONTINUED | OUTPATIENT
Start: 2019-02-14 | End: 2019-02-14 | Stop reason: HOSPADM

## 2019-02-14 RX ORDER — SODIUM CHLORIDE 9 MG/ML
INJECTION, SOLUTION INTRAVENOUS
Status: DISCONTINUED | OUTPATIENT
Start: 2019-02-14 | End: 2019-02-14 | Stop reason: HOSPADM

## 2019-02-14 RX ORDER — PROPOFOL 10 MG/ML
INJECTION, EMULSION INTRAVENOUS
Status: DISCONTINUED
Start: 2019-02-14 | End: 2019-02-14 | Stop reason: HOSPADM

## 2019-02-14 RX ORDER — LIDOCAINE HYDROCHLORIDE 20 MG/ML
INJECTION, SOLUTION EPIDURAL; INFILTRATION; INTRACAUDAL; PERINEURAL AS NEEDED
Status: DISCONTINUED | OUTPATIENT
Start: 2019-02-14 | End: 2019-02-14 | Stop reason: HOSPADM

## 2019-02-14 RX ADMIN — PROPOFOL 50 MG: 10 INJECTION, EMULSION INTRAVENOUS at 12:16

## 2019-02-14 RX ADMIN — PROPOFOL 50 MG: 10 INJECTION, EMULSION INTRAVENOUS at 12:21

## 2019-02-14 RX ADMIN — LIDOCAINE HYDROCHLORIDE 40 MG: 20 INJECTION, SOLUTION EPIDURAL; INFILTRATION; INTRACAUDAL; PERINEURAL at 12:16

## 2019-02-14 RX ADMIN — PROPOFOL 50 MG: 10 INJECTION, EMULSION INTRAVENOUS at 12:18

## 2019-02-14 RX ADMIN — PROPOFOL 20 MG: 10 INJECTION, EMULSION INTRAVENOUS at 12:23

## 2019-02-14 RX ADMIN — SODIUM CHLORIDE: 9 INJECTION, SOLUTION INTRAVENOUS at 12:14

## 2019-02-14 NOTE — DISCHARGE INSTRUCTIONS
Gastrointestinal Esophagogastroduodenoscopy (EGD) - Upper Exam Discharge Instructions    1. Call Dr. Edward Pack at [phone] for any problems or questions. 2. Contact the doctor's office for follow up appointment as directed. 3. Medication may cause drowsiness for several hours, therefore, do not drive or operate machinery for remainder of the day. 4. No alcohol today. 5. Ordinarily, you may resume regular diet and activity after exam unless otherwise specified by your physician. 6. For mild soreness in your throat you may use Cepacol throat lozenges or warm salt-water gargles as needed. Any additional instructions:    1. Avoid NSAIDs. Use Tylenol if over the counter pain medication is needed. 2. The office will contact you regarding the results of the pathology. 3. Take Pepcid 20 mg a day           Instructions given to [employer] and other family members.

## 2019-02-14 NOTE — ANESTHESIA PREPROCEDURE EVALUATION
Anesthetic History Review of Systems / Medical History Patient summary reviewed Pulmonary Neuro/Psych  
 
 
 
TIA Cardiovascular Hypertension Exercise tolerance: >4 METS 
  
GI/Hepatic/Renal 
  
 
 
Renal disease: dialysis Endo/Other Diabetes: type 2 Obesity Other Findings Physical Exam 
 
Airway Mallampati: II 
 
Neck ROM: normal range of motion Mouth opening: Normal 
 
 Cardiovascular Regular rate and rhythm,  S1 and S2 normal,  no murmur, click, rub, or gallop Dental 
 
Dentition: Edentulous Pulmonary Abdominal 
 
 
 
 Other Findings Anesthetic Plan ASA: 3 Anesthesia type: total IV anesthesia Anesthetic plan and risks discussed with: Patient

## 2019-02-14 NOTE — ANESTHESIA POSTPROCEDURE EVALUATION
Procedure(s): ESOPHAGOGASTRODUODENOSCOPY (EGD) / BMI 36 ESOPHAGOGASTRODUODENAL (EGD) BIOPSY. Anesthesia Post Evaluation Patient location during evaluation: PACU Patient participation: complete - patient participated Level of consciousness: awake and alert Pain management: adequate Airway patency: patent Anesthetic complications: no 
Cardiovascular status: acceptable Respiratory status: acceptable Hydration status: acceptable Post anesthesia nausea and vomiting:  none Visit Vitals /70 Pulse 73 Temp 37 °C (98.6 °F) Resp 12 Ht 5' 5.5\" (1.664 m) Wt 99.8 kg (220 lb) SpO2 97% BMI 36.05 kg/m²

## 2019-02-14 NOTE — PROCEDURES
ESOPHAGOGASTRODUODENOSCOPY    ESOPHAGOGASTRODUODENOSCOPY    DATE of PROCEDURE: 2/14/2019    PT NAME: Cristian Fsicher     [id number]  PCP:  Aldo Redd MD  MEDICATION:   TIVA  Indication:  Heme +, anemia, HB  INSTRUMENT: GIF  H190  IV access via left external jugular  SPECIAL PROCEDURE: None  BLOOD LOSS- min. SPEC- Duodenal and gastric (random)    PROCEDURE:  Standard EGD      ASSESSMENT:  1. Very mild gastritis. No bleeding or source seen. PLAN:   1. Avoid NSAIDs  2. F/U biopsies  3. Pepcid 20 mg a day  4.  Consider SB studies    Te Rocha MD

## 2019-02-14 NOTE — H&P
Gastroenterology Outpatient History and Physical 
 
Patient: Kecia Fischer PCP: Evelyn Nguyen MD 
Physician: Hannah Tai MD 
 
Vital Signs: Blood pressure 147/67, pulse 76, temperature 98.3 °F (36.8 °C), resp. rate 18, height 5' 5.5\" (1.664 m), weight 99.8 kg (220 lb), SpO2 98 %. Allergies: Allergies Allergen Reactions  Pcn [Penicillins] Rash  Vancomycin Rash Chief Complaint: Heme+, Anemia History of Present Illness: As Above Justification for Procedure: As Above History: 
Past Medical History:  
Diagnosis Date  Arthritis  AVF (arteriovenous fistula) ([facility] [address] 12/20/2016 12/6/16 ([facility]) Right AVF revision and thrombectomy  Cancer ([facility] [address] 2015 L kidney  Chronic kidney disease HD in M-W-F- at [facility]  Degenerative joint disease  Diabetes ([facility] [address] checks QD, normal 120-130, hyposymptoms at 90  ESRD (end stage renal disease) [facility]) Nov 2006 ESRD. MWF dialysis  Hypertension  Obesity  Transient ischemic attack 08/29/2015  
 no residual  
  
Past Surgical History:  
Procedure Laterality Date  BREAST SURGERY PROCEDURE UNLISTED Right   
 cyst removed  COLONOSCOPY N/A 11/8/2018 COLONOSCOPY  BMI 36 performed by Katarzyna Chatterjee MD at [facility] ENDOSCOPY  
 HX GI  06/01/2002  
 colon resection resulting in temporary colostomy reversal  
 HX GI  08/06/2018  
 exploratory laparotomy  HX GYN    
 stephan  HX OTHER SURGICAL    
 dialysis fistula, several permcaths  HX UROLOGICAL Left July 2015  
 nephrectomy  HX VASCULAR ACCESS  VASCULAR SURGERY PROCEDURE UNLIST Right AV graft Social History Socioeconomic History  Marital status:  Spouse name: Not on file  Number of children: Not on file  Years of education: Not on file  Highest education level: Not on file Tobacco Use  Smoking status: Never Smoker  Smokeless tobacco: Never Used Substance and Sexual Activity  Alcohol use:  No  
  Drug use: No  
 Sexual activity: Not Currently Family History Problem Relation Age of Onset  Diabetes Mother  Heart Disease Mother  Hypertension Mother  Heart Disease Father  Hypertension Father  Malignant Hyperthermia Neg Hx  Pseudocholinesterase Deficiency Neg Hx  Delayed Awakening Neg Hx  Post-op Nausea/Vomiting Neg Hx  Emergence Delirium Neg Hx   
 Other Neg Hx  Post-op Cognitive Dysfunction Neg Hx Medications:  
Prior to Admission medications Medication Sig Start Date End Date Taking? Authorizing Provider  
amLODIPine (NORVASC) 10 mg tablet Take 10 mg by mouth nightly. Yes Provider, Historical  
VIT C/VIT E/LUTEIN/MIN/OMEGA-3 (OCUVITE PO) Take  by mouth nightly. Yes Provider, Historical  
cinacalcet (SENSIPAR) 30 mg tablet Take 60 mg by mouth nightly. Yes Provider, Historical  
minoxidil (LONITEN) 2.5 mg tablet Take 7.5 mg by mouth two (2) times a day. Indications: HYPERTENSION   Yes Other, MD Jaylon  
sevelamer carbonate (RENVELA) 800 mg tab tab Take 800 mg by mouth three (3) times daily (with meals). 5 tablets with meals/ 2 with snacks Sometimes only eats 2 meals/d   Yes Provider, Historical  
lisinopril (PRINIVIL, ZESTRIL) 40 mg tablet Take 40 mg by mouth nightly. Indications: HYPERTENSION 6/8/15  Yes Provider, Historical  
sitaGLIPtin (JANUVIA) 100 mg tablet Take 50 mg by mouth every morning. Indications: TYPE 2 DIABETES MELLITUS   Yes Provider, Historical  
furosemide (LASIX) 80 mg tablet Take 80 mg by mouth two (2) times a day. Indications: EDEMA, HYPERTENSION   Yes Provider, Historical  
lidocaine (LIDODERM) 5 % 1 Patch by TransDERmal route every twenty-four (24) hours. Apply patch to the affected area for 12 hours a day and remove for 12 hours a day. 9/16/17   Adelita Zuniga MD  
lidocaine 5 % topical cream Apply  to affected area two (2) times daily as needed for Pain. 9/1/17   FRANCISCO Lion Physical Exam: Heart: regular rate and rhythm, S1, S2 normal, no murmur, click, rub or gallop Lungs: Heart exam - S1, S2 normal, no murmur, no gallop, rate regular Abdominal: Bowel sounds are normal, liver is not enlarged, spleen is not enlarged Findings/Diagnosis: Heme +, anemia Plan EGD Signed: 
Harrison Ferrara MD 2/14/2019

## 2019-02-28 ENCOUNTER — HOSPITAL ENCOUNTER (OUTPATIENT)
Dept: INTERVENTIONAL RADIOLOGY/VASCULAR | Age: 69
Discharge: HOME | End: 2019-02-28
Attending: RADIOLOGY
Payer: MEDICARE

## 2019-02-28 VITALS
TEMPERATURE: 99.3 F | WEIGHT: 219 LBS | HEIGHT: 65 IN | BODY MASS INDEX: 36.49 KG/M2 | SYSTOLIC BLOOD PRESSURE: 162 MMHG | HEART RATE: 74 BPM | OXYGEN SATURATION: 95 % | DIASTOLIC BLOOD PRESSURE: 72 MMHG | RESPIRATION RATE: 16 BRPM

## 2019-02-28 DIAGNOSIS — N18.6 ESRD (END STAGE RENAL DISEASE) (HCC): ICD-10-CM

## 2019-02-28 LAB — GLUCOSE BLD STRIP.AUTO-MCNC: 93 MG/DL (ref 65–100)

## 2019-02-28 PROCEDURE — 82962 GLUCOSE BLOOD TEST: CPT

## 2019-02-28 PROCEDURE — 74011250636 HC RX REV CODE- 250/636: Performed by: RADIOLOGY

## 2019-02-28 PROCEDURE — 99153 MOD SED SAME PHYS/QHP EA: CPT

## 2019-02-28 PROCEDURE — C1769 GUIDE WIRE: HCPCS

## 2019-02-28 PROCEDURE — 77030002916 HC SUT ETHLN J&J -A

## 2019-02-28 PROCEDURE — C1725 CATH, TRANSLUMIN NON-LASER: HCPCS

## 2019-02-28 PROCEDURE — 77030021532 HC CATH ANGI DX IMPRS MRTM -B

## 2019-02-28 PROCEDURE — C1894 INTRO/SHEATH, NON-LASER: HCPCS

## 2019-02-28 PROCEDURE — 74011636320 HC RX REV CODE- 636/320: Performed by: RADIOLOGY

## 2019-02-28 PROCEDURE — 77030013519 HC DEV INFL BASIX MRTM -B

## 2019-02-28 PROCEDURE — 74011250636 HC RX REV CODE- 250/636

## 2019-02-28 RX ORDER — DIPHENHYDRAMINE HYDROCHLORIDE 50 MG/ML
50 INJECTION, SOLUTION INTRAMUSCULAR; INTRAVENOUS ONCE
Status: COMPLETED | OUTPATIENT
Start: 2019-02-28 | End: 2019-02-28

## 2019-02-28 RX ORDER — FENTANYL CITRATE 50 UG/ML
25-50 INJECTION, SOLUTION INTRAMUSCULAR; INTRAVENOUS
Status: DISCONTINUED | OUTPATIENT
Start: 2019-02-28 | End: 2019-03-04 | Stop reason: HOSPADM

## 2019-02-28 RX ORDER — LIDOCAINE HYDROCHLORIDE 20 MG/ML
1-20 INJECTION, SOLUTION EPIDURAL; INFILTRATION; INTRACAUDAL; PERINEURAL ONCE
Status: ACTIVE | OUTPATIENT
Start: 2019-02-28 | End: 2019-02-28

## 2019-02-28 RX ORDER — MIDAZOLAM HYDROCHLORIDE 1 MG/ML
.5-2 INJECTION, SOLUTION INTRAMUSCULAR; INTRAVENOUS
Status: DISCONTINUED | OUTPATIENT
Start: 2019-02-28 | End: 2019-03-04 | Stop reason: HOSPADM

## 2019-02-28 RX ORDER — SODIUM CHLORIDE 9 MG/ML
25 INJECTION, SOLUTION INTRAVENOUS ONCE
Status: DISCONTINUED | OUTPATIENT
Start: 2019-02-28 | End: 2019-02-28

## 2019-02-28 RX ORDER — HEPARIN SODIUM 200 [USP'U]/100ML
1000 INJECTION, SOLUTION INTRAVENOUS AS NEEDED
Status: DISCONTINUED | OUTPATIENT
Start: 2019-02-28 | End: 2019-03-04 | Stop reason: HOSPADM

## 2019-02-28 RX ORDER — HYDROCODONE BITARTRATE AND ACETAMINOPHEN 7.5; 325 MG/1; MG/1
1 TABLET ORAL
Status: DISCONTINUED | OUTPATIENT
Start: 2019-02-28 | End: 2019-03-04 | Stop reason: HOSPADM

## 2019-02-28 RX ADMIN — DIPHENHYDRAMINE HYDROCHLORIDE 50 MG: 50 INJECTION, SOLUTION INTRAMUSCULAR; INTRAVENOUS at 08:27

## 2019-02-28 RX ADMIN — IOPAMIDOL 20 ML: 612 INJECTION, SOLUTION INTRAVENOUS at 08:55

## 2019-02-28 RX ADMIN — HEPARIN SODIUM 4000 UNITS: 200 INJECTION, SOLUTION INTRAVENOUS at 08:29

## 2019-02-28 RX ADMIN — MIDAZOLAM HYDROCHLORIDE 1 MG: 1 INJECTION, SOLUTION INTRAMUSCULAR; INTRAVENOUS at 08:41

## 2019-02-28 RX ADMIN — FENTANYL CITRATE 50 MCG: 50 INJECTION, SOLUTION INTRAMUSCULAR; INTRAVENOUS at 08:41

## 2019-02-28 RX ADMIN — MIDAZOLAM HYDROCHLORIDE 1 MG: 1 INJECTION, SOLUTION INTRAMUSCULAR; INTRAVENOUS at 08:27

## 2019-02-28 RX ADMIN — FENTANYL CITRATE 50 MCG: 50 INJECTION, SOLUTION INTRAMUSCULAR; INTRAVENOUS at 08:27

## 2019-02-28 RX ADMIN — FENTANYL CITRATE 50 MCG: 50 INJECTION, SOLUTION INTRAMUSCULAR; INTRAVENOUS at 08:35

## 2019-02-28 RX ADMIN — MIDAZOLAM HYDROCHLORIDE 1 MG: 1 INJECTION, SOLUTION INTRAMUSCULAR; INTRAVENOUS at 08:35

## 2019-02-28 NOTE — PROGRESS NOTES
TRANSFER - OUT REPORT:    Verbal report given to Ynes Torres RN(name) on [address]  being transferred to [facility](unit) for routine post - op       Report consisted of patients Situation, Background, Assessment and   Recommendations(SBAR). Information from the following report(s) SBAR, Kardex, Procedure Summary and MAR was reviewed with the receiving nurse. Opportunity for questions and clarification was provided.

## 2019-02-28 NOTE — PROCEDURES
Department of Interventional Radiology  [phone]        Interventional Radiology Brief Procedure Note    Patient: Naveed Pereira MRN: [id number]  SSN: [id number]    YOB: 1950  Age: 80 y.o. Sex: female      Date of Procedure: 2/28/2019    Pre-Procedure Diagnosis: Dysfunctional AVF. ESRD. Post-Procedure Diagnosis: SAME    Procedure(s): AVFogram w PTA    Brief Description of Procedure: as above    Performed By: Megan Rubalcava MD     Assistants: None    Anesthesia:Moderate Sedation    Estimated Blood Loss: Less than 10ml    Specimens:  None    Implants:  None    Findings: Recurrent stenosis in the cephalic and brachiocephalic veins. Complications: None    Recommendations: 1 hour bedrest. Remove suture prior to DC. Follow Up: 4 months.     Signed By: Megan Rubalcava MD     February 28, 2019

## 2019-02-28 NOTE — PROGRESS NOTES
TRANSFER - IN REPORT:    Verbal report received from [facility] RN(name) on [address]  being received from IR(unit) for routine progression of care      Report consisted of patients Situation, Background, Assessment and   Recommendations(SBAR). Information from the following report(s) Procedure Summary and MAR was reviewed with the receiving nurse. Opportunity for questions and clarification was provided. Assessment completed upon patients arrival to unit and care assumed.

## 2019-02-28 NOTE — DISCHARGE INSTRUCTIONS
[address]  Department of Interventional Radiology  [phone] Office  [phone] Fax       ARTERIOVENOUS FISTULA, GRAFT ACCESS, REVISION, OR DECLOT    ACTIVITY:  Limited activity today. Keep access arm straight and raised above the level of your heart for 24 hours or until the swelling goes away. DIET:  May have your usual food, unless told otherwise by your doctor. PAIN:  Use the prescription for pain medicine your doctor gave you. If you do not have one, usual your normal pain medicine. If this does not control your pain, call your doctor. DO NOT TAKE ASPIRIN OR MEDICINE WITH ASPIRIN IN IT, unless your doctor says you may. DRESSING CARE:   Keep your dressing clean and dry. Leave your dressing on until the Dialysis Nurse or your doctor takes it off. BLEEDING:  If you have any bleeding put pressure over the bleeding area and call your doctor. CARE OF YOUR ACCESS ARM:  You may have some bruising, swelling, and discoloration for several weeks. After the swelling has gone down, you will be able to see the outline of the graft. By placing your fingertips over the graft you will be able to feel a vibration (thrill) that means blood is flowing and the graft is working. DO:  1. Make sure your arm is washed with soap and water every day. 2. Feel for the [address] at area marked in the picture. 3. Call your Kidney doctor if you do not feel the [address] or for any problems like swelling, redness, tingling, or numbness in access arm, pus, or fever over 101. DO NOT:  1. Wear tight sleeves, watches, belts, or bracelets over the graft. 2. Carry heavy bags across the graft or fistula. 3. Sleep on your graft side. 4. Let your blood pressure be taken in your access arm. 5. Let blood be drawn from your access arm. For the next 24 hours, DO NOT Drive, Drink Alcoholic Beverages, or Make IMPORTANT Decisions.     Follow-Up Instructions:  Please see your ordering doctor as he/she has requested. To Reach Us: If you have any questions about your procedure, please call the Interventional Radiology department at [phone]. After business hours (5pm) and weekends, call the answering service at [phone] and ask for the Radiologist on call to be paged. Interventional Radiology General Nurse Discharge    After general anesthesia or intravenous sedation, for 24 hours or while taking prescription Narcotics:  · Limit your activities  · Do not drive and operate hazardous machinery  · Do not make important personal or business decisions  · Do  not drink alcoholic beverages  · If you have not urinated within 8 hours after discharge, please contact your surgeon on call. * Please give a list of your current medications to your Primary Care Provider. * Please update this list whenever your medications are discontinued, doses are     changed, or new medications (including over-the-counter products) are added. * Please carry medication information at all times in case of emergency situations. These are general instructions for a healthy lifestyle:    No smoking/ No tobacco products/ Avoid exposure to second hand smoke  Surgeon General's Warning:  Quitting smoking now greatly reduces serious risk to your health. Obesity, smoking, and sedentary lifestyle greatly increases your risk for illness  A healthy diet, regular physical exercise & weight monitoring are important for maintaining a healthy lifestyle    You may be retaining fluid if you have a history of heart failure or if you experience any of the following symptoms:  Weight gain of 3 pounds or more overnight or 5 pounds in a week, increased swelling in our hands or feet or shortness of breath while lying flat in bed. Please call your doctor as soon as you notice any of these symptoms; do not wait until your next office visit.     Recognize signs and symptoms of STROKE:  F-face looks uneven    A-arms unable to move or move unevenly    S-speech slurred or non-existent    T-time-call 911 as soon as signs and symptoms begin-DO NOT go       Back to bed or wait to see if you get better-TIME IS BRAIN.

## 2019-02-28 NOTE — H&P
Department of Interventional Radiology  ([phone])    History and Physical    Patient: Karen Larios MRN:  [id number]  SSN:  [id number]    YOB: 1950  Age:  80 y.o. Sex:  female      Primary Care Provider:  Ruby Aleman MD  Referring Physician:  Rohini Mancilla MD    Subjective:     Chief Complaint: Excessive bleeding after needle decannulation. AVF dysfunction. History of the Present Illness: The patient is a 80 y.o. female who presents with the above. ESRD. 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 Past Medical History:   Diagnosis Date    Arthritis     AVF (arteriovenous fistula) ([facility] [address] 12/20/2016 12/6/16 ([facility]) Right AVF revision and thrombectomy    Cancer ([facility] [address] 2015    L kidney    Chronic kidney disease     HD [facility]    Degenerative joint disease     Diabetes ([facility] [address]     checks QD, normal 120-130, hyposymptoms at 80    ESRD (end stage renal disease) [facility]) Nov 2006    ESRD. MWF dialysis     Hypertension     Obesity     Transient ischemic attack 08/29/2015    no residual     Past Surgical History:   Procedure Laterality Date    BREAST SURGERY PROCEDURE UNLISTED Right     cyst removed    COLONOSCOPY N/A 11/8/2018    COLONOSCOPY  BMI 36 performed by Ariel Laguerre MD at [facility] ENDOSCOPY    HX GI  06/01/2002    colon resection resulting in temporary colostomy reversal    HX GI  08/06/2018    exploratory laparotomy    HX GYN      stephan    HX OTHER SURGICAL      dialysis fistula, several permcaths    HX UROLOGICAL Left July 2015    nephrectomy    HX VASCULAR ACCESS      VASCULAR SURGERY PROCEDURE UNLIST Right     AV graft        Review of Systems:    Pertinent items are noted in the History of Present Illness. Current Outpatient Medications   Medication Sig    lidocaine (LIDODERM) 5 % 1 Patch by TransDERmal route every twenty-four (24) hours.  Apply patch to the affected area for 12 hours a day and remove for 12 hours a day.  lidocaine 5 % topical cream Apply  to affected area two (2) times daily as needed for Pain.  amLODIPine (NORVASC) 10 mg tablet Take 10 mg by mouth nightly.  VIT C/VIT E/LUTEIN/MIN/OMEGA-3 (OCUVITE PO) Take  by mouth nightly.  cinacalcet (SENSIPAR) 30 mg tablet Take 60 mg by mouth nightly.  minoxidil (LONITEN) 2.5 mg tablet Take 7.5 mg by mouth two (2) times a day. Indications: HYPERTENSION    sevelamer carbonate (RENVELA) 800 mg tab tab Take 800 mg by mouth three (3) times daily (with meals). 5 tablets with meals/ 2 with snacks  Sometimes only eats 2 meals/d    lisinopril (PRINIVIL, ZESTRIL) 40 mg tablet Take 40 mg by mouth nightly. Indications: HYPERTENSION    sitaGLIPtin (JANUVIA) 100 mg tablet Take 50 mg by mouth every morning. Indications: TYPE 2 DIABETES MELLITUS    furosemide (LASIX) 80 mg tablet Take 80 mg by mouth two (2) times a day.  Indications: EDEMA, HYPERTENSION     Current Facility-Administered Medications   Medication Dose Route Frequency    0.9% sodium chloride infusion  25 mL/hr IntraVENous ONCE    fentaNYL citrate (PF) injection 25-50 mcg  25-50 mcg IntraVENous Rad Multiple    midazolam (VERSED) injection 0.5-2 mg  0.5-2 mg IntraVENous Rad Multiple    heparin (PF) 2 units/ml in NS infusion 2,000 Units  1,000 mL Irrigation PRN    lidocaine (PF) (XYLOCAINE) 20 mg/mL (2 %) injection  mg  1-20 mL IntraDERMal ONCE        Allergies   Allergen Reactions    Pcn [Penicillins] Rash    Vancomycin Rash       Family History   Problem Relation Age of Onset    Diabetes Mother     Heart Disease Mother     Hypertension Mother     Heart Disease Father     Hypertension Father     Malignant Hyperthermia Neg Hx     Pseudocholinesterase Deficiency Neg Hx     Delayed Awakening Neg Hx     Post-op Nausea/Vomiting Neg Hx     Emergence Delirium Neg Hx     Other Neg Hx     Post-op Cognitive Dysfunction Neg Hx      Social History     Tobacco Use    Smoking status: Never Smoker    Smokeless tobacco: Never Used   Substance Use Topics    Alcohol use: No        Objective:       Physical Examination:    Vitals:    02/28/19 0713 02/28/19 0715   BP: 138/63    Pulse:  85   Resp:  16   Temp:  99.3 °F (37.4 °C)   SpO2: 93%    Weight:  99.3 kg (219 lb)   Height:  5' 5\" (1.651 m)     Blood pressure 138/63, pulse 85, temperature 99.3 °F (37.4 °C), resp. rate 16, height 5' 5\" (1.651 m), weight 99.3 kg (219 lb), SpO2 93 %, not currently breastfeeding. General:  alert, cooperative, no distress  Heart:  regular rate and rhythm, S1, S2 normal, no murmur, click, rub or gallop  Lungs:  clear to auscultation bilaterally  Abdomen:  soft, protuberant, nondistended, normal bowel sounds  Neuro:  normal without focal findings  Extremities:  RUE Cephaliv Vein AVF w two pseudoaneurysms. No cellulitis.       Laboratory:     Lab Results   Component Value Date/Time    Sodium 138 01/22/2019 03:21 PM    Sodium 137 09/10/2018 05:59 AM    Potassium 3.7 01/22/2019 03:21 PM    Potassium 5.1 09/10/2018 05:59 AM    Chloride 98 01/22/2019 03:21 PM    Chloride 99 09/10/2018 05:59 AM    CO2 31 01/22/2019 03:21 PM    CO2 24 09/10/2018 05:59 AM    Anion gap 9 01/22/2019 03:21 PM    Anion gap 14 09/10/2018 05:59 AM    Glucose 148 (H) 01/22/2019 03:21 PM    Glucose 129 (H) 09/10/2018 05:59 AM    BUN 38 (H) 01/22/2019 03:21 PM    BUN 37 (H) 09/10/2018 05:59 AM    Creatinine 6.86 (H) 01/22/2019 03:21 PM    Creatinine 9.08 (H) 09/10/2018 05:59 AM    GFR est AA 8 (L) 01/22/2019 03:21 PM    GFR est AA 6 (L) 09/10/2018 05:59 AM    GFR est non-AA 6 (L) 01/22/2019 03:21 PM    GFR est non-AA 5 (L) 09/10/2018 05:59 AM    Calcium 8.9 01/22/2019 03:21 PM    Calcium 9.0 09/10/2018 05:59 AM    Magnesium 2.4 08/08/2018 03:57 AM    Magnesium 2.7 (H) 08/06/2018 04:37 AM    Phosphorus 5.2 (H) 08/05/2018 04:30 AM    Phosphorus 2.1 (L) 08/03/2018 11:11 AM    Albumin 4.1 01/22/2019 03:21 PM    Albumin 4.1 09/10/2018 05:59 AM    Protein, total 8.1 01/22/2019 03:21 PM    Protein, total 8.4 (H) 09/10/2018 05:59 AM    Globulin 4.0 (H) 01/22/2019 03:21 PM    Globulin 4.3 (H) 09/10/2018 05:59 AM    A-G Ratio 1.0 (L) 01/22/2019 03:21 PM    A-G Ratio 1.0 (L) 09/10/2018 05:59 AM    AST (SGOT) 9 (L) 01/22/2019 03:21 PM    AST (SGOT) 41 (H) 09/10/2018 05:59 AM    ALT (SGPT) 9 (L) 01/22/2019 03:21 PM    ALT (SGPT) 15 09/10/2018 05:59 AM     Lab Results   Component Value Date/Time    WBC 7.4 01/22/2019 03:21 PM    WBC 8.5 09/10/2018 05:59 AM    HGB 10.2 (L) 01/22/2019 03:21 PM    HGB 9.5 (L) 09/10/2018 05:59 AM    HCT 29.1 (L) 01/22/2019 03:21 PM    HCT 27.1 (L) 09/10/2018 05:59 AM    PLATELET 404 (L) 05/41/1975 03:21 PM    PLATELET 217 70/00/5199 05:59 AM     Lab Results   Component Value Date/Time    aPTT 22.2 (L) 11/16/2015 12:00 AM    aPTT 27.2 01/29/2013 12:20 PM    Prothrombin time 10.2 11/16/2015 12:00 AM    Prothrombin time 10.5 08/29/2015 04:30 PM    INR 0.9 11/16/2015 12:00 AM    INR 1.0 08/29/2015 04:30 PM       Assessment:     Dysfunctional AVF. Plan:     Planned Procedure:  RUE AVFistulogram w PTA. Sedation. Risks, benefits, and alternatives reviewed with patient and she agrees to proceed with the procedure.       Signed By: Sami Collier MD     February 28, 2019

## 2019-02-28 NOTE — PROGRESS NOTES
Pt preferred that suture remain in until dialysis tomorrow. She said if dialysis does not remove it she will come back to have us remove suture.

## 2019-03-03 ENCOUNTER — HOSPITAL ENCOUNTER (EMERGENCY)
Age: 69
Discharge: HOME | End: 2019-03-03
Attending: EMERGENCY MEDICINE
Payer: MEDICARE

## 2019-03-03 ENCOUNTER — APPOINTMENT (OUTPATIENT)
Dept: GENERAL RADIOLOGY | Age: 69
End: 2019-03-03
Attending: EMERGENCY MEDICINE
Payer: MEDICARE

## 2019-03-03 VITALS
OXYGEN SATURATION: 93 % | SYSTOLIC BLOOD PRESSURE: 173 MMHG | BODY MASS INDEX: 36.49 KG/M2 | RESPIRATION RATE: 15 BRPM | HEIGHT: 65 IN | WEIGHT: 219 LBS | TEMPERATURE: 98.2 F | HEART RATE: 83 BPM | DIASTOLIC BLOOD PRESSURE: 72 MMHG

## 2019-03-03 DIAGNOSIS — F41.1 ANXIETY STATE: ICD-10-CM

## 2019-03-03 DIAGNOSIS — R06.02 SOB (SHORTNESS OF BREATH): Primary | ICD-10-CM

## 2019-03-03 LAB
ALBUMIN SERPL-MCNC: 3.7 G/DL (ref 3.2–4.6)
ALBUMIN/GLOB SERPL: 1 {RATIO} (ref 1.2–3.5)
ALP SERPL-CCNC: 94 U/L (ref 50–136)
ALT SERPL-CCNC: 15 U/L (ref 12–65)
ANION GAP SERPL CALC-SCNC: 8 MMOL/L (ref 7–16)
AST SERPL-CCNC: 22 U/L (ref 15–37)
ATRIAL RATE: 83 BPM
BASOPHILS # BLD: 0 K/UL (ref 0–0.2)
BASOPHILS NFR BLD: 1 % (ref 0–2)
BILIRUB SERPL-MCNC: 0.6 MG/DL (ref 0.2–1.1)
BUN SERPL-MCNC: 34 MG/DL (ref 8–23)
CALCIUM SERPL-MCNC: 8.6 MG/DL (ref 8.3–10.4)
CALCULATED P AXIS, ECG09: 59 DEGREES
CALCULATED R AXIS, ECG10: 57 DEGREES
CALCULATED T AXIS, ECG11: 22 DEGREES
CHLORIDE SERPL-SCNC: 102 MMOL/L (ref 98–107)
CO2 SERPL-SCNC: 29 MMOL/L (ref 21–32)
CREAT SERPL-MCNC: 7.3 MG/DL (ref 0.6–1)
DIAGNOSIS, 93000: NORMAL
DIFFERENTIAL METHOD BLD: ABNORMAL
EOSINOPHIL # BLD: 0.1 K/UL (ref 0–0.8)
EOSINOPHIL NFR BLD: 1 % (ref 0.5–7.8)
ERYTHROCYTE [DISTWIDTH] IN BLOOD BY AUTOMATED COUNT: 14.7 % (ref 11.9–14.6)
GLOBULIN SER CALC-MCNC: 3.7 G/DL (ref 2.3–3.5)
GLUCOSE SERPL-MCNC: 110 MG/DL (ref 65–100)
HCT VFR BLD AUTO: 25.1 % (ref 35.8–46.3)
HGB BLD-MCNC: 8.9 G/DL (ref 11.7–15.4)
IMM GRANULOCYTES # BLD AUTO: 0 K/UL (ref 0–0.5)
IMM GRANULOCYTES NFR BLD AUTO: 0 % (ref 0–5)
LACTATE BLD-SCNC: 0.97 MMOL/L (ref 0.5–1.9)
LYMPHOCYTES # BLD: 1.3 K/UL (ref 0.5–4.6)
LYMPHOCYTES NFR BLD: 16 % (ref 13–44)
MCH RBC QN AUTO: 34 PG (ref 26.1–32.9)
MCHC RBC AUTO-ENTMCNC: 35.5 G/DL (ref 31.4–35)
MCV RBC AUTO: 95.8 FL (ref 79.6–97.8)
MONOCYTES # BLD: 0.7 K/UL (ref 0.1–1.3)
MONOCYTES NFR BLD: 9 % (ref 4–12)
NEUTS SEG # BLD: 5.7 K/UL (ref 1.7–8.2)
NEUTS SEG NFR BLD: 72 % (ref 43–78)
NRBC # BLD: 0 K/UL (ref 0–0.2)
P-R INTERVAL, ECG05: 166 MS
PLATELET # BLD AUTO: 202 K/UL (ref 150–450)
PMV BLD AUTO: 9.8 FL (ref 9.4–12.3)
POTASSIUM SERPL-SCNC: 3.5 MMOL/L (ref 3.5–5.1)
PROT SERPL-MCNC: 7.4 G/DL (ref 6.3–8.2)
Q-T INTERVAL, ECG07: 404 MS
QRS DURATION, ECG06: 70 MS
QTC CALCULATION (BEZET), ECG08: 474 MS
RBC # BLD AUTO: 2.62 M/UL (ref 4.05–5.2)
SODIUM SERPL-SCNC: 139 MMOL/L (ref 136–145)
TROPONIN I SERPL-MCNC: <0.02 NG/ML (ref 0.02–0.05)
VENTRICULAR RATE, ECG03: 83 BPM
WBC # BLD AUTO: 7.8 K/UL (ref 4.3–11.1)

## 2019-03-03 PROCEDURE — 74011250636 HC RX REV CODE- 250/636: Performed by: EMERGENCY MEDICINE

## 2019-03-03 PROCEDURE — 83605 ASSAY OF LACTIC ACID: CPT

## 2019-03-03 PROCEDURE — 71045 X-RAY EXAM CHEST 1 VIEW: CPT

## 2019-03-03 PROCEDURE — 85025 COMPLETE CBC W/AUTO DIFF WBC: CPT

## 2019-03-03 PROCEDURE — 99284 EMERGENCY DEPT VISIT MOD MDM: CPT | Performed by: EMERGENCY MEDICINE

## 2019-03-03 PROCEDURE — 93005 ELECTROCARDIOGRAM TRACING: CPT | Performed by: EMERGENCY MEDICINE

## 2019-03-03 PROCEDURE — 80053 COMPREHEN METABOLIC PANEL: CPT

## 2019-03-03 PROCEDURE — 96374 THER/PROPH/DIAG INJ IV PUSH: CPT | Performed by: EMERGENCY MEDICINE

## 2019-03-03 PROCEDURE — 84484 ASSAY OF TROPONIN QUANT: CPT

## 2019-03-03 RX ORDER — LORAZEPAM 2 MG/ML
1 INJECTION INTRAMUSCULAR
Status: COMPLETED | OUTPATIENT
Start: 2019-03-03 | End: 2019-03-03

## 2019-03-03 RX ADMIN — LORAZEPAM 1 MG: 2 INJECTION INTRAMUSCULAR; INTRAVENOUS at 11:32

## 2019-03-03 NOTE — ED TRIAGE NOTES
Pt ambulatory to triage and placed in w/c. Pt reports SOB x a few hours. Pt is a dialysis pt and last dialyzed on Friday for 3 hours 45 minutes per normal. Access on the right arm. Pt states her BP has been high since then as well. Pt denies cp. Pt reports cough x 1 week as well, but denies the cough as being productive.

## 2019-03-03 NOTE — DISCHARGE INSTRUCTIONS
Patient Education        Shortness of Breath: Care Instructions  Your Care Instructions  Shortness of breath has many causes. Sometimes conditions such as anxiety can lead to shortness of breath. Some people get mild shortness of breath when they exercise. Trouble breathing also can be a symptom of a serious problem, such as asthma, lung disease, emphysema, heart problems, and pneumonia. If your shortness of breath continues, you may need tests and treatment. Watch for any changes in your breathing and other symptoms. Follow-up care is a key part of your treatment and safety. Be sure to make and go to all appointments, and call your doctor if you are having problems. It's also a good idea to know your test results and keep a list of the medicines you take. How can you care for yourself at home? · Do not smoke or allow others to smoke around you. If you need help quitting, talk to your doctor about stop-smoking programs and medicines. These can increase your chances of quitting for good. · Get plenty of rest and sleep. · Take your medicines exactly as prescribed. Call your doctor if you think you are having a problem with your medicine. · Find healthy ways to deal with stress. ? Exercise daily. ? Get plenty of sleep. ? Eat regularly and well. When should you call for help? Call 911 anytime you think you may need emergency care. For example, call if:    · You have severe shortness of breath.     · You have symptoms of a heart attack. These may include:  ? Chest pain or pressure, or a strange feeling in the chest.  ? Sweating. ? Shortness of breath. ? Nausea or vomiting. ? Pain, pressure, or a strange feeling in the back, neck, jaw, or upper belly or in one or both shoulders or arms. ? Lightheadedness or sudden weakness. ? A fast or irregular heartbeat. After you call 911, the  may tell you to chew 1 adult-strength or 2 to 4 low-dose aspirin. Wait for an ambulance.  Do not try to drive yourself.    Call your doctor now or seek immediate medical care if:    · Your shortness of breath gets worse or you start to wheeze. Wheezing is a high-pitched sound when you breathe.     · You wake up at night out of breath or have to prop your head up on several pillows to breathe.     · You are short of breath after only light activity or while at rest.    Watch closely for changes in your health, and be sure to contact your doctor if:    · You do not get better over the next 1 to 2 days. Where can you learn more? Go to http://felisa-radha.info/. Enter S780 in the search box to learn more about \"Shortness of Breath: Care Instructions. \"  Current as of: September 5, 2018  Content Version: 11.9  © 5911-5319 [employer]. Care instructions adapted under license by [employer] (which disclaims liability or warranty for this information). If you have questions about a medical condition or this instruction, always ask your healthcare professional. [place] [address] any warranty or liability for your use of this information. Anxiety Disorder: Care Instructions  Your Care Instructions    Anxiety is a normal reaction to stress. Difficult situations can cause you to have symptoms such as sweaty palms and a nervous feeling. In an anxiety disorder, the symptoms are far more severe. Constant worry, muscle tension, trouble sleeping, nausea and diarrhea, and other symptoms can make normal daily activities difficult or impossible. These symptoms may occur for no reason, and they can affect your work, school, or social life. Medicines, counseling, and self-care can all help. Follow-up care is a key part of your treatment and safety. Be sure to make and go to all appointments, and call your doctor if you are having problems. It's also a good idea to know your test results and keep a list of the medicines you take.   How can you care for yourself at home?  · Take medicines exactly as directed. Call your doctor if you think you are having a problem with your medicine. · Go to your counseling sessions and follow-up appointments. · Recognize and accept your anxiety. Then, when you are in a situation that makes you anxious, say to yourself, \"This is not an emergency. I feel uncomfortable, but I am not in danger. I can keep going even if I feel anxious. \"  · Be kind to your body:  ? Relieve tension with exercise or a massage. ? Get enough rest.  ? Avoid alcohol, caffeine, nicotine, and illegal drugs. They can increase your anxiety level and cause sleep problems. ? Learn and do relaxation techniques. See below for more about these techniques. · Engage your mind. Get out and do something you enjoy. Go to a funny movie, or take a walk or hike. Plan your day. Having too much or too little to do can make you anxious. · Keep a record of your symptoms. Discuss your fears with a good friend or family member, or join a support group for people with similar problems. Talking to others sometimes relieves stress. · Get involved in social groups, or volunteer to help others. Being alone sometimes makes things seem worse than they are. · Get at least 30 minutes of exercise on most days of the week to relieve stress. Walking is a good choice. You also may want to do other activities, such as running, swimming, cycling, or playing tennis or team sports. Relaxation techniques  Do relaxation exercises 10 to 20 minutes a day. You can play soothing, relaxing music while you do them, if you wish. · Tell others in your house that you are going to do your relaxation exercises. Ask them not to disturb you. · Find a comfortable place, away from all distractions and noise. · Lie down on your back, or sit with your back straight. · Focus on your breathing. Make it slow and steady. · Breathe in through your nose. Breathe out through either your nose or mouth.   · Breathe deeply, filling up the area between your navel and your rib cage. Breathe so that your belly goes up and down. · Do not hold your breath. · Breathe like this for 5 to 10 minutes. Notice the feeling of calmness throughout your whole body. As you continue to breathe slowly and deeply, relax by doing the following for another 5 to 10 minutes:  · Tighten and relax each muscle group in your body. You can begin at your toes and work your way up to your head. · Imagine your muscle groups relaxing and becoming heavy. · Empty your mind of all thoughts. · Let yourself relax more and more deeply. · Become aware of the state of calmness that surrounds you. · When your relaxation time is over, you can bring yourself back to alertness by moving your fingers and toes and then your hands and feet and then stretching and moving your entire body. Sometimes people fall asleep during relaxation, but they usually wake up shortly afterward. · Always give yourself time to return to full alertness before you drive a car or do anything that might cause an accident if you are not fully alert. Never play a relaxation tape while you drive a car. When should you call for help? Call 911 anytime you think you may need emergency care. For example, call if:    · You feel you cannot stop from hurting yourself or someone else.   [employer] the numbers for these national suicide hotlines: 6-807-552-TALK (9-479-126-755.443.4518) and 6-528-FQWPNYF (6-325.763.6221). If you or someone you know talks about suicide or feeling hopeless, get help right away.   Watch closely for changes in your health, and be sure to contact your doctor if:    · You have anxiety or fear that affects your life.     · You have symptoms of anxiety that are new or different from those you had before. Where can you learn more? Go to http://felisa-radha.info/. Enter P754 in the search box to learn more about \"Anxiety Disorder: Care Instructions. \"  Current as of: September 11, 2018  Content Version: 11.9  © 8036-1180 [employer]. Care instructions adapted under license by [employer] (which disclaims liability or warranty for this information). If you have questions about a medical condition or this instruction, always ask your healthcare professional. [address] any warranty or liability for your use of this information.

## 2019-03-03 NOTE — ED NOTES
I have reviewed discharge instructions with the patient. The patient verbalized understanding. Patient left ED via Discharge Method: ambulatory to Home with family. Opportunity for questions and clarification provided. Patient given 0 scripts. To continue your aftercare when you leave the hospital, you may receive an automated call from our care team to check in on how you are doing. This is a free service and part of our promise to provide the best care and service to meet your aftercare needs.  If you have questions, or wish to unsubscribe from this service please call [phone]. Thank you for Choosing our [facility] Emergency Department.

## 2019-03-03 NOTE — ED PROVIDER NOTES
Patient is a 76-year-old female who has past medical history of chronic kidney disease and is on dialysis Monday Wednesdays and Fridays. Last dialysis was on Friday. She has had some hypertension and generalized malaise as well. No cp. She does have some nausea, vomiting, and diarrhea. She is coming in with a chief complaint of shortness of breath that lasted a few hours and she's had a cough for the last week. Past Medical History:  
Diagnosis Date  Arthritis  AVF (arteriovenous fistula) ([facility] [address] 12/20/2016 12/6/16 (S) Right AVF revision and thrombectomy  Cancer ([facility] [address] 2015 L kidney  Chronic kidney disease HD in M-W-F- at [facility]  Degenerative joint disease  Diabetes ([facility] [address] checks QD, normal 120-130, hyposymptoms at 90  ESRD (end stage renal disease) [facility]) Nov 2006 ESRD. MWF dialysis  Hypertension  Obesity  Transient ischemic attack 08/29/2015  
 no residual  
 
 
Past Surgical History:  
Procedure Laterality Date  BREAST SURGERY PROCEDURE UNLISTED Right   
 cyst removed  COLONOSCOPY N/A 11/8/2018 COLONOSCOPY  BMI 36 performed by Gianna Ledezma MD at [facility] ENDOSCOPY  
 HX GI  06/01/2002  
 colon resection resulting in temporary colostomy reversal  
 HX GI  08/06/2018  
 exploratory laparotomy  HX GYN    
 stephan  HX OTHER SURGICAL    
 dialysis fistula, several permcaths  HX UROLOGICAL Left July 2015  
 nephrectomy  HX VASCULAR ACCESS    
 IR PLC CATH AV SHUNT IN W PTA SI VENOUS RT  2/28/2019  VASCULAR SURGERY PROCEDURE UNLIST Right AV graft Family History:  
Problem Relation Age of Onset  Diabetes Mother  Heart Disease Mother  Hypertension Mother  Heart Disease Father  Hypertension Father  Malignant Hyperthermia Neg Hx  Pseudocholinesterase Deficiency Neg Hx  Delayed Awakening Neg Hx  Post-op Nausea/Vomiting Neg Hx  Emergence Delirium Neg Hx  Other Neg Hx  Post-op Cognitive Dysfunction Neg Hx Social History Socioeconomic History  Marital status:  Spouse name: Not on file  Number of children: Not on file  Years of education: Not on file  Highest education level: Not on file Social Needs  Financial resource strain: Not on file  Food insecurity - worry: Not on file  Food insecurity - inability: Not on file  Transportation needs - medical: Not on file  Transportation needs - non-medical: Not on file Occupational History  Not on file Tobacco Use  Smoking status: Never Smoker  Smokeless tobacco: Never Used Substance and Sexual Activity  Alcohol use: No  
 Drug use: No  
 Sexual activity: Not Currently Other Topics Concern  Not on file Social History Narrative  Not on file ALLERGIES: Pcn [penicillins] and Vancomycin Review of Systems Constitutional: Negative for chills and fever. Respiratory: Positive for cough and shortness of breath. Negative for chest tightness, wheezing and stridor. Cardiovascular: Negative for chest pain and palpitations. Gastrointestinal: Positive for diarrhea, nausea and vomiting. Negative for abdominal pain. Neurological: Positive for headaches. All other systems reviewed and are negative. Vitals:  
 03/03/19 1036 BP: 160/72 Pulse: 87 Resp: 24 Temp: 98.2 °F (36.8 °C) SpO2: 96% Weight: 99.3 kg (219 lb) Height: 5' 5\" (1.651 m) Physical Exam  
Constitutional: She is oriented to person, place, and time. She appears well-developed and well-nourished. No distress. HENT:  
Head: Normocephalic and atraumatic. Eyes: Conjunctivae and EOM are normal. Pupils are equal, round, and reactive to light. No scleral icterus. Neck: Normal range of motion. Cardiovascular: Normal rate, regular rhythm, normal heart sounds and intact distal pulses. Pulmonary/Chest: Effort normal and breath sounds normal. No stridor. No respiratory distress. She has no wheezes. She has no rales. She exhibits no tenderness. Initially appeared somewhat anxious taking the gas between speaking. Abdominal: Soft. She exhibits no distension and no mass. There is no tenderness. There is no rebound and no guarding. Neurological: She is alert and oriented to person, place, and time. Psychiatric: Her mood appears anxious. tea Nursing note and vitals reviewed. MDM Number of Diagnoses or Management Options Anxiety state:  
SOB (shortness of breath):  
Diagnosis management comments: After the Ativan patient was no longer having any trouble expressing her words her lungs are clear chest x-ray does not show any abnormalities. She is due for dialysis tomorrow which I explained she must go to and she agrees too. I believe she may have gotten a little bit shortness of breath with lying flat and made herself anxious. She was given return precautions if anything were to change. Josias Zuleta MD; 3/3/2019 @12:40 PM Voice dictation software was used during the making of this note. This software is not perfect and grammatical and other typographical errors may be present. This note has not been proofread for errors. 
=================================================================== Amount and/or Complexity of Data Reviewed Clinical lab tests: ordered and reviewed (Results for orders placed or performed during the hospital encounter of 03/03/19 
-CBC WITH AUTOMATED DIFF Result                      Value             Ref Range WBC                         7.8               4.3 - 11.1 K* 
     RBC                         2.62 (L)          4.05 - 5.2 M* HGB                         8.9 (L)           11.7 - 15.4 * HCT                         25.1 (L)          35.8 - 46.3 % MCV                         95.8              79.6 - 97.8 * MCH                         34.0 (H)          26.1 - 32.9 * MCHC                        35.5 (H)          31.4 - 35.0 * RDW                         14.7 (H)          11.9 - 14.6 % PLATELET                    202               150 - 450 K/* MPV                         9.8               9.4 - 12.3 FL ABSOLUTE NRBC               0.00              0.0 - 0.2 K/* DF                          AUTOMATED NEUTROPHILS                 72                43 - 78 % LYMPHOCYTES                 16                13 - 44 % MONOCYTES                   9                 4.0 - 12.0 % EOSINOPHILS                 1                 0.5 - 7.8 % BASOPHILS                   1                 0.0 - 2.0 % IMMATURE GRANULOCYTES       0                 0.0 - 5.0 %   
     ABS. NEUTROPHILS            5.7               1.7 - 8.2 K/* ABS. LYMPHOCYTES            1.3               0.5 - 4.6 K/* ABS. MONOCYTES              0.7               0.1 - 1.3 K/* ABS. EOSINOPHILS            0.1               0.0 - 0.8 K/* ABS. BASOPHILS              0.0               0.0 - 0.2 K/* ABS. IMM. GRANS.            0.0               0.0 - 0.5 K/* 
-METABOLIC PANEL, COMPREHENSIVE Result                      Value             Ref Range Sodium                      139               136 - 145 mm* Potassium                   3.5               3.5 - 5.1 mm* Chloride                    102               98 - 107 mmo* CO2                         29                21 - 32 mmol* Anion gap                   8                 7 - 16 mmol/L Glucose                     110 (H)           65 - 100 mg/* BUN                         34 (H)            8 - 23 MG/DL Creatinine                  7.30 (H)          0.6 - 1.0 MG* 
     GFR est AA                  7 (L)             >60 ml/min/1* GFR est non-AA              6 (L)             >60 ml/min/1* Calcium                     8.6               8.3 - 10.4 M* Bilirubin, total            0.6               0.2 - 1.1 MG* ALT (SGPT)                  15                12 - 65 U/L   
     AST (SGOT)                  22                15 - 37 U/L Alk. phosphatase            94                50 - 136 U/L Protein, total              7.4               6.3 - 8.2 g/* Albumin                     3.7               3.2 - 4.6 g/* Globulin                    3.7 (H)           2.3 - 3.5 g/* A-G Ratio                   1.0 (L)           1.2 - 3.5     
-TROPONIN I Result                      Value             Ref Range Troponin-I, Qt.             <0.02 (L)         0.02 - 0.05 * 
-POC LACTIC ACID Result                      Value             Ref Range Lactic Acid (POC)           0.97              0.5 - 1.9 mm* 
-EKG, 12 LEAD, INITIAL Result                      Value             Ref Range Ventricular Rate            83                BPM           
     Atrial Rate                 83                BPM           
     P-R Interval                166               ms            
     QRS Duration                70                ms Q-T Interval                404               ms            
     QTC Calculation (Bezet)     474               ms            
     Calculated P Axis           59                degrees Calculated R Axis           57                degrees Calculated T Axis           22                degrees Diagnosis                                                   
 !! AGE AND GENDER SPECIFIC ECG ANALYSIS !! Normal sinus rhythm Cannot rule out Anterior infarct , age undetermined Abnormal ECG  When compared with ECG of 03-AUG-2018 11:22, 
 Nonspecific T wave abnormality no longer evident in Anterolateral leads QT has lengthened Confirmed by Dorothy Ojeda MD (), HANNAH QUIGLEY ([id number]) on 3/3/2019 11:43:50 AM 
  ) Tests in the radiology section of CPT®: ordered and reviewed (Xr Chest [facility] Result Date: 3/3/2019 EXAM: Single view chest radiograph. ADDITIONAL CLINICAL INFORMATION: 80 years sob COMPARISON: Chest radiograph dated August 03, 2018. FINDINGS: No focal lung opacity. No pneumothorax. No pleural effusion. The heart, mediastinum, clint, and pulmonary vasculature are within normal limits. No evidence of acute osseous abnormality. Right axillary vascular stent. IMPRESSION: 1. No acute cardiopulmonary abnormality. ) Independent visualization of images, tracings, or specimens: yes Procedures

## 2019-03-17 ENCOUNTER — HOSPITAL ENCOUNTER (EMERGENCY)
Age: 69
Discharge: HOME | End: 2019-03-17
Attending: EMERGENCY MEDICINE | Admitting: EMERGENCY MEDICINE
Payer: MEDICARE

## 2019-03-17 ENCOUNTER — APPOINTMENT (OUTPATIENT)
Dept: GENERAL RADIOLOGY | Age: 69
End: 2019-03-17
Attending: EMERGENCY MEDICINE
Payer: MEDICARE

## 2019-03-17 VITALS
BODY MASS INDEX: 36.49 KG/M2 | SYSTOLIC BLOOD PRESSURE: 141 MMHG | HEIGHT: 65 IN | OXYGEN SATURATION: 97 % | TEMPERATURE: 98.4 F | RESPIRATION RATE: 18 BRPM | DIASTOLIC BLOOD PRESSURE: 80 MMHG | WEIGHT: 219 LBS | HEART RATE: 85 BPM

## 2019-03-17 DIAGNOSIS — S80.01XA CONTUSION OF RIGHT KNEE, INITIAL ENCOUNTER: Primary | ICD-10-CM

## 2019-03-17 DIAGNOSIS — S90.31XA CONTUSION OF RIGHT FOOT, INITIAL ENCOUNTER: ICD-10-CM

## 2019-03-17 PROCEDURE — 73562 X-RAY EXAM OF KNEE 3: CPT

## 2019-03-17 PROCEDURE — 73630 X-RAY EXAM OF FOOT: CPT

## 2019-03-17 PROCEDURE — 99284 EMERGENCY DEPT VISIT MOD MDM: CPT | Performed by: EMERGENCY MEDICINE

## 2019-03-17 PROCEDURE — 74011250637 HC RX REV CODE- 250/637: Performed by: EMERGENCY MEDICINE

## 2019-03-17 RX ORDER — HYDROCODONE BITARTRATE AND ACETAMINOPHEN 10; 325 MG/1; MG/1
1 TABLET ORAL
Status: COMPLETED | OUTPATIENT
Start: 2019-03-17 | End: 2019-03-17

## 2019-03-17 RX ORDER — TRAMADOL HYDROCHLORIDE 50 MG/1
50-100 TABLET ORAL
Qty: 20 TAB | Refills: 0 | Status: SHIPPED | OUTPATIENT
Start: 2019-03-17 | End: 2019-03-20

## 2019-03-17 RX ADMIN — HYDROCODONE BITARTRATE AND ACETAMINOPHEN 1 TABLET: 10; 325 TABLET ORAL at 07:10

## 2019-03-17 NOTE — ED TRIAGE NOTES
Pt states she fell yesterday afternoon when she was going up her steps at home. States she tripped on a rug outside and landed on her knees. States her right knee is hurting her and the top of her foot. States she had a knee replacement on her right knee in 2006.  No swelling noted in triage

## 2019-03-17 NOTE — DISCHARGE INSTRUCTIONS
Rest, ice, elevate, Ace wrap  Ultram and Tylenol for pain  Dialyzed as scheduled      Patient Education     Contusion: Care Instructions  Your Care Instructions  Contusion is the medical term for a bruise. It is the result of a direct blow or an impact, such as a fall. Contusions are common sports injuries. Most people think of a bruise as a black-and-blue spot. This happens when small blood vessels get torn and leak blood under the skin. But bones, muscles, and organs can also get bruised. This may damage deep tissues but not cause a bruise you can see. The doctor will do a physical exam to find the location of your contusion. You may also have tests to make sure you do not have a more serious injury, such as a broken bone or nerve damage. These may include X-rays or other imaging tests like a CT scan or MRI. Deep-tissue contusions may cause pain and swelling. But if there is no serious damage, they will often get better in a few weeks with home treatment. The doctor has checked you carefully, but problems can develop later. If you notice any problems or new symptoms, get medical treatment right away. Follow-up care is a key part of your treatment and safety. Be sure to make and go to all appointments, and call your doctor if you are having problems. It's also a good idea to know your test results and keep a list of the medicines you take. How can you care for yourself at home? · Put ice or a cold pack on the sore area for 10 to 20 minutes at a time to stop swelling. Put a thin cloth between the ice pack and your skin. · Be safe with medicines. Read and follow all instructions on the label. ¨ If the doctor gave you a prescription medicine for pain, take it as prescribed. ¨ If you are not taking a prescription pain medicine, ask your doctor if you can take an over-the-counter medicine. · If you can, prop up the sore area on pillows as much as possible for the next few days.  Try to keep the sore area above the level of your heart. When should you call for help? Call your doctor now or seek immediate medical care if:  · Your pain gets worse. · You have new or worse swelling. · You have tingling, weakness, or numbness in the area near the contusion. · The area near the contusion is cold or pale. Watch closely for changes in your health, and be sure to contact your doctor if:  · You do not get better as expected. Where can you learn more? Go to [employer]  Enter [id number] in the search box to learn more about \"Contusion: Care Instructions. \"   © 8020-5893 [employer]. Care instructions adapted under license by [employer] (which disclaims liability or warranty for this information). This care instruction is for use with your licensed healthcare professional. If you have questions about a medical condition or this instruction, always ask your healthcare professional. [address] any warranty or liability for your use of this information. Content Version: 73.1.642235; Current as of: May 22, 2015         Patient Education        Learning About RICE (Rest, Ice, Compression, and Elevation)  What is RICE? RICE is a way to care for an injury. RICE helps relieve pain and swelling. It may also help with healing and flexibility. RICE stands for:  · Rest and protect the injured or sore area. · Ice or a cold pack used as soon as possible. · Compression, or wrapping the injured or sore area with an elastic bandage. · Elevation (propping up) the injured or sore area. How do you do RICE? You can use RICE for home treatment when you have general aches and pains or after an injury or surgery. Rest  · Do not put weight on the injury for at least 24 to 48 hours. · Use crutches for a badly sprained knee or ankle. · Support a sprained wrist, elbow, or shoulder with a sling. Ice  · Put ice or a cold pack on the injury right away to reduce pain and swelling.  Frozen vegetables will also work as an ice pack. Put a thin cloth between the ice or cold pack and your skin. The cloth protects the injured area from getting too cold. · Use ice for 10 to 15 minutes at a time for the first 48 to 72 hours. Compression  · Use compression for sprains, strains, and surgeries of the arms and legs. · Wrap the injured area with an elastic bandage or compression sleeve to reduce swelling. · Don't wrap it too tightly. If the area below it feels numb, tingles, or feels cool, loosen the wrap. Elevation  · Use elevation for areas of the body that can be propped up, such as arms and legs. · Prop up the injured area on pillows whenever you use ice. Keep it propped up anytime you sit or lie down. · Try to keep the injured area at or above the level of your heart. This will help reduce swelling and bruising. Where can you learn more? Go to http://felisa-radha.info/. Enter Q290 in the search box to learn more about \"Learning About RICE (Rest, Ice, Compression, and Elevation). \"  Current as of: September 20, 2018  Content Version: 11.9  © 2107-5532 [employer]. Care instructions adapted under license by [employer] (which disclaims liability or warranty for this information). If you have questions about a medical condition or this instruction, always ask your healthcare professional. [place] [address] any warranty or liability for your use of this information.

## 2019-03-17 NOTE — ED PROVIDER NOTES
Chief complaint : Knee and foot pain    HISTORY OF PRESENT ILLNESS :  Location : right-sided    Quality : sharp    Quantity : constant    Timing : 4 PM yesterday    Severity : moderate    Alleviating / exacerbating factors : worse with ambulation, no improvement with Tylenol    Associated Symptoms : no numbness    Dialysis patient               Past Medical History:   Diagnosis Date    Arthritis     AVF (arteriovenous fistula) ([facility] 12/20/2016 12/6/16 ([facility]) Right AVF revision and thrombectomy    Cancer ([facility] 2015    L kidney    Chronic kidney disease     HD in M-W-F- at [facility]    Degenerative joint disease     Diabetes ([facility] [address] [facility]     checks QD, normal 120-130, hyposymptoms at 80    ESRD (end stage renal disease) ([facility] [facility] Nov 2006    ESRD.  MWF dialysis     Hypertension     Obesity     Transient ischemic attack 08/29/2015    no residual       Past Surgical History:   Procedure Laterality Date    BREAST SURGERY PROCEDURE UNLISTED Right     cyst removed    COLONOSCOPY N/A 11/8/2018    COLONOSCOPY  BMI 36 performed [facility] ENDOSCOPY    HX GI  06/01/2002    colon resection resulting in temporary colostomy reversal    HX GI  08/06/2018    exploratory laparotomy    HX GYN      stephan    HX OTHER SURGICAL      dialysis fistula, several permcaths    HX UROLOGICAL Left July 2015    nephrectomy    HX VASCULAR ACCESS      IR PLC CATH AV SHUNT IN W PTA SI VENOUS RT  2/28/2019    VASCULAR SURGERY PROCEDURE UNLIST Right     AV graft         Family History:   Problem Relation Age of Onset    Diabetes Mother     Heart Disease Mother     Hypertension Mother     Heart Disease Father     Hypertension Father     Malignant Hyperthermia Neg Hx     Pseudocholinesterase Deficiency Neg Hx     Delayed Awakening Neg Hx     Post-op Nausea/Vomiting Neg Hx     Emergence Delirium Neg Hx     Other Neg Hx     Post-op Cognitive Dysfunction Neg Hx        Social History     Socioeconomic History    Marital status:      Spouse name: Not on file    Number of children: Not on file    Years of education: Not on file    Highest education level: Not on file   Social Needs    Financial resource strain: Not on file    Food insecurity - worry: Not on file    Food insecurity - inability: Not on file    Transportation needs - medical: Not on file   [employer] needs - non-medical: Not on file   Occupational History    Not on file   Tobacco Use    Smoking status: Never Smoker    Smokeless tobacco: Never Used   Substance and Sexual Activity    Alcohol use: No    Drug use: No    Sexual activity: Not Currently   Other Topics Concern    Not on file   Social History Narrative    Not on file         ALLERGIES: Pcn [penicillins] and Vancomycin    Review of Systems   Musculoskeletal: Positive for arthralgias and gait problem. Vitals:    03/17/19 0551   BP: 146/79   Pulse: 95   Resp: 18   Temp: 98.3 °F (36.8 °C)   SpO2: 96%   Weight: 99.3 kg (219 lb)   Height: 5' 5\" (1.651 m)            Physical Exam   Constitutional: She is oriented to person, place, and time. She appears well-developed and well-nourished. No distress. HENT:   Head: Normocephalic and atraumatic. Eyes: Conjunctivae and EOM are normal. Right eye exhibits no discharge. Left eye exhibits no discharge. Neck: Normal range of motion. Neck supple. Pulmonary/Chest: Effort normal. No respiratory distress. Musculoskeletal: Normal range of motion. She exhibits tenderness. Right knee: Tenderness found. Legs:       Right foot: There is tenderness. Feet:    Neurological: She is alert and oriented to person, place, and time. She has normal strength. She exhibits normal muscle tone. cni 2-12 grossly  Nl gait,  Nl speech     Skin: Skin is warm and dry. No rash noted. She is not diaphoretic. Psychiatric: She has a normal mood and affect. Her behavior is normal.   Nursing note and vitals reviewed. MDM  Number of Diagnoses or Management Options  Contusion of right foot, initial encounter:   Contusion of right knee, initial encounter:   Diagnosis management comments: Medical decision making note: Foot and knee contusion status post fall  Negative x-rays  Analgesics, ace, Rice  This concludes the \"medical decision making note\" part of this emergency department visit note.            Procedures

## 2019-03-17 NOTE — ED NOTES
I have reviewed discharge instructions with the patient. The patient verbalized understanding. Patient left ED via Discharge Method: ambulatory to Home with daughter. Opportunity for questions and clarification provided. Patient given 1 scripts. To continue your aftercare when you leave the hospital, you may receive an automated call from our care team to check in on how you are doing. This is a free service and part of our promise to provide the best care and service to meet your aftercare needs.  If you have questions, or wish to unsubscribe from this service please call [phone]. Thank you for Choosing our [facility] Emergency Department.

## 2019-04-09 ENCOUNTER — APPOINTMENT (OUTPATIENT)
Dept: GENERAL RADIOLOGY | Age: 69
End: 2019-04-09
Attending: EMERGENCY MEDICINE
Payer: MEDICARE

## 2019-04-09 ENCOUNTER — HOSPITAL ENCOUNTER (EMERGENCY)
Age: 69
Discharge: HOME | End: 2019-04-10
Attending: EMERGENCY MEDICINE
Payer: MEDICARE

## 2019-04-09 DIAGNOSIS — R07.9 CHEST PAIN, UNSPECIFIED TYPE: Primary | ICD-10-CM

## 2019-04-09 LAB
ALBUMIN SERPL-MCNC: 4 G/DL (ref 3.2–4.6)
ALBUMIN/GLOB SERPL: 1 {RATIO} (ref 1.2–3.5)
ALP SERPL-CCNC: 101 U/L (ref 50–136)
ALT SERPL-CCNC: 14 U/L (ref 12–65)
ANION GAP SERPL CALC-SCNC: 10 MMOL/L (ref 7–16)
AST SERPL-CCNC: 47 U/L (ref 15–37)
ATRIAL RATE: 100 BPM
BASOPHILS # BLD: 0.1 K/UL (ref 0–0.2)
BASOPHILS NFR BLD: 1 % (ref 0–2)
BILIRUB SERPL-MCNC: 0.5 MG/DL (ref 0.2–1.1)
BUN SERPL-MCNC: 29 MG/DL (ref 8–23)
CALCIUM SERPL-MCNC: 9 MG/DL (ref 8.3–10.4)
CALCULATED P AXIS, ECG09: 58 DEGREES
CALCULATED R AXIS, ECG10: 71 DEGREES
CALCULATED T AXIS, ECG11: 48 DEGREES
CHLORIDE SERPL-SCNC: 105 MMOL/L (ref 98–107)
CO2 SERPL-SCNC: 25 MMOL/L (ref 21–32)
CREAT SERPL-MCNC: 6.35 MG/DL (ref 0.6–1)
DIAGNOSIS, 93000: NORMAL
DIFFERENTIAL METHOD BLD: ABNORMAL
EOSINOPHIL # BLD: 0.1 K/UL (ref 0–0.8)
EOSINOPHIL NFR BLD: 2 % (ref 0.5–7.8)
ERYTHROCYTE [DISTWIDTH] IN BLOOD BY AUTOMATED COUNT: 15.7 % (ref 11.9–14.6)
GLOBULIN SER CALC-MCNC: 4.2 G/DL (ref 2.3–3.5)
GLUCOSE SERPL-MCNC: 107 MG/DL (ref 65–100)
HCT VFR BLD AUTO: 23.5 % (ref 35.8–46.3)
HGB BLD-MCNC: 8.3 G/DL (ref 11.7–15.4)
IMM GRANULOCYTES # BLD AUTO: 0 K/UL (ref 0–0.5)
IMM GRANULOCYTES NFR BLD AUTO: 0 % (ref 0–5)
LYMPHOCYTES # BLD: 1.3 K/UL (ref 0.5–4.6)
LYMPHOCYTES NFR BLD: 18 % (ref 13–44)
MCH RBC QN AUTO: 34 PG (ref 26.1–32.9)
MCHC RBC AUTO-ENTMCNC: 35.3 G/DL (ref 31.4–35)
MCV RBC AUTO: 96.3 FL (ref 79.6–97.8)
MONOCYTES # BLD: 0.7 K/UL (ref 0.1–1.3)
MONOCYTES NFR BLD: 10 % (ref 4–12)
NEUTS SEG # BLD: 5.1 K/UL (ref 1.7–8.2)
NEUTS SEG NFR BLD: 70 % (ref 43–78)
NRBC # BLD: 0 K/UL (ref 0–0.2)
P-R INTERVAL, ECG05: 150 MS
PLATELET # BLD AUTO: 221 K/UL (ref 150–450)
PMV BLD AUTO: 10.2 FL (ref 9.4–12.3)
POTASSIUM SERPL-SCNC: 4.6 MMOL/L (ref 3.5–5.1)
PROT SERPL-MCNC: 8.2 G/DL (ref 6.3–8.2)
Q-T INTERVAL, ECG07: 370 MS
QRS DURATION, ECG06: 62 MS
QTC CALCULATION (BEZET), ECG08: 477 MS
RBC # BLD AUTO: 2.44 M/UL (ref 4.05–5.2)
SODIUM SERPL-SCNC: 140 MMOL/L (ref 136–145)
TROPONIN I SERPL-MCNC: <0.02 NG/ML (ref 0.02–0.05)
VENTRICULAR RATE, ECG03: 100 BPM
WBC # BLD AUTO: 7.3 K/UL (ref 4.3–11.1)

## 2019-04-09 PROCEDURE — 71046 X-RAY EXAM CHEST 2 VIEWS: CPT

## 2019-04-09 PROCEDURE — 96374 THER/PROPH/DIAG INJ IV PUSH: CPT | Performed by: EMERGENCY MEDICINE

## 2019-04-09 PROCEDURE — 84484 ASSAY OF TROPONIN QUANT: CPT

## 2019-04-09 PROCEDURE — 93005 ELECTROCARDIOGRAM TRACING: CPT | Performed by: EMERGENCY MEDICINE

## 2019-04-09 PROCEDURE — 99285 EMERGENCY DEPT VISIT HI MDM: CPT | Performed by: EMERGENCY MEDICINE

## 2019-04-09 PROCEDURE — 96375 TX/PRO/DX INJ NEW DRUG ADDON: CPT | Performed by: EMERGENCY MEDICINE

## 2019-04-09 PROCEDURE — 85025 COMPLETE CBC W/AUTO DIFF WBC: CPT

## 2019-04-09 PROCEDURE — 80053 COMPREHEN METABOLIC PANEL: CPT

## 2019-04-09 PROCEDURE — 74011250636 HC RX REV CODE- 250/636: Performed by: EMERGENCY MEDICINE

## 2019-04-09 RX ORDER — MORPHINE SULFATE 4 MG/ML
4 INJECTION INTRAVENOUS
Status: COMPLETED | OUTPATIENT
Start: 2019-04-09 | End: 2019-04-09

## 2019-04-09 RX ORDER — ONDANSETRON 2 MG/ML
4 INJECTION INTRAMUSCULAR; INTRAVENOUS
Status: COMPLETED | OUTPATIENT
Start: 2019-04-09 | End: 2019-04-09

## 2019-04-09 RX ADMIN — ONDANSETRON 4 MG: 2 INJECTION INTRAMUSCULAR; INTRAVENOUS at 23:42

## 2019-04-09 RX ADMIN — MORPHINE SULFATE 4 MG: 4 INJECTION INTRAVENOUS at 23:45

## 2019-04-09 NOTE — ED TRIAGE NOTES
Pt arrives to the ED from home, pt c/o of chest pain that started about an hour ago. Pt states she is having shortness of breath with it. Denies N/V chest tender upon palpation, pt has hx of anxiety

## 2019-04-10 VITALS
HEART RATE: 89 BPM | OXYGEN SATURATION: 93 % | TEMPERATURE: 98 F | HEIGHT: 65 IN | DIASTOLIC BLOOD PRESSURE: 69 MMHG | RESPIRATION RATE: 16 BRPM | WEIGHT: 219 LBS | SYSTOLIC BLOOD PRESSURE: 174 MMHG | BODY MASS INDEX: 36.49 KG/M2

## 2019-04-10 LAB
ATRIAL RATE: 86 BPM
CALCULATED P AXIS, ECG09: 72 DEGREES
CALCULATED R AXIS, ECG10: 53 DEGREES
CALCULATED T AXIS, ECG11: 38 DEGREES
DIAGNOSIS, 93000: NORMAL
P-R INTERVAL, ECG05: 174 MS
Q-T INTERVAL, ECG07: 338 MS
QRS DURATION, ECG06: 66 MS
QTC CALCULATION (BEZET), ECG08: 404 MS
TROPONIN I BLD-MCNC: 0.01 NG/ML (ref 0.02–0.05)
VENTRICULAR RATE, ECG03: 86 BPM

## 2019-04-10 RX ORDER — HYDROXYZINE 50 MG/1
50 TABLET, FILM COATED ORAL
Qty: 15 TAB | Refills: 0 | Status: SHIPPED | OUTPATIENT
Start: 2019-04-10 | End: 2019-04-20

## 2019-04-10 NOTE — ED NOTES
I have reviewed discharge instructions with the patient. The patient verbalized understanding. Patient left ED via Discharge Method: ambulatory to Home with family member. Opportunity for questions and clarification provided. Patient given 1 scripts. To continue your aftercare when you leave the hospital, you may receive an automated call from our care team to check in on how you are doing. This is a free service and part of our promise to provide the best care and service to meet your aftercare needs.  If you have questions, or wish to unsubscribe from this service please call [phone]. Thank you for Choosing our [facility] Emergency Department.

## 2019-04-10 NOTE — DISCHARGE INSTRUCTIONS
Follow-up with your doctor for checkup. Go to dialysis as scheduled tomorrow. Return if any other emergency.

## 2019-04-10 NOTE — ED PROVIDER NOTES
HPI: 
80 female on dialysis, hypertension, Diabetes, or chest pain. Positive and ongoing since yesterday. She is a cough for the past 3 days. No hemoptysis. Went to dialysis on Monday and went 3-1/2 hour as scheduled. Denies any abdominal pain. No fever. Nonproductive cough. No worsening leg swelling. ROS Constitutional: No fever, no chills Skin: no rash Eye: No vision changes ENMT: No sore throat Respiratory: No shortness of breath, + cough Cardiovascular: + chest pain, no palpitations Gastrointestinal: No vomiting, no nausea, no diarrhea, no abdominal pain : No dysuria MSK: No back pain, no muscle pain Neuro: No headache, no change in mental status, no numbness, no tingling, no weakness Psych:  
Endocrine:  
All other review of systems positive per history of present illness and the above otherwise negative or noncontributory. Visit Vitals /68 Pulse 98 Temp 98.9 °F (37.2 °C) Resp 28 Ht 5' 5\" (1.651 m) Wt 99.3 kg (219 lb) SpO2 96% BMI 36.44 kg/m² Past Medical History:  
Diagnosis Date  Arthritis  AVF (arteriovenous fistula) ([facility] [address] 12/20/2016 12/6/16 ([facility]) Right AVF revision and thrombectomy  Cancer ([facility] [address] 2015 L kidney  Chronic kidney disease HD in M[facility]F- at [facility]  Degenerative joint disease  Diabetes ([facility] [address] checks QD, normal 120-130, hyposymptoms at 90  ESRD (end stage renal disease) [facility]) Nov 2006 ESRD. MWF dialysis  Hypertension  Obesity  Transient ischemic attack 08/29/2015  
 no residual  
 
Past Surgical History:  
Procedure Laterality Date  BREAST SURGERY PROCEDURE UNLISTED Right   
 cyst removed  COLONOSCOPY N/A 11/8/2018 COLONOSCOPY  BMI 36 performed by Kiki Adkins MD at [facility] ENDOSCOPY  
 HX GI  06/01/2002  
 colon resection resulting in temporary colostomy reversal  
 HX GI  08/06/2018  
 exploratory laparotomy  HX GYN    
 stephan  HX OTHER SURGICAL dialysis fistula, several permcaths  HX UROLOGICAL Left 2015  
 nephrectomy  HX VASCULAR ACCESS    
 IR PLC CATH AV SHUNT IN W PTA SI VENOUS RT  2019  VASCULAR SURGERY PROCEDURE UNLIST Right AV graft Prior to Admission Medications Prescriptions Last Dose Informant Patient Reported? Taking? VIT C/VIT E/LUTEIN/MIN/OMEGA-3 (OCUVITE PO)   Yes No  
Sig: Take  by mouth nightly. amLODIPine (NORVASC) 10 mg tablet   Yes No  
Sig: Take 10 mg by mouth nightly. cinacalcet (SENSIPAR) 30 mg tablet   Yes No  
Sig: Take 60 mg by mouth nightly. furosemide (LASIX) 80 mg tablet  Self Yes No  
Sig: Take 80 mg by mouth two (2) times a day. Indications: EDEMA, HYPERTENSION  
lidocaine (LIDODERM) 5 %   No No  
Si Patch by TransDERmal route every twenty-four (24) hours. Apply patch to the affected area for 12 hours a day and remove for 12 hours a day. lidocaine 5 % topical cream   No No  
Sig: Apply  to affected area two (2) times daily as needed for Pain. lisinopril (PRINIVIL, ZESTRIL) 40 mg tablet   Yes No  
Sig: Take 40 mg by mouth nightly. Indications: HYPERTENSION  
minoxidil (LONITEN) 2.5 mg tablet   Yes No  
Sig: Take 7.5 mg by mouth two (2) times a day. Indications: HYPERTENSION  
sevelamer carbonate (RENVELA) 800 mg tab tab   Yes No  
Sig: Take 800 mg by mouth three (3) times daily (with meals). 5 tablets with meals/ 2 with snacks Sometimes only eats 2 meals/d  
sitaGLIPtin (JANUVIA) 100 mg tablet   Yes No  
Sig: Take 50 mg by mouth every morning. Indications: TYPE 2 DIABETES MELLITUS Facility-Administered Medications: None Adult Exam  
General: alert, no acute distress Head: normocephalic, atraumatic ENT: moist mucous membranes Neck: supple, non-tender; full range of motion Cardiovascular: regular rate and rhythm, normal peripheral perfusion, trace edema. Equal pulses Chest wall: No rash. No paradoxical chest wall movement.   Reproducible chest wall pain on exam at the midsternum. Respiratory:  normal respirations; no wheezing, rales or rhonchi Gastrointestinal: soft, non-tender; no rebound or guarding, no peritoneal signs, no distension Back: non-tender, full range of motion Musculoskeletal: normal range of motion, normal strength, no gross deformities Neurological: alert and oriented x 4, no gross focal deficits; normal speech Psychiatric: cooperative; appropriate mood and affect MDM: EKG rate of 100 with normal axis, interval.  Sinus rhythm. No STEMI. Troponin negative. Chest pain reproducible. Patient stated she has had this before. It was secondary to anxiety. It feels similar to her previous anxiety episode. She denies hemoptysis. Electrolytes are stable. Troponin is negative. Lungs are clear. Chest x-ray without signs of consolidation, pneumonia, pneumothorax. Chest wall pain is reproducible. This may be secondary to anxiety, chest wall related. We'll repeat a second set of troponin and EKG. If negative will discharge home. 12:40 AM 
Repeat troponin negative. EKG #2 obtained and interpreted by me rate of 86 with normal sinus rhythm. Normal axis. Normal interval.  No STEMI ischemic changes noted. Reproducible chest wall pain. She also had anxiety. She feels better after morphine and Zofran. We'll discharge home with low-dose hydroxyzine. Recommend Tylenol as needed for chest wall pain. Recommend going to dialysis tomorrow. Recommend return if any other emergency Xr Chest Pa Lat Result Date: 4/9/2019 Two view chest History: shortness of breath. Comparison: 05/03/2019 Findings: The heart is borderline enlarged, unchanged is. The lungs and pleural spaces are clear. The pulmonary vascularity is within normal limits. The visualized osseous structures are unremarkable. A vascular stent overlies the right subclavian region. Impression: Borderline cardiomegaly with clear lungs. Recent Results (from the past 24 hour(s)) EKG, 12 LEAD, INITIAL Collection Time: 04/09/19  6:12 PM  
Result Value Ref Range Ventricular Rate 100 BPM  
 Atrial Rate 100 BPM  
 P-R Interval 150 ms QRS Duration 62 ms Q-T Interval 370 ms QTC Calculation (Bezet) 477 ms Calculated P Axis 58 degrees Calculated R Axis 71 degrees Calculated T Axis 48 degrees Diagnosis    
  !!! Poor data quality, interpretation may be adversely affected Normal sinus rhythm Normal ECG When compared with ECG of 03-MAR-2019 10:41, No significant change was found Confirmed by ST ADITI ARREDONDO MD (), TAMEKA SMALLS ([id number]) on 4/9/2019 9:28:07 PM 
  
CBC WITH AUTOMATED DIFF Collection Time: 04/09/19  6:31 PM  
Result Value Ref Range WBC 7.3 4.3 - 11.1 K/uL  
 RBC 2.44 (L) 4.05 - 5.2 M/uL HGB 8.3 (L) 11.7 - 15.4 g/dL HCT 23.5 (L) 35.8 - 46.3 % MCV 96.3 79.6 - 97.8 FL  
 MCH 34.0 (H) 26.1 - 32.9 PG  
 MCHC 35.3 (H) 31.4 - 35.0 g/dL  
 RDW 15.7 (H) 11.9 - 14.6 % PLATELET 001 202 - 070 K/uL MPV 10.2 9.4 - 12.3 FL ABSOLUTE NRBC 0.00 0.0 - 0.2 K/uL  
 DF AUTOMATED NEUTROPHILS 70 43 - 78 % LYMPHOCYTES 18 13 - 44 % MONOCYTES 10 4.0 - 12.0 % EOSINOPHILS 2 0.5 - 7.8 % BASOPHILS 1 0.0 - 2.0 % IMMATURE GRANULOCYTES 0 0.0 - 5.0 %  
 ABS. NEUTROPHILS 5.1 1.7 - 8.2 K/UL  
 ABS. LYMPHOCYTES 1.3 0.5 - 4.6 K/UL  
 ABS. MONOCYTES 0.7 0.1 - 1.3 K/UL  
 ABS. EOSINOPHILS 0.1 0.0 - 0.8 K/UL  
 ABS. BASOPHILS 0.1 0.0 - 0.2 K/UL  
 ABS. IMM. GRANS. 0.0 0.0 - 0.5 K/UL METABOLIC PANEL, COMPREHENSIVE Collection Time: 04/09/19  6:31 PM  
Result Value Ref Range Sodium 140 136 - 145 mmol/L Potassium 4.6 3.5 - 5.1 mmol/L Chloride 105 98 - 107 mmol/L  
 CO2 25 21 - 32 mmol/L Anion gap 10 7 - 16 mmol/L Glucose 107 (H) 65 - 100 mg/dL BUN 29 (H) 8 - 23 MG/DL Creatinine 6.35 (H) 0.6 - 1.0 MG/DL  
 GFR est AA 8 (L) >60 ml/min/1.73m2 GFR est non-AA 7 (L) >60 ml/min/1.73m2 Calcium 9.0 8.3 - 10.4 MG/DL Bilirubin, total 0.5 0.2 - 1.1 MG/DL  
 ALT (SGPT) 14 12 - 65 U/L  
 AST (SGOT) 47 (H) 15 - 37 U/L Alk. phosphatase 101 50 - 136 U/L Protein, total 8.2 6.3 - 8.2 g/dL Albumin 4.0 3.2 - 4.6 g/dL Globulin 4.2 (H) 2.3 - 3.5 g/dL A-G Ratio 1.0 (L) 1.2 - 3.5    
TROPONIN I Collection Time: 04/09/19  6:31 PM  
Result Value Ref Range Troponin-I, Qt. <0.02 (L) 0.02 - 0.05 NG/ML Dragon voice recognition software was used to create this note. Although the note has been reviewed and corrected where necessary, additional errors may have been overlooked and remain in the text.

## 2019-04-10 NOTE — ED NOTES
IV access established at this time; patient resting on stretcher in no acute distress. Verbal report from COURTNEY Hernandez. IV meds administered following obtained access with repeat trop obtained. Pt on cardiac monitoring

## 2019-05-30 ENCOUNTER — HOSPITAL ENCOUNTER (OUTPATIENT)
Dept: INTERVENTIONAL RADIOLOGY/VASCULAR | Age: 69
Discharge: HOME | End: 2019-05-30
Attending: RADIOLOGY
Payer: MEDICARE

## 2019-05-30 VITALS
OXYGEN SATURATION: 93 % | HEIGHT: 65 IN | TEMPERATURE: 98.2 F | DIASTOLIC BLOOD PRESSURE: 74 MMHG | BODY MASS INDEX: 36.49 KG/M2 | HEART RATE: 93 BPM | WEIGHT: 219 LBS | SYSTOLIC BLOOD PRESSURE: 163 MMHG | RESPIRATION RATE: 16 BRPM

## 2019-05-30 DIAGNOSIS — N18.6 ESRD (END STAGE RENAL DISEASE) (HCC): ICD-10-CM

## 2019-05-30 PROCEDURE — 36902 INTRO CATH DIALYSIS CIRCUIT: CPT

## 2019-05-30 PROCEDURE — 74011250636 HC RX REV CODE- 250/636: Performed by: RADIOLOGY

## 2019-05-30 PROCEDURE — 74011250636 HC RX REV CODE- 250/636

## 2019-05-30 PROCEDURE — C1894 INTRO/SHEATH, NON-LASER: HCPCS

## 2019-05-30 PROCEDURE — C1725 CATH, TRANSLUMIN NON-LASER: HCPCS

## 2019-05-30 PROCEDURE — 74011636320 HC RX REV CODE- 636/320: Performed by: RADIOLOGY

## 2019-05-30 PROCEDURE — 77030013519 HC DEV INFL BASIX MRTM -B

## 2019-05-30 PROCEDURE — 77030021532 HC CATH ANGI DX IMPRS MRTM -B

## 2019-05-30 PROCEDURE — C1769 GUIDE WIRE: HCPCS

## 2019-05-30 PROCEDURE — 77030002916 HC SUT ETHLN J&J -A

## 2019-05-30 RX ORDER — MIDAZOLAM HYDROCHLORIDE 1 MG/ML
.5-2 INJECTION, SOLUTION INTRAMUSCULAR; INTRAVENOUS
Status: DISCONTINUED | OUTPATIENT
Start: 2019-05-30 | End: 2019-06-03 | Stop reason: HOSPADM

## 2019-05-30 RX ORDER — LIDOCAINE HYDROCHLORIDE 20 MG/ML
20-400 INJECTION, SOLUTION INFILTRATION; PERINEURAL ONCE
Status: COMPLETED | OUTPATIENT
Start: 2019-05-30 | End: 2019-05-30

## 2019-05-30 RX ORDER — SODIUM CHLORIDE 9 MG/ML
25 INJECTION, SOLUTION INTRAVENOUS CONTINUOUS
Status: DISCONTINUED | OUTPATIENT
Start: 2019-05-30 | End: 2019-06-03 | Stop reason: HOSPADM

## 2019-05-30 RX ORDER — FENTANYL CITRATE 50 UG/ML
25-50 INJECTION, SOLUTION INTRAMUSCULAR; INTRAVENOUS
Status: DISCONTINUED | OUTPATIENT
Start: 2019-05-30 | End: 2019-06-03 | Stop reason: HOSPADM

## 2019-05-30 RX ORDER — HEPARIN SODIUM 200 [USP'U]/100ML
1000 INJECTION, SOLUTION INTRAVENOUS CONTINUOUS
Status: DISCONTINUED | OUTPATIENT
Start: 2019-05-30 | End: 2019-06-03 | Stop reason: HOSPADM

## 2019-05-30 RX ORDER — DIPHENHYDRAMINE HYDROCHLORIDE 50 MG/ML
50 INJECTION, SOLUTION INTRAMUSCULAR; INTRAVENOUS ONCE
Status: COMPLETED | OUTPATIENT
Start: 2019-05-30 | End: 2019-05-30

## 2019-05-30 RX ADMIN — FENTANYL CITRATE 50 MCG: 50 INJECTION, SOLUTION INTRAMUSCULAR; INTRAVENOUS at 13:24

## 2019-05-30 RX ADMIN — HEPARIN SODIUM 2000 UNITS: 200 INJECTION, SOLUTION INTRAVENOUS at 13:17

## 2019-05-30 RX ADMIN — MIDAZOLAM HYDROCHLORIDE 1 MG: 1 INJECTION, SOLUTION INTRAMUSCULAR; INTRAVENOUS at 13:25

## 2019-05-30 RX ADMIN — DIPHENHYDRAMINE HYDROCHLORIDE 50 MG: 50 INJECTION, SOLUTION INTRAMUSCULAR; INTRAVENOUS at 13:16

## 2019-05-30 RX ADMIN — LIDOCAINE HYDROCHLORIDE 60 MG: 20 INJECTION, SOLUTION INFILTRATION; PERINEURAL at 13:13

## 2019-05-30 RX ADMIN — IOPAMIDOL 75 ML: 612 INJECTION, SOLUTION INTRAVENOUS at 13:46

## 2019-05-30 RX ADMIN — MIDAZOLAM HYDROCHLORIDE 1 MG: 1 INJECTION, SOLUTION INTRAMUSCULAR; INTRAVENOUS at 13:16

## 2019-05-30 RX ADMIN — FENTANYL CITRATE 50 MCG: 50 INJECTION, SOLUTION INTRAMUSCULAR; INTRAVENOUS at 13:34

## 2019-05-30 RX ADMIN — FENTANYL CITRATE 50 MCG: 50 INJECTION, SOLUTION INTRAMUSCULAR; INTRAVENOUS at 13:16

## 2019-05-30 RX ADMIN — MIDAZOLAM HYDROCHLORIDE 1 MG: 1 INJECTION, SOLUTION INTRAMUSCULAR; INTRAVENOUS at 13:34

## 2019-05-30 NOTE — DISCHARGE INSTRUCTIONS
[address]  Department of Interventional Radiology  [phone] Office  [phone] Fax       ARTERIOVENOUS FISTULA, GRAFT ACCESS, REVISION, OR DECLOT    ACTIVITY:  Limited activity today. Keep access arm straight and raised above the level of your heart for 24 hours or until the swelling goes away. DIET:  May have your usual food, unless told otherwise by your doctor. PAIN:  Use the prescription for pain medicine your doctor gave you. If you do not have one, usual your normal pain medicine. If this does not control your pain, call your doctor. DO NOT TAKE ASPIRIN OR MEDICINE WITH ASPIRIN IN IT, unless your doctor says you may. DRESSING CARE:   Keep your dressing clean and dry. Leave your dressing on until the Dialysis Nurse or your doctor takes it off. BLEEDING:  If you have any bleeding put pressure over the bleeding area and call your doctor. CARE OF YOUR ACCESS ARM:  You may have some bruising, swelling, and discoloration for several weeks. After the swelling has gone down, you will be able to see the outline of the graft. By placing your fingertips over the graft you will be able to feel a vibration (thrill) that means blood is flowing and the graft is working. DO:  1. Make sure your arm is washed with soap and water every day. 2. Feel for the [address] at area marked in the picture. 3. Call your Kidney doctor if you do not feel the [address] or for any problems like swelling, redness, tingling, or numbness in access arm, pus, or fever over 101. DO NOT:  1. Wear tight sleeves, watches, belts, or bracelets over the graft. 2. Carry heavy bags across the graft or fistula. 3. Sleep on your graft side. 4. Let your blood pressure be taken in your access arm. 5. Let blood be drawn from your access arm. For the next 24 hours, DO NOT Drive, Drink Alcoholic Beverages, or Make IMPORTANT Decisions.     Follow-Up Instructions:  Please see your ordering doctor as he/she has requested. To Reach Us: If you have any questions about your procedure, please call the Interventional Radiology department at [phone]. After business hours (5pm) and weekends, call the answering service at [phone] and ask for the Radiologist on call to be paged. Interventional Radiology General Nurse Discharge    After general anesthesia or intravenous sedation, for 24 hours or while taking prescription Narcotics:  · Limit your activities  · Do not drive and operate hazardous machinery  · Do not make important personal or business decisions  · Do  not drink alcoholic beverages  · If you have not urinated within 8 hours after discharge, please contact your surgeon on call. * Please give a list of your current medications to your Primary Care Provider. * Please update this list whenever your medications are discontinued, doses are     changed, or new medications (including over-the-counter products) are added. * Please carry medication information at all times in case of emergency situations. These are general instructions for a healthy lifestyle:    No smoking/ No tobacco products/ Avoid exposure to second hand smoke  Surgeon General's Warning:  Quitting smoking now greatly reduces serious risk to your health. Obesity, smoking, and sedentary lifestyle greatly increases your risk for illness  A healthy diet, regular physical exercise & weight monitoring are important for maintaining a healthy lifestyle    You may be retaining fluid if you have a history of heart failure or if you experience any of the following symptoms:  Weight gain of 3 pounds or more overnight or 5 pounds in a week, increased swelling in our hands or feet or shortness of breath while lying flat in bed. Please call your doctor as soon as you notice any of these symptoms; do not wait until your next office visit.     Recognize signs and symptoms of STROKE:  F-face looks uneven    A-arms unable to move or move unevenly    S-speech slurred or non-existent    T-time-call 911 as soon as signs and symptoms begin-DO NOT go       Back to bed or wait to see if you get better-TIME IS BRAIN.

## 2019-05-30 NOTE — PROGRESS NOTES
TRANSFER - OUT REPORT:    Verbal report given to Elisha RN(name) on Kecia Fischer  being transferred to IR(unit) for routine progression of care       Report consisted of patients Situation, Background, Assessment and   Recommendations(SBAR). Information from the following report(s) SBAR and MAR was reviewed with the receiving nurse. Lines:       Opportunity for questions and clarification was provided.       Patient transported with:   Registered Nurse

## 2019-05-30 NOTE — PROCEDURES
Department of Interventional Radiology  [phone]        Interventional Radiology Brief Procedure Note    Patient: Ciarra Ford MRN: [id number]  SSN: [id number]    YOB: 1950  Age: 80 y.o. Sex: female      Date of Procedure: 5/30/2019    Pre-Procedure Diagnosis:  AVF dysfunction. Excess bleeding with needle decannulation. Post-Procedure Diagnosis: SAME    Procedure(s): Arteriovenous fistulogram w angioplasty. Brief Description of Procedure: as above    Performed By: Darcy Velásquez MD     Assistants: None    Anesthesia:Moderate Sedation    Estimated Blood Loss: Less than 10ml    Specimens:  None    Implants:  None    Findings: High grade stenosis in the right cephalic vein and brachiocephalic vein. Complications: None    Recommendations: 1 hour bedrest.       Follow Up: 3 months.      Signed By: Darcy Velásquez MD     May 30, 2019

## 2019-05-30 NOTE — PROGRESS NOTES
Per Daisy Montaño RN, Dr. Hilton Garcia is aware patient has no current labs and physician is ok with proceeding with procedure.

## 2019-05-30 NOTE — PROGRESS NOTES
Patient in procedure room at 1249, patient alert and orient, patient will have sedation for this case given by Dr. Trevon Jiménez as no IV access is obtainable.

## 2019-07-23 ENCOUNTER — HOSPITAL ENCOUNTER (OUTPATIENT)
Dept: LAB | Age: 69
Discharge: HOME | End: 2019-07-23
Payer: MEDICARE

## 2019-07-23 DIAGNOSIS — C64.2 MALIGNANT NEOPLASM OF LEFT KIDNEY, EXCEPT RENAL PELVIS (HCC): ICD-10-CM

## 2019-07-23 DIAGNOSIS — C64.9 RENAL CELL CARCINOMA, UNSPECIFIED LATERALITY (HCC): ICD-10-CM

## 2019-07-23 LAB
ALBUMIN SERPL-MCNC: 4 G/DL (ref 3.2–4.6)
ALBUMIN/GLOB SERPL: 1.1 {RATIO} (ref 1.2–3.5)
ALP SERPL-CCNC: 89 U/L (ref 50–136)
ALT SERPL-CCNC: 11 U/L (ref 12–65)
ANION GAP SERPL CALC-SCNC: 6 MMOL/L (ref 7–16)
AST SERPL-CCNC: 15 U/L (ref 15–37)
BASOPHILS # BLD: 0.1 K/UL (ref 0–0.2)
BASOPHILS NFR BLD: 1 % (ref 0–2)
BILIRUB SERPL-MCNC: 0.5 MG/DL (ref 0.2–1.1)
BUN SERPL-MCNC: 26 MG/DL (ref 8–23)
CALCIUM SERPL-MCNC: 9.3 MG/DL (ref 8.3–10.4)
CHLORIDE SERPL-SCNC: 107 MMOL/L (ref 98–107)
CO2 SERPL-SCNC: 30 MMOL/L (ref 21–32)
CREAT SERPL-MCNC: 6.01 MG/DL (ref 0.6–1)
DIFFERENTIAL METHOD BLD: ABNORMAL
EOSINOPHIL # BLD: 0.2 K/UL (ref 0–0.8)
EOSINOPHIL NFR BLD: 2 % (ref 0.5–7.8)
ERYTHROCYTE [DISTWIDTH] IN BLOOD BY AUTOMATED COUNT: 15.7 % (ref 11.9–14.6)
GLOBULIN SER CALC-MCNC: 3.7 G/DL (ref 2.3–3.5)
GLUCOSE SERPL-MCNC: 95 MG/DL (ref 65–100)
HCT VFR BLD AUTO: 25.9 % (ref 35.8–46.3)
HGB BLD-MCNC: 9.2 G/DL (ref 11.7–15.4)
IMM GRANULOCYTES # BLD AUTO: 0 K/UL (ref 0–0.5)
IMM GRANULOCYTES NFR BLD AUTO: 0 % (ref 0–5)
LYMPHOCYTES # BLD: 1.4 K/UL (ref 0.5–4.6)
LYMPHOCYTES NFR BLD: 19 % (ref 13–44)
MCH RBC QN AUTO: 34.1 PG (ref 26.1–32.9)
MCHC RBC AUTO-ENTMCNC: 35.5 G/DL (ref 31.4–35)
MCV RBC AUTO: 95.9 FL (ref 79.6–97.8)
MONOCYTES # BLD: 0.8 K/UL (ref 0.1–1.3)
MONOCYTES NFR BLD: 11 % (ref 4–12)
NEUTS SEG # BLD: 4.8 K/UL (ref 1.7–8.2)
NEUTS SEG NFR BLD: 66 % (ref 43–78)
NRBC # BLD: 0 K/UL (ref 0–0.2)
PLATELET # BLD AUTO: 191 K/UL (ref 150–450)
PMV BLD AUTO: 9.6 FL (ref 9.4–12.3)
POTASSIUM SERPL-SCNC: 4 MMOL/L (ref 3.5–5.1)
PROT SERPL-MCNC: 7.7 G/DL (ref 6.3–8.2)
RBC # BLD AUTO: 2.7 M/UL (ref 4.05–5.25)
SODIUM SERPL-SCNC: 143 MMOL/L (ref 136–145)
WBC # BLD AUTO: 7.2 K/UL (ref 4.3–11.1)

## 2019-07-23 PROCEDURE — 85025 COMPLETE CBC W/AUTO DIFF WBC: CPT

## 2019-07-23 PROCEDURE — 36415 COLL VENOUS BLD VENIPUNCTURE: CPT

## 2019-07-23 PROCEDURE — 80053 COMPREHEN METABOLIC PANEL: CPT

## 2019-07-29 ENCOUNTER — HOSPITAL ENCOUNTER (OUTPATIENT)
Dept: CT IMAGING | Age: 69
Discharge: HOME | End: 2019-07-29
Attending: INTERNAL MEDICINE
Payer: MEDICARE

## 2019-07-29 DIAGNOSIS — C64.9 RENAL CELL CARCINOMA, UNSPECIFIED LATERALITY (HCC): ICD-10-CM

## 2019-07-29 DIAGNOSIS — C64.2 MALIGNANT NEOPLASM OF LEFT KIDNEY, EXCEPT RENAL PELVIS (HCC): ICD-10-CM

## 2019-07-29 PROCEDURE — 74011636320 HC RX REV CODE- 636/320: Performed by: INTERNAL MEDICINE

## 2019-07-29 PROCEDURE — 74011000258 HC RX REV CODE- 258: Performed by: INTERNAL MEDICINE

## 2019-07-29 PROCEDURE — 74177 CT ABD & PELVIS W/CONTRAST: CPT

## 2019-07-29 RX ORDER — SODIUM CHLORIDE 0.9 % (FLUSH) 0.9 %
10 SYRINGE (ML) INJECTION
Status: COMPLETED | OUTPATIENT
Start: 2019-07-29 | End: 2019-07-29

## 2019-07-29 RX ADMIN — DIATRIZOATE MEGLUMINE AND DIATRIZOATE SODIUM 15 ML: 660; 100 LIQUID ORAL; RECTAL at 16:45

## 2019-07-29 RX ADMIN — Medication 10 ML: at 16:45

## 2019-07-29 RX ADMIN — IOPAMIDOL 100 ML: 755 INJECTION, SOLUTION INTRAVENOUS at 16:45

## 2019-07-29 RX ADMIN — SODIUM CHLORIDE 100 ML: 900 INJECTION, SOLUTION INTRAVENOUS at 16:45

## 2019-09-03 ENCOUNTER — HOSPITAL ENCOUNTER (OUTPATIENT)
Dept: INTERVENTIONAL RADIOLOGY/VASCULAR | Age: 69
Discharge: HOME | End: 2019-09-03
Attending: RADIOLOGY
Payer: MEDICARE

## 2019-09-03 VITALS
HEART RATE: 80 BPM | DIASTOLIC BLOOD PRESSURE: 62 MMHG | RESPIRATION RATE: 16 BRPM | HEIGHT: 66 IN | BODY MASS INDEX: 35.2 KG/M2 | OXYGEN SATURATION: 90 % | SYSTOLIC BLOOD PRESSURE: 135 MMHG | WEIGHT: 219 LBS

## 2019-09-03 DIAGNOSIS — N18.6 ESRD (END STAGE RENAL DISEASE) (HCC): ICD-10-CM

## 2019-09-03 LAB — GLUCOSE BLD STRIP.AUTO-MCNC: 105 MG/DL (ref 65–100)

## 2019-09-03 PROCEDURE — C1894 INTRO/SHEATH, NON-LASER: HCPCS

## 2019-09-03 PROCEDURE — 36901 INTRO CATH DIALYSIS CIRCUIT: CPT

## 2019-09-03 PROCEDURE — 74011636320 HC RX REV CODE- 636/320: Performed by: RADIOLOGY

## 2019-09-03 PROCEDURE — C1725 CATH, TRANSLUMIN NON-LASER: HCPCS

## 2019-09-03 PROCEDURE — 82962 GLUCOSE BLOOD TEST: CPT

## 2019-09-03 PROCEDURE — 77030013519 HC DEV INFL BASIX MRTM -B

## 2019-09-03 PROCEDURE — 74011000250 HC RX REV CODE- 250: Performed by: RADIOLOGY

## 2019-09-03 PROCEDURE — 74011250636 HC RX REV CODE- 250/636

## 2019-09-03 PROCEDURE — C1769 GUIDE WIRE: HCPCS

## 2019-09-03 PROCEDURE — 77030021532 HC CATH ANGI DX IMPRS MRTM -B

## 2019-09-03 PROCEDURE — 77030002916 HC SUT ETHLN J&J -A

## 2019-09-03 PROCEDURE — 74011250636 HC RX REV CODE- 250/636: Performed by: RADIOLOGY

## 2019-09-03 RX ORDER — HEPARIN SODIUM 200 [USP'U]/100ML
1000 INJECTION, SOLUTION INTRAVENOUS
Status: DISCONTINUED | OUTPATIENT
Start: 2019-09-03 | End: 2019-09-03 | Stop reason: ALTCHOICE

## 2019-09-03 RX ORDER — LIDOCAINE HYDROCHLORIDE 20 MG/ML
2-20 INJECTION, SOLUTION INFILTRATION; PERINEURAL ONCE
Status: CANCELLED | OUTPATIENT
Start: 2019-09-03 | End: 2019-09-03

## 2019-09-03 RX ORDER — DIPHENHYDRAMINE HYDROCHLORIDE 50 MG/ML
12.5-5 INJECTION, SOLUTION INTRAMUSCULAR; INTRAVENOUS ONCE
Status: CANCELLED | OUTPATIENT
Start: 2019-09-03 | End: 2019-09-03

## 2019-09-03 RX ORDER — MIDAZOLAM HYDROCHLORIDE 1 MG/ML
.25-2 INJECTION, SOLUTION INTRAMUSCULAR; INTRAVENOUS
Status: DISCONTINUED | OUTPATIENT
Start: 2019-09-03 | End: 2019-09-03 | Stop reason: ALTCHOICE

## 2019-09-03 RX ORDER — LIDOCAINE HYDROCHLORIDE 20 MG/ML
20-200 INJECTION, SOLUTION INFILTRATION; PERINEURAL
Status: DISCONTINUED | OUTPATIENT
Start: 2019-09-03 | End: 2019-09-03 | Stop reason: ALTCHOICE

## 2019-09-03 RX ORDER — DIPHENHYDRAMINE HYDROCHLORIDE 50 MG/ML
12.5-5 INJECTION, SOLUTION INTRAMUSCULAR; INTRAVENOUS ONCE
Status: COMPLETED | OUTPATIENT
Start: 2019-09-03 | End: 2019-09-03

## 2019-09-03 RX ORDER — HEPARIN SODIUM 200 [USP'U]/100ML
1000 INJECTION, SOLUTION INTRAVENOUS AS NEEDED
Status: CANCELLED | OUTPATIENT
Start: 2019-09-03

## 2019-09-03 RX ORDER — FENTANYL CITRATE 50 UG/ML
25-50 INJECTION, SOLUTION INTRAMUSCULAR; INTRAVENOUS
Status: CANCELLED | OUTPATIENT
Start: 2019-09-03

## 2019-09-03 RX ORDER — FENTANYL CITRATE 50 UG/ML
25-100 INJECTION, SOLUTION INTRAMUSCULAR; INTRAVENOUS
Status: DISCONTINUED | OUTPATIENT
Start: 2019-09-03 | End: 2019-09-03 | Stop reason: ALTCHOICE

## 2019-09-03 RX ORDER — MIDAZOLAM HYDROCHLORIDE 1 MG/ML
.5-2 INJECTION, SOLUTION INTRAMUSCULAR; INTRAVENOUS
Status: CANCELLED | OUTPATIENT
Start: 2019-09-03

## 2019-09-03 RX ORDER — HYDROCODONE BITARTRATE AND ACETAMINOPHEN 7.5; 325 MG/1; MG/1
1 TABLET ORAL
Status: DISCONTINUED | OUTPATIENT
Start: 2019-09-03 | End: 2019-09-07 | Stop reason: HOSPADM

## 2019-09-03 RX ORDER — SODIUM CHLORIDE 9 MG/ML
25 INJECTION, SOLUTION INTRAVENOUS ONCE
Status: DISCONTINUED | OUTPATIENT
Start: 2019-09-03 | End: 2019-09-03 | Stop reason: ALTCHOICE

## 2019-09-03 RX ADMIN — MIDAZOLAM HYDROCHLORIDE 0.5 MG: 1 INJECTION, SOLUTION INTRAMUSCULAR; INTRAVENOUS at 11:28

## 2019-09-03 RX ADMIN — HEPARIN SODIUM 2000 UNITS: 200 INJECTION, SOLUTION INTRAVENOUS at 11:12

## 2019-09-03 RX ADMIN — MIDAZOLAM HYDROCHLORIDE 1 MG: 1 INJECTION, SOLUTION INTRAMUSCULAR; INTRAVENOUS at 11:14

## 2019-09-03 RX ADMIN — MIDAZOLAM HYDROCHLORIDE 1 MG: 1 INJECTION, SOLUTION INTRAMUSCULAR; INTRAVENOUS at 11:20

## 2019-09-03 RX ADMIN — FENTANYL CITRATE 50 MCG: 50 INJECTION, SOLUTION INTRAMUSCULAR; INTRAVENOUS at 11:14

## 2019-09-03 RX ADMIN — FENTANYL CITRATE 50 MCG: 50 INJECTION, SOLUTION INTRAMUSCULAR; INTRAVENOUS at 11:20

## 2019-09-03 RX ADMIN — HEPARIN SODIUM 2000 UNITS: 200 INJECTION, SOLUTION INTRAVENOUS at 11:18

## 2019-09-03 RX ADMIN — MIDAZOLAM HYDROCHLORIDE 0.5 MG: 1 INJECTION, SOLUTION INTRAMUSCULAR; INTRAVENOUS at 11:36

## 2019-09-03 RX ADMIN — FENTANYL CITRATE 25 MCG: 50 INJECTION, SOLUTION INTRAMUSCULAR; INTRAVENOUS at 11:36

## 2019-09-03 RX ADMIN — LIDOCAINE HYDROCHLORIDE 100 MG: 20 INJECTION, SOLUTION INFILTRATION; PERINEURAL at 11:12

## 2019-09-03 RX ADMIN — FENTANYL CITRATE 25 MCG: 50 INJECTION, SOLUTION INTRAMUSCULAR; INTRAVENOUS at 11:28

## 2019-09-03 RX ADMIN — IOPAMIDOL 20 ML: 612 INJECTION, SOLUTION INTRAVENOUS at 11:43

## 2019-09-03 RX ADMIN — DIPHENHYDRAMINE HYDROCHLORIDE 50 MG: 50 INJECTION, SOLUTION INTRAMUSCULAR; INTRAVENOUS at 11:14

## 2019-09-03 NOTE — DISCHARGE INSTRUCTIONS
[address]  Department of Interventional Radiology  [facility]   [phone] Office  [phone] FAX  Angiography Discharge Instructions    General Information: This test is done to evaluate circulation in the extremities. In the case of a stenosis (narrowing) of the arteries, we can sometimes fix the problem either with a balloon, stent, or a combination. If there is a stenosis that we cannot fix or if there is no problem seen on the angiography study, then you will be able to go home today. If there is something that we can fix, you will be spending the night in the hospital.  If we do find a stenosis that we are not able to correct, then the study will be forwarded to the vascular surgeon, who can take you to the operating room to improve the circulation. You will be asked to lie flat on your back for 3-6 hours after the procedure to prevent bleeding complications. Sometimes the physician will use an arterial closure device that will decrease your recovery time. If this is used, your nurse will explain the difference in recovery. We have no way to determine if we can use a closure device until the procedure is started. We will let you know when you wake up after the procedure. Home Care Instructions: You can resume your regular diet. Do not shower, bathe, swim, drink alcohol, or make any important legal decisions in the next 48 hours. You may drive after 24 hours. Do not lift anything heavier than a gallon of milk for 5 days. Watch the site carefully for signs of infection, like fever, drainage, redness, and/or swelling. If you take Glucophage (Metformin) for diabetes, do not take it for the next 48 hours. If you were asked to hold any blood thinners prior to the procedure, you may restart that medicine the day after the procedure is completed. Recline your car seat for the ride home.     Call If: You should call your Physician and/or the Radiology Nurse if you have any signs of infection like fever, drainage, redness, and/or swelling. If the puncture site should ooze, please call. Also call if you have any pain, decreased sensation, numbness, tingling, swelling, or change in color to the affected extremity. SEEK IMMEDIATE MEDICAL CARE IF YOUR PUNCTURE SITE STARTS TO BLEED. APPLY ENOUGH FIRM PRESSURE TO THE SITE WITH THE TIPS OF YOUR FINGERS TO STOP THE BLEEDING. Arterial bleeding is a medical emergency and should be evaluated immediately. Follow-Up Instructions:  Please see your ordering doctor as he/she has requested. To Reach Us: If you have any questions about your procedure, please call the Interventional Radiology department at [phone]. After business hours (5pm) and weekends, call the answering service at [phone] and ask for the Radiologist on call to be paged. Interventional Radiology General Nurse Discharge    After general anesthesia or intravenous sedation, for 24 hours or while taking prescription Narcotics:  · Limit your activities  · Do not drive and operate hazardous machinery  · Do not make important personal or business decisions  · Do  not drink alcoholic beverages  · If you have not urinated within 8 hours after discharge, please contact your surgeon on call. * Please give a list of your current medications to your Primary Care Provider. * Please update this list whenever your medications are discontinued, doses are     changed, or new medications (including over-the-counter products) are added. * Please carry medication information at all times in case of emergency situations. These are general instructions for a healthy lifestyle:    No smoking/ No tobacco products/ Avoid exposure to second hand smoke  Surgeon General's Warning:  Quitting smoking now greatly reduces serious risk to your health.     Obesity, smoking, and sedentary lifestyle greatly increases your risk for illness  A healthy diet, regular physical exercise & weight monitoring are important for maintaining a healthy lifestyle    You may be retaining fluid if you have a history of heart failure or if you experience any of the following symptoms:  Weight gain of 3 pounds or more overnight or 5 pounds in a week, increased swelling in our hands or feet or shortness of breath while lying flat in bed. Please call your doctor as soon as you notice any of these symptoms; do not wait until your next office visit. Recognize signs and symptoms of STROKE:  F-face looks uneven    A-arms unable to move or move unevenly    S-speech slurred or non-existent    T-time-call 911 as soon as signs and symptoms begin-DO NOT go       Back to bed or wait to see if you get better-TIME IS BRAIN. Interventional Radiology General Nurse Discharge    After general anesthesia or intravenous sedation, for 24 hours or while taking prescription Narcotics:  · Limit your activities  · Do not drive and operate hazardous machinery  · Do not make important personal or business decisions  · Do  not drink alcoholic beverages  · If you have not urinated within 8 hours after discharge, please contact your surgeon on call. * Please give a list of your current medications to your Primary Care Provider. * Please update this list whenever your medications are discontinued, doses are     changed, or new medications (including over-the-counter products) are added. * Please carry medication information at all times in case of emergency situations. These are general instructions for a healthy lifestyle:    No smoking/ No tobacco products/ Avoid exposure to second hand smoke  Surgeon General's Warning:  Quitting smoking now greatly reduces serious risk to your health.     Obesity, smoking, and sedentary lifestyle greatly increases your risk for illness  A healthy diet, regular physical exercise & weight monitoring are important for maintaining a healthy lifestyle    You may be retaining fluid if you have a history of heart failure or if you experience any of the following symptoms:  Weight gain of 3 pounds or more overnight or 5 pounds in a week, increased swelling in our hands or feet or shortness of breath while lying flat in bed. Please call your doctor as soon as you notice any of these symptoms; do not wait until your next office visit. Recognize signs and symptoms of STROKE:  F-face looks uneven    A-arms unable to move or move unevenly    S-speech slurred or non-existent    T-time-call 911 as soon as signs and symptoms begin-DO NOT go       Back to bed or wait to see if you get better-TIME IS BRAIN.         Patient Signature:  Date: 9/3/2019  Discharging Nurse: Stacy Ervin

## 2019-09-03 NOTE — PROGRESS NOTES
TRANSFER - OUT REPORT:    Verbal report given to Mindy Acosta RN (name) on German Gallardo  being transferred to IR recovery (unit) for routine progression of care       Report consisted of patients Situation, Background, Assessment and   Recommendations(SBAR). Information from the following report(s) Procedure Summary and MAR was reviewed with the receiving nurse. Opportunity for questions and clarification was provided. Conscious Sedation:   150 Mcg of Fentanyl administered  3 Mg of Versed administered  50 mg of Benadryl administered    Pt tolerated procedure well. Visit Vitals  /59   Pulse 87   Resp 12   Ht 5' 5.5\" (1.664 m)   Wt 99.3 kg (219 lb)   SpO2 96%   Breastfeeding? No   BMI 35.89 kg/m²     Past Medical History:   Diagnosis Date    Arthritis     AVF [facility]arteriovenous fistula) [facility] [address] 12/20/2016 12/6/16 ([facility]) Right AVF revision and thrombectomy    Cancer ([facility] [address] 2015    L kidney    Chronic kidney disease     HD in M-W-F- at [facility]    Degenerative joint disease     Diabetes ([facility] [address]     checks QD, normal 120-130, hyposymptoms at 80    ESRD (end stage renal disease) [facility]) Nov 2006    ESRD.  MWF dialysis     Hypertension     Obesity     Transient ischemic attack 08/29/2015    no residual

## 2019-09-03 NOTE — H&P
Department of Interventional Radiology  [phone]    History and Physical    Patient: David Fleming MRN:  [id number]  SSN:  [id number]    YOB: 1950  Age:  80 y.o. Sex:  female      Primary Care Provider:  Joaquin Lorenz MD  Referring Physician:  Kristan Strauss MD    Subjective:     Chief Complaint: AV access malfunction    History of the Present Illness: The patient is a 80 y.o. female with ESRD who presents RUE fistulogram.  Prolong bleeding with decannulation following HD.   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 Last dialysis yesterday. NPO. Past Medical History:   Diagnosis Date    Arthritis     AVF (arteriovenous fistula) ([facility] [address] 12/20/2016 12/6/16 ([facility]) Right AVF revision and thrombectomy    Cancer ([facility] [address] 2015    L kidney    Chronic kidney disease     HD in M-W-F- at [facility]    Degenerative joint disease     Diabetes ([facility] [address]     checks QD, normal 120-130, hyposymptoms at 80    ESRD (end stage renal disease) [facility]) Nov 2006    ESRD. MWF dialysis     Hypertension     Obesity     Transient ischemic attack 08/29/2015    no residual     Past Surgical History:   Procedure Laterality Date    BREAST SURGERY PROCEDURE UNLISTED Right     cyst removed    COLONOSCOPY N/A 11/8/2018    COLONOSCOPY  BMI 36 performed by [facility] Montes MD at [facility] ENDOSCOPY    HX GI  06/01/2002    colon resection resulting in temporary colostomy reversal    HX GI  08/06/2018    exploratory laparotomy    HX GYN      stephan    HX OTHER SURGICAL      dialysis fistula, several permcaths    HX UROLOGICAL Left July 2015    nephrectomy    HX VASCULAR ACCESS      IR PLC CATH AV SHUNT IN W PTA SI VENOUS  5/30/2019    IR PLC CATH AV SHUNT IN W PTA SI VENOUS RT  2/28/2019    VASCULAR SURGERY PROCEDURE UNLIST Right     AV graft        Review of Systems:    Pertinent items are noted in the History of Present Illness.     Prior to Admission medications Medication Sig Start Date End Date Taking? Authorizing Provider   amLODIPine (NORVASC) 10 mg tablet Take 10 mg by mouth nightly. Yes Provider, Historical   VIT C/VIT E/LUTEIN/MIN/OMEGA-3 (OCUVITE PO) Take  by mouth nightly. Yes Provider, Historical   cinacalcet (SENSIPAR) 30 mg tablet Take 60 mg by mouth nightly. Yes Provider, Historical   sevelamer carbonate (RENVELA) 800 mg tab tab Take 800 mg by mouth three (3) times daily (with meals). 5 tablets with meals/ 2 with snacks  Sometimes only eats 2 meals/d   Yes Provider, Historical   lisinopril (PRINIVIL, ZESTRIL) 40 mg tablet Take 40 mg by mouth nightly. Indications: HYPERTENSION 6/8/15  Yes Provider, Historical   sitaGLIPtin (JANUVIA) 100 mg tablet Take 50 mg by mouth every morning. Indications: TYPE 2 DIABETES MELLITUS   Yes Provider, Historical   lidocaine 5 % topical cream Apply  to affected area two (2) times daily as needed for Pain. 9/1/17   FRANCISCO Vivar   minoxidil (LONITEN) 2.5 mg tablet Take 7.5 mg by mouth two (2) times a day.  Indications: HYPERTENSION    Other, MD Jaylon        Allergies   Allergen Reactions    Pcn [Penicillins] Rash    Vancomycin Rash       Family History   Problem Relation Age of Onset    Diabetes Mother     Heart Disease Mother     Hypertension Mother     Heart Disease Father     Hypertension Father     Malignant Hyperthermia Neg Hx     Pseudocholinesterase Deficiency Neg Hx     Delayed Awakening Neg Hx     Post-op Nausea/Vomiting Neg Hx     Emergence Delirium Neg Hx     Other Neg Hx     Post-op Cognitive Dysfunction Neg Hx      Social History     Tobacco Use    Smoking status: Never Smoker    Smokeless tobacco: Never Used   Substance Use Topics    Alcohol use: No        Objective:       Physical Examination:    Vitals:    09/03/19 0913   BP: 130/58   Pulse: 81   Resp: 16   SpO2: 98%   Weight: 99.3 kg (219 lb)   Height: 5' 5.5\" (1.664 m)       HEART: regular rate and rhythm  LUNG: clear to auscultation bilaterally  ABDOMEN: normal findings: soft, non-tender, obese  EXTREMITIES: normal strength, tone, and muscle mass    Laboratory:     Lab Results   Component Value Date/Time    Sodium 143 07/23/2019 07:50 AM    Sodium 140 04/09/2019 06:31 PM    Potassium 4.0 07/23/2019 07:50 AM    Potassium 4.6 04/09/2019 06:31 PM    Chloride 107 07/23/2019 07:50 AM    Chloride 105 04/09/2019 06:31 PM    CO2 30 07/23/2019 07:50 AM    CO2 25 04/09/2019 06:31 PM    Anion gap 6 (L) 07/23/2019 07:50 AM    Anion gap 10 04/09/2019 06:31 PM    Glucose 95 07/23/2019 07:50 AM    Glucose 107 (H) 04/09/2019 06:31 PM    BUN 26 (H) 07/23/2019 07:50 AM    BUN 29 (H) 04/09/2019 06:31 PM    Creatinine 6.01 (H) 07/23/2019 07:50 AM    Creatinine 6.35 (H) 04/09/2019 06:31 PM    GFR est AA 9 (L) 07/23/2019 07:50 AM    GFR est AA 8 (L) 04/09/2019 06:31 PM    GFR est non-AA 7 (L) 07/23/2019 07:50 AM    GFR est non-AA 7 (L) 04/09/2019 06:31 PM    Calcium 9.3 07/23/2019 07:50 AM    Calcium 9.0 04/09/2019 06:31 PM    Magnesium 2.4 08/08/2018 03:57 AM    Magnesium 2.7 (H) 08/06/2018 04:37 AM    Phosphorus 5.2 (H) 08/05/2018 04:30 AM    Phosphorus 2.1 (L) 08/03/2018 11:11 AM    Albumin 4.0 07/23/2019 07:50 AM    Albumin 4.0 04/09/2019 06:31 PM    Protein, total 7.7 07/23/2019 07:50 AM    Protein, total 8.2 04/09/2019 06:31 PM    Globulin 3.7 (H) 07/23/2019 07:50 AM    Globulin 4.2 (H) 04/09/2019 06:31 PM    A-G Ratio 1.1 (L) 07/23/2019 07:50 AM    A-G Ratio 1.0 (L) 04/09/2019 06:31 PM    AST (SGOT) 15 07/23/2019 07:50 AM    AST (SGOT) 47 (H) 04/09/2019 06:31 PM    ALT (SGPT) 11 (L) 07/23/2019 07:50 AM    ALT (SGPT) 14 04/09/2019 06:31 PM     Lab Results   Component Value Date/Time    WBC 7.2 07/23/2019 07:50 AM    WBC 7.3 04/09/2019 06:31 PM    HGB 9.2 (L) 07/23/2019 07:50 AM    HGB 8.3 (L) 04/09/2019 06:31 PM    HCT 25.9 (L) 07/23/2019 07:50 AM    HCT 23.5 (L) 04/09/2019 06:31 PM    PLATELET 899 00/54/0476 07:50 AM    PLATELET 853 09/54/0231 06:31 PM     Lab Results   Component Value Date/Time    aPTT 22.2 (L) 11/16/2015 12:00 AM    aPTT 27.2 01/29/2013 12:20 PM    Prothrombin time 10.2 11/16/2015 12:00 AM    Prothrombin time 10.5 08/29/2015 04:30 PM    INR 0.9 11/16/2015 12:00 AM    INR 1.0 08/29/2015 04:30 PM       Assessment:     ESRD, AV access malfunction    Hospital Problems  Date Reviewed: 7/23/2019    None          Plan:     Planned Procedure:  fistulogram    Risks, benefits, and alternatives reviewed with patient and she agrees to proceed with the procedure.       Signed By: Rohit Suazo PA-C     September 3, 2019

## 2019-09-03 NOTE — PROCEDURES
Department of Interventional Radiology  [phone]        Interventional Radiology Brief Procedure Note    Patient: Murray Boyle MRN: [id number]  SSN: [id number]    YOB: 1950  Age: 80 y.o. Sex: female      Date of Procedure: 9/3/2019    Pre-Procedure Diagnosis: Dysfunctional AVFistula, high pressure, exessive bleeding. Post-Procedure Diagnosis: SAME    Procedure(s): AV Fistulogram w PTA. Brief Description of Procedure: as above    Performed By: Simona Hassan MD     Assistants: None    Anesthesia:Moderate Sedation    Estimated Blood Loss: Less than 10ml    Specimens:  None    Implants:  None    Findings: R cephalic v stenosis. R subclavian v stenosis. Complications: None    Recommendations: 1 hour bedrest.   REmove suture in a few days. Follow Up: 3 months.       Signed By: Simona Hassan MD     September 3, 2019

## 2019-09-14 NOTE — ED TRIAGE NOTES
Pt arrives POV from home c/o abd cramps and diarrhea x 2 days. Also c/o h/a.  Denies n/v. I concur with the Admission Order and I certify that services are provided in accordance with Section 42 CFR § 412.3

## 2019-11-01 ENCOUNTER — HOSPITAL ENCOUNTER (EMERGENCY)
Age: 69
Discharge: HOME | End: 2019-11-02
Attending: EMERGENCY MEDICINE
Payer: MEDICARE

## 2019-11-01 ENCOUNTER — APPOINTMENT (OUTPATIENT)
Dept: GENERAL RADIOLOGY | Age: 69
End: 2019-11-01
Attending: EMERGENCY MEDICINE
Payer: MEDICARE

## 2019-11-01 DIAGNOSIS — R10.9 LEFT SIDED ABDOMINAL PAIN: Primary | ICD-10-CM

## 2019-11-01 LAB
BASOPHILS # BLD: 0.1 K/UL (ref 0–0.2)
BASOPHILS NFR BLD: 1 % (ref 0–2)
DIFFERENTIAL METHOD BLD: ABNORMAL
EOSINOPHIL # BLD: 0.2 K/UL (ref 0–0.8)
EOSINOPHIL NFR BLD: 3 % (ref 0.5–7.8)
ERYTHROCYTE [DISTWIDTH] IN BLOOD BY AUTOMATED COUNT: 15.7 % (ref 11.9–14.6)
HCT VFR BLD AUTO: 25.9 % (ref 35.8–46.3)
HGB BLD-MCNC: 9.1 G/DL (ref 11.7–15.4)
IMM GRANULOCYTES # BLD AUTO: 0 K/UL (ref 0–0.5)
IMM GRANULOCYTES NFR BLD AUTO: 1 % (ref 0–5)
LYMPHOCYTES # BLD: 1.3 K/UL (ref 0.5–4.6)
LYMPHOCYTES NFR BLD: 19 % (ref 13–44)
MCH RBC QN AUTO: 33.6 PG (ref 26.1–32.9)
MCHC RBC AUTO-ENTMCNC: 35.1 G/DL (ref 31.4–35)
MCV RBC AUTO: 95.6 FL (ref 79.6–97.8)
MONOCYTES # BLD: 0.6 K/UL (ref 0.1–1.3)
MONOCYTES NFR BLD: 9 % (ref 4–12)
NEUTS SEG # BLD: 4.7 K/UL (ref 1.7–8.2)
NEUTS SEG NFR BLD: 68 % (ref 43–78)
NRBC # BLD: 0 K/UL (ref 0–0.2)
PLATELET # BLD AUTO: 195 K/UL (ref 150–450)
PMV BLD AUTO: 10 FL (ref 9.4–12.3)
RBC # BLD AUTO: 2.71 M/UL (ref 4.05–5.2)
WBC # BLD AUTO: 7 K/UL (ref 4.3–11.1)

## 2019-11-01 PROCEDURE — 74022 RADEX COMPL AQT ABD SERIES: CPT

## 2019-11-01 PROCEDURE — 80053 COMPREHEN METABOLIC PANEL: CPT

## 2019-11-01 PROCEDURE — 74011250636 HC RX REV CODE- 250/636: Performed by: EMERGENCY MEDICINE

## 2019-11-01 PROCEDURE — 85025 COMPLETE CBC W/AUTO DIFF WBC: CPT

## 2019-11-01 PROCEDURE — 96374 THER/PROPH/DIAG INJ IV PUSH: CPT | Performed by: EMERGENCY MEDICINE

## 2019-11-01 PROCEDURE — 99284 EMERGENCY DEPT VISIT MOD MDM: CPT | Performed by: EMERGENCY MEDICINE

## 2019-11-01 RX ORDER — MORPHINE SULFATE 4 MG/ML
4 INJECTION INTRAVENOUS
Status: COMPLETED | OUTPATIENT
Start: 2019-11-01 | End: 2019-11-01

## 2019-11-01 RX ORDER — MORPHINE SULFATE 4 MG/ML
4 INJECTION INTRAVENOUS
Status: DISCONTINUED | OUTPATIENT
Start: 2019-11-01 | End: 2019-11-01 | Stop reason: SDUPTHER

## 2019-11-01 RX ADMIN — MORPHINE SULFATE 4 MG: 4 INJECTION INTRAVENOUS at 23:36

## 2019-11-02 VITALS
DIASTOLIC BLOOD PRESSURE: 65 MMHG | SYSTOLIC BLOOD PRESSURE: 151 MMHG | HEIGHT: 65 IN | RESPIRATION RATE: 18 BRPM | TEMPERATURE: 98 F | WEIGHT: 230 LBS | BODY MASS INDEX: 38.32 KG/M2 | OXYGEN SATURATION: 91 % | HEART RATE: 90 BPM

## 2019-11-02 LAB
ALBUMIN SERPL-MCNC: 3.9 G/DL (ref 3.2–4.6)
ALBUMIN/GLOB SERPL: 1.1 {RATIO} (ref 1.2–3.5)
ALP SERPL-CCNC: 96 U/L (ref 50–136)
ALT SERPL-CCNC: 9 U/L (ref 12–65)
ANION GAP SERPL CALC-SCNC: 5 MMOL/L (ref 7–16)
AST SERPL-CCNC: 15 U/L (ref 15–37)
BILIRUB SERPL-MCNC: 0.6 MG/DL (ref 0.2–1.1)
BUN SERPL-MCNC: 14 MG/DL (ref 8–23)
CALCIUM SERPL-MCNC: 9.1 MG/DL (ref 8.3–10.4)
CHLORIDE SERPL-SCNC: 98 MMOL/L (ref 98–107)
CO2 SERPL-SCNC: 36 MMOL/L (ref 21–32)
CREAT SERPL-MCNC: 4.17 MG/DL (ref 0.6–1)
GLOBULIN SER CALC-MCNC: 3.6 G/DL (ref 2.3–3.5)
GLUCOSE SERPL-MCNC: 101 MG/DL (ref 65–100)
POTASSIUM SERPL-SCNC: 3.2 MMOL/L (ref 3.5–5.1)
PROT SERPL-MCNC: 7.5 G/DL (ref 6.3–8.2)
SODIUM SERPL-SCNC: 139 MMOL/L (ref 136–145)

## 2019-11-02 RX ORDER — TRAMADOL HYDROCHLORIDE 50 MG/1
50 TABLET ORAL
Qty: 11 TAB | Refills: 0 | Status: SHIPPED | OUTPATIENT
Start: 2019-11-02 | End: 2019-11-05

## 2019-11-02 RX ORDER — ONDANSETRON 4 MG/1
4 TABLET, ORALLY DISINTEGRATING ORAL
Qty: 6 TAB | Refills: 1 | Status: SHIPPED | OUTPATIENT
Start: 2019-11-02 | End: 2019-11-04

## 2019-11-02 NOTE — ED NOTES
I have reviewed discharge instructions with the patient. The patient verbalized understanding. Patient left ED via Discharge Method: ambulatory to Home with daughter. Opportunity for questions and clarification provided. Patient given 2 scripts. To continue your aftercare when you leave the hospital, you may receive an automated call from our care team to check in on how you are doing. This is a free service and part of our promise to provide the best care and service to meet your aftercare needs.  If you have questions, or wish to unsubscribe from this service please call [phone]. Thank you for Choosing our [employer] [facility] Emergency Department.

## 2019-11-02 NOTE — ED NOTES
This RN attempted IV and blood draw in left hand at this time, unsuccessful. Pt reports on past 2 ER visits she required EJ IV placement both times.

## 2019-11-02 NOTE — ED PROVIDER NOTES
60-year-old lady presents with concerns about pain in her left side that started earlier today. Patient says that she had her left kidney removed several years ago but this pain is similar to when that happened. She says she is had no other symptoms but she does not make any urine. She denies any fevers or chills. She says she had some mild nausea but no vomiting or diarrhea. She denies any blood in her bowels. Has dialysis on Mondays, Wednesdays, and Fridays and she did her dialysis today. No other associated symptoms. Overall she rates her pain as a 10 out of 10. Elements of this note were created using speech recognition software. As such, errors of speech recognition may be present. Past Medical History:   Diagnosis Date    Arthritis     AVF (arteriovenous fistula) ([facility] [address] 12/20/2016 12/6/16 (S) Right AVF revision and thrombectomy    Cancer ([facility] [address] 2015    L kidney    Chronic kidney disease     HD in M-W-F- at [facility]    Degenerative joint disease     Diabetes ([facility] [address]     checks QD, normal 120-130, hyposymptoms at 80    ESRD (end stage renal disease) [facility]) Nov 2006    ESRD.  MWF dialysis     Hypertension     Obesity     Transient ischemic attack 08/29/2015    no residual       Past Surgical History:   Procedure Laterality Date    BREAST SURGERY PROCEDURE UNLISTED Right     cyst removed    COLONOSCOPY N/A 11/8/2018    COLONOSCOPY  BMI 36 performed by Connor [facility]lelrs MD at [facility] ENDOSCOPY    HX GI  06/01/2002    colon resection resulting in temporary colostomy reversal    HX GI  08/06/2018    exploratory laparotomy    HX GYN      stephan    HX OTHER SURGICAL      dialysis fistula, several permcaths    HX UROLOGICAL Left July 2015    nephrectomy    HX VASCULAR ACCESS      IR PLC CATH AV SHUNT IN W PTA SI VENOUS  5/30/2019    IR PLC CATH AV SHUNT IN W PTA SI VENOUS RT  2/28/2019    IR PLC CATH AV SHUNT IN W PTA SI VENOUS RT  9/3/2019    VASCULAR SURGERY PROCEDURE UNLIST Right     AV graft         Family History:   Problem Relation Age of Onset    Diabetes Mother     Heart Disease Mother     Hypertension Mother     Heart Disease Father     Hypertension Father     Malignant Hyperthermia Neg Hx     Pseudocholinesterase Deficiency Neg Hx     Delayed Awakening Neg Hx     Post-op Nausea/Vomiting Neg Hx     Emergence Delirium Neg Hx     Other Neg Hx     Post-op Cognitive Dysfunction Neg Hx        Social History     Socioeconomic History    Marital status:      Spouse name: Not on file    Number of children: Not on file    Years of education: Not on file    Highest education level: Not on file   Occupational History    Not on file   Social Needs    Financial resource strain: Not on file    Food insecurity:     Worry: Not on file     Inability: Not on file    Transportation needs:     Medical: Not on file     Non-medical: Not on file   Tobacco Use    Smoking status: Never Smoker    Smokeless tobacco: Never Used   Substance and Sexual Activity    Alcohol use: No    Drug use: No    Sexual activity: Not Currently   Lifestyle    Physical activity:     Days per week: Not on file     Minutes per session: Not on file    Stress: Not on file   Relationships    Social connections:     Talks on phone: Not on file     Gets together: Not on file     Attends [employer] service: Not on file     Active member of club or organization: Not on file     Attends meetings of clubs or organizations: Not on file     Relationship status: Not on file    Intimate partner violence:     Fear of current or ex partner: Not on file     Emotionally abused: Not on file     Physically abused: Not on file     Forced sexual activity: Not on file   Other Topics Concern    Not on file   Social History Narrative    Not on file         ALLERGIES: Pcn [penicillins] and Vancomycin    Review of Systems   Constitutional: Negative for chills, diaphoresis and fever.    HENT: Negative for congestion, rhinorrhea and sore throat. Eyes: Negative for redness and visual disturbance. Respiratory: Negative for cough, chest tightness, shortness of breath and wheezing. Cardiovascular: Negative for chest pain and palpitations. Gastrointestinal: Negative for abdominal pain, blood in stool, diarrhea, nausea and vomiting. Endocrine: Negative for polydipsia and polyuria. Genitourinary: Positive for flank pain. Negative for dysuria and hematuria. Musculoskeletal: Negative for arthralgias, myalgias and neck stiffness. Skin: Negative for rash. Allergic/Immunologic: Negative for environmental allergies and food allergies. Neurological: Negative for dizziness, weakness and headaches. Hematological: Negative for adenopathy. Does not bruise/bleed easily. Psychiatric/Behavioral: Negative for confusion and sleep disturbance. The patient is not nervous/anxious. Vitals:    11/01/19 2101   BP: 171/74   Pulse: 82   Resp: 16   Temp: 98.1 °F (36.7 °C)   SpO2: 98%   Weight: 104.3 kg (230 lb)   Height: 5' 5\" (1.651 m)            Physical Exam   Constitutional: She is oriented to person, place, and time. She appears well-developed and well-nourished. HENT:   Head: Normocephalic and atraumatic. Cardiovascular: Normal rate and regular rhythm. Pulmonary/Chest: Effort normal and breath sounds normal.   Neurological: She is alert and oriented to person, place, and time. Skin: Skin is warm and dry. Nursing note and vitals reviewed. MDM  Number of Diagnoses or Management Options  Diagnosis management comments: Nursing staff was unable to get blood for an IV. I offered to do a femoral stick. But she declined. I will look with an ultrasound. No good peripheral veins identified with ultrasound. She had a fairly large left EJ. I accessed that.          Procedures

## 2019-11-02 NOTE — ED TRIAGE NOTES
Pt arrives via POV from home, pt states L stabbing flank pain x3-4 hours, pt states no kidney on L side. Pt states + nausea, denies vomiting or diarrhea. Pt denies any urinary symptoms.

## 2019-11-02 NOTE — ED NOTES
Pt states she produces only 1-2 drops of urine. No urine sample ordered. Pt is on dialysis, last run today, full run completed.

## 2019-11-10 ENCOUNTER — APPOINTMENT (OUTPATIENT)
Dept: GENERAL RADIOLOGY | Age: 69
End: 2019-11-10
Attending: EMERGENCY MEDICINE
Payer: MEDICARE

## 2019-11-10 LAB
ALBUMIN SERPL-MCNC: 4 G/DL (ref 3.2–4.6)
ALBUMIN/GLOB SERPL: 1.1 {RATIO} (ref 1.2–3.5)
ALP SERPL-CCNC: 98 U/L (ref 50–136)
ALT SERPL-CCNC: 9 U/L (ref 12–65)
ANION GAP SERPL CALC-SCNC: 9 MMOL/L (ref 7–16)
AST SERPL-CCNC: 16 U/L (ref 15–37)
BASOPHILS # BLD: 0 K/UL (ref 0–0.2)
BASOPHILS NFR BLD: 1 % (ref 0–2)
BILIRUB SERPL-MCNC: 0.4 MG/DL (ref 0.2–1.1)
BUN SERPL-MCNC: 36 MG/DL (ref 8–23)
CALCIUM SERPL-MCNC: 8.7 MG/DL (ref 8.3–10.4)
CHLORIDE SERPL-SCNC: 100 MMOL/L (ref 98–107)
CO2 SERPL-SCNC: 29 MMOL/L (ref 21–32)
CREAT SERPL-MCNC: 8.29 MG/DL (ref 0.6–1)
DIFFERENTIAL METHOD BLD: ABNORMAL
EOSINOPHIL # BLD: 0.2 K/UL (ref 0–0.8)
EOSINOPHIL NFR BLD: 2 % (ref 0.5–7.8)
ERYTHROCYTE [DISTWIDTH] IN BLOOD BY AUTOMATED COUNT: 15.5 % (ref 11.9–14.6)
GLOBULIN SER CALC-MCNC: 3.8 G/DL (ref 2.3–3.5)
GLUCOSE SERPL-MCNC: 182 MG/DL (ref 65–100)
HCT VFR BLD AUTO: 24.5 % (ref 35.8–46.3)
HGB BLD-MCNC: 8.6 G/DL (ref 11.7–15.4)
IMM GRANULOCYTES # BLD AUTO: 0 K/UL (ref 0–0.5)
IMM GRANULOCYTES NFR BLD AUTO: 1 % (ref 0–5)
LYMPHOCYTES # BLD: 1.4 K/UL (ref 0.5–4.6)
LYMPHOCYTES NFR BLD: 21 % (ref 13–44)
MCH RBC QN AUTO: 32.8 PG (ref 26.1–32.9)
MCHC RBC AUTO-ENTMCNC: 35.1 G/DL (ref 31.4–35)
MCV RBC AUTO: 93.5 FL (ref 79.6–97.8)
MONOCYTES # BLD: 0.7 K/UL (ref 0.1–1.3)
MONOCYTES NFR BLD: 10 % (ref 4–12)
NEUTS SEG # BLD: 4.6 K/UL (ref 1.7–8.2)
NEUTS SEG NFR BLD: 66 % (ref 43–78)
NRBC # BLD: 0 K/UL (ref 0–0.2)
PLATELET # BLD AUTO: 208 K/UL (ref 150–450)
PMV BLD AUTO: 10.3 FL (ref 9.4–12.3)
POTASSIUM SERPL-SCNC: 3.2 MMOL/L (ref 3.5–5.1)
PROT SERPL-MCNC: 7.8 G/DL (ref 6.3–8.2)
RBC # BLD AUTO: 2.62 M/UL (ref 4.05–5.2)
SODIUM SERPL-SCNC: 138 MMOL/L (ref 136–145)
TROPONIN I SERPL-MCNC: <0.02 NG/ML (ref 0.02–0.05)
WBC # BLD AUTO: 6.9 K/UL (ref 4.3–11.1)

## 2019-11-10 PROCEDURE — 93005 ELECTROCARDIOGRAM TRACING: CPT | Performed by: EMERGENCY MEDICINE

## 2019-11-10 PROCEDURE — 71045 X-RAY EXAM CHEST 1 VIEW: CPT

## 2019-11-10 PROCEDURE — 85025 COMPLETE CBC W/AUTO DIFF WBC: CPT

## 2019-11-10 PROCEDURE — 84484 ASSAY OF TROPONIN QUANT: CPT

## 2019-11-10 PROCEDURE — 99284 EMERGENCY DEPT VISIT MOD MDM: CPT | Performed by: EMERGENCY MEDICINE

## 2019-11-10 PROCEDURE — 80053 COMPREHEN METABOLIC PANEL: CPT

## 2019-11-11 ENCOUNTER — HOSPITAL ENCOUNTER (EMERGENCY)
Age: 69
Discharge: HOME | End: 2019-11-11
Attending: EMERGENCY MEDICINE
Payer: MEDICARE

## 2019-11-11 VITALS
SYSTOLIC BLOOD PRESSURE: 160 MMHG | BODY MASS INDEX: 36.65 KG/M2 | HEIGHT: 65 IN | HEART RATE: 84 BPM | OXYGEN SATURATION: 97 % | DIASTOLIC BLOOD PRESSURE: 70 MMHG | RESPIRATION RATE: 18 BRPM | TEMPERATURE: 98.3 F | WEIGHT: 220 LBS

## 2019-11-11 DIAGNOSIS — R07.89 CHEST WALL PAIN: Primary | ICD-10-CM

## 2019-11-11 LAB
ATRIAL RATE: 98 BPM
CALCULATED P AXIS, ECG09: 82 DEGREES
CALCULATED R AXIS, ECG10: 100 DEGREES
CALCULATED T AXIS, ECG11: 30 DEGREES
DIAGNOSIS, 93000: NORMAL
P-R INTERVAL, ECG05: 170 MS
Q-T INTERVAL, ECG07: 404 MS
QRS DURATION, ECG06: 68 MS
QTC CALCULATION (BEZET), ECG08: 515 MS
TROPONIN I SERPL-MCNC: <0.02 NG/ML (ref 0.02–0.05)
VENTRICULAR RATE, ECG03: 98 BPM

## 2019-11-11 PROCEDURE — 84484 ASSAY OF TROPONIN QUANT: CPT

## 2019-11-11 PROCEDURE — 74011250637 HC RX REV CODE- 250/637: Performed by: EMERGENCY MEDICINE

## 2019-11-11 RX ORDER — ACETAMINOPHEN 500 MG
1000 TABLET ORAL ONCE
Status: COMPLETED | OUTPATIENT
Start: 2019-11-11 | End: 2019-11-11

## 2019-11-11 RX ADMIN — ACETAMINOPHEN 1000 MG: 500 TABLET, FILM COATED ORAL at 01:02

## 2019-11-11 NOTE — ED TRIAGE NOTES
Patient arrives from home complaining of chest pain that started around 2100. Patient states she was about to go to bed and the chest pain started. Patient states she couldn't lay down because of the pain. Patient states she got short of breath and nauseas. Patient states she feels \"swimmy headed\". Patient denies cardiac history. Patient is a dialysis patient and went on Friday. Patient has history of colon cancer. No other complaints.

## 2019-11-11 NOTE — ED NOTES
I have reviewed discharge instructions with the patient. The patient verbalized understanding. Patient left ED via Discharge Method: ambulatory to Home with daughter. Opportunity for questions and clarification provided. Patient given 0 scripts. To continue your aftercare when you leave the hospital, you may receive an automated call from our care team to check in on how you are doing. This is a free service and part of our promise to provide the best care and service to meet your aftercare needs.  If you have questions, or wish to unsubscribe from this service please call [phone]. Thank you for Choosing our [facility] Emergency Department.

## 2019-11-11 NOTE — DISCHARGE INSTRUCTIONS
Use Tylenol as needed for pain. Follow-up at your scheduled dialysis. Return for any other acute concerns.

## 2019-11-11 NOTE — ED PROVIDER NOTES
Patient is a 67-year-old female with hypertension, diabetes, end-stage renal disease on hemodialysis who comes to the ER this evening complaining of chest pain. She states around 9 PM tonight while getting ready for bed she developed sharp substernal chest pain. States that she was mildly short of breath. Hurt worse when she pressed on her chest.  She denies any fever. No radiation of the pain. The history is provided by the patient. Chest Pain    This is a new problem. The current episode started 3 to 5 hours ago. The problem has not changed since onset. The problem occurs constantly. The pain is associated with normal activity. The pain is present in the substernal region. The pain is mild. The quality of the pain is described as sharp. The pain does not radiate. Associated symptoms include shortness of breath. Pertinent negatives include no abdominal pain, no back pain, no cough, no diaphoresis, no fever, no nausea, no palpitations, no vomiting and no weakness. She has tried nothing for the symptoms. Risk factors include diabetes mellitus and hypertension. Her past medical history is significant for DM and HTN. Pertinent negatives include no cardiac catheterization and no cardiac stents. Past Medical History:   Diagnosis Date    Arthritis     AVF [facility]arteriovenous fistula) ([facility] [address] 12/20/2016 12/6/16 ([facility]) Right AVF revision and thrombectomy    Cancer ([facility] [address] 2015    L kidney    Chronic kidney disease     HD in M[facility]F- at [facility] dialysis    Degenerative joint disease     Diabetes ([facility] [address]     checks QD, normal 120-130, hyposymptoms at 80    ESRD (end stage renal disease) [facility]) Nov 2006    ESRD.  MWF dialysis     Hypertension     Obesity     Transient ischemic attack 08/29/2015    no residual       Past Surgical History:   Procedure Laterality Date    BREAST SURGERY PROCEDURE UNLISTED Right     cyst removed    COLONOSCOPY N/A 11/8/2018    COLONOSCOPY  BMI 36 performed by Connor Sellers MD at [facility] ENDOSCOPY    HX GI  06/01/2002    colon resection resulting in temporary colostomy reversal    HX GI  08/06/2018    exploratory laparotomy    HX GYN      stephan    HX OTHER SURGICAL      dialysis fistula, several permcaths    HX UROLOGICAL Left July 2015    nephrectomy    HX VASCULAR ACCESS      IR PLC CATH AV SHUNT IN W PTA SI VENOUS  5/30/2019    IR PLC CATH AV SHUNT IN W PTA SI VENOUS RT  2/28/2019    IR PLC CATH AV SHUNT IN W PTA SI VENOUS RT  9/3/2019    VASCULAR SURGERY PROCEDURE UNLIST Right     AV graft         Family History:   Problem Relation Age of Onset    Diabetes Mother     Heart Disease Mother     Hypertension Mother     Heart Disease Father     Hypertension Father     Malignant Hyperthermia Neg Hx     Pseudocholinesterase Deficiency Neg Hx     Delayed Awakening Neg Hx     Post-op Nausea/Vomiting Neg Hx     Emergence Delirium Neg Hx     Other Neg Hx     Post-op Cognitive Dysfunction Neg Hx        Social History     Socioeconomic History    Marital status:      Spouse name: Not on file    Number of children: Not on file    Years of education: Not on file    Highest education level: Not on file   Occupational History    Not on file   Social Needs    Financial resource strain: Not on file    Food insecurity:     Worry: Not on file     Inability: Not on file    Transportation needs:     Medical: Not on file     Non-medical: Not on file   Tobacco Use    Smoking status: Never Smoker    Smokeless tobacco: Never Used   Substance and Sexual Activity    Alcohol use: No    Drug use: No    Sexual activity: Not Currently   Lifestyle    Physical activity:     Days per week: Not on file     Minutes per session: Not on file    Stress: Not on file   Relationships    Social connections:     Talks on phone: Not on file     Gets together: Not on file     Attends [employer] service: Not on file     Active member of club or organization: Not on file     Attends meetings of clubs or organizations: Not on file     Relationship status: Not on file    Intimate partner violence:     Fear of current or ex partner: Not on file     Emotionally abused: Not on file     Physically abused: Not on file     Forced sexual activity: Not on file   Other Topics Concern    Not on file   Social History Narrative    Not on file         ALLERGIES: Pcn [penicillins] and Vancomycin    Review of Systems   Constitutional: Negative for chills, diaphoresis, fatigue and fever. HENT: Negative for congestion, rhinorrhea and sore throat. Eyes: Negative for pain, discharge and visual disturbance. Respiratory: Positive for shortness of breath. Negative for cough. Cardiovascular: Positive for chest pain. Negative for palpitations. Gastrointestinal: Negative for abdominal pain, diarrhea, nausea and vomiting. Endocrine: Negative for polydipsia and polyuria. Genitourinary: Negative for dysuria, frequency and urgency. Musculoskeletal: Negative for back pain and neck pain. Skin: Negative for rash. Neurological: Negative for seizures, syncope and weakness. Hematological: Negative. Vitals:    11/10/19 2252 11/11/19 0041   BP: 163/67 158/66   Pulse: 96 83   Resp: 16 18   Temp: 97.8 °F (36.6 °C) 98.3 °F (36.8 °C)   SpO2: 96% 99%   Weight: 99.8 kg (220 lb)    Height: 5' 5\" (1.651 m)             Physical Exam   Constitutional: She is oriented to person, place, and time. She appears well-developed and well-nourished. HENT:   Head: Normocephalic and atraumatic. Eyes: Pupils are equal, round, and reactive to light. Conjunctivae and EOM are normal.   Neck: Normal range of motion. Neck supple. Cardiovascular: Normal rate, regular rhythm and intact distal pulses. Pulmonary/Chest: Effort normal and breath sounds normal.   Tender on the anterior chest wall. Palpation reproduces her pain. Abdominal: Soft. There is no tenderness. There is no rebound and no guarding.    Musculoskeletal: Normal range of motion. She exhibits no deformity. Right lower leg: She exhibits edema. She exhibits no tenderness. Left lower leg: She exhibits edema. She exhibits no tenderness. Lymphadenopathy:     She has no cervical adenopathy. Neurological: She is alert and oriented to person, place, and time. She has normal strength. No cranial nerve deficit or sensory deficit. GCS eye subscore is 4. GCS verbal subscore is 5. GCS motor subscore is 6. Skin: Skin is warm and dry. No rash noted. Psychiatric: Her mood appears anxious. Nursing note and vitals reviewed. MDM  Number of Diagnoses or Management Options  Diagnosis management comments: EKG reviewed by me shows normal sinus rhythm rate with no acute ischemia  Initial troponin 0  Chest x-ray is clear    Clinically this all appears to be chest wall pain and some anxiety. We will check a repeat 2-hour troponin if that stays negative I think the patient could be discharged. A dose of Tylenol is ordered for her pain. 1:48 AM  Repeat troponin remain negative. Patient can be discharged home and follow-up with her scheduled dialysis in the morning. Voice dictation software was used during the making of this note. This software is not perfect and grammatical and other typographical errors may be present. This note has been proofread, but may still contain errors.   Alicia Hamm MD; 11/11/2019 @1:48 AM   ===================================================================         Amount and/or Complexity of Data Reviewed  Clinical lab tests: ordered and reviewed  Review and summarize past medical records: yes  Independent visualization of images, tracings, or specimens: yes    Risk of Complications, Morbidity, and/or Mortality  Presenting problems: moderate  Diagnostic procedures: low  Management options: low    Patient Progress  Patient progress: stable         Procedures

## 2019-12-05 ENCOUNTER — HOSPITAL ENCOUNTER (OUTPATIENT)
Dept: INTERVENTIONAL RADIOLOGY/VASCULAR | Age: 69
Discharge: HOME | End: 2019-12-05
Attending: RADIOLOGY
Payer: MEDICARE

## 2019-12-05 VITALS
HEART RATE: 78 BPM | SYSTOLIC BLOOD PRESSURE: 135 MMHG | TEMPERATURE: 98.6 F | RESPIRATION RATE: 18 BRPM | OXYGEN SATURATION: 95 % | DIASTOLIC BLOOD PRESSURE: 61 MMHG

## 2019-12-05 DIAGNOSIS — N18.6 ESRD (END STAGE RENAL DISEASE) (HCC): ICD-10-CM

## 2019-12-05 LAB — GLUCOSE BLD STRIP.AUTO-MCNC: 105 MG/DL (ref 65–100)

## 2019-12-05 PROCEDURE — C1894 INTRO/SHEATH, NON-LASER: HCPCS

## 2019-12-05 PROCEDURE — 99153 MOD SED SAME PHYS/QHP EA: CPT

## 2019-12-05 PROCEDURE — 99152 MOD SED SAME PHYS/QHP 5/>YRS: CPT

## 2019-12-05 PROCEDURE — C1725 CATH, TRANSLUMIN NON-LASER: HCPCS

## 2019-12-05 PROCEDURE — 74011000250 HC RX REV CODE- 250: Performed by: RADIOLOGY

## 2019-12-05 PROCEDURE — 77030021532 HC CATH ANGI DX IMPRS MRTM -B

## 2019-12-05 PROCEDURE — 74011636320 HC RX REV CODE- 636/320: Performed by: RADIOLOGY

## 2019-12-05 PROCEDURE — C1769 GUIDE WIRE: HCPCS

## 2019-12-05 PROCEDURE — 74011250636 HC RX REV CODE- 250/636: Performed by: RADIOLOGY

## 2019-12-05 PROCEDURE — 77030013519 HC DEV INFL BASIX MRTM -B

## 2019-12-05 PROCEDURE — 36907 BALO ANGIOP CTR DIALYSIS SEG: CPT

## 2019-12-05 PROCEDURE — 82962 GLUCOSE BLOOD TEST: CPT

## 2019-12-05 RX ORDER — MIDAZOLAM HYDROCHLORIDE 1 MG/ML
.5-2 INJECTION, SOLUTION INTRAMUSCULAR; INTRAVENOUS
Status: DISCONTINUED | OUTPATIENT
Start: 2019-12-05 | End: 2019-12-09 | Stop reason: HOSPADM

## 2019-12-05 RX ORDER — HEPARIN SODIUM 200 [USP'U]/100ML
20-40 INJECTION, SOLUTION INTRAVENOUS CONTINUOUS
Status: DISCONTINUED | OUTPATIENT
Start: 2019-12-05 | End: 2019-12-09 | Stop reason: HOSPADM

## 2019-12-05 RX ORDER — LIDOCAINE HYDROCHLORIDE 20 MG/ML
0.2 INJECTION, SOLUTION INFILTRATION; PERINEURAL ONCE
Status: COMPLETED | OUTPATIENT
Start: 2019-12-05 | End: 2019-12-05

## 2019-12-05 RX ORDER — FENTANYL CITRATE 50 UG/ML
25-50 INJECTION, SOLUTION INTRAMUSCULAR; INTRAVENOUS
Status: DISCONTINUED | OUTPATIENT
Start: 2019-12-05 | End: 2019-12-09 | Stop reason: HOSPADM

## 2019-12-05 RX ORDER — DIPHENHYDRAMINE HYDROCHLORIDE 50 MG/ML
50 INJECTION, SOLUTION INTRAMUSCULAR; INTRAVENOUS ONCE
Status: COMPLETED | OUTPATIENT
Start: 2019-12-05 | End: 2019-12-05

## 2019-12-05 RX ORDER — HYDROCODONE BITARTRATE AND ACETAMINOPHEN 10; 325 MG/1; MG/1
1 TABLET ORAL
Status: DISCONTINUED | OUTPATIENT
Start: 2019-12-05 | End: 2019-12-09 | Stop reason: HOSPADM

## 2019-12-05 RX ADMIN — MIDAZOLAM 1 MG: 1 INJECTION INTRAMUSCULAR; INTRAVENOUS at 09:07

## 2019-12-05 RX ADMIN — FENTANYL CITRATE 50 MCG: 50 INJECTION, SOLUTION INTRAMUSCULAR; INTRAVENOUS at 08:44

## 2019-12-05 RX ADMIN — IOPAMIDOL 35 ML: 612 INJECTION, SOLUTION INTRAVENOUS at 09:20

## 2019-12-05 RX ADMIN — DIPHENHYDRAMINE HYDROCHLORIDE 50 MG: 50 INJECTION, SOLUTION INTRAMUSCULAR; INTRAVENOUS at 08:38

## 2019-12-05 RX ADMIN — FENTANYL CITRATE 50 MCG: 50 INJECTION, SOLUTION INTRAMUSCULAR; INTRAVENOUS at 08:56

## 2019-12-05 RX ADMIN — HEPARIN SODIUM 20 UNITS/HR: 200 INJECTION, SOLUTION INTRAVENOUS at 09:14

## 2019-12-05 RX ADMIN — FENTANYL CITRATE 50 MCG: 50 INJECTION, SOLUTION INTRAMUSCULAR; INTRAVENOUS at 08:38

## 2019-12-05 RX ADMIN — MIDAZOLAM 1 MG: 1 INJECTION INTRAMUSCULAR; INTRAVENOUS at 08:44

## 2019-12-05 RX ADMIN — MIDAZOLAM 1 MG: 1 INJECTION INTRAMUSCULAR; INTRAVENOUS at 08:38

## 2019-12-05 RX ADMIN — LIDOCAINE HYDROCHLORIDE 4 MG: 20 INJECTION, SOLUTION INFILTRATION; PERINEURAL at 09:15

## 2019-12-05 RX ADMIN — FENTANYL CITRATE 50 MCG: 50 INJECTION, SOLUTION INTRAMUSCULAR; INTRAVENOUS at 09:07

## 2019-12-05 RX ADMIN — MIDAZOLAM 1 MG: 1 INJECTION INTRAMUSCULAR; INTRAVENOUS at 08:56

## 2019-12-05 NOTE — PROCEDURES
Department of Interventional Radiology  [phone]        Interventional Radiology Brief Procedure Note    Patient: Kayla Olson MRN: [id number]  SSN: [id number]    YOB: 1950  Age: 77 y.o. Sex: female      Date of Procedure: 12/5/2019    Pre-Procedure Diagnosis: REcurrent venous stenoses. Post-Procedure Diagnosis: SAME    Procedure(s): Arteriovenous Graftogram w PTA    Brief Description of Procedure: as above    Performed By: Nhan Zuniga MD     Assistants: None    Anesthesia:Moderate Sedation    Estimated Blood Loss: Less than 10ml    Specimens:  None    Implants:  None    Findings: Multiple stenoses--right cephalic and subclavian veins    Complications: None    Recommendations: 1 hour bedrest.  Suture removal in 3-5 days. Follow Up: 3 months.      Signed By: Nhan Zuniga MD     December 5, 2019

## 2019-12-05 NOTE — DISCHARGE INSTRUCTIONS
[address]  Department of Interventional Radiology  [facility]  [phone] Office  [phone] Fax    THROMBECTOMY/FISTULAPLASTY DISCHARGE INSTRUCTIONS    General Information: This procedure has been done in order to improve blood flow through your dialysis access. The procedure is designed to remove clots and/or to enlarge the narrowed areas of your dialysis access. Home Care Instructions: You can resume your regular diet and medication regimen. Do not drink alcohol, drive, or make any important legal decisions in the next 24 hours. Watch the site carefully for signs of infection. You may have some tenderness at your graft site. You make take Tylenol (acetaminophen) for this discomfort. Do not carry anything heavier than a gallon of milk. Do not wear any tight clothing on this arm, including elastic cuffs and/or tight watches. Do not allow anyone to take a blood pressure or draw blood from this extremity. You should elevate this arm on pillows to help with any swelling. Do not sleep on this arm with the graft, or fold it under your head while you sleep. Feel your graft every day to see if you can feel a thrill (pulsation). Call If:     You should call your Physician and/or the Radiology Nurse if you have any signs of infection like fever, drainage, redness, or increased pain at the graft site. Call your doctor or dialysis center immediately if you do not feel a thrill on your graft/fistula, or if you have a change in color of this extremity. Call 911 and seek immediate medical attention if you start bleeding from your graft or fistula. Apply pressure to the graft, but only enough pressure to control the bleeding. Armand the time you start holding pressure and let medical personnel know.   Interventional Radiology General Nurse Discharge    After general anesthesia or intravenous sedation, for 24 hours or while taking prescription Narcotics:  · Limit your activities  · Do not drive and operate hazardous machinery  · Do not make important personal or business decisions  · Do  not drink alcoholic beverages  · If you have not urinated within 8 hours after discharge, please contact your surgeon on call. * Please give a list of your current medications to your Primary Care Provider. * Please update this list whenever your medications are discontinued, doses are     changed, or new medications (including over-the-counter products) are added. * Please carry medication information at all times in case of emergency situations. These are general instructions for a healthy lifestyle:    No smoking/ No tobacco products/ Avoid exposure to second hand smoke  Surgeon General's Warning:  Quitting smoking now greatly reduces serious risk to your health. Obesity, smoking, and sedentary lifestyle greatly increases your risk for illness  A healthy diet, regular physical exercise & weight monitoring are important for maintaining a healthy lifestyle    You may be retaining fluid if you have a history of heart failure or if you experience any of the following symptoms:  Weight gain of 3 pounds or more overnight or 5 pounds in a week, increased swelling in our hands or feet or shortness of breath while lying flat in bed. Please call your doctor as soon as you notice any of these symptoms; do not wait until your next office visit. Recognize signs and symptoms of STROKE:  F-face looks uneven    A-arms unable to move or move unevenly    S-speech slurred or non-existent    T-time-call 911 as soon as signs and symptoms begin-DO NOT go       Back to bed or wait to see if you get better-TIME IS BRAIN. Follow-Up Instructions: Please see your ordering doctor as he/she has requested. You may not go to dialysis today, except with special written permission from you doctor. You may go to dialysis tomorrow, and resume your normal dialysis schedule.     To Reach Us: If you have any questions about your procedure, please call the Interventional Radiology department at [phone]. After business hours (5pm) and weekends, call the answering service at [phone] and ask for the Radiologist on call to be paged. Si tiene Preguntas acerca del procedimiento, por favor llame al departamento de Radiología Intervencional al [phone]. Después de horas de oficina (5 pm) y los fines de [place], llamar al [place] al [phone] y pregunte por el Radiologo de [facility].          Patient Signature:  Date: 12/5/2019  Discharging Nurse: Narcisa Aceves RN

## 2019-12-05 NOTE — H&P
Department of Interventional Radiology  [phone]    History and Physical    Patient: Sourav Sarmiento MRN:  [id number]  SSN:  [id number]    YOB: 1950  Age:  77 y.o. Sex:  female      Primary Care Provider:  Dheeraj Pagan MD  Referring Physician:  Svetlana Soares MD    Subjective:     Chief Complaint: fistula malfunction    History of the Present Illness: The patient is a 77 y.o. female with ESRD who presents for RUE fistulogram.  Prolong bleeding with decannulation following HD.   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   Last dialysis yesterday morning. NPO.     Past Medical History:   Diagnosis Date    Arthritis     AVF (arteriovenous fistula) ([facility] [address] 12/20/2016 12/6/16 ([facility]) Right AVF revision and thrombectomy    Cancer ([facility] [address] 2015    L kidney    Chronic kidney disease     HD in [facility] at [facility]    Degenerative joint disease     Diabetes ([facility] [address]     checks QD, normal 120-130, hyposymptoms at 80    ESRD (end stage renal disease) [facility]) Nov 2006    ESRD.  MWF dialysis     Hypertension     Obesity     Transient ischemic attack 08/29/2015    no residual     Past Surgical History:   Procedure Laterality Date    BREAST SURGERY PROCEDURE UNLISTED Right     cyst removed    COLONOSCOPY N/A 11/8/2018    COLONOSCOPY  BMI 36 performed by Chato [facility]    HX GI  06/01/2002    colon resection resulting in temporary colostomy reversal    HX GI  08/06/2018    exploratory laparotomy    HX GYN      stephan    HX OTHER SURGICAL      dialysis fistula, several permcaths    HX UROLOGICAL Left July 2015    nephrectomy    HX VASCULAR ACCESS      IR PLC CATH AV SHUNT IN W PTA SI VENOUS  5/30/2019    IR PLC CATH AV SHUNT IN W PTA SI VENOUS RT  2/28/2019    IR PLC CATH AV SHUNT IN W PTA SI VENOUS RT  9/3/2019    VASCULAR SURGERY PROCEDURE UNLIST Right     AV graft        Review of Systems:    Pertinent items are noted in the History of Present Illness. Prior to Admission medications    Medication Sig Start Date End Date Taking? Authorizing Provider   lidocaine 5 % topical cream Apply  to affected area two (2) times daily as needed for Pain. 9/1/17  Yes Pasty FRANCISCO Knox   amLODIPine (NORVASC) 10 mg tablet Take 10 mg by mouth nightly. Yes Provider, Historical   VIT C/VIT E/LUTEIN/MIN/OMEGA-3 (OCUVITE PO) Take  by mouth nightly. Yes Provider, Historical   cinacalcet (SENSIPAR) 30 mg tablet Take 60 mg by mouth nightly. Yes Provider, Historical   minoxidil (LONITEN) 2.5 mg tablet Take 7.5 mg by mouth two (2) times a day. Indications: HYPERTENSION   Yes Other, MD Jaylon   sevelamer carbonate (RENVELA) 800 mg tab tab Take 800 mg by mouth three (3) times daily (with meals). 5 tablets with meals/ 2 with snacks  Sometimes only eats 2 meals/d   Yes Provider, Historical   lisinopril (PRINIVIL, ZESTRIL) 40 mg tablet Take 40 mg by mouth nightly. Indications: HYPERTENSION 6/8/15  Yes Provider, Historical   sitaGLIPtin (JANUVIA) 100 mg tablet Take 50 mg by mouth every morning.  Indications: TYPE 2 DIABETES MELLITUS   Yes Provider, Historical        Allergies   Allergen Reactions    Pcn [Penicillins] Rash    Vancomycin Rash       Family History   Problem Relation Age of Onset    Diabetes Mother     Heart Disease Mother     Hypertension Mother     Heart Disease Father     Hypertension Father     Malignant Hyperthermia Neg Hx     Pseudocholinesterase Deficiency Neg Hx     Delayed Awakening Neg Hx     Post-op Nausea/Vomiting Neg Hx     Emergence Delirium Neg Hx     Other Neg Hx     Post-op Cognitive Dysfunction Neg Hx      Social History     Tobacco Use    Smoking status: Never Smoker    Smokeless tobacco: Never Used   Substance Use Topics    Alcohol use: No        Objective:       Physical Examination:    Vitals:    12/05/19 0728 12/05/19 0740 12/05/19 0743   BP: 118/68     Temp:   98.6 °F (37 °C)   SpO2: 97% 100% Pain Assessment  Pain Intensity 1: 0 (12/05/19 0729)        Patient Stated Pain Goal: 0      HEART: regular rate and rhythm  LUNG: clear to auscultation bilaterally  ABDOMEN: normal findings: soft, non-tender  EXTREMITIES: warm, no edema, aneurysmal RUE AV fistula    Laboratory:     Lab Results   Component Value Date/Time    Sodium 138 11/10/2019 10:58 PM    Sodium 139 11/01/2019 11:24 PM    Potassium 3.2 (L) 11/10/2019 10:58 PM    Potassium 3.2 (L) 11/01/2019 11:24 PM    Chloride 100 11/10/2019 10:58 PM    Chloride 98 11/01/2019 11:24 PM    CO2 29 11/10/2019 10:58 PM    CO2 36 (H) 11/01/2019 11:24 PM    Anion gap 9 11/10/2019 10:58 PM    Anion gap 5 (L) 11/01/2019 11:24 PM    Glucose 182 (H) 11/10/2019 10:58 PM    Glucose 101 (H) 11/01/2019 11:24 PM    BUN 36 (H) 11/10/2019 10:58 PM    BUN 14 11/01/2019 11:24 PM    Creatinine 8.29 (H) 11/10/2019 10:58 PM    Creatinine 4.17 (H) 11/01/2019 11:24 PM    GFR est AA 6 (L) 11/10/2019 10:58 PM    GFR est AA 14 (L) 11/01/2019 11:24 PM    GFR est non-AA 5 (L) 11/10/2019 10:58 PM    GFR est non-AA 11 (L) 11/01/2019 11:24 PM    Calcium 8.7 11/10/2019 10:58 PM    Calcium 9.1 11/01/2019 11:24 PM    Magnesium 2.4 08/08/2018 03:57 AM    Magnesium 2.7 (H) 08/06/2018 04:37 AM    Phosphorus 5.2 (H) 08/05/2018 04:30 AM    Phosphorus 2.1 (L) 08/03/2018 11:11 AM    Albumin 4.0 11/10/2019 10:58 PM    Albumin 3.9 11/01/2019 11:24 PM    Protein, total 7.8 11/10/2019 10:58 PM    Protein, total 7.5 11/01/2019 11:24 PM    Globulin 3.8 (H) 11/10/2019 10:58 PM    Globulin 3.6 (H) 11/01/2019 11:24 PM    A-G Ratio 1.1 (L) 11/10/2019 10:58 PM    A-G Ratio 1.1 (L) 11/01/2019 11:24 PM    AST (SGOT) 16 11/10/2019 10:58 PM    AST (SGOT) 15 11/01/2019 11:24 PM    ALT (SGPT) 9 (L) 11/10/2019 10:58 PM    ALT (SGPT) 9 (L) 11/01/2019 11:24 PM     Lab Results   Component Value Date/Time    WBC 6.9 11/10/2019 10:58 PM    WBC 7.0 11/01/2019 11:24 PM    HGB 8.6 (L) 11/10/2019 10:58 PM    HGB 9.1 (L) 11/01/2019 11:24 PM    HCT 24.5 (L) 11/10/2019 10:58 PM    HCT 25.9 (L) 11/01/2019 11:24 PM    PLATELET 513 73/65/7250 10:58 PM    PLATELET 235 63/85/4366 11:24 PM     Lab Results   Component Value Date/Time    aPTT 22.2 (L) 11/16/2015 12:00 AM    aPTT 27.2 01/29/2013 12:20 PM    Prothrombin time 10.2 11/16/2015 12:00 AM    Prothrombin time 10.5 08/29/2015 04:30 PM    INR 0.9 11/16/2015 12:00 AM    INR 1.0 08/29/2015 04:30 PM       Assessment:     ESRD, prolonged bleeding post decannulation with  dialysis    Hospital Problems  Date Reviewed: 7/23/2019    None          Plan:     Planned Procedure:  fistulogram    Risks, benefits, and alternatives reviewed with patient and she agrees to proceed with the procedure.       Signed By: Laura Bowen PA-C     December 5, 2019

## 2020-01-23 ENCOUNTER — HOSPITAL ENCOUNTER (OUTPATIENT)
Dept: CT IMAGING | Age: 70
Discharge: HOME | End: 2020-01-23
Attending: INTERNAL MEDICINE
Payer: MEDICARE

## 2020-01-23 DIAGNOSIS — C64.2 MALIGNANT NEOPLASM OF LEFT KIDNEY, EXCEPT RENAL PELVIS (HCC): ICD-10-CM

## 2020-01-23 PROCEDURE — 74011000258 HC RX REV CODE- 258: Performed by: INTERNAL MEDICINE

## 2020-01-23 PROCEDURE — 74011636320 HC RX REV CODE- 636/320: Performed by: INTERNAL MEDICINE

## 2020-01-23 PROCEDURE — 74177 CT ABD & PELVIS W/CONTRAST: CPT

## 2020-01-23 RX ORDER — SODIUM CHLORIDE 0.9 % (FLUSH) 0.9 %
10 SYRINGE (ML) INJECTION
Status: COMPLETED | OUTPATIENT
Start: 2020-01-23 | End: 2020-01-23

## 2020-01-23 RX ADMIN — IOPAMIDOL 100 ML: 755 INJECTION, SOLUTION INTRAVENOUS at 09:48

## 2020-01-23 RX ADMIN — SODIUM CHLORIDE 100 ML: 900 INJECTION, SOLUTION INTRAVENOUS at 09:48

## 2020-01-23 RX ADMIN — DIATRIZOATE MEGLUMINE AND DIATRIZOATE SODIUM 15 ML: 660; 100 LIQUID ORAL; RECTAL at 09:48

## 2020-01-23 RX ADMIN — Medication 10 ML: at 09:48

## 2020-02-11 ENCOUNTER — HOSPITAL ENCOUNTER (OUTPATIENT)
Dept: LAB | Age: 70
Discharge: HOME | End: 2020-02-11
Payer: MEDICARE

## 2020-02-11 DIAGNOSIS — C64.2 MALIGNANT NEOPLASM OF LEFT KIDNEY, EXCEPT RENAL PELVIS (HCC): ICD-10-CM

## 2020-02-11 LAB
ALBUMIN SERPL-MCNC: 4 G/DL (ref 3.2–4.6)
ALBUMIN/GLOB SERPL: 1 {RATIO} (ref 1.2–3.5)
ALP SERPL-CCNC: 93 U/L (ref 50–136)
ALT SERPL-CCNC: 8 U/L (ref 12–65)
ANION GAP SERPL CALC-SCNC: 7 MMOL/L (ref 7–16)
AST SERPL-CCNC: 17 U/L (ref 15–37)
BASOPHILS # BLD: 0.1 K/UL (ref 0–0.2)
BASOPHILS NFR BLD: 2 % (ref 0–2)
BILIRUB SERPL-MCNC: 0.5 MG/DL (ref 0.2–1.1)
BUN SERPL-MCNC: 18 MG/DL (ref 8–23)
CALCIUM SERPL-MCNC: 8.7 MG/DL (ref 8.3–10.4)
CHLORIDE SERPL-SCNC: 102 MMOL/L (ref 98–107)
CO2 SERPL-SCNC: 29 MMOL/L (ref 21–32)
CREAT SERPL-MCNC: 5.13 MG/DL (ref 0.6–1)
DIFFERENTIAL METHOD BLD: ABNORMAL
EOSINOPHIL # BLD: 0.2 K/UL (ref 0–0.8)
EOSINOPHIL NFR BLD: 3 % (ref 0.5–7.8)
ERYTHROCYTE [DISTWIDTH] IN BLOOD BY AUTOMATED COUNT: 18.2 % (ref 11.9–14.6)
GLOBULIN SER CALC-MCNC: 4 G/DL (ref 2.3–3.5)
GLUCOSE SERPL-MCNC: 111 MG/DL (ref 65–100)
HCT VFR BLD AUTO: 27.4 % (ref 35.8–46.3)
HGB BLD-MCNC: 9.2 G/DL (ref 11.7–15.4)
IMM GRANULOCYTES # BLD AUTO: 0 K/UL (ref 0–0.5)
IMM GRANULOCYTES NFR BLD AUTO: 0 % (ref 0–5)
LYMPHOCYTES # BLD: 0.9 K/UL (ref 0.5–4.6)
LYMPHOCYTES NFR BLD: 17 % (ref 13–44)
MCH RBC QN AUTO: 30.8 PG (ref 26.1–32.9)
MCHC RBC AUTO-ENTMCNC: 33.6 G/DL (ref 31.4–35)
MCV RBC AUTO: 91.6 FL (ref 79.6–97.8)
MONOCYTES # BLD: 0.4 K/UL (ref 0.1–1.3)
MONOCYTES NFR BLD: 8 % (ref 4–12)
NEUTS SEG # BLD: 4 K/UL (ref 1.7–8.2)
NEUTS SEG NFR BLD: 70 % (ref 43–78)
NRBC # BLD: 0 K/UL (ref 0–0.2)
PLATELET # BLD AUTO: 174 K/UL (ref 150–450)
PMV BLD AUTO: 10.1 FL (ref 9.4–12.3)
POTASSIUM SERPL-SCNC: 3.3 MMOL/L (ref 3.5–5.1)
PROT SERPL-MCNC: 8 G/DL (ref 6.3–8.2)
RBC # BLD AUTO: 2.99 M/UL (ref 4.05–5.25)
SODIUM SERPL-SCNC: 138 MMOL/L (ref 136–145)
WBC # BLD AUTO: 5.6 K/UL (ref 4.3–11.1)

## 2020-02-11 PROCEDURE — 80053 COMPREHEN METABOLIC PANEL: CPT

## 2020-02-11 PROCEDURE — 36415 COLL VENOUS BLD VENIPUNCTURE: CPT

## 2020-02-11 PROCEDURE — 85025 COMPLETE CBC W/AUTO DIFF WBC: CPT

## 2020-02-20 ENCOUNTER — HOSPITAL ENCOUNTER (EMERGENCY)
Age: 70
Discharge: HOME | End: 2020-02-20
Attending: EMERGENCY MEDICINE
Payer: MEDICARE

## 2020-02-20 VITALS
TEMPERATURE: 98.5 F | BODY MASS INDEX: 33.82 KG/M2 | SYSTOLIC BLOOD PRESSURE: 186 MMHG | HEART RATE: 86 BPM | WEIGHT: 203 LBS | DIASTOLIC BLOOD PRESSURE: 77 MMHG | RESPIRATION RATE: 18 BRPM | OXYGEN SATURATION: 94 % | HEIGHT: 65 IN

## 2020-02-20 DIAGNOSIS — R10.9 ACUTE LEFT FLANK PAIN: Primary | ICD-10-CM

## 2020-02-20 PROCEDURE — 99284 EMERGENCY DEPT VISIT MOD MDM: CPT

## 2020-02-20 PROCEDURE — 96372 THER/PROPH/DIAG INJ SC/IM: CPT

## 2020-02-20 PROCEDURE — 74011250636 HC RX REV CODE- 250/636: Performed by: EMERGENCY MEDICINE

## 2020-02-20 RX ORDER — MORPHINE SULFATE 4 MG/ML
4 INJECTION INTRAVENOUS
Status: COMPLETED | OUTPATIENT
Start: 2020-02-20 | End: 2020-02-20

## 2020-02-20 RX ADMIN — MORPHINE SULFATE 4 MG: 4 INJECTION INTRAVENOUS at 20:08

## 2020-02-21 NOTE — ED PROVIDER NOTES
67-year-old lady presents with concerns about pain in her left flank that started about 2 hours prior to arrival.  Patient says this pain feels very similar to pain that she had when she was originally diagnosed with a left renal cell cancer. She has since had her left kidney removed. She says that she has been on dialysis even before the cancer was discovered and she normally goes on Mondays, Wednesdays, and Fridays. She says she went yesterday without any significant difficulty. Mild nausea present. Patient says that she was carrying heavy groceries in that left arm and maybe that caused a pulled muscle or something. Patient says she is had similar problems in the past without any obvious diagnosis. She denies any difficulty with bowel movements and she is had no blood in her bowels. No other associated symptoms. Elements of this note were created using speech recognition software. As such, errors of speech recognition may be present. Past Medical History:   Diagnosis Date    Arthritis     AVF (arteriovenous fistula) ([facility] [address] 12/20/2016 12/6/16 ([facility]) Right AVF revision and thrombectomy    Cancer ([facility] [address] 2015    L kidney    Chronic kidney disease     HD in M-W-F- at [place] dialysis    Degenerative joint disease     Diabetes ([facility] [address]     checks QD, normal 120-130, hyposymptoms at 80    ESRD (end stage renal disease) [facility]) Nov 2006    ESRD.  MWF dialysis     Hypertension     Obesity     Transient ischemic attack 08/29/2015    no residual       Past Surgical History:   Procedure Laterality Date    BREAST SURGERY PROCEDURE UNLISTED Right     cyst removed    COLONOSCOPY N/A 11/8/2018    COLONOSCOPY  BMI 36 performed by Ad[facility] at [facility] ENDOSCOPY    HX GI  06/01/2002    colon resection resulting in temporary colostomy reversal    HX GI  08/06/2018    exploratory laparotomy    HX GYN      stephan    HX OTHER SURGICAL      dialysis fistula, several permcaths    HX UROLOGICAL Left July 2015    nephrectomy    HX VASCULAR ACCESS      IR PLC CATH AV SHUNT IN W PTA SI VENOUS  5/30/2019    IR PLC CATH AV SHUNT IN W PTA SI VENOUS RT  2/28/2019    IR PLC CATH AV SHUNT IN W PTA SI VENOUS RT  9/3/2019    IR PLC CATH AV SHUNT IN W PTA SI VENOUS RT  12/5/2019    VASCULAR SURGERY PROCEDURE UNLIST Right     AV graft         Family History:   Problem Relation Age of Onset    Diabetes Mother     Heart Disease Mother     Hypertension Mother     Heart Disease Father     Hypertension Father     Malignant Hyperthermia Neg Hx     Pseudocholinesterase Deficiency Neg Hx     Delayed Awakening Neg Hx     Post-op Nausea/Vomiting Neg Hx     Emergence Delirium Neg Hx     Other Neg Hx     Post-op Cognitive Dysfunction Neg Hx        Social History     Socioeconomic History    Marital status:      Spouse name: Not on file    Number of children: Not on file    Years of education: Not on file    Highest education level: Not on file   Occupational History    Not on file   Social Needs    Financial resource strain: Not on file    Food insecurity:     Worry: Not on file     Inability: Not on file    Transportation needs:     Medical: Not on file     Non-medical: Not on file   Tobacco Use    Smoking status: Never Smoker    Smokeless tobacco: Never Used   Substance and Sexual Activity    Alcohol use: No    Drug use: No    Sexual activity: Not Currently   Lifestyle    Physical activity:     Days per week: Not on file     Minutes per session: Not on file    Stress: Not on file   Relationships    Social connections:     Talks on phone: Not on file     Gets together: Not on file     Attends Jewish service: Not on file     Active member of club or organization: Not on file     Attends meetings of clubs or organizations: Not on file     Relationship status: Not on file    Intimate partner violence:     Fear of current or ex partner: Not on file     Emotionally abused: Not on file     Physically abused: Not on file     Forced sexual activity: Not on file   Other Topics Concern    Not on file   Social History Narrative    Not on file         ALLERGIES: Pcn [penicillins] and Vancomycin    Review of Systems   Constitutional: Negative for chills, diaphoresis and fever. HENT: Negative for congestion, rhinorrhea and sore throat. Eyes: Negative for redness and visual disturbance. Respiratory: Negative for cough, chest tightness, shortness of breath and wheezing. Cardiovascular: Negative for chest pain and palpitations. Gastrointestinal: Positive for nausea. Negative for abdominal pain, blood in stool, diarrhea and vomiting. Endocrine: Negative for polydipsia and polyuria. Genitourinary: Positive for flank pain. Negative for dysuria and hematuria. Musculoskeletal: Negative for arthralgias, myalgias and neck stiffness. Skin: Negative for rash. Allergic/Immunologic: Negative for environmental allergies and food allergies. Neurological: Negative for dizziness, weakness and headaches. Hematological: Negative for adenopathy. Does not bruise/bleed easily. Psychiatric/Behavioral: Negative for confusion and sleep disturbance. The patient is not nervous/anxious. Vitals:    02/20/20 1947 02/20/20 1948   BP: 180/79 180/79   Pulse: 95 95   Resp: 20 17   Temp: 98.3 °F (36.8 °C)    SpO2: 96% 94%   Weight: 92.1 kg (203 lb)    Height: 5' 5\" (1.651 m)             Physical Exam  Vitals signs and nursing note reviewed. Constitutional:       Appearance: Normal appearance. Cardiovascular:      Rate and Rhythm: Normal rate and regular rhythm. Pulmonary:      Effort: Pulmonary effort is normal.      Breath sounds: Normal breath sounds. Musculoskeletal:      Comments: No tenderness to percussion   Skin:     General: Skin is warm and dry. Neurological:      Mental Status: She is alert.           MDM  Number of Diagnoses or Management Options  Diagnosis management comments: I do not think she needs any blood work or imaging at this time. I will treat her pain with some IM morphine. Patient says she is feeling better after the morphine injection. Said she feels well enough to be able to go home. I will discharge her home.          Procedures

## 2020-02-21 NOTE — ED TRIAGE NOTES
Patient states that she began having left sided flank pain 2 hours PTA. Has had a left nephrectomy due to cancer. Had a CT scan 2 weeks ago at [id number]. Some nausea but denies vomiting.

## 2020-02-22 ENCOUNTER — APPOINTMENT (OUTPATIENT)
Dept: CT IMAGING | Age: 70
End: 2020-02-22
Attending: EMERGENCY MEDICINE
Payer: MEDICARE

## 2020-02-22 ENCOUNTER — HOSPITAL ENCOUNTER (EMERGENCY)
Age: 70
Discharge: HOME | End: 2020-02-22
Attending: EMERGENCY MEDICINE
Payer: MEDICARE

## 2020-02-22 VITALS
HEART RATE: 93 BPM | HEIGHT: 65 IN | TEMPERATURE: 98.6 F | DIASTOLIC BLOOD PRESSURE: 72 MMHG | BODY MASS INDEX: 34.49 KG/M2 | SYSTOLIC BLOOD PRESSURE: 183 MMHG | WEIGHT: 207 LBS | OXYGEN SATURATION: 93 % | RESPIRATION RATE: 16 BRPM

## 2020-02-22 DIAGNOSIS — N28.89 RENAL MASS, LEFT: ICD-10-CM

## 2020-02-22 DIAGNOSIS — R10.9 FLANK PAIN: Primary | ICD-10-CM

## 2020-02-22 LAB
ALBUMIN SERPL-MCNC: 4.2 G/DL (ref 3.2–4.6)
ALBUMIN/GLOB SERPL: 1.1 {RATIO} (ref 1.2–3.5)
ALP SERPL-CCNC: 89 U/L (ref 50–136)
ALT SERPL-CCNC: 14 U/L (ref 12–65)
ANION GAP SERPL CALC-SCNC: 8 MMOL/L (ref 7–16)
AST SERPL-CCNC: 51 U/L (ref 15–37)
BASOPHILS # BLD: 0.1 K/UL (ref 0–0.2)
BASOPHILS NFR BLD: 1 % (ref 0–2)
BILIRUB SERPL-MCNC: 1 MG/DL (ref 0.2–1.1)
BUN SERPL-MCNC: 27 MG/DL (ref 8–23)
CALCIUM SERPL-MCNC: 8.6 MG/DL (ref 8.3–10.4)
CHLORIDE SERPL-SCNC: 98 MMOL/L (ref 98–107)
CO2 SERPL-SCNC: 29 MMOL/L (ref 21–32)
CREAT SERPL-MCNC: 5.7 MG/DL (ref 0.6–1)
DIFFERENTIAL METHOD BLD: ABNORMAL
EOSINOPHIL # BLD: 0.2 K/UL (ref 0–0.8)
EOSINOPHIL NFR BLD: 3 % (ref 0.5–7.8)
ERYTHROCYTE [DISTWIDTH] IN BLOOD BY AUTOMATED COUNT: 18.5 % (ref 11.9–14.6)
GLOBULIN SER CALC-MCNC: 3.9 G/DL (ref 2.3–3.5)
GLUCOSE SERPL-MCNC: 144 MG/DL (ref 65–100)
HCT VFR BLD AUTO: 27 % (ref 35.8–46.3)
HGB BLD-MCNC: 9.5 G/DL (ref 11.7–15.4)
IMM GRANULOCYTES # BLD AUTO: 0 K/UL (ref 0–0.5)
IMM GRANULOCYTES NFR BLD AUTO: 0 % (ref 0–5)
LYMPHOCYTES # BLD: 0.9 K/UL (ref 0.5–4.6)
LYMPHOCYTES NFR BLD: 16 % (ref 13–44)
MCH RBC QN AUTO: 32 PG (ref 26.1–32.9)
MCHC RBC AUTO-ENTMCNC: 35.2 G/DL (ref 31.4–35)
MCV RBC AUTO: 90.9 FL (ref 79.6–97.8)
MONOCYTES # BLD: 0.5 K/UL (ref 0.1–1.3)
MONOCYTES NFR BLD: 8 % (ref 4–12)
NEUTS SEG # BLD: 4.1 K/UL (ref 1.7–8.2)
NEUTS SEG NFR BLD: 71 % (ref 43–78)
NRBC # BLD: 0 K/UL (ref 0–0.2)
PLATELET # BLD AUTO: 185 K/UL (ref 150–450)
PMV BLD AUTO: 10.3 FL (ref 9.4–12.3)
POTASSIUM SERPL-SCNC: 5.2 MMOL/L (ref 3.5–5.1)
PROT SERPL-MCNC: 8.1 G/DL (ref 6.3–8.2)
RBC # BLD AUTO: 2.97 M/UL (ref 4.05–5.2)
SODIUM SERPL-SCNC: 135 MMOL/L (ref 136–145)
WBC # BLD AUTO: 5.8 K/UL (ref 4.3–11.1)

## 2020-02-22 PROCEDURE — 74011250636 HC RX REV CODE- 250/636: Performed by: EMERGENCY MEDICINE

## 2020-02-22 PROCEDURE — 96376 TX/PRO/DX INJ SAME DRUG ADON: CPT

## 2020-02-22 PROCEDURE — 74011000258 HC RX REV CODE- 258: Performed by: EMERGENCY MEDICINE

## 2020-02-22 PROCEDURE — 99283 EMERGENCY DEPT VISIT LOW MDM: CPT

## 2020-02-22 PROCEDURE — 96374 THER/PROPH/DIAG INJ IV PUSH: CPT

## 2020-02-22 PROCEDURE — 85025 COMPLETE CBC W/AUTO DIFF WBC: CPT

## 2020-02-22 PROCEDURE — 71260 CT THORAX DX C+: CPT

## 2020-02-22 PROCEDURE — 80053 COMPREHEN METABOLIC PANEL: CPT

## 2020-02-22 PROCEDURE — 74011250636 HC RX REV CODE- 250/636: Performed by: STUDENT IN AN ORGANIZED HEALTH CARE EDUCATION/TRAINING PROGRAM

## 2020-02-22 PROCEDURE — 74011636320 HC RX REV CODE- 636/320: Performed by: EMERGENCY MEDICINE

## 2020-02-22 RX ORDER — HYDROMORPHONE HYDROCHLORIDE 1 MG/ML
0.5 INJECTION, SOLUTION INTRAMUSCULAR; INTRAVENOUS; SUBCUTANEOUS ONCE
Status: COMPLETED | OUTPATIENT
Start: 2020-02-22 | End: 2020-02-22

## 2020-02-22 RX ORDER — DOCUSATE SODIUM 100 MG/1
100 CAPSULE, LIQUID FILLED ORAL 2 TIMES DAILY
Qty: 20 CAP | Refills: 1 | Status: SHIPPED | OUTPATIENT
Start: 2020-02-22 | End: 2020-03-03

## 2020-02-22 RX ORDER — HYDROCODONE BITARTRATE AND ACETAMINOPHEN 7.5; 325 MG/1; MG/1
1 TABLET ORAL
Qty: 10 TAB | Refills: 0 | Status: SHIPPED | OUTPATIENT
Start: 2020-02-22 | End: 2020-02-25

## 2020-02-22 RX ORDER — ONDANSETRON 4 MG/1
4 TABLET, ORALLY DISINTEGRATING ORAL
Qty: 8 TAB | Refills: 2 | Status: SHIPPED | OUTPATIENT
Start: 2020-02-22 | End: 2021-07-27

## 2020-02-22 RX ORDER — HYOSCYAMINE SULFATE 0.125 MG
125 TABLET ORAL
Qty: 30 TAB | Refills: 1 | Status: SHIPPED | OUTPATIENT
Start: 2020-02-22 | End: 2021-07-27

## 2020-02-22 RX ORDER — HYDROMORPHONE HYDROCHLORIDE 1 MG/ML
1 INJECTION, SOLUTION INTRAMUSCULAR; INTRAVENOUS; SUBCUTANEOUS
Status: COMPLETED | OUTPATIENT
Start: 2020-02-22 | End: 2020-02-22

## 2020-02-22 RX ORDER — SODIUM CHLORIDE 0.9 % (FLUSH) 0.9 %
10 SYRINGE (ML) INJECTION
Status: COMPLETED | OUTPATIENT
Start: 2020-02-22 | End: 2020-02-22

## 2020-02-22 RX ORDER — HYDROMORPHONE HYDROCHLORIDE 1 MG/ML
1 INJECTION, SOLUTION INTRAMUSCULAR; INTRAVENOUS; SUBCUTANEOUS
Status: DISCONTINUED | OUTPATIENT
Start: 2020-02-22 | End: 2020-02-22 | Stop reason: ALTCHOICE

## 2020-02-22 RX ADMIN — Medication 10 ML: at 21:17

## 2020-02-22 RX ADMIN — IOPAMIDOL 100 ML: 755 INJECTION, SOLUTION INTRAVENOUS at 21:16

## 2020-02-22 RX ADMIN — HYDROMORPHONE HYDROCHLORIDE 1 MG: 1 INJECTION, SOLUTION INTRAMUSCULAR; INTRAVENOUS; SUBCUTANEOUS at 22:06

## 2020-02-22 RX ADMIN — SODIUM CHLORIDE 100 ML: 900 INJECTION, SOLUTION INTRAVENOUS at 21:17

## 2020-02-22 RX ADMIN — HYDROMORPHONE HYDROCHLORIDE 0.5 MG: 1 INJECTION, SOLUTION INTRAMUSCULAR; INTRAVENOUS; SUBCUTANEOUS at 20:08

## 2020-02-22 NOTE — ED TRIAGE NOTES
Pt arrives stating left sided flank pain that has been on and off since Thursday. Was seen here Thursday for same. Pt states pain comes and goes and that movement does not make it worse. States sometimes makes urine. States hx of cancer in left kidney, does not have a kidney in the left.

## 2020-02-23 NOTE — DISCHARGE INSTRUCTIONS
Patient Education      Take the medication for pain as needed. You have been prescribed medication for nausea as well to use as needed. It is important to take a stool softener when taking pain medication as it can cause constipation worsened discomfort in her abdomen. In addition medication for abdominal cramping has been provided as well. Abdominal Pain: Care Instructions  Your Care Instructions    Abdominal pain has many possible causes. Some aren't serious and get better on their own in a few days. Others need more testing and treatment. If your pain continues or gets worse, you need to be rechecked and may need more tests to find out what is wrong. You may need surgery to correct the problem. Don't ignore new symptoms, such as fever, nausea and vomiting, urination problems, pain that gets worse, and dizziness. These may be signs of a more serious problem. Your doctor may have recommended a follow-up visit in the next 8 to 12 hours. If you are not getting better, you may need more tests or treatment. The doctor has checked you carefully, but problems can develop later. If you notice any problems or new symptoms, get medical treatment right away. Follow-up care is a key part of your treatment and safety. Be sure to make and go to all appointments, and call your doctor if you are having problems. It's also a good idea to know your test results and keep a list of the medicines you take. How can you care for yourself at home? · Rest until you feel better. · To prevent dehydration, drink plenty of fluids, enough so that your urine is light yellow or clear like water. Choose water and other caffeine-free clear liquids until you feel better. If you have kidney, heart, or liver disease and have to limit fluids, talk with your doctor before you increase the amount of fluids you drink. · If your stomach is upset, eat mild foods, such as rice, dry toast or crackers, bananas, and applesauce.  Try eating several small meals instead of two or three large ones. · Wait until 48 hours after all symptoms have gone away before you have spicy foods, alcohol, and drinks that contain caffeine. · Do not eat foods that are high in fat. · Avoid anti-inflammatory medicines such as aspirin, ibuprofen (Advil, Motrin), and naproxen (Aleve). These can cause stomach upset. Talk to your doctor if you take daily aspirin for another health problem. When should you call for help? Call 911 anytime you think you may need emergency care. For example, call if:    · You passed out (lost consciousness).     · You pass maroon or very bloody stools.     · You vomit blood or what looks like coffee grounds.     · You have new, severe belly pain.    Call your doctor now or seek immediate medical care if:    · Your pain gets worse, especially if it becomes focused in one area of your belly.     · You have a new or higher fever.     · Your stools are black and look like tar, or they have streaks of blood.     · You have unexpected vaginal bleeding.     · You have symptoms of a urinary tract infection. These may include:  ? Pain when you urinate. ? Urinating more often than usual.  ? Blood in your urine.     · You are dizzy or lightheaded, or you feel like you may faint.    Watch closely for changes in your health, and be sure to contact your doctor if:    · You are not getting better after 1 day (24 hours). Where can you learn more? Go to http://felisa-radha.info/. Enter I864 in the search box to learn more about \"Abdominal Pain: Care Instructions. \"  Current as of: June 26, 2019  Content Version: 12.2  © 2365-0034 [employer]. Care instructions adapted under license by [employer] (which disclaims liability or warranty for this information).  If you have questions about a medical condition or this instruction, always ask your healthcare professional. Brian Leon disclaims any warranty or liability for your use of this information.

## 2020-02-23 NOTE — ED NOTES
I have reviewed discharge instructions with the patient. The patient verbalized understanding. Patient left ED via Discharge Method: ambulatory to Home with daughter    Opportunity for questions and clarification provided. Patient given 4 scripts. To continue your aftercare when you leave the hospital, you may receive an automated call from our care team to check in on how you are doing. This is a free service and part of our promise to provide the best care and service to meet your aftercare needs.  If you have questions, or wish to unsubscribe from this service please call [phone]. Thank you for Choosing our [facility].

## 2020-02-23 NOTE — ED PROVIDER NOTES
End-stage renal disease who hemodialyzes on Wednesday Friday and has history of a left nephrectomy he is here with continued significant left flank pain. She was here several days ago with similar and pain has continued and worsened since her discharge. Unaware of any fever. No redness or rash. Denies any respiratory component. No cough or sputum. No pleuritic pain. Pain is much worse with movement and describes it as being very severe. It is somewhat worse to certain movements. Only lifting she is done was groceries that she does not recall any provoking associated event. Denies any GI symptom such as vomiting or diarrhea though does correct to say that at times when the pain is intense she does have some nausea    The history is provided by the patient. Flank Pain    This is a new problem. The current episode started more than 2 days ago. The problem has been gradually worsening. The problem occurs constantly. The pain is present in the left side. The pain is severe. Pertinent negatives include no fever, no numbness, no abdominal pain, no abdominal swelling, no dysuria, no paresthesias and no paresis. Past Medical History:   Diagnosis Date    Arthritis     AVF (arteriovenous fistula) ([address] 12/20/2016 12/6/16 ([facility]) Right AVF revision and thrombectomy    Cancer ([address] 2015    L kidney    Chronic kidney disease     HD in M-W-F- at [place] [facility]    Degenerative joint disease     Diabetes ([address]     checks QD, normal 120-130, hyposymptoms at 80    ESRD (end stage renal disease) [facility]) Nov 2006    ESRD.  MWF dialysis     Hypertension     Obesity     Transient ischemic attack 08/29/2015    no residual       Past Surgical History:   Procedure Laterality Date    BREAST SURGERY PROCEDURE UNLISTED Right     cyst removed    COLONOSCOPY N/A 11/8/2018    COLONOSCOPY  BMI 36 performed by France Meraz MD at [facility] ENDOSCOPY    HX GI  06/01/2002    colon resection resulting in temporary colostomy reversal    HX GI  08/06/2018    exploratory laparotomy    HX GYN      stephan    HX OTHER SURGICAL      dialysis fistula, several permcaths    HX UROLOGICAL Left July 2015    nephrectomy    HX VASCULAR ACCESS      IR PLC CATH AV SHUNT IN W PTA SI VENOUS  5/30/2019    IR PLC CATH AV SHUNT IN W PTA SI VENOUS RT  2/28/2019    IR PLC CATH AV SHUNT IN W PTA SI VENOUS RT  9/3/2019    IR PLC CATH AV SHUNT IN W PTA SI VENOUS RT  12/5/2019    VASCULAR SURGERY PROCEDURE UNLIST Right     AV graft         Family History:   Problem Relation Age of Onset    Diabetes Mother     Heart Disease Mother     Hypertension Mother     Heart Disease Father     Hypertension Father     Malignant Hyperthermia Neg Hx     Pseudocholinesterase Deficiency Neg Hx     Delayed Awakening Neg Hx     Post-op Nausea/Vomiting Neg Hx     Emergence Delirium Neg Hx     Other Neg Hx     Post-op Cognitive Dysfunction Neg Hx        Social History     Socioeconomic History    Marital status:      Spouse name: Not on file    Number of children: Not on file    Years of education: Not on file    Highest education level: Not on file   Occupational History    Not on file   Social Needs    Financial resource strain: Not on file    Food insecurity:     Worry: Not on file     Inability: Not on file    Transportation needs:     Medical: Not on file     Non-medical: Not on file   Tobacco Use    Smoking status: Never Smoker    Smokeless tobacco: Never Used   Substance and Sexual Activity    Alcohol use: No    Drug use: No    Sexual activity: Not Currently   Lifestyle    Physical activity:     Days per week: Not on file     Minutes per session: Not on file    Stress: Not on file   Relationships    Social connections:     Talks on phone: Not on file     Gets together: Not on file     Attends Yazidi service: Not on file     Active member of club or organization: Not on file     Attends meetings of clubs or organizations: Not on file     Relationship status: Not on file    Intimate partner violence:     Fear of current or ex partner: Not on file     Emotionally abused: Not on file     Physically abused: Not on file     Forced sexual activity: Not on file   Other Topics Concern    Not on file   Social History Narrative    Not on file         ALLERGIES: Pcn [penicillins] and Vancomycin    Review of Systems   Constitutional: Negative for chills and fever. Respiratory: Negative for cough, shortness of breath and wheezing. Cardiovascular: Negative. Gastrointestinal: Positive for nausea. Negative for abdominal pain and vomiting. Genitourinary: Positive for flank pain. Negative for difficulty urinating, dysuria and hematuria. Neurological: Negative for numbness and paresthesias. Psychiatric/Behavioral: Negative. Negative for confusion and decreased concentration. All other systems reviewed and are negative. Vitals:    02/22/20 1703   BP: 164/85   Pulse: 92   Resp: 16   Temp: 98.5 °F (36.9 °C)   SpO2: 96%   Weight: 93.9 kg (207 lb)   Height: 5' 5\" (1.651 m)            Physical Exam  Vitals signs and nursing note reviewed. Constitutional:       General: She is in acute distress. Appearance: She is ill-appearing. She is not toxic-appearing. Comments: Patient is tearful/sobbing at times   HENT:      Head: Atraumatic. Nose: Nose normal.   Eyes:      General: No scleral icterus. Neck:      Musculoskeletal: Normal range of motion. Cardiovascular:      Rate and Rhythm: Normal rate and regular rhythm. Pulses: Normal pulses. Pulmonary:      Effort: Pulmonary effort is normal. No respiratory distress. Breath sounds: No wheezing or rhonchi. Chest:      Chest wall: Tenderness present. Abdominal:      General: Abdomen is flat. Palpations: Abdomen is soft. Tenderness: There is no abdominal tenderness. There is no right CVA tenderness, left CVA tenderness or guarding.    Skin: Capillary Refill: Capillary refill takes less than 2 seconds. Findings: No bruising, erythema or rash. Comments: Area marked on left posterior neck as to area of pain but no associated noted swelling, redness or rash. Neurological:      General: No focal deficit present. Mental Status: She is alert. Psychiatric:         Mood and Affect: Mood normal.          MDM  Number of Diagnoses or Management Options  Diagnosis management comments: Tensive fine pain to patient's left flank. Has history of nephrectomy to same side due to malignancy. In light of failure to improve will do CT scanning. If negative most likely musculoskeletal in nature. Today pulmonary source for this. No present redness or rash or infectious changes. Amount and/or Complexity of Data Reviewed  Clinical lab tests: ordered  Tests in the radiology section of CPT®: ordered    Risk of Complications, Morbidity, and/or Mortality  Presenting problems: moderate  Diagnostic procedures: low  Management options: moderate           Procedures                 Transfer care at change of shift from Dr. Genesis Harrell.  This is a 70-year-old female patient with a history of renal cell carcinoma on the left side, status post left nephrectomy. Presenting for the second time with reports of left sided flank pain. Initial lab work unremarkable. Awaiting CT imaging. T imaging shows presence of left-sided mass at the nephrectomy bed, previously noted on prior CT imaging, now slightly enlarged. There is also a 7 mm lung nodule, ventral hernia with nonobstructed bowel loops present. No other focal findings. Discussed CT imaging results with patient who voices concern. Feel that patient will not benefit from admission to the hospital at this time given stable labs. She is a patient of Dr. Andres Mancuso and will be referred to this specialist for follow-up.   In addition, we will provide medication for pain to use as needed until follow-up.

## 2020-03-10 ENCOUNTER — HOSPITAL ENCOUNTER (OUTPATIENT)
Dept: INTERVENTIONAL RADIOLOGY/VASCULAR | Age: 70
Discharge: HOME | End: 2020-03-10
Attending: RADIOLOGY
Payer: MEDICARE

## 2020-03-10 VITALS
OXYGEN SATURATION: 88 % | HEIGHT: 65 IN | WEIGHT: 207 LBS | BODY MASS INDEX: 34.49 KG/M2 | RESPIRATION RATE: 16 BRPM | HEART RATE: 90 BPM | SYSTOLIC BLOOD PRESSURE: 114 MMHG | DIASTOLIC BLOOD PRESSURE: 51 MMHG

## 2020-03-10 DIAGNOSIS — N18.6 ESRD (END STAGE RENAL DISEASE) (HCC): ICD-10-CM

## 2020-03-10 LAB — GLUCOSE BLD STRIP.AUTO-MCNC: 98 MG/DL (ref 65–100)

## 2020-03-10 PROCEDURE — 77030002916 HC SUT ETHLN J&J -A

## 2020-03-10 PROCEDURE — 74011250636 HC RX REV CODE- 250/636: Performed by: RADIOLOGY

## 2020-03-10 PROCEDURE — C1769 GUIDE WIRE: HCPCS

## 2020-03-10 PROCEDURE — 74011636320 HC RX REV CODE- 636/320: Performed by: RADIOLOGY

## 2020-03-10 PROCEDURE — C1725 CATH, TRANSLUMIN NON-LASER: HCPCS

## 2020-03-10 PROCEDURE — C1894 INTRO/SHEATH, NON-LASER: HCPCS

## 2020-03-10 PROCEDURE — 36907 BALO ANGIOP CTR DIALYSIS SEG: CPT

## 2020-03-10 PROCEDURE — 74011000250 HC RX REV CODE- 250: Performed by: RADIOLOGY

## 2020-03-10 PROCEDURE — 77030021532 HC CATH ANGI DX IMPRS MRTM -B

## 2020-03-10 PROCEDURE — 82962 GLUCOSE BLOOD TEST: CPT

## 2020-03-10 PROCEDURE — 77030013519 HC DEV INFL BASIX MRTM -B

## 2020-03-10 RX ORDER — FENTANYL CITRATE 50 UG/ML
25-100 INJECTION, SOLUTION INTRAMUSCULAR; INTRAVENOUS
Status: DISCONTINUED | OUTPATIENT
Start: 2020-03-10 | End: 2020-03-14 | Stop reason: HOSPADM

## 2020-03-10 RX ORDER — MIDAZOLAM HYDROCHLORIDE 1 MG/ML
.5-2 INJECTION, SOLUTION INTRAMUSCULAR; INTRAVENOUS
Status: DISCONTINUED | OUTPATIENT
Start: 2020-03-10 | End: 2020-03-14 | Stop reason: HOSPADM

## 2020-03-10 RX ORDER — LIDOCAINE HYDROCHLORIDE 20 MG/ML
20-300 INJECTION, SOLUTION INFILTRATION; PERINEURAL
Status: DISCONTINUED | OUTPATIENT
Start: 2020-03-10 | End: 2020-03-14 | Stop reason: HOSPADM

## 2020-03-10 RX ORDER — DIPHENHYDRAMINE HYDROCHLORIDE 50 MG/ML
25-50 INJECTION, SOLUTION INTRAMUSCULAR; INTRAVENOUS
Status: DISCONTINUED | OUTPATIENT
Start: 2020-03-10 | End: 2020-03-14 | Stop reason: HOSPADM

## 2020-03-10 RX ORDER — HEPARIN SODIUM 200 [USP'U]/100ML
1000 INJECTION, SOLUTION INTRAVENOUS CONTINUOUS
Status: DISCONTINUED | OUTPATIENT
Start: 2020-03-10 | End: 2020-03-14 | Stop reason: HOSPADM

## 2020-03-10 RX ORDER — HYDROCODONE BITARTRATE AND ACETAMINOPHEN 7.5; 325 MG/1; MG/1
1 TABLET ORAL
Status: DISCONTINUED | OUTPATIENT
Start: 2020-03-10 | End: 2020-03-14 | Stop reason: HOSPADM

## 2020-03-10 RX ADMIN — FENTANYL CITRATE 50 MCG: 50 INJECTION, SOLUTION INTRAMUSCULAR; INTRAVENOUS at 08:55

## 2020-03-10 RX ADMIN — FENTANYL CITRATE 50 MCG: 50 INJECTION, SOLUTION INTRAMUSCULAR; INTRAVENOUS at 09:09

## 2020-03-10 RX ADMIN — DIPHENHYDRAMINE HYDROCHLORIDE 50 MG: 50 INJECTION, SOLUTION INTRAMUSCULAR; INTRAVENOUS at 08:55

## 2020-03-10 RX ADMIN — MIDAZOLAM 1 MG: 1 INJECTION INTRAMUSCULAR; INTRAVENOUS at 09:09

## 2020-03-10 RX ADMIN — HEPARIN SODIUM 4000 UNITS: 200 INJECTION, SOLUTION INTRAVENOUS at 09:21

## 2020-03-10 RX ADMIN — LIDOCAINE HYDROCHLORIDE 150 MG: 20 INJECTION, SOLUTION INFILTRATION; PERINEURAL at 08:49

## 2020-03-10 RX ADMIN — IOPAMIDOL 40 ML: 612 INJECTION, SOLUTION INTRAVENOUS at 09:21

## 2020-03-10 RX ADMIN — MIDAZOLAM 1 MG: 1 INJECTION INTRAMUSCULAR; INTRAVENOUS at 08:55

## 2020-03-10 NOTE — DISCHARGE INSTRUCTIONS
[address]  Department of Interventional Radiology  [facility]  [phone] Office  [phone] Fax    THROMBECTOMY/FISTULAPLASTY DISCHARGE INSTRUCTIONS    General Information: This procedure has been done in order to improve blood flow through your dialysis access. The procedure is designed to remove clots and/or to enlarge the narrowed areas of your dialysis access. Home Care Instructions: You can resume your regular diet and medication regimen. Do not drink alcohol, drive, or make any important legal decisions in the next 24 hours. Watch the site carefully for signs of infection. You may have some tenderness at your graft site. You make take Tylenol (acetaminophen) for this discomfort. Do not carry anything heavier than a gallon of milk. Do not wear any tight clothing on this arm, including elastic cuffs and/or tight watches. Do not allow anyone to take a blood pressure or draw blood from this extremity. You should elevate this arm on pillows to help with any swelling. Do not sleep on this arm with the graft, or fold it under your head while you sleep. Feel your graft every day to see if you can feel a thrill (pulsation). Call If:     You should call your Physician and/or the Radiology Nurse if you have any signs of infection like fever, drainage, redness, or increased pain at the graft site. Call your doctor or dialysis center immediately if you do not feel a thrill on your graft/fistula, or if you have a change in color of this extremity. Call 911 and seek immediate medical attention if you start bleeding from your graft or fistula. Apply pressure to the graft, but only enough pressure to control the bleeding. Armand the time you start holding pressure and let medical personnel know. Follow-Up Instructions: Please see your ordering doctor as he/she has requested.  You may not go to dialysis today, except with special written permission from you doctor. You may go to dialysis tomorrow, and resume your normal dialysis schedule. To Reach Us: If you have any questions about your procedure, please call the Interventional Radiology department at [phone]. After business hours (5pm) and weekends, call the answering service at [phone] and ask for the Radiologist on call to be paged. Si tiene Preguntas acerca del procedimiento, por favor llame al departamento de Radiología Intervencional al [phone]. Después de horas de oficina (5 pm) y los fines de [place], llamar al Praveen Stein al [phone] y pregunte por el Radiologo de [facility].      Patient Signature:  Date: 3/10/2020  Discharging Nurse: Kenneth Quarles RN

## 2020-03-10 NOTE — PROCEDURES
Department of Interventional Radiology  [phone]        Interventional Radiology Brief Procedure Note    Patient: Shekhar Peterson MRN: [id number]  SSN: [id number]    YOB: 1950  Age: 77 y.o. Sex: female      Date of Procedure: 3/10/2020    Pre-Procedure Diagnosis: AVGraft dysfunction, elevated pressures, excessive bleeding, outflow vein stenoses. Post-Procedure Diagnosis: SAME    Procedure(s): AV Fistulogram w angioplasty    Brief Description of Procedure: as above    Performed By: Debbie Stapleton MD     Assistants: None    Anesthesia:Moderate Sedation    Estimated Blood Loss: Less than 10ml    Specimens:  None    Implants:  None    Findings: 3 Stenoses in the outflow vein. Complications: None    Recommendations: 1 hour bedrest.      Follow Up: return in 3-4 months.       Signed By: Debbie Stapleton MD     March 10, 2020

## 2020-03-10 NOTE — PROGRESS NOTES
Pt to IR Suite 2 via stretcher.   IR Nurse Pre-Procedure Checklist Part 2          Consent signed: Yes    H&P complete:  Yes    Antibiotics: Not applicable    Airway/Mallampati Done: Yes    Shaved: Not applicable    Pregnancy Form:Not applicable    Patient Position: Yes    MD Side: Yes     Biopsy Worksheet: Not applicable    Specimen Medium: Not applicable

## 2020-03-10 NOTE — H&P
Department of Interventional Radiology  [phone]    History and Physical    Patient: Duong Priest MRN:  [id number]  SSN:  [id number]    YOB: 1950  Age:  77 y.o. Sex:  female      Primary Care Provider:  Zack Ruano MD  Referring Physician:  Pepe Mercado MD    Subjective:     Chief Complaint: fistula malfunction    History of the Present Illness: The patient is a 77 y.o. female with ESRD who presents for RUE fistulogram.  Prolong bleeding with decannulation following HD.   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   Last dialysis yesterday morning. She had a new LUE fistula placed in [facility] last week. NPO.       Past Medical History:   Diagnosis Date    Arthritis     [address]F (arteriovenous fistula) ([facility] 12/20/2016 12/6/16 ([facility]) Right AVF revision and thrombectomy    Cancer ([facility] 2015    L kidney    Chronic kidney disease     HD [facility] at [facility]    Degenerative joint disease     Diabetes ([address]     checks QD, normal 120-130, hyposymptoms at 80    ESRD (end stage renal disease) [facility]) Nov 2006    ESRD.  MWF dialysis     Hypertension     Obesity     Transient ischemic attack 08/29/2015    no residual     Past Surgical History:   Procedure Laterality Date    BREAST SURGERY PROCEDURE UNLISTED Right     cyst removed    COLONOSCOPY N/A 11/8/2018    COLONOSCOPY  BMI 36 performed by Violeta Hernandez MD at [facility] ENDOSCOPY    HX GI  06/01/2002    colon resection resulting in temporary colostomy reversal    HX GI  08/06/2018    exploratory laparotomy    HX GYN      stephan    HX OTHER SURGICAL      dialysis fistula, several permcaths    HX UROLOGICAL Left July 2015    nephrectomy    HX VASCULAR ACCESS      IR PLC CATH AV SHUNT IN W PTA SI VENOUS  5/30/2019    IR PLC CATH AV SHUNT IN W PTA SI VENOUS RT  2/28/2019    IR PLC CATH AV SHUNT IN W PTA SI VENOUS RT  9/3/2019    IR PLC CATH AV SHUNT IN W PTA SI VENOUS RT  12/5/2019  VASCULAR SURGERY PROCEDURE UNLIST Right     AV graft        Review of Systems:    Pertinent items are noted in the History of Present Illness. Prior to Admission medications    Medication Sig Start Date End Date Taking? Authorizing Provider   hyoscyamine (LEVSIN) 0.125 mg tablet Take 1 Tab by mouth every four (4) hours as needed for Cramping for up to 30 doses. 2/22/20  Yes Everton Mayo, DO   ondansetron (ZOFRAN ODT) 4 mg disintegrating tablet Take 1 Tab by mouth every eight (8) hours as needed for Nausea. 2/22/20  Yes Felicia Mayo,    amLODIPine (NORVASC) 10 mg tablet Take 10 mg by mouth nightly. Yes Provider, Historical   minoxidil (LONITEN) 2.5 mg tablet Take 7.5 mg by mouth two (2) times a day. Indications: HYPERTENSION   Yes Other, MD Jaylon   sevelamer carbonate (RENVELA) 800 mg tab tab Take 800 mg by mouth three (3) times daily (with meals). 5 tablets with meals/ 2 with snacks  Sometimes only eats 2 meals/d   Yes Provider, Historical   lisinopril (PRINIVIL, ZESTRIL) 40 mg tablet Take 40 mg by mouth nightly. Indications: HYPERTENSION 6/8/15  Yes Provider, Historical   sitaGLIPtin (JANUVIA) 100 mg tablet Take 50 mg by mouth every morning. Indications: TYPE 2 DIABETES MELLITUS   Yes Provider, Historical   lidocaine 5 % topical cream Apply  to affected area two (2) times daily as needed for Pain. 9/1/17   FRANCISCO Virgen   VIT C/VIT E/LUTEIN/MIN/OMEGA-3 (OCUVITE PO) Take  by mouth nightly. Provider, Historical   cinacalcet (SENSIPAR) 30 mg tablet Take 60 mg by mouth nightly.     Provider, Historical        Allergies   Allergen Reactions    Pcn [Penicillins] Rash    Vancomycin Rash       Family History   Problem Relation Age of Onset    Diabetes Mother     Heart Disease Mother     Hypertension Mother     Heart Disease Father     Hypertension Father     Malignant Hyperthermia Neg Hx     Pseudocholinesterase Deficiency Neg Hx     Delayed Awakening Neg Hx     Post-op Nausea/Vomiting Neg Hx     Emergence Delirium Neg Hx     Other Neg Hx     Post-op Cognitive Dysfunction Neg Hx      Social History     Tobacco Use    Smoking status: Never Smoker    Smokeless tobacco: Never Used   Substance Use Topics    Alcohol use: No        Objective:       Physical Examination:    Vitals:    03/10/20 0727 03/10/20 0734   BP: 156/67    Pulse: 97    SpO2: 96%    Weight:  93.9 kg (207 lb)   Height:  5' 5\" (1.651 m)       Pain Assessment  Pain Intensity 1: 10 (03/10/20 0724)  Pain Location 1: Back     Patient Stated Pain Goal: 9      HEART: regular rate and rhythm  LUNG: clear to auscultation bilaterally  ABDOMEN: normal findings: soft, non-tender  EXTREMITIES: warm, LUE dressing lightly soiled    Laboratory:     Lab Results   Component Value Date/Time    Sodium 135 (L) 02/22/2020 05:05 PM    Sodium 138 02/11/2020 08:18 AM    Potassium 5.2 (H) 02/22/2020 05:05 PM    Potassium 3.3 (L) 02/11/2020 08:18 AM    Chloride 98 02/22/2020 05:05 PM    Chloride 102 02/11/2020 08:18 AM    CO2 29 02/22/2020 05:05 PM    CO2 29 02/11/2020 08:18 AM    Anion gap 8 02/22/2020 05:05 PM    Anion gap 7 02/11/2020 08:18 AM    Glucose 144 (H) 02/22/2020 05:05 PM    Glucose 111 (H) 02/11/2020 08:18 AM    BUN 27 (H) 02/22/2020 05:05 PM    BUN 18 02/11/2020 08:18 AM    Creatinine 5.70 (H) 02/22/2020 05:05 PM    Creatinine 5.13 (H) 02/11/2020 08:18 AM    GFR est AA 10 (L) 02/22/2020 05:05 PM    GFR est AA 11 (L) 02/11/2020 08:18 AM    GFR est non-AA 8 (L) 02/22/2020 05:05 PM    GFR est non-AA 9 (L) 02/11/2020 08:18 AM    Calcium 8.6 02/22/2020 05:05 PM    Calcium 8.7 02/11/2020 08:18 AM    Magnesium 2.4 08/08/2018 03:57 AM    Magnesium 2.7 (H) 08/06/2018 04:37 AM    Phosphorus 5.2 (H) 08/05/2018 04:30 AM    Phosphorus 2.1 (L) 08/03/2018 11:11 AM    Albumin 4.2 02/22/2020 05:05 PM    Albumin 4.0 02/11/2020 08:18 AM    Protein, total 8.1 02/22/2020 05:05 PM    Protein, total 8.0 02/11/2020 08:18 AM    Globulin 3.9 (H) 02/22/2020 05:05 PM    Globulin 4.0 (H) 02/11/2020 08:18 AM    A-G Ratio 1.1 (L) 02/22/2020 05:05 PM    A-G Ratio 1.0 (L) 02/11/2020 08:18 AM    AST (SGOT) 51 (H) 02/22/2020 05:05 PM    AST (SGOT) 17 02/11/2020 08:18 AM    ALT (SGPT) 14 02/22/2020 05:05 PM    ALT (SGPT) 8 (L) 02/11/2020 08:18 AM     Lab Results   Component Value Date/Time    WBC 5.8 02/22/2020 05:05 PM    WBC 5.6 02/11/2020 08:18 AM    HGB 9.5 (L) 02/22/2020 05:05 PM    HGB 9.2 (L) 02/11/2020 08:18 AM    HCT 27.0 (L) 02/22/2020 05:05 PM    HCT 27.4 (L) 02/11/2020 08:18 AM    PLATELET 444 94/39/0384 05:05 PM    PLATELET 280 20/00/1330 08:18 AM     Lab Results   Component Value Date/Time    aPTT 22.2 (L) 11/16/2015 12:00 AM    aPTT 27.2 01/29/2013 12:20 PM    Prothrombin time 10.2 11/16/2015 12:00 AM    Prothrombin time 10.5 08/29/2015 04:30 PM    INR 0.9 11/16/2015 12:00 AM    INR 1.0 08/29/2015 04:30 PM       Assessment:     ESRD, RUE fistula malfunction    Hospital Problems  Date Reviewed: 2/11/2020    None          Plan:     Planned Procedure:  fistulogram with intervention    Risks, benefits, and alternatives reviewed with patient and she agrees to proceed with the procedure.       Signed By: Edward Estrada PA-C     March 10, 2020

## 2020-03-11 ENCOUNTER — HOSPITAL ENCOUNTER (OUTPATIENT)
Dept: LAB | Age: 70
Discharge: HOME | End: 2020-03-11
Payer: MEDICARE

## 2020-03-11 DIAGNOSIS — R10.9 LEFT SIDED ABDOMINAL PAIN: ICD-10-CM

## 2020-03-11 LAB
ALBUMIN SERPL-MCNC: 4 G/DL (ref 3.2–4.6)
ALBUMIN/GLOB SERPL: 0.8 {RATIO} (ref 1.2–3.5)
ALP SERPL-CCNC: 118 U/L (ref 50–136)
ALT SERPL-CCNC: 18 U/L (ref 12–65)
ANION GAP SERPL CALC-SCNC: 10 MMOL/L (ref 7–16)
AST SERPL-CCNC: 23 U/L (ref 15–37)
BASOPHILS # BLD: 0 K/UL (ref 0–0.2)
BASOPHILS NFR BLD: 0 % (ref 0–2)
BILIRUB SERPL-MCNC: 1 MG/DL (ref 0.2–1.1)
BUN SERPL-MCNC: 12 MG/DL (ref 8–23)
CALCIUM SERPL-MCNC: 9 MG/DL (ref 8.3–10.4)
CHLORIDE SERPL-SCNC: 96 MMOL/L (ref 98–107)
CO2 SERPL-SCNC: 28 MMOL/L (ref 21–32)
CREAT SERPL-MCNC: 3.31 MG/DL (ref 0.6–1)
DIFFERENTIAL METHOD BLD: ABNORMAL
EOSINOPHIL # BLD: 0.2 K/UL (ref 0–0.8)
EOSINOPHIL NFR BLD: 1 % (ref 0.5–7.8)
ERYTHROCYTE [DISTWIDTH] IN BLOOD BY AUTOMATED COUNT: 17.9 % (ref 11.9–14.6)
GLOBULIN SER CALC-MCNC: 4.8 G/DL (ref 2.3–3.5)
GLUCOSE SERPL-MCNC: 78 MG/DL (ref 65–100)
HCT VFR BLD AUTO: 31.6 % (ref 35.8–46.3)
HGB BLD-MCNC: 10.6 G/DL (ref 11.7–15.4)
IMM GRANULOCYTES # BLD AUTO: 0 K/UL (ref 0–0.5)
IMM GRANULOCYTES NFR BLD AUTO: 0 % (ref 0–5)
LACTATE SERPL-SCNC: 1.1 MMOL/L (ref 0.4–2)
LYMPHOCYTES # BLD: 0.9 K/UL (ref 0.5–4.6)
LYMPHOCYTES NFR BLD: 8 % (ref 13–44)
MCH RBC QN AUTO: 31 PG (ref 26.1–32.9)
MCHC RBC AUTO-ENTMCNC: 33.5 G/DL (ref 31.4–35)
MCV RBC AUTO: 92.4 FL (ref 79.6–97.8)
MONOCYTES # BLD: 1.2 K/UL (ref 0.1–1.3)
MONOCYTES NFR BLD: 10 % (ref 4–12)
NEUTS SEG # BLD: 8.7 K/UL (ref 1.7–8.2)
NEUTS SEG NFR BLD: 79 % (ref 43–78)
NRBC # BLD: 0 K/UL (ref 0–0.2)
PLATELET # BLD AUTO: 242 K/UL (ref 150–450)
PMV BLD AUTO: 10.4 FL (ref 9.4–12.3)
POTASSIUM SERPL-SCNC: 3.6 MMOL/L (ref 3.5–5.1)
PROCALCITONIN SERPL-MCNC: 2.04 NG/ML
PROT SERPL-MCNC: 8.8 G/DL (ref 6.3–8.2)
RBC # BLD AUTO: 3.42 M/UL (ref 4.05–5.25)
SODIUM SERPL-SCNC: 134 MMOL/L (ref 136–145)
WBC # BLD AUTO: 11.1 K/UL (ref 4.3–11.1)

## 2020-03-11 PROCEDURE — 85025 COMPLETE CBC W/AUTO DIFF WBC: CPT

## 2020-03-11 PROCEDURE — 87040 BLOOD CULTURE FOR BACTERIA: CPT

## 2020-03-11 PROCEDURE — 36415 COLL VENOUS BLD VENIPUNCTURE: CPT

## 2020-03-11 PROCEDURE — 83605 ASSAY OF LACTIC ACID: CPT

## 2020-03-11 PROCEDURE — 80053 COMPREHEN METABOLIC PANEL: CPT

## 2020-03-11 PROCEDURE — 84145 PROCALCITONIN (PCT): CPT

## 2020-03-16 LAB
BACTERIA SPEC CULT: NORMAL
SERVICE CMNT-IMP: NORMAL

## 2020-03-26 ENCOUNTER — HOSPITAL ENCOUNTER (OUTPATIENT)
Dept: LAB | Age: 70
Discharge: HOME | End: 2020-03-26
Payer: MEDICARE

## 2020-03-26 DIAGNOSIS — C64.2 MALIGNANT NEOPLASM OF LEFT KIDNEY, EXCEPT RENAL PELVIS (HCC): ICD-10-CM

## 2020-03-26 LAB
ALBUMIN SERPL-MCNC: 3.5 G/DL (ref 3.2–4.6)
ALBUMIN/GLOB SERPL: 0.9 {RATIO} (ref 1.2–3.5)
ALP SERPL-CCNC: 75 U/L (ref 50–136)
ALT SERPL-CCNC: 13 U/L (ref 12–65)
ANION GAP SERPL CALC-SCNC: 7 MMOL/L (ref 7–16)
AST SERPL-CCNC: 19 U/L (ref 15–37)
BASOPHILS # BLD: 0.1 K/UL (ref 0–0.2)
BASOPHILS NFR BLD: 1 % (ref 0–2)
BILIRUB SERPL-MCNC: 0.6 MG/DL (ref 0.2–1.1)
BUN SERPL-MCNC: 15 MG/DL (ref 8–23)
CALCIUM SERPL-MCNC: 8.6 MG/DL (ref 8.3–10.4)
CHLORIDE SERPL-SCNC: 103 MMOL/L (ref 98–107)
CO2 SERPL-SCNC: 28 MMOL/L (ref 21–32)
CREAT SERPL-MCNC: 5.23 MG/DL (ref 0.6–1)
DIFFERENTIAL METHOD BLD: ABNORMAL
EOSINOPHIL # BLD: 0.2 K/UL (ref 0–0.8)
EOSINOPHIL NFR BLD: 2 % (ref 0.5–7.8)
ERYTHROCYTE [DISTWIDTH] IN BLOOD BY AUTOMATED COUNT: 19.9 % (ref 11.9–14.6)
GLOBULIN SER CALC-MCNC: 4 G/DL (ref 2.3–3.5)
GLUCOSE SERPL-MCNC: 174 MG/DL (ref 65–100)
HCT VFR BLD AUTO: 30.7 % (ref 35.8–46.3)
HGB BLD-MCNC: 10.7 G/DL (ref 11.7–15.4)
IMM GRANULOCYTES # BLD AUTO: 0 K/UL (ref 0–0.5)
IMM GRANULOCYTES NFR BLD AUTO: 0 % (ref 0–5)
LYMPHOCYTES # BLD: 1 K/UL (ref 0.5–4.6)
LYMPHOCYTES NFR BLD: 14 % (ref 13–44)
MCH RBC QN AUTO: 32.9 PG (ref 26.1–32.9)
MCHC RBC AUTO-ENTMCNC: 34.9 G/DL (ref 31.4–35)
MCV RBC AUTO: 94.5 FL (ref 79.6–97.8)
MONOCYTES # BLD: 0.7 K/UL (ref 0.1–1.3)
MONOCYTES NFR BLD: 10 % (ref 4–12)
NEUTS SEG # BLD: 4.9 K/UL (ref 1.7–8.2)
NEUTS SEG NFR BLD: 72 % (ref 43–78)
NRBC # BLD: 0 K/UL (ref 0–0.2)
PLATELET # BLD AUTO: 230 K/UL (ref 150–450)
PMV BLD AUTO: 10.2 FL (ref 9.4–12.3)
POTASSIUM SERPL-SCNC: 3 MMOL/L (ref 3.5–5.1)
PROT SERPL-MCNC: 7.5 G/DL (ref 6.3–8.2)
RBC # BLD AUTO: 3.25 M/UL (ref 4.05–5.25)
SODIUM SERPL-SCNC: 138 MMOL/L (ref 136–145)
WBC # BLD AUTO: 6.9 K/UL (ref 4.3–11.1)

## 2020-03-26 PROCEDURE — 36415 COLL VENOUS BLD VENIPUNCTURE: CPT

## 2020-03-26 PROCEDURE — 85025 COMPLETE CBC W/AUTO DIFF WBC: CPT

## 2020-03-26 PROCEDURE — 80053 COMPREHEN METABOLIC PANEL: CPT

## 2020-07-21 ENCOUNTER — HOSPITAL ENCOUNTER (OUTPATIENT)
Dept: CT IMAGING | Age: 70
Discharge: HOME | End: 2020-07-21
Attending: INTERNAL MEDICINE
Payer: MEDICARE

## 2020-07-21 DIAGNOSIS — C64.2 MALIGNANT NEOPLASM OF LEFT KIDNEY, EXCEPT RENAL PELVIS (HCC): ICD-10-CM

## 2020-07-21 PROCEDURE — 74176 CT ABD & PELVIS W/O CONTRAST: CPT

## 2020-08-11 ENCOUNTER — HOSPITAL ENCOUNTER (OUTPATIENT)
Dept: LAB | Age: 70
Discharge: HOME | End: 2020-08-11
Payer: MEDICARE

## 2020-08-11 DIAGNOSIS — C64.2 MALIGNANT NEOPLASM OF LEFT KIDNEY, EXCEPT RENAL PELVIS (HCC): ICD-10-CM

## 2020-08-11 LAB
ALBUMIN SERPL-MCNC: 4.2 G/DL (ref 3.2–4.6)
ALBUMIN/GLOB SERPL: 1.1 {RATIO} (ref 1.2–3.5)
ALP SERPL-CCNC: 93 U/L (ref 50–136)
ALT SERPL-CCNC: 20 U/L (ref 12–65)
ANION GAP SERPL CALC-SCNC: 4 MMOL/L (ref 7–16)
AST SERPL-CCNC: 26 U/L (ref 15–37)
BASOPHILS # BLD: 0.1 K/UL (ref 0–0.2)
BASOPHILS NFR BLD: 1 % (ref 0–2)
BILIRUB SERPL-MCNC: 0.7 MG/DL (ref 0.2–1.1)
BUN SERPL-MCNC: 28 MG/DL (ref 8–23)
CALCIUM SERPL-MCNC: 8.4 MG/DL (ref 8.3–10.4)
CHLORIDE SERPL-SCNC: 100 MMOL/L (ref 98–107)
CO2 SERPL-SCNC: 33 MMOL/L (ref 21–32)
CREAT SERPL-MCNC: 5.4 MG/DL (ref 0.6–1)
DIFFERENTIAL METHOD BLD: ABNORMAL
EOSINOPHIL # BLD: 0.1 K/UL (ref 0–0.8)
EOSINOPHIL NFR BLD: 2 % (ref 0.5–7.8)
ERYTHROCYTE [DISTWIDTH] IN BLOOD BY AUTOMATED COUNT: 14.7 % (ref 11.9–14.6)
GLOBULIN SER CALC-MCNC: 3.8 G/DL (ref 2.3–3.5)
GLUCOSE SERPL-MCNC: 95 MG/DL (ref 65–100)
HCT VFR BLD AUTO: 28 % (ref 35.8–46.3)
HGB BLD-MCNC: 9.9 G/DL (ref 11.7–15.4)
IMM GRANULOCYTES # BLD AUTO: 0 K/UL (ref 0–0.5)
IMM GRANULOCYTES NFR BLD AUTO: 0 % (ref 0–5)
LYMPHOCYTES # BLD: 1 K/UL (ref 0.5–4.6)
LYMPHOCYTES NFR BLD: 21 % (ref 13–44)
MCH RBC QN AUTO: 33.9 PG (ref 26.1–32.9)
MCHC RBC AUTO-ENTMCNC: 35.4 G/DL (ref 31.4–35)
MCV RBC AUTO: 95.9 FL (ref 79.6–97.8)
MONOCYTES # BLD: 0.6 K/UL (ref 0.1–1.3)
MONOCYTES NFR BLD: 12 % (ref 4–12)
NEUTS SEG # BLD: 3.2 K/UL (ref 1.7–8.2)
NEUTS SEG NFR BLD: 64 % (ref 43–78)
NRBC # BLD: 0 K/UL (ref 0–0.2)
PLATELET # BLD AUTO: 143 K/UL (ref 150–450)
PMV BLD AUTO: 10.8 FL (ref 9.4–12.3)
POTASSIUM SERPL-SCNC: 4.1 MMOL/L (ref 3.5–5.1)
PROT SERPL-MCNC: 8 G/DL (ref 6.3–8.2)
RBC # BLD AUTO: 2.92 M/UL (ref 4.05–5.25)
SODIUM SERPL-SCNC: 137 MMOL/L (ref 136–145)
WBC # BLD AUTO: 5 K/UL (ref 4.3–11.1)

## 2020-08-11 PROCEDURE — 85025 COMPLETE CBC W/AUTO DIFF WBC: CPT

## 2020-08-11 PROCEDURE — 36415 COLL VENOUS BLD VENIPUNCTURE: CPT

## 2020-08-11 PROCEDURE — 80053 COMPREHEN METABOLIC PANEL: CPT

## 2020-09-06 ENCOUNTER — HOSPITAL ENCOUNTER (EMERGENCY)
Age: 70
Discharge: HOME | End: 2020-09-06
Attending: EMERGENCY MEDICINE
Payer: MEDICARE

## 2020-09-06 ENCOUNTER — APPOINTMENT (OUTPATIENT)
Dept: GENERAL RADIOLOGY | Age: 70
End: 2020-09-06
Attending: EMERGENCY MEDICINE
Payer: MEDICARE

## 2020-09-06 VITALS
HEIGHT: 63 IN | OXYGEN SATURATION: 93 % | DIASTOLIC BLOOD PRESSURE: 42 MMHG | TEMPERATURE: 97.9 F | BODY MASS INDEX: 33.66 KG/M2 | SYSTOLIC BLOOD PRESSURE: 115 MMHG | RESPIRATION RATE: 18 BRPM | HEART RATE: 76 BPM | WEIGHT: 190 LBS

## 2020-09-06 DIAGNOSIS — M54.9 ACUTE LEFT-SIDED BACK PAIN, UNSPECIFIED BACK LOCATION: Primary | ICD-10-CM

## 2020-09-06 PROCEDURE — 72100 X-RAY EXAM L-S SPINE 2/3 VWS: CPT

## 2020-09-06 PROCEDURE — 99283 EMERGENCY DEPT VISIT LOW MDM: CPT

## 2020-09-06 PROCEDURE — 74011250636 HC RX REV CODE- 250/636: Performed by: EMERGENCY MEDICINE

## 2020-09-06 PROCEDURE — 96372 THER/PROPH/DIAG INJ SC/IM: CPT

## 2020-09-06 PROCEDURE — 74011250637 HC RX REV CODE- 250/637: Performed by: EMERGENCY MEDICINE

## 2020-09-06 PROCEDURE — 71046 X-RAY EXAM CHEST 2 VIEWS: CPT

## 2020-09-06 PROCEDURE — 72070 X-RAY EXAM THORAC SPINE 2VWS: CPT

## 2020-09-06 RX ORDER — ONDANSETRON 4 MG/1
4 TABLET, ORALLY DISINTEGRATING ORAL
Status: COMPLETED | OUTPATIENT
Start: 2020-09-06 | End: 2020-09-06

## 2020-09-06 RX ORDER — HYDROMORPHONE HYDROCHLORIDE 1 MG/ML
1 INJECTION, SOLUTION INTRAMUSCULAR; INTRAVENOUS; SUBCUTANEOUS
Status: COMPLETED | OUTPATIENT
Start: 2020-09-06 | End: 2020-09-06

## 2020-09-06 RX ORDER — HYDROCODONE BITARTRATE AND ACETAMINOPHEN 5; 325 MG/1; MG/1
1 TABLET ORAL
Qty: 10 TAB | Refills: 0 | Status: SHIPPED | OUTPATIENT
Start: 2020-09-06 | End: 2020-09-09

## 2020-09-06 RX ADMIN — ONDANSETRON 4 MG: 4 TABLET, ORALLY DISINTEGRATING ORAL at 08:46

## 2020-09-06 RX ADMIN — HYDROMORPHONE HYDROCHLORIDE 1 MG: 1 INJECTION, SOLUTION INTRAMUSCULAR; INTRAVENOUS; SUBCUTANEOUS at 08:46

## 2020-09-06 NOTE — ED TRIAGE NOTES
Pt ambulatory to triage wearing mask with the help of medic. States she began having left sided back pain around her lung that started at 2230 last night. States she has hx of left nephrectomy. Lifted heavy table and chairs last night. Denies shob.

## 2020-09-06 NOTE — ED NOTES
I have reviewed discharge instructions with the patient. The patient verbalized understanding. Patient left ED via Discharge Method: ambulatory to Home with family member    Opportunity for questions and clarification provided. Patient given 1 scripts. To continue your aftercare when you leave the hospital, you may receive an automated call from our care team to check in on how you are doing. This is a free service and part of our promise to provide the best care and service to meet your aftercare needs.  If you have questions, or wish to unsubscribe from this service please call [phone]. Thank you for Choosing our [facility] Emergency Department.

## 2020-09-06 NOTE — ED PROVIDER NOTES
78-year-old female with history of renal cell carcinoma, end-stage renal disease on dialysis, diabetes, hypertension presents with severe left back pain for the past 2 days. She stated started shortly after moving a heavy table and furniture. She has had similar pain in this area in the past as this is a side of her prior renal cell carcinoma. She has had nephrectomy, but had recurrence in the renal bed that was treated medically. Denies any urinary symptoms, loss of bladder or bowel control, groin numbness, leg weakness or numbness, or radiation of pain. No fevers, cough, or shortness of breath. She was seen by her oncologist August 11th:    \"CT imaging July 2020 reviewed and shows no evidence of progression, the treated oligometastasis in the nephrectomy bed is stable and a pulmonary nodule has decreased from 7 to 4 mm. She will continue with observation alone at this time. No plans for systemic treatment until disease progression which is likely, but may be several months or even years. \"      Back Pain    Pertinent negatives include no chest pain, no fever, no headaches, no abdominal pain, no dysuria and no weakness. Past Medical History:   Diagnosis Date    Arthritis     AVF (arteriovenous fistula) ([facility] [address] 12/20/2016 12/6/16 ([facility]) Right AVF revision and thrombectomy    Cancer ([facility] [address] 2015    L kidney    Chronic kidney disease     HD in M-W-F- at [place] [facility]    Degenerative joint disease     Diabetes ([facility] [address]     checks QD, normal 120-130, hyposymptoms at 80    ESRD (end stage renal disease) [facility]) Nov 2006    ESRD.  MWF dialysis     Hypertension     Obesity     Transient ischemic attack 08/29/2015    no residual       Past Surgical History:   Procedure Laterality Date    BREAST SURGERY PROCEDURE UNLISTED Right     cyst removed    COLONOSCOPY N/A 11/8/2018    COLONOSCOPY  BMI 36 performed by A[facility]ir Hunter MD at [facility] ENDOSCOPY    HX GI  06/01/2002    colon resection resulting in temporary colostomy reversal    HX GI  08/06/2018    exploratory laparotomy    HX GYN      stephan    HX OTHER SURGICAL      dialysis fistula, several permcaths    HX UROLOGICAL Left July 2015    nephrectomy    HX VASCULAR ACCESS      IR PLC CATH AV SHUNT IN W PTA SI VENOUS  5/30/2019    IR PLC CATH AV SHUNT IN W PTA SI VENOUS RT  2/28/2019    IR PLC CATH AV SHUNT IN W PTA SI VENOUS RT  9/3/2019    IR PLC CATH AV SHUNT IN W PTA SI VENOUS RT  12/5/2019    IR PLC CATH AV SHUNT IN W PTA SI VENOUS RT  3/10/2020    VASCULAR SURGERY PROCEDURE UNLIST Right     AV graft         Family History:   Problem Relation Age of Onset    Diabetes Mother     Heart Disease Mother     Hypertension Mother     Heart Disease Father     Hypertension Father     Malignant Hyperthermia Neg Hx     Pseudocholinesterase Deficiency Neg Hx     Delayed Awakening Neg Hx     Post-op Nausea/Vomiting Neg Hx     Emergence Delirium Neg Hx     Other Neg Hx     Post-op Cognitive Dysfunction Neg Hx        Social History     Socioeconomic History    Marital status:      Spouse name: Not on file    Number of children: Not on file    Years of education: Not on file    Highest education level: Not on file   Occupational History    Not on file   Social Needs    Financial resource strain: Not on file    Food insecurity     Worry: Not on file     Inability: Not on file    Transportation needs     Medical: Not on file     Non-medical: Not on file   Tobacco Use    Smoking status: Never Smoker    Smokeless tobacco: Never Used   Substance and Sexual Activity    Alcohol use: No    Drug use: No    Sexual activity: Not Currently   Lifestyle    Physical activity     Days per week: Not on file     Minutes per session: Not on file    Stress: Not on file   Relationships    Social connections     Talks on phone: Not on file     Gets together: Not on file     Attends [employer] service: Not on file     Active member of club or organization: Not on file     Attends meetings of clubs or organizations: Not on file     Relationship status: Not on file    Intimate partner violence     Fear of current or ex partner: Not on file     Emotionally abused: Not on file     Physically abused: Not on file     Forced sexual activity: Not on file   Other Topics Concern    Not on file   Social History Narrative    Not on file         ALLERGIES: Pcn [penicillins] and Vancomycin    Review of Systems   Constitutional: Negative for chills and fever. HENT: Negative for hearing loss. Eyes: Negative for visual disturbance. Respiratory: Negative for cough and shortness of breath. Cardiovascular: Negative for chest pain and palpitations. Gastrointestinal: Negative for abdominal pain, diarrhea, nausea and vomiting. Genitourinary: Negative for difficulty urinating, dysuria and hematuria. Musculoskeletal: Positive for back pain. Skin: Negative for rash. Neurological: Negative for weakness and headaches. Psychiatric/Behavioral: Negative for confusion. Vitals:    09/06/20 0831   BP: 142/75   Pulse: 92   Resp: 18   Temp: 97.8 °F (36.6 °C)   SpO2: 93%   Weight: 86.2 kg (190 lb)   Height: 5' 3\" (1.6 m)            Physical Exam  Vitals signs and nursing note reviewed. Constitutional:       Appearance: She is well-developed. Comments: Appears in severe pain   HENT:      Head: Normocephalic and atraumatic. Eyes:      Pupils: Pupils are equal, round, and reactive to light. Neck:      Musculoskeletal: Normal range of motion and neck supple. Cardiovascular:      Rate and Rhythm: Regular rhythm. Heart sounds: Normal heart sounds. Pulmonary:      Effort: Pulmonary effort is normal.      Breath sounds: Normal breath sounds. Abdominal:      Palpations: Abdomen is soft. Tenderness: There is no abdominal tenderness. Musculoskeletal:      Thoracic back: She exhibits decreased range of motion, tenderness and pain. Back:         Arms:    Skin:     General: Skin is warm and dry. Neurological:      Mental Status: She is alert. Cranial Nerves: Cranial nerves are intact. Sensory: Sensation is intact. Motor: Motor function is intact. Psychiatric:         Behavior: Behavior normal.          MDM  Number of Diagnoses or Management Options  Diagnosis management comments: Parts of this document were created using dragon voice recognition software. The chart has been reviewed but errors may still be present. I wore appropriate PPE throughout this patient's ED visit. Hoang Reyna MD, 8:47 AM    9:38 AM  Pain improved. Has been seen previously for the same pain. I discussed the results of all labs, procedures, radiographs, and treatments with the patient and available family. Treatment plan is agreed upon and the patient is ready for discharge. Questions about treatment in the ED and differential diagnosis of presenting condition were answered. Patient was given verbal discharge instructions including, but not limited to, importance of returning to the emergency department for any concern of worsening or continued symptoms. Instructions were given to follow up with a primary care provider or specialist within 1-2 days. Adverse effects of medications, if prescribed, were discussed and patient was advised to refrain from significant physical activity until followed up by primary care physician and to not drive or operate heavy machinery after taking any sedating substances. Amount and/or Complexity of Data Reviewed  Tests in the radiology section of CPT®: reviewed and ordered (Xr Chest Pa Lat    Result Date: 9/6/2020  Frontal and lateral views of the chest 9/6/2020 Comparison: 7/21/2020 and prior Indication: Severe left side pain FINDINGS: Cardiac enlargement. Vascular stent overlies the right subclavian region. Small left pleural effusion again noted.  There is no focal pulmonary infiltrate, nodule, progressive effusion, or pneumothorax. No discrete acute osseous lesion seen. IMPRESSION: No acute cardiopulmonary process. Xr Spine Thorac 2 V    Result Date: 9/6/2020  AP, lateral, swimmer's views thoracic spine 9/6/2020 INDICATION: Severe back pain FINDINGS: Multilevel lateral osteophyte formation throughout the thoracic column consistent with age. No lytic or blastic lesion identified. Vertebral body heights are preserved. No evidence for acute fracture or listhesis. IMPRESSION: Age appropriate appearance thoracic spine. Xr Spine Lumb 2 Or 3 V    Result Date: 9/6/2020  AP, lateral view lumbar spine 9/6/2020 COMPARISON: 8/27/2009 INDICATION: Back pain FINDINGS: Surgical clips overlie the left hemiabdomen. Prominent bridging right lateral osteophyte L1-2 unchanged. No interval acute fracture or listhesis identified. Sacrum and sacroiliac joints grossly unremarkable. Mild degenerative disc height loss throughout the lumbar column. Few scattered aortic calcifications.      IMPRESSION: Stable appearance lumbar spine without acute interval change.    )  Tests in the medicine section of CPT®: reviewed and ordered           Procedures

## 2020-10-28 NOTE — H&P
Department of Interventional Radiology  [phone]    History and Physical    Patient: Moon Edward MRN:  [id number]  SSN:  [id number]    YOB: 1950  Age:  80 y.o. Sex:  female      Primary Care Provider:  Claribel Phillips MD  Referring Physician:  Yany Moon MD    Subjective:     Chief Complaint: AVF malfunction    History of the Present Illness: The patient is a 80 y.o. female who presents for RUE fistulogram.  Prolong bleeding with decannulation following HD. 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 Last dialysis yesterday. NPO. Past Medical History:   Diagnosis Date    Arthritis     AVF (arteriovenous fistula) ([facility] [address] 12/20/2016 12/6/16 ([facility]) Right AVF revision and thrombectomy    Cancer ([facility] [address] 2015    L kidney    Chronic kidney disease     HD in M[facility]W-F- at [facility]    Degenerative joint disease     Diabetes ([facility] [address]     checks QD, normal 120-130, hyposymptoms at 80    ESRD (end stage renal [facility]) [facility]) Nov 2006    ESRD. MWF dialysis     Hypertension     Obesity     Transient ischemic attack 08/29/2015    no residual     Past Surgical History:   Procedure Laterality Date    BREAST SURGERY PROCEDURE UNLISTED Right     cyst removed    COLONOSCOPY N/A 11/8/2018    COLONOSCOPY  BMI 36 performed by Renny Roberson MD [facility] ENDOSCOPY    HX GI  06/01/2002    colon resection resulting in temporary colostomy reversal    HX GI  08/06/2018    exploratory laparotomy    HX GYN      stephan    HX OTHER SURGICAL      dialysis fistula, several permcaths    HX UROLOGICAL Left July 2015    nephrectomy    HX VASCULAR ACCESS      IR PLC CATH AV SHUNT IN W PTA SI VENOUS RT  2/28/2019    VASCULAR SURGERY PROCEDURE UNLIST Right     AV graft        Review of Systems:    Pertinent items are noted in the History of Present Illness.     Current Outpatient Medications   Medication Sig    amLODIPine (NORVASC) 10 mg tablet Take 10 mg by Normal pap mouth nightly.  VIT C/VIT E/LUTEIN/MIN/OMEGA-3 (OCUVITE PO) Take  by mouth nightly.  cinacalcet (SENSIPAR) 30 mg tablet Take 60 mg by mouth nightly.  minoxidil (LONITEN) 2.5 mg tablet Take 7.5 mg by mouth two (2) times a day. Indications: HYPERTENSION    sevelamer carbonate (RENVELA) 800 mg tab tab Take 800 mg by mouth three (3) times daily (with meals). 5 tablets with meals/ 2 with snacks  Sometimes only eats 2 meals/d    lisinopril (PRINIVIL, ZESTRIL) 40 mg tablet Take 40 mg by mouth nightly. Indications: HYPERTENSION    sitaGLIPtin (JANUVIA) 100 mg tablet Take 50 mg by mouth every morning. Indications: TYPE 2 DIABETES MELLITUS    lidocaine (LIDODERM) 5 % 1 Patch by TransDERmal route every twenty-four (24) hours. Apply patch to the affected area for 12 hours a day and remove for 12 hours a day.  lidocaine 5 % topical cream Apply  to affected area two (2) times daily as needed for Pain.      Current Facility-Administered Medications   Medication Dose Route Frequency    iopamidol (ISOVUE 300) 61 % contrast injection 50 mL  50 mL IntraCATHeter RAD ONCE    heparin (PF) 2 units/ml in NS infusion 2,000 Units  1,000 mL Irrigation CONTINUOUS    lidocaine (XYLOCAINE) 20 mg/mL (2 %) injection  mg   mg IntraDERMal ONCE    0.9% sodium chloride infusion  25 mL/hr IntraVENous CONTINUOUS    diphenhydrAMINE (BENADRYL) injection 50 mg  50 mg IntraVENous ONCE    fentaNYL citrate (PF) injection 25-50 mcg  25-50 mcg IntraVENous Multiple    midazolam (VERSED) injection 0.5-2 mg  0.5-2 mg IntraVENous Multiple        Allergies   Allergen Reactions    Pcn [Penicillins] Rash    Vancomycin Rash       Family History   Problem Relation Age of Onset    Diabetes Mother     Heart Disease Mother     Hypertension Mother     Heart Disease Father     Hypertension Father     Malignant Hyperthermia Neg Hx     Pseudocholinesterase Deficiency Neg Hx     Delayed Awakening Neg Hx     Post-op Nausea/Vomiting Neg Hx     Emergence Delirium Neg Hx     Other Neg Hx     Post-op Cognitive Dysfunction Neg Hx      Social History     Tobacco Use    Smoking status: Never Smoker    Smokeless tobacco: Never Used   Substance Use Topics    Alcohol use: No        Objective:       Physical Examination:    Vitals:    05/29/19 1336 05/30/19 1204   Pulse:  84   Resp:  18   Temp:  98.2 °F (36.8 °C)   SpO2:  96%   Weight: 99.3 kg (219 lb)    Height: 5' 5\" (1.651 m)      Pulse 84, temperature 98.2 °F (36.8 °C), resp. rate 18, height 5' 5\" (1.651 m), weight 99.3 kg (219 lb), SpO2 96 %, not currently breastfeeding.   HEART: regular rate and rhythm  LUNG: clear to auscultation bilaterally  ABDOMEN: normal findings: soft, non-tender  EXTREMITIES: RUE AVF + thrill, aneurysmal    Laboratory:     Lab Results   Component Value Date/Time    Sodium 140 04/09/2019 06:31 PM    Sodium 139 03/03/2019 10:41 AM    Potassium 4.6 04/09/2019 06:31 PM    Potassium 3.5 03/03/2019 10:41 AM    Chloride 105 04/09/2019 06:31 PM    Chloride 102 03/03/2019 10:41 AM    CO2 25 04/09/2019 06:31 PM    CO2 29 03/03/2019 10:41 AM    Anion gap 10 04/09/2019 06:31 PM    Anion gap 8 03/03/2019 10:41 AM    Glucose 107 (H) 04/09/2019 06:31 PM    Glucose 110 (H) 03/03/2019 10:41 AM    BUN 29 (H) 04/09/2019 06:31 PM    BUN 34 (H) 03/03/2019 10:41 AM    Creatinine 6.35 (H) 04/09/2019 06:31 PM    Creatinine 7.30 (H) 03/03/2019 10:41 AM    GFR est AA 8 (L) 04/09/2019 06:31 PM    GFR est AA 7 (L) 03/03/2019 10:41 AM    GFR est non-AA 7 (L) 04/09/2019 06:31 PM    GFR est non-AA 6 (L) 03/03/2019 10:41 AM    Calcium 9.0 04/09/2019 06:31 PM    Calcium 8.6 03/03/2019 10:41 AM    Magnesium 2.4 08/08/2018 03:57 AM    Magnesium 2.7 (H) 08/06/2018 04:37 AM    Phosphorus 5.2 (H) 08/05/2018 04:30 AM    Phosphorus 2.1 (L) 08/03/2018 11:11 AM    Albumin 4.0 04/09/2019 06:31 PM    Albumin 3.7 03/03/2019 10:41 AM    Protein, total 8.2 04/09/2019 06:31 PM    Protein, total 7.4 03/03/2019 10:41 AM    Globulin 4.2 (H) 04/09/2019 06:31 PM    Globulin 3.7 (H) 03/03/2019 10:41 AM    A-G Ratio 1.0 (L) 04/09/2019 06:31 PM    A-G Ratio 1.0 (L) 03/03/2019 10:41 AM    AST (SGOT) 47 (H) 04/09/2019 06:31 PM    AST (SGOT) 22 03/03/2019 10:41 AM    ALT (SGPT) 14 04/09/2019 06:31 PM    ALT (SGPT) 15 03/03/2019 10:41 AM     Lab Results   Component Value Date/Time    WBC 7.3 04/09/2019 06:31 PM    WBC 7.8 03/03/2019 10:41 AM    HGB 8.3 (L) 04/09/2019 06:31 PM    HGB 8.9 (L) 03/03/2019 10:41 AM    HCT 23.5 (L) 04/09/2019 06:31 PM    HCT 25.1 (L) 03/03/2019 10:41 AM    PLATELET 980 71/64/0831 06:31 PM    PLATELET 176 50/85/2036 10:41 AM     Lab Results   Component Value Date/Time    aPTT 22.2 (L) 11/16/2015 12:00 AM    aPTT 27.2 01/29/2013 12:20 PM    Prothrombin time 10.2 11/16/2015 12:00 AM    Prothrombin time 10.5 08/29/2015 04:30 PM    INR 0.9 11/16/2015 12:00 AM    INR 1.0 08/29/2015 04:30 PM       Assessment:     ESRD, AVF malfunction    Plan:     Planned Procedure:  fistulogram    Risks, benefits, and alternatives reviewed with patient and she agrees to proceed with the procedure.       Signed By: Alma Delia Golden PA-C     May 30, 2019

## 2021-01-21 ENCOUNTER — HOSPITAL ENCOUNTER (OUTPATIENT)
Dept: CT IMAGING | Age: 71
Discharge: HOME | End: 2021-01-21
Attending: INTERNAL MEDICINE
Payer: MEDICARE

## 2021-01-21 DIAGNOSIS — C64.2 MALIGNANT NEOPLASM OF LEFT KIDNEY, EXCEPT RENAL PELVIS (HCC): ICD-10-CM

## 2021-01-21 PROCEDURE — 74176 CT ABD & PELVIS W/O CONTRAST: CPT

## 2021-01-26 ENCOUNTER — HOSPITAL ENCOUNTER (OUTPATIENT)
Dept: LAB | Age: 71
Discharge: HOME | End: 2021-01-26
Payer: MEDICARE

## 2021-01-26 DIAGNOSIS — C64.2 MALIGNANT NEOPLASM OF LEFT KIDNEY, EXCEPT RENAL PELVIS (HCC): ICD-10-CM

## 2021-01-26 LAB
ALBUMIN SERPL-MCNC: 4 G/DL (ref 3.2–4.6)
ALBUMIN/GLOB SERPL: 1 {RATIO} (ref 1.2–3.5)
ALP SERPL-CCNC: 63 U/L (ref 50–136)
ALT SERPL-CCNC: 13 U/L (ref 12–65)
ANION GAP SERPL CALC-SCNC: 8 MMOL/L (ref 7–16)
AST SERPL-CCNC: 19 U/L (ref 15–37)
BASOPHILS # BLD: 0.1 K/UL (ref 0–0.2)
BASOPHILS NFR BLD: 2 % (ref 0–2)
BILIRUB SERPL-MCNC: 0.7 MG/DL (ref 0.2–1.1)
BUN SERPL-MCNC: 17 MG/DL (ref 8–23)
CALCIUM SERPL-MCNC: 9.3 MG/DL (ref 8.3–10.4)
CHLORIDE SERPL-SCNC: 101 MMOL/L (ref 98–107)
CO2 SERPL-SCNC: 31 MMOL/L (ref 21–32)
CREAT SERPL-MCNC: 4.6 MG/DL (ref 0.6–1)
DIFFERENTIAL METHOD BLD: ABNORMAL
EOSINOPHIL # BLD: 0.1 K/UL (ref 0–0.8)
EOSINOPHIL NFR BLD: 2 % (ref 0.5–7.8)
ERYTHROCYTE [DISTWIDTH] IN BLOOD BY AUTOMATED COUNT: 17.1 % (ref 11.9–14.6)
GLOBULIN SER CALC-MCNC: 4.2 G/DL (ref 2.3–3.5)
GLUCOSE SERPL-MCNC: 97 MG/DL (ref 65–100)
HCT VFR BLD AUTO: 32.3 % (ref 35.8–46.3)
HGB BLD-MCNC: 10.8 G/DL (ref 11.7–15.4)
IMM GRANULOCYTES # BLD AUTO: 0 K/UL (ref 0–0.5)
IMM GRANULOCYTES NFR BLD AUTO: 1 % (ref 0–5)
LYMPHOCYTES # BLD: 0.9 K/UL (ref 0.5–4.6)
LYMPHOCYTES NFR BLD: 18 % (ref 13–44)
MCH RBC QN AUTO: 32.3 PG (ref 26.1–32.9)
MCHC RBC AUTO-ENTMCNC: 33.4 G/DL (ref 31.4–35)
MCV RBC AUTO: 96.7 FL (ref 79.6–97.8)
MONOCYTES # BLD: 0.5 K/UL (ref 0.1–1.3)
MONOCYTES NFR BLD: 9 % (ref 4–12)
NEUTS SEG # BLD: 3.6 K/UL (ref 1.7–8.2)
NEUTS SEG NFR BLD: 68 % (ref 43–78)
NRBC # BLD: 0 K/UL (ref 0–0.2)
PLATELET # BLD AUTO: 128 K/UL (ref 150–450)
PMV BLD AUTO: 11.5 FL (ref 9.4–12.3)
POTASSIUM SERPL-SCNC: 3.7 MMOL/L (ref 3.5–5.1)
PROT SERPL-MCNC: 8.2 G/DL (ref 6.3–8.2)
RBC # BLD AUTO: 3.34 M/UL (ref 4.05–5.25)
SODIUM SERPL-SCNC: 140 MMOL/L (ref 136–145)
WBC # BLD AUTO: 5.2 K/UL (ref 4.3–11.1)

## 2021-01-26 PROCEDURE — 85025 COMPLETE CBC W/AUTO DIFF WBC: CPT

## 2021-01-26 PROCEDURE — 80053 COMPREHEN METABOLIC PANEL: CPT

## 2021-01-26 PROCEDURE — 36415 COLL VENOUS BLD VENIPUNCTURE: CPT

## 2021-07-22 ENCOUNTER — HOSPITAL ENCOUNTER (OUTPATIENT)
Dept: CT IMAGING | Age: 71
Discharge: HOME | End: 2021-07-22
Attending: INTERNAL MEDICINE
Payer: MEDICARE

## 2021-07-22 DIAGNOSIS — C64.2 MALIGNANT NEOPLASM OF LEFT KIDNEY, EXCEPT RENAL PELVIS (HCC): ICD-10-CM

## 2021-07-22 PROCEDURE — 74176 CT ABD & PELVIS W/O CONTRAST: CPT

## 2021-07-22 PROCEDURE — 74011000636 HC RX REV CODE- 636: Performed by: INTERNAL MEDICINE

## 2021-07-22 RX ADMIN — DIATRIZOATE MEGLUMINE AND DIATRIZOATE SODIUM 15 ML: 660; 100 LIQUID ORAL; RECTAL at 10:44

## 2021-07-27 ENCOUNTER — HOSPITAL ENCOUNTER (OUTPATIENT)
Dept: LAB | Age: 71
Discharge: HOME | End: 2021-07-27
Payer: MEDICARE

## 2021-07-27 DIAGNOSIS — C64.2 MALIGNANT NEOPLASM OF LEFT KIDNEY, EXCEPT RENAL PELVIS (HCC): ICD-10-CM

## 2021-07-27 LAB
ALBUMIN SERPL-MCNC: 3.6 G/DL (ref 3.2–4.6)
ALBUMIN/GLOB SERPL: 0.9 {RATIO} (ref 1.2–3.5)
ALP SERPL-CCNC: 70 U/L (ref 50–136)
ALT SERPL-CCNC: 14 U/L (ref 12–65)
ANION GAP SERPL CALC-SCNC: 10 MMOL/L (ref 7–16)
AST SERPL-CCNC: 19 U/L (ref 15–37)
BASOPHILS # BLD: 0.1 K/UL (ref 0–0.2)
BASOPHILS NFR BLD: 1 % (ref 0–2)
BILIRUB SERPL-MCNC: 0.7 MG/DL (ref 0.2–1.1)
BUN SERPL-MCNC: 26 MG/DL (ref 8–23)
CALCIUM SERPL-MCNC: 9.4 MG/DL (ref 8.3–10.4)
CHLORIDE SERPL-SCNC: 105 MMOL/L (ref 98–107)
CO2 SERPL-SCNC: 22 MMOL/L (ref 21–32)
CREAT SERPL-MCNC: 5 MG/DL (ref 0.6–1)
DIFFERENTIAL METHOD BLD: ABNORMAL
EOSINOPHIL # BLD: 0.1 K/UL (ref 0–0.8)
EOSINOPHIL NFR BLD: 1 % (ref 0.5–7.8)
ERYTHROCYTE [DISTWIDTH] IN BLOOD BY AUTOMATED COUNT: 15.5 % (ref 11.9–14.6)
GLOBULIN SER CALC-MCNC: 4.1 G/DL (ref 2.3–3.5)
GLUCOSE SERPL-MCNC: 122 MG/DL (ref 65–100)
HCT VFR BLD AUTO: 26.7 % (ref 35.8–46.3)
HGB BLD-MCNC: 9.4 G/DL (ref 11.7–15.4)
IMM GRANULOCYTES # BLD AUTO: 0.1 K/UL (ref 0–0.5)
IMM GRANULOCYTES NFR BLD AUTO: 1 % (ref 0–5)
LYMPHOCYTES # BLD: 1.2 K/UL (ref 0.5–4.6)
LYMPHOCYTES NFR BLD: 20 % (ref 13–44)
MCH RBC QN AUTO: 33.7 PG (ref 26.1–32.9)
MCHC RBC AUTO-ENTMCNC: 35.2 G/DL (ref 31.4–35)
MCV RBC AUTO: 95.7 FL (ref 79.6–97.8)
MONOCYTES # BLD: 0.6 K/UL (ref 0.1–1.3)
MONOCYTES NFR BLD: 10 % (ref 4–12)
NEUTS SEG # BLD: 4 K/UL (ref 1.7–8.2)
NEUTS SEG NFR BLD: 67 % (ref 43–78)
NRBC # BLD: 0 K/UL (ref 0–0.2)
PLATELET # BLD AUTO: 92 K/UL (ref 150–450)
PMV BLD AUTO: 11.6 FL (ref 9.4–12.3)
POTASSIUM SERPL-SCNC: 3.8 MMOL/L (ref 3.5–5.1)
PROT SERPL-MCNC: 7.7 G/DL (ref 6.3–8.2)
RBC # BLD AUTO: 2.79 M/UL (ref 4.05–5.2)
SODIUM SERPL-SCNC: 137 MMOL/L (ref 136–145)
WBC # BLD AUTO: 6 K/UL (ref 4.3–11.1)

## 2021-07-27 PROCEDURE — 80053 COMPREHEN METABOLIC PANEL: CPT

## 2021-07-27 PROCEDURE — 85025 COMPLETE CBC W/AUTO DIFF WBC: CPT

## 2021-07-27 PROCEDURE — 36415 COLL VENOUS BLD VENIPUNCTURE: CPT

## 2021-11-09 ENCOUNTER — HOSPITAL ENCOUNTER (EMERGENCY)
Age: 71
Discharge: HOME | DRG: 314 | End: 2021-11-09
Attending: EMERGENCY MEDICINE
Payer: MEDICARE

## 2021-11-09 ENCOUNTER — APPOINTMENT (OUTPATIENT)
Dept: CT IMAGING | Age: 71
DRG: 314 | End: 2021-11-09
Attending: EMERGENCY MEDICINE
Payer: MEDICARE

## 2021-11-09 VITALS
BODY MASS INDEX: 31.89 KG/M2 | TEMPERATURE: 97.6 F | SYSTOLIC BLOOD PRESSURE: 100 MMHG | HEIGHT: 63 IN | OXYGEN SATURATION: 91 % | HEART RATE: 75 BPM | RESPIRATION RATE: 16 BRPM | WEIGHT: 180 LBS | DIASTOLIC BLOOD PRESSURE: 50 MMHG

## 2021-11-09 DIAGNOSIS — R10.9 ACUTE LEFT FLANK PAIN: Primary | ICD-10-CM

## 2021-11-09 LAB
ALBUMIN SERPL-MCNC: 4.1 G/DL (ref 3.2–4.6)
ALBUMIN/GLOB SERPL: 1 {RATIO} (ref 1.2–3.5)
ALP SERPL-CCNC: 91 U/L (ref 50–136)
ALT SERPL-CCNC: 16 U/L (ref 12–65)
ANION GAP SERPL CALC-SCNC: 13 MMOL/L (ref 7–16)
AST SERPL-CCNC: 20 U/L (ref 15–37)
BASOPHILS # BLD: 0.1 K/UL (ref 0–0.2)
BASOPHILS NFR BLD: 1 % (ref 0–2)
BILIRUB SERPL-MCNC: 0.8 MG/DL (ref 0.2–1.1)
BUN SERPL-MCNC: 44 MG/DL (ref 8–23)
CALCIUM SERPL-MCNC: 8.6 MG/DL (ref 8.3–10.4)
CHLORIDE SERPL-SCNC: 100 MMOL/L (ref 98–107)
CO2 SERPL-SCNC: 26 MMOL/L (ref 21–32)
CREAT SERPL-MCNC: 5.63 MG/DL (ref 0.6–1)
DIFFERENTIAL METHOD BLD: ABNORMAL
EOSINOPHIL # BLD: 0.1 K/UL (ref 0–0.8)
EOSINOPHIL NFR BLD: 1 % (ref 0.5–7.8)
ERYTHROCYTE [DISTWIDTH] IN BLOOD BY AUTOMATED COUNT: 16.7 % (ref 11.9–14.6)
GLOBULIN SER CALC-MCNC: 4.3 G/DL (ref 2.3–3.5)
GLUCOSE SERPL-MCNC: 125 MG/DL (ref 65–100)
HCT VFR BLD AUTO: 28.8 % (ref 35.8–46.3)
HGB BLD-MCNC: 10 G/DL (ref 11.7–15.4)
IMM GRANULOCYTES # BLD AUTO: 0 K/UL (ref 0–0.5)
IMM GRANULOCYTES NFR BLD AUTO: 0 % (ref 0–5)
LIPASE SERPL-CCNC: 396 U/L (ref 73–393)
LYMPHOCYTES # BLD: 0.9 K/UL (ref 0.5–4.6)
LYMPHOCYTES NFR BLD: 13 % (ref 13–44)
MCH RBC QN AUTO: 32.3 PG (ref 26.1–32.9)
MCHC RBC AUTO-ENTMCNC: 34.7 G/DL (ref 31.4–35)
MCV RBC AUTO: 92.9 FL (ref 79.6–97.8)
MONOCYTES # BLD: 0.5 K/UL (ref 0.1–1.3)
MONOCYTES NFR BLD: 8 % (ref 4–12)
NEUTS SEG # BLD: 5.3 K/UL (ref 1.7–8.2)
NEUTS SEG NFR BLD: 78 % (ref 43–78)
NRBC # BLD: 0 K/UL (ref 0–0.2)
PLATELET # BLD AUTO: 109 K/UL (ref 150–450)
PMV BLD AUTO: 11.1 FL (ref 9.4–12.3)
POTASSIUM SERPL-SCNC: 3.2 MMOL/L (ref 3.5–5.1)
PROT SERPL-MCNC: 8.4 G/DL (ref 6.3–8.2)
RBC # BLD AUTO: 3.1 M/UL (ref 4.05–5.2)
SODIUM SERPL-SCNC: 139 MMOL/L (ref 136–145)
WBC # BLD AUTO: 6.9 K/UL (ref 4.3–11.1)

## 2021-11-09 PROCEDURE — 83690 ASSAY OF LIPASE: CPT

## 2021-11-09 PROCEDURE — 74177 CT ABD & PELVIS W/CONTRAST: CPT

## 2021-11-09 PROCEDURE — 85025 COMPLETE CBC W/AUTO DIFF WBC: CPT

## 2021-11-09 PROCEDURE — 74011250636 HC RX REV CODE- 250/636: Performed by: EMERGENCY MEDICINE

## 2021-11-09 PROCEDURE — 96374 THER/PROPH/DIAG INJ IV PUSH: CPT

## 2021-11-09 PROCEDURE — 96375 TX/PRO/DX INJ NEW DRUG ADDON: CPT

## 2021-11-09 PROCEDURE — 74011000258 HC RX REV CODE- 258: Performed by: EMERGENCY MEDICINE

## 2021-11-09 PROCEDURE — 99284 EMERGENCY DEPT VISIT MOD MDM: CPT

## 2021-11-09 PROCEDURE — 80053 COMPREHEN METABOLIC PANEL: CPT

## 2021-11-09 PROCEDURE — 74011000636 HC RX REV CODE- 636: Performed by: EMERGENCY MEDICINE

## 2021-11-09 RX ORDER — HYDROCODONE BITARTRATE AND ACETAMINOPHEN 7.5; 325 MG/1; MG/1
1 TABLET ORAL
Qty: 17 TABLET | Refills: 0 | Status: SHIPPED | OUTPATIENT
Start: 2021-11-09 | End: 2021-11-14

## 2021-11-09 RX ORDER — SODIUM CHLORIDE 0.9 % (FLUSH) 0.9 %
5-10 SYRINGE (ML) INJECTION AS NEEDED
Status: DISCONTINUED | OUTPATIENT
Start: 2021-11-09 | End: 2021-11-09 | Stop reason: HOSPADM

## 2021-11-09 RX ORDER — SODIUM CHLORIDE 0.9 % (FLUSH) 0.9 %
10 SYRINGE (ML) INJECTION
Status: COMPLETED | OUTPATIENT
Start: 2021-11-09 | End: 2021-11-09

## 2021-11-09 RX ORDER — MORPHINE SULFATE 4 MG/ML
4 INJECTION INTRAVENOUS
Status: COMPLETED | OUTPATIENT
Start: 2021-11-09 | End: 2021-11-09

## 2021-11-09 RX ORDER — MORPHINE SULFATE 10 MG/ML
10 INJECTION, SOLUTION INTRAMUSCULAR; INTRAVENOUS
Status: DISCONTINUED | OUTPATIENT
Start: 2021-11-09 | End: 2021-11-09

## 2021-11-09 RX ORDER — ONDANSETRON 4 MG/1
4 TABLET, ORALLY DISINTEGRATING ORAL
Status: DISCONTINUED | OUTPATIENT
Start: 2021-11-09 | End: 2021-11-09

## 2021-11-09 RX ORDER — METHOCARBAMOL 750 MG/1
1500 TABLET, FILM COATED ORAL 4 TIMES DAILY
Qty: 40 TABLET | Refills: 0 | Status: ON HOLD | OUTPATIENT
Start: 2021-11-09 | End: 2022-02-03

## 2021-11-09 RX ORDER — SODIUM CHLORIDE 9 MG/ML
150 INJECTION, SOLUTION INTRAVENOUS CONTINUOUS
Status: DISCONTINUED | OUTPATIENT
Start: 2021-11-09 | End: 2021-11-09 | Stop reason: HOSPADM

## 2021-11-09 RX ORDER — ONDANSETRON 2 MG/ML
4 INJECTION INTRAMUSCULAR; INTRAVENOUS
Status: COMPLETED | OUTPATIENT
Start: 2021-11-09 | End: 2021-11-09

## 2021-11-09 RX ORDER — SODIUM CHLORIDE 0.9 % (FLUSH) 0.9 %
5-10 SYRINGE (ML) INJECTION EVERY 8 HOURS
Status: DISCONTINUED | OUTPATIENT
Start: 2021-11-09 | End: 2021-11-09 | Stop reason: HOSPADM

## 2021-11-09 RX ADMIN — IOPAMIDOL 100 ML: 755 INJECTION, SOLUTION INTRAVENOUS at 09:01

## 2021-11-09 RX ADMIN — SODIUM CHLORIDE 100 ML: 900 INJECTION, SOLUTION INTRAVENOUS at 09:01

## 2021-11-09 RX ADMIN — Medication 10 ML: at 09:02

## 2021-11-09 RX ADMIN — ONDANSETRON 4 MG: 2 INJECTION INTRAMUSCULAR; INTRAVENOUS at 08:34

## 2021-11-09 RX ADMIN — SODIUM CHLORIDE 150 ML/HR: 900 INJECTION, SOLUTION INTRAVENOUS at 08:34

## 2021-11-09 RX ADMIN — MORPHINE SULFATE 4 MG: 4 INJECTION INTRAVENOUS at 08:34

## 2021-11-09 NOTE — ED PROVIDER NOTES
36-year-old female presents with relatively sudden on onset of severe pain. History of prior renal cell cancer. Chart indicates she had clear cell carcinoma with recurrence in the renal bed  Last seen by her oncologist in July. No new treatment since that time. They are doing surveillance CAT scans every 6 months    The history is provided by the patient. Flank Pain   This is a recurrent problem. The current episode started yesterday. The problem has not changed since onset. The problem occurs constantly. Patient reports not work related injury. The pain is associated with no known injury. The pain is present in the right side, middle back and upper back. The quality of the pain is described as aching and dull. The pain does not radiate. The pain is severe. The pain is the same all the time. Pertinent negatives include no chest pain, no fever, no weight loss, no headaches, no abdominal pain, no abdominal swelling and no dysuria. She has tried nothing for the symptoms. Risk factors include a history of cancer. Left nephrectomy       Past Medical History:   Diagnosis Date    Arthritis     AVF (arteriovenous fistula) ([facility] [address] 12/20/2016 12/6/16 (S) Right AVF revision and thrombectomy    Cancer ([facility] [address] 2015    L kidney    Chronic kidney disease     HD in M-W-F- at [facility]    Degenerative joint disease     Diabetes ([facility] [address]     checks QD, normal 120-130, hyposymptoms at 80    ESRD (end stage renal disease) [facility]) Nov 2006    ESRD.  MWF dialysis     Hypertension     Obesity     Transient ischemic attack 08/29/2015    no residual       Past Surgical History:   Procedure Laterality Date    COLONOSCOPY N/A 11/8/2018    COLONOSCOPY  BMI 36 performed by Delores Merchant MD at [facility] ENDOSCOPY    HX GI  06/01/2002    colon resection resulting in temporary colostomy reversal    HX GI  08/06/2018    exploratory laparotomy    HX GYN      stephan    HX OTHER SURGICAL      dialysis fistula, several permcaths    HX UROLOGICAL Left July 2015    nephrectomy    HX VASCULAR ACCESS      IR PLC CATH AV SHUNT IN W PTA SI VENOUS  5/30/2019    IR PLC CATH AV SHUNT IN W PTA SI VENOUS RT  2/28/2019    IR PLC CATH AV SHUNT IN W PTA SI VENOUS RT  9/3/2019    IR PLC CATH AV SHUNT IN W PTA SI VENOUS RT  12/5/2019    IR PLC CATH AV SHUNT IN W PTA SI VENOUS RT  3/10/2020    ME BREAST SURGERY PROCEDURE UNLISTED Right     cyst removed    VASCULAR SURGERY PROCEDURE UNLIST Right     AV graft         Family History:   Problem Relation Age of Onset    Diabetes Mother     Heart Disease Mother     Hypertension Mother     Heart Disease Father     Hypertension Father     Malignant Hyperthermia Neg Hx     Pseudocholinesterase Deficiency Neg Hx     Delayed Awakening Neg Hx     Post-op Nausea/Vomiting Neg Hx     Emergence Delirium Neg Hx     Other Neg Hx     Post-op Cognitive Dysfunction Neg Hx        Social History     Socioeconomic History    Marital status:      Spouse name: Not on file    Number of children: Not on file    Years of education: Not on file    Highest education level: Not on file   Occupational History    Not on file   Tobacco Use    Smoking status: Never Smoker    Smokeless tobacco: Never Used   Substance and Sexual Activity    Alcohol use: No    Drug use: No    Sexual activity: Not Currently   Other Topics Concern    Not on file   Social History Narrative    Not on file     Social Determinants of Health     Financial Resource Strain:     Difficulty of Paying Living Expenses: Not on file   Food Insecurity:     Worried About Running Out of Food in the Last Year: Not on file    Rick of Food in the Last Year: Not on file   Transportation Needs:     Lack of Transportation (Medical): Not on file    Lack of Transportation (Non-Medical):  Not on file   Physical Activity:     Days of Exercise per Week: Not on file    Minutes of Exercise per Session: Not on file   Stress:     Feeling of Stress : Not on file   Social Connections:     Frequency of Communication with Friends and Family: Not on file    Frequency of Social Gatherings with Friends and Family: Not on file    Attends [employer] Services: Not on file    Active Member of Clubs or Organizations: Not on file    Attends Club or Organization Meetings: Not on file    Marital Status: Not on file   Intimate Partner Violence:     Fear of Current or Ex-Partner: Not on file    Emotionally Abused: Not on file    Physically Abused: Not on file    Sexually Abused: Not on file   Housing Stability:     Unable to Pay for Housing in the Last Year: Not on file    Number of [place] in the Last Year: Not on file    Unstable Housing in the Last Year: Not on file         ALLERGIES: Pcn [penicillins] and Vancomycin    Review of Systems   Constitutional: Negative for chills, fever and weight loss. HENT: Negative for congestion, ear pain and rhinorrhea. Eyes: Negative for photophobia and discharge. Respiratory: Negative for cough and shortness of breath. Cardiovascular: Negative for chest pain and palpitations. Gastrointestinal: Negative for abdominal pain, constipation, diarrhea and vomiting. Endocrine: Negative for cold intolerance and heat intolerance. Genitourinary: Positive for flank pain. Negative for dysuria. Musculoskeletal: Negative for arthralgias, myalgias and neck pain. Skin: Negative for rash and wound. Allergic/Immunologic: Negative for environmental allergies and food allergies. Neurological: Negative for syncope and headaches. Hematological: Negative for adenopathy. Does not bruise/bleed easily. Psychiatric/Behavioral: Negative for dysphoric mood. The patient is not nervous/anxious. All other systems reviewed and are negative.       Vitals:    11/09/21 0723   BP: (!) 103/52   Pulse: 68   Resp: 18   Temp: 97.4 °F (36.3 °C)   SpO2: 98%   Weight: 81.6 kg (180 lb)   Height: 5' 3\" (1.6 m)            Physical Exam  Vitals and nursing note reviewed. Constitutional:       General: She is in acute distress. Appearance: Normal appearance. She is well-developed and normal weight. HENT:      Head: Normocephalic and atraumatic. Right Ear: External ear normal.      Left Ear: External ear normal.      Mouth/Throat:      Mouth: Mucous membranes are moist.      Pharynx: Oropharynx is clear. No oropharyngeal exudate. Eyes:      Extraocular Movements: Extraocular movements intact. Conjunctiva/sclera: Conjunctivae normal.      Pupils: Pupils are equal, round, and reactive to light. Neck:      Vascular: No JVD. Cardiovascular:      Rate and Rhythm: Normal rate and regular rhythm. Heart sounds: Normal heart sounds. No murmur heard. No friction rub. No gallop. Pulmonary:      Effort: Pulmonary effort is normal.      Breath sounds: Normal breath sounds. Abdominal:      General: Bowel sounds are normal. There is no distension. Palpations: Abdomen is soft. There is no mass. Tenderness: There is no abdominal tenderness. Musculoskeletal:         General: No deformity. Normal range of motion. Cervical back: Normal range of motion and neck supple. Back:    Skin:     General: Skin is warm and dry. Capillary Refill: Capillary refill takes less than 2 seconds. Findings: No rash. Neurological:      General: No focal deficit present. Mental Status: She is alert and oriented to person, place, and time. Cranial Nerves: No cranial nerve deficit. Sensory: No sensory deficit. Gait: Gait normal.   Psychiatric:         Mood and Affect: Mood normal.         Speech: Speech normal.         Behavior: Behavior normal.         Thought Content:  Thought content normal.         Judgment: Judgment normal.          MDM  Number of Diagnoses or Management Options  Acute left flank pain: new and requires workup  Diagnosis management comments: 80-year-old female with a history of end-stage renal disease and renal cell carcinoma on the left with recurrence post nephrectomy  Here with relatively sudden onset of pain yesterday  Did not contact her oncologist that she was having pain in the same area as previous episodes    No fever vomiting or diarrhea    We will check labs and CT    10:42 AM  Patient's lab work is stable without other acute concerning changes. CT imaging reveals increased adenopathy in the chest.  Mass in the left renal bed is unchanged and without other acute findings    Reviewed with oncology, Dr. Robinson Forrester  Patient will be referred for close outpatient follow-up in the office       Amount and/or Complexity of Data Reviewed  Clinical lab tests: ordered and reviewed  Tests in the radiology section of CPT®: ordered and reviewed  Tests in the medicine section of CPT®: ordered and reviewed  Decide to obtain previous medical records or to obtain history from someone other than the patient: yes  Review and summarize past medical records: yes  Discuss the patient with other providers: yes  Independent visualization of images, tracings, or specimens: yes    Risk of Complications, Morbidity, and/or Mortality  Presenting problems: moderate  Diagnostic procedures: moderate  Management options: moderate  General comments: Elements of this note have been dictated via voice recognition software. Text and phrases may be limited by the accuracy of the software. The chart has been reviewed, but errors may still be present.       Patient Progress  Patient progress: improved         Procedures

## 2021-11-09 NOTE — ED NOTES
BP noted to be low even after repositioning and using different location sites. Denies any lightheadedness or dizziness. Skin warm and dry. Provider notified and ordered to give Morphine and Zofran and continue to monitor. New order received for Normal Saline 150mL/hr.

## 2021-11-09 NOTE — DISCHARGE INSTRUCTIONS
Be sure to go to your normal dialysis run tomorrow    Take pain medications as needed  Do not drink alcohol or drive while taking the prescription pain medications    Follow-up with Dr. Rubi Briscoe as we discussed.   Their office should call you for follow-up    Return to ER for any worsening symptoms or new problems which may arise

## 2021-11-09 NOTE — ED NOTES
SaO2 86%, assessed patient she denies shortness of breath. She is able to speak in complete sentences with no distress noted. Deep breathing increased SaO2 to 88%, 2L/min O2 administered via NC. Provider notified.

## 2021-11-09 NOTE — ED NOTES
I have reviewed discharge instructions with the patient. The patient verbalized understanding. Patient left ED via Discharge Method: ambulatory to Home with son-in-law. Patient ambulatory with steady gait unassisted and in no acute distress. States she feel much better compared to when she first arrived and was very thankful to staff. Opportunity for questions and clarification provided. Patient given 2 scripts. To continue your aftercare when you leave the hospital, you may receive an automated call from our care team to check in on how you are doing. This is a free service and part of our promise to provide the best care and service to meet your aftercare needs.  If you have questions, or wish to unsubscribe from this service please call [phone]. Thank you for Choosing our [facility] Emergency Department.

## 2021-11-12 ENCOUNTER — APPOINTMENT (OUTPATIENT)
Dept: CT IMAGING | Age: 71
DRG: 314 | End: 2021-11-12
Attending: PHYSICIAN ASSISTANT
Payer: MEDICARE

## 2021-11-12 ENCOUNTER — APPOINTMENT (OUTPATIENT)
Dept: CT IMAGING | Age: 71
DRG: 314 | End: 2021-11-12
Attending: FAMILY MEDICINE
Payer: MEDICARE

## 2021-11-12 ENCOUNTER — HOSPITAL ENCOUNTER (INPATIENT)
Age: 71
LOS: 3 days | Discharge: HOME HEALTH | DRG: 314 | End: 2021-11-15
Attending: EMERGENCY MEDICINE | Admitting: FAMILY MEDICINE
Payer: MEDICARE

## 2021-11-12 DIAGNOSIS — N18.6 STAGE 5 CHRONIC KIDNEY DISEASE ON CHRONIC DIALYSIS (HCC): ICD-10-CM

## 2021-11-12 DIAGNOSIS — I95.9 HYPOTENSION, UNSPECIFIED HYPOTENSION TYPE: ICD-10-CM

## 2021-11-12 DIAGNOSIS — R09.02 HYPOXIA: ICD-10-CM

## 2021-11-12 DIAGNOSIS — Z99.2 STAGE 5 CHRONIC KIDNEY DISEASE ON CHRONIC DIALYSIS (HCC): ICD-10-CM

## 2021-11-12 DIAGNOSIS — M54.6 THORACIC BACK PAIN, UNSPECIFIED BACK PAIN LATERALITY, UNSPECIFIED CHRONICITY: Primary | ICD-10-CM

## 2021-11-12 PROBLEM — M54.9 SEVERE BACK PAIN: Status: ACTIVE | Noted: 2021-11-12

## 2021-11-12 PROBLEM — J96.01 ACUTE RESPIRATORY FAILURE WITH HYPOXIA (HCC): Status: ACTIVE | Noted: 2021-11-12

## 2021-11-12 PROBLEM — E87.6 HYPOKALEMIA: Status: ACTIVE | Noted: 2021-11-12

## 2021-11-12 PROBLEM — J15.9 BACTERIAL PNEUMONIA: Status: ACTIVE | Noted: 2021-11-12

## 2021-11-12 PROBLEM — R77.8 ELEVATED TROPONIN: Status: ACTIVE | Noted: 2021-11-12

## 2021-11-12 LAB
ALBUMIN SERPL-MCNC: 4.1 G/DL (ref 3.2–4.6)
ALBUMIN/GLOB SERPL: 0.9 {RATIO} (ref 1.2–3.5)
ALP SERPL-CCNC: 90 U/L (ref 50–136)
ALT SERPL-CCNC: 23 U/L (ref 12–65)
ANION GAP SERPL CALC-SCNC: 8 MMOL/L (ref 7–16)
AST SERPL-CCNC: 44 U/L (ref 15–37)
B PERT DNA SPEC QL NAA+PROBE: NOT DETECTED
BASOPHILS # BLD: 0.1 K/UL (ref 0–0.2)
BASOPHILS NFR BLD: 1 % (ref 0–2)
BILIRUB SERPL-MCNC: 1.1 MG/DL (ref 0.2–1.1)
BORDETELLA PARAPERTUSSIS PCR, BORPAR: NOT DETECTED
BUN SERPL-MCNC: 16 MG/DL (ref 8–23)
C PNEUM DNA SPEC QL NAA+PROBE: NOT DETECTED
CALCIUM SERPL-MCNC: 9.2 MG/DL (ref 8.3–10.4)
CHLORIDE SERPL-SCNC: 99 MMOL/L (ref 98–107)
CO2 SERPL-SCNC: 30 MMOL/L (ref 21–32)
COVID-19 RAPID TEST, COVR: NOT DETECTED
CREAT SERPL-MCNC: 3.31 MG/DL (ref 0.6–1)
DIFFERENTIAL METHOD BLD: ABNORMAL
EOSINOPHIL # BLD: 0.1 K/UL (ref 0–0.8)
EOSINOPHIL NFR BLD: 2 % (ref 0.5–7.8)
ERYTHROCYTE [DISTWIDTH] IN BLOOD BY AUTOMATED COUNT: 17 % (ref 11.9–14.6)
FLUAV SUBTYP SPEC NAA+PROBE: NOT DETECTED
FLUBV RNA SPEC QL NAA+PROBE: NOT DETECTED
GLOBULIN SER CALC-MCNC: 4.7 G/DL (ref 2.3–3.5)
GLUCOSE SERPL-MCNC: 132 MG/DL (ref 65–100)
HADV DNA SPEC QL NAA+PROBE: NOT DETECTED
HCOV 229E RNA SPEC QL NAA+PROBE: NOT DETECTED
HCOV HKU1 RNA SPEC QL NAA+PROBE: NOT DETECTED
HCOV NL63 RNA SPEC QL NAA+PROBE: NOT DETECTED
HCOV OC43 RNA SPEC QL NAA+PROBE: NOT DETECTED
HCT VFR BLD AUTO: 31.1 % (ref 35.8–46.3)
HGB BLD-MCNC: 10.4 G/DL (ref 11.7–15.4)
HMPV RNA SPEC QL NAA+PROBE: NOT DETECTED
HPIV1 RNA SPEC QL NAA+PROBE: NOT DETECTED
HPIV2 RNA SPEC QL NAA+PROBE: NOT DETECTED
HPIV3 RNA SPEC QL NAA+PROBE: NOT DETECTED
HPIV4 RNA SPEC QL NAA+PROBE: NOT DETECTED
IMM GRANULOCYTES # BLD AUTO: 0 K/UL (ref 0–0.5)
IMM GRANULOCYTES NFR BLD AUTO: 1 % (ref 0–5)
LACTATE SERPL-SCNC: 0.6 MMOL/L (ref 0.4–2)
LYMPHOCYTES # BLD: 0.8 K/UL (ref 0.5–4.6)
LYMPHOCYTES NFR BLD: 12 % (ref 13–44)
M PNEUMO DNA SPEC QL NAA+PROBE: NOT DETECTED
MCH RBC QN AUTO: 31.7 PG (ref 26.1–32.9)
MCHC RBC AUTO-ENTMCNC: 33.4 G/DL (ref 31.4–35)
MCV RBC AUTO: 94.8 FL (ref 79.6–97.8)
MONOCYTES # BLD: 0.7 K/UL (ref 0.1–1.3)
MONOCYTES NFR BLD: 10 % (ref 4–12)
NEUTS SEG # BLD: 4.9 K/UL (ref 1.7–8.2)
NEUTS SEG NFR BLD: 74 % (ref 43–78)
NRBC # BLD: 0.03 K/UL (ref 0–0.2)
PLATELET # BLD AUTO: 138 K/UL (ref 150–450)
PMV BLD AUTO: 10.6 FL (ref 9.4–12.3)
POTASSIUM SERPL-SCNC: 2.6 MMOL/L (ref 3.5–5.1)
POTASSIUM SERPL-SCNC: 5.6 MMOL/L (ref 3.5–5.1)
PROCALCITONIN SERPL-MCNC: 0.1 NG/ML (ref 0–0.49)
PROT SERPL-MCNC: 8.8 G/DL (ref 6.3–8.2)
RBC # BLD AUTO: 3.28 M/UL (ref 4.05–5.2)
RSV RNA SPEC QL NAA+PROBE: NOT DETECTED
RV+EV RNA SPEC QL NAA+PROBE: NOT DETECTED
SARS-COV-2 PCR, COVPCR: NOT DETECTED
SODIUM SERPL-SCNC: 137 MMOL/L (ref 136–145)
SOURCE, COVRS: NORMAL
TROPONIN-HIGH SENSITIVITY: 131.4 PG/ML (ref 0–14)
WBC # BLD AUTO: 6.6 K/UL (ref 4.3–11.1)

## 2021-11-12 PROCEDURE — 74011250636 HC RX REV CODE- 250/636: Performed by: PHYSICIAN ASSISTANT

## 2021-11-12 PROCEDURE — 93005 ELECTROCARDIOGRAM TRACING: CPT

## 2021-11-12 PROCEDURE — 80053 COMPREHEN METABOLIC PANEL: CPT

## 2021-11-12 PROCEDURE — 74011250637 HC RX REV CODE- 250/637: Performed by: FAMILY MEDICINE

## 2021-11-12 PROCEDURE — 36415 COLL VENOUS BLD VENIPUNCTURE: CPT

## 2021-11-12 PROCEDURE — 72131 CT LUMBAR SPINE W/O DYE: CPT

## 2021-11-12 PROCEDURE — 74011000636 HC RX REV CODE- 636: Performed by: FAMILY MEDICINE

## 2021-11-12 PROCEDURE — 0202U NFCT DS 22 TRGT SARS-COV-2: CPT

## 2021-11-12 PROCEDURE — 84132 ASSAY OF SERUM POTASSIUM: CPT

## 2021-11-12 PROCEDURE — 87635 SARS-COV-2 COVID-19 AMP PRB: CPT

## 2021-11-12 PROCEDURE — 96375 TX/PRO/DX INJ NEW DRUG ADDON: CPT

## 2021-11-12 PROCEDURE — 85025 COMPLETE CBC W/AUTO DIFF WBC: CPT

## 2021-11-12 PROCEDURE — 83605 ASSAY OF LACTIC ACID: CPT

## 2021-11-12 PROCEDURE — 99285 EMERGENCY DEPT VISIT HI MDM: CPT

## 2021-11-12 PROCEDURE — 71275 CT ANGIOGRAPHY CHEST: CPT

## 2021-11-12 PROCEDURE — 74011000258 HC RX REV CODE- 258: Performed by: FAMILY MEDICINE

## 2021-11-12 PROCEDURE — 84484 ASSAY OF TROPONIN QUANT: CPT

## 2021-11-12 PROCEDURE — 87040 BLOOD CULTURE FOR BACTERIA: CPT

## 2021-11-12 PROCEDURE — 65660000000 HC RM CCU STEPDOWN

## 2021-11-12 PROCEDURE — 74011250636 HC RX REV CODE- 250/636: Performed by: FAMILY MEDICINE

## 2021-11-12 PROCEDURE — 96374 THER/PROPH/DIAG INJ IV PUSH: CPT

## 2021-11-12 PROCEDURE — 84145 PROCALCITONIN (PCT): CPT

## 2021-11-12 RX ORDER — ONDANSETRON 4 MG/1
4 TABLET, ORALLY DISINTEGRATING ORAL
Status: DISCONTINUED | OUTPATIENT
Start: 2021-11-12 | End: 2021-11-15 | Stop reason: HOSPADM

## 2021-11-12 RX ORDER — FUROSEMIDE 80 MG/1
80 TABLET ORAL 2 TIMES DAILY
COMMUNITY
End: 2022-01-20

## 2021-11-12 RX ORDER — ONDANSETRON 2 MG/ML
4 INJECTION INTRAMUSCULAR; INTRAVENOUS
Status: DISCONTINUED | OUTPATIENT
Start: 2021-11-12 | End: 2021-11-15 | Stop reason: HOSPADM

## 2021-11-12 RX ORDER — POLYETHYLENE GLYCOL 3350 17 G/17G
17 POWDER, FOR SOLUTION ORAL DAILY PRN
Status: DISCONTINUED | OUTPATIENT
Start: 2021-11-12 | End: 2021-11-15 | Stop reason: HOSPADM

## 2021-11-12 RX ORDER — ACETAMINOPHEN 650 MG/1
650 SUPPOSITORY RECTAL
Status: DISCONTINUED | OUTPATIENT
Start: 2021-11-12 | End: 2021-11-15 | Stop reason: HOSPADM

## 2021-11-12 RX ORDER — LIDOCAINE 4 G/100G
2 PATCH TOPICAL EVERY 24 HOURS
Status: DISCONTINUED | OUTPATIENT
Start: 2021-11-13 | End: 2021-11-15 | Stop reason: HOSPADM

## 2021-11-12 RX ORDER — SODIUM CHLORIDE 0.9 % (FLUSH) 0.9 %
10 SYRINGE (ML) INJECTION
Status: COMPLETED | OUTPATIENT
Start: 2021-11-12 | End: 2021-11-12

## 2021-11-12 RX ORDER — MIDODRINE HYDROCHLORIDE 5 MG/1
10 TABLET ORAL
Status: DISCONTINUED | OUTPATIENT
Start: 2021-11-13 | End: 2021-11-14

## 2021-11-12 RX ORDER — HEPARIN SODIUM 5000 [USP'U]/ML
5000 INJECTION, SOLUTION INTRAVENOUS; SUBCUTANEOUS EVERY 8 HOURS
Status: DISCONTINUED | OUTPATIENT
Start: 2021-11-12 | End: 2021-11-13

## 2021-11-12 RX ORDER — ACETAMINOPHEN 325 MG/1
650 TABLET ORAL
Status: DISCONTINUED | OUTPATIENT
Start: 2021-11-12 | End: 2021-11-15 | Stop reason: HOSPADM

## 2021-11-12 RX ORDER — HYDROCODONE BITARTRATE AND ACETAMINOPHEN 7.5; 325 MG/1; MG/1
1 TABLET ORAL
Status: DISCONTINUED | OUTPATIENT
Start: 2021-11-12 | End: 2021-11-15 | Stop reason: HOSPADM

## 2021-11-12 RX ORDER — PANTOPRAZOLE SODIUM 40 MG/1
40 TABLET, DELAYED RELEASE ORAL
Status: DISCONTINUED | OUTPATIENT
Start: 2021-11-13 | End: 2021-11-15 | Stop reason: HOSPADM

## 2021-11-12 RX ORDER — MINOXIDIL 10 MG/1
TABLET ORAL 2 TIMES DAILY
COMMUNITY
End: 2022-01-20

## 2021-11-12 RX ORDER — SODIUM CHLORIDE 0.9 % (FLUSH) 0.9 %
5-40 SYRINGE (ML) INJECTION AS NEEDED
Status: DISCONTINUED | OUTPATIENT
Start: 2021-11-12 | End: 2021-11-15 | Stop reason: HOSPADM

## 2021-11-12 RX ORDER — MIDODRINE HYDROCHLORIDE 5 MG/1
5 TABLET ORAL
Status: DISCONTINUED | OUTPATIENT
Start: 2021-11-13 | End: 2021-11-12

## 2021-11-12 RX ORDER — MORPHINE SULFATE 4 MG/ML
4 INJECTION INTRAVENOUS ONCE
Status: COMPLETED | OUTPATIENT
Start: 2021-11-12 | End: 2021-11-12

## 2021-11-12 RX ORDER — ONDANSETRON 2 MG/ML
4 INJECTION INTRAMUSCULAR; INTRAVENOUS ONCE
Status: COMPLETED | OUTPATIENT
Start: 2021-11-12 | End: 2021-11-12

## 2021-11-12 RX ORDER — AZITHROMYCIN 250 MG/1
500 TABLET, FILM COATED ORAL DAILY
Status: DISCONTINUED | OUTPATIENT
Start: 2021-11-13 | End: 2021-11-13

## 2021-11-12 RX ORDER — VANCOMYCIN 1.75 GRAM/500 ML IN 0.9 % SODIUM CHLORIDE INTRAVENOUS
1750 ONCE
Status: DISCONTINUED | OUTPATIENT
Start: 2021-11-12 | End: 2021-11-12

## 2021-11-12 RX ORDER — SEVELAMER CARBONATE 800 MG/1
800 TABLET, FILM COATED ORAL
Status: DISCONTINUED | OUTPATIENT
Start: 2021-11-13 | End: 2021-11-15 | Stop reason: HOSPADM

## 2021-11-12 RX ORDER — GABAPENTIN 100 MG/1
100 CAPSULE ORAL 3 TIMES DAILY
Status: DISCONTINUED | OUTPATIENT
Start: 2021-11-12 | End: 2021-11-15

## 2021-11-12 RX ORDER — MIDODRINE HYDROCHLORIDE 5 MG/1
10 TABLET ORAL ONCE
Status: COMPLETED | OUTPATIENT
Start: 2021-11-12 | End: 2021-11-12

## 2021-11-12 RX ORDER — SODIUM CHLORIDE 0.9 % (FLUSH) 0.9 %
5-40 SYRINGE (ML) INJECTION EVERY 8 HOURS
Status: DISCONTINUED | OUTPATIENT
Start: 2021-11-12 | End: 2021-11-15 | Stop reason: HOSPADM

## 2021-11-12 RX ADMIN — HYDROCODONE BITARTRATE AND ACETAMINOPHEN 1 TABLET: 7.5; 325 TABLET ORAL at 20:13

## 2021-11-12 RX ADMIN — SODIUM CHLORIDE 500 ML: 900 INJECTION, SOLUTION INTRAVENOUS at 19:58

## 2021-11-12 RX ADMIN — HEPARIN SODIUM 5000 UNITS: 5000 INJECTION INTRAVENOUS; SUBCUTANEOUS at 22:04

## 2021-11-12 RX ADMIN — MIDODRINE HYDROCHLORIDE 10 MG: 5 TABLET ORAL at 19:55

## 2021-11-12 RX ADMIN — Medication 10 ML: at 21:38

## 2021-11-12 RX ADMIN — SODIUM CHLORIDE 500 ML: 900 INJECTION, SOLUTION INTRAVENOUS at 17:28

## 2021-11-12 RX ADMIN — GABAPENTIN 100 MG: 100 CAPSULE ORAL at 22:04

## 2021-11-12 RX ADMIN — SODIUM CHLORIDE 100 ML: 900 INJECTION, SOLUTION INTRAVENOUS at 21:39

## 2021-11-12 RX ADMIN — MORPHINE SULFATE 4 MG: 4 INJECTION INTRAVENOUS at 15:22

## 2021-11-12 RX ADMIN — IOPAMIDOL 100 ML: 755 INJECTION, SOLUTION INTRAVENOUS at 21:38

## 2021-11-12 RX ADMIN — CEFTRIAXONE 1 G: 1 INJECTION, POWDER, FOR SOLUTION INTRAMUSCULAR; INTRAVENOUS at 22:05

## 2021-11-12 RX ADMIN — ONDANSETRON 4 MG: 2 INJECTION INTRAMUSCULAR; INTRAVENOUS at 15:21

## 2021-11-12 NOTE — ED TRIAGE NOTES
Pt reports mid back pain that started Tuesday. Was seen here Tuesday. Denies recent injuries. OTC med give no relief.

## 2021-11-12 NOTE — H&P
Hospitalist History and Physical   Admit Date:  2021  1:35 PM   Name:  Norma Fischer   Age:  72 y.o. Sex:  female  :  1950   MRN:  [id number]   Room:  ERF/F    Presenting Complaint: Back Pain    Reason(s) for Admission: Hypotension [I95.9]     History of Present Illness: Kimberly Gaona is a 72 y.o. female with medical history of ESRD on HD, Renal cell carcinoma s/p resection and SBRT who presented with sharp severe midthoracic back pain of 3-4 day duration that comes and goes. Pain does not radiate anywhere. Pt has been taking her home Norco, Robaxin, Lidocaine path, Gabapentin without alleviating symptoms, prompting her to come to the ED. She went to the ED on  also then with associated L flank pain and was noted to have unremarkable CT C/A/P. She denies SOB, chest pain, abdominal pain, diaphoresis, N/V, diarrhea or constipation. In ED, BP low 88/50. O2sat was 88% on RA and was placed on 3LO2NC. K 5.6 but reported hemolyzed. Recheck was 2.6. CT T spine on admission shows no acute fracture, no aggressive bony lesions; evidence of patchy ground-glass opacity seen within lungs. Review of Systems:  10 systems reviewed and negative except as noted in HPI. Assessment & Plan:       Hypotension, improved  Hx of HTN  BP in upper extremities in ED with MAP <65. S/p 1L bolus with midodrine started. Pt reported BP normally check on leg at dialysis. Holding home Norvasc, Lisinopril  Cautious with narcotics with hypotension  Cont midodrine TID for now  Recheck with improvement  Intensivist notified and aware    Severe back pain  Unclear etiology.  Similar presentation in ED on  with CT C/A/P with contrast showing small L pleural effusion with atelectasis of LLL, adenopathy and infraumbilical abdominal wall hernia  CT T spine on admission shows no acute fracture, no aggressive bony lesions; evidence of patchy ground-glass opacity seen within lungs  Supportive care for now  PT/OT  Analgesics prn  Lidocaine patch  Obtain CTA Chest to r/o aortic dissection, check lipase    Acute respiratory failure with hypoxia  Bacterial PNA/ CAP  Pt allergic to Vanc and PCN. In ED, O2sat was 88% on RA and was placed on 3LO2NC  CT T spine shows patchy groundglass opacity seen within the lungs, more prominent than seen on the prior chest CT. Findings are nonspecific and could represent pneumonitis,  pulmonary edema, or atypical infection  Respiratory panel/Covid-19 PCR NEG. Procal 0.10  Abx: Rocephin/Azithromycin (11/12-. ..)  BCX; pending  Obtain Sputum Cx  On 3LO2NC, wean as tolerated    Hypokalemia  Initial BMP with K 5.6 was reported hemolyzed. Recheck was 2.6  Replace and recheck  Check mag    Elevated HS-trop   On admission was 131. EKG shows SR with PVC's. Most likely demand in setting of PNA and ESRD pt  Trend HS-trop  Monitor on telemetry     ESRD on HD  Consult nephrology, appreciate recs    Hx of recurrent renal cell carcinoma  S/p resection in 2015. Treated with local therapy with rad/on SBRT on 11/2017  Follows Oncology outpt    DMII  Check H1C, if elevated or hyperglycemic, consider SSI      Dispo/Discharge Planning:     >2 days    Diet: Diabetic  VTE ppx: heparin SQ  Code status: Prior    Hospital Problems as of 11/12/2021 Date Reviewed: 7/27/2021          Codes Class Noted - Resolved POA    Hypotension ICD-10-CM: I95.9  ICD-9-CM: 458.9  12/1/2014 - Present Unknown              Past History:  Past Medical History:   Diagnosis Date    Arthritis     AVF (arteriovenous fistula) ([address] 12/20/2016 12/6/16 (S) Right AVF revision and thrombectomy    Cancer ([address] 2015    L kidney    Chronic kidney disease     HD in M-W-F- at [place] dialysis    Degenerative joint disease     Diabetes ([address]     checks QD, normal 120-130, hyposymptoms at 80    ESRD (end stage renal disease) ([address] Nov 2006    ESRD.  MWF dialysis     Hypertension     Obesity     Transient ischemic attack 08/29/2015    no residual     Past Surgical History:   Procedure Laterality Date    COLONOSCOPY N/A 11/8/2018    COLONOSCOPY  BMI 36 performed [facility] Royer Gaming [facility]    HX GI  06/01/2002    colon resection resulting in temporary colostomy reversal    HX GI  08/06/2018    exploratory laparotomy    HX GYN      stephan    HX OTHER SURGICAL      dialysis fistula, several permcaths    HX UROLOGICAL Left July 2015    nephrectomy    HX VASCULAR ACCESS      IR PLC CATH AV SHUNT IN W PTA SI VENOUS  5/30/2019    IR PLC CATH AV SHUNT IN W PTA SI VENOUS RT  2/28/2019    IR PLC CATH AV SHUNT IN W PTA SI VENOUS RT  9/3/2019    IR PLC CATH AV SHUNT IN W PTA SI VENOUS RT  12/5/2019    IR PLC CATH AV SHUNT IN W PTA SI VENOUS RT  3/10/2020    [place] BREAST SURGERY PROCEDURE UNLISTED Right     cyst removed    VASCULAR SURGERY PROCEDURE UNLIST Right     AV graft      Allergies   Allergen Reactions    Pcn [Penicillins] Rash    Vancomycin Rash      Social History     Tobacco Use    Smoking status: Never Smoker    Smokeless tobacco: Never Used   Substance Use Topics    Alcohol use: No      Family History   Problem Relation Age of Onset    Diabetes Mother     Heart Disease Mother     Hypertension Mother     Heart Disease Father     Hypertension Father     Malignant Hyperthermia Neg Hx     Pseudocholinesterase Deficiency Neg Hx     Delayed Awakening Neg Hx     Post-op Nausea/Vomiting Neg Hx     Emergence Delirium Neg Hx     Other Neg Hx     Post-op Cognitive Dysfunction Neg Hx       Family history reviewed and negative except as otherwise noted.     Immunization History   Administered Date(s) Administered    COVID-19, PFIZER, MRNA, LNP-S, PF, 30MCG/0.3ML DOSE 02/25/2021, 03/25/2021    TB Skin Test (PPD) Intradermal 02/08/2013, 08/03/2018    TD Vaccine 07/01/2008    TDAP Vaccine 03/30/2012     Prior to Admit Medications:  Current Outpatient Medications   Medication Instructions    amLODIPine (NORVASC) 10 mg, Oral, EVERY BEDTIME    gabapentin (NEURONTIN) 100 mg capsule TAKE 1 CAPSULE BY MOUTH THREE TIMES DAILY FOR PAIN    HYDROcodone-acetaminophen (NORCO) 7.5-325 mg per tablet 1 Tablet, Oral, EVERY 4 HOURS AS NEEDED    lidocaine 5 % topical cream Topical, 2 TIMES DAILY AS NEEDED    lisinopriL (PRINIVIL, ZESTRIL) 40 mg, Oral, EVERY BEDTIME    methocarbamoL (ROBAXIN-750) 1,500 mg, Oral, 4 TIMES DAILY    omeprazole (PRILOSEC) 40 mg capsule TAKE ONE CAPSULE BY MOUTH EVERY DAY    sevelamer carbonate (RENVELA) 800 mg, Oral, 3 TIMES DAILY WITH MEALS, 5 tablets with meals/ 2 with snacks<BR>Sometimes only eats 2 meals/d    SITagliptin (JANUVIA) 50 mg, Oral, 7AM    VIT C/VIT E/LUTEIN/MIN/OMEGA-3 (OCUVITE PO) Oral, EVERY BEDTIME       Objective:     Patient Vitals for the past 24 hrs:   Temp Pulse Resp BP SpO2   11/12/21 1752  80  (!) 79/50 100 %   11/12/21 1704  82  (!) 76/45 100 %   11/12/21 1702    (!) 76/39 100 %   11/12/21 1700     99 %   11/12/21 1655     99 %   11/12/21 1654 97.9 °F (36.6 °C) 84 18 (!) 88/50 (!) 88 %   11/12/21 1344     94 %   11/12/21 1313 97.9 °F (36.6 °C) 93 18 (!) 101/53 94 %     Oxygen Therapy  O2 Sat (%): 100 % (11/12/21 1752)  Pulse via Oximetry: 81 beats per minute (11/12/21 1752)  O2 Device: Nasal cannula (11/12/21 1655)  O2 Flow Rate (L/min): 3 l/min (11/12/21 1655)    Estimated body mass index is 29.5 kg/m² as calculated from the following:    Height as of this encounter: 5' 5.5\" (1.664 m). Weight as of this encounter: 81.6 kg (180 lb). No intake or output data in the 24 hours ending 11/12/21 1833      Physical Exam:    Blood pressure (!) 79/50, pulse 80, temperature 97.9 °F (36.6 °C), resp. rate 18, height 5' 5.5\" (1.664 m), weight 81.6 kg (180 lb), SpO2 100 %. General:    Well nourished. No overt distress. Appears uncomfortable d/t back pain  Head:  Normocephalic, atraumatic  Eyes:  Sclerae appear normal.  Pupils equally round.   ENT:  Nares appear normal, no drainage. Moist oral mucosa  Neck:  No restricted ROM. Trachea midline   CV:   RRR. No m/r/g. No jugular venous distension. Lungs:   Faint rhonchi on b/l lung bases. Respirations even, unlabored  Abdomen: Bowel sounds present. Soft, nontender, nondistended. Extremities: No cyanosis or clubbing. No edema. TTP on mid-low T-spine  Skin:     No rashes and normal coloration. Warm and dry. Neuro:  CN II-XII grossly intact. Sensation intact. A&Ox3  Psych:  Normal mood and affect. I have reviewed ordered lab tests and independently visualized imaging below:    Last 24hr Labs:  Recent Results (from the past 24 hour(s))   CBC WITH AUTOMATED DIFF    Collection Time: 11/12/21  2:55 PM   Result Value Ref Range    WBC 6.6 4.3 - 11.1 K/uL    RBC 3.28 (L) 4.05 - 5.2 M/uL    HGB 10.4 (L) 11.7 - 15.4 g/dL    HCT 31.1 (L) 35.8 - 46.3 %    MCV 94.8 79.6 - 97.8 FL    MCH 31.7 26.1 - 32.9 PG    MCHC 33.4 31.4 - 35.0 g/dL    RDW 17.0 (H) 11.9 - 14.6 %    PLATELET 568 (L) 891 - 450 K/uL    MPV 10.6 9.4 - 12.3 FL    ABSOLUTE NRBC 0.03 0.0 - 0.2 K/uL    DF AUTOMATED      NEUTROPHILS 74 43 - 78 %    LYMPHOCYTES 12 (L) 13 - 44 %    MONOCYTES 10 4.0 - 12.0 %    EOSINOPHILS 2 0.5 - 7.8 %    BASOPHILS 1 0.0 - 2.0 %    IMMATURE GRANULOCYTES 1 0.0 - 5.0 %    ABS. NEUTROPHILS 4.9 1.7 - 8.2 K/UL    ABS. LYMPHOCYTES 0.8 0.5 - 4.6 K/UL    ABS. MONOCYTES 0.7 0.1 - 1.3 K/UL    ABS. EOSINOPHILS 0.1 0.0 - 0.8 K/UL    ABS. BASOPHILS 0.1 0.0 - 0.2 K/UL    ABS. IMM.  GRANS. 0.0 0.0 - 0.5 K/UL   METABOLIC PANEL, COMPREHENSIVE    Collection Time: 11/12/21  2:55 PM   Result Value Ref Range    Sodium 137 136 - 145 mmol/L    Potassium 5.6 (H) 3.5 - 5.1 mmol/L    Chloride 99 98 - 107 mmol/L    CO2 30 21 - 32 mmol/L    Anion gap 8 7 - 16 mmol/L    Glucose 132 (H) 65 - 100 mg/dL    BUN 16 8 - 23 MG/DL    Creatinine 3.31 (H) 0.6 - 1.0 MG/DL    GFR est AA 18 (L) >60 ml/min/1.73m2    GFR est non-AA 15 (L) >60 ml/min/1.73m2 Calcium 9.2 8.3 - 10.4 MG/DL    Bilirubin, total 1.1 0.2 - 1.1 MG/DL    ALT (SGPT) 23 12 - 65 U/L    AST (SGOT) 44 (H) 15 - 37 U/L    Alk. phosphatase 90 50 - 136 U/L    Protein, total 8.8 (H) 6.3 - 8.2 g/dL    Albumin 4.1 3.2 - 4.6 g/dL    Globulin 4.7 (H) 2.3 - 3.5 g/dL    A-G Ratio 0.9 (L) 1.2 - 3.5     COVID-19 RAPID TEST    Collection Time: 11/12/21  5:01 PM   Result Value Ref Range    Specimen source Nasopharyngeal      COVID-19 rapid test Not detected NOTD         All Micro Results     Procedure Component Value Units Date/Time    CULTURE, BLOOD [[id number]] Collected: 11/12/21 1817    Order Status: Completed Specimen: Blood Updated: 11/12/21 1826    RESPIRATORY VIRUS PANEL W/COVID-19, PCR [[id number]]     Order Status: Sent Specimen: NASOPHARYNGEAL SWAB     COVID-19 RAPID TEST [[id number]] Collected: 11/12/21 1701    Order Status: Completed Specimen: Nasopharyngeal Updated: 11/12/21 1736     Specimen source Nasopharyngeal        COVID-19 rapid test Not detected        Comment:      The specimen is NEGATIVE for SARS-CoV-2, the novel coronavirus associated with COVID-19. A negative result does not rule out COVID-19. This test has been authorized by the FDA under an Emergency Use Authorization (EUA) for use by authorized laboratories. Fact sheet for Healthcare Providers: ConventionUpdate.co.nz  Fact sheet for Patients: ConventionUpdate.co.nz       Methodology: Isothermal Nucleic Acid Amplification         CULTURE, BLOOD [[id number]]     Order Status: Sent Specimen: Blood           Other Studies:  CT SPINE THORAC LUMB WO CONT    Result Date: 11/12/2021  History: Mid back pain EXAM: CT thoracolumbar spine without contrast TECHNIQUE: Thin section axial CT images are obtained through the thoracic and lumbar spine. Coronal and sagittal reformatted images are obtained based on the axial data. Radiation dose reduction techniques were used for this study.   Our CT scanners use one or all of the following: Automated exposure control, adjustment of the mA and/or kV according to patient size, use of iterative reconstruction. No comparison FINDINGS: There is mediastinal adenopathy. There is patchy groundglass opacity seen throughout the lungs with more focal airspace consolidation in the left lower lobe. There is a small left pleural effusion. There is preservation of vertebral body height. Multilevel degenerative change of the thoracolumbar spine present without evidence of aggressive osseous lesion. For findings within the upper abdomen, please see recent CT chest abdomen and pelvis report from 11/9/2021. There has been no significant interval change in these findings when compared with the prior exam.     1. No aggressive lesions within the thoracolumbar spine, nor is there evidence of acute fracture. 2. Patchy groundglass opacity seen within the lungs, more prominent than seen on the prior chest CT. Findings are nonspecific and could represent pneumonitis, pulmonary edema, or atypical infection. Medications Administered     morphine injection 4 mg     Admin Date  11/12/2021 Action  Given Dose  4 mg Route  IntraVENous Administered By  Melvina Villa RN          ondansetron [facility]) injection 4 mg     Admin Date  11/12/2021 Action  Given Dose  4 mg Route  IntraVENous Administered By  Melvina Villa RN          sodium chloride 0.9 % bolus infusion 500 mL     Admin Date  11/12/2021 Action  New Bag Dose  500 mL Rate  1,000 mL/hr Route  IntraVENous Administered By  Sarahy Nails RN                Signed: Cristina Bay DO    Part of this note may have been written by using a voice dictation software. The note has been proof read but may still contain some grammatical/other typographical errors.

## 2021-11-12 NOTE — ED PROVIDER NOTES
Patient presents to the emergency department complaining of significant mid back pain. She was already evaluated here on Tuesday of this week which was 3 days ago for left flank pain and had an unremarkable CT scan of the abdomen and pelvis. She states to me she was having some back pain at that time as well. Patient does not make urine. She is on hemodialysis Monday Wednesday and Friday and went to dialysis today and was not back pain at the time but later when she left started having some significant midthoracic back discomfort that does not radiate. Patient denies any fever, chills, abdominal pain, chest pain, cough, URI symptoms or shortness of breath. She has had both Covid vaccinations. Patient denies any saddle paresthesias, radicular pain in her legs or motor weakness. No concerning  symptoms for cauda equina syndrome. She denies any tearing chest or abdominal pain rating into her back. Patient has a prior history of renal cell carcinoma with left nephrectomy. She is in remission to her knowledge  Patient was given prescriptions for Robaxin, Norco, and a Lidoderm topical pain patch. She also takes Neurontin for chronic pain. She states she took all of her medicines earlier today and they did not help at all. Patient is moaning and appears to be quite uncomfortable upon examination           Past Medical History:   Diagnosis Date    Arthritis     AVF (arteriovenous fistula) ([address] 12/20/2016 12/6/16 (S) Right AVF revision and thrombectomy    Cancer ([address] 2015    L kidney    Chronic kidney disease     HD in M-W-F- at [place] dialysis    Degenerative joint disease     Diabetes ([address]     checks QD, normal 120-130, hyposymptoms at 80    ESRD (end stage renal disease) [facility]) Nov 2006    ESRD.  MWF dialysis     Hypertension     Obesity     Transient ischemic attack 08/29/2015    no residual       Past Surgical History:   Procedure Laterality Date    COLONOSCOPY N/A 11/8/2018 COLONOSCOPY  BMI 36 performed by Waqas Toro [facility] at [facility] ENDOSCOPY    HX GI  06/01/2002    colon resection resulting in temporary colostomy reversal    HX GI  08/06/2018    exploratory laparotomy    HX GYN      stephan    HX OTHER SURGICAL      dialysis fistula, several permcaths    HX UROLOGICAL Left July 2015    nephrectomy    HX VASCULAR ACCESS      IR PLC CATH AV SHUNT IN W PTA SI VENOUS  5/30/2019    IR PLC CATH AV SHUNT IN W PTA SI VENOUS RT  2/28/2019    IR PLC CATH AV SHUNT IN W PTA SI VENOUS RT  9/3/2019    IR PLC CATH AV SHUNT IN W PTA SI VENOUS RT  12/5/2019    IR PLC CATH AV SHUNT IN W PTA SI VENOUS RT  3/10/2020    [place] BREAST SURGERY PROCEDURE UNLISTED Right     cyst removed    VASCULAR SURGERY PROCEDURE UNLIST Right     AV graft         Family History:   Problem Relation Age of Onset    Diabetes Mother     Heart Disease Mother     Hypertension Mother     Heart Disease Father     Hypertension Father     Malignant Hyperthermia Neg Hx     Pseudocholinesterase Deficiency Neg Hx     Delayed Awakening Neg Hx     Post-op Nausea/Vomiting Neg Hx     Emergence Delirium Neg Hx     Other Neg Hx     Post-op Cognitive Dysfunction Neg Hx        Social History     Socioeconomic History    Marital status:      Spouse name: Not on file    Number of children: Not on file    Years of education: Not on file    Highest education level: Not on file   Occupational History    Not on file   Tobacco Use    Smoking status: Never Smoker    Smokeless tobacco: Never Used   Substance and Sexual Activity    Alcohol use: No    Drug use: No    Sexual activity: Not Currently   Other Topics Concern    Not on file   Social History Narrative    Not on file     Social Determinants of Health     Financial Resource Strain:     Difficulty of Paying Living Expenses: Not on file   Food Insecurity:     Worried About Running Out of Food in the Last Year: Not on file    Rick of Food in the Last Year: Not on file   Transportation Needs:     Lack of Transportation (Medical): Not on file    Lack of Transportation (Non-Medical): Not on file   Physical Activity:     Days of Exercise per Week: Not on file    Minutes of Exercise per Session: Not on file   Stress:     Feeling of Stress : Not on file   Social Connections:     Frequency of Communication with Friends and Family: Not on file    Frequency of Social Gatherings with Friends and Family: Not on file    Attends Jehovah's witness Services: Not on file    Active Member of [address] [place] [address] or Organizations: Not on file    Attends Club or Organization Meetings: Not on file    Marital Status: Not on file   Intimate Partner Violence:     Fear of Current or Ex-Partner: Not on file    Emotionally Abused: Not on file    Physically Abused: Not on file    Sexually Abused: Not on file   Housing Stability:     Unable to Pay for Housing in the Last Year: Not on file    Number of [place] in the Last Year: Not on file    Unstable Housing in the Last Year: Not on file         ALLERGIES: Pcn [penicillins] and Vancomycin    Review of Systems   Cardiovascular: Negative for chest pain. Gastrointestinal: Negative for abdominal pain. Musculoskeletal: Positive for back pain. All other systems reviewed and are negative. Vitals:    11/12/21 1313 11/12/21 1344 11/12/21 1346   BP: (!) 101/53     Pulse: 93     Resp: 18     Temp: 97.9 °F (36.6 °C)     SpO2: 94% 94%    Weight:   81.6 kg (180 lb)   Height:   5' 5.5\" (1.664 m)            Physical Exam  Vitals and nursing note reviewed. Constitutional:       Appearance: Normal appearance. Comments: Patient appears uncomfortable due to back pain   HENT:      Head: Normocephalic and atraumatic. Nose: Nose normal.      Mouth/Throat:      Mouth: Mucous membranes are dry. Eyes:      Extraocular Movements: Extraocular movements intact.       Conjunctiva/sclera: Conjunctivae normal.      Pupils: Pupils are equal, round, and reactive to light. Cardiovascular:      Rate and Rhythm: Normal rate and regular rhythm. Pulses: Normal pulses. Heart sounds: Normal heart sounds. Pulmonary:      Effort: Pulmonary effort is normal.      Comments: Faint rales heard in lung bases. Patient in no respiratory distress. Abdominal:      General: Abdomen is flat. Bowel sounds are normal.      Palpations: Abdomen is soft. Tenderness: There is no abdominal tenderness. There is no guarding. Musculoskeletal:         General: Normal range of motion. Cervical back: Normal range of motion and neck supple. Skin:     General: Skin is warm and dry. Neurological:      General: No focal deficit present. Mental Status: She is oriented to person, place, and time. Psychiatric:         Mood and Affect: Mood normal.         Behavior: Behavior normal.          MDM  Number of Diagnoses or Management Options  Hypotension, unspecified hypotension type  Hypoxia  Stage 5 chronic kidney disease on chronic dialysis [facility])  Thoracic back pain, unspecified back pain laterality, unspecified chronicity  Diagnosis management comments: Potassium came back elevated at 5.6 but I contacted the lab and it is hemolyzed. Patient's back CT does not show any concerning bony abnormalities however the radiologist did comment on potential atypical infection or even pulmonary edema. I did notice that patient's pulse ox was little lower than her last visit in which she was 98% on Tuesday but today 94%. I reexamined her and she is now comfortably resting but does appear mildly tachypneic. She specifically states she has not had a cough or URI symptoms. When nurse went to swab patient for Covid due to her symptoms of atypical potential infection and a lower pulse ox she notified me that her blood pressure is trending downward. This may be secondary to her receiving a dose of morphine however she does not appear significantly drowsy.   She is now 88% on room air and blood pressure has  dropped to 76/45. I will have the nurse give the patient 500 cc of fluids. Patient will need to be admitted. Patient clinically does not appear to be volume overloaded and she states that she felt that they pulled off the normal amount of fluid at dialysis today.   She is 100% on 3 L nasal cannula oxygen as of 1720    Covid test came back negative    I spoke to the hospitalist Dr. Madeleine Melo at 1746 to admit the patient requested that I also get a PCR Covid test       Amount and/or Complexity of Data Reviewed  Clinical lab tests: reviewed  Tests in the radiology section of CPT®: reviewed  Tests in the medicine section of CPT®: reviewed  Discussion of test results with the performing providers: yes  Decide to obtain previous medical records or to obtain history from someone other than the patient: yes  Review and summarize past medical records: yes  Discuss the patient with other providers: yes    Risk of Complications, Morbidity, and/or Mortality  Presenting problems: moderate  Diagnostic procedures: moderate  Management options: moderate    Patient Progress  Patient progress: stable         Procedures

## 2021-11-13 LAB
ANION GAP SERPL CALC-SCNC: 9 MMOL/L (ref 7–16)
ATRIAL RATE: 82 BPM
BUN SERPL-MCNC: 25 MG/DL (ref 8–23)
CALCIUM SERPL-MCNC: 8.2 MG/DL (ref 8.3–10.4)
CALCULATED P AXIS, ECG09: 64 DEGREES
CALCULATED R AXIS, ECG10: 132 DEGREES
CALCULATED T AXIS, ECG11: -8 DEGREES
CHLORIDE SERPL-SCNC: 102 MMOL/L (ref 98–107)
CO2 SERPL-SCNC: 29 MMOL/L (ref 21–32)
CREAT SERPL-MCNC: 4.33 MG/DL (ref 0.6–1)
DIAGNOSIS, 93000: NORMAL
ERYTHROCYTE [DISTWIDTH] IN BLOOD BY AUTOMATED COUNT: 17.1 % (ref 11.9–14.6)
EST. AVERAGE GLUCOSE BLD GHB EST-MCNC: 105 MG/DL
GLUCOSE BLD STRIP.AUTO-MCNC: 109 MG/DL (ref 65–100)
GLUCOSE BLD STRIP.AUTO-MCNC: 144 MG/DL (ref 65–100)
GLUCOSE BLD STRIP.AUTO-MCNC: 151 MG/DL (ref 65–100)
GLUCOSE BLD STRIP.AUTO-MCNC: 194 MG/DL (ref 65–100)
GLUCOSE SERPL-MCNC: 102 MG/DL (ref 65–100)
HBA1C MFR BLD: 5.3 % (ref 4.2–6.3)
HCT VFR BLD AUTO: 26.3 % (ref 35.8–46.3)
HGB BLD-MCNC: 9 G/DL (ref 11.7–15.4)
LIPASE SERPL-CCNC: 164 U/L (ref 73–393)
MAGNESIUM SERPL-MCNC: 2 MG/DL (ref 1.8–2.4)
MAGNESIUM SERPL-MCNC: 2.2 MG/DL (ref 1.8–2.4)
MCH RBC QN AUTO: 31.5 PG (ref 26.1–32.9)
MCHC RBC AUTO-ENTMCNC: 34.2 G/DL (ref 31.4–35)
MCV RBC AUTO: 92 FL (ref 79.6–97.8)
NRBC # BLD: 0.04 K/UL (ref 0–0.2)
P-R INTERVAL, ECG05: 180 MS
PHOSPHATE SERPL-MCNC: 3.7 MG/DL (ref 2.3–3.7)
PLATELET # BLD AUTO: 125 K/UL (ref 150–450)
PMV BLD AUTO: 10.8 FL (ref 9.4–12.3)
POTASSIUM SERPL-SCNC: 3 MMOL/L (ref 3.5–5.1)
POTASSIUM SERPL-SCNC: 3 MMOL/L (ref 3.5–5.1)
Q-T INTERVAL, ECG07: 402 MS
QRS DURATION, ECG06: 74 MS
QTC CALCULATION (BEZET), ECG08: 469 MS
RBC # BLD AUTO: 2.86 M/UL (ref 4.05–5.2)
SERVICE CMNT-IMP: ABNORMAL
SODIUM SERPL-SCNC: 140 MMOL/L (ref 136–145)
TROPONIN-HIGH SENSITIVITY: 158.4 PG/ML (ref 0–14)
VENTRICULAR RATE, ECG03: 82 BPM
WBC # BLD AUTO: 4.6 K/UL (ref 4.3–11.1)

## 2021-11-13 PROCEDURE — 36415 COLL VENOUS BLD VENIPUNCTURE: CPT

## 2021-11-13 PROCEDURE — 74011250636 HC RX REV CODE- 250/636: Performed by: FAMILY MEDICINE

## 2021-11-13 PROCEDURE — 83036 HEMOGLOBIN GLYCOSYLATED A1C: CPT

## 2021-11-13 PROCEDURE — 65660000000 HC RM CCU STEPDOWN

## 2021-11-13 PROCEDURE — 74011000258 HC RX REV CODE- 258: Performed by: FAMILY MEDICINE

## 2021-11-13 PROCEDURE — 80048 BASIC METABOLIC PNL TOTAL CA: CPT

## 2021-11-13 PROCEDURE — 82962 GLUCOSE BLOOD TEST: CPT

## 2021-11-13 PROCEDURE — 74011250637 HC RX REV CODE- 250/637: Performed by: FAMILY MEDICINE

## 2021-11-13 PROCEDURE — 74011636637 HC RX REV CODE- 636/637: Performed by: HOSPITALIST

## 2021-11-13 PROCEDURE — 83690 ASSAY OF LIPASE: CPT

## 2021-11-13 PROCEDURE — 97530 THERAPEUTIC ACTIVITIES: CPT

## 2021-11-13 PROCEDURE — 97161 PT EVAL LOW COMPLEX 20 MIN: CPT

## 2021-11-13 PROCEDURE — 74011000250 HC RX REV CODE- 250: Performed by: FAMILY MEDICINE

## 2021-11-13 PROCEDURE — 85027 COMPLETE CBC AUTOMATED: CPT

## 2021-11-13 PROCEDURE — 74011250636 HC RX REV CODE- 250/636: Performed by: HOSPITALIST

## 2021-11-13 PROCEDURE — 87040 BLOOD CULTURE FOR BACTERIA: CPT

## 2021-11-13 PROCEDURE — 2709999900 HC NON-CHARGEABLE SUPPLY

## 2021-11-13 PROCEDURE — 83735 ASSAY OF MAGNESIUM: CPT

## 2021-11-13 PROCEDURE — 84484 ASSAY OF TROPONIN QUANT: CPT

## 2021-11-13 PROCEDURE — 84132 ASSAY OF SERUM POTASSIUM: CPT

## 2021-11-13 PROCEDURE — 84100 ASSAY OF PHOSPHORUS: CPT

## 2021-11-13 RX ORDER — MORPHINE SULFATE 2 MG/ML
2 INJECTION, SOLUTION INTRAMUSCULAR; INTRAVENOUS
Status: DISCONTINUED | OUTPATIENT
Start: 2021-11-13 | End: 2021-11-13 | Stop reason: SDUPTHER

## 2021-11-13 RX ORDER — AZITHROMYCIN 250 MG/1
250 TABLET, FILM COATED ORAL DAILY
Status: DISCONTINUED | OUTPATIENT
Start: 2021-11-14 | End: 2021-11-13

## 2021-11-13 RX ORDER — INSULIN LISPRO 100 [IU]/ML
INJECTION, SOLUTION INTRAVENOUS; SUBCUTANEOUS
Status: DISCONTINUED | OUTPATIENT
Start: 2021-11-13 | End: 2021-11-15 | Stop reason: HOSPADM

## 2021-11-13 RX ORDER — MORPHINE SULFATE 2 MG/ML
2 INJECTION, SOLUTION INTRAMUSCULAR; INTRAVENOUS
Status: DISCONTINUED | OUTPATIENT
Start: 2021-11-13 | End: 2021-11-15 | Stop reason: HOSPADM

## 2021-11-13 RX ADMIN — SEVELAMER CARBONATE 800 MG: 800 TABLET, FILM COATED ORAL at 17:27

## 2021-11-13 RX ADMIN — Medication 10 ML: at 06:36

## 2021-11-13 RX ADMIN — INSULIN LISPRO 3 UNITS: 100 INJECTION, SOLUTION INTRAVENOUS; SUBCUTANEOUS at 17:30

## 2021-11-13 RX ADMIN — CEFTRIAXONE 1 G: 1 INJECTION, POWDER, FOR SOLUTION INTRAMUSCULAR; INTRAVENOUS at 21:26

## 2021-11-13 RX ADMIN — GABAPENTIN 100 MG: 100 CAPSULE ORAL at 08:44

## 2021-11-13 RX ADMIN — HYDROCODONE BITARTRATE AND ACETAMINOPHEN 1 TABLET: 7.5; 325 TABLET ORAL at 08:46

## 2021-11-13 RX ADMIN — MIDODRINE HYDROCHLORIDE 10 MG: 5 TABLET ORAL at 17:27

## 2021-11-13 RX ADMIN — MORPHINE SULFATE 2 MG: 2 INJECTION, SOLUTION INTRAMUSCULAR; INTRAVENOUS at 21:27

## 2021-11-13 RX ADMIN — POTASSIUM BICARBONATE 40 MEQ: 782 TABLET, EFFERVESCENT ORAL at 17:44

## 2021-11-13 RX ADMIN — AZITHROMYCIN MONOHYDRATE 500 MG: 250 TABLET ORAL at 08:45

## 2021-11-13 RX ADMIN — Medication 10 ML: at 13:40

## 2021-11-13 RX ADMIN — GABAPENTIN 100 MG: 100 CAPSULE ORAL at 17:27

## 2021-11-13 RX ADMIN — MORPHINE SULFATE 2 MG: 2 INJECTION, SOLUTION INTRAMUSCULAR; INTRAVENOUS at 13:44

## 2021-11-13 RX ADMIN — MIDODRINE HYDROCHLORIDE 10 MG: 5 TABLET ORAL at 08:44

## 2021-11-13 RX ADMIN — INSULIN LISPRO 3 UNITS: 100 INJECTION, SOLUTION INTRAVENOUS; SUBCUTANEOUS at 21:38

## 2021-11-13 RX ADMIN — MORPHINE SULFATE 2 MG: 2 INJECTION, SOLUTION INTRAMUSCULAR; INTRAVENOUS at 09:07

## 2021-11-13 RX ADMIN — MORPHINE SULFATE 2 MG: 2 INJECTION, SOLUTION INTRAMUSCULAR; INTRAVENOUS at 17:29

## 2021-11-13 RX ADMIN — HEPARIN SODIUM 5000 UNITS: 5000 INJECTION INTRAVENOUS; SUBCUTANEOUS at 06:36

## 2021-11-13 RX ADMIN — HYDROCODONE BITARTRATE AND ACETAMINOPHEN 1 TABLET: 7.5; 325 TABLET ORAL at 00:14

## 2021-11-13 RX ADMIN — Medication 10 ML: at 21:28

## 2021-11-13 RX ADMIN — Medication 10 ML: at 00:14

## 2021-11-13 RX ADMIN — POTASSIUM BICARBONATE 40 MEQ: 782 TABLET, EFFERVESCENT ORAL at 08:45

## 2021-11-13 RX ADMIN — GABAPENTIN 100 MG: 100 CAPSULE ORAL at 21:25

## 2021-11-13 RX ADMIN — SEVELAMER CARBONATE 800 MG: 800 TABLET, FILM COATED ORAL at 13:39

## 2021-11-13 RX ADMIN — PANTOPRAZOLE SODIUM 40 MG: 40 TABLET, DELAYED RELEASE ORAL at 06:36

## 2021-11-13 RX ADMIN — SEVELAMER CARBONATE 800 MG: 800 TABLET, FILM COATED ORAL at 08:45

## 2021-11-13 RX ADMIN — MIDODRINE HYDROCHLORIDE 10 MG: 5 TABLET ORAL at 13:39

## 2021-11-13 NOTE — PROGRESS NOTES
Hospitalist   Admit Date:  2021  1:35 PM   Name:  Santosh Fischer   Age:  72 y.o. Sex:  female  :  1950   MRN:  [id number]   Room:  [facility]    Presenting Complaint: Back Pain    Reason(s) for Admission: Hypotension [I95.9]     History of Present Illness:      72 y.o. female with medical history of ESRD on HD, Renal cell carcinoma s/p resection and SBRT who presented with sharp severe midthoracic back pain of 3-4 day duration that comes and goes. Pain does not radiate anywhere. Pt has been taking her home Norco, Robaxin, Lidocaine path, Gabapentin without alleviating symptoms, prompting her to come to the ED. She went to the ED on  also then with associated L flank pain and was noted to have unremarkable CT C/A/P. She denies SOB, chest pain, abdominal pain, diaphoresis, N/V, diarrhea or constipation. In ED, BP low 88/50. O2sat was 88% on RA and was placed on 3LO2NC. K 5.6 but reported hemolyzed. Recheck was 2.6. CT T spine on admission shows no acute fracture, no aggressive bony lesions; evidence of patchy ground-glass opacity seen within lungs. Today, midline lower back pain, no resp spx, on room air    Assessment & Plan:     1- Acute back pain, for few days, no injuries or fall, no associated neuro spx  2- Low/ low normal BP. Hx of HTN, improved  3- LLL atelectasis, no evidence of pneumonia or pulm edema, no resp spx. 4- ESRD on HD  5- DM, controlled  6- Hx of RCC.  Stable  7- Hypokalemia, replaced    Rx:  Pain control  Continue rocephin iv  DC azithro  Follow blood cultures to r/o bone infection  Insulin scale  PT/OT consulted    Dispo; home in 2-3 days if blood CS negative    Objective:     Patient Vitals for the past 24 hrs:   Temp Pulse Resp BP SpO2   21 1143 97.9 °F (36.6 °C) 68 20 103/68 92 %   21 0908     95 %   21 0746 97.8 °F (36.6 °C) 67 18 109/66 98 %   21 0442    (!) 107/53    21 0400  64      21 0345 97.6 °F (36.4 °C) 98 18 (!) 97/55 95 %   11/13/21 0000  60      11/12/21 2309 98.2 °F (36.8 °C) 71 19 106/60 91 %   11/12/21 2219  72  (!) 113/55 98 %   11/12/21 2205  73  121/60 98 %   11/12/21 2153  70  (!) 121/58 100 %   11/12/21 2125  75  (!) 105/51 100 %   11/12/21 2117  82  (!) 110/55 100 %   11/12/21 2055  75  (!) 83/54 100 %   11/12/21 1955  76  (!) 80/52    11/12/21 1752  80  (!) 79/50 100 %   11/12/21 1704  82  (!) 76/45 100 %   11/12/21 1702    (!) 76/39 100 %   11/12/21 1700     99 %   11/12/21 1655     99 %   11/12/21 1654 97.9 °F (36.6 °C) 84 18 (!) 88/50 (!) 88 %   11/12/21 1344     94 %   11/12/21 1313 97.9 °F (36.6 °C) 93 18 (!) 101/53 94 %     Oxygen Therapy  O2 Sat (%): 92 % (11/13/21 1143)  Pulse via Oximetry: 72 beats per minute (11/12/21 2219)  O2 Device: Nasal cannula (11/13/21 0908)  O2 Flow Rate (L/min): 1 l/min (11/13/21 0908)    Estimated body mass index is 29.5 kg/m² as calculated from the following:    Height as of this encounter: 5' 5.5\" (1.664 m). Weight as of this encounter: 81.6 kg (180 lb). Intake/Output Summary (Last 24 hours) at 11/13/2021 1250  Last data filed at 11/13/2021 0846  Gross per 24 hour   Intake 440 ml   Output    Net 440 ml         Physical Exam:    Blood pressure 103/68, pulse 68, temperature 97.9 °F (36.6 °C), resp. rate 20, height 5' 5.5\" (1.664 m), weight 81.6 kg (180 lb), SpO2 92 %, not currently breastfeeding. General:    Well nourished. Moderate distress. Obese, Appears uncomfortable d/t back pain  CV:   RRR. No m/r/g. No jugular venous distension. Lungs:   Faint rhonchi on b/l lung bases. Respirations even, unlabored  Abdomen: Bowel sounds present. Soft, nontender, nondistended. Extremities: No cyanosis or clubbing. No edema. Midline point tenderness in lower lumbar area  Skin:     No rashes and normal coloration. Warm and dry. Neuro:  grossly intact. No M/S deficits A&Ox3  Psych:  Normal mood and affect.       I have reviewed ordered lab tests and independently visualized imaging below:    Last 24hr Labs:  Recent Results (from the past 24 hour(s))   CBC WITH AUTOMATED DIFF    Collection Time: 11/12/21  2:55 PM   Result Value Ref Range    WBC 6.6 4.3 - 11.1 K/uL    RBC 3.28 (L) 4.05 - 5.2 M/uL    HGB 10.4 (L) 11.7 - 15.4 g/dL    HCT 31.1 (L) 35.8 - 46.3 %    MCV 94.8 79.6 - 97.8 FL    MCH 31.7 26.1 - 32.9 PG    MCHC 33.4 31.4 - 35.0 g/dL    RDW 17.0 (H) 11.9 - 14.6 %    PLATELET 015 (L) 172 - 450 K/uL    MPV 10.6 9.4 - 12.3 FL    ABSOLUTE NRBC 0.03 0.0 - 0.2 K/uL    DF AUTOMATED      NEUTROPHILS 74 43 - 78 %    LYMPHOCYTES 12 (L) 13 - 44 %    MONOCYTES 10 4.0 - 12.0 %    EOSINOPHILS 2 0.5 - 7.8 %    BASOPHILS 1 0.0 - 2.0 %    IMMATURE GRANULOCYTES 1 0.0 - 5.0 %    ABS. NEUTROPHILS 4.9 1.7 - 8.2 K/UL    ABS. LYMPHOCYTES 0.8 0.5 - 4.6 K/UL    ABS. MONOCYTES 0.7 0.1 - 1.3 K/UL    ABS. EOSINOPHILS 0.1 0.0 - 0.8 K/UL    ABS. BASOPHILS 0.1 0.0 - 0.2 K/UL    ABS. IMM. GRANS. 0.0 0.0 - 0.5 K/UL   METABOLIC PANEL, COMPREHENSIVE    Collection Time: 11/12/21  2:55 PM   Result Value Ref Range    Sodium 137 136 - 145 mmol/L    Potassium 5.6 (H) 3.5 - 5.1 mmol/L    Chloride 99 98 - 107 mmol/L    CO2 30 21 - 32 mmol/L    Anion gap 8 7 - 16 mmol/L    Glucose 132 (H) 65 - 100 mg/dL    BUN 16 8 - 23 MG/DL    Creatinine 3.31 (H) 0.6 - 1.0 MG/DL    GFR est AA 18 (L) >60 ml/min/1.73m2    GFR est non-AA 15 (L) >60 ml/min/1.73m2    Calcium 9.2 8.3 - 10.4 MG/DL    Bilirubin, total 1.1 0.2 - 1.1 MG/DL    ALT (SGPT) 23 12 - 65 U/L    AST (SGOT) 44 (H) 15 - 37 U/L    Alk.  phosphatase 90 50 - 136 U/L    Protein, total 8.8 (H) 6.3 - 8.2 g/dL    Albumin 4.1 3.2 - 4.6 g/dL    Globulin 4.7 (H) 2.3 - 3.5 g/dL    A-G Ratio 0.9 (L) 1.2 - 3.5     COVID-19 RAPID TEST    Collection Time: 11/12/21  5:01 PM   Result Value Ref Range    Specimen source Nasopharyngeal      COVID-19 rapid test Not detected NOTD     CULTURE, BLOOD    Collection Time: 11/12/21  6:17 PM Specimen: Blood   Result Value Ref Range    Special Requests: RIGHT  FOREARM        Culture result: NO GROWTH AFTER 13 HOURS     EKG, 12 LEAD, INITIAL    Collection Time: 11/12/21  6:24 PM   Result Value Ref Range    Ventricular Rate 82 BPM    Atrial Rate 82 BPM    P-R Interval 180 ms    QRS Duration 74 ms    Q-T Interval 402 ms    QTC Calculation (Bezet) 469 ms    Calculated P Axis 64 degrees    Calculated R Axis 132 degrees    Calculated T Axis -8 degrees    Diagnosis       Sinus rhythm with Premature supraventricular complexes with occasional   Premature ventricular complexes  Possible Right ventricular hypertrophy  Septal infarct , age undetermined  T wave abnormality, consider inferolateral ischemia  Abnormal ECG  When compared with ECG of 10-NOV-2019 22:46,  Significant changes have occurred  Confirmed by Archie Cleveland ([id number]) on 11/13/2021 7:40:57 AM     LACTIC ACID    Collection Time: 11/12/21  6:30 PM   Result Value Ref Range    Lactic acid 0.6 0.4 - 2.0 MMOL/L   PROCALCITONIN    Collection Time: 11/12/21  6:30 PM   Result Value Ref Range    Procalcitonin 0.10 0.00 - 0.49 ng/mL   RESPIRATORY VIRUS PANEL W/COVID-19, PCR    Collection Time: 11/12/21  6:30 PM    Specimen: Nasopharyngeal   Result Value Ref Range    Adenovirus NOT DETECTED NOTDET      Coronavirus 229E NOT DETECTED NOTDET      Coronavirus HKU1 NOT DETECTED NOTDET      Coronavirus CVNL63 NOT DETECTED NOTDET      Coronavirus OC43 NOT DETECTED NOTDET      SARS-CoV-2, PCR NOT DETECTED NOTDET      Metapneumovirus NOT DETECTED NOTDET      Rhinovirus and Enterovirus NOT DETECTED NOTDET      Influenza A NOT DETECTED NOTDET      Influenza B NOT DETECTED NOTDET      Parainfluenza 1 NOT DETECTED NOTDET      Parainfluenza 2 NOT DETECTED NOTDET      Parainfluenza 3 NOT DETECTED NOTDET      Parainfluenza virus 4 NOT DETECTED NOTDET      RSV by PCR NOT DETECTED NOTDET      B. parapertussis, PCR NOT DETECTED NOTDET      Bordetella pertussis - PCR NOT DETECTED NOTDET      Chlamydophila pneumoniae DNA, QL, PCR NOT DETECTED NOTDET      Mycoplasma pneumoniae DNA, QL, PCR NOT DETECTED NOTDET     TROPONIN-HIGH SENSITIVITY    Collection Time: 11/12/21  6:30 PM   Result Value Ref Range    Troponin-High Sensitivity 131.4 (HH) 0 - 14 pg/mL   POTASSIUM    Collection Time: 11/12/21  6:30 PM   Result Value Ref Range    Potassium 2.6 (L) 3.5 - 5.1 mmol/L   TROPONIN-HIGH SENSITIVITY    Collection Time: 11/13/21  1:40 AM   Result Value Ref Range    Troponin-High Sensitivity 158.4 (HH) 0 - 14 pg/mL   HEMOGLOBIN A1C WITH EAG    Collection Time: 11/13/21  1:40 AM   Result Value Ref Range    Hemoglobin A1c 5.3 4.20 - 6.30 %    Est. average glucose 105 mg/dL   POTASSIUM    Collection Time: 11/13/21  1:40 AM   Result Value Ref Range    Potassium 3.0 (L) 3.5 - 5.1 mmol/L   LIPASE    Collection Time: 11/13/21  1:40 AM   Result Value Ref Range    Lipase 164 73 - 393 U/L   MAGNESIUM    Collection Time: 11/13/21  1:40 AM   Result Value Ref Range    Magnesium 2.0 1.8 - 2.4 mg/dL   METABOLIC PANEL, BASIC    Collection Time: 11/13/21  7:53 AM   Result Value Ref Range    Sodium 140 136 - 145 mmol/L    Potassium 3.0 (L) 3.5 - 5.1 mmol/L    Chloride 102 98 - 107 mmol/L    CO2 29 21 - 32 mmol/L    Anion gap 9 7 - 16 mmol/L    Glucose 102 (H) 65 - 100 mg/dL    BUN 25 (H) 8 - 23 MG/DL    Creatinine 4.33 (H) 0.6 - 1.0 MG/DL    GFR est AA 13 (L) >60 ml/min/1.73m2    GFR est non-AA 11 (L) >60 ml/min/1.73m2    Calcium 8.2 (L) 8.3 - 10.4 MG/DL   CBC W/O DIFF    Collection Time: 11/13/21  7:53 AM   Result Value Ref Range    WBC 4.6 4.3 - 11.1 K/uL    RBC 2.86 (L) 4.05 - 5.2 M/uL    HGB 9.0 (L) 11.7 - 15.4 g/dL    HCT 26.3 (L) 35.8 - 46.3 %    MCV 92.0 79.6 - 97.8 FL    MCH 31.5 26.1 - 32.9 PG    MCHC 34.2 31.4 - 35.0 g/dL    RDW 17.1 (H) 11.9 - 14.6 %    PLATELET 868 (L) 031 - 450 K/uL    MPV 10.8 9.4 - 12.3 FL    ABSOLUTE NRBC 0.04 0.0 - 0.2 K/uL   MAGNESIUM    Collection Time: 11/13/21  7:53 AM   Result Value Ref Range    Magnesium 2.2 1.8 - 2.4 mg/dL   GLUCOSE, POC    Collection Time: 11/13/21  8:34 AM   Result Value Ref Range    Glucose (POC) 109 (H) 65 - 100 mg/dL    Performed by [facility]    GLUCOSE, POC    Collection Time: 11/13/21 11:54 AM   Result Value Ref Range    Glucose (POC) 144 (H) 65 - 100 mg/dL    Performed by [facility]oney[facility]ita[facility]        All Micro Results     Procedure Component Value Units Date/Time    CULTURE, BLOOD [[id number]] Collected: 11/12/21 1817    Order Status: Completed Specimen: Blood Updated: 11/13/21 0759     Special Requests: --        RIGHT  FOREARM       Culture result: NO GROWTH AFTER 13 HOURS       CULTURE, BLOOD [[id number]] Collected: 11/13/21 0140    Order Status: Completed Specimen: Blood Updated: 11/13/21 0409    CULTURE, RESPIRATORY/SPUTUM/BRONCH Rishi Mckusick STAIN [[id number]]     Order Status: Canceled Specimen: Sputum     MSSA/MRSA SC BY PCR, NASAL SWAB [[id number]]     Order Status: Sent Specimen: Nasal swab     RESPIRATORY VIRUS PANEL W/COVID-19, PCR [[id number]] Collected: 11/12/21 1830    Order Status: Completed Specimen: Nasopharyngeal Updated: 11/12/21 1936     Adenovirus NOT DETECTED        Coronavirus 229E NOT DETECTED        Coronavirus HKU1 NOT DETECTED        Coronavirus CVNL63 NOT DETECTED        Coronavirus OC43 NOT DETECTED        SARS-CoV-2, PCR NOT DETECTED        Metapneumovirus NOT DETECTED        Rhinovirus and Enterovirus NOT DETECTED        Influenza A NOT DETECTED        Influenza B NOT DETECTED        Parainfluenza 1 NOT DETECTED        Parainfluenza 2 NOT DETECTED        Parainfluenza 3 NOT DETECTED        Parainfluenza virus 4 NOT DETECTED        RSV by PCR NOT DETECTED        B. parapertussis, PCR NOT DETECTED        Bordetella pertussis - PCR NOT DETECTED        Chlamydophila pneumoniae DNA, QL, PCR NOT DETECTED        Mycoplasma pneumoniae DNA, QL, PCR NOT DETECTED       COVID-19 RAPID TEST [[id number]] Collected: 11/12/21 1701    Order Status: Completed Specimen: Nasopharyngeal Updated: 11/12/21 1736     Specimen source Nasopharyngeal        COVID-19 rapid test Not detected        Comment:      The specimen is NEGATIVE for SARS-CoV-2, the novel coronavirus associated with COVID-19. A negative result does not rule out COVID-19. This test has been authorized by the FDA under an Emergency Use Authorization (EUA) for use by authorized laboratories. Fact sheet for Healthcare Providers: [employer]  Fact sheet for Patients: [employer]       Methodology: Isothermal Nucleic Acid Amplification               Other Studies:  CTA CHEST ABD W WO CONT    Result Date: 11/12/2021  CTA of the Chest and Abdomen INDICATION: Back pain Multiple axial images were obtained through the chest and abdomen. 100mL of Isovue 370 intravenous contrast was used for better evaluation of solid organs and vascular structures. Multiplanar reformats and MIPS were also evaluated. Radiation dose reduction techniques were used for this study. All CT scans performed at this facility use one or all of the following: Automated exposure control, adjustment of the mA and/or kVp according to patient's size, iterative reconstruction. COMPARISON: Chest CT dated 11/09/2021 FINDINGS: - LUNGS: There is stable infiltrate/atelectasis in the posterior left lung base. There are stable small bilateral pleural effusions. - MEDIASTINUM/AXILLA: Right paratracheal lymph node/mass is larger, now 2.0 cm in diameter compared with 12 mm previously. Small bilateral hilar lymph nodes are also again noted. - HEART/VESSELS: Extensive vascular disease. No evidence of aortic dissection or aneurysm. 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 Multiple collateral vessels are present. - CHEST WALL: Normal. - LIVER: Hepatomegaly, 21 cm in length. - GALLBLADDER/BILE DUCTS: Cholelithiasis.   No bile duct dilatation. - PANCREAS: Normal. - SPLEEN: Normal. - ADRENALS  Normal. - KIDNEYS/URETERS: Post left nephrectomy. Stable 14 mm mass in the left renal fossa near the left adrenal gland. Right kidney is atrophic. Small nonobstructing stones are present in the right kidney. No hydronephrosis. - BOWEL: Normal caliber. Diverticulosis. No inflammatory changes. - LYMPH NODES: Several stable small retroperitoneal lymph nodes. - BONES: No fracture or significant bone lesion. - VASCULATURE: Atherosclerosis. No evidence of aortic dissection or aneurysm. - OTHER: Surgical changes in the intra-abdominal wall. Minimal ascites. 1.  No evidence of aortic dissection or aneurysm. 2.  Increased right paratracheal mass/adenopathy. 3.  Stable small mass in the left renal fossa. 4.  Stable left lower lobe infiltrate/atelectasis and small effusion. CT SPINE [facility] LUMB WO CONT    Result Date: 11/12/2021  History: Mid back pain EXAM: CT thoracolumbar spine without contrast TECHNIQUE: Thin section axial CT images are obtained through the thoracic and lumbar spine. Coronal and sagittal reformatted images are obtained based on the axial data. Radiation dose reduction techniques were used for this study. Our CT scanners use one or all of the following: Automated exposure control, adjustment of the mA and/or kV according to patient size, use of iterative reconstruction. No comparison FINDINGS: There is mediastinal adenopathy. There is patchy groundglass opacity seen throughout the lungs with more focal airspace consolidation in the left lower lobe. There is a small left pleural effusion. There is preservation of vertebral body height. Multilevel degenerative change of the thoracolumbar spine present without evidence of aggressive osseous lesion. For findings within the upper abdomen, please see recent CT chest abdomen and pelvis report from 11/9/2021.  There has been no significant interval change in these findings when compared with the prior exam.     1. No aggressive lesions within the thoracolumbar spine, nor is there evidence of acute fracture. 2. Patchy groundglass opacity seen within the lungs, more prominent than seen on the prior chest CT. Findings are nonspecific and could represent pneumonitis, pulmonary edema, or atypical infection. Medications Administered     morphine injection 4 mg     Admin Date  11/12/2021 Action  Given Dose  4 mg Route  IntraVENous Administered By  Gerson Bates RN          ondansetron [facility] injection 4 mg     Admin Date  11/12/2021 Action  Given Dose  4 mg Route  IntraVENous Administered By  Gerson Bates RN          sodium chloride 0.9 % bolus infusion 500 mL     Admin Date  11/12/2021 Action  New Bag Dose  500 mL Rate  1,000 mL/hr Route  IntraVENous Administered By  Teri Sales RN                Signed:  Katie Avendaño MD    Part of this note may have been written by using a voice dictation software. The note has been proof read but may still contain some grammatical/other typographical errors.

## 2021-11-13 NOTE — PROGRESS NOTES
Norco 7.5 mg po given. 11/13/21 0846   Pain 1   Pain Scale 1 Numeric (0 - 10)   Pain Intensity 1 10   Patient Stated Pain Goal 0   Pain Onset 1 acute   Pain Location 1 Back   Pain Orientation 1 Medial   Pain Description 1 Aching;  Sharp   Pain Intervention(s) 1 Medication (see MAR)

## 2021-11-13 NOTE — ED NOTES
TRANSFER - OUT REPORT:    Verbal report given to Dewey VALDEZ on [address]  being transferred to [facility] for routine progression of care       Report consisted of patients Situation, Background, Assessment and   Recommendations(SBAR). Information from the following report(s) SBAR was reviewed with the receiving nurse. Lines:   Peripheral IV 11/12/21 Right Hand (Active)   Site Assessment Clean, dry, & intact 11/12/21 1500   Phlebitis Assessment 0 11/12/21 1500   Infiltration Assessment 0 11/12/21 1500   Dressing Status Clean, dry, & intact 11/12/21 1500   Dressing Type Transparent 11/12/21 1500   Hub Color/Line Status Blue 11/12/21 1500       Peripheral IV 11/12/21 Anterior; Proximal; Right Forearm (Active)        Opportunity for questions and clarification was provided.       Patient transported with:   [employer]

## 2021-11-13 NOTE — PROGRESS NOTES
Received bedside shift report from Yan Villa RN. Pt lying in bed. No apparent distress. Respirations even and unlabored on 3L NC. Instructed to call for assistance with needs, as they arise. Pt voiced understanding.

## 2021-11-13 NOTE — CONSULTS
RENAL H&P/CONSULT    Subjective:     Patient is a 71 y/o AAF with ESRD on HD MWF at [facility] under my medical care due to diabetic nephropathy,  HTN,  history of Left RCC (Clear cell carcinoma) s/p left radical nephrectomy by Dr. Anand Snow in 2015 with history of recurrent mass at the site of nephrectomy. She presented to the ED yesterday with complaints of back pain and was found to be hypoxic and hypotensive. She completed a full dialysis treatment yesterday morning and is due for dialysis Monday. Today she is still complaining of lower back pain. No SOB, cough, N/V, fever/chills. Past Medical History:   Diagnosis Date    Arthritis     AVF (arteriovenous fistula) ([facility] [address] 12/20/2016 12/6/16 (S) Right AVF revision and thrombectomy    Cancer ([facility] [address] 2015    L kidney    Chronic kidney disease     HD in M-W-F- at [facility]    Degenerative joint disease     Diabetes ([facility] [address]     checks QD, normal 120-130, hyposymptoms at 80    ESRD (end stage renal disease) [facility]) Nov 2006    ESRD.  MWF dialysis     Hypertension     Obesity     Transient ischemic attack 08/29/2015    no residual      Past Surgical History:   Procedure Laterality Date    COLONOSCOPY N/A 11/8/2018    COLONOSCOPY  BMI 36 performed by Rj Mar MD at [facility] ENDOSCOPY    HX GI  06/01/2002    colon resection resulting in temporary colostomy reversal    HX GI  08/06/2018    exploratory laparotomy    HX GYN      stephan    HX OTHER SURGICAL      dialysis fistula, several permcaths    HX UROLOGICAL Left July 2015    nephrectomy    HX VASCULAR ACCESS      IR PLC CATH AV SHUNT IN W PTA SI VENOUS  5/30/2019    IR PLC CATH AV SHUNT IN W PTA SI VENOUS RT  2/28/2019    IR PLC CATH AV SHUNT IN W PTA SI VENOUS RT  9/3/2019    IR PLC CATH AV SHUNT IN W PTA SI VENOUS RT  12/5/2019    IR PLC CATH AV SHUNT IN W PTA SI VENOUS RT  3/10/2020    IL BREAST SURGERY PROCEDURE UNLISTED Right     cyst removed    VASCULAR SURGERY PROCEDURE UNLIST Right     AV graft      Prior to Admission medications    Medication Sig Start Date End Date Taking? Authorizing Provider   minoxidiL (LONITEN) 10 mg tablet Take  by mouth two (2) times a day. Yes Provider, Historical   furosemide (Lasix) 80 mg tablet Take 80 mg by mouth two (2) times a day. Yes Provider, Historical   HYDROcodone-acetaminophen (NORCO) 7.5-325 mg per tablet Take 1 Tablet by mouth every four (4) hours as needed for Pain for up to 5 days. Max Daily Amount: 6 Tablets. 11/9/21 11/14/21  Paras Daniel MD   methocarbamoL (Robaxin-750) 750 mg tablet Take 2 Tablets by mouth four (4) times daily. 11/9/21   Paras Daniel MD   gabapentin (NEURONTIN) 100 mg capsule TAKE 1 CAPSULE BY MOUTH THREE TIMES DAILY FOR PAIN 6/10/21   Provider, Historical   omeprazole (PRILOSEC) 40 mg capsule TAKE ONE CAPSULE BY MOUTH EVERY DAY 6/10/21   Provider, Historical   lidocaine 5 % topical cream Apply  to affected area two (2) times daily as needed for Pain. 9/1/17   FRANCISCO Arriola   amLODIPine (NORVASC) 10 mg tablet Take 10 mg by mouth nightly. Provider, Historical   VIT C/VIT E/LUTEIN/MIN/OMEGA-3 (OCUVITE PO) Take  by mouth nightly. Provider, Historical   sevelamer carbonate (RENVELA) 800 mg tab tab Take 800 mg by mouth three (3) times daily (with meals). 5 tablets with meals/ 2 with snacks  Sometimes only eats 2 meals/d    Provider, Historical   lisinopril (PRINIVIL, ZESTRIL) 40 mg tablet Take 40 mg by mouth nightly. Indications: HYPERTENSION 6/8/15   Provider, Historical   sitaGLIPtin (JANUVIA) 100 mg tablet Take 50 mg by mouth every morning.  Indications: TYPE 2 DIABETES MELLITUS    Provider, Historical     Allergies   Allergen Reactions    Pcn [Penicillins] Rash    Vancomycin Rash      Social History     Tobacco Use    Smoking status: Never Smoker    Smokeless tobacco: Never Used   Substance Use Topics    Alcohol use: No      Family History   Problem Relation Age of Onset    Diabetes Mother     Heart Disease Mother     Hypertension Mother     Heart Disease Father     Hypertension Father     Malignant Hyperthermia Neg Hx     Pseudocholinesterase Deficiency Neg Hx     Delayed Awakening Neg Hx     Post-op Nausea/Vomiting Neg Hx     Emergence Delirium Neg Hx     Other Neg Hx     Post-op Cognitive Dysfunction Neg Hx           Review of Systems    Constitutional: no fever,   Eyes: fair vision,   Ears, nose, mouth, throat, and face:fair hearing,   Respiratory: no asthma,   Cardiovascular:no chest pain,   Gastrointestinal:no diarrhea,   Genitourinary: no dysuria,  Hematologic/lymphatic: no bleeding tendency,   Neurological: no seizures,   Behvioral/Psych: no psych hospitalization   Endocrine: no goiter,       Objective:       Visit Vitals  /66 (BP 1 Location: Right leg, BP Patient Position: At rest)   Pulse 67   Temp 97.8 °F (36.6 °C)   Resp 18   Ht 5' 5.5\" (1.664 m)   Wt 81.6 kg (180 lb)   SpO2 98%   Breastfeeding No   BMI 29.50 kg/m²       11/13 0701 - 11/13 1900  In: 240 [P.O.:240]  Out: -   11/11 1901 - 11/13 0700  In: 200 [P.O.:200]  Out: -     Visit Vitals  /66 (BP 1 Location: Right leg, BP Patient Position: At rest)   Pulse 67   Temp 97.8 °F (36.6 °C)   Resp 18   Ht 5' 5.5\" (1.664 m)   Wt 81.6 kg (180 lb)   SpO2 98%   Breastfeeding No   BMI 29.50 kg/m²     General:  Alert, cooperative, no distress, appears stated age. Head:  Normocephalic, without obvious abnormality, atraumatic. Eyes:  Conjunctivae/corneas clear. PERRL, EOMs intact. Ears:  Normal external ear canals both ears. Neck: Supple, symmetrical, trachea midline, no adenopathy, thyroid: no enlargement/tenderness/nodules, no JVD. Back:   Symmetric, no curvature. ROM normal. No CVA tenderness. Lungs:   Coarse to auscultation bilaterally. Chest wall:  No tenderness or deformity. Heart:  Regular rate and rhythm, S1, S2 normal, no murmur,  rub or gallop. Abdomen:   Soft, non-tender. Bowel sounds normal. No masses,  No organomegaly. No renal bruit. Extremities: Extremities normal, atraumatic, no cyanosis or edema. Access:  CE AVG examines well   Skin: Skin color, texture, turgor normal. No rashes or lesions. Neurologic: Grossly intact. No asterixis. Data Review:     Recent Results (from the past 24 hour(s))   CBC WITH AUTOMATED DIFF    Collection Time: 11/12/21  2:55 PM   Result Value Ref Range    WBC 6.6 4.3 - 11.1 K/uL    RBC 3.28 (L) 4.05 - 5.2 M/uL    HGB 10.4 (L) 11.7 - 15.4 g/dL    HCT 31.1 (L) 35.8 - 46.3 %    MCV 94.8 79.6 - 97.8 FL    MCH 31.7 26.1 - 32.9 PG    MCHC 33.4 31.4 - 35.0 g/dL    RDW 17.0 (H) 11.9 - 14.6 %    PLATELET 994 (L) 323 - 450 K/uL    MPV 10.6 9.4 - 12.3 FL    ABSOLUTE NRBC 0.03 0.0 - 0.2 K/uL    DF AUTOMATED      NEUTROPHILS 74 43 - 78 %    LYMPHOCYTES 12 (L) 13 - 44 %    MONOCYTES 10 4.0 - 12.0 %    EOSINOPHILS 2 0.5 - 7.8 %    BASOPHILS 1 0.0 - 2.0 %    IMMATURE GRANULOCYTES 1 0.0 - 5.0 %    ABS. NEUTROPHILS 4.9 1.7 - 8.2 K/UL    ABS. LYMPHOCYTES 0.8 0.5 - 4.6 K/UL    ABS. MONOCYTES 0.7 0.1 - 1.3 K/UL    ABS. EOSINOPHILS 0.1 0.0 - 0.8 K/UL    ABS. BASOPHILS 0.1 0.0 - 0.2 K/UL    ABS. IMM. GRANS. 0.0 0.0 - 0.5 K/UL   METABOLIC PANEL, COMPREHENSIVE    Collection Time: 11/12/21  2:55 PM   Result Value Ref Range    Sodium 137 136 - 145 mmol/L    Potassium 5.6 (H) 3.5 - 5.1 mmol/L    Chloride 99 98 - 107 mmol/L    CO2 30 21 - 32 mmol/L    Anion gap 8 7 - 16 mmol/L    Glucose 132 (H) 65 - 100 mg/dL    BUN 16 8 - 23 MG/DL    Creatinine 3.31 (H) 0.6 - 1.0 MG/DL    GFR est AA 18 (L) >60 ml/min/1.73m2    GFR est non-AA 15 (L) >60 ml/min/1.73m2    Calcium 9.2 8.3 - 10.4 MG/DL    Bilirubin, total 1.1 0.2 - 1.1 MG/DL    ALT (SGPT) 23 12 - 65 U/L    AST (SGOT) 44 (H) 15 - 37 U/L    Alk.  phosphatase 90 50 - 136 U/L    Protein, total 8.8 (H) 6.3 - 8.2 g/dL    Albumin 4.1 3.2 - 4.6 g/dL    Globulin 4.7 (H) 2.3 - 3.5 g/dL    A-G Ratio 0.9 (L) 1.2 - 3.5     COVID-19 RAPID TEST    Collection Time: 11/12/21  5:01 PM   Result Value Ref Range    Specimen source Nasopharyngeal      COVID-19 rapid test Not detected NOTD     CULTURE, BLOOD    Collection Time: 11/12/21  6:17 PM    Specimen: Blood   Result Value Ref Range    Special Requests: RIGHT  FOREARM        Culture result: NO GROWTH AFTER 13 HOURS     EKG, 12 LEAD, INITIAL    Collection Time: 11/12/21  6:24 PM   Result Value Ref Range    Ventricular Rate 82 BPM    Atrial Rate 82 BPM    P-R Interval 180 ms    QRS Duration 74 ms    Q-T Interval 402 ms    QTC Calculation (Bezet) 469 ms    Calculated P Axis 64 degrees    Calculated R Axis 132 degrees    Calculated T Axis -8 degrees    Diagnosis       Sinus rhythm with Premature supraventricular complexes with occasional   Premature ventricular complexes  Possible Right ventricular hypertrophy  Septal infarct , age undetermined  T wave abnormality, consider inferolateral ischemia  Abnormal ECG  When compared with ECG of 10-NOV-2019 22:46,  Significant changes have occurred  Confirmed by Queenie Sparks ([id number]) on 11/13/2021 7:40:57 AM     LACTIC ACID    Collection Time: 11/12/21  6:30 PM   Result Value Ref Range    Lactic acid 0.6 0.4 - 2.0 MMOL/L   PROCALCITONIN    Collection Time: 11/12/21  6:30 PM   Result Value Ref Range    Procalcitonin 0.10 0.00 - 0.49 ng/mL   RESPIRATORY VIRUS PANEL W/COVID-19, PCR    Collection Time: 11/12/21  6:30 PM    Specimen: Nasopharyngeal   Result Value Ref Range    Adenovirus NOT DETECTED NOTDET      Coronavirus 229E NOT DETECTED NOTDET      Coronavirus HKU1 NOT DETECTED NOTDET      Coronavirus CVNL63 NOT DETECTED NOTDET      Coronavirus OC43 NOT DETECTED NOTDET      SARS-CoV-2, PCR NOT DETECTED NOTDET      Metapneumovirus NOT DETECTED NOTDET      Rhinovirus and Enterovirus NOT DETECTED NOTDET      Influenza A NOT DETECTED NOTDET      Influenza B NOT DETECTED NOTDET      Parainfluenza 1 NOT DETECTED NOTDET      Parainfluenza 2 NOT DETECTED NOTDET Parainfluenza 3 NOT DETECTED NOTDET      Parainfluenza virus 4 NOT DETECTED NOTDET      RSV by PCR NOT DETECTED NOTDET      B. parapertussis, PCR NOT DETECTED NOTDET      Bordetella pertussis - PCR NOT DETECTED NOTDET      Chlamydophila pneumoniae DNA, QL, PCR NOT DETECTED NOTDET      Mycoplasma pneumoniae DNA, QL, PCR NOT DETECTED NOTDET     TROPONIN-HIGH SENSITIVITY    Collection Time: 11/12/21  6:30 PM   Result Value Ref Range    Troponin-High Sensitivity 131.4 (HH) 0 - 14 pg/mL   POTASSIUM    Collection Time: 11/12/21  6:30 PM   Result Value Ref Range    Potassium 2.6 (L) 3.5 - 5.1 mmol/L   TROPONIN-HIGH SENSITIVITY    Collection Time: 11/13/21  1:40 AM   Result Value Ref Range    Troponin-High Sensitivity 158.4 (HH) 0 - 14 pg/mL   HEMOGLOBIN A1C WITH EAG    Collection Time: 11/13/21  1:40 AM   Result Value Ref Range    Hemoglobin A1c 5.3 4.20 - 6.30 %    Est. average glucose 105 mg/dL   POTASSIUM    Collection Time: 11/13/21  1:40 AM   Result Value Ref Range    Potassium 3.0 (L) 3.5 - 5.1 mmol/L   LIPASE    Collection Time: 11/13/21  1:40 AM   Result Value Ref Range    Lipase 164 73 - 393 U/L   MAGNESIUM    Collection Time: 11/13/21  1:40 AM   Result Value Ref Range    Magnesium 2.0 1.8 - 2.4 mg/dL   METABOLIC PANEL, BASIC    Collection Time: 11/13/21  7:53 AM   Result Value Ref Range    Sodium 140 136 - 145 mmol/L    Potassium 3.0 (L) 3.5 - 5.1 mmol/L    Chloride 102 98 - 107 mmol/L    CO2 29 21 - 32 mmol/L    Anion gap 9 7 - 16 mmol/L    Glucose 102 (H) 65 - 100 mg/dL    BUN 25 (H) 8 - 23 MG/DL    Creatinine 4.33 (H) 0.6 - 1.0 MG/DL    GFR est AA 13 (L) >60 ml/min/1.73m2    GFR est non-AA 11 (L) >60 ml/min/1.73m2    Calcium 8.2 (L) 8.3 - 10.4 MG/DL   CBC W/O DIFF    Collection Time: 11/13/21  7:53 AM   Result Value Ref Range    WBC 4.6 4.3 - 11.1 K/uL    RBC 2.86 (L) 4.05 - 5.2 M/uL    HGB 9.0 (L) 11.7 - 15.4 g/dL    HCT 26.3 (L) 35.8 - 46.3 %    MCV 92.0 79.6 - 97.8 FL    MCH 31.5 26.1 - 32.9 PG    MCHC 34.2 31.4 - 35.0 g/dL    RDW 17.1 (H) 11.9 - 14.6 %    PLATELET 102 (L) 735 - 450 K/uL    MPV 10.8 9.4 - 12.3 FL    ABSOLUTE NRBC 0.04 0.0 - 0.2 K/uL   MAGNESIUM    Collection Time: 11/13/21  7:53 AM   Result Value Ref Range    Magnesium 2.2 1.8 - 2.4 mg/dL   GLUCOSE, POC    Collection Time: 11/13/21  8:34 AM   Result Value Ref Range    Glucose (POC) 109 (H) 65 - 100 mg/dL    Performed by [facility]            Principal Problem:    Hypotension (12/1/2014)    Active Problems:    DM (diabetes mellitus) ([facility] [address] (2/7/2013)      ESRD (end stage renal disease) ([facility] [address] (2/7/2013)      HTN (hypertension) (2/7/2013)      Severe back pain (11/12/2021)      Bacterial pneumonia (11/12/2021)      Acute respiratory failure with hypoxia ([facility] [address] (11/12/2021)      Hypokalemia (11/12/2021)      Elevated troponin (11/12/2021)        Assessment:     1. ESRD -  - S/P HD Friday as outpatient  - no HD needed today  - plan next HD Monday    2. Hypokalemia  - replacing PO     3. DM II -  - continue current meds     4. Hypotension  - improving with midodrine    5. Anemia  - d/t ESRD  - treat with JAMES    6.  Hyperphosphatemia  - treat with Renvela PO CM    Plan:     Per above    Ambar Reyes NP

## 2021-11-13 NOTE — PROGRESS NOTES
Morphine effective.      11/13/21 8919   Pain 1   Pain Scale 1 Numeric (0 - 10)   Pain Intensity 1 0   Patient Stated Pain Goal 0

## 2021-11-13 NOTE — PROGRESS NOTES
Morphine 2 mg IV given. 11/13/21 0846   Pain 1   Pain Scale 1 Numeric (0 - 10)   Pain Intensity 1 10   Patient Stated Pain Goal 0   Pain Onset 1 acute   Pain Location 1 Back   Pain Orientation 1 Medial   Pain Description 1 Aching;  Sharp   Pain Intervention(s) 1 Medication (see MAR)

## 2021-11-13 NOTE — PROGRESS NOTES
ACUTE PHYSICAL THERAPY GOALS:  (Developed with and agreed upon by patient and/or caregiver.)  1. Pt will perform bed mobility with (I) in 7 therapy sessions. 2. Pt will perform sit-to-stand/ stand-to-sit transfers (I) in 7 therapy sessions. 3. Pt will ambulate 250 ft CG (A) with use of LRAD and breaks as needed in 7 therapy sessions. 4. Pt will perform standing balance activities with minimal postural sway in 7 therapy sessions. 5. Pt will tolerate multiple sets and reps of BLE exercises in 7 therapy sessions. PHYSICAL THERAPY ASSESSMENT: Initial Assessment and AM PT Treatment Day # 1      Kecia Fischer is a 72 y.o. female   PRIMARY DIAGNOSIS: Hypotension  Hypotension [I95.9]       Reason for Referral:    ICD-10: Treatment Diagnosis: Generalized Muscle Weakness (M62.81)  Difficulty in walking, Not elsewhere classified (R26.2)  Other abnormalities of gait and mobility (R26.89)  INPATIENT: Payor: [employer] MEDICARE / Plan: [facility] [employer] MEDICARE CHOICE PPO/PFFS / Product Type: Managed Care Medicare /     ASSESSMENT:     REHAB RECOMMENDATIONS:   Recommendation to date pending progress:  Setting:   Home Health Therapy  Equipment:    To Be Determined     PRIOR LEVEL OF FUNCTION:  (Prior to Hospitalization) INITIAL/CURRENT LEVEL OF FUNCTION:  (Most Recently Demonstrated)   Bed Mobility:   Independent  Sit to Stand:   Independent  Transfers:   Independent  Gait/Mobility:   Independent Bed Mobility:   Standby Assistance  Sit to Stand:  [employer] Assistance  Transfers:   Standby Assistance  Gait/Mobility:   Contact Guard Assistance     ASSESSMENT:  Ms. Tee Keys Is a 72 y.o F presenting to PT after coming to [facility] ED on 11/12 c CC of significant back pain. PTA pt lives c her DTR, YVAN, and 3 grandchildren in a 2 level home c no BEBE (approx 10 interior steps). Pt reports she is functionally (I) at baseline for all ADLs and mobility while family (A) PRN for IADLs/ homemaking needs.  At time of initial evaluation, pt presents slightly below baseline LOF with deficits in bed mobility, balance, transfers, gait and endurance limiting her overall functional mobility. Today, pt performed all mobility c SB (A) with the exception of ambulation during which CG (A) was provided out of an abundance of caution. Of note, pt requiring inc time for all mobility but shows good safety awareness and mechanics in performing bed mobility/ transfers. While ambulating, pt demonstrates a slow and shuffling gait pattern c dec step length and foot clearance OVIDIO as well as a forward-flexed posture. Pt is slightly unsteady c inc postural sway but never required physical (A) from therapist for stability; she does intermittently surface walk. Upon completion of mobility, pt reporting she feels weaker and is moving more slowly than her typical self. Additionally, 8/10 back pain cont to remain a major limiting factor in pt performance. At this time, pt is an appropriate candidate for skilled PT and will benefit from POC designed to address the aforementioned deficits. Upon completion of treatment, pt was positioned to comfort in bed with needs in reach. RN was made aware of pt performance. SUBJECTIVE:   Ms. Sina Jennings states, \"I'm moving slower than normal.\"    SOCIAL HISTORY/LIVING ENVIRONMENT:  lives c her DTR, YVAN, and 3 grandchildren in a 2 level home c no BEBE (approx 10 interior steps). Owns cane and RW.   Support Systems: Child(joan), Other Family Member(s) (Santana Dolan at [phone] and Justice Rohit (sister [phone]))  OBJECTIVE:     PAIN: VITAL SIGNS: LINES/DRAINS:   Pre Treatment: Pain Screen  Pain Scale 1: Numeric (0 - 10)  Pain Intensity 1: 8  Pain Onset 1: acute   Pain Location 1: Back  Post Treatment: 8 Vital Signs  O2 Sat (%): 95 %  O2 Device: Nasal cannula  O2 Flow Rate (L/min): 1 l/min N/A  O2 Device: Nasal cannula     GROSS EVALUATION:  BLE Within Functional Limits Abnormal/ Functional Abnormal/ Non-Functional (see comments) Not Tested Comments:   AROM [x] [] [] []    PROM [] [] [] [x]    Strength [] [x] [] []    Balance [] [x] [] [] slightly unsteady on feet   Posture [] [x] [] [] Forward-flexed   Sensation [x] [] [] []    Coordination [x] [] [] []    Tone [x] [] [] []    Edema [x] [] [] []    Activity Tolerance [] [x] [] [] Below baseline     [] [] [] []      COGNITION/  PERCEPTION: Intact Impaired   (see comments) Comments:   Orientation [x] []    Vision [x] []    Hearing [x] []    Command Following [x] []    Safety Awareness [x] []     [] []      MOBILITY: I Mod I S SBA CGA Min Mod Max Total  NT x2 Comments:   Bed Mobility    Rolling [] [] [] [x] [] [] [] [] [] [] []    Supine to Sit [] [] [] [x] [] [] [] [] [] [] []    Scooting [] [] [] [x] [] [] [] [] [] [] []    Sit to Supine [] [] [] [x] [] [] [] [] [] [] []    Transfers    Sit to Stand [] [] [] [x] [] [] [] [] [] [] []    Bed to Chair [] [] [] [] [] [] [] [] [] [x] []    Stand to Sit [] [] [] [x] [] [] [] [] [] [] []    I=Independent, Mod I=Modified Independent, S=Supervision, SBA=Standby Assistance, CGA=Contact Guard Assistance,   Min=Minimal Assistance, Mod=Moderate Assistance, Max=Maximal Assistance, Total=Total Assistance, NT=Not Tested  GAIT: I Mod I S SBA CGA Min Mod Max Total  NT x2 Comments:   Level of Assistance [] [] [] [] [x] [] [] [] [] [] []    Distance 20'x1    DME intermittent HHA and surface walking    Gait Quality Slow and shuffling; inc postural sway    Weightbearing Status N/A     I=Independent, Mod I=Modified Independent, S=Supervision, SBA=Standby Assistance, CGA=Contact Guard Assistance,   Min=Minimal Assistance, Mod=Moderate Assistance, Max=Maximal Assistance, Total=Total Assistance, NT=Not Tested    [facility] [address] [facility] Mobility Inpatient Short Form       How much difficulty does the patient currently have. .. Unable A Lot A Little None   1. Turning over in bed (including adjusting bedclothes, sheets and blankets)?    [] 1   [] 2   [] 3   [x] 4   2. Sitting down on and standing up from a chair with arms ( e.g., wheelchair, bedside commode, etc.)   [] 1   [] 2   [] 3   [x] 4   3. Moving from lying on back to sitting on the side of the bed? [] 1   [] 2   [] 3   [x] 4   How much help from another person does the patient currently need. .. Total A Lot A Little None   4. Moving to and from a bed to a chair (including a wheelchair)? [] 1   [] 2   [] 3   [x] 4   5. Need to walk in hospital room? [] 1   [] 2   [x] 3   [] 4   6. Climbing 3-5 steps with a railing? [] 1   [] 2   [x] 3   [] 4   © 2007, Trustees of [address] [place] [address] [address] [address], under license to [employer]. All rights reserved     Score:  Initial: 22 Most Recent: X (Date: -- )    Interpretation of Tool:  Represents activities that are increasingly more difficult (i.e. Bed mobility, Transfers, Gait). PLAN:   FREQUENCY/DURATION: PT Plan of Care: 3 times/week for duration of hospital stay or until stated goals are met, whichever comes first.    PROBLEM LIST:   (Skilled intervention is medically necessary to address:)  1. Decreased Activity Tolerance  2. Decreased Balance  3. Decreased Gait Ability  4. Decreased Strength  5. Decreased Transfer Abilities  6. Increased Pain   INTERVENTIONS PLANNED:   (Benefits and precautions of physical therapy have been discussed with the patient.)  1. Therapeutic Activity  2. Therapeutic Exercise/HEP  3. Neuromuscular Re-education  4. Gait Training  5. Manual Therapy  6. Education     TREATMENT:     EVALUATION: Low Complexity : (Untimed Charge)    TREATMENT:   ($$ Therapeutic Activity: 8-22 mins    )  Therapeutic Activity (12 Minutes): Therapeutic activity included Rolling, Supine to Sit, Sit to Supine, Scooting, Lateral Scooting, Transfer Training, Ambulation on level ground, Sitting balance  and Standing balance to improve functional Mobility, Strength, ROM and Activity tolerance.     TREATMENT GRID:  N/A    AFTER TREATMENT POSITION/PRECAUTIONS:  Alarm Activated, Bed, Needs within reach and RN notified    INTERDISCIPLINARY COLLABORATION:  RN/PCT and PT/PTA    TOTAL TREATMENT DURATION:  PT Patient Time In/Time Out  Time In: 0908  Time Out: [address] [place], PT

## 2021-11-13 NOTE — PROGRESS NOTES
Morphine 1 mg IV given. 11/13/21 1344   Pain 1   Pain Scale 1 Numeric (0 - 10)   Pain Intensity 1 8   Patient Stated Pain Goal 0   Pain Onset 1 acute   Pain Location 1 Back   Pain Orientation 1 Medial   Pain Description 1 Aching;  Sharp   Pain Intervention(s) 1 Medication (see MAR)

## 2021-11-13 NOTE — PROGRESS NOTES
RNCM met with patient in room 835 to discuss discharge planning. Patient awake in bed, eating breakfast. Patient alert and oriented. Patient states she lives in own residence, two level home with no steps to enter and approx ten interior steps; continues to state \"I only go upstairs to do laundry\". Patient states daughter, Ana Monae, son-in-law, and 3 grandchildren live with her; along with Smokey the cat and Chel the dog. Confirmed demographic information, emergency contact and PCP. Patient uses [facility], on [address]. Patient denies use of home oxygen but has had [facility] PT/OT in past. DME: cane and walker for ambulation but states \"I get around without them\". Patient states daughter takes her to Dialysis. PT/OT evals pending. CM will continue to follow. Encourage patient to call for any discharge needs or questions. Care Management Interventions  PCP Verified by CM:  Yes (Dr Herminio Sheehan at [phone])  Mode of Transport at Discharge: Self  Transition of Care Consult (CM Consult): Discharge Planning  MyChart Signup:  (has a cane and walker at home)  Discharge Durable Medical Equipment: Yes  Physical Therapy Consult: Yes (ordered 11/12/21)  Occupational Therapy Consult: Yes  Support Systems: Child(joan), Other Family Member(s) (Levsundeep Zaratel at [phone] and Kirti Pierre (sister [phone]))  Confirm Follow Up Transport: Family  The Plan for Transition of Care is Related to the Following Treatment Goals : return to a safe level of independence  The Patient and/or Patient Representative was Provided with a Choice of Provider and Agrees with the Discharge Plan?: Yes  Freedom of Choice List was Provided with Basic Dialogue that Supports the Patient's Individualized Plan of Care/Goals, Treatment Preferences and Shares the Quality Data Associated with the Providers?: Yes   Resource Information Provided?: No  Discharge Location  Discharge Placement: Home with family assistance

## 2021-11-13 NOTE — PROGRESS NOTES
VANCO RENAL INSUFFICIENCY NOTE [address] [facility] Pharmacokinetic Monitoring Service - Vancomycin    Sumeet España is a 72 y.o. female starting on vancomycin therapy for CAP. Pharmacy consulted by Dr. Vidhya Mcintyre for monitoring and adjustment. Target Concentration: Pre-Dialysis Concentration 15-20 mg/L  Additional Antimicrobials: ceftriaxone, azithromycin    Pertinent Laboratory Values: Wt Readings from Last 1 Encounters:   11/12/21 81.6 kg (180 lb)     Temp Readings from Last 1 Encounters:   11/12/21 97.9 °F (36.6 °C)     Recent Labs     11/12/21  1830 11/12/21  1455   BUN  --  16   CREA  --  3.31*   WBC  --  6.6   PCT 0.10  --    LAC 0.6  --        MRSA Nasal Swab: not ordered. Order placed by pharmacy. .    Plan:  Concentration-guided dosing due to intermittent hemodialysis  Start vancomycin 1750 mg x 1 dose; followed by intermittent dosing  Vancomycin concentration ordered as clinically appropriate  Pharmacy will continue to monitor patient and adjust therapy as indicated    Thank you for the consult,  Jody Stapleton

## 2021-11-13 NOTE — PROGRESS NOTES
Morphine effective.      11/13/21 1400   Pain 1   Pain Scale 1 Numeric (0 - 10)   Pain Intensity 1 0   Patient Stated Pain Goal 0

## 2021-11-14 LAB
ANION GAP SERPL CALC-SCNC: 8 MMOL/L (ref 7–16)
BUN SERPL-MCNC: 38 MG/DL (ref 8–23)
CALCIUM SERPL-MCNC: 9 MG/DL (ref 8.3–10.4)
CHLORIDE SERPL-SCNC: 99 MMOL/L (ref 98–107)
CO2 SERPL-SCNC: 28 MMOL/L (ref 21–32)
CREAT SERPL-MCNC: 5.81 MG/DL (ref 0.6–1)
GLUCOSE BLD STRIP.AUTO-MCNC: 124 MG/DL (ref 65–100)
GLUCOSE BLD STRIP.AUTO-MCNC: 134 MG/DL (ref 65–100)
GLUCOSE BLD STRIP.AUTO-MCNC: 136 MG/DL (ref 65–100)
GLUCOSE BLD STRIP.AUTO-MCNC: 142 MG/DL (ref 65–100)
GLUCOSE BLD STRIP.AUTO-MCNC: 150 MG/DL (ref 65–100)
GLUCOSE SERPL-MCNC: 129 MG/DL (ref 65–100)
POTASSIUM SERPL-SCNC: 4.9 MMOL/L (ref 3.5–5.1)
SERVICE CMNT-IMP: ABNORMAL
SODIUM SERPL-SCNC: 135 MMOL/L (ref 136–145)

## 2021-11-14 PROCEDURE — 74011250637 HC RX REV CODE- 250/637: Performed by: FAMILY MEDICINE

## 2021-11-14 PROCEDURE — 74011000258 HC RX REV CODE- 258: Performed by: FAMILY MEDICINE

## 2021-11-14 PROCEDURE — 80048 BASIC METABOLIC PNL TOTAL CA: CPT

## 2021-11-14 PROCEDURE — 74011000250 HC RX REV CODE- 250: Performed by: FAMILY MEDICINE

## 2021-11-14 PROCEDURE — 74011250636 HC RX REV CODE- 250/636: Performed by: HOSPITALIST

## 2021-11-14 PROCEDURE — 36415 COLL VENOUS BLD VENIPUNCTURE: CPT

## 2021-11-14 PROCEDURE — 74011250636 HC RX REV CODE- 250/636: Performed by: FAMILY MEDICINE

## 2021-11-14 PROCEDURE — 74011250637 HC RX REV CODE- 250/637: Performed by: HOSPITALIST

## 2021-11-14 PROCEDURE — 65660000000 HC RM CCU STEPDOWN

## 2021-11-14 PROCEDURE — 82962 GLUCOSE BLOOD TEST: CPT

## 2021-11-14 RX ORDER — LIDOCAINE 4 G/100G
1 PATCH TOPICAL EVERY 24 HOURS
Status: DISCONTINUED | OUTPATIENT
Start: 2021-11-14 | End: 2021-11-14 | Stop reason: SDUPTHER

## 2021-11-14 RX ORDER — ONDANSETRON 2 MG/ML
4 INJECTION INTRAMUSCULAR; INTRAVENOUS ONCE
Status: COMPLETED | OUTPATIENT
Start: 2021-11-14 | End: 2021-11-14

## 2021-11-14 RX ORDER — MIDODRINE HYDROCHLORIDE 5 MG/1
5 TABLET ORAL
Status: DISCONTINUED | OUTPATIENT
Start: 2021-11-14 | End: 2021-11-15

## 2021-11-14 RX ADMIN — SEVELAMER CARBONATE 800 MG: 800 TABLET, FILM COATED ORAL at 12:50

## 2021-11-14 RX ADMIN — MIDODRINE HYDROCHLORIDE 5 MG: 5 TABLET ORAL at 12:50

## 2021-11-14 RX ADMIN — MORPHINE SULFATE 2 MG: 2 INJECTION, SOLUTION INTRAMUSCULAR; INTRAVENOUS at 09:34

## 2021-11-14 RX ADMIN — CEFTRIAXONE 1 G: 1 INJECTION, POWDER, FOR SOLUTION INTRAMUSCULAR; INTRAVENOUS at 21:38

## 2021-11-14 RX ADMIN — Medication 10 ML: at 21:44

## 2021-11-14 RX ADMIN — GABAPENTIN 100 MG: 100 CAPSULE ORAL at 09:34

## 2021-11-14 RX ADMIN — GABAPENTIN 100 MG: 100 CAPSULE ORAL at 16:31

## 2021-11-14 RX ADMIN — Medication 10 ML: at 14:10

## 2021-11-14 RX ADMIN — MORPHINE SULFATE 2 MG: 2 INJECTION, SOLUTION INTRAMUSCULAR; INTRAVENOUS at 22:30

## 2021-11-14 RX ADMIN — Medication 10 ML: at 16:45

## 2021-11-14 RX ADMIN — ONDANSETRON 4 MG: 2 INJECTION INTRAMUSCULAR; INTRAVENOUS at 16:45

## 2021-11-14 RX ADMIN — ONDANSETRON 4 MG: 2 INJECTION INTRAMUSCULAR; INTRAVENOUS at 14:13

## 2021-11-14 RX ADMIN — GABAPENTIN 100 MG: 100 CAPSULE ORAL at 21:43

## 2021-11-14 RX ADMIN — SEVELAMER CARBONATE 800 MG: 800 TABLET, FILM COATED ORAL at 09:34

## 2021-11-14 RX ADMIN — PANTOPRAZOLE SODIUM 40 MG: 40 TABLET, DELAYED RELEASE ORAL at 06:37

## 2021-11-14 RX ADMIN — MIDODRINE HYDROCHLORIDE 10 MG: 5 TABLET ORAL at 09:34

## 2021-11-14 RX ADMIN — Medication 10 ML: at 06:37

## 2021-11-14 NOTE — PROGRESS NOTES
SpO2 87% on room air. Increased O2 to 1L nc to get SpO2 to 91%.      11/14/21 1056   Vital Signs   O2 Sat (%) 91 %   Oxygen Therapy   O2 Device Nasal cannula   O2 Flow Rate (L/min) 1 l/min

## 2021-11-14 NOTE — PROGRESS NOTES
Hospitalist   Admit Date:  2021  1:35 PM   Name:  Don Fischer   Age:  72 y.o. Sex:  female  :  1950   MRN:  [id number]   Room:  [facility]    Presenting Complaint: Back Pain    Reason(s) for Admission: Hypotension [I95.9]     History of Present Illness:      72 y.o. female with medical history of ESRD on HD, Renal cell carcinoma s/p resection and SBRT who presented with sharp severe midthoracic back pain of 3-4 day duration that comes and goes. Pain does not radiate anywhere. Pt has been taking her home Norco, Robaxin, Lidocaine path, Gabapentin without alleviating symptoms, prompting her to come to the ED. She went to the ED on  also then with associated L flank pain and was noted to have unremarkable CT C/A/P. She denies SOB, chest pain, abdominal pain, diaphoresis, N/V, diarrhea or constipation. In ED, BP low 88/50. O2sat was 88% on RA and was placed on 3LO2NC. K 5.6 but reported hemolyzed. Recheck was 2.6. CT T spine on admission shows no acute fracture, no aggressive bony lesions; evidence of patchy ground-glass opacity seen within lungs. Today, less midline lower back pain, no resp spx, on room air    Assessment & Plan:     1- Acute back pain, for few days, no injuries or fall, no associated neuro spx. Improving   2- Low/ low normal BP. Hx of HTN, improved  3- LLL atelectasis, no evidence of pneumonia or pulm edema, no resp spx. 4- ESRD on HD  5- DM, controlled  6- Hx of RCC.  Stable  7- Hypokalemia, replaced    Rx:  Pain control  Lidocaine topical  Continue rocephin iv  DC azithro  Follow blood cultures to r/o bone infection- negative TD  Insulin scale  PT/OT consulted    Dispo; home likely tomorrow    Objective:     Patient Vitals for the past 24 hrs:   Temp Pulse Resp BP SpO2   21 1536 98 °F (36.7 °C) 72 20 (!) 145/81 91 %   21 1117 98.1 °F (36.7 °C) 73 20 (!) 143/76 91 %   21 1056     91 %   21 0928     93 %   21 0730 98.2 °F (36.8 °C) 69 18 131/62 95 %   11/14/21 0410  64      11/14/21 0313 98.3 °F (36.8 °C) 68 16 (!) 146/72 94 %   11/14/21 0005  (!) 55      11/13/21 2317 97.9 °F (36.6 °C) (!) 57 18 117/68 95 %   11/13/21 2015  64      11/13/21 2000 98 °F (36.7 °C) 62 18 127/70 90 %     Oxygen Therapy  O2 Sat (%): 91 % (11/14/21 1536)  Pulse via Oximetry: 72 beats per minute (11/12/21 2219)  O2 Device: Nasal cannula (11/14/21 1056)  O2 Flow Rate (L/min): 1 l/min (11/14/21 1056)    Estimated body mass index is 29.5 kg/m² as calculated from the following:    Height as of this encounter: 5' 5.5\" (1.664 m). Weight as of this encounter: 81.6 kg (180 lb). Intake/Output Summary (Last 24 hours) at 11/14/2021 1655  Last data filed at 11/14/2021 1230  Gross per 24 hour   Intake 480 ml   Output 1 ml   Net 479 ml         Physical Exam:    Blood pressure (!) 145/81, pulse 72, temperature 98 °F (36.7 °C), resp. rate 20, height 5' 5.5\" (1.664 m), weight 81.6 kg (180 lb), SpO2 91 %, not currently breastfeeding. General:    Well nourished. Moderate distress. Obese, Appears uncomfortable d/t back pain  CV:   RRR. No m/r/g. No jugular venous distension. Lungs:   Faint rhonchi on b/l lung bases. Respirations even, unlabored  Abdomen: Bowel sounds present. Soft, nontender, nondistended. Extremities: No cyanosis or clubbing. No edema. Midline point tenderness in lower lumbar area  Skin:     No rashes and normal coloration. Warm and dry. Neuro:  grossly intact. No M/S deficits A&Ox3  Psych:  Normal mood and affect.       I have reviewed ordered lab tests and independently visualized imaging below:    Last 24hr Labs:  Recent Results (from the past 24 hour(s))   GLUCOSE, POC    Collection Time: 11/13/21  9:35 PM   Result Value Ref Range    Glucose (POC) 151 (H) 65 - 100 mg/dL    Performed by Robert    METABOLIC PANEL, BASIC    Collection Time: 11/14/21  6:46 AM   Result Value Ref Range    Sodium 135 (L) 136 - 145 mmol/L Potassium 4.9 3.5 - 5.1 mmol/L    Chloride 99 98 - 107 mmol/L    CO2 28 21 - 32 mmol/L    Anion gap 8 7 - 16 mmol/L    Glucose 129 (H) 65 - 100 mg/dL    BUN 38 (H) 8 - 23 MG/DL    Creatinine 5.81 (H) 0.6 - 1.0 MG/DL    GFR est AA 9 (L) >60 ml/min/1.73m2    GFR est non-AA 8 (L) >60 ml/min/1.73m2    Calcium 9.0 8.3 - 10.4 MG/DL   GLUCOSE, POC    Collection Time: 11/14/21  7:39 AM   Result Value Ref Range    Glucose (POC) 134 (H) 65 - 100 mg/dL    Performed by ToneyRitaPCT    GLUCOSE, POC    Collection Time: 11/14/21 11:36 AM   Result Value Ref Range    Glucose (POC) 124 (H) 65 - 100 mg/dL    Performed by ToneyRitaPCT    GLUCOSE, POC    Collection Time: 11/14/21  4:23 PM   Result Value Ref Range    Glucose (POC) 136 (H) 65 - 100 mg/dL    Performed by ToneyRitaPCT        All Micro Results     Procedure Component Value Units Date/Time    CULTURE, BLOOD [[id number]] Collected: 11/13/21 0140    Order Status: Completed Specimen: Blood Updated: 11/14/21 0615     Special Requests: --        RIGHT  HAND       Culture result: NO GROWTH 1 DAY       CULTURE, BLOOD [[id number]] Collected: 11/12/21 1817    Order Status: Completed Specimen: Blood Updated: 11/14/21 0615     Special Requests: --        RIGHT  FOREARM       Culture result: NO GROWTH 2 DAYS       CULTURE, RESPIRATORY/SPUTUM/BRONCH Rishi Mckusick STAIN [[id number]]     Order Status: Canceled Specimen: Sputum     MSSA/MRSA SC BY PCR, NASAL SWAB [[id number]]     Order Status: Sent Specimen: Nasal swab     RESPIRATORY VIRUS PANEL W/COVID-19, PCR [[id number]] Collected: 11/12/21 1830    Order Status: Completed Specimen: Nasopharyngeal Updated: 11/12/21 1936     Adenovirus NOT DETECTED        Coronavirus 229E NOT DETECTED        Coronavirus HKU1 NOT DETECTED        Coronavirus CVNL63 NOT DETECTED        Coronavirus OC43 NOT DETECTED        SARS-CoV-2, PCR NOT DETECTED        Metapneumovirus NOT DETECTED        Rhinovirus and Enterovirus NOT DETECTED        Influenza A NOT DETECTED Influenza B NOT DETECTED        Parainfluenza 1 NOT DETECTED        Parainfluenza 2 NOT DETECTED        Parainfluenza 3 NOT DETECTED        Parainfluenza virus 4 NOT DETECTED        RSV by PCR NOT DETECTED        B. parapertussis, PCR NOT DETECTED        Bordetella pertussis - PCR NOT DETECTED        Chlamydophila pneumoniae DNA, QL, PCR NOT DETECTED        Mycoplasma pneumoniae DNA, QL, PCR NOT DETECTED       COVID-19 RAPID TEST [[id number]] Collected: 11/12/21 1701    Order Status: Completed Specimen: Nasopharyngeal Updated: 11/12/21 1736     Specimen source Nasopharyngeal        COVID-19 rapid test Not detected        Comment:      The specimen is NEGATIVE for SARS-CoV-2, the novel coronavirus associated with COVID-19. A negative result does not rule out COVID-19. This test has been authorized by the FDA under an Emergency Use Authorization (EUA) for use by authorized laboratories. Fact sheet for Healthcare Providers: SearchForcedate.co.nz  Fact sheet for Patients: WiChorus.co.nz       Methodology: Isothermal Nucleic Acid Amplification               Other Studies:  No results found. Medications Administered     morphine injection 4 mg     Admin Date  11/12/2021 Action  Given Dose  4 mg Route  IntraVENous Administered By  Mila Montiel RN          ondansetron [facility]) injection 4 mg     Admin Date  11/12/2021 Action  Given Dose  4 mg Route  IntraVENous Administered By  Mila Montiel RN          sodium chloride 0.9 % bolus infusion 500 mL     Admin Date  11/12/2021 Action  New Bag Dose  500 mL Rate  1,000 mL/hr Route  IntraVENous Administered By  Denzel Pastor RN                Signed:  Andrey Morocho MD    Part of this note may have been written by using a voice dictation software. The note has been proof read but may still contain some grammatical/other typographical errors.

## 2021-11-14 NOTE — PROGRESS NOTES
Patient received from day shift breathing with ease on room air no acute distress noted. Iv canula intact no redness or swelling noted at site. Patient denies any pain or n/v. Bed locked on lowest position and call bell within reach.

## 2021-11-14 NOTE — PROGRESS NOTES
Received bedside shift report from Silvester Romberg, RN. Pt lying in bed. No apparent distress. Respirations even and unlabored on 2L NC. Instructed to call for assistance with needs, as they arise. Pt voiced understanding.

## 2021-11-14 NOTE — PROGRESS NOTES
Reviewed notes for concerns      Per notes:             Lives in [address]    Daughter - marcel    Full code    No directives    Exp d/c  5  Days            Will continue to assess how to best serve this family

## 2021-11-14 NOTE — PROGRESS NOTES
RENAL H&P/CONSULT    Subjective:     Patient is a 73 y/o AAF with ESRD on HD MWF at [facility] under my medical care due to diabetic nephropathy,  HTN,  history of Left RCC (Clear cell carcinoma) s/p left radical nephrectomy by Dr. Adali Bauman in 2015 with history of recurrent mass at the site of nephrectomy. She presented to the ED yesterday with complaints of back pain and was found to be hypoxic and hypotensive. She completed a full dialysis treatment yesterday morning and is due for dialysis Monday. Today she is still complaining of lower back pain. No SOB, cough, N/V, fever/chills. 11/14/21- Alert, oriented, c/o nausea and low back discomfort. No dyspnea, no CP. Discussed plans for dialysis tomorrow. Past Medical History:   Diagnosis Date    Arthritis     AVF ([facility]iovenous fistula) ([facility] 12/20/2016 [facility]/6/16 ([facility]) Right AVF revision and thrombectomy    Cancer ([facility] [address] 2015    L kidney    Chronic kidney disease     HD in M-W-F- at [place] [facility]    Degenerative joint disease     Diabetes ([address]     checks QD, normal 120-130, hyposymptoms at 80    ESRD (end stage renal disease) [facility]) Nov 2006    ESRD.  MWF dialysis     Hypertension     Obesity     Transient ischemic attack 08/29/2015    no residual      Past Surgical History:   Procedure Laterality Date    COLONOSCOPY N/A 11/8/2018    COLONOSCOPY  BMI 36 performed by Diana Holt MD at [facility] ENDOSCOPY    HX GI  06/01/2002    colon resection resulting in temporary colostomy reversal    HX GI  08/06/2018    exploratory laparotomy    HX GYN      stephan    HX OTHER SURGICAL      dialysis fistula, several permcaths    HX UROLOGICAL Left July 2015    nephrectomy    HX VASCULAR ACCESS      IR PLC CATH AV SHUNT IN W PTA SI VENOUS  5/30/2019    IR PLC CATH AV SHUNT IN W PTA SI VENOUS RT  2/28/2019    IR PLC CATH AV SHUNT IN W PTA SI VENOUS RT  9/3/2019    IR PLC CATH AV SHUNT IN W PTA SI VENOUS RT  12/5/2019    IR PLC CATH AV SHUNT IN W PTA SI VENOUS RT  3/10/2020    DC BREAST SURGERY PROCEDURE UNLISTED Right     cyst removed    VASCULAR SURGERY PROCEDURE UNLIST Right     AV graft      Prior to Admission medications    Medication Sig Start Date End Date Taking? Authorizing Provider   minoxidiL (LONITEN) 10 mg tablet Take  by mouth two (2) times a day. Yes Provider, Historical   furosemide (Lasix) 80 mg tablet Take 80 mg by mouth two (2) times a day. Yes Provider, Historical   HYDROcodone-acetaminophen (NORCO) 7.5-325 mg per tablet Take 1 Tablet by mouth every four (4) hours as needed for Pain for up to 5 days. Max Daily Amount: 6 Tablets. 11/9/21 11/14/21  Khadar Daniel MD   methocarbamoL (Robaxin-750) 750 mg tablet Take 2 Tablets by mouth four (4) times daily. 11/9/21   Khadar Daniel MD   gabapentin (NEURONTIN) 100 mg capsule TAKE 1 CAPSULE BY MOUTH THREE TIMES DAILY FOR PAIN 6/10/21   Provider, Historical   omeprazole (PRILOSEC) 40 mg capsule TAKE ONE CAPSULE BY MOUTH EVERY DAY 6/10/21   Provider, Historical   lidocaine 5 % topical cream Apply  to affected area two (2) times daily as needed for Pain. 9/1/17   Jenna Rater, FRANCISCO   amLODIPine (NORVASC) 10 mg tablet Take 10 mg by mouth nightly. Provider, Historical   VIT C/VIT E/LUTEIN/MIN/OMEGA-3 (OCUVITE PO) Take  by mouth nightly. Provider, Historical   sevelamer carbonate (RENVELA) 800 mg tab tab Take 800 mg by mouth three (3) times daily (with meals). 5 tablets with meals/ 2 with snacks  Sometimes only eats 2 meals/d    Provider, Historical   lisinopril (PRINIVIL, ZESTRIL) 40 mg tablet Take 40 mg by mouth nightly. Indications: HYPERTENSION 6/8/15   Provider, Historical   sitaGLIPtin (JANUVIA) 100 mg tablet Take 50 mg by mouth every morning.  Indications: TYPE 2 DIABETES MELLITUS    Provider, Historical     Allergies   Allergen Reactions    Pcn [Penicillins] Rash    Vancomycin Rash      Social History     Tobacco Use    Smoking status: Never Smoker    Smokeless tobacco: Never Used   Substance Use Topics    Alcohol use: No      Family History   Problem Relation Age of Onset    Diabetes Mother     Heart Disease Mother     Hypertension Mother     Heart Disease Father     Hypertension Father     Malignant Hyperthermia Neg Hx     Pseudocholinesterase Deficiency Neg Hx     Delayed Awakening Neg Hx     Post-op Nausea/Vomiting Neg Hx     Emergence Delirium Neg Hx     Other Neg Hx     Post-op Cognitive Dysfunction Neg Hx           Review of Systems    Constitutional: no fever,   Eyes: fair vision,   Ears, nose, mouth, throat, and face:fair hearing,   Respiratory: no asthma,   Cardiovascular:no chest pain,   Gastrointestinal:no diarrhea,   Genitourinary: no dysuria,  Hematologic/lymphatic: no bleeding tendency,   Neurological: no seizures,   Behvioral/Psych: no psych hospitalization   Endocrine: no goiter,       Objective:       Visit Vitals  BP (!) 145/81 (BP 1 Location: Right leg, BP Patient Position: At rest)   Pulse 72   Temp 98 °F (36.7 °C)   Resp 20   Ht 5' 5.5\" (1.664 m)   Wt 81.6 kg (180 lb)   SpO2 91%   Breastfeeding No   BMI 29.50 kg/m²       11/14 0701 - 11/14 1900  In: 240 [P.O.:240]  Out: -   11/12 1901 - 11/14 0700  In: 920 [P.O.:920]  Out: 1     Visit Vitals  BP (!) 145/81 (BP 1 Location: Right leg, BP Patient Position: At rest)   Pulse 72   Temp 98 °F (36.7 °C)   Resp 20   Ht 5' 5.5\" (1.664 m)   Wt 81.6 kg (180 lb)   SpO2 91%   Breastfeeding No   BMI 29.50 kg/m²     General:  Alert, cooperative, no distress, appears stated age. Head:  Normocephalic, without obvious abnormality, atraumatic. Eyes:  Conjunctivae/corneas clear. PERRL, EOMs intact. Ears:  Normal external ear canals both ears. Neck: Supple, symmetrical, trachea midline, no adenopathy, no JVD. Lungs:   Coarse to auscultation bilaterally. Chest wall:  No tenderness or deformity. Heart:  Regular rate and rhythm, S1, S2 normal, no murmur,  rub or gallop. Abdomen:   Soft, non-tender. Bowel sounds normal. No masses,  No organomegaly. No renal bruit. Extremities: Extremities normal, atraumatic, no cyanosis or edema. Access:  CE AVG examines well   Skin: Skin color, texture, turgor normal. No rashes or lesions. Neurologic: Grossly intact. No asterixis. Data Review:     Recent Results (from the past 24 hour(s))   GLUCOSE, POC    Collection Time: 11/13/21  9:35 PM   Result Value Ref Range    Glucose (POC) 151 (H) 65 - 100 mg/dL    Performed by [facility]    METABOLIC PANEL, BASIC    Collection Time: 11/14/21  6:46 AM   Result Value Ref Range    Sodium 135 (L) 136 - 145 mmol/L    Potassium 4.9 3.5 - 5.1 mmol/L    Chloride 99 98 - 107 mmol/L    CO2 28 21 - 32 mmol/L    Anion gap 8 7 - 16 mmol/L    Glucose 129 (H) 65 - 100 mg/dL    BUN 38 (H) 8 - 23 MG/DL    Creatinine 5.81 (H) 0.6 - 1.0 MG/DL    GFR est AA 9 (L) >60 ml/min/1.73m2    GFR est non-AA 8 (L) >60 ml/min/1.73m2    Calcium 9.0 8.3 - 10.4 MG/DL   GLUCOSE, POC    Collection Time: 11/14/21  7:39 AM   Result Value Ref Range    Glucose (POC) 134 (H) 65 - 100 mg/dL    Performed by [employer] [facility]PCT    GLUCOSE, POC    Collection Time: 11/14/21 11:36 AM   Result Value Ref Range    Glucose (POC) 124 (H) 65 - 100 mg/dL    Performed by ToneyRitaPCT    GLUCOSE, POC    Collection Time: 11/14/21  4:23 PM   Result Value Ref Range    Glucose (POC) 136 (H) 65 - 100 mg/dL    Performed by ToneyRitaPCT            Principal Problem:    Hypotension (12/1/2014)    Active Problems:    DM (diabetes mellitus) ([facility] (2/7/2013)      [facility] (2/7/2013)      HTN (hypertension) (2/7/2013)      Severe back pain (11/12/2021)      Bacterial pneumonia (11/12/2021)      Acute respiratory failure with hypoxia ([address] (11/12/2021)      Hypokalemia (11/12/2021)      Elevated troponin (11/12/2021)        Assessment:     1.  ESRD -  - S/P HD Friday as outpatient  - no HD needed today  - plan next HD Monday    2. Hypokalemia  - replacing PO     3. DM II -  - continue current meds     4. Hypotension  - improving with midodrine    5. Anemia  - d/t ESRD  - treat with JAMES    6.  Hyperphosphatemia  - treat with Renvela PO CM    Plan:     Per above    Yajaira Mcqueen, NP

## 2021-11-15 ENCOUNTER — HOME HEALTH ADMISSION (OUTPATIENT)
Dept: HOME HEALTH SERVICES | Facility: HOME HEALTH | Age: 71
End: 2021-11-15

## 2021-11-15 VITALS
HEART RATE: 84 BPM | TEMPERATURE: 98.7 F | HEIGHT: 66 IN | WEIGHT: 190.7 LBS | OXYGEN SATURATION: 85 % | RESPIRATION RATE: 18 BRPM | DIASTOLIC BLOOD PRESSURE: 74 MMHG | SYSTOLIC BLOOD PRESSURE: 141 MMHG | BODY MASS INDEX: 30.65 KG/M2

## 2021-11-15 LAB
GLUCOSE BLD STRIP.AUTO-MCNC: 90 MG/DL (ref 65–100)
SERVICE CMNT-IMP: NORMAL

## 2021-11-15 PROCEDURE — 82962 GLUCOSE BLOOD TEST: CPT

## 2021-11-15 PROCEDURE — 90935 HEMODIALYSIS ONE EVALUATION: CPT

## 2021-11-15 PROCEDURE — 74011250637 HC RX REV CODE- 250/637: Performed by: HOSPITALIST

## 2021-11-15 PROCEDURE — 97165 OT EVAL LOW COMPLEX 30 MIN: CPT

## 2021-11-15 PROCEDURE — 97535 SELF CARE MNGMENT TRAINING: CPT

## 2021-11-15 PROCEDURE — 74011250637 HC RX REV CODE- 250/637: Performed by: NURSE PRACTITIONER

## 2021-11-15 PROCEDURE — 74011250637 HC RX REV CODE- 250/637: Performed by: FAMILY MEDICINE

## 2021-11-15 PROCEDURE — 74011000250 HC RX REV CODE- 250: Performed by: FAMILY MEDICINE

## 2021-11-15 PROCEDURE — 97530 THERAPEUTIC ACTIVITIES: CPT

## 2021-11-15 RX ORDER — LIDOCAINE 4 G/100G
1 PATCH TOPICAL EVERY 12 HOURS
Qty: 8 PATCH | Refills: 0 | Status: ON HOLD | OUTPATIENT
Start: 2021-11-15 | End: 2022-02-03

## 2021-11-15 RX ORDER — GABAPENTIN 300 MG/1
300 CAPSULE ORAL
Status: DISCONTINUED | OUTPATIENT
Start: 2021-11-15 | End: 2021-11-15 | Stop reason: HOSPADM

## 2021-11-15 RX ORDER — AMLODIPINE BESYLATE 10 MG/1
10 TABLET ORAL DAILY
Status: DISCONTINUED | OUTPATIENT
Start: 2021-11-15 | End: 2021-11-15 | Stop reason: HOSPADM

## 2021-11-15 RX ORDER — MINOXIDIL 10 MG/1
10 TABLET ORAL 2 TIMES DAILY
Status: DISCONTINUED | OUTPATIENT
Start: 2021-11-15 | End: 2021-11-15 | Stop reason: HOSPADM

## 2021-11-15 RX ORDER — DIPHENHYDRAMINE HCL 25 MG
25 CAPSULE ORAL
Status: DISCONTINUED | OUTPATIENT
Start: 2021-11-15 | End: 2021-11-15

## 2021-11-15 RX ORDER — HYDRALAZINE HYDROCHLORIDE 50 MG/1
25 TABLET, FILM COATED ORAL
Status: DISCONTINUED | OUTPATIENT
Start: 2021-11-15 | End: 2021-11-15 | Stop reason: HOSPADM

## 2021-11-15 RX ORDER — HYDRALAZINE HYDROCHLORIDE 20 MG/ML
10 INJECTION INTRAMUSCULAR; INTRAVENOUS
Status: DISCONTINUED | OUTPATIENT
Start: 2021-11-15 | End: 2021-11-15 | Stop reason: HOSPADM

## 2021-11-15 RX ADMIN — SEVELAMER CARBONATE 800 MG: 800 TABLET, FILM COATED ORAL at 12:24

## 2021-11-15 RX ADMIN — Medication 10 ML: at 06:06

## 2021-11-15 RX ADMIN — DIPHENHYDRAMINE HYDROCHLORIDE 25 MG: 25 CAPSULE ORAL at 07:34

## 2021-11-15 RX ADMIN — MINOXIDIL 10 MG: 10 TABLET ORAL at 12:24

## 2021-11-15 RX ADMIN — PANTOPRAZOLE SODIUM 40 MG: 40 TABLET, DELAYED RELEASE ORAL at 06:06

## 2021-11-15 RX ADMIN — AMLODIPINE BESYLATE 10 MG: 10 TABLET ORAL at 14:48

## 2021-11-15 NOTE — PROGRESS NOTES
Problem: Falls - Risk of  Goal: *Absence of Falls  Description: Document Luz Woodward Fall Risk and appropriate interventions in the flowsheet.   Outcome: Progressing Towards Goal  Note: Fall Risk Interventions:  Mobility Interventions: Patient to call before getting OOB         Medication Interventions: Patient to call before getting OOB    Elimination Interventions: Patient to call for help with toileting needs              Problem: Patient Education: Go to Patient Education Activity  Goal: Patient/Family Education  Outcome: Progressing Towards Goal

## 2021-11-15 NOTE — PROGRESS NOTES
Pt is resting in bed at this time. Pt had bout of vomiting moments ago. Pt has received nausea medication at this time. Pt is on 2L NC. Pt has no s/sx of acute respiratory distress at this time. Pt has emesis bags at bedside. Pt has safety measures in place. Pt has call light within reach. Will continue to monitor.

## 2021-11-15 NOTE — PROGRESS NOTES
Pt arrived back to room from HD. Pt alert oriented times 3 at this times. Pt denies pain or distress at this time. Pt on 1 1/2 L NC. Pt encouraged to call for assistance if needed call light in reach, will monitor.

## 2021-11-15 NOTE — PROGRESS NOTES
Patient is not eligible for [facility] since she's not homebound. CM shared this with patient. Patient will discharge home with family support.

## 2021-11-15 NOTE — PROGRESS NOTES
Occupational Therapy Note:    Patient off the floor to HD at this time per chart. Will return to initiate OT evaluation as schedule permits and patient is able.      Krystina Harrell, OTR/L

## 2021-11-15 NOTE — DISCHARGE SUMMARY
Physician Discharge Summary     Patient ID:  Kimberly Gaona  [id number]  72 y.o.  1950    Admit date: 11/12/2021    Discharge date: 11-    Diagnosis:  1- Acute back pain, for few days, no injuries or fall, no associated neuro spx. Negative blood cultures. Treated initially with Rocephin iv until bacteremia ruled out. Improved  2- Low/ low normal BP initially then resolved. Hx of HTN, improved. Restarted home medications. 3- Left lower lobe atelectasis, no evidence of pneumonia or pulmonary edema, no sognificant respiratory symptoms or fever. 4- ESRD on hemodialysis, continued while hospitalized. 5- NIDDM, controlled  6- Hx of renal cell carcinoma, to follow regularly with Oncology as set up. 7- Hypokalemia, replaced     CTA of the Chest and Abdomen:  - LUNGS: There is stable infiltrate/atelectasis in the posterior left lung base. There are stable small bilateral pleural effusions.  - MEDIASTINUM/AXILLA: Right paratracheal lymph node/mass is larger, now 2.0 cm  in diameter compared with 12 mm previously. Small bilateral hilar lymph nodes  are also again noted. - HEART/VESSELS: Extensive vascular disease. No evidence of aortic dissection  or aneurysm. 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 Multiple collateral vessels are present.  - CHEST WALL: Normal.   - LIVER: Hepatomegaly, 21 cm in length. - GALLBLADDER/BILE DUCTS: Cholelithiasis. No bile duct dilatation.  - PANCREAS: Normal.  - SPLEEN: Normal.  - ADRENALS  Normal.  - KIDNEYS/URETERS: Post left nephrectomy. Stable 14 mm mass in the left renal  fossa near the left adrenal gland. Right kidney is atrophic. Small  nonobstructing stones are present in the right kidney. No hydronephrosis. - BOWEL: Normal caliber. Diverticulosis. No inflammatory changes.  - LYMPH NODES: Several stable small retroperitoneal lymph nodes.  - BONES: No fracture or significant bone lesion.  - VASCULATURE: Atherosclerosis.   No evidence of aortic dissection or aneurysm.  - OTHER: Surgical changes in the intra-abdominal wall. Minimal ascites. IMPRESSION  1. No evidence of aortic dissection or aneurysm. 2.  Increased right paratracheal mass/adenopathy. 3.  Stable small mass in the left renal fossa. 4.  Stable left lower lobe infiltrate/atelectasis and small effusion. Hospital course:   72 y.o. female with medical history of ESRD on HD, Renal cell carcinoma s/p resection and SBRT who presented with sharp severe midthoracic back pain of 3-4 day duration that comes and goes. Pain does not radiate anywhere. Pt has been taking her home Norco, Robaxin, Lidocaine path, Gabapentin without alleviating symptoms, prompting her to come to the ED. She went to the ED on 11/9 also then with associated L flank pain and was noted to have unremarkable CT C/A/P. She denies SOB, chest pain, abdominal pain, diaphoresis, N/V, diarrhea or constipation. In ED, BP low 88/50. O2sat was 88% on RA and was placed on 3LO2NC. K 5.6 but reported hemolyzed. Recheck was 2.6. CT T spine on admission shows no acute fracture, no aggressive bony lesions; evidence of patchy ground-glass opacity seen within lungs. Patient admitted and treated as above. On day of discharge, patient exam showed diminished bs bibasilar, o wehzzing or crackles, pain is in good control. PCP: Cleve Martinez MD    Patient Instructions:   Current Discharge Medication List      START taking these medications    Details   lidocaine 4 % patch 1 Patch by TransDERmal route every twelve (12) hours every twelve (12) hours. Qty: 8 Patch, Refills: 0         CONTINUE these medications which have NOT CHANGED    Details   minoxidiL (LONITEN) 10 mg tablet Take  by mouth two (2) times a day. furosemide (Lasix) 80 mg tablet Take 80 mg by mouth two (2) times a day. methocarbamoL (Robaxin-750) 750 mg tablet Take 2 Tablets by mouth four (4) times daily.   Qty: 40 Tablet, Refills: 0 gabapentin (NEURONTIN) 100 mg capsule TAKE 1 CAPSULE BY MOUTH THREE TIMES DAILY FOR PAIN      omeprazole (PRILOSEC) 40 mg capsule TAKE ONE CAPSULE BY MOUTH EVERY DAY      lidocaine 5 % topical cream Apply  to affected area two (2) times daily as needed for Pain. Qty: 1 Tube, Refills: 0      amLODIPine (NORVASC) 10 mg tablet Take 10 mg by mouth nightly. Associated Diagnoses: Chronic renal failure, stage 5 (HCC)      VIT C/VIT E/LUTEIN/MIN/OMEGA-3 (OCUVITE PO) Take  by mouth nightly. sevelamer carbonate (RENVELA) 800 mg tab tab Take 800 mg by mouth three (3) times daily (with meals). 5 tablets with meals/ 2 with snacks  Sometimes only eats 2 meals/d      lisinopril (PRINIVIL, ZESTRIL) 40 mg tablet Take 40 mg by mouth nightly. Indications: HYPERTENSION      sitaGLIPtin (JANUVIA) 100 mg tablet Take 50 mg by mouth every morning. Indications: TYPE 2 DIABETES MELLITUS         STOP taking these medications       HYDROcodone-acetaminophen (NORCO) 7.5-325 mg per tablet Comments:   Reason for Stopping:               Instructions:     Oncology and Dialysis as set up, diabetic diet, activity as tolerated. Follow-up with primary physician in 1-2 week. Time 35 min  Please send copy to primary physician.     Signed:  Valentino Lau MD  11/15/2021  1:28 PM

## 2021-11-15 NOTE — PROGRESS NOTES
Hemodialysis Rounding Note    Subjective: Karoline Conn is a 72 y.o.  who presents with Hypotension [I95.9]. The patient is dialyzing utilizing the following method:Intermittent Hemodialysis  Artificial Kidney Dialyzer/Set Up Inspection: [employer]   hours     blood flow rate Blood Flow Rate (ml/min): 400 ml/min   dialysate rate     ultrafiltration Goal/Amount of Fluid to Remove (mL): 2100 mL   Heparin is not used during the dialysis treatment. Complaints c/o N/V yesterday afternoon and evening, none today but she reports not eating a meal since Saturday; no CP, no dyspnea, no chills     Current Facility-Administered Medications   Medication Dose Route Frequency    minoxidiL (LONITEN) tablet 10 mg  10 mg Oral BID    hydrALAZINE (APRESOLINE) 20 mg/mL injection 10 mg  10 mg IntraVENous Q6H PRN    hydrALAZINE (APRESOLINE) tablet 25 mg  25 mg Oral TID PRN    insulin lispro (HUMALOG) injection   SubCUTAneous AC&HS    morphine injection 2 mg  2 mg IntraVENous Q4H PRN    gabapentin (NEURONTIN) capsule 100 mg  100 mg Oral TID    HYDROcodone-acetaminophen (NORCO) 7.5-325 mg per tablet 1 Tablet  1 Tablet Oral Q4H PRN    pantoprazole (PROTONIX) tablet 40 mg  40 mg Oral ACB    sevelamer carbonate (RENVELA) tab 800 mg  800 mg Oral TID WITH MEALS    sodium chloride (NS) flush 5-40 mL  5-40 mL IntraVENous Q8H    sodium chloride (NS) flush 5-40 mL  5-40 mL IntraVENous PRN    acetaminophen (TYLENOL) tablet 650 mg  650 mg Oral Q6H PRN    Or    acetaminophen (TYLENOL) suppository 650 mg  650 mg Rectal Q6H PRN    polyethylene glycol (MIRALAX) packet 17 g  17 g Oral DAILY PRN    ondansetron (ZOFRAN ODT) tablet 4 mg  4 mg Oral Q8H PRN    Or    ondansetron (ZOFRAN) injection 4 mg  4 mg IntraVENous Q6H PRN    lidocaine 4 % patch 2 Patch  2 Patch TransDERmal Q24H       No intake/output data recorded.   11/13 1901 - 11/15 0700  In: 240 [P.O.:240]  Out: 1     Recent Results (from the past 24 hour(s)) GLUCOSE, POC    Collection Time: 21 11:36 AM   Result Value Ref Range    Glucose (POC) 124 (H) 65 - 100 mg/dL    Performed by ToneyRitaPCT    GLUCOSE, POC    Collection Time: 21  4:23 PM   Result Value Ref Range    Glucose (POC) 136 (H) 65 - 100 mg/dL    Performed by ToneyRitaPCT    GLUCOSE, POC    Collection Time: 21  8:31 PM   Result Value Ref Range    Glucose (POC) 150 (H) 65 - 100 mg/dL    Performed by LowerTokopediaaryPCT    GLUCOSE, POC    Collection Time: 21 10:28 PM   Result Value Ref Range    Glucose (POC) 142 (H) 65 - 100 mg/dL    Performed by NicoleBSROBIN    GLUCOSE, POC    Collection Time: 11/15/21  9:56 AM   Result Value Ref Range    Glucose (POC) 90 65 - 100 mg/dL    Performed by Sulema          Review of Systems  A comprehensive review of systems was negative except for that written in the HPI. .    Objective:     Blood pressure (!) 178/78, pulse 71, temperature 98.1 °F (36.7 °C), resp. rate 20, height 5' 5.5\" (1.664 m), weight 86.5 kg (190 lb 11.2 oz), SpO2 91 %, not currently breastfeeding. Temp (24hrs), Av.2 °F (36.8 °C), Min:98 °F (36.7 °C), Max:98.5 °F (36.9 °C)      Physical Exam:     CTA  RRR  No edema      Assessment:     End Stage Renal Disease:  Patient is tolerating dialysis treatment well. .  Additionally the patient has experienced normal dialysis treatment during dialysis. Dry weight   same. Anemia:    Recent Labs     21  0753   HCT 26.3*   HGB 9.0*       Renal Metabolic Bone Disease:    Recent Labs     21  0646 21  0753 21  1455   CA 9.0   < > 9.2   ALB  --   --  4.1    < > = values in this interval not displayed.                         Treatment:  PO4 binders, Cinacaleat, Vitamin D  Hypertension: controlled    Access: adequate monitoring/no changes    Principal Problem:    Hypotension (2014)    Active Problems:    DM (diabetes mellitus) ([facility] [address] (2013)      ESRD (end stage renal disease) ([facility] [address] (2013)      HTN (hypertension) (2/7/2013)      Severe back pain (11/12/2021)      Bacterial pneumonia (11/12/2021)      Acute respiratory failure with hypoxia ([facility] [address] (11/12/2021)      Hypokalemia (11/12/2021)      Elevated troponin (11/12/2021)           Plan:     Continue scheduled dialysis

## 2021-11-15 NOTE — PROGRESS NOTES
DC instructions given to pt. Pt being DC home to home health with follow up appt. . pt medications appts and instructions given and understood. Prescription sent into pharmacy. Pt aware of regular HD run. Pt awaiting ride and the will be DC home to self care.

## 2021-11-15 NOTE — DIALYSIS
TRANSFER IN - DIALYSIS    Received patient in dialysis unit from [facility] (unit) for ordered procedure. Consent verified for renal replacement therapy. Patient a/ox3 and vital signs stable. /75 P73  2L  O2  via NC. Hemodialysis initiated using LUE AVG and 15 g needles. Machine settings per MD order. No Heparin    Will monitor during treatment.

## 2021-11-15 NOTE — DIALYSIS
TRANSFER OUT -DIALYSIS    Hemodialysis treatment completed without complications. Patient a/ox3 and VS stable  /67  P 72       1.5 Kgs removed. Needles X2 removed from access and manual pressure held until hemostasis complete and pressure dressing applied. Patient to 835 after dialysis.

## 2021-11-15 NOTE — PROGRESS NOTES
Care Management Interventions  PCP Verified by CM: Yes (Dr Jaylene Kelly at [phone])  Mode of Transport at Discharge: Self  Transition of Care Consult (CM Consult): [address]: Yes  MyChart Signup:  (has a cane and walker at home)  Discharge Durable Medical Equipment: Yes  Physical Therapy Consult: Yes  Occupational Therapy Consult: Yes  Speech Therapy Consult: No  Support Systems: Child(joan), Other Family Member(s) (Myrna Ivory at [phone] and Raina Blackwood (sister [phone]))  Confirm Follow Up Transport: Family  The Plan for Transition of Care is Related to the Following Treatment Goals : [facility]  The Patient and/or Patient Representative was Provided with a Choice of Provider and Agrees with the Discharge Plan?: Yes  Name of the Patient Representative Who was Provided with a Choice of Provider and Agrees with the Discharge Plan: patient  Freedom of Choice List was Provided with Basic Dialogue that Supports the Patient's Individualized Plan of Care/Goals, Treatment Preferences and Shares the Quality Data Associated with the Providers?: Yes   Resource Information Provided?: No  Discharge Location  Discharge Placement: Home with home health  Patient is discharging home with [facility] RN/PT/OT. Patient's family will transport patient home.

## 2021-11-15 NOTE — PROGRESS NOTES
Pt has left the floor with transport to go to dialysis at this time. Pt had no s/sx of acute distress.  Report has been given to oncoming nurse [address] [place] [address] RN

## 2021-11-15 NOTE — PROGRESS NOTES
Hospitalist   Admit Date:  2021  1:35 PM   Name:  Edward Fischer   Age:  72 y.o. Sex:  female  :  1950   MRN:  [id number]   Room:  [facility]    Presenting Complaint: Back Pain    Reason(s) for Admission: Hypotension [I95.9]     History of Present Illness:      72 y.o. female with medical history of ESRD on HD, Renal cell carcinoma s/p resection and SBRT who presented with sharp severe midthoracic back pain of 3-4 day duration that comes and goes. Pain does not radiate anywhere. Pt has been taking her home Norco, Robaxin, Lidocaine path, Gabapentin without alleviating symptoms, prompting her to come to the ED. She went to the ED on  also then with associated L flank pain and was noted to have unremarkable CT C/A/P. She denies SOB, chest pain, abdominal pain, diaphoresis, N/V, diarrhea or constipation. In ED, BP low 88/50. O2sat was 88% on RA and was placed on 3LO2NC. K 5.6 but reported hemolyzed. Recheck was 2.6. CT T spine on admission shows no acute fracture, no aggressive bony lesions; evidence of patchy ground-glass opacity seen within lungs. Today, minimal midline lower back pain, no resp spx, on room air    Assessment & Plan:     1- Acute back pain, for few days, no injuries or fall, no associated neuro spx. Improved  2- Low/ low normal BP. Hx of HTN, improved. Now BP not well controlled. 3- LLL atelectasis, no evidence of pneumonia or pulm edema, no resp spx. 4- ESRD on HD  5- DM, controlled  6- Hx of RCC.  Stable  7- Hypokalemia, replaced    Rx:  Pain control  Lidocaine topical  DC rocephin  Follow blood cultures to r/o bone infection- negative TD  Insulin scale  Restarted home anti-HTN    Dispo; home health    Objective:     Patient Vitals for the past 24 hrs:   Temp Pulse Resp BP SpO2   11/15/21 1222 98.1 °F (36.7 °C) 74 18 (!) 150/74 97 %   11/15/21 1030  72  (!) 178/67    11/15/21 1000  71  (!) 178/78    11/15/21 0930  72  (!) 177/67    11/15/21 0900  71  (!) 183/74    11/15/21 0836  69  (!) 159/67    11/15/21 0800  69  (!) 150/64    11/15/21 0730  69  (!) 153/67    11/15/21 0703  73  (!) 154/75    11/15/21 0615    (!) 195/72    11/15/21 0411 98.1 °F (36.7 °C) 71 20 (!) 140/65 91 %   11/14/21 2344 98.2 °F (36.8 °C) 69 20 (!) 150/84 95 %   11/14/21 1927 98.5 °F (36.9 °C) 75 20 (!) 161/86 94 %   11/14/21 1536 98 °F (36.7 °C) 72 20 (!) 145/81 91 %     Oxygen Therapy  O2 Sat (%): 97 % (11/15/21 1222)  Pulse via Oximetry: 72 beats per minute (11/12/21 2219)  O2 Device: Nasal cannula (11/15/21 1229)  O2 Flow Rate (L/min): 2 l/min (11/15/21 1229)    Estimated body mass index is 31.25 kg/m² as calculated from the following:    Height as of this encounter: 5' 5.5\" (1.664 m). Weight as of this encounter: 86.5 kg (190 lb 11.2 oz). Intake/Output Summary (Last 24 hours) at 11/15/2021 1428  Last data filed at 11/15/2021 1030  Gross per 24 hour   Intake 0 ml   Output 1550 ml   Net -1550 ml         Physical Exam:    Blood pressure (!) 150/74, pulse 74, temperature 98.1 °F (36.7 °C), resp. rate 18, height 5' 5.5\" (1.664 m), weight 86.5 kg (190 lb 11.2 oz), SpO2 97 %, not currently breastfeeding. General:    Well nourished. Moderate distress. Obese, Appears uncomfortable d/t back pain  CV:   RRR. No m/r/g. No jugular venous distension. Lungs:   Faint rhonchi on b/l lung bases. Respirations even, unlabored  Abdomen: Bowel sounds present. Soft, nontender, nondistended. Extremities: No cyanosis or clubbing. No edema. Midline point tenderness in lower lumbar area  Skin:     No rashes and normal coloration. Warm and dry. Neuro:  grossly intact. No M/S deficits A&Ox3  Psych:  Normal mood and affect.       I have reviewed ordered lab tests and independently visualized imaging below:    Last 24hr Labs:  Recent Results (from the past 24 hour(s))   GLUCOSE, POC    Collection Time: 11/14/21  4:23 PM   Result Value Ref Range    Glucose (POC) 136 (H) 65 - 100 mg/dL    Performed by ToneyRitaPCT    GLUCOSE, POC    Collection Time: 11/14/21  8:31 PM   Result Value Ref Range    Glucose (POC) 150 (H) 65 - 100 mg/dL    Performed by LoweryMaryPCT    GLUCOSE, POC    Collection Time: 11/14/21 10:28 PM   Result Value Ref Range    Glucose (POC) 142 (H) 65 - 100 mg/dL    Performed by Susan    GLUCOSE, POC    Collection Time: 11/15/21  9:56 AM   Result Value Ref Range    Glucose (POC) 90 65 - 100 mg/dL    Performed by Sulema        All Micro Results     Procedure Component Value Units Date/Time    CULTURE, BLOOD [[id number]] Collected: 11/13/21 0140    Order Status: Completed Specimen: Blood Updated: 11/15/21 0905     Special Requests: --        RIGHT  HAND       Culture result: NO GROWTH 2 DAYS       CULTURE, BLOOD [[id number]] Collected: 11/12/21 1817    Order Status: Completed Specimen: Blood Updated: 11/15/21 0905     Special Requests: --        RIGHT  FOREARM       Culture result: NO GROWTH 3 DAYS       CULTURE, RESPIRATORY/SPUTUM/BRONCH Anai Yudelka STAIN [[id number]]     Order Status: Canceled Specimen: Sputum     MSSA/MRSA SC BY PCR, NASAL SWAB [[id number]] Collected: 11/12/21 2045    Order Status: Canceled Specimen: Nasal swab     RESPIRATORY VIRUS PANEL W/COVID-19, PCR [[id number]] Collected: 11/12/21 1830    Order Status: Completed Specimen: Nasopharyngeal Updated: 11/12/21 1936     Adenovirus NOT DETECTED        Coronavirus 229E NOT DETECTED        Coronavirus HKU1 NOT DETECTED        Coronavirus CVNL63 NOT DETECTED        Coronavirus OC43 NOT DETECTED        SARS-CoV-2, PCR NOT DETECTED        Metapneumovirus NOT DETECTED        Rhinovirus and Enterovirus NOT DETECTED        Influenza A NOT DETECTED        Influenza B NOT DETECTED        Parainfluenza 1 NOT DETECTED        Parainfluenza 2 NOT DETECTED        Parainfluenza 3 NOT DETECTED        Parainfluenza virus 4 NOT DETECTED        RSV by PCR NOT DETECTED        B. parapertussis, PCR NOT DETECTED Bordetella pertussis - PCR NOT DETECTED        Chlamydophila pneumoniae DNA, QL, PCR NOT DETECTED        Mycoplasma pneumoniae DNA, QL, PCR NOT DETECTED       COVID-19 RAPID TEST [[id number]] Collected: 11/12/21 1701    Order Status: Completed Specimen: Nasopharyngeal Updated: 11/12/21 1736     Specimen source Nasopharyngeal        COVID-19 rapid test Not detected        Comment:      The specimen is NEGATIVE for SARS-CoV-2, the novel coronavirus associated with COVID-19. A negative result does not rule out COVID-19. This test has been authorized by the FDA under an Emergency Use Authorization (EUA) for use by authorized laboratories. Fact sheet for Healthcare Providers: bunkersofadate.co.nz  Fact sheet for Patients: [employer]nz       Methodology: Isothermal Nucleic Acid Amplification               Other Studies:  No results found. Medications Administered     morphine injection 4 mg     Admin Date  11/12/2021 Action  Given Dose  4 mg Route  IntraVENous Administered By  Karoline Arteaga RN          ondansetron [facility]) injection 4 mg     Admin Date  11/12/2021 Action  Given Dose  4 mg Route  IntraVENous Administered By  Karoline Arteaga RN          sodium chloride 0.9 % bolus infusion 500 mL     Admin Date  11/12/2021 Action  New Bag Dose  500 mL Rate  1,000 mL/hr Route  IntraVENous Administered By  Yelitza Tang RN                Signed:  Gualberto Rivers MD    Part of this note may have been written by using a voice dictation software. The note has been proof read but may still contain some grammatical/other typographical errors.

## 2021-11-15 NOTE — DISCHARGE INSTRUCTIONS
DISCHARGE SUMMARY from Nurse    PATIENT INSTRUCTIONS:    After general anesthesia or intravenous sedation, for 24 hours or while taking prescription Narcotics:  · Limit your activities  · Do not drive and operate hazardous machinery  · Do not make important personal or business decisions  · Do  not drink alcoholic beverages  · If you have not urinated within 8 hours after discharge, please contact your surgeon on call. Report the following to your surgeon:  · Excessive pain, swelling, redness or odor of or around the surgical area  · Temperature over 100.5  · Nausea and vomiting lasting longer than 4 hours or if unable to take medications  · Any signs of decreased circulation or nerve impairment to extremity: change in color, persistent  numbness, tingling, coldness or increase pain  · Any questions    What to do at Home:  Recommended activity: Activity as tolerated    If you experience any of the following symptoms shortness of breath not relieved by rest, pain not relieved by medications, chest pain or pressure, increase in weakness fatigue or swelling, temperature greater than 101, or nausea, vomiting or diarrhea  please follow up with MD.    *  Please give a list of your current medications to your Primary Care Provider. *  Please update this list whenever your medications are discontinued, doses are      changed, or new medications (including over-the-counter products) are added. *  Please carry medication information at all times in case of emergency situations. These are general instructions for a healthy lifestyle:    No smoking/ No tobacco products/ Avoid exposure to second hand smoke  Surgeon General's Warning:  Quitting smoking now greatly reduces serious risk to your health.     Obesity, smoking, and sedentary lifestyle greatly increases your risk for illness    A healthy diet, regular physical exercise & weight monitoring are important for maintaining a healthy lifestyle    You may be retaining fluid if you have a history of heart failure or if you experience any of the following symptoms:  Weight gain of 3 pounds or more overnight or 5 pounds in a week, increased swelling in our hands or feet or shortness of breath while lying flat in bed. Please call your doctor as soon as you notice any of these symptoms; do not wait until your next office visit. The discharge information has been reviewed with the patient. The patient verbalized understanding. Discharge medications reviewed with the patient and appropriate educational materials and side effects teaching were provided.   ___________________________________________________________________________________________________________________________________

## 2021-11-15 NOTE — PROGRESS NOTES
ACUTE OT GOALS:  (Developed with and agreed upon by patient and/or caregiver.)  1. Patient will bathe and dress total body with modified independence and adaptive device as needed. 2. Patient will toilet with modified independence and adaptive device as needed. 3. Patient will tolerate 30 minutes of OT treatment with up to 2 rest breaks to increase activity tolerance for ADLs. 4. Patient will complete functional transfers/functional mobility for ADLs with modified independence. 5. Patient will demonstrate modified independence with therapeutic exercise HEP to increase strength in BUEs for increased safety and independence with functional tasks. Timeframe: 7 visits       OCCUPATIONAL THERAPY ASSESSMENT: Initial Assessment and Daily Note OT Treatment Day # 1    Kecia Fischer is a 72 y.o. female   PRIMARY DIAGNOSIS: Hypotension  Hypotension [I95.9]       Reason for Referral:    ICD-10: Treatment Diagnosis: Generalized Muscle Weakness (M62.81)  Difficulty in walking, Not elsewhere classified (R26.2)  INPATIENT: Payor: [employer] MEDICARE / Plan: [facility] [employer] MEDICARE CHOICE PPO/PFFS / Product Type: Managed Care Medicare /   ASSESSMENT:     REHAB RECOMMENDATIONS:   Recommendation to date pending progress:  [address] [place] [address] Therapy  Equipment:    Rolling Walker   To Be Determined     PRIOR LEVEL OF FUNCTION:  (Prior to Hospitalization)  INITIAL/CURRENT LEVEL OF FUNCTION:  (Based on today's evaluation)   Bathing:   Independent  Dressing:   Independent  Feeding/Grooming:   Independent  Toileting:   Independent  Functional Mobility:   Independent Bathing:   Contact Guard Assistance  Dressing:   Contact Guard Assistance  Feeding/Grooming:   Standby Assistance  Toileting:   Contact Guard Assistance  Functional Mobility:   Contact Guard Assistance to [employer]     ASSESSMENT:  Ms. Stan Allen presents with hypotension. Hx ESRD on HD, LBP.  At baseline pt lives with daughter/family, completes ADLs, some IADLs including laundry, drives, and ambulates with independence. Pt with deficits in strength, balance, and endurance impacting mobility and ADLs. Pt practiced bed mobility and dressing with SBA, functional transfers with SBA, ambulation in room with CGA-Dameon via HHAx1, decreased steadiness and endurance noted. Pt with difficult to read SpO2, appears to have dropped to mid 80s with ambulation. RN notified. Recovered after resting in chair. Pt is functioning below baseline and would benefit from continued OT to increase safety and independence. SUBJECTIVE:   Ms. Paddy Cunningham states, \"I don't drive at night. \"    SOCIAL HISTORY/LIVING ENVIRONMENT:  At baseline pt lives with daughter/family, completes ADLs, some IADLs including laundry, drives, and ambulates with independence.    Home Environment: Private residence  # Steps to Enter: 0  One/Two Story Residence: Two story  Living Alone: No  Support Systems: Child(joan), Other Family Member(s) ([facility] at [phone] and [place] (sister [phone]))    OBJECTIVE:     PAIN: VITAL SIGNS: LINES/DRAINS:   Pre Treatment: Pain Screen  Pain Scale 1: Numeric (0 - 10)  Pain Intensity 1: 0  Post Treatment: same Vital Signs  O2 Sat (%): (!) 85 % (with ambulation, SpO2 difficult to assess )  O2 Device: None (Room air)   O2 Device: None (Room air)     GROSS EVALUATION:  BUEs Within Functional Limits Abnormal/ Functional Abnormal/ Non-Functional (see comments) Not Tested Comments:   AROM [] [x] [] []    PROM [] [] [] []    Strength [] [x] [] []    Balance [] [x] [] []    Posture [] [x] [] []    Sensation [] [] [] []    Coordination [x] [] [] []    Tone [] [] [] []    Edema [] [] [] []    Activity Tolerance [] [x] [] []     [] [] [] []      COGNITION/  PERCEPTION: Intact Impaired   (see comments) Comments:   Orientation [x] []    Vision [] []    Hearing [x] []    Judgment/ Insight [x] []    Attention [x] []    Memory [] []    Command Following [x] []    Emotional Regulation [x] []     [] []      ACTIVITIES OF DAILY LIVING: I Mod I S SBA CGA Min Mod Max Total NT Comments   BASIC ADLs:              Bathing/ Showering [] [] [] [] [] [] [] [] [] [x]    Toileting [] [] [] [] [] [] [] [] [] [x]    Dressing [] [] [] [x] [] [] [] [] [] [] Jordyn Chimes pants/shirt/bra   Feeding [] [] [] [] [] [] [] [] [] [x]    Grooming [] [] [] [] [] [] [] [] [] [x]    Personal Device Care [] [] [] [] [] [] [] [] [] [x]    Functional Mobility [] [] [] [] [x] [x] [] [] [] [] CGA-HHAx1   I=Independent, Mod I=Modified Independent, S=Supervision, SBA=Standby Assistance, CGA=Contact Guard Assistance,   Min=Minimal Assistance, Mod=Moderate Assistance, Max=Maximal Assistance, Total=Total Assistance, NT=Not Tested    MOBILITY: I Mod I S SBA CGA Min Mod Max Total  NT x2 Comments:   Supine to sit [] [] [] [x] [] [] [] [] [] [] []    Sit to supine [] [] [] [] [] [] [] [] [] [x] []    Sit to stand [] [] [] [x] [] [] [] [] [] [] []    Bed to chair [] [] [] [] [x] [x] [] [] [] [] []    I=Independent, Mod I=Modified Independent, S=Supervision, SBA=Standby Assistance, CGA=Contact Guard Assistance,   Min=Minimal Assistance, Mod=Moderate Assistance, Max=Maximal Assistance, Total=Total Assistance, NT=Not Tested    [address] [place] [address] [address] [id number] AM-PAC 6 Clicks   Daily Activity Inpatient Short Form        How much help from another person does the patient currently need. .. Total A Lot A Little None   1. Putting on and taking off regular lower body clothing? [] 1   [] 2   [x] 3   [] 4   2. Bathing (including washing, rinsing, drying)? [] 1   [] 2   [x] 3   [] 4   3. Toileting, which includes using toilet, bedpan or urinal?   [] 1   [] 2   [x] 3   [] 4   4. Putting on and taking off regular upper body clothing? [] 1   [] 2   [x] 3   [] 4   5. Taking care of personal grooming such as brushing teeth? [] 1   [] 2   [x] 3   [] 4   6. Eating meals?    [] 1   [] 2   [] 3   [x] 4   © 2007, Trustees of [address] [place] [address] [address] [id number], under license to [employer]. All rights reserved     Score:  Initial: 19 11/15/21 Most Recent: X (Date: -- )   Interpretation of Tool:  Represents activities that are increasingly more difficult (i.e. Bed mobility, Transfers, Gait). PLAN:   FREQUENCY/DURATION: OT Plan of Care: 3 times/week for duration of hospital stay or until stated goals are met, whichever comes first.    PROBLEM LIST:   (Skilled intervention is medically necessary to address:)  1. Decreased ADL/Functional Activities  2. Decreased Activity Tolerance  3. Decreased Balance  4. Decreased Gait Ability  5. Decreased Strength  6. Decreased Transfer Abilities   INTERVENTIONS PLANNED:   (Benefits and precautions of occupational therapy have been discussed with the patient.)  1. Self Care Training  2. Therapeutic Activity  3. Therapeutic Exercise/HEP  4. Neuromuscular Re-education  5. Education     TREATMENT:     EVALUATION: Low Complexity : (Untimed Charge)    TREATMENT:   ($$ Self Care/Home Management: 8-22 mins$$ Therapeutic Activity: 8-22 mins   )  Therapeutic Activity (10 Minutes): Therapeutic activity included Rolling, Supine to Sit, Scooting, Lateral Scooting, Ambulation on level ground and Standing balance to improve functional Mobility, Strength and Activity tolerance. Self Care (13 Minutes): Self care including Upper Body Dressing, Lower Body Dressing and Energy Conservation Training to increase independence and decrease level of assistance required.     TREATMENT GRID:  N/A    AFTER TREATMENT POSITION/PRECAUTIONS:  Chair, Needs within reach and RN notified    INTERDISCIPLINARY COLLABORATION:  RN/PCT, OT/PRUITT and RN Case Manager/     TOTAL TREATMENT DURATION:  OT Patient Time In/Time Out  Time In: 1350  Time Out: [address], OTR/L

## 2021-11-17 LAB
BACTERIA SPEC CULT: NORMAL
SERVICE CMNT-IMP: NORMAL

## 2021-11-18 LAB
BACTERIA SPEC CULT: NORMAL
SERVICE CMNT-IMP: NORMAL

## 2021-11-19 ENCOUNTER — HOSPITAL ENCOUNTER (OUTPATIENT)
Dept: INTERVENTIONAL RADIOLOGY/VASCULAR | Age: 71
Discharge: HOME | End: 2021-11-19
Attending: INTERNAL MEDICINE
Payer: MEDICARE

## 2021-11-19 VITALS
SYSTOLIC BLOOD PRESSURE: 145 MMHG | DIASTOLIC BLOOD PRESSURE: 67 MMHG | HEIGHT: 65 IN | WEIGHT: 180 LBS | BODY MASS INDEX: 29.99 KG/M2 | RESPIRATION RATE: 16 BRPM | TEMPERATURE: 98.9 F | HEART RATE: 79 BPM | OXYGEN SATURATION: 91 %

## 2021-11-19 DIAGNOSIS — N18.6 ESRD (END STAGE RENAL DISEASE) (HCC): ICD-10-CM

## 2021-11-19 LAB
GLUCOSE BLD STRIP.AUTO-MCNC: 101 MG/DL (ref 65–100)
SERVICE CMNT-IMP: ABNORMAL

## 2021-11-19 PROCEDURE — 74011000250 HC RX REV CODE- 250: Performed by: RADIOLOGY

## 2021-11-19 PROCEDURE — 82962 GLUCOSE BLOOD TEST: CPT

## 2021-11-19 PROCEDURE — 77030003028 HC SUT VCRL J&J -A

## 2021-11-19 PROCEDURE — C1894 INTRO/SHEATH, NON-LASER: HCPCS

## 2021-11-19 PROCEDURE — 77030002916 HC SUT ETHLN J&J -A

## 2021-11-19 PROCEDURE — 74011250636 HC RX REV CODE- 250/636: Performed by: RADIOLOGY

## 2021-11-19 PROCEDURE — C1750 CATH, HEMODIALYSIS,LONG-TERM: HCPCS

## 2021-11-19 PROCEDURE — 77030018719 HC DRSG PTCH ANTIMIC J&J -A

## 2021-11-19 PROCEDURE — 77030010507 HC ADH SKN DERMBND J&J -B

## 2021-11-19 PROCEDURE — 77001 FLUOROGUIDE FOR VEIN DEVICE: CPT

## 2021-11-19 RX ORDER — LIDOCAINE HYDROCHLORIDE 20 MG/ML
2-20 INJECTION, SOLUTION INFILTRATION; PERINEURAL ONCE
Status: COMPLETED | OUTPATIENT
Start: 2021-11-19 | End: 2021-11-19

## 2021-11-19 RX ORDER — SODIUM CHLORIDE 9 MG/ML
25 INJECTION, SOLUTION INTRAVENOUS CONTINUOUS
Status: DISCONTINUED | OUTPATIENT
Start: 2021-11-19 | End: 2021-11-19

## 2021-11-19 RX ORDER — HEPARIN SODIUM 1000 [USP'U]/ML
2000-5000 INJECTION, SOLUTION INTRAVENOUS; SUBCUTANEOUS ONCE
Status: COMPLETED | OUTPATIENT
Start: 2021-11-19 | End: 2021-11-19

## 2021-11-19 RX ORDER — LIDOCAINE HYDROCHLORIDE AND EPINEPHRINE 10; 10 MG/ML; UG/ML
2-20 INJECTION, SOLUTION INFILTRATION; PERINEURAL ONCE
Status: COMPLETED | OUTPATIENT
Start: 2021-11-19 | End: 2021-11-19

## 2021-11-19 RX ORDER — MIDAZOLAM HYDROCHLORIDE 1 MG/ML
.5-2 INJECTION, SOLUTION INTRAMUSCULAR; INTRAVENOUS
Status: DISCONTINUED | OUTPATIENT
Start: 2021-11-19 | End: 2021-11-19

## 2021-11-19 RX ORDER — FENTANYL CITRATE 50 UG/ML
25-50 INJECTION, SOLUTION INTRAMUSCULAR; INTRAVENOUS
Status: DISCONTINUED | OUTPATIENT
Start: 2021-11-19 | End: 2021-11-19

## 2021-11-19 RX ADMIN — FENTANYL CITRATE 50 MCG: 50 INJECTION, SOLUTION INTRAMUSCULAR; INTRAVENOUS at 14:41

## 2021-11-19 RX ADMIN — HEPARIN SODIUM 4200 UNITS: 1000 INJECTION, SOLUTION INTRAVENOUS; SUBCUTANEOUS at 14:49

## 2021-11-19 RX ADMIN — FENTANYL CITRATE 50 MCG: 50 INJECTION, SOLUTION INTRAMUSCULAR; INTRAVENOUS at 14:34

## 2021-11-19 RX ADMIN — SODIUM CHLORIDE 25 ML/HR: 9 INJECTION, SOLUTION INTRAVENOUS at 14:35

## 2021-11-19 RX ADMIN — LIDOCAINE HYDROCHLORIDE 200 MG: 20 INJECTION, SOLUTION INFILTRATION; PERINEURAL at 14:43

## 2021-11-19 RX ADMIN — MIDAZOLAM 1 MG: 1 INJECTION INTRAMUSCULAR; INTRAVENOUS at 14:41

## 2021-11-19 RX ADMIN — LIDOCAINE HYDROCHLORIDE,EPINEPHRINE BITARTRATE 150 MG: 10; .01 INJECTION, SOLUTION INFILTRATION; PERINEURAL at 14:46

## 2021-11-19 RX ADMIN — MIDAZOLAM 1 MG: 1 INJECTION INTRAMUSCULAR; INTRAVENOUS at 14:34

## 2021-11-19 NOTE — DISCHARGE INSTRUCTIONS
[address]  Department of Interventional Radiology  [facility]  [phone] Office  [phone] Fax    Tunneled or Non Tunneled Catheter Discharge Instructions    General Information:   A catheter, either tunneled (permanent) or non-tunneled (temporary) catheter was inserted into your neck today for the purpose of Cancer treatment (apheresis) or dialysis. Your catheter will be used for the length of your apheresis, or until you get a fistula placed in surgery for dialysis. After this time, your catheter may be removed. You may return to our department for the catheter removal.  A non-tunneled catheter will exit at the neck. There will be three ports, two of which will be used for dialysis or apheresis. The one smaller port in the middle can be used like a regular IV. It ideally is used only for about two weeks. A tunneled catheter will exit lower down on the chest wall, and can be used for a longer period of time. There is no IV port on these. The tunneled catheter is used only for dialysis. In case of emergencies only can drugs be given through these catheters and only with written permission from your doctor. Home Care Instructions: You can resume your regular diet. Do not drink alcohol, drive, or make any important legal decisions in the next 24 hours. Watch the site carefully for signs of infection, like fever, drainage, redness, and/or swelling. Your catheter should be \"packed\" with heparin after each use or at least once a week if it is not being used. This \"packing\" should only be done by nurses experienced with caring for this type of catheter. You may shower in 24 hours, but you need to cover the catheter with plastic wrap and tape to keep it dry while you are showering. Keep the site clean, dry, and protected. Do not immerse yourself in water like in the case of tub baths or swimming as long as you have the catheter. Call If:   You should call your Physician and/or the Radiology Nurse if you have any signs of infection, shortness of breath, or if the dressing should come off or become moist.  You will be instructed on where to go for a new dressing. You should not have to change the dressings yourself, as that will be done by the nurses who access the catheter. Follow-Up Instructions:  Please see your ordering doctor as he/she has requested. To Reach Us: If you have any questions about your procedure, please call the Interventional Radiology department at [phone]. After business hours (5pm) and weekends, call the answering service at [phone] and ask for the Radiologist on call to be paged. Si tiene Preguntas acerca del procedimiento, por favor llame al departamento de Radiología Intervencional al [phone]. Después de horas de oficina (5 pm) y los fines de [place], llamar al [place] al [phone] y pregunte por el Radiologo de [place]ag[place] Ernie Gamboahiri. Interventional Radiology General Nurse Discharge    After general anesthesia or intravenous sedation, for 24 hours or while taking prescription Narcotics:  · Limit your activities  · Do not drive and operate hazardous machinery  · Do not make important personal or business decisions  · Do  not drink alcoholic beverages  · If you have not urinated within 8 hours after discharge, please contact your surgeon on call. * Please give a list of your current medications to your Primary Care Provider. * Please update this list whenever your medications are discontinued, doses are     changed, or new medications (including over-the-counter products) are added. * Please carry medication information at all times in case of emergency situations.     These are general instructions for a healthy lifestyle:    No smoking/ No tobacco products/ Avoid exposure to second hand smoke  Surgeon General's Warning:  Quitting smoking now greatly reduces serious risk to your health. Obesity, smoking, and sedentary lifestyle greatly increases your risk for illness  A healthy diet, regular physical exercise & weight monitoring are important for maintaining a healthy lifestyle    You may be retaining fluid if you have a history of heart failure or if you experience any of the following symptoms:  Weight gain of 3 pounds or more overnight or 5 pounds in a week, increased swelling in our hands or feet or shortness of breath while lying flat in bed. Please call your doctor as soon as you notice any of these symptoms; do not wait until your next office visit. Recognize signs and symptoms of STROKE:  F-face looks uneven    A-arms unable to move or move unevenly    S-speech slurred or non-existent    T-time-call 911 as soon as signs and symptoms begin-DO NOT go       Back to bed or wait to see if you get better-TIME IS BRAIN.

## 2021-11-19 NOTE — H&P
History and Physical    Patient: Darlin Trujillo MRN: [id number]  SSN: [id number]    YOB: 1950  Age: 72 y.o. Sex: female      Subjective: Darlin Trujillo is a 72 y.o. female who has ESRD and recently failed LUE AVG. Past Medical History:   Diagnosis Date    Arthritis     AVF (arteriovenous fistula) ([facility] 12/20/2016 12/6/16 ([facility]) Right AVF revision and thrombectomy    Cancer ([facility] 2015    L kidney    Chronic kidney disease     HD in M[facility]W-F- at [facility]    Degenerative joint disease     Diabetes ([facility]     checks QD, normal 120-130, hyposymptoms at 80    ESRD (end stage renal disease) [facility]) Nov 2006    ESRD.  MWF dialysis     Hypertension     Obesity     Transient ischemic attack 08/29/2015    no residual     Past Surgical History:   Procedure Laterality Date    COLONOSCOPY N/A 11/8/2018    COLONOSCOPY  BMI 36 performed by Suzette Turner [facility] at [facility] ENDOSCOPY    HX GI  06/01/2002    colon resection resulting in temporary colostomy reversal    HX GI  08/06/2018    exploratory laparotomy    HX GYN      stephan    HX OTHER SURGICAL      dialysis fistula, several permcaths    HX UROLOGICAL Left July 2015    nephrectomy    HX VASCULAR ACCESS      IR PLC CATH AV SHUNT IN W PTA SI VENOUS  5/30/2019    IR PLC CATH AV SHUNT IN W PTA SI VENOUS RT  2/28/2019    IR PLC CATH AV SHUNT IN W PTA SI VENOUS RT  9/3/2019    IR PLC CATH AV SHUNT IN W PTA SI VENOUS RT  12/5/2019    IR PLC CATH AV SHUNT IN W PTA SI VENOUS RT  3/10/2020    VA BREAST SURGERY PROCEDURE UNLISTED Right     cyst removed    VASCULAR SURGERY PROCEDURE UNLIST Right     AV graft      Family History   Problem Relation Age of Onset    Diabetes Mother     Heart Disease Mother     Hypertension Mother     Heart Disease Father     Hypertension Father     Malignant Hyperthermia Neg Hx     Pseudocholinesterase Deficiency Neg Hx     Delayed Awakening Neg Hx     Post-op Nausea/Vomiting Neg Hx     Emergence Delirium Neg Hx     Other Neg Hx     Post-op Cognitive Dysfunction Neg Hx      Social History     Tobacco Use    Smoking status: Never Smoker    Smokeless tobacco: Never Used   Substance Use Topics    Alcohol use: No      Prior to Admission medications    Medication Sig Start Date End Date Taking? Authorizing Provider   lidocaine 4 % patch 1 Patch by TransDERmal route every twelve (12) hours every twelve (12) hours. 11/15/21  Yes Theresa Hanks MD   minoxidiL (LONITEN) 10 mg tablet Take  by mouth two (2) times a day. Yes Provider, Historical   furosemide (Lasix) 80 mg tablet Take 80 mg by mouth two (2) times a day. Yes Provider, Historical   methocarbamoL (Robaxin-750) 750 mg tablet Take 2 Tablets by mouth four (4) times daily. 11/9/21  Yes Jane Daniel MD   gabapentin (NEURONTIN) 100 mg capsule TAKE 1 CAPSULE BY MOUTH THREE TIMES DAILY FOR PAIN 6/10/21  Yes Provider, Historical   omeprazole (PRILOSEC) 40 mg capsule TAKE ONE CAPSULE BY MOUTH EVERY DAY 6/10/21  Yes Provider, Historical   lidocaine 5 % topical cream Apply  to affected area two (2) times daily as needed for Pain. 9/1/17  Yes FRANCISCO Johnson   amLODIPine (NORVASC) 10 mg tablet Take 10 mg by mouth nightly. Yes Provider, Historical   VIT C/VIT E/LUTEIN/MIN/OMEGA-3 (OCUVITE PO) Take  by mouth nightly. Yes Provider, Historical   sevelamer carbonate (RENVELA) 800 mg tab tab Take 800 mg by mouth three (3) times daily (with meals). 5 tablets with meals/ 2 with snacks  Sometimes only eats 2 meals/d   Yes Provider, Historical   lisinopril (PRINIVIL, ZESTRIL) 40 mg tablet Take 40 mg by mouth nightly. Indications: HYPERTENSION 6/8/15  Yes Provider, Historical   sitaGLIPtin (JANUVIA) 100 mg tablet Take 50 mg by mouth every morning.  Indications: TYPE 2 DIABETES MELLITUS   Yes Provider, Historical        Allergies   Allergen Reactions    Pcn [Penicillins] Rash    Vancomycin Rash       Review of Systems:  Pertinent items are noted in the History of Present Illness. Objective:     Vitals:    11/19/21 1341   BP: (!) 146/67   Pulse: 81   Resp: 18   Temp: 98.9 °F (37.2 °C)   SpO2: 94%   Weight: 81.6 kg (180 lb)   Height: 5' 5\" (1.651 m)        Physical Exam:  LUNG: clear to auscultation bilaterally  HEART: systolic murmur    Assessment:     Hospital Problems  Date Reviewed: 7/27/2021    None          Plan:     Imag guided tunneled dialysis catheter. Moderate sedation.     Signed By: Jazzy Markham MD     November 19, 2021

## 2021-11-19 NOTE — PROCEDURES
Department of Interventional Radiology  [phone]        Interventional Radiology Brief Procedure Note    Patient: Derrick Brooks MRN: [id number]  SSN: [id number]    YOB: 1950  Age: 72 y.o.   Sex: female      Date of Procedure: 11/19/2021    Pre-Procedure Diagnosis: renal failure    Post-Procedure Diagnosis: SAME    Procedure(s): Tunneled Central Venous Catheter    Brief Description of Procedure: as above    Performed By: Nate Bobo MD     Assistants: None    Anesthesia:Moderate Sedation    Estimated Blood Loss: Less than 10ml    Specimens:  None    Implants:  Tunnelled Hemodialysis Catheter    Findings: right IJ is occluded   Patent right EJ    Complications: None    Recommendations: ok to use     Follow Up: as needed    Signed By: Nate Bobo MD     November 19, 2021

## 2021-11-22 ENCOUNTER — APPOINTMENT (OUTPATIENT)
Dept: GENERAL RADIOLOGY | Age: 71
End: 2021-11-22
Attending: EMERGENCY MEDICINE
Payer: MEDICARE

## 2021-11-22 ENCOUNTER — HOSPITAL ENCOUNTER (EMERGENCY)
Age: 71
Discharge: HOME | End: 2021-11-22
Attending: EMERGENCY MEDICINE
Payer: MEDICARE

## 2021-11-22 VITALS
BODY MASS INDEX: 29.16 KG/M2 | TEMPERATURE: 97.5 F | WEIGHT: 175 LBS | HEIGHT: 65 IN | HEART RATE: 97 BPM | DIASTOLIC BLOOD PRESSURE: 64 MMHG | SYSTOLIC BLOOD PRESSURE: 143 MMHG | OXYGEN SATURATION: 94 % | RESPIRATION RATE: 20 BRPM

## 2021-11-22 DIAGNOSIS — M54.6 ACUTE BILATERAL THORACIC BACK PAIN: Primary | ICD-10-CM

## 2021-11-22 LAB
ALBUMIN SERPL-MCNC: 4.3 G/DL (ref 3.2–4.6)
ALBUMIN/GLOB SERPL: 1 {RATIO} (ref 1.2–3.5)
ALP SERPL-CCNC: 84 U/L (ref 50–136)
ALT SERPL-CCNC: 13 U/L (ref 12–65)
ANION GAP SERPL CALC-SCNC: 7 MMOL/L (ref 7–16)
AST SERPL-CCNC: 26 U/L (ref 15–37)
ATRIAL RATE: 105 BPM
BASOPHILS # BLD: 0.1 K/UL (ref 0–0.2)
BASOPHILS NFR BLD: 2 % (ref 0–2)
BILIRUB SERPL-MCNC: 1.2 MG/DL (ref 0.2–1.1)
BUN SERPL-MCNC: 13 MG/DL (ref 8–23)
CALCIUM SERPL-MCNC: 9.6 MG/DL (ref 8.3–10.4)
CALCULATED P AXIS, ECG09: 78 DEGREES
CALCULATED R AXIS, ECG10: 120 DEGREES
CALCULATED T AXIS, ECG11: 58 DEGREES
CHLORIDE SERPL-SCNC: 104 MMOL/L (ref 98–107)
CO2 SERPL-SCNC: 30 MMOL/L (ref 21–32)
CREAT SERPL-MCNC: 3.01 MG/DL (ref 0.6–1)
DIAGNOSIS, 93000: NORMAL
DIFFERENTIAL METHOD BLD: ABNORMAL
EOSINOPHIL # BLD: 0.1 K/UL (ref 0–0.8)
EOSINOPHIL NFR BLD: 2 % (ref 0.5–7.8)
ERYTHROCYTE [DISTWIDTH] IN BLOOD BY AUTOMATED COUNT: 17.7 % (ref 11.9–14.6)
GLOBULIN SER CALC-MCNC: 4.5 G/DL (ref 2.3–3.5)
GLUCOSE SERPL-MCNC: 134 MG/DL (ref 65–100)
HCT VFR BLD AUTO: 34.9 % (ref 35.8–46.3)
HGB BLD-MCNC: 12.1 G/DL (ref 11.7–15.4)
IMM GRANULOCYTES # BLD AUTO: 0 K/UL (ref 0–0.5)
IMM GRANULOCYTES NFR BLD AUTO: 1 % (ref 0–5)
LIPASE SERPL-CCNC: 317 U/L (ref 73–393)
LYMPHOCYTES # BLD: 0.8 K/UL (ref 0.5–4.6)
LYMPHOCYTES NFR BLD: 13 % (ref 13–44)
MCH RBC QN AUTO: 32.5 PG (ref 26.1–32.9)
MCHC RBC AUTO-ENTMCNC: 34.7 G/DL (ref 31.4–35)
MCV RBC AUTO: 93.8 FL (ref 79.6–97.8)
MONOCYTES # BLD: 0.9 K/UL (ref 0.1–1.3)
MONOCYTES NFR BLD: 14 % (ref 4–12)
NEUTS SEG # BLD: 4.4 K/UL (ref 1.7–8.2)
NEUTS SEG NFR BLD: 69 % (ref 43–78)
NRBC # BLD: 0 K/UL (ref 0–0.2)
P-R INTERVAL, ECG05: 166 MS
PLATELET # BLD AUTO: 180 K/UL (ref 150–450)
PMV BLD AUTO: 10.8 FL (ref 9.4–12.3)
POTASSIUM SERPL-SCNC: 3.5 MMOL/L (ref 3.5–5.1)
PROT SERPL-MCNC: 8.8 G/DL (ref 6.3–8.2)
Q-T INTERVAL, ECG07: 350 MS
QRS DURATION, ECG06: 76 MS
QTC CALCULATION (BEZET), ECG08: 462 MS
RBC # BLD AUTO: 3.72 M/UL (ref 4.05–5.2)
SODIUM SERPL-SCNC: 141 MMOL/L (ref 136–145)
TROPONIN-HIGH SENSITIVITY: 24.5 PG/ML (ref 0–14)
TROPONIN-HIGH SENSITIVITY: 26.4 PG/ML (ref 0–14)
VENTRICULAR RATE, ECG03: 105 BPM
WBC # BLD AUTO: 6.3 K/UL (ref 4.3–11.1)

## 2021-11-22 PROCEDURE — 93005 ELECTROCARDIOGRAM TRACING: CPT | Performed by: EMERGENCY MEDICINE

## 2021-11-22 PROCEDURE — 85025 COMPLETE CBC W/AUTO DIFF WBC: CPT

## 2021-11-22 PROCEDURE — 96375 TX/PRO/DX INJ NEW DRUG ADDON: CPT

## 2021-11-22 PROCEDURE — 80053 COMPREHEN METABOLIC PANEL: CPT

## 2021-11-22 PROCEDURE — 72070 X-RAY EXAM THORAC SPINE 2VWS: CPT

## 2021-11-22 PROCEDURE — 96376 TX/PRO/DX INJ SAME DRUG ADON: CPT

## 2021-11-22 PROCEDURE — 83690 ASSAY OF LIPASE: CPT

## 2021-11-22 PROCEDURE — 96374 THER/PROPH/DIAG INJ IV PUSH: CPT

## 2021-11-22 PROCEDURE — 84484 ASSAY OF TROPONIN QUANT: CPT

## 2021-11-22 PROCEDURE — 99284 EMERGENCY DEPT VISIT MOD MDM: CPT

## 2021-11-22 PROCEDURE — 74011250636 HC RX REV CODE- 250/636: Performed by: EMERGENCY MEDICINE

## 2021-11-22 PROCEDURE — 71045 X-RAY EXAM CHEST 1 VIEW: CPT

## 2021-11-22 RX ORDER — MORPHINE SULFATE 2 MG/ML
4 INJECTION, SOLUTION INTRAMUSCULAR; INTRAVENOUS
Status: COMPLETED | OUTPATIENT
Start: 2021-11-22 | End: 2021-11-22

## 2021-11-22 RX ORDER — ONDANSETRON 2 MG/ML
4 INJECTION INTRAMUSCULAR; INTRAVENOUS
Status: COMPLETED | OUTPATIENT
Start: 2021-11-22 | End: 2021-11-22

## 2021-11-22 RX ORDER — MORPHINE SULFATE 10 MG/ML
6 INJECTION, SOLUTION INTRAMUSCULAR; INTRAVENOUS
Status: COMPLETED | OUTPATIENT
Start: 2021-11-22 | End: 2021-11-22

## 2021-11-22 RX ORDER — HYDROCODONE BITARTRATE AND ACETAMINOPHEN 7.5; 325 MG/1; MG/1
1 TABLET ORAL
Qty: 15 TABLET | Refills: 0 | Status: SHIPPED | OUTPATIENT
Start: 2021-11-22 | End: 2021-11-27

## 2021-11-22 RX ADMIN — SODIUM CHLORIDE 250 ML: 9 INJECTION, SOLUTION INTRAVENOUS at 15:58

## 2021-11-22 RX ADMIN — MORPHINE SULFATE 6 MG: 10 INJECTION INTRAVENOUS at 17:09

## 2021-11-22 RX ADMIN — MORPHINE SULFATE 4 MG: 2 INJECTION, SOLUTION INTRAMUSCULAR; INTRAVENOUS at 15:58

## 2021-11-22 RX ADMIN — ONDANSETRON 4 MG: 2 INJECTION INTRAMUSCULAR; INTRAVENOUS at 15:58

## 2021-11-22 NOTE — ED PROVIDER NOTES
Patient finished dialysis about 9:30 AM.  Around 11 AM developed some bilateral upper back pain. Sharp in nature. Worse with certain movements. States it hurts internally and is not tender to any palpation. Denies any saddle anesthesia or change in bowel bladder function. No numbness or weakness. Has been seen in the ER for similar pains in the past.  No acute found. Denies any chest pain or shortness of breath. No abdominal pain. No nausea vomiting diarrhea or constipation. No dysuria hematuria. The history is provided by the patient. No  was used. Thoracic Back Pain   This is a new problem. The current episode started 3 to 5 hours ago. The problem has been gradually worsening. The problem occurs constantly. Patient reports not work related injury. The pain is associated with no known injury. The pain is present in the left side, right side and thoracic spine. The quality of the pain is described as aching and sharp. The pain does not radiate. The pain is moderate. The symptoms are aggravated by bending and twisting. Pertinent negatives include no chest pain, no fever, no numbness, no headaches, no abdominal pain, no abdominal swelling, no bowel incontinence, no perianal numbness, no bladder incontinence, no dysuria, no pelvic pain, no leg pain, no paresthesias and no weakness. She has tried nothing for the symptoms. Past Medical History:   Diagnosis Date    Arthritis     AVF (arteriovenous fistula) ([address] 12/20/2016 12/6/16 ([facility]) Right AVF revision and thrombectomy    Cancer ([address] 2015    L kidney    Chronic kidney disease     HD in M-W-F- at [place] dialysis    Degenerative joint disease     Diabetes ([address]     checks QD, normal 120-130, hyposymptoms at 80    ESRD (end stage renal disease) [facility]) Nov 2006    ESRD.  MWF dialysis     Hypertension     Obesity     Transient ischemic attack 08/29/2015    no residual       Past Surgical History:   Procedure Laterality Date    COLONOSCOPY N/A 11/8/2018    COLONOSCOPY  BMI 36 performed by Bailee Melton [facility] ENDOSCOPY    HX GI  06/01/2002    colon resection resulting in temporary colostomy reversal    HX GI  08/06/2018    exploratory laparotomy    HX GYN      stephan    HX OTHER SURGICAL      dialysis fistula, several permcaths    HX UROLOGICAL Left July 2015    nephrectomy    HX VASCULAR ACCESS      IR INSERT TUNL CVC W/O PORT OVER 5 YR  11/19/2021    IR PLC CATH AV SHUNT IN W PTA SI VENOUS  5/30/2019    IR PLC CATH AV SHUNT IN W PTA SI VENOUS RT  2/28/2019    IR PLC CATH AV SHUNT IN W PTA SI VENOUS RT  9/3/2019    IR PLC CATH AV SHUNT IN W PTA SI VENOUS RT  12/5/2019    IR PLC CATH AV SHUNT IN W PTA SI VENOUS RT  3/10/2020    GA BREAST SURGERY PROCEDURE UNLISTED Right     cyst removed    VASCULAR SURGERY PROCEDURE UNLIST Right     AV graft         Family History:   Problem Relation Age of Onset    Diabetes Mother     Heart Disease Mother     Hypertension Mother     Heart Disease Father     Hypertension Father     Malignant Hyperthermia Neg Hx     Pseudocholinesterase Deficiency Neg Hx     Delayed Awakening Neg Hx     Post-op Nausea/Vomiting Neg Hx     Emergence Delirium Neg Hx     Other Neg Hx     Post-op Cognitive Dysfunction Neg Hx        Social History     Socioeconomic History    Marital status:      Spouse name: Not on file    Number of children: Not on file    Years of education: Not on file    Highest education level: Not on file   Occupational History    Not on file   Tobacco Use    Smoking status: Never Smoker    Smokeless tobacco: Never Used   Substance and Sexual Activity    Alcohol use: No    Drug use: No    Sexual activity: Not Currently   Other Topics Concern    Not on file   Social History Narrative    Not on file     Social Determinants of Health     Financial Resource Strain:     Difficulty of Paying Living Expenses: Not on file   Food Insecurity:     Worried About [facility] in the Last Year: Not on file    Rick of Food in the Last Year: Not on file   Transportation Needs:     Lack of Transportation (Medical): Not on file    Lack of Transportation (Non-Medical): Not on file   Physical Activity:     Days of Exercise per Week: Not on file    Minutes of Exercise per Session: Not on file   Stress:     Feeling of Stress : Not on file   Social Connections:     Frequency of Communication with Friends and Family: Not on file    Frequency of Social Gatherings with Friends and Family: Not on file    Attends Hinduism Services: Not on file    Active Member of [address] [place] [address] or Organizations: Not on file    Attends Club or Organization Meetings: Not on file    Marital Status: Not on file   Intimate Partner Violence:     Fear of Current or Ex-Partner: Not on file    Emotionally Abused: Not on file    Physically Abused: Not on file    Sexually Abused: Not on file   Housing Stability:     Unable to Pay for Housing in the Last Year: Not on file    Number of [place] in the Last Year: Not on file    Unstable Housing in the Last Year: Not on file         ALLERGIES: Pcn [penicillins] and Vancomycin    Review of Systems   Constitutional: Negative for chills and fever. Eyes: Negative for pain and redness. Respiratory: Negative for chest tightness, shortness of breath and wheezing. Cardiovascular: Negative for chest pain and leg swelling. Gastrointestinal: Negative for abdominal pain, bowel incontinence, diarrhea, nausea and vomiting. Genitourinary: Negative for bladder incontinence, dysuria, hematuria and pelvic pain. Musculoskeletal: Positive for back pain. Negative for gait problem, neck pain and neck stiffness. Skin: Negative for color change and rash. Neurological: Negative for weakness, numbness, headaches and paresthesias.        Vitals:    11/22/21 1420   BP: (!) 158/56   Pulse: (!) 112   Resp: 20   Temp: 97.5 °F (36.4 °C)   SpO2: 94% Weight: 79.4 kg (175 lb)   Height: 5' 5\" (1.651 m)            Physical Exam     MDM  Number of Diagnoses or Management Options  Diagnosis management comments: Patient with similar episode of back pain about a month ago where she was admitted. That episode also started during or after dialysis. No acute found on CT of her T-spine. Pain improved and patient was discharged. No acute on x-ray here. Will discharge with pain medication and follow-up. Amount and/or Complexity of Data Reviewed  Clinical lab tests: ordered and reviewed  Tests in the radiology section of CPT®: ordered and reviewed  Tests in the medicine section of CPT®: ordered and reviewed    Patient Progress  Patient progress: stable         Procedures        Results Include:    Recent Results (from the past 24 hour(s))   CBC WITH AUTOMATED DIFF    Collection Time: 11/22/21  2:30 PM   Result Value Ref Range    WBC 6.3 4.3 - 11.1 K/uL    RBC 3.72 (L) 4.05 - 5.2 M/uL    HGB 12.1 11.7 - 15.4 g/dL    HCT 34.9 (L) 35.8 - 46.3 %    MCV 93.8 79.6 - 97.8 FL    MCH 32.5 26.1 - 32.9 PG    MCHC 34.7 31.4 - 35.0 g/dL    RDW 17.7 (H) 11.9 - 14.6 %    PLATELET 710 400 - 830 K/uL    MPV 10.8 9.4 - 12.3 FL    ABSOLUTE NRBC 0.00 0.0 - 0.2 K/uL    DF AUTOMATED      NEUTROPHILS 69 43 - 78 %    LYMPHOCYTES 13 13 - 44 %    MONOCYTES 14 (H) 4.0 - 12.0 %    EOSINOPHILS 2 0.5 - 7.8 %    BASOPHILS 2 0.0 - 2.0 %    IMMATURE GRANULOCYTES 1 0.0 - 5.0 %    ABS. NEUTROPHILS 4.4 1.7 - 8.2 K/UL    ABS. LYMPHOCYTES 0.8 0.5 - 4.6 K/UL    ABS. MONOCYTES 0.9 0.1 - 1.3 K/UL    ABS. EOSINOPHILS 0.1 0.0 - 0.8 K/UL    ABS. BASOPHILS 0.1 0.0 - 0.2 K/UL    ABS. IMM.  GRANS. 0.0 0.0 - 0.5 K/UL   METABOLIC PANEL, COMPREHENSIVE    Collection Time: 11/22/21  2:30 PM   Result Value Ref Range    Sodium 141 136 - 145 mmol/L    Potassium 3.5 3.5 - 5.1 mmol/L    Chloride 104 98 - 107 mmol/L    CO2 30 21 - 32 mmol/L    Anion gap 7 7 - 16 mmol/L    Glucose 134 (H) 65 - 100 mg/dL    BUN 13 8 - 23 MG/DL    Creatinine 3.01 (H) 0.6 - 1.0 MG/DL    GFR est AA 20 (L) >60 ml/min/1.73m2    GFR est non-AA 16 (L) >60 ml/min/1.73m2    Calcium 9.6 8.3 - 10.4 MG/DL    Bilirubin, total 1.2 (H) 0.2 - 1.1 MG/DL    ALT (SGPT) 13 12 - 65 U/L    AST (SGOT) 26 15 - 37 U/L    Alk. phosphatase 84 50 - 136 U/L    Protein, total 8.8 (H) 6.3 - 8.2 g/dL    Albumin 4.3 3.2 - 4.6 g/dL    Globulin 4.5 (H) 2.3 - 3.5 g/dL    A-G Ratio 1.0 (L) 1.2 - 3.5     EKG, 12 LEAD, INITIAL    Collection Time: 11/22/21  2:36 PM   Result Value Ref Range    Ventricular Rate 105 BPM    Atrial Rate 105 BPM    P-R Interval 166 ms    QRS Duration 76 ms    Q-T Interval 350 ms    QTC Calculation (Bezet) 462 ms    Calculated P Axis 78 degrees    Calculated R Axis 120 degrees    Calculated T Axis 58 degrees    Diagnosis       Sinus tachycardia  Left posterior fascicular block  Non-specific ST-t wave changes  Abnormal ECG  Confirmed by ST ADITI ARREDONDO MD (), TAMEKA SMALLS ([id number]) on 11/22/2021 4:44:38 PM     TROPONIN-HIGH SENSITIVITY    Collection Time: 11/22/21  3:30 PM   Result Value Ref Range    Troponin-High Sensitivity 24.5 (H) 0 - 14 pg/mL   LIPASE    Collection Time: 11/22/21  3:30 PM   Result Value Ref Range    Lipase 317 73 - 393 U/L   TROPONIN-HIGH SENSITIVITY    Collection Time: 11/22/21  6:19 PM   Result Value Ref Range    Troponin-High Sensitivity 26.4 (H) 0 - 14 pg/mL     XR CHEST SNGL V (Final result)  Result time 11/22/21 19:26:17  Final result by Larry Dey MD (11/22/21 19:26:17)                Impression:    Tunneled dialysis catheter present in the right sinus described. No   consolidation. Narrative:    PORTABLE CHEST 1 VIEW     HISTORY: Chest pain and upper back pain. COMPARISON: 9/6/2020     FINDINGS: A tunneled dialysis catheter on the right side terminates at the   cavoatrial junction. A right-sided vascular stent is present in the subclavian   region. The cardiac silhouette is prominent.  There is no lobar consolidation or advanced   pulmonary edema. A vascular stent is present in the proximal left arm.                         XR SPINE THORAC 2 V (Final result)  Result time 11/22/21 16:43:24  Final result by Nat Alejandro MD (11/22/21 16:43:24)                Impression:    Stable exam without acute abnormality. Narrative:    History: Upper back pain     EXAM: Thoracic spine series     COMPARISON: 9/6/2020     FINDINGS: Degenerative change of the thoracic spine noted. There is no acute   fracture. No change in the appearance of the thoracic spine when compared with   the prior study. No focal alveolar infiltrate involving the included lungs.                     EKG: normal sinus rhythm, nonspecific ST and T waves changes. Rate 105.

## 2021-11-22 NOTE — ED TRIAGE NOTES
Patient ambulatory to triage with mask in place. Patient complains of upper back pain that started today after she completed her dialysis run. Patient stats she had the same symptoms after a dialysis run last week and was admitted so she called Dr Michelle Zavala and he told her to come into the ER to be seen. Patient denies any other symptoms at this time.

## 2021-11-23 NOTE — ED NOTES
I have reviewed discharge instructions with the patient. The patient verbalized understanding. Patient left ED via Discharge Method: ambulatory to Home with granddaughter    Opportunity for questions and clarification provided. Patient given 1 scripts. To continue your aftercare when you leave the hospital, you may receive an automated call from our care team to check in on how you are doing. This is a free service and part of our promise to provide the best care and service to meet your aftercare needs.  If you have questions, or wish to unsubscribe from this service please call [phone]. Thank you for Choosing our [facility] Emergency Department.

## 2022-01-01 ENCOUNTER — HOME CARE VISIT (OUTPATIENT)
Dept: SCHEDULING | Facility: HOME HEALTH | Age: 72
End: 2022-01-01
Payer: MEDICARE

## 2022-01-01 ENCOUNTER — APPOINTMENT (OUTPATIENT)
Dept: NON INVASIVE DIAGNOSTICS | Age: 72
DRG: 252 | End: 2022-01-01
Payer: MEDICARE

## 2022-01-01 ENCOUNTER — HOSPITAL ENCOUNTER (INPATIENT)
Age: 72
LOS: 10 days | DRG: 252 | End: 2022-08-13
Attending: EMERGENCY MEDICINE | Admitting: HOSPITALIST
Payer: MEDICARE

## 2022-01-01 ENCOUNTER — HOME CARE VISIT (OUTPATIENT)
Dept: HOME HEALTH SERVICES | Facility: HOME HEALTH | Age: 72
End: 2022-01-01
Payer: MEDICARE

## 2022-01-01 ENCOUNTER — OFFICE VISIT (OUTPATIENT)
Dept: VASCULAR SURGERY | Age: 72
End: 2022-01-01
Payer: MEDICARE

## 2022-01-01 ENCOUNTER — APPOINTMENT (OUTPATIENT)
Dept: CT IMAGING | Age: 72
DRG: 252 | End: 2022-01-01
Payer: MEDICARE

## 2022-01-01 ENCOUNTER — CARE COORDINATION (OUTPATIENT)
Dept: CARE COORDINATION | Facility: CLINIC | Age: 72
End: 2022-01-01

## 2022-01-01 ENCOUNTER — HOSPITAL ENCOUNTER (EMERGENCY)
Age: 72
Discharge: HOME | End: 2022-07-29
Attending: EMERGENCY MEDICINE
Payer: MEDICARE

## 2022-01-01 ENCOUNTER — APPOINTMENT (OUTPATIENT)
Dept: INTERVENTIONAL RADIOLOGY/VASCULAR | Age: 72
DRG: 252 | End: 2022-01-01
Payer: MEDICARE

## 2022-01-01 ENCOUNTER — ANESTHESIA EVENT (OUTPATIENT)
Dept: SURGERY | Age: 72
DRG: 252 | End: 2022-01-01
Payer: MEDICARE

## 2022-01-01 ENCOUNTER — DIRECT ADMIT ORDERS (OUTPATIENT)
Dept: SURGERY | Age: 72
End: 2022-01-01

## 2022-01-01 ENCOUNTER — APPOINTMENT (OUTPATIENT)
Dept: GENERAL RADIOLOGY | Age: 72
DRG: 252 | End: 2022-01-01
Payer: MEDICARE

## 2022-01-01 ENCOUNTER — ANESTHESIA EVENT (OUTPATIENT)
Dept: SURGERY | Age: 72
End: 2022-01-01
Payer: MEDICARE

## 2022-01-01 ENCOUNTER — ANESTHESIA (OUTPATIENT)
Dept: SURGERY | Age: 72
DRG: 252 | End: 2022-01-01
Payer: MEDICARE

## 2022-01-01 ENCOUNTER — CLINICAL DOCUMENTATION (OUTPATIENT)
Dept: VASCULAR SURGERY | Age: 72
End: 2022-01-01

## 2022-01-01 ENCOUNTER — HOSPITAL ENCOUNTER (EMERGENCY)
Age: 72
Discharge: HOME | End: 2022-07-01
Attending: STUDENT IN AN ORGANIZED HEALTH CARE EDUCATION/TRAINING PROGRAM
Payer: MEDICARE

## 2022-01-01 ENCOUNTER — APPOINTMENT (OUTPATIENT)
Dept: GENERAL RADIOLOGY | Age: 72
DRG: 312 | End: 2022-01-01
Payer: MEDICARE

## 2022-01-01 ENCOUNTER — HOSPITAL ENCOUNTER (INPATIENT)
Age: 72
LOS: 5 days | Discharge: HOME HEALTH | DRG: 252 | End: 2022-06-30
Admitting: INTERNAL MEDICINE
Payer: MEDICARE

## 2022-01-01 ENCOUNTER — HOSPITAL ENCOUNTER (OUTPATIENT)
Age: 72
Setting detail: OUTPATIENT SURGERY
Discharge: HOME | End: 2022-05-26
Attending: SURGERY | Admitting: SURGERY
Payer: MEDICARE

## 2022-01-01 ENCOUNTER — HOME HEALTH ADMISSION (OUTPATIENT)
Dept: HOME HEALTH SERVICES | Facility: HOME HEALTH | Age: 72
End: 2022-01-01

## 2022-01-01 ENCOUNTER — APPOINTMENT (OUTPATIENT)
Dept: CT IMAGING | Age: 72
End: 2022-01-01
Payer: MEDICARE

## 2022-01-01 ENCOUNTER — APPOINTMENT (OUTPATIENT)
Dept: GENERAL RADIOLOGY | Age: 72
End: 2022-01-01
Payer: MEDICARE

## 2022-01-01 ENCOUNTER — PREP FOR PROCEDURE (OUTPATIENT)
Dept: VASCULAR SURGERY | Age: 72
End: 2022-01-01

## 2022-01-01 ENCOUNTER — ANESTHESIA (OUTPATIENT)
Dept: SURGERY | Age: 72
End: 2022-01-01
Payer: MEDICARE

## 2022-01-01 ENCOUNTER — HOSPITAL ENCOUNTER (INPATIENT)
Age: 72
LOS: 5 days | Discharge: HOME HEALTH | DRG: 312 | End: 2022-06-05
Attending: EMERGENCY MEDICINE | Admitting: INTERNAL MEDICINE
Payer: MEDICARE

## 2022-01-01 ENCOUNTER — HOME HEALTH ADMISSION (OUTPATIENT)
Dept: HOME HEALTH SERVICES | Facility: HOME HEALTH | Age: 72
End: 2022-01-01
Payer: MEDICARE

## 2022-01-01 VITALS
RESPIRATION RATE: 17 BRPM | SYSTOLIC BLOOD PRESSURE: 104 MMHG | DIASTOLIC BLOOD PRESSURE: 64 MMHG | TEMPERATURE: 98 F | HEART RATE: 108 BPM | OXYGEN SATURATION: 96 %

## 2022-01-01 VITALS
DIASTOLIC BLOOD PRESSURE: 55 MMHG | RESPIRATION RATE: 16 BRPM | SYSTOLIC BLOOD PRESSURE: 129 MMHG | BODY MASS INDEX: 35.15 KG/M2 | OXYGEN SATURATION: 100 % | TEMPERATURE: 98.6 F | WEIGHT: 205.91 LBS | HEIGHT: 64 IN | HEART RATE: 96 BPM

## 2022-01-01 VITALS
HEART RATE: 71 BPM | TEMPERATURE: 97.7 F | SYSTOLIC BLOOD PRESSURE: 124 MMHG | DIASTOLIC BLOOD PRESSURE: 60 MMHG | RESPIRATION RATE: 16 BRPM | OXYGEN SATURATION: 94 %

## 2022-01-01 VITALS
TEMPERATURE: 98 F | WEIGHT: 205 LBS | HEIGHT: 64 IN | BODY MASS INDEX: 35 KG/M2 | DIASTOLIC BLOOD PRESSURE: 61 MMHG | HEART RATE: 104 BPM | SYSTOLIC BLOOD PRESSURE: 123 MMHG | OXYGEN SATURATION: 92 %

## 2022-01-01 VITALS
DIASTOLIC BLOOD PRESSURE: 69 MMHG | HEART RATE: 75 BPM | SYSTOLIC BLOOD PRESSURE: 119 MMHG | RESPIRATION RATE: 16 BRPM | OXYGEN SATURATION: 98 % | TEMPERATURE: 98 F

## 2022-01-01 VITALS
HEART RATE: 69 BPM | TEMPERATURE: 97.5 F | RESPIRATION RATE: 16 BRPM | SYSTOLIC BLOOD PRESSURE: 112 MMHG | OXYGEN SATURATION: 97 % | DIASTOLIC BLOOD PRESSURE: 70 MMHG

## 2022-01-01 VITALS
TEMPERATURE: 98.2 F | OXYGEN SATURATION: 96 % | RESPIRATION RATE: 21 BRPM | DIASTOLIC BLOOD PRESSURE: 40 MMHG | HEART RATE: 102 BPM | SYSTOLIC BLOOD PRESSURE: 58 MMHG

## 2022-01-01 VITALS
HEART RATE: 74 BPM | OXYGEN SATURATION: 95 % | SYSTOLIC BLOOD PRESSURE: 130 MMHG | RESPIRATION RATE: 16 BRPM | DIASTOLIC BLOOD PRESSURE: 78 MMHG | TEMPERATURE: 97.9 F

## 2022-01-01 VITALS
DIASTOLIC BLOOD PRESSURE: 64 MMHG | TEMPERATURE: 98 F | OXYGEN SATURATION: 98 % | RESPIRATION RATE: 18 BRPM | SYSTOLIC BLOOD PRESSURE: 122 MMHG | HEART RATE: 76 BPM

## 2022-01-01 VITALS
TEMPERATURE: 97.6 F | RESPIRATION RATE: 16 BRPM | SYSTOLIC BLOOD PRESSURE: 105 MMHG | BODY MASS INDEX: 32.32 KG/M2 | WEIGHT: 194 LBS | HEART RATE: 103 BPM | OXYGEN SATURATION: 96 % | DIASTOLIC BLOOD PRESSURE: 71 MMHG | HEIGHT: 65 IN

## 2022-01-01 VITALS
SYSTOLIC BLOOD PRESSURE: 85 MMHG | TEMPERATURE: 97.9 F | DIASTOLIC BLOOD PRESSURE: 53 MMHG | HEIGHT: 64 IN | BODY MASS INDEX: 31.99 KG/M2 | OXYGEN SATURATION: 94 % | WEIGHT: 187.4 LBS | RESPIRATION RATE: 20 BRPM | HEART RATE: 81 BPM

## 2022-01-01 VITALS
OXYGEN SATURATION: 94 % | TEMPERATURE: 97.5 F | DIASTOLIC BLOOD PRESSURE: 69 MMHG | HEART RATE: 78 BPM | SYSTOLIC BLOOD PRESSURE: 103 MMHG

## 2022-01-01 VITALS
HEART RATE: 76 BPM | DIASTOLIC BLOOD PRESSURE: 68 MMHG | SYSTOLIC BLOOD PRESSURE: 104 MMHG | RESPIRATION RATE: 16 BRPM | OXYGEN SATURATION: 96 % | TEMPERATURE: 97.3 F

## 2022-01-01 VITALS
RESPIRATION RATE: 16 BRPM | TEMPERATURE: 97.6 F | SYSTOLIC BLOOD PRESSURE: 118 MMHG | HEART RATE: 74 BPM | OXYGEN SATURATION: 97 % | DIASTOLIC BLOOD PRESSURE: 74 MMHG

## 2022-01-01 VITALS
RESPIRATION RATE: 16 BRPM | DIASTOLIC BLOOD PRESSURE: 72 MMHG | TEMPERATURE: 97.7 F | OXYGEN SATURATION: 97 % | HEART RATE: 84 BPM | SYSTOLIC BLOOD PRESSURE: 128 MMHG

## 2022-01-01 VITALS
TEMPERATURE: 98.3 F | RESPIRATION RATE: 16 BRPM | OXYGEN SATURATION: 100 % | HEART RATE: 103 BPM | DIASTOLIC BLOOD PRESSURE: 85 MMHG | SYSTOLIC BLOOD PRESSURE: 147 MMHG

## 2022-01-01 VITALS
SYSTOLIC BLOOD PRESSURE: 126 MMHG | DIASTOLIC BLOOD PRESSURE: 80 MMHG | HEART RATE: 59 BPM | TEMPERATURE: 97.5 F | OXYGEN SATURATION: 97 % | RESPIRATION RATE: 17 BRPM

## 2022-01-01 VITALS
OXYGEN SATURATION: 97 % | TEMPERATURE: 97.7 F | SYSTOLIC BLOOD PRESSURE: 108 MMHG | DIASTOLIC BLOOD PRESSURE: 62 MMHG | HEART RATE: 82 BPM | RESPIRATION RATE: 16 BRPM

## 2022-01-01 VITALS
SYSTOLIC BLOOD PRESSURE: 122 MMHG | RESPIRATION RATE: 16 BRPM | DIASTOLIC BLOOD PRESSURE: 76 MMHG | HEART RATE: 72 BPM | TEMPERATURE: 97.2 F | OXYGEN SATURATION: 95 %

## 2022-01-01 VITALS
SYSTOLIC BLOOD PRESSURE: 118 MMHG | RESPIRATION RATE: 16 BRPM | DIASTOLIC BLOOD PRESSURE: 60 MMHG | TEMPERATURE: 97.4 F | OXYGEN SATURATION: 97 % | HEART RATE: 71 BPM

## 2022-01-01 VITALS
OXYGEN SATURATION: 99 % | TEMPERATURE: 97.5 F | WEIGHT: 203.6 LBS | SYSTOLIC BLOOD PRESSURE: 108 MMHG | BODY MASS INDEX: 34.76 KG/M2 | HEIGHT: 64 IN | HEART RATE: 63 BPM | DIASTOLIC BLOOD PRESSURE: 48 MMHG | RESPIRATION RATE: 18 BRPM

## 2022-01-01 VITALS
RESPIRATION RATE: 16 BRPM | SYSTOLIC BLOOD PRESSURE: 118 MMHG | OXYGEN SATURATION: 94 % | TEMPERATURE: 97.5 F | HEART RATE: 100 BPM | DIASTOLIC BLOOD PRESSURE: 60 MMHG

## 2022-01-01 VITALS — OXYGEN SATURATION: 96 % | RESPIRATION RATE: 18 BRPM | HEART RATE: 89 BPM | TEMPERATURE: 97.9 F

## 2022-01-01 VITALS
HEART RATE: 20 BPM | TEMPERATURE: 99.3 F | OXYGEN SATURATION: 71 % | BODY MASS INDEX: 35.51 KG/M2 | SYSTOLIC BLOOD PRESSURE: 29 MMHG | DIASTOLIC BLOOD PRESSURE: 14 MMHG | WEIGHT: 208 LBS | HEIGHT: 64 IN

## 2022-01-01 VITALS
TEMPERATURE: 97.5 F | HEART RATE: 88 BPM | DIASTOLIC BLOOD PRESSURE: 54 MMHG | SYSTOLIC BLOOD PRESSURE: 96 MMHG | OXYGEN SATURATION: 97 % | RESPIRATION RATE: 16 BRPM

## 2022-01-01 DIAGNOSIS — M17.11 PRIMARY OSTEOARTHRITIS OF RIGHT KNEE: ICD-10-CM

## 2022-01-01 DIAGNOSIS — R53.1 GENERALIZED WEAKNESS: Primary | ICD-10-CM

## 2022-01-01 DIAGNOSIS — Z99.2 END STAGE RENAL DISEASE ON DIALYSIS (HCC): ICD-10-CM

## 2022-01-01 DIAGNOSIS — R40.4 TRANSIENT ALTERATION OF AWARENESS: ICD-10-CM

## 2022-01-01 DIAGNOSIS — R10.9 ACUTE LEFT FLANK PAIN: ICD-10-CM

## 2022-01-01 DIAGNOSIS — I95.9 HYPOTENSION, UNSPECIFIED HYPOTENSION TYPE: ICD-10-CM

## 2022-01-01 DIAGNOSIS — C64.9 RENAL CELL CARCINOMA, UNSPECIFIED LATERALITY (HCC): ICD-10-CM

## 2022-01-01 DIAGNOSIS — R79.89 ELEVATED LACTIC ACID LEVEL: ICD-10-CM

## 2022-01-01 DIAGNOSIS — Z99.2 ESRD (END STAGE RENAL DISEASE) ON DIALYSIS (HCC): Primary | ICD-10-CM

## 2022-01-01 DIAGNOSIS — N18.6 ESRD (END STAGE RENAL DISEASE) ON DIALYSIS (HCC): Primary | ICD-10-CM

## 2022-01-01 DIAGNOSIS — I95.9 HYPOTENSION, UNSPECIFIED HYPOTENSION TYPE: Primary | ICD-10-CM

## 2022-01-01 DIAGNOSIS — N18.4 CKD (CHRONIC KIDNEY DISEASE) STAGE 4, GFR 15-29 ML/MIN (HCC): ICD-10-CM

## 2022-01-01 DIAGNOSIS — R01.1 CARDIAC MURMUR: ICD-10-CM

## 2022-01-01 DIAGNOSIS — Z99.2 ESRD ON HEMODIALYSIS (HCC): ICD-10-CM

## 2022-01-01 DIAGNOSIS — R10.9 FLANK PAIN: Primary | ICD-10-CM

## 2022-01-01 DIAGNOSIS — R59.0 MEDIASTINAL ADENOPATHY: Primary | ICD-10-CM

## 2022-01-01 DIAGNOSIS — N18.6 END STAGE RENAL DISEASE ON DIALYSIS (HCC): ICD-10-CM

## 2022-01-01 DIAGNOSIS — T14.8XXA HEMATOMA: ICD-10-CM

## 2022-01-01 DIAGNOSIS — I95.3 HEMODIALYSIS-ASSOCIATED HYPOTENSION: ICD-10-CM

## 2022-01-01 DIAGNOSIS — E87.5 HYPERKALEMIA: ICD-10-CM

## 2022-01-01 DIAGNOSIS — N18.6 ESRD ON HEMODIALYSIS (HCC): ICD-10-CM

## 2022-01-01 DIAGNOSIS — R22.9 SOFT TISSUE SWELLING: Primary | ICD-10-CM

## 2022-01-01 LAB
ABO + RH BLD: NORMAL
ALBUMIN SERPL-MCNC: 2.6 G/DL (ref 3.2–4.6)
ALBUMIN SERPL-MCNC: 3.4 G/DL (ref 3.2–4.6)
ALBUMIN SERPL-MCNC: 3.5 G/DL (ref 3.2–4.6)
ALBUMIN SERPL-MCNC: 3.8 G/DL (ref 3.2–4.6)
ALBUMIN SERPL-MCNC: 3.9 G/DL (ref 3.2–4.6)
ALBUMIN/GLOB SERPL: 0.7 {RATIO} (ref 1.2–3.5)
ALBUMIN/GLOB SERPL: 0.8 {RATIO} (ref 1.2–3.5)
ALBUMIN/GLOB SERPL: 0.9 {RATIO} (ref 1.2–3.5)
ALP SERPL-CCNC: 103 U/L (ref 50–136)
ALP SERPL-CCNC: 105 U/L (ref 50–136)
ALP SERPL-CCNC: 106 U/L (ref 50–136)
ALP SERPL-CCNC: 108 U/L (ref 50–136)
ALP SERPL-CCNC: 87 U/L (ref 50–136)
ALT SERPL-CCNC: 16 U/L (ref 12–65)
ALT SERPL-CCNC: 17 U/L (ref 12–65)
ALT SERPL-CCNC: 18 U/L (ref 12–65)
ALT SERPL-CCNC: 21 U/L (ref 12–65)
ALT SERPL-CCNC: 9 U/L (ref 12–65)
ANION GAP SERPL CALC-SCNC: 10 MMOL/L (ref 7–16)
ANION GAP SERPL CALC-SCNC: 11 MMOL/L (ref 7–16)
ANION GAP SERPL CALC-SCNC: 11 MMOL/L (ref 7–16)
ANION GAP SERPL CALC-SCNC: 12 MMOL/L (ref 7–16)
ANION GAP SERPL CALC-SCNC: 16 MMOL/L (ref 7–16)
ANION GAP SERPL CALC-SCNC: 17 MMOL/L (ref 7–16)
ANION GAP SERPL CALC-SCNC: 19 MMOL/L (ref 7–16)
ANION GAP SERPL CALC-SCNC: 5 MMOL/L (ref 7–16)
ANION GAP SERPL CALC-SCNC: 5 MMOL/L (ref 7–16)
ANION GAP SERPL CALC-SCNC: 7 MMOL/L (ref 7–16)
ANION GAP SERPL CALC-SCNC: 8 MMOL/L (ref 7–16)
ANION GAP SERPL CALC-SCNC: 8 MMOL/L (ref 7–16)
ANION GAP SERPL CALC-SCNC: 9 MMOL/L (ref 7–16)
APTT PPP: 53.1 SEC (ref 24.1–35.1)
ARTERIAL PATENCY WRIST A: ABNORMAL
ARTERIAL PATENCY WRIST A: POSITIVE
ARTERIAL PATENCY WRIST A: POSITIVE
AST SERPL-CCNC: 31 U/L (ref 15–37)
AST SERPL-CCNC: 37 U/L (ref 15–37)
AST SERPL-CCNC: 40 U/L (ref 15–37)
AST SERPL-CCNC: 52 U/L (ref 15–37)
AST SERPL-CCNC: 65 U/L (ref 15–37)
BACTERIA SPEC CULT: NORMAL
BASE DEFICIT BLD-SCNC: 1.7 MMOL/L
BASE DEFICIT BLD-SCNC: 1.8 MMOL/L
BASE DEFICIT BLD-SCNC: 10.5 MMOL/L
BASE DEFICIT BLD-SCNC: 16.8 MMOL/L
BASE DEFICIT BLD-SCNC: 19.1 MMOL/L
BASE DEFICIT BLD-SCNC: 7 MMOL/L
BASE DEFICIT BLD-SCNC: 8.7 MMOL/L
BASE DEFICIT BLDV-SCNC: 1.6 MMOL/L
BASE EXCESS BLD CALC-SCNC: 0.8 MMOL/L
BASOPHILS # BLD: 0 K/UL (ref 0–0.2)
BASOPHILS # BLD: 0.1 K/UL (ref 0–0.2)
BASOPHILS NFR BLD: 0 % (ref 0–2)
BASOPHILS NFR BLD: 1 % (ref 0–2)
BASOPHILS NFR BLD: 2 % (ref 0–2)
BDY SITE: ABNORMAL
BILIRUB SERPL-MCNC: 0.9 MG/DL (ref 0.2–1.1)
BILIRUB SERPL-MCNC: 1.6 MG/DL (ref 0.2–1.1)
BILIRUB SERPL-MCNC: 1.6 MG/DL (ref 0.2–1.1)
BILIRUB SERPL-MCNC: 1.9 MG/DL (ref 0.2–1.1)
BILIRUB SERPL-MCNC: 2.2 MG/DL (ref 0.2–1.1)
BLD PROD TYP BPU: NORMAL
BLD PROD TYP BPU: NORMAL
BLOOD BANK DISPENSE STATUS: NORMAL
BLOOD BANK DISPENSE STATUS: NORMAL
BLOOD GROUP ANTIBODIES SERPL: NORMAL
BPU ID: NORMAL
BPU ID: NORMAL
BUN SERPL-MCNC: 17 MG/DL (ref 8–23)
BUN SERPL-MCNC: 20 MG/DL (ref 8–23)
BUN SERPL-MCNC: 20 MG/DL (ref 8–23)
BUN SERPL-MCNC: 24 MG/DL (ref 8–23)
BUN SERPL-MCNC: 24 MG/DL (ref 8–23)
BUN SERPL-MCNC: 26 MG/DL (ref 8–23)
BUN SERPL-MCNC: 30 MG/DL (ref 8–23)
BUN SERPL-MCNC: 31 MG/DL (ref 8–23)
BUN SERPL-MCNC: 32 MG/DL (ref 8–23)
BUN SERPL-MCNC: 34 MG/DL (ref 8–23)
BUN SERPL-MCNC: 34 MG/DL (ref 8–23)
BUN SERPL-MCNC: 35 MG/DL (ref 8–23)
BUN SERPL-MCNC: 38 MG/DL (ref 8–23)
BUN SERPL-MCNC: 38 MG/DL (ref 8–23)
BUN SERPL-MCNC: 43 MG/DL (ref 8–23)
BUN SERPL-MCNC: 45 MG/DL (ref 8–23)
BUN SERPL-MCNC: 46 MG/DL (ref 8–23)
CA-I BLD-MCNC: 0.98 MMOL/L (ref 1.12–1.32)
CA-I BLD-MCNC: 1.06 MMOL/L (ref 1.12–1.32)
CALCIUM SERPL-MCNC: 7.3 MG/DL (ref 8.3–10.4)
CALCIUM SERPL-MCNC: 7.3 MG/DL (ref 8.3–10.4)
CALCIUM SERPL-MCNC: 7.4 MG/DL (ref 8.3–10.4)
CALCIUM SERPL-MCNC: 7.6 MG/DL (ref 8.3–10.4)
CALCIUM SERPL-MCNC: 7.7 MG/DL (ref 8.3–10.4)
CALCIUM SERPL-MCNC: 7.8 MG/DL (ref 8.3–10.4)
CALCIUM SERPL-MCNC: 7.8 MG/DL (ref 8.3–10.4)
CALCIUM SERPL-MCNC: 7.9 MG/DL (ref 8.3–10.4)
CALCIUM SERPL-MCNC: 8.1 MG/DL (ref 8.3–10.4)
CALCIUM SERPL-MCNC: 8.2 MG/DL (ref 8.3–10.4)
CALCIUM SERPL-MCNC: 8.2 MG/DL (ref 8.3–10.4)
CALCIUM SERPL-MCNC: 8.4 MG/DL (ref 8.3–10.4)
CALCIUM SERPL-MCNC: 8.4 MG/DL (ref 8.3–10.4)
CALCIUM SERPL-MCNC: 8.5 MG/DL (ref 8.3–10.4)
CALCIUM SERPL-MCNC: 8.7 MG/DL (ref 8.3–10.4)
CHLORIDE SERPL-SCNC: 101 MMOL/L (ref 98–107)
CHLORIDE SERPL-SCNC: 102 MMOL/L (ref 98–107)
CHLORIDE SERPL-SCNC: 102 MMOL/L (ref 98–107)
CHLORIDE SERPL-SCNC: 103 MMOL/L (ref 98–107)
CHLORIDE SERPL-SCNC: 95 MMOL/L (ref 98–107)
CHLORIDE SERPL-SCNC: 96 MMOL/L (ref 98–107)
CHLORIDE SERPL-SCNC: 98 MMOL/L (ref 98–107)
CHLORIDE SERPL-SCNC: 98 MMOL/L (ref 98–107)
CHLORIDE SERPL-SCNC: 99 MMOL/L (ref 98–107)
CHLORIDE SERPL-SCNC: 99 MMOL/L (ref 98–107)
CHP ED QC CHECK: YES
CO2 BLD-SCNC: 25 MMOL/L (ref 13–23)
CO2 BLD-SCNC: 25 MMOL/L (ref 13–23)
CO2 SERPL-SCNC: 16 MMOL/L (ref 21–32)
CO2 SERPL-SCNC: 20 MMOL/L (ref 21–32)
CO2 SERPL-SCNC: 22 MMOL/L (ref 21–32)
CO2 SERPL-SCNC: 23 MMOL/L (ref 21–32)
CO2 SERPL-SCNC: 25 MMOL/L (ref 21–32)
CO2 SERPL-SCNC: 25 MMOL/L (ref 21–32)
CO2 SERPL-SCNC: 26 MMOL/L (ref 21–32)
CO2 SERPL-SCNC: 26 MMOL/L (ref 21–32)
CO2 SERPL-SCNC: 27 MMOL/L (ref 21–32)
CO2 SERPL-SCNC: 28 MMOL/L (ref 21–32)
CO2 SERPL-SCNC: 29 MMOL/L (ref 21–32)
CO2 SERPL-SCNC: 30 MMOL/L (ref 21–32)
CO2 SERPL-SCNC: 31 MMOL/L (ref 21–32)
COMMENT:: NORMAL
CREAT SERPL-MCNC: 4 MG/DL (ref 0.6–1)
CREAT SERPL-MCNC: 4 MG/DL (ref 0.6–1)
CREAT SERPL-MCNC: 4.6 MG/DL (ref 0.6–1)
CREAT SERPL-MCNC: 4.7 MG/DL (ref 0.6–1)
CREAT SERPL-MCNC: 4.7 MG/DL (ref 0.6–1)
CREAT SERPL-MCNC: 5 MG/DL (ref 0.6–1)
CREAT SERPL-MCNC: 5.1 MG/DL (ref 0.6–1)
CREAT SERPL-MCNC: 5.1 MG/DL (ref 0.6–1)
CREAT SERPL-MCNC: 5.3 MG/DL (ref 0.6–1)
CREAT SERPL-MCNC: 5.4 MG/DL (ref 0.6–1)
CREAT SERPL-MCNC: 5.7 MG/DL (ref 0.6–1)
CREAT SERPL-MCNC: 6 MG/DL (ref 0.6–1)
CREAT SERPL-MCNC: 6.3 MG/DL (ref 0.6–1)
CREAT SERPL-MCNC: 6.3 MG/DL (ref 0.6–1)
CREAT SERPL-MCNC: 7.2 MG/DL (ref 0.6–1)
CROSSMATCH RESULT: NORMAL
CROSSMATCH RESULT: NORMAL
D DIMER PPP FEU-MCNC: 16.53 UG/ML(FEU)
DIFFERENTIAL METHOD BLD: ABNORMAL
ECHO AO ASC DIAM: 2.9 CM
ECHO AO ASCENDING AORTA INDEX: 1.49 CM/M2
ECHO AO ROOT DIAM: 3 CM
ECHO AO ROOT DIAM: 3.1 CM
ECHO AO ROOT INDEX: 1.53 CM/M2
ECHO AO ROOT INDEX: 1.59 CM/M2
ECHO AV AREA PEAK VELOCITY: 1.9 CM2
ECHO AV AREA PEAK VELOCITY: 2 CM2
ECHO AV AREA VTI: 2.1 CM2
ECHO AV AREA VTI: 2.3 CM2
ECHO AV AREA/BSA PEAK VELOCITY: 1 CM2/M2
ECHO AV AREA/BSA PEAK VELOCITY: 1 CM2/M2
ECHO AV AREA/BSA VTI: 1.1 CM2/M2
ECHO AV AREA/BSA VTI: 1.2 CM2/M2
ECHO AV MEAN GRADIENT: 3 MMHG
ECHO AV MEAN GRADIENT: 5 MMHG
ECHO AV MEAN VELOCITY: 0.7 M/S
ECHO AV MEAN VELOCITY: 1.1 M/S
ECHO AV PEAK GRADIENT: 5 MMHG
ECHO AV PEAK GRADIENT: 9 MMHG
ECHO AV PEAK VELOCITY: 1.1 M/S
ECHO AV PEAK VELOCITY: 1.5 M/S
ECHO AV VELOCITY RATIO: 0.8
ECHO AV VELOCITY RATIO: 0.82
ECHO AV VTI: 19.1 CM
ECHO AV VTI: 24.9 CM
ECHO BSA: 2.02 M2
ECHO BSA: 2.02 M2
ECHO EST RA PRESSURE: 8 MMHG
ECHO EST RA PRESSURE: 8 MMHG
ECHO IVC PROX: 2 CM
ECHO IVC PROX: 2 CM
ECHO LA AREA 2C: 12.7 CM2
ECHO LA AREA 4C: 10.6 CM2
ECHO LA AREA 4C: 11.5 CM2
ECHO LA DIAMETER INDEX: 1.28 CM/M2
ECHO LA DIAMETER INDEX: 1.38 CM/M2
ECHO LA DIAMETER: 2.5 CM
ECHO LA DIAMETER: 2.7 CM
ECHO LA MAJOR AXIS: 5.9 CM
ECHO LA MAJOR AXIS: 6 CM
ECHO LA MINOR AXIS: 4.5 CM
ECHO LA TO AORTIC ROOT RATIO: 0.81
ECHO LA TO AORTIC ROOT RATIO: 0.9
ECHO LV E' LATERAL VELOCITY: 10 CM/S
ECHO LV E' LATERAL VELOCITY: 9 CM/S
ECHO LV E' SEPTAL VELOCITY: 7 CM/S
ECHO LV E' SEPTAL VELOCITY: 8 CM/S
ECHO LV EDV A4C: 30 ML
ECHO LV EDV INDEX A4C: 15 ML/M2
ECHO LV EJECTION FRACTION A4C: 65 %
ECHO LV ESV A4C: 11 ML
ECHO LV ESV INDEX A4C: 6 ML/M2
ECHO LV FRACTIONAL SHORTENING: 31 % (ref 28–44)
ECHO LV FRACTIONAL SHORTENING: 36 % (ref 28–44)
ECHO LV INTERNAL DIMENSION DIASTOLE INDEX: 1.69 CM/M2
ECHO LV INTERNAL DIMENSION DIASTOLE INDEX: 1.79 CM/M2
ECHO LV INTERNAL DIMENSION DIASTOLIC: 3.3 CM (ref 3.9–5.3)
ECHO LV INTERNAL DIMENSION DIASTOLIC: 3.5 CM (ref 3.9–5.3)
ECHO LV INTERNAL DIMENSION SYSTOLIC INDEX: 1.08 CM/M2
ECHO LV INTERNAL DIMENSION SYSTOLIC INDEX: 1.22 CM/M2
ECHO LV INTERNAL DIMENSION SYSTOLIC: 2.1 CM
ECHO LV INTERNAL DIMENSION SYSTOLIC: 2.4 CM
ECHO LV IVSD: 1.1 CM (ref 0.6–0.9)
ECHO LV IVSD: 1.2 CM (ref 0.6–0.9)
ECHO LV MASS 2D: 116.8 G (ref 67–162)
ECHO LV MASS 2D: 119 G (ref 67–162)
ECHO LV MASS INDEX 2D: 59.9 G/M2 (ref 43–95)
ECHO LV MASS INDEX 2D: 60.7 G/M2 (ref 43–95)
ECHO LV POSTERIOR WALL DIASTOLIC: 1.1 CM (ref 0.6–0.9)
ECHO LV POSTERIOR WALL DIASTOLIC: 1.1 CM (ref 0.6–0.9)
ECHO LV RELATIVE WALL THICKNESS RATIO: 0.63
ECHO LV RELATIVE WALL THICKNESS RATIO: 0.67
ECHO LVOT AREA: 2.5 CM2
ECHO LVOT AREA: 2.5 CM2
ECHO LVOT AV VTI INDEX: 0.84
ECHO LVOT AV VTI INDEX: 0.92
ECHO LVOT DIAM: 1.8 CM
ECHO LVOT DIAM: 1.8 CM
ECHO LVOT MEAN GRADIENT: 1 MMHG
ECHO LVOT MEAN GRADIENT: 3 MMHG
ECHO LVOT PEAK GRADIENT: 3 MMHG
ECHO LVOT PEAK GRADIENT: 5 MMHG
ECHO LVOT PEAK VELOCITY: 0.9 M/S
ECHO LVOT PEAK VELOCITY: 1.2 M/S
ECHO LVOT STROKE VOLUME INDEX: 22.8 ML/M2
ECHO LVOT STROKE VOLUME INDEX: 27.3 ML/M2
ECHO LVOT SV: 44.5 ML
ECHO LVOT SV: 53.4 ML
ECHO LVOT VTI: 17.5 CM
ECHO LVOT VTI: 21 CM
ECHO MV A VELOCITY: 0.78 M/S
ECHO MV A VELOCITY: 0.81 M/S
ECHO MV E DECELERATION TIME (DT): 154 MS
ECHO MV E DECELERATION TIME (DT): 192 MS
ECHO MV E VELOCITY: 0.61 M/S
ECHO MV E VELOCITY: 0.93 M/S
ECHO MV E/A RATIO: 0.78
ECHO MV E/A RATIO: 1.15
ECHO MV E/E' LATERAL: 6.78
ECHO MV E/E' LATERAL: 9.3
ECHO MV E/E' RATIO (AVERAGED): 10.46
ECHO MV E/E' RATIO (AVERAGED): 7.75
ECHO MV E/E' SEPTAL: 11.63
ECHO MV E/E' SEPTAL: 8.71
ECHO PV ACCELERATION TIME (AT): 151 MS
ECHO PV MAX VELOCITY: 0.9 M/S
ECHO PV PEAK GRADIENT: 3 MMHG
ECHO RIGHT VENTRICULAR SYSTOLIC PRESSURE (RVSP): 72 MMHG
ECHO RIGHT VENTRICULAR SYSTOLIC PRESSURE (RVSP): 90 MMHG
ECHO RV BASAL DIMENSION: 5.3 CM
ECHO RV BASAL DIMENSION: 5.6 CM
ECHO RV INTERNAL DIMENSION: 3 CM
ECHO RV INTERNAL DIMENSION: 3.3 CM
ECHO RV LONGITUDINAL DIMENSION: 7.1 CM
ECHO RV LONGITUDINAL DIMENSION: 9 CM
ECHO RV MID DIMENSION: 4.9 CM
ECHO RV MID DIMENSION: 4.9 CM
ECHO RV TAPSE: 1.6 CM (ref 1.7–?)
ECHO TV REGURGITANT MAX VELOCITY: 4 M/S
ECHO TV REGURGITANT MAX VELOCITY: 4.53 M/S
ECHO TV REGURGITANT PEAK GRADIENT: 64 MMHG
ECHO TV REGURGITANT PEAK GRADIENT: 82 MMHG
EKG ATRIAL RATE: 98 BPM
EKG ATRIAL RATE: 99 BPM
EKG DIAGNOSIS: NORMAL
EKG DIAGNOSIS: NORMAL
EKG P AXIS: 26 DEGREES
EKG P AXIS: 71 DEGREES
EKG P-R INTERVAL: 155 MS
EKG P-R INTERVAL: 170 MS
EKG Q-T INTERVAL: 395 MS
EKG Q-T INTERVAL: 395 MS
EKG QRS DURATION: 87 MS
EKG QRS DURATION: 91 MS
EKG QTC CALCULATION (BAZETT): 505 MS
EKG QTC CALCULATION (BAZETT): 507 MS
EKG R AXIS: 139 DEGREES
EKG R AXIS: 145 DEGREES
EKG T AXIS: 28 DEGREES
EKG T AXIS: 33 DEGREES
EKG VENTRICULAR RATE: 98 BPM
EKG VENTRICULAR RATE: 99 BPM
EOSINOPHIL # BLD: 0 K/UL (ref 0–0.8)
EOSINOPHIL # BLD: 0.1 K/UL (ref 0–0.8)
EOSINOPHIL # BLD: 0.2 K/UL (ref 0–0.8)
EOSINOPHIL # BLD: 0.2 K/UL (ref 0–0.8)
EOSINOPHIL NFR BLD: 0 % (ref 0.5–7.8)
EOSINOPHIL NFR BLD: 1 % (ref 0.5–7.8)
EOSINOPHIL NFR BLD: 2 % (ref 0.5–7.8)
EOSINOPHIL NFR BLD: 3 % (ref 0.5–7.8)
ERYTHROCYTE [DISTWIDTH] IN BLOOD BY AUTOMATED COUNT: 16.5 % (ref 11.9–14.6)
ERYTHROCYTE [DISTWIDTH] IN BLOOD BY AUTOMATED COUNT: 16.8 % (ref 11.9–14.6)
ERYTHROCYTE [DISTWIDTH] IN BLOOD BY AUTOMATED COUNT: 17 % (ref 11.9–14.6)
ERYTHROCYTE [DISTWIDTH] IN BLOOD BY AUTOMATED COUNT: 17.1 % (ref 11.9–14.6)
ERYTHROCYTE [DISTWIDTH] IN BLOOD BY AUTOMATED COUNT: 17.2 % (ref 11.9–14.6)
ERYTHROCYTE [DISTWIDTH] IN BLOOD BY AUTOMATED COUNT: 17.3 % (ref 11.9–14.6)
ERYTHROCYTE [DISTWIDTH] IN BLOOD BY AUTOMATED COUNT: 17.5 % (ref 11.9–14.6)
ERYTHROCYTE [DISTWIDTH] IN BLOOD BY AUTOMATED COUNT: 17.6 % (ref 11.9–14.6)
ERYTHROCYTE [DISTWIDTH] IN BLOOD BY AUTOMATED COUNT: 17.7 % (ref 11.9–14.6)
ERYTHROCYTE [DISTWIDTH] IN BLOOD BY AUTOMATED COUNT: 17.8 % (ref 11.9–14.6)
ERYTHROCYTE [DISTWIDTH] IN BLOOD BY AUTOMATED COUNT: 17.8 % (ref 11.9–14.6)
ERYTHROCYTE [DISTWIDTH] IN BLOOD BY AUTOMATED COUNT: 17.9 % (ref 11.9–14.6)
ERYTHROCYTE [DISTWIDTH] IN BLOOD BY AUTOMATED COUNT: 17.9 % (ref 11.9–14.6)
ERYTHROCYTE [DISTWIDTH] IN BLOOD BY AUTOMATED COUNT: 18 % (ref 11.9–14.6)
ERYTHROCYTE [DISTWIDTH] IN BLOOD BY AUTOMATED COUNT: 18.2 % (ref 11.9–14.6)
ERYTHROCYTE [DISTWIDTH] IN BLOOD BY AUTOMATED COUNT: 18.5 % (ref 11.9–14.6)
ERYTHROCYTE [DISTWIDTH] IN BLOOD BY AUTOMATED COUNT: 18.8 % (ref 11.9–14.6)
ERYTHROCYTE [DISTWIDTH] IN BLOOD BY AUTOMATED COUNT: 18.8 % (ref 11.9–14.6)
ERYTHROCYTE [DISTWIDTH] IN BLOOD BY AUTOMATED COUNT: 19.1 % (ref 11.9–14.6)
EST. AVERAGE GLUCOSE BLD GHB EST-MCNC: 100 MG/DL
EST. AVERAGE GLUCOSE BLD GHB EST-MCNC: ABNORMAL MG/DL
FIO2 ON VENT: 80 %
GAS FLOW.O2 O2 DELIVERY SYS: ABNORMAL L/MIN
GLOBULIN SER CALC-MCNC: 3.8 G/DL (ref 2.3–3.5)
GLOBULIN SER CALC-MCNC: 3.9 G/DL (ref 2.3–3.5)
GLOBULIN SER CALC-MCNC: 3.9 G/DL (ref 2.3–3.5)
GLOBULIN SER CALC-MCNC: 4.2 G/DL (ref 2.3–3.5)
GLOBULIN SER CALC-MCNC: 4.2 G/DL (ref 2.3–3.5)
GLUCOSE BLD STRIP.AUTO-MCNC: 100 MG/DL (ref 65–100)
GLUCOSE BLD STRIP.AUTO-MCNC: 100 MG/DL (ref 65–100)
GLUCOSE BLD STRIP.AUTO-MCNC: 101 MG/DL (ref 65–100)
GLUCOSE BLD STRIP.AUTO-MCNC: 103 MG/DL (ref 65–100)
GLUCOSE BLD STRIP.AUTO-MCNC: 105 MG/DL (ref 65–100)
GLUCOSE BLD STRIP.AUTO-MCNC: 107 MG/DL (ref 65–100)
GLUCOSE BLD STRIP.AUTO-MCNC: 107 MG/DL (ref 65–100)
GLUCOSE BLD STRIP.AUTO-MCNC: 108 MG/DL (ref 65–100)
GLUCOSE BLD STRIP.AUTO-MCNC: 108 MG/DL (ref 65–100)
GLUCOSE BLD STRIP.AUTO-MCNC: 109 MG/DL (ref 65–100)
GLUCOSE BLD STRIP.AUTO-MCNC: 111 MG/DL (ref 65–100)
GLUCOSE BLD STRIP.AUTO-MCNC: 116 MG/DL (ref 65–100)
GLUCOSE BLD STRIP.AUTO-MCNC: 119 MG/DL (ref 65–100)
GLUCOSE BLD STRIP.AUTO-MCNC: 119 MG/DL (ref 65–100)
GLUCOSE BLD STRIP.AUTO-MCNC: 121 MG/DL (ref 65–100)
GLUCOSE BLD STRIP.AUTO-MCNC: 121 MG/DL (ref 65–100)
GLUCOSE BLD STRIP.AUTO-MCNC: 122 MG/DL (ref 65–100)
GLUCOSE BLD STRIP.AUTO-MCNC: 123 MG/DL (ref 65–100)
GLUCOSE BLD STRIP.AUTO-MCNC: 124 MG/DL (ref 65–100)
GLUCOSE BLD STRIP.AUTO-MCNC: 124 MG/DL (ref 65–100)
GLUCOSE BLD STRIP.AUTO-MCNC: 125 MG/DL (ref 65–100)
GLUCOSE BLD STRIP.AUTO-MCNC: 126 MG/DL (ref 65–100)
GLUCOSE BLD STRIP.AUTO-MCNC: 127 MG/DL (ref 65–100)
GLUCOSE BLD STRIP.AUTO-MCNC: 128 MG/DL (ref 65–100)
GLUCOSE BLD STRIP.AUTO-MCNC: 130 MG/DL (ref 65–100)
GLUCOSE BLD STRIP.AUTO-MCNC: 131 MG/DL (ref 65–100)
GLUCOSE BLD STRIP.AUTO-MCNC: 132 MG/DL (ref 65–100)
GLUCOSE BLD STRIP.AUTO-MCNC: 135 MG/DL (ref 65–100)
GLUCOSE BLD STRIP.AUTO-MCNC: 136 MG/DL (ref 65–100)
GLUCOSE BLD STRIP.AUTO-MCNC: 136 MG/DL (ref 65–100)
GLUCOSE BLD STRIP.AUTO-MCNC: 137 MG/DL (ref 65–100)
GLUCOSE BLD STRIP.AUTO-MCNC: 138 MG/DL (ref 65–100)
GLUCOSE BLD STRIP.AUTO-MCNC: 138 MG/DL (ref 65–100)
GLUCOSE BLD STRIP.AUTO-MCNC: 139 MG/DL (ref 65–100)
GLUCOSE BLD STRIP.AUTO-MCNC: 139 MG/DL (ref 65–100)
GLUCOSE BLD STRIP.AUTO-MCNC: 140 MG/DL (ref 65–100)
GLUCOSE BLD STRIP.AUTO-MCNC: 144 MG/DL (ref 65–100)
GLUCOSE BLD STRIP.AUTO-MCNC: 145 MG/DL (ref 65–100)
GLUCOSE BLD STRIP.AUTO-MCNC: 145 MG/DL (ref 65–100)
GLUCOSE BLD STRIP.AUTO-MCNC: 146 MG/DL (ref 65–100)
GLUCOSE BLD STRIP.AUTO-MCNC: 147 MG/DL (ref 65–100)
GLUCOSE BLD STRIP.AUTO-MCNC: 149 MG/DL (ref 65–100)
GLUCOSE BLD STRIP.AUTO-MCNC: 150 MG/DL (ref 65–100)
GLUCOSE BLD STRIP.AUTO-MCNC: 151 MG/DL (ref 65–100)
GLUCOSE BLD STRIP.AUTO-MCNC: 151 MG/DL (ref 65–100)
GLUCOSE BLD STRIP.AUTO-MCNC: 155 MG/DL (ref 65–100)
GLUCOSE BLD STRIP.AUTO-MCNC: 155 MG/DL (ref 65–100)
GLUCOSE BLD STRIP.AUTO-MCNC: 159 MG/DL (ref 65–100)
GLUCOSE BLD STRIP.AUTO-MCNC: 162 MG/DL (ref 65–100)
GLUCOSE BLD STRIP.AUTO-MCNC: 163 MG/DL (ref 65–100)
GLUCOSE BLD STRIP.AUTO-MCNC: 167 MG/DL (ref 65–100)
GLUCOSE BLD STRIP.AUTO-MCNC: 168 MG/DL (ref 65–100)
GLUCOSE BLD STRIP.AUTO-MCNC: 170 MG/DL (ref 65–100)
GLUCOSE BLD STRIP.AUTO-MCNC: 177 MG/DL (ref 65–100)
GLUCOSE BLD STRIP.AUTO-MCNC: 179 MG/DL (ref 65–100)
GLUCOSE BLD STRIP.AUTO-MCNC: 182 MG/DL (ref 65–100)
GLUCOSE BLD STRIP.AUTO-MCNC: 182 MG/DL (ref 65–100)
GLUCOSE BLD STRIP.AUTO-MCNC: 184 MG/DL (ref 65–100)
GLUCOSE BLD STRIP.AUTO-MCNC: 191 MG/DL (ref 65–100)
GLUCOSE BLD STRIP.AUTO-MCNC: 197 MG/DL (ref 65–100)
GLUCOSE BLD STRIP.AUTO-MCNC: 199 MG/DL (ref 65–100)
GLUCOSE BLD STRIP.AUTO-MCNC: 201 MG/DL (ref 65–100)
GLUCOSE BLD STRIP.AUTO-MCNC: 203 MG/DL (ref 65–100)
GLUCOSE BLD STRIP.AUTO-MCNC: 209 MG/DL (ref 65–100)
GLUCOSE BLD STRIP.AUTO-MCNC: 220 MG/DL (ref 65–100)
GLUCOSE BLD STRIP.AUTO-MCNC: 233 MG/DL (ref 65–100)
GLUCOSE BLD STRIP.AUTO-MCNC: 233 MG/DL (ref 65–100)
GLUCOSE BLD STRIP.AUTO-MCNC: 50 MG/DL (ref 65–100)
GLUCOSE BLD STRIP.AUTO-MCNC: 62 MG/DL (ref 65–100)
GLUCOSE BLD STRIP.AUTO-MCNC: 68 MG/DL (ref 65–100)
GLUCOSE BLD STRIP.AUTO-MCNC: 83 MG/DL (ref 65–100)
GLUCOSE BLD STRIP.AUTO-MCNC: 87 MG/DL (ref 65–100)
GLUCOSE BLD STRIP.AUTO-MCNC: 91 MG/DL (ref 65–100)
GLUCOSE BLD STRIP.AUTO-MCNC: 93 MG/DL (ref 65–100)
GLUCOSE BLD STRIP.AUTO-MCNC: 97 MG/DL (ref 65–100)
GLUCOSE BLD STRIP.AUTO-MCNC: 98 MG/DL (ref 65–100)
GLUCOSE BLD-MCNC: 107 MG/DL
GLUCOSE SERPL-MCNC: 102 MG/DL (ref 65–100)
GLUCOSE SERPL-MCNC: 107 MG/DL (ref 65–100)
GLUCOSE SERPL-MCNC: 120 MG/DL (ref 65–100)
GLUCOSE SERPL-MCNC: 120 MG/DL (ref 65–100)
GLUCOSE SERPL-MCNC: 128 MG/DL (ref 65–100)
GLUCOSE SERPL-MCNC: 129 MG/DL (ref 65–100)
GLUCOSE SERPL-MCNC: 132 MG/DL (ref 65–100)
GLUCOSE SERPL-MCNC: 133 MG/DL (ref 65–100)
GLUCOSE SERPL-MCNC: 145 MG/DL (ref 65–100)
GLUCOSE SERPL-MCNC: 146 MG/DL (ref 65–100)
GLUCOSE SERPL-MCNC: 154 MG/DL (ref 65–100)
GLUCOSE SERPL-MCNC: 159 MG/DL (ref 65–100)
GLUCOSE SERPL-MCNC: 160 MG/DL (ref 65–100)
GLUCOSE SERPL-MCNC: 173 MG/DL (ref 65–100)
GLUCOSE SERPL-MCNC: 293 MG/DL (ref 65–100)
GLUCOSE SERPL-MCNC: 88 MG/DL (ref 65–100)
GLUCOSE SERPL-MCNC: 93 MG/DL (ref 65–100)
GRAM STN SPEC: NORMAL
GRAM STN SPEC: NORMAL
HBA1C MFR BLD: 5.1 % (ref 4.2–6.3)
HBA1C MFR BLD: <3.5 % (ref 4.2–6.3)
HBV SURFACE AG SER QL: NONREACTIVE
HCO3 BLD-SCNC: 10.1 MMOL/L (ref 22–26)
HCO3 BLD-SCNC: 14.6 MMOL/L (ref 22–26)
HCO3 BLD-SCNC: 15.8 MMOL/L (ref 22–26)
HCO3 BLD-SCNC: 19.5 MMOL/L (ref 22–26)
HCO3 BLD-SCNC: 23.2 MMOL/L (ref 22–26)
HCO3 BLD-SCNC: 24.6 MMOL/L (ref 22–26)
HCO3 BLD-SCNC: 25.2 MMOL/L (ref 22–26)
HCO3 BLD-SCNC: 8.8 MMOL/L (ref 22–26)
HCO3 BLDV-SCNC: 23.2 MMOL/L (ref 23–28)
HCT VFR BLD AUTO: 23.5 % (ref 35.8–46.3)
HCT VFR BLD AUTO: 23.7 % (ref 35.8–46.3)
HCT VFR BLD AUTO: 24 % (ref 35.8–46.3)
HCT VFR BLD AUTO: 24.3 % (ref 35.8–46.3)
HCT VFR BLD AUTO: 24.5 % (ref 35.8–46.3)
HCT VFR BLD AUTO: 24.9 % (ref 35.8–46.3)
HCT VFR BLD AUTO: 25.1 % (ref 35.8–46.3)
HCT VFR BLD AUTO: 25.1 % (ref 35.8–46.3)
HCT VFR BLD AUTO: 25.6 % (ref 35.8–46.3)
HCT VFR BLD AUTO: 26.4 % (ref 35.8–46.3)
HCT VFR BLD AUTO: 27.7 % (ref 35.8–46.3)
HCT VFR BLD AUTO: 27.8 % (ref 35.8–46.3)
HCT VFR BLD AUTO: 28.9 % (ref 35.8–46.3)
HCT VFR BLD AUTO: 28.9 % (ref 35.8–46.3)
HCT VFR BLD AUTO: 29.1 % (ref 35.8–46.3)
HCT VFR BLD AUTO: 29.2 % (ref 35.8–46.3)
HCT VFR BLD AUTO: 29.2 % (ref 35.8–46.3)
HCT VFR BLD AUTO: 29.3 % (ref 35.8–46.3)
HCT VFR BLD AUTO: 29.5 % (ref 35.8–46.3)
HCT VFR BLD AUTO: 29.6 % (ref 35.8–46.3)
HCT VFR BLD AUTO: 29.8 % (ref 35.8–46.3)
HGB BLD-MCNC: 10.1 G/DL (ref 11.7–15.4)
HGB BLD-MCNC: 10.1 G/DL (ref 11.7–15.4)
HGB BLD-MCNC: 10.2 G/DL (ref 11.7–15.4)
HGB BLD-MCNC: 10.5 G/DL (ref 11.7–15.4)
HGB BLD-MCNC: 7.7 G/DL (ref 11.7–15.4)
HGB BLD-MCNC: 8.2 G/DL (ref 11.7–15.4)
HGB BLD-MCNC: 8.4 G/DL (ref 11.7–15.4)
HGB BLD-MCNC: 8.5 G/DL (ref 11.7–15.4)
HGB BLD-MCNC: 8.5 G/DL (ref 11.7–15.4)
HGB BLD-MCNC: 8.7 G/DL (ref 11.7–15.4)
HGB BLD-MCNC: 9 G/DL (ref 11.7–15.4)
HGB BLD-MCNC: 9 G/DL (ref 11.7–15.4)
HGB BLD-MCNC: 9.2 G/DL (ref 11.7–15.4)
HGB BLD-MCNC: 9.7 G/DL (ref 11.7–15.4)
HGB BLD-MCNC: 9.8 G/DL (ref 11.7–15.4)
HGB BLD-MCNC: 9.8 G/DL (ref 11.7–15.4)
HGB BLD-MCNC: 9.9 G/DL (ref 11.7–15.4)
HGB BLD-MCNC: 9.9 G/DL (ref 11.7–15.4)
HISTORY CHECK: NORMAL
IMM GRANULOCYTES # BLD AUTO: 0 K/UL (ref 0–0.5)
IMM GRANULOCYTES # BLD AUTO: 0.1 K/UL (ref 0–0.5)
IMM GRANULOCYTES # BLD AUTO: 0.2 K/UL (ref 0–0.5)
IMM GRANULOCYTES # BLD AUTO: 1 K/UL (ref 0–0.5)
IMM GRANULOCYTES NFR BLD AUTO: 0 % (ref 0–5)
IMM GRANULOCYTES NFR BLD AUTO: 1 % (ref 0–5)
IMM GRANULOCYTES NFR BLD AUTO: 5 % (ref 0–5)
INR PPP: 2.7
INSPIRATION.DURATION SETTING TIME VENT: 0.9 SEC
IPAP/PIP/HIGH PEEP: 20
IPAP/PIP/HIGH PEEP: 25
IPAP/PIP/HIGH PEEP: 32
IPAP/PIP/HIGH PEEP: 34
IPAP/PIP/HIGH PEEP: 36
IPAP/PIP/HIGH PEEP: 41
LACTATE SERPL-SCNC: 1.1 MMOL/L (ref 0.4–2)
LACTATE SERPL-SCNC: 1.5 MMOL/L (ref 0.4–2)
LACTATE SERPL-SCNC: 1.8 MMOL/L (ref 0.4–2)
LACTATE SERPL-SCNC: 19.7 MMOL/L (ref 0.4–2)
LACTATE SERPL-SCNC: 2.3 MMOL/L (ref 0.4–2)
LACTATE SERPL-SCNC: 2.4 MMOL/L (ref 0.4–2)
LACTATE SERPL-SCNC: 25.6 MMOL/L (ref 0.4–2)
LACTATE SERPL-SCNC: 29.6 MMOL/L (ref 0.4–2)
LACTATE SERPL-SCNC: 3.5 MMOL/L (ref 0.4–2)
LACTATE SERPL-SCNC: 4 MMOL/L (ref 0.4–2)
LIPASE SERPL-CCNC: 285 U/L (ref 73–393)
LYMPHOCYTES # BLD: 0.4 K/UL (ref 0.5–4.6)
LYMPHOCYTES # BLD: 0.4 K/UL (ref 0.5–4.6)
LYMPHOCYTES # BLD: 0.5 K/UL (ref 0.5–4.6)
LYMPHOCYTES # BLD: 0.5 K/UL (ref 0.5–4.6)
LYMPHOCYTES # BLD: 0.6 K/UL (ref 0.5–4.6)
LYMPHOCYTES # BLD: 0.7 K/UL (ref 0.5–4.6)
LYMPHOCYTES # BLD: 0.8 K/UL (ref 0.5–4.6)
LYMPHOCYTES # BLD: 1 K/UL (ref 0.5–4.6)
LYMPHOCYTES NFR BLD: 10 % (ref 13–44)
LYMPHOCYTES NFR BLD: 11 % (ref 13–44)
LYMPHOCYTES NFR BLD: 11 % (ref 13–44)
LYMPHOCYTES NFR BLD: 12 % (ref 13–44)
LYMPHOCYTES NFR BLD: 12 % (ref 13–44)
LYMPHOCYTES NFR BLD: 4 % (ref 13–44)
LYMPHOCYTES NFR BLD: 4 % (ref 13–44)
LYMPHOCYTES NFR BLD: 5 % (ref 13–44)
LYMPHOCYTES NFR BLD: 7 % (ref 13–44)
LYMPHOCYTES NFR BLD: 8 % (ref 13–44)
LYMPHOCYTES NFR BLD: 9 % (ref 13–44)
LYMPHOCYTES NFR BLD: 9 % (ref 13–44)
MAGNESIUM SERPL-MCNC: 2.1 MG/DL (ref 1.8–2.4)
MAGNESIUM SERPL-MCNC: 2.2 MG/DL (ref 1.8–2.4)
MCH RBC QN AUTO: 32.2 PG (ref 26.1–32.9)
MCH RBC QN AUTO: 32.2 PG (ref 26.1–32.9)
MCH RBC QN AUTO: 32.6 PG (ref 26.1–32.9)
MCH RBC QN AUTO: 32.6 PG (ref 26.1–32.9)
MCH RBC QN AUTO: 32.7 PG (ref 26.1–32.9)
MCH RBC QN AUTO: 32.7 PG (ref 26.1–32.9)
MCH RBC QN AUTO: 32.8 PG (ref 26.1–32.9)
MCH RBC QN AUTO: 32.8 PG (ref 26.1–32.9)
MCH RBC QN AUTO: 32.9 PG (ref 26.1–32.9)
MCH RBC QN AUTO: 32.9 PG (ref 26.1–32.9)
MCH RBC QN AUTO: 33 PG (ref 26.1–32.9)
MCH RBC QN AUTO: 33.1 PG (ref 26.1–32.9)
MCH RBC QN AUTO: 33.2 PG (ref 26.1–32.9)
MCH RBC QN AUTO: 33.6 PG (ref 26.1–32.9)
MCH RBC QN AUTO: 33.7 PG (ref 26.1–32.9)
MCHC RBC AUTO-ENTMCNC: 32.8 G/DL (ref 31.4–35)
MCHC RBC AUTO-ENTMCNC: 33.2 G/DL (ref 31.4–35)
MCHC RBC AUTO-ENTMCNC: 33.6 G/DL (ref 31.4–35)
MCHC RBC AUTO-ENTMCNC: 33.7 G/DL (ref 31.4–35)
MCHC RBC AUTO-ENTMCNC: 33.7 G/DL (ref 31.4–35)
MCHC RBC AUTO-ENTMCNC: 33.9 G/DL (ref 31.4–35)
MCHC RBC AUTO-ENTMCNC: 34 G/DL (ref 31.4–35)
MCHC RBC AUTO-ENTMCNC: 34.1 G/DL (ref 31.4–35)
MCHC RBC AUTO-ENTMCNC: 34.1 G/DL (ref 31.4–35)
MCHC RBC AUTO-ENTMCNC: 34.2 G/DL (ref 31.4–35)
MCHC RBC AUTO-ENTMCNC: 34.3 G/DL (ref 31.4–35)
MCHC RBC AUTO-ENTMCNC: 34.3 G/DL (ref 31.4–35)
MCHC RBC AUTO-ENTMCNC: 34.5 G/DL (ref 31.4–35)
MCHC RBC AUTO-ENTMCNC: 34.6 G/DL (ref 31.4–35)
MCHC RBC AUTO-ENTMCNC: 34.9 G/DL (ref 31.4–35)
MCHC RBC AUTO-ENTMCNC: 35 G/DL (ref 31.4–35)
MCHC RBC AUTO-ENTMCNC: 35.1 G/DL (ref 31.4–35)
MCHC RBC AUTO-ENTMCNC: 35.9 G/DL (ref 31.4–35)
MCHC RBC AUTO-ENTMCNC: 35.9 G/DL (ref 31.4–35)
MCV RBC AUTO: 93.4 FL (ref 79.6–97.8)
MCV RBC AUTO: 93.7 FL (ref 79.6–97.8)
MCV RBC AUTO: 94 FL (ref 79.6–97.8)
MCV RBC AUTO: 94.2 FL (ref 79.6–97.8)
MCV RBC AUTO: 94.9 FL (ref 79.6–97.8)
MCV RBC AUTO: 95.4 FL (ref 79.6–97.8)
MCV RBC AUTO: 95.8 FL (ref 79.6–97.8)
MCV RBC AUTO: 95.8 FL (ref 79.6–97.8)
MCV RBC AUTO: 96 FL (ref 79.6–97.8)
MCV RBC AUTO: 96.1 FL (ref 79.6–97.8)
MCV RBC AUTO: 96.1 FL (ref 79.6–97.8)
MCV RBC AUTO: 96.4 FL (ref 79.6–97.8)
MCV RBC AUTO: 96.7 FL (ref 79.6–97.8)
MCV RBC AUTO: 97.3 FL (ref 79.6–97.8)
MCV RBC AUTO: 97.3 FL (ref 79.6–97.8)
MCV RBC AUTO: 97.6 FL (ref 79.6–97.8)
MCV RBC AUTO: 97.7 FL (ref 79.6–97.8)
MCV RBC AUTO: 98.2 FL (ref 79.6–97.8)
MCV RBC AUTO: 98.3 FL (ref 79.6–97.8)
MCV RBC AUTO: 98.4 FL (ref 79.6–97.8)
MCV RBC AUTO: 98.8 FL (ref 79.6–97.8)
MONOCYTES # BLD: 0.4 K/UL (ref 0.1–1.3)
MONOCYTES # BLD: 0.5 K/UL (ref 0.1–1.3)
MONOCYTES # BLD: 0.6 K/UL (ref 0.1–1.3)
MONOCYTES # BLD: 0.7 K/UL (ref 0.1–1.3)
MONOCYTES # BLD: 0.8 K/UL (ref 0.1–1.3)
MONOCYTES # BLD: 0.8 K/UL (ref 0.1–1.3)
MONOCYTES # BLD: 0.9 K/UL (ref 0.1–1.3)
MONOCYTES # BLD: 1 K/UL (ref 0.1–1.3)
MONOCYTES # BLD: 1.1 K/UL (ref 0.1–1.3)
MONOCYTES # BLD: 1.1 K/UL (ref 0.1–1.3)
MONOCYTES NFR BLD: 10 % (ref 4–12)
MONOCYTES NFR BLD: 12 % (ref 4–12)
MONOCYTES NFR BLD: 13 % (ref 4–12)
MONOCYTES NFR BLD: 13 % (ref 4–12)
MONOCYTES NFR BLD: 16 % (ref 4–12)
MONOCYTES NFR BLD: 3 % (ref 4–12)
MONOCYTES NFR BLD: 4 % (ref 4–12)
MONOCYTES NFR BLD: 4 % (ref 4–12)
MONOCYTES NFR BLD: 5 % (ref 4–12)
MONOCYTES NFR BLD: 6 % (ref 4–12)
MONOCYTES NFR BLD: 7 % (ref 4–12)
MONOCYTES NFR BLD: 7 % (ref 4–12)
MONOCYTES NFR BLD: 8 % (ref 4–12)
MONOCYTES NFR BLD: 9 % (ref 4–12)
NEUTS SEG # BLD: 11.4 K/UL (ref 1.7–8.2)
NEUTS SEG # BLD: 17.4 K/UL (ref 1.7–8.2)
NEUTS SEG # BLD: 17.4 K/UL (ref 1.7–8.2)
NEUTS SEG # BLD: 3 K/UL (ref 1.7–8.2)
NEUTS SEG # BLD: 4.3 K/UL (ref 1.7–8.2)
NEUTS SEG # BLD: 4.5 K/UL (ref 1.7–8.2)
NEUTS SEG # BLD: 4.8 K/UL (ref 1.7–8.2)
NEUTS SEG # BLD: 5.1 K/UL (ref 1.7–8.2)
NEUTS SEG # BLD: 5.3 K/UL (ref 1.7–8.2)
NEUTS SEG # BLD: 5.4 K/UL (ref 1.7–8.2)
NEUTS SEG # BLD: 5.8 K/UL (ref 1.7–8.2)
NEUTS SEG # BLD: 6.3 K/UL (ref 1.7–8.2)
NEUTS SEG # BLD: 6.5 K/UL (ref 1.7–8.2)
NEUTS SEG # BLD: 7.7 K/UL (ref 1.7–8.2)
NEUTS SEG # BLD: 8.6 K/UL (ref 1.7–8.2)
NEUTS SEG # BLD: 8.8 K/UL (ref 1.7–8.2)
NEUTS SEG NFR BLD: 69 % (ref 43–78)
NEUTS SEG NFR BLD: 71 % (ref 43–78)
NEUTS SEG NFR BLD: 72 % (ref 43–78)
NEUTS SEG NFR BLD: 76 % (ref 43–78)
NEUTS SEG NFR BLD: 77 % (ref 43–78)
NEUTS SEG NFR BLD: 78 % (ref 43–78)
NEUTS SEG NFR BLD: 79 % (ref 43–78)
NEUTS SEG NFR BLD: 80 % (ref 43–78)
NEUTS SEG NFR BLD: 81 % (ref 43–78)
NEUTS SEG NFR BLD: 82 % (ref 43–78)
NEUTS SEG NFR BLD: 82 % (ref 43–78)
NEUTS SEG NFR BLD: 84 % (ref 43–78)
NEUTS SEG NFR BLD: 85 % (ref 43–78)
NEUTS SEG NFR BLD: 85 % (ref 43–78)
NEUTS SEG NFR BLD: 87 % (ref 43–78)
NEUTS SEG NFR BLD: 89 % (ref 43–78)
NEUTS SEG NFR BLD: 89 % (ref 43–78)
NEUTS SEG NFR BLD: 91 % (ref 43–78)
NRBC # BLD: 0 K/UL (ref 0–0.2)
NRBC # BLD: 0.02 K/UL (ref 0–0.2)
NRBC # BLD: 0.02 K/UL (ref 0–0.2)
NRBC # BLD: 0.03 K/UL (ref 0–0.2)
NRBC # BLD: 0.04 K/UL (ref 0–0.2)
NRBC # BLD: 0.06 K/UL (ref 0–0.2)
NRBC # BLD: 0.1 K/UL (ref 0–0.2)
NRBC # BLD: 0.1 K/UL (ref 0–0.2)
NRBC # BLD: 0.16 K/UL (ref 0–0.2)
NRBC # BLD: 0.3 K/UL (ref 0–0.2)
NRBC # BLD: 0.41 K/UL (ref 0–0.2)
O2/TOTAL GAS SETTING VFR VENT: 100 %
O2/TOTAL GAS SETTING VFR VENT: 60 %
PAW @ MEAN EXP FLOW ON VENT: 18 CMH2O
PAW @ MEAN EXP FLOW ON VENT: 19 CMH2O
PAW @ MEAN EXP FLOW ON VENT: 22 CMH2O
PAW @ MEAN EXP FLOW ON VENT: 22 CMH2O
PAW @ MEAN EXP FLOW ON VENT: 24 CMH2O
PCO2 BLD: 26.7 MMHG (ref 35–45)
PCO2 BLD: 27.6 MMHG (ref 35–45)
PCO2 BLD: 28.5 MMHG (ref 35–45)
PCO2 BLD: 29.4 MMHG (ref 35–45)
PCO2 BLD: 38.6 MMHG (ref 35–45)
PCO2 BLD: 39 MMHG (ref 35–45)
PCO2 BLD: 42.1 MMHG (ref 35–45)
PCO2 BLD: 47.7 MMHG (ref 35–45)
PCO2 BLDV: 38.1 MMHG (ref 41–51)
PEEP RESPIRATORY: 10 CMH2O
PEEP RESPIRATORY: 10 CMH2O
PEEP RESPIRATORY: 12 CMH2O
PEEP RESPIRATORY: 8 CMH2O
PEEP RESPIRATORY: 8 CMH2O
PEEP RESPIRATORY: 8 CM[H2O]
PH BLD: 7.11 [PH] (ref 7.35–7.45)
PH BLD: 7.18 [PH] (ref 7.35–7.45)
PH BLD: 7.27 [PH] (ref 7.35–7.45)
PH BLD: 7.31 [PH] (ref 7.35–7.45)
PH BLD: 7.32 [PH] (ref 7.35–7.45)
PH BLD: 7.35 [PH] (ref 7.35–7.45)
PH BLD: 7.38 [PH] (ref 7.35–7.45)
PH BLD: 7.42 [PH] (ref 7.35–7.45)
PH BLDV: 7.39 [PH] (ref 7.32–7.42)
PHOSPHATE SERPL-MCNC: 2.9 MG/DL (ref 2.3–3.7)
PHOSPHATE SERPL-MCNC: 4.3 MG/DL (ref 2.3–3.7)
PLATELET # BLD AUTO: 104 K/UL (ref 150–450)
PLATELET # BLD AUTO: 107 K/UL (ref 150–450)
PLATELET # BLD AUTO: 113 K/UL (ref 150–450)
PLATELET # BLD AUTO: 120 K/UL (ref 150–450)
PLATELET # BLD AUTO: 121 K/UL (ref 150–450)
PLATELET # BLD AUTO: 126 K/UL (ref 150–450)
PLATELET # BLD AUTO: 127 K/UL (ref 150–450)
PLATELET # BLD AUTO: 129 K/UL (ref 150–450)
PLATELET # BLD AUTO: 132 K/UL (ref 150–450)
PLATELET # BLD AUTO: 138 K/UL (ref 150–450)
PLATELET # BLD AUTO: 145 K/UL (ref 150–450)
PLATELET # BLD AUTO: 154 K/UL (ref 150–450)
PLATELET # BLD AUTO: 62 K/UL (ref 150–450)
PLATELET # BLD AUTO: 78 K/UL (ref 150–450)
PLATELET # BLD AUTO: 84 K/UL (ref 150–450)
PLATELET # BLD AUTO: 85 K/UL (ref 150–450)
PLATELET # BLD AUTO: 87 K/UL (ref 150–450)
PLATELET # BLD AUTO: 89 K/UL (ref 150–450)
PLATELET # BLD AUTO: 98 K/UL (ref 150–450)
PLATELET COMMENT: ABNORMAL
PMV BLD AUTO: 10.3 FL (ref 9.4–12.3)
PMV BLD AUTO: 10.6 FL (ref 9.4–12.3)
PMV BLD AUTO: 10.6 FL (ref 9.4–12.3)
PMV BLD AUTO: 10.8 FL (ref 9.4–12.3)
PMV BLD AUTO: 10.9 FL (ref 9.4–12.3)
PMV BLD AUTO: 11 FL (ref 9.4–12.3)
PMV BLD AUTO: 11.2 FL (ref 9.4–12.3)
PMV BLD AUTO: 11.3 FL (ref 9.4–12.3)
PMV BLD AUTO: 11.5 FL (ref 9.4–12.3)
PMV BLD AUTO: 11.5 FL (ref 9.4–12.3)
PMV BLD AUTO: 12.2 FL (ref 9.4–12.3)
PMV BLD AUTO: 12.3 FL (ref 9.4–12.3)
PMV BLD AUTO: 12.4 FL (ref 9.4–12.3)
PMV BLD AUTO: 13 FL (ref 9.4–12.3)
PMV BLD AUTO: 13.1 FL (ref 9.4–12.3)
PMV BLD AUTO: 13.3 FL (ref 9.4–12.3)
PMV BLD AUTO: 13.4 FL (ref 9.4–12.3)
PMV BLD AUTO: ABNORMAL FL (ref 9.4–12.3)
PO2 BLD: 102 MMHG (ref 75–100)
PO2 BLD: 103 MMHG (ref 75–100)
PO2 BLD: 107 MMHG (ref 75–100)
PO2 BLD: 116 MMHG (ref 75–100)
PO2 BLD: 149 MMHG (ref 75–100)
PO2 BLD: 267 MMHG (ref 75–100)
PO2 BLD: 39 MMHG (ref 75–100)
PO2 BLD: 79 MMHG (ref 75–100)
PO2 BLDV: 37 MMHG
POTASSIUM BLD-SCNC: 3.4 MMOL/L (ref 3.5–5.1)
POTASSIUM BLD-SCNC: 4 MMOL/L (ref 3.5–5.1)
POTASSIUM BLD-SCNC: 5 MMOL/L (ref 3.5–5.1)
POTASSIUM SERPL-SCNC: 3.5 MMOL/L (ref 3.5–5.1)
POTASSIUM SERPL-SCNC: 3.6 MMOL/L (ref 3.5–5.1)
POTASSIUM SERPL-SCNC: 3.7 MMOL/L (ref 3.5–5.1)
POTASSIUM SERPL-SCNC: 3.8 MMOL/L (ref 3.5–5.1)
POTASSIUM SERPL-SCNC: 3.8 MMOL/L (ref 3.5–5.1)
POTASSIUM SERPL-SCNC: 3.9 MMOL/L (ref 3.5–5.1)
POTASSIUM SERPL-SCNC: 4 MMOL/L (ref 3.5–5.1)
POTASSIUM SERPL-SCNC: 4.1 MMOL/L (ref 3.5–5.1)
POTASSIUM SERPL-SCNC: 4.2 MMOL/L (ref 3.5–5.1)
POTASSIUM SERPL-SCNC: 4.4 MMOL/L (ref 3.5–5.1)
POTASSIUM SERPL-SCNC: 4.5 MMOL/L (ref 3.5–5.1)
POTASSIUM SERPL-SCNC: 4.5 MMOL/L (ref 3.5–5.1)
POTASSIUM SERPL-SCNC: 4.7 MMOL/L (ref 3.5–5.1)
POTASSIUM SERPL-SCNC: 4.8 MMOL/L (ref 3.5–5.1)
POTASSIUM SERPL-SCNC: 5.5 MMOL/L (ref 3.5–5.1)
PRESSURE SUPPORT SETTING VENT: 10 CMH2O
PROCALCITONIN SERPL-MCNC: 0.36 NG/ML (ref 0–0.49)
PROCALCITONIN SERPL-MCNC: 0.37 NG/ML (ref 0–0.49)
PROCALCITONIN SERPL-MCNC: 0.65 NG/ML (ref 0–0.49)
PROCALCITONIN SERPL-MCNC: 18.36 NG/ML (ref 0–0.49)
PROT SERPL-MCNC: 6.5 G/DL (ref 6.3–8.2)
PROT SERPL-MCNC: 7.3 G/DL (ref 6.3–8.2)
PROT SERPL-MCNC: 7.3 G/DL (ref 6.3–8.2)
PROT SERPL-MCNC: 7.6 G/DL (ref 6.3–8.2)
PROT SERPL-MCNC: 8.1 G/DL (ref 6.3–8.2)
PROTHROMBIN TIME: 29.7 SEC (ref 12.6–14.5)
RBC # BLD AUTO: 2.39 M/UL (ref 4.05–5.2)
RBC # BLD AUTO: 2.47 M/UL (ref 4.05–5.2)
RBC # BLD AUTO: 2.53 M/UL (ref 4.05–5.2)
RBC # BLD AUTO: 2.55 M/UL (ref 4.05–5.2)
RBC # BLD AUTO: 2.55 M/UL (ref 4.05–5.2)
RBC # BLD AUTO: 2.57 M/UL (ref 4.05–5.2)
RBC # BLD AUTO: 2.57 M/UL (ref 4.05–5.2)
RBC # BLD AUTO: 2.59 M/UL (ref 4.05–5.2)
RBC # BLD AUTO: 2.67 M/UL (ref 4.05–5.2)
RBC # BLD AUTO: 2.74 M/UL (ref 4.05–5.2)
RBC # BLD AUTO: 2.82 M/UL (ref 4.05–5.2)
RBC # BLD AUTO: 2.93 M/UL (ref 4.05–5.2)
RBC # BLD AUTO: 3 M/UL (ref 4.05–5.2)
RBC # BLD AUTO: 3.01 M/UL (ref 4.05–5.2)
RBC # BLD AUTO: 3.01 M/UL (ref 4.05–5.2)
RBC # BLD AUTO: 3.03 M/UL (ref 4.05–5.2)
RBC # BLD AUTO: 3.04 M/UL (ref 4.05–5.2)
RBC # BLD AUTO: 3.06 M/UL (ref 4.05–5.2)
RBC # BLD AUTO: 3.08 M/UL (ref 4.05–5.2)
RBC # BLD AUTO: 3.09 M/UL (ref 4.05–5.2)
RBC # BLD AUTO: 3.11 M/UL (ref 4.05–5.2)
RBC MORPH BLD: ABNORMAL
RESPIRATORY RATE, POC: 20 (ref 5–40)
RESPIRATORY RATE, POC: 28 (ref 5–40)
RESPIRATORY RATE, POC: 28 (ref 5–40)
RESPIRATORY RATE, POC: 31 (ref 5–40)
SAO2 % BLD: 100 %
SAO2 % BLD: 66.1 % (ref 95–98)
SAO2 % BLD: 94 %
SAO2 % BLD: 96.4 % (ref 95–98)
SAO2 % BLD: 96.8 % (ref 95–98)
SAO2 % BLD: 97.7 % (ref 95–98)
SAO2 % BLD: 97.8 % (ref 95–98)
SAO2 % BLD: 99.2 % (ref 95–98)
SAO2 % BLDV: 69.8 % (ref 65–88)
SARS-COV-2 RDRP RESP QL NAA+PROBE: NOT DETECTED
SARS-COV-2 RDRP RESP QL NAA+PROBE: NOT DETECTED
SERVICE CMNT-IMP: 0
SERVICE CMNT-IMP: ABNORMAL
SERVICE CMNT-IMP: NORMAL
SODIUM BLD-SCNC: 139 MMOL/L (ref 136–145)
SODIUM BLD-SCNC: 140 MMOL/L (ref 136–145)
SODIUM SERPL-SCNC: 133 MMOL/L (ref 136–145)
SODIUM SERPL-SCNC: 134 MMOL/L (ref 136–145)
SODIUM SERPL-SCNC: 135 MMOL/L (ref 136–145)
SODIUM SERPL-SCNC: 136 MMOL/L (ref 136–145)
SODIUM SERPL-SCNC: 136 MMOL/L (ref 136–145)
SODIUM SERPL-SCNC: 137 MMOL/L (ref 136–145)
SODIUM SERPL-SCNC: 138 MMOL/L (ref 136–145)
SODIUM SERPL-SCNC: 139 MMOL/L (ref 136–145)
SODIUM SERPL-SCNC: 140 MMOL/L (ref 136–145)
SOURCE: NORMAL
SOURCE: NORMAL
SPECIMEN EXP DATE BLD: NORMAL
SPECIMEN SITE: ABNORMAL
SPECIMEN SITE: ABNORMAL
SPECIMEN TYPE: ABNORMAL
T4 FREE SERPL-MCNC: 1.3 NG/DL (ref 0.78–1.46)
TSH W FREE THYROID IF ABNORMAL: 4.42 UIU/ML (ref 0.36–3.74)
UFH PPP CHRO-ACNC: 0.41 IU/ML (ref 0.3–0.7)
UFH PPP CHRO-ACNC: 0.56 IU/ML (ref 0.3–0.7)
UNIT DIVISION: 0
UNIT DIVISION: 0
VANCOMYCIN SERPL-MCNC: 17.5 UG/ML
VENTILATION MODE VENT: ABNORMAL
VT SETTING VENT: 480 ML
VT SETTING VENT: 845 ML
WBC # BLD AUTO: 10.7 K/UL (ref 4.3–11.1)
WBC # BLD AUTO: 10.7 K/UL (ref 4.3–11.1)
WBC # BLD AUTO: 12.6 K/UL (ref 4.3–11.1)
WBC # BLD AUTO: 17.2 K/UL (ref 4.3–11.1)
WBC # BLD AUTO: 19.5 K/UL (ref 4.3–11.1)
WBC # BLD AUTO: 20 K/UL (ref 4.3–11.1)
WBC # BLD AUTO: 4.2 K/UL (ref 4.3–11.1)
WBC # BLD AUTO: 6.1 K/UL (ref 4.3–11.1)
WBC # BLD AUTO: 6.3 K/UL (ref 4.3–11.1)
WBC # BLD AUTO: 6.5 K/UL (ref 4.3–11.1)
WBC # BLD AUTO: 7 K/UL (ref 4.3–11.1)
WBC # BLD AUTO: 7.1 K/UL (ref 4.3–11.1)
WBC # BLD AUTO: 7.1 K/UL (ref 4.3–11.1)
WBC # BLD AUTO: 7.4 K/UL (ref 4.3–11.1)
WBC # BLD AUTO: 7.8 K/UL (ref 4.3–11.1)
WBC # BLD AUTO: 8.2 K/UL (ref 4.3–11.1)
WBC # BLD AUTO: 8.2 K/UL (ref 4.3–11.1)
WBC # BLD AUTO: 9 K/UL (ref 4.3–11.1)
WBC # BLD AUTO: 9.7 K/UL (ref 4.3–11.1)
WBC MORPH BLD: ABNORMAL

## 2022-01-01 PROCEDURE — 2580000003 HC RX 258: Performed by: INTERNAL MEDICINE

## 2022-01-01 PROCEDURE — 2580000003 HC RX 258: Performed by: HOSPITALIST

## 2022-01-01 PROCEDURE — 92526 ORAL FUNCTION THERAPY: CPT

## 2022-01-01 PROCEDURE — 36415 COLL VENOUS BLD VENIPUNCTURE: CPT

## 2022-01-01 PROCEDURE — 1090000002 HH PPS REVENUE DEBIT

## 2022-01-01 PROCEDURE — 1036F TOBACCO NON-USER: CPT | Performed by: SURGERY

## 2022-01-01 PROCEDURE — C1769 GUIDE WIRE: HCPCS

## 2022-01-01 PROCEDURE — G0299 HHS/HOSPICE OF RN EA 15 MIN: HCPCS

## 2022-01-01 PROCEDURE — 2500000003 HC RX 250 WO HCPCS: Performed by: INTERNAL MEDICINE

## 2022-01-01 PROCEDURE — 6370000000 HC RX 637 (ALT 250 FOR IP): Performed by: HOSPITALIST

## 2022-01-01 PROCEDURE — 6370000000 HC RX 637 (ALT 250 FOR IP): Performed by: INTERNAL MEDICINE

## 2022-01-01 PROCEDURE — 36600 WITHDRAWAL OF ARTERIAL BLOOD: CPT

## 2022-01-01 PROCEDURE — 6360000002 HC RX W HCPCS: Performed by: INTERNAL MEDICINE

## 2022-01-01 PROCEDURE — 80048 BASIC METABOLIC PNL TOTAL CA: CPT

## 2022-01-01 PROCEDURE — 94660 CPAP INITIATION&MGMT: CPT

## 2022-01-01 PROCEDURE — 83735 ASSAY OF MAGNESIUM: CPT

## 2022-01-01 PROCEDURE — 2100000000 HC CCU R&B

## 2022-01-01 PROCEDURE — 7100000010 HC PHASE II RECOVERY - FIRST 15 MIN: Performed by: SURGERY

## 2022-01-01 PROCEDURE — 71250 CT THORAX DX C-: CPT

## 2022-01-01 PROCEDURE — 2500000003 HC RX 250 WO HCPCS: Performed by: NURSE ANESTHETIST, CERTIFIED REGISTERED

## 2022-01-01 PROCEDURE — 3017F COLORECTAL CA SCREEN DOC REV: CPT | Performed by: SURGERY

## 2022-01-01 PROCEDURE — 1090000001 HH PPS REVENUE CREDIT

## 2022-01-01 PROCEDURE — 84295 ASSAY OF SERUM SODIUM: CPT

## 2022-01-01 PROCEDURE — 99291 CRITICAL CARE FIRST HOUR: CPT | Performed by: INTERNAL MEDICINE

## 2022-01-01 PROCEDURE — 97530 THERAPEUTIC ACTIVITIES: CPT

## 2022-01-01 PROCEDURE — 2500000003 HC RX 250 WO HCPCS: Performed by: SURGERY

## 2022-01-01 PROCEDURE — 82962 GLUCOSE BLOOD TEST: CPT

## 2022-01-01 PROCEDURE — 1100000000 HC RM PRIVATE

## 2022-01-01 PROCEDURE — 87040 BLOOD CULTURE FOR BACTERIA: CPT

## 2022-01-01 PROCEDURE — 6360000002 HC RX W HCPCS: Performed by: RADIOLOGY

## 2022-01-01 PROCEDURE — 6360000002 HC RX W HCPCS: Performed by: HOSPITALIST

## 2022-01-01 PROCEDURE — 71045 X-RAY EXAM CHEST 1 VIEW: CPT

## 2022-01-01 PROCEDURE — 2580000003 HC RX 258: Performed by: STUDENT IN AN ORGANIZED HEALTH CARE EDUCATION/TRAINING PROGRAM

## 2022-01-01 PROCEDURE — 85025 COMPLETE CBC W/AUTO DIFF WBC: CPT

## 2022-01-01 PROCEDURE — 1100000003 HC PRIVATE W/ TELEMETRY

## 2022-01-01 PROCEDURE — 31500 INSERT EMERGENCY AIRWAY: CPT

## 2022-01-01 PROCEDURE — 2580000003 HC RX 258: Performed by: EMERGENCY MEDICINE

## 2022-01-01 PROCEDURE — 99292 CRITICAL CARE ADDL 30 MIN: CPT | Performed by: INTERNAL MEDICINE

## 2022-01-01 PROCEDURE — 6370000000 HC RX 637 (ALT 250 FOR IP): Performed by: FAMILY MEDICINE

## 2022-01-01 PROCEDURE — 6370000000 HC RX 637 (ALT 250 FOR IP): Performed by: NURSE PRACTITIONER

## 2022-01-01 PROCEDURE — 7100000000 HC PACU RECOVERY - FIRST 15 MIN: Performed by: SURGERY

## 2022-01-01 PROCEDURE — G0157 HHC PT ASSISTANT EA 15: HCPCS

## 2022-01-01 PROCEDURE — 2700000000 HC OXYGEN THERAPY PER DAY

## 2022-01-01 PROCEDURE — 2500000003 HC RX 250 WO HCPCS: Performed by: RADIOLOGY

## 2022-01-01 PROCEDURE — G8400 PT W/DXA NO RESULTS DOC: HCPCS | Performed by: SURGERY

## 2022-01-01 PROCEDURE — 6370000000 HC RX 637 (ALT 250 FOR IP): Performed by: PHYSICIAN ASSISTANT

## 2022-01-01 PROCEDURE — 85520 HEPARIN ASSAY: CPT

## 2022-01-01 PROCEDURE — G0158 HHC OT ASSISTANT EA 15: HCPCS

## 2022-01-01 PROCEDURE — 93306 TTE W/DOPPLER COMPLETE: CPT | Performed by: INTERNAL MEDICINE

## 2022-01-01 PROCEDURE — 3600000003 HC SURGERY LEVEL 3 BASE: Performed by: SURGERY

## 2022-01-01 PROCEDURE — 82803 BLOOD GASES ANY COMBINATION: CPT

## 2022-01-01 PROCEDURE — 93005 ELECTROCARDIOGRAM TRACING: CPT | Performed by: EMERGENCY MEDICINE

## 2022-01-01 PROCEDURE — 99024 POSTOP FOLLOW-UP VISIT: CPT | Performed by: SURGERY

## 2022-01-01 PROCEDURE — 84439 ASSAY OF FREE THYROXINE: CPT

## 2022-01-01 PROCEDURE — 99285 EMERGENCY DEPT VISIT HI MDM: CPT

## 2022-01-01 PROCEDURE — 82330 ASSAY OF CALCIUM: CPT

## 2022-01-01 PROCEDURE — 6360000002 HC RX W HCPCS: Performed by: ANESTHESIOLOGY

## 2022-01-01 PROCEDURE — 2500000003 HC RX 250 WO HCPCS

## 2022-01-01 PROCEDURE — C1894 INTRO/SHEATH, NON-LASER: HCPCS | Performed by: SURGERY

## 2022-01-01 PROCEDURE — 94002 VENT MGMT INPAT INIT DAY: CPT

## 2022-01-01 PROCEDURE — 83605 ASSAY OF LACTIC ACID: CPT

## 2022-01-01 PROCEDURE — 6360000002 HC RX W HCPCS: Performed by: STUDENT IN AN ORGANIZED HEALTH CARE EDUCATION/TRAINING PROGRAM

## 2022-01-01 PROCEDURE — 80053 COMPREHEN METABOLIC PANEL: CPT

## 2022-01-01 PROCEDURE — 97165 OT EVAL LOW COMPLEX 30 MIN: CPT

## 2022-01-01 PROCEDURE — 87205 SMEAR GRAM STAIN: CPT

## 2022-01-01 PROCEDURE — 6360000002 HC RX W HCPCS: Performed by: EMERGENCY MEDICINE

## 2022-01-01 PROCEDURE — 2580000003 HC RX 258: Performed by: NURSE ANESTHETIST, CERTIFIED REGISTERED

## 2022-01-01 PROCEDURE — 99221 1ST HOSP IP/OBS SF/LOW 40: CPT | Performed by: SURGERY

## 2022-01-01 PROCEDURE — 96376 TX/PRO/DX INJ SAME DRUG ADON: CPT

## 2022-01-01 PROCEDURE — 2580000003 HC RX 258: Performed by: FAMILY MEDICINE

## 2022-01-01 PROCEDURE — 85018 HEMOGLOBIN: CPT

## 2022-01-01 PROCEDURE — 1090F PRES/ABSN URINE INCON ASSESS: CPT | Performed by: SURGERY

## 2022-01-01 PROCEDURE — 96365 THER/PROPH/DIAG IV INF INIT: CPT

## 2022-01-01 PROCEDURE — 90945 DIALYSIS ONE EVALUATION: CPT

## 2022-01-01 PROCEDURE — 1123F ACP DISCUSS/DSCN MKR DOCD: CPT | Performed by: SURGERY

## 2022-01-01 PROCEDURE — 0221000100 HH NO PAY CLAIM PROCEDURE

## 2022-01-01 PROCEDURE — G0151 HHCP-SERV OF PT,EA 15 MIN: HCPCS

## 2022-01-01 PROCEDURE — 04LL3DZ OCCLUSION OF LEFT FEMORAL ARTERY WITH INTRALUMINAL DEVICE, PERCUTANEOUS APPROACH: ICD-10-PCS | Performed by: RADIOLOGY

## 2022-01-01 PROCEDURE — 3600000013 HC SURGERY LEVEL 3 ADDTL 15MIN: Performed by: SURGERY

## 2022-01-01 PROCEDURE — 3700000001 HC ADD 15 MINUTES (ANESTHESIA): Performed by: SURGERY

## 2022-01-01 PROCEDURE — 86923 COMPATIBILITY TEST ELECTRIC: CPT

## 2022-01-01 PROCEDURE — 6370000000 HC RX 637 (ALT 250 FOR IP): Performed by: EMERGENCY MEDICINE

## 2022-01-01 PROCEDURE — 6360000002 HC RX W HCPCS: Performed by: NURSE ANESTHETIST, CERTIFIED REGISTERED

## 2022-01-01 PROCEDURE — 97162 PT EVAL MOD COMPLEX 30 MIN: CPT

## 2022-01-01 PROCEDURE — 70450 CT HEAD/BRAIN W/O DYE: CPT

## 2022-01-01 PROCEDURE — 36140 INTRO NDL ICATH UPR/LXTR ART: CPT | Performed by: SURGERY

## 2022-01-01 PROCEDURE — 3700000000 HC ANESTHESIA ATTENDED CARE: Performed by: SURGERY

## 2022-01-01 PROCEDURE — 82947 ASSAY GLUCOSE BLOOD QUANT: CPT

## 2022-01-01 PROCEDURE — 7100000011 HC PHASE II RECOVERY - ADDTL 15 MIN: Performed by: SURGERY

## 2022-01-01 PROCEDURE — 0BH17EZ INSERTION OF ENDOTRACHEAL AIRWAY INTO TRACHEA, VIA NATURAL OR ARTIFICIAL OPENING: ICD-10-PCS | Performed by: INTERNAL MEDICINE

## 2022-01-01 PROCEDURE — 6370000000 HC RX 637 (ALT 250 FOR IP): Performed by: STUDENT IN AN ORGANIZED HEALTH CARE EDUCATION/TRAINING PROGRAM

## 2022-01-01 PROCEDURE — 2709999900 HC NON-CHARGEABLE SUPPLY: Performed by: SURGERY

## 2022-01-01 PROCEDURE — 96375 TX/PRO/DX INJ NEW DRUG ADDON: CPT

## 2022-01-01 PROCEDURE — 2580000003 HC RX 258

## 2022-01-01 PROCEDURE — 74177 CT ABD & PELVIS W/CONTRAST: CPT

## 2022-01-01 PROCEDURE — 0Y960ZZ DRAINAGE OF LEFT INGUINAL REGION, OPEN APPROACH: ICD-10-PCS | Performed by: SURGERY

## 2022-01-01 PROCEDURE — 99232 SBSQ HOSP IP/OBS MODERATE 35: CPT | Performed by: SURGERY

## 2022-01-01 PROCEDURE — 6360000004 HC RX CONTRAST MEDICATION: Performed by: RADIOLOGY

## 2022-01-01 PROCEDURE — P9041 ALBUMIN (HUMAN),5%, 50ML: HCPCS | Performed by: INTERNAL MEDICINE

## 2022-01-01 PROCEDURE — 94760 N-INVAS EAR/PLS OXIMETRY 1: CPT

## 2022-01-01 PROCEDURE — G8427 DOCREV CUR MEDS BY ELIG CLIN: HCPCS | Performed by: SURGERY

## 2022-01-01 PROCEDURE — C9113 INJ PANTOPRAZOLE SODIUM, VIA: HCPCS | Performed by: INTERNAL MEDICINE

## 2022-01-01 PROCEDURE — 5A1945Z RESPIRATORY VENTILATION, 24-96 CONSECUTIVE HOURS: ICD-10-PCS | Performed by: INTERNAL MEDICINE

## 2022-01-01 PROCEDURE — 74018 RADEX ABDOMEN 1 VIEW: CPT

## 2022-01-01 PROCEDURE — 8010000000 HC HEMODIALYSIS ACUTE INPT

## 2022-01-01 PROCEDURE — G0152 HHCP-SERV OF OT,EA 15 MIN: HCPCS

## 2022-01-01 PROCEDURE — 80069 RENAL FUNCTION PANEL: CPT

## 2022-01-01 PROCEDURE — 97535 SELF CARE MNGMENT TRAINING: CPT

## 2022-01-01 PROCEDURE — 85379 FIBRIN DEGRADATION QUANT: CPT

## 2022-01-01 PROCEDURE — 85027 COMPLETE CBC AUTOMATED: CPT

## 2022-01-01 PROCEDURE — 83036 HEMOGLOBIN GLYCOSYLATED A1C: CPT

## 2022-01-01 PROCEDURE — 6360000002 HC RX W HCPCS: Performed by: SURGERY

## 2022-01-01 PROCEDURE — 2580000003 HC RX 258: Performed by: ANESTHESIOLOGY

## 2022-01-01 PROCEDURE — 87075 CULTR BACTERIA EXCEPT BLOOD: CPT

## 2022-01-01 PROCEDURE — 1111F DSCHRG MED/CURRENT MED MERGE: CPT | Performed by: SURGERY

## 2022-01-01 PROCEDURE — B41J1ZZ FLUOROSCOPY OF OTHER LOWER ARTERIES USING LOW OSMOLAR CONTRAST: ICD-10-PCS | Performed by: INTERNAL MEDICINE

## 2022-01-01 PROCEDURE — 6360000002 HC RX W HCPCS

## 2022-01-01 PROCEDURE — 80202 ASSAY OF VANCOMYCIN: CPT

## 2022-01-01 PROCEDURE — 2500000003 HC RX 250 WO HCPCS: Performed by: STUDENT IN AN ORGANIZED HEALTH CARE EDUCATION/TRAINING PROGRAM

## 2022-01-01 PROCEDURE — 85730 THROMBOPLASTIN TIME PARTIAL: CPT

## 2022-01-01 PROCEDURE — 97166 OT EVAL MOD COMPLEX 45 MIN: CPT

## 2022-01-01 PROCEDURE — G8417 CALC BMI ABV UP PARAM F/U: HCPCS | Performed by: SURGERY

## 2022-01-01 PROCEDURE — 7100000001 HC PACU RECOVERY - ADDTL 15 MIN: Performed by: SURGERY

## 2022-01-01 PROCEDURE — 400014 HH F/U

## 2022-01-01 PROCEDURE — A4216 STERILE WATER/SALINE, 10 ML: HCPCS | Performed by: INTERNAL MEDICINE

## 2022-01-01 PROCEDURE — B51W1ZZ FLUOROSCOPY OF DIALYSIS SHUNT/FISTULA USING LOW OSMOLAR CONTRAST: ICD-10-PCS | Performed by: RADIOLOGY

## 2022-01-01 PROCEDURE — 36830 ARTERY-VEIN NONAUTOGRAFT: CPT | Performed by: SURGERY

## 2022-01-01 PROCEDURE — 36620 INSERTION CATHETER ARTERY: CPT | Performed by: INTERNAL MEDICINE

## 2022-01-01 PROCEDURE — 5A1D70Z PERFORMANCE OF URINARY FILTRATION, INTERMITTENT, LESS THAN 6 HOURS PER DAY: ICD-10-PCS | Performed by: INTERNAL MEDICINE

## 2022-01-01 PROCEDURE — 2500000003 HC RX 250 WO HCPCS: Performed by: ANESTHESIOLOGY

## 2022-01-01 PROCEDURE — C1768 GRAFT, VASCULAR: HCPCS | Performed by: SURGERY

## 2022-01-01 PROCEDURE — 97161 PT EVAL LOW COMPLEX 20 MIN: CPT

## 2022-01-01 PROCEDURE — 5A1D80Z PERFORMANCE OF URINARY FILTRATION, PROLONGED INTERMITTENT, 6-18 HOURS PER DAY: ICD-10-PCS | Performed by: INTERNAL MEDICINE

## 2022-01-01 PROCEDURE — 6370000000 HC RX 637 (ALT 250 FOR IP): Performed by: ANESTHESIOLOGY

## 2022-01-01 PROCEDURE — 6360000004 HC RX CONTRAST MEDICATION

## 2022-01-01 PROCEDURE — 93306 TTE W/DOPPLER COMPLETE: CPT

## 2022-01-01 PROCEDURE — 87340 HEPATITIS B SURFACE AG IA: CPT

## 2022-01-01 PROCEDURE — 2580000003 HC RX 258: Performed by: PHYSICIAN ASSISTANT

## 2022-01-01 PROCEDURE — 84132 ASSAY OF SERUM POTASSIUM: CPT

## 2022-01-01 PROCEDURE — 37799 UNLISTED PX VASCULAR SURGERY: CPT

## 2022-01-01 PROCEDURE — 96374 THER/PROPH/DIAG INJ IV PUSH: CPT

## 2022-01-01 PROCEDURE — 84145 PROCALCITONIN (PCT): CPT

## 2022-01-01 PROCEDURE — 83690 ASSAY OF LIPASE: CPT

## 2022-01-01 PROCEDURE — 99222 1ST HOSP IP/OBS MODERATE 55: CPT | Performed by: SURGERY

## 2022-01-01 PROCEDURE — 99223 1ST HOSP IP/OBS HIGH 75: CPT | Performed by: INTERNAL MEDICINE

## 2022-01-01 PROCEDURE — 76937 US GUIDE VASCULAR ACCESS: CPT

## 2022-01-01 PROCEDURE — 99233 SBSQ HOSP IP/OBS HIGH 50: CPT | Performed by: INTERNAL MEDICINE

## 2022-01-01 PROCEDURE — 92610 EVALUATE SWALLOWING FUNCTION: CPT

## 2022-01-01 PROCEDURE — 97112 NEUROMUSCULAR REEDUCATION: CPT

## 2022-01-01 PROCEDURE — 04JY3ZZ INSPECTION OF LOWER ARTERY, PERCUTANEOUS APPROACH: ICD-10-PCS | Performed by: SURGERY

## 2022-01-01 PROCEDURE — 99999 PR OFFICE/OUTPT VISIT,PROCEDURE ONLY: CPT | Performed by: SURGERY

## 2022-01-01 PROCEDURE — 04QY0ZZ REPAIR LOWER ARTERY, OPEN APPROACH: ICD-10-PCS | Performed by: SURGERY

## 2022-01-01 PROCEDURE — 87635 SARS-COV-2 COVID-19 AMP PRB: CPT

## 2022-01-01 PROCEDURE — 99213 OFFICE O/P EST LOW 20 MIN: CPT | Performed by: SURGERY

## 2022-01-01 PROCEDURE — 84443 ASSAY THYROID STIM HORMONE: CPT

## 2022-01-01 PROCEDURE — 94003 VENT MGMT INPAT SUBQ DAY: CPT

## 2022-01-01 PROCEDURE — 6360000002 HC RX W HCPCS: Performed by: PHYSICIAN ASSISTANT

## 2022-01-01 PROCEDURE — 04HY32Z INSERTION OF MONITORING DEVICE INTO LOWER ARTERY, PERCUTANEOUS APPROACH: ICD-10-PCS | Performed by: INTERNAL MEDICINE

## 2022-01-01 PROCEDURE — 31500 INSERT EMERGENCY AIRWAY: CPT | Performed by: INTERNAL MEDICINE

## 2022-01-01 PROCEDURE — 99284 EMERGENCY DEPT VISIT MOD MDM: CPT

## 2022-01-01 PROCEDURE — 94762 N-INVAS EAR/PLS OXIMTRY CONT: CPT

## 2022-01-01 PROCEDURE — 86901 BLOOD TYPING SEROLOGIC RH(D): CPT

## 2022-01-01 PROCEDURE — 85610 PROTHROMBIN TIME: CPT

## 2022-01-01 DEVICE — VG 6MM X 40CM LINED
Type: IMPLANTABLE DEVICE | Site: LEG | Status: FUNCTIONAL
Brand: GORE-TEX   VASCULAR GRAFT

## 2022-01-01 RX ORDER — FAMOTIDINE 20 MG/1
10 TABLET, FILM COATED ORAL 2 TIMES DAILY PRN
Status: DISCONTINUED | OUTPATIENT
Start: 2022-01-01 | End: 2022-01-01 | Stop reason: HOSPADM

## 2022-01-01 RX ORDER — SODIUM CHLORIDE 0.9 % (FLUSH) 0.9 %
5-40 SYRINGE (ML) INJECTION PRN
Status: CANCELLED | OUTPATIENT
Start: 2022-01-01

## 2022-01-01 RX ORDER — HYDROCODONE BITARTRATE AND ACETAMINOPHEN 7.5; 325 MG/1; MG/1
1 TABLET ORAL EVERY 4 HOURS PRN
Qty: 18 TABLET | Refills: 0 | Status: SHIPPED | OUTPATIENT
Start: 2022-01-01 | End: 2022-01-01

## 2022-01-01 RX ORDER — FENTANYL CITRATE 50 UG/ML
100 INJECTION, SOLUTION INTRAMUSCULAR; INTRAVENOUS ONCE
Status: COMPLETED | OUTPATIENT
Start: 2022-01-01 | End: 2022-01-01

## 2022-01-01 RX ORDER — 0.9 % SODIUM CHLORIDE 0.9 %
500 INTRAVENOUS SOLUTION INTRAVENOUS ONCE
Status: COMPLETED | OUTPATIENT
Start: 2022-01-01 | End: 2022-01-01

## 2022-01-01 RX ORDER — 0.9 % SODIUM CHLORIDE 0.9 %
250 INTRAVENOUS SOLUTION INTRAVENOUS ONCE
Status: COMPLETED | OUTPATIENT
Start: 2022-01-01 | End: 2022-01-01

## 2022-01-01 RX ORDER — MIDODRINE HYDROCHLORIDE 5 MG/1
10 TABLET ORAL EVERY 8 HOURS
Status: DISCONTINUED | OUTPATIENT
Start: 2022-01-01 | End: 2022-01-01

## 2022-01-01 RX ORDER — MIDAZOLAM HYDROCHLORIDE 2 MG/2ML
1 INJECTION, SOLUTION INTRAMUSCULAR; INTRAVENOUS ONCE
Status: COMPLETED | OUTPATIENT
Start: 2022-01-01 | End: 2022-01-01

## 2022-01-01 RX ORDER — OXYCODONE HYDROCHLORIDE 5 MG/1
10 TABLET ORAL EVERY 6 HOURS PRN
Status: DISCONTINUED | OUTPATIENT
Start: 2022-01-01 | End: 2022-01-01

## 2022-01-01 RX ORDER — LISINOPRIL 20 MG/1
40 TABLET ORAL NIGHTLY
Status: DISCONTINUED | OUTPATIENT
Start: 2022-01-01 | End: 2022-01-01

## 2022-01-01 RX ORDER — SODIUM CHLORIDE 9 MG/ML
INJECTION, SOLUTION INTRAVENOUS PRN
Status: DISCONTINUED | OUTPATIENT
Start: 2022-01-01 | End: 2022-01-01 | Stop reason: HOSPADM

## 2022-01-01 RX ORDER — CYCLOBENZAPRINE HCL 10 MG
10 TABLET ORAL
Status: COMPLETED | OUTPATIENT
Start: 2022-01-01 | End: 2022-01-01

## 2022-01-01 RX ORDER — 0.9 % SODIUM CHLORIDE 0.9 %
500 INTRAVENOUS SOLUTION INTRAVENOUS
Status: COMPLETED | OUTPATIENT
Start: 2022-01-01 | End: 2022-01-01

## 2022-01-01 RX ORDER — MAGNESIUM SULFATE IN WATER 40 MG/ML
2000 INJECTION, SOLUTION INTRAVENOUS PRN
Status: DISCONTINUED | OUTPATIENT
Start: 2022-01-01 | End: 2022-01-01 | Stop reason: HOSPADM

## 2022-01-01 RX ORDER — HALOPERIDOL 5 MG/ML
INJECTION INTRAMUSCULAR
Status: DISPENSED
Start: 2022-01-01 | End: 2022-01-01

## 2022-01-01 RX ORDER — LINEZOLID 2 MG/ML
600 INJECTION, SOLUTION INTRAVENOUS EVERY 12 HOURS
Status: DISCONTINUED | OUTPATIENT
Start: 2022-01-01 | End: 2022-01-01

## 2022-01-01 RX ORDER — INSULIN LISPRO 100 [IU]/ML
0-8 INJECTION, SOLUTION INTRAVENOUS; SUBCUTANEOUS
Status: DISCONTINUED | OUTPATIENT
Start: 2022-01-01 | End: 2022-01-01 | Stop reason: HOSPADM

## 2022-01-01 RX ORDER — PROPOFOL 10 MG/ML
INJECTION, EMULSION INTRAVENOUS PRN
Status: DISCONTINUED | OUTPATIENT
Start: 2022-01-01 | End: 2022-01-01 | Stop reason: SDUPTHER

## 2022-01-01 RX ORDER — PHENYLEPHRINE HCL IN 0.9% NACL 50MG/250ML
10-300 PLASTIC BAG, INJECTION (ML) INTRAVENOUS CONTINUOUS
Status: DISCONTINUED | OUTPATIENT
Start: 2022-01-01 | End: 2022-01-01 | Stop reason: HOSPADM

## 2022-01-01 RX ORDER — DEXAMETHASONE SODIUM PHOSPHATE 4 MG/ML
INJECTION, SOLUTION INTRA-ARTICULAR; INTRALESIONAL; INTRAMUSCULAR; INTRAVENOUS; SOFT TISSUE PRN
Status: DISCONTINUED | OUTPATIENT
Start: 2022-01-01 | End: 2022-01-01 | Stop reason: SDUPTHER

## 2022-01-01 RX ORDER — HYDROCODONE BITARTRATE AND ACETAMINOPHEN 7.5; 325 MG/1; MG/1
1 TABLET ORAL EVERY 4 HOURS PRN
Status: DISCONTINUED | OUTPATIENT
Start: 2022-01-01 | End: 2022-01-01 | Stop reason: HOSPADM

## 2022-01-01 RX ORDER — HYDROMORPHONE HYDROCHLORIDE 1 MG/ML
1 INJECTION, SOLUTION INTRAMUSCULAR; INTRAVENOUS; SUBCUTANEOUS
Status: COMPLETED | OUTPATIENT
Start: 2022-01-01 | End: 2022-01-01

## 2022-01-01 RX ORDER — SUCCINYLCHOLINE/SOD CL,ISO/PF 200MG/10ML
200 SYRINGE (ML) INTRAVENOUS ONCE
Status: DISCONTINUED | OUTPATIENT
Start: 2022-01-01 | End: 2022-01-01 | Stop reason: HOSPADM

## 2022-01-01 RX ORDER — SUCCINYLCHOLINE CHLORIDE 20 MG/ML
INJECTION INTRAMUSCULAR; INTRAVENOUS PRN
Status: DISCONTINUED | OUTPATIENT
Start: 2022-01-01 | End: 2022-01-01 | Stop reason: SDUPTHER

## 2022-01-01 RX ORDER — FOLIC ACID/VIT B COMPLEX AND C 0.8 MG
1 TABLET ORAL DAILY
Status: DISCONTINUED | OUTPATIENT
Start: 2022-01-01 | End: 2022-01-01 | Stop reason: HOSPADM

## 2022-01-01 RX ORDER — OXYCODONE HYDROCHLORIDE 5 MG/1
5 TABLET ORAL PRN
Status: COMPLETED | OUTPATIENT
Start: 2022-01-01 | End: 2022-01-01

## 2022-01-01 RX ORDER — OXYCODONE HYDROCHLORIDE 5 MG/1
5 TABLET ORAL EVERY 4 HOURS PRN
Status: DISCONTINUED | OUTPATIENT
Start: 2022-01-01 | End: 2022-01-01 | Stop reason: HOSPADM

## 2022-01-01 RX ORDER — EPHEDRINE SULFATE/0.9% NACL/PF 50 MG/5 ML
SYRINGE (ML) INTRAVENOUS PRN
Status: DISCONTINUED | OUTPATIENT
Start: 2022-01-01 | End: 2022-01-01 | Stop reason: SDUPTHER

## 2022-01-01 RX ORDER — POLYETHYLENE GLYCOL 3350 17 G/17G
17 POWDER, FOR SOLUTION ORAL DAILY PRN
Status: DISCONTINUED | OUTPATIENT
Start: 2022-01-01 | End: 2022-01-01 | Stop reason: HOSPADM

## 2022-01-01 RX ORDER — GLUCAGON 1 MG/ML
1 KIT INJECTION PRN
Status: DISCONTINUED | OUTPATIENT
Start: 2022-01-01 | End: 2022-01-01 | Stop reason: HOSPADM

## 2022-01-01 RX ORDER — INSULIN LISPRO 100 [IU]/ML
0-4 INJECTION, SOLUTION INTRAVENOUS; SUBCUTANEOUS NIGHTLY
Status: DISCONTINUED | OUTPATIENT
Start: 2022-01-01 | End: 2022-01-01 | Stop reason: HOSPADM

## 2022-01-01 RX ORDER — HEPARIN SODIUM 1000 [USP'U]/ML
INJECTION, SOLUTION INTRAVENOUS; SUBCUTANEOUS PRN
Status: DISCONTINUED | OUTPATIENT
Start: 2022-01-01 | End: 2022-01-01 | Stop reason: SDUPTHER

## 2022-01-01 RX ORDER — SODIUM CHLORIDE 0.9 % (FLUSH) 0.9 %
5-40 SYRINGE (ML) INJECTION EVERY 12 HOURS SCHEDULED
Status: DISCONTINUED | OUTPATIENT
Start: 2022-01-01 | End: 2022-01-01 | Stop reason: HOSPADM

## 2022-01-01 RX ORDER — HEPARIN SODIUM 5000 [USP'U]/ML
5000 INJECTION, SOLUTION INTRAVENOUS; SUBCUTANEOUS EVERY 8 HOURS SCHEDULED
Status: DISCONTINUED | OUTPATIENT
Start: 2022-01-01 | End: 2022-01-01

## 2022-01-01 RX ORDER — MIDODRINE HYDROCHLORIDE 10 MG/1
10 TABLET ORAL EVERY 8 HOURS
Qty: 90 TABLET | Refills: 1 | Status: SHIPPED | OUTPATIENT
Start: 2022-01-01 | End: 2022-01-01

## 2022-01-01 RX ORDER — PANTOPRAZOLE SODIUM 40 MG/1
40 TABLET, DELAYED RELEASE ORAL NIGHTLY
Status: DISCONTINUED | OUTPATIENT
Start: 2022-01-01 | End: 2022-01-01 | Stop reason: SDUPTHER

## 2022-01-01 RX ORDER — LIDOCAINE HYDROCHLORIDE 20 MG/ML
INJECTION, SOLUTION EPIDURAL; INFILTRATION; INTRACAUDAL; PERINEURAL PRN
Status: DISCONTINUED | OUTPATIENT
Start: 2022-01-01 | End: 2022-01-01 | Stop reason: SDUPTHER

## 2022-01-01 RX ORDER — HYDROMORPHONE HYDROCHLORIDE 2 MG/1
0.5 TABLET ORAL ONCE
Status: COMPLETED | OUTPATIENT
Start: 2022-01-01 | End: 2022-01-01

## 2022-01-01 RX ORDER — OXYCODONE HYDROCHLORIDE 5 MG/1
10 TABLET ORAL EVERY 4 HOURS PRN
Status: DISCONTINUED | OUTPATIENT
Start: 2022-01-01 | End: 2022-01-01 | Stop reason: HOSPADM

## 2022-01-01 RX ORDER — TRAMADOL HYDROCHLORIDE 50 MG/1
50 TABLET ORAL EVERY 6 HOURS PRN
Status: DISCONTINUED | OUTPATIENT
Start: 2022-01-01 | End: 2022-01-01

## 2022-01-01 RX ORDER — ROCURONIUM BROMIDE 10 MG/ML
INJECTION, SOLUTION INTRAVENOUS PRN
Status: DISCONTINUED | OUTPATIENT
Start: 2022-01-01 | End: 2022-01-01 | Stop reason: SDUPTHER

## 2022-01-01 RX ORDER — MIDAZOLAM HYDROCHLORIDE 2 MG/2ML
2 INJECTION, SOLUTION INTRAMUSCULAR; INTRAVENOUS
Status: COMPLETED | OUTPATIENT
Start: 2022-01-01 | End: 2022-01-01

## 2022-01-01 RX ORDER — HEPARIN SODIUM 1000 [USP'U]/ML
80 INJECTION, SOLUTION INTRAVENOUS; SUBCUTANEOUS PRN
Status: DISCONTINUED | OUTPATIENT
Start: 2022-01-01 | End: 2022-01-01 | Stop reason: HOSPADM

## 2022-01-01 RX ORDER — HYDROCODONE BITARTRATE AND ACETAMINOPHEN 7.5; 325 MG/1; MG/1
1 TABLET ORAL EVERY 4 HOURS PRN
Qty: 30 TABLET | Refills: 0 | Status: SHIPPED | OUTPATIENT
Start: 2022-01-01 | End: 2022-01-01

## 2022-01-01 RX ORDER — PANTOPRAZOLE SODIUM 40 MG/1
40 TABLET, DELAYED RELEASE ORAL
Status: DISCONTINUED | OUTPATIENT
Start: 2022-01-01 | End: 2022-01-01 | Stop reason: HOSPADM

## 2022-01-01 RX ORDER — SODIUM CHLORIDE, SODIUM LACTATE, POTASSIUM CHLORIDE, AND CALCIUM CHLORIDE .6; .31; .03; .02 G/100ML; G/100ML; G/100ML; G/100ML
250 INJECTION, SOLUTION INTRAVENOUS ONCE
Status: COMPLETED | OUTPATIENT
Start: 2022-01-01 | End: 2022-01-01

## 2022-01-01 RX ORDER — LIDOCAINE HYDROCHLORIDE 10 MG/ML
1 INJECTION, SOLUTION EPIDURAL; INFILTRATION; INTRACAUDAL; PERINEURAL
Status: DISCONTINUED | OUTPATIENT
Start: 2022-01-01 | End: 2022-01-01 | Stop reason: HOSPADM

## 2022-01-01 RX ORDER — ACETAMINOPHEN 325 MG/1
650 TABLET ORAL EVERY 6 HOURS PRN
Status: DISCONTINUED | OUTPATIENT
Start: 2022-01-01 | End: 2022-01-01

## 2022-01-01 RX ORDER — MIDODRINE HYDROCHLORIDE 5 MG/1
10 TABLET ORAL
Status: DISCONTINUED | OUTPATIENT
Start: 2022-01-01 | End: 2022-01-01

## 2022-01-01 RX ORDER — HEPARIN SODIUM 10000 [USP'U]/100ML
5-30 INJECTION, SOLUTION INTRAVENOUS CONTINUOUS
Status: DISCONTINUED | OUTPATIENT
Start: 2022-01-01 | End: 2022-01-01 | Stop reason: HOSPADM

## 2022-01-01 RX ORDER — TEMAZEPAM 15 MG/1
15 CAPSULE ORAL NIGHTLY PRN
Status: DISCONTINUED | OUTPATIENT
Start: 2022-01-01 | End: 2022-01-01

## 2022-01-01 RX ORDER — HYDROMORPHONE HYDROCHLORIDE 1 MG/ML
1 INJECTION, SOLUTION INTRAMUSCULAR; INTRAVENOUS; SUBCUTANEOUS EVERY 4 HOURS PRN
Status: DISCONTINUED | OUTPATIENT
Start: 2022-01-01 | End: 2022-01-01

## 2022-01-01 RX ORDER — PANTOPRAZOLE SODIUM 40 MG/1
40 TABLET, DELAYED RELEASE ORAL
Status: DISCONTINUED | OUTPATIENT
Start: 2022-01-01 | End: 2022-01-01

## 2022-01-01 RX ORDER — SODIUM CHLORIDE 0.9 % (FLUSH) 0.9 %
5-40 SYRINGE (ML) INJECTION PRN
Status: DISCONTINUED | OUTPATIENT
Start: 2022-01-01 | End: 2022-01-01 | Stop reason: HOSPADM

## 2022-01-01 RX ORDER — SODIUM CHLORIDE 0.9 % (FLUSH) 0.9 %
5-40 SYRINGE (ML) INJECTION EVERY 12 HOURS SCHEDULED
Status: CANCELLED | OUTPATIENT
Start: 2022-01-01

## 2022-01-01 RX ORDER — HALOPERIDOL 5 MG/ML
5 INJECTION INTRAMUSCULAR EVERY 6 HOURS PRN
Status: DISCONTINUED | OUTPATIENT
Start: 2022-01-01 | End: 2022-01-01 | Stop reason: HOSPADM

## 2022-01-01 RX ORDER — MORPHINE SULFATE 4 MG/ML
4 INJECTION INTRAVENOUS ONCE
Status: DISCONTINUED | OUTPATIENT
Start: 2022-01-01 | End: 2022-01-01 | Stop reason: HOSPADM

## 2022-01-01 RX ORDER — PROCHLORPERAZINE EDISYLATE 5 MG/ML
5 INJECTION INTRAMUSCULAR; INTRAVENOUS
Status: DISCONTINUED | OUTPATIENT
Start: 2022-01-01 | End: 2022-01-01 | Stop reason: HOSPADM

## 2022-01-01 RX ORDER — ONDANSETRON 2 MG/ML
4 INJECTION INTRAMUSCULAR; INTRAVENOUS EVERY 6 HOURS PRN
Status: DISCONTINUED | OUTPATIENT
Start: 2022-01-01 | End: 2022-01-01 | Stop reason: HOSPADM

## 2022-01-01 RX ORDER — ONDANSETRON 4 MG/1
4 TABLET, ORALLY DISINTEGRATING ORAL EVERY 8 HOURS PRN
Status: DISCONTINUED | OUTPATIENT
Start: 2022-01-01 | End: 2022-01-01

## 2022-01-01 RX ORDER — MIDODRINE HYDROCHLORIDE 5 MG/1
5 TABLET ORAL EVERY 8 HOURS
Status: DISCONTINUED | OUTPATIENT
Start: 2022-01-01 | End: 2022-01-01

## 2022-01-01 RX ORDER — SODIUM CHLORIDE 9 MG/ML
INJECTION, SOLUTION INTRAVENOUS CONTINUOUS
Status: DISCONTINUED | OUTPATIENT
Start: 2022-01-01 | End: 2022-01-01 | Stop reason: HOSPADM

## 2022-01-01 RX ORDER — LABETALOL HYDROCHLORIDE 5 MG/ML
10 INJECTION, SOLUTION INTRAVENOUS
Status: DISCONTINUED | OUTPATIENT
Start: 2022-01-01 | End: 2022-01-01 | Stop reason: HOSPADM

## 2022-01-01 RX ORDER — ACETAMINOPHEN 650 MG/1
650 SUPPOSITORY RECTAL EVERY 6 HOURS PRN
Status: DISCONTINUED | OUTPATIENT
Start: 2022-01-01 | End: 2022-01-01 | Stop reason: HOSPADM

## 2022-01-01 RX ORDER — LIDOCAINE HYDROCHLORIDE 10 MG/ML
INJECTION, SOLUTION INFILTRATION; PERINEURAL
Status: COMPLETED | OUTPATIENT
Start: 2022-01-01 | End: 2022-01-01

## 2022-01-01 RX ORDER — LIDOCAINE HYDROCHLORIDE 10 MG/ML
1 INJECTION, SOLUTION INFILTRATION; PERINEURAL
Status: DISCONTINUED | OUTPATIENT
Start: 2022-01-01 | End: 2022-01-01 | Stop reason: HOSPADM

## 2022-01-01 RX ORDER — TEMAZEPAM 15 MG/1
15 CAPSULE ORAL NIGHTLY PRN
Status: DISCONTINUED | OUTPATIENT
Start: 2022-01-01 | End: 2022-01-01 | Stop reason: HOSPADM

## 2022-01-01 RX ORDER — TRAMADOL HYDROCHLORIDE 50 MG/1
50 TABLET ORAL EVERY 6 HOURS PRN
Status: DISCONTINUED | OUTPATIENT
Start: 2022-01-01 | End: 2022-01-01 | Stop reason: HOSPADM

## 2022-01-01 RX ORDER — 0.9 % SODIUM CHLORIDE 0.9 %
500 INTRAVENOUS SOLUTION INTRAVENOUS ONCE
Status: DISCONTINUED | OUTPATIENT
Start: 2022-01-01 | End: 2022-01-01

## 2022-01-01 RX ORDER — SEVELAMER CARBONATE 800 MG/1
3 TABLET, FILM COATED ORAL
COMMUNITY

## 2022-01-01 RX ORDER — HEPARIN SODIUM 1000 [USP'U]/ML
40 INJECTION, SOLUTION INTRAVENOUS; SUBCUTANEOUS PRN
Status: DISCONTINUED | OUTPATIENT
Start: 2022-01-01 | End: 2022-01-01 | Stop reason: HOSPADM

## 2022-01-01 RX ORDER — DEXTROSE MONOHYDRATE 100 MG/ML
INJECTION, SOLUTION INTRAVENOUS CONTINUOUS PRN
Status: DISCONTINUED | OUTPATIENT
Start: 2022-01-01 | End: 2022-01-01 | Stop reason: HOSPADM

## 2022-01-01 RX ORDER — MIDODRINE HYDROCHLORIDE 5 MG/1
10 TABLET ORAL
Status: DISCONTINUED | OUTPATIENT
Start: 2022-01-01 | End: 2022-01-01 | Stop reason: HOSPADM

## 2022-01-01 RX ORDER — HEPARIN SODIUM 5000 [USP'U]/ML
INJECTION, SOLUTION INTRAVENOUS; SUBCUTANEOUS PRN
Status: DISCONTINUED | OUTPATIENT
Start: 2022-01-01 | End: 2022-01-01 | Stop reason: ALTCHOICE

## 2022-01-01 RX ORDER — PHENYLEPHRINE HCL IN 0.9% NACL 50MG/250ML
10-300 PLASTIC BAG, INJECTION (ML) INTRAVENOUS CONTINUOUS
Status: DISCONTINUED | OUTPATIENT
Start: 2022-01-01 | End: 2022-01-01

## 2022-01-01 RX ORDER — HALOPERIDOL 5 MG/ML
1 INJECTION INTRAMUSCULAR
Status: DISCONTINUED | OUTPATIENT
Start: 2022-01-01 | End: 2022-01-01 | Stop reason: HOSPADM

## 2022-01-01 RX ORDER — SODIUM CHLORIDE 9 MG/ML
INJECTION, SOLUTION INTRAVENOUS CONTINUOUS
Status: DISCONTINUED | OUTPATIENT
Start: 2022-01-01 | End: 2022-01-01

## 2022-01-01 RX ORDER — HYDROMORPHONE HYDROCHLORIDE 1 MG/ML
0.25 INJECTION, SOLUTION INTRAMUSCULAR; INTRAVENOUS; SUBCUTANEOUS EVERY 4 HOURS PRN
Status: DISCONTINUED | OUTPATIENT
Start: 2022-01-01 | End: 2022-01-01

## 2022-01-01 RX ORDER — OMEPRAZOLE 40 MG/1
40 CAPSULE, DELAYED RELEASE ORAL NIGHTLY
COMMUNITY

## 2022-01-01 RX ORDER — HEPARIN SODIUM,PORCINE 10 UNIT/ML
VIAL (ML) INTRAVENOUS PRN
Status: DISCONTINUED | OUTPATIENT
Start: 2022-01-01 | End: 2022-01-01 | Stop reason: SDUPTHER

## 2022-01-01 RX ORDER — HEPARIN SODIUM 1000 [USP'U]/ML
5000 INJECTION, SOLUTION INTRAVENOUS; SUBCUTANEOUS ONCE
Status: COMPLETED | OUTPATIENT
Start: 2022-01-01 | End: 2022-01-01

## 2022-01-01 RX ORDER — ACETAMINOPHEN 500 MG
1000 TABLET ORAL
Status: COMPLETED | OUTPATIENT
Start: 2022-01-01 | End: 2022-01-01

## 2022-01-01 RX ORDER — ACETAMINOPHEN 325 MG/1
650 TABLET ORAL EVERY 6 HOURS PRN
Status: DISCONTINUED | OUTPATIENT
Start: 2022-01-01 | End: 2022-01-01 | Stop reason: HOSPADM

## 2022-01-01 RX ORDER — HALOPERIDOL 5 MG/ML
2 INJECTION INTRAMUSCULAR ONCE
Status: COMPLETED | OUTPATIENT
Start: 2022-01-01 | End: 2022-01-01

## 2022-01-01 RX ORDER — FENTANYL CITRATE-0.9 % NACL/PF 10 MCG/ML
25-200 PLASTIC BAG, INJECTION (ML) INTRAVENOUS CONTINUOUS
Status: DISCONTINUED | OUTPATIENT
Start: 2022-01-01 | End: 2022-01-01 | Stop reason: HOSPADM

## 2022-01-01 RX ORDER — EPINEPHRINE 0.1 MG/ML
SYRINGE (ML) INJECTION
Status: COMPLETED | OUTPATIENT
Start: 2022-01-01 | End: 2022-01-01

## 2022-01-01 RX ORDER — FENTANYL CITRATE 50 UG/ML
INJECTION, SOLUTION INTRAMUSCULAR; INTRAVENOUS PRN
Status: DISCONTINUED | OUTPATIENT
Start: 2022-01-01 | End: 2022-01-01 | Stop reason: SDUPTHER

## 2022-01-01 RX ORDER — MAGNESIUM HYDROXIDE/ALUMINUM HYDROXICE/SIMETHICONE 120; 1200; 1200 MG/30ML; MG/30ML; MG/30ML
30 SUSPENSION ORAL EVERY 6 HOURS PRN
Status: DISCONTINUED | OUTPATIENT
Start: 2022-01-01 | End: 2022-01-01

## 2022-01-01 RX ORDER — HYDROMORPHONE HYDROCHLORIDE 2 MG/ML
0.5 INJECTION, SOLUTION INTRAMUSCULAR; INTRAVENOUS; SUBCUTANEOUS EVERY 5 MIN PRN
Status: DISCONTINUED | OUTPATIENT
Start: 2022-01-01 | End: 2022-01-01 | Stop reason: HOSPADM

## 2022-01-01 RX ORDER — OXYCODONE HYDROCHLORIDE 5 MG/1
5 TABLET ORAL
Status: DISCONTINUED | OUTPATIENT
Start: 2022-01-01 | End: 2022-01-01 | Stop reason: HOSPADM

## 2022-01-01 RX ORDER — ONDANSETRON 4 MG/1
4 TABLET, ORALLY DISINTEGRATING ORAL EVERY 8 HOURS PRN
Status: DISCONTINUED | OUTPATIENT
Start: 2022-01-01 | End: 2022-01-01 | Stop reason: HOSPADM

## 2022-01-01 RX ORDER — HYDROCODONE BITARTRATE AND ACETAMINOPHEN 7.5; 325 MG/1; MG/1
1 TABLET ORAL EVERY 4 HOURS PRN
Qty: 18 TABLET | Refills: 0
Start: 2022-01-01 | End: 2022-01-01

## 2022-01-01 RX ORDER — SEVELAMER CARBONATE 800 MG/1
800 TABLET, FILM COATED ORAL
Status: DISCONTINUED | OUTPATIENT
Start: 2022-01-01 | End: 2022-01-01 | Stop reason: HOSPADM

## 2022-01-01 RX ORDER — NOREPINEPHRINE BIT/0.9 % NACL 16MG/250ML
1-100 INFUSION BOTTLE (ML) INTRAVENOUS CONTINUOUS
Status: DISCONTINUED | OUTPATIENT
Start: 2022-01-01 | End: 2022-01-01 | Stop reason: HOSPADM

## 2022-01-01 RX ORDER — LISINOPRIL 40 MG/1
40 TABLET ORAL NIGHTLY
Status: ON HOLD | COMMUNITY
End: 2022-01-01 | Stop reason: HOSPADM

## 2022-01-01 RX ORDER — HYDROMORPHONE HYDROCHLORIDE 2 MG/ML
1 INJECTION, SOLUTION INTRAMUSCULAR; INTRAVENOUS; SUBCUTANEOUS EVERY 4 HOURS PRN
Status: DISCONTINUED | OUTPATIENT
Start: 2022-01-01 | End: 2022-01-01

## 2022-01-01 RX ORDER — ACETAMINOPHEN 650 MG/1
650 SUPPOSITORY RECTAL EVERY 6 HOURS PRN
Status: DISCONTINUED | OUTPATIENT
Start: 2022-01-01 | End: 2022-01-01

## 2022-01-01 RX ORDER — ONDANSETRON 2 MG/ML
4 INJECTION INTRAMUSCULAR; INTRAVENOUS
Status: DISCONTINUED | OUTPATIENT
Start: 2022-01-01 | End: 2022-01-01 | Stop reason: HOSPADM

## 2022-01-01 RX ORDER — HEPARIN SODIUM 1000 [USP'U]/ML
5000 INJECTION, SOLUTION INTRAVENOUS; SUBCUTANEOUS PRN
Status: DISCONTINUED | OUTPATIENT
Start: 2022-01-01 | End: 2022-01-01

## 2022-01-01 RX ORDER — DIPHENHYDRAMINE HYDROCHLORIDE 50 MG/ML
12.5 INJECTION INTRAMUSCULAR; INTRAVENOUS
Status: DISCONTINUED | OUTPATIENT
Start: 2022-01-01 | End: 2022-01-01 | Stop reason: HOSPADM

## 2022-01-01 RX ORDER — OXYCODONE HYDROCHLORIDE 5 MG/1
10 TABLET ORAL PRN
Status: COMPLETED | OUTPATIENT
Start: 2022-01-01 | End: 2022-01-01

## 2022-01-01 RX ORDER — DEXMEDETOMIDINE HYDROCHLORIDE 4 UG/ML
.1-1.5 INJECTION, SOLUTION INTRAVENOUS CONTINUOUS
Status: DISCONTINUED | OUTPATIENT
Start: 2022-01-01 | End: 2022-01-01 | Stop reason: HOSPADM

## 2022-01-01 RX ORDER — SODIUM CHLORIDE, SODIUM LACTATE, POTASSIUM CHLORIDE, CALCIUM CHLORIDE 600; 310; 30; 20 MG/100ML; MG/100ML; MG/100ML; MG/100ML
INJECTION, SOLUTION INTRAVENOUS CONTINUOUS
Status: DISCONTINUED | OUTPATIENT
Start: 2022-01-01 | End: 2022-01-01 | Stop reason: HOSPADM

## 2022-01-01 RX ORDER — OXYCODONE HYDROCHLORIDE 5 MG/1
5 TABLET ORAL PRN
Status: DISCONTINUED | OUTPATIENT
Start: 2022-01-01 | End: 2022-01-01 | Stop reason: HOSPADM

## 2022-01-01 RX ORDER — ACETAMINOPHEN 500 MG
500 TABLET ORAL EVERY 6 HOURS PRN
COMMUNITY

## 2022-01-01 RX ORDER — DEXTROSE MONOHYDRATE 50 MG/ML
100 INJECTION, SOLUTION INTRAVENOUS PRN
Status: DISCONTINUED | OUTPATIENT
Start: 2022-01-01 | End: 2022-01-01 | Stop reason: HOSPADM

## 2022-01-01 RX ORDER — OXYCODONE HYDROCHLORIDE 5 MG/1
5 TABLET ORAL EVERY 6 HOURS PRN
Status: DISCONTINUED | OUTPATIENT
Start: 2022-01-01 | End: 2022-01-01

## 2022-01-01 RX ORDER — POLYETHYLENE GLYCOL 3350 17 G/17G
17 POWDER, FOR SOLUTION ORAL DAILY PRN
Status: DISCONTINUED | OUTPATIENT
Start: 2022-01-01 | End: 2022-01-01

## 2022-01-01 RX ORDER — GLYCOPYRROLATE 0.2 MG/ML
INJECTION INTRAMUSCULAR; INTRAVENOUS PRN
Status: DISCONTINUED | OUTPATIENT
Start: 2022-01-01 | End: 2022-01-01 | Stop reason: SDUPTHER

## 2022-01-01 RX ORDER — MORPHINE SULFATE 4 MG/ML
4 INJECTION INTRAVENOUS ONCE
Status: COMPLETED | OUTPATIENT
Start: 2022-01-01 | End: 2022-01-01

## 2022-01-01 RX ORDER — POTASSIUM CHLORIDE 7.45 MG/ML
10 INJECTION INTRAVENOUS PRN
Status: DISCONTINUED | OUTPATIENT
Start: 2022-01-01 | End: 2022-01-01 | Stop reason: HOSPADM

## 2022-01-01 RX ORDER — 0.9 % SODIUM CHLORIDE 0.9 %
1000 INTRAVENOUS SOLUTION INTRAVENOUS ONCE
Status: COMPLETED | OUTPATIENT
Start: 2022-01-01 | End: 2022-01-01

## 2022-01-01 RX ORDER — MIDAZOLAM HYDROCHLORIDE 2 MG/2ML
2 INJECTION, SOLUTION INTRAMUSCULAR; INTRAVENOUS
Status: DISCONTINUED | OUTPATIENT
Start: 2022-01-01 | End: 2022-01-01 | Stop reason: HOSPADM

## 2022-01-01 RX ORDER — BUPIVACAINE HYDROCHLORIDE 2.5 MG/ML
INJECTION, SOLUTION EPIDURAL; INFILTRATION; INTRACAUDAL PRN
Status: DISCONTINUED | OUTPATIENT
Start: 2022-01-01 | End: 2022-01-01 | Stop reason: ALTCHOICE

## 2022-01-01 RX ORDER — AMLODIPINE BESYLATE 10 MG/1
10 TABLET ORAL NIGHTLY
Status: ON HOLD | COMMUNITY
End: 2022-01-01 | Stop reason: HOSPADM

## 2022-01-01 RX ORDER — MIDODRINE HYDROCHLORIDE 5 MG/1
10 TABLET ORAL EVERY 8 HOURS
Status: DISCONTINUED | OUTPATIENT
Start: 2022-01-01 | End: 2022-01-01 | Stop reason: HOSPADM

## 2022-01-01 RX ORDER — OXYCODONE HYDROCHLORIDE 5 MG/1
5 TABLET ORAL EVERY 4 HOURS PRN
Status: DISCONTINUED | OUTPATIENT
Start: 2022-01-01 | End: 2022-01-01

## 2022-01-01 RX ORDER — HYDRALAZINE HYDROCHLORIDE 20 MG/ML
10 INJECTION INTRAMUSCULAR; INTRAVENOUS
Status: DISCONTINUED | OUTPATIENT
Start: 2022-01-01 | End: 2022-01-01 | Stop reason: HOSPADM

## 2022-01-01 RX ORDER — HEPARIN SODIUM 1000 [USP'U]/ML
5000 INJECTION, SOLUTION INTRAVENOUS; SUBCUTANEOUS PRN
Status: DISCONTINUED | OUTPATIENT
Start: 2022-01-01 | End: 2022-01-01 | Stop reason: HOSPADM

## 2022-01-01 RX ORDER — METRONIDAZOLE 500 MG/1
500 TABLET ORAL EVERY 12 HOURS SCHEDULED
Status: DISCONTINUED | OUTPATIENT
Start: 2022-01-01 | End: 2022-01-01

## 2022-01-01 RX ORDER — HYDROMORPHONE HYDROCHLORIDE 2 MG/ML
0.25 INJECTION, SOLUTION INTRAMUSCULAR; INTRAVENOUS; SUBCUTANEOUS EVERY 5 MIN PRN
Status: DISCONTINUED | OUTPATIENT
Start: 2022-01-01 | End: 2022-01-01 | Stop reason: HOSPADM

## 2022-01-01 RX ORDER — MIDAZOLAM HYDROCHLORIDE 1 MG/ML
1 INJECTION, SOLUTION INTRAMUSCULAR; INTRAVENOUS
Status: DISCONTINUED | OUTPATIENT
Start: 2022-01-01 | End: 2022-01-01 | Stop reason: HOSPADM

## 2022-01-01 RX ORDER — LEVOFLOXACIN 5 MG/ML
750 INJECTION, SOLUTION INTRAVENOUS
Status: COMPLETED | OUTPATIENT
Start: 2022-01-01 | End: 2022-01-01

## 2022-01-01 RX ORDER — 0.9 % SODIUM CHLORIDE 0.9 %
100 INTRAVENOUS SOLUTION INTRAVENOUS
Status: COMPLETED | OUTPATIENT
Start: 2022-01-01 | End: 2022-01-01

## 2022-01-01 RX ORDER — ALBUMIN, HUMAN INJ 5% 5 %
25 SOLUTION INTRAVENOUS ONCE
Status: COMPLETED | OUTPATIENT
Start: 2022-01-01 | End: 2022-01-01

## 2022-01-01 RX ORDER — AMLODIPINE BESYLATE 10 MG/1
10 TABLET ORAL NIGHTLY
Status: DISCONTINUED | OUTPATIENT
Start: 2022-01-01 | End: 2022-01-01

## 2022-01-01 RX ORDER — LORAZEPAM 0.5 MG/1
0.5 TABLET ORAL EVERY 12 HOURS PRN
Status: DISCONTINUED | OUTPATIENT
Start: 2022-01-01 | End: 2022-01-01

## 2022-01-01 RX ORDER — SODIUM CHLORIDE 9 MG/ML
INJECTION, SOLUTION INTRAVENOUS PRN
Status: CANCELLED | OUTPATIENT
Start: 2022-01-01

## 2022-01-01 RX ORDER — NYSTATIN 100000 [USP'U]/G
POWDER TOPICAL 2 TIMES DAILY
Status: DISCONTINUED | OUTPATIENT
Start: 2022-01-01 | End: 2022-01-01 | Stop reason: HOSPADM

## 2022-01-01 RX ORDER — ONDANSETRON 2 MG/ML
4 INJECTION INTRAMUSCULAR; INTRAVENOUS
Status: COMPLETED | OUTPATIENT
Start: 2022-01-01 | End: 2022-01-01

## 2022-01-01 RX ORDER — SEVELAMER CARBONATE 800 MG/1
2400 TABLET, FILM COATED ORAL
Status: DISCONTINUED | OUTPATIENT
Start: 2022-01-01 | End: 2022-01-01

## 2022-01-01 RX ORDER — POTASSIUM CHLORIDE 20 MEQ/1
40 TABLET, EXTENDED RELEASE ORAL PRN
Status: DISCONTINUED | OUTPATIENT
Start: 2022-01-01 | End: 2022-01-01 | Stop reason: HOSPADM

## 2022-01-01 RX ORDER — FENTANYL CITRATE 50 UG/ML
100 INJECTION, SOLUTION INTRAMUSCULAR; INTRAVENOUS
Status: DISCONTINUED | OUTPATIENT
Start: 2022-01-01 | End: 2022-01-01 | Stop reason: HOSPADM

## 2022-01-01 RX ORDER — HYDROMORPHONE HYDROCHLORIDE 1 MG/ML
0.5 INJECTION, SOLUTION INTRAMUSCULAR; INTRAVENOUS; SUBCUTANEOUS EVERY 4 HOURS PRN
Status: DISCONTINUED | OUTPATIENT
Start: 2022-01-01 | End: 2022-01-01 | Stop reason: HOSPADM

## 2022-01-01 RX ORDER — OXYCODONE HYDROCHLORIDE 5 MG/1
10 TABLET ORAL PRN
Status: DISCONTINUED | OUTPATIENT
Start: 2022-01-01 | End: 2022-01-01 | Stop reason: HOSPADM

## 2022-01-01 RX ORDER — MAGNESIUM HYDROXIDE/ALUMINUM HYDROXICE/SIMETHICONE 120; 1200; 1200 MG/30ML; MG/30ML; MG/30ML
30 SUSPENSION ORAL EVERY 6 HOURS PRN
Status: DISCONTINUED | OUTPATIENT
Start: 2022-01-01 | End: 2022-01-01 | Stop reason: HOSPADM

## 2022-01-01 RX ORDER — ETOMIDATE 2 MG/ML
40 INJECTION INTRAVENOUS ONCE
Status: COMPLETED | OUTPATIENT
Start: 2022-01-01 | End: 2022-01-01

## 2022-01-01 RX ORDER — HEPARIN SODIUM 1000 [USP'U]/ML
80 INJECTION, SOLUTION INTRAVENOUS; SUBCUTANEOUS ONCE
Status: COMPLETED | OUTPATIENT
Start: 2022-01-01 | End: 2022-01-01

## 2022-01-01 RX ORDER — ALOGLIPTIN 6.25 MG/1
6.25 TABLET, FILM COATED ORAL DAILY
Status: DISCONTINUED | OUTPATIENT
Start: 2022-01-01 | End: 2022-01-01

## 2022-01-01 RX ORDER — NEOSTIGMINE METHYLSULFATE 1 MG/ML
INJECTION, SOLUTION INTRAVENOUS PRN
Status: DISCONTINUED | OUTPATIENT
Start: 2022-01-01 | End: 2022-01-01 | Stop reason: SDUPTHER

## 2022-01-01 RX ORDER — ACETAMINOPHEN 325 MG/1
650 TABLET ORAL EVERY 4 HOURS PRN
Status: DISCONTINUED | OUTPATIENT
Start: 2022-01-01 | End: 2022-01-01 | Stop reason: SDUPTHER

## 2022-01-01 RX ORDER — METRONIDAZOLE 500 MG/100ML
500 INJECTION, SOLUTION INTRAVENOUS EVERY 12 HOURS
Status: DISCONTINUED | OUTPATIENT
Start: 2022-01-01 | End: 2022-01-01

## 2022-01-01 RX ORDER — OXYCODONE HYDROCHLORIDE 5 MG/1
5 TABLET ORAL EVERY 6 HOURS PRN
Status: DISCONTINUED | OUTPATIENT
Start: 2022-01-01 | End: 2022-01-01 | Stop reason: HOSPADM

## 2022-01-01 RX ORDER — SODIUM CHLORIDE, SODIUM LACTATE, POTASSIUM CHLORIDE, AND CALCIUM CHLORIDE .6; .31; .03; .02 G/100ML; G/100ML; G/100ML; G/100ML
500 INJECTION, SOLUTION INTRAVENOUS
Status: COMPLETED | OUTPATIENT
Start: 2022-01-01 | End: 2022-01-01

## 2022-01-01 RX ORDER — SEVELAMER CARBONATE 800 MG/1
800 TABLET, FILM COATED ORAL
Status: DISCONTINUED | OUTPATIENT
Start: 2022-01-01 | End: 2022-01-01 | Stop reason: SDUPTHER

## 2022-01-01 RX ORDER — BISACODYL 10 MG
10 SUPPOSITORY, RECTAL RECTAL DAILY PRN
Status: DISCONTINUED | OUTPATIENT
Start: 2022-01-01 | End: 2022-01-01 | Stop reason: HOSPADM

## 2022-01-01 RX ORDER — OXYCODONE HYDROCHLORIDE AND ACETAMINOPHEN 5; 325 MG/1; MG/1
1 TABLET ORAL EVERY 6 HOURS PRN
Qty: 30 TABLET | Refills: 0 | Status: SHIPPED | OUTPATIENT
Start: 2022-01-01 | End: 2022-01-01

## 2022-01-01 RX ORDER — HYDROMORPHONE HYDROCHLORIDE 1 MG/ML
0.5 INJECTION, SOLUTION INTRAMUSCULAR; INTRAVENOUS; SUBCUTANEOUS ONCE
Status: COMPLETED | OUTPATIENT
Start: 2022-01-01 | End: 2022-01-01

## 2022-01-01 RX ORDER — SEVELAMER CARBONATE 800 MG/1
3200 TABLET, FILM COATED ORAL
Status: DISCONTINUED | OUTPATIENT
Start: 2022-01-01 | End: 2022-01-01 | Stop reason: HOSPADM

## 2022-01-01 RX ORDER — MIDODRINE HYDROCHLORIDE 10 MG/1
10 TABLET ORAL EVERY 8 HOURS
Qty: 90 TABLET | Refills: 1 | Status: SHIPPED | OUTPATIENT
Start: 2022-01-01

## 2022-01-01 RX ORDER — HEPARIN SODIUM 200 [USP'U]/100ML
INJECTION, SOLUTION INTRAVENOUS CONTINUOUS PRN
Status: DISCONTINUED | OUTPATIENT
Start: 2022-01-01 | End: 2022-01-01 | Stop reason: HOSPADM

## 2022-01-01 RX ORDER — ONDANSETRON 2 MG/ML
4 INJECTION INTRAMUSCULAR; INTRAVENOUS EVERY 6 HOURS PRN
Status: DISCONTINUED | OUTPATIENT
Start: 2022-01-01 | End: 2022-01-01

## 2022-01-01 RX ORDER — IPRATROPIUM BROMIDE AND ALBUTEROL SULFATE 2.5; .5 MG/3ML; MG/3ML
1 SOLUTION RESPIRATORY (INHALATION)
Status: DISCONTINUED | OUTPATIENT
Start: 2022-01-01 | End: 2022-01-01 | Stop reason: HOSPADM

## 2022-01-01 RX ORDER — HYDROMORPHONE HYDROCHLORIDE 1 MG/ML
0.5 INJECTION, SOLUTION INTRAMUSCULAR; INTRAVENOUS; SUBCUTANEOUS EVERY 4 HOURS PRN
Status: DISCONTINUED | OUTPATIENT
Start: 2022-01-01 | End: 2022-01-01 | Stop reason: SDUPTHER

## 2022-01-01 RX ORDER — OXYCODONE HYDROCHLORIDE AND ACETAMINOPHEN 5; 325 MG/1; MG/1
1 TABLET ORAL ONCE
Status: COMPLETED | OUTPATIENT
Start: 2022-01-01 | End: 2022-01-01

## 2022-01-01 RX ORDER — OXYCODONE HYDROCHLORIDE AND ACETAMINOPHEN 5; 325 MG/1; MG/1
1 TABLET ORAL EVERY 4 HOURS PRN
Status: DISCONTINUED | OUTPATIENT
Start: 2022-01-01 | End: 2022-01-01

## 2022-01-01 RX ORDER — SODIUM CHLORIDE 0.9 % (FLUSH) 0.9 %
10 SYRINGE (ML) INJECTION
Status: COMPLETED | OUTPATIENT
Start: 2022-01-01 | End: 2022-01-01

## 2022-01-01 RX ORDER — ACETAMINOPHEN 500 MG
1000 TABLET ORAL ONCE
Status: COMPLETED | OUTPATIENT
Start: 2022-01-01 | End: 2022-01-01

## 2022-01-01 RX ORDER — METRONIDAZOLE 500 MG/100ML
500 INJECTION, SOLUTION INTRAVENOUS EVERY 12 HOURS
Status: DISCONTINUED | OUTPATIENT
Start: 2022-01-01 | End: 2022-01-01 | Stop reason: HOSPADM

## 2022-01-01 RX ORDER — LIDOCAINE 4 G/G
1 PATCH TOPICAL DAILY
Status: DISCONTINUED | OUTPATIENT
Start: 2022-01-01 | End: 2022-01-01 | Stop reason: HOSPADM

## 2022-01-01 RX ORDER — SODIUM CHLORIDE 9 MG/ML
INJECTION, SOLUTION INTRAVENOUS
Status: DISCONTINUED | OUTPATIENT
Start: 2022-01-01 | End: 2022-01-01

## 2022-01-01 RX ORDER — LINEZOLID 600 MG/1
600 TABLET, FILM COATED ORAL EVERY 12 HOURS SCHEDULED
Status: DISCONTINUED | OUTPATIENT
Start: 2022-01-01 | End: 2022-01-01

## 2022-01-01 RX ORDER — SODIUM CHLORIDE 9 MG/ML
INJECTION, SOLUTION INTRAVENOUS CONTINUOUS
Status: ACTIVE | OUTPATIENT
Start: 2022-01-01 | End: 2022-01-01

## 2022-01-01 RX ORDER — MIDAZOLAM HYDROCHLORIDE 1 MG/ML
INJECTION INTRAMUSCULAR; INTRAVENOUS PRN
Status: DISCONTINUED | OUTPATIENT
Start: 2022-01-01 | End: 2022-01-01 | Stop reason: SDUPTHER

## 2022-01-01 RX ORDER — OXYCODONE HYDROCHLORIDE 5 MG/1
10 TABLET ORAL EVERY 4 HOURS PRN
Status: DISCONTINUED | OUTPATIENT
Start: 2022-01-01 | End: 2022-01-01 | Stop reason: SDUPTHER

## 2022-01-01 RX ORDER — NOREPINEPHRINE BIT/0.9 % NACL 16MG/250ML
1-100 INFUSION BOTTLE (ML) INTRAVENOUS CONTINUOUS
Status: DISCONTINUED | OUTPATIENT
Start: 2022-01-01 | End: 2022-01-01

## 2022-01-01 RX ADMIN — SEVELAMER CARBONATE 2400 MG: 800 TABLET, FILM COATED ORAL at 18:18

## 2022-01-01 RX ADMIN — SODIUM CHLORIDE 0.3 MCG/KG/HR: 9 INJECTION, SOLUTION INTRAVENOUS at 20:03

## 2022-01-01 RX ADMIN — SODIUM CHLORIDE 40 MG: 9 INJECTION, SOLUTION INTRAMUSCULAR; INTRAVENOUS; SUBCUTANEOUS at 08:32

## 2022-01-01 RX ADMIN — HYDROCODONE BITARTRATE AND ACETAMINOPHEN 1 TABLET: 7.5; 325 TABLET ORAL at 16:56

## 2022-01-01 RX ADMIN — ACETAMINOPHEN 650 MG: 325 TABLET, FILM COATED ORAL at 18:18

## 2022-01-01 RX ADMIN — SEVELAMER CARBONATE 3200 MG: 800 TABLET, FILM COATED ORAL at 12:14

## 2022-01-01 RX ADMIN — SODIUM CHLORIDE: 9 INJECTION, SOLUTION INTRAVENOUS at 20:25

## 2022-01-01 RX ADMIN — SODIUM CHLORIDE, PRESERVATIVE FREE 10 ML: 5 INJECTION INTRAVENOUS at 08:26

## 2022-01-01 RX ADMIN — SEVELAMER CARBONATE 3200 MG: 800 TABLET, FILM COATED ORAL at 09:06

## 2022-01-01 RX ADMIN — SODIUM CHLORIDE, PRESERVATIVE FREE 10 ML: 5 INJECTION INTRAVENOUS at 09:09

## 2022-01-01 RX ADMIN — SEVELAMER CARBONATE 2400 MG: 800 TABLET, FILM COATED ORAL at 12:38

## 2022-01-01 RX ADMIN — HEPARIN SODIUM 5000 UNITS: 5000 INJECTION INTRAVENOUS; SUBCUTANEOUS at 14:46

## 2022-01-01 RX ADMIN — VASOPRESSIN 0.04 UNITS/MIN: 0.2 INJECTION INTRAVENOUS at 13:39

## 2022-01-01 RX ADMIN — LEVOFLOXACIN 750 MG: 5 INJECTION, SOLUTION INTRAVENOUS at 14:38

## 2022-01-01 RX ADMIN — SEVELAMER CARBONATE 2400 MG: 800 TABLET, FILM COATED ORAL at 11:15

## 2022-01-01 RX ADMIN — PROPOFOL 150 MG: 10 INJECTION, EMULSION INTRAVENOUS at 09:02

## 2022-01-01 RX ADMIN — SODIUM CHLORIDE 500 ML: 9 INJECTION, SOLUTION INTRAVENOUS at 09:50

## 2022-01-01 RX ADMIN — INSULIN LISPRO 2 UNITS: 100 INJECTION, SOLUTION INTRAVENOUS; SUBCUTANEOUS at 07:49

## 2022-01-01 RX ADMIN — SODIUM CHLORIDE 500 ML: 9 INJECTION, SOLUTION INTRAVENOUS at 23:18

## 2022-01-01 RX ADMIN — SODIUM CHLORIDE, PRESERVATIVE FREE 10 ML: 5 INJECTION INTRAVENOUS at 21:00

## 2022-01-01 RX ADMIN — SODIUM CHLORIDE 500 ML: 9 INJECTION, SOLUTION INTRAVENOUS at 00:01

## 2022-01-01 RX ADMIN — SODIUM CHLORIDE, PRESERVATIVE FREE 10 ML: 5 INJECTION INTRAVENOUS at 22:24

## 2022-01-01 RX ADMIN — SEVELAMER CARBONATE 2400 MG: 800 TABLET, FILM COATED ORAL at 16:42

## 2022-01-01 RX ADMIN — CEFTRIAXONE 1000 MG: 1 INJECTION, POWDER, FOR SOLUTION INTRAMUSCULAR; INTRAVENOUS at 10:43

## 2022-01-01 RX ADMIN — HYDROCODONE BITARTRATE AND ACETAMINOPHEN 1 TABLET: 7.5; 325 TABLET ORAL at 01:15

## 2022-01-01 RX ADMIN — CEFEPIME HYDROCHLORIDE 1000 MG: 1 INJECTION, POWDER, FOR SOLUTION INTRAMUSCULAR; INTRAVENOUS at 08:34

## 2022-01-01 RX ADMIN — Medication 30 MCG/MIN: at 09:46

## 2022-01-01 RX ADMIN — EPOETIN ALFA-EPBX 20000 UNITS: 20000 INJECTION, SOLUTION INTRAVENOUS; SUBCUTANEOUS at 18:36

## 2022-01-01 RX ADMIN — SODIUM CHLORIDE, PRESERVATIVE FREE 10 ML: 5 INJECTION INTRAVENOUS at 09:39

## 2022-01-01 RX ADMIN — HYDROMORPHONE HYDROCHLORIDE 0.5 MG: 1 INJECTION, SOLUTION INTRAMUSCULAR; INTRAVENOUS; SUBCUTANEOUS at 13:39

## 2022-01-01 RX ADMIN — HEPARIN SODIUM 5000 UNITS: 5000 INJECTION INTRAVENOUS; SUBCUTANEOUS at 21:03

## 2022-01-01 RX ADMIN — PANTOPRAZOLE SODIUM 40 MG: 40 TABLET, DELAYED RELEASE ORAL at 06:53

## 2022-01-01 RX ADMIN — HEPARIN SODIUM 6000 UNITS: 1000 INJECTION, SOLUTION INTRAVENOUS; SUBCUTANEOUS at 07:44

## 2022-01-01 RX ADMIN — Medication 120 MG: at 08:03

## 2022-01-01 RX ADMIN — Medication 10 MG: at 07:54

## 2022-01-01 RX ADMIN — HYDROMORPHONE HYDROCHLORIDE 1 MG: 1 INJECTION, SOLUTION INTRAMUSCULAR; INTRAVENOUS; SUBCUTANEOUS at 03:00

## 2022-01-01 RX ADMIN — OXYCODONE AND ACETAMINOPHEN 1 TABLET: 5; 325 TABLET ORAL at 08:47

## 2022-01-01 RX ADMIN — ALOGLIPTIN 6.25 MG: 12.5 TABLET, FILM COATED ORAL at 09:54

## 2022-01-01 RX ADMIN — HYDROMORPHONE HYDROCHLORIDE 1 MG: 1 INJECTION, SOLUTION INTRAMUSCULAR; INTRAVENOUS; SUBCUTANEOUS at 08:08

## 2022-01-01 RX ADMIN — PANTOPRAZOLE SODIUM 40 MG: 40 TABLET, DELAYED RELEASE ORAL at 05:00

## 2022-01-01 RX ADMIN — Medication 20 MCG/MIN: at 19:11

## 2022-01-01 RX ADMIN — METRONIDAZOLE 500 MG: 500 INJECTION, SOLUTION INTRAVENOUS at 04:28

## 2022-01-01 RX ADMIN — PHENYLEPHRINE HYDROCHLORIDE 250 MCG/MIN: 10 INJECTION INTRAVENOUS at 21:54

## 2022-01-01 RX ADMIN — Medication 45 MCG/MIN: at 23:11

## 2022-01-01 RX ADMIN — CEFEPIME HYDROCHLORIDE 1000 MG: 1 INJECTION, POWDER, FOR SOLUTION INTRAMUSCULAR; INTRAVENOUS at 09:05

## 2022-01-01 RX ADMIN — SEVELAMER CARBONATE 2400 MG: 800 TABLET, FILM COATED ORAL at 08:42

## 2022-01-01 RX ADMIN — FENTANYL CITRATE 50 MCG: 50 INJECTION, SOLUTION INTRAMUSCULAR; INTRAVENOUS at 10:09

## 2022-01-01 RX ADMIN — MIDODRINE HYDROCHLORIDE 10 MG: 5 TABLET ORAL at 11:59

## 2022-01-01 RX ADMIN — FENTANYL CITRATE 50 MCG: 50 INJECTION, SOLUTION INTRAMUSCULAR; INTRAVENOUS at 09:02

## 2022-01-01 RX ADMIN — VASOPRESSIN 0.06 UNITS/MIN: 0.2 INJECTION INTRAVENOUS at 14:51

## 2022-01-01 RX ADMIN — SODIUM BICARBONATE 100 MEQ: 84 INJECTION, SOLUTION INTRAVENOUS at 16:50

## 2022-01-01 RX ADMIN — Medication 1 AMPULE: at 09:00

## 2022-01-01 RX ADMIN — ALOGLIPTIN 6.25 MG: 12.5 TABLET, FILM COATED ORAL at 10:47

## 2022-01-01 RX ADMIN — SODIUM CHLORIDE, PRESERVATIVE FREE 10 ML: 5 INJECTION INTRAVENOUS at 20:19

## 2022-01-01 RX ADMIN — NYSTATIN: 100000 POWDER TOPICAL at 08:11

## 2022-01-01 RX ADMIN — PHENYLEPHRINE HYDROCHLORIDE 150 MCG/MIN: 10 INJECTION INTRAVENOUS at 19:49

## 2022-01-01 RX ADMIN — OXYCODONE 10 MG: 5 TABLET ORAL at 20:14

## 2022-01-01 RX ADMIN — TEMAZEPAM 15 MG: 15 CAPSULE ORAL at 22:50

## 2022-01-01 RX ADMIN — MIDODRINE HYDROCHLORIDE 10 MG: 5 TABLET ORAL at 17:14

## 2022-01-01 RX ADMIN — PHENYLEPHRINE HYDROCHLORIDE 100 MCG: 0.1 INJECTION, SOLUTION INTRAVENOUS at 07:17

## 2022-01-01 RX ADMIN — HEPARIN SODIUM 5000 UNITS: 5000 INJECTION INTRAVENOUS; SUBCUTANEOUS at 13:31

## 2022-01-01 RX ADMIN — ACETAMINOPHEN 650 MG: 325 TABLET ORAL at 17:42

## 2022-01-01 RX ADMIN — MIDODRINE HYDROCHLORIDE 10 MG: 5 TABLET ORAL at 09:54

## 2022-01-01 RX ADMIN — EPINEPHRINE 5 MCG/MIN: 1 INJECTION INTRAMUSCULAR; INTRAVENOUS; SUBCUTANEOUS at 08:26

## 2022-01-01 RX ADMIN — FENTANYL CITRATE 50 MCG: 50 INJECTION INTRAMUSCULAR; INTRAVENOUS at 08:02

## 2022-01-01 RX ADMIN — MIDODRINE HYDROCHLORIDE 10 MG: 5 TABLET ORAL at 09:03

## 2022-01-01 RX ADMIN — MIDODRINE HYDROCHLORIDE 10 MG: 5 TABLET ORAL at 16:42

## 2022-01-01 RX ADMIN — HYDROMORPHONE HYDROCHLORIDE 0.25 MG: 1 INJECTION, SOLUTION INTRAMUSCULAR; INTRAVENOUS; SUBCUTANEOUS at 01:24

## 2022-01-01 RX ADMIN — LIDOCAINE HYDROCHLORIDE 10 ML: 10 INJECTION, SOLUTION INFILTRATION; PERINEURAL at 14:47

## 2022-01-01 RX ADMIN — Medication 2 G: at 07:28

## 2022-01-01 RX ADMIN — HEPARIN SODIUM 5000 UNITS: 5000 INJECTION INTRAVENOUS; SUBCUTANEOUS at 21:34

## 2022-01-01 RX ADMIN — PROPOFOL 90 MG: 10 INJECTION, EMULSION INTRAVENOUS at 08:02

## 2022-01-01 RX ADMIN — SEVELAMER CARBONATE 3200 MG: 800 TABLET, FILM COATED ORAL at 08:25

## 2022-01-01 RX ADMIN — SEVELAMER CARBONATE 2400 MG: 800 TABLET, FILM COATED ORAL at 16:36

## 2022-01-01 RX ADMIN — MORPHINE SULFATE 4 MG: 4 INJECTION INTRAVENOUS at 09:38

## 2022-01-01 RX ADMIN — SEVELAMER CARBONATE 2400 MG: 800 TABLET, FILM COATED ORAL at 09:06

## 2022-01-01 RX ADMIN — LIDOCAINE HYDROCHLORIDE 60 MG: 20 INJECTION, SOLUTION EPIDURAL; INFILTRATION; INTRACAUDAL; PERINEURAL at 07:03

## 2022-01-01 RX ADMIN — METRONIDAZOLE 500 MG: 500 INJECTION, SOLUTION INTRAVENOUS at 16:41

## 2022-01-01 RX ADMIN — OXYCODONE 5 MG: 5 TABLET ORAL at 20:31

## 2022-01-01 RX ADMIN — OXYCODONE 5 MG: 5 TABLET ORAL at 04:59

## 2022-01-01 RX ADMIN — PHENYLEPHRINE HYDROCHLORIDE 100 MCG: 0.1 INJECTION, SOLUTION INTRAVENOUS at 07:05

## 2022-01-01 RX ADMIN — MIDODRINE HYDROCHLORIDE 10 MG: 5 TABLET ORAL at 16:40

## 2022-01-01 RX ADMIN — SODIUM CHLORIDE, POTASSIUM CHLORIDE, SODIUM LACTATE AND CALCIUM CHLORIDE 250 ML: 600; 310; 30; 20 INJECTION, SOLUTION INTRAVENOUS at 20:21

## 2022-01-01 RX ADMIN — SEVELAMER CARBONATE 3200 MG: 800 TABLET, FILM COATED ORAL at 17:56

## 2022-01-01 RX ADMIN — Medication 120 MG: at 09:02

## 2022-01-01 RX ADMIN — VASOPRESSIN 0.06 UNITS/MIN: 0.2 INJECTION INTRAVENOUS at 09:15

## 2022-01-01 RX ADMIN — OXYCODONE 5 MG: 5 TABLET ORAL at 18:20

## 2022-01-01 RX ADMIN — HYDROCODONE BITARTRATE AND ACETAMINOPHEN 1 TABLET: 7.5; 325 TABLET ORAL at 08:08

## 2022-01-01 RX ADMIN — METRONIDAZOLE 500 MG: 500 INJECTION, SOLUTION INTRAVENOUS at 10:43

## 2022-01-01 RX ADMIN — SODIUM CHLORIDE 0.4 MCG/KG/HR: 9 INJECTION, SOLUTION INTRAVENOUS at 00:34

## 2022-01-01 RX ADMIN — SEVELAMER CARBONATE 2400 MG: 800 TABLET, FILM COATED ORAL at 10:41

## 2022-01-01 RX ADMIN — SODIUM CHLORIDE, PRESERVATIVE FREE 10 ML: 5 INJECTION INTRAVENOUS at 20:23

## 2022-01-01 RX ADMIN — SODIUM CHLORIDE, PRESERVATIVE FREE 10 ML: 5 INJECTION INTRAVENOUS at 20:05

## 2022-01-01 RX ADMIN — SODIUM CHLORIDE, PRESERVATIVE FREE 10 ML: 5 INJECTION INTRAVENOUS at 20:15

## 2022-01-01 RX ADMIN — FENTANYL CITRATE 50 MCG: 50 INJECTION, SOLUTION INTRAMUSCULAR; INTRAVENOUS at 21:15

## 2022-01-01 RX ADMIN — EPOETIN ALFA-EPBX 20000 UNITS: 20000 INJECTION, SOLUTION INTRAVENOUS; SUBCUTANEOUS at 21:46

## 2022-01-01 RX ADMIN — HEPARIN SODIUM 5000 UNITS: 5000 INJECTION INTRAVENOUS; SUBCUTANEOUS at 13:33

## 2022-01-01 RX ADMIN — MIDODRINE HYDROCHLORIDE 10 MG: 5 TABLET ORAL at 12:38

## 2022-01-01 RX ADMIN — Medication 1 AMPULE: at 08:43

## 2022-01-01 RX ADMIN — SEVELAMER CARBONATE 800 MG: 800 TABLET, FILM COATED ORAL at 18:36

## 2022-01-01 RX ADMIN — SEVELAMER CARBONATE 3200 MG: 800 TABLET, FILM COATED ORAL at 08:40

## 2022-01-01 RX ADMIN — PANTOPRAZOLE SODIUM 40 MG: 40 TABLET, DELAYED RELEASE ORAL at 09:06

## 2022-01-01 RX ADMIN — Medication 1 AMPULE: at 10:54

## 2022-01-01 RX ADMIN — PANTOPRAZOLE SODIUM 40 MG: 40 TABLET, DELAYED RELEASE ORAL at 06:00

## 2022-01-01 RX ADMIN — PHENYLEPHRINE HYDROCHLORIDE 250 MCG/MIN: 10 INJECTION INTRAVENOUS at 14:51

## 2022-01-01 RX ADMIN — CEFTRIAXONE 1000 MG: 1 INJECTION, POWDER, FOR SOLUTION INTRAMUSCULAR; INTRAVENOUS at 10:01

## 2022-01-01 RX ADMIN — HEPARIN SODIUM 5000 UNITS: 5000 INJECTION INTRAVENOUS; SUBCUTANEOUS at 07:49

## 2022-01-01 RX ADMIN — TEMAZEPAM 15 MG: 15 CAPSULE ORAL at 22:35

## 2022-01-01 RX ADMIN — OXYCODONE 10 MG: 5 TABLET ORAL at 12:22

## 2022-01-01 RX ADMIN — MIDODRINE HYDROCHLORIDE 10 MG: 5 TABLET ORAL at 23:44

## 2022-01-01 RX ADMIN — MIDODRINE HYDROCHLORIDE 10 MG: 5 TABLET ORAL at 16:55

## 2022-01-01 RX ADMIN — PANTOPRAZOLE SODIUM 40 MG: 40 TABLET, DELAYED RELEASE ORAL at 05:19

## 2022-01-01 RX ADMIN — HEPARIN SODIUM AND DEXTROSE 18 UNITS/KG/HR: 10000; 5 INJECTION INTRAVENOUS at 22:44

## 2022-01-01 RX ADMIN — SODIUM CHLORIDE, PRESERVATIVE FREE 10 ML: 5 INJECTION INTRAVENOUS at 22:13

## 2022-01-01 RX ADMIN — PHENYLEPHRINE HYDROCHLORIDE 100 MCG: 0.1 INJECTION, SOLUTION INTRAVENOUS at 07:25

## 2022-01-01 RX ADMIN — MIDODRINE HYDROCHLORIDE 10 MG: 5 TABLET ORAL at 09:38

## 2022-01-01 RX ADMIN — MIDODRINE HYDROCHLORIDE 10 MG: 5 TABLET ORAL at 07:59

## 2022-01-01 RX ADMIN — ACETAMINOPHEN 1000 MG: 500 TABLET ORAL at 06:15

## 2022-01-01 RX ADMIN — CEFTRIAXONE 1000 MG: 1 INJECTION, POWDER, FOR SOLUTION INTRAMUSCULAR; INTRAVENOUS at 10:22

## 2022-01-01 RX ADMIN — FENTANYL CITRATE 25 MCG: 50 INJECTION INTRAMUSCULAR; INTRAVENOUS at 08:38

## 2022-01-01 RX ADMIN — HYDROCODONE BITARTRATE AND ACETAMINOPHEN 1 TABLET: 7.5; 325 TABLET ORAL at 10:26

## 2022-01-01 RX ADMIN — PHENYLEPHRINE HYDROCHLORIDE 100 MCG: 10 INJECTION INTRAVENOUS at 09:23

## 2022-01-01 RX ADMIN — VANCOMYCIN HYDROCHLORIDE 500 MG: 500 INJECTION, POWDER, LYOPHILIZED, FOR SOLUTION INTRAVENOUS at 20:04

## 2022-01-01 RX ADMIN — HEPARIN SODIUM 5000 UNITS: 5000 INJECTION INTRAVENOUS; SUBCUTANEOUS at 05:14

## 2022-01-01 RX ADMIN — LINEZOLID 600 MG: 600 INJECTION, SOLUTION INTRAVENOUS at 07:11

## 2022-01-01 RX ADMIN — HYDROMORPHONE HYDROCHLORIDE 0.5 MG: 1 INJECTION, SOLUTION INTRAMUSCULAR; INTRAVENOUS; SUBCUTANEOUS at 21:51

## 2022-01-01 RX ADMIN — HYDROMORPHONE HYDROCHLORIDE 0.5 MG: 1 INJECTION, SOLUTION INTRAMUSCULAR; INTRAVENOUS; SUBCUTANEOUS at 12:51

## 2022-01-01 RX ADMIN — MIDODRINE HYDROCHLORIDE 10 MG: 5 TABLET ORAL at 17:48

## 2022-01-01 RX ADMIN — SODIUM CHLORIDE: 9 INJECTION, SOLUTION INTRAVENOUS at 07:19

## 2022-01-01 RX ADMIN — HYDROMORPHONE HYDROCHLORIDE 1 MG: 1 INJECTION, SOLUTION INTRAMUSCULAR; INTRAVENOUS; SUBCUTANEOUS at 08:48

## 2022-01-01 RX ADMIN — SODIUM CHLORIDE 500 ML: 9 INJECTION, SOLUTION INTRAVENOUS at 01:38

## 2022-01-01 RX ADMIN — PANTOPRAZOLE SODIUM 40 MG: 40 TABLET, DELAYED RELEASE ORAL at 05:49

## 2022-01-01 RX ADMIN — FENTANYL CITRATE 50 MCG: 50 INJECTION, SOLUTION INTRAMUSCULAR; INTRAVENOUS at 13:54

## 2022-01-01 RX ADMIN — ACETAMINOPHEN 650 MG: 325 TABLET ORAL at 23:05

## 2022-01-01 RX ADMIN — SODIUM CHLORIDE 1000 ML: 9 INJECTION, SOLUTION INTRAVENOUS at 14:27

## 2022-01-01 RX ADMIN — OXYCODONE 5 MG: 5 TABLET ORAL at 16:02

## 2022-01-01 RX ADMIN — ALOGLIPTIN 6.25 MG: 12.5 TABLET, FILM COATED ORAL at 09:07

## 2022-01-01 RX ADMIN — SODIUM CHLORIDE, PRESERVATIVE FREE 10 ML: 5 INJECTION INTRAVENOUS at 12:15

## 2022-01-01 RX ADMIN — Medication 3 MG: at 08:27

## 2022-01-01 RX ADMIN — PANTOPRAZOLE SODIUM 40 MG: 40 TABLET, DELAYED RELEASE ORAL at 06:14

## 2022-01-01 RX ADMIN — SEVELAMER CARBONATE 2400 MG: 800 TABLET, FILM COATED ORAL at 13:00

## 2022-01-01 RX ADMIN — MIDODRINE HYDROCHLORIDE 10 MG: 5 TABLET ORAL at 09:06

## 2022-01-01 RX ADMIN — HEPARIN SODIUM 5000 UNITS: 5000 INJECTION INTRAVENOUS; SUBCUTANEOUS at 12:10

## 2022-01-01 RX ADMIN — PROPOFOL 130 MG: 10 INJECTION, EMULSION INTRAVENOUS at 07:03

## 2022-01-01 RX ADMIN — SEVELAMER CARBONATE 3200 MG: 800 TABLET, FILM COATED ORAL at 12:15

## 2022-01-01 RX ADMIN — HEPARIN SODIUM 7260 UNITS: 1000 INJECTION INTRAVENOUS; SUBCUTANEOUS at 09:38

## 2022-01-01 RX ADMIN — CEFEPIME HYDROCHLORIDE 1000 MG: 1 INJECTION, POWDER, FOR SOLUTION INTRAMUSCULAR; INTRAVENOUS at 00:24

## 2022-01-01 RX ADMIN — FENTANYL CITRATE 25 MCG: 50 INJECTION, SOLUTION INTRAMUSCULAR; INTRAVENOUS at 09:58

## 2022-01-01 RX ADMIN — MIDODRINE HYDROCHLORIDE 10 MG: 5 TABLET ORAL at 15:26

## 2022-01-01 RX ADMIN — PHENYLEPHRINE HYDROCHLORIDE 100 MCG: 0.1 INJECTION, SOLUTION INTRAVENOUS at 07:56

## 2022-01-01 RX ADMIN — PHENYLEPHRINE HYDROCHLORIDE 100 MCG: 10 INJECTION INTRAVENOUS at 09:19

## 2022-01-01 RX ADMIN — SODIUM CHLORIDE 0.2 MCG/KG/HR: 9 INJECTION, SOLUTION INTRAVENOUS at 17:21

## 2022-01-01 RX ADMIN — PHENYLEPHRINE HYDROCHLORIDE 200 MCG/MIN: 10 INJECTION INTRAVENOUS at 11:04

## 2022-01-01 RX ADMIN — PHENYLEPHRINE HYDROCHLORIDE 100 MCG: 0.1 INJECTION, SOLUTION INTRAVENOUS at 07:49

## 2022-01-01 RX ADMIN — PANTOPRAZOLE SODIUM 40 MG: 40 TABLET, DELAYED RELEASE ORAL at 06:13

## 2022-01-01 RX ADMIN — MIDODRINE HYDROCHLORIDE 10 MG: 5 TABLET ORAL at 12:17

## 2022-01-01 RX ADMIN — Medication 3 ML: at 08:15

## 2022-01-01 RX ADMIN — PANTOPRAZOLE SODIUM 40 MG: 40 TABLET, DELAYED RELEASE ORAL at 06:24

## 2022-01-01 RX ADMIN — ONDANSETRON 4 MG: 2 INJECTION INTRAMUSCULAR; INTRAVENOUS at 03:00

## 2022-01-01 RX ADMIN — SODIUM CHLORIDE: 9 INJECTION, SOLUTION INTRAVENOUS at 06:18

## 2022-01-01 RX ADMIN — HEPARIN SODIUM AND DEXTROSE 18 UNITS/KG/HR: 10000; 5 INJECTION INTRAVENOUS at 09:39

## 2022-01-01 RX ADMIN — PHENYLEPHRINE HYDROCHLORIDE 150 MCG/MIN: 10 INJECTION INTRAVENOUS at 00:59

## 2022-01-01 RX ADMIN — MIDODRINE HYDROCHLORIDE 10 MG: 5 TABLET ORAL at 12:10

## 2022-01-01 RX ADMIN — METRONIDAZOLE 500 MG: 500 INJECTION, SOLUTION INTRAVENOUS at 15:25

## 2022-01-01 RX ADMIN — LINEZOLID 600 MG: 600 INJECTION, SOLUTION INTRAVENOUS at 21:00

## 2022-01-01 RX ADMIN — ACETAMINOPHEN 650 MG: 325 TABLET, FILM COATED ORAL at 09:02

## 2022-01-01 RX ADMIN — MIDODRINE HYDROCHLORIDE 10 MG: 5 TABLET ORAL at 16:36

## 2022-01-01 RX ADMIN — SEVELAMER CARBONATE 800 MG: 800 TABLET, FILM COATED ORAL at 08:08

## 2022-01-01 RX ADMIN — IOPAMIDOL 100 ML: 755 INJECTION, SOLUTION INTRAVENOUS at 03:35

## 2022-01-01 RX ADMIN — PHENYLEPHRINE HYDROCHLORIDE 30 MCG/MIN: 10 INJECTION INTRAVENOUS at 08:17

## 2022-01-01 RX ADMIN — SEVELAMER CARBONATE 3200 MG: 800 TABLET, FILM COATED ORAL at 07:54

## 2022-01-01 RX ADMIN — SEVELAMER CARBONATE 2400 MG: 800 TABLET, FILM COATED ORAL at 12:10

## 2022-01-01 RX ADMIN — HYDROCODONE BITARTRATE AND ACETAMINOPHEN 1 TABLET: 7.5; 325 TABLET ORAL at 17:49

## 2022-01-01 RX ADMIN — HYDROMORPHONE HYDROCHLORIDE 0.5 MG: 1 INJECTION, SOLUTION INTRAMUSCULAR; INTRAVENOUS; SUBCUTANEOUS at 15:58

## 2022-01-01 RX ADMIN — ACETAMINOPHEN 650 MG: 325 TABLET, FILM COATED ORAL at 11:59

## 2022-01-01 RX ADMIN — SEVELAMER CARBONATE 800 MG: 800 TABLET, FILM COATED ORAL at 16:56

## 2022-01-01 RX ADMIN — SODIUM CHLORIDE, PRESERVATIVE FREE 10 ML: 5 INJECTION INTRAVENOUS at 13:00

## 2022-01-01 RX ADMIN — SEVELAMER CARBONATE 800 MG: 800 TABLET, FILM COATED ORAL at 17:13

## 2022-01-01 RX ADMIN — OXYCODONE 10 MG: 5 TABLET ORAL at 15:28

## 2022-01-01 RX ADMIN — METRONIDAZOLE 500 MG: 500 INJECTION, SOLUTION INTRAVENOUS at 09:26

## 2022-01-01 RX ADMIN — ALBUMIN (HUMAN) 25 G: 12.5 INJECTION, SOLUTION INTRAVENOUS at 21:34

## 2022-01-01 RX ADMIN — OXYCODONE 5 MG: 5 TABLET ORAL at 11:15

## 2022-01-01 RX ADMIN — SEVELAMER CARBONATE 800 MG: 800 TABLET, FILM COATED ORAL at 07:59

## 2022-01-01 RX ADMIN — HEPARIN SODIUM 5000 UNITS: 1000 INJECTION INTRAVENOUS; SUBCUTANEOUS at 07:12

## 2022-01-01 RX ADMIN — CEFEPIME 2000 MG: 2 INJECTION, POWDER, FOR SOLUTION INTRAVENOUS at 00:52

## 2022-01-01 RX ADMIN — SEVELAMER CARBONATE 3200 MG: 800 TABLET, FILM COATED ORAL at 16:34

## 2022-01-01 RX ADMIN — Medication 6 MCG/MIN: at 23:59

## 2022-01-01 RX ADMIN — MIDODRINE HYDROCHLORIDE 10 MG: 5 TABLET ORAL at 12:08

## 2022-01-01 RX ADMIN — GLYCOPYRROLATE 0.4 MG: 0.2 INJECTION INTRAMUSCULAR; INTRAVENOUS at 08:27

## 2022-01-01 RX ADMIN — SODIUM CHLORIDE: 9 INJECTION, SOLUTION INTRAVENOUS at 10:43

## 2022-01-01 RX ADMIN — CEFEPIME HYDROCHLORIDE 1000 MG: 1 INJECTION, POWDER, FOR SOLUTION INTRAMUSCULAR; INTRAVENOUS at 08:44

## 2022-01-01 RX ADMIN — MIDODRINE HYDROCHLORIDE 10 MG: 5 TABLET ORAL at 22:28

## 2022-01-01 RX ADMIN — HYDROCODONE BITARTRATE AND ACETAMINOPHEN 1 TABLET: 7.5; 325 TABLET ORAL at 17:13

## 2022-01-01 RX ADMIN — HEPARIN SODIUM 5000 UNITS: 5000 INJECTION INTRAVENOUS; SUBCUTANEOUS at 06:13

## 2022-01-01 RX ADMIN — VANCOMYCIN HYDROCHLORIDE 1750 MG: 10 INJECTION, POWDER, LYOPHILIZED, FOR SOLUTION INTRAVENOUS at 02:14

## 2022-01-01 RX ADMIN — MIDODRINE HYDROCHLORIDE 10 MG: 5 TABLET ORAL at 04:04

## 2022-01-01 RX ADMIN — AMIODARONE HYDROCHLORIDE 150 MG: 50 INJECTION, SOLUTION INTRAVENOUS at 15:21

## 2022-01-01 RX ADMIN — MIDAZOLAM HYDROCHLORIDE 1 MG: 1 INJECTION, SOLUTION INTRAMUSCULAR; INTRAVENOUS at 08:27

## 2022-01-01 RX ADMIN — SEVELAMER CARBONATE 2400 MG: 800 TABLET, FILM COATED ORAL at 16:55

## 2022-01-01 RX ADMIN — SODIUM CHLORIDE, PRESERVATIVE FREE 10 ML: 5 INJECTION INTRAVENOUS at 17:01

## 2022-01-01 RX ADMIN — MIDODRINE HYDROCHLORIDE 5 MG: 5 TABLET ORAL at 17:49

## 2022-01-01 RX ADMIN — METRONIDAZOLE 500 MG: 500 INJECTION, SOLUTION INTRAVENOUS at 16:47

## 2022-01-01 RX ADMIN — METRONIDAZOLE 500 MG: 500 INJECTION, SOLUTION INTRAVENOUS at 08:40

## 2022-01-01 RX ADMIN — SODIUM BICARBONATE: 84 INJECTION, SOLUTION INTRAVENOUS at 19:28

## 2022-01-01 RX ADMIN — EPINEPHRINE 20 MCG: 1 INJECTION INTRAMUSCULAR; INTRAVENOUS; SUBCUTANEOUS at 08:25

## 2022-01-01 RX ADMIN — HYDROMORPHONE HYDROCHLORIDE 0.5 MG: 2 TABLET ORAL at 04:54

## 2022-01-01 RX ADMIN — SODIUM CHLORIDE, PRESERVATIVE FREE 10 ML: 5 INJECTION INTRAVENOUS at 08:44

## 2022-01-01 RX ADMIN — NYSTATIN: 100000 POWDER TOPICAL at 19:45

## 2022-01-01 RX ADMIN — EPINEPHRINE 0.5 MG: 0.1 INJECTION, SOLUTION ENDOTRACHEAL; INTRACARDIAC; INTRAVENOUS at 15:08

## 2022-01-01 RX ADMIN — Medication 5 MCG/MIN: at 10:28

## 2022-01-01 RX ADMIN — FENTANYL CITRATE 50 MCG/HR: 50 INJECTION, SOLUTION INTRAMUSCULAR; INTRAVENOUS at 16:20

## 2022-01-01 RX ADMIN — HYDROMORPHONE HYDROCHLORIDE 0.5 MG: 1 INJECTION, SOLUTION INTRAMUSCULAR; INTRAVENOUS; SUBCUTANEOUS at 15:51

## 2022-01-01 RX ADMIN — METRONIDAZOLE 500 MG: 500 INJECTION, SOLUTION INTRAVENOUS at 04:26

## 2022-01-01 RX ADMIN — ALBUMIN (HUMAN) 25 G: 12.5 INJECTION, SOLUTION INTRAVENOUS at 20:14

## 2022-01-01 RX ADMIN — SODIUM CHLORIDE, PRESERVATIVE FREE 10 ML: 5 INJECTION INTRAVENOUS at 10:49

## 2022-01-01 RX ADMIN — PANTOPRAZOLE SODIUM 40 MG: 40 TABLET, DELAYED RELEASE ORAL at 12:52

## 2022-01-01 RX ADMIN — MIDODRINE HYDROCHLORIDE 5 MG: 5 TABLET ORAL at 01:11

## 2022-01-01 RX ADMIN — MIDODRINE HYDROCHLORIDE 5 MG: 5 TABLET ORAL at 09:07

## 2022-01-01 RX ADMIN — HALOPERIDOL LACTATE 5 MG: 5 INJECTION, SOLUTION INTRAMUSCULAR at 11:57

## 2022-01-01 RX ADMIN — EPINEPHRINE 10 MCG: 1 INJECTION INTRAMUSCULAR; INTRAVENOUS; SUBCUTANEOUS at 08:24

## 2022-01-01 RX ADMIN — SEVELAMER CARBONATE 800 MG: 800 TABLET, FILM COATED ORAL at 09:07

## 2022-01-01 RX ADMIN — SEVELAMER CARBONATE 800 MG: 800 TABLET, FILM COATED ORAL at 17:49

## 2022-01-01 RX ADMIN — SODIUM CHLORIDE, PRESERVATIVE FREE 10 ML: 5 INJECTION INTRAVENOUS at 09:07

## 2022-01-01 RX ADMIN — IOPAMIDOL 40 ML: 612 INJECTION, SOLUTION INTRAVENOUS at 15:15

## 2022-01-01 RX ADMIN — ACETAMINOPHEN 650 MG: 325 TABLET, FILM COATED ORAL at 19:43

## 2022-01-01 RX ADMIN — SODIUM CHLORIDE 500 ML: 9 INJECTION, SOLUTION INTRAVENOUS at 19:47

## 2022-01-01 RX ADMIN — DEXTROSE MONOHYDRATE: 100 INJECTION, SOLUTION INTRAVENOUS at 23:52

## 2022-01-01 RX ADMIN — SODIUM CHLORIDE 500 ML: 9 INJECTION, SOLUTION INTRAVENOUS at 00:34

## 2022-01-01 RX ADMIN — LINEZOLID 600 MG: 600 INJECTION, SOLUTION INTRAVENOUS at 20:32

## 2022-01-01 RX ADMIN — HYDROMORPHONE HYDROCHLORIDE 1 MG: 1 INJECTION, SOLUTION INTRAMUSCULAR; INTRAVENOUS; SUBCUTANEOUS at 12:20

## 2022-01-01 RX ADMIN — INSULIN LISPRO 2 UNITS: 100 INJECTION, SOLUTION INTRAVENOUS; SUBCUTANEOUS at 12:03

## 2022-01-01 RX ADMIN — METRONIDAZOLE 500 MG: 500 INJECTION, SOLUTION INTRAVENOUS at 21:35

## 2022-01-01 RX ADMIN — SODIUM BICARBONATE 100 MEQ: 84 INJECTION, SOLUTION INTRAVENOUS at 14:52

## 2022-01-01 RX ADMIN — SODIUM CHLORIDE, PRESERVATIVE FREE 10 ML: 5 INJECTION INTRAVENOUS at 20:32

## 2022-01-01 RX ADMIN — SODIUM CHLORIDE, PRESERVATIVE FREE 10 ML: 5 INJECTION INTRAVENOUS at 07:55

## 2022-01-01 RX ADMIN — EPINEPHRINE 10 MCG: 1 INJECTION INTRAMUSCULAR; INTRAVENOUS; SUBCUTANEOUS at 08:23

## 2022-01-01 RX ADMIN — SODIUM CHLORIDE, PRESERVATIVE FREE 10 ML: 5 INJECTION INTRAVENOUS at 08:13

## 2022-01-01 RX ADMIN — SODIUM CHLORIDE, PRESERVATIVE FREE 10 ML: 5 INJECTION INTRAVENOUS at 21:35

## 2022-01-01 RX ADMIN — HEPARIN SODIUM 5000 UNITS: 5000 INJECTION INTRAVENOUS; SUBCUTANEOUS at 22:16

## 2022-01-01 RX ADMIN — MIDODRINE HYDROCHLORIDE 10 MG: 5 TABLET ORAL at 00:34

## 2022-01-01 RX ADMIN — SODIUM CHLORIDE 100 ML: 9 INJECTION, SOLUTION INTRAVENOUS at 03:41

## 2022-01-01 RX ADMIN — PHENYLEPHRINE HYDROCHLORIDE 100 MCG/MIN: 10 INJECTION INTRAVENOUS at 13:23

## 2022-01-01 RX ADMIN — MIDODRINE HYDROCHLORIDE 10 MG: 5 TABLET ORAL at 07:04

## 2022-01-01 RX ADMIN — FENTANYL CITRATE 25 MCG: 50 INJECTION, SOLUTION INTRAMUSCULAR; INTRAVENOUS at 10:05

## 2022-01-01 RX ADMIN — HYDROCODONE BITARTRATE AND ACETAMINOPHEN 1 TABLET: 7.5; 325 TABLET ORAL at 14:05

## 2022-01-01 RX ADMIN — SEVELAMER CARBONATE 3200 MG: 800 TABLET, FILM COATED ORAL at 12:00

## 2022-01-01 RX ADMIN — HYDROMORPHONE HYDROCHLORIDE 0.5 MG: 1 INJECTION, SOLUTION INTRAMUSCULAR; INTRAVENOUS; SUBCUTANEOUS at 09:03

## 2022-01-01 RX ADMIN — ALOGLIPTIN 6.25 MG: 12.5 TABLET, FILM COATED ORAL at 10:37

## 2022-01-01 RX ADMIN — MIDODRINE HYDROCHLORIDE 10 MG: 5 TABLET ORAL at 08:08

## 2022-01-01 RX ADMIN — SODIUM BICARBONATE: 84 INJECTION, SOLUTION INTRAVENOUS at 19:52

## 2022-01-01 RX ADMIN — HYDROMORPHONE HYDROCHLORIDE 1 MG: 1 INJECTION, SOLUTION INTRAMUSCULAR; INTRAVENOUS; SUBCUTANEOUS at 21:30

## 2022-01-01 RX ADMIN — HALOPERIDOL LACTATE 2 MG: 5 INJECTION, SOLUTION INTRAMUSCULAR at 17:05

## 2022-01-01 RX ADMIN — MIDAZOLAM 2 MG: 1 INJECTION INTRAMUSCULAR; INTRAVENOUS at 08:44

## 2022-01-01 RX ADMIN — HEPARIN SODIUM 1000 ML/HR: 200 INJECTION, SOLUTION INTRAVENOUS at 15:16

## 2022-01-01 RX ADMIN — ROCURONIUM BROMIDE 5 MG: 50 INJECTION, SOLUTION INTRAVENOUS at 09:02

## 2022-01-01 RX ADMIN — SEVELAMER CARBONATE 800 MG: 800 TABLET, FILM COATED ORAL at 18:39

## 2022-01-01 RX ADMIN — METRONIDAZOLE 500 MG: 500 INJECTION, SOLUTION INTRAVENOUS at 03:55

## 2022-01-01 RX ADMIN — HEPARIN SODIUM 5000 UNITS: 5000 INJECTION INTRAVENOUS; SUBCUTANEOUS at 21:40

## 2022-01-01 RX ADMIN — HEPARIN SODIUM 5000 UNITS: 5000 INJECTION INTRAVENOUS; SUBCUTANEOUS at 06:16

## 2022-01-01 RX ADMIN — MIDODRINE HYDROCHLORIDE 10 MG: 5 TABLET ORAL at 18:12

## 2022-01-01 RX ADMIN — SODIUM CHLORIDE, PRESERVATIVE FREE 10 ML: 5 INJECTION INTRAVENOUS at 09:04

## 2022-01-01 RX ADMIN — SODIUM CHLORIDE, PRESERVATIVE FREE 10 ML: 5 INJECTION INTRAVENOUS at 09:50

## 2022-01-01 RX ADMIN — PHENYLEPHRINE HYDROCHLORIDE 200 MCG/MIN: 10 INJECTION INTRAVENOUS at 06:28

## 2022-01-01 RX ADMIN — SODIUM BICARBONATE: 84 INJECTION, SOLUTION INTRAVENOUS at 03:45

## 2022-01-01 RX ADMIN — SODIUM CHLORIDE: 9 INJECTION, SOLUTION INTRAVENOUS at 05:46

## 2022-01-01 RX ADMIN — MIDODRINE HYDROCHLORIDE 10 MG: 5 TABLET ORAL at 11:04

## 2022-01-01 RX ADMIN — HYDROMORPHONE HYDROCHLORIDE 1 MG: 1 INJECTION, SOLUTION INTRAMUSCULAR; INTRAVENOUS; SUBCUTANEOUS at 06:09

## 2022-01-01 RX ADMIN — EPINEPHRINE 100 MCG: 1 INJECTION INTRAMUSCULAR; INTRAVENOUS; SUBCUTANEOUS at 08:12

## 2022-01-01 RX ADMIN — SODIUM CHLORIDE, PRESERVATIVE FREE 10 ML: 5 INJECTION INTRAVENOUS at 19:44

## 2022-01-01 RX ADMIN — PHENYLEPHRINE HYDROCHLORIDE 100 MCG: 10 INJECTION INTRAVENOUS at 09:21

## 2022-01-01 RX ADMIN — PANTOPRAZOLE SODIUM 40 MG: 40 TABLET, DELAYED RELEASE ORAL at 04:58

## 2022-01-01 RX ADMIN — HEPARIN SODIUM 5000 UNITS: 5000 INJECTION INTRAVENOUS; SUBCUTANEOUS at 06:00

## 2022-01-01 RX ADMIN — HEPARIN SODIUM 5000 UNITS: 1000 INJECTION INTRAVENOUS; SUBCUTANEOUS at 11:31

## 2022-01-01 RX ADMIN — FENTANYL CITRATE 50 MCG/HR: 50 INJECTION, SOLUTION INTRAMUSCULAR; INTRAVENOUS at 07:02

## 2022-01-01 RX ADMIN — TEMAZEPAM 15 MG: 15 CAPSULE ORAL at 23:04

## 2022-01-01 RX ADMIN — PANTOPRAZOLE SODIUM 40 MG: 40 TABLET, DELAYED RELEASE ORAL at 06:23

## 2022-01-01 RX ADMIN — SEVELAMER CARBONATE 3200 MG: 800 TABLET, FILM COATED ORAL at 17:02

## 2022-01-01 RX ADMIN — ETOMIDATE 20 MG: 2 INJECTION, SOLUTION INTRAVENOUS at 13:55

## 2022-01-01 RX ADMIN — HYDROMORPHONE HYDROCHLORIDE 1 MG: 1 INJECTION, SOLUTION INTRAMUSCULAR; INTRAVENOUS; SUBCUTANEOUS at 01:34

## 2022-01-01 RX ADMIN — HYDROMORPHONE HYDROCHLORIDE 0.5 MG: 1 INJECTION, SOLUTION INTRAMUSCULAR; INTRAVENOUS; SUBCUTANEOUS at 21:08

## 2022-01-01 RX ADMIN — LORAZEPAM 0.5 MG: 0.5 TABLET ORAL at 12:16

## 2022-01-01 RX ADMIN — SEVELAMER CARBONATE 3200 MG: 800 TABLET, FILM COATED ORAL at 17:10

## 2022-01-01 RX ADMIN — SODIUM CHLORIDE, PRESERVATIVE FREE 5 ML: 5 INJECTION INTRAVENOUS at 08:44

## 2022-01-01 RX ADMIN — HEPARIN SODIUM 5000 UNITS: 5000 INJECTION INTRAVENOUS; SUBCUTANEOUS at 06:20

## 2022-01-01 RX ADMIN — SODIUM CHLORIDE 250 ML: 9 INJECTION, SOLUTION INTRAVENOUS at 14:13

## 2022-01-01 RX ADMIN — VASOPRESSIN 0.06 UNITS/MIN: 0.2 INJECTION INTRAVENOUS at 03:53

## 2022-01-01 RX ADMIN — Medication 1 TABLET: at 18:13

## 2022-01-01 RX ADMIN — Medication 1 TABLET: at 10:41

## 2022-01-01 RX ADMIN — METRONIDAZOLE 500 MG: 500 INJECTION, SOLUTION INTRAVENOUS at 17:15

## 2022-01-01 RX ADMIN — PHENYLEPHRINE HYDROCHLORIDE 100 MCG: 0.1 INJECTION, SOLUTION INTRAVENOUS at 08:06

## 2022-01-01 RX ADMIN — SEVELAMER CARBONATE 2400 MG: 800 TABLET, FILM COATED ORAL at 12:17

## 2022-01-01 RX ADMIN — MIDODRINE HYDROCHLORIDE 10 MG: 5 TABLET ORAL at 15:28

## 2022-01-01 RX ADMIN — Medication 1 TABLET: at 10:26

## 2022-01-01 RX ADMIN — HYDROMORPHONE HYDROCHLORIDE 0.25 MG: 1 INJECTION, SOLUTION INTRAMUSCULAR; INTRAVENOUS; SUBCUTANEOUS at 05:54

## 2022-01-01 RX ADMIN — SODIUM CHLORIDE, PRESERVATIVE FREE 10 ML: 5 INJECTION INTRAVENOUS at 21:15

## 2022-01-01 RX ADMIN — ACETAMINOPHEN 650 MG: 325 TABLET ORAL at 16:55

## 2022-01-01 RX ADMIN — CEFEPIME 2000 MG: 2 INJECTION, POWDER, FOR SOLUTION INTRAVENOUS at 09:44

## 2022-01-01 RX ADMIN — CYCLOBENZAPRINE HYDROCHLORIDE 10 MG: 10 TABLET, FILM COATED ORAL at 10:13

## 2022-01-01 RX ADMIN — PROPOFOL 30 MG: 10 INJECTION, EMULSION INTRAVENOUS at 08:30

## 2022-01-01 RX ADMIN — LIDOCAINE HYDROCHLORIDE 100 MG: 20 INJECTION, SOLUTION EPIDURAL; INFILTRATION; INTRACAUDAL; PERINEURAL at 08:02

## 2022-01-01 RX ADMIN — SEVELAMER CARBONATE 800 MG: 800 TABLET, FILM COATED ORAL at 12:12

## 2022-01-01 RX ADMIN — METRONIDAZOLE 500 MG: 500 INJECTION, SOLUTION INTRAVENOUS at 12:14

## 2022-01-01 RX ADMIN — HYDROMORPHONE HYDROCHLORIDE 0.5 MG: 2 INJECTION, SOLUTION INTRAMUSCULAR; INTRAVENOUS; SUBCUTANEOUS at 10:39

## 2022-01-01 RX ADMIN — VASOPRESSIN 0.06 UNITS/MIN: 0.2 INJECTION INTRAVENOUS at 20:36

## 2022-01-01 RX ADMIN — SODIUM CHLORIDE, PRESERVATIVE FREE 10 ML: 5 INJECTION INTRAVENOUS at 19:43

## 2022-01-01 RX ADMIN — HEPARIN SODIUM 5000 UNITS: 5000 INJECTION INTRAVENOUS; SUBCUTANEOUS at 05:49

## 2022-01-01 RX ADMIN — NYSTATIN: 100000 POWDER TOPICAL at 13:41

## 2022-01-01 RX ADMIN — EPOETIN ALFA-EPBX 20000 UNITS: 10000 INJECTION, SOLUTION INTRAVENOUS; SUBCUTANEOUS at 16:31

## 2022-01-01 RX ADMIN — HYDROMORPHONE HYDROCHLORIDE 0.5 MG: 1 INJECTION, SOLUTION INTRAMUSCULAR; INTRAVENOUS; SUBCUTANEOUS at 11:56

## 2022-01-01 RX ADMIN — ONDANSETRON 4 MG: 2 INJECTION INTRAMUSCULAR; INTRAVENOUS at 09:50

## 2022-01-01 RX ADMIN — HEPARIN SODIUM 5000 UNITS: 5000 INJECTION INTRAVENOUS; SUBCUTANEOUS at 13:01

## 2022-01-01 RX ADMIN — OXYCODONE 5 MG: 5 TABLET ORAL at 17:23

## 2022-01-01 RX ADMIN — HEPARIN SODIUM 5000 UNITS: 5000 INJECTION INTRAVENOUS; SUBCUTANEOUS at 21:36

## 2022-01-01 RX ADMIN — HEPARIN SODIUM 5000 UNITS: 1000 INJECTION INTRAVENOUS; SUBCUTANEOUS at 07:45

## 2022-01-01 RX ADMIN — SODIUM CHLORIDE: 9 INJECTION, SOLUTION INTRAVENOUS at 07:55

## 2022-01-01 RX ADMIN — MIDODRINE HYDROCHLORIDE 10 MG: 5 TABLET ORAL at 23:07

## 2022-01-01 RX ADMIN — VANCOMYCIN HYDROCHLORIDE 1750 MG: 10 INJECTION, POWDER, LYOPHILIZED, FOR SOLUTION INTRAVENOUS at 10:26

## 2022-01-01 RX ADMIN — CEFTRIAXONE 1000 MG: 1 INJECTION, POWDER, FOR SOLUTION INTRAMUSCULAR; INTRAVENOUS at 08:25

## 2022-01-01 RX ADMIN — DEXAMETHASONE SODIUM PHOSPHATE 4 MG: 4 INJECTION, SOLUTION INTRAMUSCULAR; INTRAVENOUS at 09:50

## 2022-01-01 RX ADMIN — EPINEPHRINE 100 MCG: 1 INJECTION INTRAMUSCULAR; INTRAVENOUS; SUBCUTANEOUS at 08:07

## 2022-01-01 RX ADMIN — OXYCODONE 10 MG: 5 TABLET ORAL at 13:41

## 2022-01-01 RX ADMIN — MORPHINE SULFATE 4 MG: 4 INJECTION INTRAVENOUS at 08:56

## 2022-01-01 RX ADMIN — SODIUM CHLORIDE, PRESERVATIVE FREE 10 ML: 5 INJECTION INTRAVENOUS at 03:41

## 2022-01-01 RX ADMIN — SODIUM CHLORIDE, PRESERVATIVE FREE 10 ML: 5 INJECTION INTRAVENOUS at 21:50

## 2022-01-01 RX ADMIN — Medication 10 MG: at 08:21

## 2022-01-01 RX ADMIN — ROCURONIUM BROMIDE 40 MG: 50 INJECTION, SOLUTION INTRAVENOUS at 07:03

## 2022-01-01 RX ADMIN — SODIUM CHLORIDE 500 ML: 9 INJECTION, SOLUTION INTRAVENOUS at 20:04

## 2022-01-01 RX ADMIN — SODIUM CHLORIDE 500 ML: 9 INJECTION, SOLUTION INTRAVENOUS at 06:00

## 2022-01-01 RX ADMIN — LIDOCAINE HYDROCHLORIDE 60 MG: 20 INJECTION, SOLUTION EPIDURAL; INFILTRATION; INTRACAUDAL; PERINEURAL at 09:02

## 2022-01-01 RX ADMIN — Medication 10 MG: at 08:06

## 2022-01-01 RX ADMIN — LINEZOLID 600 MG: 600 INJECTION, SOLUTION INTRAVENOUS at 20:35

## 2022-01-01 RX ADMIN — LINEZOLID 600 MG: 600 INJECTION, SOLUTION INTRAVENOUS at 08:49

## 2022-01-01 RX ADMIN — MIDODRINE HYDROCHLORIDE 10 MG: 5 TABLET ORAL at 08:42

## 2022-01-01 RX ADMIN — OXYCODONE 10 MG: 5 TABLET ORAL at 07:59

## 2022-01-01 RX ADMIN — Medication 10 MG: at 09:23

## 2022-01-01 RX ADMIN — MIDODRINE HYDROCHLORIDE 10 MG: 5 TABLET ORAL at 08:31

## 2022-01-01 RX ADMIN — MIDODRINE HYDROCHLORIDE 10 MG: 5 TABLET ORAL at 13:00

## 2022-01-01 RX ADMIN — FENTANYL CITRATE 50 MCG: 50 INJECTION INTRAMUSCULAR; INTRAVENOUS at 07:03

## 2022-01-01 RX ADMIN — HYDROMORPHONE HYDROCHLORIDE 1 MG: 1 INJECTION, SOLUTION INTRAMUSCULAR; INTRAVENOUS; SUBCUTANEOUS at 17:00

## 2022-01-01 RX ADMIN — HYDROMORPHONE HYDROCHLORIDE 0.5 MG: 1 INJECTION, SOLUTION INTRAMUSCULAR; INTRAVENOUS; SUBCUTANEOUS at 07:52

## 2022-01-01 RX ADMIN — SODIUM BICARBONATE: 84 INJECTION, SOLUTION INTRAVENOUS at 11:46

## 2022-01-01 RX ADMIN — OXYCODONE 10 MG: 5 TABLET ORAL at 13:13

## 2022-01-01 RX ADMIN — MIDODRINE HYDROCHLORIDE 10 MG: 5 TABLET ORAL at 07:16

## 2022-01-01 RX ADMIN — PANTOPRAZOLE SODIUM 40 MG: 40 TABLET, DELAYED RELEASE ORAL at 05:14

## 2022-01-01 RX ADMIN — PHENYLEPHRINE HYDROCHLORIDE 250 MCG/MIN: 10 INJECTION INTRAVENOUS at 18:24

## 2022-01-01 RX ADMIN — MIDODRINE HYDROCHLORIDE 10 MG: 5 TABLET ORAL at 16:01

## 2022-01-01 RX ADMIN — SEVELAMER CARBONATE 2400 MG: 800 TABLET, FILM COATED ORAL at 09:01

## 2022-01-01 RX ADMIN — MIDODRINE HYDROCHLORIDE 10 MG: 5 TABLET ORAL at 16:56

## 2022-01-01 RX ADMIN — ACETAMINOPHEN 1000 MG: 500 TABLET, FILM COATED ORAL at 02:16

## 2022-01-01 RX ADMIN — SODIUM CHLORIDE, PRESERVATIVE FREE 10 ML: 5 INJECTION INTRAVENOUS at 21:40

## 2022-01-01 RX ADMIN — MIDODRINE HYDROCHLORIDE 10 MG: 5 TABLET ORAL at 16:30

## 2022-01-01 RX ADMIN — METRONIDAZOLE 500 MG: 500 INJECTION, SOLUTION INTRAVENOUS at 20:32

## 2022-01-01 RX ADMIN — FENTANYL CITRATE 50 MCG: 50 INJECTION, SOLUTION INTRAMUSCULAR; INTRAVENOUS at 04:01

## 2022-01-01 RX ADMIN — Medication 30 MCG/MIN: at 13:41

## 2022-01-01 RX ADMIN — SEVELAMER CARBONATE 3200 MG: 800 TABLET, FILM COATED ORAL at 17:12

## 2022-01-01 RX ADMIN — SODIUM CHLORIDE, PRESERVATIVE FREE 10 ML: 5 INJECTION INTRAVENOUS at 10:44

## 2022-01-01 RX ADMIN — FENTANYL CITRATE 25 MCG: 50 INJECTION INTRAMUSCULAR; INTRAVENOUS at 08:30

## 2022-01-01 RX ADMIN — SODIUM CHLORIDE, PRESERVATIVE FREE 10 ML: 5 INJECTION INTRAVENOUS at 20:43

## 2022-01-01 RX ADMIN — SODIUM CHLORIDE: 9 INJECTION, SOLUTION INTRAVENOUS at 16:03

## 2022-01-01 RX ADMIN — DEXTROSE MONOHYDRATE 125 ML: 100 INJECTION, SOLUTION INTRAVENOUS at 16:41

## 2022-01-01 RX ADMIN — MIDODRINE HYDROCHLORIDE 10 MG: 5 TABLET ORAL at 11:15

## 2022-01-01 RX ADMIN — VASOPRESSIN 0.1 UNITS/MIN: 0.2 INJECTION INTRAVENOUS at 16:28

## 2022-01-01 RX ADMIN — ACETAMINOPHEN 650 MG: 325 TABLET, FILM COATED ORAL at 15:23

## 2022-01-01 RX ADMIN — MIDAZOLAM HYDROCHLORIDE 1 MG: 1 INJECTION, SOLUTION INTRAMUSCULAR; INTRAVENOUS at 06:43

## 2022-01-01 RX ADMIN — Medication 35 MCG/MIN: at 04:57

## 2022-01-01 RX ADMIN — Medication 1 AMPULE: at 10:00

## 2022-01-01 RX ADMIN — EPOETIN ALFA-EPBX 20000 UNITS: 10000 INJECTION, SOLUTION INTRAVENOUS; SUBCUTANEOUS at 14:00

## 2022-01-01 RX ADMIN — LINEZOLID 600 MG: 600 INJECTION, SOLUTION INTRAVENOUS at 10:44

## 2022-01-01 RX ADMIN — HEPARIN SODIUM 5000 UNITS: 5000 INJECTION INTRAVENOUS; SUBCUTANEOUS at 05:35

## 2022-01-01 RX ADMIN — METRONIDAZOLE 500 MG: 500 INJECTION, SOLUTION INTRAVENOUS at 21:06

## 2022-01-01 RX ADMIN — LINEZOLID 600 MG: 600 INJECTION, SOLUTION INTRAVENOUS at 09:13

## 2022-01-01 RX ADMIN — HEPARIN SODIUM 5000 UNITS: 1000 INJECTION INTRAVENOUS; SUBCUTANEOUS at 09:57

## 2022-01-01 RX ADMIN — VASOPRESSIN 0.05 UNITS/MIN: 0.2 INJECTION INTRAVENOUS at 21:34

## 2022-01-01 RX ADMIN — MIDODRINE HYDROCHLORIDE 10 MG: 5 TABLET ORAL at 07:12

## 2022-01-01 RX ADMIN — ALOGLIPTIN 6.25 MG: 12.5 TABLET, FILM COATED ORAL at 09:02

## 2022-01-01 RX ADMIN — OXYCODONE 10 MG: 5 TABLET ORAL at 19:37

## 2022-01-01 RX ADMIN — OXYCODONE 10 MG: 5 TABLET ORAL at 22:18

## 2022-01-01 RX ADMIN — HYDROMORPHONE HYDROCHLORIDE 0.5 MG: 1 INJECTION, SOLUTION INTRAMUSCULAR; INTRAVENOUS; SUBCUTANEOUS at 21:01

## 2022-01-01 RX ADMIN — HYDROMORPHONE HYDROCHLORIDE 0.5 MG: 1 INJECTION, SOLUTION INTRAMUSCULAR; INTRAVENOUS; SUBCUTANEOUS at 20:44

## 2022-01-01 RX ADMIN — HEPARIN SODIUM 5000 UNITS: 5000 INJECTION INTRAVENOUS; SUBCUTANEOUS at 14:59

## 2022-01-01 RX ADMIN — TRAMADOL HYDROCHLORIDE 50 MG: 50 TABLET, COATED ORAL at 10:48

## 2022-01-01 RX ADMIN — NYSTATIN: 100000 POWDER TOPICAL at 21:00

## 2022-01-01 RX ADMIN — ALOGLIPTIN 6.25 MG: 12.5 TABLET, FILM COATED ORAL at 08:47

## 2022-01-01 RX ADMIN — LORAZEPAM 0.5 MG: 0.5 TABLET ORAL at 23:50

## 2022-01-01 RX ADMIN — OXYCODONE 5 MG: 5 TABLET ORAL at 09:47

## 2022-01-01 RX ADMIN — HEPARIN SODIUM 5000 UNITS: 5000 INJECTION INTRAVENOUS; SUBCUTANEOUS at 16:40

## 2022-01-01 RX ADMIN — SODIUM CHLORIDE, POTASSIUM CHLORIDE, SODIUM LACTATE AND CALCIUM CHLORIDE 500 ML: 600; 310; 30; 20 INJECTION, SOLUTION INTRAVENOUS at 23:05

## 2022-01-01 RX ADMIN — SODIUM CHLORIDE 0.2 MCG/KG/HR: 9 INJECTION, SOLUTION INTRAVENOUS at 20:56

## 2022-01-01 RX ADMIN — SEVELAMER CARBONATE 800 MG: 800 TABLET, FILM COATED ORAL at 13:29

## 2022-01-01 RX ADMIN — DEXAMETHASONE SODIUM PHOSPHATE 4 MG: 4 INJECTION, SOLUTION INTRAMUSCULAR; INTRAVENOUS at 07:34

## 2022-01-01 RX ADMIN — PANTOPRAZOLE SODIUM 40 MG: 40 TABLET, DELAYED RELEASE ORAL at 05:54

## 2022-01-01 RX ADMIN — SODIUM CHLORIDE, PRESERVATIVE FREE 10 ML: 5 INJECTION INTRAVENOUS at 19:50

## 2022-01-01 RX ADMIN — HYDROMORPHONE HYDROCHLORIDE 0.5 MG: 1 INJECTION, SOLUTION INTRAMUSCULAR; INTRAVENOUS; SUBCUTANEOUS at 06:22

## 2022-01-01 RX ADMIN — MIDODRINE HYDROCHLORIDE 10 MG: 5 TABLET ORAL at 06:00

## 2022-01-01 RX ADMIN — SODIUM CHLORIDE, PRESERVATIVE FREE 10 ML: 5 INJECTION INTRAVENOUS at 09:59

## 2022-01-01 RX ADMIN — OXYCODONE 10 MG: 5 TABLET ORAL at 19:42

## 2022-01-01 RX ADMIN — NYSTATIN: 100000 POWDER TOPICAL at 20:15

## 2022-01-01 RX ADMIN — PHENYLEPHRINE HYDROCHLORIDE 100 MCG: 0.1 INJECTION, SOLUTION INTRAVENOUS at 07:29

## 2022-01-01 ASSESSMENT — PAIN SCALES - GENERAL
PAINLEVEL_OUTOF10: 8
PAINLEVEL_OUTOF10: 6
PAINLEVEL_OUTOF10: 4
PAINLEVEL_OUTOF10: 10
PAINLEVEL_OUTOF10: 6
PAINLEVEL_OUTOF10: 0
PAINLEVEL_OUTOF10: 6
PAINLEVEL_OUTOF10: 0
PAINLEVEL_OUTOF10: 0
PAINLEVEL_OUTOF10: 3
PAINLEVEL_OUTOF10: 0
PAINLEVEL_OUTOF10: 0
PAINLEVEL_OUTOF10: 2
PAINLEVEL_OUTOF10: 0
PAINLEVEL_OUTOF10: 0
PAINLEVEL_OUTOF10: 6
PAINLEVEL_OUTOF10: 2
PAINLEVEL_OUTOF10: 7
PAINLEVEL_OUTOF10: 7
PAINLEVEL_OUTOF10: 0
PAINLEVEL_OUTOF10: 4
PAINLEVEL_OUTOF10: 6
PAINLEVEL_OUTOF10: 6
PAINLEVEL_OUTOF10: 0
PAINLEVEL_OUTOF10: 6
PAINLEVEL_OUTOF10: 0
PAINLEVEL_OUTOF10: 5
PAINLEVEL_OUTOF10: 7
PAINLEVEL_OUTOF10: 2
PAINLEVEL_OUTOF10: 5
PAINLEVEL_OUTOF10: 2
PAINLEVEL_OUTOF10: 7
PAINLEVEL_OUTOF10: 6
PAINLEVEL_OUTOF10: 10
PAINLEVEL_OUTOF10: 10
PAINLEVEL_OUTOF10: 4
PAINLEVEL_OUTOF10: 0
PAINLEVEL_OUTOF10: 7
PAINLEVEL_OUTOF10: 10
PAINLEVEL_OUTOF10: 1
PAINLEVEL_OUTOF10: 0
PAINLEVEL_OUTOF10: 7
PAINLEVEL_OUTOF10: 0
PAINLEVEL_OUTOF10: 5
PAINLEVEL_OUTOF10: 0
PAINLEVEL_OUTOF10: 7
PAINLEVEL_OUTOF10: 5
PAINLEVEL_OUTOF10: 10
PAINLEVEL_OUTOF10: 0
PAINLEVEL_OUTOF10: 4
PAINLEVEL_OUTOF10: 6
PAINLEVEL_OUTOF10: 3
PAINLEVEL_OUTOF10: 0
PAINLEVEL_OUTOF10: 0
PAINLEVEL_OUTOF10: 6
PAINLEVEL_OUTOF10: 6
PAINLEVEL_OUTOF10: 8
PAINLEVEL_OUTOF10: 5
PAINLEVEL_OUTOF10: 0
PAINLEVEL_OUTOF10: 4
PAINLEVEL_OUTOF10: 7
PAINLEVEL_OUTOF10: 2
PAINLEVEL_OUTOF10: 9
PAINLEVEL_OUTOF10: 4
PAINLEVEL_OUTOF10: 0
PAINLEVEL_OUTOF10: 7
PAINLEVEL_OUTOF10: 10
PAINLEVEL_OUTOF10: 10
PAINLEVEL_OUTOF10: 0
PAINLEVEL_OUTOF10: 0
PAINLEVEL_OUTOF10: 10
PAINLEVEL_OUTOF10: 8
PAINLEVEL_OUTOF10: 2
PAINLEVEL_OUTOF10: 9
PAINLEVEL_OUTOF10: 2
PAINLEVEL_OUTOF10: 8
PAINLEVEL_OUTOF10: 10
PAINLEVEL_OUTOF10: 2
PAINLEVEL_OUTOF10: 0
PAINLEVEL_OUTOF10: 6
PAINLEVEL_OUTOF10: 5
PAINLEVEL_OUTOF10: 6
PAINLEVEL_OUTOF10: 2
PAINLEVEL_OUTOF10: 0
PAINLEVEL_OUTOF10: 1
PAINLEVEL_OUTOF10: 10
PAINLEVEL_OUTOF10: 0
PAINLEVEL_OUTOF10: 10
PAINLEVEL_OUTOF10: 4
PAINLEVEL_OUTOF10: 10
PAINLEVEL_OUTOF10: 6
PAINLEVEL_OUTOF10: 4
PAINLEVEL_OUTOF10: 8
PAINLEVEL_OUTOF10: 0
PAINLEVEL_OUTOF10: 2
PAINLEVEL_OUTOF10: 3
PAINLEVEL_OUTOF10: 0
PAINLEVEL_OUTOF10: 9
PAINLEVEL_OUTOF10: 10
PAINLEVEL_OUTOF10: 1
PAINLEVEL_OUTOF10: 7
PAINLEVEL_OUTOF10: 0
PAINLEVEL_OUTOF10: 1
PAINLEVEL_OUTOF10: 4
PAINLEVEL_OUTOF10: 0
PAINLEVEL_OUTOF10: 3
PAINLEVEL_OUTOF10: 8
PAINLEVEL_OUTOF10: 0
PAINLEVEL_OUTOF10: 0
PAINLEVEL_OUTOF10: 10
PAINLEVEL_OUTOF10: 0
PAINLEVEL_OUTOF10: 7
PAINLEVEL_OUTOF10: 10
PAINLEVEL_OUTOF10: 3
PAINLEVEL_OUTOF10: 5
PAINLEVEL_OUTOF10: 6
PAINLEVEL_OUTOF10: 0
PAINLEVEL_OUTOF10: 0
PAINLEVEL_OUTOF10: 2
PAINLEVEL_OUTOF10: 0
PAINLEVEL_OUTOF10: 3
PAINLEVEL_OUTOF10: 6
PAINLEVEL_OUTOF10: 2
PAINLEVEL_OUTOF10: 2
PAINLEVEL_OUTOF10: 8
PAINLEVEL_OUTOF10: 0
PAINLEVEL_OUTOF10: 10
PAINLEVEL_OUTOF10: 7
PAINLEVEL_OUTOF10: 4
PAINLEVEL_OUTOF10: 3
PAINLEVEL_OUTOF10: 0
PAINLEVEL_OUTOF10: 1
PAINLEVEL_OUTOF10: 8
PAINLEVEL_OUTOF10: 2
PAINLEVEL_OUTOF10: 4
PAINLEVEL_OUTOF10: 0
PAINLEVEL_OUTOF10: 8
PAINLEVEL_OUTOF10: 2
PAINLEVEL_OUTOF10: 3
PAINLEVEL_OUTOF10: 0
PAINLEVEL_OUTOF10: 0
PAINLEVEL_OUTOF10: 7
PAINLEVEL_OUTOF10: 7
PAINLEVEL_OUTOF10: 10
PAINLEVEL_OUTOF10: 0
PAINLEVEL_OUTOF10: 4
PAINLEVEL_OUTOF10: 0
PAINLEVEL_OUTOF10: 5
PAINLEVEL_OUTOF10: 0

## 2022-01-01 ASSESSMENT — PULMONARY FUNCTION TESTS
PIF_VALUE: 0
PIF_VALUE: 38
PIF_VALUE: 36
PIF_VALUE: 40
PIF_VALUE: 34
PIF_VALUE: 34
PIF_VALUE: 35
PIF_VALUE: 40
PIF_VALUE: 37
PIF_VALUE: 34
PIF_VALUE: 36
PIF_VALUE: 39
PIF_VALUE: 26
PIF_VALUE: 34
PIF_VALUE: 33
PIF_VALUE: 38
PIF_VALUE: 34
PIF_VALUE: 35
PIF_VALUE: 37
PIF_VALUE: 38
PIF_VALUE: 34
PIF_VALUE: 34
PIF_VALUE: 31
PIF_VALUE: 35
PIF_VALUE: 35
PIF_VALUE: 33
PIF_VALUE: 34
PIF_VALUE: 38
PIF_VALUE: 36
PIF_VALUE: 35
PIF_VALUE: 34
PIF_VALUE: 32
PIF_VALUE: 34
PIF_VALUE: 38
PIF_VALUE: 37
PIF_VALUE: 31
PIF_VALUE: 34
PIF_VALUE: 36
PIF_VALUE: 34
PIF_VALUE: 34
PIF_VALUE: 37
PIF_VALUE: 33
PIF_VALUE: 36
PIF_VALUE: 36
PIF_VALUE: 30
PIF_VALUE: 34
PIF_VALUE: 34
PIF_VALUE: 36
PIF_VALUE: 33
PIF_VALUE: 33
PIF_VALUE: 36
PIF_VALUE: 34
PIF_VALUE: 37
PIF_VALUE: 34
PIF_VALUE: 35
PIF_VALUE: 34
PIF_VALUE: 36
PIF_VALUE: 24
PIF_VALUE: 33
PIF_VALUE: 35
PIF_VALUE: 35
PIF_VALUE: 33
PIF_VALUE: 37
PIF_VALUE: 35
PIF_VALUE: 35
PIF_VALUE: 34
PIF_VALUE: 36
PIF_VALUE: 35
PIF_VALUE: 36
PIF_VALUE: 37
PIF_VALUE: 40

## 2022-01-01 ASSESSMENT — PAIN DESCRIPTION - LOCATION
LOCATION: LEG
LOCATION: HIP
LOCATION: GROIN
LOCATION: ABDOMEN;GROIN
LOCATION: BACK
LOCATION: HEAD
LOCATION: LEG
LOCATION: HIP;GROIN
LOCATION: OTHER (COMMENT)
LOCATION: BACK
LOCATION: LEG
LOCATION: BACK
LOCATION: BACK;ABDOMEN
LOCATION: LEG
LOCATION: GROIN;LEG
LOCATION: GROIN
LOCATION: LEG
LOCATION: LEG
LOCATION: GROIN;LEG
LOCATION: BACK
LOCATION: BACK
LOCATION: LEG
LOCATION: ABDOMEN
LOCATION: LEG
LOCATION: GROIN
LOCATION: ARM
LOCATION: BACK
LOCATION: LEG
LOCATION: LEG
LOCATION: BACK
LOCATION: GENERALIZED
LOCATION: HIP
LOCATION: GROIN
LOCATION: LEG
LOCATION: BACK
LOCATION: GROIN
LOCATION: GROIN
LOCATION: LEG
LOCATION: BACK
LOCATION: LEG
LOCATION: GROIN
LOCATION: GROIN;LEG
LOCATION: LEG
LOCATION: LEG
LOCATION: OTHER (COMMENT)
LOCATION: LEG
LOCATION: GROIN
LOCATION: BACK
LOCATION: LEG
LOCATION: GROIN;LEG
LOCATION: BACK
LOCATION: LEG

## 2022-01-01 ASSESSMENT — ENCOUNTER SYMPTOMS
COLOR CHANGE: 0
ABDOMINAL PAIN: 0
SHORTNESS OF BREATH: 1
RHINORRHEA: 0
STOOL DESCRIPTION: SOFT FORMED
EYES NEGATIVE: 1
STOOL DESCRIPTION: SOFT FORMED
SHORTNESS OF BREATH: 0
ABDOMINAL PAIN: 0
PAIN LOCATION - PAIN QUALITY: ACHE
SHORTNESS OF BREATH: 0
STOOL DESCRIPTION: SOFT FORMED
DIARRHEA: 0
PAIN LOCATION - PAIN QUALITY: ACHE
VOMITING: 0
SHORTNESS OF BREATH: 0
FACIAL SWELLING: 0
DIARRHEA: 0
DYSPNEA ACTIVITY LEVEL: AFTER AMBULATING 10 - 20 FT
NAUSEA: 0
STOOL DESCRIPTION: SOFT
COLOR CHANGE: 0
RESPIRATORY NEGATIVE: 1
PAIN LOCATION - PAIN QUALITY: ACHY
COUGH: 0
STOOL DESCRIPTION: SMALL
BACK PAIN: 1
PAIN LOCATION - PAIN QUALITY: ACHE
SHORTNESS OF BREATH: 1
SHORTNESS OF BREATH: 1
STOOL DESCRIPTION: SOFT FORMED
DYSPNEA ACTIVITY LEVEL: AFTER AMBULATING MORE THAN 20 FT
BACK PAIN: 0
PAIN LOCATION - PAIN QUALITY: SHARP
ABDOMINAL PAIN: 0
VOMITING: 0
DYSPNEA ACTIVITY LEVEL: AFTER AMBULATING MORE THAN 20 FT
DYSPNEA ACTIVITY LEVEL: AFTER AMBULATING MORE THAN 20 FT
STOOL DESCRIPTION: SOFT FORMED
BACK PAIN: 1
PAIN LOCATION - PAIN QUALITY: ACHE
PAIN LOCATION - PAIN QUALITY: ACHE
DIARRHEA: 0
PAIN LOCATION - PAIN QUALITY: ACHE
DYSPNEA ACTIVITY LEVEL: AFTER AMBULATING 10 - 20 FT
RHINORRHEA: 0
SHORTNESS OF BREATH: 1
DYSPNEA ACTIVITY LEVEL: AFTER AMBULATING MORE THAN 20 FT
DYSPNEA ACTIVITY LEVEL: AFTER AMBULATING MORE THAN 20 FT
PAIN LOCATION - PAIN QUALITY: ACHE
DYSPNEA ACTIVITY LEVEL: AFTER AMBULATING MORE THAN 20 FT
DYSPNEA ACTIVITY LEVEL: AFTER AMBULATING 10 - 20 FT
STOOL DESCRIPTION: SOFT FORMED
PAIN LOCATION - PAIN QUALITY: SHARP, DULL
COUGH: 0
NAUSEA: 0
PAIN LOCATION - PAIN QUALITY: ACHE
COUGH: 1
PAIN LOCATION - PAIN QUALITY: SHARP
DYSPNEA ACTIVITY LEVEL: AFTER AMBULATING MORE THAN 20 FT
STOOL DESCRIPTION: SOFT FORMED
STRIDOR: 0
GASTROINTESTINAL NEGATIVE: 1
DYSPNEA ACTIVITY LEVEL: AFTER AMBULATING 10 - 20 FT
VOMITING: 0
DYSPNEA ACTIVITY LEVEL: AFTER AMBULATING 10 - 20 FT
STOOL DESCRIPTION: SOFT FORMED
DYSPNEA ACTIVITY LEVEL: AFTER AMBULATING MORE THAN 20 FT

## 2022-01-01 ASSESSMENT — PAIN DESCRIPTION - FREQUENCY
FREQUENCY: CONTINUOUS
FREQUENCY: INTERMITTENT
FREQUENCY: CONTINUOUS
FREQUENCY: INTERMITTENT
FREQUENCY: CONTINUOUS
FREQUENCY: INTERMITTENT
FREQUENCY: CONTINUOUS

## 2022-01-01 ASSESSMENT — PAIN DESCRIPTION - DESCRIPTORS
DESCRIPTORS: BURNING
DESCRIPTORS: ACHING
DESCRIPTORS: ACHING;DISCOMFORT;SHARP
DESCRIPTORS: CRAMPING;SPASM
DESCRIPTORS: ACHING
DESCRIPTORS: ACHING
DESCRIPTORS: BURNING
DESCRIPTORS: ACHING
DESCRIPTORS: SHARP;SORE
DESCRIPTORS: BURNING;DISCOMFORT
DESCRIPTORS: ACHING
DESCRIPTORS: ACHING;TENDER
DESCRIPTORS: CRAMPING;SPASM;ACHING
DESCRIPTORS: ACHING;SHARP;SORE
DESCRIPTORS: ACHING
DESCRIPTORS: GNAWING
DESCRIPTORS: ACHING;TENDER
DESCRIPTORS: BURNING
DESCRIPTORS: STABBING
DESCRIPTORS: ACHING
DESCRIPTORS: OTHER (COMMENT)
DESCRIPTORS: ACHING
DESCRIPTORS: SORE
DESCRIPTORS: ACHING;TENDER
DESCRIPTORS: ACHING
DESCRIPTORS: ACHING;TENDER
DESCRIPTORS: ACHING
DESCRIPTORS: ACHING
DESCRIPTORS: SORE
DESCRIPTORS: BURNING
DESCRIPTORS: ACHING
DESCRIPTORS: BURNING
DESCRIPTORS: ACHING
DESCRIPTORS: ACHING
DESCRIPTORS: CRAMPING;SPASM
DESCRIPTORS: ACHING;TENDER
DESCRIPTORS: ACHING
DESCRIPTORS: SHARP
DESCRIPTORS: ACHING
DESCRIPTORS: ACHING
DESCRIPTORS: ACHING;TENDER
DESCRIPTORS: ACHING;TENDER
DESCRIPTORS: BURNING
DESCRIPTORS: ACHING
DESCRIPTORS: ACHING;BURNING
DESCRIPTORS: ACHING
DESCRIPTORS: ACHING;SHARP;SORE

## 2022-01-01 ASSESSMENT — PAIN DESCRIPTION - PAIN TYPE
TYPE: CHRONIC PAIN
TYPE: CHRONIC PAIN
TYPE: SURGICAL PAIN
TYPE: CHRONIC PAIN
TYPE: CHRONIC PAIN
TYPE: ACUTE PAIN
TYPE: ACUTE PAIN
TYPE: CHRONIC PAIN
TYPE: ACUTE PAIN
TYPE: SURGICAL PAIN
TYPE: CHRONIC PAIN
TYPE: ACUTE PAIN
TYPE: ACUTE PAIN
TYPE: SURGICAL PAIN
TYPE: ACUTE PAIN
TYPE: CHRONIC PAIN
TYPE: ACUTE PAIN
TYPE: SURGICAL PAIN
TYPE: ACUTE PAIN
TYPE: ACUTE PAIN
TYPE: OTHER (COMMENT)
TYPE: ACUTE PAIN
TYPE: SURGICAL PAIN
TYPE: ACUTE PAIN

## 2022-01-01 ASSESSMENT — PAIN SCALES - WONG BAKER
WONGBAKER_NUMERICALRESPONSE: 0
WONGBAKER_NUMERICALRESPONSE: 2
WONGBAKER_NUMERICALRESPONSE: 2

## 2022-01-01 ASSESSMENT — PAIN - FUNCTIONAL ASSESSMENT
PAIN_FUNCTIONAL_ASSESSMENT: ACTIVITIES ARE NOT PREVENTED
PAIN_FUNCTIONAL_ASSESSMENT: PREVENTS OR INTERFERES SOME ACTIVE ACTIVITIES AND ADLS
PAIN_FUNCTIONAL_ASSESSMENT: ACTIVITIES ARE NOT PREVENTED
PAIN_FUNCTIONAL_ASSESSMENT: PREVENTS OR INTERFERES SOME ACTIVE ACTIVITIES AND ADLS
PAIN_FUNCTIONAL_ASSESSMENT: PREVENTS OR INTERFERES SOME ACTIVE ACTIVITIES AND ADLS
PAIN_FUNCTIONAL_ASSESSMENT: NONE - DENIES PAIN
PAIN_FUNCTIONAL_ASSESSMENT: PREVENTS OR INTERFERES WITH MANY ACTIVE NOT PASSIVE ACTIVITIES
PAIN_FUNCTIONAL_ASSESSMENT: NONE - DENIES PAIN
PAIN_FUNCTIONAL_ASSESSMENT: ACTIVITIES ARE NOT PREVENTED
PAIN_FUNCTIONAL_ASSESSMENT: PREVENTS OR INTERFERES SOME ACTIVE ACTIVITIES AND ADLS
PAIN_FUNCTIONAL_ASSESSMENT: NONE - DENIES PAIN
PAIN_FUNCTIONAL_ASSESSMENT: 0-10
PAIN_FUNCTIONAL_ASSESSMENT: PREVENTS OR INTERFERES WITH ALL ACTIVE AND SOME PASSIVE ACTIVITIES
PAIN_FUNCTIONAL_ASSESSMENT: 0-10
PAIN_FUNCTIONAL_ASSESSMENT: ACTIVITIES ARE NOT PREVENTED
PAIN_FUNCTIONAL_ASSESSMENT: PREVENTS OR INTERFERES SOME ACTIVE ACTIVITIES AND ADLS
PAIN_FUNCTIONAL_ASSESSMENT: ACTIVITIES ARE NOT PREVENTED
PAIN_FUNCTIONAL_ASSESSMENT: PREVENTS OR INTERFERES SOME ACTIVE ACTIVITIES AND ADLS
PAIN_FUNCTIONAL_ASSESSMENT: PREVENTS OR INTERFERES SOME ACTIVE ACTIVITIES AND ADLS
PAIN_FUNCTIONAL_ASSESSMENT: PREVENTS OR INTERFERES WITH ALL ACTIVE AND SOME PASSIVE ACTIVITIES
PAIN_FUNCTIONAL_ASSESSMENT: ACTIVITIES ARE NOT PREVENTED
PAIN_FUNCTIONAL_ASSESSMENT: PREVENTS OR INTERFERES SOME ACTIVE ACTIVITIES AND ADLS
PAIN_FUNCTIONAL_ASSESSMENT: PREVENTS OR INTERFERES SOME ACTIVE ACTIVITIES AND ADLS
PAIN_FUNCTIONAL_ASSESSMENT: PREVENTS OR INTERFERES WITH MANY ACTIVE NOT PASSIVE ACTIVITIES
PAIN_FUNCTIONAL_ASSESSMENT: ACTIVITIES ARE NOT PREVENTED
PAIN_FUNCTIONAL_ASSESSMENT: ACTIVITIES ARE NOT PREVENTED
PAIN_FUNCTIONAL_ASSESSMENT: PREVENTS OR INTERFERES SOME ACTIVE ACTIVITIES AND ADLS
PAIN_FUNCTIONAL_ASSESSMENT: PREVENTS OR INTERFERES SOME ACTIVE ACTIVITIES AND ADLS
PAIN_FUNCTIONAL_ASSESSMENT: ADULT NONVERBAL PAIN SCALE (NPVS)
PAIN_FUNCTIONAL_ASSESSMENT: 0-10
PAIN_FUNCTIONAL_ASSESSMENT: 0-10
PAIN_FUNCTIONAL_ASSESSMENT: PREVENTS OR INTERFERES SOME ACTIVE ACTIVITIES AND ADLS
PAIN_FUNCTIONAL_ASSESSMENT: ACTIVITIES ARE NOT PREVENTED
PAIN_FUNCTIONAL_ASSESSMENT: PREVENTS OR INTERFERES SOME ACTIVE ACTIVITIES AND ADLS
PAIN_FUNCTIONAL_ASSESSMENT: ACTIVITIES ARE NOT PREVENTED
PAIN_FUNCTIONAL_ASSESSMENT: ACTIVITIES ARE NOT PREVENTED
PAIN_FUNCTIONAL_ASSESSMENT: PREVENTS OR INTERFERES SOME ACTIVE ACTIVITIES AND ADLS
PAIN_FUNCTIONAL_ASSESSMENT: PREVENTS OR INTERFERES SOME ACTIVE ACTIVITIES AND ADLS

## 2022-01-01 ASSESSMENT — PAIN DESCRIPTION - ORIENTATION
ORIENTATION: LEFT
ORIENTATION: LEFT;INNER
ORIENTATION: LEFT;UPPER
ORIENTATION: LEFT
ORIENTATION: LEFT;UPPER
ORIENTATION: LEFT
ORIENTATION: LEFT;UPPER
ORIENTATION: LEFT
ORIENTATION: LEFT
ORIENTATION: LEFT;UPPER
ORIENTATION: LEFT
ORIENTATION: OTHER (COMMENT)
ORIENTATION: MID
ORIENTATION: LEFT;UPPER
ORIENTATION: LEFT
ORIENTATION: RIGHT
ORIENTATION: LEFT
ORIENTATION: RIGHT
ORIENTATION: MID
ORIENTATION: LEFT;INNER
ORIENTATION: LEFT
ORIENTATION: ANTERIOR;MID
ORIENTATION: LEFT
ORIENTATION: MID
ORIENTATION: LEFT
ORIENTATION: MID;LOWER
ORIENTATION: LEFT;UPPER
ORIENTATION: LEFT
ORIENTATION: RIGHT
ORIENTATION: LEFT;UPPER
ORIENTATION: LEFT
ORIENTATION: LEFT
ORIENTATION: ANTERIOR
ORIENTATION: LEFT
ORIENTATION: MID
ORIENTATION: LEFT
ORIENTATION: LEFT
ORIENTATION: LEFT;UPPER

## 2022-01-01 ASSESSMENT — PATIENT HEALTH QUESTIONNAIRE - PHQ9
2. FEELING DOWN, DEPRESSED OR HOPELESS: 0
1. LITTLE INTEREST OR PLEASURE IN DOING THINGS: 0
2. FEELING DOWN, DEPRESSED OR HOPELESS: 0
SUM OF ALL RESPONSES TO PHQ QUESTIONS 1-9: 0
1. LITTLE INTEREST OR PLEASURE IN DOING THINGS: 0
SUM OF ALL RESPONSES TO PHQ QUESTIONS 1-9: 0
SUM OF ALL RESPONSES TO PHQ9 QUESTIONS 1 & 2: 0
SUM OF ALL RESPONSES TO PHQ QUESTIONS 1-9: 0
SUM OF ALL RESPONSES TO PHQ9 QUESTIONS 1 & 2: 0

## 2022-01-01 ASSESSMENT — PAIN DESCRIPTION - ONSET
ONSET: ON-GOING
ONSET: SUDDEN
ONSET: ON-GOING
ONSET: SUDDEN
ONSET: ON-GOING
ONSET: SUDDEN
ONSET: ON-GOING
ONSET: GRADUAL
ONSET: ON-GOING
ONSET: GRADUAL
ONSET: ON-GOING
ONSET: SUDDEN
ONSET: ON-GOING
ONSET: SUDDEN
ONSET: SUDDEN

## 2022-01-10 ENCOUNTER — APPOINTMENT (OUTPATIENT)
Dept: CT IMAGING | Age: 72
DRG: 177 | End: 2022-01-10
Attending: EMERGENCY MEDICINE
Payer: MEDICARE

## 2022-01-10 ENCOUNTER — HOSPITAL ENCOUNTER (INPATIENT)
Age: 72
LOS: 8 days | Discharge: HOME HEALTH | DRG: 177 | End: 2022-01-20
Attending: EMERGENCY MEDICINE | Admitting: INTERNAL MEDICINE
Payer: MEDICARE

## 2022-01-10 DIAGNOSIS — C64.9 RENAL CELL CARCINOMA, UNSPECIFIED LATERALITY (HCC): ICD-10-CM

## 2022-01-10 DIAGNOSIS — N18.5 CHRONIC RENAL FAILURE, STAGE 5 (HCC): ICD-10-CM

## 2022-01-10 DIAGNOSIS — J15.9 BACTERIAL PNEUMONIA: ICD-10-CM

## 2022-01-10 DIAGNOSIS — J98.59 MEDIASTINAL MASS: Primary | ICD-10-CM

## 2022-01-10 DIAGNOSIS — M54.9 SEVERE BACK PAIN: ICD-10-CM

## 2022-01-10 DIAGNOSIS — R91.8 LUNG MASS: ICD-10-CM

## 2022-01-10 DIAGNOSIS — J96.01 ACUTE RESPIRATORY FAILURE WITH HYPOXIA (HCC): ICD-10-CM

## 2022-01-10 DIAGNOSIS — N18.6 ESRD (END STAGE RENAL DISEASE) (HCC): ICD-10-CM

## 2022-01-10 DIAGNOSIS — J90 BILATERAL PLEURAL EFFUSION: ICD-10-CM

## 2022-01-10 DIAGNOSIS — J90 PLEURAL EFFUSION: ICD-10-CM

## 2022-01-10 LAB
ALBUMIN SERPL-MCNC: 4.2 G/DL (ref 3.2–4.6)
ALBUMIN/GLOB SERPL: 1.2 {RATIO} (ref 1.2–3.5)
ALP SERPL-CCNC: 77 U/L (ref 50–136)
ALT SERPL-CCNC: 12 U/L (ref 12–65)
ANION GAP SERPL CALC-SCNC: 11 MMOL/L (ref 7–16)
AST SERPL-CCNC: 16 U/L (ref 15–37)
ATRIAL RATE: 84 BPM
BASOPHILS # BLD: 0.1 K/UL (ref 0–0.2)
BASOPHILS NFR BLD: 1 % (ref 0–2)
BILIRUB SERPL-MCNC: 1.5 MG/DL (ref 0.2–1.1)
BUN SERPL-MCNC: 33 MG/DL (ref 8–23)
CALCIUM SERPL-MCNC: 9.1 MG/DL (ref 8.3–10.4)
CALCULATED P AXIS, ECG09: 73 DEGREES
CALCULATED R AXIS, ECG10: 119 DEGREES
CALCULATED T AXIS, ECG11: 106 DEGREES
CHLORIDE SERPL-SCNC: 99 MMOL/L (ref 98–107)
CO2 SERPL-SCNC: 28 MMOL/L (ref 21–32)
CREAT SERPL-MCNC: 6.44 MG/DL (ref 0.6–1)
DIAGNOSIS, 93000: NORMAL
DIFFERENTIAL METHOD BLD: ABNORMAL
EOSINOPHIL # BLD: 0 K/UL (ref 0–0.8)
EOSINOPHIL NFR BLD: 1 % (ref 0.5–7.8)
ERYTHROCYTE [DISTWIDTH] IN BLOOD BY AUTOMATED COUNT: 18.4 % (ref 11.9–14.6)
GLOBULIN SER CALC-MCNC: 3.6 G/DL (ref 2.3–3.5)
GLUCOSE BLD STRIP.AUTO-MCNC: 125 MG/DL (ref 65–100)
GLUCOSE BLD STRIP.AUTO-MCNC: 141 MG/DL (ref 65–100)
GLUCOSE SERPL-MCNC: 121 MG/DL (ref 65–100)
HCT VFR BLD AUTO: 26.2 % (ref 35.8–46.3)
HGB BLD-MCNC: 8.9 G/DL (ref 11.7–15.4)
IMM GRANULOCYTES # BLD AUTO: 0.1 K/UL (ref 0–0.5)
IMM GRANULOCYTES NFR BLD AUTO: 1 % (ref 0–5)
LIPASE SERPL-CCNC: 247 U/L (ref 73–393)
LYMPHOCYTES # BLD: 0.7 K/UL (ref 0.5–4.6)
LYMPHOCYTES NFR BLD: 12 % (ref 13–44)
MAGNESIUM SERPL-MCNC: 2.3 MG/DL (ref 1.8–2.4)
MCH RBC QN AUTO: 33.5 PG (ref 26.1–32.9)
MCHC RBC AUTO-ENTMCNC: 34 G/DL (ref 31.4–35)
MCV RBC AUTO: 98.5 FL (ref 79.6–97.8)
MONOCYTES # BLD: 0.4 K/UL (ref 0.1–1.3)
MONOCYTES NFR BLD: 7 % (ref 4–12)
NEUTS SEG # BLD: 4.3 K/UL (ref 1.7–8.2)
NEUTS SEG NFR BLD: 78 % (ref 43–78)
NRBC # BLD: 0 K/UL (ref 0–0.2)
P-R INTERVAL, ECG05: 180 MS
PLATELET # BLD AUTO: 119 K/UL (ref 150–450)
PMV BLD AUTO: 11.5 FL (ref 9.4–12.3)
POTASSIUM SERPL-SCNC: 3.8 MMOL/L (ref 3.5–5.1)
PROT SERPL-MCNC: 7.8 G/DL (ref 6.3–8.2)
Q-T INTERVAL, ECG07: 350 MS
QRS DURATION, ECG06: 70 MS
QTC CALCULATION (BEZET), ECG08: 413 MS
RBC # BLD AUTO: 2.66 M/UL (ref 4.05–5.2)
SERVICE CMNT-IMP: ABNORMAL
SERVICE CMNT-IMP: ABNORMAL
SODIUM SERPL-SCNC: 138 MMOL/L (ref 136–145)
VENTRICULAR RATE, ECG03: 84 BPM
WBC # BLD AUTO: 5.5 K/UL (ref 4.3–11.1)

## 2022-01-10 PROCEDURE — G0378 HOSPITAL OBSERVATION PER HR: HCPCS

## 2022-01-10 PROCEDURE — 74011250636 HC RX REV CODE- 250/636: Performed by: FAMILY MEDICINE

## 2022-01-10 PROCEDURE — 96372 THER/PROPH/DIAG INJ SC/IM: CPT

## 2022-01-10 PROCEDURE — 99222 1ST HOSP IP/OBS MODERATE 55: CPT | Performed by: INTERNAL MEDICINE

## 2022-01-10 PROCEDURE — 74011000250 HC RX REV CODE- 250: Performed by: FAMILY MEDICINE

## 2022-01-10 PROCEDURE — 74011250636 HC RX REV CODE- 250/636: Performed by: EMERGENCY MEDICINE

## 2022-01-10 PROCEDURE — 93005 ELECTROCARDIOGRAM TRACING: CPT | Performed by: EMERGENCY MEDICINE

## 2022-01-10 PROCEDURE — 80053 COMPREHEN METABOLIC PANEL: CPT

## 2022-01-10 PROCEDURE — 74011250637 HC RX REV CODE- 250/637: Performed by: FAMILY MEDICINE

## 2022-01-10 PROCEDURE — 71250 CT THORAX DX C-: CPT

## 2022-01-10 PROCEDURE — 96374 THER/PROPH/DIAG INJ IV PUSH: CPT

## 2022-01-10 PROCEDURE — 83735 ASSAY OF MAGNESIUM: CPT

## 2022-01-10 PROCEDURE — 74011000250 HC RX REV CODE- 250: Performed by: EMERGENCY MEDICINE

## 2022-01-10 PROCEDURE — 85025 COMPLETE CBC W/AUTO DIFF WBC: CPT

## 2022-01-10 PROCEDURE — 83690 ASSAY OF LIPASE: CPT

## 2022-01-10 PROCEDURE — 82962 GLUCOSE BLOOD TEST: CPT

## 2022-01-10 PROCEDURE — 99285 EMERGENCY DEPT VISIT HI MDM: CPT

## 2022-01-10 PROCEDURE — APPSS180 APP SPLIT SHARED TIME > 60 MINUTES: Performed by: NURSE PRACTITIONER

## 2022-01-10 RX ORDER — POLYETHYLENE GLYCOL 3350 17 G/17G
17 POWDER, FOR SOLUTION ORAL DAILY PRN
Status: DISCONTINUED | OUTPATIENT
Start: 2022-01-10 | End: 2022-01-16

## 2022-01-10 RX ORDER — HYDROMORPHONE HYDROCHLORIDE 1 MG/ML
0.5 INJECTION, SOLUTION INTRAMUSCULAR; INTRAVENOUS; SUBCUTANEOUS ONCE
Status: COMPLETED | OUTPATIENT
Start: 2022-01-10 | End: 2022-01-10

## 2022-01-10 RX ORDER — PANTOPRAZOLE SODIUM 40 MG/1
40 TABLET, DELAYED RELEASE ORAL
Status: DISCONTINUED | OUTPATIENT
Start: 2022-01-11 | End: 2022-01-20 | Stop reason: HOSPADM

## 2022-01-10 RX ORDER — SODIUM CHLORIDE 0.9 % (FLUSH) 0.9 %
5-40 SYRINGE (ML) INJECTION AS NEEDED
Status: DISCONTINUED | OUTPATIENT
Start: 2022-01-10 | End: 2022-01-20 | Stop reason: HOSPADM

## 2022-01-10 RX ORDER — HYDROCODONE BITARTRATE AND ACETAMINOPHEN 7.5; 325 MG/1; MG/1
1 TABLET ORAL
Status: DISCONTINUED | OUTPATIENT
Start: 2022-01-10 | End: 2022-01-20 | Stop reason: HOSPADM

## 2022-01-10 RX ORDER — ACETAMINOPHEN 325 MG/1
650 TABLET ORAL
Status: DISCONTINUED | OUTPATIENT
Start: 2022-01-10 | End: 2022-01-20 | Stop reason: HOSPADM

## 2022-01-10 RX ORDER — METHOCARBAMOL 750 MG/1
1500 TABLET, FILM COATED ORAL 4 TIMES DAILY
Status: DISCONTINUED | OUTPATIENT
Start: 2022-01-10 | End: 2022-01-20 | Stop reason: HOSPADM

## 2022-01-10 RX ORDER — FUROSEMIDE 40 MG/1
80 TABLET ORAL 2 TIMES DAILY
Status: DISCONTINUED | OUTPATIENT
Start: 2022-01-10 | End: 2022-01-10

## 2022-01-10 RX ORDER — ONDANSETRON 2 MG/ML
4 INJECTION INTRAMUSCULAR; INTRAVENOUS
Status: DISCONTINUED | OUTPATIENT
Start: 2022-01-10 | End: 2022-01-20 | Stop reason: HOSPADM

## 2022-01-10 RX ORDER — ONDANSETRON 4 MG/1
4 TABLET, ORALLY DISINTEGRATING ORAL
Status: DISCONTINUED | OUTPATIENT
Start: 2022-01-10 | End: 2022-01-20 | Stop reason: HOSPADM

## 2022-01-10 RX ORDER — SODIUM CHLORIDE 0.9 % (FLUSH) 0.9 %
5-10 SYRINGE (ML) INJECTION AS NEEDED
Status: DISCONTINUED | OUTPATIENT
Start: 2022-01-10 | End: 2022-01-10

## 2022-01-10 RX ORDER — ACETAMINOPHEN 650 MG/1
650 SUPPOSITORY RECTAL
Status: DISCONTINUED | OUTPATIENT
Start: 2022-01-10 | End: 2022-01-20 | Stop reason: HOSPADM

## 2022-01-10 RX ORDER — HEPARIN SODIUM 1000 [USP'U]/ML
5000 INJECTION, SOLUTION INTRAVENOUS; SUBCUTANEOUS
Status: DISCONTINUED | OUTPATIENT
Start: 2022-01-10 | End: 2022-01-20 | Stop reason: HOSPADM

## 2022-01-10 RX ORDER — GABAPENTIN 100 MG/1
100 CAPSULE ORAL 3 TIMES DAILY
Status: DISCONTINUED | OUTPATIENT
Start: 2022-01-10 | End: 2022-01-20 | Stop reason: HOSPADM

## 2022-01-10 RX ORDER — SEVELAMER CARBONATE 800 MG/1
800 TABLET, FILM COATED ORAL
Status: DISCONTINUED | OUTPATIENT
Start: 2022-01-10 | End: 2022-01-20 | Stop reason: HOSPADM

## 2022-01-10 RX ORDER — MORPHINE SULFATE 4 MG/ML
4 INJECTION INTRAVENOUS ONCE
Status: COMPLETED | OUTPATIENT
Start: 2022-01-10 | End: 2022-01-10

## 2022-01-10 RX ORDER — HYDROMORPHONE HYDROCHLORIDE 1 MG/ML
0.2 INJECTION, SOLUTION INTRAMUSCULAR; INTRAVENOUS; SUBCUTANEOUS
Status: DISCONTINUED | OUTPATIENT
Start: 2022-01-10 | End: 2022-01-12

## 2022-01-10 RX ORDER — INSULIN LISPRO 100 [IU]/ML
INJECTION, SOLUTION INTRAVENOUS; SUBCUTANEOUS
Status: DISCONTINUED | OUTPATIENT
Start: 2022-01-10 | End: 2022-01-20 | Stop reason: HOSPADM

## 2022-01-10 RX ORDER — SODIUM CHLORIDE 0.9 % (FLUSH) 0.9 %
5-40 SYRINGE (ML) INJECTION EVERY 8 HOURS
Status: DISCONTINUED | OUTPATIENT
Start: 2022-01-10 | End: 2022-01-20 | Stop reason: HOSPADM

## 2022-01-10 RX ORDER — SODIUM CHLORIDE 0.9 % (FLUSH) 0.9 %
5-10 SYRINGE (ML) INJECTION EVERY 8 HOURS
Status: DISCONTINUED | OUTPATIENT
Start: 2022-01-10 | End: 2022-01-10

## 2022-01-10 RX ORDER — HEPARIN SODIUM 5000 [USP'U]/ML
5000 INJECTION, SOLUTION INTRAVENOUS; SUBCUTANEOUS EVERY 8 HOURS
Status: DISCONTINUED | OUTPATIENT
Start: 2022-01-10 | End: 2022-01-12

## 2022-01-10 RX ORDER — LISINOPRIL 20 MG/1
40 TABLET ORAL
Status: DISCONTINUED | OUTPATIENT
Start: 2022-01-10 | End: 2022-01-20 | Stop reason: HOSPADM

## 2022-01-10 RX ORDER — HYDROMORPHONE HYDROCHLORIDE 1 MG/ML
0.5 INJECTION, SOLUTION INTRAMUSCULAR; INTRAVENOUS; SUBCUTANEOUS ONCE
Status: DISCONTINUED | OUTPATIENT
Start: 2022-01-10 | End: 2022-01-10 | Stop reason: ALTCHOICE

## 2022-01-10 RX ORDER — AMLODIPINE BESYLATE 2.5 MG/1
10 TABLET ORAL
Status: DISCONTINUED | OUTPATIENT
Start: 2022-01-10 | End: 2022-01-20 | Stop reason: HOSPADM

## 2022-01-10 RX ORDER — MINOXIDIL 10 MG/1
10 TABLET ORAL 2 TIMES DAILY
Status: DISCONTINUED | OUTPATIENT
Start: 2022-01-10 | End: 2022-01-20 | Stop reason: HOSPADM

## 2022-01-10 RX ADMIN — METHOCARBAMOL TABLETS 1500 MG: 750 TABLET, COATED ORAL at 17:21

## 2022-01-10 RX ADMIN — SEVELAMER CARBONATE 800 MG: 800 TABLET, FILM COATED ORAL at 17:21

## 2022-01-10 RX ADMIN — HEPARIN SODIUM 5000 UNITS: 5000 INJECTION INTRAVENOUS; SUBCUTANEOUS at 21:50

## 2022-01-10 RX ADMIN — GABAPENTIN 100 MG: 100 CAPSULE ORAL at 17:20

## 2022-01-10 RX ADMIN — HYDROMORPHONE HYDROCHLORIDE 0.2 MG: 1 INJECTION, SOLUTION INTRAMUSCULAR; INTRAVENOUS; SUBCUTANEOUS at 17:26

## 2022-01-10 RX ADMIN — SODIUM CHLORIDE, PRESERVATIVE FREE 10 ML: 5 INJECTION INTRAVENOUS at 06:00

## 2022-01-10 RX ADMIN — SODIUM CHLORIDE, PRESERVATIVE FREE 10 ML: 5 INJECTION INTRAVENOUS at 14:00

## 2022-01-10 RX ADMIN — MORPHINE SULFATE 4 MG: 4 INJECTION INTRAVENOUS at 06:27

## 2022-01-10 RX ADMIN — SODIUM CHLORIDE, PRESERVATIVE FREE 10 ML: 5 INJECTION INTRAVENOUS at 21:52

## 2022-01-10 RX ADMIN — METHOCARBAMOL TABLETS 1500 MG: 750 TABLET, COATED ORAL at 21:50

## 2022-01-10 RX ADMIN — HEPARIN SODIUM 5000 UNITS: 5000 INJECTION INTRAVENOUS; SUBCUTANEOUS at 14:14

## 2022-01-10 RX ADMIN — GABAPENTIN 100 MG: 100 CAPSULE ORAL at 21:50

## 2022-01-10 RX ADMIN — HYDROMORPHONE HYDROCHLORIDE 0.5 MG: 1 INJECTION, SOLUTION INTRAMUSCULAR; INTRAVENOUS; SUBCUTANEOUS at 06:58

## 2022-01-10 RX ADMIN — MINOXIDIL 10 MG: 10 TABLET ORAL at 17:20

## 2022-01-10 RX ADMIN — SODIUM CHLORIDE 250 ML: 9 INJECTION, SOLUTION INTRAVENOUS at 21:10

## 2022-01-10 NOTE — CONSULTS
RENAL H&P/CONSULT    Subjective:     Patient is a 79 y/o AA female, followed by our office for ESRD, on HD MWF at [facility]. She was due for HD today. Presented to the ER this morning with CC of sharp left-sided back pain that started last night. She was admitted last November for similar complaint, work-up was essentially negative. CT scan today showed bilateral pleural effusions with subjacent atelectaiss and previously reported right paratracheal mass/lymph node slightly larger. She has a hx of renal cell carcinoma, s/p left nephrectomy, followed by Oncology. Her electrolytes are stable. She c/o mild dyspnea, denies CP, no n/v, no HA or dizziness. She denies fever or chills. PMH also consist of DM II, Hyperphosphatemia, anemia, and HTN. At time of assessment, she is in no respiratory distress, on 2L NC. She is not on O2 at home. She is admitted for observation. There is no urgent need for HD. Plan for dialysis in the morning. Past Medical History:   Diagnosis Date    Arthritis     [facility] (arteriovenous [facility]) ([facility] 12/20/2016 12/6/16 (S) Right AVF revision and thrombectomy    Cancer ([facility] 2015    L kidney    Chronic kidney disease     HD in M-W-F- at [facility] dialysis    Degenerative joint disease     Diabetes ([address]     checks QD, normal 120-130, hyposymptoms at 80    ESRD (end stage renal disease) [facility]) Nov 2006    ESRD.  MWF dialysis     Hypertension     Obesity     Transient ischemic attack 08/29/2015    no residual      Past Surgical History:   Procedure Laterality Date    COLONOSCOPY N/A 11/8/2018    COLONOSCOPY  BMI 36 performed by Iris Silva MD at [facility] ENDOSCOPY    HX GI  06/01/2002    colon resection resulting in temporary colostomy reversal    HX GI  08/06/2018    exploratory laparotomy    HX GYN      stephan    HX OTHER SURGICAL      dialysis fistula, several permcaths    HX UROLOGICAL Left July 2015    nephrectomy    HX VASCULAR ACCESS      IR INSERT TUNL CVC W/O PORT OVER 5 YR  11/19/2021    IR PLC CATH AV SHUNT IN W PTA SI VENOUS  5/30/2019    IR PLC CATH AV SHUNT IN W PTA SI VENOUS RT  2/28/2019    IR PLC CATH AV SHUNT IN W PTA SI VENOUS RT  9/3/2019    IR PLC CATH AV SHUNT IN W PTA SI VENOUS RT  12/5/2019    IR PLC CATH AV SHUNT IN W PTA SI VENOUS RT  3/10/2020    ID BREAST SURGERY PROCEDURE UNLISTED Right     cyst removed    VASCULAR SURGERY PROCEDURE UNLIST Right     AV graft      Prior to Admission medications    Medication Sig Start Date End Date Taking? Authorizing Provider   lidocaine 4 % patch 1 Patch by TransDERmal route every twelve (12) hours every twelve (12) hours. 11/15/21  Yes Jackson Price MD   minoxidiL (LONITEN) 10 mg tablet Take  by mouth two (2) times a day. Yes Provider, Historical   omeprazole (PRILOSEC) 40 mg capsule TAKE ONE CAPSULE BY MOUTH EVERY DAY 6/10/21  Yes Provider, Historical   lidocaine 5 % topical cream Apply  to affected area two (2) times daily as needed for Pain. 9/1/17  Yes FRANCISCO Burgess   amLODIPine (NORVASC) 10 mg tablet Take 10 mg by mouth nightly. Yes Provider, Historical   sevelamer carbonate (RENVELA) 800 mg tab tab Take 800 mg by mouth three (3) times daily (with meals). 5 tablets with meals/ 2 with snacks  Sometimes only eats 2 meals/d   Yes Provider, Historical   lisinopril (PRINIVIL, ZESTRIL) 40 mg tablet Take 40 mg by mouth nightly. Indications: HYPERTENSION 6/8/15  Yes Provider, Historical   sitaGLIPtin (JANUVIA) 100 mg tablet Take 50 mg by mouth every morning. Indications: TYPE 2 DIABETES MELLITUS   Yes Provider, Historical   furosemide (Lasix) 80 mg tablet Take 80 mg by mouth two (2) times a day. Patient not taking: Reported on 1/10/2022    Provider, Historical   methocarbamoL (Robaxin-750) 750 mg tablet Take 2 Tablets by mouth four (4) times daily.   Patient not taking: Reported on 1/10/2022 11/9/21   Bety Daniel MD   gabapentin (NEURONTIN) 100 mg capsule TAKE 1 CAPSULE BY MOUTH THREE TIMES DAILY FOR PAIN  Patient not taking: Reported on 1/10/2022 6/10/21   Provider, Historical   VIT C/VIT E/LUTEIN/MIN/OMEGA-3 (OCUVITE PO) Take  by mouth nightly. Patient not taking: Reported on 1/10/2022    Provider, Historical     Allergies   Allergen Reactions    Pcn [Penicillins] Rash    Vancomycin Rash      Social History     Tobacco Use    Smoking status: Never Smoker    Smokeless tobacco: Never Used   Substance Use Topics    Alcohol use: No      Family History   Problem Relation Age of Onset    Diabetes Mother     Heart Disease Mother     Hypertension Mother     Heart Disease Father     Hypertension Father     Malignant Hyperthermia Neg Hx     Pseudocholinesterase Deficiency Neg Hx     Delayed Awakening Neg Hx     Post-op Nausea/Vomiting Neg Hx     Emergence Delirium Neg Hx     Other Neg Hx     Post-op Cognitive Dysfunction Neg Hx           Review of Systems    ROS negative except for what is mention in HPI      Objective:       Visit Vitals  BP (!) 145/64 (BP 1 Location: Right leg, BP Patient Position: At rest;Sitting)   Pulse 84   Temp 97.9 °F (36.6 °C)   Resp 20   Ht 5' 5.5\" (1.664 m)   Wt 80.7 kg (178 lb)   SpO2 99%   BMI 29.17 kg/m²       No intake/output data recorded. No intake/output data recorded. Visit Vitals  BP (!) 145/64 (BP 1 Location: Right leg, BP Patient Position: At rest;Sitting)   Pulse 84   Temp 97.9 °F (36.6 °C)   Resp 20   Ht 5' 5.5\" (1.664 m)   Wt 80.7 kg (178 lb)   SpO2 99%   BMI 29.17 kg/m²     General:  Alert, cooperative, no distress, appears stated age. Head:  Normocephalic, without obvious abnormality, atraumatic. Eyes:  Conjunctivae/corneas clear. PERRL, EOMs intact. Ears:  Normal external ear canals both ears. Nose: Nares normal. Septum midline. Mucosa normal. No drainage or sinus tenderness.    Throat: Lips, mucosa, and tongue normal. Teeth and gums normal.   Neck: Supple, symmetrical, trachea midline, no adenopathy, thyroid: no enlargement/tenderness/nodules, no JVD. Back:   Symmetric, no curvature. ROM normal. No CVA tenderness. Lungs:   Fine crackles lower lobes to auscultation bilaterally. Chest wall:  No tenderness or deformity. Heart:  Regular rate and rhythm, S1, S2 normal, no murmur,  rub or gallop. Abdomen:   Soft, non-tender. Bowel sounds normal. No masses,  No organomegaly. No renal bruit. Extremities: Extremities normal, atraumatic, no cyanosis or edema. CE AVG examines well   Pulses: 2+ and symmetric all extremities. Skin: Skin color, texture, turgor normal. No rashes or lesions. Lymph nodes: Cervical and supraclavicular nodes normal.   Neurologic: Grossly intact. No asterixis.        ECG:     Data Review:     Recent Results (from the past 24 hour(s))   EKG, 12 LEAD, INITIAL    Collection Time: 01/10/22  5:51 AM   Result Value Ref Range    Ventricular Rate 84 BPM    Atrial Rate 84 BPM    P-R Interval 180 ms    QRS Duration 70 ms    Q-T Interval 350 ms    QTC Calculation (Bezet) 413 ms    Calculated P Axis 73 degrees    Calculated R Axis 119 degrees    Calculated T Axis 106 degrees    Diagnosis       Normal sinus rhythm  Right axis deviation  Nonspecific ST and T wave abnormality  Abnormal ECG  When compared with ECG of 22-NOV-2021 14:36,  Nonspecific T wave abnormality now evident in Inferior leads  Confirmed by Nuvia Del Angel MD (), MERT ([id number]) on 1/10/2022 12:23:59 PM     CBC WITH AUTOMATED DIFF    Collection Time: 01/10/22  6:03 AM   Result Value Ref Range    WBC 5.5 4.3 - 11.1 K/uL    RBC 2.66 (L) 4.05 - 5.2 M/uL    HGB 8.9 (L) 11.7 - 15.4 g/dL    HCT 26.2 (L) 35.8 - 46.3 %    MCV 98.5 (H) 79.6 - 97.8 FL    MCH 33.5 (H) 26.1 - 32.9 PG    MCHC 34.0 31.4 - 35.0 g/dL    RDW 18.4 (H) 11.9 - 14.6 %    PLATELET 711 (L) 742 - 450 K/uL    MPV 11.5 9.4 - 12.3 FL    ABSOLUTE NRBC 0.00 0.0 - 0.2 K/uL    DF AUTOMATED      NEUTROPHILS 78 43 - 78 %    LYMPHOCYTES 12 (L) 13 - 44 %    MONOCYTES 7 4.0 - 12.0 %    EOSINOPHILS 1 0.5 - 7.8 %    BASOPHILS 1 0.0 - 2.0 %    IMMATURE GRANULOCYTES 1 0.0 - 5.0 %    ABS. NEUTROPHILS 4.3 1.7 - 8.2 K/UL    ABS. LYMPHOCYTES 0.7 0.5 - 4.6 K/UL    ABS. MONOCYTES 0.4 0.1 - 1.3 K/UL    ABS. EOSINOPHILS 0.0 0.0 - 0.8 K/UL    ABS. BASOPHILS 0.1 0.0 - 0.2 K/UL    ABS. IMM. GRANS. 0.1 0.0 - 0.5 K/UL   METABOLIC PANEL, COMPREHENSIVE    Collection Time: 01/10/22  6:03 AM   Result Value Ref Range    Sodium 138 136 - 145 mmol/L    Potassium 3.8 3.5 - 5.1 mmol/L    Chloride 99 98 - 107 mmol/L    CO2 28 21 - 32 mmol/L    Anion gap 11 7 - 16 mmol/L    Glucose 121 (H) 65 - 100 mg/dL    BUN 33 (H) 8 - 23 MG/DL    Creatinine 6.44 (H) 0.6 - 1.0 MG/DL    GFR est AA 8 (L) >60 ml/min/1.73m2    GFR est non-AA 7 (L) >60 ml/min/1.73m2    Calcium 9.1 8.3 - 10.4 MG/DL    Bilirubin, total 1.5 (H) 0.2 - 1.1 MG/DL    ALT (SGPT) 12 12 - 65 U/L    AST (SGOT) 16 15 - 37 U/L    Alk. phosphatase 77 50 - 136 U/L    Protein, total 7.8 6.3 - 8.2 g/dL    Albumin 4.2 3.2 - 4.6 g/dL    Globulin 3.6 (H) 2.3 - 3.5 g/dL    A-G Ratio 1.2 1.2 - 3.5     MAGNESIUM    Collection Time: 01/10/22  6:03 AM   Result Value Ref Range    Magnesium 2.3 1.8 - 2.4 mg/dL   LIPASE    Collection Time: 01/10/22  6:03 AM   Result Value Ref Range    Lipase 247 73 - 393 U/L           Principal Problem:    Mediastinal mass (1/10/2022)    Active Problems:    ESRD (end stage renal disease) ([facility] (2/7/2013)      HTN (hypertension) (2/7/2013)      Abdominal mass (9/12/2017)      Overview: Of Left renal bed      Renal cell carcinoma ([facility] (9/12/2017)      Severe back pain (11/12/2021)        Assessment/Plan:     1. ESRD- on HD, dialysis needed for biochemical and volume control, no urgent HD needed at this time, plan for HD in AM.    2. Back pain- work up in progress, managed by primary    3. Right paratracheal mass/lymph node- seen on CT scan, hx of renal cell carcinoma, Oncology has been consulted by primary    4.  Bilateral pleural effusion- no signs of respiratory distress, plan for HD in AM, dialysis RN is aware. Lasix stopped, she does not make urine. 5. HTN- hx of, on Norvasc, Lisinopril,  and Minoxidil    6.  Hyperphosphatemia- hx of, continue [id number], [place]

## 2022-01-10 NOTE — ED PROVIDER NOTES
67-year-old lady presents with concerns about pain in the left upper back area. She says this is similar to pain that she has had many times in the past.  She is a dialysis patient and normally goes on Mondays, Wednesdays, and Fridays. She skipped her dialysis this morning because she was in too much pain. She said she said no fevers or chills. No nausea, vomiting, or diarrhea. No weakness or numbness. She was admitted in November for similar symptoms and that time underwent extensive CT scanning including CT scans of her lumbar spine as well as her chest abdomen and pelvis that scan revealed a 2 cm paratracheal mass that was apparently significantly larger than it previously had been. She does have an oncologist but has not seen one since July 2021. At that time she was thought to have a solitary mass in her left renal fossa. She had a previous left nephrectomy for renal cell carcinoma. No other associated symptoms. Elements of this note were created using speech recognition software. As such, errors of speech recognition may be present. Past Medical History:   Diagnosis Date    Arthritis     AVF (arteriovenous fistula) ([facility] 12/20/2016 12/6/16 ([facility]) Right AVF revision and thrombectomy    Cancer ([facility] 2015    L kidney    Chronic kidney disease     HD in [facility]-F- at [facility] dialysis    Degenerative joint disease     Diabetes ([facility]     checks QD, normal 120-130, hyposymptoms at 80    ESRD (end stage renal disease) [facility]) Nov 2006    ESRD.  MWF dialysis     Hypertension     Obesity     Transient ischemic attack 08/29/2015    no residual       Past Surgical History:   Procedure Laterality Date    COLONOSCOPY N/A 11/8/2018    COLONOSCOPY  BMI 36 performed by Diane Li MD at [facility] ENDOSCOPY    HX GI  06/01/2002    colon resection resulting in temporary colostomy reversal    HX GI  08/06/2018    exploratory laparotomy    HX GYN      stephan    HX OTHER SURGICAL      dialysis fistula, several permcaths    HX UROLOGICAL Left July 2015    nephrectomy    HX VASCULAR ACCESS      IR INSERT TUNL CVC W/O PORT OVER 5 YR  11/19/2021    IR PLC CATH AV SHUNT IN W PTA SI VENOUS  5/30/2019    IR PLC CATH AV SHUNT IN W PTA SI VENOUS RT  2/28/2019    IR PLC CATH AV SHUNT IN W PTA SI VENOUS RT  9/3/2019    IR PLC CATH AV SHUNT IN W PTA SI VENOUS RT  12/5/2019    IR PLC CATH AV SHUNT IN W PTA SI VENOUS RT  3/10/2020    IL BREAST SURGERY PROCEDURE UNLISTED Right     cyst removed    VASCULAR SURGERY PROCEDURE UNLIST Right     AV graft         Family History:   Problem Relation Age of Onset    Diabetes Mother     Heart Disease Mother     Hypertension Mother     Heart Disease Father     Hypertension Father     Malignant Hyperthermia Neg Hx     Pseudocholinesterase Deficiency Neg Hx     Delayed Awakening Neg Hx     Post-op Nausea/Vomiting Neg Hx     Emergence Delirium Neg Hx     Other Neg Hx     Post-op Cognitive Dysfunction Neg Hx        Social History     Socioeconomic History    Marital status:      Spouse name: Not on file    Number of children: Not on file    Years of education: Not on file    Highest education level: Not on file   Occupational History    Not on file   Tobacco Use    Smoking status: Never Smoker    Smokeless tobacco: Never Used   Substance and Sexual Activity    Alcohol use: No    Drug use: No    Sexual activity: Not Currently   Other Topics Concern    Not on file   Social History Narrative    Not on file     Social Determinants of Health     Financial Resource Strain:     Difficulty of Paying Living Expenses: Not on file   Food Insecurity:     Worried About Running Out of Food in the Last Year: Not on file    Rick of Food in the Last Year: Not on file   Transportation Needs:     Lack of Transportation (Medical): Not on file    Lack of Transportation (Non-Medical):  Not on file   Physical Activity:     Days of Exercise per Week: Not on file    Minutes of Exercise per Session: Not on file   Stress:     Feeling of Stress : Not on file   Social Connections:     Frequency of Communication with Friends and Family: Not on file    Frequency of Social Gatherings with Friends and Family: Not on file    Attends Hinduism Services: Not on file    Active Member of Clubs or Organizations: Not on file    Attends Club or Organization Meetings: Not on file    Marital Status: Not on file   Intimate Partner Violence:     Fear of Current or Ex-Partner: Not on file    Emotionally Abused: Not on file    Physically Abused: Not on file    Sexually Abused: Not on file   Housing Stability:     Unable to Pay for Housing in the Last Year: Not on file    Number of [place] in the Last Year: Not on file    Unstable Housing in the Last Year: Not on file         ALLERGIES: Pcn [penicillins] and Vancomycin    Review of Systems   Constitutional: Negative for chills, diaphoresis and fever. HENT: Negative for congestion, rhinorrhea and sore throat. Eyes: Negative for redness and visual disturbance. Respiratory: Negative for cough, chest tightness, shortness of breath and wheezing. Cardiovascular: Negative for chest pain and palpitations. Gastrointestinal: Negative for abdominal pain, blood in stool, diarrhea, nausea and vomiting. Endocrine: Negative for polydipsia and polyuria. Genitourinary: Negative for dysuria and hematuria. Musculoskeletal: Positive for back pain. Negative for arthralgias, myalgias and neck stiffness. Skin: Negative for rash. Allergic/Immunologic: Negative for environmental allergies and food allergies. Neurological: Negative for dizziness, weakness and headaches. Hematological: Negative for adenopathy. Does not bruise/bleed easily. Psychiatric/Behavioral: Negative for confusion and sleep disturbance. The patient is not nervous/anxious.         Vitals:    01/10/22 0616 01/10/22 0619 01/10/22 0655 01/10/22 0700   BP: (!) 143/64  103/88 123/61   Pulse:  86 86 85   Resp:  16 26 15   Temp:       SpO2:  96% 99%    Weight:       Height:                Physical Exam  Vitals and nursing note reviewed. Constitutional:       Appearance: Normal appearance. HENT:      Head: Normocephalic and atraumatic. Eyes:      General:         Right eye: No discharge. Left eye: No discharge. Cardiovascular:      Rate and Rhythm: Normal rate and regular rhythm. Pulmonary:      Effort: Pulmonary effort is normal.      Breath sounds: Normal breath sounds. Abdominal:      General: Bowel sounds are normal.      Palpations: Abdomen is soft. Neurological:      General: No focal deficit present. Mental Status: She is alert and oriented to person, place, and time. MDM  Number of Diagnoses or Management Options  Diagnosis management comments: I will get a CT of her chest to see if the paratracheal mass has increased in size. I will check her basic blood work including her potassium.          Procedures

## 2022-01-10 NOTE — ED TRIAGE NOTES
Arrives with face mask in place. Ambulatory with difficulty into triage. Reports left upper back pain. Onset last night approx 1900. Constant since onset. Denies injury/trauma. Endorses shortness of breath. Denies chest pain, n/v/d, fever/chills, cough. Currently on dialysis. Dialyzes  mon wed fri and came here this AM instead of dialysis. Followed by dr Tavia Cervantes for renal cell carcinoma. Reports left nephrectomy.

## 2022-01-10 NOTE — CONSULTS
[facility]        Inpatient Hematology / Oncology Consult Note    Reason for Consult:  Mediastinal mass [J98.59]  Referring Physician:  Stephanie Braxton MD    History of Present Illness:  Ms. Ayaan Chapman is a 80 y.o. female admitted on 1/10/2022 with a primary diagnosis of The primary encounter diagnosis was Mediastinal mass. A diagnosis of Pleural effusion was also pertinent to this visit. .      80 y.o. female with past medical history of end-stage renal disease on hemodialysis Monday Wednesday Friday, diabetes, hypertension, renal cell carcinoma status postsurgical resection in 2015, local recurrence of kidney cancer in 2017 received radiation in 11/2017, last CT showed no evidence of recurrence, presented to the emergency room with left upper back pain, which has been evaluated in emergency room on 11/9/2021, CT chest abdomen pelvis showed a right axillary, right paratracheal, and bilateral inguinal lymphadenopathy, left nephrectomy with stable 18 mm soft tissue nodule in the surgical bed, and she was referred to close outpatient follow-up with oncology but did not show as planned. CT chest on 1/10/2021 showed bilateral pleural effusion and right-sided paratracheal mass with lymph node larger than prior, up to 2.6 cm from 1.2 cm, oncology was consulted.     Onc History (copied):  Renal cell carcinoma: recurrent, originally resected in 2015 but now with recurrent soft tissue mass in nephrectomy bed.  No other distant sites noted, CT chest negative.  This would be considered stage IV, but I would consider this oligometastatic disease with no evidence of disease outside of the nephrectomy bed.  We discussed observation since she is asymptomatic but she insisted upon treatment. Gucci Johnson, she did not wish for systemic therapy.  Since this appears to be a single site of recurrence, it is reasonable to consider local therapy - she is not a surgical candidate, but SBRT or cryoablation may be options. Alpa Campos met with radiation oncology, who agreed that local therapy was reasonable.  She completed SBRT on 11/10/17, a 5 fraction regimen.  She tolerated this well. Review of Systems:  Constitutional Denies fever, chills, weight loss, appetite changes, fatigue, night sweats. HEENT Denies trauma, blurry vision, hearing loss, ear pain, nosebleeds, sore throat, neck pain and ear discharge. Skin Denies lesions or rashes. Lungs Denies dyspnea, cough, sputum production or hemoptysis. Cardiovascular Denies chest pain, palpitations, or lower extremity edema. Gastrointestinal Denies nausea, vomiting, changes in bowel habits, bloody or black stools, abdominal pain.  Denies dysuria, frequency or hesitancy of urination. Neuro Denies headaches, visual changes or ataxia. Denies dizziness, tingling, tremors, sensory change, speech change, focal weakness or headaches. Hematology Denies easy bruising or bleeding, denies gingival bleeding or epistaxis. Endo Denies heat/cold intolerance, denies diabetes or thyroid abnormalities. MSK  left upper back pain. Denies arthralgias, myalgias or frequent falls. Psychiatric/Behavioral Denies depression and substance abuse. Anxious and tearful. Allergies   Allergen Reactions    Pcn [Penicillins] Rash    Vancomycin Rash     Past Medical History:   Diagnosis Date    Arthritis     AVF (arteriovenous fistula) ([facility] 2016 ([facility]) Right AVF revision and thrombectomy    Cancer ([facility]     L kidney    Chronic kidney disease     HD in --- at [facility] dialysis    Degenerative joint disease     Diabetes ([facility]     checks QD, normal 120-130, hyposymptoms at 80    ESRD (end stage renal disease) [facility]) 2006    ESRD.  MWF dialysis     Hypertension     Obesity     Transient ischemic attack 2015    no residual     Past Surgical History:   Procedure Laterality Date    COLONOSCOPY N/A 2018    COLONOSCOPY  BMI 36 performed by Eva Stanley Perez Huggins MD at [facility] ENDOSCOPY    HX GI  06/01/2002    colon resection resulting in temporary colostomy reversal    HX GI  08/06/2018    exploratory laparotomy    HX GYN      stephan    HX OTHER SURGICAL      dialysis fistula, several permcaths    HX UROLOGICAL Left July 2015    nephrectomy    HX VASCULAR ACCESS      IR INSERT TUNL CVC W/O PORT OVER 5 YR  11/19/2021    IR PLC CATH AV SHUNT IN W PTA SI VENOUS  5/30/2019    IR PLC CATH AV SHUNT IN W PTA SI VENOUS RT  2/28/2019    IR PLC CATH AV SHUNT IN W PTA SI VENOUS RT  9/3/2019    IR PLC CATH AV SHUNT IN W PTA SI VENOUS RT  12/5/2019    IR PLC CATH AV SHUNT IN W PTA SI VENOUS RT  3/10/2020    GA BREAST SURGERY PROCEDURE UNLISTED Right     cyst removed    VASCULAR SURGERY PROCEDURE UNLIST Right     AV graft     Family History   Problem Relation Age of Onset    Diabetes Mother     Heart Disease Mother     Hypertension Mother     Heart Disease Father     Hypertension Father     Malignant Hyperthermia Neg Hx     Pseudocholinesterase Deficiency Neg Hx     Delayed Awakening Neg Hx     Post-op Nausea/Vomiting Neg Hx     Emergence Delirium Neg Hx     Other Neg Hx     Post-op Cognitive Dysfunction Neg Hx      Social History     Socioeconomic History    Marital status:      Spouse name: Not on file    Number of children: Not on file    Years of education: Not on file    Highest education level: Not on file   Occupational History    Not on file   Tobacco Use    Smoking status: Never Smoker    Smokeless tobacco: Never Used   Substance and Sexual Activity    Alcohol use: No    Drug use: No    Sexual activity: Not Currently   Other Topics Concern    Not on file   Social History Narrative    Not on file     Social Determinants of Health     Financial Resource Strain:     Difficulty of Paying Living Expenses: Not on file   Food Insecurity:     Worried About Running Out of Food in the Last Year: Not on file    Rick of Food in the Last Year: Not on file   Transportation Needs:     Lack of Transportation (Medical): Not on file    Lack of Transportation (Non-Medical):  Not on file   Physical Activity:     Days of Exercise per Week: Not on file    Minutes of Exercise per Session: Not on file   Stress:     Feeling of Stress : Not on file   Social Connections:     Frequency of Communication with Friends and Family: Not on file    Frequency of Social Gatherings with Friends and Family: Not on file    Attends Judaism Services: Not on file    Active Member of [address] [place] [address] [employer] or Organizations: Not on file    Attends Club or Organization Meetings: Not on file    Marital Status: Not on file   Intimate Partner Violence:     Fear of Current or Ex-Partner: Not on file    Emotionally Abused: Not on file    Physically Abused: Not on file    Sexually Abused: Not on file   Housing Stability:     Unable to Pay for Housing in the Last Year: Not on file    Number of [place] in the Last Year: Not on file    Unstable Housing in the Last Year: Not on file     Current Facility-Administered Medications   Medication Dose Route Frequency Provider Last Rate Last Admin    amLODIPine (NORVASC) tablet 10 mg  10 mg Oral QHS Rebekah Thomas MD        gabapentin (NEURONTIN) capsule 100 mg  100 mg Oral TID Rebekah Thomas MD   100 mg at 01/10/22 1720    lisinopriL (PRINIVIL, ZESTRIL) tablet 40 mg  40 mg Oral QHS Rebekah Thomas MD        methocarbamoL (ROBAXIN) tablet 1,500 mg  1,500 mg Oral QID Rebekah Thomas MD   1,500 mg at 01/10/22 1721    minoxidiL (LONITEN) tablet 10 mg  10 mg Oral BID Rebekah Thomas MD   10 mg at 01/10/22 1720    [START ON 1/11/2022] pantoprazole (PROTONIX) tablet 40 mg  40 mg Oral ACB Rebekah Thomas MD        sevelamer carbonate (RENVELA) tab 800 mg  800 mg Oral TID WITH MEALS Rebekah Thomas MD   800 mg at 01/10/22 1721    sodium chloride (NS) flush 5-40 mL  5-40 mL IntraVENous Q8H Rebekah Thomas MD   10 mL at 01/10/22 1400    sodium chloride (NS) flush 5-40 mL  5-40 mL IntraVENous PRN Rayshawn Saldivar MD        acetaminophen (TYLENOL) tablet 650 mg  650 mg Oral Q6H PRN Rayshawn Saldivar MD        Or    acetaminophen (TYLENOL) suppository 650 mg  650 mg Rectal Q6H PRN Rayshawn Saldivar MD        polyethylene glycol (MIRALAX) packet 17 g  17 g Oral DAILY PRN Rayshawn Saldivar MD        ondansetron (ZOFRAN ODT) tablet 4 mg  4 mg Oral Q8H PRN Rayshawn Saldivar MD        Or    ondansetron (ZOFRAN) injection 4 mg  4 mg IntraVENous Q6H PRN Rayshawn Saldivar MD        heparin (porcine) injection 5,000 Units  5,000 Units SubCUTAneous Q8H Rayshawn Saldivar MD   5,000 Units at 01/10/22 1414    HYDROcodone-acetaminophen (Kojo Comes) 7.5-325 mg per tablet 1 Tablet  1 Tablet Oral Q6H PRN Rayshawn Saldivar MD        HYDROmorphone (DILAUDID) injection 0.2 mg  0.2 mg IntraVENous Q6H PRN Rayshawn Saldivar MD   0.2 mg at 01/10/22 1726    heparin (porcine) 1,000 unit/mL injection 5,000 Units  5,000 Units Hemodialysis DIALYSIS PRN Elise Shaikh NP        insulin lispro (HUMALOG) injection   SubCUTAneous AC&HS Ashwin Feng MD           OBJECTIVE:  Patient Vitals for the past 8 hrs:   BP Temp Pulse Resp SpO2   01/10/22 1535 (!) 103/53 98.2 °F (36.8 °C) 84 18 96 %   01/10/22 1326 (!) 145/64 97.9 °F (36.6 °C) 84 20 99 %   01/10/22 1215 134/60 -- 81 23 98 %     Temp (24hrs), Av.9 °F (36.6 °C), Min:97.7 °F (36.5 °C), Max:98.2 °F (36.8 °C)    01/10 0701 - 01/10 1900  In: 61 [P.O.:60]  Out: -     Physical Exam:  Constitutional: Well developed, well nourished female in no acute distress, sitting comfortably in the hospital bed. HEENT: Normocephalic and atraumatic. Oropharynx is clear, mucous membranes are moist.  Pupils are equal, round, and reactive to light. Extraocular muscles are intact. Sclerae anicteric. Neck supple without JVD. No thyromegaly present. Lymph node   No palpable submandibular, cervical, supraclavicular, axillary or inguinal lymph nodes. Skin Warm and dry. No bruising and no rash noted.   No erythema. No pallor. Respiratory Lungs are clear to auscultation bilaterally without wheezes, rales or rhonchi, normal air exchange without accessory muscle use. CVS Normal rate, regular rhythm and normal S1 and S2. No murmurs, gallops, or rubs. Abdomen Soft, nontender and nondistended, normoactive bowel sounds. No palpable mass. No hepatosplenomegaly. Neuro Grossly nonfocal with no obvious sensory or motor deficits. MSK Normal range of motion in general.  No edema and no tenderness. Psych  anxious and tearful. Labs:    Recent Results (from the past 24 hour(s))   EKG, 12 LEAD, INITIAL    Collection Time: 01/10/22  5:51 AM   Result Value Ref Range    Ventricular Rate 84 BPM    Atrial Rate 84 BPM    P-R Interval 180 ms    QRS Duration 70 ms    Q-T Interval 350 ms    QTC Calculation (Bezet) 413 ms    Calculated P Axis 73 degrees    Calculated R Axis 119 degrees    Calculated T Axis 106 degrees    Diagnosis       Normal sinus rhythm  Right axis deviation  Nonspecific ST and T wave abnormality  Abnormal ECG  When compared with ECG of 22-NOV-2021 14:36,  Nonspecific T wave abnormality now evident in Inferior leads  Confirmed by Donnie Contreras MD ()MERT ([id number]) on 1/10/2022 12:23:59 PM     CBC WITH AUTOMATED DIFF    Collection Time: 01/10/22  6:03 AM   Result Value Ref Range    WBC 5.5 4.3 - 11.1 K/uL    RBC 2.66 (L) 4.05 - 5.2 M/uL    HGB 8.9 (L) 11.7 - 15.4 g/dL    HCT 26.2 (L) 35.8 - 46.3 %    MCV 98.5 (H) 79.6 - 97.8 FL    MCH 33.5 (H) 26.1 - 32.9 PG    MCHC 34.0 31.4 - 35.0 g/dL    RDW 18.4 (H) 11.9 - 14.6 %    PLATELET 558 (L) 908 - 450 K/uL    MPV 11.5 9.4 - 12.3 FL    ABSOLUTE NRBC 0.00 0.0 - 0.2 K/uL    DF AUTOMATED      NEUTROPHILS 78 43 - 78 %    LYMPHOCYTES 12 (L) 13 - 44 %    MONOCYTES 7 4.0 - 12.0 %    EOSINOPHILS 1 0.5 - 7.8 %    BASOPHILS 1 0.0 - 2.0 %    IMMATURE GRANULOCYTES 1 0.0 - 5.0 %    ABS. NEUTROPHILS 4.3 1.7 - 8.2 K/UL    ABS. LYMPHOCYTES 0.7 0.5 - 4.6 K/UL    ABS.  MONOCYTES 0.4 0.1 - 1.3 K/UL    ABS. EOSINOPHILS 0.0 0.0 - 0.8 K/UL    ABS. BASOPHILS 0.1 0.0 - 0.2 K/UL    ABS. IMM. GRANS. 0.1 0.0 - 0.5 K/UL   METABOLIC PANEL, COMPREHENSIVE    Collection Time: 01/10/22  6:03 AM   Result Value Ref Range    Sodium 138 136 - 145 mmol/L    Potassium 3.8 3.5 - 5.1 mmol/L    Chloride 99 98 - 107 mmol/L    CO2 28 21 - 32 mmol/L    Anion gap 11 7 - 16 mmol/L    Glucose 121 (H) 65 - 100 mg/dL    BUN 33 (H) 8 - 23 MG/DL    Creatinine 6.44 (H) 0.6 - 1.0 MG/DL    GFR est AA 8 (L) >60 ml/min/1.73m2    GFR est non-AA 7 (L) >60 ml/min/1.73m2    Calcium 9.1 8.3 - 10.4 MG/DL    Bilirubin, total 1.5 (H) 0.2 - 1.1 MG/DL    ALT (SGPT) 12 12 - 65 U/L    AST (SGOT) 16 15 - 37 U/L    Alk. phosphatase 77 50 - 136 U/L    Protein, total 7.8 6.3 - 8.2 g/dL    Albumin 4.2 3.2 - 4.6 g/dL    Globulin 3.6 (H) 2.3 - 3.5 g/dL    A-G Ratio 1.2 1.2 - 3.5     MAGNESIUM    Collection Time: 01/10/22  6:03 AM   Result Value Ref Range    Magnesium 2.3 1.8 - 2.4 mg/dL   LIPASE    Collection Time: 01/10/22  6:03 AM   Result Value Ref Range    Lipase 247 73 - 393 U/L       Imaging:  No images are attached to the encounter. ASSESSMENT/RECOMMENDATIONS:  Problem List  Date Reviewed: 7/27/2021          Codes Class Noted    * (Principal) Mediastinal mass ICD-10-CM: J98.59  ICD-9-CM: 786.6  1/10/2022        Severe back pain ICD-10-CM: M54.9  ICD-9-CM: 724.5  11/12/2021        Bacterial pneumonia ICD-10-CM: J15.9  ICD-9-CM: 482.9  11/12/2021        Acute respiratory failure with hypoxia [facility]) ICD-10-CM: J96.01  ICD-9-CM: 518.81  11/12/2021        Hypokalemia ICD-10-CM: E87.6  ICD-9-CM: 276.8  11/12/2021        Elevated troponin ICD-10-CM: R77.8  ICD-9-CM: 790.6  11/12/2021        Small bowel obstruction ([facility] ICD-10-CM: Z01.238  ICD-9-CM: 560.9  8/3/2018        Severe obesity (BMI 35.0-39. 9) with comorbidity ([facility] ICD-10-CM: E66.01  ICD-9-CM: 278.01  5/4/2018        Type 2 diabetes mellitus with nephropathy ([facility] ICD-10-CM: E11.21  ICD-9-CM: 250.40, 583.81  12/15/2017        Flank pain ICD-10-CM: R10.9  ICD-9-CM: 789.09  9/14/2017        Abdominal mass ICD-10-CM: R19.00  ICD-9-CM: 789.30  9/12/2017    Overview Signed 9/12/2017  9:01 PM by Pooja Gomez MD     Of Left renal bed             Renal cell carcinoma ([facility] ICD-10-CM: C64.9  ICD-9-CM: 189.0  9/12/2017        AVF (arteriovenous fistula) (HCC) (Chronic) ICD-10-CM: I77.0  ICD-9-CM: 447.0  12/20/2016    Overview Signed 12/20/2016  2:19 PM by Tayo Perrin NP       12/6/16 ([facility]) Right AVF revision and thrombectomy             Transient ischemic attack ICD-10-CM: G45.9  ICD-9-CM: 435.9  8/29/2015        Renal mass ICD-10-CM: N28.89  ICD-9-CM: 593.9  7/2/2015        Chronic renal failure, stage 5 (HCC) ICD-10-CM: N18.5  ICD-9-CM: 585.5  6/15/2015        Hypotension ICD-10-CM: I95.9  ICD-9-CM: 458.9  12/1/2014        Dizziness ICD-10-CM: R42  ICD-9-CM: 780.4  12/1/2014        Osteoarthritis of right knee ICD-10-CM: M17.11  ICD-9-CM: 715.96  2/7/2013        Total knee replacement status ICD-10-CM: Z96.659  ICD-9-CM: V43.65  2/7/2013        DM (diabetes mellitus) ([facility] ICD-10-CM: E11.9  ICD-9-CM: 250.00  2/7/2013        ESRD (end stage renal disease) ([facility] ICD-10-CM: N18.6  ICD-9-CM: 585.6  2/7/2013        HTN (hypertension) ICD-10-CM: I10  ICD-9-CM: 401.9  2/7/2013            67-year-old female with history of recurrent renal cell carcinoma admitted for persisting left upper back pain and CT showed multiple lymphadenopathy and bilateral pleural effusion, left lower lobe atelectasis versus mass, discussed with patient that this has to be considered suspicious of cancer recurrence unless proven otherwise, consult pulmonology for thoracentesis and cytology, if not diagnostic would need EUS biopsy of the mediastinal lymph node and navigational biopsy of the left lower lobe mass, pain medicine as needed, supportive care, will follow for histology result.   If patient becomes clinically stable, there is no oncological contraindication to complete the work-up outpatient and following office. Lab studies and imaging studies were personally reviewed. Pertinent old records were reviewed. Case discussed with primary team.    Thank you for allowing us to participate in the care of Ms. Fischer. Precious Barron M.D.   [address]  Office : [phone]  Fax : [phone]

## 2022-01-10 NOTE — ACP (ADVANCE CARE PLANNING)
Advance Care Planning   Healthcare Decision Maker:   Primary decision maker if daughter Casandra Schmitz [phone]      Click here to [address] including selection of the Healthcare Decision Maker Relationship (ie \"Primary\")  Today we documented Decision Maker(s) consistent with Legal Next of Kin hierarchy. These are patient's current wishes as discussed at bedside today and are not intended to take place of [address]

## 2022-01-10 NOTE — ROUTINE PROCESS
TRANSFER - IN REPORT:    Verbal report received from [address] on [address] being received from ED for routine progression of care. Report consisted of patients Situation, Background, Assessment and Recommendations(SBAR). Information from the following report(s)  was reviewed. Opportunity for questions and clarification was provided. Assessment completed upon patients arrival to unit and care assumed. Patient received to room 1 (old ER). Patient connected to monitor and assessment completed. Plan of care reviewed. Patient oriented to room and call light. Patient aware to use call light to communicate any chest pain or needs. Admission skin assessment completed with second RN and reveals the following:  Sacrum and heels are C/D/I. Heels are very dry. She has scattered scars. She has a scar on her abdomen. She has dialysis access in both her arms.

## 2022-01-10 NOTE — MANAGEMENT PLAN
[employer] [facility]        Inpatient Hematology / Oncology Plan of Care    Reason for Consult:  Mediastinal mass [J98.59]  Referring Physician:  Ana Laura Bhatia MD    History of Present Illness:  Ms. Marcia Vazquez is a 80 y.o. female admitted on 1/10/2022. The primary encounter diagnosis was Mediastinal mass. A diagnosis of Pleural effusion was also pertinent to this visit. Her PMH includes CKD on dialysis MWF, DM, and HTN. She is a patient of Dr. Cornelio Lopez s/p recurrent RCC s/p SBRT in 11/2017. Last CT with no evidence of recurrence; continues on observation alone. She presented to ED with c/o L upper back pain. She was seen in ED for same complaint on 11/9/21, CT CAP showed R axillary, R paratracheal, and b/l lingual LAD; L nephrectomy with stable 18mm soft tissue nodule in neph bed. She was referred for close OP f/u with oncology, but was lost to follow up. CT chest on 1/10 with b/l pleural effusions and R paratracheal mass/LN slightly larger in size (up to 2.6cm from 12mm). We were consulted for paratracheal mass. Onc History (copied):  Renal cell carcinoma: recurrent, originally resected in 2015 but now with recurrent soft tissue mass in nephrectomy bed. No other distant sites noted, CT chest negative. This would be considered stage IV, but I would consider this oligometastatic disease with no evidence of disease outside of the nephrectomy bed. We discussed observation since she is asymptomatic but she insisted upon treatment. However, she did not wish for systemic therapy. Since this appears to be a single site of recurrence, it is reasonable to consider local therapy - she is not a surgical candidate, but SBRT or cryoablation may be options. She met with radiation oncology, who agreed that local therapy was reasonable. She completed SBRT on 11/10/17, a 5 fraction regimen. She tolerated this well.     Allergies   Allergen Reactions    Pcn [Penicillins] Rash    Vancomycin Rash     Past Medical History:   Diagnosis Date    Arthritis     AVF [facility]arteriovenous fistula) ([facility] 12/20/2016 12/6/16 ([facility]) Right AVF revision and thrombectomy    Cancer ([facility] 2015    L kidney    Chronic kidney disease     HD in [facility] dialysis    Degenerative joint disease     Diabetes ([facility]     checks QD, normal 120-130, hyposymptoms at [address]    ESRD (end stage renal disease) [facility]) Nov 2006    ESRD.  MWF dialysis     Hypertension     Obesity     Transient ischemic attack 08/29/2015    no residual     Past Surgical History:   Procedure Laterality Date    COLONOSCOPY N/A 11/8/2018    COLONOSCOPY  BMI 36 performed [facility]ita [facility]el [facility] ENDOSCOPY    HX GI  06/01/2002    colon resection resulting in temporary colostomy reversal    HX GI  08/06/2018    exploratory laparotomy    HX GYN      stephan    HX OTHER SURGICAL      dialysis fistula, several permcaths    HX UROLOGICAL Left July 2015    nephrectomy    HX VASCULAR ACCESS      IR INSERT TUNL CVC W/O PORT OVER 5 YR  11/19/2021    IR PLC CATH AV SHUNT IN W PTA SI VENOUS  5/30/2019    IR PLC CATH AV SHUNT IN W PTA SI VENOUS RT  2/28/2019    IR PLC CATH AV SHUNT IN W PTA SI VENOUS RT  9/3/2019    IR PLC CATH AV SHUNT IN W PTA SI VENOUS RT  12/5/2019    IR PLC CATH AV SHUNT IN W PTA SI VENOUS RT  3/10/2020    LA BREAST SURGERY PROCEDURE UNLISTED Right     cyst removed    VASCULAR SURGERY PROCEDURE UNLIST Right     AV graft     Family History   Problem Relation Age of Onset    Diabetes Mother     Heart Disease Mother     Hypertension Mother     Heart Disease Father     Hypertension Father     Malignant Hyperthermia Neg Hx     Pseudocholinesterase Deficiency Neg Hx     Delayed Awakening Neg Hx     Post-op Nausea/Vomiting Neg Hx     Emergence Delirium Neg Hx     Other Neg Hx     Post-op Cognitive Dysfunction Neg Hx      Social History     Socioeconomic History    Marital status:      Spouse name: Not on file    Number of children: Not on file    Years of education: Not on file    Highest education level: Not on file   Occupational History    Not on file   Tobacco Use    Smoking status: Never Smoker    Smokeless tobacco: Never Used   Substance and Sexual Activity    Alcohol use: No    Drug use: No    Sexual activity: Not Currently   Other Topics Concern    Not on file   Social History Narrative    Not on file     Social Determinants of Health     Financial Resource Strain:     Difficulty of Paying Living Expenses: Not on file   Food Insecurity:     Worried About Running Out of Food in the Last Year: Not on file    Rick of Food in the Last Year: Not on file   Transportation Needs:     Lack of Transportation (Medical): Not on file    Lack of Transportation (Non-Medical): Not on file   Physical Activity:     Days of Exercise per Week: Not on file    Minutes of Exercise per Session: Not on file   Stress:     Feeling of Stress : Not on file   Social Connections:     Frequency of Communication with Friends and Family: Not on file    Frequency of Social Gatherings with Friends and Family: Not on file    Attends Mu-ism Services: Not on file    Active Member of [address] [place] [address] or Organizations: Not on file    Attends Club or Organization Meetings: Not on file    Marital Status: Not on file   Intimate Partner Violence:     Fear of Current or Ex-Partner: Not on file    Emotionally Abused: Not on file    Physically Abused: Not on file    Sexually Abused: Not on file   Housing Stability:     Unable to Pay for Housing in the Last Year: Not on file    Number of [place] in the Last Year: Not on file    Unstable Housing in the Last Year: Not on file     Current Outpatient Medications   Medication Sig Dispense Refill    lidocaine 4 % patch 1 Patch by TransDERmal route every twelve (12) hours every twelve (12) hours. 8 Patch 0    minoxidiL (LONITEN) 10 mg tablet Take  by mouth two (2) times a day.       furosemide (Lasix) 80 mg tablet Take 80 mg by mouth two (2) times a day.  methocarbamoL (Robaxin-750) 750 mg tablet Take 2 Tablets by mouth four (4) times daily. 40 Tablet 0    gabapentin (NEURONTIN) 100 mg capsule TAKE 1 CAPSULE BY MOUTH THREE TIMES DAILY FOR PAIN      omeprazole (PRILOSEC) 40 mg capsule TAKE ONE CAPSULE BY MOUTH EVERY DAY      lidocaine 5 % topical cream Apply  to affected area two (2) times daily as needed for Pain. 1 Tube 0    amLODIPine (NORVASC) 10 mg tablet Take 10 mg by mouth nightly.  VIT C/VIT E/LUTEIN/MIN/OMEGA-3 (OCUVITE PO) Take  by mouth nightly.  sevelamer carbonate (RENVELA) 800 mg tab tab Take 800 mg by mouth three (3) times daily (with meals). 5 tablets with meals/ 2 with snacks  Sometimes only eats 2 meals/d      lisinopril (PRINIVIL, ZESTRIL) 40 mg tablet Take 40 mg by mouth nightly. Indications: HYPERTENSION      sitaGLIPtin (JANUVIA) 100 mg tablet Take 50 mg by mouth every morning. Indications: TYPE 2 DIABETES MELLITUS         OBJECTIVE:  Patient Vitals for the past 8 hrs:   BP Temp Pulse Resp SpO2 Height Weight   01/10/22 0742 -- -- 81 14 96 % -- --   01/10/22 0741 -- -- 83 22 (!) 85 % -- --   01/10/22 0715 (!) 133/57 -- 83 15 -- -- --   01/10/22 0700 123/61 -- 85 15 -- -- --   01/10/22 0655 103/88 -- 86 26 99 % -- --   01/10/22 0619 -- -- 86 16 96 % -- --   01/10/22 0616 (!) 143/64 -- -- -- -- -- --   01/10/22 0544 (!) 117/59 97.7 °F (36.5 °C) 90 20 90 % 5' 5.5\" (1.664 m) 178 lb (80.7 kg)     Temp (24hrs), Av.7 °F (36.5 °C), Min:97.7 °F (36.5 °C), Max:97.7 °F (36.5 °C)    No intake/output data recorded. Physical Exam:  Constitutional: Well developed, well nourished female in no acute distress, sitting in the hospital bed. HEENT: Normocephalic and atraumatic. Oropharynx is clear, mucous membranes are moist. Extraocular muscles are intact. Sclerae anicteric. Neck supple without JVD. No thyromegaly present. Skin Warm and dry.   No bruising and no rash noted. No erythema. No pallor. Respiratory Lungs are clear to auscultation bilaterally without wheezes, rales or rhonchi, normal air exchange without accessory muscle use. CVS Normal rate, regular rhythm and normal S1 and S2. No murmurs, gallops, or rubs. Abdomen Soft, nontender and nondistended, normoactive bowel sounds. No palpable mass. No hepatosplenomegaly. Neuro Grossly nonfocal with no obvious sensory or motor deficits. MSK Normal range of motion in general.  No edema and no tenderness. Psych Anxious       Labs:    Recent Results (from the past 24 hour(s))   EKG, 12 LEAD, INITIAL    Collection Time: 01/10/22  5:51 AM   Result Value Ref Range    Ventricular Rate 84 BPM    Atrial Rate 84 BPM    P-R Interval 180 ms    QRS Duration 70 ms    Q-T Interval 350 ms    QTC Calculation (Bezet) 413 ms    Calculated P Axis 73 degrees    Calculated R Axis 119 degrees    Calculated T Axis 106 degrees    Diagnosis       Normal sinus rhythm  Right axis deviation  Nonspecific ST and T wave abnormality  Abnormal ECG  When compared with ECG of 22-NOV-2021 14:36,  Nonspecific T wave abnormality now evident in Inferior leads     CBC WITH AUTOMATED DIFF    Collection Time: 01/10/22  6:03 AM   Result Value Ref Range    WBC 5.5 4.3 - 11.1 K/uL    RBC 2.66 (L) 4.05 - 5.2 M/uL    HGB 8.9 (L) 11.7 - 15.4 g/dL    HCT 26.2 (L) 35.8 - 46.3 %    MCV 98.5 (H) 79.6 - 97.8 FL    MCH 33.5 (H) 26.1 - 32.9 PG    MCHC 34.0 31.4 - 35.0 g/dL    RDW 18.4 (H) 11.9 - 14.6 %    PLATELET 860 (L) 778 - 450 K/uL    MPV 11.5 9.4 - 12.3 FL    ABSOLUTE NRBC 0.00 0.0 - 0.2 K/uL    DF AUTOMATED      NEUTROPHILS 78 43 - 78 %    LYMPHOCYTES 12 (L) 13 - 44 %    MONOCYTES 7 4.0 - 12.0 %    EOSINOPHILS 1 0.5 - 7.8 %    BASOPHILS 1 0.0 - 2.0 %    IMMATURE GRANULOCYTES 1 0.0 - 5.0 %    ABS. NEUTROPHILS 4.3 1.7 - 8.2 K/UL    ABS. LYMPHOCYTES 0.7 0.5 - 4.6 K/UL    ABS. MONOCYTES 0.4 0.1 - 1.3 K/UL    ABS. EOSINOPHILS 0.0 0.0 - 0.8 K/UL    ABS. BASOPHILS 0.1 0.0 - 0.2 K/UL    ABS. IMM. GRANS. 0.1 0.0 - 0.5 K/UL   METABOLIC PANEL, COMPREHENSIVE    Collection Time: 01/10/22  6:03 AM   Result Value Ref Range    Sodium 138 136 - 145 mmol/L    Potassium 3.8 3.5 - 5.1 mmol/L    Chloride 99 98 - 107 mmol/L    CO2 28 21 - 32 mmol/L    Anion gap 11 7 - 16 mmol/L    Glucose 121 (H) 65 - 100 mg/dL    BUN 33 (H) 8 - 23 MG/DL    Creatinine 6.44 (H) 0.6 - 1.0 MG/DL    GFR est AA 8 (L) >60 ml/min/1.73m2    GFR est non-AA 7 (L) >60 ml/min/1.73m2    Calcium 9.1 8.3 - 10.4 MG/DL    Bilirubin, total 1.5 (H) 0.2 - 1.1 MG/DL    ALT (SGPT) 12 12 - 65 U/L    AST (SGOT) 16 15 - 37 U/L    Alk. phosphatase 77 50 - 136 U/L    Protein, total 7.8 6.3 - 8.2 g/dL    Albumin 4.2 3.2 - 4.6 g/dL    Globulin 3.6 (H) 2.3 - 3.5 g/dL    A-G Ratio 1.2 1.2 - 3.5     MAGNESIUM    Collection Time: 01/10/22  6:03 AM   Result Value Ref Range    Magnesium 2.3 1.8 - 2.4 mg/dL   LIPASE    Collection Time: 01/10/22  6:03 AM   Result Value Ref Range    Lipase 247 73 - 393 U/L       Imaging:  CT CHEST WO CONT [[id number]] Collected: 01/10/22 0745   Order Status: Completed Updated: 01/10/22 0757   Narrative:     EXAMINATION: CT CHEST WO CONT 1/10/2022 7:27 AM     ACCESSION NUMBER: [id number]     COMPARISON: November 22, 2021, November 12, 2021     INDICATION: 2cm right sided paratracheal mass     TECHNIQUE: Multiple contiguous axial CT images of the chest were obtained from   the lung apices to the lung bases without intravenous contrast. Coronal   reformats are provided. Radiation dose reduction techniques were used for this study. Our CT scanners   use one or all of the following: Automated exposure control, adjustment of the   mA and/or kV according to patient size, iterative reconstruction. FINDINGS:   Lungs: Bilateral pleural effusions with subjacent atelectasis.      Mediastinum: Limitations due to no contrast. Hilar fullness.  Suggestion of   adenopathy with prominent right paratracheal node measuring 2.6 cm short axis. Visualized upper abdomen: Ascites. The visualized portions of the liver and   adrenal glands are normal. Left nephrectomy. Osseous structures: No suspicious lytic or blastic bony lesions. Impression:     1. Bilateral pleural effusions with subjacent atelectasis. 2.  The previously reported right paratracheal mass/lymph node is slightly   larger         ASSESSMENT:  Problem List  Date Reviewed: 7/27/2021          Codes Class Noted    Mediastinal mass ICD-10-CM: J98.59  ICD-9-CM: 786.6  1/10/2022        Severe back pain ICD-10-CM: M54.9  ICD-9-CM: 724.5  11/12/2021        Bacterial pneumonia ICD-10-CM: J15.9  ICD-9-CM: 482.9  11/12/2021        Acute respiratory failure with hypoxia [facility]) ICD-10-CM: J96.01  ICD-9-CM: 518.81  11/12/2021        Hypokalemia ICD-10-CM: E87.6  ICD-9-CM: 276.8  11/12/2021        Elevated troponin ICD-10-CM: R77.8  ICD-9-CM: 790.6  11/12/2021        Small bowel obstruction ([facility] ICD-10-CM: B32.198  ICD-9-CM: 560.9  8/3/2018        Severe obesity (BMI 35.0-39. 9) with comorbidity ([facility] ICD-10-CM: E66.01  ICD-9-CM: 278.01  5/4/2018        Type 2 diabetes mellitus with nephropathy ([facility] ICD-10-CM: E11.21  ICD-9-CM: 250.40, 583.81  12/15/2017        Flank pain ICD-10-CM: R10.9  ICD-9-CM: 789.09  9/14/2017        Abdominal mass ICD-10-CM: R19.00  ICD-9-CM: 789.30  9/12/2017    Overview Signed 9/12/2017  9:01 PM by Israel Byrd MD     Of Left renal bed             Renal cell carcinoma ([facility] ICD-10-CM: C64.9  ICD-9-CM: 189.0  9/12/2017        AVF (arteriovenous fistula) (HCC) (Chronic) ICD-10-CM: I77.0  ICD-9-CM: 447.0  12/20/2016    Overview Signed 12/20/2016  2:19 PM by Nikita Mendes NP       12/6/16 ([address]) Right AVF revision and thrombectomy             Transient ischemic attack ICD-10-CM: G45.9  ICD-9-CM: 435.9  8/29/2015        Renal mass ICD-10-CM: N28.89  ICD-9-CM: 593.9  7/2/2015        Chronic renal failure, stage 5 (HCC) ICD-10-CM: N18.5  ICD-9-CM: 585.5  6/15/2015        Hypotension ICD-10-CM: I95.9  ICD-9-CM: 458.9  12/1/2014        Dizziness ICD-10-CM: X78  ICD-9-CM: 780.4  12/1/2014        Osteoarthritis of right knee ICD-10-CM: M17.11  ICD-9-CM: 715.96  2/7/2013        Total knee replacement status ICD-10-CM: C41.712  ICD-9-CM: V43.65  2/7/2013        DM (diabetes mellitus) ([facility] ICD-10-CM: E11.9  ICD-9-CM: 250.00  2/7/2013        ESRD (end stage renal disease) ([facility] ICD-10-CM: N18.6  ICD-9-CM: 585.6  2/7/2013        HTN (hypertension) ICD-10-CM: I10  ICD-9-CM: 401.9  2/7/2013                RECOMMENDATIONS:  Stage IV recurrent RCC  - s/p SBRT on 11/10/17  - currently on observation    Left upper back pain / Paratracheal mass / Bilateral pleural effusions  - CT chest with b/l pleural effusions and R paratracheal mass/LN slightly larger in size compared to 11/2021 imaging  - Consult pulm for thoracentesis vs EBUS for bx. Can be performed as OP. CKD  - on HD MWF    Lab studies and imaging studies were personally reviewed. Thank you for allowing us to participate in the care of Ms. Fischer. Formal consult note by Dr. Bobie Schwab to follow. Ms. Sina Jennings will need to f/u with Dr. Emely Doshi upon discharge.          Rachana Escalante, NP   [facility] Oncology  [address] [address]  Office : [phone]  Fax : [phone]

## 2022-01-10 NOTE — ED NOTES
TRANSFER - OUT REPORT:    Verbal report given to Kimberli rn(name) on Kecia iFscher  being transferred to 1107(unit) for routine progression of care       Report consisted of patients Situation, Background, Assessment and   Recommendations(SBAR). Information from the following report(s) SBAR and ED Summary was reviewed with the receiving nurse. Lines:   Peripheral IV 01/10/22 Right Antecubital (Active)        Opportunity for questions and clarification was provided.       Patient transported with:   O2 @ 2 liters  Tech

## 2022-01-10 NOTE — PROGRESS NOTES
Chart review complete, CM met with pt at bedside pt found laying in bed alert and oriented x4, pt states she lives in a 2 level home with daughter her spouse and her 3 children, states she does not have any steps to enter home and her bedroom is on the 1st floor, states she is normally independent with ADLS, no current DME in home for use but would like a cane if possible. States she does not drive and she gets transportation with [facility] [employer] transport for appointments and HD, states she goes to [facility] on M/W/F  Demographics, insurance and PCP confirmed. Care Management Interventions  PCP Verified by CM:  Yes (Dr Liseth Parker last visit was friday)  MyChart Signup: No  Discharge Durable Medical Equipment: No  Physical Therapy Consult: No  Occupational Therapy Consult: No  Speech Therapy Consult: No  Support Systems: Child(joan) (daughter Clover Garcia [phone])  Confirm Follow Up Transport: Family  Discharge Location  Discharge Placement: Unable to determine at this time

## 2022-01-10 NOTE — H&P
Hospitalist History and Physical   Admit Date:  1/10/2022  6:06 AM   Name:  Destini Fischer   Age:  80 y.o. Sex:  female  :  1950   MRN:  [id number]   Room:  [place] [address]    Presenting Complaint: Back Pain    Reason(s) for Admission: Mediastinal mass [J98.59]     History of Present Illness: Yuri Valdovinos is a 80 y.o. female with medical history of ESRD on hemodialysis, renal cell carcinoma s/p resection who presented with severe sharp left-sided thoracic back pain, started at night. Patient recently admitted in November for similar complaint and work-up was unremarkable including CAT scan lumbar spine, CT C/A/P. She denies chest pain, palpitation, shortness of breath, nausea, vomiting, abdominal pain. Denies fevers or chills. Patient is on MWF hemodialysis. She did not go to her sched hemodialysis today due to persistent pain. In the ED patient was vitally stable. Labs noted for WBC 5.5, hemoglobin 8.9, platelet 456, electrolytes unremarkable, creatinine 6.44, lipase 247. CT chest showed bilateral pleural effusion. Adjacent atelectasis and right paratracheal mass/lymph node is slightly larger. Hospitalist consulted for admission. Review of Systems:  10 systems reviewed and negative except as noted in HPI.   Assessment & Plan:     Chronic left thoracic back pain:  Unclear etiology, patient recently admitted in November for similar complaint and work-up was unremarkable including CT lumbar spine, CT C/A/P  No spinal tenderness noted on exam  No obvious signs and symptoms of infection  Pain control with Norco and Dilaudid as needed  PT/OT    Right paratracheal mass/lymph node:  Right paratracheal lymph node, gradually increasing in size  Not sure if this is addressed before  Patient with history of renal cell carcinoma status post nephrectomy and SBRT, following oncology outpatient  Will consult oncology for evaluation    ESRD on hemodialysis (MWF):  Patient did not go to dialysis today  Nephrology consulted for continuation of dialysis  Avoid nephrotoxic agents    Bilateral pleural effusion:   CT chest with bilateral pleural  Not in respiratory distress  Continue Lasix  Hemodialysis    Hypertension:  Continue home med Norvasc and lisinopril    History of recurrent renal cell carcinoma:  S/p resection in 2015  Treated with local therapy with the right side of SBRT on 11/2017  Follows oncology outpatient      Dispo/Discharge Planning: Admit for observation    Diet: No diet orders on file  VTE ppx: Heparin  Code status: Prior    Hospital Problems as of 1/10/2022 Date Reviewed: 7/27/2021          Codes Class Noted - Resolved POA    * (Principal) Mediastinal mass ICD-10-CM: J98.59  ICD-9-CM: 786.6  1/10/2022 - Present Unknown        Severe back pain ICD-10-CM: M54.9  ICD-9-CM: 724.5  11/12/2021 - Present Yes        Abdominal mass ICD-10-CM: R19.00  ICD-9-CM: 789.30  9/12/2017 - Present Yes    Overview Signed 9/12/2017  9:01 PM by Fran Cabrera MD     Of Left renal bed             Renal cell carcinoma ([facility] ICD-10-CM: C64.9  ICD-9-CM: 189.0  9/12/2017 - Present Yes        ESRD (end stage renal disease) ([facility] ICD-10-CM: N18.6  ICD-9-CM: 585.6  2/7/2013 - Present Yes        HTN (hypertension) ICD-10-CM: I10  ICD-9-CM: 401.9  2/7/2013 - Present Yes              Past History:  Past Medical History:   Diagnosis Date    Arthritis     AVF (arteriovenous fistula) ([address] 12/20/2016 12/6/16 ([facility]) Right AVF revision and thrombectomy    Cancer ([address] 2015    L kidney    Chronic kidney disease     HD in M-W-F- at [facility] dialysis    Degenerative joint disease     Diabetes ([address]     checks QD, normal 120-130, hyposymptoms at 80    ESRD (end stage renal disease) ([address] Nov 2006    ESRD.  MWF dialysis     Hypertension     Obesity     Transient ischemic attack 08/29/2015    no residual     Past Surgical History:   Procedure Laterality Date    COLONOSCOPY N/A 11/8/2018    COLONOSCOPY  BMI 36 performed by Diane Li MD at [facility] ENDOSCOPY    HX GI  06/01/2002    colon resection resulting in temporary colostomy reversal    HX GI  08/06/2018    exploratory laparotomy    HX GYN      stephan    HX OTHER SURGICAL      dialysis fistula, several permcaths    HX UROLOGICAL Left July 2015    nephrectomy    HX VASCULAR ACCESS      IR INSERT TUNL CVC W/O PORT OVER 5 YR  11/19/2021    IR PLC CATH AV SHUNT IN W PTA SI VENOUS  5/30/2019    IR PLC CATH AV SHUNT IN W PTA SI VENOUS RT  2/28/2019    IR PLC CATH AV SHUNT IN W PTA SI VENOUS RT  9/3/2019    IR PLC CATH AV SHUNT IN W PTA SI VENOUS RT  12/5/2019    IR PLC CATH AV SHUNT IN W PTA SI VENOUS RT  3/10/2020    AZ BREAST SURGERY PROCEDURE UNLISTED Right     cyst removed    VASCULAR SURGERY PROCEDURE UNLIST Right     AV graft      Allergies   Allergen Reactions    Pcn [Penicillins] Rash    Vancomycin Rash      Social History     Tobacco Use    Smoking status: Never Smoker    Smokeless tobacco: Never Used   Substance Use Topics    Alcohol use: No      Family History   Problem Relation Age of Onset    Diabetes Mother     Heart Disease Mother     Hypertension Mother     Heart Disease Father     Hypertension Father     Malignant Hyperthermia Neg Hx     Pseudocholinesterase Deficiency Neg Hx     Delayed Awakening Neg Hx     Post-op Nausea/Vomiting Neg Hx     Emergence Delirium Neg Hx     Other Neg Hx     Post-op Cognitive Dysfunction Neg Hx       Family history reviewed and negative except as noted above.     Immunization History   Administered Date(s) Administered    COVID-19, PFIZER, MRNA, LNP-S, PF, 30MCG/0.3ML DOSE 02/25/2021, 03/25/2021    TB Skin Test (PPD) Intradermal 02/08/2013, 08/03/2018    TD Vaccine 07/01/2008    TDAP Vaccine 03/30/2012     Prior to Admit Medications:  Current Outpatient Medications   Medication Instructions    amLODIPine (NORVASC) 10 mg, Oral, EVERY BEDTIME    furosemide (LASIX) 80 mg, Oral, 2 TIMES DAILY    gabapentin (NEURONTIN) 100 mg capsule TAKE 1 CAPSULE BY MOUTH THREE TIMES DAILY FOR PAIN    lidocaine 4 % patch 1 Patch, TransDERmal, EVERY 12 HOURS    lidocaine 5 % topical cream Topical, 2 TIMES DAILY AS NEEDED    lisinopriL (PRINIVIL, ZESTRIL) 40 mg, Oral, EVERY BEDTIME    methocarbamoL (ROBAXIN-750) 1,500 mg, Oral, 4 TIMES DAILY    minoxidiL (LONITEN) 10 mg tablet Oral, 2 TIMES DAILY    omeprazole (PRILOSEC) 40 mg capsule TAKE ONE CAPSULE BY MOUTH EVERY DAY    sevelamer carbonate (RENVELA) 800 mg, Oral, 3 TIMES DAILY WITH MEALS, 5 tablets with meals/ 2 with snacksSometimes only eats 2 meals/d    SITagliptin (JANUVIA) 50 mg, Oral, 7AM    VIT C/VIT E/LUTEIN/MIN/OMEGA-3 (OCUVITE PO) Oral, EVERY BEDTIME       Objective:     Patient Vitals for the past 24 hrs:   Temp Pulse Resp BP SpO2   01/10/22 0742 -- 81 14 -- 96 %   01/10/22 0741 -- 83 22 -- (!) 85 %   01/10/22 0715 -- 83 15 (!) 133/57 --   01/10/22 0700 -- 85 15 123/61 --   01/10/22 0655 -- 86 26 103/88 99 %   01/10/22 0619 -- 86 16 -- 96 %   01/10/22 0616 -- -- -- (!) 143/64 --   01/10/22 0544 97.7 °F (36.5 °C) 90 20 (!) 117/59 90 %     Oxygen Therapy  O2 Sat (%): 96 % (01/10/22 0742)  Pulse via Oximetry: 81 beats per minute (01/10/22 0742)  O2 Device: Nasal cannula (01/10/22 0742)  O2 Flow Rate (L/min): 3 l/min (01/10/22 0742)    Estimated body mass index is 29.17 kg/m² as calculated from the following:    Height as of this encounter: 5' 5.5\" (1.664 m). Weight as of this encounter: 80.7 kg (178 lb). No intake or output data in the 24 hours ending 01/10/22 1039      Physical Exam:    Blood pressure (!) 133/57, pulse 81, temperature 97.7 °F (36.5 °C), resp. rate 14, height 5' 5.5\" (1.664 m), weight 80.7 kg (178 lb), SpO2 96 %. General:    Well nourished. No overt distress  Head:  Normocephalic, atraumatic  Eyes:  Sclerae appear normal.  Pupils equally round. ENT:  Nares appear normal, no drainage. Moist oral mucosa  Neck:  No restricted ROM. Trachea midline   CV:   RRR. No m/r/g. No jugular venous distension. Lungs:   CTAB. No wheezing, rhonchi, or rales. Respirations even, unlabored  Abdomen/Back:   Bowel sounds present. Soft, nontender, nondistended. Left paravertebral thoracic tenderness, no spinal point tenderness  Extremities: No cyanosis or clubbing. No edema  Skin:     No rashes and normal coloration. Warm and dry. Neuro:  CN II-XII grossly intact. Sensation intact. A&Ox3  Psych:  Normal mood and affect. I have reviewed ordered lab tests and independently visualized imaging below:    Last 24hr Labs:  Recent Results (from the past 24 hour(s))   EKG, 12 LEAD, INITIAL    Collection Time: 01/10/22  5:51 AM   Result Value Ref Range    Ventricular Rate 84 BPM    Atrial Rate 84 BPM    P-R Interval 180 ms    QRS Duration 70 ms    Q-T Interval 350 ms    QTC Calculation (Bezet) 413 ms    Calculated P Axis 73 degrees    Calculated R Axis 119 degrees    Calculated T Axis 106 degrees    Diagnosis       Normal sinus rhythm  Right axis deviation  Nonspecific ST and T wave abnormality  Abnormal ECG  When compared with ECG of 22-NOV-2021 14:36,  Nonspecific T wave abnormality now evident in Inferior leads     CBC WITH AUTOMATED DIFF    Collection Time: 01/10/22  6:03 AM   Result Value Ref Range    WBC 5.5 4.3 - 11.1 K/uL    RBC 2.66 (L) 4.05 - 5.2 M/uL    HGB 8.9 (L) 11.7 - 15.4 g/dL    HCT 26.2 (L) 35.8 - 46.3 %    MCV 98.5 (H) 79.6 - 97.8 FL    MCH 33.5 (H) 26.1 - 32.9 PG    MCHC 34.0 31.4 - 35.0 g/dL    RDW 18.4 (H) 11.9 - 14.6 %    PLATELET 769 (L) 484 - 450 K/uL    MPV 11.5 9.4 - 12.3 FL    ABSOLUTE NRBC 0.00 0.0 - 0.2 K/uL    DF AUTOMATED      NEUTROPHILS 78 43 - 78 %    LYMPHOCYTES 12 (L) 13 - 44 %    MONOCYTES 7 4.0 - 12.0 %    EOSINOPHILS 1 0.5 - 7.8 %    BASOPHILS 1 0.0 - 2.0 %    IMMATURE GRANULOCYTES 1 0.0 - 5.0 %    ABS. NEUTROPHILS 4.3 1.7 - 8.2 K/UL    ABS. LYMPHOCYTES 0.7 0.5 - 4.6 K/UL    ABS.  MONOCYTES 0.4 0.1 - 1.3 K/UL ABS. EOSINOPHILS 0.0 0.0 - 0.8 K/UL    ABS. BASOPHILS 0.1 0.0 - 0.2 K/UL    ABS. IMM. GRANS. 0.1 0.0 - 0.5 K/UL   METABOLIC PANEL, COMPREHENSIVE    Collection Time: 01/10/22  6:03 AM   Result Value Ref Range    Sodium 138 136 - 145 mmol/L    Potassium 3.8 3.5 - 5.1 mmol/L    Chloride 99 98 - 107 mmol/L    CO2 28 21 - 32 mmol/L    Anion gap 11 7 - 16 mmol/L    Glucose 121 (H) 65 - 100 mg/dL    BUN 33 (H) 8 - 23 MG/DL    Creatinine 6.44 (H) 0.6 - 1.0 MG/DL    GFR est AA 8 (L) >60 ml/min/1.73m2    GFR est non-AA 7 (L) >60 ml/min/1.73m2    Calcium 9.1 8.3 - 10.4 MG/DL    Bilirubin, total 1.5 (H) 0.2 - 1.1 MG/DL    ALT (SGPT) 12 12 - 65 U/L    AST (SGOT) 16 15 - 37 U/L    Alk. phosphatase 77 50 - 136 U/L    Protein, total 7.8 6.3 - 8.2 g/dL    Albumin 4.2 3.2 - 4.6 g/dL    Globulin 3.6 (H) 2.3 - 3.5 g/dL    A-G Ratio 1.2 1.2 - 3.5     MAGNESIUM    Collection Time: 01/10/22  6:03 AM   Result Value Ref Range    Magnesium 2.3 1.8 - 2.4 mg/dL   LIPASE    Collection Time: 01/10/22  6:03 AM   Result Value Ref Range    Lipase 247 73 - 393 U/L       All Micro Results     None          Other Studies:  CT CHEST WO CONT    Result Date: 1/10/2022  EXAMINATION: CT CHEST WO CONT 1/10/2022 7:27 AM ACCESSION NUMBER: [id number] COMPARISON: November 22, 2021, November 12, 2021 INDICATION: 2cm right sided paratracheal mass TECHNIQUE: Multiple contiguous axial CT images of the chest were obtained from the lung apices to the lung bases without intravenous contrast. Coronal reformats are provided. Radiation dose reduction techniques were used for this study. Our CT scanners use one or all of the following: Automated exposure control, adjustment of the mA and/or kV according to patient size, iterative reconstruction. FINDINGS: Lungs: Bilateral pleural effusions with subjacent atelectasis. Mediastinum: Limitations due to no contrast. Hilar fullness. Suggestion of adenopathy with prominent right paratracheal node measuring 2.6 cm short axis. Visualized upper abdomen: Ascites. The visualized portions of the liver and adrenal glands are normal. Left nephrectomy. Osseous structures: No suspicious lytic or blastic bony lesions. 1. Bilateral pleural effusions with subjacent atelectasis. 2.  The previously reported right paratracheal mass/lymph node is slightly larger. Medications Administered     HYDROmorphone (PF) (DILAUDID) injection 0.5 mg     Admin Date  01/10/2022 Action  Given Dose  0.5 mg Route  IntraMUSCular Administered By  Maralee Litten, RN          morphine injection 4 mg     Admin Date  01/10/2022 Action  Given Dose  4 mg Route  IntraMUSCular Administered By  Maralee Litten, RN                Signed:  Mya Delvalle MD    Part of this note may have been written by using a voice dictation software. The note has been proof read but may still contain some grammatical/other typographical errors.

## 2022-01-11 ENCOUNTER — APPOINTMENT (OUTPATIENT)
Dept: GENERAL RADIOLOGY | Age: 72
DRG: 177 | End: 2022-01-11
Payer: MEDICARE

## 2022-01-11 LAB
ANION GAP SERPL CALC-SCNC: 9 MMOL/L (ref 7–16)
BASOPHILS # BLD: 0.1 K/UL (ref 0–0.2)
BASOPHILS NFR BLD: 2 % (ref 0–2)
BUN SERPL-MCNC: 39 MG/DL (ref 8–23)
CALCIUM SERPL-MCNC: 8.3 MG/DL (ref 8.3–10.4)
CHLORIDE SERPL-SCNC: 102 MMOL/L (ref 98–107)
CO2 SERPL-SCNC: 28 MMOL/L (ref 21–32)
CREAT SERPL-MCNC: 7.54 MG/DL (ref 0.6–1)
DIFFERENTIAL METHOD BLD: ABNORMAL
EOSINOPHIL # BLD: 0.2 K/UL (ref 0–0.8)
EOSINOPHIL NFR BLD: 4 % (ref 0.5–7.8)
ERYTHROCYTE [DISTWIDTH] IN BLOOD BY AUTOMATED COUNT: 17.7 % (ref 11.9–14.6)
GLUCOSE BLD STRIP.AUTO-MCNC: 101 MG/DL (ref 65–100)
GLUCOSE BLD STRIP.AUTO-MCNC: 128 MG/DL (ref 65–100)
GLUCOSE BLD STRIP.AUTO-MCNC: 164 MG/DL (ref 65–100)
GLUCOSE SERPL-MCNC: 85 MG/DL (ref 65–100)
HCT VFR BLD AUTO: 22.8 % (ref 35.8–46.3)
HGB BLD-MCNC: 8 G/DL (ref 11.7–15.4)
IMM GRANULOCYTES # BLD AUTO: 0 K/UL (ref 0–0.5)
IMM GRANULOCYTES NFR BLD AUTO: 0 % (ref 0–5)
LYMPHOCYTES # BLD: 0.9 K/UL (ref 0.5–4.6)
LYMPHOCYTES NFR BLD: 20 % (ref 13–44)
MAGNESIUM SERPL-MCNC: 3.2 MG/DL (ref 1.8–2.4)
MCH RBC QN AUTO: 32.7 PG (ref 26.1–32.9)
MCHC RBC AUTO-ENTMCNC: 35.1 G/DL (ref 31.4–35)
MCV RBC AUTO: 93.1 FL (ref 79.6–97.8)
MONOCYTES # BLD: 0.4 K/UL (ref 0.1–1.3)
MONOCYTES NFR BLD: 9 % (ref 4–12)
NEUTS SEG # BLD: 2.9 K/UL (ref 1.7–8.2)
NEUTS SEG NFR BLD: 65 % (ref 43–78)
NRBC # BLD: 0 K/UL (ref 0–0.2)
PLATELET # BLD AUTO: 91 K/UL (ref 150–450)
PMV BLD AUTO: 11 FL (ref 9.4–12.3)
POTASSIUM SERPL-SCNC: 4.2 MMOL/L (ref 3.5–5.1)
RBC # BLD AUTO: 2.45 M/UL (ref 4.05–5.2)
SERVICE CMNT-IMP: ABNORMAL
SODIUM SERPL-SCNC: 139 MMOL/L (ref 136–145)
WBC # BLD AUTO: 4.5 K/UL (ref 4.3–11.1)

## 2022-01-11 PROCEDURE — 72072 X-RAY EXAM THORAC SPINE 3VWS: CPT

## 2022-01-11 PROCEDURE — 90935 HEMODIALYSIS ONE EVALUATION: CPT

## 2022-01-11 PROCEDURE — 83735 ASSAY OF MAGNESIUM: CPT

## 2022-01-11 PROCEDURE — APPNB30 APP NON BILLABLE TIME 0-30 MINS: Performed by: NURSE PRACTITIONER

## 2022-01-11 PROCEDURE — 74011000250 HC RX REV CODE- 250: Performed by: FAMILY MEDICINE

## 2022-01-11 PROCEDURE — 36415 COLL VENOUS BLD VENIPUNCTURE: CPT

## 2022-01-11 PROCEDURE — 74011250636 HC RX REV CODE- 250/636: Performed by: FAMILY MEDICINE

## 2022-01-11 PROCEDURE — 85025 COMPLETE CBC W/AUTO DIFF WBC: CPT

## 2022-01-11 PROCEDURE — 82962 GLUCOSE BLOOD TEST: CPT

## 2022-01-11 PROCEDURE — 96376 TX/PRO/DX INJ SAME DRUG ADON: CPT

## 2022-01-11 PROCEDURE — G0378 HOSPITAL OBSERVATION PER HR: HCPCS

## 2022-01-11 PROCEDURE — 74011636637 HC RX REV CODE- 636/637: Performed by: STUDENT IN AN ORGANIZED HEALTH CARE EDUCATION/TRAINING PROGRAM

## 2022-01-11 PROCEDURE — 96372 THER/PROPH/DIAG INJ SC/IM: CPT

## 2022-01-11 PROCEDURE — 80048 BASIC METABOLIC PNL TOTAL CA: CPT

## 2022-01-11 PROCEDURE — G0257 UNSCHED DIALYSIS ESRD PT HOS: HCPCS

## 2022-01-11 PROCEDURE — 74011250637 HC RX REV CODE- 250/637: Performed by: FAMILY MEDICINE

## 2022-01-11 RX ADMIN — HYDROMORPHONE HYDROCHLORIDE 0.2 MG: 1 INJECTION, SOLUTION INTRAMUSCULAR; INTRAVENOUS; SUBCUTANEOUS at 14:47

## 2022-01-11 RX ADMIN — SEVELAMER CARBONATE 800 MG: 800 TABLET, FILM COATED ORAL at 12:09

## 2022-01-11 RX ADMIN — HYDROMORPHONE HYDROCHLORIDE 0.2 MG: 1 INJECTION, SOLUTION INTRAMUSCULAR; INTRAVENOUS; SUBCUTANEOUS at 23:13

## 2022-01-11 RX ADMIN — PANTOPRAZOLE SODIUM 40 MG: 40 TABLET, DELAYED RELEASE ORAL at 05:23

## 2022-01-11 RX ADMIN — HEPARIN SODIUM 5000 UNITS: 5000 INJECTION INTRAVENOUS; SUBCUTANEOUS at 05:23

## 2022-01-11 RX ADMIN — METHOCARBAMOL TABLETS 1500 MG: 750 TABLET, COATED ORAL at 12:09

## 2022-01-11 RX ADMIN — HEPARIN SODIUM 5000 UNITS: 5000 INJECTION INTRAVENOUS; SUBCUTANEOUS at 13:36

## 2022-01-11 RX ADMIN — SODIUM CHLORIDE, PRESERVATIVE FREE 10 ML: 5 INJECTION INTRAVENOUS at 21:51

## 2022-01-11 RX ADMIN — HEPARIN SODIUM 5000 UNITS: 5000 INJECTION INTRAVENOUS; SUBCUTANEOUS at 21:46

## 2022-01-11 RX ADMIN — SODIUM CHLORIDE, PRESERVATIVE FREE 10 ML: 5 INJECTION INTRAVENOUS at 05:23

## 2022-01-11 RX ADMIN — METHOCARBAMOL TABLETS 1500 MG: 750 TABLET, COATED ORAL at 17:48

## 2022-01-11 RX ADMIN — GABAPENTIN 100 MG: 100 CAPSULE ORAL at 12:09

## 2022-01-11 RX ADMIN — MINOXIDIL 10 MG: 10 TABLET ORAL at 12:09

## 2022-01-11 RX ADMIN — GABAPENTIN 100 MG: 100 CAPSULE ORAL at 16:00

## 2022-01-11 RX ADMIN — SEVELAMER CARBONATE 800 MG: 800 TABLET, FILM COATED ORAL at 16:01

## 2022-01-11 RX ADMIN — GABAPENTIN 100 MG: 100 CAPSULE ORAL at 21:46

## 2022-01-11 RX ADMIN — METHOCARBAMOL TABLETS 1500 MG: 750 TABLET, COATED ORAL at 21:46

## 2022-01-11 RX ADMIN — Medication 2 UNITS: at 16:00

## 2022-01-11 NOTE — ROUTINE PROCESS
Verbal bedside report given to [facility], oncoming RN. Patient's situation, background, assessment and recommendations provided. Opportunity for questions provided. Oncoming RN assumed care of patient.

## 2022-01-11 NOTE — DIALYSIS
TRANSFER IN - DIALYSIS    Received patient in dialysis unit from [address] [place] [address] (unit) for ordered procedure. Consent verified for renal replacement therapy. Patient alert and vital signs stable. /57 P72  3L  O2  via nasal canula. Hemodialysis initiated using left AVG and 15 g needles. Machine settings per MD order. Will monitor during treatment.

## 2022-01-11 NOTE — PROGRESS NOTES
Hospitalist Progress Note   Admit Date:  1/10/2022  6:06 AM   Name:  Terra Fischer   Age:  80 y.o. Sex:  female  :  1950   MRN:  [id number]   Room:  [facility]    Presenting Complaint: Back Pain    Reason(s) for Admission: Mediastinal mass [facility] Course & Interval History:   Bertin Briceño is a 80 y.o. female with medical history of ESRD on hemodialysis, recurrent renal cell carcinoma s/p left nephrectomy  with recurrence and s/p course localized XRT to left renal bed who presented with severe sharp left-sided thoracic back pain. Patient recently admitted in November left flank pain and treated empirically with abx until acute infectious process ruled out. Pt had a CT thoracic spine which did not reveal acute fx, no aggressive bony lesions though b/l lung patchy infiltrative processes (pneumonitis / atypical PNA / pulm edema). Of significance pt did have a CT C/A to eval for potential aortic dissection given significant pain on 2021 which did reveal b/l small pleural effusions, enlarging right paratracheal lymph node (2.0 cm from previous 12mm) and small b/l hilar lymph nodes, no aortic dissection. She does follow with hem/onc routinely for underlying RCC hx. In the ER, pt had a repeat CT chest which revealed b/l pleural effusions and an increase in size of right paratracheal lymph node now 2.6cm in size as compared to Nov CT chest which measured lymph node 2.0cm. Pt admits to having missed her Monday HD session due to pain (she has been on HD x 15 years, M/W/F). She denies chest pain, palpitation, shortness of breath, nausea, vomiting, fevers or chills. Her VS / labs were stable; Hospitalist service consulted for inpatient admission for persistent back pain and enlarging mediastinal lymph node with acute hypoxic resp failure. Subjective (22): \"My pain is better now with the pain medicines. \"  Pleasant 71y.o. AA female at HD, without new complaints.   She is currently on 2L via NC     Review of Systems:  10 systems reviewed and negative except as noted in HPI. Assessment & Plan:     Principal Problem:    Mediastinal mass (1/10/2022)  - need to rule out malignant etiology as pt with hx of recurrent RCC  - appreciate hem/onc consult note, recommends pulmonary eval for thoracentesis / cytology +/- EBUS (this can be completed outpatient); pulmonary consult pending    Active Problems:    Acute hypoxic respiratory failure  - O2 85% on RA noted in ER, improved to 96% on 2L  - suspect etiology is volume overload and pleural effusions as pt missed HD session  - volume mgmt via HD; weaning supplemental O2 as tolerated      Thoracic / upper back pain  - 11/2021 CT spine without aggressive lesions noted, no fx  - pt denies fall / trauma  - repeat thoracic spine xray   - PT eval; prn analgesics      B/l pleural effusions  - suspect volume overload though with increased mediastinal lymph node, malignant effusion needs to be ruled out  - pulm consult; defer to pulmonary team if enough fluids for thoracentesis      ESRD (end stage renal disease)  - missed HD session yesterday  - HD today  - appreciate nephrology's assistance      HTN (hypertension)  - acceptable control; one episode of hypotensive noted with resolution  - monitor closely      Hx of recurrent renal cell carcinoma  - s/p left nephrectomy 2015 with recurrence and subsequent XRT to left renal bed  - mgmt per hem/onc      Anemia chronic disease  - hgb dropped to 8.0 this AM; no gross bleeding; cont to closely monitor  - check iron levels      Diabetes mellitus   - on januvia at home  - hgbA1c 5.3 on 11/13/2021  - sliding scale coverage as needed; low-carb diet       Dispo/Discharge Planning:    - anticipate at least 2 midnight hospitalization    Diet:  ADULT DIET Regular; 3 carb choices (45 gm/meal); Low Fat/Low Chol/High Fiber/2 gm Na; Low Potassium (Less than 3000 mg/day);  Low Phosphorus (Less than 1000 mg)  DVT PPx: hep sc  Code status: Full Code    Hospital Problems as of 1/11/2022 Date Reviewed: 7/27/2021          Codes Class Noted - Resolved POA    * (Principal) Mediastinal mass ICD-10-CM: J98.59  ICD-9-CM: 786.6  1/10/2022 - Present Unknown        Severe back pain ICD-10-CM: M54.9  ICD-9-CM: 724.5  11/12/2021 - Present Yes        Abdominal mass ICD-10-CM: R19.00  ICD-9-CM: 789.30  9/12/2017 - Present Yes    Overview Signed 9/12/2017  9:01 PM by Pooja Gomez MD     Of Left renal bed             Renal cell carcinoma ([facility] ICD-10-CM: C64.9  ICD-9-CM: 189.0  9/12/2017 - Present Yes        ESRD (end stage renal disease) ([facility] ICD-10-CM: N18.6  ICD-9-CM: 585.6  2/7/2013 - Present Yes        HTN (hypertension) ICD-10-CM: I10  ICD-9-CM: 401.9  2/7/2013 - Present Yes              Objective:     Patient Vitals for the past 24 hrs:   Temp Pulse Resp BP SpO2   01/11/22 1236 97.8 °F (36.6 °C) 84 18 (!) 124/57 96 %   01/11/22 1115 -- 74 -- (!) 119/50 --   01/11/22 1100 -- 72 -- (!) 144/46 --   01/11/22 1038 -- 64 -- (!) 71/49 --   01/11/22 1003 -- 73 -- (!) 123/49 --   01/11/22 0930 -- 71 -- (!) 113/53 --   01/11/22 0900 -- 72 -- (!) 118/53 --   01/11/22 0830 -- 72 -- (!) 138/56 --   01/11/22 0803 -- 70 -- (!) 111/54 --   01/11/22 0728 -- 74 -- (!) 117/57 --   01/11/22 0447 98.2 °F (36.8 °C) 67 18 (!) 107/56 100 %   01/11/22 0006 98 °F (36.7 °C) 81 18 (!) 90/52 98 %   01/10/22 2203 -- -- -- (!) 97/52 --   01/10/22 2027 98.3 °F (36.8 °C) 84 16 (!) 74/44 99 %   01/10/22 1535 98.2 °F (36.8 °C) 84 18 (!) 103/53 96 %     Oxygen Therapy  O2 Sat (%): 96 % (01/11/22 1236)  Pulse via Oximetry: 80 beats per minute (01/10/22 1215)  O2 Device: Nasal cannula (01/11/22 1200)  O2 Flow Rate (L/min): 2 l/min (01/11/22 1200)    Estimated body mass index is 29.17 kg/m² as calculated from the following:    Height as of this encounter: 5' 5.5\" (1.664 m). Weight as of this encounter: 80.7 kg (178 lb).     Intake/Output Summary (Last 24 hours) at 1/11/2022 1436  Last data filed at 1/11/2022 1115  Gross per 24 hour   Intake 300 ml   Output 2700 ml   Net -2400 ml         Physical Exam:     Blood pressure (!) 124/57, pulse 84, temperature 97.8 °F (36.6 °C), resp. rate 18, height 5' 5.5\" (1.664 m), weight 80.7 kg (178 lb), SpO2 96 %. General:    Well nourished. No overt distress  Head:  Normocephalic, atraumatic  Eyes:  Sclerae appear normal.  Pupils equally round. ENT:  Nares appear normal, no drainage. Moist oral mucosa  Neck:  No restricted ROM. Trachea midline   CV:   RRR. No m/r/g. No jugular venous distension. Lungs:   Decreased b/l basilar breath sounds without gross wheezing / rhonchi, no accessory muscles used  Abdomen: Bowel sounds present. Soft, nontender, nondistended. Extremities: + ROM b/l LE without tenderness elicited; dependent b/l LE edema; good bruit TRACEY AVF  Skin:     No rashes and normal coloration. Warm and dry. Neuro:  CN II-XII grossly intact. Sensation intact. A&Ox3  Psych:  Normal mood and affect.       I have reviewed ordered lab tests and independently visualized imaging below:    Recent Labs:  Recent Results (from the past 48 hour(s))   EKG, 12 LEAD, INITIAL    Collection Time: 01/10/22  5:51 AM   Result Value Ref Range    Ventricular Rate 84 BPM    Atrial Rate 84 BPM    P-R Interval 180 ms    QRS Duration 70 ms    Q-T Interval 350 ms    QTC Calculation (Bezet) 413 ms    Calculated P Axis 73 degrees    Calculated R Axis 119 degrees    Calculated T Axis 106 degrees    Diagnosis       Normal sinus rhythm  Right axis deviation  Nonspecific ST and T wave abnormality  Abnormal ECG  When compared with ECG of 22-NOV-2021 14:36,  Nonspecific T wave abnormality now evident in Inferior leads  Confirmed by Robbi Parr MD (), MERT ([id number]) on 1/10/2022 12:23:59 PM     CBC WITH AUTOMATED DIFF    Collection Time: 01/10/22  6:03 AM   Result Value Ref Range    WBC 5.5 4.3 - 11.1 K/uL    RBC 2.66 (L) 4.05 - 5.2 M/uL    HGB 8.9 (L) 11.7 - 15.4 g/dL    HCT 26.2 (L) 35.8 - 46.3 %    MCV 98.5 (H) 79.6 - 97.8 FL    MCH 33.5 (H) 26.1 - 32.9 PG    MCHC 34.0 31.4 - 35.0 g/dL    RDW 18.4 (H) 11.9 - 14.6 %    PLATELET 203 (L) 696 - 450 K/uL    MPV 11.5 9.4 - 12.3 FL    ABSOLUTE NRBC 0.00 0.0 - 0.2 K/uL    DF AUTOMATED      NEUTROPHILS 78 43 - 78 %    LYMPHOCYTES 12 (L) 13 - 44 %    MONOCYTES 7 4.0 - 12.0 %    EOSINOPHILS 1 0.5 - 7.8 %    BASOPHILS 1 0.0 - 2.0 %    IMMATURE GRANULOCYTES 1 0.0 - 5.0 %    ABS. NEUTROPHILS 4.3 1.7 - 8.2 K/UL    ABS. LYMPHOCYTES 0.7 0.5 - 4.6 K/UL    ABS. MONOCYTES 0.4 0.1 - 1.3 K/UL    ABS. EOSINOPHILS 0.0 0.0 - 0.8 K/UL    ABS. BASOPHILS 0.1 0.0 - 0.2 K/UL    ABS. IMM. GRANS. 0.1 0.0 - 0.5 K/UL   METABOLIC PANEL, COMPREHENSIVE    Collection Time: 01/10/22  6:03 AM   Result Value Ref Range    Sodium 138 136 - 145 mmol/L    Potassium 3.8 3.5 - 5.1 mmol/L    Chloride 99 98 - 107 mmol/L    CO2 28 21 - 32 mmol/L    Anion gap 11 7 - 16 mmol/L    Glucose 121 (H) 65 - 100 mg/dL    BUN 33 (H) 8 - 23 MG/DL    Creatinine 6.44 (H) 0.6 - 1.0 MG/DL    GFR est AA 8 (L) >60 ml/min/1.73m2    GFR est non-AA 7 (L) >60 ml/min/1.73m2    Calcium 9.1 8.3 - 10.4 MG/DL    Bilirubin, total 1.5 (H) 0.2 - 1.1 MG/DL    ALT (SGPT) 12 12 - 65 U/L    AST (SGOT) 16 15 - 37 U/L    Alk.  phosphatase 77 50 - 136 U/L    Protein, total 7.8 6.3 - 8.2 g/dL    Albumin 4.2 3.2 - 4.6 g/dL    Globulin 3.6 (H) 2.3 - 3.5 g/dL    A-G Ratio 1.2 1.2 - 3.5     MAGNESIUM    Collection Time: 01/10/22  6:03 AM   Result Value Ref Range    Magnesium 2.3 1.8 - 2.4 mg/dL   LIPASE    Collection Time: 01/10/22  6:03 AM   Result Value Ref Range    Lipase 247 73 - 393 U/L   GLUCOSE, POC    Collection Time: 01/10/22  6:30 PM   Result Value Ref Range    Glucose (POC) 141 (H) 65 - 100 mg/dL    Performed by [facility]    GLUCOSE, POC    Collection Time: 01/10/22  8:59 PM   Result Value Ref Range    Glucose (POC) 125 (H) 65 - 100 mg/dL    Performed by Douglas    METABOLIC PANEL, BASIC    Collection Time: 01/11/22  3:33 AM   Result Value Ref Range    Sodium 139 136 - 145 mmol/L    Potassium 4.2 3.5 - 5.1 mmol/L    Chloride 102 98 - 107 mmol/L    CO2 28 21 - 32 mmol/L    Anion gap 9 7 - 16 mmol/L    Glucose 85 65 - 100 mg/dL    BUN 39 (H) 8 - 23 MG/DL    Creatinine 7.54 (H) 0.6 - 1.0 MG/DL    GFR est AA 7 (L) >60 ml/min/1.73m2    GFR est non-AA 6 (L) >60 ml/min/1.73m2    Calcium 8.3 8.3 - 10.4 MG/DL   MAGNESIUM    Collection Time: 01/11/22  3:33 AM   Result Value Ref Range    Magnesium 3.2 (H) 1.8 - 2.4 mg/dL   CBC WITH AUTOMATED DIFF    Collection Time: 01/11/22  3:33 AM   Result Value Ref Range    WBC 4.5 4.3 - 11.1 K/uL    RBC 2.45 (L) 4.05 - 5.2 M/uL    HGB 8.0 (L) 11.7 - 15.4 g/dL    HCT 22.8 (L) 35.8 - 46.3 %    MCV 93.1 79.6 - 97.8 FL    MCH 32.7 26.1 - 32.9 PG    MCHC 35.1 (H) 31.4 - 35.0 g/dL    RDW 17.7 (H) 11.9 - 14.6 %    PLATELET 91 (L) 687 - 450 K/uL    MPV 11.0 9.4 - 12.3 FL    ABSOLUTE NRBC 0.00 0.0 - 0.2 K/uL    DF AUTOMATED      NEUTROPHILS 65 43 - 78 %    LYMPHOCYTES 20 13 - 44 %    MONOCYTES 9 4.0 - 12.0 %    EOSINOPHILS 4 0.5 - 7.8 %    BASOPHILS 2 0.0 - 2.0 %    IMMATURE GRANULOCYTES 0 0.0 - 5.0 %    ABS. NEUTROPHILS 2.9 1.7 - 8.2 K/UL    ABS. LYMPHOCYTES 0.9 0.5 - 4.6 K/UL    ABS. MONOCYTES 0.4 0.1 - 1.3 K/UL    ABS. EOSINOPHILS 0.2 0.0 - 0.8 K/UL    ABS. BASOPHILS 0.1 0.0 - 0.2 K/UL    ABS. IMM. GRANS. 0.0 0.0 - 0.5 K/UL   GLUCOSE, POC    Collection Time: 01/11/22 10:10 AM   Result Value Ref Range    Glucose (POC) 101 (H) 65 - 100 mg/dL    Performed by [email or web address]        All Micro Results     None          Other Studies:  No results found.     Current Meds:  Current Facility-Administered Medications   Medication Dose Route Frequency    amLODIPine (NORVASC) tablet 10 mg  10 mg Oral QHS    gabapentin (NEURONTIN) capsule 100 mg  100 mg Oral TID    lisinopriL (PRINIVIL, ZESTRIL) tablet 40 mg  40 mg Oral QHS    methocarbamoL (ROBAXIN) tablet 1,500 mg  1,500 mg Oral QID    minoxidiL (LONITEN) tablet 10 mg  10 mg Oral BID    pantoprazole (PROTONIX) tablet 40 mg  40 mg Oral ACB    sevelamer carbonate (RENVELA) tab 800 mg  800 mg Oral TID WITH MEALS    sodium chloride (NS) flush 5-40 mL  5-40 mL IntraVENous Q8H    sodium chloride (NS) flush 5-40 mL  5-40 mL IntraVENous PRN    acetaminophen (TYLENOL) tablet 650 mg  650 mg Oral Q6H PRN    Or    acetaminophen (TYLENOL) suppository 650 mg  650 mg Rectal Q6H PRN    polyethylene glycol (MIRALAX) packet 17 g  17 g Oral DAILY PRN    ondansetron (ZOFRAN ODT) tablet 4 mg  4 mg Oral Q8H PRN    Or    ondansetron (ZOFRAN) injection 4 mg  4 mg IntraVENous Q6H PRN    heparin (porcine) injection 5,000 Units  5,000 Units SubCUTAneous Q8H    HYDROcodone-acetaminophen (NORCO) 7.5-325 mg per tablet 1 Tablet  1 Tablet Oral Q6H PRN    HYDROmorphone (DILAUDID) injection 0.2 mg  0.2 mg IntraVENous Q6H PRN    heparin (porcine) 1,000 unit/mL injection 5,000 Units  5,000 Units Hemodialysis DIALYSIS PRN    insulin lispro (HUMALOG) injection   SubCUTAneous AC&HS       Signed:  ABBY Dillon    Part of this note may have been written by using a voice dictation software. The note has been proof read but may still contain some grammatical/other typographical errors.

## 2022-01-11 NOTE — ROUTINE PROCESS
Verbal bedside report received from Albert, [facility]. Assumed care of patient.

## 2022-01-11 NOTE — PROGRESS NOTES
Hemodialysis Rounding Note    Subjective: Lucía Carbone is a 80 y.o.  who presents with Mediastinal mass [J98.59]. The patient is dialyzing utilizing the following method:Intermittent Hemodialysis  Artificial Kidney Dialyzer/Set Up Inspection: [employer]   hours Duration of Treatment (hours): 4 hours   blood flow rate Blood Flow Rate (ml/min): 450 ml/min   dialysate rate     ultrafiltration Goal/Amount of Fluid to Remove (mL): 3400 mL   Heparin is used during the dialysis treatment. Complaints none.      Current Facility-Administered Medications   Medication Dose Route Frequency    amLODIPine (NORVASC) tablet 10 mg  10 mg Oral QHS    gabapentin (NEURONTIN) capsule 100 mg  100 mg Oral TID    lisinopriL (PRINIVIL, ZESTRIL) tablet 40 mg  40 mg Oral QHS    methocarbamoL (ROBAXIN) tablet 1,500 mg  1,500 mg Oral QID    minoxidiL (LONITEN) tablet 10 mg  10 mg Oral BID    pantoprazole (PROTONIX) tablet 40 mg  40 mg Oral ACB    sevelamer carbonate (RENVELA) tab 800 mg  800 mg Oral TID WITH MEALS    sodium chloride (NS) flush 5-40 mL  5-40 mL IntraVENous Q8H    sodium chloride (NS) flush 5-40 mL  5-40 mL IntraVENous PRN    acetaminophen (TYLENOL) tablet 650 mg  650 mg Oral Q6H PRN    Or    acetaminophen (TYLENOL) suppository 650 mg  650 mg Rectal Q6H PRN    polyethylene glycol (MIRALAX) packet 17 g  17 g Oral DAILY PRN    ondansetron (ZOFRAN ODT) tablet 4 mg  4 mg Oral Q8H PRN    Or    ondansetron (ZOFRAN) injection 4 mg  4 mg IntraVENous Q6H PRN    heparin (porcine) injection 5,000 Units  5,000 Units SubCUTAneous Q8H    HYDROcodone-acetaminophen (NORCO) 7.5-325 mg per tablet 1 Tablet  1 Tablet Oral Q6H PRN    HYDROmorphone (DILAUDID) injection 0.2 mg  0.2 mg IntraVENous Q6H PRN    heparin (porcine) 1,000 unit/mL injection 5,000 Units  5,000 Units Hemodialysis DIALYSIS PRN    insulin lispro (HUMALOG) injection   SubCUTAneous AC&HS       01/11 0701 - 01/11 4240  In: -   Out: 5911 01/09 1901 - 01/11 0700  In: 360 [P.O.:110; I.V.:250]  Out: 0     Recent Results (from the past 24 hour(s))   GLUCOSE, POC    Collection Time: 01/10/22  6:30 PM   Result Value Ref Range    Glucose (POC) 141 (H) 65 - 100 mg/dL    Performed by [employer]    GLUCOSE, POC    Collection Time: 01/10/22  8:59 PM   Result Value Ref Range    Glucose (POC) 125 (H) 65 - 100 mg/dL    Performed by [facility]    METABOLIC PANEL, BASIC    Collection Time: 01/11/22  3:33 AM   Result Value Ref Range    Sodium 139 136 - 145 mmol/L    Potassium 4.2 3.5 - 5.1 mmol/L    Chloride 102 98 - 107 mmol/L    CO2 28 21 - 32 mmol/L    Anion gap 9 7 - 16 mmol/L    Glucose 85 65 - 100 mg/dL    BUN 39 (H) 8 - 23 MG/DL    Creatinine 7.54 (H) 0.6 - 1.0 MG/DL    GFR est AA 7 (L) >60 ml/min/1.73m2    GFR est non-AA 6 (L) >60 ml/min/1.73m2    Calcium 8.3 8.3 - 10.4 MG/DL   MAGNESIUM    Collection Time: 01/11/22  3:33 AM   Result Value Ref Range    Magnesium 3.2 (H) 1.8 - 2.4 mg/dL   CBC WITH AUTOMATED DIFF    Collection Time: 01/11/22  3:33 AM   Result Value Ref Range    WBC 4.5 4.3 - 11.1 K/uL    RBC 2.45 (L) 4.05 - 5.2 M/uL    HGB 8.0 (L) 11.7 - 15.4 g/dL    HCT 22.8 (L) 35.8 - 46.3 %    MCV 93.1 79.6 - 97.8 FL    MCH 32.7 26.1 - 32.9 PG    MCHC 35.1 (H) 31.4 - 35.0 g/dL    RDW 17.7 (H) 11.9 - 14.6 %    PLATELET 91 (L) 709 - 450 K/uL    MPV 11.0 9.4 - 12.3 FL    ABSOLUTE NRBC 0.00 0.0 - 0.2 K/uL    DF AUTOMATED      NEUTROPHILS 65 43 - 78 %    LYMPHOCYTES 20 13 - 44 %    MONOCYTES 9 4.0 - 12.0 %    EOSINOPHILS 4 0.5 - 7.8 %    BASOPHILS 2 0.0 - 2.0 %    IMMATURE GRANULOCYTES 0 0.0 - 5.0 %    ABS. NEUTROPHILS 2.9 1.7 - 8.2 K/UL    ABS. LYMPHOCYTES 0.9 0.5 - 4.6 K/UL    ABS. MONOCYTES 0.4 0.1 - 1.3 K/UL    ABS. EOSINOPHILS 0.2 0.0 - 0.8 K/UL    ABS. BASOPHILS 0.1 0.0 - 0.2 K/UL    ABS. IMM.  GRANS. 0.0 0.0 - 0.5 K/UL   GLUCOSE, POC    Collection Time: 01/11/22 10:10 AM   Result Value Ref Range    Glucose (POC) 101 (H) 65 - 100 mg/dL Performed by [facility]          Review of Systems  A comprehensive review of systems was negative except for that written in the HPI. .    Objective:     Blood pressure (!) 119/50, pulse 74, temperature 98.2 °F (36.8 °C), resp. rate 18, height 5' 5.5\" (1.664 m), weight 80.7 kg (178 lb), SpO2 100 %. Temp (24hrs), Av.1 °F (36.7 °C), Min:97.9 °F (36.6 °C), Max:98.3 °F (36.8 °C)      Physical Exam:     General: alert/oriented  Lungs: CTAB  Heart: S1S2, no m/r/g  Abdomen: soft,  Positive BS  Extremities: no edema        Assessment:     End Stage Renal Disease:  Patient is tolerating dialysis treatment well. .  Additionally the patient has experienced normal dialysis treatment during dialysis. Dry weight   same. Anemia:    Recent Labs     22  0333   HCT 22.8*   HGB 8.0*       Renal Metabolic Bone Disease:    Recent Labs     22  0333 01/10/22  0603 01/10/22  0603   CA 8.3   < > 9.1   ALB  --   --  4.2    < > = values in this interval not displayed.                         Treatment:  PO4 binders, Cinacaleat, Vitamin D  Hypertension: controlled    Access: adequate monitoring/no changes    Principal Problem:    Mediastinal mass (1/10/2022)    Active Problems:    ESRD (end stage renal disease) ([facility] (2013)      HTN (hypertension) (2013)      Abdominal mass (2017)      Overview: Of Left renal bed      Renal cell carcinoma ([address] (2017)      Severe back pain (2021)           Plan:     Continue scheduled dialysis     CALIXTO Troncoso

## 2022-01-11 NOTE — DIALYSIS
Dialysis treatment completed at 200. Pt tolerated well and 2.7 Kgs removed. Two 15 ga needles removed from access and manual pressure held until hemostasis complete and pressure dressing applied. Pt noted alert and oriented. Vital signs HR=74, WN=930/50. Pt to ER overflow after dialysis completed.

## 2022-01-11 NOTE — PROGRESS NOTES
RASHAAD Baumann MD notified on pt's BP:77/44 & HR: 89.   Pt did run dialysis today, MD made aware of that. Orders: Bolus 250cc & hold evening BP meds. Will continue to monitor.

## 2022-01-12 PROBLEM — J90 BILATERAL PLEURAL EFFUSION: Status: ACTIVE | Noted: 2022-01-12

## 2022-01-12 LAB
APPEARANCE FLD: NORMAL
BASOPHILS # BLD: 0.1 K/UL (ref 0–0.2)
BASOPHILS NFR BLD: 1 % (ref 0–2)
COLOR FLD: YELLOW
DIFFERENTIAL METHOD BLD: ABNORMAL
EOSINOPHIL # BLD: 0.1 K/UL (ref 0–0.8)
EOSINOPHIL NFR BLD: 2 % (ref 0.5–7.8)
EOSINOPHIL NFR BRONCH MANUAL: 1 %
ERYTHROCYTE [DISTWIDTH] IN BLOOD BY AUTOMATED COUNT: 17.7 % (ref 11.9–14.6)
GLUCOSE BLD STRIP.AUTO-MCNC: 124 MG/DL (ref 65–100)
GLUCOSE BLD STRIP.AUTO-MCNC: 125 MG/DL (ref 65–100)
GLUCOSE BLD STRIP.AUTO-MCNC: 130 MG/DL (ref 65–100)
GLUCOSE BLD STRIP.AUTO-MCNC: 86 MG/DL (ref 65–100)
GLUCOSE FLD-MCNC: 123 MG/DL
HCT VFR BLD AUTO: 24.5 % (ref 35.8–46.3)
HGB BLD-MCNC: 8.5 G/DL (ref 11.7–15.4)
IMM GRANULOCYTES # BLD AUTO: 0 K/UL (ref 0–0.5)
IMM GRANULOCYTES NFR BLD AUTO: 0 % (ref 0–5)
LDH FLD L TO P-CCNC: 73 U/L
LYMPHOCYTES # BLD: 0.7 K/UL (ref 0.5–4.6)
LYMPHOCYTES NFR BLD: 14 % (ref 13–44)
LYMPHOCYTES NFR BRONCH MANUAL: 84 %
MACROPHAGES NFR BRONCH MANUAL: 9 %
MCH RBC QN AUTO: 33.6 PG (ref 26.1–32.9)
MCHC RBC AUTO-ENTMCNC: 34.7 G/DL (ref 31.4–35)
MCV RBC AUTO: 96.8 FL (ref 79.6–97.8)
MONOCYTES # BLD: 0.5 K/UL (ref 0.1–1.3)
MONOCYTES NFR BLD: 11 % (ref 4–12)
NEUTROPHILS NFR BRONCH MANUAL: 6 %
NEUTS SEG # BLD: 3.7 K/UL (ref 1.7–8.2)
NEUTS SEG NFR BLD: 73 % (ref 43–78)
NRBC # BLD: 0 K/UL (ref 0–0.2)
NUC CELL # FLD: 603 /CU MM
OTHER CELLS NFR BLD MANUAL: 39 %
PLATELET # BLD AUTO: 99 K/UL (ref 150–450)
PMV BLD AUTO: 11.9 FL (ref 9.4–12.3)
PROT FLD-MCNC: 3 G/DL
RBC # BLD AUTO: 2.53 M/UL (ref 4.05–5.2)
RBC # FLD: 3000 /CU MM
SERVICE CMNT-IMP: ABNORMAL
SERVICE CMNT-IMP: NORMAL
SPECIMEN SOURCE FLD: NORMAL
WBC # BLD AUTO: 5.1 K/UL (ref 4.3–11.1)

## 2022-01-12 PROCEDURE — 82962 GLUCOSE BLOOD TEST: CPT

## 2022-01-12 PROCEDURE — 88112 CYTOPATH CELL ENHANCE TECH: CPT

## 2022-01-12 PROCEDURE — 84157 ASSAY OF PROTEIN OTHER: CPT

## 2022-01-12 PROCEDURE — 89050 BODY FLUID CELL COUNT: CPT

## 2022-01-12 PROCEDURE — 96376 TX/PRO/DX INJ SAME DRUG ADON: CPT

## 2022-01-12 PROCEDURE — 36415 COLL VENOUS BLD VENIPUNCTURE: CPT

## 2022-01-12 PROCEDURE — 87102 FUNGUS ISOLATION CULTURE: CPT

## 2022-01-12 PROCEDURE — 99223 1ST HOSP IP/OBS HIGH 75: CPT | Performed by: INTERNAL MEDICINE

## 2022-01-12 PROCEDURE — 76604 US EXAM CHEST: CPT | Performed by: INTERNAL MEDICINE

## 2022-01-12 PROCEDURE — 87205 SMEAR GRAM STAIN: CPT

## 2022-01-12 PROCEDURE — 82945 GLUCOSE OTHER FLUID: CPT

## 2022-01-12 PROCEDURE — 96375 TX/PRO/DX INJ NEW DRUG ADDON: CPT

## 2022-01-12 PROCEDURE — 77030014147 HC TY THORCENT PARA TELE -B: Performed by: INTERNAL MEDICINE

## 2022-01-12 PROCEDURE — 65270000029 HC RM PRIVATE

## 2022-01-12 PROCEDURE — 88305 TISSUE EXAM BY PATHOLOGIST: CPT

## 2022-01-12 PROCEDURE — 74011250636 HC RX REV CODE- 250/636: Performed by: PHYSICIAN ASSISTANT

## 2022-01-12 PROCEDURE — 74011000250 HC RX REV CODE- 250: Performed by: FAMILY MEDICINE

## 2022-01-12 PROCEDURE — 74011250636 HC RX REV CODE- 250/636: Performed by: FAMILY MEDICINE

## 2022-01-12 PROCEDURE — 96372 THER/PROPH/DIAG INJ SC/IM: CPT

## 2022-01-12 PROCEDURE — 74011250636 HC RX REV CODE- 250/636: Performed by: INTERNAL MEDICINE

## 2022-01-12 PROCEDURE — 85025 COMPLETE CBC W/AUTO DIFF WBC: CPT

## 2022-01-12 PROCEDURE — 87116 MYCOBACTERIA CULTURE: CPT

## 2022-01-12 PROCEDURE — 32555 ASPIRATE PLEURA W/ IMAGING: CPT | Performed by: INTERNAL MEDICINE

## 2022-01-12 PROCEDURE — 74011250637 HC RX REV CODE- 250/637: Performed by: FAMILY MEDICINE

## 2022-01-12 PROCEDURE — 2709999900 HC NON-CHARGEABLE SUPPLY: Performed by: INTERNAL MEDICINE

## 2022-01-12 PROCEDURE — 0W993ZZ DRAINAGE OF RIGHT PLEURAL CAVITY, PERCUTANEOUS APPROACH: ICD-10-PCS | Performed by: INTERNAL MEDICINE

## 2022-01-12 PROCEDURE — APPNB30 APP NON BILLABLE TIME 0-30 MINS: Performed by: NURSE PRACTITIONER

## 2022-01-12 PROCEDURE — 83615 LACTATE (LD) (LDH) ENZYME: CPT

## 2022-01-12 PROCEDURE — 76040000019: Performed by: INTERNAL MEDICINE

## 2022-01-12 PROCEDURE — G0378 HOSPITAL OBSERVATION PER HR: HCPCS

## 2022-01-12 PROCEDURE — 5A1D70Z PERFORMANCE OF URINARY FILTRATION, INTERMITTENT, LESS THAN 6 HOURS PER DAY: ICD-10-PCS | Performed by: FAMILY MEDICINE

## 2022-01-12 RX ORDER — MORPHINE SULFATE 4 MG/ML
4 INJECTION INTRAVENOUS
Status: DISCONTINUED | OUTPATIENT
Start: 2022-01-12 | End: 2022-01-20 | Stop reason: HOSPADM

## 2022-01-12 RX ORDER — HEPARIN SODIUM 5000 [USP'U]/ML
5000 INJECTION, SOLUTION INTRAVENOUS; SUBCUTANEOUS EVERY 12 HOURS
Status: DISCONTINUED | OUTPATIENT
Start: 2022-01-12 | End: 2022-01-20 | Stop reason: HOSPADM

## 2022-01-12 RX ADMIN — EPOETIN ALFA-EPBX 40000 UNITS: 40000 INJECTION, SOLUTION INTRAVENOUS; SUBCUTANEOUS at 08:41

## 2022-01-12 RX ADMIN — METHOCARBAMOL TABLETS 1500 MG: 750 TABLET, COATED ORAL at 21:41

## 2022-01-12 RX ADMIN — GABAPENTIN 100 MG: 100 CAPSULE ORAL at 21:41

## 2022-01-12 RX ADMIN — SODIUM CHLORIDE, PRESERVATIVE FREE 10 ML: 5 INJECTION INTRAVENOUS at 06:22

## 2022-01-12 RX ADMIN — HYDROCODONE BITARTRATE AND ACETAMINOPHEN 1 TABLET: 7.5; 325 TABLET ORAL at 11:17

## 2022-01-12 RX ADMIN — MORPHINE SULFATE 4 MG: 4 INJECTION INTRAVENOUS at 20:37

## 2022-01-12 RX ADMIN — SEVELAMER CARBONATE 800 MG: 800 TABLET, FILM COATED ORAL at 11:17

## 2022-01-12 RX ADMIN — GABAPENTIN 100 MG: 100 CAPSULE ORAL at 08:41

## 2022-01-12 RX ADMIN — HEPARIN SODIUM 5000 UNITS: 5000 INJECTION INTRAVENOUS; SUBCUTANEOUS at 06:22

## 2022-01-12 RX ADMIN — SEVELAMER CARBONATE 800 MG: 800 TABLET, FILM COATED ORAL at 08:41

## 2022-01-12 RX ADMIN — SODIUM CHLORIDE, PRESERVATIVE FREE 10 ML: 5 INJECTION INTRAVENOUS at 13:21

## 2022-01-12 RX ADMIN — SEVELAMER CARBONATE 800 MG: 800 TABLET, FILM COATED ORAL at 16:50

## 2022-01-12 RX ADMIN — METHOCARBAMOL TABLETS 1500 MG: 750 TABLET, COATED ORAL at 13:21

## 2022-01-12 RX ADMIN — MORPHINE SULFATE 4 MG: 4 INJECTION INTRAVENOUS at 13:18

## 2022-01-12 RX ADMIN — HEPARIN SODIUM 5000 UNITS: 5000 INJECTION INTRAVENOUS; SUBCUTANEOUS at 17:31

## 2022-01-12 RX ADMIN — GABAPENTIN 100 MG: 100 CAPSULE ORAL at 16:49

## 2022-01-12 RX ADMIN — SODIUM CHLORIDE, PRESERVATIVE FREE 10 ML: 5 INJECTION INTRAVENOUS at 21:41

## 2022-01-12 RX ADMIN — PANTOPRAZOLE SODIUM 40 MG: 40 TABLET, DELAYED RELEASE ORAL at 06:22

## 2022-01-12 RX ADMIN — METHOCARBAMOL TABLETS 1500 MG: 750 TABLET, COATED ORAL at 08:39

## 2022-01-12 RX ADMIN — HYDROMORPHONE HYDROCHLORIDE 0.2 MG: 1 INJECTION, SOLUTION INTRAMUSCULAR; INTRAVENOUS; SUBCUTANEOUS at 06:22

## 2022-01-12 RX ADMIN — METHOCARBAMOL TABLETS 1500 MG: 750 TABLET, COATED ORAL at 17:31

## 2022-01-12 NOTE — CONSULTS
History and Physical Initial Visit NOTE           2022    Don Fischer                        Date of Admission:  1/10/2022    The patient's chart is reviewed and the patient is discussed with the staff. Subjective:     Patient is a 80 y.o.  female seen and evaluated at the request of Dr. Simuel Gosselin. She has a history of ESRD on HD, RCC s/p resection () and recurrence requiring SBRT (), DM, and HTN. She was admitted 1/10 with left sided back pain. She had a CT chest performed which showed B pleural effusions (both small, R>L) as well as a 2.5cm right paratracheal mass vs lymph node. Of note, this was also seen on multiple past CT scans but appears more prominent now. She was seen by oncology who recommended thoracentesis for cytology if possible. If non diagnostic outpatient EBUS. Consult was just brought to our attention today. Patient is currently needing 2L O2. Pt complains of ongoing pain in her back. She is tearful because of this throughout much of my encounter with her. Review of Systems  A comprehensive review of systems was negative except for that written in the HPI. Prior to Admission Medications   Prescriptions Last Dose Informant Patient Reported? Taking? VIT C/VIT E/LUTEIN/MIN/OMEGA-3 (OCUVITE PO) Not Taking at Unknown time  Yes No   Sig: Take  by mouth nightly. Patient not taking: Reported on 1/10/2022   amLODIPine (NORVASC) 10 mg tablet 2022 at Unknown time  Yes Yes   Sig: Take 10 mg by mouth nightly. furosemide (Lasix) 80 mg tablet Not Taking at Unknown time Self Yes No   Sig: Take 80 mg by mouth two (2) times a day.    Patient not taking: Reported on 1/10/2022   gabapentin (NEURONTIN) 100 mg capsule Not Taking at Unknown time  Yes No   Sig: TAKE 1 CAPSULE BY MOUTH THREE TIMES DAILY FOR PAIN   Patient not taking: Reported on 1/10/2022   lidocaine 4 % patch 12/10/2021 at Unknown time  No Yes   Si Patch by TransDERmal route every twelve (12) hours every twelve (12) hours. lidocaine 5 % topical cream 12/10/2021 at Unknown time  No Yes   Sig: Apply  to affected area two (2) times daily as needed for Pain. lisinopril (PRINIVIL, ZESTRIL) 40 mg tablet 1/9/2022 at Unknown time  Yes Yes   Sig: Take 40 mg by mouth nightly. Indications: HYPERTENSION   methocarbamoL (Robaxin-750) 750 mg tablet Not Taking at Unknown time  No No   Sig: Take 2 Tablets by mouth four (4) times daily. Patient not taking: Reported on 1/10/2022   minoxidiL (LONITEN) 10 mg tablet 1/9/2022 at Unknown time Self Yes Yes   Sig: Take  by mouth two (2) times a day. omeprazole (PRILOSEC) 40 mg capsule 1/9/2022 at Unknown time  Yes Yes   Sig: TAKE ONE CAPSULE BY MOUTH EVERY DAY   sevelamer carbonate (RENVELA) 800 mg tab tab 1/9/2022 at Unknown time  Yes Yes   Sig: Take 800 mg by mouth three (3) times daily (with meals). 5 tablets with meals/ 2 with snacks  Sometimes only eats 2 meals/d   sitaGLIPtin (JANUVIA) 100 mg tablet 1/9/2022 at Unknown time  Yes Yes   Sig: Take 50 mg by mouth every morning. Indications: TYPE 2 DIABETES MELLITUS      Facility-Administered Medications: None     Past Medical History:   Diagnosis Date    Arthritis     AVF (arteriovenous fistula) ([facility] 12/20/2016 12/6/16 ([facility]) Right AVF revision and thrombectomy    Cancer ([facility] [address] 2015    L kidney    Chronic kidney disease     HD in M[facility]W-F- at [facility] dialysis    Degenerative joint disease     Diabetes ([facility] [address]     checks QD, normal 120-130, hyposymptoms at 80    ESRD (end stage renal disease) [facility]) Nov 2006    ESRD.  MWF dialysis     Hypertension     Obesity     Transient ischemic attack 08/29/2015    no residual     Past Surgical History:   Procedure Laterality Date    COLONOSCOPY N/A 11/8/2018    COLONOSCOPY  BMI 36 performed [facility] Paula [facility] END[facility]COPY    HX GI  06/01/2002    colon resection resulting in temporary colostomy reversal    HX GI  08/06/2018    exploratory laparotomy    HX GYN      stephan    HX OTHER SURGICAL      dialysis fistula, several permcaths    HX UROLOGICAL Left July 2015    nephrectomy    HX VASCULAR ACCESS      IR INSERT TUNL CVC W/O PORT OVER 5 YR  11/19/2021    IR PLC CATH AV SHUNT IN W PTA SI VENOUS  5/30/2019    IR PLC CATH AV SHUNT IN W PTA SI VENOUS RT  2/28/2019    IR PLC CATH AV SHUNT IN W PTA SI VENOUS RT  9/3/2019    IR PLC CATH AV SHUNT IN W PTA SI VENOUS RT  12/5/2019    IR PLC CATH AV SHUNT IN W PTA SI VENOUS RT  3/10/2020    MA BREAST SURGERY PROCEDURE UNLISTED Right     cyst removed    VASCULAR SURGERY PROCEDURE UNLIST Right     AV graft     Social History     Socioeconomic History    Marital status:      Spouse name: Not on file    Number of children: Not on file    Years of education: Not on file    Highest education level: Not on file   Occupational History    Not on file   Tobacco Use    Smoking status: Never Smoker    Smokeless tobacco: Never Used   Substance and Sexual Activity    Alcohol use: No    Drug use: No    Sexual activity: Not Currently   Other Topics Concern    Not on file   Social History Narrative    Not on file     Social Determinants of Health     Financial Resource Strain:     Difficulty of Paying Living Expenses: Not on file   Food Insecurity:     Worried About Running Out of Food in the Last Year: Not on file    Rick of Food in the Last Year: Not on file   Transportation Needs:     Lack of Transportation (Medical): Not on file    Lack of Transportation (Non-Medical):  Not on file   Physical Activity:     Days of Exercise per Week: Not on file    Minutes of Exercise per Session: Not on file   Stress:     Feeling of Stress : Not on file   Social Connections:     Frequency of Communication with Friends and Family: Not on file    Frequency of Social Gatherings with Friends and Family: Not on file    Attends Voodoo Services: Not on file    Active Member of Clubs or Organizations: Not on file    Attends Club or Organization Meetings: Not on file    Marital Status: Not on file   Intimate Partner Violence:     Fear of Current or Ex-Partner: Not on file    Emotionally Abused: Not on file    Physically Abused: Not on file    Sexually Abused: Not on file   Housing Stability:     Unable to Pay for Housing in the Last Year: Not on file    Number of Places Lived in the Last Year: Not on file    Unstable Housing in the Last Year: Not on file     Family History   Problem Relation Age of Onset    Diabetes Mother     Heart Disease Mother     Hypertension Mother     Heart Disease Father     Hypertension Father     Malignant Hyperthermia Neg Hx     Pseudocholinesterase Deficiency Neg Hx     Delayed Awakening Neg Hx     Post-op Nausea/Vomiting Neg Hx     Emergence Delirium Neg Hx     Other Neg Hx     Post-op Cognitive Dysfunction Neg Hx      Allergies   Allergen Reactions    Pcn [Penicillins] Rash    Vancomycin Rash     Current Facility-Administered Medications   Medication Dose Route Frequency    heparin (porcine) injection 5,000 Units  5,000 Units SubCUTAneous Q12H    epoetin mary lou-epbx (RETACRIT) injection 40,000 Units  40,000 Units SubCUTAneous Q7D    amLODIPine (NORVASC) tablet 10 mg  10 mg Oral QHS    gabapentin (NEURONTIN) capsule 100 mg  100 mg Oral TID    lisinopriL (PRINIVIL, ZESTRIL) tablet 40 mg  40 mg Oral QHS    methocarbamoL (ROBAXIN) tablet 1,500 mg  1,500 mg Oral QID    [Held by provider] minoxidiL (LONITEN) tablet 10 mg  10 mg Oral BID    pantoprazole (PROTONIX) tablet 40 mg  40 mg Oral ACB    sevelamer carbonate (RENVELA) tab 800 mg  800 mg Oral TID WITH MEALS    sodium chloride (NS) flush 5-40 mL  5-40 mL IntraVENous Q8H    sodium chloride (NS) flush 5-40 mL  5-40 mL IntraVENous PRN    acetaminophen (TYLENOL) tablet 650 mg  650 mg Oral Q6H PRN    Or    acetaminophen (TYLENOL) suppository 650 mg  650 mg Rectal Q6H PRN    polyethylene glycol (MIRALAX) packet 17 g  17 g Oral DAILY PRN    ondansetron (ZOFRAN ODT) tablet 4 mg  4 mg Oral Q8H PRN    Or    ondansetron (ZOFRAN) injection 4 mg  4 mg IntraVENous Q6H PRN    HYDROcodone-acetaminophen (NORCO) 7.5-325 mg per tablet 1 Tablet  1 Tablet Oral Q6H PRN    HYDROmorphone (DILAUDID) injection 0.2 mg  0.2 mg IntraVENous Q6H PRN    heparin (porcine) 1,000 unit/mL injection 5,000 Units  5,000 Units Hemodialysis DIALYSIS PRN    insulin lispro (HUMALOG) injection   SubCUTAneous AC&HS     Objective:   Blood pressure (!) 88/45, pulse 83, temperature 98.4 °F (36.9 °C), resp. rate 16, height 5' 5.5\" (1.664 m), weight 183 lb (83 kg), SpO2 92 %. Intake/Output Summary (Last 24 hours) at 1/12/2022 1051  Last data filed at 1/11/2022 1115  Gross per 24 hour   Intake --   Output 2700 ml   Net -2700 ml     PHYSICAL EXAM   Constitutional:  the patient is well developed and in no acute distress  EENMT:  Sclera clear, pupils equal, oral mucosa moist  Respiratory: CTA B in anterior lung fields. Not feeling able to sit up to listen posteriorly. Cardiovascular:  RRR without M,G,R  Gastrointestinal: soft and non-tender; with positive bowel sounds. Musculoskeletal: warm without cyanosis. There is trace B lower extremity edema. Skin:  no jaundice or rashes, no wounds   Neurologic: no gross neuro deficits     Psychiatric:  alert and oriented x ppt    CT Chest: 1/10/22          Recent Labs     01/12/22  0934 01/11/22  0333 01/10/22  0603   WBC 5.1 4.5 5.5   HGB 8.5* 8.0* 8.9*   HCT 24.5* 22.8* 26.2*   PLT 99* 91* 119*   NA  --  139 138   K  --  4.2 3.8   CL  --  102 99   CO2  --  28 28   GLU  --  85 121*   BUN  --  39* 33*   CREA  --  7.54* 6.44*   MG  --  3.2* 2.3   CA  --  8.3 9.1   ALB  --   --  4.2   AST  --   --  16   ALT  --   --  12   AP  --   --  77     ECHO: No results found for this or any previous visit. Results     ** No results found for the last 336 hours.  **        Inpat Anti-Infectives (From admission, onward) None        Assessment and Plan:  (Medical Decision Making)   Principal Problem:    Mediastinal mass (1/10/2022)    R paratracheal lymph node. Has been present and enlarged for months but does appear to be larger now. Will evaluate with US first to see if effusion is large enough to tap on the right. May not be based on recent CT scan. If it is, will drain fluid and send for analysis including cytology. If this is non diagnostic or not large enough, will look for outpatient spot for EBUS. Would also be helpful to obtain PET scan as outpatient. The area adjacent to the left effusion is most consistent with atelectasis. Do not suspect PNA or mass in this area. PET scan will help evaluate this further as well. Active Problems:    ESRD (end stage renal disease) ([facility] [address] (2/7/2013)          HTN (hypertension) (2/7/2013)          Abdominal mass (9/12/2017)          Renal cell carcinoma ([facility] [address] (9/12/2017)          Severe back pain (11/12/2021)         Full Code    More than 50% of the time documented was spent in face-to-face contact with the patient and in the care of the patient on the floor/unit where the patient is located. Thank you very much for this referral.  We appreciate the opportunity to participate in this patient's care. Will follow along with above stated plan.     Cesilia Anderson MD

## 2022-01-12 NOTE — PROGRESS NOTES
Problem: Pressure Injury - Risk of  Goal: *Prevention of pressure injury  Description: Document Arnaldo Scale and appropriate interventions in the flowsheet. Outcome: Progressing Towards Goal  Note: Pressure Injury Interventions:  Sensory Interventions: Assess changes in LOC    Moisture Interventions: Absorbent underpads,Minimize layers    Activity Interventions: PT/OT evaluation    Mobility Interventions: PT/OT evaluation    Nutrition Interventions: Offer support with meals,snacks and hydration         Problem: Falls - Risk of  Goal: *Absence of Falls  Description: Document Loyd Fall Risk and appropriate interventions in the flowsheet.   Outcome: Progressing Towards Goal  Note: Fall Risk Interventions:  Mobility Interventions: PT Consult for mobility concerns         Medication Interventions: Patient to call before getting OOB,Teach patient to arise slowly,Bed/chair exit alarm    Elimination Interventions: Call light in reach,Bed/chair exit alarm

## 2022-01-12 NOTE — PROGRESS NOTES
Physical Therapy Note:    Physical therapy evaluation orders received and chart reviewed. Attempted to see patient this AM to initiate assessment. Patient prepping for thoracentesis. Will follow and re-attempt at a later time/date as schedule permits/patient available.  Thank you,    Sakshi Montesinos, PT, DPT

## 2022-01-12 NOTE — PROGRESS NOTES
RENAL H&P/CONSULT    Subjective:     Patient is a 81 y/o AA female, followed by our office for ESRD, on HD MWF at [facility]. She was due for HD today. Presented to the ER this morning with CC of sharp left-sided back pain that started last night. She was admitted last November for similar complaint, work-up was essentially negative. CT scan today showed bilateral pleural effusions with subjacent atelectaiss and previously reported right paratracheal mass/lymph node slightly larger. She has a hx of renal cell carcinoma, s/p left nephrectomy, followed by Oncology. Her electrolytes are stable. She c/o mild dyspnea, denies CP, no n/v, no HA or dizziness. She denies fever or chills. PMH also consist of DM II, Hyperphosphatemia, anemia, and HTN. At time of assessment, she is in no respiratory distress, on 2L NC. She is not on O2 at home. She is admitted for observation. There is no urgent need for HD. Plan for dialysis in the morning. 1/11/22 - see HD Note  1/12/22 - alert - wants to know what's going on with her back - discussed plans for HD tomorrow if she is still here and to call outpatient dialysis for a spot if she is discharged - no N/V, no CP, no significant dyspnea     Past Medical History:   Diagnosis Date    Arthritis     AVF (arteriovenous fistula) ([address] 12/20/2016 12/6/16 ([facility]) Right AVF revision and thrombectomy    Cancer ([address] 2015    L kidney    Chronic kidney disease     HD in M[facility]W-F- at [facility] dialysis    Degenerative joint disease     Diabetes ([address]     checks QD, normal 120-130, hyposymptoms at 80    ESRD (end stage renal disease) [facility]) Nov 2006    ESRD.  MWF dialysis     Hypertension     Obesity     Transient ischemic attack 08/29/2015    no residual      Past Surgical History:   Procedure Laterality Date    COLONOSCOPY N/A 11/8/2018    COLONOSCOPY  BMI 36 performed by Suzette Turner MD at [facility] ENDOSCOPY    HX GI  06/01/2002    colon resection resulting in temporary colostomy reversal    HX GI  08/06/2018    exploratory laparotomy    HX GYN      stephan    HX OTHER SURGICAL      dialysis fistula, several permcaths    HX UROLOGICAL Left July 2015    nephrectomy    HX VASCULAR ACCESS      IR INSERT TUNL CVC W/O PORT OVER 5 YR  11/19/2021    IR PLC CATH AV SHUNT IN W PTA SI VENOUS  5/30/2019    IR PLC CATH AV SHUNT IN W PTA SI VENOUS RT  2/28/2019    IR PLC CATH AV SHUNT IN W PTA SI VENOUS RT  9/3/2019    IR PLC CATH AV SHUNT IN W PTA SI VENOUS RT  12/5/2019    IR PLC CATH AV SHUNT IN W PTA SI VENOUS RT  3/10/2020    NC BREAST SURGERY PROCEDURE UNLISTED Right     cyst removed    VASCULAR SURGERY PROCEDURE UNLIST Right     AV graft      Prior to Admission medications    Medication Sig Start Date End Date Taking? Authorizing Provider   lidocaine 4 % patch 1 Patch by TransDERmal route every twelve (12) hours every twelve (12) hours. 11/15/21  Yes Margy Delgado MD   minoxidiL (LONITEN) 10 mg tablet Take  by mouth two (2) times a day. Yes Provider, Historical   omeprazole (PRILOSEC) 40 mg capsule TAKE ONE CAPSULE BY MOUTH EVERY DAY 6/10/21  Yes Provider, Historical   lidocaine 5 % topical cream Apply  to affected area two (2) times daily as needed for Pain. 9/1/17  Yes FRANCISCO Pitt   amLODIPine (NORVASC) 10 mg tablet Take 10 mg by mouth nightly. Yes Provider, Historical   sevelamer carbonate (RENVELA) 800 mg tab tab Take 800 mg by mouth three (3) times daily (with meals). 5 tablets with meals/ 2 with snacks  Sometimes only eats 2 meals/d   Yes Provider, Historical   lisinopril (PRINIVIL, ZESTRIL) 40 mg tablet Take 40 mg by mouth nightly. Indications: HYPERTENSION 6/8/15  Yes Provider, Historical   sitaGLIPtin (JANUVIA) 100 mg tablet Take 50 mg by mouth every morning. Indications: TYPE 2 DIABETES MELLITUS   Yes Provider, Historical   furosemide (Lasix) 80 mg tablet Take 80 mg by mouth two (2) times a day.   Patient not taking: Reported on 1/10/2022    Provider, Historical methocarbamoL (Robaxin-750) 750 mg tablet Take 2 Tablets by mouth four (4) times daily. Patient not taking: Reported on 1/10/2022 11/9/21   Myra Daniel MD   gabapentin (NEURONTIN) 100 mg capsule TAKE 1 CAPSULE BY MOUTH THREE TIMES DAILY FOR PAIN  Patient not taking: Reported on 1/10/2022 6/10/21   Provider, Historical   VIT C/VIT E/LUTEIN/MIN/OMEGA-3 (OCUVITE PO) Take  by mouth nightly. Patient not taking: Reported on 1/10/2022    Provider, Historical     Allergies   Allergen Reactions    Pcn [Penicillins] Rash    Vancomycin Rash      Social History     Tobacco Use    Smoking status: Never Smoker    Smokeless tobacco: Never Used   Substance Use Topics    Alcohol use: No      Family History   Problem Relation Age of Onset    Diabetes Mother     Heart Disease Mother     Hypertension Mother     Heart Disease Father     Hypertension Father     Malignant Hyperthermia Neg Hx     Pseudocholinesterase Deficiency Neg Hx     Delayed Awakening Neg Hx     Post-op Nausea/Vomiting Neg Hx     Emergence Delirium Neg Hx     Other Neg Hx     Post-op Cognitive Dysfunction Neg Hx           Review of Systems    ROS negative except for what is mention in HPI      Objective:       Visit Vitals  BP (!) 88/45   Pulse 83   Temp 98.4 °F (36.9 °C)   Resp 16   Ht 5' 5.5\" (1.664 m)   Wt 83 kg (183 lb)   SpO2 91%   BMI 29.99 kg/m²       No intake/output data recorded. 01/10 1901 - 01/12 0700  In: 300 [P.O.:50; I.V.:250]  Out: 2700       General:  Alert, cooperative, no distress, appears stated age. Head:  Normocephalic, without obvious abnormality, atraumatic. Eyes:  Conjunctivae/corneas clear. EOMs intact. Throat: Lips, mucosa, and tongue normal. Teeth and gums normal.   Neck: Supple, symmetrical, trachea midline, no adenopathy,  no JVD. Lungs:   Clear to auscultation bilaterally. Heart:  Regular rate and rhythm, S1, S2 normal, no murmur,  rub or gallop. Abdomen:   Soft, non-tender.   No masses,  No organomegaly. Extremities: Extremities normal, atraumatic, no cyanosis or edema. CE AVG examines well   Access: Is open. Skin: Skin color, texture, turgor normal. No rashes or lesions. Neurologic: Grossly intact. No asterixis. Data Review:      CT chest  COMPARISON: November 22, 2021, November 12, 2021  INDICATION: 2cm right sided paratracheal mass  FINDINGS:  Lungs: Bilateral pleural effusions with subjacent atelectasis. Mediastinum: Limitations due to no contrast. Hilar fullness. Suggestion of  adenopathy with prominent right paratracheal node measuring 2.6 cm short axis. Visualized upper abdomen: Ascites. The visualized portions of the liver and  adrenal glands are normal. Left nephrectomy. Osseous structures: No suspicious lytic or blastic bony lesions. IMPRESSION  1. Bilateral pleural effusions with subjacent atelectasis. 2.  The previously reported right paratracheal mass/lymph node is slightly  Larger. XR SPINE [facility] 3 V on 1/11/2022 6:00 PM  Clinical History: The Female patient is 80years old  presenting for pain. Comparison:  Lasix 5 films 11/22/2021  Findings:  3 views demonstrate no fracture or subluxation of the thoracic spine. Normal curvature and alignment is maintained. There are chronic degenerative  endplate changes with spondylosis. There is no significant disc or vertebral  body height loss. The cervicothoracic junction is unremarkable on lateral  imaging. The paraspinal soft tissues are normal.  IMPRESSION  1.  Chronic degenerative changes with marginal osteophyte formation      Recent Results (from the past 24 hour(s))   GLUCOSE, POC    Collection Time: 01/11/22 10:10 AM   Result Value Ref Range    Glucose (POC) 101 (H) 65 - 100 mg/dL    Performed by Marcus Bello, POC    Collection Time: 01/11/22  3:30 PM   Result Value Ref Range    Glucose (POC) 164 (H) 65 - 100 mg/dL    Performed by Beatrice    GLUCOSE, POC    Collection Time: 01/11/22  8:57 PM   Result Value Ref Range    Glucose (POC) 128 (H) 65 - 100 mg/dL    Performed by Trinh    GLUCOSE, POC    Collection Time: 01/12/22  6:18 AM   Result Value Ref Range    Glucose (POC) 86 65 - 100 mg/dL    Performed by Tania            Principal Problem:    Mediastinal mass (1/10/2022)    Active Problems:    ESRD (end stage renal disease) ([facility] [address] (2/7/2013)      HTN (hypertension) (2/7/2013)      Abdominal mass (9/12/2017)      Overview: Of Left renal bed      Renal cell carcinoma ([facility] [address] (9/12/2017)      Severe back pain (11/12/2021)        Assessment/Plan:     1. ESRD- on HD, TIW dialysis needed for biochemical and volume control, no urgent HD needed at this time, plan for HD in AM - normally runs MWF    2. Back pain- work up per primary    3. Right paratracheal mass/lymph node on CT -   hx of renal cell carcinoma, Oncology has been consulted by primary - Xray shows DJD    4. Bilateral pleural effusion- no signs of respiratory distress, plan for HD in AM, discussed with dialysis RN     5. HTN-  Excessively tight on Norvasc, Lisinopril,  and Minoxidil - adjust meds as needed    6. Hyperphosphatemia- on Renvela    7.  Anemia - treat with JAMES        Destiney Miranda MD

## 2022-01-12 NOTE — PROGRESS NOTES
Hospitalist Progress Note   Admit Date:  1/10/2022  6:06 AM   Name:  Bladimir Fischer   Age:  80 y.o. Sex:  female  :  1950   MRN:  [id number]   [facility]:  [facility]/    Presenting Complaint: Back Pain    Reason(s) for Admission: Mediastinal mass [facility] Course & Interval History:   Darlin Trujillo is a 80 y.o. female with medical history of ESRD on hemodialysis, recurrent renal cell carcinoma s/p left nephrectomy  with recurrence and s/p course localized XRT to left renal bed who presented with severe sharp left-sided thoracic back pain. Patient recently admitted in November left flank pain and treated empirically with abx until acute infectious process ruled out. Pt had a CT thoracic spine which did not reveal acute fx, no aggressive bony lesions though b/l lung patchy infiltrative processes (pneumonitis / atypical PNA / pulm edema). Of significance pt did have a CT C/A to eval for potential aortic dissection given significant pain on 2021 which did reveal b/l small pleural effusions, enlarging right paratracheal lymph node (2.0 cm from previous 12mm) and small b/l hilar lymph nodes, no aortic dissection. She does follow with hem/onc routinely for underlying RCC hx. In the ER, pt had a repeat CT chest which revealed b/l pleural effusions and an increase in size of right paratracheal lymph node now 2.6cm in size as compared to Nov CT chest which measured lymph node 2.0cm. Pt admits to having missed her Monday HD session due to pain (she has been on HD x 15 years, M/W/F). She denies chest pain, palpitation, shortness of breath, nausea, vomiting, fevers or chills. Her VS / labs were stable; Hospitalist service consulted for inpatient admission for persistent back pain and enlarging mediastinal lymph node with acute hypoxic resp failure. Subjective (22): \"I feel like the pain in my back is worse. \"  Tearful 71y.o. AA female in bed c/o left mid back pain with mild dyspnea    Review of Systems:  10 systems reviewed and negative except as noted in HPI. Assessment & Plan:     Principal Problem:    Mediastinal mass (1/10/2022)  - need to rule out malignant etiology as pt with hx of recurrent RCC  - appreciate pulmon consult today, s/p thoracentesis with cytology / cx pending  - if no yield will consider EBUS; pulm recommended PET scan on outpatient setting    Active Problems:    Acute hypoxic respiratory failure  - O2 85% on RA noted in ER, improved to 96% on 2L  - etiology in setting of b/l pleural effusion and mild pulm vascular edema  - wean as tolerated      Thoracic / upper back pain  - appreciate pulmonary eval for potential empyema  - xray thoracic spine 1/12/2022 negative for acute fx  - s/p thoracentesis today  - prn norco / morphine for pain control      B/l pleural effusions  - s/p thoracentesis right lung today; f/u cytology / cx  - cont HD for volume mgmt      ESRD (end stage renal disease)  - appreciate nephrology's ongoing assistance  - cont HD 3 x a week      HTN (hypertension)  - labile readings with hypotensive episodes; may be attributed to IV dilaudid  - closely monitor; RN instructed to HOLD PAIN RX if SBP <100mmhg  - monitor closely      Hx of recurrent renal cell carcinoma  - s/p left nephrectomy 2015 with recurrence and subsequent XRT to left renal bed  - mgmt per hem/onc      Anemia chronic disease  - hgb 8.5 this AM (from 8.0 yesterday)  - no gross bleeding; cont to closely monitor      Diabetes mellitus   - on januvia at home  - hgbA1c 5.3 on 11/13/2021  - sliding scale coverage as needed; low-carb diet       Dispo/Discharge Planning:    - home once medically stable    Diet:  ADULT DIET Regular; 3 carb choices (45 gm/meal); Low Fat/Low Chol/High Fiber/2 gm Na; Low Potassium (Less than 3000 mg/day);  Low Phosphorus (Less than 1000 mg)  DVT PPx: hep sc  Code status: Full Code    Hospital Problems as of 1/12/2022 Date Reviewed: 7/27/2021          Codes Class Noted - Resolved POA    Bilateral pleural effusion ICD-10-CM: J90  ICD-9-CM: 511.9  1/12/2022 - Present Unknown        * (Principal) Mediastinal mass ICD-10-CM: J98.59  ICD-9-CM: 786.6  1/10/2022 - Present Unknown        Severe back pain ICD-10-CM: M54.9  ICD-9-CM: 724.5  11/12/2021 - Present Yes        Abdominal mass ICD-10-CM: R19.00  ICD-9-CM: 789.30  9/12/2017 - Present Yes    Overview Signed 9/12/2017  9:01 PM by Fidelina Orantes MD     Of Left renal bed             Renal cell carcinoma ([facility] ICD-10-CM: C64.9  ICD-9-CM: 189.0  9/12/2017 - Present Yes        ESRD (end stage renal disease) ([facility] ICD-10-CM: N18.6  ICD-9-CM: 585.6  2/7/2013 - Present Yes        HTN (hypertension) ICD-10-CM: I10  ICD-9-CM: 401.9  2/7/2013 - Present Yes              Objective:     Patient Vitals for the past 24 hrs:   Temp Pulse Resp BP SpO2   01/12/22 1201 -- 87 18 120/71 98 %   01/12/22 1148 -- 85 18 (!) 147/75 96 %   01/12/22 1145 -- 85 18 (!) 159/81 96 %   01/12/22 1110 98.3 °F (36.8 °C) 87 -- (!) 96/49 96 %   01/12/22 0730 -- -- -- -- 92 %   01/12/22 0435 98.4 °F (36.9 °C) 83 -- (!) 88/45 --   01/12/22 0051 98.3 °F (36.8 °C) 80 -- (!) 91/47 91 %   01/11/22 2016 98.3 °F (36.8 °C) 87 16 (!) 82/48 95 %   01/11/22 1746 -- -- -- (!) 98/41 --   01/11/22 1612 98 °F (36.7 °C) 85 17 (!) 85/49 95 %     Oxygen Therapy  O2 Sat (%): 98 % (01/12/22 1201)  Pulse via Oximetry: 85 beats per minute (01/12/22 1148)  O2 Device: Nasal cannula (01/12/22 1148)  O2 Flow Rate (L/min): 2 l/min (01/12/22 1201)    Estimated body mass index is 29.99 kg/m² as calculated from the following:    Height as of this encounter: 5' 5.5\" (1.664 m). Weight as of this encounter: 83 kg (183 lb). Intake/Output Summary (Last 24 hours) at 1/12/2022 1304  Last data filed at 1/12/2022 1252  Gross per 24 hour   Intake 16 ml   Output --   Net 16 ml         Physical Exam:     Blood pressure 120/71, pulse 87, temperature 98.3 °F (36.8 °C), resp.  rate 18, height 5' 5.5\" (1.664 m), weight 83 kg (183 lb), SpO2 98 %. General:    Well nourished. No overt distress  Head:  Normocephalic, atraumatic  Eyes:  Sclerae appear normal.  Pupils equally round. ENT:  Nares appear normal, no drainage. Moist oral mucosa  Neck:  No restricted ROM. Trachea midline   CV:   RRR. No m/r/g. No jugular venous distension. Lungs:   Decreased b/l basilar breath sounds without gross wheezing / rhonchi, no accessory muscles used  Abdomen: Bowel sounds present. Soft, nontender, nondistended. Extremities: + ROM b/l LE without tenderness elicited; dependent b/l LE edema  Skin:     No rashes and normal coloration. Warm and dry. Neuro:  CN II-XII grossly intact. Sensation intact. A&Ox3  Psych:  Normal mood and affect.       I have reviewed ordered lab tests and independently visualized imaging below:    Recent Labs:  Recent Results (from the past 48 hour(s))   GLUCOSE, POC    Collection Time: 01/10/22  6:30 PM   Result Value Ref Range    Glucose (POC) 141 (H) 65 - 100 mg/dL    Performed by Onesimo    GLUCOSE, POC    Collection Time: 01/10/22  8:59 PM   Result Value Ref Range    Glucose (POC) 125 (H) 65 - 100 mg/dL    Performed by Douglas    METABOLIC PANEL, BASIC    Collection Time: 01/11/22  3:33 AM   Result Value Ref Range    Sodium 139 136 - 145 mmol/L    Potassium 4.2 3.5 - 5.1 mmol/L    Chloride 102 98 - 107 mmol/L    CO2 28 21 - 32 mmol/L    Anion gap 9 7 - 16 mmol/L    Glucose 85 65 - 100 mg/dL    BUN 39 (H) 8 - 23 MG/DL    Creatinine 7.54 (H) 0.6 - 1.0 MG/DL    GFR est AA 7 (L) >60 ml/min/1.73m2    GFR est non-AA 6 (L) >60 ml/min/1.73m2    Calcium 8.3 8.3 - 10.4 MG/DL   MAGNESIUM    Collection Time: 01/11/22  3:33 AM   Result Value Ref Range    Magnesium 3.2 (H) 1.8 - 2.4 mg/dL   CBC WITH AUTOMATED DIFF    Collection Time: 01/11/22  3:33 AM   Result Value Ref Range    WBC 4.5 4.3 - 11.1 K/uL    RBC 2.45 (L) 4.05 - 5.2 M/uL    HGB 8.0 (L) 11.7 - 15.4 g/dL    HCT 22.8 (L) 35.8 - 46.3 %    MCV 93.1 79.6 - 97.8 FL    MCH 32.7 26.1 - 32.9 PG    MCHC 35.1 (H) 31.4 - 35.0 g/dL    RDW 17.7 (H) 11.9 - 14.6 %    PLATELET 91 (L) 399 - 450 K/uL    MPV 11.0 9.4 - 12.3 FL    ABSOLUTE NRBC 0.00 0.0 - 0.2 K/uL    DF AUTOMATED      NEUTROPHILS 65 43 - 78 %    LYMPHOCYTES 20 13 - 44 %    MONOCYTES 9 4.0 - 12.0 %    EOSINOPHILS 4 0.5 - 7.8 %    BASOPHILS 2 0.0 - 2.0 %    IMMATURE GRANULOCYTES 0 0.0 - 5.0 %    ABS. NEUTROPHILS 2.9 1.7 - 8.2 K/UL    ABS. LYMPHOCYTES 0.9 0.5 - 4.6 K/UL    ABS. MONOCYTES 0.4 0.1 - 1.3 K/UL    ABS. EOSINOPHILS 0.2 0.0 - 0.8 K/UL    ABS. BASOPHILS 0.1 0.0 - 0.2 K/UL    ABS. IMM. GRANS. 0.0 0.0 - 0.5 K/UL   GLUCOSE, POC    Collection Time: 01/11/22 10:10 AM   Result Value Ref Range    Glucose (POC) 101 (H) 65 - 100 mg/dL    Performed by Marcus Bello, POC    Collection Time: 01/11/22  3:30 PM   Result Value Ref Range    Glucose (POC) 164 (H) 65 - 100 mg/dL    Performed by Beatrice    GLUCOSE, POC    Collection Time: 01/11/22  8:57 PM   Result Value Ref Range    Glucose (POC) 128 (H) 65 - 100 mg/dL    Performed by Trinh    GLUCOSE, POC    Collection Time: 01/12/22  6:18 AM   Result Value Ref Range    Glucose (POC) 86 65 - 100 mg/dL    Performed by Tania    CBC WITH AUTOMATED DIFF    Collection Time: 01/12/22  9:34 AM   Result Value Ref Range    WBC 5.1 4.3 - 11.1 K/uL    RBC 2.53 (L) 4.05 - 5.2 M/uL    HGB 8.5 (L) 11.7 - 15.4 g/dL    HCT 24.5 (L) 35.8 - 46.3 %    MCV 96.8 79.6 - 97.8 FL    MCH 33.6 (H) 26.1 - 32.9 PG    MCHC 34.7 31.4 - 35.0 g/dL    RDW 17.7 (H) 11.9 - 14.6 %    PLATELET 99 (L) 493 - 450 K/uL    MPV 11.9 9.4 - 12.3 FL    ABSOLUTE NRBC 0.00 0.0 - 0.2 K/uL    DF AUTOMATED      NEUTROPHILS 73 43 - 78 %    LYMPHOCYTES 14 13 - 44 %    MONOCYTES 11 4.0 - 12.0 %    EOSINOPHILS 2 0.5 - 7.8 %    BASOPHILS 1 0.0 - 2.0 %    IMMATURE GRANULOCYTES 0 0.0 - 5.0 %    ABS. NEUTROPHILS 3.7 1.7 - 8.2 K/UL    ABS.  LYMPHOCYTES 0.7 0.5 - 4.6 K/UL    ABS. MONOCYTES 0.5 0.1 - 1.3 K/UL    ABS. EOSINOPHILS 0.1 0.0 - 0.8 K/UL    ABS. BASOPHILS 0.1 0.0 - 0.2 K/UL    ABS. IMM. GRANS. 0.0 0.0 - 0.5 K/UL   GLUCOSE, POC    Collection Time: 01/12/22 11:03 AM   Result Value Ref Range    Glucose (POC) 125 (H) 65 - 100 mg/dL    Performed by Timothy    CELL COUNT, BODY FLUID    Collection Time: 01/12/22 11:53 AM   Result Value Ref Range    BODY FLUID TYPE PLEURAL FLUID      FLUID COLOR YELLOW      FLUID APPEARANCE CLOUDY      FLUID RBC CT. 3,000 /cu mm    FLUID WBC COUNT 603 /cu mm       All Micro Results     Procedure Component Value Units Date/Time    CULTURE, BODY FLUID W Nayla Carey [[id number]] Collected: 01/12/22 1153    Order Status: Completed Updated: 01/12/22 1258    AFB CULTURE + SMEAR W/RFLX ID FROM CULTURE [[id number]] Collected: 01/12/22 1153    Order Status: Completed Updated: 01/12/22 1217    FUNGUS CULTURE AND SMEAR [[id number]] Collected: 01/12/22 1153    Order Status: Completed Updated: 01/12/22 1216          Other Studies:  XR SPINE THORAC 3 V    Result Date: 1/11/2022  Exam: XR SPINE [facility] 3 V on 1/11/2022 6:00 PM Clinical History: The Female patient is 80years old  presenting for pain. Comparison:  Lasix 5 films 11/22/2021 Findings:  3 views demonstrate no fracture or subluxation of the thoracic spine. Normal curvature and alignment is maintained. There are chronic degenerative endplate changes with spondylosis. There is no significant disc or vertebral body height loss. The cervicothoracic junction is unremarkable on lateral imaging.  The paraspinal soft tissues are normal.     1. Chronic degenerative changes with marginal osteophyte formation CPT code(s) 26128       Current Meds:  Current Facility-Administered Medications   Medication Dose Route Frequency    heparin (porcine) injection 5,000 Units  5,000 Units SubCUTAneous Q12H    epoetin mary lou-epbx (RETACRIT) injection 40,000 Units  40,000 Units SubCUTAneous Q7D    morphine injection 4 mg  4 mg IntraVENous Q6H PRN    amLODIPine (NORVASC) tablet 10 mg  10 mg Oral QHS    gabapentin (NEURONTIN) capsule 100 mg  100 mg Oral TID    lisinopriL (PRINIVIL, ZESTRIL) tablet 40 mg  40 mg Oral QHS    methocarbamoL (ROBAXIN) tablet 1,500 mg  1,500 mg Oral QID    [Held by provider] minoxidiL (LONITEN) tablet 10 mg  10 mg Oral BID    pantoprazole (PROTONIX) tablet 40 mg  40 mg Oral ACB    sevelamer carbonate (RENVELA) tab 800 mg  800 mg Oral TID WITH MEALS    sodium chloride (NS) flush 5-40 mL  5-40 mL IntraVENous Q8H    sodium chloride (NS) flush 5-40 mL  5-40 mL IntraVENous PRN    acetaminophen (TYLENOL) tablet 650 mg  650 mg Oral Q6H PRN    Or    acetaminophen (TYLENOL) suppository 650 mg  650 mg Rectal Q6H PRN    polyethylene glycol (MIRALAX) packet 17 g  17 g Oral DAILY PRN    ondansetron (ZOFRAN ODT) tablet 4 mg  4 mg Oral Q8H PRN    Or    ondansetron (ZOFRAN) injection 4 mg  4 mg IntraVENous Q6H PRN    HYDROcodone-acetaminophen (NORCO) 7.5-325 mg per tablet 1 Tablet  1 Tablet Oral Q6H PRN    heparin (porcine) 1,000 unit/mL injection 5,000 Units  5,000 Units Hemodialysis DIALYSIS PRN    insulin lispro (HUMALOG) injection   SubCUTAneous AC&HS       Signed:  ABBY Khan    Part of this note may have been written by using a voice dictation software. The note has been proof read but may still contain some grammatical/other typographical errors.

## 2022-01-12 NOTE — PROCEDURES
PROCEDURE:  DIAGNOSTIC THORACENTESIS, THERAPEUTIC THORACENTESIS       PRE-OP DIAGNOSIS:  B PLEURAL EFFUSION    POST-OP DIAGNOSIS:  B PLEURAL EFFUSION    VOLUME REMOVED:    300cc from right side. ANESTHESIA:    LOCAL ANESTHESIA WITH 1% LIDOCAINE 10 CC TOTAL. CHEST ULTRASOUND FINDINGS:    A Turbo-M, Sonosite ultrasound with a 5-16 mHz probe was used to image the chest and localize the pleural effusion on the bilateral  chest.    A small anechoic space was seen on the bilateral  consistent with an uncomplicated pleural effusion. While similar in size, the fluid on the right was more clearly delineated and appeared easier to enter. Thoracentesis was performed only on the right side. DESCRIPTION OF PROCEDURE:    After obtaining informed consent and localizing the safest location for thoracentesis, the  8th intercostal space was marked with a blunt, plastic needle cap in the mid scapular line. An [employer] [id number] Pleral-Seal thoracentesis kit was used to perform the procedure. The skin was cleansed with the supplied chlorhexidine swab and then draped in the usual fashion. Using the previously marked location as a guide, a 22 G 1.5 inch needle was used to inject 1% lidocaine into the skin and subcutaneous tissue, as well as onto the underlying rib and inter-costal muscles. Pleural fluid was aspirated to assure proper location and additional lidocaine was injected into the pleural space prior to removing the anesthesia needle. A 3mm incision was then made with the supplied scalpel in the usual fashion to facilitate the insertion of the thoracentesis needle. The needle with an 8 Tristanian thoracentesis catheter was then introduced into the chest through the previously made incision in the usual fashion, the rib localized with the needle, and the catheter then marched over the rib into the pleural space.     After aspirating fluid, the thoracentesis catheter was then placed into the chest using the needle itself as a trocar. The needle was then removed and the catheter was attached to the supplied tubing without complication. 300 cc of clear, yellow  fluid was aspirated and sent for analysis. Fluid was sent for the following tests:      LDH  Total Protein  Glucose  Cell count with differential  Routine culture and Gram stain  Cytology  AFB  Fungus    Post procedure US confirmed complete drainage of the effusion and presence of lung sliding, ruling out pneumothorax.  ([id number])    EBL:     <6CJ    COMPLICATIONS:    None      Luís Vela MD

## 2022-01-12 NOTE — ROUTINE PROCESS
Procedure explained to patient. Consent obtained for thoracentesis. Patient sat up on side of the bed. Vitals documented in flow sheet. Dr. Luis A Harding at bedside. Time out for procedure at 1146. Ultrasound of bilateral chest done with picture taken. Approximately 300cc of yellow color fluid was taken from right side with no complications noted. Lung slide afterward was obtained. Picture of obtained and placed on chart. Specimens x 3 were taken to lab. Primary RN notified of procedure summary. Primary RN to continue care. Vital signs stable at this time.

## 2022-01-13 LAB
ALBUMIN SERPL-MCNC: 3.4 G/DL (ref 3.2–4.6)
ALBUMIN/GLOB SERPL: 1.1 {RATIO} (ref 1.2–3.5)
ALP SERPL-CCNC: 64 U/L (ref 50–136)
ALT SERPL-CCNC: 8 U/L (ref 12–65)
ANION GAP SERPL CALC-SCNC: 8 MMOL/L (ref 7–16)
AST SERPL-CCNC: 7 U/L (ref 15–37)
BASOPHILS # BLD: 0.1 K/UL (ref 0–0.2)
BASOPHILS NFR BLD: 1 % (ref 0–2)
BILIRUB DIRECT SERPL-MCNC: 0.3 MG/DL
BILIRUB INDIRECT SERPL-MCNC: 1.5 MG/DL (ref 0–1.1)
BILIRUB SERPL-MCNC: 1.8 MG/DL (ref 0.2–1.1)
BILIRUB SERPL-MCNC: 1.8 MG/DL (ref 0.2–1.1)
BUN SERPL-MCNC: 39 MG/DL (ref 8–23)
CALCIUM SERPL-MCNC: 8.8 MG/DL (ref 8.3–10.4)
CHLORIDE SERPL-SCNC: 97 MMOL/L (ref 98–107)
CO2 SERPL-SCNC: 31 MMOL/L (ref 21–32)
CREAT SERPL-MCNC: 6.53 MG/DL (ref 0.6–1)
DIFFERENTIAL METHOD BLD: ABNORMAL
EOSINOPHIL # BLD: 0.2 K/UL (ref 0–0.8)
EOSINOPHIL NFR BLD: 3 % (ref 0.5–7.8)
ERYTHROCYTE [DISTWIDTH] IN BLOOD BY AUTOMATED COUNT: 17.2 % (ref 11.9–14.6)
GLOBULIN SER CALC-MCNC: 3.2 G/DL (ref 2.3–3.5)
GLUCOSE BLD STRIP.AUTO-MCNC: 100 MG/DL (ref 65–100)
GLUCOSE BLD STRIP.AUTO-MCNC: 132 MG/DL (ref 65–100)
GLUCOSE BLD STRIP.AUTO-MCNC: 89 MG/DL (ref 65–100)
GLUCOSE BLD STRIP.AUTO-MCNC: 96 MG/DL (ref 65–100)
GLUCOSE SERPL-MCNC: 106 MG/DL (ref 65–100)
HCT VFR BLD AUTO: 23.6 % (ref 35.8–46.3)
HGB BLD-MCNC: 8.1 G/DL (ref 11.7–15.4)
IMM GRANULOCYTES # BLD AUTO: 0 K/UL (ref 0–0.5)
IMM GRANULOCYTES NFR BLD AUTO: 1 % (ref 0–5)
LYMPHOCYTES # BLD: 0.8 K/UL (ref 0.5–4.6)
LYMPHOCYTES NFR BLD: 16 % (ref 13–44)
MCH RBC QN AUTO: 33.3 PG (ref 26.1–32.9)
MCHC RBC AUTO-ENTMCNC: 34.3 G/DL (ref 31.4–35)
MCV RBC AUTO: 97.1 FL (ref 79.6–97.8)
MONOCYTES # BLD: 0.5 K/UL (ref 0.1–1.3)
MONOCYTES NFR BLD: 11 % (ref 4–12)
NEUTS SEG # BLD: 3.3 K/UL (ref 1.7–8.2)
NEUTS SEG NFR BLD: 69 % (ref 43–78)
NRBC # BLD: 0 K/UL (ref 0–0.2)
PLATELET # BLD AUTO: 96 K/UL (ref 150–450)
PMV BLD AUTO: 11.1 FL (ref 9.4–12.3)
POTASSIUM SERPL-SCNC: 4.7 MMOL/L (ref 3.5–5.1)
PROT SERPL-MCNC: 6.6 G/DL (ref 6.3–8.2)
RBC # BLD AUTO: 2.43 M/UL (ref 4.05–5.2)
SERVICE CMNT-IMP: ABNORMAL
SERVICE CMNT-IMP: NORMAL
SODIUM SERPL-SCNC: 136 MMOL/L (ref 136–145)
WBC # BLD AUTO: 4.8 K/UL (ref 4.3–11.1)

## 2022-01-13 PROCEDURE — 74011250636 HC RX REV CODE- 250/636: Performed by: PHYSICIAN ASSISTANT

## 2022-01-13 PROCEDURE — 74011000250 HC RX REV CODE- 250: Performed by: FAMILY MEDICINE

## 2022-01-13 PROCEDURE — APPNB30 APP NON BILLABLE TIME 0-30 MINS: Performed by: NURSE PRACTITIONER

## 2022-01-13 PROCEDURE — 80053 COMPREHEN METABOLIC PANEL: CPT

## 2022-01-13 PROCEDURE — 82248 BILIRUBIN DIRECT: CPT

## 2022-01-13 PROCEDURE — 97530 THERAPEUTIC ACTIVITIES: CPT

## 2022-01-13 PROCEDURE — 85025 COMPLETE CBC W/AUTO DIFF WBC: CPT

## 2022-01-13 PROCEDURE — 99232 SBSQ HOSP IP/OBS MODERATE 35: CPT | Performed by: INTERNAL MEDICINE

## 2022-01-13 PROCEDURE — 36415 COLL VENOUS BLD VENIPUNCTURE: CPT

## 2022-01-13 PROCEDURE — 65270000029 HC RM PRIVATE

## 2022-01-13 PROCEDURE — 82962 GLUCOSE BLOOD TEST: CPT

## 2022-01-13 PROCEDURE — 74011250637 HC RX REV CODE- 250/637: Performed by: FAMILY MEDICINE

## 2022-01-13 PROCEDURE — 97161 PT EVAL LOW COMPLEX 20 MIN: CPT

## 2022-01-13 PROCEDURE — 74011250636 HC RX REV CODE- 250/636: Performed by: INTERNAL MEDICINE

## 2022-01-13 PROCEDURE — G0257 UNSCHED DIALYSIS ESRD PT HOS: HCPCS

## 2022-01-13 PROCEDURE — 74011250637 HC RX REV CODE- 250/637: Performed by: INTERNAL MEDICINE

## 2022-01-13 PROCEDURE — 90935 HEMODIALYSIS ONE EVALUATION: CPT

## 2022-01-13 RX ORDER — POLYETHYLENE GLYCOL 3350 17 G/17G
17 POWDER, FOR SOLUTION ORAL DAILY
Status: DISCONTINUED | OUTPATIENT
Start: 2022-01-13 | End: 2022-01-14

## 2022-01-13 RX ORDER — FACIAL-BODY WIPES
10 EACH TOPICAL DAILY PRN
Status: DISCONTINUED | OUTPATIENT
Start: 2022-01-13 | End: 2022-01-20 | Stop reason: HOSPADM

## 2022-01-13 RX ADMIN — POLYETHYLENE GLYCOL 3350 17 G: 17 POWDER, FOR SOLUTION ORAL at 21:17

## 2022-01-13 RX ADMIN — METHOCARBAMOL TABLETS 1500 MG: 750 TABLET, COATED ORAL at 21:17

## 2022-01-13 RX ADMIN — HEPARIN SODIUM 5000 UNITS: 5000 INJECTION INTRAVENOUS; SUBCUTANEOUS at 06:14

## 2022-01-13 RX ADMIN — PANTOPRAZOLE SODIUM 40 MG: 40 TABLET, DELAYED RELEASE ORAL at 06:14

## 2022-01-13 RX ADMIN — HYDROCODONE BITARTRATE AND ACETAMINOPHEN 1 TABLET: 7.5; 325 TABLET ORAL at 21:17

## 2022-01-13 RX ADMIN — SEVELAMER CARBONATE 800 MG: 800 TABLET, FILM COATED ORAL at 16:49

## 2022-01-13 RX ADMIN — SODIUM CHLORIDE, PRESERVATIVE FREE 10 ML: 5 INJECTION INTRAVENOUS at 22:00

## 2022-01-13 RX ADMIN — GABAPENTIN 100 MG: 100 CAPSULE ORAL at 21:17

## 2022-01-13 RX ADMIN — GABAPENTIN 100 MG: 100 CAPSULE ORAL at 16:49

## 2022-01-13 RX ADMIN — SODIUM CHLORIDE, PRESERVATIVE FREE 10 ML: 5 INJECTION INTRAVENOUS at 15:41

## 2022-01-13 RX ADMIN — METHOCARBAMOL TABLETS 1500 MG: 750 TABLET, COATED ORAL at 17:00

## 2022-01-13 RX ADMIN — MORPHINE SULFATE 4 MG: 4 INJECTION INTRAVENOUS at 12:54

## 2022-01-13 RX ADMIN — MORPHINE SULFATE 4 MG: 4 INJECTION INTRAVENOUS at 03:38

## 2022-01-13 RX ADMIN — HEPARIN SODIUM 5000 UNITS: 5000 INJECTION INTRAVENOUS; SUBCUTANEOUS at 17:00

## 2022-01-13 RX ADMIN — METHOCARBAMOL TABLETS 1500 MG: 750 TABLET, COATED ORAL at 12:22

## 2022-01-13 RX ADMIN — SODIUM CHLORIDE, PRESERVATIVE FREE 10 ML: 5 INJECTION INTRAVENOUS at 06:14

## 2022-01-13 RX ADMIN — SEVELAMER CARBONATE 800 MG: 800 TABLET, FILM COATED ORAL at 12:22

## 2022-01-13 NOTE — PROGRESS NOTES
RENAL H&P/CONSULT    Subjective:     Patient is a 79 y/o AA female, followed by our office for ESRD, on HD MWF at [facility]. She was due for HD today. Presented to the ER this morning with CC of sharp left-sided back pain that started last night. She was admitted last November for similar complaint, work-up was essentially negative. CT scan today showed bilateral pleural effusions with subjacent atelectaiss and previously reported right paratracheal mass/lymph node slightly larger. She has a hx of renal cell carcinoma, s/p left nephrectomy, followed by Oncology. Her electrolytes are stable. She c/o mild dyspnea, denies CP, no n/v, no HA or dizziness. She denies fever or chills. PMH also consist of DM II, Hyperphosphatemia, anemia, and HTN. At time of assessment, she is in no respiratory distress, on 2L NC. She is not on O2 at home. She is admitted for observation. There is no urgent need for HD. Plan for dialysis in the morning. 1/11/22 - see HD Note  1/12/22 - alert - wants to know what's going on with her back - discussed plans for HD tomorrow if she is still here and to call outpatient dialysis for a spot if she is discharged - no N/V, no CP, no significant dyspnea   1/13/22- alert/oriented, no signs of distress, on 2L NC, denies dyspnea or CP, no n/v, no HA or dizziness. Thoracentesis done yesterday at the request of Oncology to obtain fluid for cytology. Tolerated HD today with no complications    Past Medical History:   Diagnosis Date    Arthritis     AVF (arteriovenous fistula) ([address] 12/20/2016 12/6/16 (S) Right AVF revision and thrombectomy    Cancer ([address] 2015    L kidney    Chronic kidney disease     HD in M-W-F- at [facility] dialysis    Degenerative joint disease     Diabetes ([address]     checks QD, normal 120-130, hyposymptoms at 80    ESRD (end stage renal disease) [facility]) Nov 2006    ESRD.  MWF dialysis     Hypertension     Obesity     Transient ischemic attack 08/29/2015    no residual      Past Surgical History:   Procedure Laterality Date    COLONOSCOPY N/A 11/8/2018    COLONOSCOPY  BMI 36 performed by Iris Silva MD at [facility] ENDOSCOPY    HX GI  06/01/2002    colon resection resulting in temporary colostomy reversal    HX GI  08/06/2018    exploratory laparotomy    HX GYN      stephan    HX OTHER SURGICAL      dialysis fistula, several permcaths    HX UROLOGICAL Left July 2015    nephrectomy    HX VASCULAR ACCESS      IR INSERT TUNL CVC W/O PORT OVER 5 YR  11/19/2021    IR PLC CATH AV SHUNT IN W PTA SI VENOUS  5/30/2019    IR PLC CATH AV SHUNT IN W PTA SI VENOUS RT  2/28/2019    IR PLC CATH AV SHUNT IN W PTA SI VENOUS RT  9/3/2019    IR PLC CATH AV SHUNT IN W PTA SI VENOUS RT  12/5/2019    IR PLC CATH AV SHUNT IN W PTA SI VENOUS RT  3/10/2020    CO BREAST SURGERY PROCEDURE UNLISTED Right     cyst removed    VASCULAR SURGERY PROCEDURE UNLIST Right     AV graft      Prior to Admission medications    Medication Sig Start Date End Date Taking? Authorizing Provider   lidocaine 4 % patch 1 Patch by TransDERmal route every twelve (12) hours every twelve (12) hours. 11/15/21  Yes Leida Matt MD   minoxidiL (LONITEN) 10 mg tablet Take  by mouth two (2) times a day. Yes Provider, Historical   omeprazole (PRILOSEC) 40 mg capsule TAKE ONE CAPSULE BY MOUTH EVERY DAY 6/10/21  Yes Provider, Historical   lidocaine 5 % topical cream Apply  to affected area two (2) times daily as needed for Pain. 9/1/17  Yes Tamiamie Montenegro APRN   amLODIPine (NORVASC) 10 mg tablet Take 10 mg by mouth nightly. Yes Provider, Historical   sevelamer carbonate (RENVELA) 800 mg tab tab Take 800 mg by mouth three (3) times daily (with meals). 5 tablets with meals/ 2 with snacks  Sometimes only eats 2 meals/d   Yes Provider, Historical   lisinopril (PRINIVIL, ZESTRIL) 40 mg tablet Take 40 mg by mouth nightly.  Indications: HYPERTENSION 6/8/15  Yes Provider, Historical   sitaGLIPtin (JANUVIA) 100 mg tablet Take 50 mg by mouth every morning. Indications: TYPE 2 DIABETES MELLITUS   Yes Provider, Historical   furosemide (Lasix) 80 mg tablet Take 80 mg by mouth two (2) times a day. Patient not taking: Reported on 1/10/2022    Provider, Historical   methocarbamoL (Robaxin-750) 750 mg tablet Take 2 Tablets by mouth four (4) times daily. Patient not taking: Reported on 1/10/2022 11/9/21   Mable Daniel MD   gabapentin (NEURONTIN) 100 mg capsule TAKE 1 CAPSULE BY MOUTH THREE TIMES DAILY FOR PAIN  Patient not taking: Reported on 1/10/2022 6/10/21   Provider, Historical   VIT C/VIT E/LUTEIN/MIN/OMEGA-3 (OCUVITE PO) Take  by mouth nightly. Patient not taking: Reported on 1/10/2022    Provider, Historical     Allergies   Allergen Reactions    Pcn [Penicillins] Rash    Vancomycin Rash      Social History     Tobacco Use    Smoking status: Never Smoker    Smokeless tobacco: Never Used   Substance Use Topics    Alcohol use: No      Family History   Problem Relation Age of Onset    Diabetes Mother     Heart Disease Mother     Hypertension Mother     Heart Disease Father     Hypertension Father     Malignant Hyperthermia Neg Hx     Pseudocholinesterase Deficiency Neg Hx     Delayed Awakening Neg Hx     Post-op Nausea/Vomiting Neg Hx     Emergence Delirium Neg Hx     Other Neg Hx     Post-op Cognitive Dysfunction Neg Hx           Review of Systems    ROS negative except for what is mention in HPI      Objective:       Visit Vitals  BP (!) 117/48   Pulse 73   Temp 98.2 °F (36.8 °C)   Resp 18   Ht 5' 5.5\" (1.664 m)   Wt 83 kg (183 lb)   SpO2 95%   BMI 29.99 kg/m²       01/13 0701 - 01/13 1900  In: -   Out: 1000   01/11 1901 - 01/13 0700  In: 16 [P.O.:16]  Out: -       General:  Alert, cooperative, no distress, appears stated age. Neck: Supple, symmetrical, trachea midline, no JVD. Lungs:   Clear to auscultation bilaterally. Heart:  Regular rate and rhythm, S1, S2 normal, no murmur,  rub or gallop.    Abdomen: Soft, non-tender. No masses,  No organomegaly. Extremities: Extremities normal, atraumatic, no cyanosis or edema. CE AVG examines well   Access: Is open. Skin: Skin color, texture, turgor normal. No rashes or lesions. Neurologic: Grossly intact. No asterixis. Data Review:      CT chest  COMPARISON: November 22, 2021, November 12, 2021  INDICATION: 2cm right sided paratracheal mass  FINDINGS:  Lungs: Bilateral pleural effusions with subjacent atelectasis. Mediastinum: Limitations due to no contrast. Hilar fullness. Suggestion of  adenopathy with prominent right paratracheal node measuring 2.6 cm short axis. Visualized upper abdomen: Ascites. The visualized portions of the liver and  adrenal glands are normal. Left nephrectomy. Osseous structures: No suspicious lytic or blastic bony lesions. IMPRESSION  1. Bilateral pleural effusions with subjacent atelectasis. 2.  The previously reported right paratracheal mass/lymph node is slightly  Larger. XR SPINE [address] 3 V on 1/11/2022 6:00 PM  Clinical History: The Female patient is 80years old  presenting for pain. Comparison:  Lasix 5 films 11/22/2021  Findings:  3 views demonstrate no fracture or subluxation of the thoracic spine. Normal curvature and alignment is maintained. There are chronic degenerative  endplate changes with spondylosis. There is no significant disc or vertebral  body height loss. The cervicothoracic junction is unremarkable on lateral  imaging. The paraspinal soft tissues are normal.  IMPRESSION  1.  Chronic degenerative changes with marginal osteophyte formation      Recent Results (from the past 24 hour(s))   GLUCOSE, POC    Collection Time: 01/12/22  4:03 PM   Result Value Ref Range    Glucose (POC) 124 (H) 65 - 100 mg/dL    Performed by Timothy    GLUCOSE, POC    Collection Time: 01/12/22  9:40 PM   Result Value Ref Range    Glucose (POC) 130 (H) 65 - 100 mg/dL    Performed by Tania    CBC WITH AUTOMATED DIFF    Collection Time: 01/13/22  3:50 AM   Result Value Ref Range    WBC 4.8 4.3 - 11.1 K/uL    RBC 2.43 (L) 4.05 - 5.2 M/uL    HGB 8.1 (L) 11.7 - 15.4 g/dL    HCT 23.6 (L) 35.8 - 46.3 %    MCV 97.1 79.6 - 97.8 FL    MCH 33.3 (H) 26.1 - 32.9 PG    MCHC 34.3 31.4 - 35.0 g/dL    RDW 17.2 (H) 11.9 - 14.6 %    PLATELET 96 (L) 277 - 450 K/uL    MPV 11.1 9.4 - 12.3 FL    ABSOLUTE NRBC 0.00 0.0 - 0.2 K/uL    DF AUTOMATED      NEUTROPHILS 69 43 - 78 %    LYMPHOCYTES 16 13 - 44 %    MONOCYTES 11 4.0 - 12.0 %    EOSINOPHILS 3 0.5 - 7.8 %    BASOPHILS 1 0.0 - 2.0 %    IMMATURE GRANULOCYTES 1 0.0 - 5.0 %    ABS. NEUTROPHILS 3.3 1.7 - 8.2 K/UL    ABS. LYMPHOCYTES 0.8 0.5 - 4.6 K/UL    ABS. MONOCYTES 0.5 0.1 - 1.3 K/UL    ABS. EOSINOPHILS 0.2 0.0 - 0.8 K/UL    ABS. BASOPHILS 0.1 0.0 - 0.2 K/UL    ABS. IMM. GRANS. 0.0 0.0 - 0.5 K/UL   METABOLIC PANEL, COMPREHENSIVE    Collection Time: 01/13/22  3:50 AM   Result Value Ref Range    Sodium 136 136 - 145 mmol/L    Potassium 4.7 3.5 - 5.1 mmol/L    Chloride 97 (L) 98 - 107 mmol/L    CO2 31 21 - 32 mmol/L    Anion gap 8 7 - 16 mmol/L    Glucose 106 (H) 65 - 100 mg/dL    BUN 39 (H) 8 - 23 MG/DL    Creatinine 6.53 (H) 0.6 - 1.0 MG/DL    GFR est AA 8 (L) >60 ml/min/1.73m2    GFR est non-AA 7 (L) >60 ml/min/1.73m2    Calcium 8.8 8.3 - 10.4 MG/DL    Bilirubin, total 1.8 (H) 0.2 - 1.1 MG/DL    ALT (SGPT) 8 (L) 12 - 65 U/L    AST (SGOT) 7 (L) 15 - 37 U/L    Alk.  phosphatase 64 50 - 136 U/L    Protein, total 6.6 6.3 - 8.2 g/dL    Albumin 3.4 3.2 - 4.6 g/dL    Globulin 3.2 2.3 - 3.5 g/dL    A-G Ratio 1.1 (L) 1.2 - 3.5     BILIRUBIN, FRACTIONATED    Collection Time: 01/13/22  3:50 AM   Result Value Ref Range    Bilirubin, total 1.8 (H) 0.2 - 1.1 MG/DL    Bilirubin, direct 0.3 <0.4 MG/DL    Bilirubin, indirect 1.5 (H) 0.0 - 1.1 MG/DL   GLUCOSE, POC    Collection Time: 01/13/22  6:36 AM   Result Value Ref Range    Glucose (POC) 96 65 - 100 mg/dL    Performed by Tania    GLUCOSE, POC    Collection Time: 01/13/22 10:26 AM   Result Value Ref Range    Glucose (POC) 100 65 - 100 mg/dL    Performed by Margo            Principal Problem:    Mediastinal mass (1/10/2022)    Active Problems:    ESRD (end stage renal disease) ([facility] (2/7/2013)      HTN (hypertension) (2/7/2013)      Abdominal mass (9/12/2017)      Overview: Of Left renal bed      Renal cell carcinoma ([facility] (9/12/2017)      Severe back pain (11/12/2021)      Acute respiratory failure with hypoxia ([address] (11/12/2021)      Bilateral pleural effusion (1/12/2022)        Assessment/Plan:     1. ESRD- on HD, TIW, dialysis needed for biochemical and volume control, no urgent HD needed at this time,  - normally runs MWF, tolerated 1Kg fluid removal with HD today. 2. Back pain- work up per primary    3. Right paratracheal mass/lymph node on CT -   hx of renal cell carcinoma, followed by Oncology  - Xray shows DJD    4. Bilateral pleural effusion- no signs of respiratory distress, s/p right thoracentesis with 300 ml fluid removed for cytology    5. HTN-  Excessively tight, antihypertensives on hold, will monitor    6. Hyperphosphatemia- on Renvela    7.  Anemia - hgb fairly stable, treat with JAMES        Sienna Gautam NP

## 2022-01-13 NOTE — PROGRESS NOTES
ACUTE PHYSICAL THERAPY GOALS:  (Developed with and agreed upon by patient and/or caregiver.)    (1.) Gary Jin  will move from supine to sit and sit to supine  with INDEPENDENCE within 7 treatment day(s). (2.) Kecia Fischer will transfer from bed to chair and chair to bed with MODIFIED INDEPENDENCE using the least restrictive device within 7 treatment day(s). (3.) Kecia Fischer will ambulate with MODIFIED INDEPENDENCE for 150 feet with the least restrictive device within 7 treatment day(s). (4.) Kecia Fischer will perform standing static and dynamic balance activities x 20 minutes with SUPERVISION to improve safety within 7 treatment day(s). (5.) Kecia Fischer will perform bilateral lower extremity exercises x 20 min for HEP with INDEPENDENCE to improve strength, endurance, and functional mobility within 7 treatment day(s).      PHYSICAL THERAPY ASSESSMENT: Initial Assessment, Daily Note and PM PT Treatment Day Kayce Fischer is a 80 y.o. female   PRIMARY DIAGNOSIS: Mediastinal mass  Mediastinal mass [J98.59]  Acute respiratory failure with hypoxia (HCC) [J96.01]  Procedure(s) (LRB):  ULTRASOUND (Bilateral)  THORACENTESIS (N/A)  1 Day Post-Op  Reason for Referral:    ICD-10: Treatment Diagnosis: Generalized Muscle Weakness (M62.81)  Other abnormalities of gait and mobility (R26.89)  History of falling (Z91.81)  INPATIENT: Payor: [employer] MEDICARE / Plan: [facility] MEDICARE CHOICE PPO/PFFS / Product Type: Managed Care Medicare /     ASSESSMENT:     REHAB RECOMMENDATIONS:   Recommendation to date pending progress:  Setting:    with 24/7 family assistance or STR  Equipment:    Rolling Walker     PRIOR LEVEL OF FUNCTION:  (Prior to Hospitalization) INITIAL/CURRENT LEVEL OF FUNCTION:  (Most Recently Demonstrated)   Bed Mobility:   Independent  Sit to Stand:   Independent  Transfers:   Independent  Gait/Mobility:   Independent Bed Mobility:   Minimal Assistance  Sit to Stand:   Minimal Assistance  Transfers:   Not tested  Gait/Mobility:   Contact Guard Assistance     ASSESSMENT:  Ms. Tee Keys is a 80year old F who presents with back pain, mediastinal mass. Pt was received very SOB on room air with SpO2 at 76%. Informed RN who put pt on 4L O2 to help pt recover and eventually weaned back down to 2L, SpO2 99%. Pt is a scattered historian but reports that at baseline she is independent and denies any DME use. This date pt performs mobility including bed mobility with Saira. She was able to ambulate 20 ft with RW and CGA on 3L O2. Pt required Saira to control descent back to seated position after walking as she had fatigued. Pt can d/c with HH if 24/7 family assistance is available (pt reports she has this), otherwise pt will need STR. Pt presents as functioning below her baseline, with deficits in mobility including transfers, gait, balance, and activity tolerance. Pt will benefit from skilled therapy services to address stated deficits to promote return to highest level of function, independence, and safety. Will continue to follow.      SUBJECTIVE:   Ms. Tee Keys states, \"thank you\"    SOCIAL HISTORY/LIVING ENVIRONMENT: PLOF: ind, no DME, 1 falls, lives with daughter's family  Support Systems: Child(joan) (daughter Amilcar Ayers [phone])  OBJECTIVE:     PAIN: VITAL SIGNS: LINES/DRAINS:   Pre Treatment: Pain Screen  Pain Scale 1: Numeric (0 - 10)  Pain Intensity 1: 0  Post Treatment: 0   none  O2 Device: Nasal cannula     GROSS EVALUATION:  B LE Within Functional Limits Abnormal/ Functional Abnormal/ Non-Functional (see comments) Not Tested Comments:   AROM [x] [] [] []    PROM [] [] [] [x]    Strength [] [x] [] [] Generalized weakness   Balance [] [x] [] [] Fair+ sitting, fair standing    Posture [] [x] [] [] Forward flexed   Sensation [] [] [] [x]    Coordination [] [] [] [x]    Tone [] [] [] [x]    Edema [] [] [] [x]    Activity Tolerance [] [x] [] [] Fatigues easily     [] [] [] [] COGNITION/  PERCEPTION: Intact Impaired   (see comments) Comments:   Orientation [x] []    Vision [x] []    Hearing [x] []    Command Following [x] []    Safety Awareness [] [x] Mild confusion    [] []      MOBILITY: I Mod I S SBA CGA Min Mod Max Total  NT x2 Comments:   Bed Mobility    Rolling [] [] [] [] [] [x] [] [] [] [] []    Supine to Sit [] [] [] [] [] [x] [] [] [] [] []    Scooting [] [] [] [] [] [x] [] [] [] [] []    Sit to Supine [] [] [] [] [] [x] [] [] [] [] []    Transfers    Sit to Stand [] [] [] [] [] [x] [] [] [] [] []    Bed to Chair [] [] [] [] [] [] [] [] [] [x] []    Stand to Sit [] [] [] [] [] [x] [] [] [] [] []    I=Independent, Mod I=Modified Independent, S=Supervision, SBA=Standby Assistance, CGA=Contact Guard Assistance,   Min=Minimal Assistance, Mod=Moderate Assistance, Max=Maximal Assistance, Total=Total Assistance, NT=Not Tested  GAIT: I Mod I S SBA CGA Min Mod Max Total  NT x2 Comments:   Level of Assistance [] [] [] [] [x] [] [] [] [] [] []    Distance 20 ft    DME Rolling Walker    Gait Quality Decreased gait speed, fatigues easily     Weightbearing Status N/A     I=Independent, Mod I=Modified Independent, S=Supervision, SBA=Standby Assistance, CGA=Contact Guard Assistance,   Min=Minimal Assistance, Mod=Moderate Assistance, Max=Maximal Assistance, Total=Total Assistance, NT=Not Tested    Saravanan Kam 6 Clicks   Basic Mobility Inpatient Short Form       How much difficulty does the patient currently have. .. Unable A Lot A Little None   1. Turning over in bed (including adjusting bedclothes, sheets and blankets)? [] 1   [] 2   [x] 3   [] 4   2. Sitting down on and standing up from a chair with arms ( e.g., wheelchair, bedside commode, etc.)   [] 1   [] 2   [x] 3   [] 4   3. Moving from lying on back to sitting on the side of the bed? [] 1   [] 2   [x] 3   [] 4   How much help from another person does the patient currently need. .. Total A Lot A Little None   4. Moving to and from a bed to a chair (including a wheelchair)? [] 1   [] 2   [x] 3   [] 4   5. Need to walk in hospital room? [] 1   [] 2   [x] 3   [] 4   6. Climbing 3-5 steps with a railing? [] 1   [x] 2   [] 3   [] 4   © 2007, Trustees of [facility], under license to [employer]. All rights reserved     Score:  Initial: 17 Most Recent: X (Date: -- )    Interpretation of Tool:  Represents activities that are increasingly more difficult (i.e. Bed mobility, Transfers, Gait). PLAN:   FREQUENCY/DURATION: PT Plan of Care: 3 times/week for duration of hospital stay or until stated goals are met, whichever comes first.    PROBLEM LIST:   (Skilled intervention is medically necessary to address:)  1. Decreased ADL/Functional Activities  2. Decreased Activity Tolerance  3. Decreased Balance  4. Decreased Gait Ability  5. Decreased Strength  6. Decreased Transfer Abilities  7. Increased Pain   INTERVENTIONS PLANNED:   (Benefits and precautions of physical therapy have been discussed with the patient.)  1. Therapeutic Activity  2. Therapeutic Exercise/HEP  3. Neuromuscular Re-education  4. Gait Training  5. Education     TREATMENT:     EVALUATION: Low Complexity : (Untimed Charge)    TREATMENT:   ($$ Therapeutic Activity: 23-37 mins    )  Therapeutic Activity (23 Minutes): Therapeutic activity included Rolling, Supine to Sit, Sit to Supine, Scooting, Ambulation on level ground, Sitting balance  and Standing balance to improve functional Mobility, Strength and Activity tolerance.     TREATMENT GRID:  N/A    AFTER TREATMENT POSITION/PRECAUTIONS:  Bed, Needs within reach and RN notified    INTERDISCIPLINARY COLLABORATION:  RN/PCT    TOTAL TREATMENT DURATION:  PT Patient Time In/Time Out  Time In: 1414  [facility]: [facility]

## 2022-01-13 NOTE — PROGRESS NOTES
TRANSFER IN - DIALYSIS    [facility] for ordered procedure. Consent verified for renal replacement therapy. Procedure explained to patient, opportunity for Q&A provided. Call light given. Patient alert and vital signs stable. Hemodialysis initiated using left avf and 15 g needles. Machine settings per MD order. Heparin 0 unit bolus and 0 units/hr. Will monitor during treatment.       01/13/22 0794   Patient Information   Acute or Chronic Care Acute (comment)   Treatment Number 2   Informed Consent Verified Yes   Dialysis Weight   Goal/Amount of Fluid to Remove (mL) 3100 mL   Dialyzer/Set Up Inspection   Dialyzer/Set Up Inspection Revaclear   Alarms Verified Yes   Test Pass Yes   pH 7.1   Machine Conductivity 14   Meter Conductivity 13.5   Reverse Osmosis Safety Checks   Reverse Osmosis Machine Log Completed Yes   Total Chlorine Test Negative   Machine Initiation   Machine Number t3   Hemodialysis Start Time 0725   Unused Lines Clamped Yes   Machine Temperature 95.9 °F (35.5 °C)   Dialysis Initiation   All Connections Secured Yes   NS Bag  Yes   Saline Line Double Clamped Yes   Prime Given   Air Foam Detector Engaged Yes   Dialysate NA (mEq/L) 138   Dialysate K (mEq/L) 3   Dialysate CA (mEq/L) 2.5   Dialysate HCO3 (mEq/L) 38   Citrasate No   During Hemodialysis    Temp 98.2 °F (36.8 °C)   Pulse (Heart Rate) 80   BP (!) 90/45   MAP (Calculated) (!) 60   Transducer Checks Dry   Saline Given (mL) 300 mL   Heparin Bolus (units) 0 units   Continuous Heparin Infusion (Units/hr) 0 Units/hr   Blood Flow Rate (ml/min) 450 ml/min   Dialysate Flow Rate (ml/hr) 800 ml/hr   Arterial Access Pressure (mmHg) 310   Venous Return Pressure (mmHg) 170   Transmembrane Pressure (mmHg) 60 mmHg   Ultrafiltration Rate (ml/hr) 780 ml/hr   Fluid Removed (mL) 0

## 2022-01-13 NOTE — PROGRESS NOTES
Laura Fischer  Admission Date: 1/10/2022         Daily Progress Note: 1/13/2022    The patient's chart is reviewed and the patient is discussed with the staff. Background: Patient is a 80 y.o.  female seen and evaluated at the request of Dr. Elisa Dorado. She has a history of ESRD on HD, RCC s/p resection (2015) and recurrence requiring SBRT (2017), DM, and HTN. She was admitted 1/10 with left sided back pain. She had a CT chest performed which showed B pleural effusions (both small, R>L) as well as a 2.5cm right paratracheal mass vs lymph node. Of note, this was also seen on multiple past CT scans but appears more prominent now. She was seen by oncology who recommended thoracentesis for cytology if possible. If non diagnostic outpatient EBUS. Consult was just brought to our attention today. Patient is currently needing 2L O2.      Pt complains of ongoing pain in her back. She is tearful because of this throughout much of my encounter with her. Subjective: Had thoracentesis for 400 ml yesterday. She didn't notice much change in breathing post tap. Currently on 2L with sat of 100. Just got morphine.  Had dialysis this AM.      Current Facility-Administered Medications   Medication Dose Route Frequency    heparin (porcine) injection 5,000 Units  5,000 Units SubCUTAneous Q12H    epoetin mary lou-epbx (RETACRIT) injection 40,000 Units  40,000 Units SubCUTAneous Q7D    morphine injection 4 mg  4 mg IntraVENous Q6H PRN    [Held by provider] amLODIPine (NORVASC) tablet 10 mg  10 mg Oral QHS    gabapentin (NEURONTIN) capsule 100 mg  100 mg Oral TID    [Held by provider] lisinopriL (PRINIVIL, ZESTRIL) tablet 40 mg  40 mg Oral QHS    methocarbamoL (ROBAXIN) tablet 1,500 mg  1,500 mg Oral QID    [Held by provider] minoxidiL (LONITEN) tablet 10 mg  10 mg Oral BID    pantoprazole (PROTONIX) tablet 40 mg  40 mg Oral ACB    sevelamer carbonate (RENVELA) tab 800 mg  800 mg Oral TID WITH MEALS    sodium chloride (NS) flush 5-40 mL  5-40 mL IntraVENous Q8H    sodium chloride (NS) flush 5-40 mL  5-40 mL IntraVENous PRN    acetaminophen (TYLENOL) tablet 650 mg  650 mg Oral Q6H PRN    Or    acetaminophen (TYLENOL) suppository 650 mg  650 mg Rectal Q6H PRN    polyethylene glycol (MIRALAX) packet 17 g  17 g Oral DAILY PRN    ondansetron (ZOFRAN ODT) tablet 4 mg  4 mg Oral Q8H PRN    Or    ondansetron (ZOFRAN) injection 4 mg  4 mg IntraVENous Q6H PRN    HYDROcodone-acetaminophen (NORCO) 7.5-325 mg per tablet 1 Tablet  1 Tablet Oral Q6H PRN    heparin (porcine) 1,000 unit/mL injection 5,000 Units  5,000 Units Hemodialysis DIALYSIS PRN    insulin lispro (HUMALOG) injection   SubCUTAneous AC&HS     Review of Systems    Constitutional: negative for fever, chills, sweats  Cardiovascular: negative for chest pain, palpitations, syncope, edema  Gastrointestinal:  negative for dysphagia, reflux, vomiting, diarrhea, abdominal pain, or melena  Neurologic:  negative for focal weakness, numbness, headache    Objective:     Vitals:    01/13/22 1030 01/13/22 1100 01/13/22 1115 01/13/22 1218   BP: (!) 128/56 (!) 106/46 (!) 117/48 (!) 116/52   Pulse: 69 72 73 81   Resp:    18   Temp:    98.1 °F (36.7 °C)   SpO2:    100%   Weight:       Height:           Intake/Output Summary (Last 24 hours) at 1/13/2022 1317  Last data filed at 1/13/2022 1115  Gross per 24 hour   Intake --   Output 1000 ml   Net -1000 ml     Physical Exam:   Constitution:  the patient is well developed and in no acute distress  HEENT:  Sclera clear, pupils equal, oral mucosa moist  Respiratory: decreased BS   Cardiovascular:  RRR without M,G,R  Gastrointestinal: soft and non-tender; with positive bowel sounds. Musculoskeletal: warm without cyanosis. There is trace lower extremity edema.   Skin:  no jaundice or rashes  Neurologic: no gross neuro deficits     Psychiatric:  alert and oriented x 3    Chest CT: 1/10/22: IMPRESSION  1. Bilateral pleural effusions with subjacent atelectasis.     2. The previously reported right paratracheal mass/lymph node is slightly  larger. LAB:  Recent Labs     01/13/22  0350 01/12/22  0934 01/11/22  0333   WBC 4.8 5.1 4.5   HGB 8.1* 8.5* 8.0*   HCT 23.6* 24.5* 22.8*   PLT 96* 99* 91*     Recent Labs     01/13/22  0350 01/11/22  0333    139   K 4.7 4.2   CL 97* 102   CO2 31 28   * 85   BUN 39* 39*   CREA 6.53* 7.54*   MG  --  3.2*   CA 8.8 8.3   ALB 3.4  --    AST 7*  --    ALT 8*  --    AP 64  --      No results for input(s): LAC, TROPHS, BNPNT, CRP in the last 72 hours. No lab exists for component: ESR  No results for input(s): PH, PCO2, PO2, HCO3, PHI, PCO2I, PO2I, HCO3I in the last 72 hours. Recent Labs     01/12/22  1153   CULT NO GROWTH 1 DAY     Assessment and Plan:  (Medical Decision Making)   Principal Problem:    Mediastinal mass (1/10/2022)    Will likely need OP EBUS guided Bx. Active Problems:    ESRD (end stage renal disease) ([address] (2/7/2013)    Per nephrology      HTN (hypertension) (2/7/2013)    Controlled      Abdominal mass (9/12/2017)    Per primary      Renal cell carcinoma ([address] (9/12/2017)    ? Mets to pleura and mediastinum. Severe back pain (11/12/2021)    Per primary      Acute respiratory failure with hypoxia ([address] (11/12/2021)    On 2L, can wean to RA and see if she still needs O2. Bilateral pleural effusion (1/12/2022)    Await pleural fluid cytology, etc. Technically fluid was a transudate. Culture is negative with AFB and fungus also still pending. More than 50% of the time documented was spent in face-to-face contact with the patient and in the care of the patient on the floor/unit where the patient is located.     Kosta Shankar MD

## 2022-01-13 NOTE — PROGRESS NOTES
Hospitalist Progress Note   Admit Date:  1/10/2022  6:06 AM   Name:  Eugenia Fischer   Age:  80 y.o. Sex:  female  :  1950   MRN:  [id number]   [facility]  [facility]    Presenting Complaint: Back Pain    Reason(s) for Admission: Mediastinal mass [J98.59]  Acute respiratory failure with hypoxia [facility]) [J96.01]     Hospital Course & Interval History:   Johanny Culver is a 80 y.o. female with medical history of ESRD on hemodialysis, recurrent renal cell carcinoma s/p left nephrectomy  with recurrence and s/p course localized XRT to left renal bed who presented with severe sharp left-sided thoracic back pain. Patient recently admitted in November left flank pain and treated empirically with abx until acute infectious process ruled out. Pt had a CT thoracic spine which did not reveal acute fx, no aggressive bony lesions though b/l lung patchy infiltrative processes (pneumonitis / atypical PNA / pulm edema). Of significance pt did have a CT C/A to eval for potential aortic dissection given significant pain on 2021 which did reveal b/l small pleural effusions, enlarging right paratracheal lymph node (2.0 cm from previous 12mm) and small b/l hilar lymph nodes, no aortic dissection. She does follow with hem/onc routinely for underlying RCC hx. In the ER, pt had a repeat CT chest which revealed b/l pleural effusions and an increase in size of right paratracheal lymph node now 2.6cm in size as compared to Nov CT chest which measured lymph node 2.0cm. Pt admits to having missed her Monday HD session due to pain (she has been on HD x 15 years, M/W/F). She denies chest pain, palpitation, shortness of breath, nausea, vomiting, fevers or chills. Her VS / labs were stable; Hospitalist service consulted for inpatient admission for persistent back pain and enlarging mediastinal lymph node with acute hypoxic resp failure. Subjective (22): \"I feel like the pain in my back is worse. \"  Tearful 71y. o. AA female in bed c/o left mid back pain with mild dyspnea    Review of Systems:  10 systems reviewed and negative except as noted in HPI. Assessment & Plan:     Principal Problem:    Mediastinal mass (1/10/2022)  1/13:  - need to rule out malignant etiology as pt with hx of recurrent RCC  - s/p thoracentesis with cytology / cx pending  - if no yield will consider EBUS; pulm recommended PET scan on outpatient setting  Currently on 2 ltr via NC  6 minute walk test ordered for 1/14/22 for home O2 assessment    Active Problems:    Acute hypoxic respiratory failure  - O2 85% on RA noted in ER, improved to 96% on 2L  1/13:  S/p thoracentesis on 1/12/22  6 minute walk test for home O2 assessment. Thoracic / upper back pain  - appreciate pulmonary eval for potential empyema  - xray thoracic spine 1/12/2022 negative for acute fx  - s/p thoracentesis on 1/12  - prn norco / morphine for pain control      B/l pleural effusions  - s/p thoracentesis right lung on 1/12 ; f/u cytology / cx  - cont HD for volume mgmt      ESRD (end stage renal disease)  - appreciate nephrology's ongoing assistance  - cont HD 3 x a week      HTN (hypertension)  - labile readings with hypotensive episodes; may be attributed to IV dilaudid  - closely monitor; RN instructed to [address] [facility] [address] if SBP <100mmhg  - monitor closely      Hx of recurrent renal cell carcinoma  - s/p left nephrectomy 2015 with recurrence and subsequent XRT to left renal bed  - mgmt per hem/onc      Anemia chronic disease  - hgb 8.1 this AM (from 8.0 yesterday)  - no gross bleeding; cont to closely monitor      Diabetes mellitus   - on januvia at home  - hgbA1c 5.3 on 11/13/2021  - sliding scale coverage as needed; low-carb diet       Dispo/Discharge Planning:    - home once medically stable    Diet:  ADULT DIET Regular; 3 carb choices (45 gm/meal); Low Fat/Low Chol/High Fiber/2 gm Na; Low Potassium (Less than 3000 mg/day);  Low Phosphorus (Less than 1000 mg)  DVT PPx: hep sc  Code status: Full Code    Hospital Problems as of 1/13/2022 Date Reviewed: 7/27/2021          Codes Class Noted - Resolved POA    Bilateral pleural effusion ICD-10-CM: J90  ICD-9-CM: 511.9  1/12/2022 - Present Unknown        * (Principal) Mediastinal mass ICD-10-CM: J98.59  ICD-9-CM: 786.6  1/10/2022 - Present Unknown        Severe back pain ICD-10-CM: M54.9  ICD-9-CM: 724.5  11/12/2021 - Present Yes        Acute respiratory failure with hypoxia [facility]) ICD-10-CM: J96.01  ICD-9-CM: 518.81  11/12/2021 - Present Unknown        Abdominal mass ICD-10-CM: R19.00  ICD-9-CM: 789.30  9/12/2017 - Present Yes    Overview Signed 9/12/2017  9:01 PM by Scout Carlisle MD     Of Left renal bed             Renal cell carcinoma ([facility] ICD-10-CM: C64.9  ICD-9-CM: 189.0  9/12/2017 - Present Yes        ES[facility] ICD-10-CM: N18.6  ICD-9-CM: 585.6  2/7/2013 - Present Yes        HTN (hypertension) ICD-10-CM: I10  ICD-9-CM: 401.9  2/7/2013 - Present Yes              Objective:     Patient Vitals for the past 24 hrs:   Temp Pulse Resp BP SpO2   01/13/22 0800 -- 83 -- (!) 111/55 --   01/13/22 0730 -- 80 -- (!) 107/55 --   01/13/22 0723 98.2 °F (36.8 °C) 80 -- (!) 90/45 --   01/13/22 0326 98.2 °F (36.8 °C) 81 -- (!) 86/52 95 %   01/13/22 0037 98 °F (36.7 °C) 80 -- (!) 86/50 94 %   01/12/22 2137 97 °F (36.1 °C) 79 18 (!) 87/50 94 %   01/12/22 1610 98.1 °F (36.7 °C) 84 -- (!) 99/50 96 %   01/12/22 1201 -- 87 18 120/71 98 %   01/12/22 1148 -- 85 18 (!) 147/75 96 %   01/12/22 1145 -- 85 18 (!) 159/81 96 %   01/12/22 1110 98.3 °F (36.8 °C) 87 -- (!) 96/49 96 %     Oxygen Therapy  O2 Sat (%): 95 % (01/13/22 0326)  Pulse via Oximetry: 85 beats per minute (01/12/22 1148)  O2 Device: Nasal cannula (01/13/22 0326)  O2 Flow Rate (L/min): 2 l/min (01/13/22 0326)    Estimated body mass index is 29.99 kg/m² as calculated from the following:    Height as of this encounter: 5' 5.5\" (1.664 m).     Weight as of this encounter: 83 kg (183 lb). Intake/Output Summary (Last 24 hours) at 1/13/2022 0825  Last data filed at 1/12/2022 1252  Gross per 24 hour   Intake 16 ml   Output --   Net 16 ml         Physical Exam:     Blood pressure (!) 111/55, pulse 83, temperature 98.2 °F (36.8 °C), resp. rate 18, height 5' 5.5\" (1.664 m), weight 83 kg (183 lb), SpO2 95 %. General:    Alert/awake, NAD, on 2-3 ltr via NC  HEENT:           Head NCAT, PERRLA+, MMM  Neck:  No restricted ROM. Trachea midline   CV:   RRR. No m/r/g. No jugular venous distension. Lungs:   Diminished breath sounds in bases B/L, no wheezing or ronchi  Abdomen: Bowel sounds present. Soft, nontender, nondistended. Extremities: Trace bilateral pedal edema  Skin:     No rashes and normal coloration. Warm and dry. Neuro:  CN II-XII grossly intact. Sensation intact. Psych:  AOx3, Normal mood and affect.       I have reviewed ordered lab tests and independently visualized imaging below:    Recent Labs:  Recent Results (from the past 48 hour(s))   GLUCOSE, POC    Collection Time: 01/11/22 10:10 AM   Result Value Ref Range    Glucose (POC) 101 (H) 65 - 100 mg/dL    Performed by Marcus Bello, POC    Collection Time: 01/11/22  3:30 PM   Result Value Ref Range    Glucose (POC) 164 (H) 65 - 100 mg/dL    Performed by Beatrice    GLUCOSE, POC    Collection Time: 01/11/22  8:57 PM   Result Value Ref Range    Glucose (POC) 128 (H) 65 - 100 mg/dL    Performed by Trinh    GLUCOSE, POC    Collection Time: 01/12/22  6:18 AM   Result Value Ref Range    Glucose (POC) 86 65 - 100 mg/dL    Performed by Tania    CBC WITH AUTOMATED DIFF    Collection Time: 01/12/22  9:34 AM   Result Value Ref Range    WBC 5.1 4.3 - 11.1 K/uL    RBC 2.53 (L) 4.05 - 5.2 M/uL    HGB 8.5 (L) 11.7 - 15.4 g/dL    HCT 24.5 (L) 35.8 - 46.3 %    MCV 96.8 79.6 - 97.8 FL    MCH 33.6 (H) 26.1 - 32.9 PG    MCHC 34.7 31.4 - 35.0 g/dL    RDW 17.7 (H) 11.9 - 14.6 % PLATELET 99 (L) 550 - 450 K/uL    MPV 11.9 9.4 - 12.3 FL    ABSOLUTE NRBC 0.00 0.0 - 0.2 K/uL    DF AUTOMATED      NEUTROPHILS 73 43 - 78 %    LYMPHOCYTES 14 13 - 44 %    MONOCYTES 11 4.0 - 12.0 %    EOSINOPHILS 2 0.5 - 7.8 %    BASOPHILS 1 0.0 - 2.0 %    IMMATURE GRANULOCYTES 0 0.0 - 5.0 %    ABS. NEUTROPHILS 3.7 1.7 - 8.2 K/UL    ABS. LYMPHOCYTES 0.7 0.5 - 4.6 K/UL    ABS. MONOCYTES 0.5 0.1 - 1.3 K/UL    ABS. EOSINOPHILS 0.1 0.0 - 0.8 K/UL    ABS. BASOPHILS 0.1 0.0 - 0.2 K/UL    ABS. IMM. GRANS. 0.0 0.0 - 0.5 K/UL   GLUCOSE, POC    Collection Time: 01/12/22 11:03 AM   Result Value Ref Range    Glucose (POC) 125 (H) 65 - 100 mg/dL    Performed by Timothy    CULTURE, BODY FLUID W GRAM STAIN    Collection Time: 01/12/22 11:53 AM    Specimen: Pleural Fluid    RIGHT   Result Value Ref Range    Special Requests: NO SPECIAL REQUESTS      GRAM STAIN PENDING     Culture result: NO GROWTH 1 DAY     CELL COUNT, BODY FLUID    Collection Time: 01/12/22 11:53 AM   Result Value Ref Range    BODY FLUID TYPE PLEURAL FLUID      FLUID COLOR YELLOW      FLUID APPEARANCE CLOUDY      FLUID RBC CT. 3,000 /cu mm    FLUID WBC COUNT 603 /cu mm    BRCH NEUTROPHIL 6 %    BRCH LYMPHS 84 %    BRCH MACROPHAGES 9 %    BRCH EOSINS 1 %    OTHER CELL 39     GLUCOSE, FLUID    Collection Time: 01/12/22 11:53 AM   Result Value Ref Range    Fluid Type: PLEURAL FLUID      Glucose, body fld. 123 MG/DL   LDH, BODY FLUID    Collection Time: 01/12/22 11:53 AM   Result Value Ref Range    Fluid Type: PLEURAL FLUID      LD, body fld. 73 U/L   PROTEIN TOTAL, FLUID    Collection Time: 01/12/22 11:53 AM   Result Value Ref Range    Fluid Type: PLEURAL FLUID      Protein total, body fld.  3.0 g/dL   GLUCOSE, POC    Collection Time: 01/12/22  4:03 PM   Result Value Ref Range    Glucose (POC) 124 (H) 65 - 100 mg/dL    Performed by [facility]    GLUCOSE, POC    Collection Time: 01/12/22  9:40 PM   Result Value Ref Range    Glucose (POC) 130 (H) 65 - 100 mg/dL    Performed by [facility]    CBC WITH AUTOMATED DIFF    Collection Time: 01/13/22  3:50 AM   Result Value Ref Range    WBC 4.8 4.3 - 11.1 K/uL    RBC 2.43 (L) 4.05 - 5.2 M/uL    HGB 8.1 (L) 11.7 - 15.4 g/dL    HCT 23.6 (L) 35.8 - 46.3 %    MCV 97.1 79.6 - 97.8 FL    MCH 33.3 (H) 26.1 - 32.9 PG    MCHC 34.3 31.4 - 35.0 g/dL    RDW 17.2 (H) 11.9 - 14.6 %    PLATELET 96 (L) 286 - 450 K/uL    MPV 11.1 9.4 - 12.3 FL    ABSOLUTE NRBC 0.00 0.0 - 0.2 K/uL    DF AUTOMATED      NEUTROPHILS 69 43 - 78 %    LYMPHOCYTES 16 13 - 44 %    MONOCYTES 11 4.0 - 12.0 %    EOSINOPHILS 3 0.5 - 7.8 %    BASOPHILS 1 0.0 - 2.0 %    IMMATURE GRANULOCYTES 1 0.0 - 5.0 %    ABS. NEUTROPHILS 3.3 1.7 - 8.2 K/UL    ABS. LYMPHOCYTES 0.8 0.5 - 4.6 K/UL    ABS. MONOCYTES 0.5 0.1 - 1.3 K/UL    ABS. EOSINOPHILS 0.2 0.0 - 0.8 K/UL    ABS. BASOPHILS 0.1 0.0 - 0.2 K/UL    ABS. IMM. GRANS. 0.0 0.0 - 0.5 K/UL   METABOLIC PANEL, COMPREHENSIVE    Collection Time: 01/13/22  3:50 AM   Result Value Ref Range    Sodium 136 136 - 145 mmol/L    Potassium 4.7 3.5 - 5.1 mmol/L    Chloride 97 (L) 98 - 107 mmol/L    CO2 31 21 - 32 mmol/L    Anion gap 8 7 - 16 mmol/L    Glucose 106 (H) 65 - 100 mg/dL    BUN 39 (H) 8 - 23 MG/DL    Creatinine 6.53 (H) 0.6 - 1.0 MG/DL    GFR est AA 8 (L) >60 ml/min/1.73m2    GFR est non-AA 7 (L) >60 ml/min/1.73m2    Calcium 8.8 8.3 - 10.4 MG/DL    Bilirubin, total 1.8 (H) 0.2 - 1.1 MG/DL    ALT (SGPT) 8 (L) 12 - 65 U/L    AST (SGOT) 7 (L) 15 - 37 U/L    Alk.  phosphatase 64 50 - 136 U/L    Protein, total 6.6 6.3 - 8.2 g/dL    Albumin 3.4 3.2 - 4.6 g/dL    Globulin 3.2 2.3 - 3.5 g/dL    A-G Ratio 1.1 (L) 1.2 - 3.5     GLUCOSE, POC    Collection Time: 01/13/22  6:36 AM   Result Value Ref Range    Glucose (POC) 96 65 - 100 mg/dL    Performed by Tania        All Micro Results     Procedure Component Value Units Date/Time    CULTURE, BODY FLUID W Mindy Hem [[id number]] Collected: 01/12/22 1153    Order Status: Completed Specimen: Pleural Fluid Updated: 01/13/22 0705     Special Requests: NO SPECIAL REQUESTS        GRAM STAIN PENDING     Culture result: NO GROWTH 1 DAY       AFB CULTURE + SMEAR W/RFLX ID FROM CULTURE [[id number]] Collected: 01/12/22 1153    Order Status: Completed Updated: 01/12/22 1217    FUNGUS CULTURE AND SMEAR [[id number]] Collected: 01/12/22 1153    Order Status: Completed Updated: 01/12/22 1216          Other Studies:  No results found.     Current Meds:  Current Facility-Administered Medications   Medication Dose Route Frequency    heparin (porcine) injection 5,000 Units  5,000 Units SubCUTAneous Q12H    epoetin mary lou-epbx (RETACRIT) injection 40,000 Units  40,000 Units SubCUTAneous Q7D    morphine injection 4 mg  4 mg IntraVENous Q6H PRN    amLODIPine (NORVASC) tablet 10 mg  10 mg Oral QHS    gabapentin (NEURONTIN) capsule 100 mg  100 mg Oral TID    lisinopriL (PRINIVIL, ZESTRIL) tablet 40 mg  40 mg Oral QHS    methocarbamoL (ROBAXIN) tablet 1,500 mg  1,500 mg Oral QID    [Held by provider] minoxidiL (LONITEN) tablet 10 mg  10 mg Oral BID    pantoprazole (PROTONIX) tablet 40 mg  40 mg Oral ACB    sevelamer carbonate (RENVELA) tab 800 mg  800 mg Oral TID WITH MEALS    sodium chloride (NS) flush 5-40 mL  5-40 mL IntraVENous Q8H    sodium chloride (NS) flush 5-40 mL  5-40 mL IntraVENous PRN    acetaminophen (TYLENOL) tablet 650 mg  650 mg Oral Q6H PRN    Or    acetaminophen (TYLENOL) suppository 650 mg  650 mg Rectal Q6H PRN    polyethylene glycol (MIRALAX) packet 17 g  17 g Oral DAILY PRN    ondansetron (ZOFRAN ODT) tablet 4 mg  4 mg Oral Q8H PRN    Or    ondansetron (ZOFRAN) injection 4 mg  4 mg IntraVENous Q6H PRN    HYDROcodone-acetaminophen (NORCO) 7.5-325 mg per tablet 1 Tablet  1 Tablet Oral Q6H PRN    heparin (porcine) 1,000 unit/mL injection 5,000 Units  5,000 Units Hemodialysis DIALYSIS PRN    insulin lispro (HUMALOG) injection   SubCUTAneous AC&HS       Signed:  Liliana Travis MD    Part of this note may have been written by using a voice dictation software. The note has been proof read but may still contain some grammatical/other typographical errors.

## 2022-01-13 NOTE — PROGRESS NOTES
TRANSFER OUT -DIALYSIS    Hemodialysis treatment completed without complications. Patient alert and VS stable        1.0 Kg removed. Needles x2 removed from access and manual pressure held until hemostasis complete and pressure dressing applied. Meds given: 0. 0 units of RBCs given during dialysis. Patient to [phone] after dialysis.       01/13/22 1115   During Hemodialysis    Pulse (Heart Rate) 73   BP (!) 117/48   MAP (Calculated) 71   Transducer Checks Dry   Fluid Removed (mL) 1600   NET Fluid Removed (mL) 1000 ml   Post-Dialysis   Rinseback Volume (ml) 300 ml   Duration of Treatment (hours) 40 hours   Patient Response to Treatment Fair   Patient Disposition Return to room   Condition of Dialyzer Filter Fair   Hemodialysis End Time 1115   $$ Dialysis Charges   $$ Method Hemodialysis

## 2022-01-13 NOTE — PROGRESS NOTES
PT evaluation received, chart reviewed. Attempted to see pt, she is currently PADMINI for HD. Will f/u as pt is able and schedule permits.     Rafaela Knight, PT, DPT

## 2022-01-13 NOTE — PROGRESS NOTES
Chart screened by CM for d/c planning. Pt is currently requiring 2L O2 NC. A PT consult has been ordered. CM is awaiting recommendations. Yesterday pt had the following procedure: Therapeutic thoracentesis. Both Pre and Post-Procedure Dx: Bilateral pleural effusion. 300 cc of fluid was removed from the R side and sent to the lab for analysis. D/C plan undetermined. Hopefully home when medically stable. CM will continue to follow and remain available if any needs arise.

## 2022-01-14 LAB
BACTERIA SPEC CULT: NORMAL
GLUCOSE BLD STRIP.AUTO-MCNC: 102 MG/DL (ref 65–100)
GLUCOSE BLD STRIP.AUTO-MCNC: 110 MG/DL (ref 65–100)
GLUCOSE BLD STRIP.AUTO-MCNC: 114 MG/DL (ref 65–100)
GLUCOSE BLD STRIP.AUTO-MCNC: 125 MG/DL (ref 65–100)
GRAM STN SPEC: NORMAL
GRAM STN SPEC: NORMAL
SERVICE CMNT-IMP: ABNORMAL
SERVICE CMNT-IMP: NORMAL

## 2022-01-14 PROCEDURE — 74011250637 HC RX REV CODE- 250/637: Performed by: HOSPITALIST

## 2022-01-14 PROCEDURE — APPSS30 APP SPLIT SHARED TIME 16-30 MINUTES: Performed by: NURSE PRACTITIONER

## 2022-01-14 PROCEDURE — 99232 SBSQ HOSP IP/OBS MODERATE 35: CPT | Performed by: INTERNAL MEDICINE

## 2022-01-14 PROCEDURE — 94761 N-INVAS EAR/PLS OXIMETRY MLT: CPT

## 2022-01-14 PROCEDURE — 74011250637 HC RX REV CODE- 250/637: Performed by: FAMILY MEDICINE

## 2022-01-14 PROCEDURE — 65270000029 HC RM PRIVATE

## 2022-01-14 PROCEDURE — 74011250636 HC RX REV CODE- 250/636: Performed by: INTERNAL MEDICINE

## 2022-01-14 PROCEDURE — 74011250636 HC RX REV CODE- 250/636: Performed by: PHYSICIAN ASSISTANT

## 2022-01-14 PROCEDURE — 74011000250 HC RX REV CODE- 250: Performed by: FAMILY MEDICINE

## 2022-01-14 PROCEDURE — 82962 GLUCOSE BLOOD TEST: CPT

## 2022-01-14 PROCEDURE — 74011250636 HC RX REV CODE- 250/636: Performed by: EMERGENCY MEDICINE

## 2022-01-14 RX ORDER — FENTANYL CITRATE 50 UG/ML
25 INJECTION, SOLUTION INTRAMUSCULAR; INTRAVENOUS ONCE
Status: DISCONTINUED | OUTPATIENT
Start: 2022-01-14 | End: 2022-01-14

## 2022-01-14 RX ORDER — POLYETHYLENE GLYCOL 3350 17 G/17G
17 POWDER, FOR SOLUTION ORAL 2 TIMES DAILY
Status: DISPENSED | OUTPATIENT
Start: 2022-01-14 | End: 2022-01-16

## 2022-01-14 RX ORDER — FACIAL-BODY WIPES
10 EACH TOPICAL ONCE
Status: COMPLETED | OUTPATIENT
Start: 2022-01-14 | End: 2022-01-14

## 2022-01-14 RX ORDER — ADHESIVE BANDAGE
30 BANDAGE TOPICAL DAILY PRN
Status: DISCONTINUED | OUTPATIENT
Start: 2022-01-14 | End: 2022-01-20 | Stop reason: HOSPADM

## 2022-01-14 RX ADMIN — GABAPENTIN 100 MG: 100 CAPSULE ORAL at 08:48

## 2022-01-14 RX ADMIN — PANTOPRAZOLE SODIUM 40 MG: 40 TABLET, DELAYED RELEASE ORAL at 05:30

## 2022-01-14 RX ADMIN — POLYETHYLENE GLYCOL 3350 17 G: 17 POWDER, FOR SOLUTION ORAL at 18:49

## 2022-01-14 RX ADMIN — METHOCARBAMOL TABLETS 1500 MG: 750 TABLET, COATED ORAL at 18:48

## 2022-01-14 RX ADMIN — BISACODYL 10 MG: 10 SUPPOSITORY RECTAL at 10:20

## 2022-01-14 RX ADMIN — MORPHINE SULFATE 4 MG: 4 INJECTION INTRAVENOUS at 08:42

## 2022-01-14 RX ADMIN — GABAPENTIN 100 MG: 100 CAPSULE ORAL at 16:50

## 2022-01-14 RX ADMIN — SODIUM CHLORIDE, PRESERVATIVE FREE 10 ML: 5 INJECTION INTRAVENOUS at 14:00

## 2022-01-14 RX ADMIN — MORPHINE SULFATE 4 MG: 4 INJECTION INTRAVENOUS at 13:41

## 2022-01-14 RX ADMIN — METHOCARBAMOL TABLETS 1500 MG: 750 TABLET, COATED ORAL at 13:32

## 2022-01-14 RX ADMIN — SODIUM CHLORIDE, PRESERVATIVE FREE 10 ML: 5 INJECTION INTRAVENOUS at 04:00

## 2022-01-14 RX ADMIN — HYDROCODONE BITARTRATE AND ACETAMINOPHEN 1 TABLET: 7.5; 325 TABLET ORAL at 05:30

## 2022-01-14 RX ADMIN — METHOCARBAMOL TABLETS 1500 MG: 750 TABLET, COATED ORAL at 22:04

## 2022-01-14 RX ADMIN — MORPHINE SULFATE 4 MG: 4 INJECTION INTRAVENOUS at 22:03

## 2022-01-14 RX ADMIN — METHOCARBAMOL TABLETS 1500 MG: 750 TABLET, COATED ORAL at 08:48

## 2022-01-14 RX ADMIN — SODIUM CHLORIDE 250 ML: 900 INJECTION, SOLUTION INTRAVENOUS at 04:00

## 2022-01-14 RX ADMIN — GABAPENTIN 100 MG: 100 CAPSULE ORAL at 22:04

## 2022-01-14 RX ADMIN — SEVELAMER CARBONATE 800 MG: 800 TABLET, FILM COATED ORAL at 13:32

## 2022-01-14 RX ADMIN — SEVELAMER CARBONATE 800 MG: 800 TABLET, FILM COATED ORAL at 16:50

## 2022-01-14 RX ADMIN — SODIUM CHLORIDE, PRESERVATIVE FREE 10 ML: 5 INJECTION INTRAVENOUS at 22:06

## 2022-01-14 RX ADMIN — HEPARIN SODIUM 5000 UNITS: 5000 INJECTION INTRAVENOUS; SUBCUTANEOUS at 05:30

## 2022-01-14 RX ADMIN — SEVELAMER CARBONATE 800 MG: 800 TABLET, FILM COATED ORAL at 08:48

## 2022-01-14 RX ADMIN — HEPARIN SODIUM 5000 UNITS: 5000 INJECTION INTRAVENOUS; SUBCUTANEOUS at 18:48

## 2022-01-14 NOTE — PROGRESS NOTES
Oxygen Qualifier       Room air: SpO2 with O2 and liter flow   Resting SpO2  79%   86% on 1L    90% on 2L   Ambulating SpO2  73%  77% on 1L   90% on 2L       Completed by:    Yash Babb

## 2022-01-14 NOTE — PROGRESS NOTES
Bonnie Fischer  Admission Date: 1/10/2022         Daily Progress Note: 1/14/2022    The patient's chart is reviewed and the patient is discussed with the staff. Background: Patient is a 80 y.o.  female seen and evaluated at the request of Dr. Saint Mulligan. She has a history of ESRD on HD, RCC s/p resection (2015) and recurrence requiring SBRT (2017), DM, and HTN. She was admitted 1/10 with left sided back pain. She had a CT chest performed which showed B pleural effusions (both small, R>L) as well as a 2.5cm right paratracheal mass vs lymph node. Of note, this was also seen on multiple past CT scans but appears more prominent now. She was seen by oncology who recommended thoracentesis for cytology if possible. If non diagnostic outpatient EBUS. Consult was just brought to our attention today. Patient is currently needing 2L O2. Had thoracentesis for 400 ml  1/12. Pt complains of ongoing pain in her back. She is tearful because of this throughout much of my encounter with her. Subjective: On 3 lpm with sat 93%. Pleural fluid looks transudative. Cytology pending. PET scan ordered.     Current Facility-Administered Medications   Medication Dose Route Frequency    magnesium hydroxide (MILK OF MAGNESIA) 400 mg/5 mL oral suspension 30 mL  30 mL Oral DAILY PRN    polyethylene glycol (MIRALAX) packet 17 g  17 g Oral BID    bisacodyL (DULCOLAX) suppository 10 mg  10 mg Rectal DAILY PRN    B complex-vitamin C-folic acid (NEPHRO-ROBB) 0.8 mg tab  1 Tablet Oral DAILY    heparin (porcine) injection 5,000 Units  5,000 Units SubCUTAneous Q12H    epoetin mary lou-epbx (RETACRIT) injection 40,000 Units  40,000 Units SubCUTAneous Q7D    morphine injection 4 mg  4 mg IntraVENous Q6H PRN    [Held by provider] amLODIPine (NORVASC) tablet 10 mg  10 mg Oral QHS    gabapentin (NEURONTIN) capsule 100 mg  100 mg Oral TID    [Held by provider] lisinopriL (PRINIVIL, ZESTRIL) tablet 40 mg  40 mg Oral QHS    methocarbamoL (ROBAXIN) tablet 1,500 mg  1,500 mg Oral QID    [Held by provider] minoxidiL (LONITEN) tablet 10 mg  10 mg Oral BID    pantoprazole (PROTONIX) tablet 40 mg  40 mg Oral ACB    sevelamer carbonate (RENVELA) tab 800 mg  800 mg Oral TID WITH MEALS    sodium chloride (NS) flush 5-40 mL  5-40 mL IntraVENous Q8H    sodium chloride (NS) flush 5-40 mL  5-40 mL IntraVENous PRN    acetaminophen (TYLENOL) tablet 650 mg  650 mg Oral Q6H PRN    Or    acetaminophen (TYLENOL) suppository 650 mg  650 mg Rectal Q6H PRN    polyethylene glycol (MIRALAX) packet 17 g  17 g Oral DAILY PRN    ondansetron (ZOFRAN ODT) tablet 4 mg  4 mg Oral Q8H PRN    Or    ondansetron (ZOFRAN) injection 4 mg  4 mg IntraVENous Q6H PRN    HYDROcodone-acetaminophen (NORCO) 7.5-325 mg per tablet 1 Tablet  1 Tablet Oral Q6H PRN    heparin (porcine) 1,000 unit/mL injection 5,000 Units  5,000 Units Hemodialysis DIALYSIS PRN    insulin lispro (HUMALOG) injection   SubCUTAneous AC&HS     Review of Systems    Constitutional: negative for fever, chills, sweats  Cardiovascular: negative for chest pain, palpitations, syncope, edema  Gastrointestinal:  negative for dysphagia, reflux, vomiting, diarrhea, abdominal pain, or melena  Neurologic:  negative for focal weakness, numbness, headache    Objective:     Vitals:    01/14/22 0130 01/14/22 0438 01/14/22 0440 01/14/22 0757   BP: (!) 87/50  (!) 93/48 (!) 110/59   Pulse: 89  81 84   Resp: 19  20 14   Temp:   98.9 °F (37.2 °C) 98.6 °F (37 °C)   SpO2:   100% 93%   Weight:  188 lb 1.6 oz (85.3 kg)     Height:           Intake/Output Summary (Last 24 hours) at 1/14/2022 1123  Last data filed at 1/14/2022 0840  Gross per 24 hour   Intake 480 ml   Output --   Net 480 ml     Physical Exam:   Constitution:  the patient is elderly  HEENT:  Sclera clear, pupils equal, oral mucosa moist  Respiratory: decreased BS on 3 lpm  Cardiovascular:  RRR without M,G,R  Gastrointestinal: firm abdomen, no pain; with positive bowel sounds. Musculoskeletal: warm without cyanosis. There is trace lower extremity edema. Skin:  no jaundice or rashes  Neurologic: no gross neuro deficits     Psychiatric:  alert and oriented x 3    Chest CT: 1/10/22: IMPRESSION  1. Bilateral pleural effusions with subjacent atelectasis.     2. The previously reported right paratracheal mass/lymph node is slightly  larger. LAB:  Recent Labs     01/13/22  0350 01/12/22  0934   WBC 4.8 5.1   HGB 8.1* 8.5*   HCT 23.6* 24.5*   PLT 96* 99*     Recent Labs     01/13/22  0350      K 4.7   CL 97*   CO2 31   *   BUN 39*   CREA 6.53*   CA 8.8   ALB 3.4   AST 7*   ALT 8*   AP 64     No results for input(s): LAC, TROPHS, BNPNT, CRP in the last 72 hours. No lab exists for component: ESR  No results for input(s): PH, PCO2, PO2, HCO3, PHI, PCO2I, PO2I, HCO3I in the last 72 hours. Recent Labs     01/12/22  1153   CULT NO GROWTH 2 DAYS     Assessment and Plan:  (Medical Decision Making)   Principal Problem:    Mediastinal mass (1/10/2022)    Will likely need OP EBUS guided Bx. ESRD (end stage renal disease) ([address] (2/7/2013)    Per nephrology      Abdominal mass (9/12/2017)    Per primary      Renal cell carcinoma ([address] (9/12/2017)    ? Mets to pleura and mediastinum. Severe back pain (11/12/2021)    Per primary      Acute respiratory failure with hypoxia ([address] (11/12/2021)    On 2L, assess RA sat      Bilateral pleural effusion (1/12/2022)    Await pleural fluid cytology, etc. Technically fluid was a transudate. Culture is negative with AFB and fungus also still pending.   -If this is non diagnostic or not large enough, will look for outpatient spot for EBUS. Would also be helpful to obtain PET scan as outpatient. The area adjacent to the left effusion is most consistent with atelectasis. Do not suspect PNA or mass in this area. PET scan will help evaluate this further as well.       Mobilize, assess O2, awaiting cytology. Pet scan ordered. POC per primary      More than 50% of the time documented was spent in face-to-face contact with the patient and in the care of the patient on the floor/unit where the patient is located. Pura Mercado NP  I have spoken with and examined the patient. I agree with the above assessment and plan as documented.     Gen: alert  Lungs:  clear  Heart:  RRR with no Murmur/Rubs/Gallops  Abd:soft  Ext: no edema    Follow up pleural fluid cytology, if neg needs ebus, needs pet    Kathy Fung MD

## 2022-01-14 NOTE — PROGRESS NOTES
[facility] Hematology & Oncology        Inpatient Hematology / Oncology Progress Note    Reason for Consult:  Mediastinal mass [J98.59]; Acute respiratory failure with hypoxia ([facility] [J96.01]  Referring Physician:  Don Rubi MD    Interval history:  VSS, afebrile  Complains of ongoing diffuse pain  Awaiting placement/DC planning      ROS:  Constitutional: +pain. Negative for fever, chills, weakness, malaise, fatigue. CV: Negative for chest pain, palpitations, edema. Respiratory: Negative for dyspnea, cough, wheezing. GI: Negative for nausea, abdominal pain, diarrhea. 10 point review of systems is otherwise negative with the exception of the elements mentioned above in the HPI. Allergies   Allergen Reactions    Pcn [Penicillins] Rash    Vancomycin Rash     Past Medical History:   Diagnosis Date    Arthritis     AVF (arteriovenous fistula) ([facility] 12/20/2016 12/6/16 (S) Right AVF revision and thrombectomy    Cancer ([facility] 2015    L kidney    Chronic kidney disease     HD in M-W-F- at [facility] dialysis    Degenerative joint disease     Diabetes ([facility]     checks QD, normal 120-130, hyposymptoms at 80    ESRD (end stage renal disease) [facility]) Nov 2006    ESRD.  MWF dialysis     Hypertension     Obesity     Transient ischemic attack 08/29/2015    no residual     Past Surgical History:   Procedure Laterality Date    COLONOSCOPY N/A 11/8/2018    COLONOSCOPY  BMI 36 performed by Corbin Duarte MD at [facility] ENDOSCOPY    HX GI  06/01/2002    colon resection resulting in temporary colostomy reversal    HX GI  08/06/2018    exploratory laparotomy    HX GYN      stephan    HX OTHER SURGICAL      dialysis fistula, several permcaths    HX UROLOGICAL Left July 2015    nephrectomy    HX VASCULAR ACCESS      IR INSERT TUNL CVC W/O PORT OVER 5 YR  11/19/2021    IR PLC CATH AV SHUNT IN W PTA SI VENOUS  5/30/2019    IR PLC CATH AV SHUNT IN W PTA SI VENOUS RT  2/28/2019    IR PLC CATH AV SHUNT IN W PTA SI VENOUS RT  9/3/2019    IR PLC CATH AV SHUNT IN W PTA SI VENOUS RT  12/5/2019    IR PLC CATH AV SHUNT IN W PTA SI VENOUS RT  3/10/2020    NH BREAST SURGERY PROCEDURE UNLISTED Right     cyst removed    VASCULAR SURGERY PROCEDURE UNLIST Right     AV graft     Family History   Problem Relation Age of Onset    Diabetes Mother     Heart Disease Mother     Hypertension Mother     Heart Disease Father     Hypertension Father     Malignant Hyperthermia Neg Hx     Pseudocholinesterase Deficiency Neg Hx     Delayed Awakening Neg Hx     Post-op Nausea/Vomiting Neg Hx     Emergence Delirium Neg Hx     Other Neg Hx     Post-op Cognitive Dysfunction Neg Hx      Social History     Socioeconomic History    Marital status:      Spouse name: Not on file    Number of children: Not on file    Years of education: Not on file    Highest education level: Not on file   Occupational History    Not on file   Tobacco Use    Smoking status: Never Smoker    Smokeless tobacco: Never Used   Substance and Sexual Activity    Alcohol use: No    Drug use: No    Sexual activity: Not Currently   Other Topics Concern    Not on file   Social History Narrative    Not on file     Social Determinants of Health     Financial Resource Strain:     Difficulty of Paying Living Expenses: Not on file   Food Insecurity:     Worried About Running Out of Food in the Last Year: Not on file    Rick of Food in the Last Year: Not on file   Transportation Needs:     Lack of Transportation (Medical): Not on file    Lack of Transportation (Non-Medical):  Not on file   Physical Activity:     Days of Exercise per Week: Not on file    Minutes of Exercise per Session: Not on file   Stress:     Feeling of Stress : Not on file   Social Connections:     Frequency of Communication with Friends and Family: Not on file    Frequency of Social Gatherings with Friends and Family: Not on file    Attends [employer] Services: Not on file   [facility] or Organizations: Not on file    Attends Club or Organization Meetings: Not on file    Marital Status: Not on file   Intimate Partner Violence:     Fear of Current or Ex-Partner: Not on file    Emotionally Abused: Not on file    Physically Abused: Not on file    Sexually Abused: Not on file   Housing Stability:     Unable to Pay for Housing in the Last Year: Not on file    Number of [place] in the Last Year: Not on file    Unstable Housing in the Last Year: Not on file     Current Facility-Administered Medications   Medication Dose Route Frequency Provider Last Rate Last Admin    magnesium hydroxide (MILK OF MAGNESIA) 400 mg/5 mL oral suspension 30 mL  30 mL Oral DAILY PRN Terra Hernandez MD        polyethylene glycol (MIRALAX) packet 17 g  17 g Oral BID Terra Hernandez MD        bisacodyL (DULCOLAX) suppository 10 mg  10 mg Rectal DAILY PRN Laurita Stern MD        B complex-vitamin C-folic acid (NEPHRO-ROBB) 0.8 mg tab  1 Tablet Oral DAILY Vitor Luis MD        heparin (porcine) injection 5,000 Units  5,000 Units SubCUTAneous Q12H Laurita Stern MD   5,000 Units at 01/14/22 0530    epoetin mary lou-epbx (RETACRIT) injection 40,000 Units  40,000 Units SubCUTAneous Q7D Vitor Luis MD   40,000 Units at 01/12/22 0841    morphine injection 4 mg  4 mg IntraVENous Q6H PRN Lynsey Simon PA   4 mg at 01/14/22 1341    [Held by provider] amLODIPine (NORVASC) tablet 10 mg  10 mg Oral QHS Aiden Brady MD        gabapentin (NEURONTIN) capsule 100 mg  100 mg Oral TID Aiden Brady MD   100 mg at 01/14/22 0848    [Held by provider] lisinopriL (PRINIVIL, ZESTRIL) tablet 40 mg  40 mg Oral QHS Aiden Brady MD        methocarbamoL (ROBAXIN) tablet 1,500 mg  1,500 mg Oral QID Aiden Brady MD   1,500 mg at 01/14/22 1332    [Held by provider] minoxidiL (LONITEN) tablet 10 mg  10 mg Oral BID Aiden Brady MD   10 mg at 01/11/22 1209    pantoprazole ([address]) tablet 40 mg  40 mg Oral ACB Ervin Glez MD   40 mg at 22 0530    sevelamer carbonate (RENVELA) tab 800 mg  800 mg Oral TID WITH MEALS Ervin Glez MD   800 mg at 22 1332    sodium chloride (NS) flush 5-40 mL  5-40 mL IntraVENous Q8H Ervin Glez MD   10 mL at 22 1400    sodium chloride (NS) flush 5-40 mL  5-40 mL IntraVENous PRN Ervin Glez MD        acetaminophen (TYLENOL) tablet 650 mg  650 mg Oral Q6H PRN Ervin Glez MD        Or    acetaminophen (TYLENOL) suppository 650 mg  650 mg Rectal Q6H PRN Ervin Glez MD        polyethylene glycol (MIRALAX) packet 17 g  17 g Oral DAILY PRN Ervin Glez MD        ondansetron (ZOFRAN ODT) tablet 4 mg  4 mg Oral Q8H PRN Ervin Glez MD        Or    ondansetron (ZOFRAN) injection 4 mg  4 mg IntraVENous Q6H PRN Ervin Glez MD        HYDROcodone-acetaminophen (NORCO) 7.5-325 mg per tablet 1 Tablet  1 Tablet Oral Q6H PRN Ervin Glez MD   1 Tablet at 22 0530    heparin (porcine) 1,000 unit/mL injection 5,000 Units  5,000 Units Hemodialysis DIALYSIS PRN Jay Gowers, NP        insulin lispro (HUMALOG) injection   SubCUTAneous AC&HS Freddy Pierre MD   2 Units at 22 1600       OBJECTIVE:  Patient Sebastian Marsh for the past 8 hrs:   BP Temp Pulse Resp SpO2   22 1334 (!) 121/52 -- -- -- --   22 1142 (!) 96/58 98.3 °F (36.8 °C) 84 14 99 %   22 0757 (!) 110/59 98.6 °F (37 °C) 84 14 93 %     Temp (24hrs), Av.4 °F (36.9 °C), Min:97.1 °F (36.2 °C), Max:99 °F (37.2 °C)     0701 -  1900  In: 240 [P.O.:240]  Out: -     Physical Exam:  Constitutional: Well developed, well nourished female in no acute distress, sitting comfortably in the hospital bed. HEENT: Normocephalic and atraumatic. Oropharynx is clear, mucous membranes are moist.  Pupils are equal, round, and reactive to light. Extraocular muscles are intact. Sclerae anicteric. Neck supple without JVD. No thyromegaly present.     Lymph node   No palpable submandibular, cervical, supraclavicular, axillary or inguinal lymph nodes. Skin Warm and dry. No bruising and no rash noted. No erythema. No pallor. Respiratory Lungs are clear to auscultation bilaterally without wheezes, rales or rhonchi, normal air exchange without accessory muscle use. CVS Normal rate, regular rhythm and normal S1 and S2. No murmurs, gallops, or rubs. Abdomen Soft, nontender and nondistended, normoactive bowel sounds. No palpable mass. No hepatosplenomegaly. Neuro Grossly nonfocal with no obvious sensory or motor deficits. MSK Normal range of motion in general.  No edema and no tenderness. Psych  anxious and tearful. Labs:    Recent Results (from the past 24 hour(s))   GLUCOSE, POC    Collection Time: 01/13/22  4:47 PM   Result Value Ref Range    Glucose (POC) 132 (H) 65 - 100 mg/dL    Performed by Mariah    GLUCOSE, POC    Collection Time: 01/14/22  8:44 AM   Result Value Ref Range    Glucose (POC) 125 (H) 65 - 100 mg/dL    Performed by Lidya Cea, POC    Collection Time: 01/14/22 11:18 AM   Result Value Ref Range    Glucose (POC) 102 (H) 65 - 100 mg/dL    Performed by Yvonne)CNA        Imaging:  No images are attached to the encounter.     ASSESSMENT/RECOMMENDATIONS:  Problem List  Date Reviewed: 7/27/2021          Codes Class Noted    Bilateral pleural effusion ICD-10-CM: J90  ICD-9-CM: 511.9  1/12/2022        * (Principal) Mediastinal mass ICD-10-CM: J98.59  ICD-9-CM: 786.6  1/10/2022        Severe back pain ICD-10-CM: M54.9  ICD-9-CM: 724.5  11/12/2021        Bacterial pneumonia ICD-10-CM: J15.9  ICD-9-CM: 482.9  11/12/2021        Acute respiratory failure with hypoxia (HCC) ICD-10-CM: J96.01  ICD-9-CM: 518.81  11/12/2021        Hypokalemia ICD-10-CM: E87.6  ICD-9-CM: 276.8  11/12/2021        Elevated troponin ICD-10-CM: R77.8  ICD-9-CM: 790.6  11/12/2021        Small bowel obstruction ([facility] ICD-10-CM: U10.331  ICD-9-CM: 560.9 8/3/2018        Severe obesity (BMI 35.0-39. 9) with comorbidity ([facility] ICD-10-CM: E66.01  ICD-9-CM: 278.01  5/4/2018        Type 2 diabetes mellitus with nephropathy ([facility] ICD-10-CM: E11.21  ICD-9-CM: 250.40, 583.81  12/15/2017        Flank pain ICD-10-CM: R10.9  ICD-9-CM: 789.09  9/14/2017        Abdominal mass ICD-10-CM: R19.00  ICD-9-CM: 789.30  9/12/2017    Overview Signed 9/12/2017  9:01 PM by Zeynep Webster MD     Of Left renal bed             Renal cell carcinoma ([facility] ICD-10-CM: C64.9  ICD-9-CM: 189.0  9/12/2017        AVF (arteriovenous fistula) (HCC) (Chronic) ICD-10-CM: I77.0  ICD-9-CM: 447.0  12/20/2016    Overview Signed 12/20/2016  2:19 PM by Rubén Sher NP       12/6/16 ([facility]) Right AVF revision and thrombectomy             Transient ischemic attack ICD-10-CM: G45.9  ICD-9-CM: 435.9  8/29/2015        Renal mass ICD-10-CM: N28.89  ICD-9-CM: 593.9  7/2/2015        Chronic renal failure, stage 5 (HCC) ICD-10-CM: N18.5  ICD-9-CM: 585.5  6/15/2015        Hypotension ICD-10-CM: I95.9  ICD-9-CM: 458.9  12/1/2014        Dizziness ICD-10-CM: R42  ICD-9-CM: 780.4  12/1/2014        Osteoarthritis of right knee ICD-10-CM: M17.11  ICD-9-CM: 715.96  2/7/2013        Total knee replacement status ICD-10-CM: Z96.659  ICD-9-CM: V43.65  2/7/2013        DM (diabetes mellitus) ([facility] ICD-10-CM: E11.9  ICD-9-CM: 250.00  2/7/2013        ESRD (end stage renal disease) ([facility] ICD-10-CM: N18.6  ICD-9-CM: 585.6  2/7/2013        HTN (hypertension) ICD-10-CM: I10  ICD-9-CM: 401.9  2/7/2013            61-year-old female with history of recurrent renal cell carcinoma admitted for persisting left upper back pain and CT showed multiple lymphadenopathy and bilateral pleural effusion, left lower lobe atelectasis versus mass, discussed with patient that this has to be considered suspicious of cancer recurrence unless proven otherwise, consult pulmonology for thoracentesis and cytology, if not diagnostic would need EUS biopsy of the mediastinal lymph node and navigational biopsy of the left lower lobe mass, pain medicine as needed, supportive care, will follow for histology result. If patient becomes clinically stable, there is no oncological contraindication to complete the work-up outpatient and following office. 1/14/22 Preliminary results from cytology discussed with Dr Martita Swann - rare atypical cells, likely reactive and will more than likely be non-diagnostic for malignancy but cell block will not be back until Monday. Informed patient of findings. She will likely need outpatient EBUS - pulmonology following and arranging. Patient ok for DC home from oncology standpoint with outpatient follow-up with Dr Erica Austin. Patient reports she is not ready for DC and PT/OT/CM following for possible STR. Lab studies and imaging studies were personally reviewed. Pertinent old records were reviewed. Case discussed with primary team.    Thank you for allowing us to participate in the care of Ms. Fischer. Nothing further to add from oncology standpoint. Pulmonology to arrange EBUS. Follow-up with Dr Erica Austin in 1 week of DC. We will no sign off please. Please call with oncology questions. JASPER Chandler [address] Hematology & Oncology  [address] [address]  Office : [phone]  Fax : [phone]   I personally saw, exammed and counselled the patient, and discussed with NP, agree with above history/assessment/plan. 80 y. o.female with history of recurrent renal cell carcinoma admitted for persisting left upper back pain and CT showed multiple lymphadenopathy and bilateral pleural effusion, left lower lobe atelectasis versus mass,     discussed with patient that this has to be considered suspicious of cancer recurrence unless proven otherwise, consult pulmonology for thoracentesis and cytology, discussed with Dr. Martita Swann for preliminary report of cytology and only saw atypical cells without clear evidence of malignancy, further work-up would be needed with US biopsy of the mediastinal lymph node and navigational biopsy of the left lower lobe mass, which can be obtained either inpatient or outpatient from oncology standpoint in view if patient has reasonable pain control. Pulmonology also recommended PET scan which is reasonable. Please call when new biopsy result is available. Tierra Bryant M.D.   [place]  Office : [phone]  Fax : [phone]

## 2022-01-14 NOTE — PROGRESS NOTES
Verbal bedside report received from off going RN, Aubree Riley. Patient's situation, background, assessment and recommendations provided. Opportunity for questions provided. Assumed care of patient. Assessment to follow.

## 2022-01-14 NOTE — PROGRESS NOTES
Hospitalist Progress Note   Admit Date:  1/10/2022  6:06 AM   Name:  Don Fischer   Age:  80 y.o. Sex:  female  :  1950   MRN:  [id number]   Room:  O1[facility]    Presenting Complaint: Back Pain    Reason(s) for Admission: Mediastinal mass [J98.59]  Acute respiratory failure with hypoxia [facility]) [J96.01]     Hospital Course & Interval History:   Andie Ro is a 80 y.o. female with medical history of ESRD on hemodialysis, recurrent renal cell carcinoma s/p left nephrectomy  with recurrence and s/p course localized XRT to left renal bed who presented with severe sharp left-sided thoracic back pain. Patient recently admitted in November left flank pain and treated empirically with abx until acute infectious process ruled out. Pt had a CT thoracic spine which did not reveal acute fx, no aggressive bony lesions though b/l lung patchy infiltrative processes (pneumonitis / atypical PNA / pulm edema). Of significance pt did have a CT C/A to eval for potential aortic dissection given significant pain on 2021 which did reveal b/l small pleural effusions, enlarging right paratracheal lymph node (2.0 cm from previous 12mm) and small b/l hilar lymph nodes, no aortic dissection. She does follow with hem/onc routinely for underlying RCC hx. In the ER, pt had a repeat CT chest which revealed b/l pleural effusions and an increase in size of right paratracheal lymph node now 2.6cm in size as compared to Nov CT chest which measured lymph node 2.0cm. Pt admits to having missed her Monday HD session due to pain (she has been on HD x 15 years, M/W/F). She denies chest pain, palpitation, shortness of breath, nausea, vomiting, fevers or chills. Her VS / labs were stable; Hospitalist service consulted for inpatient admission for persistent back pain and enlarging mediastinal lymph node with acute hypoxic resp failure. Subjective (22):   Patient seen at bedside, tearful, is alert and awake, reports feeling okay. He is currently on 2 to 3 L NC. No chest pain, nausea or vomiting, fever, diarrhea. Review of Systems:  10 point ROS is negative except for what mentioned above.       Assessment & Plan:     Principal Problem:    Mediastinal mass (1/10/2022)  1/14:  - need to rule out malignant etiology as pt with hx of recurrent RCC  - s/p thoracentesis with cytology / cx pending  - if no yield will consider EBUS; pulm recommended PET scan on outpatient setting  Currently on 2 ltr via NC  Oxygen Qualifier          Room air: SpO2 with O2 and liter flow   Resting SpO2  79%   86% on 1L    90% on 2L   Ambulating SpO2  73%  77% on 1L   90% on 2L          Active Problems:    Acute hypoxic respiratory failure  - O2 85% on RA noted in ER, improved to 96% on 2L  1/14:  S/p thoracentesis on 1/12/22  Oxygen Qualifier          Room air: SpO2 with O2 and liter flow   Resting SpO2  79%   86% on 1L    90% on 2L   Ambulating SpO2  73%  77% on 1L   90% on 2L            Thoracic / upper back pain  - appreciate pulmonary eval for potential empyema  - xray thoracic spine 1/12/2022 negative for acute fx  - s/p thoracentesis on 1/12  - prn norco / morphine for pain control      B/l pleural effusions  - s/p thoracentesis right lung on 1/12 ; f/u cytology / cx  - cont HD for volume mgmt      ESRD (end stage renal disease)  - appreciate nephrology's ongoing assistance  - cont HD 3 x a week      HTN (hypertension)  - labile readings with hypotensive episodes; may be attributed to IV dilaudid  - closely monitor; RN instructed to HOLD PAIN RX if SBP <100mmhg  - monitor closely      Hx of recurrent renal cell carcinoma  - s/p left nephrectomy 2015 with recurrence and subsequent XRT to left renal bed  - mgmt per hem/onc      Anemia chronic disease  - hgb 8.1 this AM (from 8.0 yesterday)  - no gross bleeding; cont to closely monitor      Diabetes mellitus   - on januvia at home  - hgbA1c 5.3 on 11/13/2021  - sliding scale coverage as needed; low-carb diet       Dispo/Discharge Planning:    - home once medically stable    Diet:  ADULT DIET Regular; 3 carb choices (45 gm/meal); Low Fat/Low Chol/High Fiber/2 gm Na; Low Potassium (Less than 3000 mg/day); Low Phosphorus (Less than 1000 mg)  ADULT ORAL NUTRITION SUPPLEMENT Breakfast, Lunch, Dinner, HS Snack;  Renal Supplement  DVT PPx: hep sc  Code status: Full Code    Hospital Problems as of 1/14/2022 Date Reviewed: 7/27/2021          Codes Class Noted - Resolved POA    Bilateral pleural effusion ICD-10-CM: J90  ICD-9-CM: 511.9  1/12/2022 - Present Unknown        * (Principal) Mediastinal mass ICD-10-CM: J98.59  ICD-9-CM: 786.6  1/10/2022 - Present Unknown        Severe back pain ICD-10-CM: M54.9  ICD-9-CM: 724.5  11/12/2021 - Present Yes        Acute respiratory failure with hypoxia [facility]) ICD-10-CM: J96.01  ICD-9-CM: 518.81  11/12/2021 - Present Unknown        Abdominal mass ICD-10-CM: R19.00  ICD-9-CM: 789.30  9/12/2017 - Present Yes    Overview Signed 9/12/2017  9:01 PM by Laurent Holliday MD     Of Left renal bed             Renal cell carcinoma [facility]) ICD-10-CM: C64.9  ICD-9-CM: 189.0  9/12/2017 - Present Yes        [facility] ICD-10-CM: N18.6  ICD-9-CM: 585.6  2/7/2013 - Present Yes        HTN (hypertension) ICD-10-CM: I10  ICD-9-CM: 401.9  2/7/2013 - Present Yes              Objective:     Patient Vitals for the past 24 hrs:   Temp Pulse Resp BP SpO2   01/14/22 0757 98.6 °F (37 °C) 84 14 (!) 110/59 93 %   01/14/22 0440 98.9 °F (37.2 °C) 81 20 (!) 93/48 100 %   01/14/22 0130 -- 89 19 (!) 87/50 --   01/14/22 0027 98.1 °F (36.7 °C) 86 19 (!) 89/48 97 %   01/13/22 2301 98.7 °F (37.1 °C) 90 17 (!) 90/54 92 %   01/13/22 2029 99 °F (37.2 °C) 92 17 (!) 98/52 96 %   01/13/22 1426 97.1 °F (36.2 °C) 89 18 (!) 110/55 99 %   01/13/22 1218 98.1 °F (36.7 °C) 81 18 (!) 116/52 100 %   01/13/22 1115 -- 73 -- (!) 117/48 --   01/13/22 1100 -- 72 -- (!) 106/46 --   01/13/22 1030 -- 69 -- (!) 128/56 --   01/13/22 1000 -- 72 -- (!) 111/46 --   01/13/22 0930 -- 74 -- (!) 98/51 --   01/13/22 0900 -- 77 -- (!) 88/44 --   01/13/22 0830 -- 74 -- (!) 74/32 --     Oxygen Therapy  O2 Sat (%): 93 % (01/14/22 0757)  Pulse via Oximetry: 85 beats per minute (01/12/22 1148)  O2 Device: Nasal cannula (01/14/22 0757)  O2 Flow Rate (L/min): 2 l/min (01/14/22 0757)    Estimated body mass index is 30.83 kg/m² as calculated from the following:    Height as of this encounter: 5' 5.5\" (1.664 m). Weight as of this encounter: 85.3 kg (188 lb 1.6 oz). Intake/Output Summary (Last 24 hours) at 1/14/2022 0818  Last data filed at 1/13/2022 1339  Gross per 24 hour   Intake 240 ml   Output 1000 ml   Net -760 ml         Physical Exam:     Blood pressure (!) 110/59, pulse 84, temperature 98.6 °F (37 °C), resp. rate 14, height 5' 5.5\" (1.664 m), weight 85.3 kg (188 lb 1.6 oz), SpO2 93 %. General:    Alert/awake, NAD, on 2-3 ltr via NC  HEENT:           Head NCAT, PERRLA+, MMM  Neck:  No restricted ROM. Trachea midline   CV:   RRR. No m/r/g. No jugular venous distension. Lungs:   Diminished breath sounds in bases B/L, no wheezing or ronchi  Abdomen: Bowel sounds present. Soft, nontender, nondistended. Extremities: Trace bilateral pedal edema  Skin:     No rashes and normal coloration. Warm and dry. Neuro:  CN II-XII grossly intact. Sensation intact. Psych:  AOx3, Normal mood and affect.       I have reviewed ordered lab tests and independently visualized imaging below:    Recent Labs:  Recent Results (from the past 48 hour(s))   CBC WITH AUTOMATED DIFF    Collection Time: 01/12/22  9:34 AM   Result Value Ref Range    WBC 5.1 4.3 - 11.1 K/uL    RBC 2.53 (L) 4.05 - 5.2 M/uL    HGB 8.5 (L) 11.7 - 15.4 g/dL    HCT 24.5 (L) 35.8 - 46.3 %    MCV 96.8 79.6 - 97.8 FL    MCH 33.6 (H) 26.1 - 32.9 PG    MCHC 34.7 31.4 - 35.0 g/dL    RDW 17.7 (H) 11.9 - 14.6 %    PLATELET 99 (L) 542 - 450 K/uL    MPV 11.9 9.4 - 12.3 FL ABSOLUTE NRBC 0.00 0.0 - 0.2 K/uL    DF AUTOMATED      NEUTROPHILS 73 43 - 78 %    LYMPHOCYTES 14 13 - 44 %    MONOCYTES 11 4.0 - 12.0 %    EOSINOPHILS 2 0.5 - 7.8 %    BASOPHILS 1 0.0 - 2.0 %    IMMATURE GRANULOCYTES 0 0.0 - 5.0 %    ABS. NEUTROPHILS 3.7 1.7 - 8.2 K/UL    ABS. LYMPHOCYTES 0.7 0.5 - 4.6 K/UL    ABS. MONOCYTES 0.5 0.1 - 1.3 K/UL    ABS. EOSINOPHILS 0.1 0.0 - 0.8 K/UL    ABS. BASOPHILS 0.1 0.0 - 0.2 K/UL    ABS. IMM. GRANS. 0.0 0.0 - 0.5 K/UL   GLUCOSE, POC    Collection Time: 01/12/22 11:03 AM   Result Value Ref Range    Glucose (POC) 125 (H) 65 - 100 mg/dL    Performed by Timothy    CULTURE, BODY FLUID W GRAM STAIN    Collection Time: 01/12/22 11:53 AM    Specimen: Pleural Fluid    RIGHT   Result Value Ref Range    Special Requests: NO SPECIAL REQUESTS      GRAM STAIN 0 TO 10 WBCS PER OIF     GRAM STAIN NO DEFINITE ORGANISM SEEN      Culture result: NO GROWTH 1 DAY     FUNGUS CULTURE AND SMEAR    Collection Time: 01/12/22 11:53 AM    Specimen: Miscellaneous sample   Result Value Ref Range    Source PLEURAL FLUID      Fungus stain Direct Inoculation     Fungus (Mycology) Culture Other source received    AFB CULTURE + SMEAR W/RFLX ID FROM CULTURE    Collection Time: 01/12/22 11:53 AM    Specimen: Miscellaneous sample   Result Value Ref Range    Source PLEURAL FLUID      AFB Specimen processing Concentration     Acid Fast Smear Negative      Acid Fast Culture PENDING    CELL COUNT, BODY FLUID    Collection Time: 01/12/22 11:53 AM   Result Value Ref Range    BODY FLUID TYPE PLEURAL FLUID      FLUID COLOR YELLOW      FLUID APPEARANCE CLOUDY      FLUID RBC CT. 3,000 /cu mm    FLUID WBC COUNT 603 /cu mm    BRCH NEUTROPHIL 6 %    BRCH LYMPHS 84 %    BRCH MACROPHAGES 9 %    BRCH EOSINS 1 %    OTHER CELL 39     GLUCOSE, FLUID    Collection Time: 01/12/22 11:53 AM   Result Value Ref Range    Fluid Type: PLEURAL FLUID      Glucose, body fld.  123 MG/DL   LDH, BODY FLUID    Collection Time: 01/12/22 11:53 AM   Result Value Ref Range    Fluid Type: PLEURAL FLUID      LD, body fld. 73 U/L   PROTEIN TOTAL, FLUID    Collection Time: 01/12/22 11:53 AM   Result Value Ref Range    Fluid Type: PLEURAL FLUID      Protein total, body fld. 3.0 g/dL   GLUCOSE, POC    Collection Time: 01/12/22  4:03 PM   Result Value Ref Range    Glucose (POC) 124 (H) 65 - 100 mg/dL    Performed by Timothy    GLUCOSE, POC    Collection Time: 01/12/22  9:40 PM   Result Value Ref Range    Glucose (POC) 130 (H) 65 - 100 mg/dL    Performed by Tania    CBC WITH AUTOMATED DIFF    Collection Time: 01/13/22  3:50 AM   Result Value Ref Range    WBC 4.8 4.3 - 11.1 K/uL    RBC 2.43 (L) 4.05 - 5.2 M/uL    HGB 8.1 (L) 11.7 - 15.4 g/dL    HCT 23.6 (L) 35.8 - 46.3 %    MCV 97.1 79.6 - 97.8 FL    MCH 33.3 (H) 26.1 - 32.9 PG    MCHC 34.3 31.4 - 35.0 g/dL    RDW 17.2 (H) 11.9 - 14.6 %    PLATELET 96 (L) 929 - 450 K/uL    MPV 11.1 9.4 - 12.3 FL    ABSOLUTE NRBC 0.00 0.0 - 0.2 K/uL    DF AUTOMATED      NEUTROPHILS 69 43 - 78 %    LYMPHOCYTES 16 13 - 44 %    MONOCYTES 11 4.0 - 12.0 %    EOSINOPHILS 3 0.5 - 7.8 %    BASOPHILS 1 0.0 - 2.0 %    IMMATURE GRANULOCYTES 1 0.0 - 5.0 %    ABS. NEUTROPHILS 3.3 1.7 - 8.2 K/UL    ABS. LYMPHOCYTES 0.8 0.5 - 4.6 K/UL    ABS. MONOCYTES 0.5 0.1 - 1.3 K/UL    ABS. EOSINOPHILS 0.2 0.0 - 0.8 K/UL    ABS. BASOPHILS 0.1 0.0 - 0.2 K/UL    ABS. IMM.  GRANS. 0.0 0.0 - 0.5 K/UL   METABOLIC PANEL, COMPREHENSIVE    Collection Time: 01/13/22  3:50 AM   Result Value Ref Range    Sodium 136 136 - 145 mmol/L    Potassium 4.7 3.5 - 5.1 mmol/L    Chloride 97 (L) 98 - 107 mmol/L    CO2 31 21 - 32 mmol/L    Anion gap 8 7 - 16 mmol/L    Glucose 106 (H) 65 - 100 mg/dL    BUN 39 (H) 8 - 23 MG/DL    Creatinine 6.53 (H) 0.6 - 1.0 MG/DL    GFR est AA 8 (L) >60 ml/min/1.73m2    GFR est non-AA 7 (L) >60 ml/min/1.73m2    Calcium 8.8 8.3 - 10.4 MG/DL    Bilirubin, total 1.8 (H) 0.2 - 1.1 MG/DL    ALT (SGPT) 8 (L) 12 - 65 U/L    AST (SGOT) 7 (L) 15 - 37 U/L    Alk.  phosphatase 64 50 - 136 U/L    Protein, total 6.6 6.3 - 8.2 g/dL    Albumin 3.4 3.2 - 4.6 g/dL    Globulin 3.2 2.3 - 3.5 g/dL    A-G Ratio 1.1 (L) 1.2 - 3.5     BILIRUBIN, FRACTIONATED    Collection Time: 22  3:50 AM   Result Value Ref Range    Bilirubin, total 1.8 (H) 0.2 - 1.1 MG/DL    Bilirubin, direct 0.3 <0.4 MG/DL    Bilirubin, indirect 1.5 (H) 0.0 - 1.1 MG/DL   GLUCOSE, POC    Collection Time: 22  6:36 AM   Result Value Ref Range    Glucose (POC) 96 65 - 100 mg/dL    Performed by Tania    GLUCOSE, POC    Collection Time: 22 10:26 AM   Result Value Ref Range    Glucose (POC) 100 65 - 100 mg/dL    Performed by Margo    GLUCOSE, POC    Collection Time: 22 12:22 PM   Result Value Ref Range    Glucose (POC) 89 65 - 100 mg/dL    Performed by Antonina    GLUCOSE, POC    Collection Time: 22  4:47 PM   Result Value Ref Range    Glucose (POC) 132 (H) 65 - 100 mg/dL    Performed by Mariah        All Micro Results     Procedure Component Value Units Date/Time    AFB CULTURE + SMEAR W/RFLX ID FROM CULTURE [[id number]] Collected: 22 1153    Order Status: Completed Specimen: Miscellaneous sample Updated: 22 1735     Source PLEURAL FLUID        Comment: RIGHT  1          AFB Specimen processing Concentration     Acid Fast Smear Negative        Comment: (NOTE)  Performed At: [address] [id number]  Cari Lima MD VX:[id number]          Acid Fast Culture PENDING    FUNGUS CULTURE AND SMEAR [[id number]] Collected: 22 1153    Order Status: Completed Specimen: Miscellaneous sample Updated: 22 1536     Source PLEURAL FLUID        Comment: RIGHT  1          Fungus stain Direct Inoculation     Fungus (Mycology) Culture Other source received     Comment: (NOTE)  Performed At: [address] [id number]  Cari Lima MD A CULTURE, BODY FLUID Rabia Daniel [[id number]] Collected: 01/12/22 1153    Order Status: Completed Specimen: Pleural Fluid Updated: 01/13/22 1231     Special Requests: NO SPECIAL REQUESTS        GRAM STAIN 0 TO 10 WBCS PER OIF      NO DEFINITE ORGANISM SEEN        Culture result: NO GROWTH 1 DAY             Other Studies:  No results found.     Current Meds:  Current Facility-Administered Medications   Medication Dose Route Frequency    polyethylene glycol (MIRALAX) packet 17 g  17 g Oral DAILY    bisacodyL (DULCOLAX) suppository 10 mg  10 mg Rectal DAILY PRN    B complex-vitamin C-folic acid (NEPHRO-ROBB) 0.8 mg tab  1 Tablet Oral DAILY    heparin (porcine) injection 5,000 Units  5,000 Units SubCUTAneous Q12H    epoetin mary lou-epbx (RETACRIT) injection 40,000 Units  40,000 Units SubCUTAneous Q7D    morphine injection 4 mg  4 mg IntraVENous Q6H PRN    [Held by provider] amLODIPine (NORVASC) tablet 10 mg  10 mg Oral QHS    gabapentin (NEURONTIN) capsule 100 mg  100 mg Oral TID    [Held by provider] lisinopriL (PRINIVIL, ZESTRIL) tablet 40 mg  40 mg Oral QHS    methocarbamoL (ROBAXIN) tablet 1,500 mg  1,500 mg Oral QID    [Held by provider] minoxidiL (LONITEN) tablet 10 mg  10 mg Oral BID    pantoprazole (PROTONIX) tablet 40 mg  40 mg Oral ACB    sevelamer carbonate (RENVELA) tab 800 mg  800 mg Oral TID WITH MEALS    sodium chloride (NS) flush 5-40 mL  5-40 mL IntraVENous Q8H    sodium chloride (NS) flush 5-40 mL  5-40 mL IntraVENous PRN    acetaminophen (TYLENOL) tablet 650 mg  650 mg Oral Q6H PRN    Or    acetaminophen (TYLENOL) suppository 650 mg  650 mg Rectal Q6H PRN    polyethylene glycol (MIRALAX) packet 17 g  17 g Oral DAILY PRN    ondansetron (ZOFRAN ODT) tablet 4 mg  4 mg Oral Q8H PRN    Or    ondansetron (ZOFRAN) injection 4 mg  4 mg IntraVENous Q6H PRN    HYDROcodone-acetaminophen (NORCO) 7.5-325 mg per tablet 1 Tablet  1 Tablet Oral Q6H PRN    heparin (porcine) 1,000 unit/mL injection 5,000 Units  5,000 Units Hemodialysis DIALYSIS PRN    insulin lispro (HUMALOG) injection   SubCUTAneous AC&HS       Signed:  Janelle Heard MD    Part of this note may have been written by using a voice dictation software. The note has been proof read but may still contain some grammatical/other typographical errors.

## 2022-01-14 NOTE — PROGRESS NOTES
Problem: Pressure Injury - Risk of  Goal: *Prevention of pressure injury  Description: Document Arnaldo Scale and appropriate interventions in the flowsheet. Outcome: Progressing Towards Goal  Note: Pressure Injury Interventions:  Sensory Interventions: Assess changes in LOC    Moisture Interventions: Absorbent underpads    Activity Interventions: Pressure redistribution bed/mattress(bed type),PT/OT evaluation    Mobility Interventions: Pressure redistribution bed/mattress (bed type),PT/OT evaluation    Nutrition Interventions: Document food/fluid/supplement intake                     Problem: Patient Education: Go to Patient Education Activity  Goal: Patient/Family Education  Outcome: Progressing Towards Goal     Problem: Falls - Risk of  Goal: *Absence of Falls  Description: Document Loyd Fall Risk and appropriate interventions in the flowsheet.   Outcome: Progressing Towards Goal  Note: Fall Risk Interventions:  Mobility Interventions: Communicate number of staff needed for ambulation/transfer         Medication Interventions: Patient to call before getting OOB    Elimination Interventions: Call light in reach              Problem: Patient Education: Go to Patient Education Activity  Goal: Patient/Family Education  Outcome: Progressing Towards Goal     Problem: Patient Education: Go to Patient Education Activity  Goal: Patient/Family Education  Outcome: Progressing Towards Goal

## 2022-01-14 NOTE — PROGRESS NOTES
RENAL H&P/CONSULT    Subjective:     Patient is a 81 y/o AA female, followed by our office for ESRD, on HD MWF at [facility]. She was due for HD today. Presented to the ER this morning with CC of sharp left-sided back pain that started last night. She was admitted last November for similar complaint, work-up was essentially negative. CT scan today showed bilateral pleural effusions with subjacent atelectaiss and previously reported right paratracheal mass/lymph node slightly larger. She has a hx of renal cell carcinoma, s/p left nephrectomy, followed by Oncology. Her electrolytes are stable. She c/o mild dyspnea, denies CP, no n/v, no HA or dizziness. She denies fever or chills. PMH also consist of DM II, Hyperphosphatemia, anemia, and HTN. At time of assessment, she is in no respiratory distress, on 2L NC. She is not on O2 at home. She is admitted for observation. There is no urgent need for HD. Plan for dialysis in the morning. 1/11/22 - see HD Note  1/12/22 - alert - wants to know what's going on with her back - discussed plans for HD tomorrow if she is still here and to call outpatient dialysis for a spot if she is discharged - no N/V, no CP, no significant dyspnea   1/13/22- alert/oriented, no signs of distress, on 2L NC, denies dyspnea or CP, no n/v, no HA or dizziness. Thoracentesis done yesterday at the request of Oncology to obtain fluid for cytology.  Tolerated HD today with no complications  2/68/64 - alert and communicating well  -still stressed, but not as tearful as previously - we discussed plans for rehab placement and she had a BM after suppository yesterday - no N/V, dyspnea is controlled with nasal cannula O2 - plans for dialysis discussed tomorrow    Past Medical History:   Diagnosis Date    Arthritis     AVF (arteriovenous fistula) ([address] 12/20/2016 12/6/16 ([facility]) Right AVF revision and thrombectomy    Cancer ([address] 2015    L kidney    Chronic kidney disease     HD in M-W-F- at [facility]    Degenerative joint disease     Diabetes ([facility]     checks QD, normal 120-130, hyposymptoms at 80    ESRD (end stage renal disease) [facility]) Nov 2006    ESRD. MWF dialysis     Hypertension     Obesity     Transient ischemic attack 08/29/2015    no residual      Past Surgical History:   Procedure Laterality Date    COLONOSCOPY N/A 11/8/2018    COLONOSCOPY  BMI 36 performed by Niraj George MD at [facility] ENDOSCOPY    HX GI  06/01/2002    colon resection resulting in temporary colostomy reversal    HX GI  08/06/2018    exploratory laparotomy    HX GYN      stephan    HX OTHER SURGICAL      dialysis fistula, several permcaths    HX UROLOGICAL Left July 2015    nephrectomy    HX VASCULAR ACCESS      IR INSERT TUNL CVC W/O PORT OVER 5 YR  11/19/2021    IR PLC CATH AV SHUNT IN W PTA SI VENOUS  5/30/2019    IR PLC CATH AV SHUNT IN W PTA SI VENOUS RT  2/28/2019    IR PLC CATH AV SHUNT IN W PTA SI VENOUS RT  9/3/2019    IR PLC CATH AV SHUNT IN W PTA SI VENOUS RT  12/5/2019    IR PLC CATH AV SHUNT IN W PTA SI VENOUS RT  3/10/2020    NM BREAST SURGERY PROCEDURE UNLISTED Right     cyst removed    VASCULAR SURGERY PROCEDURE UNLIST Right     AV graft      Prior to Admission medications    Medication Sig Start Date End Date Taking? Authorizing Provider   lidocaine 4 % patch 1 Patch by TransDERmal route every twelve (12) hours every twelve (12) hours. 11/15/21  Yes Alireza Jack MD   minoxidiL (LONITEN) 10 mg tablet Take  by mouth two (2) times a day. Yes Provider, Historical   omeprazole (PRILOSEC) 40 mg capsule TAKE ONE CAPSULE BY MOUTH EVERY DAY 6/10/21  Yes Provider, Historical   lidocaine 5 % topical cream Apply  to affected area two (2) times daily as needed for Pain. 9/1/17  Yes FRANCISCO Chacon   amLODIPine (NORVASC) 10 mg tablet Take 10 mg by mouth nightly.    Yes Provider, Historical   sevelamer carbonate (RENVELA) 800 mg tab tab Take 800 mg by mouth three (3) times daily (with meals). 5 tablets with meals/ 2 with snacks  Sometimes only eats 2 meals/d   Yes Provider, Historical   lisinopril (PRINIVIL, ZESTRIL) 40 mg tablet Take 40 mg by mouth nightly. Indications: HYPERTENSION 6/8/15  Yes Provider, Historical   sitaGLIPtin (JANUVIA) 100 mg tablet Take 50 mg by mouth every morning. Indications: TYPE 2 DIABETES MELLITUS   Yes Provider, Historical   furosemide (Lasix) 80 mg tablet Take 80 mg by mouth two (2) times a day. Patient not taking: Reported on 1/10/2022    Provider, Historical   methocarbamoL (Robaxin-750) 750 mg tablet Take 2 Tablets by mouth four (4) times daily. Patient not taking: Reported on 1/10/2022 11/9/21   Jane Daniel MD   gabapentin (NEURONTIN) 100 mg capsule TAKE 1 CAPSULE BY MOUTH THREE TIMES DAILY FOR PAIN  Patient not taking: Reported on 1/10/2022 6/10/21   Provider, Historical   VIT C/VIT E/LUTEIN/MIN/OMEGA-3 (OCUVITE PO) Take  by mouth nightly.   Patient not taking: Reported on 1/10/2022    Provider, Historical     Allergies   Allergen Reactions    Pcn [Penicillins] Rash    Vancomycin Rash      Social History     Tobacco Use    Smoking status: Never Smoker    Smokeless tobacco: Never Used   Substance Use Topics    Alcohol use: No      Family History   Problem Relation Age of Onset    Diabetes Mother     Heart Disease Mother     Hypertension Mother     Heart Disease Father     Hypertension Father     Malignant Hyperthermia Neg Hx     Pseudocholinesterase Deficiency Neg Hx     Delayed Awakening Neg Hx     Post-op Nausea/Vomiting Neg Hx     Emergence Delirium Neg Hx     Other Neg Hx     Post-op Cognitive Dysfunction Neg Hx           Review of Systems    ROS negative except for what is mention in HPI      Objective:       Visit Vitals  BP (!) 96/58 (BP 1 Location: Right leg, BP Patient Position: Sitting)   Pulse 84   Temp 98.3 °F (36.8 °C)   Resp 14   Ht 5' 5.5\" (1.664 m)   Wt 85.3 kg (188 lb 1.6 oz)   SpO2 99%   BMI 30.83 kg/m²       01/14 0701 - 01/14 1900  In: 240 [P.O.:240]  Out: -   01/12 1901 - 01/14 0700  In: 240 [P.O.:240]  Out: 1000       General:  Alert, cooperative, no distress, appears stated age. Neck: Supple, symmetrical, trachea midline, no JVD. Lungs:   Clear to auscultation bilaterally. Heart:  Regular rate and rhythm, S1, S2 normal, no murmur,  rub or gallop. Abdomen:   Soft, non-tender. No masses,  No organomegaly. Extremities: Extremities normal, atraumatic, no cyanosis or edema. CE AVG examines well   Access: Is open. Skin: Skin color, texture, turgor normal. No rashes or lesions. Neurologic: Grossly intact. No asterixis. Data Review:      CT chest  COMPARISON: November 22, 2021, November 12, 2021  INDICATION: 2cm right sided paratracheal mass  FINDINGS:  Lungs: Bilateral pleural effusions with subjacent atelectasis. Mediastinum: Limitations due to no contrast. Hilar fullness. Suggestion of  adenopathy with prominent right paratracheal node measuring 2.6 cm short axis. Visualized upper abdomen: Ascites. The visualized portions of the liver and  adrenal glands are normal. Left nephrectomy. Osseous structures: No suspicious lytic or blastic bony lesions. IMPRESSION  1. Bilateral pleural effusions with subjacent atelectasis. 2.  The previously reported right paratracheal mass/lymph node is slightly  Larger. XR SPINE [facility] 3 V on 1/11/2022 6:00 PM  Clinical History: The Female patient is 80years old  presenting for pain. Comparison:  Lasix 5 films 11/22/2021  Findings:  3 views demonstrate no fracture or subluxation of the thoracic spine. Normal curvature and alignment is maintained. There are chronic degenerative  endplate changes with spondylosis. There is no significant disc or vertebral  body height loss. The cervicothoracic junction is unremarkable on lateral  imaging. The paraspinal soft tissues are normal.  IMPRESSION  1.  Chronic degenerative changes with marginal osteophyte formation      Recent Results (from the past 24 hour(s))   GLUCOSE, POC    Collection Time: 01/13/22 12:22 PM   Result Value Ref Range    Glucose (POC) 89 65 - 100 mg/dL    Performed by Antonina    GLUCOSE, POC    Collection Time: 01/13/22  4:47 PM   Result Value Ref Range    Glucose (POC) 132 (H) 65 - 100 mg/dL    Performed by Mariah    GLUCOSE, POC    Collection Time: 01/14/22  8:44 AM   Result Value Ref Range    Glucose (POC) 125 (H) 65 - 100 mg/dL    Performed by Yasmine Wilcox, POC    Collection Time: 01/14/22 11:18 AM   Result Value Ref Range    Glucose (POC) 102 (H) 65 - 100 mg/dL    Performed by Yvonne)CNSORAIDA            Principal Problem:    Mediastinal mass (1/10/2022)    Active Problems:    ESRD (end stage renal disease) ([address] (2/7/2013)      HTN (hypertension) (2/7/2013)      Abdominal mass (9/12/2017)      Overview: Of Left renal bed      Renal cell carcinoma ([address] (9/12/2017)      Severe back pain (11/12/2021)      Acute respiratory failure with hypoxia ([address] (11/12/2021)      Bilateral pleural effusion (1/12/2022)        Assessment/Plan:     1. ESRD- on HD, TIW, dialysis needed for biochemical and volume control, no urgent HD needed at this time,  - normally runs MWF, plan HD tomorrow and continue on TTS schedule as she is anticipating rehab placement    2. Back pain- work up per primary, cytology pending and biopsy anticipated    3. Right paratracheal mass/lymph node on CT -   hx of renal cell carcinoma, followed by Oncology  - Xray shows DJD    4. Bilateral pleural effusion- no signs of respiratory distress, s/p right thoracentesis with 300 ml fluid removed for cytology    5. HTN-  Excessively tight, antihypertensives on hold, will monitor    6. Hyperphosphatemia- on Renvela    7.  Anemia - hgb fairly stable, treat with JAMES        Colton Purdy MD

## 2022-01-14 NOTE — PROGRESS NOTES
Received order to give patient 250 ml normal saline bolus to see if her blood pressure would come up enough for her to tolerate an adequate amount of pain medication to control her back pain. Bolus given.

## 2022-01-14 NOTE — PROGRESS NOTES
Patient's blood pressure has been running in the soft range high 80s/40s. Patient with terrible back pain but not given her BP unable to provide patient with requested Morphine 4 mg IV dose ordered prn. Received order for Fentanyl 25 mcg IV x 1. Read back and verified. Unable to give Fentanyl on non-monitored floor. Informed Dr. Cornel Duke.

## 2022-01-14 NOTE — PROGRESS NOTES
Patient were discussed in [id number] team meeting this AM.  CM made contact with gisele Bee) to discuss PT recommendation. CM informed daughter Isabel Bee) that PT has recommended HH with 24/7 family assistance or STR. Gisele Bee) states that patient can make that decision. CM will follow up with patient regarding d/c position. CM will continue to follow.

## 2022-01-15 LAB
ANION GAP SERPL CALC-SCNC: 11 MMOL/L (ref 7–16)
BUN SERPL-MCNC: 19 MG/DL (ref 8–23)
CALCIUM SERPL-MCNC: 9.1 MG/DL (ref 8.3–10.4)
CHLORIDE SERPL-SCNC: 98 MMOL/L (ref 98–107)
CO2 SERPL-SCNC: 28 MMOL/L (ref 21–32)
COLLECTION COMMENT, COLCM: NORMAL
CREAT SERPL-MCNC: 3.79 MG/DL (ref 0.6–1)
ERYTHROCYTE [DISTWIDTH] IN BLOOD BY AUTOMATED COUNT: 17.6 % (ref 11.9–14.6)
GLUCOSE BLD STRIP.AUTO-MCNC: 131 MG/DL (ref 65–100)
GLUCOSE BLD STRIP.AUTO-MCNC: 145 MG/DL (ref 65–100)
GLUCOSE BLD STRIP.AUTO-MCNC: 85 MG/DL (ref 65–100)
GLUCOSE BLD STRIP.AUTO-MCNC: 95 MG/DL (ref 65–100)
GLUCOSE SERPL-MCNC: 98 MG/DL (ref 65–100)
HCT VFR BLD AUTO: 27.6 % (ref 35.8–46.3)
HGB BLD-MCNC: 9.1 G/DL (ref 11.7–15.4)
MCH RBC QN AUTO: 33.5 PG (ref 26.1–32.9)
MCHC RBC AUTO-ENTMCNC: 33 G/DL (ref 31.4–35)
MCV RBC AUTO: 101.5 FL (ref 79.6–97.8)
NRBC # BLD: 0 K/UL (ref 0–0.2)
PLATELET # BLD AUTO: 120 K/UL (ref 150–450)
PMV BLD AUTO: 11 FL (ref 9.4–12.3)
POTASSIUM SERPL-SCNC: 4 MMOL/L (ref 3.5–5.1)
RBC # BLD AUTO: 2.72 M/UL (ref 4.05–5.2)
SERVICE CMNT-IMP: ABNORMAL
SERVICE CMNT-IMP: ABNORMAL
SERVICE CMNT-IMP: NORMAL
SERVICE CMNT-IMP: NORMAL
SODIUM SERPL-SCNC: 137 MMOL/L (ref 136–145)
WBC # BLD AUTO: 5 K/UL (ref 4.3–11.1)

## 2022-01-15 PROCEDURE — 85027 COMPLETE CBC AUTOMATED: CPT

## 2022-01-15 PROCEDURE — 65270000029 HC RM PRIVATE

## 2022-01-15 PROCEDURE — 74011250637 HC RX REV CODE- 250/637: Performed by: HOSPITALIST

## 2022-01-15 PROCEDURE — 36415 COLL VENOUS BLD VENIPUNCTURE: CPT

## 2022-01-15 PROCEDURE — 82962 GLUCOSE BLOOD TEST: CPT

## 2022-01-15 PROCEDURE — G0257 UNSCHED DIALYSIS ESRD PT HOS: HCPCS

## 2022-01-15 PROCEDURE — 90935 HEMODIALYSIS ONE EVALUATION: CPT

## 2022-01-15 PROCEDURE — 74011250636 HC RX REV CODE- 250/636: Performed by: INTERNAL MEDICINE

## 2022-01-15 PROCEDURE — 74011250637 HC RX REV CODE- 250/637: Performed by: FAMILY MEDICINE

## 2022-01-15 PROCEDURE — 74011250636 HC RX REV CODE- 250/636: Performed by: PHYSICIAN ASSISTANT

## 2022-01-15 PROCEDURE — 74011250637 HC RX REV CODE- 250/637: Performed by: INTERNAL MEDICINE

## 2022-01-15 PROCEDURE — 80048 BASIC METABOLIC PNL TOTAL CA: CPT

## 2022-01-15 PROCEDURE — 74011000250 HC RX REV CODE- 250: Performed by: FAMILY MEDICINE

## 2022-01-15 RX ADMIN — HEPARIN SODIUM 5000 UNITS: 5000 INJECTION INTRAVENOUS; SUBCUTANEOUS at 05:39

## 2022-01-15 RX ADMIN — SEVELAMER CARBONATE 800 MG: 800 TABLET, FILM COATED ORAL at 13:04

## 2022-01-15 RX ADMIN — SODIUM CHLORIDE, PRESERVATIVE FREE 10 ML: 5 INJECTION INTRAVENOUS at 13:04

## 2022-01-15 RX ADMIN — GABAPENTIN 100 MG: 100 CAPSULE ORAL at 21:35

## 2022-01-15 RX ADMIN — PANTOPRAZOLE SODIUM 40 MG: 40 TABLET, DELAYED RELEASE ORAL at 05:39

## 2022-01-15 RX ADMIN — HYDROCODONE BITARTRATE AND ACETAMINOPHEN 1 TABLET: 7.5; 325 TABLET ORAL at 13:10

## 2022-01-15 RX ADMIN — GABAPENTIN 100 MG: 100 CAPSULE ORAL at 16:54

## 2022-01-15 RX ADMIN — HEPARIN SODIUM 5000 UNITS: 5000 INJECTION INTRAVENOUS; SUBCUTANEOUS at 17:26

## 2022-01-15 RX ADMIN — METHOCARBAMOL TABLETS 1500 MG: 750 TABLET, COATED ORAL at 13:03

## 2022-01-15 RX ADMIN — Medication 1 TABLET: at 13:02

## 2022-01-15 RX ADMIN — HYDROCODONE BITARTRATE AND ACETAMINOPHEN 1 TABLET: 7.5; 325 TABLET ORAL at 21:35

## 2022-01-15 RX ADMIN — SEVELAMER CARBONATE 800 MG: 800 TABLET, FILM COATED ORAL at 16:54

## 2022-01-15 RX ADMIN — MORPHINE SULFATE 4 MG: 4 INJECTION INTRAVENOUS at 05:47

## 2022-01-15 RX ADMIN — METHOCARBAMOL TABLETS 1500 MG: 750 TABLET, COATED ORAL at 21:35

## 2022-01-15 RX ADMIN — MORPHINE SULFATE 4 MG: 4 INJECTION INTRAVENOUS at 13:45

## 2022-01-15 RX ADMIN — SODIUM CHLORIDE, PRESERVATIVE FREE 10 ML: 5 INJECTION INTRAVENOUS at 21:40

## 2022-01-15 RX ADMIN — METHOCARBAMOL TABLETS 1500 MG: 750 TABLET, COATED ORAL at 17:26

## 2022-01-15 RX ADMIN — SODIUM CHLORIDE, PRESERVATIVE FREE 10 ML: 5 INJECTION INTRAVENOUS at 05:39

## 2022-01-15 RX ADMIN — POLYETHYLENE GLYCOL 3350 17 G: 17 POWDER, FOR SOLUTION ORAL at 17:26

## 2022-01-15 NOTE — PROGRESS NOTES
TRANSFER IN - DIALYSIS    Received patient in dialysis unit from [facility] (unit) for ordered procedure. Consent verified for renal replacement therapy. Procedure explained to patient, opportunity for Q&A provided. Call light given. Patient alert and vital signs stable. Visit Vitals  BP (!) 127/57   Pulse (!) 109   Temp 98.2 °F (36.8 °C)   Resp 18   Ht 5' 5.5\" (1.664 m)   Wt 84.9 kg (187 lb 3.2 oz)   SpO2 91%   BMI 30.68 kg/m²         Hemodialysis initiated using right avf and 15 g needles. Machine settings per MD order. Heparin 0 unit bolus and 0 units/hr. Will monitor during treatment.       01/15/22 0700   Patient Information   Acute or Chronic Care Acute (comment)   Treatment Number 3   Informed Consent Verified Yes   Dialysis Weight   Goal/Amount of Fluid to Remove (mL) 3600 mL   Dialyzer/Set Up Inspection   Dialyzer/Set Up Inspection Revaclear   Alarms Verified Yes   Test Pass Yes   pH 7.1   Machine Conductivity 14   Meter Conductivity 13.8   Reverse Osmosis Safety Checks   Reverse Osmosis Machine Log Completed Yes   Total Chlorine Test Negative   Machine Initiation   Machine Number t7   Hemodialysis Start Time 0701   Unused Lines Clamped Yes   Machine Temperature 95.9 °F (35.5 °C)   Dialysis Initiation   All Connections Secured Yes   NS Bag  Yes   Saline Line Double Clamped Yes   Prime Given   Air Foam Detector Engaged Yes   Dialysate NA (mEq/L) 138   Dialysate K (mEq/L) 3   Dialysate CA (mEq/L) 2.5   Dialysate HCO3 (mEq/L) 38   Citrasate No   During Hemodialysis    Temp 98.2 °F (36.8 °C)   Pulse (Heart Rate) (!) 109   Resp Rate 18   O2 Sat (%) 91 %   BP (!) 127/57   MAP (Calculated) 80   Transducer Checks Dry   Saline Given (mL) 300 mL   Heparin Bolus (units) 0 units   Continuous Heparin Infusion (Units/hr) 0 Units/hr   Blood Flow Rate (ml/min) 450 ml/min   Dialysate Flow Rate (ml/hr) 800 ml/hr   Arterial Access Pressure (mmHg) 3

## 2022-01-15 NOTE — PROGRESS NOTES
TRANSFER OUT -DIALYSIS    Hemodialysis treatment completed without complications. Patient alert and VS stable   Visit Vitals  BP (!) 143/56   Pulse 74   Temp 98.2 °F (36.8 °C)   Resp 18   Ht 5' 5.5\" (1.664 m)   Wt 84.9 kg (187 lb 3.2 oz)   SpO2 91%   BMI 30.68 kg/m²           3.0 Kg removed. Needles x2 removed from access and manual pressure held until hemostasis complete and pressure dressing applied. Meds given: 0. 0 units of RBCs given during dialysis. Patient to [phone] after dialysis.       01/15/22 1044   During Hemodialysis    Pulse (Heart Rate) 74   BP (!) 143/56   MAP (Calculated) 85   Transducer Checks Dry   Fluid Removed (mL) 3500   NET Fluid Removed (mL) 3000 ml   Post-Dialysis   Rinseback Volume (ml) 300 ml   Duration of Treatment (hours) 4 hours   Patient Response to Treatment Fair   Patient Disposition Return to room   Condition of Dialyzer Filter Fair   Hemodialysis End Time 1045   $$ Dialysis Charges   $$ Method Hemodialysis   3

## 2022-01-15 NOTE — PROGRESS NOTES
Attempted to call patient but their was no answer.    Patient called and informed of her urine culture results - no further treatment.   Report received from [facility]. Patient transported to dialysis at shift change. Will assess upon return.

## 2022-01-15 NOTE — PROGRESS NOTES
Hospitalist Progress Note   Admit Date:  1/10/2022  6:06 AM   Name:  Edward Fischer   Age:  80 y.o. Sex:  female  :  1950   MRN:  [id number]   Room:  O1[facility]/    Presenting Complaint: Back Pain    Reason(s) for Admission: Mediastinal mass [J98.59]  Acute respiratory failure with hypoxia [facility]) [J96.01]     Hospital Course & Interval History:   Ana Connolly is a 80 y.o. female with medical history of ESRD on hemodialysis, recurrent renal cell carcinoma s/p left nephrectomy  with recurrence and s/p course localized XRT to left renal bed who presented with severe sharp left-sided thoracic back pain. Patient recently admitted in November left flank pain and treated empirically with abx until acute infectious process ruled out. Pt had a CT thoracic spine which did not reveal acute fx, no aggressive bony lesions though b/l lung patchy infiltrative processes (pneumonitis / atypical PNA / pulm edema). Of significance pt did have a CT C/A to eval for potential aortic dissection given significant pain on 2021 which did reveal b/l small pleural effusions, enlarging right paratracheal lymph node (2.0 cm from previous 12mm) and small b/l hilar lymph nodes, no aortic dissection. She does follow with hem/onc routinely for underlying RCC hx. In the ER, pt had a repeat CT chest which revealed b/l pleural effusions and an increase in size of right paratracheal lymph node now 2.6cm in size as compared to Nov CT chest which measured lymph node 2.0cm. Pt admits to having missed her Monday HD session due to pain (she has been on HD x 15 years, M/W/F). She denies chest pain, palpitation, shortness of breath, nausea, vomiting, fevers or chills. Her VS / labs were stable; Hospitalist service consulted for inpatient admission for persistent back pain and enlarging mediastinal lymph node with acute hypoxic resp failure. Subjective (01/15/22): Pt seen at bedside, reports feeling well.  She does c/o of feeling weak and lethargic. She is on 1 ltr via NC. No chest pain, nausea/vomiting, fever, headache, chest pain. Review of Systems:  10 point ROS negative except what mentioned above.       Assessment & Plan:     Principal Problem:    Mediastinal mass (1/10/2022)  1/15:  - need to rule out malignant etiology as pt with hx of recurrent RCC  - s/p thoracentesis with cytology / cx pending  - if no yield will consider EBUS; pulm recommended PET scan on outpatient setting  Currently on 1 ltr via NC  Oxygen Qualifier          Room air: SpO2 with O2 and liter flow   Resting SpO2  79%   86% on 1L    90% on 2L   Ambulating SpO2  73%  77% on 1L   90% on 2L          Active Problems:    Acute hypoxic respiratory failure  - O2 85% on RA noted in ER, improved to 96% on 2L  1/15:  S/p thoracentesis on 1/12/22  Oxygen Qualifier          Room air: SpO2 with O2 and liter flow   Resting SpO2  79%   86% on 1L    90% on 2L   Ambulating SpO2  73%  77% on 1L   90% on 2L            Thoracic / upper back pain  - appreciate pulmonary eval for potential empyema  - xray thoracic spine 1/12/2022 negative for acute fx  - s/p thoracentesis on 1/12  - prn norco / morphine for pain control      B/l pleural effusions  - s/p thoracentesis right lung on 1/12 ; f/u cytology / cx  - cont HD for volume mgmt      ESRD (end stage renal disease)  - appreciate nephrology's ongoing assistance  - cont HD 3 x a week      HTN (hypertension)  - labile readings with hypotensive episodes; may be attributed to IV dilaudid  - closely monitor; RN instructed to HOLD PAIN RX if SBP <100mmhg  - monitor closely      Hx of recurrent renal cell carcinoma  - s/p left nephrectomy 2015 with recurrence and subsequent XRT to left renal bed  - mgmt per hem/onc      Anemia chronic disease  - hgb 8.1 this AM (from 8.0 yesterday)  - no gross bleeding; cont to closely monitor      Diabetes mellitus   - on januvia at home  - hgbA1c 5.3 on 11/13/2021  - sliding scale coverage as needed; low-carb diet       Dispo/Discharge Planning:    Pt will likely need SNF/STR on discharge    Diet:  ADULT DIET Regular; 3 carb choices (45 gm/meal); Low Fat/Low Chol/High Fiber/2 gm Na; Low Potassium (Less than 3000 mg/day); Low Phosphorus (Less than 1000 mg)  ADULT ORAL NUTRITION SUPPLEMENT Breakfast, Lunch, Dinner, HS Snack;  Renal Supplement  DVT PPx: hep sc  Code status: Full Code    Hospital Problems as of 1/15/2022 Date Reviewed: 7/27/2021          Codes Class Noted - Resolved POA    Bilateral pleural effusion ICD-10-CM: J90  ICD-9-CM: 511.9  1/12/2022 - Present Unknown        * (Principal) Mediastinal mass ICD-10-CM: J98.59  ICD-9-CM: 786.6  1/10/2022 - Present Unknown        Severe back pain ICD-10-CM: M54.9  ICD-9-CM: 724.5  11/12/2021 - Present Yes        Acute respiratory failure with hypoxia [facility]) ICD-10-CM: J96.01  ICD-9-CM: 518.81  11/12/2021 - Present Unknown        Abdominal mass ICD-10-CM: R19.00  ICD-9-CM: 789.30  9/12/2017 - Present Yes    Overview Signed 9/12/2017  9:01 PM by Martine Mullins MD     Of Left renal bed             Renal cell carcinoma [facility]) ICD-10-CM: C64.9  ICD-9-CM: 189.0  9/12/2017 - Present Yes        ESRD (end stage renal disease) ([facility] [address] ICD-10-CM: N18.6  ICD-9-CM: 585.6  2/7/2013 - Present Yes        HTN (hypertension) ICD-10-CM: I10  ICD-9-CM: 401.9  2/7/2013 - Present Yes              Objective:     Patient Vitals for the past 24 hrs:   Temp Pulse Resp BP SpO2   01/15/22 0700 98.2 °F (36.8 °C) (!) 109 18 (!) 127/57 91 %   01/15/22 0459 98.2 °F (36.8 °C) 88 18 110/62 91 %   01/15/22 0005 98.5 °F (36.9 °C) 83 16 (!) 150/64 93 %   01/14/22 1945 98 °F (36.7 °C) 87 20 99/62 95 %   01/14/22 1627 98.1 °F (36.7 °C) 85 14 (!) 96/48 95 %   01/14/22 1334 -- -- -- (!) 121/52 --   01/14/22 1142 98.3 °F (36.8 °C) 84 14 (!) 96/58 99 %   01/14/22 0757 98.6 °F (37 °C) 84 14 (!) 110/59 93 %     Oxygen Therapy  O2 Sat (%): 91 % (01/15/22 0700)  Pulse via Oximetry: 85 beats per minute (01/12/22 1148)  O2 Device: Nasal cannula (01/14/22 2015)  O2 Flow Rate (L/min): 1 l/min (01/14/22 2015)    Estimated body mass index is 30.68 kg/m² as calculated from the following:    Height as of this encounter: 5' 5.5\" (1.664 m). Weight as of this encounter: 84.9 kg (187 lb 3.2 oz). Intake/Output Summary (Last 24 hours) at 1/15/2022 0714  Last data filed at 1/14/2022 1842  Gross per 24 hour   Intake 720 ml   Output --   Net 720 ml         Physical Exam:     Blood pressure (!) 127/57, pulse (!) 109, temperature 98.2 °F (36.8 °C), resp. rate 18, height 5' 5.5\" (1.664 m), weight 84.9 kg (187 lb 3.2 oz), SpO2 91 %. General:    Alert/awake, NAD, on 1 ltr via NC  HEENT:           Head NCAT, PERRLA+, MMM  Neck:  No restricted ROM. Trachea midline   CV:   RRR. No m/r/g. No jugular venous distension. Lungs:   Diminished breath sounds in bases B/L, no wheezing or ronchi  Abdomen: Bowel sounds present. Soft, nontender, nondistended. Extremities: Trace bilateral pedal edema  Skin:     No rashes and normal coloration. Warm and dry. Neuro:  CN II-XII grossly intact. Sensation intact. Psych:  AOx3, Normal mood and affect.       I have reviewed ordered lab tests and independently visualized imaging below:    Recent Labs:  Recent Results (from the past 48 hour(s))   GLUCOSE, POC    Collection Time: 01/13/22 10:26 AM   Result Value Ref Range    Glucose (POC) 100 65 - 100 mg/dL    Performed by David Vásquez    GLUCOSE, POC    Collection Time: 01/13/22 12:22 PM   Result Value Ref Range    Glucose (POC) 89 65 - 100 mg/dL    Performed by Antonina    GLUCOSE, POC    Collection Time: 01/13/22  4:47 PM   Result Value Ref Range    Glucose (POC) 132 (H) 65 - 100 mg/dL    Performed by Mariah    GLUCOSE, POC    Collection Time: 01/14/22  8:44 AM   Result Value Ref Range    Glucose (POC) 125 (H) 65 - 100 mg/dL    Performed by Tiana Betancur, POC    Collection Time: 01/14/22 11:18 AM   Result Value Ref Range    Glucose (POC) 102 (H) 65 - 100 mg/dL    Performed by Yvonne)CNA    GLUCOSE, POC    Collection Time: 01/14/22  4:39 PM   Result Value Ref Range    Glucose (POC) 114 (H) 65 - 100 mg/dL    Performed by Yvonne)CNA    GLUCOSE, POC    Collection Time: 01/14/22  9:27 PM   Result Value Ref Range    Glucose (POC) 110 (H) 65 - 100 mg/dL    Performed by Edward        All Micro Results     Procedure Component Value Units Date/Time    CULTURE, BODY FLUID Jessica Matt STAIN [[id number]] Collected: 01/12/22 1153    Order Status: Completed Specimen: Pleural Fluid Updated: 01/14/22 0924     Special Requests: NO SPECIAL REQUESTS        GRAM STAIN 0 TO 10 WBCS PER OIF      NO DEFINITE ORGANISM SEEN        Culture result: NO GROWTH 2 DAYS       AFB CULTURE + SMEAR W/RFLX ID FROM CULTURE [[id number]] Collected: 01/12/22 1153    Order Status: Completed Specimen: Miscellaneous sample Updated: 01/13/22 1735     Source PLEURAL FLUID        Comment: RIGHT  1          AFB Specimen processing Concentration     Acid Fast Smear Negative        Comment: (NOTE)  Performed At: [facility]  [employer] [address] [id number]  Rosan Bence MD PX:[id number]          Acid Fast Culture PENDING    FUNGUS CULTURE AND SMEAR [[id number]] Collected: 01/12/22 1153    Order Status: Completed Specimen: Miscellaneous sample Updated: 01/13/22 1536     Source PLEURAL FLUID        Comment: RIGHT  1          Fungus stain Direct Inoculation     Fungus (Mycology) Culture Other source received     Comment: (NOTE)  Performed At: [facility]  [employer] [address] [id number]  Rosan Bence MD NB:[phone]               Other Studies:  No results found.     Current Meds:  Current Facility-Administered Medications   Medication Dose Route Frequency    magnesium hydroxide (MILK OF MAGNESIA) 400 mg/5 mL oral suspension 30 mL  30 mL Oral DAILY PRN    polyethylene glycol (MIRALAX) packet 17 g  17 g Oral BID    bisacodyL (DULCOLAX) suppository 10 mg  10 mg Rectal DAILY PRN    B complex-vitamin C-folic acid (NEPHRO-ROBB) 0.8 mg tab  1 Tablet Oral DAILY    heparin (porcine) injection 5,000 Units  5,000 Units SubCUTAneous Q12H    epoetin mary lou-epbx (RETACRIT) injection 40,000 Units  40,000 Units SubCUTAneous Q7D    morphine injection 4 mg  4 mg IntraVENous Q6H PRN    [Held by provider] amLODIPine (NORVASC) tablet 10 mg  10 mg Oral QHS    gabapentin (NEURONTIN) capsule 100 mg  100 mg Oral TID    [Held by provider] lisinopriL (PRINIVIL, ZESTRIL) tablet 40 mg  40 mg Oral QHS    methocarbamoL (ROBAXIN) tablet 1,500 mg  1,500 mg Oral QID    [Held by provider] minoxidiL (LONITEN) tablet 10 mg  10 mg Oral BID    pantoprazole (PROTONIX) tablet 40 mg  40 mg Oral ACB    sevelamer carbonate (RENVELA) tab 800 mg  800 mg Oral TID WITH MEALS    sodium chloride (NS) flush 5-40 mL  5-40 mL IntraVENous Q8H    sodium chloride (NS) flush 5-40 mL  5-40 mL IntraVENous PRN    acetaminophen (TYLENOL) tablet 650 mg  650 mg Oral Q6H PRN    Or    acetaminophen (TYLENOL) suppository 650 mg  650 mg Rectal Q6H PRN    polyethylene glycol (MIRALAX) packet 17 g  17 g Oral DAILY PRN    ondansetron (ZOFRAN ODT) tablet 4 mg  4 mg Oral Q8H PRN    Or    ondansetron (ZOFRAN) injection 4 mg  4 mg IntraVENous Q6H PRN    HYDROcodone-acetaminophen (NORCO) 7.5-325 mg per tablet 1 Tablet  1 Tablet Oral Q6H PRN    heparin (porcine) 1,000 unit/mL injection 5,000 Units  5,000 Units Hemodialysis DIALYSIS PRN    insulin lispro (HUMALOG) injection   SubCUTAneous AC&HS       Signed:  Stephanie Calhoun MD    Part of this note may have been written by using a voice dictation software. The note has been proof read but may still contain some grammatical/other typographical errors.

## 2022-01-15 NOTE — PROGRESS NOTES
Patient being transported to dialysis. She has rested relatively well through the night. She has been given Morphine 4 mg IV for pain.

## 2022-01-15 NOTE — PROGRESS NOTES
Noted that patient is still stressed about new mass    Per notes less tearful today    Will follow closely

## 2022-01-15 NOTE — PROGRESS NOTES
Verbal bedside report received from off going RN, Nat Benavides. Patient's situation, background, assessment and recommendations provided. Opportunity for questions provided. Assumed care of patient. Assessment to follow.

## 2022-01-15 NOTE — PROGRESS NOTES
Hemodialysis Rounding Note    Subjective: Rebeca Nguyen is a 80 y.o.  who presents with Mediastinal mass [J98.59]  Acute respiratory failure with hypoxia ([address] [J96.01]. The patient is dialyzing utilizing the following method:Intermittent Hemodialysis  Artificial Kidney Dialyzer/Set Up Inspection: [employer]   hours Duration of Treatment (hours): 40 hours   blood flow rate Blood Flow Rate (ml/min): 400 ml/min   dialysate rate     ultrafiltration Goal/Amount of Fluid to Remove (mL): 3600 mL   Heparin is used during the dialysis treatment. Complaints none.      Current Facility-Administered Medications   Medication Dose Route Frequency    magnesium hydroxide (MILK OF MAGNESIA) 400 mg/5 mL oral suspension 30 mL  30 mL Oral DAILY PRN    polyethylene glycol (MIRALAX) packet 17 g  17 g Oral BID    bisacodyL (DULCOLAX) suppository 10 mg  10 mg Rectal DAILY PRN    B complex-vitamin C-folic acid (NEPHRO-ROBB) 0.8 mg tab  1 Tablet Oral DAILY    heparin (porcine) injection 5,000 Units  5,000 Units SubCUTAneous Q12H    epoetin mary lou-epbx (RETACRIT) injection 40,000 Units  40,000 Units SubCUTAneous Q7D    morphine injection 4 mg  4 mg IntraVENous Q6H PRN    [Held by provider] amLODIPine (NORVASC) tablet 10 mg  10 mg Oral QHS    gabapentin (NEURONTIN) capsule 100 mg  100 mg Oral TID    [Held by provider] lisinopriL (PRINIVIL, ZESTRIL) tablet 40 mg  40 mg Oral QHS    methocarbamoL (ROBAXIN) tablet 1,500 mg  1,500 mg Oral QID    [Held by provider] minoxidiL (LONITEN) tablet 10 mg  10 mg Oral BID    pantoprazole (PROTONIX) tablet 40 mg  40 mg Oral ACB    sevelamer carbonate (RENVELA) tab 800 mg  800 mg Oral TID WITH MEALS    sodium chloride (NS) flush 5-40 mL  5-40 mL IntraVENous Q8H    sodium chloride (NS) flush 5-40 mL  5-40 mL IntraVENous PRN    acetaminophen (TYLENOL) tablet 650 mg  650 mg Oral Q6H PRN    Or    acetaminophen (TYLENOL) suppository 650 mg  650 mg Rectal Q6H PRN    polyethylene glycol (MIRALAX) packet 17 g  17 g Oral DAILY PRN    ondansetron (ZOFRAN ODT) tablet 4 mg  4 mg Oral Q8H PRN    Or    ondansetron (ZOFRAN) injection 4 mg  4 mg IntraVENous Q6H PRN    HYDROcodone-acetaminophen (NORCO) 7.5-325 mg per tablet 1 Tablet  1 Tablet Oral Q6H PRN    heparin (porcine) 1,000 unit/mL injection 5,000 Units  5,000 Units Hemodialysis DIALYSIS PRN    insulin lispro (HUMALOG) injection   SubCUTAneous AC&HS       No intake/output data recorded.  1901 - 01/15 0700  In: 720 [P.O.:720]  Out: -     Recent Results (from the past 24 hour(s))   GLUCOSE, POC    Collection Time: 22 11:18 AM   Result Value Ref Range    Glucose (POC) 102 (H) 65 - 100 mg/dL    Performed by Yvonne)CNA    GLUCOSE, POC    Collection Time: 22  4:39 PM   Result Value Ref Range    Glucose (POC) 114 (H) 65 - 100 mg/dL    Performed by Yvonne)CNA    GLUCOSE, POC    Collection Time: 22  9:27 PM   Result Value Ref Range    Glucose (POC) 110 (H) 65 - 100 mg/dL    Performed by Edward          Review of Systems  A comprehensive review of systems was negative except for that written in the HPI. .    Objective:     Blood pressure (!) 116/57, pulse 81, temperature 98.2 °F (36.8 °C), resp. rate 18, height 5' 5.5\" (1.664 m), weight 84.9 kg (187 lb 3.2 oz), SpO2 91 %. Temp (24hrs), Av.2 °F (36.8 °C), Min:98 °F (36.7 °C), Max:98.5 °F (36.9 °C)      Physical Exam:     General: alert/oriented  Lungs: CTAB  Heart: S1S2, no m/r/g  Abdomen: soft, +BS  Extremities: no edema      Assessment:     End Stage Renal Disease:  Patient is tolerating dialysis treatment well. .  Additionally the patient has experienced normal dialysis treatment during dialysis. Dry weight   same.     Anemia:    Recent Labs     22  0350   HCT 23.6*   HGB 8.1*       Renal Metabolic Bone Disease:    Recent Labs     22  0350   CA 8.8   ALB 3.4                        Treatment:  PO4 binders, Cinacaleat, Vitamin D  Hypertension: controlled    Access: adequate monitoring/no changes    Principal Problem:    Mediastinal mass (1/10/2022)    Active Problems:    ESRD (end stage renal disease) ([facility] [address] (2/7/2013)      HTN (hypertension) (2/7/2013)      Abdominal mass (9/12/2017)      Overview: Of Left renal bed      Renal cell carcinoma ([facility] [address] (9/12/2017)      Severe back pain (11/12/2021)      Acute respiratory failure with hypoxia ([address] (11/12/2021)      Bilateral pleural effusion (1/12/2022)           Plan:     Continue scheduled dialysis     Tiki Poster, ACNP

## 2022-01-16 LAB
ERYTHROCYTE [DISTWIDTH] IN BLOOD BY AUTOMATED COUNT: 16.7 % (ref 11.9–14.6)
GLUCOSE BLD STRIP.AUTO-MCNC: 127 MG/DL (ref 65–100)
GLUCOSE BLD STRIP.AUTO-MCNC: 133 MG/DL (ref 65–100)
GLUCOSE BLD STRIP.AUTO-MCNC: 155 MG/DL (ref 65–100)
GLUCOSE BLD STRIP.AUTO-MCNC: 86 MG/DL (ref 65–100)
HCT VFR BLD AUTO: 23.3 % (ref 35.8–46.3)
HGB BLD-MCNC: 8.2 G/DL (ref 11.7–15.4)
MCH RBC QN AUTO: 33.7 PG (ref 26.1–32.9)
MCHC RBC AUTO-ENTMCNC: 35.2 G/DL (ref 31.4–35)
MCV RBC AUTO: 95.9 FL (ref 79.6–97.8)
NRBC # BLD: 0 K/UL (ref 0–0.2)
PLATELET # BLD AUTO: 129 K/UL (ref 150–450)
PMV BLD AUTO: 10.8 FL (ref 9.4–12.3)
RBC # BLD AUTO: 2.43 M/UL (ref 4.05–5.2)
SERVICE CMNT-IMP: ABNORMAL
SERVICE CMNT-IMP: NORMAL
WBC # BLD AUTO: 6.3 K/UL (ref 4.3–11.1)

## 2022-01-16 PROCEDURE — 74011250637 HC RX REV CODE- 250/637: Performed by: FAMILY MEDICINE

## 2022-01-16 PROCEDURE — 65270000029 HC RM PRIVATE

## 2022-01-16 PROCEDURE — 74011000250 HC RX REV CODE- 250: Performed by: FAMILY MEDICINE

## 2022-01-16 PROCEDURE — 36415 COLL VENOUS BLD VENIPUNCTURE: CPT

## 2022-01-16 PROCEDURE — 74011250636 HC RX REV CODE- 250/636: Performed by: INTERNAL MEDICINE

## 2022-01-16 PROCEDURE — 85027 COMPLETE CBC AUTOMATED: CPT

## 2022-01-16 PROCEDURE — 74011250637 HC RX REV CODE- 250/637: Performed by: INTERNAL MEDICINE

## 2022-01-16 PROCEDURE — 74011250637 HC RX REV CODE- 250/637: Performed by: HOSPITALIST

## 2022-01-16 PROCEDURE — 82962 GLUCOSE BLOOD TEST: CPT

## 2022-01-16 PROCEDURE — 74011636637 HC RX REV CODE- 636/637: Performed by: STUDENT IN AN ORGANIZED HEALTH CARE EDUCATION/TRAINING PROGRAM

## 2022-01-16 RX ORDER — POLYETHYLENE GLYCOL 3350 17 G/17G
17 POWDER, FOR SOLUTION ORAL 2 TIMES DAILY
Status: DISCONTINUED | OUTPATIENT
Start: 2022-01-16 | End: 2022-01-20 | Stop reason: HOSPADM

## 2022-01-16 RX ORDER — MIDODRINE HYDROCHLORIDE 5 MG/1
5 TABLET ORAL
Status: DISCONTINUED | OUTPATIENT
Start: 2022-01-16 | End: 2022-01-20 | Stop reason: HOSPADM

## 2022-01-16 RX ADMIN — HEPARIN SODIUM 5000 UNITS: 5000 INJECTION INTRAVENOUS; SUBCUTANEOUS at 05:35

## 2022-01-16 RX ADMIN — METHOCARBAMOL TABLETS 1500 MG: 750 TABLET, COATED ORAL at 17:22

## 2022-01-16 RX ADMIN — SODIUM CHLORIDE, PRESERVATIVE FREE 10 ML: 5 INJECTION INTRAVENOUS at 05:35

## 2022-01-16 RX ADMIN — SEVELAMER CARBONATE 800 MG: 800 TABLET, FILM COATED ORAL at 10:31

## 2022-01-16 RX ADMIN — METHOCARBAMOL TABLETS 1500 MG: 750 TABLET, COATED ORAL at 13:30

## 2022-01-16 RX ADMIN — SODIUM CHLORIDE, PRESERVATIVE FREE 10 ML: 5 INJECTION INTRAVENOUS at 13:30

## 2022-01-16 RX ADMIN — MIDODRINE HYDROCHLORIDE 5 MG: 5 TABLET ORAL at 13:30

## 2022-01-16 RX ADMIN — GABAPENTIN 100 MG: 100 CAPSULE ORAL at 10:31

## 2022-01-16 RX ADMIN — GABAPENTIN 100 MG: 100 CAPSULE ORAL at 17:21

## 2022-01-16 RX ADMIN — Medication 1 TABLET: at 10:31

## 2022-01-16 RX ADMIN — METHOCARBAMOL TABLETS 1500 MG: 750 TABLET, COATED ORAL at 10:31

## 2022-01-16 RX ADMIN — PANTOPRAZOLE SODIUM 40 MG: 40 TABLET, DELAYED RELEASE ORAL at 05:35

## 2022-01-16 RX ADMIN — METHOCARBAMOL TABLETS 1500 MG: 750 TABLET, COATED ORAL at 22:45

## 2022-01-16 RX ADMIN — SEVELAMER CARBONATE 800 MG: 800 TABLET, FILM COATED ORAL at 17:22

## 2022-01-16 RX ADMIN — HEPARIN SODIUM 5000 UNITS: 5000 INJECTION INTRAVENOUS; SUBCUTANEOUS at 17:21

## 2022-01-16 RX ADMIN — HYDROCODONE BITARTRATE AND ACETAMINOPHEN 1 TABLET: 7.5; 325 TABLET ORAL at 12:13

## 2022-01-16 RX ADMIN — Medication 2 UNITS: at 16:30

## 2022-01-16 RX ADMIN — POLYETHYLENE GLYCOL 3350 17 G: 17 POWDER, FOR SOLUTION ORAL at 17:23

## 2022-01-16 RX ADMIN — MIDODRINE HYDROCHLORIDE 5 MG: 5 TABLET ORAL at 10:31

## 2022-01-16 RX ADMIN — MIDODRINE HYDROCHLORIDE 5 MG: 5 TABLET ORAL at 17:22

## 2022-01-16 RX ADMIN — GABAPENTIN 100 MG: 100 CAPSULE ORAL at 22:45

## 2022-01-16 RX ADMIN — SEVELAMER CARBONATE 800 MG: 800 TABLET, FILM COATED ORAL at 13:30

## 2022-01-16 RX ADMIN — SODIUM CHLORIDE, PRESERVATIVE FREE 10 ML: 5 INJECTION INTRAVENOUS at 22:46

## 2022-01-16 NOTE — PROGRESS NOTES
Bedside shift report given to Adán Holcomb (oncoming nurse) by Biagio Holstein, RN (offgoing nurse). Bedside shift report included the following information: SBAR, Kardex, Procedure Summary, MAR, and Recent Results.

## 2022-01-16 NOTE — PROGRESS NOTES
RENAL H&P/CONSULT    Subjective:     Patient is a 81 y/o AA female, followed by our office for ESRD, on HD MWF at [facility]. She was due for HD today. Presented to the ER this morning with CC of sharp left-sided back pain that started last night. She was admitted last November for similar complaint, work-up was essentially negative. CT scan today showed bilateral pleural effusions with subjacent atelectaiss and previously reported right paratracheal mass/lymph node slightly larger. She has a hx of renal cell carcinoma, s/p left nephrectomy, followed by Oncology. Her electrolytes are stable. She c/o mild dyspnea, denies CP, no n/v, no HA or dizziness. She denies fever or chills. PMH also consist of DM II, Hyperphosphatemia, anemia, and HTN. At time of assessment, she is in no respiratory distress, on 2L NC. She is not on O2 at home. She is admitted for observation. There is no urgent need for HD. Plan for dialysis in the morning. 1/11/22 - see HD Note  1/12/22 - alert - wants to know what's going on with her back - discussed plans for HD tomorrow if she is still here and to call outpatient dialysis for a spot if she is discharged - no N/V, no CP, no significant dyspnea   1/13/22- alert/oriented, no signs of distress, on 2L NC, denies dyspnea or CP, no n/v, no HA or dizziness. Thoracentesis done yesterday at the request of Oncology to obtain fluid for cytology.  Tolerated HD today with no complications  2/29/38 - alert and communicating well  -still stressed, but not as tearful as previously - we discussed plans for rehab placement and she had a BM after suppository yesterday - no N/V, dyspnea is controlled with nasal cannula O2 - plans for dialysis discussed tomorrow  1/15/22 - see HD Note  1/16/22 - she feels better - had breakfast with delay due ton blizzard - no N/V, no CP, no constipation, no edema - breathing better     Past Medical History:   Diagnosis Date    Arthritis     AVF (arteriovenous fistula) [facility]) 12/20/2016 12/6/16 (S) Right AVF revision and thrombectomy    Cancer ([facility] [address] 2015    L kidney    Chronic kidney disease     HD in M-W-F- at [facility] dialysis    Degenerative joint disease     Diabetes ([facility] [address]     checks QD, normal 120-130, hyposymptoms at 80    ESRD (end stage renal disease) [facility]) Nov 2006    ESRD. MWF dialysis     Hypertension     Obesity     Transient ischemic attack 08/29/2015    no residual      Past Surgical History:   Procedure Laterality Date    COLONOSCOPY N/A 11/8/2018    COLONOSCOPY  BMI 36 performed by Ba[facility] Melton [facility] at [facility] ENDOSCOPY    HX GI  06/01/2002    colon resection resulting in temporary colostomy reversal    HX GI  08/06/2018    exploratory laparotomy    HX GYN      stephan    HX OTHER SURGICAL      dialysis fistula, several permcaths    HX UROLOGICAL Left July 2015    nephrectomy    HX VASCULAR ACCESS      IR INSERT TUNL CVC W/O PORT OVER 5 YR  11/19/2021    IR PLC CATH AV SHUNT IN W PTA SI VENOUS  5/30/2019    IR PLC CATH AV SHUNT IN W PTA SI VENOUS RT  2/28/2019    IR PLC CATH AV SHUNT IN W PTA SI VENOUS RT  9/3/2019    IR PLC CATH AV SHUNT IN W PTA SI VENOUS RT  12/5/2019    IR PLC CATH AV SHUNT IN W PTA SI VENOUS RT  3/10/2020    ND BREAST SURGERY PROCEDURE UNLISTED Right     cyst removed    VASCULAR SURGERY PROCEDURE UNLIST Right     AV graft      Prior to Admission medications    Medication Sig Start Date End Date Taking? Authorizing Provider   lidocaine 4 % patch 1 Patch by TransDERmal route every twelve (12) hours every twelve (12) hours. 11/15/21  Yes Leandro Matthews MD   minoxidiL (LONITEN) 10 mg tablet Take  by mouth two (2) times a day. Yes Provider, Historical   omeprazole (PRILOSEC) 40 mg capsule TAKE ONE CAPSULE BY MOUTH EVERY DAY 6/10/21  Yes Provider, Historical   lidocaine 5 % topical cream Apply  to affected area two (2) times daily as needed for Pain.  9/1/17  Yes Vonn Phoenix, APRN   amLODIPine (NORVASC) 10 mg tablet Take 10 mg by mouth nightly. Yes Provider, Historical   sevelamer carbonate (RENVELA) 800 mg tab tab Take 800 mg by mouth three (3) times daily (with meals). 5 tablets with meals/ 2 with snacks  Sometimes only eats 2 meals/d   Yes Provider, Historical   lisinopril (PRINIVIL, ZESTRIL) 40 mg tablet Take 40 mg by mouth nightly. Indications: HYPERTENSION 6/8/15  Yes Provider, Historical   sitaGLIPtin (JANUVIA) 100 mg tablet Take 50 mg by mouth every morning. Indications: TYPE 2 DIABETES MELLITUS   Yes Provider, Historical   furosemide (Lasix) 80 mg tablet Take 80 mg by mouth two (2) times a day. Patient not taking: Reported on 1/10/2022    Provider, Historical   methocarbamoL (Robaxin-750) 750 mg tablet Take 2 Tablets by mouth four (4) times daily. Patient not taking: Reported on 1/10/2022 11/9/21   Leticia Daniel MD   gabapentin (NEURONTIN) 100 mg capsule TAKE 1 CAPSULE BY MOUTH THREE TIMES DAILY FOR PAIN  Patient not taking: Reported on 1/10/2022 6/10/21   Provider, Historical   VIT C/VIT E/LUTEIN/MIN/OMEGA-3 (OCUVITE PO) Take  by mouth nightly.   Patient not taking: Reported on 1/10/2022    Provider, Historical     Allergies   Allergen Reactions    Pcn [Penicillins] Rash    Vancomycin Rash      Social History     Tobacco Use    Smoking status: Never Smoker    Smokeless tobacco: Never Used   Substance Use Topics    Alcohol use: No      Family History   Problem Relation Age of Onset    Diabetes Mother     Heart Disease Mother     Hypertension Mother     Heart Disease Father     Hypertension Father     Malignant Hyperthermia Neg Hx     Pseudocholinesterase Deficiency Neg Hx     Delayed Awakening Neg Hx     Post-op Nausea/Vomiting Neg Hx     Emergence Delirium Neg Hx     Other Neg Hx     Post-op Cognitive Dysfunction Neg Hx           Review of Systems    ROS negative except for what is mention in HPI      Objective:       Visit Vitals  /60   Pulse 95   Temp 99.1 °F (37.3 °C)   Resp 16   Ht 5' 5\" (1.651 m)   Wt 77.9 kg (171 lb 11.2 oz)   SpO2 96%   BMI 28.57 kg/m²       No intake/output data recorded. 01/14 1901 - 01/16 0700  In: 300 [P.O.:300]  Out: 3000       General:  Alert, cooperative, no distress, appears stated age. Neck: Supple, symmetrical, trachea midline, no JVD. Lungs:   Clear to auscultation bilaterally. Heart:  Regular rate and rhythm, S1, S2 normal, no murmur,  rub or gallop. Abdomen:   Soft, non-tender. No masses,  No organomegaly. Extremities: Extremities normal, atraumatic, no cyanosis or edema. CE AVG examines well   Access: Is open. Skin: Skin color, texture, turgor normal. No rashes or lesions. Neurologic: Grossly intact. No asterixis. Data Review:      CT chest  COMPARISON: November 22, 2021, November 12, 2021  INDICATION: 2cm right sided paratracheal mass  FINDINGS:  Lungs: Bilateral pleural effusions with subjacent atelectasis. Mediastinum: Limitations due to no contrast. Hilar fullness. Suggestion of  adenopathy with prominent right paratracheal node measuring 2.6 cm short axis. Visualized upper abdomen: Ascites. The visualized portions of the liver and  adrenal glands are normal. Left nephrectomy. Osseous structures: No suspicious lytic or blastic bony lesions. IMPRESSION  1. Bilateral pleural effusions with subjacent atelectasis. 2.  The previously reported right paratracheal mass/lymph node is slightly  Larger. XR SPINE [facility] 3 V on 1/11/2022 6:00 PM  Clinical History: The Female patient is 80years old  presenting for pain. Comparison:  Lasix 5 films 11/22/2021  Findings:  3 views demonstrate no fracture or subluxation of the thoracic spine. Normal curvature and alignment is maintained. There are chronic degenerative  endplate changes with spondylosis. There is no significant disc or vertebral  body height loss. The cervicothoracic junction is unremarkable on lateral  imaging.  The paraspinal soft tissues are normal.  IMPRESSION  1. Chronic degenerative changes with marginal osteophyte formation      Recent Results (from the past 24 hour(s))   GLUCOSE, POC    Collection Time: 01/15/22  3:47 PM   Result Value Ref Range    Glucose (POC) 131 (H) 65 - 100 mg/dL    Performed by Timothy    GLUCOSE, POC    Collection Time: 01/15/22  8:59 PM   Result Value Ref Range    Glucose (POC) 145 (H) 65 - 100 mg/dL    Performed by Bibiana    CBC W/O DIFF    Collection Time: 01/16/22  6:20 AM   Result Value Ref Range    WBC 6.3 4.3 - 11.1 K/uL    RBC 2.43 (L) 4.05 - 5.2 M/uL    HGB 8.2 (L) 11.7 - 15.4 g/dL    HCT 23.3 (L) 35.8 - 46.3 %    MCV 95.9 79.6 - 97.8 FL    MCH 33.7 (H) 26.1 - 32.9 PG    MCHC 35.2 (H) 31.4 - 35.0 g/dL    RDW 16.7 (H) 11.9 - 14.6 %    PLATELET 439 (L) 070 - 450 K/uL    MPV 10.8 9.4 - 12.3 FL    ABSOLUTE NRBC 0.00 0.0 - 0.2 K/uL   GLUCOSE, POC    Collection Time: 01/16/22 10:36 AM   Result Value Ref Range    Glucose (POC) 133 (H) 65 - 100 mg/dL    Performed by Whitney Nowak            Principal Problem:    Mediastinal mass (1/10/2022)    Active Problems:    ESRD (end stage renal disease) ([facility] [address] (2/7/2013)      HTN (hypertension) (2/7/2013)      Abdominal mass (9/12/2017)      Overview: Of Left renal bed      Renal cell carcinoma ([facility] [address] (9/12/2017)      Severe back pain (11/12/2021)      Acute respiratory failure with hypoxia (HCC) (11/12/2021)      Bilateral pleural effusion (1/12/2022)        Assessment/Plan:     1. ESRD- on HD, TIW, dialysis needed for biochemical and volume control, no urgent HD needed at this time,  - plan to return to her normal MWF  Schedule with hD tomorrow    2. Back pain- work up per primary, cytology pending and biopsy anticipated    3. Right paratracheal mass/lymph node on CT -   hx of renal cell carcinoma, followed by Oncology  - Xray shows DJD    4. Bilateral pleural effusion- no signs of respiratory distress, s/p right thoracentesis with 300 ml fluid removed for cytology    5.  HTN- Excessively tight, antihypertensives on hold, will monitor    6. Hyperphosphatemia- on Renvela    7.  Anemia - Hgb fairly stable, treat with JAMES        Leopoldo Cora, MD

## 2022-01-16 NOTE — PROGRESS NOTES
Hospitalist Progress Note   Admit Date:  1/10/2022  6:06 AM   Name:  Usama Fischer   Age:  80 y.o. Sex:  female  :  1950   MRN:  [id number]   Room:  [facility]/    Presenting Complaint: Back Pain    Reason(s) for Admission: Mediastinal mass [J98.59]  Acute respiratory failure with hypoxia [facility]) [J96.01]     Hospital Course & Interval History:   Michel Sewell is a 80 y.o. female with medical history of ESRD on hemodialysis, recurrent renal cell carcinoma s/p left nephrectomy  with recurrence and s/p course localized XRT to left renal bed who presented with severe sharp left-sided thoracic back pain. Patient recently admitted in November left flank pain and treated empirically with abx until acute infectious process ruled out. Pt had a CT thoracic spine which did not reveal acute fx, no aggressive bony lesions though b/l lung patchy infiltrative processes (pneumonitis / atypical PNA / pulm edema). Of significance pt did have a CT C/A to eval for potential aortic dissection given significant pain on 2021 which did reveal b/l small pleural effusions, enlarging right paratracheal lymph node (2.0 cm from previous 12mm) and small b/l hilar lymph nodes, no aortic dissection. She does follow with hem/onc routinely for underlying RCC hx. In the ER, pt had a repeat CT chest which revealed b/l pleural effusions and an increase in size of right paratracheal lymph node now 2.6cm in size as compared to Nov CT chest which measured lymph node 2.0cm. Pt admits to having missed her Monday HD session due to pain (she has been on HD x 15 years, M/W/F). She denies chest pain, palpitation, shortness of breath, nausea, vomiting, fevers or chills. Her VS / labs were stable; Hospitalist service consulted for inpatient admission for persistent back pain and enlarging mediastinal lymph node with acute hypoxic resp failure. Subjective (22): Pt seen at bedside, resting comfortably on RA.   Pt denies chest pain, nausea/vomiting, headache. Pt had 3 small BM's    Review of Systems:  10 point ROS negative except what mentioned above.       Assessment & Plan:     Principal Problem:    Mediastinal mass (1/10/2022)  1/16:  - need to rule out malignant etiology as pt with hx of recurrent RCC  - s/p thoracentesis with cytology / cx pending  - if no yield will consider EBUS; pulm recommended PET scan on outpatient setting  Currently on 1 ltr via NC  Oxygen Qualifier          Room air: SpO2 with O2 and liter flow   Resting SpO2  79%   86% on 1L    90% on 2L   Ambulating SpO2  73%  77% on 1L   90% on 2L          Active Problems:    Acute hypoxic respiratory failure  - O2 85% on RA noted in ER, improved to 96% on 2L  1/16:  S/p thoracentesis on 1/12/22  Oxygen Qualifier          Room air: SpO2 with O2 and liter flow   Resting SpO2  79%   86% on 1L    90% on 2L   Ambulating SpO2  73%  77% on 1L   90% on 2L            Thoracic / upper back pain  - appreciate pulmonary eval for potential empyema  - xray thoracic spine 1/12/2022 negative for acute fx  - s/p thoracentesis on 1/12  - prn norco / morphine for pain control      B/l pleural effusions  - s/p thoracentesis right lung on 1/12 ; f/u cytology / cx  - cont HD for volume mgmt      ESRD (end stage renal disease)  - appreciate nephrology's ongoing assistance  - cont HD 3 x a week      HTN (hypertension)  - labile readings with hypotensive episodes; may be attributed to IV dilaudid  - closely monitor; RN instructed to HOLD PAIN RX if SBP <100mmhg  - monitor closely      Hx of recurrent renal cell carcinoma  - s/p left nephrectomy 2015 with recurrence and subsequent XRT to left renal bed  - mgmt per hem/onc      Anemia chronic disease  - hgb 8.2 this AM   - no gross bleeding; cont to closely monitor      Diabetes mellitus   - on januvia at home  - hgbA1c 5.3 on 11/13/2021  - sliding scale coverage as needed; low-carb diet       Dispo/Discharge Planning:    Pt will likely need SNF/STR on discharge    Diet:  ADULT DIET Regular; 3 carb choices (45 gm/meal); Low Fat/Low Chol/High Fiber/2 gm Na; Low Potassium (Less than 3000 mg/day); Low Phosphorus (Less than 1000 mg)  ADULT ORAL NUTRITION SUPPLEMENT Breakfast, Lunch, Dinner, HS Snack;  Renal Supplement  DVT PPx: hep sc  Code status: Full Code    Hospital Problems as of 1/16/2022 Date Reviewed: 7/27/2021          Codes Class Noted - Resolved POA    Bilateral pleural effusion ICD-10-CM: J90  ICD-9-CM: 511.9  1/12/2022 - Present Unknown        * (Principal) Mediastinal mass ICD-10-CM: J98.59  ICD-9-CM: 786.6  1/10/2022 - Present Unknown        Severe back pain ICD-10-CM: M54.9  ICD-9-CM: 724.5  11/12/2021 - Present Yes        Acute respiratory failure with hypoxia [facility]) ICD-10-CM: J96.01  ICD-9-CM: 518.81  11/12/2021 - Present Unknown        Abdominal mass ICD-10-CM: R19.00  ICD-9-CM: 789.30  9/12/2017 - Present Yes    Overview Signed 9/12/2017  9:01 PM by Liliana Michele MD     Of Left renal bed             Renal cell carcinoma ([facility] ICD-10-CM: C64.9  ICD-9-CM: 189.0  9/12/2017 - Present Yes        ESRD (end stage renal disease) ([facility] ICD-10-CM: N18.6  ICD-9-CM: 585.6  2/7/2013 - Present Yes        HTN (hypertension) ICD-10-CM: I10  ICD-9-CM: 401.9  2/7/2013 - Present Yes              Objective:     Patient Vitals for the past 24 hrs:   Temp Pulse Resp BP SpO2   01/16/22 0417 98.2 °F (36.8 °C) 86 20 (!) 86/55 95 %   01/16/22 0030 -- -- -- (!) 94/56 --   01/15/22 2329 98.6 °F (37 °C) 87 20 (!) 86/48 94 %   01/15/22 1928 98.4 °F (36.9 °C) 89 20 (!) 92/53 95 %   01/15/22 1548 98.4 °F (36.9 °C) 84 21 (!) 109/54 97 %   01/15/22 1118 97.5 °F (36.4 °C) 80 19 (!) 147/53 97 %   01/15/22 1044 -- 74 -- (!) 143/56 --   01/15/22 1030 -- 75 -- (!) 149/59 --   01/15/22 1009 -- 75 -- (!) 124/56 --   01/15/22 0930 -- 79 -- (!) 124/49 --   01/15/22 0900 -- 81 -- (!) 113/51 --   01/15/22 0830 -- 81 -- (!) 116/57 --   01/15/22 0800 -- 85 -- (!) 111/54 -- Oxygen Therapy  O2 Sat (%): 95 % (01/16/22 0417)  Pulse via Oximetry: 85 beats per minute (01/12/22 1148)  O2 Device: Nasal cannula (01/16/22 0417)  O2 Flow Rate (L/min): 1 l/min (01/16/22 0417)    Estimated body mass index is 30.68 kg/m² as calculated from the following:    Height as of this encounter: 5' 5.5\" (1.664 m). Weight as of this encounter: 84.9 kg (187 lb 3.2 oz). Intake/Output Summary (Last 24 hours) at 1/16/2022 0754  Last data filed at 1/16/2022 0417  Gross per 24 hour   Intake 300 ml   Output 3000 ml   Net -2700 ml         Physical Exam:     Blood pressure (!) 86/55, pulse 86, temperature 98.2 °F (36.8 °C), resp. rate 20, height 5' 5.5\" (1.664 m), weight 84.9 kg (187 lb 3.2 oz), SpO2 95 %. General:    Alert/awake, NAD, on room air  HEENT:           Head NCAT, PERRLA+, MMM  Neck:  No restricted ROM. Trachea midline   CV:   RRR. No m/r/g. No jugular venous distension. Lungs:   Diminished breath sounds in bases B/L, no wheezing or ronchi  Abdomen: Bowel sounds present. Soft, nontender, nondistended. Extremities: No edema in LE. Skin:     No rashes and normal coloration. Warm and dry. Neuro:  CN II-XII grossly intact. Sensation intact. Psych:  AOx3, Normal mood and affect.       I have reviewed ordered lab tests and independently visualized imaging below:    Recent Labs:  Recent Results (from the past 48 hour(s))   GLUCOSE, POC    Collection Time: 01/14/22  8:44 AM   Result Value Ref Range    Glucose (POC) 125 (H) 65 - 100 mg/dL    Performed by [address], POC    Collection Time: 01/14/22 11:18 AM   Result Value Ref Range    Glucose (POC) 102 (H) 65 - 100 mg/dL    Performed by Yvonne)JAHAIRA    GLUCOSE, POC    Collection Time: 01/14/22  4:39 PM   Result Value Ref Range    Glucose (POC) 114 (H) 65 - 100 mg/dL    Performed by Ravi([facility])CNA    GLUCOSE, POC    Collection Time: 01/14/22  9:27 PM   Result Value Ref Range    Glucose (POC) 110 (H) 65 - 100 mg/dL    Performed by Edward    GLUCOSE, POC    Collection Time: 01/15/22 10:17 AM   Result Value Ref Range    Glucose (POC) 95 65 - 100 mg/dL    Performed by Jakob    GLUCOSE, POC    Collection Time: 01/15/22 11:22 AM   Result Value Ref Range    Glucose (POC) 85 65 - 100 mg/dL    Performed by Timothy    CBC W/O DIFF    Collection Time: 01/15/22  1:30 PM   Result Value Ref Range    WBC 5.0 4.3 - 11.1 K/uL    RBC 2.72 (L) 4.05 - 5.2 M/uL    HGB 9.1 (L) 11.7 - 15.4 g/dL    HCT 27.6 (L) 35.8 - 46.3 %    .5 (H) 79.6 - 97.8 FL    MCH 33.5 (H) 26.1 - 32.9 PG    MCHC 33.0 31.4 - 35.0 g/dL    RDW 17.6 (H) 11.9 - 14.6 %    PLATELET 611 (L) 316 - 450 K/uL    MPV 11.0 9.4 - 12.3 FL    ABSOLUTE NRBC 0.00 0.0 - 0.2 K/uL   METABOLIC PANEL, BASIC    Collection Time: 01/15/22  1:30 PM   Result Value Ref Range    Sodium 137 136 - 145 mmol/L    Potassium 4.0 3.5 - 5.1 mmol/L    Chloride 98 98 - 107 mmol/L    CO2 28 21 - 32 mmol/L    Anion gap 11 7 - 16 mmol/L    Glucose 98 65 - 100 mg/dL    BUN 19 8 - 23 MG/DL    Creatinine 3.79 (H) 0.6 - 1.0 MG/DL    GFR est AA 15 (L) >60 ml/min/1.73m2    GFR est non-AA 13 (L) >60 ml/min/1.73m2    Calcium 9.1 8.3 - 10.4 MG/DL   COLLECTION COMMENT    Collection Time: 01/15/22  1:30 PM   Result Value Ref Range    Collection Comment PATIENT ON DIALYSIS    GLUCOSE, POC    Collection Time: 01/15/22  3:47 PM   Result Value Ref Range    Glucose (POC) 131 (H) 65 - 100 mg/dL    Performed by Timothy    GLUCOSE, POC    Collection Time: 01/15/22  8:59 PM   Result Value Ref Range    Glucose (POC) 145 (H) 65 - 100 mg/dL    Performed by ModusP    CBC W/O DIFF    Collection Time: 01/16/22  6:20 AM   Result Value Ref Range    WBC 6.3 4.3 - 11.1 K/uL    RBC 2.43 (L) 4.05 - 5.2 M/uL    HGB 8.2 (L) 11.7 - 15.4 g/dL    HCT 23.3 (L) 35.8 - 46.3 %    MCV 95.9 79.6 - 97.8 FL    MCH 33.7 (H) 26.1 - 32.9 PG    MCHC 35.2 (H) 31.4 - 35.0 g/dL    RDW 16.7 (H) 11.9 - 14.6 %    PLATELET 430 (L) 443 - 450 K/uL    MPV 10.8 9.4 - 12.3 FL    ABSOLUTE NRBC 0.00 0.0 - 0.2 K/uL       All Micro Results     Procedure Component Value Units Date/Time    CULTURE, BODY FLUID [place] STAIN [[id number]] Collected: 01/12/22 1153    Order Status: Completed Specimen: Pleural Fluid Updated: 01/14/22 0924     Special Requests: NO SPECIAL REQUESTS        GRAM STAIN 0 TO 10 WBCS PER OIF      NO DEFINITE ORGANISM SEEN        Culture result: NO GROWTH 2 DAYS       AFB CULTURE + SMEAR W/RFLX ID FROM CULTURE [[id number]] Collected: 01/12/22 1153    Order Status: Completed Specimen: Miscellaneous sample Updated: 01/13/22 1735     Source PLEURAL FLUID        Comment: RIGHT  1          AFB Specimen processing Concentration     Acid Fast Smear Negative        Comment: (NOTE)  Performed At: [address] [id number]  Eze Calix MD IW:[id number]          Acid Fast Culture PENDING    FUNGUS CULTURE AND SMEAR [[id number]] Collected: 01/12/22 1153    Order Status: Completed Specimen: Miscellaneous sample Updated: 01/13/22 1536     Source PLEURAL FLUID        Comment: RIGHT  1          Fungus stain Direct Inoculation     Fungus (Mycology) Culture Other source received     Comment: (NOTE)  Performed At: [address] [id number]  Eze Calix MD [id number]               Other Studies:  No results found.     Current Meds:  Current Facility-Administered Medications   Medication Dose Route Frequency    midodrine (PROAMATINE) tablet 5 mg  5 mg Oral TID WITH MEALS    magnesium hydroxide (MILK OF MAGNESIA) 400 mg/5 mL oral suspension 30 mL  30 mL Oral DAILY PRN    polyethylene glycol (MIRALAX) packet 17 g  17 g Oral BID    bisacodyL (DULCOLAX) suppository 10 mg  10 mg Rectal DAILY PRN    B complex-vitamin C-folic acid (NEPHRO-ROBB) 0.8 mg tab  1 Tablet Oral DAILY    heparin (porcine) injection 5,000 Units  5,000 Units SubCUTAneous Q12H  epoetin mary lou-epbx (RETACRIT) injection 40,000 Units  40,000 Units SubCUTAneous Q7D    morphine injection 4 mg  4 mg IntraVENous Q6H PRN    [Held by provider] amLODIPine (NORVASC) tablet 10 mg  10 mg Oral QHS    gabapentin (NEURONTIN) capsule 100 mg  100 mg Oral TID    [Held by provider] lisinopriL (PRINIVIL, ZESTRIL) tablet 40 mg  40 mg Oral QHS    methocarbamoL (ROBAXIN) tablet 1,500 mg  1,500 mg Oral QID    [Held by provider] minoxidiL (LONITEN) tablet 10 mg  10 mg Oral BID    pantoprazole (PROTONIX) tablet 40 mg  40 mg Oral ACB    sevelamer carbonate (RENVELA) tab 800 mg  800 mg Oral TID WITH MEALS    sodium chloride (NS) flush 5-40 mL  5-40 mL IntraVENous Q8H    sodium chloride (NS) flush 5-40 mL  5-40 mL IntraVENous PRN    acetaminophen (TYLENOL) tablet 650 mg  650 mg Oral Q6H PRN    Or    acetaminophen (TYLENOL) suppository 650 mg  650 mg Rectal Q6H PRN    polyethylene glycol (MIRALAX) packet 17 g  17 g Oral DAILY PRN    ondansetron (ZOFRAN ODT) tablet 4 mg  4 mg Oral Q8H PRN    Or    ondansetron (ZOFRAN) injection 4 mg  4 mg IntraVENous Q6H PRN    HYDROcodone-acetaminophen (NORCO) 7.5-325 mg per tablet 1 Tablet  1 Tablet Oral Q6H PRN    heparin (porcine) 1,000 unit/mL injection 5,000 Units  5,000 Units Hemodialysis DIALYSIS PRN    insulin lispro (HUMALOG) injection   SubCUTAneous AC&HS       Signed:  Jacquelyn Mcintyre MD    Part of this note may have been written by using a voice dictation software. The note has been proof read but may still contain some grammatical/other typographical errors.

## 2022-01-17 LAB
ANION GAP SERPL CALC-SCNC: 6 MMOL/L (ref 7–16)
BUN SERPL-MCNC: 44 MG/DL (ref 8–23)
CALCIUM SERPL-MCNC: 9.4 MG/DL (ref 8.3–10.4)
CHLORIDE SERPL-SCNC: 97 MMOL/L (ref 98–107)
CO2 SERPL-SCNC: 31 MMOL/L (ref 21–32)
CREAT SERPL-MCNC: 5.83 MG/DL (ref 0.6–1)
ERYTHROCYTE [DISTWIDTH] IN BLOOD BY AUTOMATED COUNT: 16.9 % (ref 11.9–14.6)
GLUCOSE BLD STRIP.AUTO-MCNC: 115 MG/DL (ref 65–100)
GLUCOSE BLD STRIP.AUTO-MCNC: 137 MG/DL (ref 65–100)
GLUCOSE BLD STRIP.AUTO-MCNC: 139 MG/DL (ref 65–100)
GLUCOSE SERPL-MCNC: 98 MG/DL (ref 65–100)
HCT VFR BLD AUTO: 24.2 % (ref 35.8–46.3)
HGB BLD-MCNC: 8.3 G/DL (ref 11.7–15.4)
MAGNESIUM SERPL-MCNC: 2.1 MG/DL (ref 1.8–2.4)
MCH RBC QN AUTO: 32.2 PG (ref 26.1–32.9)
MCHC RBC AUTO-ENTMCNC: 34.3 G/DL (ref 31.4–35)
MCV RBC AUTO: 93.8 FL (ref 79.6–97.8)
NRBC # BLD: 0 K/UL (ref 0–0.2)
PHOSPHATE SERPL-MCNC: 4.7 MG/DL (ref 2.3–3.7)
PLATELET # BLD AUTO: 141 K/UL (ref 150–450)
PMV BLD AUTO: 10.6 FL (ref 9.4–12.3)
POTASSIUM SERPL-SCNC: 4.8 MMOL/L (ref 3.5–5.1)
RBC # BLD AUTO: 2.58 M/UL (ref 4.05–5.2)
SERVICE CMNT-IMP: ABNORMAL
SODIUM SERPL-SCNC: 134 MMOL/L (ref 136–145)
WBC # BLD AUTO: 7.8 K/UL (ref 4.3–11.1)

## 2022-01-17 PROCEDURE — 74011000250 HC RX REV CODE- 250: Performed by: FAMILY MEDICINE

## 2022-01-17 PROCEDURE — 80048 BASIC METABOLIC PNL TOTAL CA: CPT

## 2022-01-17 PROCEDURE — 74011250637 HC RX REV CODE- 250/637: Performed by: FAMILY MEDICINE

## 2022-01-17 PROCEDURE — G0257 UNSCHED DIALYSIS ESRD PT HOS: HCPCS

## 2022-01-17 PROCEDURE — 90935 HEMODIALYSIS ONE EVALUATION: CPT

## 2022-01-17 PROCEDURE — 74011250637 HC RX REV CODE- 250/637: Performed by: INTERNAL MEDICINE

## 2022-01-17 PROCEDURE — 99232 SBSQ HOSP IP/OBS MODERATE 35: CPT | Performed by: INTERNAL MEDICINE

## 2022-01-17 PROCEDURE — 74011250637 HC RX REV CODE- 250/637: Performed by: HOSPITALIST

## 2022-01-17 PROCEDURE — 74011250636 HC RX REV CODE- 250/636: Performed by: INTERNAL MEDICINE

## 2022-01-17 PROCEDURE — 84100 ASSAY OF PHOSPHORUS: CPT

## 2022-01-17 PROCEDURE — 85027 COMPLETE CBC AUTOMATED: CPT

## 2022-01-17 PROCEDURE — 82962 GLUCOSE BLOOD TEST: CPT

## 2022-01-17 PROCEDURE — 65270000029 HC RM PRIVATE

## 2022-01-17 PROCEDURE — 36415 COLL VENOUS BLD VENIPUNCTURE: CPT

## 2022-01-17 PROCEDURE — 83735 ASSAY OF MAGNESIUM: CPT

## 2022-01-17 RX ORDER — AMOXICILLIN 250 MG
2 CAPSULE ORAL DAILY
Status: DISCONTINUED | OUTPATIENT
Start: 2022-01-17 | End: 2022-01-20 | Stop reason: HOSPADM

## 2022-01-17 RX ADMIN — HEPARIN SODIUM 5000 UNITS: 5000 INJECTION INTRAVENOUS; SUBCUTANEOUS at 17:50

## 2022-01-17 RX ADMIN — HYDROCODONE BITARTRATE AND ACETAMINOPHEN 1 TABLET: 7.5; 325 TABLET ORAL at 04:06

## 2022-01-17 RX ADMIN — GABAPENTIN 100 MG: 100 CAPSULE ORAL at 09:23

## 2022-01-17 RX ADMIN — HEPARIN SODIUM 5000 UNITS: 5000 INJECTION INTRAVENOUS; SUBCUTANEOUS at 05:21

## 2022-01-17 RX ADMIN — SODIUM CHLORIDE, PRESERVATIVE FREE 10 ML: 5 INJECTION INTRAVENOUS at 05:21

## 2022-01-17 RX ADMIN — SEVELAMER CARBONATE 800 MG: 800 TABLET, FILM COATED ORAL at 09:23

## 2022-01-17 RX ADMIN — METHOCARBAMOL TABLETS 1500 MG: 750 TABLET, COATED ORAL at 09:23

## 2022-01-17 RX ADMIN — METHOCARBAMOL TABLETS 1500 MG: 750 TABLET, COATED ORAL at 21:43

## 2022-01-17 RX ADMIN — PANTOPRAZOLE SODIUM 40 MG: 40 TABLET, DELAYED RELEASE ORAL at 05:21

## 2022-01-17 RX ADMIN — GABAPENTIN 100 MG: 100 CAPSULE ORAL at 21:43

## 2022-01-17 RX ADMIN — BISACODYL 10 MG: 10 SUPPOSITORY RECTAL at 09:36

## 2022-01-17 RX ADMIN — MIDODRINE HYDROCHLORIDE 5 MG: 5 TABLET ORAL at 16:13

## 2022-01-17 RX ADMIN — POLYETHYLENE GLYCOL 3350 17 G: 17 POWDER, FOR SOLUTION ORAL at 09:23

## 2022-01-17 RX ADMIN — METHOCARBAMOL TABLETS 1500 MG: 750 TABLET, COATED ORAL at 17:50

## 2022-01-17 RX ADMIN — SODIUM CHLORIDE, PRESERVATIVE FREE 10 ML: 5 INJECTION INTRAVENOUS at 16:15

## 2022-01-17 RX ADMIN — SODIUM CHLORIDE, PRESERVATIVE FREE 10 ML: 5 INJECTION INTRAVENOUS at 21:45

## 2022-01-17 RX ADMIN — SEVELAMER CARBONATE 800 MG: 800 TABLET, FILM COATED ORAL at 16:13

## 2022-01-17 RX ADMIN — MAGNESIUM HYDROXIDE 30 ML: 2400 SUSPENSION ORAL at 09:33

## 2022-01-17 RX ADMIN — GABAPENTIN 100 MG: 100 CAPSULE ORAL at 16:13

## 2022-01-17 RX ADMIN — Medication 1 TABLET: at 09:23

## 2022-01-17 RX ADMIN — MIDODRINE HYDROCHLORIDE 5 MG: 5 TABLET ORAL at 09:23

## 2022-01-17 NOTE — PROGRESS NOTES
Hospitalist Progress Note   Admit Date:  1/10/2022  6:06 AM   Name:  Wu Fischer   Age:  80 y.o. Sex:  female  :  1950   MRN:  [id number]   Room:  [facility]/    Presenting Complaint: Back Pain    Reason(s) for Admission: Mediastinal mass [J98.59]  Acute respiratory failure with hypoxia [facility]) [J96.01]     Hospital Course & Interval History:   Sumeet España is a 80 y.o. female with medical history of ESRD on hemodialysis, recurrent renal cell carcinoma s/p left nephrectomy  with recurrence and s/p course localized XRT to left renal bed who presented with severe sharp left-sided thoracic back pain. Patient recently admitted in November left flank pain and treated empirically with abx until acute infectious process ruled out. Pt had a CT thoracic spine which did not reveal acute fx, no aggressive bony lesions though b/l lung patchy infiltrative processes (pneumonitis / atypical PNA / pulm edema). Of significance pt did have a CT C/A to eval for potential aortic dissection given significant pain on 2021 which did reveal b/l small pleural effusions, enlarging right paratracheal lymph node (2.0 cm from previous 12mm) and small b/l hilar lymph nodes, no aortic dissection. She does follow with hem/onc routinely for underlying RCC hx. In the ER, pt had a repeat CT chest which revealed b/l pleural effusions and an increase in size of right paratracheal lymph node now 2.6cm in size as compared to Nov CT chest which measured lymph node 2.0cm. Pt admits to having missed her Monday HD session due to pain (she has been on HD x 15 years, M/W/F). She denies chest pain, palpitation, shortness of breath, nausea, vomiting, fevers or chills. Her VS / labs were stable; Hospitalist service consulted for inpatient admission for persistent back pain and enlarging mediastinal lymph node with acute hypoxic resp failure. Subjective (22):   Pt seen resting comfortably in bed, dose not appear to be in any distress. She complains of not having a decent BM, and is asking for more laxative. She is on 2 ltr via NC. No chest pain, nausea/vomiting, headache, nausea/vomiting    Review of Systems:  10 point ROS negative except what mentioned above.       Assessment & Plan:     Principal Problem:    Mediastinal mass (1/10/2022)  1/17:  - need to rule out malignant etiology as pt with hx of recurrent RCC  - s/p thoracentesis with cytology / cx pending  - if no yield will consider EBUS; pulm recommended PET scan on outpatient setting  Currently on 1 ltr via NC  Oxygen Qualifier          Room air: SpO2 with O2 and liter flow   Resting SpO2  79%   86% on 1L    90% on 2L   Ambulating SpO2  73%  77% on 1L   90% on 2L          Active Problems:    Acute hypoxic respiratory failure  - O2 85% on RA noted in ER, improved to 96% on 2L  1/17:  S/p thoracentesis on 1/12/22  Oxygen Qualifier          Room air: SpO2 with O2 and liter flow   Resting SpO2  79%   86% on 1L    90% on 2L   Ambulating SpO2  73%  77% on 1L   90% on 2L            Thoracic / upper back pain  - appreciate pulmonary eval for potential empyema  - xray thoracic spine 1/12/2022 negative for acute fx  - s/p thoracentesis on 1/12  - prn norco / morphine for pain control      B/l pleural effusions  - s/p thoracentesis right lung on 1/12; f/u cytology / cx  - cont HD for volume mgmt      ESRD (end stage renal disease)  - appreciate nephrology's ongoing assistance  - cont HD 3 x a week      HTN (hypertension)  - labile readings with hypotensive episodes; may be attributed to IV dilaudid  - closely monitor; RN instructed to HOLD PAIN RX if SBP <100mmhg  - monitor closely      Hx of recurrent renal cell carcinoma  - s/p left nephrectomy 2015 with recurrence and subsequent XRT to left renal bed  - mgmt per hem/onc      Anemia chronic disease  - hgb 8.2 this AM   - no gross bleeding; cont to closely monitor      Diabetes mellitus   - on januvia at home  - hgbA1c 5.3 on 11/13/2021  - sliding scale coverage as needed; low-carb diet       Dispo/Discharge Planning:    Pt will likely need SNF/STR on discharge  Plan of care discussed with pt, pt's daughter () and CM. Diet:  ADULT DIET Regular; 3 carb choices (45 gm/meal); Low Fat/Low Chol/High Fiber/2 gm Na; Low Potassium (Less than 3000 mg/day); Low Phosphorus (Less than 1000 mg)  ADULT ORAL NUTRITION SUPPLEMENT Breakfast, Lunch, Dinner, HS Snack;  Renal Supplement  DVT PPx: hep sc  Code status: Full Code    Hospital Problems as of 1/17/2022 Date Reviewed: 7/27/2021          Codes Class Noted - Resolved POA    Bilateral pleural effusion ICD-10-CM: J90  ICD-9-CM: 511.9  1/12/2022 - Present Unknown        * (Principal) Mediastinal mass ICD-10-CM: J98.59  ICD-9-CM: 786.6  1/10/2022 - Present Unknown        Severe back pain ICD-10-CM: M54.9  ICD-9-CM: 724.5  11/12/2021 - Present Yes        Acute respiratory failure with hypoxia [facility]) ICD-10-CM: J96.01  ICD-9-CM: 518.81  11/12/2021 - Present Unknown        Abdominal mass ICD-10-CM: R19.00  ICD-9-CM: 789.30  9/12/2017 - Present Yes    Overview Signed 9/12/2017  9:01 PM by Merlin Erm, MD     Of Left renal bed             Renal cell carcinoma [facility]) ICD-10-CM: C64.9  ICD-9-CM: 189.0  9/12/2017 - Present Yes        ESRD (end stage renal disease) ([facility] [address] ICD-10-CM: N18.6  ICD-9-CM: 585.6  2/7/2013 - Present Yes        HTN (hypertension) ICD-10-CM: I10  ICD-9-CM: 401.9  2/7/2013 - Present Yes              Objective:     Patient Vitals for the past 24 hrs:   Temp Pulse Resp BP SpO2   01/17/22 0726 98.1 °F (36.7 °C) 71 20 (!) 105/51 98 %   01/17/22 0359 98.3 °F (36.8 °C) 73 16 (!) 115/53 98 %   01/16/22 2339 97.9 °F (36.6 °C) 79 16 116/73 100 %   01/16/22 1948 98.8 °F (37.1 °C) 84 16 104/62 98 %   01/16/22 1649 98.4 °F (36.9 °C) 84 16 (!) 90/56 100 %   01/16/22 1648 98.4 °F (36.9 °C) 85 16 (!) 92/58 100 %   01/16/22 1205 99.1 °F (37.3 °C) 95 16 107/60 96 %   01/16/22 0859 98.3 °F (36.8 °C) 99 20 (!) 146/72 93 %     Oxygen Therapy  O2 Sat (%): 98 % (01/17/22 0726)  Pulse via Oximetry: 85 beats per minute (01/12/22 1148)  O2 Device: Nasal cannula (01/16/22 1948)  O2 Flow Rate (L/min): 1 l/min (01/16/22 1948)    Estimated body mass index is 30.37 kg/m² as calculated from the following:    Height as of this encounter: 5' 5\" (1.651 m). Weight as of this encounter: 82.8 kg (182 lb 8 oz). No intake or output data in the 24 hours ending 01/17/22 0739      Physical Exam:     Blood pressure (!) 105/51, pulse 71, temperature 98.1 °F (36.7 °C), resp. rate 20, height 5' 5\" (1.651 m), weight 82.8 kg (182 lb 8 oz), SpO2 98 %. General:    Alert/awake, NAD, on 1-2 ltr via NC  HEENT:           Head NCAT, PERRLA+, MMM  Neck:  No restricted ROM. Trachea midline   CV:   RRR. No m/r/g. No jugular venous distension. Lungs:   Diminished breath sounds in bases B/L, no wheezing or ronchi  Abdomen: Bowel sounds present. Soft, nontender, nondistended. Extremities: No edema in LE. Skin:     No rashes and normal coloration. Warm and dry. Neuro:  CN II-XII grossly intact. Sensation intact. Psych:  AOx3, Normal mood and affect.       I have reviewed ordered lab tests and independently visualized imaging below:    Recent Labs:  Recent Results (from the past 48 hour(s))   GLUCOSE, POC    Collection Time: 01/15/22 10:17 AM   Result Value Ref Range    Glucose (POC) 95 65 - 100 mg/dL    Performed by Jakob    GLUCOSE, POC    Collection Time: 01/15/22 11:22 AM   Result Value Ref Range    Glucose (POC) 85 65 - 100 mg/dL    Performed by Timothy    CBC W/O DIFF    Collection Time: 01/15/22  1:30 PM   Result Value Ref Range    WBC 5.0 4.3 - 11.1 K/uL    RBC 2.72 (L) 4.05 - 5.2 M/uL    HGB 9.1 (L) 11.7 - 15.4 g/dL    HCT 27.6 (L) 35.8 - 46.3 %    .5 (H) 79.6 - 97.8 FL    MCH 33.5 (H) 26.1 - 32.9 PG    MCHC 33.0 31.4 - 35.0 g/dL    RDW 17.6 (H) 11.9 - 14.6 %    PLATELET 491 (L) 891 - 450 K/uL MPV 11.0 9.4 - 12.3 FL    ABSOLUTE NRBC 0.00 0.0 - 0.2 K/uL   METABOLIC PANEL, BASIC    Collection Time: 01/15/22  1:30 PM   Result Value Ref Range    Sodium 137 136 - 145 mmol/L    Potassium 4.0 3.5 - 5.1 mmol/L    Chloride 98 98 - 107 mmol/L    CO2 28 21 - 32 mmol/L    Anion gap 11 7 - 16 mmol/L    Glucose 98 65 - 100 mg/dL    BUN 19 8 - 23 MG/DL    Creatinine 3.79 (H) 0.6 - 1.0 MG/DL    GFR est AA 15 (L) >60 ml/min/1.73m2    GFR est non-AA 13 (L) >60 ml/min/1.73m2    Calcium 9.1 8.3 - 10.4 MG/DL   COLLECTION COMMENT    Collection Time: 01/15/22  1:30 PM   Result Value Ref Range    Collection Comment PATIENT ON DIALYSIS    GLUCOSE, POC    Collection Time: 01/15/22  3:47 PM   Result Value Ref Range    Glucose (POC) 131 (H) 65 - 100 mg/dL    Performed by Timothy    GLUCOSE, POC    Collection Time: 01/15/22  8:59 PM   Result Value Ref Range    Glucose (POC) 145 (H) 65 - 100 mg/dL    Performed by Bibiana    CBC W/O DIFF    Collection Time: 01/16/22  6:20 AM   Result Value Ref Range    WBC 6.3 4.3 - 11.1 K/uL    RBC 2.43 (L) 4.05 - 5.2 M/uL    HGB 8.2 (L) 11.7 - 15.4 g/dL    HCT 23.3 (L) 35.8 - 46.3 %    MCV 95.9 79.6 - 97.8 FL    MCH 33.7 (H) 26.1 - 32.9 PG    MCHC 35.2 (H) 31.4 - 35.0 g/dL    RDW 16.7 (H) 11.9 - 14.6 %    PLATELET 619 (L) 321 - 450 K/uL    MPV 10.8 9.4 - 12.3 FL    ABSOLUTE NRBC 0.00 0.0 - 0.2 K/uL   GLUCOSE, POC    Collection Time: 01/16/22 10:36 AM   Result Value Ref Range    Glucose (POC) 133 (H) 65 - 100 mg/dL    Performed by Marcy Denton 1729, POC    Collection Time: 01/16/22  4:33 PM   Result Value Ref Range    Glucose (POC) 155 (H) 65 - 100 mg/dL    Performed by Marcy Denton 1729, POC    Collection Time: 01/16/22  9:03 PM   Result Value Ref Range    Glucose (POC) 127 (H) 65 - 100 mg/dL    Performed by Brendon    GLUCOSE, POC    Collection Time: 01/16/22 10:56 PM   Result Value Ref Range    Glucose (POC) 86 65 - 100 mg/dL    Performed by Ja    CBC W/O DIFF    Collection Time: 01/17/22  3:07 AM   Result Value Ref Range    WBC 7.8 4.3 - 11.1 K/uL    RBC 2.58 (L) 4.05 - 5.2 M/uL    HGB 8.3 (L) 11.7 - 15.4 g/dL    HCT 24.2 (L) 35.8 - 46.3 %    MCV 93.8 79.6 - 97.8 FL    MCH 32.2 26.1 - 32.9 PG    MCHC 34.3 31.4 - 35.0 g/dL    RDW 16.9 (H) 11.9 - 14.6 %    PLATELET 504 (L) 103 - 450 K/uL    MPV 10.6 9.4 - 12.3 FL    ABSOLUTE NRBC 0.00 0.0 - 0.2 K/uL   METABOLIC PANEL, BASIC    Collection Time: 01/17/22  3:07 AM   Result Value Ref Range    Sodium 134 (L) 136 - 145 mmol/L    Potassium 4.8 3.5 - 5.1 mmol/L    Chloride 97 (L) 98 - 107 mmol/L    CO2 31 21 - 32 mmol/L    Anion gap 6 (L) 7 - 16 mmol/L    Glucose 98 65 - 100 mg/dL    BUN 44 (H) 8 - 23 MG/DL    Creatinine 5.83 (H) 0.6 - 1.0 MG/DL    GFR est AA 9 (L) >60 ml/min/1.73m2    GFR est non-AA 8 (L) >60 ml/min/1.73m2    Calcium 9.4 8.3 - 10.4 MG/DL   MAGNESIUM    Collection Time: 01/17/22  3:07 AM   Result Value Ref Range    Magnesium 2.1 1.8 - 2.4 mg/dL   PHOSPHORUS    Collection Time: 01/17/22  3:07 AM   Result Value Ref Range    Phosphorus 4.7 (H) 2.3 - 3.7 MG/DL   GLUCOSE, POC    Collection Time: 01/17/22  6:40 AM   Result Value Ref Range    Glucose (POC) 115 (H) 65 - 100 mg/dL    Performed by [facility]        All Micro Results     Procedure Component Value Units Date/Time    CULTURE, BODY FLUID Rishi Mckusick STAIN [[id number]] Collected: 01/12/22 1153    Order Status: Completed Specimen: Pleural Fluid Updated: 01/14/22 0588     Special Requests: NO SPECIAL REQUESTS        GRAM STAIN 0 TO 10 WBCS PER OIF      NO DEFINITE ORGANISM SEEN        Culture result: NO GROWTH 2 DAYS       AFB CULTURE + SMEAR W/RFLX ID FROM CULTURE [[id number]] Collected: 01/12/22 1153    Order Status: Completed Specimen: Miscellaneous sample Updated: 01/13/22 1737     Source PLEURAL FLUID        Comment: RIGHT  1          AFB Specimen processing Concentration     Acid Fast Smear Negative        Comment: (NOTE)  Performed At: [facility] & [address] [id number]  Lida Gabriel MD JT:[id number]          Acid Fast Culture PENDING    FUNGUS CULTURE AND SMEAR [[id number]] Collected: 01/12/22 1153    Order Status: Completed Specimen: Miscellaneous sample Updated: 01/13/22 1536     Source PLEURAL FLUID        Comment: RIGHT  1          Fungus stain Direct Inoculation     Fungus (Mycology) Culture Other source received     Comment: (NOTE)  Performed At: [facility] & [address] [id number]  Lida Gabriel MD YX:[id number]               Other Studies:  No results found.     Current Meds:  Current Facility-Administered Medications   Medication Dose Route Frequency    midodrine (PROAMATINE) tablet 5 mg  5 mg Oral TID WITH MEALS    polyethylene glycol (MIRALAX) packet 17 g  17 g Oral BID    magnesium hydroxide (MILK OF MAGNESIA) 400 mg/5 mL oral suspension 30 mL  30 mL Oral DAILY PRN    bisacodyL (DULCOLAX) suppository 10 mg  10 mg Rectal DAILY PRN    B complex-vitamin C-folic acid (NEPHRO-ROBB) 0.8 mg tab  1 Tablet Oral DAILY    heparin (porcine) injection 5,000 Units  5,000 Units SubCUTAneous Q12H    epoetin mary lou-epbx (RETACRIT) injection 40,000 Units  40,000 Units SubCUTAneous Q7D    morphine injection 4 mg  4 mg IntraVENous Q6H PRN    [Held by provider] amLODIPine (NORVASC) tablet 10 mg  10 mg Oral QHS    gabapentin (NEURONTIN) capsule 100 mg  100 mg Oral TID    [Held by provider] lisinopriL (PRINIVIL, ZESTRIL) tablet 40 mg  40 mg Oral QHS    methocarbamoL (ROBAXIN) tablet 1,500 mg  1,500 mg Oral QID    [Held by provider] minoxidiL (LONITEN) tablet 10 mg  10 mg Oral BID    pantoprazole (PROTONIX) tablet 40 mg  40 mg Oral ACB    sevelamer carbonate (RENVELA) tab 800 mg  800 mg Oral TID WITH MEALS    sodium chloride (NS) flush 5-40 mL  5-40 mL IntraVENous Q8H    sodium chloride (NS) flush 5-40 mL  5-40 mL IntraVENous PRN    acetaminophen (TYLENOL) tablet 650 mg  650 mg Oral Q6H PRN    Or    acetaminophen (TYLENOL) suppository 650 mg  650 mg Rectal Q6H PRN    ondansetron (ZOFRAN ODT) tablet 4 mg  4 mg Oral Q8H PRN    Or    ondansetron (ZOFRAN) injection 4 mg  4 mg IntraVENous Q6H PRN    HYDROcodone-acetaminophen (NORCO) 7.5-325 mg per tablet 1 Tablet  1 Tablet Oral Q6H PRN    heparin (porcine) 1,000 unit/mL injection 5,000 Units  5,000 Units Hemodialysis DIALYSIS PRN    insulin lispro (HUMALOG) injection   SubCUTAneous AC&HS       Signed:  Teto Meraz MD    Part of this note may have been written by using a voice dictation software. The note has been proof read but may still contain some grammatical/other typographical errors.

## 2022-01-17 NOTE — PROGRESS NOTES
OT orders received and chart reviewed. Attempted to see pt for initial OT evaluation this AM. Pt currently off floor at dialysis. Will follow up and attempt to see pt as schedule permits and as pt is available to be seen.     Thank you,     Mayra Church OTR/L

## 2022-01-17 NOTE — PROGRESS NOTES
TRANSFER IN - DIALYSIS    Received patient in dialysis unit from [facility] (unit) for ordered procedure. Consent verified for renal replacement therapy. Procedure explained to patient, opportunity for Q&A provided. Call light given. Patient alert and vital signs stable. Visit Vitals  BP (!) 159/76   Pulse 74   Temp 98.1 °F (36.7 °C)   Resp 20   Ht 5' 5\" (1.651 m)   Wt 82.8 kg (182 lb 8 oz)   SpO2 98%   BMI 30.37 kg/m²     . Hemodialysis initiated using left avg and 15 g needles. Machine settings per MD order. Heparin 0 unit bolus and 0 units/hr. Will monitor during treatment.       01/17/22 1012   Patient Information   Acute or Chronic Care Acute (comment)   Treatment Number 4   Informed Consent Verified Yes   Dialysis Weight   Goal/Amount of Fluid to Remove (mL) 3600 mL   Dialyzer/Set Up Inspection   Dialyzer/Set Up Inspection Revaclear   Alarms Verified Yes   Test Pass Yes   pH 7.2   Machine Conductivity 14   Meter Conductivity 13.8   Reverse Osmosis Safety Checks   Reverse Osmosis Machine Log Completed Yes   Total Chlorine Test Negative   Machine Initiation   Machine Number t6   Hemodialysis Start Time 1015   Unused Lines Clamped Yes   Machine Temperature 97.3 °F (36.3 °C)   Dialysis Initiation   All Connections Secured Yes   NS Bag  Yes   Saline Line Double Clamped Yes   Prime Given   Air Foam Detector Engaged Yes   Dialysate NA (mEq/L) 138   Dialysate K (mEq/L) 3   Dialysate CA (mEq/L) 2.5   Dialysate HCO3 (mEq/L) 38   Citrasate No   During Hemodialysis    Pulse (Heart Rate) 74   Resp Rate 20   O2 Sat (%) 98 %   BP (!) 159/76   MAP (Calculated) 104   Transducer Checks Dry   Saline Given (mL) 300 mL   Heparin Bolus (units) 0 units   Continuous Heparin Infusion (Units/hr) 0 Units/hr   Blood Flow Rate (ml/min) 400 ml/min   Dialysate Flow Rate (ml/hr) 800 ml/hr   Arterial Access Pressure (mmHg) 280   Venous Return Pressure (mmHg) 160   Transmembrane Pressure (mmHg) 30 mmHg   Ultrafiltration Rate (ml/hr) 740 ml/hr   Fluid Removed (mL) 0

## 2022-01-17 NOTE — PROGRESS NOTES
Annelise Fischer  Admission Date: 1/10/2022         Daily Progress Note: 1/17/2022    The patient's chart is reviewed and the patient is discussed with the staff. Background: Patient is a 80 y.o.  female seen and evaluated at the request of Dr. Ariadna Mcguire. She has a history of ESRD on HD, RCC s/p resection (2015) and recurrence requiring SBRT (2017), DM, and HTN. She was admitted 1/10 with left sided back pain. She had a CT chest performed which showed B pleural effusions (both small, R>L) as well as a 2.5cm right paratracheal mass vs lymph node. Of note, this was also seen on multiple past CT scans but appears more prominent now. She was seen by oncology who recommended thoracentesis for cytology if possible. If non diagnostic outpatient EBUS. Consult was just brought to our attention today. Patient is currently needing 2L O2. Had thoracentesis for 400 ml  1/12. Pt complains of ongoing pain in her back. She is tearful because of this throughout much of my encounter with her. Pleural fluid looks transudative. Cytology pending. PET scan ordered. Subjective:      Seen during HD. On 2 lpm, sat 98%.    Awaiting cytology, should be back in the am    Current Facility-Administered Medications   Medication Dose Route Frequency    senna-docusate (PERICOLACE) 8.6-50 mg per tablet 2 Tablet  2 Tablet Oral DAILY    midodrine (PROAMATINE) tablet 5 mg  5 mg Oral TID WITH MEALS    polyethylene glycol (MIRALAX) packet 17 g  17 g Oral BID    magnesium hydroxide (MILK OF MAGNESIA) 400 mg/5 mL oral suspension 30 mL  30 mL Oral DAILY PRN    bisacodyL (DULCOLAX) suppository 10 mg  10 mg Rectal DAILY PRN    B complex-vitamin C-folic acid (NEPHRO-ROBB) 0.8 mg tab  1 Tablet Oral DAILY    heparin (porcine) injection 5,000 Units  5,000 Units SubCUTAneous Q12H    epoetin mary lou-epbx (RETACRIT) injection 40,000 Units  40,000 Units SubCUTAneous Q7D    morphine injection 4 mg  4 mg IntraVENous Q6H PRN    [Held by provider] amLODIPine (NORVASC) tablet 10 mg  10 mg Oral QHS    gabapentin (NEURONTIN) capsule 100 mg  100 mg Oral TID    [Held by provider] lisinopriL (PRINIVIL, ZESTRIL) tablet 40 mg  40 mg Oral QHS    methocarbamoL (ROBAXIN) tablet 1,500 mg  1,500 mg Oral QID    [Held by provider] minoxidiL (LONITEN) tablet 10 mg  10 mg Oral BID    pantoprazole (PROTONIX) tablet 40 mg  40 mg Oral ACB    sevelamer carbonate (RENVELA) tab 800 mg  800 mg Oral TID WITH MEALS    sodium chloride (NS) flush 5-40 mL  5-40 mL IntraVENous Q8H    sodium chloride (NS) flush 5-40 mL  5-40 mL IntraVENous PRN    acetaminophen (TYLENOL) tablet 650 mg  650 mg Oral Q6H PRN    Or    acetaminophen (TYLENOL) suppository 650 mg  650 mg Rectal Q6H PRN    ondansetron (ZOFRAN ODT) tablet 4 mg  4 mg Oral Q8H PRN    Or    ondansetron (ZOFRAN) injection 4 mg  4 mg IntraVENous Q6H PRN    HYDROcodone-acetaminophen (NORCO) 7.5-325 mg per tablet 1 Tablet  1 Tablet Oral Q6H PRN    heparin (porcine) 1,000 unit/mL injection 5,000 Units  5,000 Units Hemodialysis DIALYSIS PRN    insulin lispro (HUMALOG) injection   SubCUTAneous AC&HS     Review of Systems    Constitutional: negative for fever, chills, sweats  Cardiovascular: negative for chest pain, palpitations, syncope, edema  Gastrointestinal:  negative for dysphagia, reflux, vomiting, diarrhea, abdominal pain, or melena  Neurologic:  negative for focal weakness, numbness, headache    Objective:     Vitals:    01/17/22 1200 01/17/22 1215 01/17/22 1300 01/17/22 1330   BP: (!) 147/67 (!) 147/68 (!) 168/40 (!) 149/45   Pulse: 81 81 88 77   Resp:       Temp:       SpO2:       Weight:       Height:           Intake/Output Summary (Last 24 hours) at 1/17/2022 1356  Last data filed at 1/17/2022 0853  Gross per 24 hour   Intake 360 ml   Output --   Net 360 ml     Physical Exam:   Constitution:  the patient is elderly  HEENT:  Sclera clear, pupils equal, oral mucosa moist  Respiratory: decreased BS on 2 lpm  Cardiovascular:  RRR without M,G,R  Gastrointestinal: firm abdomen, no pain; with positive bowel sounds. Musculoskeletal: warm without cyanosis. There is trace lower extremity edema. Skin:  no jaundice or rashes  Neurologic: no gross neuro deficits     Psychiatric:  alert and oriented x 3    Chest CT: 1/10/22: IMPRESSION  1. Bilateral pleural effusions with subjacent atelectasis.     2. The previously reported right paratracheal mass/lymph node is slightly  larger. LAB:  Recent Labs     01/17/22  0307 01/16/22  0620 01/15/22  1330   WBC 7.8 6.3 5.0   HGB 8.3* 8.2* 9.1*   HCT 24.2* 23.3* 27.6*   * 129* 120*     Recent Labs     01/17/22  0307 01/15/22  1330   * 137   K 4.8 4.0   CL 97* 98   CO2 31 28   GLU 98 98   BUN 44* 19   CREA 5.83* 3.79*   MG 2.1  --    CA 9.4 9.1   PHOS 4.7*  --      No results for input(s): LAC, TROPHS, BNPNT, CRP in the last 72 hours. No lab exists for component: ESR  No results for input(s): PH, PCO2, PO2, HCO3, PHI, PCO2I, PO2I, HCO3I in the last 72 hours. No results for input(s): SDES, CULT in the last 72 hours. Assessment and Plan:  (Medical Decision Making)   Principal Problem:    Mediastinal mass (1/10/2022)    Will likely need OP EBUS guided Bx. Cytology pending      ESRD (end stage renal disease) ([address] (2/7/2013)    Per nephrology      Abdominal mass (9/12/2017)    Per primary      Renal cell carcinoma ([address] (9/12/2017)    ? Mets to pleura and mediastinum. Severe back pain (11/12/2021)    Per primary      Acute respiratory failure with hypoxia ([address] (11/12/2021)    On 2L, assess RA sat      Bilateral pleural effusion (1/12/2022)    Await pleural fluid cytology, etc. Technically fluid was a transudate. Culture is negative with AFB and fungus also still pending.   -If this is non diagnostic or not large enough, will look for outpatient spot for EBUS. Would also be helpful to obtain PET scan as outpatient.  The area adjacent to the left effusion is most consistent with atelectasis. Do not suspect PNA or mass in this area. PET scan will help evaluate this further as well. Mobilize, assess O2, awaiting cytology- should be back in am per lab. May need EBUS, PET scan P results    Wenceslao Collins NP    I have spoken with and examined the patient. I agree with the above assessment and plan as documented. Gen: alert  Lungs:  Decreased in bases, 1-2L NC  Heart:  RRR with no Murmur/Rubs/Gallops  Abd: soft  Ext: trace edema    PET scan as outpatient. EBUS as outpatient if pleural fluid is negative. Does not have to stay inpatient for any of this. Fluid removal with HD. IS, ambulation for atelectasis. May need O2 for discharge.      Nara Hilario MD

## 2022-01-17 NOTE — PROGRESS NOTES
TRANSFER OUT -DIALYSIS    Hemodialysis treatment completed without complications. Patient alert and VS stable    Visit Vitals  /60   Pulse 75   Temp 98.1 °F (36.7 °C)   Resp 20   Ht 5' 5\" (1.651 m)   Wt 82.8 kg (182 lb 8 oz)   SpO2 98%   BMI 30.37 kg/m²          2.5 Kg removed. Needles x2 removed from access and manual pressure held until hemostasis complete and pressure dressing applied. Meds given: 0. 0 units of RBCs given during dialysis. Patient to [phone] after dialysis.       01/17/22 1407   During Hemodialysis    Pulse (Heart Rate) 75   /60   MAP (Calculated) 85   Fluid Removed (mL) 3100   NET Fluid Removed (mL) 2500 ml   Post-Dialysis   Rinseback Volume (ml) 300 ml   Duration of Treatment (hours) 4 hours   Patient Response to Treatment Fair   Patient Disposition Return to room   Condition of Dialyzer Filter Fair   Hemodialysis End Time 1747   $$ Dialysis Charges   $$ Method Hemodialysis

## 2022-01-18 LAB
GLUCOSE BLD STRIP.AUTO-MCNC: 105 MG/DL (ref 65–100)
GLUCOSE BLD STRIP.AUTO-MCNC: 133 MG/DL (ref 65–100)
GLUCOSE BLD STRIP.AUTO-MCNC: 136 MG/DL (ref 65–100)
GLUCOSE BLD STRIP.AUTO-MCNC: 144 MG/DL (ref 65–100)
SERVICE CMNT-IMP: ABNORMAL

## 2022-01-18 PROCEDURE — 74011250636 HC RX REV CODE- 250/636: Performed by: INTERNAL MEDICINE

## 2022-01-18 PROCEDURE — 74011250637 HC RX REV CODE- 250/637: Performed by: INTERNAL MEDICINE

## 2022-01-18 PROCEDURE — 74011250637 HC RX REV CODE- 250/637: Performed by: FAMILY MEDICINE

## 2022-01-18 PROCEDURE — 74011000250 HC RX REV CODE- 250: Performed by: FAMILY MEDICINE

## 2022-01-18 PROCEDURE — 97165 OT EVAL LOW COMPLEX 30 MIN: CPT

## 2022-01-18 PROCEDURE — 97530 THERAPEUTIC ACTIVITIES: CPT

## 2022-01-18 PROCEDURE — 82962 GLUCOSE BLOOD TEST: CPT

## 2022-01-18 PROCEDURE — 65270000029 HC RM PRIVATE

## 2022-01-18 PROCEDURE — 74011250637 HC RX REV CODE- 250/637: Performed by: HOSPITALIST

## 2022-01-18 PROCEDURE — 99232 SBSQ HOSP IP/OBS MODERATE 35: CPT | Performed by: INTERNAL MEDICINE

## 2022-01-18 RX ADMIN — GABAPENTIN 100 MG: 100 CAPSULE ORAL at 21:38

## 2022-01-18 RX ADMIN — SENNOSIDES AND DOCUSATE SODIUM 1 TABLET: 8.6; 5 TABLET ORAL at 09:42

## 2022-01-18 RX ADMIN — HEPARIN SODIUM 5000 UNITS: 5000 INJECTION INTRAVENOUS; SUBCUTANEOUS at 06:31

## 2022-01-18 RX ADMIN — HEPARIN SODIUM 5000 UNITS: 5000 INJECTION INTRAVENOUS; SUBCUTANEOUS at 17:56

## 2022-01-18 RX ADMIN — SEVELAMER CARBONATE 800 MG: 800 TABLET, FILM COATED ORAL at 17:56

## 2022-01-18 RX ADMIN — SEVELAMER CARBONATE 800 MG: 800 TABLET, FILM COATED ORAL at 11:53

## 2022-01-18 RX ADMIN — SODIUM CHLORIDE, PRESERVATIVE FREE 10 ML: 5 INJECTION INTRAVENOUS at 21:38

## 2022-01-18 RX ADMIN — PANTOPRAZOLE SODIUM 40 MG: 40 TABLET, DELAYED RELEASE ORAL at 06:31

## 2022-01-18 RX ADMIN — HYDROCODONE BITARTRATE AND ACETAMINOPHEN 1 TABLET: 7.5; 325 TABLET ORAL at 21:39

## 2022-01-18 RX ADMIN — SEVELAMER CARBONATE 800 MG: 800 TABLET, FILM COATED ORAL at 09:42

## 2022-01-18 RX ADMIN — Medication 1 TABLET: at 09:46

## 2022-01-18 RX ADMIN — METHOCARBAMOL TABLETS 1500 MG: 750 TABLET, COATED ORAL at 17:56

## 2022-01-18 RX ADMIN — MIDODRINE HYDROCHLORIDE 5 MG: 5 TABLET ORAL at 09:42

## 2022-01-18 RX ADMIN — MIDODRINE HYDROCHLORIDE 5 MG: 5 TABLET ORAL at 17:56

## 2022-01-18 RX ADMIN — HYDROCODONE BITARTRATE AND ACETAMINOPHEN 1 TABLET: 7.5; 325 TABLET ORAL at 00:13

## 2022-01-18 RX ADMIN — GABAPENTIN 100 MG: 100 CAPSULE ORAL at 16:22

## 2022-01-18 RX ADMIN — MIDODRINE HYDROCHLORIDE 5 MG: 5 TABLET ORAL at 11:53

## 2022-01-18 RX ADMIN — METHOCARBAMOL TABLETS 1500 MG: 750 TABLET, COATED ORAL at 14:26

## 2022-01-18 RX ADMIN — SODIUM CHLORIDE, PRESERVATIVE FREE 10 ML: 5 INJECTION INTRAVENOUS at 06:32

## 2022-01-18 RX ADMIN — METHOCARBAMOL TABLETS 1500 MG: 750 TABLET, COATED ORAL at 09:41

## 2022-01-18 RX ADMIN — GABAPENTIN 100 MG: 100 CAPSULE ORAL at 09:46

## 2022-01-18 RX ADMIN — METHOCARBAMOL TABLETS 1500 MG: 750 TABLET, COATED ORAL at 21:38

## 2022-01-18 RX ADMIN — SODIUM CHLORIDE, PRESERVATIVE FREE 10 ML: 5 INJECTION INTRAVENOUS at 14:27

## 2022-01-18 NOTE — PROGRESS NOTES
RENAL H&P/CONSULT    Subjective:     Patient is a 79 y/o AA female, followed by our office for ESRD, on HD MWF at [facility]. She was due for HD today. Presented to the ER this morning with CC of sharp left-sided back pain that started last night. She was admitted last November for similar complaint, work-up was essentially negative. CT scan today showed bilateral pleural effusions with subjacent atelectaiss and previously reported right paratracheal mass/lymph node slightly larger. She has a hx of renal cell carcinoma, s/p left nephrectomy, followed by Oncology. Her electrolytes are stable. She c/o mild dyspnea, denies CP, no n/v, no HA or dizziness. She denies fever or chills. PMH also consist of DM II, Hyperphosphatemia, anemia, and HTN. At time of assessment, she is in no respiratory distress, on 2L NC. She is not on O2 at home. She is admitted for observation. There is no urgent need for HD. Plan for dialysis in the morning. 1/11/22 - see HD Note  1/12/22 - alert - wants to know what's going on with her back - discussed plans for HD tomorrow if she is still here and to call outpatient dialysis for a spot if she is discharged - no N/V, no CP, no significant dyspnea   1/13/22- alert/oriented, no signs of distress, on 2L NC, denies dyspnea or CP, no n/v, no HA or dizziness. Thoracentesis done yesterday at the request of Oncology to obtain fluid for cytology.  Tolerated HD today with no complications  3/88/48 - alert and communicating well  -still stressed, but not as tearful as previously - we discussed plans for rehab placement and she had a BM after suppository yesterday - no N/V, dyspnea is controlled with nasal cannula O2 - plans for dialysis discussed tomorrow  1/15/22 - see HD Note  1/16/22 - she feels better - had breakfast with delay due ton blizzard - no N/V, no CP, no constipation, no edema - breathing better   1/17/22 - late note - alert and communicating well - for HD later - no N/V, no CP, no dyspnea -working on rehab placement - cytology is pending    Past Medical History:   Diagnosis Date    Arthritis     AVF (arteriovenous fistula) ([facility] [address] 12/20/2016 12/6/16 (S) Right AVF revision and thrombectomy    Cancer ([facility] [address] 2015    L kidney    Chronic kidney disease     HD in M-W-F- at [facility] dialysis    Degenerative joint disease     Diabetes ([facility] [address]     checks QD, normal 120-130, hyposymptoms at 80    ESRD (end stage renal disease) [facility]) Nov 2006    ESRD. MWF dialysis     Hypertension     Obesity     Transient ischemic attack 08/29/2015    no residual      Past Surgical History:   Procedure Laterality Date    COLONOSCOPY N/A 11/8/2018    COLONOSCOPY  BMI 36 performed by [facility] at [facility] ENDOSCOPY    HX GI  06/01/2002    colon resection resulting in temporary colostomy reversal    HX GI  08/06/2018    exploratory laparotomy    HX GYN      stephan    HX OTHER SURGICAL      dialysis fistula, several permcaths    HX UROLOGICAL Left July 2015    nephrectomy    HX VASCULAR ACCESS      IR INSERT TUNL CVC W/O PORT OVER 5 YR  11/19/2021    IR PLC CATH AV SHUNT IN W PTA SI VENOUS  5/30/2019    IR PLC CATH AV SHUNT IN W PTA SI VENOUS RT  2/28/2019    IR PLC CATH AV SHUNT IN W PTA SI VENOUS RT  9/3/2019    IR PLC CATH AV SHUNT IN W PTA SI VENOUS RT  12/5/2019    IR PLC CATH AV SHUNT IN W PTA SI VENOUS RT  3/10/2020    [place] BREAST SURGERY PROCEDURE UNLISTED Right     cyst removed    VASCULAR SURGERY PROCEDURE UNLIST Right     AV graft      Prior to Admission medications    Medication Sig Start Date End Date Taking? Authorizing Provider   lidocaine 4 % patch 1 Patch by TransDERmal route every twelve (12) hours every twelve (12) hours. 11/15/21  Yes Brody Castellanos MD   minoxidiL (LONITEN) 10 mg tablet Take  by mouth two (2) times a day.    Yes Provider, Historical   omeprazole (PRILOSEC) 40 mg capsule TAKE ONE CAPSULE BY MOUTH EVERY DAY 6/10/21  Yes Provider, Historical   lidocaine 5 % topical cream Apply  to affected area two (2) times daily as needed for Pain. 9/1/17  Yes FRANCISCO Leblanc   amLODIPine (NORVASC) 10 mg tablet Take 10 mg by mouth nightly. Yes Provider, Historical   sevelamer carbonate (RENVELA) 800 mg tab tab Take 800 mg by mouth three (3) times daily (with meals). 5 tablets with meals/ 2 with snacks  Sometimes only eats 2 meals/d   Yes Provider, Historical   lisinopril (PRINIVIL, ZESTRIL) 40 mg tablet Take 40 mg by mouth nightly. Indications: HYPERTENSION 6/8/15  Yes Provider, Historical   sitaGLIPtin (JANUVIA) 100 mg tablet Take 50 mg by mouth every morning. Indications: TYPE 2 DIABETES MELLITUS   Yes Provider, Historical   furosemide (Lasix) 80 mg tablet Take 80 mg by mouth two (2) times a day. Patient not taking: Reported on 1/10/2022    Provider, Historical   methocarbamoL (Robaxin-750) 750 mg tablet Take 2 Tablets by mouth four (4) times daily. Patient not taking: Reported on 1/10/2022 11/9/21   Oriana Daniel MD   gabapentin (NEURONTIN) 100 mg capsule TAKE 1 CAPSULE BY MOUTH THREE TIMES DAILY FOR PAIN  Patient not taking: Reported on 1/10/2022 6/10/21   Provider, Historical   VIT C/VIT E/LUTEIN/MIN/OMEGA-3 (OCUVITE PO) Take  by mouth nightly.   Patient not taking: Reported on 1/10/2022    Provider, Historical     Allergies   Allergen Reactions    Pcn [Penicillins] Rash    Vancomycin Rash      Social History     Tobacco Use    Smoking status: Never Smoker    Smokeless tobacco: Never Used   Substance Use Topics    Alcohol use: No      Family History   Problem Relation Age of Onset    Diabetes Mother     Heart Disease Mother     Hypertension Mother     Heart Disease Father     Hypertension Father     Malignant Hyperthermia Neg Hx     Pseudocholinesterase Deficiency Neg Hx     Delayed Awakening Neg Hx     Post-op Nausea/Vomiting Neg Hx     Emergence Delirium Neg Hx     Other Neg Hx     Post-op Cognitive Dysfunction Neg Hx           Review of Systems    ROS negative except for what is mention in HPI      Objective:       Visit Vitals  /84 (BP 1 Location: Right leg, BP Patient Position: Lying)   Pulse 68   Temp 97.9 °F (36.6 °C)   Resp 18   Ht 5' 5\" (1.651 m)   Wt 82.8 kg (182 lb 8 oz)   SpO2 99%   BMI 30.37 kg/m²       No intake/output data recorded. 01/16 1901 - 01/18 0700  In: 600 [P.O.:600]  Out: 2500       General:  Alert, cooperative, no distress, appears stated age. Neck: Supple, symmetrical, trachea midline, no JVD. Lungs:   Clear to auscultation bilaterally. Heart:  Regular rate and rhythm, S1, S2 normal, no murmur,  rub or gallop. Abdomen:   Soft, non-tender. No masses,  No organomegaly. Extremities: Extremities normal, atraumatic, no cyanosis or edema. CE AVG examines well   Access: Is open. Skin: Skin color, texture, turgor normal. No rashes or lesions. Neurologic: Grossly intact. No asterixis. Data Review:      CT chest  COMPARISON: November 22, 2021, November 12, 2021  INDICATION: 2cm right sided paratracheal mass  FINDINGS:  Lungs: Bilateral pleural effusions with subjacent atelectasis. Mediastinum: Limitations due to no contrast. Hilar fullness. Suggestion of  adenopathy with prominent right paratracheal node measuring 2.6 cm short axis. Visualized upper abdomen: Ascites. The visualized portions of the liver and  adrenal glands are normal. Left nephrectomy. Osseous structures: No suspicious lytic or blastic bony lesions. IMPRESSION  1. Bilateral pleural effusions with subjacent atelectasis. 2.  The previously reported right paratracheal mass/lymph node is slightly  Larger. XR SPINE [address] 3 V on 1/11/2022 6:00 PM  Clinical History: The Female patient is 80years old  presenting for pain. Comparison:  Lasix 5 films 11/22/2021  Findings:  3 views demonstrate no fracture or subluxation of the thoracic spine. Normal curvature and alignment is maintained.  There are chronic degenerative  endplate changes with spondylosis. There is no significant disc or vertebral  body height loss. The cervicothoracic junction is unremarkable on lateral  imaging. The paraspinal soft tissues are normal.  IMPRESSION  1. Chronic degenerative changes with marginal osteophyte formation      Recent Results (from the past 24 hour(s))   GLUCOSE, POC    Collection Time: 01/17/22  4:10 PM   Result Value Ref Range    Glucose (POC) 139 (H) 65 - 100 mg/dL    Performed by [employer], POC    Collection Time: 01/17/22  9:42 PM   Result Value Ref Range    Glucose (POC) 137 (H) 65 - 100 mg/dL    Performed by George    GLUCOSE, POC    Collection Time: 01/18/22  8:02 AM   Result Value Ref Range    Glucose (POC) 105 (H) 65 - 100 mg/dL    Performed by Sabino            Principal Problem:    Mediastinal mass (1/10/2022)    Active Problems:    ESRD (end stage renal disease) ([address] (2/7/2013)      HTN (hypertension) (2/7/2013)      Abdominal mass (9/12/2017)      Overview: Of Left renal bed      Renal cell carcinoma ([address] (9/12/2017)      Severe back pain (11/12/2021)      Acute respiratory failure with hypoxia ([address] (11/12/2021)      Bilateral pleural effusion (1/12/2022)        Assessment/Plan:     1. ESRD- on HD, TIW, dialysis needed for biochemical and volume control, HD today per normal MWF      2. Back pain- work up per primary, cytology pending and biopsy anticipated    3. Right paratracheal mass/lymph node on CT -   hx of renal cell carcinoma, followed by Oncology  - Xray shows DJD    4. Bilateral pleural effusion- no signs of respiratory distress, s/p right thoracentesis with 300 ml fluid removed for cytology    5. HTN-  Excessively tight, antihypertensives on hold, will monitor    6. Hyperphosphatemia- on Renvela    7.  Anemia - Hgb fairly stable, treat with JAMES        Sarahy Etienne MD

## 2022-01-18 NOTE — PROGRESS NOTES
Bedside and Verbal shift change report given to self (oncoming nurse) by Jamel Hunt (offgoing nurse). Report included the following information SBAR, Kardex and MAR.

## 2022-01-18 NOTE — PROGRESS NOTES
RENAL H&P/CONSULT    Subjective:     Patient is a 79 y/o AA female, followed by our office for ESRD, on HD MWF at [facility]. She was due for HD today. Presented to the ER this morning with CC of sharp left-sided back pain that started last night. She was admitted last November for similar complaint, work-up was essentially negative. CT scan today showed bilateral pleural effusions with subjacent atelectaiss and previously reported right paratracheal mass/lymph node slightly larger. She has a hx of renal cell carcinoma, s/p left nephrectomy, followed by Oncology. Her electrolytes are stable. She c/o mild dyspnea, denies CP, no n/v, no HA or dizziness. She denies fever or chills. PMH also consist of DM II, Hyperphosphatemia, anemia, and HTN. At time of assessment, she is in no respiratory distress, on 2L NC. She is not on O2 at home. She is admitted for observation. There is no urgent need for HD. Plan for dialysis in the morning. 1/11/22 - see HD Note  1/12/22 - alert - wants to know what's going on with her back - discussed plans for HD tomorrow if she is still here and to call outpatient dialysis for a spot if she is discharged - no N/V, no CP, no significant dyspnea   1/13/22- alert/oriented, no signs of distress, on 2L NC, denies dyspnea or CP, no n/v, no HA or dizziness. Thoracentesis done yesterday at the request of Oncology to obtain fluid for cytology.  Tolerated HD today with no complications  8/19/97 - alert and communicating well  -still stressed, but not as tearful as previously - we discussed plans for rehab placement and she had a BM after suppository yesterday - no N/V, dyspnea is controlled with nasal cannula O2 - plans for dialysis discussed tomorrow  1/15/22 - see HD Note  1/16/22 - she feels better - had breakfast with delay due ton blizzard - no N/V, no CP, no constipation, no edema - breathing better   1/17/22 - late note - alert and communicating well - for HD later - no N/V, no CP, no dyspnea -working on rehab placement - cytology is pending  1/18/22 - progressing well - tolerated HD well yesterday - no dyspnea, no CP, no N/V, taking supplements well - no cytology yet - awaiting rehab placement     Past Medical History:   Diagnosis Date    Arthritis     AVF (arteriovenous fistula) ([facility] [address] 12/20/2016 12/6/16 ([facility]) Right AVF revision and thrombectomy    Cancer ([facility] [address] 2015    L kidney    Chronic kidney disease     HD in M[facility]W-F- [facility]    Degenerative joint disease     Diabetes ([facility] [address]     checks QD, normal 120-130, hyposymptoms at 80    ESRD (end stage renal disease) [facility]) Nov 2006    ESRD. MWF dialysis     Hypertension     Obesity     Transient ischemic attack 08/29/2015    no residual      Past Surgical History:   Procedure Laterality Date    COLONOSCOPY N/A 11/8/2018    COLONOSCOPY  BMI 36 performed by Diana [facility] MD at [facility] ENDOSCOPY    HX GI  06/01/2002    colon resection resulting in temporary colostomy reversal    HX GI  08/06/2018    exploratory laparotomy    HX GYN      stephan    HX OTHER SURGICAL      dialysis fistula, several permcaths    HX UROLOGICAL Left July 2015    nephrectomy    HX VASCULAR ACCESS      IR INSERT TUNL CVC W/O PORT OVER 5 YR  11/19/2021    IR PLC CATH AV SHUNT IN W PTA SI VENOUS  5/30/2019    IR PLC CATH AV SHUNT IN W PTA SI VENOUS RT  2/28/2019    IR PLC CATH AV SHUNT IN W PTA SI VENOUS RT  9/3/2019    IR PLC CATH AV SHUNT IN W PTA SI VENOUS RT  12/5/2019    IR PLC CATH AV SHUNT IN W PTA SI VENOUS RT  3/10/2020    [place] BREAST SURGERY PROCEDURE UNLISTED Right     cyst removed    VASCULAR SURGERY PROCEDURE UNLIST Right     AV graft      Prior to Admission medications    Medication Sig Start Date End Date Taking? Authorizing Provider   lidocaine 4 % patch 1 Patch by TransDERmal route every twelve (12) hours every twelve (12) hours.  11/15/21  Yes Ana Mckeon MD   minoxidiL (LONITEN) 10 mg tablet Take  by mouth two (2) times a day.   Yes Provider, Historical   omeprazole (PRILOSEC) 40 mg capsule TAKE ONE CAPSULE BY MOUTH EVERY DAY 6/10/21  Yes Provider, Historical   lidocaine 5 % topical cream Apply  to affected area two (2) times daily as needed for Pain. 9/1/17  Yes FRANCISCO Odell   amLODIPine (NORVASC) 10 mg tablet Take 10 mg by mouth nightly. Yes Provider, Historical   sevelamer carbonate (RENVELA) 800 mg tab tab Take 800 mg by mouth three (3) times daily (with meals). 5 tablets with meals/ 2 with snacks  Sometimes only eats 2 meals/d   Yes Provider, Historical   lisinopril (PRINIVIL, ZESTRIL) 40 mg tablet Take 40 mg by mouth nightly. Indications: HYPERTENSION 6/8/15  Yes Provider, Historical   sitaGLIPtin (JANUVIA) 100 mg tablet Take 50 mg by mouth every morning. Indications: TYPE 2 DIABETES MELLITUS   Yes Provider, Historical   furosemide (Lasix) 80 mg tablet Take 80 mg by mouth two (2) times a day. Patient not taking: Reported on 1/10/2022    Provider, Historical   methocarbamoL (Robaxin-750) 750 mg tablet Take 2 Tablets by mouth four (4) times daily. Patient not taking: Reported on 1/10/2022 11/9/21   Cinthya Daniel MD   gabapentin (NEURONTIN) 100 mg capsule TAKE 1 CAPSULE BY MOUTH THREE TIMES DAILY FOR PAIN  Patient not taking: Reported on 1/10/2022 6/10/21   Provider, Historical   VIT C/VIT E/LUTEIN/MIN/OMEGA-3 (OCUVITE PO) Take  by mouth nightly.   Patient not taking: Reported on 1/10/2022    Provider, Historical     Allergies   Allergen Reactions    Pcn [Penicillins] Rash    Vancomycin Rash      Social History     Tobacco Use    Smoking status: Never Smoker    Smokeless tobacco: Never Used   Substance Use Topics    Alcohol use: No      Family History   Problem Relation Age of Onset    Diabetes Mother     Heart Disease Mother     Hypertension Mother     Heart Disease Father     Hypertension Father     Malignant Hyperthermia Neg Hx     Pseudocholinesterase Deficiency Neg Hx     Delayed Awakening Neg Hx  Post-op Nausea/Vomiting Neg Hx     Emergence Delirium Neg Hx     Other Neg Hx     Post-op Cognitive Dysfunction Neg Hx           Review of Systems    ROS negative except for what is mention in HPI      Objective:       Visit Vitals  /84 (BP 1 Location: Right leg, BP Patient Position: Lying)   Pulse 68   Temp 97.9 °F (36.6 °C)   Resp 18   Ht 5' 5\" (1.651 m)   Wt 82.8 kg (182 lb 8 oz)   SpO2 99%   BMI 30.37 kg/m²       No intake/output data recorded. 01/16 1901 - 01/18 0700  In: 600 [P.O.:600]  Out: 2500       General:  Alert, cooperative, no distress, appears stated age. Neck: Supple, symmetrical, trachea midline, no JVD. Lungs:   Clear to auscultation bilaterally. Heart:  Regular rate and rhythm, S1, S2 normal, no murmur,  rub or gallop. Abdomen:   Soft, non-tender. No masses,  No organomegaly. Extremities: Extremities normal, atraumatic, no cyanosis or edema. CE AVG examines well   Access: Is open. Skin: Skin color, texture, turgor normal. No rashes or lesions. Neurologic: Grossly intact. No asterixis. Data Review:      CT chest  COMPARISON: November 22, 2021, November 12, 2021  INDICATION: 2cm right sided paratracheal mass  FINDINGS:  Lungs: Bilateral pleural effusions with subjacent atelectasis. Mediastinum: Limitations due to no contrast. Hilar fullness. Suggestion of  adenopathy with prominent right paratracheal node measuring 2.6 cm short axis. Visualized upper abdomen: Ascites. The visualized portions of the liver and  adrenal glands are normal. Left nephrectomy. Osseous structures: No suspicious lytic or blastic bony lesions. IMPRESSION  1. Bilateral pleural effusions with subjacent atelectasis. 2.  The previously reported right paratracheal mass/lymph node is slightly  Larger. XR SPINE [address] 3 V on 1/11/2022 6:00 PM  Clinical History: The Female patient is 80years old  presenting for pain.   Comparison:  Lasix 5 films 11/22/2021  Findings:  3 views demonstrate no fracture or subluxation of the thoracic spine. Normal curvature and alignment is maintained. There are chronic degenerative  endplate changes with spondylosis. There is no significant disc or vertebral  body height loss. The cervicothoracic junction is unremarkable on lateral  imaging. The paraspinal soft tissues are normal.  IMPRESSION  1. Chronic degenerative changes with marginal osteophyte formation      Recent Results (from the past 24 hour(s))   GLUCOSE, POC    Collection Time: 01/17/22  4:10 PM   Result Value Ref Range    Glucose (POC) 139 (H) 65 - 100 mg/dL    Performed by [facility], POC    Collection Time: 01/17/22  9:42 PM   Result Value Ref Range    Glucose (POC) 137 (H) 65 - 100 mg/dL    Performed by George    GLUCOSE, POC    Collection Time: 01/18/22  8:02 AM   Result Value Ref Range    Glucose (POC) 105 (H) 65 - 100 mg/dL    Performed by Sabino            Principal Problem:    Mediastinal mass (1/10/2022)    Active Problems:    ESRD (end stage renal disease) ([facility] [address] (2/7/2013)      HTN (hypertension) (2/7/2013)      Abdominal mass (9/12/2017)      Overview: Of Left renal bed      Renal cell carcinoma ([address] (9/12/2017)      Severe back pain (11/12/2021)      Acute respiratory failure with hypoxia ([address] (11/12/2021)      Bilateral pleural effusion (1/12/2022)        Assessment/Plan:     1. ESRD- on HD, TIW, dialysis needed for biochemical and volume control, HD tomorrow per normal MWF      2. Back pain- work up per primary, cytology pending and biopsy anticipated    3. Right paratracheal mass/lymph node on CT -   hx of renal cell carcinoma, followed by Oncology  - Xray shows DJD    4. Bilateral pleural effusion- no signs of respiratory distress, s/p right thoracentesis with 300 ml fluid removed for cytology which is pending    5. HTN-  No antihypertensives needed - on PO Midodrine as BP had been low    6. Hyperphosphatemia- on Renvela    7.  Anemia - Hgb fairly stable, treat with JAMES        Lizz Quintana MD

## 2022-01-18 NOTE — PROGRESS NOTES
CM met with patient to discussed STR. Patient is agreeable to STR. Patient has requested a referral to be made to (1)  [facility] (2) [facility]. Referral completed. Infromation faxed to [facility] in [facility]. CM will continue to monitor.

## 2022-01-18 NOTE — PROGRESS NOTES
Ezio Fischer  Admission Date: 1/10/2022         Daily Progress Note: 1/18/2022    The patient's chart is reviewed and the patient is discussed with the staff. Background: Patient is a 80 y.o.  female seen and evaluated at the request of Dr. Yuly Haynes. She has a history of ESRD on HD, RCC s/p resection (2015) and recurrence requiring SBRT (2017), DM, and HTN. She was admitted 1/10 with left sided back pain. She had a CT chest performed which showed B pleural effusions (both small, R>L) as well as a 2.5cm right paratracheal mass vs lymph node. Of note, this was also seen on multiple past CT scans but appears more prominent now. She was seen by oncology who recommended thoracentesis for cytology if possible. If non diagnostic outpatient EBUS. Consult was just brought to our attention today. Patient is currently needing 2L O2. Had thoracentesis for 400 ml  1/12. Pt complains of ongoing pain in her back. She is tearful because of this throughout much of my encounter with her. Pleural fluid looks transudative. Cytology pending. PET scan ordered.     Subjective:       Awaiting rehab bed, on NC  Cytology suspicious but not positive    Current Facility-Administered Medications   Medication Dose Route Frequency    senna-docusate (PERICOLACE) 8.6-50 mg per tablet 2 Tablet  2 Tablet Oral DAILY    midodrine (PROAMATINE) tablet 5 mg  5 mg Oral TID WITH MEALS    polyethylene glycol (MIRALAX) packet 17 g  17 g Oral BID    magnesium hydroxide (MILK OF MAGNESIA) 400 mg/5 mL oral suspension 30 mL  30 mL Oral DAILY PRN    bisacodyL (DULCOLAX) suppository 10 mg  10 mg Rectal DAILY PRN    B complex-vitamin C-folic acid (NEPHRO-ROBB) 0.8 mg tab  1 Tablet Oral DAILY    heparin (porcine) injection 5,000 Units  5,000 Units SubCUTAneous Q12H    epoetin mary lou-epbx (RETACRIT) injection 40,000 Units  40,000 Units SubCUTAneous Q7D    morphine injection 4 mg  4 mg IntraVENous Q6H PRN    [Held by provider] amLODIPine (NORVASC) tablet 10 mg  10 mg Oral QHS    gabapentin (NEURONTIN) capsule 100 mg  100 mg Oral TID    [Held by provider] lisinopriL (PRINIVIL, ZESTRIL) tablet 40 mg  40 mg Oral QHS    methocarbamoL (ROBAXIN) tablet 1,500 mg  1,500 mg Oral QID    [Held by provider] minoxidiL (LONITEN) tablet 10 mg  10 mg Oral BID    pantoprazole (PROTONIX) tablet 40 mg  40 mg Oral ACB    sevelamer carbonate (RENVELA) tab 800 mg  800 mg Oral TID WITH MEALS    sodium chloride (NS) flush 5-40 mL  5-40 mL IntraVENous Q8H    sodium chloride (NS) flush 5-40 mL  5-40 mL IntraVENous PRN    acetaminophen (TYLENOL) tablet 650 mg  650 mg Oral Q6H PRN    Or    acetaminophen (TYLENOL) suppository 650 mg  650 mg Rectal Q6H PRN    ondansetron (ZOFRAN ODT) tablet 4 mg  4 mg Oral Q8H PRN    Or    ondansetron (ZOFRAN) injection 4 mg  4 mg IntraVENous Q6H PRN    HYDROcodone-acetaminophen (NORCO) 7.5-325 mg per tablet 1 Tablet  1 Tablet Oral Q6H PRN    heparin (porcine) 1,000 unit/mL injection 5,000 Units  5,000 Units Hemodialysis DIALYSIS PRN    insulin lispro (HUMALOG) injection   SubCUTAneous AC&HS     Review of Systems    Constitutional: negative for fever, chills, sweats  Cardiovascular: negative for chest pain, palpitations, syncope, edema  Gastrointestinal:  negative for dysphagia, reflux, vomiting, diarrhea, abdominal pain, or melena  Neurologic:  negative for focal weakness, numbness, headache    Objective:     Vitals:    01/18/22 0008 01/18/22 0339 01/18/22 0757 01/18/22 1140   BP: 106/67 105/70 129/84 (!) 102/55   Pulse: 72 74 68 73   Resp: 18 18 18 18   Temp: 98.6 °F (37 °C) 98.7 °F (37.1 °C) 97.9 °F (36.6 °C) 97.5 °F (36.4 °C)   SpO2: 97% 96% 99% 97%   Weight:       Height:           Intake/Output Summary (Last 24 hours) at 1/18/2022 1404  Last data filed at 1/17/2022 1748  Gross per 24 hour   Intake 240 ml   Output 2500 ml   Net -2260 ml     Physical Exam:   Constitution:  the patient is elderly  HEENT: Sclera clear, pupils equal, oral mucosa moist  Respiratory: decreased BS on 2 lpm  Cardiovascular:  RRR without M,G,R  Gastrointestinal: firm abdomen, no pain; with positive bowel sounds. Musculoskeletal: warm without cyanosis. There is trace lower extremity edema. Skin:  no jaundice or rashes  Neurologic: no gross neuro deficits     Psychiatric:  alert and oriented x 3    Chest CT: 1/10/22: IMPRESSION  1. Bilateral pleural effusions with subjacent atelectasis.     2. The previously reported right paratracheal mass/lymph node is slightly  Larger. Pleural fluid  RARE CELLS SUSPICIOUS FOR MALIGNANCY. LAB:  Recent Labs     01/17/22  0307 01/16/22  0620   WBC 7.8 6.3   HGB 8.3* 8.2*   HCT 24.2* 23.3*   * 129*     Recent Labs     01/17/22  0307   *   K 4.8   CL 97*   CO2 31   GLU 98   BUN 44*   CREA 5.83*   MG 2.1   CA 9.4   PHOS 4.7*     No results for input(s): LAC, TROPHS, BNPNT, CRP in the last 72 hours. No lab exists for component: ESR  No results for input(s): PH, PCO2, PO2, HCO3, PHI, PCO2I, PO2I, HCO3I in the last 72 hours. No results for input(s): SDES, CULT in the last 72 hours. Assessment and Plan:  (Medical Decision Making)   Principal Problem:    Mediastinal mass (1/10/2022)    Will need OP EBUS guided Bx. ESRD (end stage renal disease) ([address] (2/7/2013)    Per nephrology      Abdominal mass (9/12/2017)    Per primary      Renal cell carcinoma ([address] (9/12/2017)    ? Mets to pleura and mediastinum. Severe back pain (11/12/2021)    Per primary      Acute respiratory failure with hypoxia ([address] (11/12/2021)    On 2L, assess RA sat      Bilateral pleural effusion (1/12/2022)  Cytology suspicious but not diagnostic, etc. Technically fluid was a transudate.  -non diagnostic, message sent for schedule for outpatient spot for EBUS following PET scan as outpatient.      Assess O2, arranging EBUS, PET scan   Discharge plan per hospitalist    Sonja White NP    I have spoken with and examined the patient. I agree with the above assessment and plan as documented.     Gen: awake  Lungs:  clear  Heart:  RRR with no Murmur/Rubs/Gallops  Abd: soft, NT      Needs PET with follow up Vivek Morgan MD

## 2022-01-18 NOTE — PROGRESS NOTES
ACUTE PHYSICAL THERAPY GOALS:  (Developed with and agreed upon by patient and/or caregiver.)  (1.) Ana Connolly  will move from supine to sit and sit to supine  with INDEPENDENCE within 7 treatment day(s). (2.) Kecia Fischer will transfer from bed to chair and chair to bed with MODIFIED INDEPENDENCE using the least restrictive device within 7 treatment day(s). (3.) Kecia Fischer will ambulate with MODIFIED INDEPENDENCE for 150 feet with the least restrictive device within 7 treatment day(s). (4.) Kecia Fischer will perform standing static and dynamic balance activities x 20 minutes with SUPERVISION to improve safety within 7 treatment day(s). (5.) Kecia Fischer will perform bilateral lower extremity exercises x 20 min for HEP with INDEPENDENCE to improve strength, endurance, and functional mobility within 7 treatment day(s). PHYSICAL THERAPY: Daily Note and PM Treatment Day # 2    Kecia Fischer is a 80 y.o. female   PRIMARY DIAGNOSIS: Mediastinal mass  Mediastinal mass [J98.59]  Acute respiratory failure with hypoxia (HCC) [J96.01]  Procedure(s) (LRB):  ULTRASOUND (Bilateral)  THORACENTESIS (N/A)  6 Days Post-Op    ASSESSMENT:     REHAB RECOMMENDATIONS: CURRENT LEVEL OF FUNCTION:  (Most Recently Demonstrated)   Recommendation to date pending progress:  Setting:   Short-term Rehab  Equipment:    To Be Determined Bed Mobility:   Minimal Assistance  Sit to Stand:   Minimal Assistance  Transfers:   Not tested  Gait/Mobility:  [employer] Assistance     ASSESSMENT:  Ms. Yoseph Merchant was received supine in bed, agreeable to therapy, on 2L O2 via NC. Reports she has been feeling better and pain is much more controlled. She was able to sit at EOB with Saira. She was able to ambulate ~80 ft within room with RW and CGA. Afterwards pt very SOB and fatigued, could not tolerate any further mobility, returned to supine with Saira. SpO2 over 92% throughout session.  Good progress, will continue to follow.       SUBJECTIVE:   Ms. Connor Cerrato states, \"I'm doing better\"    SOCIAL HISTORY/ LIVING ENVIRONMENT: see eval  Support Systems: Child(joan) (daughter Luis Kunh [phone])  OBJECTIVE:     PAIN: VITAL SIGNS: LINES/DRAINS:   Pre Treatment: Pain Screen  Pain Scale 1: Numeric (0 - 10)  Pain Intensity 1: 0  Post Treatment: 0   none  O2 Device: Nasal cannula     MOBILITY: I Mod I S SBA CGA Min Mod Max Total  NT x2 Comments:   Bed Mobility    Rolling [] [] [] [] [] [x] [] [] [] [] []    Supine to Sit [] [] [] [] [] [x] [] [] [] [] []    Scooting [] [] [] [] [] [x] [] [] [] [] []    Sit to Supine [] [] [] [] [] [x] [] [] [] [] []    Transfers    Sit to Stand [] [] [] [] [] [x] [] [] [] [] []    Bed to Chair [] [] [] [] [] [] [] [] [] [x] []    Stand to Sit [] [] [] [] [] [x] [] [] [] [] []    I=Independent, Mod I=Modified Independent, S=Supervision, SBA=Standby Assistance, CGA=Contact Guard Assistance,   Min=Minimal Assistance, Mod=Moderate Assistance, Max=Maximal Assistance, Total=Total Assistance, NT=Not Tested    BALANCE: Good Fair+ Fair Fair- Poor NT Comments   Sitting Static [x] [] [] [] [] []    Sitting Dynamic [] [x] [] [] [] []              Standing Static [] [x] [] [] [] []    Standing Dynamic [] [] [x] [] [] []      GAIT: I Mod I S SBA CGA Min Mod Max Total  NT x2 Comments:   Level of Assistance [] [] [] [] [x] [] [] [] [] [] []    Distance 80 ft    DME Rolling Walker    Gait Quality Decreased gait speed, forward flexed posture, increased work of breathing     Weightbearing  Status N/A     I=Independent, Mod I=Modified Independent, S=Supervision, SBA=Standby Assistance, CGA=Contact Guard Assistance,   Min=Minimal Assistance, Mod=Moderate Assistance, Max=Maximal Assistance, Total=Total Assistance, NT=Not Tested    PLAN:   FREQUENCY/DURATION: PT Plan of Care: 3 times/week for duration of hospital stay or until stated goals are met, whichever comes first.  TREATMENT:     TREATMENT:   ($$ Therapeutic Activity: 8-22 mins    )  Therapeutic Activity (14 Minutes): Therapeutic activity included Rolling, Supine to Sit, Sit to Supine, Scooting, Transfer Training, Ambulation on level ground, Sitting balance  and Standing balance to improve functional Mobility, Strength and Activity tolerance.     TREATMENT GRID:  N/A    AFTER TREATMENT POSITION/PRECAUTIONS:  Bed, Needs within reach and RN notified    INTERDISCIPLINARY COLLABORATION:  RN/PCT    TOTAL TREATMENT DURATION:  PT Patient Time In/Time Out  Time In: 1426  Time Out: [facility],

## 2022-01-18 NOTE — PROGRESS NOTES
ACUTE OT GOALS:  (Developed with and agreed upon by patient and/or caregiver.)  1. Patient will complete lower body bathing and dressing with SBA and adaptive equipment as needed. 2. Patient will complete toileting with SBA and adaptive equipment as needed. 3. Patient will tolerate 23 minutes of OT treatment with 2-3 rest breaks to increase activity tolerance for ADLs. 4. Patient will complete functional transfers with Supervision and adaptive equipment as needed. 5. Patient will complete standing grooming ADL with Supervision and adaptive equipment as needed. Timeframe: 7 visits       OCCUPATIONAL THERAPY ASSESSMENT: Initial Assessment, Daily Note and PM OT Treatment Day # 1    Kecia Fischer is a 80 y.o. female   PRIMARY DIAGNOSIS: Mediastinal mass  Mediastinal mass [J98.59]  Acute respiratory failure with hypoxia (HCC) [J96.01]  Procedure(s) (LRB):  ULTRASOUND (Bilateral)  THORACENTESIS (N/A)  6 Days Post-Op  Reason for Referral:  Generalized Weakness  ICD-10: Treatment Diagnosis: Generalized Muscle Weakness (M62.81)  Difficulty in walking, Not elsewhere classified (R26.2)  INPATIENT: Payor: [employer] MEDICARE / Plan: [facility] [employer] MEDICARE CHOICE PPO/PFFS / Product Type: Managed Care Medicare /   ASSESSMENT:     REHAB RECOMMENDATIONS:   Recommendation to date pending progress:  Setting:   Short-term Rehab  Equipment:    To Be Determined      PRIOR LEVEL OF FUNCTION:  (Prior to Hospitalization)  INITIAL/CURRENT LEVEL OF FUNCTION:  (Based on today's evaluation)   Bathing:   Independent  Dressing:   Independent  Feeding/Grooming:   Independent  Toileting:   Independent  Functional Mobility:   Independent Bathing:   Moderate Assistance for LB bathing. Dressing:   Moderate Assistance for LB dressing. Feeding/Grooming:   Set Up seated EOB.    Toileting:   Moderate Assistance  Functional Mobility:   Minimal Assistance x RW     ASSESSMENT:  Ms. Betty Frausto was admitted for upper back pain and acute respiratory failure with hypoxia. Presents with deficits in overall strength, balance, and activity tolerance for performance of ADLs and functional transfers. Requires CGA and additional time to complete supine <> sit. Requires Total A to complete sock management due to decreased BLE ROM. BUE generally decreased yet functional for ROM, strength, and coordination with deficits in LUE > than RUE. Requires Min A to complete sit <> stand. Upon standing, pt cued to hold head upright and bring pelvis forward to improve standing posture and balance. Requires Min A x RW to complete two laps from bed <> room door and return to EOB. O2 sats 93% post-activity on 2L O2. Requires SBA to complete sit <> supine. Pt would benefit from continued skilled OT to increase independence and safety for performance of ADLs and functional mobility. SUBJECTIVE:   Ms. Paula Montgomery states, \"I'll do one more. \" In reference to completing additional lap in room. SOCIAL HISTORY/LIVING ENVIRONMENT: Lives with family in two story home with 0 BEBE and pt main living quarters on first floor. Does report she accesses 2nd level on Tuesdays, Thursdays, and Saturdays to complete laundry routine. Independent for ADLs and functional mobility. No home O2. Receiving dialysis on M,W, & F. Dependent for transportation. Support Systems: Child(joan) (daughter Gabbie Cassandra [phone])    OBJECTIVE:     PAIN: VITAL SIGNS: LINES/DRAINS:   Pre Treatment: Pain Screen  Pain Scale 1: Numeric (0 - 10)  Pain Intensity 1: 0  Post Treatment: Unchanged Vital Signs  O2 Sat (%): 93 %  O2 Device: Nasal cannula  O2 Flow Rate (L/min): 2 l/min None  O2 Device: Nasal cannula     GROSS EVALUATION:  BUE Within Functional Limits Abnormal/ Functional Abnormal/ Non-Functional (see comments) Not Tested Comments:   AROM [] [x] [] [] Deficits in LUE > than RUE. PROM [] [] [] [x]    Strength [] [x] [] [] BUE with deficits in LUE > than RUE.     Balance [] [x] [] []    Posture [] [x] [] []    Sensation [x] [] [] []    Coordination [] [x] [] []    Tone [] [] [] [x]    Edema [] [] [] [x]    Activity Tolerance [] [x] [] [] Nasal cannula    [] [] [] []      COGNITION/  PERCEPTION: Intact Impaired   (see comments) Comments:   Orientation [x] []    Vision [x] []    Hearing [x] []    Judgment/ Insight [x] [] Pt very self-motivated and wanting to work with therapy to get better. Attention [x] []    Memory [x] []    Command Following [x] []    Emotional Regulation [x] []     [] []      ACTIVITIES OF DAILY LIVING: I Mod I S SBA CGA Min Mod Max Total NT Comments   BASIC ADLs:              Bathing/ Showering [] [] [] [] [] [] [] [] [] [x]    Toileting [] [] [] [] [] [] [] [] [] [x]    Dressing [] [] [] [] [] [] [] [] [x] [] Sock management due to decreased BLE ROM. Feeding [] [] [] [] [] [] [] [] [] [x]    Grooming [] [] [] [] [] [] [] [] [] [x]    Personal Device Care [] [] [] [] [] [] [] [] [] [x]    Functional Mobility [] [] [] [] [] [x] [] [] [] [] X RW   I=Independent, Mod I=Modified Independent, S=Supervision, SBA=Standby Assistance, CGA=Contact Guard Assistance,   Min=Minimal Assistance, Mod=Moderate Assistance, Max=Maximal Assistance, Total=Total Assistance, NT=Not Tested    MOBILITY: I Mod I S SBA CGA Min Mod Max Total  NT x2 Comments:   Supine to sit [] [] [] [] [x] [] [] [] [] [] [] Additional time   Sit to supine [] [] [] [x] [] [] [] [] [] [] []    Sit to stand [] [] [] [] [] [x] [] [] [] [] []    Bed to chair [] [] [] [] [] [] [] [] [] [x] [] Min A x RW in room mobility. I=Independent, Mod I=Modified Independent, S=Supervision, SBA=Standby Assistance, CGA=Contact Guard Assistance,   Min=Minimal Assistance, Mod=Moderate Assistance, Max=Maximal Assistance, Total=Total Assistance, NT=Not [place] [address]   Daily Activity Inpatient Short Form        How much help from another person does the patient currently need. .. Total A Lot A Little None   1.   Putting on and taking off regular lower body clothing? [] 1   [x] 2   [] 3   [] 4   2. Bathing (including washing, rinsing, drying)? [] 1   [x] 2   [] 3   [] 4   3. Toileting, which includes using toilet, bedpan or urinal?   [] 1   [x] 2   [] 3   [] 4   4. Putting on and taking off regular upper body clothing? [] 1   [] 2   [x] 3   [] 4   5. Taking care of personal grooming such as brushing teeth? [] 1   [] 2   [x] 3   [] 4   6. Eating meals? [] 1   [] 2   [] 3   [x] 4   © 2007, Trustees of [address] [place] [address] [address] [id number], under license to [employer]. All rights reserved     Score:  Initial: 16, completed, 1/18/2022 Most Recent: X (Date: -- )   Interpretation of Tool:  Represents activities that are increasingly more difficult (i.e. Bed mobility, Transfers, Gait). PLAN:   FREQUENCY/DURATION: OT Plan of Care: 3 times/week for duration of hospital stay or until stated goals are met, whichever comes first.    PROBLEM LIST:   (Skilled intervention is medically necessary to address:)  1. Decreased ADL/Functional Activities  2. Decreased Activity Tolerance  3. Decreased AROM/PROM  4. Decreased Balance  5. Decreased Coordination  6. Decreased Gait Ability  7. Decreased Strength  8. Decreased Transfer Abilities   INTERVENTIONS PLANNED:   (Benefits and precautions of occupational therapy have been discussed with the patient.)  1. Self Care Training  2. Therapeutic Activity  3. Therapeutic Exercise/HEP  4. Neuromuscular Re-education  5. Education     TREATMENT:     EVALUATION: Low Complexity : (Untimed Charge)    TREATMENT:   Therapeutic Activity (10 Minutes): Therapeutic activity included Supine to Sit, Sit to Supine, Scooting, Transfer Training, Ambulation on level ground and Standing balance to improve functional Mobility, Strength and Activity tolerance. TREATMENT GRID:  N/A    AFTER TREATMENT POSITION/PRECAUTIONS:  Bed, Needs within reach, RN notified and head of bed elevated.     INTERDISCIPLINARY COLLABORATION:  RN/PCT, PT/PTA and OT/PRUITT    TOTAL TREATMENT DURATION:  OT Patient Time In/Time Out  Time In: 1451  Time Out: 1510    Mayra Church OTR/L

## 2022-01-18 NOTE — PROGRESS NOTES
Hospitalist Progress Note   Admit Date:  1/10/2022  6:06 AM   Name:  Norma Fischer   Age:  80 y.o. Sex:  female  :  1950   MRN:  [id number]   Room:  [facility]/    Presenting Complaint: Back Pain    Reason(s) for Admission: Mediastinal mass [J98.59]  Acute respiratory failure with hypoxia [facility]) [J96.01]     Hospital Course & Interval History:   Kimberly Gaona is a 80 y.o. female with medical history of ESRD on hemodialysis, recurrent renal cell carcinoma s/p left nephrectomy  with recurrence and s/p course localized XRT to left renal bed who presented with severe sharp left-sided thoracic back pain. Patient recently admitted in November left flank pain and treated empirically with abx until acute infectious process ruled out. Pt had a CT thoracic spine which did not reveal acute fx, no aggressive bony lesions though b/l lung patchy infiltrative processes (pneumonitis / atypical PNA / pulm edema). Of significance pt did have a CT C/A to eval for potential aortic dissection given significant pain on 2021 which did reveal b/l small pleural effusions, enlarging right paratracheal lymph node (2.0 cm from previous 12mm) and small b/l hilar lymph nodes, no aortic dissection. She does follow with hem/onc routinely for underlying RCC hx. In the ER, pt had a repeat CT chest which revealed b/l pleural effusions and an increase in size of right paratracheal lymph node now 2.6cm in size as compared to Nov CT chest which measured lymph node 2.0cm. Pt admits to having missed her Monday HD session due to pain (she has been on HD x 15 years, M/W/F). She denies chest pain, palpitation, shortness of breath, nausea, vomiting, fevers or chills. Her VS / labs were stable; Hospitalist service consulted for inpatient admission for persistent back pain and enlarging mediastinal lymph node with acute hypoxic resp failure. Subjective (22): Pt seen at bedside, resting in bed comfortably.  She is on 1-2 ltr via NC. She reports to be feeling weak and lethargic. Denies chest/abdominal pain, nausea/vomiting, headache, palpitations, productive cough. Review of Systems:  10 point ROS negative except what mentioned above.       Assessment & Plan:     Principal Problem:    Mediastinal mass (1/10/2022)  1/18:  - need to rule out malignant etiology as pt with hx of recurrent RCC  - s/p thoracentesis with cytology: rare cells suspicious for malignancy  - outpatient EBUS and PET scan  Currently on 1-2 ltr via NC  Oxygen Qualifier          Room air: SpO2 with O2 and liter flow   Resting SpO2  79%   86% on 1L    90% on 2L   Ambulating SpO2  73%  77% on 1L   90% on 2L          Active Problems:    Acute hypoxic respiratory failure  - O2 85% on RA noted in ER, improved to 96% on 2L  1/18:  S/p thoracentesis on 1/12/22  Oxygen Qualifier          Room air: SpO2 with O2 and liter flow   Resting SpO2  79%   86% on 1L    90% on 2L   Ambulating SpO2  73%  77% on 1L   90% on 2L            Thoracic / upper back pain  - appreciate pulmonary eval for potential empyema  - xray thoracic spine 1/12/2022 negative for acute fx  - s/p thoracentesis on 1/12  - prn norco / morphine for pain control      B/l pleural effusions  - s/p thoracentesis right lung on 1/12; f/u cytology / cx  - cont HD for volume mgmt      ESRD (end stage renal disease)  - appreciate nephrology's ongoing assistance  - cont HD 3 x a week      HTN (hypertension)  - labile readings with hypotensive episodes; may be attributed to IV dilaudid  - closely monitor; RN instructed to HOLD PAIN RX if SBP <100mmhg  - monitor closely      Hx of recurrent renal cell carcinoma  - s/p left nephrectomy 2015 with recurrence and subsequent XRT to left renal bed  - mgmt per hem/onc      Anemia chronic disease  - hgb 8.2 this AM   - no gross bleeding; cont to closely monitor      Diabetes mellitus   - on januvia at home  - hgbA1c 5.3 on 11/13/2021  - sliding scale coverage as needed; low-carb diet       Dispo/Discharge Planning:    Pt will likely need SNF/STR on discharge  Plan of care discussed with pt, pt's daughter () and CM. Diet:  ADULT DIET Regular; 3 carb choices (45 gm/meal); Low Fat/Low Chol/High Fiber/2 gm Na; Low Potassium (Less than 3000 mg/day); Low Phosphorus (Less than 1000 mg)  ADULT ORAL NUTRITION SUPPLEMENT Breakfast, Lunch, Dinner, HS Snack;  Renal Supplement  DVT PPx: hep sc  Code status: Full Code    Hospital Problems as of 1/18/2022 Date Reviewed: 7/27/2021          Codes Class Noted - Resolved POA    Bilateral pleural effusion ICD-10-CM: J90  ICD-9-CM: 511.9  1/12/2022 - Present Unknown        * (Principal) Mediastinal mass ICD-10-CM: J98.59  ICD-9-CM: 786.6  1/10/2022 - Present Unknown        Severe back pain ICD-10-CM: M54.9  ICD-9-CM: 724.5  11/12/2021 - Present Yes        Acute respiratory failure with hypoxia [facility]) ICD-10-CM: J96.01  ICD-9-CM: 518.81  11/12/2021 - Present Unknown        Abdominal mass ICD-10-CM: R19.00  ICD-9-CM: 789.30  9/12/2017 - Present Yes    Overview Signed 9/12/2017  9:01 PM by Pooja Gomez MD     Of Left renal bed             Renal cell carcinoma [facility]) ICD-10-CM: C64.9  ICD-9-CM: 189.0  9/12/2017 - Present Yes        [facility] ICD-10-CM: N18.6  ICD-9-CM: 585.6  2/7/2013 - Present Yes        HTN (hypertension) ICD-10-CM: I10  ICD-9-CM: 401.9  2/7/2013 - Present Yes              Objective:     Patient Vitals for the past 24 hrs:   Temp Pulse Resp BP SpO2   01/18/22 0339 98.7 °F (37.1 °C) 74 18 105/70 96 %   01/18/22 0008 98.6 °F (37 °C) 72 18 106/67 97 %   01/17/22 1959 98.5 °F (36.9 °C) 75 20 (!) 100/50 91 %   01/17/22 1517 98 °F (36.7 °C) 71 20 (!) 100/57 98 %   01/17/22 1407 -- 75 -- 135/60 --   01/17/22 1400 -- 75 -- 137/61 --   01/17/22 1330 -- 77 -- (!) 149/45 --   01/17/22 1300 -- 88 -- (!) 168/40 --   01/17/22 1215 -- 81 -- (!) 147/68 --   01/17/22 1200 -- 81 -- (!) 147/67 --   01/17/22 1130 -- 77 -- (!) 140/60 --   01/17/22 1104 -- 76 -- 137/62 --   01/17/22 1030 -- 77 -- 120/62 --   01/17/22 1012 -- 74 20 (!) 159/76 98 %   01/17/22 0726 98.1 °F (36.7 °C) 71 20 (!) 105/51 98 %     Oxygen Therapy  O2 Sat (%): 96 % (01/18/22 0339)  Pulse via Oximetry: 85 beats per minute (01/12/22 1148)  O2 Device: Nasal cannula (01/17/22 1517)  O2 Flow Rate (L/min): 2 l/min (01/17/22 1517)    Estimated body mass index is 30.37 kg/m² as calculated from the following:    Height as of this encounter: 5' 5\" (1.651 m). Weight as of this encounter: 82.8 kg (182 lb 8 oz). Intake/Output Summary (Last 24 hours) at 1/18/2022 0718  Last data filed at 1/17/2022 1748  Gross per 24 hour   Intake 600 ml   Output 2500 ml   Net -1900 ml         Physical Exam:     Blood pressure 105/70, pulse 74, temperature 98.7 °F (37.1 °C), resp. rate 18, height 5' 5\" (1.651 m), weight 82.8 kg (182 lb 8 oz), SpO2 96 %. General:    Alert/awake, NAD, on 1-2 ltr via NC  HEENT:           Head NCAT, PERRLA+, MMM  Neck:  No restricted ROM. Trachea midline   CV:   RRR. No m/r/g. No jugular venous distension. Lungs:   Diminished breath sounds in bases B/L, no wheezing or ronchi  Abdomen: Bowel sounds present. Soft, nontender, nondistended. Extremities: No edema in LE. Skin:     No rashes and normal coloration. Warm and dry. Neuro:  CN II-XII grossly intact. Sensation intact. Psych:  AOx3, Normal mood and affect.       I have reviewed ordered lab tests and independently visualized imaging below:    Recent Labs:  Recent Results (from the past 48 hour(s))   GLUCOSE, POC    Collection Time: 01/16/22 10:36 AM   Result Value Ref Range    Glucose (POC) 133 (H) 65 - 100 mg/dL    Performed by Marcy Denton 1729, POC    Collection Time: 01/16/22  4:33 PM   Result Value Ref Range    Glucose (POC) 155 (H) 65 - 100 mg/dL    Performed by Marcy Denton 1729, POC    Collection Time: 01/16/22  9:03 PM   Result Value Ref Range    Glucose (POC) 127 (H) 65 - 100 mg/dL    Performed by Brendon    GLUCOSE, POC    Collection Time: 01/16/22 10:56 PM   Result Value Ref Range    Glucose (POC) 86 65 - 100 mg/dL    Performed by Ja    CBC W/O DIFF    Collection Time: 01/17/22  3:07 AM   Result Value Ref Range    WBC 7.8 4.3 - 11.1 K/uL    RBC 2.58 (L) 4.05 - 5.2 M/uL    HGB 8.3 (L) 11.7 - 15.4 g/dL    HCT 24.2 (L) 35.8 - 46.3 %    MCV 93.8 79.6 - 97.8 FL    MCH 32.2 26.1 - 32.9 PG    MCHC 34.3 31.4 - 35.0 g/dL    RDW 16.9 (H) 11.9 - 14.6 %    PLATELET 597 (L) 408 - 450 K/uL    MPV 10.6 9.4 - 12.3 FL    ABSOLUTE NRBC 0.00 0.0 - 0.2 K/uL   METABOLIC PANEL, BASIC    Collection Time: 01/17/22  3:07 AM   Result Value Ref Range    Sodium 134 (L) 136 - 145 mmol/L    Potassium 4.8 3.5 - 5.1 mmol/L    Chloride 97 (L) 98 - 107 mmol/L    CO2 31 21 - 32 mmol/L    Anion gap 6 (L) 7 - 16 mmol/L    Glucose 98 65 - 100 mg/dL    BUN 44 (H) 8 - 23 MG/DL    Creatinine 5.83 (H) 0.6 - 1.0 MG/DL    GFR est AA 9 (L) >60 ml/min/1.73m2    GFR est non-AA 8 (L) >60 ml/min/1.73m2    Calcium 9.4 8.3 - 10.4 MG/DL   MAGNESIUM    Collection Time: 01/17/22  3:07 AM   Result Value Ref Range    Magnesium 2.1 1.8 - 2.4 mg/dL   PHOSPHORUS    Collection Time: 01/17/22  3:07 AM   Result Value Ref Range    Phosphorus 4.7 (H) 2.3 - 3.7 MG/DL   GLUCOSE, POC    Collection Time: 01/17/22  6:40 AM   Result Value Ref Range    Glucose (POC) 115 (H) 65 - 100 mg/dL    Performed by Brendon    GLUCOSE, POC    Collection Time: 01/17/22  4:10 PM   Result Value Ref Range    Glucose (POC) 139 (H) 65 - 100 mg/dL    Performed by [facility], POC    Collection Time: 01/17/22  9:42 PM   Result Value Ref Range    Glucose (POC) 137 (H) 65 - 100 mg/dL    Performed by George        All Micro Results     Procedure Component Value Units Date/Time    FUNGUS CULTURE AND SMEAR [[id number]] Collected: 01/12/22 1153    Order Status: Completed Specimen: Miscellaneous sample Updated: 01/17/22 1536     Source PLEURAL FLUID        Comment: RIGHT  1          Fungus stain Direct Inoculation     Fungus (Mycology) Culture Other source received     Comment: (NOTE)  Performed At: [facility] & [address] [id number]  Em Blackwood MD DQ:[id number]         CULTURE, BODY FLUID Vickie Jones [[id number]] Collected: 01/12/22 1153    Order Status: Completed Specimen: Pleural Fluid Updated: 01/14/22 0924     Special Requests: NO SPECIAL REQUESTS        GRAM STAIN 0 TO 10 WBCS PER OIF      NO DEFINITE ORGANISM SEEN        Culture result: NO GROWTH 2 DAYS       AFB CULTURE + SMEAR W/RFLX ID FROM CULTURE [[id number]] Collected: 01/12/22 1153    Order Status: Completed Specimen: Miscellaneous sample Updated: 01/13/22 1735     Source PLEURAL FLUID        Comment: RIGHT  1          AFB Specimen processing Concentration     Acid Fast Smear Negative        Comment: (NOTE)  Performed At: [facility] & [address] [id number]  Em Blackwood MD AY:[id number]          Acid Fast Culture PENDING          Other Studies:  No results found.     Current Meds:  Current Facility-Administered Medications   Medication Dose Route Frequency    senna-docusate (PERICOLACE) 8.6-50 mg per tablet 2 Tablet  2 Tablet Oral DAILY    midodrine (PROAMATINE) tablet 5 mg  5 mg Oral TID WITH MEALS    polyethylene glycol (MIRALAX) packet 17 g  17 g Oral BID    magnesium hydroxide (MILK OF MAGNESIA) 400 mg/5 mL oral suspension 30 mL  30 mL Oral DAILY PRN    bisacodyL (DULCOLAX) suppository 10 mg  10 mg Rectal DAILY PRN    B complex-vitamin C-folic acid (NEPHRO-ROBB) 0.8 mg tab  1 Tablet Oral DAILY    heparin (porcine) injection 5,000 Units  5,000 Units SubCUTAneous Q12H    epoetin mary lou-epbx (RETACRIT) injection 40,000 Units  40,000 Units SubCUTAneous Q7D    morphine injection 4 mg  4 mg IntraVENous Q6H PRN    [Held by provider] amLODIPine (NORVASC) tablet 10 mg  10 mg Oral QHS  gabapentin (NEURONTIN) capsule 100 mg  100 mg Oral TID    [Held by provider] lisinopriL (PRINIVIL, ZESTRIL) tablet 40 mg  40 mg Oral QHS    methocarbamoL (ROBAXIN) tablet 1,500 mg  1,500 mg Oral QID    [Held by provider] minoxidiL (LONITEN) tablet 10 mg  10 mg Oral BID    pantoprazole (PROTONIX) tablet 40 mg  40 mg Oral ACB    sevelamer carbonate (RENVELA) tab 800 mg  800 mg Oral TID WITH MEALS    sodium chloride (NS) flush 5-40 mL  5-40 mL IntraVENous Q8H    sodium chloride (NS) flush 5-40 mL  5-40 mL IntraVENous PRN    acetaminophen (TYLENOL) tablet 650 mg  650 mg Oral Q6H PRN    Or    acetaminophen (TYLENOL) suppository 650 mg  650 mg Rectal Q6H PRN    ondansetron (ZOFRAN ODT) tablet 4 mg  4 mg Oral Q8H PRN    Or    ondansetron (ZOFRAN) injection 4 mg  4 mg IntraVENous Q6H PRN    HYDROcodone-acetaminophen (NORCO) 7.5-325 mg per tablet 1 Tablet  1 Tablet Oral Q6H PRN    heparin (porcine) 1,000 unit/mL injection 5,000 Units  5,000 Units Hemodialysis DIALYSIS PRN    insulin lispro (HUMALOG) injection   SubCUTAneous AC&HS       Signed:  Kathryn Bernal MD    Part of this note may have been written by using a voice dictation software. The note has been proof read but may still contain some grammatical/other typographical errors.

## 2022-01-18 NOTE — PROGRESS NOTES
Comprehensive Nutrition Assessment    Type and Reason for Visit: Initial,RD nutrition re-screen/LOS    Nutrition Recommendations/Plan:    Continue current diet. Food preferences added.  Continue Nepro shakes TID with meals - provides 425 kcal and 19 gm PRO per bottle. Malnutrition Assessment:  Malnutrition Status: No malnutrition    Nutrition Assessment:   Nutrition History: Pt reports a UBW of 245 lbs and weight loss 1-2 years ago when she had a stomach virus which she took antibiotics and caused a decrease in appetite and PO intake (per pt). Pt reports that her appetite has held steady at ~180 lbs since then. Pt reports she eats 1.5-2 meals/day at home. Nutrition Background: Pt admitted with SOB, back pain. PMH notable for ESRD on HD, RCC s/p resection, HTN, DM. Daily Update:  Pt seen reclined in bed. Per pt, eating lunches/dinners well w/ main complaint being at breakfast time. Food preferences collected. Observed nepro at bedside. Pt reports being full following lunch but that she is drinking w/ meals the majority of the time. Dentures at bedside. No c/o difficulty chewing items sent on regular diet. WT / BMI 1/17/2022 11/22/2021 11/19/2021 11/15/2021 11/9/2021   WEIGHT 182 lb 8 oz 175 lb 180 lb 190 lb 11.2 oz 180 lb     WT / BMI 7/27/2021 1/26/2021   WEIGHT 183 lb 8 oz 182 lb     Per weights listed in EMR, pt's weight has ranges from 175-190 lbs over the past ~1 year. Nutrition Related Findings:   No fanny muscle wasting or fat loss appreciated. S/P thoracentesis 1/12. Current Nutrition Therapies:  ADULT ORAL NUTRITION SUPPLEMENT Breakfast, Lunch, Dinner, HS Snack; Renal Supplement  ADULT DIET Regular; 3 carb choices (45 gm/meal); Low Fat/Low Chol/High Fiber/2 gm Na; Low Potassium (Less than 3000 mg/day); Low Phosphorus (Less than 1000 mg); breakfast: likes eggs, grits, sausage/martinez. no oatmeal or Yemeni toast. no coffee.     Current Intake:   Average Meal Intake: 51-75% Average Supplement Intake: %      Anthropometric Measures:  Height: 5' 5\" (165.1 cm)  Current Body Wt: 82.8 kg (182 lb 8.7 oz) (1/17), Weight source: Standing scale  BMI: 30.4, Obese class 1 (BMI 30.0-34. 9)  Admission Body Weight: 177 lb 14.6 oz (1/10, wt source not specified)  Ideal Body Weight (lbs) (Calculated): 125 lbs (57 kg), 146 %  Usual Body Wt: 81.6 kg (180 lb) (per pt), Percent weight change: 1.4          Edema: RLE: 1+ (1/18/2022  7:57 AM)     Estimated Daily Nutrient Needs:  Energy (kcal/day): 5805-1095 (Kcal/kg (20-25), Weight Used: Ideal (56.8 kg))  Protein (g/day): 68-74 (1.2-1.3) Weight Used: (Ideal (56.8 kg))  Fluid (ml/day):   (Standard renal (or per MD))    Nutrition Diagnosis:   No nutrition diagnosis at this time   Nutrition Interventions:   Food and/or Nutrient Delivery: Continue current diet,Continue oral nutrition supplement (Food preferences added.)     Coordination of Nutrition Care: Continue to monitor while inpatient    Goals: Active Goal: Continue to meet >75% of estimated needs at time of nutrition follow up.     Nutrition Monitoring and Evaluation:      Food/Nutrient Intake Outcomes: Food and nutrient intake,Supplement intake  Physical Signs/Symptoms Outcomes: Hemodynamic status,Meal time behavior,Weight    Discharge Planning:     (continue ONS as needed)    Te Bansal MS, [address] [place] [address], , [address]      Disaster Mode Active

## 2022-01-19 LAB
GLUCOSE BLD STRIP.AUTO-MCNC: 112 MG/DL (ref 65–100)
GLUCOSE BLD STRIP.AUTO-MCNC: 116 MG/DL (ref 65–100)
GLUCOSE BLD STRIP.AUTO-MCNC: 117 MG/DL (ref 65–100)
GLUCOSE BLD STRIP.AUTO-MCNC: 136 MG/DL (ref 65–100)
SERVICE CMNT-IMP: ABNORMAL

## 2022-01-19 PROCEDURE — 74011250636 HC RX REV CODE- 250/636: Performed by: INTERNAL MEDICINE

## 2022-01-19 PROCEDURE — 90935 HEMODIALYSIS ONE EVALUATION: CPT

## 2022-01-19 PROCEDURE — 74011250637 HC RX REV CODE- 250/637: Performed by: FAMILY MEDICINE

## 2022-01-19 PROCEDURE — 97110 THERAPEUTIC EXERCISES: CPT

## 2022-01-19 PROCEDURE — 97530 THERAPEUTIC ACTIVITIES: CPT

## 2022-01-19 PROCEDURE — 74011000250 HC RX REV CODE- 250: Performed by: FAMILY MEDICINE

## 2022-01-19 PROCEDURE — 82962 GLUCOSE BLOOD TEST: CPT

## 2022-01-19 PROCEDURE — 99232 SBSQ HOSP IP/OBS MODERATE 35: CPT | Performed by: INTERNAL MEDICINE

## 2022-01-19 PROCEDURE — 74011250637 HC RX REV CODE- 250/637: Performed by: HOSPITALIST

## 2022-01-19 PROCEDURE — 65270000029 HC RM PRIVATE

## 2022-01-19 PROCEDURE — G0257 UNSCHED DIALYSIS ESRD PT HOS: HCPCS

## 2022-01-19 RX ADMIN — SODIUM CHLORIDE, PRESERVATIVE FREE 10 ML: 5 INJECTION INTRAVENOUS at 21:48

## 2022-01-19 RX ADMIN — HYDROCODONE BITARTRATE AND ACETAMINOPHEN 1 TABLET: 7.5; 325 TABLET ORAL at 07:39

## 2022-01-19 RX ADMIN — GABAPENTIN 100 MG: 100 CAPSULE ORAL at 21:48

## 2022-01-19 RX ADMIN — METHOCARBAMOL TABLETS 1500 MG: 750 TABLET, COATED ORAL at 12:07

## 2022-01-19 RX ADMIN — HEPARIN SODIUM 5000 UNITS: 5000 INJECTION INTRAVENOUS; SUBCUTANEOUS at 05:53

## 2022-01-19 RX ADMIN — SEVELAMER CARBONATE 800 MG: 800 TABLET, FILM COATED ORAL at 11:29

## 2022-01-19 RX ADMIN — MIDODRINE HYDROCHLORIDE 5 MG: 5 TABLET ORAL at 17:06

## 2022-01-19 RX ADMIN — MIDODRINE HYDROCHLORIDE 5 MG: 5 TABLET ORAL at 11:29

## 2022-01-19 RX ADMIN — PANTOPRAZOLE SODIUM 40 MG: 40 TABLET, DELAYED RELEASE ORAL at 05:52

## 2022-01-19 RX ADMIN — SODIUM CHLORIDE, PRESERVATIVE FREE 10 ML: 5 INJECTION INTRAVENOUS at 14:00

## 2022-01-19 RX ADMIN — METHOCARBAMOL TABLETS 1500 MG: 750 TABLET, COATED ORAL at 21:48

## 2022-01-19 RX ADMIN — HEPARIN SODIUM 5000 UNITS: 5000 INJECTION INTRAVENOUS; SUBCUTANEOUS at 17:06

## 2022-01-19 RX ADMIN — SODIUM CHLORIDE, PRESERVATIVE FREE 10 ML: 5 INJECTION INTRAVENOUS at 05:53

## 2022-01-19 RX ADMIN — EPOETIN ALFA-EPBX 40000 UNITS: 40000 INJECTION, SOLUTION INTRAVENOUS; SUBCUTANEOUS at 12:07

## 2022-01-19 RX ADMIN — GABAPENTIN 100 MG: 100 CAPSULE ORAL at 17:05

## 2022-01-19 RX ADMIN — SEVELAMER CARBONATE 800 MG: 800 TABLET, FILM COATED ORAL at 17:05

## 2022-01-19 RX ADMIN — METHOCARBAMOL TABLETS 1500 MG: 750 TABLET, COATED ORAL at 17:06

## 2022-01-19 NOTE — ROUTINE PROCESS
Bedside and Verbal report given to Shellie Meraz RN by self. Report included SBAR, Kardex, ED summary, procedure summary, recent results.

## 2022-01-19 NOTE — ROUTINE PROCESS
Bedside and Verbal report given to self by Michela Herrera RN. Report included SBAR, Kardex, ED Summary, Procedure Summary, Intake and Output.

## 2022-01-19 NOTE — PROGRESS NOTES
ACUTE PHYSICAL THERAPY GOALS:  (Developed with and agreed upon by patient and/or caregiver.)  (1.) Yuri Valdovinos  will move from supine to sit and sit to supine  with INDEPENDENCE within 7 treatment day(s). (2.) Kecia Fischer will transfer from bed to chair and chair to bed with MODIFIED INDEPENDENCE using the least restrictive device within 7 treatment day(s). (3.) Kecia Fischer will ambulate with MODIFIED INDEPENDENCE for 150 feet with the least restrictive device within 7 treatment day(s). (4.) Kecia Fischer will perform standing static and dynamic balance activities x 20 minutes with SUPERVISION to improve safety within 7 treatment day(s). (5.) Kecia Fischer will perform bilateral lower extremity exercises x 20 min for HEP with INDEPENDENCE to improve strength, endurance, and functional mobility within 7 treatment day(s). PHYSICAL THERAPY: Daily Note and PM Treatment Day # 3    Kecia Fischer is a 80 y.o. female   PRIMARY DIAGNOSIS: Mediastinal mass  Mediastinal mass [J98.59]  Acute respiratory failure with hypoxia (HCC) [J96.01]  Procedure(s) (LRB):  ULTRASOUND (Bilateral)  THORACENTESIS (N/A)  7 Days Post-Op    ASSESSMENT:     REHAB RECOMMENDATIONS: CURRENT LEVEL OF FUNCTION:  (Most Recently Demonstrated)   Recommendation to date pending progress:  Setting:   Short-term Rehab  Equipment:    To Be Determined Bed Mobility:   Minimal Assistance  Sit to Stand:   Minimal Assistance  Transfers:   Not tested  Gait/Mobility:  [employer] Assistance     ASSESSMENT:  Ms. Sobeida Arana was received supine in bed, agreeable to therapy, on 2L O2 via NC. Reports she is fatigued today from HD. She was able to sit at EOB with Saira. She was able to ambulate ~80 ft within room with RW and CGA. Pt introduced to LE therex for strengthening, tolerated well. SpO2 over 92% throughout session. Good progress, will continue to follow.       SUBJECTIVE:   Ms. Sobeida Arana states, \"I'm always like this after dialysis\"    SOCIAL HISTORY/ LIVING ENVIRONMENT: see eval  Support Systems: Child(joan) (daughter Araceli Barrera [phone])  OBJECTIVE:     PAIN: VITAL SIGNS: LINES/DRAINS:   Pre Treatment: Pain Screen  Pain Scale 1: Numeric (0 - 10)  Pain Intensity 1: 0  Post Treatment: 0   none  O2 Device: Nasal cannula     MOBILITY: I Mod I S SBA CGA Min Mod Max Total  NT x2 Comments:   Bed Mobility    Rolling [] [] [] [] [] [x] [] [] [] [] []    Supine to Sit [] [] [] [] [] [x] [] [] [] [] []    Scooting [] [] [] [] [] [x] [] [] [] [] []    Sit to Supine [] [] [] [] [] [x] [] [] [] [] []    Transfers    Sit to Stand [] [] [] [] [] [x] [] [] [] [] []    Bed to Chair [] [] [] [] [] [] [] [] [] [x] []    Stand to Sit [] [] [] [] [] [x] [] [] [] [] []    I=Independent, Mod I=Modified Independent, S=Supervision, SBA=Standby Assistance, CGA=Contact Guard Assistance,   Min=Minimal Assistance, Mod=Moderate Assistance, Max=Maximal Assistance, Total=Total Assistance, NT=Not Tested    BALANCE: Good Fair+ Fair Fair- Poor NT Comments   Sitting Static [x] [] [] [] [] []    Sitting Dynamic [] [x] [] [] [] []              Standing Static [] [x] [] [] [] []    Standing Dynamic [] [] [x] [] [] []      GAIT: I Mod I S SBA CGA Min Mod Max Total  NT x2 Comments:   Level of Assistance [] [] [] [] [x] [] [] [] [] [] []    Distance 80 ft    DME Rolling Walker    Gait Quality Decreased gait speed, forward flexed posture, increased work of breathing     Weightbearing  Status N/A     I=Independent, Mod I=Modified Independent, S=Supervision, SBA=Standby Assistance, CGA=Contact Guard Assistance,   Min=Minimal Assistance, Mod=Moderate Assistance, Max=Maximal Assistance, Total=Total Assistance, NT=Not Tested    PLAN:   FREQUENCY/DURATION: PT Plan of Care: 3 times/week for duration of hospital stay or until stated goals are met, whichever comes first.  TREATMENT:     TREATMENT:   ($$ Therapeutic Activity: 8-22 mins  $$ Therapeutic Exercises: 8-22 mins )  Therapeutic Activity (13 Minutes): Therapeutic activity included Rolling, Supine to Sit, Sit to Supine, Scooting, Transfer Training, Ambulation on level ground, Sitting balance  and Standing balance to improve functional Mobility, Strength and Activity tolerance. Therapeutic Exercise (10 Minutes): Therapeutic exercises noted below to improve functional activity tolerance, strength and mobility.      TREATMENT GRID:   Date:  1/19/22 Date:   Date:     Activity/Exercise Parameters Parameters Parameters   Seated Marching  2 x 10      LAQs 10 reps                                         AFTER TREATMENT POSITION/PRECAUTIONS:  Bed, Needs within reach and RN notified    INTERDISCIPLINARY COLLABORATION:  RN/PCT    TOTAL TREATMENT DURATION:  PT Patient Time In/Time Out  Time In: 1410  Time Out: [facility] 36, PT

## 2022-01-19 NOTE — PROGRESS NOTES
No acute events overnight. Patient resting quietly in bed. Respirations present, even and unlabored on 2L NC. Bed low and locked, safety measures in place. No signs of distress, no needs expressed by the patient. Call light within reach, encouraged patient to call with any needs. Preparing to give report to oncoming RN.

## 2022-01-19 NOTE — PROGRESS NOTES
TRANSFER IN - DIALYSIS    Received patient in dialysis unit from [facility] (unit) for ordered procedure. Consent verified for renal replacement therapy. Procedure explained to patient, opportunity for Q&A provided. Call light given. Patient alert and vital signs stable. Visit Vitals  /64   Pulse 68   Temp 98.4 °F (36.9 °C)   Resp 16   Ht 5' 5\" (1.651 m)   Wt 77 kg (169 lb 11.2 oz)   SpO2 94%   BMI 28.24 kg/m²         Hemodialysis initiated using left AVF and 15 g needles. Machine settings per MD order. Heparin 0 unit bolus and 0 units/hr. Will monitor during treatment.       01/19/22 0715   Patient Information   Acute or Chronic Care Acute (comment)   Treatment Number 5   Informed Consent Verified Yes   Dialysis Weight   Goal/Amount of Fluid to Remove (mL) 3600 mL   Dialyzer/Set Up Inspection   Dialyzer/Set Up Inspection Revaclear Max   Alarms Verified Yes   Test Pass Yes   pH 7.2   Machine Conductivity 14   Meter Conductivity 13.8   Reverse Osmosis Safety Checks   Reverse Osmosis Machine Log Completed Yes   Total Chlorine Test Negative   Machine Initiation   Machine Number t6   Hemodialysis Start Time 0715   Unused Lines Clamped Yes   Machine Temperature 96.8 °F (36 °C)   Dialysis Initiation   All Connections Secured Yes   NS Bag  Yes   Saline Line Double Clamped Yes   Prime Given   Air Foam Detector Engaged Yes   Dialysate NA (mEq/L) 138   Dialysate K (mEq/L) 3   Dialysate CA (mEq/L) 2.5   Dialysate HCO3 (mEq/L) 38   Citrasate No   During Hemodialysis    Temp 98.4 °F (36.9 °C)   Pulse (Heart Rate) 68   Resp Rate 16   O2 Sat (%) 94 %   /64   MAP (Calculated) 86   Transducer Checks Dry   Saline Given (mL) 300 mL   Heparin Bolus (units) 0 units   Continuous Heparin Infusion (Units/hr) 0 Units/hr   Blood Flow Rate (ml/min) 400 ml/min   Dialysate Flow Rate (ml/hr) 800 ml/hr

## 2022-01-19 NOTE — PROGRESS NOTES
Verbal bedside report given to oncoming RN, Michela Herrera. Patient's situation, background, assessment and recommendations provided. Opportunity for questions provided. Oncoming RN assumed care of patient.

## 2022-01-19 NOTE — PROGRESS NOTES
RENAL H&P/CONSULT    Subjective:     Patient is a 81 y/o AA female, followed by our office for ESRD, on HD MWF at [facility]. She was due for HD today. Presented to the ER this morning with CC of sharp left-sided back pain that started last night. She was admitted last November for similar complaint, work-up was essentially negative. CT scan today showed bilateral pleural effusions with subjacent atelectaiss and previously reported right paratracheal mass/lymph node slightly larger. She has a hx of renal cell carcinoma, s/p left nephrectomy, followed by Oncology. Her electrolytes are stable. She c/o mild dyspnea, denies CP, no n/v, no HA or dizziness. She denies fever or chills. PMH also consist of DM II, Hyperphosphatemia, anemia, and HTN. At time of assessment, she is in no respiratory distress, on 2L NC. She is not on O2 at home. She is admitted for observation. There is no urgent need for HD. Plan for dialysis in the morning. 1/11/22 - see HD Note  1/12/22 - alert - wants to know what's going on with her back - discussed plans for HD tomorrow if she is still here and to call outpatient dialysis for a spot if she is discharged - no N/V, no CP, no significant dyspnea   1/13/22- alert/oriented, no signs of distress, on 2L NC, denies dyspnea or CP, no n/v, no HA or dizziness. Thoracentesis done yesterday at the request of Oncology to obtain fluid for cytology.  Tolerated HD today with no complications  [birth year] - alert and communicating well  -still stressed, but not as tearful as previously - we discussed plans for rehab placement and she had a BM after suppository yesterday - no N/V, dyspnea is controlled with nasal cannula O2 - plans for dialysis discussed tomorrow  1/15/22 - see HD Note  1/16/22 - she feels better - had breakfast with delay due ton blizzard - no N/V, no CP, no constipation, no edema - breathing better   1/17/22 - late note - alert and communicating well - for HD later - no N/V, no CP, no dyspnea -working on rehab placement - cytology is pending  1/18/22 - progressing well - tolerated HD well yesterday - no dyspnea, no CP, no N/V, taking supplements well - no cytology yet - awaiting rehab placement   1/19/22- alert/oriented, lying in bed, no signs of distress, on 2L NC, c/o dyspnea with exertion, denies CP, no n/v, no HA or dizziness. Waiting on rehab placement. Tolerated HD today. No new labs today. Past Medical History:   Diagnosis Date    Arthritis     AVF (arteriovenous fistula) ([facility] 12/20/2016 12/6/16 ([facility]) Right AVF revision and thrombectomy    Cancer ([facility] [address] 2015    L kidney    Chronic kidney disease     HD in M[facility]W-F- at [facility]    Degenerative joint disease     Diabetes ([facility] [address]     checks QD, normal 120-130, hyposymptoms at 80    ESRD (end stage renal disease) [facility]) Nov 2006    ESRD. MWF dialysis     Hypertension     Obesity     Transient ischemic attack 08/29/2015    no residual      Past Surgical History:   Procedure Laterality Date    COLONOSCOPY N/A 11/8/2018    COLONOSCOPY  BMI 36 performed by Chuy Jim[facility]nez MD at [facility] ENDOSCOPY    HX GI  06/01/2002    colon resection resulting in temporary colostomy reversal    HX GI  08/06/2018    exploratory laparotomy    HX GYN      stephan    HX OTHER SURGICAL      dialysis fistula, several permcaths    HX UROLOGICAL Left July 2015    nephrectomy    HX VASCULAR ACCESS      IR INSERT TUNL CVC W/O PORT OVER 5 YR  11/19/2021    IR PLC CATH AV SHUNT IN W PTA SI VENOUS  5/30/2019    IR PLC CATH AV SHUNT IN W PTA SI VENOUS RT  2/28/2019    IR PLC CATH AV SHUNT IN W PTA SI VENOUS RT  9/3/2019    IR PLC CATH AV SHUNT IN W PTA SI VENOUS RT  12/5/2019    IR PLC CATH AV SHUNT IN W PTA SI VENOUS RT  3/10/2020    TN BREAST SURGERY PROCEDURE UNLISTED Right     cyst removed    VASCULAR SURGERY PROCEDURE UNLIST Right     AV graft      Prior to Admission medications    Medication Sig Start Date End Date Taking?  Authorizing Provider   lidocaine 4 % patch 1 Patch by TransDERmal route every twelve (12) hours every twelve (12) hours. 11/15/21  Yes Zack Kapoor MD   minoxidiL (LONITEN) 10 mg tablet Take  by mouth two (2) times a day. Yes Provider, Historical   omeprazole (PRILOSEC) 40 mg capsule TAKE ONE CAPSULE BY MOUTH EVERY DAY 6/10/21  Yes Provider, Historical   lidocaine 5 % topical cream Apply  to affected area two (2) times daily as needed for Pain. 9/1/17  Yes FRANCISCO Cárdenas   amLODIPine (NORVASC) 10 mg tablet Take 10 mg by mouth nightly. Yes Provider, Historical   sevelamer carbonate (RENVELA) 800 mg tab tab Take 800 mg by mouth three (3) times daily (with meals). 5 tablets with meals/ 2 with snacks  Sometimes only eats 2 meals/d   Yes Provider, Historical   lisinopril (PRINIVIL, ZESTRIL) 40 mg tablet Take 40 mg by mouth nightly. Indications: HYPERTENSION 6/8/15  Yes Provider, Historical   sitaGLIPtin (JANUVIA) 100 mg tablet Take 50 mg by mouth every morning. Indications: TYPE 2 DIABETES MELLITUS   Yes Provider, Historical   furosemide (Lasix) 80 mg tablet Take 80 mg by mouth two (2) times a day. Patient not taking: Reported on 1/10/2022    Provider, Historical   methocarbamoL (Robaxin-750) 750 mg tablet Take 2 Tablets by mouth four (4) times daily. Patient not taking: Reported on 1/10/2022 11/9/21   Janeth Daniel MD   gabapentin (NEURONTIN) 100 mg capsule TAKE 1 CAPSULE BY MOUTH THREE TIMES DAILY FOR PAIN  Patient not taking: Reported on 1/10/2022 6/10/21   Provider, Historical   VIT C/VIT E/LUTEIN/MIN/OMEGA-3 (OCUVITE PO) Take  by mouth nightly.   Patient not taking: Reported on 1/10/2022    Provider, Historical     Allergies   Allergen Reactions    Pcn [Penicillins] Rash    Vancomycin Rash      Social History     Tobacco Use    Smoking status: Never Smoker    Smokeless tobacco: Never Used   Substance Use Topics    Alcohol use: No      Family History   Problem Relation Age of Onset    Diabetes Mother  Heart Disease Mother     Hypertension Mother     Heart Disease Father     Hypertension Father     Malignant Hyperthermia Neg Hx     Pseudocholinesterase Deficiency Neg Hx     Delayed Awakening Neg Hx     Post-op Nausea/Vomiting Neg Hx     Emergence Delirium Neg Hx     Other Neg Hx     Post-op Cognitive Dysfunction Neg Hx           Review of Systems    ROS negative except for what is mention in HPI      Objective:       Visit Vitals  /60   Pulse 66   Temp 98.4 °F (36.9 °C)   Resp 16   Ht 5' 5\" (1.651 m)   Wt 77 kg (169 lb 11.2 oz)   SpO2 94%   BMI 28.24 kg/m²       01/19 0701 - 01/19 1900  In: -   Out: 3000   01/17 1901 - 01/19 0700  In: 75 [P.O.:75]  Out: -       General:  Alert, cooperative, no distress, appears stated age. Neck: Supple, symmetrical, trachea midline, no JVD. Lungs:   Clear to auscultation bilaterally. Heart:  Regular rate and rhythm, S1, S2 normal, no murmur,  rub or gallop. Abdomen:   Soft, non-tender. No masses,  No organomegaly. Extremities: Extremities normal, atraumatic, no cyanosis or edema. CE AVG examines well   Access: Is open. Skin: Skin color, texture, turgor normal. No rashes or lesions. Neurologic: Grossly intact. No asterixis. Data Review:      CT chest  COMPARISON: November 22, 2021, November 12, 2021  INDICATION: 2cm right sided paratracheal mass  FINDINGS:  Lungs: Bilateral pleural effusions with subjacent atelectasis. Mediastinum: Limitations due to no contrast. Hilar fullness. Suggestion of  adenopathy with prominent right paratracheal node measuring 2.6 cm short axis. Visualized upper abdomen: Ascites. The visualized portions of the liver and  adrenal glands are normal. Left nephrectomy. Osseous structures: No suspicious lytic or blastic bony lesions. IMPRESSION  1. Bilateral pleural effusions with subjacent atelectasis. 2.  The previously reported right paratracheal mass/lymph node is slightly  Larger.       XR SPINE THORAC 3 V on 1/11/2022 6:00 PM  Clinical History: The Female patient is 80years old  presenting for pain. Comparison:  Lasix 5 films 11/22/2021  Findings:  3 views demonstrate no fracture or subluxation of the thoracic spine. Normal curvature and alignment is maintained. There are chronic degenerative  endplate changes with spondylosis. There is no significant disc or vertebral  body height loss. The cervicothoracic junction is unremarkable on lateral  imaging. The paraspinal soft tissues are normal.  IMPRESSION  1. Chronic degenerative changes with marginal osteophyte formation      Recent Results (from the past 24 hour(s))   GLUCOSE, POC    Collection Time: 01/18/22  4:54 PM   Result Value Ref Range    Glucose (POC) 144 (H) 65 - 100 mg/dL    Performed by [employer], POC    Collection Time: 01/18/22  9:30 PM   Result Value Ref Range    Glucose (POC) 133 (H) 65 - 100 mg/dL    Performed by [facility]    GLUCOSE, POC    Collection Time: 01/19/22  6:29 AM   Result Value Ref Range    Glucose (POC) 116 (H) 65 - 100 mg/dL    Performed by [facility]    GLUCOSE, POC    Collection Time: 01/19/22 11:08 AM   Result Value Ref Range    Glucose (POC) 112 (H) 65 - 100 mg/dL    Performed by [employer] [facility]            Principal Problem:    Mediastinal mass (1/10/2022)    Active Problems:    ESRD (end stage renal disease) ([address] (2/7/2013)      HTN (hypertension) (2/7/2013)      Abdominal mass (9/12/2017)      Overview: Of Left renal bed      Renal cell carcinoma ([address] (9/12/2017)      Severe back pain (11/12/2021)      Acute respiratory failure with hypoxia ([address] (11/12/2021)      Bilateral pleural effusion (1/12/2022)        Assessment/Plan:     1. ESRD- on HD, TIW, dialysis needed for biochemical and volume control, HD per normal MWF  Schedule, tolerated HD today, next HD Wednesday    2. Back pain- work up per primary, cytology pending and biopsy anticipated    3.  Right paratracheal mass/lymph node on CT -   hx of renal cell carcinoma, followed by Oncology  - Xray shows DJD    4. Bilateral pleural effusion- no signs of respiratory distress, s/p right thoracentesis with 300 ml fluid removed for cytology, rare cells suspicious for malignancy, managed by Pulmonary Disease    5. HTN-  No antihypertensives needed - on PO Midodrine as BP had been low, will monitor    6. Hyperphosphatemia- on Renvela    7.  Anemia - Hgb fairly stable, treat with JAMES, on Neohro-robert        Patricia Wu NP

## 2022-01-19 NOTE — PROGRESS NOTES
TRANSFER OUT -DIALYSIS    Hemodialysis treatment completed without complications. Patient alert and VS stable    Visit Vitals  /60   Pulse 66   Temp 98.4 °F (36.9 °C)   Resp 16   Ht 5' 5\" (1.651 m)   Wt 77 kg (169 lb 11.2 oz)   SpO2 94%   BMI 28.24 kg/m²          3.0 Kg removed. Needles x2 removed from access and manual pressure held until hemostasis complete and pressure dressing applied. Meds given: see mar. 0 units of RBCs given during dialysis. Patient to [phone] after dialysis.       01/19/22 1105   During Hemodialysis    Pulse (Heart Rate) 66   /60   MAP (Calculated) 81   Transducer Checks Dry   Fluid Removed (mL) 3600   NET Fluid Removed (mL) 3000 ml   Post-Dialysis   Rinseback Volume (ml) 300 ml   Duration of Treatment (hours) 4 hours   Patient Response to Treatment Fair   Patient Disposition Return to room   Condition of Dialyzer Filter Fair   Hemodialysis End Time 1115   $$ Dialysis Charges   $$ Method Hemodialysis

## 2022-01-19 NOTE — PROGRESS NOTES
Report received from [facility]. Patient resting quietly in bed. Respirations present, even and unlabored on 2L NC. Bed low and locked, safety measures in place. No signs of distress, no needs expressed by the patient. Call light within reach, encouraged patient to call with any needs. Will continue to monitor.

## 2022-01-19 NOTE — PROGRESS NOTES
Hospitalist Progress Note   Admit Date:  1/10/2022  6:06 AM   Name:  Rachel Fischer   Age:  80 y.o. Sex:  female  :  1950   MRN:  [id number]   [facility]  [facility]/    Presenting Complaint: Back Pain    Reason(s) for Admission: Mediastinal mass [J98.59]  Acute respiratory failure with hypoxia [facility]) [J96.01]     Hospital Course & Interval History:   Jillian Olmos is a 80 y.o. female with medical history of ESRD on hemodialysis, recurrent renal cell carcinoma s/p left nephrectomy  with recurrence and s/p course localized XRT to left renal bed who presented with severe sharp left-sided thoracic back pain. Patient recently admitted in November left flank pain and treated empirically with abx until acute infectious process ruled out. Pt had a CT thoracic spine which did not reveal acute fx, no aggressive bony lesions though b/l lung patchy infiltrative processes (pneumonitis / atypical PNA / pulm edema). Of significance pt did have a CT C/A to eval for potential aortic dissection given significant pain on 2021 which did reveal b/l small pleural effusions, enlarging right paratracheal lymph node (2.0 cm from previous 12mm) and small b/l hilar lymph nodes, no aortic dissection. She does follow with hem/onc routinely for underlying RCC hx. In the ER, pt had a repeat CT chest which revealed b/l pleural effusions and an increase in size of right paratracheal lymph node now 2.6cm in size as compared to Nov CT chest which measured lymph node 2.0cm. Pt admits to having missed her Monday HD session due to pain (she has been on HD x 15 years, M/W/F). She denies chest pain, palpitation, shortness of breath, nausea, vomiting, fevers or chills. Her VS / labs were stable; Hospitalist service consulted for inpatient admission for persistent back pain and enlarging mediastinal lymph node with acute hypoxic resp failure. Subjective (22): Pt seen at HD, resting in bed comfortably.  She is on 2 ltr via NC. She is alert/awake and reports feeling well overall. She continues to feel weak and lethargy though. Her constipation has resolved  No fever, nausea/vomiting, chest pain, palpitations. Review of Systems:  10 point ROS negative except what mentioned above.       Assessment & Plan:     Principal Problem:    Mediastinal mass (1/10/2022)  1/19:  - need to rule out malignant etiology as pt with hx of recurrent RCC  - s/p thoracentesis with cytology: rare cells suspicious for malignancy  - outpatient EBUS and PET scan  Currently on 1-2 ltr via NC  Oxygen Qualifier          Room air: SpO2 with O2 and liter flow   Resting SpO2  79%   86% on 1L    90% on 2L   Ambulating SpO2  73%  77% on 1L   90% on 2L          Active Problems:    Acute hypoxic respiratory failure  - O2 85% on RA noted in ER, improved to 96% on 2L  1/19:  S/p thoracentesis on 1/12/22  Oxygen Qualifier          Room air: SpO2 with O2 and liter flow   Resting SpO2  79%   86% on 1L    90% on 2L   Ambulating SpO2  73%  77% on 1L   90% on 2L            Thoracic / upper back pain  - resolved and optimally controlled  - x-ray thoracic spine 1/12/2022 negative for acute fx  - s/p thoracentesis on 1/12  - prn norco / morphine for pain control      B/l pleural effusions  - s/p thoracentesis right lung on 1/12; f/u cytology / cx  - cont HD for volume mgmt      ESRD (end stage renal disease)  - appreciate nephrology's ongoing assistance  - cont HD 3 x a week      HTN (hypertension)  - labile readings with hypotensive episodes; may be attributed to IV dilaudid  - closely monitor; RN instructed to HOLD PAIN RX if SBP <100mmhg  - monitor closely      Hx of recurrent renal cell carcinoma  - s/p left nephrectomy 2015 with recurrence and subsequent XRT to left renal bed  - mgmt per hem/onc      Anemia chronic disease  - hgb 8.2 this AM   - no gross bleeding; cont to closely monitor      Diabetes mellitus   - on januvia at home  - hgbA1c 5.3 on 11/13/2021  - sliding scale coverage as needed; low-carb diet       Dispo/Discharge Planning:    Pt needs SNF/STR on discharge, CM assisting with discharge. Referrals made to [facility]  and [facility] rehab. Plan of care discussed with pt, pt's daughter () and CM. Diet:  ADULT ORAL NUTRITION SUPPLEMENT Breakfast, Lunch, Dinner, HS Snack; Renal Supplement  ADULT DIET Regular; 3 carb choices (45 gm/meal); Low Fat/Low Chol/High Fiber/2 gm Na; Low Potassium (Less than 3000 mg/day); Low Phosphorus (Less than 1000 mg); breakfast: likes eggs, grits, sausage/martinez. no oatmeal or french toast. no coffee.   DVT PPx: hep sc  Code status: Full Code    Hospital Problems as of 1/19/2022 Date Reviewed: 7/27/2021          Codes Class Noted - Resolved POA    Bilateral pleural effusion ICD-10-CM: J90  ICD-9-CM: 511.9  1/12/2022 - Present Unknown        * (Principal) Mediastinal mass ICD-10-CM: J98.59  ICD-9-CM: 786.6  1/10/2022 - Present Unknown        Severe back pain ICD-10-CM: M54.9  ICD-9-CM: 724.5  11/12/2021 - Present Yes        Acute respiratory failure with hypoxia [facility]) ICD-10-CM: J96.01  ICD-9-CM: 518.81  11/12/2021 - Present Unknown        Abdominal mass ICD-10-CM: R19.00  ICD-9-CM: 789.30  9/12/2017 - Present Yes    Overview Signed 9/12/2017  9:01 PM by Fran Cabrera MD     Of Left renal bed             Renal cell carcinoma [facility]) ICD-10-CM: C64.9  ICD-9-CM: 189.0  9/12/2017 - Present Yes        ESRD (end stage renal disease) ([facility] [address] ICD-10-CM: N18.6  ICD-9-CM: 585.6  2/7/2013 - Present Yes        HTN (hypertension) ICD-10-CM: I10  ICD-9-CM: 401.9  2/7/2013 - Present Yes              Objective:     Patient Vitals for the past 24 hrs:   Temp Pulse Resp BP SpO2   01/19/22 0715 98.4 °F (36.9 °C) 68 16 129/64 94 %   01/19/22 0319 98.4 °F (36.9 °C) 63 16 (!) 116/58 94 %   01/18/22 2353 98.5 °F (36.9 °C) 61 18 (!) 119/53 96 %   01/18/22 1934 98.3 °F (36.8 °C) 71 18 111/64 95 %   01/18/22 1645 98.2 °F (36.8 °C) 64 17 (!) 130/56 98 % 01/18/22 1451 -- -- -- -- 93 %   01/18/22 1140 97.5 °F (36.4 °C) 73 18 (!) 102/55 97 %   01/18/22 0757 97.9 °F (36.6 °C) 68 18 129/84 99 %     Oxygen Therapy  O2 Sat (%): 94 % (01/19/22 0715)  Pulse via Oximetry: 85 beats per minute (01/12/22 1148)  O2 Device: Nasal cannula (01/19/22 0319)  O2 Flow Rate (L/min): 2 l/min (01/19/22 0319)    Estimated body mass index is 28.24 kg/m² as calculated from the following:    Height as of this encounter: 5' 5\" (1.651 m). Weight as of this encounter: 77 kg (169 lb 11.2 oz). Intake/Output Summary (Last 24 hours) at 1/19/2022 0720  Last data filed at 1/18/2022 2353  Gross per 24 hour   Intake 75 ml   Output --   Net 75 ml         Physical Exam:     Blood pressure 129/64, pulse 68, temperature 98.4 °F (36.9 °C), resp. rate 16, height 5' 5\" (1.651 m), weight 77 kg (169 lb 11.2 oz), SpO2 94 %. General:    Elderly, lethargic, on 2 ltr via NC, NAD  HEENT:           Head NCAT, PERRLA+, MMM  Neck:  No restricted ROM. Trachea midline   CV:   RRR. No m/r/g. No jugular venous distension. Lungs:   Diminished breath sounds in bases B/L, no wheezing or ronchi  Abdomen: Bowel sounds present. Soft, nontender, nondistended. Extremities: ROM normal in all 4 ext, no peripheral edema  Skin:     No rashes and normal coloration. Warm and dry. Neuro:  gcs 15, CN intact, moving all 4 ext spontaneously and following commands  Psych:  AOx3, Normal mood and affect.       I have reviewed ordered lab tests and independently visualized imaging below:    Recent Labs:  Recent Results (from the past 48 hour(s))   GLUCOSE, POC    Collection Time: 01/17/22  4:10 PM   Result Value Ref Range    Glucose (POC) 139 (H) 65 - 100 mg/dL    Performed by [address] [place] [address], POC    Collection Time: 01/17/22  9:42 PM   Result Value Ref Range    Glucose (POC) 137 (H) 65 - 100 mg/dL    Performed by George    GLUCOSE, POC    Collection Time: 01/18/22  8:02 AM   Result Value Ref Range Glucose (POC) 105 (H) 65 - 100 mg/dL    Performed by Rajan Real, POC    Collection Time: 01/18/22 11:34 AM   Result Value Ref Range    Glucose (POC) 136 (H) 65 - 100 mg/dL    Performed by Christine Shaw, POC    Collection Time: 01/18/22  4:54 PM   Result Value Ref Range    Glucose (POC) 144 (H) 65 - 100 mg/dL    Performed by Christine Shaw, POC    Collection Time: 01/18/22  9:30 PM   Result Value Ref Range    Glucose (POC) 133 (H) 65 - 100 mg/dL    Performed by [facility]    GLUCOSE, POC    Collection Time: 01/19/22  6:29 AM   Result Value Ref Range    Glucose (POC) 116 (H) 65 - 100 mg/dL    Performed by [facility]        All Micro Results     Procedure Component Value Units Date/Time    FUNGUS CULTURE AND SMEAR [[id number]] Collected: 01/12/22 1153    Order Status: Completed Specimen: Miscellaneous sample Updated: 01/17/22 1536     Source PLEURAL FLUID        Comment: RIGHT  1          Fungus stain Direct Inoculation     Fungus (Mycology) Culture Other source received     Comment: (NOTE)  Performed At: [facility] & [address], [address] [place] [address] [id number]  Ifeoma Quan MD BB:[id number]         CULTURE, BODY FLUID [facility] [[id number]] Collected: 01/12/22 1153    Order Status: Completed Specimen: Pleural Fluid Updated: 01/14/22 0924     Special Requests: NO SPECIAL REQUESTS        GRAM STAIN 0 TO 10 WBCS PER OIF      NO DEFINITE ORGANISM SEEN        Culture result: NO GROWTH 2 DAYS       AFB CULTURE + SMEAR W/RFLX ID FROM CULTURE [[id number]] Collected: 01/12/22 1153    Order Status: Completed Specimen: Miscellaneous sample Updated: 01/13/22 1735     Source PLEURAL FLUID        Comment: RIGHT  1          AFB Specimen processing Concentration     Acid Fast Smear Negative        Comment: (NOTE)  Performed At: [address], [address] [place] [address] [id number]  Ifeoma Quan MD MS:[id number]          Acid Fast Culture PENDING Other Studies:  No results found.     Current Meds:  Current Facility-Administered Medications   Medication Dose Route Frequency    senna-docusate (PERICOLACE) 8.6-50 mg per tablet 2 Tablet  2 Tablet Oral DAILY    midodrine (PROAMATINE) tablet 5 mg  5 mg Oral TID WITH MEALS    polyethylene glycol (MIRALAX) packet 17 g  17 g Oral BID    magnesium hydroxide (MILK OF MAGNESIA) 400 mg/5 mL oral suspension 30 mL  30 mL Oral DAILY PRN    bisacodyL (DULCOLAX) suppository 10 mg  10 mg Rectal DAILY PRN    B complex-vitamin C-folic acid (NEPHRO-ROBB) 0.8 mg tab  1 Tablet Oral DAILY    heparin (porcine) injection 5,000 Units  5,000 Units SubCUTAneous Q12H    epoetin mary lou-epbx (RETACRIT) injection 40,000 Units  40,000 Units SubCUTAneous Q7D    morphine injection 4 mg  4 mg IntraVENous Q6H PRN    [Held by provider] amLODIPine (NORVASC) tablet 10 mg  10 mg Oral QHS    gabapentin (NEURONTIN) capsule 100 mg  100 mg Oral TID    [Held by provider] lisinopriL (PRINIVIL, ZESTRIL) tablet 40 mg  40 mg Oral QHS    methocarbamoL (ROBAXIN) tablet 1,500 mg  1,500 mg Oral QID    [Held by provider] minoxidiL (LONITEN) tablet 10 mg  10 mg Oral BID    pantoprazole (PROTONIX) tablet 40 mg  40 mg Oral ACB    sevelamer carbonate (RENVELA) tab 800 mg  800 mg Oral TID WITH MEALS    sodium chloride (NS) flush 5-40 mL  5-40 mL IntraVENous Q8H    sodium chloride (NS) flush 5-40 mL  5-40 mL IntraVENous PRN    acetaminophen (TYLENOL) tablet 650 mg  650 mg Oral Q6H PRN    Or    acetaminophen (TYLENOL) suppository 650 mg  650 mg Rectal Q6H PRN    ondansetron (ZOFRAN ODT) tablet 4 mg  4 mg Oral Q8H PRN    Or    ondansetron (ZOFRAN) injection 4 mg  4 mg IntraVENous Q6H PRN    HYDROcodone-acetaminophen (NORCO) 7.5-325 mg per tablet 1 Tablet  1 Tablet Oral Q6H PRN    heparin (porcine) 1,000 unit/mL injection 5,000 Units  5,000 Units Hemodialysis DIALYSIS PRN    insulin lispro (HUMALOG) injection   SubCUTAneous AC&HS Signed:  Rosanna Vale MD    Part of this note may have been written by using a voice dictation software. The note has been proof read but may still contain some grammatical/other typographical errors.

## 2022-01-20 ENCOUNTER — HOME HEALTH ADMISSION (OUTPATIENT)
Dept: HOME HEALTH SERVICES | Facility: HOME HEALTH | Age: 72
End: 2022-01-20
Payer: MEDICARE

## 2022-01-20 VITALS
HEIGHT: 65 IN | SYSTOLIC BLOOD PRESSURE: 134 MMHG | TEMPERATURE: 98.2 F | RESPIRATION RATE: 18 BRPM | HEART RATE: 64 BPM | DIASTOLIC BLOOD PRESSURE: 59 MMHG | WEIGHT: 176.6 LBS | OXYGEN SATURATION: 98 % | BODY MASS INDEX: 29.42 KG/M2

## 2022-01-20 LAB
GLUCOSE BLD STRIP.AUTO-MCNC: 115 MG/DL (ref 65–100)
GLUCOSE BLD STRIP.AUTO-MCNC: 144 MG/DL (ref 65–100)
GLUCOSE BLD STRIP.AUTO-MCNC: 149 MG/DL (ref 65–100)
SERVICE CMNT-IMP: ABNORMAL

## 2022-01-20 PROCEDURE — 74011250637 HC RX REV CODE- 250/637: Performed by: FAMILY MEDICINE

## 2022-01-20 PROCEDURE — 74011250637 HC RX REV CODE- 250/637: Performed by: HOSPITALIST

## 2022-01-20 PROCEDURE — 74011250636 HC RX REV CODE- 250/636: Performed by: INTERNAL MEDICINE

## 2022-01-20 PROCEDURE — 97110 THERAPEUTIC EXERCISES: CPT

## 2022-01-20 PROCEDURE — 74011000250 HC RX REV CODE- 250: Performed by: FAMILY MEDICINE

## 2022-01-20 PROCEDURE — 97530 THERAPEUTIC ACTIVITIES: CPT

## 2022-01-20 PROCEDURE — 74011250637 HC RX REV CODE- 250/637: Performed by: INTERNAL MEDICINE

## 2022-01-20 PROCEDURE — 82962 GLUCOSE BLOOD TEST: CPT

## 2022-01-20 RX ORDER — MIDODRINE HYDROCHLORIDE 5 MG/1
2.5 TABLET ORAL
Qty: 45 TABLET | Refills: 0 | Status: ON HOLD | OUTPATIENT
Start: 2022-01-20 | End: 2022-02-03

## 2022-01-20 RX ADMIN — GABAPENTIN 100 MG: 100 CAPSULE ORAL at 08:59

## 2022-01-20 RX ADMIN — SEVELAMER CARBONATE 800 MG: 800 TABLET, FILM COATED ORAL at 18:03

## 2022-01-20 RX ADMIN — POLYETHYLENE GLYCOL 3350 17 G: 17 POWDER, FOR SOLUTION ORAL at 18:03

## 2022-01-20 RX ADMIN — Medication 1 TABLET: at 08:59

## 2022-01-20 RX ADMIN — MIDODRINE HYDROCHLORIDE 5 MG: 5 TABLET ORAL at 18:03

## 2022-01-20 RX ADMIN — SEVELAMER CARBONATE 800 MG: 800 TABLET, FILM COATED ORAL at 08:59

## 2022-01-20 RX ADMIN — METHOCARBAMOL TABLETS 1500 MG: 750 TABLET, COATED ORAL at 18:03

## 2022-01-20 RX ADMIN — MIDODRINE HYDROCHLORIDE 5 MG: 5 TABLET ORAL at 08:59

## 2022-01-20 RX ADMIN — MIDODRINE HYDROCHLORIDE 5 MG: 5 TABLET ORAL at 12:02

## 2022-01-20 RX ADMIN — PANTOPRAZOLE SODIUM 40 MG: 40 TABLET, DELAYED RELEASE ORAL at 05:55

## 2022-01-20 RX ADMIN — POLYETHYLENE GLYCOL 3350 17 G: 17 POWDER, FOR SOLUTION ORAL at 08:58

## 2022-01-20 RX ADMIN — METHOCARBAMOL TABLETS 1500 MG: 750 TABLET, COATED ORAL at 12:02

## 2022-01-20 RX ADMIN — HEPARIN SODIUM 5000 UNITS: 5000 INJECTION INTRAVENOUS; SUBCUTANEOUS at 18:04

## 2022-01-20 RX ADMIN — SODIUM CHLORIDE, PRESERVATIVE FREE 10 ML: 5 INJECTION INTRAVENOUS at 05:55

## 2022-01-20 RX ADMIN — SODIUM CHLORIDE, PRESERVATIVE FREE 10 ML: 5 INJECTION INTRAVENOUS at 14:00

## 2022-01-20 RX ADMIN — SENNOSIDES AND DOCUSATE SODIUM 2 TABLET: 8.6; 5 TABLET ORAL at 08:59

## 2022-01-20 RX ADMIN — METHOCARBAMOL TABLETS 1500 MG: 750 TABLET, COATED ORAL at 08:59

## 2022-01-20 RX ADMIN — HEPARIN SODIUM 5000 UNITS: 5000 INJECTION INTRAVENOUS; SUBCUTANEOUS at 05:55

## 2022-01-20 RX ADMIN — GABAPENTIN 100 MG: 100 CAPSULE ORAL at 18:03

## 2022-01-20 RX ADMIN — HYDROCODONE BITARTRATE AND ACETAMINOPHEN 1 TABLET: 7.5; 325 TABLET ORAL at 12:06

## 2022-01-20 RX ADMIN — SEVELAMER CARBONATE 800 MG: 800 TABLET, FILM COATED ORAL at 12:02

## 2022-01-20 NOTE — ROUTINE PROCESS
Bedside and Verbal shift change report given to myself (oncoming nurse) by Blu Steel (offgoing nurse). Report included the following information SBAR, Kardex, Intake/Output, MAR, Recent Results and Med Rec Status.

## 2022-01-20 NOTE — ROUTINE PROCESS
Discharge instructions reviewed with patient. Prescriptions given for patient and med info sheets provided for all new medications. Opportunity for questions provided. Patient voiced understanding of all discharge instructions.

## 2022-01-20 NOTE — ROUTINE PROCESS
Bedside and Verbal shift change report given to myself (oncoming nurse) by Moncho Galvan (offgoing nurse). Report included the following information SBAR, Kardex, MAR and Recent Results.

## 2022-01-20 NOTE — PROGRESS NOTES
RENAL H&P/CONSULT    Subjective:     Patient is a 79 y/o AA female, followed by our office for ESRD, on HD MWF at [facility]. She was due for HD today. Presented to the ER this morning with CC of sharp left-sided back pain that started last night. She was admitted last November for similar complaint, work-up was essentially negative. CT scan today showed bilateral pleural effusions with subjacent atelectaiss and previously reported right paratracheal mass/lymph node slightly larger. She has a hx of renal cell carcinoma, s/p left nephrectomy, followed by Oncology. Her electrolytes are stable. She c/o mild dyspnea, denies CP, no n/v, no HA or dizziness. She denies fever or chills. PMH also consist of DM II, Hyperphosphatemia, anemia, and HTN. At time of assessment, she is in no respiratory distress, on 2L NC. She is not on O2 at home. She is admitted for observation. There is no urgent need for HD. Plan for dialysis in the morning. 1/11/22 - see HD Note  1/12/22 - alert - wants to know what's going on with her back - discussed plans for HD tomorrow if she is still here and to call outpatient dialysis for a spot if she is discharged - no N/V, no CP, no significant dyspnea   1/13/22- alert/oriented, no signs of distress, on 2L NC, denies dyspnea or CP, no n/v, no HA or dizziness. Thoracentesis done yesterday at the request of Oncology to obtain fluid for cytology.  Tolerated HD today with no complications  2/24/15 - alert and communicating well  -still stressed, but not as tearful as previously - we discussed plans for rehab placement and she had a BM after suppository yesterday - no N/V, dyspnea is controlled with nasal cannula O2 - plans for dialysis discussed tomorrow  1/15/22 - see HD Note  1/16/22 - she feels better - had breakfast with delay due ton blizzard - no N/V, no CP, no constipation, no edema - breathing better   1/17/22 - late note - alert and communicating well - for HD later - no N/V, no CP, no dyspnea -working on rehab placement - cytology is pending  1/18/22 - progressing well - tolerated HD well yesterday - no dyspnea, no CP, no N/V, taking supplements well - no cytology yet - awaiting rehab placement   1/19/22- alert/oriented, lying in bed, no signs of distress, on 2L NC, c/o dyspnea with exertion, denies CP, no n/v, no HA or dizziness. Waiting on rehab placement. Tolerated HD today. No new labs today. 1/20/22 - she does not wish placement in [facility] for rehab and has discussed home health and home rehab with discharge planner - she is able to ambulate some - planning for home O2 and using a walker with seat - plans for HD tomorrow discussed     Past Medical History:   Diagnosis Date    Arthritis     AVF (arteriovenous fistula) ([facility] [address] 12/20/2016 12/6/16 ([facility]) Right AVF revision and thrombectomy    Cancer ([facility] [address] 2015    L kidney    Chronic kidney disease     HD in M-W-F- at [facility] dialysis    Degenerative joint disease     Diabetes ([facility] [address]     checks QD, normal 120-130, hyposymptoms at 80    ESRD (end stage renal disease) [facility]) Nov 2006    ESRD.  MWF dialysis     Hypertension     Obesity     Transient ischemic attack 08/29/2015    no residual      Past Surgical History:   Procedure Laterality Date    COLONOSCOPY N/A 11/8/2018    COLONOSCOPY  BMI 36 performed by Jose Gupta MD at [facility] ENDOSCOPY    HX GI  06/01/2002    colon resection resulting in temporary colostomy reversal    HX GI  08/06/2018    exploratory laparotomy    HX GYN      stephan    HX OTHER SURGICAL      dialysis fistula, several permcaths    HX UROLOGICAL Left July 2015    nephrectomy    HX VASCULAR ACCESS      IR INSERT TUNL CVC W/O PORT OVER 5 YR  11/19/2021    IR PLC CATH AV SHUNT IN W PTA SI VENOUS  5/30/2019    IR PLC CATH AV SHUNT IN W PTA SI VENOUS RT  2/28/2019    IR PLC CATH AV SHUNT IN W PTA SI VENOUS RT  9/3/2019    IR PLC CATH AV SHUNT IN W PTA SI VENOUS RT  12/5/2019    IR PLC CATH AV SHUNT IN W PTA SI VENOUS RT  3/10/2020    AZ BREAST SURGERY PROCEDURE UNLISTED Right     cyst removed    VASCULAR SURGERY PROCEDURE UNLIST Right     AV graft      Prior to Admission medications    Medication Sig Start Date End Date Taking? Authorizing Provider   lidocaine 4 % patch 1 Patch by TransDERmal route every twelve (12) hours every twelve (12) hours. 11/15/21  Yes Racheal Toney MD   minoxidiL (LONITEN) 10 mg tablet Take  by mouth two (2) times a day. Yes Provider, Historical   omeprazole (PRILOSEC) 40 mg capsule TAKE ONE CAPSULE BY MOUTH EVERY DAY 6/10/21  Yes Provider, Historical   lidocaine 5 % topical cream Apply  to affected area two (2) times daily as needed for Pain. 9/1/17  Yes FRANCISCO Barakat   amLODIPine (NORVASC) 10 mg tablet Take 10 mg by mouth nightly. Yes Provider, Historical   sevelamer carbonate (RENVELA) 800 mg tab tab Take 800 mg by mouth three (3) times daily (with meals). 5 tablets with meals/ 2 with snacks  Sometimes only eats 2 meals/d   Yes Provider, Historical   lisinopril (PRINIVIL, ZESTRIL) 40 mg tablet Take 40 mg by mouth nightly. Indications: HYPERTENSION 6/8/15  Yes Provider, Historical   sitaGLIPtin (JANUVIA) 100 mg tablet Take 50 mg by mouth every morning. Indications: TYPE 2 DIABETES MELLITUS   Yes Provider, Historical   furosemide (Lasix) 80 mg tablet Take 80 mg by mouth two (2) times a day. Patient not taking: Reported on 1/10/2022    Provider, Historical   methocarbamoL (Robaxin-750) 750 mg tablet Take 2 Tablets by mouth four (4) times daily. Patient not taking: Reported on 1/10/2022 11/9/21   Mickey Daniel MD   gabapentin (NEURONTIN) 100 mg capsule TAKE 1 CAPSULE BY MOUTH THREE TIMES DAILY FOR PAIN  Patient not taking: Reported on 1/10/2022 6/10/21   Provider, Historical   VIT C/VIT E/LUTEIN/MIN/OMEGA-3 (OCUVITE PO) Take  by mouth nightly.   Patient not taking: Reported on 1/10/2022    Provider, Historical     Allergies   Allergen Reactions    Pcn [Penicillins] Rash    Vancomycin Rash      Social History     Tobacco Use    Smoking status: Never Smoker    Smokeless tobacco: Never Used   Substance Use Topics    Alcohol use: No      Family History   Problem Relation Age of Onset    Diabetes Mother     Heart Disease Mother     Hypertension Mother     Heart Disease Father     Hypertension Father     Malignant Hyperthermia Neg Hx     Pseudocholinesterase Deficiency Neg Hx     Delayed Awakening Neg Hx     Post-op Nausea/Vomiting Neg Hx     Emergence Delirium Neg Hx     Other Neg Hx     Post-op Cognitive Dysfunction Neg Hx           Review of Systems    ROS negative except for what is mention in HPI      Objective:       Visit Vitals  BP (!) 142/61 (BP 1 Location: Right leg, BP Patient Position: At rest;Lying)   Pulse 73   Temp 98 °F (36.7 °C)   Resp 18   Ht 5' 5\" (1.651 m)   Wt 80.1 kg (176 lb 9.6 oz)   SpO2 (!) 88%   BMI 29.39 kg/m²       01/20 0701 - 01/20 1900  In: 120 [P.O.:120]  Out: -   01/18 1901 - 01/20 0700  In: 75 [P.O.:75]  Out: 3000       General:  Alert, cooperative, no distress, appears stated age. Neck: Supple, symmetrical, trachea midline, no JVD. Lungs:   Clear to auscultation bilaterally. Heart:  Regular rate and rhythm, S1, S2 normal, no murmur,  rub or gallop. Abdomen:   Soft, non-tender. No masses,  No organomegaly. Extremities: Extremities normal, atraumatic, no cyanosis or edema. CE AVG examines well   Access: Is open. Skin: Skin color, texture, turgor normal. No rashes or lesions. Neurologic: Grossly intact. No asterixis. Data Review:      CT chest  COMPARISON: November 22, 2021, November 12, 2021  INDICATION: 2cm right sided paratracheal mass  FINDINGS:  Lungs: Bilateral pleural effusions with subjacent atelectasis. Mediastinum: Limitations due to no contrast. Hilar fullness. Suggestion of  adenopathy with prominent right paratracheal node measuring 2.6 cm short axis. Visualized upper abdomen: Ascites.  The visualized portions of the liver and  adrenal glands are normal. Left nephrectomy. Osseous structures: No suspicious lytic or blastic bony lesions. IMPRESSION  1. Bilateral pleural effusions with subjacent atelectasis. 2.  The previously reported right paratracheal mass/lymph node is slightly  Larger. XR SPINE [facility] 3 V on 1/11/2022 6:00 PM  Clinical History: The Female patient is 80years old  presenting for pain. Comparison:  Lasix 5 films 11/22/2021  Findings:  3 views demonstrate no fracture or subluxation of the thoracic spine. Normal curvature and alignment is maintained. There are chronic degenerative  endplate changes with spondylosis. There is no significant disc or vertebral  body height loss. The cervicothoracic junction is unremarkable on lateral  imaging. The paraspinal soft tissues are normal.  IMPRESSION  1.  Chronic degenerative changes with marginal osteophyte formation      Recent Results (from the past 24 hour(s))   GLUCOSE, POC    Collection Time: 01/19/22  4:43 PM   Result Value Ref Range    Glucose (POC) 136 (H) 65 - 100 mg/dL    Performed by Sherwin    GLUCOSE, POC    Collection Time: 01/19/22  9:11 PM   Result Value Ref Range    Glucose (POC) 117 (H) 65 - 100 mg/dL    Performed by Mary Grace    GLUCOSE, POC    Collection Time: 01/20/22  8:57 AM   Result Value Ref Range    Glucose (POC) 144 (H) 65 - 100 mg/dL    Performed by Onesimo    GLUCOSE, POC    Collection Time: 01/20/22 11:47 AM   Result Value Ref Range    Glucose (POC) 149 (H) 65 - 100 mg/dL    Performed by Onesimo            Principal Problem:    Mediastinal mass (1/10/2022)    Active Problems:    ESRD (end stage renal disease) ([address] (2/7/2013)      HTN (hypertension) (2/7/2013)      Abdominal mass (9/12/2017)      Overview: Of Left renal bed      Renal cell carcinoma ([address] (9/12/2017)      Severe back pain (11/12/2021)      Acute respiratory failure with hypoxia ([address] (11/12/2021)      Bilateral pleural effusion (1/12/2022)        Assessment/Plan:     1. ESRD- on HD, TIW, dialysis needed for biochemical and volume control, HD per normal MWF schedule - plan next HD tomorrow    2. Back pain- work up per primary, cytology pending and biopsy anticipated    3. Right paratracheal mass/lymph node on CT -   hx of renal cell carcinoma, followed by Oncology  - Xray shows DJD - for outpatient biopsy in the future    4. Bilateral pleural effusion- no signs of respiratory distress, s/p right thoracentesis with 300 ml fluid removed for cytology, rare cells suspicious for malignancy, managed by Pulmonary Disease    5. HTN-  No antihypertensives needed - on PO Midodrine as BP had been low, will monitor    6. Hyperphosphatemia- on Renvela    7.  Anemia - Hgb fairly stable, treat with JAMES, on Neohro-robert        Pedro Clifton MD

## 2022-01-20 NOTE — ROUTINE PROCESS
Bedside and Verbal shift change report given to Cole Chavez RN (oncoming nurse) by myself (offgoing nurse). Report included the following information SBAR, Kardex, MAR and Recent Results.

## 2022-01-20 NOTE — PROGRESS NOTES
Pt on Room Air and at rest SAT-94%. Pt ambulated on Room Air SAT-87%. Pt placed on 1L ambulating 89%. Pt on 2L ambulating SAT-93%.   Pt back to her room on Room Air SAT-92%

## 2022-01-20 NOTE — PROGRESS NOTES
Occupational Therapy Note:    OT treatment attempted this PM. Pt refused stating she is calling family/van transportation in [facility] for d/c. Will plan to follow up another time/day if patient has not d/c.      Thank you,    Bertin Wei OTR/L

## 2022-01-20 NOTE — DISCHARGE SUMMARY
Hospitalist Discharge Summary   Admit Date:  1/10/2022  6:06 AM   DC Note date: 2022  Name:  Andie Ro   Age:  80 y.o. Sex:  female  :  1950   MRN:  [id number]   Room:  [place] [address]  PCP:  Maddie Ro MD    Presenting Complaint: Back Pain    Initial Admission Diagnosis: Mediastinal mass [J98.59]  Acute respiratory failure with hypoxia ([facility] [J96.01]     Problem List for this Hospitalization:  Hospital Problems as of 2022 Date Reviewed: 2021          Codes Class Noted - Resolved POA    Bilateral pleural effusion ICD-10-CM: J90  ICD-9-CM: 511.9  2022 - Present Unknown        * (Principal) Mediastinal mass ICD-10-CM: J98.59  ICD-9-CM: 786.6  1/10/2022 - Present Unknown        Severe back pain ICD-10-CM: M54.9  ICD-9-CM: 724.5  2021 - Present Yes        Acute respiratory failure with hypoxia ([facility] ICD-10-CM: J96.01  ICD-9-CM: 518.81  2021 - Present Unknown        Abdominal mass ICD-10-CM: R19.00  ICD-9-CM: 789.30  2017 - Present Yes    Overview Signed 2017  9:01 PM by Tomi Baltazar MD     Of Left renal bed             Renal cell carcinoma ([facility] ICD-10-CM: C64.9  ICD-9-CM: 189.0  2017 - Present Yes        ESRD (end stage renal disease) ([facility] ICD-10-CM: N18.6  ICD-9-CM: 585.6  2013 - Present Yes        HTN (hypertension) ICD-10-CM: I10  ICD-9-CM: 401.9  2013 - Present Yes              Hospital Course: Farhat Rao a 80 y.o. female with medical history of ESRD on hemodialysis, recurrent renal cell carcinoma s/p left nephrectomy  with recurrence and s/p course localized XRT to left renal bed who presented with severe sharp left-sided thoracic back pain.  Patient recently admitted in November left flank pain and treated empirically with abx until acute infectious process ruled out.  Pt had a CT thoracic spine which did not reveal acute fx, no aggressive bony lesions though b/l lung patchy infiltrative processes (pneumonitis / atypical PNA / pulm edema). Of significance pt did have a CT C/A to eval for potential aortic dissection given significant pain on 11/12/2021 which did reveal b/l small pleural effusions, enlarging right paratracheal lymph node (2.0 cm from previous 12mm) and small b/l hilar lymph nodes, no aortic dissection. She does follow with hem/onc routinely for underlying RCC hx.       In the ER, pt had a repeat CT chest which revealed b/l pleural effusions and an increase in size of right paratracheal lymph node now 2.6cm in size as compared to Nov CT chest which measured lymph node 2.0cm. Pt admits to having missed her Monday HD session due to pain (she has been on HD x 15 years, M/W/F). She denies chest pain, palpitation, shortness of breath, nausea, vomiting, fevers or chills. Her VS / labs were stable; Hospitalist service consulted for inpatient admission for persistent back pain and enlarging mediastinal lymph node with acute hypoxic resp failure. Patient was seen by Pulmonlogy. Underwent thoracentesis with cytology reporting rare cells suspicious for malignancy. She has been instructed to followup with pulmonology for EBUS/biopsy for definite diagnosis. Pulmonology to arrange this study. She maintained HD for volume management m/w/f. Pt's chronic medical conditions of anemia, DM, have been stable. Her blood pressures were borderline low and antihypertensives were held during hospital course and patient started on midodrine with good response. Will continue and request nephrology to follow-up on BP as meds may need to be resumed in the future. Patient is participating with PT and is eager to discharge home with HHC/PT. She is medically stable to discharge. Disposition: Home Health Care Svc  Diet: ADULT ORAL NUTRITION SUPPLEMENT Breakfast, Lunch, Dinner, HS Snack; Renal Supplement  ADULT DIET Regular; 3 carb choices (45 gm/meal); Low Fat/Low Chol/High Fiber/2 gm Na;  Low Potassium (Less than 3000 mg/day); Low Phosphorus (Less than 1000 mg); breakfast: likes eggs, grits, sausage/martinez. no oatmeal or Cypriot toast. no coffee. Code Status: Full Code    Follow Up Orders: Follow-up Appointments   Procedures    FOLLOW UP VISIT Appointment in: One Week Please schedule follow up appt with Dr. Christian Casillas in 1 week upon discharge #[phone]     Please schedule follow up appt with Dr. Christian Casillas in 1 week upon discharge #[phone]     Standing Status:   Standing     Number of Occurrences:   1     Order Specific Question:   Appointment in     Answer: One Week    FOLLOW UP VISIT Appointment in: One Week Pulmonology for EBUS/biospy in 1-2 weeks, HD as per routine, PCP in 1 week     Pulmonology for EBUS/biospy in 1-2 weeks, HD as per routine, PCP in 1 week     Standing Status:   Standing     Number of Occurrences:   1     Standing Expiration Date:   1/21/2022     Order Specific Question:   Appointment in     Answer: One Week       Follow-up Information     Follow up With Specialties Details Why [address]  On 1/28/2022 arrive at 7:30 - nothing to eat or drink after midnight besides water. Can take morning medicines. [address] [facility] [address]  [address] [facility] [id number] [phone]      On 1/31/2022 10:25 at old ER entrance [address] [facility]       On 2/3/2022 Main admitting office. 11:30 arrive 1pm procedure. Nothing to eat or drink after midnight. Adult with her to drive her home [address] [place] [address]   [phone]     Cassia Arnold MD Nephrology   [facility] [address] [phone]      [address]   Call with questions or concerns regarding your home oxygen or rolling walker.  [address]  If you do not receive a call from the home health office within 48 hours of discharge please call their office. [address] [facility] [address] [phone]  [phone]          Follow up labs/diagnostics (ultimately defer to outpatient provider):      Time spent in patient discharge and coordination 35 minutes. Plan was discussed with patient, RN, CM  All questions answered. Patient was stable at time of discharge. Instructions given to call a physician or return if any concerns. Discharge Info:   Current Discharge Medication List      START taking these medications    Details   midodrine (PROAMATINE) 5 mg tablet Take 0.5 Tablets by mouth three (3) times daily (with meals) for 30 days. Qty: 45 Tablet, Refills: 0  Start date: 1/20/2022, End date: 2/19/2022         CONTINUE these medications which have NOT CHANGED    Details   lidocaine 4 % patch 1 Patch by TransDERmal route every twelve (12) hours every twelve (12) hours. Qty: 8 Patch, Refills: 0      omeprazole (PRILOSEC) 40 mg capsule TAKE ONE CAPSULE BY MOUTH EVERY DAY      lidocaine 5 % topical cream Apply  to affected area two (2) times daily as needed for Pain. Qty: 1 Tube, Refills: 0      sevelamer carbonate (RENVELA) 800 mg tab tab Take 800 mg by mouth three (3) times daily (with meals). 5 tablets with meals/ 2 with snacks  Sometimes only eats 2 meals/d      sitaGLIPtin (JANUVIA) 100 mg tablet Take 50 mg by mouth every morning. Indications: TYPE 2 DIABETES MELLITUS      methocarbamoL (Robaxin-750) 750 mg tablet Take 2 Tablets by mouth four (4) times daily. Qty: 40 Tablet, Refills: 0      gabapentin (NEURONTIN) 100 mg capsule TAKE 1 CAPSULE BY MOUTH THREE TIMES DAILY FOR PAIN      VIT C/VIT E/LUTEIN/MIN/OMEGA-3 (OCUVITE PO) Take  by mouth nightly.          STOP taking these medications       minoxidiL (LONITEN) 10 mg tablet Comments:   Reason for Stopping:         amLODIPine (NORVASC) 10 mg tablet Comments:   Reason for Stopping:         lisinopril (PRINIVIL, ZESTRIL) 40 mg tablet Comments:   Reason for Stopping: furosemide (Lasix) 80 mg tablet Comments:   Reason for Stopping:               Procedures done this admission:  Procedure(s):  ULTRASOUND  THORACENTESIS    Consults this admission:  IP CONSULT TO NEPHROLOGY  IP CONSULT TO ONCOLOGY  IP CONSULT TO PULMONOLOGY  IP CONSULT TO PULMONOLOGY    Echocardiogram/EKG results:  No results found for this or any previous visit. EKG Results     Procedure 720 Value Units Date/Time    EKG (Check If Upper Abdominal Pain or symptoms of SOB, Diaphoresis, or Tachycardia) [[id number]] Collected: 01/10/22 0551    Order Status: Completed Updated: 01/10/22 1224     Ventricular Rate 84 BPM      Atrial Rate 84 BPM      P-R Interval 180 ms      QRS Duration 70 ms      Q-T Interval 350 ms      QTC Calculation (Bezet) 413 ms      Calculated P Axis 73 degrees      Calculated R Axis 119 degrees      Calculated T Axis 106 degrees      Diagnosis --     Normal sinus rhythm  Right axis deviation  Nonspecific ST and T wave abnormality  Abnormal ECG  When compared with ECG of 22-NOV-2021 14:36,  Nonspecific T wave abnormality now evident in Inferior leads  Confirmed by Jermaine Landa MD (), MERT ([id number]) on 1/10/2022 12:23:59 PM            Diagnostic Imaging/Tests:   XR SPINE THORAC 3 V    Result Date: 1/11/2022  Exam: XR SPINE THORAC 3 V on 1/11/2022 6:00 PM Clinical History: The Female patient is 80years old  presenting for pain. Comparison:  Lasix 5 films 11/22/2021 Findings:  3 views demonstrate no fracture or subluxation of the thoracic spine. Normal curvature and alignment is maintained. There are chronic degenerative endplate changes with spondylosis. There is no significant disc or vertebral body height loss. The cervicothoracic junction is unremarkable on lateral imaging.  The paraspinal soft tissues are normal.     1. Chronic degenerative changes with marginal osteophyte formation CPT code(s) 26205     CT CHEST WO CONT    Result Date: 1/10/2022  EXAMINATION: CT CHEST WO CONT 1/10/2022 7:27 AM ACCESSION NUMBER: [id number] COMPARISON: November 22, 2021, November 12, 2021 INDICATION: 2cm right sided paratracheal mass TECHNIQUE: Multiple contiguous axial CT images of the chest were obtained from the lung apices to the lung bases without intravenous contrast. Coronal reformats are provided. Radiation dose reduction techniques were used for this study. Our CT scanners use one or all of the following: Automated exposure control, adjustment of the mA and/or kV according to patient size, iterative reconstruction. FINDINGS: Lungs: Bilateral pleural effusions with subjacent atelectasis. Mediastinum: Limitations due to no contrast. Hilar fullness. Suggestion of adenopathy with prominent right paratracheal node measuring 2.6 cm short axis. Visualized upper abdomen: Ascites. The visualized portions of the liver and adrenal glands are normal. Left nephrectomy. Osseous structures: No suspicious lytic or blastic bony lesions. 1. Bilateral pleural effusions with subjacent atelectasis. 2.  The previously reported right paratracheal mass/lymph node is slightly larger.        All Micro Results     Procedure Component Value Units Date/Time    COVID-19 RAPID TEST [[id number]]     Order Status: [facility] CULTURE AND SMEAR [[id number]] Collected: 01/12/22 1153    Order Status: Completed Specimen: Miscellaneous sample Updated: 01/17/22 1536     Source PLEURAL FLUID        Comment: RIGHT  1          Fungus stain Direct Inoculation     Fungus (Mycology) Culture Other source received     Comment: (NOTE)  Performed At: [address] [id number]  Myrna Valadez MD [id number]         CULTURE, BODY FLUID Demetri Paul [[id number]] Collected: 01/12/22 1153    Order Status: Completed Specimen: Pleural Fluid Updated: 01/14/22 0924     Special Requests: NO SPECIAL REQUESTS        GRAM STAIN 0 TO 10 WBCS PER OIF      NO DEFINITE ORGANISM SEEN        Culture result: NO GROWTH 2 DAYS       AFB CULTURE + SMEAR W/RFLX ID FROM CULTURE [[id number]] Collected: 01/12/22 1153    Order Status: Completed Specimen: Miscellaneous sample Updated: 01/13/22 3337     Source PLEURAL FLUID        Comment: RIGHT  1          AFB Specimen processing Concentration     Acid Fast Smear Negative        Comment: (NOTE)  Performed At: [facility] & [address] [id number]  Renate Fleming MD [id number]          Acid Fast Culture PENDING          Labs: Results:       BMP, Mg, Phos No results for input(s): NA, K, CL, CO2, AGAP, BUN, CREA, CA, GLU, MG, PHOS in the last 72 hours. CBC No results for input(s): WBC, RBC, HGB, HCT, PLT, GRANS, LYMPH, EOS, MONOS, BASOS, IG, ANEU, ABL, TIFFANIE, ABM, ABB, AIG, HGBEXT, HCTEXT, PLTEXT in the last 72 hours. LFT No results for input(s): ALT, TBIL, AP, TP, ALB, GLOB, AGRAT in the last 72 hours.     No lab exists for component: SGOT, GPT   Cardiac Testing Lab Results   Component Value Date/Time    BNP 80 02/12/2011 04:39 PM    Troponin-I <0.05 03/26/2012 05:50 PM    Troponin-I <0.05 02/12/2011 04:39 PM    Troponin-I, Qt. <0.02 (L) 11/11/2019 01:11 AM    Troponin-I, Qt. <0.02 (L) 11/10/2019 10:58 PM    Troponin-I, Qt. <0.02 (L) 04/09/2019 06:31 PM      Coagulation Tests Lab Results   Component Value Date/Time    Prothrombin time 10.2 11/16/2015 12:00 AM    Prothrombin time 10.5 08/29/2015 04:30 PM    Prothrombin time 10.0 01/29/2013 12:20 PM    INR 0.9 11/16/2015 12:00 AM    INR 1.0 08/29/2015 04:30 PM    INR 1.0 01/29/2013 12:20 PM    aPTT 22.2 (L) 11/16/2015 12:00 AM    aPTT 27.2 01/29/2013 12:20 PM    aPTT 25.7 03/26/2012 05:25 PM      A1c Lab Results   Component Value Date/Time    Hemoglobin A1c 5.3 11/13/2021 01:40 AM    Hemoglobin A1c 4.7 (L) 08/07/2018 09:22 AM    Hemoglobin A1c 4.7 (L) 09/12/2017 04:35 PM      Lipid Panel Lab Results   Component Value Date/Time    Cholesterol, total 104 08/30/2015 05:45 AM    HDL Cholesterol 43 08/30/2015 05:45 AM    LDL, calculated 46.4 08/30/2015 05:45 AM    VLDL, calculated 14.6 08/30/2015 05:45 AM    Triglyceride 73 08/30/2015 05:45 AM    CHOL/HDL Ratio 2.4 08/30/2015 05:45 AM      Thyroid Panel Lab Results   Component Value Date/Time    TSH 2.341 03/26/2012 05:25 PM    T4, Free 1.1 03/26/2012 05:25 PM        Most Recent UA Lab Results   Component Value Date/Time    Color YELLOW 09/14/2017 05:54 AM    Appearance HAZY 09/14/2017 05:54 AM    pH (UA) 6.0 09/14/2017 05:54 AM    Protein 100 (A) 09/14/2017 05:54 AM    Glucose NEGATIVE  09/14/2017 05:54 AM    Ketone TRACE (A) 09/14/2017 05:54 AM    Bilirubin SMALL (A) 09/14/2017 05:54 AM    Blood NEGATIVE  09/14/2017 05:54 AM    Urobilinogen 0.2 09/14/2017 05:54 AM    Nitrites NEGATIVE  09/14/2017 05:54 AM    Leukocyte Esterase NEGATIVE  09/14/2017 05:54 AM    WBC 5-10 09/10/2018 07:51 AM    RBC 5-10 09/10/2018 07:51 AM    Epithelial cells 20-50 09/10/2018 07:51 AM    Bacteria 3+ (H) 09/10/2018 07:51 AM    Casts HYALINE 09/10/2018 07:51 AM    Crystals, urine 0 09/10/2018 07:51 AM    Mucus 0 09/10/2018 07:51 AM          All Labs from Last 24 Hrs:  Recent Results (from the past 24 hour(s))   GLUCOSE, POC    Collection Time: 01/19/22  4:43 PM   Result Value Ref Range    Glucose (POC) 136 (H) 65 - 100 mg/dL    Performed by Sherwin    GLUCOSE, POC    Collection Time: 01/19/22  9:11 PM   Result Value Ref Range    Glucose (POC) 117 (H) 65 - 100 mg/dL    Performed by Mary Grace    GLUCOSE, POC    Collection Time: 01/20/22  8:57 AM   Result Value Ref Range    Glucose (POC) 144 (H) 65 - 100 mg/dL    Performed by Onesimo    GLUCOSE, POC    Collection Time: 01/20/22 11:47 AM   Result Value Ref Range    Glucose (POC) 149 (H) 65 - 100 mg/dL    Performed by Onesimo    GLUCOSE, POC    Collection Time: 01/20/22  3:26 PM   Result Value Ref Range    Glucose (POC) 115 (H) 65 - 100 mg/dL    Performed by Onesimo        Current Med List in Hospital:   Current Facility-Administered Medications   Medication Dose Route Frequency    senna-docusate (PERICOLACE) 8.6-50 mg per tablet 2 Tablet  2 Tablet Oral DAILY    midodrine (PROAMATINE) tablet 5 mg  5 mg Oral TID WITH MEALS    polyethylene glycol (MIRALAX) packet 17 g  17 g Oral BID    magnesium hydroxide (MILK OF MAGNESIA) 400 mg/5 mL oral suspension 30 mL  30 mL Oral DAILY PRN    bisacodyL (DULCOLAX) suppository 10 mg  10 mg Rectal DAILY PRN    B complex-vitamin C-folic acid (NEPHRO-ROBB) 0.8 mg tab  1 Tablet Oral DAILY    heparin (porcine) injection 5,000 Units  5,000 Units SubCUTAneous Q12H    epoetin mary lou-epbx (RETACRIT) injection 40,000 Units  40,000 Units SubCUTAneous Q7D    morphine injection 4 mg  4 mg IntraVENous Q6H PRN    [Held by provider] amLODIPine (NORVASC) tablet 10 mg  10 mg Oral QHS    gabapentin (NEURONTIN) capsule 100 mg  100 mg Oral TID    [Held by provider] lisinopriL (PRINIVIL, ZESTRIL) tablet 40 mg  40 mg Oral QHS    methocarbamoL (ROBAXIN) tablet 1,500 mg  1,500 mg Oral QID    [Held by provider] minoxidiL (LONITEN) tablet 10 mg  10 mg Oral BID    pantoprazole (PROTONIX) tablet 40 mg  40 mg Oral ACB    sevelamer carbonate (RENVELA) tab 800 mg  800 mg Oral TID WITH MEALS    sodium chloride (NS) flush 5-40 mL  5-40 mL IntraVENous Q8H    sodium chloride (NS) flush 5-40 mL  5-40 mL IntraVENous PRN    acetaminophen (TYLENOL) tablet 650 mg  650 mg Oral Q6H PRN    Or    acetaminophen (TYLENOL) suppository 650 mg  650 mg Rectal Q6H PRN    ondansetron (ZOFRAN ODT) tablet 4 mg  4 mg Oral Q8H PRN    Or    ondansetron (ZOFRAN) injection 4 mg  4 mg IntraVENous Q6H PRN    HYDROcodone-acetaminophen (NORCO) 7.5-325 mg per tablet 1 Tablet  1 Tablet Oral Q6H PRN    heparin (porcine) 1,000 unit/mL injection 5,000 Units  5,000 Units Hemodialysis DIALYSIS PRN    insulin lispro (HUMALOG) injection   SubCUTAneous AC&HS       Allergies   Allergen Reactions    Pcn [Penicillins] Rash    Vancomycin Rash     Immunization History   Administered Date(s) Administered    COVID-19, Pfizer Purple top, DILUTE for use, 12+ yrs, 30mcg/0.3mL dose 02/25/2021, 03/25/2021    TB Skin Test (PPD) Intradermal 02/08/2013, 08/03/2018    TD Vaccine 07/01/2008    TDAP Vaccine 03/30/2012       Recent Vital Data:  Patient Vitals for the past 24 hrs:   Temp Pulse Resp BP SpO2   01/20/22 1556 98.2 °F (36.8 °C) 64 18 (!) 134/59 98 %   01/20/22 1241 98 °F (36.7 °C) 73 18 (!) 142/61 (!) 88 %   01/20/22 1202 -- 74 -- (!) 148/66 --   01/20/22 0923 -- 78 18 -- 94 %   01/20/22 0729 98 °F (36.7 °C) 64 16 (!) 143/62 100 %   01/20/22 0438 98.2 °F (36.8 °C) 66 16 134/62 99 %   01/19/22 2318 98 °F (36.7 °C) 66 20 (!) 96/45 98 %   01/19/22 1945 98.3 °F (36.8 °C) 96 16 (!) 114/52 99 %   01/19/22 1637 98.6 °F (37 °C) 65 18 138/60 98 %     Oxygen Therapy  O2 Sat (%): 98 % (01/20/22 1556)  Pulse via Oximetry: 85 beats per minute (01/12/22 1148)  O2 Device: None (Room air) (01/20/22 8246)  Skin Assessment: Clean, dry, & intact (01/19/22 1130)  Skin Protection for O2 Device: N/A (01/19/22 1130)  O2 Flow Rate (L/min): 2 l/min (01/20/22 2283)    Estimated body mass index is 29.39 kg/m² as calculated from the following:    Height as of this encounter: 5' 5\" (1.651 m). Weight as of this encounter: 80.1 kg (176 lb 9.6 oz). Intake/Output Summary (Last 24 hours) at 1/20/2022 1605  Last data filed at 1/20/2022 4971  Gross per 24 hour   Intake 120 ml   Output --   Net 120 ml         Physical Exam:    General:    Well nourished. No overt distress  Head:  Normocephalic, atraumatic  Eyes:  Sclerae appear normal.  Pupils equally round. HENT:  Nares appear normal, no drainage. Moist mucous membranes  Neck:  No restricted ROM. Trachea midline  CV:   RRR. No m/r/g. No JVD  Lungs:   CTAB. No wheezing, rhonchi, or rales. Slightly diminished at bases  Abdomen:   Soft, nontender, nondistended. Extremities: Warm and dry. No cyanosis or clubbing.   No edema.    Skin:     No rashes. Normal coloration  Neuro:  CN II-XII grossly intact. Psych:  Normal mood and affect. Signed:  Sincere Youngblood DO    Part of this note may have been written by using a voice dictation software. The note has been proof read but may still contain some grammatical/other typographical errors.

## 2022-01-20 NOTE — PROGRESS NOTES
Pt to d/c home today. Her daughter will provide transportation home. Pt has declined STR therefore she is being d/c home with [facility]: RN, PT, OT. A rolling walker and home O2 (2L NC) were ordered through [facility]  A portable O2 tank and new rolling walker were provided to pt prior to d/c.  [facility] referral has been submitted. Pt wanted CM to notify [facility] \"not to schedule for M,W,F\" as those are her dialysis days. CM made a note in the [facility] referral.    No other supportive care needs identified. Milestones met. CM updated Myriam with [facility] that the referral for [facility] is no longer needed. Care Management Interventions  PCP Verified by CM: Yes (Dr Latanya Garcia last visit was friday)  Mode of Transport at Discharge: Other (see comment) (Family)  Transition of Care Consult (CM Consult): Discharge Planning,Home Health,DME/Supply [address]: Yes  MyChart Signup: No (Home O2 and rolling walker)  Discharge Durable Medical Equipment: Yes  Physical Therapy Consult: Yes  Occupational Therapy Consult: Yes  Speech Therapy Consult: No  Support Systems: Child(joan) (daughter Anna Macdonald [phone])  Confirm Follow Up Transport: Family  The Plan for Transition of Care is Related to the Following Treatment Goals : Pt requires home health to return to functional baseline in the community. The Patient and/or Patient Representative was Provided with a Choice of Provider and Agrees with the Discharge Plan?: Yes  Name of the Patient Representative Who was Provided with a Choice of Provider and Agrees with the Discharge Plan:  Kecia Fischer  Freedom of Choice List was Provided with Basic Dialogue that Supports the Patient's Individualized Plan of Care/Goals, Treatment Preferences and Shares the Quality Data Associated with the Providers?: Yes  The [employer] Information Provided?: No  Discharge Location  Patient Expects to be Discharged to[de-identified] Home with home health  Read-Only, Retired: Discharge Placement: Unable to determine at this time

## 2022-01-20 NOTE — PROGRESS NOTES
ACUTE PHYSICAL THERAPY GOALS:  (Developed with and agreed upon by patient and/or caregiver.)  (1.) Bertin Briceño  will move from supine to sit and sit to supine  with INDEPENDENCE within 7 treatment day(s). (2.) Kecia Fischer will transfer from bed to chair and chair to bed with MODIFIED INDEPENDENCE using the least restrictive device within 7 treatment day(s). (3.) Kecia Fischer will ambulate with MODIFIED INDEPENDENCE for 150 feet with the least restrictive device within 7 treatment day(s). (4.) Kecia Fischer will perform standing static and dynamic balance activities x 20 minutes with SUPERVISION to improve safety within 7 treatment day(s). (5.) Kecia Fischer will perform bilateral lower extremity exercises x 20 min for HEP with INDEPENDENCE to improve strength, endurance, and functional mobility within 7 treatment day(s). PHYSICAL THERAPY: Daily Note and PM Treatment Day # 4    Kecia Fischer is a 80 y.o. female   PRIMARY DIAGNOSIS: Mediastinal mass  Mediastinal mass [J98.59]  Acute respiratory failure with hypoxia (HCC) [J96.01]  Procedure(s) (LRB):  ULTRASOUND (Bilateral)  THORACENTESIS (N/A)  8 Days Post-Op    ASSESSMENT:     REHAB RECOMMENDATIONS: CURRENT LEVEL OF FUNCTION:  (Most Recently Demonstrated)   Recommendation to date pending progress:  [facility] [place] [address] Therapy  Equipment:    Rolling Walker Bed Mobility:   Standby Assistance  Sit to Stand:   CGA-Saira  Transfers:   Contact Guard Assistance  Gait/Mobility:   Standby Assistance     ASSESSMENT:  Ms. Maura Galvan was received supine in bed, agreeable to therapy, on 2L O2 via NC. Pt made great progress in mobility today. She progressed to only needing SBA for bed mobility. She tolerated ambulating 200 ft with RW and SBA. LE therex. Transfer to chair with CGA. [facility] and will need [facility]W.  Discussed this with pt and she is in agreement that she is moving better and that her family will be able to provide the assistance she needs. Good progress, will continue to follow.       SUBJECTIVE:   Ms. Stan Allen states, \"I'm doing better\"    SOCIAL HISTORY/ LIVING ENVIRONMENT: see eval  Support Systems: Child(joan) (daughter Shelly Antunez [phone])  OBJECTIVE:     PAIN: VITAL SIGNS: LINES/DRAINS:   Pre Treatment: Pain Screen  Pain Scale 1: Numeric (0 - 10)  Pain Intensity 1: 0  Post Treatment: 0   none  O2 Device: None (Room air)     MOBILITY: I Mod I S SBA CGA Min Mod Max Total  NT x2 Comments:   Bed Mobility    Rolling [] [] [] [x] [] [] [] [] [] [] []    Supine to Sit [] [] [] [x] [] [] [] [] [] [] []    Scooting [] [] [] [x] [] [] [] [] [] [] []    Sit to Supine [] [] [] [] [] [] [] [] [] [x] []    Transfers    Sit to Stand [] [] [] [] [x] [x] [] [] [] [] []    Bed to Chair [] [] [] [] [x] [] [] [] [] [] []    Stand to Sit [] [] [] [] [x] [x] [] [] [] [] []    I=Independent, Mod I=Modified Independent, S=Supervision, SBA=Standby Assistance, CGA=Contact Guard Assistance,   Min=Minimal Assistance, Mod=Moderate Assistance, Max=Maximal Assistance, Total=Total Assistance, NT=Not Tested    BALANCE: Good Fair+ Fair Fair- Poor NT Comments   Sitting Static [x] [] [] [] [] []    Sitting Dynamic [] [x] [] [] [] []              Standing Static [] [x] [] [] [] []    Standing Dynamic [] [] [x] [] [] []      GAIT: I Mod I S SBA CGA Min Mod Max Total  NT x2 Comments:   Level of Assistance [] [] [] [x] [] [] [] [] [] [] []    Distance 200 ft    DME Rolling Walker    Gait Quality Decreased gait speed, forward flexed posture, increased work of breathing     Weightbearing  Status N/A     I=Independent, Mod I=Modified Independent, S=Supervision, SBA=Standby Assistance, CGA=Contact Guard Assistance,   Min=Minimal Assistance, Mod=Moderate Assistance, Max=Maximal Assistance, Total=Total Assistance, NT=Not Tested    PLAN:   FREQUENCY/DURATION: PT Plan of Care: 3 times/week for duration of hospital stay or until stated goals are met, whichever comes first.  TREATMENT:     TREATMENT:   ($$ Therapeutic Activity: 23-37 mins  $$ Therapeutic Exercises: 8-22 mins    )  Therapeutic Activity (30 Minutes): Therapeutic activity included Rolling, Supine to Sit, Scooting, Transfer Training, Ambulation on level ground, Sitting balance  and Standing balance to improve functional Mobility, Strength and Activity tolerance. Therapeutic Exercise (10 Minutes): Therapeutic exercises noted below to improve functional activity tolerance, strength and mobility.      TREATMENT GRID:   Date:  1/19/22 Date:  1/20/22 Date:     Activity/Exercise Parameters Parameters Parameters   Seated Marching  2 x 10  3 x 10    LAQs 10 reps 2 x 10                                        AFTER TREATMENT POSITION/PRECAUTIONS:  Chair, Needs within reach and RN notified    INTERDISCIPLINARY COLLABORATION:  RN/PCT and respiraotry therapy, vocemail left for case management    TOTAL TREATMENT DURATION:  PT Patient Time In/Time Out  Time In: 3477  Time Out: Leonarda Olvera 20, PT

## 2022-01-20 NOTE — PROGRESS NOTES
CM left messages for Banks with [facility] and Bernardo Glynn at [facility] who assists with referrals to [facility] inquiring about the status of the referrals sent for this pt on 1-18-22. There has been no communication from either facility since the referrals were sent. CM will await responses from the liaisons. Pt will require a [employer] pre-cert. CM received a call back from Bernardo Glynn. Deyvi states they never received the faxed referral and Myriam requested that this CM fax it again directly to her. Referral faxed to Bernardo Glynn at [facility] for review.

## 2022-01-21 NOTE — PROGRESS NOTES
CM opened chart to print the d/c summary as it was not available by Louise  81. yesterday and [facility] 91 requires this for the home O2 and rolling walker that were ordered for pt yesterday.

## 2022-01-25 ENCOUNTER — HOSPITAL ENCOUNTER (OUTPATIENT)
Dept: CT IMAGING | Age: 72
Discharge: HOME | End: 2022-01-25
Attending: INTERNAL MEDICINE
Payer: MEDICARE

## 2022-01-25 ENCOUNTER — HOME CARE VISIT (OUTPATIENT)
Dept: SCHEDULING | Facility: HOME HEALTH | Age: 72
End: 2022-01-25
Payer: MEDICARE

## 2022-01-25 VITALS
OXYGEN SATURATION: 99 % | DIASTOLIC BLOOD PRESSURE: 50 MMHG | SYSTOLIC BLOOD PRESSURE: 90 MMHG | TEMPERATURE: 97.7 F | HEART RATE: 90 BPM | RESPIRATION RATE: 17 BRPM

## 2022-01-25 DIAGNOSIS — C64.2 MALIGNANT NEOPLASM OF LEFT KIDNEY, EXCEPT RENAL PELVIS (HCC): ICD-10-CM

## 2022-01-25 DIAGNOSIS — C64.9 RENAL CELL CARCINOMA, UNSPECIFIED LATERALITY (HCC): ICD-10-CM

## 2022-01-25 PROCEDURE — G0299 HHS/HOSPICE OF RN EA 15 MIN: HCPCS

## 2022-01-25 PROCEDURE — 400013 HH SOC

## 2022-01-25 PROCEDURE — 3331090002 HH PPS REVENUE DEBIT

## 2022-01-25 PROCEDURE — 74011000636 HC RX REV CODE- 636: Performed by: INTERNAL MEDICINE

## 2022-01-25 PROCEDURE — 400018 HH-NO PAY CLAIM PROCEDURE

## 2022-01-25 PROCEDURE — 3331090001 HH PPS REVENUE CREDIT

## 2022-01-25 PROCEDURE — 74176 CT ABD & PELVIS W/O CONTRAST: CPT

## 2022-01-25 RX ADMIN — DIATRIZOATE MEGLUMINE AND DIATRIZOATE SODIUM 15 ML: 660; 100 LIQUID ORAL; RECTAL at 10:49

## 2022-01-26 ENCOUNTER — HOME CARE VISIT (OUTPATIENT)
Dept: SCHEDULING | Facility: HOME HEALTH | Age: 72
End: 2022-01-26
Payer: MEDICARE

## 2022-01-26 VITALS
HEART RATE: 84 BPM | DIASTOLIC BLOOD PRESSURE: 40 MMHG | TEMPERATURE: 98 F | SYSTOLIC BLOOD PRESSURE: 140 MMHG | OXYGEN SATURATION: 95 % | RESPIRATION RATE: 18 BRPM

## 2022-01-26 PROCEDURE — 3331090002 HH PPS REVENUE DEBIT

## 2022-01-26 PROCEDURE — 3331090001 HH PPS REVENUE CREDIT

## 2022-01-26 PROCEDURE — G0151 HHCP-SERV OF PT,EA 15 MIN: HCPCS

## 2022-01-27 ENCOUNTER — HOME CARE VISIT (OUTPATIENT)
Dept: SCHEDULING | Facility: HOME HEALTH | Age: 72
End: 2022-01-27
Payer: MEDICARE

## 2022-01-27 ENCOUNTER — HOME CARE VISIT (OUTPATIENT)
Dept: HOME HEALTH SERVICES | Facility: HOME HEALTH | Age: 72
End: 2022-01-27
Payer: MEDICARE

## 2022-01-27 VITALS
DIASTOLIC BLOOD PRESSURE: 62 MMHG | SYSTOLIC BLOOD PRESSURE: 101 MMHG | RESPIRATION RATE: 17 BRPM | TEMPERATURE: 97.6 F | OXYGEN SATURATION: 97 % | HEART RATE: 96 BPM

## 2022-01-27 PROCEDURE — 3331090001 HH PPS REVENUE CREDIT

## 2022-01-27 PROCEDURE — 3331090002 HH PPS REVENUE DEBIT

## 2022-01-27 PROCEDURE — G0299 HHS/HOSPICE OF RN EA 15 MIN: HCPCS

## 2022-01-28 ENCOUNTER — HOSPITAL ENCOUNTER (OUTPATIENT)
Dept: LAB | Age: 72
Discharge: HOME | End: 2022-01-28
Payer: MEDICARE

## 2022-01-28 DIAGNOSIS — C64.2 MALIGNANT NEOPLASM OF LEFT KIDNEY, EXCEPT RENAL PELVIS (HCC): ICD-10-CM

## 2022-01-28 LAB
ALBUMIN SERPL-MCNC: 4 G/DL (ref 3.2–4.6)
ALBUMIN/GLOB SERPL: 1 {RATIO} (ref 1.2–3.5)
ALP SERPL-CCNC: 87 U/L (ref 50–136)
ALT SERPL-CCNC: 20 U/L (ref 12–65)
ANION GAP SERPL CALC-SCNC: 4 MMOL/L (ref 7–16)
AST SERPL-CCNC: 24 U/L (ref 15–37)
BASOPHILS # BLD: 0.1 K/UL (ref 0–0.2)
BASOPHILS NFR BLD: 1 % (ref 0–2)
BILIRUB SERPL-MCNC: 1.3 MG/DL (ref 0.2–1.1)
BUN SERPL-MCNC: 14 MG/DL (ref 8–23)
CALCIUM SERPL-MCNC: 9.4 MG/DL (ref 8.3–10.4)
CHLORIDE SERPL-SCNC: 98 MMOL/L (ref 98–107)
CO2 SERPL-SCNC: 34 MMOL/L (ref 21–32)
CREAT SERPL-MCNC: 3 MG/DL (ref 0.6–1)
DIFFERENTIAL METHOD BLD: ABNORMAL
EOSINOPHIL # BLD: 0.2 K/UL (ref 0–0.8)
EOSINOPHIL NFR BLD: 2 % (ref 0.5–7.8)
ERYTHROCYTE [DISTWIDTH] IN BLOOD BY AUTOMATED COUNT: 17.5 % (ref 11.9–14.6)
GLOBULIN SER CALC-MCNC: 4 G/DL (ref 2.3–3.5)
GLUCOSE SERPL-MCNC: 140 MG/DL (ref 65–100)
HCT VFR BLD AUTO: 29.8 % (ref 35.8–46.3)
HGB BLD-MCNC: 10.5 G/DL (ref 11.7–15.4)
IMM GRANULOCYTES # BLD AUTO: 0 K/UL (ref 0–0.5)
IMM GRANULOCYTES NFR BLD AUTO: 0 % (ref 0–5)
LYMPHOCYTES # BLD: 0.9 K/UL (ref 0.5–4.6)
LYMPHOCYTES NFR BLD: 11 % (ref 13–44)
MCH RBC QN AUTO: 33.5 PG (ref 26.1–32.9)
MCHC RBC AUTO-ENTMCNC: 35.2 G/DL (ref 31.4–35)
MCV RBC AUTO: 95.2 FL (ref 79.6–97.8)
MONOCYTES # BLD: 0.5 K/UL (ref 0.1–1.3)
MONOCYTES NFR BLD: 7 % (ref 4–12)
NEUTS SEG # BLD: 6.2 K/UL (ref 1.7–8.2)
NEUTS SEG NFR BLD: 78 % (ref 43–78)
NRBC # BLD: 0 K/UL (ref 0–0.2)
PLATELET # BLD AUTO: 145 K/UL (ref 150–450)
PMV BLD AUTO: 10.2 FL (ref 9.4–12.3)
POTASSIUM SERPL-SCNC: 3.4 MMOL/L (ref 3.5–5.1)
PROT SERPL-MCNC: 8 G/DL (ref 6.3–8.2)
RBC # BLD AUTO: 3.13 M/UL (ref 4.05–5.2)
SODIUM SERPL-SCNC: 136 MMOL/L (ref 136–145)
WBC # BLD AUTO: 7.9 K/UL (ref 4.3–11.1)

## 2022-01-28 PROCEDURE — 3331090002 HH PPS REVENUE DEBIT

## 2022-01-28 PROCEDURE — 3331090001 HH PPS REVENUE CREDIT

## 2022-01-28 PROCEDURE — 80053 COMPREHEN METABOLIC PANEL: CPT

## 2022-01-28 PROCEDURE — 85025 COMPLETE CBC W/AUTO DIFF WBC: CPT

## 2022-01-28 PROCEDURE — 36415 COLL VENOUS BLD VENIPUNCTURE: CPT

## 2022-01-28 NOTE — H&P (VIEW-ONLY)
[facility]: Office Visit Established Patient    Chief Complaint:    Chief Complaint   Patient presents with    Follow-up         History of Present Illness:  Ms. Nagi Farris is a 80 y.o. female who returns today for management of recurrent renal cell carcinoma. She was admitted for right flank pain in September 2017, this was presumed to be due to nephrolithiasis. Although she has ESRD, she does still make a small amount of urine, which stopped while she had pain but now has restarted. In the meantime, her CT abdomen and pelvis showed a mass in the left nephrectomy bed, she had a Pratima grade 2 RCC resected in 2015. Unfortunately, CT guided biopsy confirmed recurrent clear cell carcinoma. She saw Dr. Brittaney Hui in the hospital who recommended follow-up with one of the medical oncologists. This would be considered stage IV, but I would consider this oligometastatic disease with no evidence of disease outside of the nephrectomy bed. We discussed observation since she is asymptomatic but she insisted upon treatment. However, she did not wish for systemic therapy - pazopanib would be my preferred option in that case. Since this appears to be a single site of recurrence, it is reasonable to consider local therapy - she is not a surgical candidate, but SBRT or cryoablation may be options. She met with radiation oncology, who agreed that local therapy was reasonable. She completed SBRT on 11/10/17, a 5 fraction regimen. CT afterward showed response with no new lesions, so observation was continued. Here for hospital follow-up. She was inpatient from 1/10 to 1/20, admitted for severe left thoracic back pain, CT T-spine showed no acute findings, but she did have an enlarging paratracheal node to 2.6 cm on CT chest.  She also had bilateral pleural effusions, cytology of the effusion on the right showed rare cells suspicious for malignancy.   She has been scheduled for PET/CT on 2/2, and EBUS on 2/3.  She continues to have pain although now it is mostly in her right hip, controlled with Norco which she has from before. No other new pain. She remains on HD without issue, she is more fatigued as she has not had a chance to lay down after dialysis today. She denies any fevers, chills, or infectious symptoms. Review of Systems:  Constitutional: Negative. HENT: Negative. Eyes: Negative. Respiratory: Negative. Cardiovascular: Negative. Gastrointestinal: Negative. Genitourinary: Negative. Musculoskeletal: Positive for back pain and hip pain. Skin: Negative. Neurological: Negative. Endo/Heme/Allergies: Negative. Psychiatric/Behavioral: Negative. All other systems reviewed and are negative. Allergies   Allergen Reactions    Pcn [Penicillins] Rash    Vancomycin Rash     Past Medical History:   Diagnosis Date    Arthritis     AVF (arteriovenous fistula) ([address] 12/20/2016 [facility]/6/16 ([facility]) Right AVF revision and thrombectomy    Cancer ([address] 2015    L kidney    Chronic kidney disease     HD in M-W-F- at [facility]    Degenerative joint disease     Diabetes ([address]     checks QD, normal 120-130, hyposymptoms at 80    ESRD (end stage renal disease) [facility]) Nov 2006    ESRD.  MWF dialysis     Hypertension     Obesity     Transient ischemic attack 08/29/2015    no residual     Past Surgical History:   Procedure Laterality Date    COLONOSCOPY N/A 11/8/2018    COLONOSCOPY  BMI 36 performed by Ilia Mckeon MD at [facility] ENDOSCOPY    HX GI  06/01/2002    colon resection resulting in temporary colostomy reversal    HX GI  08/06/2018    exploratory laparotomy    HX GYN      stephan    HX OTHER SURGICAL      dialysis fistula, several permcaths    HX UROLOGICAL Left July 2015    nephrectomy    HX VASCULAR ACCESS      IR INSERT TUNL CVC W/O PORT OVER 5 YR  11/19/2021    IR PLC CATH AV SHUNT IN W PTA SI VENOUS  5/30/2019    IR PLC CATH AV SHUNT IN W PTA SI VENOUS RT 2/28/2019    IR PLC CATH AV SHUNT IN W PTA SI VENOUS RT  9/3/2019    IR PLC CATH AV SHUNT IN W PTA SI VENOUS RT  12/5/2019    IR PLC CATH AV SHUNT IN W PTA SI VENOUS RT  3/10/2020    [place] BREAST SURGERY PROCEDURE UNLISTED Right     cyst removed    VASCULAR SURGERY PROCEDURE UNLIST Right     AV graft     Family History   Problem Relation Age of Onset    Diabetes Mother     Heart Disease Mother     Hypertension Mother     Heart Disease Father     Hypertension Father     Malignant Hyperthermia Neg Hx     Pseudocholinesterase Deficiency Neg Hx     Delayed Awakening Neg Hx     Post-op Nausea/Vomiting Neg Hx     Emergence Delirium Neg Hx     Other Neg Hx     Post-op Cognitive Dysfunction Neg Hx      Social History     Socioeconomic History    Marital status:      Spouse name: Not on file    Number of children: Not on file    Years of education: Not on file    Highest education level: Not on file   Occupational History    Not on file   Tobacco Use    Smoking status: Never Smoker    Smokeless tobacco: Never Used   Substance and Sexual Activity    Alcohol use: No    Drug use: No    Sexual activity: Not Currently   Other Topics Concern    Not on file   Social History Narrative    Not on file     Social Determinants of Health     Financial Resource Strain:     Difficulty of Paying Living Expenses: Not on file   Food Insecurity:     Worried About Running Out of Food in the Last Year: Not on file    Rick of Food in the Last Year: Not on file   Transportation Needs:     Lack of Transportation (Medical): Not on file    Lack of Transportation (Non-Medical):  Not on file   Physical Activity:     Days of Exercise per Week: Not on file    Minutes of Exercise per Session: Not on file   Stress:     Feeling of Stress : Not on file   Social Connections:     Frequency of Communication with Friends and Family: Not on file    Frequency of Social Gatherings with Friends and Family: Not on file  Attends Jew Services: Not on file    Active Member of Clubs or Organizations: Not on file    Attends Club or Organization Meetings: Not on file    Marital Status: Not on file   Intimate Partner Violence:     Fear of Current or Ex-Partner: Not on file    Emotionally Abused: Not on file    Physically Abused: Not on file    Sexually Abused: Not on file   Housing Stability:     Unable to Pay for Housing in the Last Year: Not on file    Number of [place] in the Last Year: Not on file    Unstable Housing in the Last Year: Not on file     Current Outpatient Medications   Medication Sig Dispense Refill    acetaminophen (TYLENOL) 500 mg tablet Take 1,000 mg by mouth every six (6) hours as needed for Pain.  omeprazole (PRILOSEC) 40 mg capsule TAKE ONE CAPSULE BY MOUTH EVERY DAY      sevelamer carbonate (RENVELA) 800 mg tab tab Take 800 mg by mouth three (3) times daily (with meals). 5 tablets with meals/ 2 with snacks  Sometimes only eats 2 meals/d      sitaGLIPtin (JANUVIA) 100 mg tablet Take 50 mg by mouth every morning. Indications: TYPE 2 DIABETES MELLITUS      methyl salicylate-menthol (ICY HOT) 30-10 % topical cream Apply 1 Each to affected area as needed for Pain. (Patient not taking: Reported on 1/28/2022)      aspirin delayed-release 81 mg tablet Take 81 mg by mouth daily. (Patient not taking: Reported on 1/28/2022)      lisinopriL (PRINIVIL, ZESTRIL) 40 mg tablet Take 40 mg by mouth daily. (Patient not taking: Reported on 1/28/2022)      amLODIPine (NORVASC) 10 mg tablet Take 10 mg by mouth daily. (Patient not taking: Reported on 1/28/2022)      minoxidiL (LONITEN) 10 mg tablet Take 10 mg by mouth two (2) times a day. (Patient not taking: Reported on 1/28/2022)      furosemide (LASIX) 80 mg tablet Take 80 mg by mouth two (2) times a day. (Patient not taking: Reported on 1/28/2022)      cinacalcet (Sensipar) 60 mg tab Take 60 mg by mouth every evening.  (Patient not taking: Reported on 1/28/2022)      midodrine (PROAMATINE) 5 mg tablet Take 0.5 Tablets by mouth three (3) times daily (with meals) for 30 days. (Patient not taking: Reported on 1/28/2022) 45 Tablet 0    lidocaine 4 % patch 1 Patch by TransDERmal route every twelve (12) hours every twelve (12) hours. (Patient not taking: Reported on 1/28/2022) 8 Patch 0    methocarbamoL (Robaxin-750) 750 mg tablet Take 2 Tablets by mouth four (4) times daily. (Patient not taking: Reported on 1/10/2022) 40 Tablet 0    gabapentin (NEURONTIN) 100 mg capsule TAKE 1 CAPSULE BY MOUTH THREE TIMES DAILY FOR PAIN (Patient not taking: Reported on 1/10/2022)      lidocaine 5 % topical cream Apply  to affected area two (2) times daily as needed for Pain. (Patient not taking: Reported on 1/28/2022) 1 Tube 0    VIT C/VIT E/LUTEIN/MIN/OMEGA-3 (OCUVITE PO) Take  by mouth nightly. (Patient not taking: Reported on 1/10/2022)         OBJECTIVE:  Visit Vitals  BP (!) 144/83   Pulse 95   Temp 98.1 °F (36.7 °C) (Oral)   Resp 18   Wt 177 lb 12.8 oz (80.6 kg)   SpO2 91%   BMI 29.59 kg/m²       Physical Exam:  Constitutional: Well developed, well nourished female in no acute distress, sitting comfortably in the wheelchair. HEENT: Normocephalic and atraumatic. Oropharynx is clear, mucous membranes are moist.  Sclerae anicteric. Neck supple without JVD. No thyromegaly present. Skin Warm and dry. No bruising and no rash noted. No erythema. No pallor. Respiratory Lungs are clear to auscultation bilaterally without wheezes, rales or rhonchi, normal air exchange without accessory muscle use. CVS Normal rate, regular rhythm and normal S1 and S2. No murmurs, gallops, or rubs. Abdomen Soft, nontender and nondistended, normoactive bowel sounds. No palpable mass. No hepatosplenomegaly. Neuro Grossly nonfocal with no obvious sensory or motor deficits. MSK Normal range of motion in general.  No edema and no tenderness.    Psych Appropriate mood and affect. Labs:  Recent Results (from the past 96 hour(s))   CBC WITH AUTOMATED DIFF    Collection Time: 01/28/22  2:39 PM   Result Value Ref Range    WBC 7.9 4.3 - 11.1 K/uL    RBC 3.13 (L) 4.05 - 5.2 M/uL    HGB 10.5 (L) 11.7 - 15.4 g/dL    HCT 29.8 (L) 35.8 - 46.3 %    MCV 95.2 79.6 - 97.8 FL    MCH 33.5 (H) 26.1 - 32.9 PG    MCHC 35.2 (H) 31.4 - 35.0 g/dL    RDW 17.5 (H) 11.9 - 14.6 %    PLATELET 468 (L) 628 - 450 K/uL    MPV 10.2 9.4 - 12.3 FL    ABSOLUTE NRBC 0.00 0.0 - 0.2 K/uL    DF AUTOMATED      NEUTROPHILS 78 43 - 78 %    LYMPHOCYTES 11 (L) 13 - 44 %    MONOCYTES 7 4.0 - 12.0 %    EOSINOPHILS 2 0.5 - 7.8 %    BASOPHILS 1 0.0 - 2.0 %    IMMATURE GRANULOCYTES 0 0.0 - 5.0 %    ABS. NEUTROPHILS 6.2 1.7 - 8.2 K/UL    ABS. LYMPHOCYTES 0.9 0.5 - 4.6 K/UL    ABS. MONOCYTES 0.5 0.1 - 1.3 K/UL    ABS. EOSINOPHILS 0.2 0.0 - 0.8 K/UL    ABS. BASOPHILS 0.1 0.0 - 0.2 K/UL    ABS. IMM. GRANS. 0.0 0.0 - 0.5 K/UL   METABOLIC PANEL, COMPREHENSIVE    Collection Time: 01/28/22  2:39 PM   Result Value Ref Range    Sodium 136 136 - 145 mmol/L    Potassium 3.4 (L) 3.5 - 5.1 mmol/L    Chloride 98 98 - 107 mmol/L    CO2 34 (H) 21 - 32 mmol/L    Anion gap 4 (L) 7 - 16 mmol/L    Glucose 140 (H) 65 - 100 mg/dL    BUN 14 8 - 23 MG/DL    Creatinine 3.00 (H) 0.6 - 1.0 MG/DL    GFR est AA 20 (L) >60 ml/min/1.73m2    GFR est non-AA 16 (L) >60 ml/min/1.73m2    Calcium 9.4 8.3 - 10.4 MG/DL    Bilirubin, total 1.3 (H) 0.2 - 1.1 MG/DL    ALT (SGPT) 20 12 - 65 U/L    AST (SGOT) 24 15 - 37 U/L    Alk.  phosphatase 87 50 - 136 U/L    Protein, total 8.0 6.3 - 8.2 g/dL    Albumin 4.0 3.2 - 4.6 g/dL    Globulin 4.0 (H) 2.3 - 3.5 g/dL    A-G Ratio 1.0 (L) 1.2 - 3.5         Imaging:  EXAM: CT CHEST, ABDOMEN, AND PELVIS WITHOUT CONTRAST     INDICATION: Renal cell carcinoma.     COMPARISON: Chest CT 1/10/2022, CTA chest and abdomen 11/12/2021, CT chest,  abdomen, and pelvis 11/8/2021     TECHNIQUE:  CT imaging was performed of the chest, abdomen, and pelvis without  IV contrast. Coronal reformatted imaging provided. Radiation dose reduction  techniques were used for this study. Our CT scanners use one or all of the  following: Automated exposure control, adjustment of the mA and/or kV according  to patient size, iterative reconstruction.     FINDINGS:  Please note, evaluation of solid organs is significantly limited by the lack of  IV contrast.     CHEST:     Mediastinum, clint, axillae: The right paratracheal lymph node is smaller,  measuring 1.2 cm, short axis, measured in a similar fashion on today's study.     Heart: Multivessel coronary atherosclerotic calcifications.     Large Vessels: Normal.     Pleura: Stable small left pleural effusion effusion and mild pleural thickening.     Lungs: Probable round atelectasis in the left lower lobe.     Airways: Normal.     ABDOMEN AND PELVIS:     Liver: Normal in size and morphology.       Gallbladder/biliary: Normal gallbladder. No biliary dilatation.     Pancreas: Normal.     Spleen: Normal.     Adrenals: Normal.     Kidneys: Status post left nephrectomy. Stable 1.4 cm nodule in the superior  aspect of the nephrectomy bed. Stable right renal cysts.     Bladder: Normal.     Pelvic organs: Uterus is surgically absent. No adnexal mass.     Gastrointestinal: Normal.     Peritoneum/retroperitoneum: Small volume ascites.     Lymph nodes: Limited evaluation for new retroperitoneal lymphadenopathy given  lack of IV contrast. No clearly larger mass.     Vessels: Normal.     Bones/Soft tissues: Stable appearance of the bones.     IMPRESSION  Limited evaluation secondary to lack of IV contrast.  1.  Slightly smaller right paratracheal lymph node. 2.  Status post left nephrectomy. Stable nodule in the superior aspect of the  nephrectomy bed.       EXAM: CT CHEST ABD PELV WO CONT     INDICATION: History of recurrent renal cell carcinoma     COMPARISON: Most recent CT dated 1/21/2021     CONTRAST: None     TECHNIQUE:   Thin axial images were obtained through the chest, abdomen and pelvis. Coronal  and sagittal reformats were generated. Oral contrast was not administered. CT  dose reduction was achieved through use of a standardized protocol tailored for  this examination and automatic exposure control for dose modulation.     FINDINGS:   AIRWAYS: The central airways are patent.      LUNGS: The lungs are clear bilaterally. No airspace consolidation. No new or  enlarging pulmonary nodule.     PLEURA: Chronic small left pleural effusion with associated left lower lobe  atelectasis.     HEART: The heart is not enlarged. Dense coronary artery calcifications. No  pericardial effusion.      THORACIC AORTA: Two endovascular stents within the right upper extremity and  subclavian region. An endovascular stent within left upper extremity. No  significant atherosclerosis within the aorta and great vessels.     PULMONARY ARTERY: The main pulmonary artery is normal in caliber.     MEDIASTINUM/JEAN-PAUL: Scattered calcified mediastinal lymph nodes, suggestive of  healed granulomatous process. Prominent right paratracheal lymph node is  unchanged compared to the 1/23/2020 CT.     CHEST WALL: No mass or axillary lymphadenopathy. Anasarca        LIVER: The liver contour is normal. No suspicious liver lesion.       BILIARY TREE: Gallbladder is within normal limits. No biliary dilation.      SPLEEN: Normal.      PANCREAS: No pancreatic mass or ductal dilation.     ADRENALS: Normal.       KIDNEYS/BLADDER: Postsurgical change of a left nephrectomy. Unchanged size and  appearance of the soft tissue lesion along the superior aspect of the  nephrectomy bed (series 2 image 63), measuring 1.8 x 1.4 cm. Right kidney is  atrophic in appearance. Punctate nonobstructing right renal calculi. Similar  appearance of the 3 cysts extending from the inferior pole of the right kidney. no hydronephrosis. The urinary bladder is unremarkable.      BOWEL: The colon is unremarkable. The small bowel is normal in caliber. No bowel  wall thickening.      APPENDIX: The appendix is normal.      PERITONEUM/RETROPERITONEUM: No ascites or free air.       VESSELS: No abdominal aortic aneurysm. Scattered calcified atherosclerosis     ABDOMINAL WALL: Ventral abdominal wall surgical scar. Small periumbilical hernia  containing nonobstructed small bowel.     REPRODUCTIVE ORGANS: No adnexal mass.     BONES: No suspicious osseous lesion.         IMPRESSION  1. No evidence of disease progression. Stable appearance of the soft tissue  nodule within the superior aspect of the left nephrectomy bed. 2.  Chronic small left pleural effusion. CT CHEST, ABDOMEN AND PELVIS WITHOUT INTRAVENOUS CONTRAST DATED 7/21/2020.     History: Kidney cancer status post left nephrectomy. Follow-up.      Comparison: CT chest, abdomen, and pelvis with contrast 2/22/2020      Technique:   Multiple contiguous helical CT images reconstructed at 5 mm were  obtained from the base of the neck to the ischial tuberosities following oral  contrast only. All CT scans performed at this facility use one or all of the  following: Automated exposure control, adjustment of the mA and/or kVp according  to patient's size, iterative reconstruction.     Findings:  CT Chest:  The base of the neck is unremarkable in appearance. No evolving lymphadenopathy  is seen. Assessment for early changes of the clint is somewhat limited on this  noncontrast study. Prominent lymph nodes are seen most evident in the right  axilla although these are not significantly changed from the prior study. The  thoracic aorta is normal in caliber. Moderate atherosclerotic calcification is  seen of the coronary arteries of the heart.     Evaluation with lung windows demonstrates improvement in the previously  described new right lower lobe nodule with only minimal residual nodular density  now seen on axial image 45.  No new pulmonary lesions are otherwise seen. The  prior small dependent left pleural effusion persist and may be slightly  increased. No pleural effusion is seen. Lungs are expanded without evidence  for pneumothorax. No evolving aggressive osseous lesion is seen. Worsening,  although mild anasarca is seen of the chest wall.     CT ABDOMEN:    Evaluation of the solid organs is limited by the lack of administered  intravenous contrast. A subtle abnormality could be missed. The Liver is  homogeneous in attenuation. The spleen is homogeneous in attenuation. No  contour deforming mass lesions are seen of the pancreas. The gallbladder has an  unremarkable CT appearance without radioopaque stones or pericholecystic  fluid/inflammatory changes.       The patient is status post left nephrectomy. The prior nodular density in the  left renal fossa persists now seen on axial image 55 measuring 1.8 cm x 1.5 cm  in size (previously measured 2 cm x 1.7 cm). The slight change in size is likely  artifactual with this lesion likely being stable. No evolving contour deforming  lesion is seen of the visualized adrenal glands. The right kidney is  suboptimally assessed on this noncontrast study although unchanged in appearance  once again demonstrating multiple hypodense cortical lesions and lower pole  nonobstructing stones.     The visualized loops of small bowel and colon are normal in caliber. Suture  lines are seen in the distal small bowel and colon suggesting prior surgeries at  these levels. No free air or focal mesenteric edema is seen. Small fluid is seen  adjacent to the pancreatic head with similar fluid noted on the prior study. The  significance of this is uncertain given the lack of reported acute abdominal  symptoms. No evolving adenopathy is seen of the abdomen. The abdominal aorta  demonstrates mild to moderate atherosclerotic calcification. No evolving  aggressive osseous lesion is seen. Postsurgical changes of the anterior  abdominal wall.  Once again, there is a small incisional-type hernia containing  small bowel at the inferior, midline abdomen. No acute features are seen of  hernia sac contents.     CT PELVIS:  No abnormal pelvic fluid collections are present. No evolving pelvic adenopathy  is seen. The urinary bladder is completely collapsed and therefore not well  assessed. No evolving aggressive osseous lesion is seen.     IMPRESSION  IMPRESSION:    1. Somewhat limited assessment due to noncontrast technique. No findings  concerning for disease progression are seen on today's study. The prior nodular  density in the left renal fossa is not significantly changed. The previously  described new 7 mm right lower lobe nodule has improved now measuring 4 mm in  size and appearing less confluent in its appearance.      2. Persistent and slightly increased although small left pleural effusion. This  could represent sequela of fluid overload given the additional mild anasarca  which does appear slightly worsened in the chest.      EXAM: CT CHEST, ABDOMEN, AND PELVIS WITH CONTRAST     INDICATION: Renal cell surveillance.     COMPARISON: CT chest, abdomen, and pelvis dated 7/29/2019     TECHNIQUE:  CT imaging was performed of the chest, abdomen, and pelvis after  intravenous injection of 100 mL Isovue 370. Intravenous contrast was used for  better evaluation of solid organs and vascular structures. Oral contrast was  used for bowel opacification. Coronal reformatted imaging provided. Radiation  dose reduction techniques were used for this study. Our CT scanners use one or  all of the following: Automated exposure control, adjustment of the mA and/or kV  according to patient size, iterative reconstruction.     FINDINGS:     CHEST:     Mediastinum and visualized thyroid: Hypoattenuating left thyroid nodule is  stable. Heart: Multivessel coronary atherosclerotic calcifications.     Large Vessels:  Aortic atherosclerotic consultations without aneurysm.     Pleura: Slight interval increase in size in the trace left pleural effusion.     Lungs: Essentially stable pleural tags in the bilateral lungs. No suspicious  lung mass or definite discrete nodule.     Airways: Normal.     ABDOMEN AND PELVIS:     Liver: Normal in size and morphology. No focal lesions.     Gallbladder/biliary: Contracted gallbladder. No biliary dilatation.     Pancreas: Normal.     Spleen: Normal.     Kidneys and adrenals: Status post left nephrectomy. Stable in size left  suprarenal mass measuring 1.7 x 1.6 cm. No enlarging tumor or worsening  adenopathy. Retroperitoneal lymph nodes remain mildly prominent in number but  not pathologically enlarged. Multiple small hypoattenuating lesions in the right  kidney, many of which are too small to further characterize. Right renal  cortical atrophy is stable.     Bladder: Decompressed urinary bladder.     Pelvic organs: Uterus is surgically absent. No adnexal mass.     Gastrointestinal: Normal.     Lymph nodes: As above.      Vessels: Normal.     Bones/Soft tissues: Postsurgical changes in the central abdomen with diastases  recti. Mild diffuse anasarca. No aggressive osseous lesion.     IMPRESSION  IMPRESSION:  1.  Stable left suprarenal mass status post left nephrectomy. No clearly  enlarging tumor or worsening retroperitoneal adenopathy. 2.  No evidence distant metastatic disease in the chest, abdomen, or pelvis. CT of the Chest, Abdomen, and Pelvis     INDICATION: Restaging renal cell carcinoma     Multiple axial images were obtained through the chest, abdomen, and pelvis. Oral contrast was used for bowel opacification. Radiation dose reduction  techniques were used for this study.   All CT scans performed at this facility  use one or all of the following: Automated exposure control, adjustment of the  mA and/or kVp according to patient's size, iterative reconstruction.     COMPARISON: 09/10/2018     FINDINGS:  LUNGS: No infiltrates, masses, or effusions. MEDIASTINUM/AXILLA: No significant adenopathy. HEART/VESSELS: There is moderate vascular calcification. CHEST WALL: Normal.     LIVER: Normal in size and appearance. GALLBLADDER/BILE DUCTS: No gallstones or bile duct dilatation. PANCREAS: Normal.  SPLEEN: Normal.     ADRENALS  2.5 x 2.2 cm mass adjacent to the left adrenal gland in the superior  left retroperitoneum. Measures 2.8 x 2.6 cm on the prior study. Right adrenal  gland is unremarkable. KIDNEYS/URETERS: Post left nephrectomy. Stable cysts in the right kidney. No  hydronephrosis. BLADDER: Normal.  REPRODUCTIVE ORGANS: No pelvic masses.     BOWEL: Normal caliber. No inflammatory changes. LYMPH NODES: No significant retroperitoneal, mesenteric, or pelvic adenopathy. BONES: No fracture or significant bone lesion. OTHER: Small ventral hernia in the pelvis, small bowel involvement but no  obstruction.     IMPRESSION  IMPRESSION: Superior left retroperitoneal/adrenal mass, slightly smaller than on  the prior exam.        CT of the Chest, Abdomen, and Pelvis     INDICATION:  Restaging renal cell carcinoma     Multiple axial images were obtained through the chest, abdomen, and pelvis after  intravenous injection of 125cc of optiray 350. Radiation dose reduction  techniques were used for this study: All CT scans performed at this facility  use one or all of the following: Automated exposure control, adjustment of the  mA and/or kVp according to patient's size, iterative reconstruction. Compared  with 09/26/2017 and 09/12/2017.     FINDINGS:  Chest CT: The lungs are clear. No masses or infiltrates are seen. There is no  effusion. There is no developing adenopathy. There is moderate coronary artery  calcification and mild cardiomegaly. There are no bony lesions.     Abdomen CT:  There are no significant lesions in the liver or spleen. The right  kidney is atrophic.   There are several small right renal cyst. The right  adrenal gland is unremarkable. Patient is post left refractory. There is a  enlarging mass in the superior left retroperitoneum, possibly involving the left  adrenal gland. Measures 3.9 x 3.4 cm compared with 3.3 x 3.1 cm on the prior  study. No other retroperitoneal masses are seen.     Pelvis CT:  There are no inflammatory changes or fluid collections in the  pelvis. There is no significant adenopathy or mass. The patient is post  hysterectomy. There is a Small ventral hernia with small bowel involvement, no  obstruction. There are no bony lesions.     IMPRESSION  IMPRESSION:  Interval enlargement of the left retroperitoneal mass. No other  evidence of recurrent tumor. CT ABDOMEN WITHOUT AND WITH INTRAVENOUS CONTRAST AND PELVIS WITH CONTRAST.     HISTORY: Right-sided flank pain. Status post left nephrectomy.     COMPARISON: None     TECHNIQUE: 5 mm axial scans from above the diaphragms to the iliac crest without  contrast were followed by 100 cc intravenous contrast and repeat images from the  diaphragm to the pubic symphysis. Intravenous contrast was given to increase the  sensitivity to acute inflammation. Radiation dose reduction techniques were  used for this study. Our CT scanners use one or more of the following:   Automated exposure control, adjustment of the mA and or kV according to patient  size, iterative reconstruction.     FINDINGS:   Abdomen: The lung bases are clear. No radiopaque urinary tract calculi in the  right kidney or upper right ureter. No gross focal parenchymal abnormality  identified within the liver or spleen. No calcified gallstones. The biliary tree  is not dilated. The pancreas is unremarkable. No free fluid, acute inflammatory  changes or adenopathy. Bowel loops are not dilated. Right kidney enhances and  demonstrates several cysts. No hydronephrosis.  In the left renal bed there is a  new 3.4 x 3.5 cm irregular soft tissue density which enhances mildly with  contrast. The adrenal glands are normal size. Aorta is normal caliber and  calcified.     Pelvis: No free fluid or acute inflammatory changes. The urinary bladder is  fairly empty and unremarkable. No pelvic adenopathy. No gross bony lesions. Postsurgical changes anterior abdominal wall.     IMPRESSION  IMPRESSION:  There is a new 3.4 x 3.5 cm irregular mass in the left nephrectomy  bed. This is suspicious for neoplasm. No acute abnormality to explain the  right-sided flank pain. Other chronic changes as above. Pathology:          ASSESSMENT:    ICD-10-CM ICD-9-CM    1. Renal cell carcinoma, unspecified laterality (HCC)  C64.9 189.0    2. ESRD (end stage renal disease) on dialysis (HCC)  N18.6 585.6     Z99.2 V45.11    3. Mediastinal lymphadenopathy  R59.0 785.6          PLAN:  Lab and imaging studies were personally reviewed. Pertinent old records were reviewed. Renal cell carcinoma: recurrent, originally resected in 2015 but now with recurrent soft tissue mass in nephrectomy bed. No other distant sites noted, CT chest negative. This would be considered stage IV, but I would consider this oligometastatic disease with no evidence of disease outside of the nephrectomy bed. We discussed observation since she is asymptomatic but she insisted upon treatment. However, she did not wish for systemic therapy. Since this appears to be a single site of recurrence, it is reasonable to consider local therapy - she is not a surgical candidate, but SBRT or cryoablation may be options. She met with radiation oncology, who agreed that local therapy was reasonable. She completed SBRT on 11/10/17, a 5 fraction regimen. She tolerated this well. Here for hospital follow-up.   She was inpatient from 1/10 to 1/20, admitted for severe left thoracic back pain, CT T-spine showed no acute findings, but she did have an enlarging paratracheal node to 2.6 cm on CT chest.  She also had bilateral pleural effusions, cytology of the effusion on the right showed rare cells suspicious for malignancy. She has been scheduled for PET/CT on 2/2, and EBUS on 2/3. She continues to have pain although now it is mostly in her right hip, controlled with Norco which she has from before. No other new pain. She remains on HD without issue, she is more fatigued as she has not had a chance to lay down after dialysis today. She denies any fevers, chills, or infectious symptoms. Labs reviewed and unremarkable, mild anemia likely due to ESRD. Updated CT shows slight decrease in the size of the paratracheal node, 2.3 cm on my measurement. The cytology from the pleural effusion is not definitive to make a diagnosis of malignant effusion, we should proceed with work-up for alternative sites of metastatic disease. I agree with the PET and EBUS as planned, we will review these and see her back next week. No plans for systemic treatment until disease progression is confirmed. All questions were asked and answered to the best of my ability.                  Aly Arizmendi MD  [facility]  [address] [place] [address]  Office : [phone]  Fax : [phone]

## 2022-01-29 PROCEDURE — 3331090002 HH PPS REVENUE DEBIT

## 2022-01-29 PROCEDURE — 3331090001 HH PPS REVENUE CREDIT

## 2022-01-30 PROCEDURE — 3331090001 HH PPS REVENUE CREDIT

## 2022-01-30 PROCEDURE — 3331090002 HH PPS REVENUE DEBIT

## 2022-01-31 PROCEDURE — 3331090002 HH PPS REVENUE DEBIT

## 2022-01-31 PROCEDURE — 3331090001 HH PPS REVENUE CREDIT

## 2022-02-01 ENCOUNTER — HOME CARE VISIT (OUTPATIENT)
Dept: SCHEDULING | Facility: HOME HEALTH | Age: 72
End: 2022-02-01
Payer: MEDICARE

## 2022-02-01 VITALS
HEART RATE: 97 BPM | SYSTOLIC BLOOD PRESSURE: 90 MMHG | DIASTOLIC BLOOD PRESSURE: 58 MMHG | RESPIRATION RATE: 19 BRPM | OXYGEN SATURATION: 93 % | TEMPERATURE: 97.5 F

## 2022-02-01 VITALS
SYSTOLIC BLOOD PRESSURE: 112 MMHG | OXYGEN SATURATION: 90 % | DIASTOLIC BLOOD PRESSURE: 80 MMHG | HEART RATE: 99 BPM | RESPIRATION RATE: 15 BRPM | TEMPERATURE: 98.3 F

## 2022-02-01 VITALS
RESPIRATION RATE: 15 BRPM | TEMPERATURE: 97.4 F | DIASTOLIC BLOOD PRESSURE: 60 MMHG | OXYGEN SATURATION: 91 % | SYSTOLIC BLOOD PRESSURE: 100 MMHG | HEART RATE: 95 BPM

## 2022-02-01 PROCEDURE — 3331090001 HH PPS REVENUE CREDIT

## 2022-02-01 PROCEDURE — 3331090002 HH PPS REVENUE DEBIT

## 2022-02-01 PROCEDURE — G0157 HHC PT ASSISTANT EA 15: HCPCS

## 2022-02-01 PROCEDURE — G0152 HHCP-SERV OF OT,EA 15 MIN: HCPCS

## 2022-02-01 PROCEDURE — G0299 HHS/HOSPICE OF RN EA 15 MIN: HCPCS

## 2022-02-01 RX ORDER — SODIUM CHLORIDE 0.9 % (FLUSH) 0.9 %
5-40 SYRINGE (ML) INJECTION AS NEEDED
Status: CANCELLED | OUTPATIENT
Start: 2022-02-01

## 2022-02-01 RX ORDER — LIDOCAINE HYDROCHLORIDE 40 MG/ML
SOLUTION TOPICAL AS NEEDED
Status: CANCELLED | OUTPATIENT
Start: 2022-02-01

## 2022-02-01 RX ORDER — SODIUM CHLORIDE 9 MG/ML
25 INJECTION, SOLUTION INTRAVENOUS CONTINUOUS
Status: CANCELLED | OUTPATIENT
Start: 2022-02-01

## 2022-02-01 RX ORDER — SODIUM CHLORIDE 0.9 % (FLUSH) 0.9 %
5-40 SYRINGE (ML) INJECTION EVERY 8 HOURS
Status: CANCELLED | OUTPATIENT
Start: 2022-02-01

## 2022-02-01 RX ORDER — MIDAZOLAM HYDROCHLORIDE 1 MG/ML
.25-5 INJECTION INTRAMUSCULAR; INTRAVENOUS
Status: CANCELLED | OUTPATIENT
Start: 2022-02-01

## 2022-02-01 RX ORDER — LIDOCAINE HYDROCHLORIDE 20 MG/ML
JELLY TOPICAL AS NEEDED
Status: CANCELLED | OUTPATIENT
Start: 2022-02-01

## 2022-02-01 RX ORDER — FENTANYL CITRATE 50 UG/ML
50 INJECTION, SOLUTION INTRAMUSCULAR; INTRAVENOUS
Status: CANCELLED | OUTPATIENT
Start: 2022-02-01

## 2022-02-02 ENCOUNTER — HOSPITAL ENCOUNTER (OUTPATIENT)
Dept: PET IMAGING | Age: 72
Discharge: HOME | End: 2022-02-02
Payer: MEDICARE

## 2022-02-02 DIAGNOSIS — R91.8 OTHER NONSPECIFIC ABNORMAL FINDING OF LUNG FIELD: ICD-10-CM

## 2022-02-02 DIAGNOSIS — J90 PLEURAL EFFUSION: ICD-10-CM

## 2022-02-02 DIAGNOSIS — J98.59 MEDIASTINAL MASS: ICD-10-CM

## 2022-02-02 LAB
GLUCOSE BLD STRIP.AUTO-MCNC: 104 MG/DL (ref 65–100)
SERVICE CMNT-IMP: ABNORMAL

## 2022-02-02 PROCEDURE — A9552 F18 FDG: HCPCS

## 2022-02-02 PROCEDURE — 3331090001 HH PPS REVENUE CREDIT

## 2022-02-02 PROCEDURE — 82962 GLUCOSE BLOOD TEST: CPT

## 2022-02-02 PROCEDURE — 74011000636 HC RX REV CODE- 636: Performed by: NURSE PRACTITIONER

## 2022-02-02 PROCEDURE — 3331090002 HH PPS REVENUE DEBIT

## 2022-02-02 RX ORDER — SODIUM CHLORIDE 0.9 % (FLUSH) 0.9 %
10 SYRINGE (ML) INJECTION
Status: COMPLETED | OUTPATIENT
Start: 2022-02-02 | End: 2022-02-02

## 2022-02-02 RX ORDER — FLUDEOXYGLUCOSE F18 300 MCI/ML
10 INJECTION INTRAVENOUS ONCE
Status: COMPLETED | OUTPATIENT
Start: 2022-02-02 | End: 2022-02-02

## 2022-02-02 RX ADMIN — DIATRIZOATE MEGLUMINE AND DIATRIZOATE SODIUM 10 ML: 660; 100 LIQUID ORAL; RECTAL at 11:55

## 2022-02-02 RX ADMIN — Medication 10 ML: at 11:55

## 2022-02-02 RX ADMIN — FLUDEOXYGLUCOSE F18 13.75 MILLICURIE: 300 INJECTION INTRAVENOUS at 11:55

## 2022-02-02 NOTE — PROCEDURES
Procedure confirmed with patient. Aware of where and when to arrive. Also aware of  policy. Reports went to [facility] and they did not do test as it \"would not be done in time\". Ok to do rapid Covid per COURTNEY Benitez. No questions at this time.

## 2022-02-03 ENCOUNTER — HOME CARE VISIT (OUTPATIENT)
Dept: SCHEDULING | Facility: HOME HEALTH | Age: 72
End: 2022-02-03
Payer: MEDICARE

## 2022-02-03 ENCOUNTER — HOSPITAL ENCOUNTER (OUTPATIENT)
Age: 72
Setting detail: OUTPATIENT SURGERY
Discharge: HOME | End: 2022-02-03
Attending: INTERNAL MEDICINE | Admitting: INTERNAL MEDICINE
Payer: MEDICARE

## 2022-02-03 VITALS
RESPIRATION RATE: 19 BRPM | HEIGHT: 64 IN | WEIGHT: 177 LBS | DIASTOLIC BLOOD PRESSURE: 92 MMHG | SYSTOLIC BLOOD PRESSURE: 148 MMHG | HEART RATE: 95 BPM | OXYGEN SATURATION: 97 % | BODY MASS INDEX: 30.22 KG/M2 | TEMPERATURE: 98 F

## 2022-02-03 DIAGNOSIS — R59.0 MEDIASTINAL ADENOPATHY: ICD-10-CM

## 2022-02-03 LAB
COVID-19 RAPID TEST, COVR: NOT DETECTED
GLUCOSE BLD STRIP.AUTO-MCNC: 98 MG/DL (ref 65–100)
SERVICE CMNT-IMP: NORMAL
SOURCE, COVRS: NORMAL

## 2022-02-03 PROCEDURE — 3331090002 HH PPS REVENUE DEBIT

## 2022-02-03 PROCEDURE — 74011250636 HC RX REV CODE- 250/636: Performed by: INTERNAL MEDICINE

## 2022-02-03 PROCEDURE — 88184 FLOWCYTOMETRY/ TC 1 MARKER: CPT

## 2022-02-03 PROCEDURE — 82962 GLUCOSE BLOOD TEST: CPT

## 2022-02-03 PROCEDURE — 76040000025: Performed by: INTERNAL MEDICINE

## 2022-02-03 PROCEDURE — 74011000250 HC RX REV CODE- 250

## 2022-02-03 PROCEDURE — 87635 SARS-COV-2 COVID-19 AMP PRB: CPT

## 2022-02-03 PROCEDURE — 99153 MOD SED SAME PHYS/QHP EA: CPT | Performed by: INTERNAL MEDICINE

## 2022-02-03 PROCEDURE — 77030009046 HC CATH BRNCH BLLN OCOA -B: Performed by: INTERNAL MEDICINE

## 2022-02-03 PROCEDURE — 88172 CYTP DX EVAL FNA 1ST EA SITE: CPT

## 2022-02-03 PROCEDURE — 88173 CYTOPATH EVAL FNA REPORT: CPT

## 2022-02-03 PROCEDURE — 88177 CYTP FNA EVAL EA ADDL: CPT

## 2022-02-03 PROCEDURE — 88185 FLOWCYTOMETRY/TC ADD-ON: CPT

## 2022-02-03 PROCEDURE — 99152 MOD SED SAME PHYS/QHP 5/>YRS: CPT | Performed by: INTERNAL MEDICINE

## 2022-02-03 PROCEDURE — 77030003406 HC NDL ASPIR BIOP OCOA -C: Performed by: INTERNAL MEDICINE

## 2022-02-03 PROCEDURE — 77030012699 HC VLV SUC CNTRL OCOA -A: Performed by: INTERNAL MEDICINE

## 2022-02-03 PROCEDURE — 31652 BRONCH EBUS SAMPLNG 1/2 NODE: CPT | Performed by: INTERNAL MEDICINE

## 2022-02-03 PROCEDURE — 2709999900 HC NON-CHARGEABLE SUPPLY: Performed by: INTERNAL MEDICINE

## 2022-02-03 PROCEDURE — 3331090001 HH PPS REVENUE CREDIT

## 2022-02-03 PROCEDURE — 74011250636 HC RX REV CODE- 250/636

## 2022-02-03 RX ORDER — SODIUM CHLORIDE 0.9 % (FLUSH) 0.9 %
5-40 SYRINGE (ML) INJECTION EVERY 8 HOURS
Status: DISCONTINUED | OUTPATIENT
Start: 2022-02-03 | End: 2022-02-03 | Stop reason: HOSPADM

## 2022-02-03 RX ORDER — SODIUM CHLORIDE 0.9 % (FLUSH) 0.9 %
5-40 SYRINGE (ML) INJECTION AS NEEDED
Status: DISCONTINUED | OUTPATIENT
Start: 2022-02-03 | End: 2022-02-03 | Stop reason: HOSPADM

## 2022-02-03 RX ORDER — LIDOCAINE HYDROCHLORIDE 40 MG/ML
SOLUTION TOPICAL AS NEEDED
Status: DISCONTINUED | OUTPATIENT
Start: 2022-02-03 | End: 2022-02-03 | Stop reason: HOSPADM

## 2022-02-03 RX ORDER — SODIUM CHLORIDE 9 MG/ML
25 INJECTION, SOLUTION INTRAVENOUS CONTINUOUS
Status: DISCONTINUED | OUTPATIENT
Start: 2022-02-03 | End: 2022-02-03 | Stop reason: HOSPADM

## 2022-02-03 RX ORDER — MIDAZOLAM HYDROCHLORIDE 1 MG/ML
.25-5 INJECTION, SOLUTION INTRAMUSCULAR; INTRAVENOUS
Status: DISCONTINUED | OUTPATIENT
Start: 2022-02-03 | End: 2022-02-03 | Stop reason: HOSPADM

## 2022-02-03 RX ORDER — LIDOCAINE HYDROCHLORIDE 20 MG/ML
JELLY TOPICAL AS NEEDED
Status: DISCONTINUED | OUTPATIENT
Start: 2022-02-03 | End: 2022-02-03 | Stop reason: HOSPADM

## 2022-02-03 RX ORDER — FENTANYL CITRATE 50 UG/ML
25-100 INJECTION, SOLUTION INTRAMUSCULAR; INTRAVENOUS
Status: DISCONTINUED | OUTPATIENT
Start: 2022-02-03 | End: 2022-02-03 | Stop reason: HOSPADM

## 2022-02-03 RX ADMIN — FENTANYL CITRATE 50 MCG: 50 INJECTION INTRAMUSCULAR; INTRAVENOUS at 13:07

## 2022-02-03 RX ADMIN — FENTANYL CITRATE 100 MCG: 50 INJECTION INTRAMUSCULAR; INTRAVENOUS at 13:15

## 2022-02-03 RX ADMIN — MIDAZOLAM 2 MG: 1 INJECTION INTRAMUSCULAR; INTRAVENOUS at 13:06

## 2022-02-03 RX ADMIN — LIDOCAINE HYDROCHLORIDE: 40 SOLUTION TOPICAL at 13:15

## 2022-02-03 RX ADMIN — SODIUM CHLORIDE 25 ML/HR: 900 INJECTION, SOLUTION INTRAVENOUS at 12:55

## 2022-02-03 RX ADMIN — MIDAZOLAM 1 MG: 1 INJECTION INTRAMUSCULAR; INTRAVENOUS at 13:09

## 2022-02-03 RX ADMIN — FENTANYL CITRATE 50 MCG: 50 INJECTION INTRAMUSCULAR; INTRAVENOUS at 13:09

## 2022-02-03 NOTE — PROCEDURES
PROCEDURE  Bronchoscopy with Endobronchial Ultrasound Guided Fine Needle Aspiration Of Medistinal Lymph nodes and airway inspection. INDICATION   Diagnosis of Mediastinal Lymphadenopathy    Patient with history of RCCa and diffuse mediastinal; adenopathy  POST OP DIAGNOSIS:  Station 4R was biopsied and negative for malignancy on MAXIMILIAN.    6 dedicated samples were sent in toto for flow cytometry to exclude lymphoma which is suspected given diffuse nature of lymphadenopathy and low level of activity on PET CT.        ANESTHESIA    Concious sedation with: Fentanyl 200 mcg IV; Versed  3 mg IV; Lidocaine 200 mg to tracheo-bronchial tree and vocal cords    AIRWAY INSPECTION  After obtaining informed consent, using a bite block, an Olympus Q 180 viedeo bronchoscope was  introduced into the trachea through the vocal cords without complications. RIGHT  LOCATION NORM/ABNORMAL DESCRIPTION   VOCAL CORDS NL    TRACHEA NL    SAGE NL    RMSB NL    RUL NL    BI NL    RML NL    SUP SEGM RLL NL    MED BASAL NL    ANTERIOR BASAL NL    LATERAL BASAL NL    POSTERIOR BASAL NL                                               LEFT  LOCATION NORMAL/ABNORMAL TYPE   LMSB NL    TRACEY NL    LINGULA NL    SUPERIOR DIVISION NL    SUPERIOR SEG LLL NL    SHARRI-MEDIAL LLL NL    LATERAL LLL NL    POSTERIOR LLL NL                         EBUS  After completing the airway inspection an Olympus  F EBUS bronchoscope was introduced into the trachea through the vocal chords without complication.   The balloon was inflated with saline and a mediastinal inspection commenced:      STATION SIZE IN CM   4R 2.5/2.0         After identifying targets the following samples were obtained:    STATION PASS# LYMPHOCYTES MALIGNANT ATYPICAL GRANULOMA NONDGNSTC   4R 1 + - - -     2 + - - -              3 -- -- -- -- In toto for flow cytometry    4 -- -- -- -- \"    5 -- -- -- -- \"    6 -- -- -- -- \"    7 -- -- -- -- \"    8 -- -- -- -- \"     © 2018 [employer], [employer]. and/or its affiliates. All Rights Reserved. T, N, and M descriptors for the eighth edition of TNM classification for lung cancer    T: Primary tumor   Tx Primary tumor cannot be assessed or tumor proven by presence of malignant cells in sputum or bronchial washings but not visualized by imaging or bronchoscopy   T0 No evidence of primary tumor   Tis Carcinoma in situ   T1 Tumor ? 3 cm in greatest dimension surrounded by lung or visceral pleura without bronchoscopic evidence of invasion more proximal than the lobar bronchus (ie, not in the main bronchus)*   T1a(mi) Minimally invasive adenocarcinoma¶   T1a Tumor ? 1 cm in greatest dimension*   T1b Tumor >1 cm but ?2 cm in greatest dimension*   T1c Tumor >2 cm but ?3 cm in greatest dimension*   T2 Tumor >3 cm but ?5 cm or tumor with any of the following features:?  · Involves main bronchus regardless of distance from the chester but without involvement of the chester  · Invades visceral pleura  · Associated with atelectasis or obstructive pneumonitis that extends to the hilar region, involving part or all of the lung   T2a Tumor >3 cm but ?4 cm in greatest dimension   T2b Tumor >4 cm but ?5 cm in greatest dimension   T3 Tumor >5 cm but ?7 cm in greatest dimension or associated with separate tumor nodule(s) in the same lobe as the primary tumor or directly invades any of the following structures: chest wall (including the parietal pleura and superior sulcus tumors), phrenic nerve, parietal pericardium   T4 Tumor >7 cm in greatest dimension or associated with separate tumor nodule(s) in a different ipsilateral lobe than that of the primary tumor or invades any of the following structures: diaphragm, mediastinum, heart, great vessels, trachea, recurrent laryngeal nerve, esophagus, vertebral body, and chester   N: Regional lymph node involvement   Nx Regional lymph nodes cannot be assessed   N0 No regional lymph node metastasis   N1 Metastasis in ipsilateral peribronchial and/or ipsilateral hilar lymph nodes and intrapulmonary nodes, including involvement by direct extension   N2 Metastasis in ipsilateral mediastinal and/or subcarinal lymph node(s)   N3 Metastasis in contralateral mediastinal, contralateral hilar, ipsilateral or contralateral scalene, or supraclavicular lymph node(s)   M: Distant metastasis   M0 No distant metastasis   M1 Distant metastasis present   M1a Separate tumor nodule(s) in a contralateral lobe; tumor with pleural or pericardial nodule(s) or malignant pleural or pericardial effusion? M1b Single extrathoracic metastasis§   M1c Multiple extrathoracic metastases in one or more organs   Stage groupings   Occult carcinoma TX N0 M0   Stage 0 Tis N0 M0   Stage IA1 T1a(mi) N0 M0    T1a N0 M0   Stage IA2 T1b N0 M0   Stage IA3 T1c N0 M0   Stage IB T2a N0 M0   Stage IIA T2b N0 M0   Stage IIB T1a to c N1 M0    T2a N1 M0    T2b N1 M0    T3 N0 M0   Stage IIIA T1a to c N2 M0    T2a to b N2 M0    T3 N1 M0    T4 N0 M0    T4 N1 M0   Stage IIIB T1a to c N3 M0    T2a to b N3 M0    T3 N2 M0    T4 N2 M0   Stage IIIC T3 N3 M0    T4 N3 M0   Stage MOISES Any T Any N M1a    Any T Any N M1b   Stage IVB Any T Any N M1c   NOTE: Changes to the seventh edition are in bold. TNM: tumor, node, metastasis; Tis: carcinoma in situ; T1a(mi): minimally invasive adenocarcinoma. * The uncommon superficial spreading tumor of any size with its invasive component limited to the bronchial wall, which may extend proximal to the main bronchus, is also classified as T1a. ¶ Solitary adenocarcinoma, ?3 cm with a predominately lepidic pattern and ?5 mm invasion in any one focus. ? T2 tumors with these features are classified as T2a if ?4 cm in greatest dimension or if size cannot be determined, and T2b if >4 cm but ?5 cm in greatest dimension. ? Most pleural (pericardial) effusions with lung cancer are due to tumor.  In a few patients, however, multiple microscopic examinations of pleural (pericardial) fluid are negative for tumor and the fluid is nonbloody and not an exudate. When these elements and clinical judgment dictate that the effusion is not related to the tumor, the effusion should be excluded as a staging descriptor. § This includes involvement of a single distant (nonregional) lymph node. Reproduced from: Eugenio Wilson et al. The IASLC Lung Cancer Staging Project: Proposals for Revision of the TNM Stage Groupings in the Forthcoming (Eighth) Edition of the TNM Classification for Lung Cancer. J Thorac Oncol 2016; 11:39. Table used with the permission of [employer]. All rights reserved. Graphic 896628 Version 1.0             The procedure was completed without complication and the patient tolerated the procedure well.     EBL: 2 ml    Kahlil Kaminski MD.

## 2022-02-03 NOTE — PROGRESS NOTES
Pts IV d/c'ed and dressed appropriately. Pt given written discharge instructions including follow-up appointment,  potential side effects and physical changes to be aware of, and physician contact number. Pt d/c'ed via WC to [facility]. Pt and family verbalized understanding of discharge instructions.

## 2022-02-03 NOTE — DISCHARGE INSTRUCTIONS
RESPIRATORY CARE - BRONCHOSCOPY/EBUS/  - DISCHARGE INSTRUCTIONS      You received a lot of numbing medication for your throat and nose, and you also received medication to make you sleepy during your procedure. Because of this and because the bronchoscopy may have irritated your airways, we ask that you follow these directions:    1. Do not eat or drink until  230 pm .   After that, you may have what you please. You may want to try some liquids first, because your throat may be a little sore. 2. Medication may cause drowsiness for several hours, therefore:  · Do not drive or operate machinery for remainder of the day. · No alcohol today  · Do not make any important or legal decisions for 24 hours  · Do not sign any legal documents for 24 hours    3. You may cough up more mucus than usual and you may see some blood, but this is expected and should subside by the following day. 4. If severe throat irritation, coughing, or bleeding continue, call your doctor. 5.         If you run a fever greater than 102, take tylenol and motrin then call [facility] at [phone]. 6.         Dr. Romelia Bee has asked that you:                A. Call the doctor's office at [phone] for any questions or problems that may occur. Evelin Gregg your previously scheduled appointment as before procedure. Discharge Medications:  Any additional instructions: Resume all medication as before procedure using caution with any pain medication.     Instructions given to [address] and other family members

## 2022-02-03 NOTE — INTERVAL H&P NOTE
Update History & Physical    Date of Surgery Update: Kecia Fischer was seen and examined. History and physical has been reviewed. The patient has been examined.  There have been no significant clinical changes since the completion of the originally dated History and Physical.    Signed By: Saul Woodson MD     February 3, 2022 1:03 PM             Electronically signed by Saul Woodson MD on 2/3/2022 at 1:03 PM

## 2022-02-04 ENCOUNTER — HOME CARE VISIT (OUTPATIENT)
Dept: SCHEDULING | Facility: HOME HEALTH | Age: 72
End: 2022-02-04
Payer: MEDICARE

## 2022-02-04 PROCEDURE — 3331090002 HH PPS REVENUE DEBIT

## 2022-02-04 PROCEDURE — 3331090001 HH PPS REVENUE CREDIT

## 2022-02-05 PROCEDURE — 3331090002 HH PPS REVENUE DEBIT

## 2022-02-05 PROCEDURE — 3331090001 HH PPS REVENUE CREDIT

## 2022-02-06 PROCEDURE — 3331090002 HH PPS REVENUE DEBIT

## 2022-02-06 PROCEDURE — 3331090001 HH PPS REVENUE CREDIT

## 2022-02-06 NOTE — PROGRESS NOTES
Physician Progress Note      PATIENT:               Johann Reece  CSN #:                  [id number]  :                       1950  ADMIT DATE:       1/10/2022 6:06 AM  DISCH DATE:        2022 7:03 PM  RESPONDING  PROVIDER #:        Nicky HARO DO          QUERY TEXT:    Pt admitted with ESRD. Pt noted to have Mediastinal Mass. If possible, please document in progress notes and discharge summary the relationship, if any, between Mediastinal mass and Mediastinal cancer. The medical record reflects the following:  Risk Factors: 80 YOF, renal cell carcinoma s/p resection, ESRD on HD  Clinical Indicators: presented with severe sharp left-sided thoracic back pain- As perHistory and physical note  Treatment: Monitoring, Pulmonary consult,    Thank you,  Jessica Hayens RN,C BSN  Clinicical   Options provided:  -- Mediastinal Mass due to confirmed Mediastinal Cancer  -- Mediastinal Mass due to NON confirmed Mediastinal Cancer  -- Other - I will add my own diagnosis  -- Disagree - Not applicable / Not valid  -- Disagree - Clinically unable to determine / Unknown  -- Refer to Clinical Documentation Reviewer    PROVIDER RESPONSE TEXT:    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 Query created by: Sharon Bobo on 2022 7:56 PM      QUERY TEXT:    Patient admitted with ESRD, Mediastinal Mass , noted to have bilateral pleural effusion . If possible, please document in progress notes and discharge summary if you are evaluating and/or treating any of the following: The medical record reflects the following:  Risk Factors: 80 YOF, ESRD, Renal carcinoma s/p resection,  Clinical Indicators: Patient was seen by Pulmonlogy. Underwent thoracentesis with cytology reporting rare cells  suspicious for malignancy. She has been instructed to followup with pulmonology for EBUS/biopsy  for definite diagnosis.  Pulmonology to arrange this study- As per Discharge summary note  1/12Consult Note: T chest performed which showed B pleural effusions (both small, R>L) as well as a 2.5cm right paratracheal mass vs lymph node. Of note, this was also seen on multiple past CT scans but appears more prominent now  1/12 Cytology pleural fluid: RARE CELLS SUSPICIOUS FOR MALIGNANCY. Cell block: Rare cells suspicious for malignancy. Treatment: Monitoring, Pulmonary consult,Thoracentesis    Radha Sena RN,C BSN  Clinical   Options provided:  -- Malignant Pleural Effusion  -- Non Malignant Pleural Effusion  -- Other - I will add my own diagnosis  -- Disagree - Not applicable / Not valid  -- Disagree - Clinically unable to determine / Unknown  -- Refer to Clinical Documentation Reviewer    PROVIDER RESPONSE TEXT:    This patient has Malignant Pleural Effusion . Query created by:  Marleni Casey on 1/25/2022 8:05 PM      Electronically signed by:  Phillip Duarte DO 2/5/2022 11:17 PM

## 2022-02-07 PROCEDURE — 3331090002 HH PPS REVENUE DEBIT

## 2022-02-07 PROCEDURE — 3331090001 HH PPS REVENUE CREDIT

## 2022-02-08 ENCOUNTER — HOME CARE VISIT (OUTPATIENT)
Dept: SCHEDULING | Facility: HOME HEALTH | Age: 72
End: 2022-02-08
Payer: MEDICARE

## 2022-02-08 ENCOUNTER — HOME CARE VISIT (OUTPATIENT)
Dept: HOME HEALTH SERVICES | Facility: HOME HEALTH | Age: 72
End: 2022-02-08
Payer: MEDICARE

## 2022-02-08 VITALS
SYSTOLIC BLOOD PRESSURE: 132 MMHG | RESPIRATION RATE: 16 BRPM | DIASTOLIC BLOOD PRESSURE: 74 MMHG | HEART RATE: 98 BPM | TEMPERATURE: 98.1 F | OXYGEN SATURATION: 97 %

## 2022-02-08 LAB
FLOW CYTOMETRY, FBTC1: NORMAL
SPECIMEN SOURCE: NORMAL
TEST ORDERED:: NORMAL

## 2022-02-08 PROCEDURE — 3331090002 HH PPS REVENUE DEBIT

## 2022-02-08 PROCEDURE — G0299 HHS/HOSPICE OF RN EA 15 MIN: HCPCS

## 2022-02-08 PROCEDURE — 3331090001 HH PPS REVENUE CREDIT

## 2022-02-08 NOTE — CASE COMMUNICATION
Patient cancelled PT appointment for today (2.8.22). Patient reports her sisters  was yesterday and she is too tired to participate with PT today. Requesting next treatment later in the week. MD notified via email of cancelled PT treatment.

## 2022-02-09 LAB
FUNGUS CULTURE, RFCO2T: NEGATIVE
FUNGUS SMEAR, RFCO1T: NORMAL
FUNGUS SPEC CULT: NORMAL
FUNGUS STAIN, 188244: NORMAL
REFLEX TO ID, RFCO3T: NORMAL
SPECIMEN SOURCE: NORMAL
SPECIMEN SOURCE: NORMAL

## 2022-02-09 PROCEDURE — 3331090001 HH PPS REVENUE CREDIT

## 2022-02-09 PROCEDURE — 3331090002 HH PPS REVENUE DEBIT

## 2022-02-10 ENCOUNTER — HOME CARE VISIT (OUTPATIENT)
Dept: SCHEDULING | Facility: HOME HEALTH | Age: 72
End: 2022-02-10
Payer: MEDICARE

## 2022-02-10 VITALS
RESPIRATION RATE: 18 BRPM | SYSTOLIC BLOOD PRESSURE: 96 MMHG | SYSTOLIC BLOOD PRESSURE: 101 MMHG | TEMPERATURE: 97.7 F | DIASTOLIC BLOOD PRESSURE: 60 MMHG | RESPIRATION RATE: 16 BRPM | OXYGEN SATURATION: 92 % | DIASTOLIC BLOOD PRESSURE: 61 MMHG | OXYGEN SATURATION: 97 % | HEART RATE: 86 BPM | HEART RATE: 67 BPM | TEMPERATURE: 98.4 F

## 2022-02-10 VITALS
SYSTOLIC BLOOD PRESSURE: 100 MMHG | OXYGEN SATURATION: 91 % | RESPIRATION RATE: 16 BRPM | HEART RATE: 93 BPM | DIASTOLIC BLOOD PRESSURE: 55 MMHG | TEMPERATURE: 97.4 F

## 2022-02-10 PROCEDURE — G0299 HHS/HOSPICE OF RN EA 15 MIN: HCPCS

## 2022-02-10 PROCEDURE — 3331090001 HH PPS REVENUE CREDIT

## 2022-02-10 PROCEDURE — G0157 HHC PT ASSISTANT EA 15: HCPCS

## 2022-02-10 PROCEDURE — 3331090002 HH PPS REVENUE DEBIT

## 2022-02-10 PROCEDURE — G0158 HHC OT ASSISTANT EA 15: HCPCS

## 2022-02-11 PROCEDURE — 3331090001 HH PPS REVENUE CREDIT

## 2022-02-11 PROCEDURE — 3331090002 HH PPS REVENUE DEBIT

## 2022-02-12 PROCEDURE — 3331090001 HH PPS REVENUE CREDIT

## 2022-02-12 PROCEDURE — 3331090002 HH PPS REVENUE DEBIT

## 2022-02-13 PROCEDURE — 3331090001 HH PPS REVENUE CREDIT

## 2022-02-13 PROCEDURE — 3331090002 HH PPS REVENUE DEBIT

## 2022-02-14 PROCEDURE — 3331090001 HH PPS REVENUE CREDIT

## 2022-02-14 PROCEDURE — 3331090002 HH PPS REVENUE DEBIT

## 2022-02-15 ENCOUNTER — HOME CARE VISIT (OUTPATIENT)
Dept: SCHEDULING | Facility: HOME HEALTH | Age: 72
End: 2022-02-15
Payer: MEDICARE

## 2022-02-15 VITALS
HEART RATE: 62 BPM | RESPIRATION RATE: 18 BRPM | DIASTOLIC BLOOD PRESSURE: 60 MMHG | TEMPERATURE: 97.6 F | SYSTOLIC BLOOD PRESSURE: 96 MMHG | OXYGEN SATURATION: 94 %

## 2022-02-15 VITALS
SYSTOLIC BLOOD PRESSURE: 110 MMHG | HEART RATE: 92 BPM | DIASTOLIC BLOOD PRESSURE: 60 MMHG | RESPIRATION RATE: 15 BRPM | TEMPERATURE: 97.3 F | OXYGEN SATURATION: 95 %

## 2022-02-15 PROCEDURE — G0157 HHC PT ASSISTANT EA 15: HCPCS

## 2022-02-15 PROCEDURE — G0299 HHS/HOSPICE OF RN EA 15 MIN: HCPCS

## 2022-02-15 PROCEDURE — 3331090001 HH PPS REVENUE CREDIT

## 2022-02-15 PROCEDURE — 3331090002 HH PPS REVENUE DEBIT

## 2022-02-16 PROCEDURE — 3331090002 HH PPS REVENUE DEBIT

## 2022-02-16 PROCEDURE — 3331090001 HH PPS REVENUE CREDIT

## 2022-02-17 ENCOUNTER — HOME CARE VISIT (OUTPATIENT)
Dept: SCHEDULING | Facility: HOME HEALTH | Age: 72
End: 2022-02-17
Payer: MEDICARE

## 2022-02-17 VITALS
OXYGEN SATURATION: 92 % | HEART RATE: 87 BPM | RESPIRATION RATE: 16 BRPM | TEMPERATURE: 98 F | DIASTOLIC BLOOD PRESSURE: 62 MMHG | SYSTOLIC BLOOD PRESSURE: 104 MMHG

## 2022-02-17 VITALS
TEMPERATURE: 97.8 F | SYSTOLIC BLOOD PRESSURE: 102 MMHG | DIASTOLIC BLOOD PRESSURE: 65 MMHG | OXYGEN SATURATION: 94 % | HEART RATE: 67 BPM | RESPIRATION RATE: 16 BRPM

## 2022-02-17 PROCEDURE — G0158 HHC OT ASSISTANT EA 15: HCPCS

## 2022-02-17 PROCEDURE — G0151 HHCP-SERV OF PT,EA 15 MIN: HCPCS

## 2022-02-17 PROCEDURE — 3331090002 HH PPS REVENUE DEBIT

## 2022-02-17 PROCEDURE — 3331090001 HH PPS REVENUE CREDIT

## 2022-02-18 PROCEDURE — 3331090001 HH PPS REVENUE CREDIT

## 2022-02-18 PROCEDURE — 3331090002 HH PPS REVENUE DEBIT

## 2022-02-19 PROCEDURE — 3331090001 HH PPS REVENUE CREDIT

## 2022-02-19 PROCEDURE — 3331090002 HH PPS REVENUE DEBIT

## 2022-02-20 PROCEDURE — 3331090002 HH PPS REVENUE DEBIT

## 2022-02-20 PROCEDURE — 3331090001 HH PPS REVENUE CREDIT

## 2022-02-21 ENCOUNTER — ANESTHESIA EVENT (OUTPATIENT)
Dept: SURGERY | Age: 72
End: 2022-02-21
Payer: MEDICARE

## 2022-02-21 PROCEDURE — 3331090001 HH PPS REVENUE CREDIT

## 2022-02-21 PROCEDURE — 3331090002 HH PPS REVENUE DEBIT

## 2022-02-22 ENCOUNTER — APPOINTMENT (OUTPATIENT)
Dept: ULTRASOUND IMAGING | Age: 72
End: 2022-02-22
Attending: SURGERY
Payer: MEDICARE

## 2022-02-22 ENCOUNTER — ANESTHESIA (OUTPATIENT)
Dept: SURGERY | Age: 72
End: 2022-02-22
Payer: MEDICARE

## 2022-02-22 ENCOUNTER — HOSPITAL ENCOUNTER (OUTPATIENT)
Age: 72
Setting detail: OUTPATIENT SURGERY
Discharge: HOME | End: 2022-02-22
Attending: SURGERY | Admitting: SURGERY
Payer: MEDICARE

## 2022-02-22 ENCOUNTER — APPOINTMENT (OUTPATIENT)
Dept: GENERAL RADIOLOGY | Age: 72
End: 2022-02-22
Attending: SURGERY
Payer: MEDICARE

## 2022-02-22 VITALS
TEMPERATURE: 97.4 F | OXYGEN SATURATION: 91 % | BODY MASS INDEX: 32.3 KG/M2 | HEIGHT: 64 IN | DIASTOLIC BLOOD PRESSURE: 54 MMHG | RESPIRATION RATE: 14 BRPM | SYSTOLIC BLOOD PRESSURE: 108 MMHG | HEART RATE: 73 BPM | WEIGHT: 189.2 LBS

## 2022-02-22 DIAGNOSIS — J96.01 ACUTE RESPIRATORY FAILURE WITH HYPOXIA (HCC): ICD-10-CM

## 2022-02-22 LAB
COVID-19 RAPID TEST, COVR: NOT DETECTED
GLUCOSE BLD STRIP.AUTO-MCNC: 81 MG/DL (ref 65–100)
POTASSIUM BLD-SCNC: 4.8 MMOL/L (ref 3.5–5.1)
SERVICE CMNT-IMP: NORMAL
SOURCE, COVRS: NORMAL

## 2022-02-22 PROCEDURE — 77030002933 HC SUT MCRYL J&J -A: Performed by: SURGERY

## 2022-02-22 PROCEDURE — 74011250636 HC RX REV CODE- 250/636

## 2022-02-22 PROCEDURE — 74011000250 HC RX REV CODE- 250

## 2022-02-22 PROCEDURE — 36831 OPEN THROMBECT AV FISTULA: CPT | Performed by: SURGERY

## 2022-02-22 PROCEDURE — 77030034888 HC SUT PROL 2 J&J -B: Performed by: SURGERY

## 2022-02-22 PROCEDURE — 3331090002 HH PPS REVENUE DEBIT

## 2022-02-22 PROCEDURE — 74011250636 HC RX REV CODE- 250/636: Performed by: SURGERY

## 2022-02-22 PROCEDURE — 77030037400 HC ADH TISS HI VISC EXOFIN CHMP -B: Performed by: SURGERY

## 2022-02-22 PROCEDURE — 76060000033 HC ANESTHESIA 1 TO 1.5 HR: Performed by: SURGERY

## 2022-02-22 PROCEDURE — C1757 CATH, THROMBECTOMY/EMBOLECT: HCPCS | Performed by: SURGERY

## 2022-02-22 PROCEDURE — 87635 SARS-COV-2 COVID-19 AMP PRB: CPT

## 2022-02-22 PROCEDURE — 76210000006 HC OR PH I REC 0.5 TO 1 HR: Performed by: SURGERY

## 2022-02-22 PROCEDURE — 74011250636 HC RX REV CODE- 250/636: Performed by: ANESTHESIOLOGY

## 2022-02-22 PROCEDURE — 76210000020 HC REC RM PH II FIRST 0.5 HR: Performed by: SURGERY

## 2022-02-22 PROCEDURE — 3331090001 HH PPS REVENUE CREDIT

## 2022-02-22 PROCEDURE — 77030002987 HC SUT PROL J&J -B: Performed by: SURGERY

## 2022-02-22 PROCEDURE — C1894 INTRO/SHEATH, NON-LASER: HCPCS | Performed by: SURGERY

## 2022-02-22 PROCEDURE — 2709999900 HC NON-CHARGEABLE SUPPLY: Performed by: SURGERY

## 2022-02-22 PROCEDURE — 76010000161 HC OR TIME 1 TO 1.5 HR INTENSV-TIER 1: Performed by: SURGERY

## 2022-02-22 PROCEDURE — 77030019908 HC STETH ESOPH SIMS -A: Performed by: ANESTHESIOLOGY

## 2022-02-22 PROCEDURE — 77030010509 HC AIRWY LMA MSK TELE -A: Performed by: ANESTHESIOLOGY

## 2022-02-22 PROCEDURE — 82962 GLUCOSE BLOOD TEST: CPT

## 2022-02-22 PROCEDURE — 74011000258 HC RX REV CODE- 258

## 2022-02-22 PROCEDURE — 74011000636 HC RX REV CODE- 636: Performed by: SURGERY

## 2022-02-22 PROCEDURE — 77030031139 HC SUT VCRL2 J&J -A: Performed by: SURGERY

## 2022-02-22 PROCEDURE — 84132 ASSAY OF SERUM POTASSIUM: CPT

## 2022-02-22 RX ORDER — OXYCODONE HYDROCHLORIDE 5 MG/1
10 TABLET ORAL
Status: DISCONTINUED | OUTPATIENT
Start: 2022-02-22 | End: 2022-02-22 | Stop reason: HOSPADM

## 2022-02-22 RX ORDER — DIPHENHYDRAMINE HYDROCHLORIDE 50 MG/ML
12.5 INJECTION, SOLUTION INTRAMUSCULAR; INTRAVENOUS
Status: DISCONTINUED | OUTPATIENT
Start: 2022-02-22 | End: 2022-02-22 | Stop reason: HOSPADM

## 2022-02-22 RX ORDER — FENTANYL CITRATE 50 UG/ML
INJECTION, SOLUTION INTRAMUSCULAR; INTRAVENOUS AS NEEDED
Status: DISCONTINUED | OUTPATIENT
Start: 2022-02-22 | End: 2022-02-22

## 2022-02-22 RX ORDER — HEPARIN SODIUM 5000 [USP'U]/ML
INJECTION, SOLUTION INTRAVENOUS; SUBCUTANEOUS AS NEEDED
Status: DISCONTINUED | OUTPATIENT
Start: 2022-02-22 | End: 2022-02-22 | Stop reason: HOSPADM

## 2022-02-22 RX ORDER — PROPOFOL 10 MG/ML
INJECTION, EMULSION INTRAVENOUS AS NEEDED
Status: DISCONTINUED | OUTPATIENT
Start: 2022-02-22 | End: 2022-02-22 | Stop reason: HOSPADM

## 2022-02-22 RX ORDER — MIDAZOLAM HYDROCHLORIDE 1 MG/ML
2 INJECTION, SOLUTION INTRAMUSCULAR; INTRAVENOUS ONCE
Status: DISCONTINUED | OUTPATIENT
Start: 2022-02-22 | End: 2022-02-22 | Stop reason: HOSPADM

## 2022-02-22 RX ORDER — MIDAZOLAM HYDROCHLORIDE 1 MG/ML
2 INJECTION, SOLUTION INTRAMUSCULAR; INTRAVENOUS
Status: DISCONTINUED | OUTPATIENT
Start: 2022-02-22 | End: 2022-02-22 | Stop reason: HOSPADM

## 2022-02-22 RX ORDER — SODIUM CHLORIDE 9 MG/ML
25 INJECTION, SOLUTION INTRAVENOUS CONTINUOUS
Status: DISCONTINUED | OUTPATIENT
Start: 2022-02-22 | End: 2022-02-22 | Stop reason: HOSPADM

## 2022-02-22 RX ORDER — FENTANYL CITRATE 50 UG/ML
100 INJECTION, SOLUTION INTRAMUSCULAR; INTRAVENOUS ONCE
Status: DISCONTINUED | OUTPATIENT
Start: 2022-02-22 | End: 2022-02-22 | Stop reason: HOSPADM

## 2022-02-22 RX ORDER — HEPARIN SODIUM 1000 [USP'U]/ML
INJECTION, SOLUTION INTRAVENOUS; SUBCUTANEOUS AS NEEDED
Status: DISCONTINUED | OUTPATIENT
Start: 2022-02-22 | End: 2022-02-22 | Stop reason: HOSPADM

## 2022-02-22 RX ORDER — DEXAMETHASONE SODIUM PHOSPHATE 4 MG/ML
INJECTION, SOLUTION INTRA-ARTICULAR; INTRALESIONAL; INTRAMUSCULAR; INTRAVENOUS; SOFT TISSUE AS NEEDED
Status: DISCONTINUED | OUTPATIENT
Start: 2022-02-22 | End: 2022-02-22 | Stop reason: HOSPADM

## 2022-02-22 RX ORDER — SODIUM CHLORIDE, SODIUM LACTATE, POTASSIUM CHLORIDE, CALCIUM CHLORIDE 600; 310; 30; 20 MG/100ML; MG/100ML; MG/100ML; MG/100ML
75 INJECTION, SOLUTION INTRAVENOUS CONTINUOUS
Status: DISCONTINUED | OUTPATIENT
Start: 2022-02-22 | End: 2022-02-22 | Stop reason: HOSPADM

## 2022-02-22 RX ORDER — CEFAZOLIN SODIUM/WATER 2 G/20 ML
2 SYRINGE (ML) INTRAVENOUS ONCE
Status: DISCONTINUED | OUTPATIENT
Start: 2022-02-22 | End: 2022-02-22 | Stop reason: HOSPADM

## 2022-02-22 RX ORDER — LIDOCAINE HYDROCHLORIDE 10 MG/ML
0.1 INJECTION INFILTRATION; PERINEURAL AS NEEDED
Status: DISCONTINUED | OUTPATIENT
Start: 2022-02-22 | End: 2022-02-22 | Stop reason: HOSPADM

## 2022-02-22 RX ORDER — ONDANSETRON 2 MG/ML
INJECTION INTRAMUSCULAR; INTRAVENOUS AS NEEDED
Status: DISCONTINUED | OUTPATIENT
Start: 2022-02-22 | End: 2022-02-22 | Stop reason: HOSPADM

## 2022-02-22 RX ORDER — OXYCODONE HYDROCHLORIDE 5 MG/1
5 TABLET ORAL
Status: DISCONTINUED | OUTPATIENT
Start: 2022-02-22 | End: 2022-02-22 | Stop reason: HOSPADM

## 2022-02-22 RX ORDER — NALOXONE HYDROCHLORIDE 0.4 MG/ML
0.1 INJECTION, SOLUTION INTRAMUSCULAR; INTRAVENOUS; SUBCUTANEOUS
Status: DISCONTINUED | OUTPATIENT
Start: 2022-02-22 | End: 2022-02-22 | Stop reason: HOSPADM

## 2022-02-22 RX ORDER — FLUMAZENIL 0.1 MG/ML
0.2 INJECTION INTRAVENOUS AS NEEDED
Status: DISCONTINUED | OUTPATIENT
Start: 2022-02-22 | End: 2022-02-22 | Stop reason: HOSPADM

## 2022-02-22 RX ORDER — EPHEDRINE SULFATE/0.9% NACL/PF 50 MG/5 ML
SYRINGE (ML) INTRAVENOUS AS NEEDED
Status: DISCONTINUED | OUTPATIENT
Start: 2022-02-22 | End: 2022-02-22 | Stop reason: HOSPADM

## 2022-02-22 RX ORDER — LIDOCAINE HYDROCHLORIDE 20 MG/ML
INJECTION, SOLUTION EPIDURAL; INFILTRATION; INTRACAUDAL; PERINEURAL AS NEEDED
Status: DISCONTINUED | OUTPATIENT
Start: 2022-02-22 | End: 2022-02-22 | Stop reason: HOSPADM

## 2022-02-22 RX ORDER — HYDROMORPHONE HYDROCHLORIDE 2 MG/ML
0.5 INJECTION, SOLUTION INTRAMUSCULAR; INTRAVENOUS; SUBCUTANEOUS
Status: DISCONTINUED | OUTPATIENT
Start: 2022-02-22 | End: 2022-02-22 | Stop reason: HOSPADM

## 2022-02-22 RX ADMIN — ONDANSETRON 4 MG: 2 INJECTION INTRAMUSCULAR; INTRAVENOUS at 11:35

## 2022-02-22 RX ADMIN — PHENYLEPHRINE HYDROCHLORIDE 50 MCG: 10 INJECTION INTRAVENOUS at 11:08

## 2022-02-22 RX ADMIN — PROPOFOL 120 MG: 10 INJECTION, EMULSION INTRAVENOUS at 11:04

## 2022-02-22 RX ADMIN — VASOPRESSIN 1 UNITS: 20 INJECTION PARENTERAL at 11:45

## 2022-02-22 RX ADMIN — Medication 10 MG: at 11:22

## 2022-02-22 RX ADMIN — PHENYLEPHRINE HYDROCHLORIDE 100 MCG: 10 INJECTION INTRAVENOUS at 11:12

## 2022-02-22 RX ADMIN — LIDOCAINE HYDROCHLORIDE 100 MG: 20 INJECTION, SOLUTION EPIDURAL; INFILTRATION; INTRACAUDAL; PERINEURAL at 11:04

## 2022-02-22 RX ADMIN — PHENYLEPHRINE HYDROCHLORIDE 100 MCG: 10 INJECTION INTRAVENOUS at 11:42

## 2022-02-22 RX ADMIN — HEPARIN SODIUM 400 UNITS: 1000 INJECTION, SOLUTION INTRAVENOUS; SUBCUTANEOUS at 11:40

## 2022-02-22 RX ADMIN — DEXAMETHASONE SODIUM PHOSPHATE 4 MG: 4 INJECTION, SOLUTION INTRAMUSCULAR; INTRAVENOUS at 11:35

## 2022-02-22 RX ADMIN — Medication 10 MG: at 11:42

## 2022-02-22 RX ADMIN — PHENYLEPHRINE HYDROCHLORIDE 100 MCG: 10 INJECTION INTRAVENOUS at 11:15

## 2022-02-22 RX ADMIN — Medication 10 MG: at 11:28

## 2022-02-22 RX ADMIN — VASOPRESSIN 1 UNITS: 20 INJECTION PARENTERAL at 12:11

## 2022-02-22 RX ADMIN — Medication 5 MG: at 11:15

## 2022-02-22 RX ADMIN — PHENYLEPHRINE HYDROCHLORIDE 100 MCG: 10 INJECTION INTRAVENOUS at 11:10

## 2022-02-22 RX ADMIN — PHENYLEPHRINE HYDROCHLORIDE 100 MCG: 10 INJECTION INTRAVENOUS at 11:31

## 2022-02-22 RX ADMIN — SODIUM CHLORIDE 25 ML/HR: 900 INJECTION, SOLUTION INTRAVENOUS at 08:49

## 2022-02-22 RX ADMIN — PHENYLEPHRINE HYDROCHLORIDE 50 MCG: 10 INJECTION INTRAVENOUS at 11:28

## 2022-02-22 NOTE — BRIEF OP NOTE
[address]   [facility] FAX: [phone]    Brief Op Note Template Note    Pre-Op Diagnosis: Thrombosis of kidney dialysis arteriovenous graft[facility] [S45.282H]    Post-Op Diagnosis: Thrombosis of kidney dialysis arteriovenous graft, initial encounter [facility]) [T35.725U]    Procedures: Procedure(s):  LEFT BRACHI CEPHALIC AV FISTULA DECLOT    Surgeon: Gertrude Berg MD    Assistants: Surgeon(s): Milena Hurtado MD      Anesthesia:  General     Findings: Left axillary axillary graft declot patent    Tourniquet Time:  * No tourniquets in log *    Estimated Blood Loss:               Specimens:            Implants:  * No implants in log *    Complications: None               Signed: Gertrude Berg MD      Elements of this note have been dictated using speech recognition software. As a result, errors of speech recognition may have occurred.

## 2022-02-22 NOTE — ANESTHESIA POSTPROCEDURE EVALUATION
Procedure(s):  LEFT BRACHI CEPHALIC AV FISTULA DECLOT. general    Anesthesia Post Evaluation      Multimodal analgesia: multimodal analgesia used between 6 hours prior to anesthesia start to PACU discharge  Patient location during evaluation: PACU  Patient participation: complete - patient participated  Level of consciousness: awake and alert  Pain management: adequate  Airway patency: patent  Anesthetic complications: no  Cardiovascular status: acceptable  Respiratory status: acceptable  Hydration status: acceptable  Post anesthesia nausea and vomiting:  none  Final Post Anesthesia Temperature Assessment:  Normothermia (36.0-37.5 degrees C)      INITIAL Post-op Vital signs:   Vitals Value Taken Time   /54 02/22/22 1301   Temp 36.2 °C (97.2 °F) 02/22/22 1223   Pulse 74 02/22/22 1303   Resp 16 02/22/22 1256   SpO2 91 % 02/22/22 1303   Vitals shown include unvalidated device data.

## 2022-02-22 NOTE — PERIOP NOTES
Pt and nephew Foreign Parker given discharge instructions at bedside, both verbalize understanding. All questions answered.

## 2022-02-22 NOTE — OP NOTES
[facility]  OPERATIVE REPORT    Name:  Juanito Pollard  MR#:  [id number]  :  1950  ACCOUNT #:  [de-identified]  DATE OF SERVICE:  2022    CLINICAL SERVICE:  Vascular Surgery. PREOPERATIVE DIAGNOSIS:  Occluded left upper axillary loop graft. POSTOPERATIVE DIAGNOSIS:  Occluded left upper axillary loop graft. PROCEDURES PERFORMED:  1. Open thromboembolectomy of left upper arm graft. 2.  Fistulogram with central venogram.    SURGEON:  Ivory Najjar, MD    ASSISTANT:      ANESTHESIA:  General.    COMPLICATIONS:  None. SPECIMENS REMOVED:  None. IMPLANTS:     ESTIMATED BLOOD LOSS:      INDICATION FOR PROCEDURE:  This is a 67-year-old female who had a graft on the left arm placed at an outside hospital.  Preop ultrasound showed that the graft was oriented and that the venous limb was medial and arterial limb was lateral.  We made a counterincision on the lateral incision. PROCEDURE:  After getting informed consent, the patient was brought to the operating room. Anesthesia was then induced. Preop antibiotics were given before skin incision. The patient's left arm was then prepped and draped in the normal sterile fashion. Incision was made over the lateral portion of the graft. We dissected down through the subcutaneous tissue where the graft was incorporated. We got control with vessel loops. We gave 4000 units of heparin. We got a #11-blade, used to do arteriotomy confirming the graft was occluded. We passed a Sousa catheter down to the venous limb, which was medial, multiple times and got organized thrombus back. We got adequate backbleeding. A #8 shunt was then placed and we did digital subtraction which showed the outflow anastomosis was patent. The axillary stent at the distal anastomosis was also patent. The subclavian and central veins were all patent. We then removed the sheath and clamped the graft.   We passed a #4 catheter multiple times to the arterial limb, which was lateral, and got pulsatile flow back. We clamped the graft. We then closed the graft with 5-0 Prolene, 3-0 for the subcu and 4-0 for the skin. The patient was extubated and returned to the PACU in stable condition.       Jaylen Graham MD      [id number]  D:  02/22/2022 12:20  T:  02/22/2022 14:08  JOB #:  [id number]

## 2022-02-22 NOTE — H&P
[address]   [address] [facility], [address] [facility]. [address] FAX: [phone]    Conchita Fischer  1950    No chief complaint on file. HPI   Ms. Denisa Johnson is a 80y.o. year old female who presents with an occluded left upper arm graft. Unsure etiology or when the graft was placed. Patient last seen at [facility] in 2019 in office. Current Facility-Administered Medications   Medication Dose Route Frequency Provider Last Rate Last Admin    lidocaine (XYLOCAINE) 10 mg/mL (1 %) injection 0.1 mL  0.1 mL SubCUTAneous PRN Rob Shoemaker MD        fentaNYL citrate (PF) injection 100 mcg  100 mcg IntraVENous ONCE Rob Shoemaker MD        midazolam (VERSED) injection 2 mg  2 mg IntraVENous ONCE PRN Rob Shoemaker MD        midazolam (VERSED) injection 2 mg  2 mg IntraVENous ONCE Rob Shoemaker MD        0.9% sodium chloride infusion  25 mL/hr IntraVENous CONTINUOUS Rob Shoemaker MD         Allergies   Allergen Reactions    Pcn [Penicillins] Rash    Vancomycin Rash     Past Medical History:   Diagnosis Date    Arthritis     AVF (arteriovenous fistula) ([address] 12/20/2016 12/6/16 ([facility]) Right AVF revision and thrombectomy    AVF (arteriovenous fistula) ([address]     left    Cancer ([address] 2015    L kidney    Chronic kidney disease     HD in M[facility]F- at [facility]    Degenerative joint disease     Diabetes ([address]     checks QD, normal 120-140, denies hypo; Last A1C 11/13/21 5.3    Enlarged lymph node     \"enlarging paratracheal node to 2.6 cm on CT chest\"    ESRD (end stage renal disease) [facility]) Nov 2006    ESRD.  MWF dialysis     Hypertension     medication    Mediastinal mass     Obesity     On home O2     2L QHS and PRN during day d/t \"lymph node in chest\"    Renal cancer ([address]     Transient ischemic attack 08/29/2015    no residual     Family History   Problem Relation Age of Onset    Diabetes Mother     Heart Disease Mother    Shay Ramirez Hypertension Mother     Heart Disease Father     Hypertension Father     Malignant Hyperthermia Neg Hx     Pseudocholinesterase Deficiency Neg Hx     Delayed Awakening Neg Hx     Post-op Nausea/Vomiting Neg Hx     Emergence Delirium Neg Hx     Other Neg Hx     Post-op Cognitive Dysfunction Neg Hx      Past Surgical History:   Procedure Laterality Date    COLONOSCOPY N/A 11/8/2018    COLONOSCOPY  BMI 36 performed by Brandon Louis MD at [facility] ENDOSCOPY    HX GI  06/01/2002    colon resection resulting in temporary colostomy reversal    HX GI  08/06/2018    exploratory laparotomy    HX GYN      stephan    HX OTHER SURGICAL      dialysis fistula, several permcaths    HX UROLOGICAL Left July 2015    nephrectomy    HX VASCULAR ACCESS      IR DECLOT AV GRAFT PERCUTANEOUS Left 2021    IR INSERT TUNL CVC W/O PORT OVER 5 YR  11/19/2021    IR PLC CATH AV SHUNT IN W PTA SI VENOUS  5/30/2019    IR PLC CATH AV SHUNT IN W PTA SI VENOUS RT  2/28/2019    IR PLC CATH AV SHUNT IN W PTA SI VENOUS RT  9/3/2019    IR PLC CATH AV SHUNT IN W PTA SI VENOUS RT  12/5/2019    IR PLC CATH AV SHUNT IN W PTA SI VENOUS RT  3/10/2020    RI BREAST SURGERY PROCEDURE UNLISTED Right     cyst removed    VASCULAR SURGERY PROCEDURE UNLIST Right     AV graft- states no longer uses    VASCULAR SURGERY PROCEDURE UNLIST Left     2/21/22 states current access used for dialysis     Social History     Tobacco Use    Smoking status: Never Smoker    Smokeless tobacco: Never Used   Substance Use Topics    Alcohol use: No        Review of Systems  Constitutional: Negative for fever and chills. HENT: Negative for congestion and sore throat. Skin: Negative for rash and itching. Eyes: Negative for blurred vision and double vision. Respiratory: Negative for cough and shortness of breath. Cardiovascular: Negative for chest pain, palpitations, claudication and leg swelling.    Gastrointestinal: Negative for nausea, vomiting and abdominal pain. Genitourinary: Negative for dysuria, hematuria and flank pain. Musculoskeletal: Negative for joint pain and falls. Neurological: Negative for dizziness, sensory change, focal weakness, loss of consciousness and headaches. PHYSICAL EXAM     Vitals:    02/22/22 0802   BP: (!) 111/56   BP 1 Location: Right upper arm   BP Patient Position: At rest   Pulse: 78   Temp: 97.7 °F (36.5 °C)   Resp: 20   Height: 5' 4\" (1.626 m)   Weight: 189 lb 3.2 oz (85.8 kg)   SpO2: 96%        Constitutional: she appears well-developed. No distress. HENT: Hearing intact. Head: Atraumatic. Eyes: Pupils are equal, round, and reactive to light. Neck: Normal range of motion. Cardiovascular: Regular rhythm. Pulmonary/Chest: Effort normal and breath sounds normal. No respiratory distress. Abdominal: Soft. Bowel sounds are normal. she exhibits no distension. There is no tenderness. There is no guarding. No hernia. Musculoskeletal: Normal range of motion. Neurological: She is alert. CN II- XII grossly intact  Skin: Warm and dry. Vascular: No thrill or bruit in upper arm graft      Imaging Results      ASSESSMENT AND PLAN   Ms. Ariana Laughlin is a 80y.o. year old female end-stage renal disease with an occluded left upper arm graft. The anatomy looks like an axillary axillary loop graft. I will get an ultrasound in the preop area to better evaluate the anatomy. Patient been marked and consented. Elements of this note have been dictated using speech recognition software. As a result, errors of speech recognition may have occurred.

## 2022-02-22 NOTE — ANESTHESIA PREPROCEDURE EVALUATION
Relevant Problems   No relevant active problems       Anesthetic History               Review of Systems / Medical History  Patient summary reviewed and pertinent labs reviewed    Pulmonary                   Neuro/Psych         TIA     Cardiovascular    Hypertension              Exercise tolerance: >4 METS  Comments: TTE 2015:  -  Left ventricle: Systolic function was normal. Ejection fraction was  estimated in the range of 60 % to 65 %. There were no regional wall motion  abnormalities. There was mild concentric hypertrophy. Doppler parameters were  consistent with mild diastolic dysfunction (grade 1).    -  Right ventricle: There was mild pulmonary artery hypertension.     -  Atrial septum: Agitated saline contrast injection (bubble study) was  performed. There was no right-to-left shunt, at rest or induced by the   Valsalva  maneuver.    GI/Hepatic/Renal         Renal disease: ESRD and dialysis       Endo/Other    Diabetes: type 2    Obesity and cancer (Renal Cell)     Other Findings            Physical Exam    Airway  Mallampati: II  TM Distance: > 6 cm  Neck ROM: normal range of motion   Mouth opening: Normal     Cardiovascular  Regular rate and rhythm,  S1 and S2 normal,  no murmur, click, rub, or gallop             Dental  No notable dental hx       Pulmonary  Breath sounds clear to auscultation               Abdominal         Other Findings            Anesthetic Plan    ASA: 3  Anesthesia type: general            Anesthetic plan and risks discussed with: Patient

## 2022-02-23 PROCEDURE — 3331090002 HH PPS REVENUE DEBIT

## 2022-02-23 PROCEDURE — 3331090001 HH PPS REVENUE CREDIT

## 2022-02-24 PROCEDURE — 3331090001 HH PPS REVENUE CREDIT

## 2022-02-24 PROCEDURE — 3331090002 HH PPS REVENUE DEBIT

## 2022-02-25 ENCOUNTER — HOME CARE VISIT (OUTPATIENT)
Dept: SCHEDULING | Facility: HOME HEALTH | Age: 72
End: 2022-02-25
Payer: MEDICARE

## 2022-02-25 VITALS
DIASTOLIC BLOOD PRESSURE: 84 MMHG | TEMPERATURE: 98.2 F | SYSTOLIC BLOOD PRESSURE: 132 MMHG | HEART RATE: 99 BPM | OXYGEN SATURATION: 96 % | RESPIRATION RATE: 17 BRPM

## 2022-02-25 LAB
ACID FAST STN SPEC: NEGATIVE
MYCOBACTERIUM SPEC QL CULT: NEGATIVE
SPECIMEN PREPARATION: NORMAL
SPECIMEN SOURCE: NORMAL

## 2022-02-25 PROCEDURE — G0299 HHS/HOSPICE OF RN EA 15 MIN: HCPCS

## 2022-02-25 PROCEDURE — 3331090002 HH PPS REVENUE DEBIT

## 2022-02-25 PROCEDURE — G0152 HHCP-SERV OF OT,EA 15 MIN: HCPCS

## 2022-02-25 PROCEDURE — 400013 HH SOC

## 2022-02-25 PROCEDURE — 3331090001 HH PPS REVENUE CREDIT

## 2022-02-26 PROCEDURE — 3331090002 HH PPS REVENUE DEBIT

## 2022-02-26 PROCEDURE — 3331090001 HH PPS REVENUE CREDIT

## 2022-02-27 VITALS
RESPIRATION RATE: 19 BRPM | DIASTOLIC BLOOD PRESSURE: 84 MMHG | OXYGEN SATURATION: 96 % | HEART RATE: 100 BPM | SYSTOLIC BLOOD PRESSURE: 132 MMHG | TEMPERATURE: 98.2 F

## 2022-02-27 PROCEDURE — 3331090001 HH PPS REVENUE CREDIT

## 2022-02-27 PROCEDURE — 3331090002 HH PPS REVENUE DEBIT

## 2022-02-28 PROCEDURE — 3331090001 HH PPS REVENUE CREDIT

## 2022-02-28 PROCEDURE — 3331090002 HH PPS REVENUE DEBIT

## 2022-03-01 ENCOUNTER — HOME CARE VISIT (OUTPATIENT)
Dept: SCHEDULING | Facility: HOME HEALTH | Age: 72
End: 2022-03-01
Payer: MEDICARE

## 2022-03-01 VITALS
OXYGEN SATURATION: 96 % | DIASTOLIC BLOOD PRESSURE: 84 MMHG | RESPIRATION RATE: 17 BRPM | SYSTOLIC BLOOD PRESSURE: 128 MMHG | HEART RATE: 95 BPM | TEMPERATURE: 97.9 F

## 2022-03-01 PROCEDURE — 3331090002 HH PPS REVENUE DEBIT

## 2022-03-01 PROCEDURE — G0299 HHS/HOSPICE OF RN EA 15 MIN: HCPCS

## 2022-03-01 PROCEDURE — 3331090001 HH PPS REVENUE CREDIT

## 2022-03-02 PROCEDURE — 3331090002 HH PPS REVENUE DEBIT

## 2022-03-02 PROCEDURE — 3331090001 HH PPS REVENUE CREDIT

## 2022-03-03 PROCEDURE — 3331090001 HH PPS REVENUE CREDIT

## 2022-03-03 PROCEDURE — 3331090002 HH PPS REVENUE DEBIT

## 2022-03-04 ENCOUNTER — HOME CARE VISIT (OUTPATIENT)
Dept: SCHEDULING | Facility: HOME HEALTH | Age: 72
End: 2022-03-04
Payer: MEDICARE

## 2022-03-04 PROCEDURE — 3331090001 HH PPS REVENUE CREDIT

## 2022-03-04 PROCEDURE — G0299 HHS/HOSPICE OF RN EA 15 MIN: HCPCS

## 2022-03-04 PROCEDURE — 3331090002 HH PPS REVENUE DEBIT

## 2022-03-05 PROCEDURE — 3331090001 HH PPS REVENUE CREDIT

## 2022-03-05 PROCEDURE — 3331090002 HH PPS REVENUE DEBIT

## 2022-03-06 VITALS
TEMPERATURE: 97.9 F | HEART RATE: 74 BPM | RESPIRATION RATE: 16 BRPM | SYSTOLIC BLOOD PRESSURE: 118 MMHG | DIASTOLIC BLOOD PRESSURE: 76 MMHG | OXYGEN SATURATION: 99 %

## 2022-03-06 PROCEDURE — 3331090002 HH PPS REVENUE DEBIT

## 2022-03-06 PROCEDURE — 3331090001 HH PPS REVENUE CREDIT

## 2022-03-07 PROCEDURE — 3331090002 HH PPS REVENUE DEBIT

## 2022-03-07 PROCEDURE — 3331090001 HH PPS REVENUE CREDIT

## 2022-03-08 ENCOUNTER — HOME CARE VISIT (OUTPATIENT)
Dept: SCHEDULING | Facility: HOME HEALTH | Age: 72
End: 2022-03-08
Payer: MEDICARE

## 2022-03-08 VITALS
OXYGEN SATURATION: 99 % | TEMPERATURE: 97.7 F | RESPIRATION RATE: 18 BRPM | DIASTOLIC BLOOD PRESSURE: 84 MMHG | HEART RATE: 77 BPM | SYSTOLIC BLOOD PRESSURE: 132 MMHG

## 2022-03-08 PROCEDURE — 3331090002 HH PPS REVENUE DEBIT

## 2022-03-08 PROCEDURE — G0299 HHS/HOSPICE OF RN EA 15 MIN: HCPCS

## 2022-03-08 PROCEDURE — 3331090001 HH PPS REVENUE CREDIT

## 2022-03-09 PROCEDURE — 3331090002 HH PPS REVENUE DEBIT

## 2022-03-09 PROCEDURE — 3331090001 HH PPS REVENUE CREDIT

## 2022-03-10 PROCEDURE — 3331090001 HH PPS REVENUE CREDIT

## 2022-03-10 PROCEDURE — 3331090002 HH PPS REVENUE DEBIT

## 2022-03-11 PROCEDURE — 3331090002 HH PPS REVENUE DEBIT

## 2022-03-11 PROCEDURE — 3331090001 HH PPS REVENUE CREDIT

## 2022-03-12 PROCEDURE — 3331090001 HH PPS REVENUE CREDIT

## 2022-03-12 PROCEDURE — 3331090002 HH PPS REVENUE DEBIT

## 2022-03-13 PROCEDURE — 3331090002 HH PPS REVENUE DEBIT

## 2022-03-13 PROCEDURE — 3331090001 HH PPS REVENUE CREDIT

## 2022-03-14 ENCOUNTER — ANESTHESIA EVENT (OUTPATIENT)
Dept: SURGERY | Age: 72
End: 2022-03-14

## 2022-03-14 PROCEDURE — 3331090002 HH PPS REVENUE DEBIT

## 2022-03-14 PROCEDURE — 3331090001 HH PPS REVENUE CREDIT

## 2022-03-14 RX ORDER — HYDROCODONE BITARTRATE AND ACETAMINOPHEN 5; 325 MG/1; MG/1
2 TABLET ORAL AS NEEDED
Status: CANCELLED | OUTPATIENT
Start: 2022-03-14

## 2022-03-14 RX ORDER — OXYCODONE HYDROCHLORIDE 5 MG/1
5 TABLET ORAL
Status: CANCELLED | OUTPATIENT
Start: 2022-03-14 | End: 2022-03-15

## 2022-03-14 RX ORDER — HYDROMORPHONE HYDROCHLORIDE 2 MG/ML
0.5 INJECTION, SOLUTION INTRAMUSCULAR; INTRAVENOUS; SUBCUTANEOUS
Status: CANCELLED | OUTPATIENT
Start: 2022-03-14

## 2022-03-14 RX ORDER — SODIUM CHLORIDE, SODIUM LACTATE, POTASSIUM CHLORIDE, CALCIUM CHLORIDE 600; 310; 30; 20 MG/100ML; MG/100ML; MG/100ML; MG/100ML
75 INJECTION, SOLUTION INTRAVENOUS CONTINUOUS
Status: CANCELLED | OUTPATIENT
Start: 2022-03-14

## 2022-03-15 ENCOUNTER — HOSPITAL ENCOUNTER (OUTPATIENT)
Age: 72
Setting detail: OUTPATIENT SURGERY
Discharge: HOME | End: 2022-03-15
Attending: SURGERY | Admitting: SURGERY
Payer: MEDICARE

## 2022-03-15 ENCOUNTER — APPOINTMENT (OUTPATIENT)
Dept: INTERVENTIONAL RADIOLOGY/VASCULAR | Age: 72
End: 2022-03-15
Attending: SURGERY
Payer: MEDICARE

## 2022-03-15 ENCOUNTER — ANESTHESIA (OUTPATIENT)
Dept: SURGERY | Age: 72
End: 2022-03-15

## 2022-03-15 VITALS
DIASTOLIC BLOOD PRESSURE: 66 MMHG | HEIGHT: 64 IN | BODY MASS INDEX: 30.56 KG/M2 | OXYGEN SATURATION: 91 % | HEART RATE: 83 BPM | TEMPERATURE: 97.3 F | WEIGHT: 179 LBS | RESPIRATION RATE: 16 BRPM | SYSTOLIC BLOOD PRESSURE: 141 MMHG

## 2022-03-15 DIAGNOSIS — R19.04 LEFT LOWER QUADRANT ABDOMINAL MASS: ICD-10-CM

## 2022-03-15 LAB
GLUCOSE BLD STRIP.AUTO-MCNC: 107 MG/DL (ref 65–100)
SERVICE CMNT-IMP: ABNORMAL

## 2022-03-15 PROCEDURE — 77030018389 HC SPNG HEMSTAT1 J&J -B

## 2022-03-15 PROCEDURE — C1750 CATH, HEMODIALYSIS,LONG-TERM: HCPCS

## 2022-03-15 PROCEDURE — 77030010507 HC ADH SKN DERMBND J&J -B

## 2022-03-15 PROCEDURE — 3331090001 HH PPS REVENUE CREDIT

## 2022-03-15 PROCEDURE — 77030018719 HC DRSG PTCH ANTIMIC J&J -A

## 2022-03-15 PROCEDURE — C1894 INTRO/SHEATH, NON-LASER: HCPCS

## 2022-03-15 PROCEDURE — 77030002916 HC SUT ETHLN J&J -A

## 2022-03-15 PROCEDURE — 77030031131 HC SUT MXN P COVD -B

## 2022-03-15 PROCEDURE — 74011250636 HC RX REV CODE- 250/636: Performed by: RADIOLOGY

## 2022-03-15 PROCEDURE — 77001 FLUOROGUIDE FOR VEIN DEVICE: CPT

## 2022-03-15 PROCEDURE — C1769 GUIDE WIRE: HCPCS

## 2022-03-15 PROCEDURE — 3331090002 HH PPS REVENUE DEBIT

## 2022-03-15 PROCEDURE — 99024 POSTOP FOLLOW-UP VISIT: CPT | Performed by: SURGERY

## 2022-03-15 PROCEDURE — 82962 GLUCOSE BLOOD TEST: CPT

## 2022-03-15 RX ORDER — FENTANYL CITRATE 50 UG/ML
100 INJECTION, SOLUTION INTRAMUSCULAR; INTRAVENOUS ONCE
Status: DISCONTINUED | OUTPATIENT
Start: 2022-03-15 | End: 2022-03-15 | Stop reason: HOSPADM

## 2022-03-15 RX ORDER — LIDOCAINE HYDROCHLORIDE AND EPINEPHRINE 20; 10 MG/ML; UG/ML
20-200 INJECTION, SOLUTION INFILTRATION; PERINEURAL ONCE
Status: DISCONTINUED | OUTPATIENT
Start: 2022-03-15 | End: 2022-03-15 | Stop reason: HOSPADM

## 2022-03-15 RX ORDER — CLINDAMYCIN PHOSPHATE 900 MG/50ML
900 INJECTION INTRAVENOUS
Status: DISCONTINUED | OUTPATIENT
Start: 2022-03-15 | End: 2022-03-15

## 2022-03-15 RX ORDER — CEFAZOLIN SODIUM/WATER 2 G/20 ML
2 SYRINGE (ML) INTRAVENOUS
Status: CANCELLED | OUTPATIENT
Start: 2022-03-15 | End: 2022-03-16

## 2022-03-15 RX ORDER — LIDOCAINE HYDROCHLORIDE 10 MG/ML
0.1 INJECTION INFILTRATION; PERINEURAL AS NEEDED
Status: DISCONTINUED | OUTPATIENT
Start: 2022-03-15 | End: 2022-03-16 | Stop reason: HOSPADM

## 2022-03-15 RX ORDER — MIDAZOLAM HYDROCHLORIDE 1 MG/ML
2 INJECTION, SOLUTION INTRAMUSCULAR; INTRAVENOUS
Status: DISCONTINUED | OUTPATIENT
Start: 2022-03-15 | End: 2022-03-16 | Stop reason: HOSPADM

## 2022-03-15 RX ORDER — SODIUM CHLORIDE, SODIUM LACTATE, POTASSIUM CHLORIDE, CALCIUM CHLORIDE 600; 310; 30; 20 MG/100ML; MG/100ML; MG/100ML; MG/100ML
75 INJECTION, SOLUTION INTRAVENOUS CONTINUOUS
Status: DISCONTINUED | OUTPATIENT
Start: 2022-03-15 | End: 2022-03-16 | Stop reason: HOSPADM

## 2022-03-15 RX ORDER — HEPARIN SODIUM 1000 [USP'U]/ML
1000-10000 INJECTION, SOLUTION INTRAVENOUS; SUBCUTANEOUS ONCE
Status: COMPLETED | OUTPATIENT
Start: 2022-03-15 | End: 2022-03-15

## 2022-03-15 RX ORDER — MIDAZOLAM HYDROCHLORIDE 1 MG/ML
.25-2 INJECTION, SOLUTION INTRAMUSCULAR; INTRAVENOUS
Status: DISCONTINUED | OUTPATIENT
Start: 2022-03-15 | End: 2022-03-16 | Stop reason: HOSPADM

## 2022-03-15 RX ORDER — FENTANYL CITRATE 50 UG/ML
12.5-1 INJECTION, SOLUTION INTRAMUSCULAR; INTRAVENOUS
Status: DISCONTINUED | OUTPATIENT
Start: 2022-03-15 | End: 2022-03-16 | Stop reason: HOSPADM

## 2022-03-15 RX ORDER — SODIUM CHLORIDE 9 MG/ML
25 INJECTION, SOLUTION INTRAVENOUS CONTINUOUS
Status: DISCONTINUED | OUTPATIENT
Start: 2022-03-15 | End: 2022-03-16 | Stop reason: HOSPADM

## 2022-03-15 RX ORDER — MIDAZOLAM HYDROCHLORIDE 1 MG/ML
4 INJECTION, SOLUTION INTRAMUSCULAR; INTRAVENOUS ONCE
Status: DISCONTINUED | OUTPATIENT
Start: 2022-03-15 | End: 2022-03-15 | Stop reason: HOSPADM

## 2022-03-15 RX ADMIN — FENTANYL CITRATE 25 MCG: 50 INJECTION INTRAMUSCULAR; INTRAVENOUS at 09:47

## 2022-03-15 RX ADMIN — SODIUM CHLORIDE 25 ML/HR: 900 INJECTION, SOLUTION INTRAVENOUS at 09:34

## 2022-03-15 RX ADMIN — FENTANYL CITRATE 25 MCG: 50 INJECTION INTRAMUSCULAR; INTRAVENOUS at 09:51

## 2022-03-15 RX ADMIN — FENTANYL CITRATE 50 MCG: 50 INJECTION INTRAMUSCULAR; INTRAVENOUS at 09:59

## 2022-03-15 RX ADMIN — MIDAZOLAM 1 MG: 1 INJECTION INTRAMUSCULAR; INTRAVENOUS at 09:47

## 2022-03-15 RX ADMIN — HEPARIN SODIUM 4200 UNITS: 1000 INJECTION INTRAVENOUS; SUBCUTANEOUS at 10:06

## 2022-03-15 RX ADMIN — MIDAZOLAM 1 MG: 1 INJECTION INTRAMUSCULAR; INTRAVENOUS at 09:59

## 2022-03-15 NOTE — H&P
[address]   [address] [facility], [address] [facility]. [address] FAX: [phone]    Brad Fischer  1950    No chief complaint on file. HPI   Ms. Joyce Roman is a 80y.o. year old female who end-stage renal disease presents for occluded left upper arm graft. Patient just declotted 3 weeks ago. At that time patient was deemed his fistula unusable. Will need a new access. Current Facility-Administered Medications   Medication Dose Route Frequency Provider Last Rate Last Admin    lidocaine (XYLOCAINE) 10 mg/mL (1 %) injection 0.1 mL  0.1 mL SubCUTAneous PRN Josefina Ortiz MD        lactated Ringers infusion  75 mL/hr IntraVENous CONTINUOUS Josefina Ortiz MD        fentaNYL citrate (PF) injection 100 mcg  100 mcg IntraVENous ONCE Josefina Ortiz MD        midazolam (VERSED) injection 2 mg  2 mg IntraVENous ONCE PRN Letha Chinpool., MD        midazolam (VERSED) injection 4 mg  4 mg IntraVENous ONCE Josefina Ortiz MD         Allergies   Allergen Reactions    Pcn [Penicillins] Rash    Vancomycin Rash     Past Medical History:   Diagnosis Date    Anemia     Arthritis     [facility]) [address] 12/20/2016 12/6/16 ([facility]) Right AVF revision and thrombectomy    AVF [facility]oven[facility] fistula) ([facility])     left    Chronic kidney disease     HD in [facility] at [facility]    Degenerative joint disease     Diabetes ([address]     checks QD, normal 120-140, denies hypo; Last A1C 11/13/21 5.3    Enlarged lymph node     \"enlarging paratracheal node to 2.6 cm on CT chest\"    ESRD on hemodialysis ([address] 11/2006    ESRD.  MWF dialysis     Hypertension     medication    Mediastinal mass     Obesity     On home O2     2L QHS and PRN during day d/t \"lymph node in chest\"    Renal cancer ([address]     Dr Chavez Ramos - renal cancer L nephrectomy--- mass now in renal/nephrectomy bed    Shortness of breath     swollen lymphnode in chest-- oxygen 2LPM  Transient ischemic attack 08/29/2015    no residual     Family History   Problem Relation Age of Onset    Diabetes Mother     Heart Disease Mother     Hypertension Mother     Heart Disease Father     Hypertension Father     Malignant Hyperthermia Neg Hx     Pseudocholinesterase Deficiency Neg Hx     Delayed Awakening Neg Hx     Post-op Nausea/Vomiting Neg Hx     Emergence Delirium Neg Hx     Other Neg Hx     Post-op Cognitive Dysfunction Neg Hx      Past Surgical History:   Procedure Laterality Date    COLONOSCOPY N/A 11/8/2018    COLONOSCOPY  BMI 36 performed by Rachelle [facility] at [facility] ENDOSCOPY    HX GI  06/01/2002    colon resection resulting in temporary colostomy reversal    HX GI  08/06/2018    exploratory laparotomy    HX GYN      stephan    HX OTHER SURGICAL      dialysis fistula, several permcaths    HX UROLOGICAL Left July 2015    nephrectomy    HX VASCULAR ACCESS      IR DECLOT AV GRAFT PERCUTANEOUS Left 2021    IR INSERT TUNL CVC W/O PORT OVER 5 YR  11/19/2021    IR PLC CATH AV SHUNT IN W PTA SI VENOUS  5/30/2019    IR PLC CATH AV SHUNT IN W PTA SI VENOUS RT  2/28/2019    IR PLC CATH AV SHUNT IN W PTA SI VENOUS RT  9/3/2019    IR PLC CATH AV SHUNT IN W PTA SI VENOUS RT  12/5/2019    IR PLC CATH AV SHUNT IN W PTA SI VENOUS RT  3/10/2020    KS BREAST SURGERY PROCEDURE UNLISTED Right     cyst removed    VASCULAR SURGERY PROCEDURE UNLIST Right     AV graft- states no longer uses    VASCULAR SURGERY PROCEDURE UNLIST Left 02/21/2022    Open thromboembolectomy of left upper arm graft. Social History     Tobacco Use    Smoking status: Never Smoker    Smokeless tobacco: Never Used   Substance Use Topics    Alcohol use: No        Review of Systems  Constitutional: Negative for fever and chills. HENT: Negative for congestion and sore throat. Skin: Negative for rash and itching. Eyes: Negative for blurred vision and double vision.    Respiratory: Negative for cough and shortness of breath. Cardiovascular: Negative for chest pain, palpitations, claudication and leg swelling. Gastrointestinal: Negative for nausea, vomiting and abdominal pain. Genitourinary: Negative for dysuria, hematuria and flank pain. Musculoskeletal: Negative for joint pain and falls. Neurological: Negative for dizziness, sensory change, focal weakness, loss of consciousness and headaches. PHYSICAL EXAM   There were no vitals filed for this visit. Constitutional: she appears well-developed. No distress. HENT: Hearing intact. Head: Atraumatic. Eyes: Pupils are equal, round, and reactive to light. Neck: Normal range of motion. Cardiovascular: Regular rhythm. Pulmonary/Chest: Effort normal and breath sounds normal. No respiratory distress. Abdominal: Soft. Bowel sounds are normal. she exhibits no distension. There is no tenderness. There is no guarding. No hernia. Musculoskeletal: Normal range of motion. Neurological: She is alert. CN II- XII grossly intact  Skin: Warm and dry. Vascular: Occluded left upper arm graft      Imaging Results      ASSESSMENT AND PLAN   Ms. Lizzy Jensen is a 80y.o. year old female end-stage renal disease with occluded left upper arm graft. We will consult interventional radiology for placement of a tunneled catheter. I will have patient come back in 1 month with a lower extremity vein mapping in preparation for for leg graft long-term. Elements of this note have been dictated using speech recognition software. As a result, errors of speech recognition may have occurred.

## 2022-03-15 NOTE — PROCEDURES
Department of Interventional Radiology  [phone]        Interventional Radiology Brief Procedure Note    Patient: Evelina Sanches MRN: [id number]  SSN: [id number]    YOB: 1950  Age: 80 y.o. Sex: female      Date of Procedure: 3/15/2022    Pre-Procedure Diagnosis: ESRD, clotted AV access    Post-Procedure Diagnosis: SAME    Procedure(s): Tunneled Central Venous Catheter    Brief Description of Procedure: as above    Performed By: Hammad Prakash PA-C     Assistants: None    Anesthesia:Moderate Sedation per ISABEL Oro MD    Estimated Blood Loss: Less than 10ml    Specimens:  None    Implants:  Tunnelled Hemodialysis Catheter    Findings: catheter tip in right atrium,  Right IJ chronically occluded.   Right EJ patent    Complications: None    Recommendations: ok to use catheter     Follow Up: prn    Signed By: Hammad Prakash PA-C     March 15, 2022

## 2022-03-15 NOTE — DISCHARGE INSTRUCTIONS
[address]  Department of Interventional Radiology  [facility]  [phone] Office  [phone] Fax    Tunneled or Non Tunneled Catheter Discharge Instructions    General Information:   A catheter, either tunneled (permanent) or non-tunneled (temporary) catheter was inserted into your neck today for the purpose of Cancer treatment (apheresis) or dialysis. Your catheter will be used for the length of your apheresis, or until you get a fistula placed in surgery for dialysis. After this time, your catheter may be removed. You may return to our department for the catheter removal.  A non-tunneled catheter will exit at the neck. There will be three ports, two of which will be used for dialysis or apheresis. The one smaller port in the middle can be used like a regular IV. It ideally is used only for about two weeks. A tunneled catheter will exit lower down on the chest wall, and can be used for a longer period of time. There is no IV port on these. The tunneled catheter is used only for dialysis. In case of emergencies only can drugs be given through these catheters and only with written permission from your doctor. Home Care Instructions: You can resume your regular diet. Do not drink alcohol, drive, or make any important legal decisions in the next 24 hours. Watch the site carefully for signs of infection, like fever, drainage, redness, and/or swelling. Your catheter should be \"packed\" with heparin after each use or at least once a week if it is not being used. This \"packing\" should only be done by nurses experienced with caring for this type of catheter. You may shower in 24 hours, but you need to cover the catheter with plastic wrap and tape to keep it dry while you are showering. Keep the site clean, dry, and protected. Do not immerse yourself in water like in the case of tub baths or swimming as long as you have the catheter. Call If:   You should call your Physician and/or the Radiology Nurse if you have any signs of infection, shortness of breath, or if the dressing should come off or become moist.  You will be instructed on where to go for a new dressing. You should not have to change the dressings yourself, as that will be done by the nurses who access the catheter. Follow-Up Instructions:  Please see your ordering doctor as he/she has requested. To Reach Us: If you have any questions about your procedure, please call the Interventional Radiology department at [phone]. After business hours (5pm) and weekends, call the answering service at [phone] and ask for the Radiologist on call to be paged. Si tiene Preguntas acerca del procedimiento, por favor llame al departamento de Radiología Intervencional al [phone]. Después de horas de oficina (5 pm) y los fines de [place], llamar al Nancy Stein al [phone] y pregunte por el Radiologo de [place] Ernie Gamboahiri. Interventional Radiology General Nurse Discharge    After general anesthesia or intravenous sedation, for 24 hours or while taking prescription Narcotics:  · Limit your activities  · Do not drive and operate hazardous machinery  · Do not make important personal or business decisions  · Do  not drink alcoholic beverages  · If you have not urinated within 8 hours after discharge, please contact your surgeon on call. * Please give a list of your current medications to your Primary Care Provider. * Please update this list whenever your medications are discontinued, doses are     changed, or new medications (including over-the-counter products) are added. * Please carry medication information at all times in case of emergency situations.     These are general instructions for a healthy lifestyle:    No smoking/ No tobacco products/ Avoid exposure to second hand smoke  Surgeon General's Warning:  Quitting smoking now greatly reduces serious risk to your health. Obesity, smoking, and sedentary lifestyle greatly increases your risk for illness  A healthy diet, regular physical exercise & weight monitoring are important for maintaining a healthy lifestyle    You may be retaining fluid if you have a history of heart failure or if you experience any of the following symptoms:  Weight gain of 3 pounds or more overnight or 5 pounds in a week, increased swelling in our hands or feet or shortness of breath while lying flat in bed. Please call your doctor as soon as you notice any of these symptoms; do not wait until your next office visit. Recognize signs and symptoms of STROKE:  F-face looks uneven    A-arms unable to move or move unevenly    S-speech slurred or non-existent    T-time-call 911 as soon as signs and symptoms begin-DO NOT go       Back to bed or wait to see if you get better-TIME IS BRAIN.     Patient Signature:  Date: 3/15/2022  Discharging Nurse: Claudean Schiff, RN

## 2022-03-16 PROCEDURE — 3331090002 HH PPS REVENUE DEBIT

## 2022-03-16 PROCEDURE — 3331090001 HH PPS REVENUE CREDIT

## 2022-03-17 ENCOUNTER — HOME CARE VISIT (OUTPATIENT)
Dept: SCHEDULING | Facility: HOME HEALTH | Age: 72
End: 2022-03-17
Payer: MEDICARE

## 2022-03-17 VITALS
RESPIRATION RATE: 17 BRPM | SYSTOLIC BLOOD PRESSURE: 116 MMHG | DIASTOLIC BLOOD PRESSURE: 78 MMHG | HEART RATE: 78 BPM | TEMPERATURE: 97.3 F | OXYGEN SATURATION: 94 %

## 2022-03-17 PROCEDURE — G0299 HHS/HOSPICE OF RN EA 15 MIN: HCPCS

## 2022-03-17 PROCEDURE — 3331090001 HH PPS REVENUE CREDIT

## 2022-03-17 PROCEDURE — 3331090002 HH PPS REVENUE DEBIT

## 2022-03-18 PROBLEM — C64.9 RENAL CELL CARCINOMA (HCC): Status: ACTIVE | Noted: 2017-09-12

## 2022-03-18 PROBLEM — R59.0 MEDIASTINAL ADENOPATHY: Status: ACTIVE | Noted: 2022-01-01

## 2022-03-18 PROCEDURE — 3331090001 HH PPS REVENUE CREDIT

## 2022-03-18 PROCEDURE — 3331090002 HH PPS REVENUE DEBIT

## 2022-03-19 PROBLEM — E87.6 HYPOKALEMIA: Status: ACTIVE | Noted: 2021-01-01

## 2022-03-19 PROBLEM — J90 BILATERAL PLEURAL EFFUSION: Status: ACTIVE | Noted: 2022-01-01

## 2022-03-19 PROBLEM — K56.609 SMALL BOWEL OBSTRUCTION (HCC): Status: ACTIVE | Noted: 2018-08-03

## 2022-03-19 PROBLEM — R10.9 FLANK PAIN: Status: ACTIVE | Noted: 2017-09-14

## 2022-03-19 PROBLEM — R19.00 ABDOMINAL MASS: Status: ACTIVE | Noted: 2017-09-12

## 2022-03-19 PROBLEM — J96.01 ACUTE RESPIRATORY FAILURE WITH HYPOXIA (HCC): Status: ACTIVE | Noted: 2021-01-01

## 2022-03-19 PROBLEM — R77.8 ELEVATED TROPONIN: Status: ACTIVE | Noted: 2021-01-01

## 2022-03-19 PROBLEM — M54.9 SEVERE BACK PAIN: Status: ACTIVE | Noted: 2021-01-01

## 2022-03-19 PROBLEM — J98.59 MEDIASTINAL MASS: Status: ACTIVE | Noted: 2022-01-01

## 2022-03-19 PROBLEM — J15.9 BACTERIAL PNEUMONIA: Status: ACTIVE | Noted: 2021-01-01

## 2022-03-19 PROBLEM — E66.01 SEVERE OBESITY (BMI 35.0-39.9) WITH COMORBIDITY (HCC): Status: ACTIVE | Noted: 2018-05-04

## 2022-03-19 PROCEDURE — 3331090001 HH PPS REVENUE CREDIT

## 2022-03-19 PROCEDURE — 3331090002 HH PPS REVENUE DEBIT

## 2022-03-20 PROBLEM — E11.21 TYPE 2 DIABETES MELLITUS WITH NEPHROPATHY (HCC): Status: ACTIVE | Noted: 2017-12-15

## 2022-03-20 PROCEDURE — 3331090002 HH PPS REVENUE DEBIT

## 2022-03-20 PROCEDURE — 3331090001 HH PPS REVENUE CREDIT

## 2022-03-21 PROCEDURE — 3331090001 HH PPS REVENUE CREDIT

## 2022-03-21 PROCEDURE — 3331090002 HH PPS REVENUE DEBIT

## 2022-03-22 ENCOUNTER — HOME CARE VISIT (OUTPATIENT)
Dept: SCHEDULING | Facility: HOME HEALTH | Age: 72
End: 2022-03-22
Payer: MEDICARE

## 2022-03-22 VITALS
SYSTOLIC BLOOD PRESSURE: 104 MMHG | OXYGEN SATURATION: 93 % | DIASTOLIC BLOOD PRESSURE: 62 MMHG | HEART RATE: 87 BPM | TEMPERATURE: 97.6 F | RESPIRATION RATE: 17 BRPM

## 2022-03-22 PROCEDURE — 3331090001 HH PPS REVENUE CREDIT

## 2022-03-22 PROCEDURE — 3331090002 HH PPS REVENUE DEBIT

## 2022-03-22 PROCEDURE — G0299 HHS/HOSPICE OF RN EA 15 MIN: HCPCS

## 2022-03-23 PROCEDURE — 3331090002 HH PPS REVENUE DEBIT

## 2022-03-23 PROCEDURE — 3331090001 HH PPS REVENUE CREDIT

## 2022-03-24 PROCEDURE — 3331090002 HH PPS REVENUE DEBIT

## 2022-03-24 PROCEDURE — 3331090001 HH PPS REVENUE CREDIT

## 2022-03-25 PROCEDURE — 3331090002 HH PPS REVENUE DEBIT

## 2022-03-25 PROCEDURE — 3331090001 HH PPS REVENUE CREDIT

## 2022-03-26 PROCEDURE — 3331090002 HH PPS REVENUE DEBIT

## 2022-03-26 PROCEDURE — 3331090001 HH PPS REVENUE CREDIT

## 2022-03-27 PROCEDURE — 3331090002 HH PPS REVENUE DEBIT

## 2022-03-27 PROCEDURE — 3331090001 HH PPS REVENUE CREDIT

## 2022-03-28 PROCEDURE — 3331090001 HH PPS REVENUE CREDIT

## 2022-03-28 PROCEDURE — 3331090002 HH PPS REVENUE DEBIT

## 2022-03-29 PROCEDURE — 3331090002 HH PPS REVENUE DEBIT

## 2022-03-29 PROCEDURE — 3331090001 HH PPS REVENUE CREDIT

## 2022-03-30 PROCEDURE — 3331090001 HH PPS REVENUE CREDIT

## 2022-03-30 PROCEDURE — 3331090002 HH PPS REVENUE DEBIT

## 2022-03-31 ENCOUNTER — HOME CARE VISIT (OUTPATIENT)
Dept: SCHEDULING | Facility: HOME HEALTH | Age: 72
End: 2022-03-31
Payer: MEDICARE

## 2022-03-31 VITALS
SYSTOLIC BLOOD PRESSURE: 126 MMHG | DIASTOLIC BLOOD PRESSURE: 62 MMHG | OXYGEN SATURATION: 95 % | RESPIRATION RATE: 16 BRPM | HEART RATE: 90 BPM | TEMPERATURE: 97.4 F

## 2022-03-31 PROCEDURE — G0299 HHS/HOSPICE OF RN EA 15 MIN: HCPCS

## 2022-03-31 PROCEDURE — 3331090002 HH PPS REVENUE DEBIT

## 2022-03-31 PROCEDURE — 3331090001 HH PPS REVENUE CREDIT

## 2022-03-31 PROCEDURE — 400014 HH F/U

## 2022-04-01 PROCEDURE — 3331090001 HH PPS REVENUE CREDIT

## 2022-04-01 PROCEDURE — 3331090002 HH PPS REVENUE DEBIT

## 2022-04-02 PROCEDURE — 3331090002 HH PPS REVENUE DEBIT

## 2022-04-02 PROCEDURE — 3331090001 HH PPS REVENUE CREDIT

## 2022-04-03 PROCEDURE — 3331090002 HH PPS REVENUE DEBIT

## 2022-04-03 PROCEDURE — 3331090001 HH PPS REVENUE CREDIT

## 2022-04-04 PROCEDURE — 3331090001 HH PPS REVENUE CREDIT

## 2022-04-04 PROCEDURE — 3331090002 HH PPS REVENUE DEBIT

## 2022-04-05 PROCEDURE — 3331090001 HH PPS REVENUE CREDIT

## 2022-04-05 PROCEDURE — 3331090002 HH PPS REVENUE DEBIT

## 2022-04-06 ENCOUNTER — ANESTHESIA EVENT (OUTPATIENT)
Dept: SURGERY | Age: 72
End: 2022-04-06
Payer: MEDICARE

## 2022-04-06 PROCEDURE — 3331090002 HH PPS REVENUE DEBIT

## 2022-04-06 PROCEDURE — 3331090001 HH PPS REVENUE CREDIT

## 2022-04-07 ENCOUNTER — HOSPITAL ENCOUNTER (OUTPATIENT)
Age: 72
Setting detail: OUTPATIENT SURGERY
Discharge: HOME | End: 2022-04-07
Attending: SURGERY | Admitting: SURGERY
Payer: MEDICARE

## 2022-04-07 ENCOUNTER — ANESTHESIA (OUTPATIENT)
Dept: SURGERY | Age: 72
End: 2022-04-07
Payer: MEDICARE

## 2022-04-07 VITALS
HEIGHT: 64 IN | TEMPERATURE: 97.9 F | BODY MASS INDEX: 32.06 KG/M2 | DIASTOLIC BLOOD PRESSURE: 58 MMHG | HEART RATE: 75 BPM | RESPIRATION RATE: 16 BRPM | WEIGHT: 187.8 LBS | SYSTOLIC BLOOD PRESSURE: 112 MMHG | OXYGEN SATURATION: 94 %

## 2022-04-07 DIAGNOSIS — R19.07 GENERALIZED ABDOMINAL MASS: ICD-10-CM

## 2022-04-07 LAB
ANION GAP SERPL CALC-SCNC: 8 MMOL/L (ref 7–16)
BASOPHILS # BLD: 0.1 K/UL (ref 0–0.2)
BASOPHILS NFR BLD: 2 % (ref 0–2)
BUN SERPL-MCNC: 27 MG/DL (ref 8–23)
CALCIUM SERPL-MCNC: 9.4 MG/DL (ref 8.3–10.4)
CHLORIDE SERPL-SCNC: 98 MMOL/L (ref 98–107)
CO2 SERPL-SCNC: 31 MMOL/L (ref 21–32)
CREAT SERPL-MCNC: 4.3 MG/DL (ref 0.6–1)
DIFFERENTIAL METHOD BLD: ABNORMAL
EOSINOPHIL # BLD: 0.1 K/UL (ref 0–0.8)
EOSINOPHIL NFR BLD: 2 % (ref 0.5–7.8)
ERYTHROCYTE [DISTWIDTH] IN BLOOD BY AUTOMATED COUNT: 17 % (ref 11.9–14.6)
GLUCOSE BLD STRIP.AUTO-MCNC: 84 MG/DL (ref 65–100)
GLUCOSE SERPL-MCNC: 85 MG/DL (ref 65–100)
HCT VFR BLD AUTO: 30.4 % (ref 35.8–46.3)
HGB BLD-MCNC: 10.4 G/DL (ref 11.7–15.4)
IMM GRANULOCYTES # BLD AUTO: 0 K/UL (ref 0–0.5)
IMM GRANULOCYTES NFR BLD AUTO: 0 % (ref 0–5)
LYMPHOCYTES # BLD: 0.7 K/UL (ref 0.5–4.6)
LYMPHOCYTES NFR BLD: 16 % (ref 13–44)
MCH RBC QN AUTO: 32.4 PG (ref 26.1–32.9)
MCHC RBC AUTO-ENTMCNC: 34.2 G/DL (ref 31.4–35)
MCV RBC AUTO: 94.7 FL (ref 79.6–97.8)
MONOCYTES # BLD: 0.4 K/UL (ref 0.1–1.3)
MONOCYTES NFR BLD: 9 % (ref 4–12)
NEUTS SEG # BLD: 3.2 K/UL (ref 1.7–8.2)
NEUTS SEG NFR BLD: 72 % (ref 43–78)
NRBC # BLD: 0 K/UL (ref 0–0.2)
PLATELET # BLD AUTO: 102 K/UL (ref 150–450)
PMV BLD AUTO: 12 FL (ref 9.4–12.3)
POTASSIUM BLD-SCNC: 3.8 MMOL/L (ref 3.5–5.1)
POTASSIUM SERPL-SCNC: 3.9 MMOL/L (ref 3.5–5.1)
RBC # BLD AUTO: 3.21 M/UL (ref 4.05–5.2)
SERVICE CMNT-IMP: NORMAL
SODIUM SERPL-SCNC: 137 MMOL/L (ref 136–145)
WBC # BLD AUTO: 4.5 K/UL (ref 4.3–11.1)

## 2022-04-07 PROCEDURE — 2709999900 HC NON-CHARGEABLE SUPPLY: Performed by: SURGERY

## 2022-04-07 PROCEDURE — 3331090001 HH PPS REVENUE CREDIT

## 2022-04-07 PROCEDURE — 77030010509 HC AIRWY LMA MSK TELE -A: Performed by: ANESTHESIOLOGY

## 2022-04-07 PROCEDURE — 76060000033 HC ANESTHESIA 1 TO 1.5 HR: Performed by: SURGERY

## 2022-04-07 PROCEDURE — 74011250637 HC RX REV CODE- 250/637: Performed by: ANESTHESIOLOGY

## 2022-04-07 PROCEDURE — 77030002987 HC SUT PROL J&J -B: Performed by: SURGERY

## 2022-04-07 PROCEDURE — 77030010512 HC APPL CLP LIG J&J -C: Performed by: SURGERY

## 2022-04-07 PROCEDURE — 85025 COMPLETE CBC W/AUTO DIFF WBC: CPT

## 2022-04-07 PROCEDURE — 74011000250 HC RX REV CODE- 250

## 2022-04-07 PROCEDURE — 77030031139 HC SUT VCRL2 J&J -A: Performed by: SURGERY

## 2022-04-07 PROCEDURE — 76010000161 HC OR TIME 1 TO 1.5 HR INTENSV-TIER 1: Performed by: SURGERY

## 2022-04-07 PROCEDURE — 37799 UNLISTED PX VASCULAR SURGERY: CPT | Performed by: SURGERY

## 2022-04-07 PROCEDURE — 76210000006 HC OR PH I REC 0.5 TO 1 HR: Performed by: SURGERY

## 2022-04-07 PROCEDURE — 74011250636 HC RX REV CODE- 250/636: Performed by: ANESTHESIOLOGY

## 2022-04-07 PROCEDURE — 74011000272 HC RX REV CODE- 272: Performed by: SURGERY

## 2022-04-07 PROCEDURE — 77030037400 HC ADH TISS HI VISC EXOFIN CHMP -B: Performed by: SURGERY

## 2022-04-07 PROCEDURE — 74011250636 HC RX REV CODE- 250/636

## 2022-04-07 PROCEDURE — 77030019908 HC STETH ESOPH SIMS -A: Performed by: ANESTHESIOLOGY

## 2022-04-07 PROCEDURE — 76210000021 HC REC RM PH II 0.5 TO 1 HR: Performed by: SURGERY

## 2022-04-07 PROCEDURE — 80048 BASIC METABOLIC PNL TOTAL CA: CPT

## 2022-04-07 PROCEDURE — 82962 GLUCOSE BLOOD TEST: CPT

## 2022-04-07 PROCEDURE — 3331090002 HH PPS REVENUE DEBIT

## 2022-04-07 PROCEDURE — 77030002933 HC SUT MCRYL J&J -A: Performed by: SURGERY

## 2022-04-07 PROCEDURE — 74011250636 HC RX REV CODE- 250/636: Performed by: SURGERY

## 2022-04-07 PROCEDURE — 84132 ASSAY OF SERUM POTASSIUM: CPT

## 2022-04-07 RX ORDER — SODIUM CHLORIDE 9 MG/ML
25 INJECTION, SOLUTION INTRAVENOUS CONTINUOUS
Status: DISCONTINUED | OUTPATIENT
Start: 2022-04-07 | End: 2022-04-07 | Stop reason: HOSPADM

## 2022-04-07 RX ORDER — SODIUM CHLORIDE 0.9 % (FLUSH) 0.9 %
5-40 SYRINGE (ML) INJECTION AS NEEDED
Status: DISCONTINUED | OUTPATIENT
Start: 2022-04-07 | End: 2022-04-07 | Stop reason: HOSPADM

## 2022-04-07 RX ORDER — FENTANYL CITRATE 50 UG/ML
100 INJECTION, SOLUTION INTRAMUSCULAR; INTRAVENOUS ONCE
Status: COMPLETED | OUTPATIENT
Start: 2022-04-07 | End: 2022-04-07

## 2022-04-07 RX ORDER — NALOXONE HYDROCHLORIDE 0.4 MG/ML
0.1 INJECTION, SOLUTION INTRAMUSCULAR; INTRAVENOUS; SUBCUTANEOUS AS NEEDED
Status: DISCONTINUED | OUTPATIENT
Start: 2022-04-07 | End: 2022-04-07 | Stop reason: HOSPADM

## 2022-04-07 RX ORDER — HYDROMORPHONE HYDROCHLORIDE 2 MG/ML
0.5 INJECTION, SOLUTION INTRAMUSCULAR; INTRAVENOUS; SUBCUTANEOUS
Status: DISCONTINUED | OUTPATIENT
Start: 2022-04-07 | End: 2022-04-07 | Stop reason: HOSPADM

## 2022-04-07 RX ORDER — PROPOFOL 10 MG/ML
INJECTION, EMULSION INTRAVENOUS AS NEEDED
Status: DISCONTINUED | OUTPATIENT
Start: 2022-04-07 | End: 2022-04-07 | Stop reason: HOSPADM

## 2022-04-07 RX ORDER — DEXAMETHASONE SODIUM PHOSPHATE 4 MG/ML
INJECTION, SOLUTION INTRA-ARTICULAR; INTRALESIONAL; INTRAMUSCULAR; INTRAVENOUS; SOFT TISSUE AS NEEDED
Status: DISCONTINUED | OUTPATIENT
Start: 2022-04-07 | End: 2022-04-07 | Stop reason: HOSPADM

## 2022-04-07 RX ORDER — SODIUM CHLORIDE, SODIUM LACTATE, POTASSIUM CHLORIDE, CALCIUM CHLORIDE 600; 310; 30; 20 MG/100ML; MG/100ML; MG/100ML; MG/100ML
100 INJECTION, SOLUTION INTRAVENOUS CONTINUOUS
Status: DISCONTINUED | OUTPATIENT
Start: 2022-04-07 | End: 2022-04-07 | Stop reason: HOSPADM

## 2022-04-07 RX ORDER — SODIUM CHLORIDE 0.9 % (FLUSH) 0.9 %
5-40 SYRINGE (ML) INJECTION EVERY 8 HOURS
Status: DISCONTINUED | OUTPATIENT
Start: 2022-04-07 | End: 2022-04-07 | Stop reason: HOSPADM

## 2022-04-07 RX ORDER — HYDROCODONE BITARTRATE AND ACETAMINOPHEN 7.5; 325 MG/1; MG/1
1 TABLET ORAL AS NEEDED
Status: DISCONTINUED | OUTPATIENT
Start: 2022-04-07 | End: 2022-04-07 | Stop reason: HOSPADM

## 2022-04-07 RX ORDER — CEFAZOLIN SODIUM/WATER 2 G/20 ML
2 SYRINGE (ML) INTRAVENOUS
Status: COMPLETED | OUTPATIENT
Start: 2022-04-07 | End: 2022-04-07

## 2022-04-07 RX ORDER — OXYCODONE HYDROCHLORIDE 5 MG/1
5 TABLET ORAL
Status: DISCONTINUED | OUTPATIENT
Start: 2022-04-07 | End: 2022-04-07 | Stop reason: HOSPADM

## 2022-04-07 RX ORDER — LIDOCAINE HYDROCHLORIDE 10 MG/ML
0.1 INJECTION INFILTRATION; PERINEURAL AS NEEDED
Status: DISCONTINUED | OUTPATIENT
Start: 2022-04-07 | End: 2022-04-07 | Stop reason: HOSPADM

## 2022-04-07 RX ORDER — EPHEDRINE SULFATE/0.9% NACL/PF 50 MG/5 ML
SYRINGE (ML) INTRAVENOUS AS NEEDED
Status: DISCONTINUED | OUTPATIENT
Start: 2022-04-07 | End: 2022-04-07 | Stop reason: HOSPADM

## 2022-04-07 RX ORDER — MIDAZOLAM HYDROCHLORIDE 1 MG/ML
2 INJECTION, SOLUTION INTRAMUSCULAR; INTRAVENOUS
Status: DISCONTINUED | OUTPATIENT
Start: 2022-04-07 | End: 2022-04-07 | Stop reason: HOSPADM

## 2022-04-07 RX ORDER — FAMOTIDINE 20 MG/1
20 TABLET, FILM COATED ORAL ONCE
Status: COMPLETED | OUTPATIENT
Start: 2022-04-07 | End: 2022-04-07

## 2022-04-07 RX ORDER — LIDOCAINE HYDROCHLORIDE 20 MG/ML
INJECTION, SOLUTION EPIDURAL; INFILTRATION; INTRACAUDAL; PERINEURAL AS NEEDED
Status: DISCONTINUED | OUTPATIENT
Start: 2022-04-07 | End: 2022-04-07 | Stop reason: HOSPADM

## 2022-04-07 RX ORDER — ONDANSETRON 2 MG/ML
INJECTION INTRAMUSCULAR; INTRAVENOUS AS NEEDED
Status: DISCONTINUED | OUTPATIENT
Start: 2022-04-07 | End: 2022-04-07 | Stop reason: HOSPADM

## 2022-04-07 RX ADMIN — PHENYLEPHRINE HYDROCHLORIDE 100 MCG: 10 INJECTION INTRAVENOUS at 10:35

## 2022-04-07 RX ADMIN — Medication 5 MG: at 10:20

## 2022-04-07 RX ADMIN — PHENYLEPHRINE HYDROCHLORIDE 200 MCG: 10 INJECTION INTRAVENOUS at 10:18

## 2022-04-07 RX ADMIN — FENTANYL CITRATE 25 MCG: 50 INJECTION INTRAMUSCULAR; INTRAVENOUS at 11:05

## 2022-04-07 RX ADMIN — Medication 5 MG: at 10:35

## 2022-04-07 RX ADMIN — PHENYLEPHRINE HYDROCHLORIDE 100 MCG: 10 INJECTION INTRAVENOUS at 10:20

## 2022-04-07 RX ADMIN — PHENYLEPHRINE HYDROCHLORIDE 100 MCG: 10 INJECTION INTRAVENOUS at 10:16

## 2022-04-07 RX ADMIN — HYDROMORPHONE HYDROCHLORIDE 0.5 MG: 2 INJECTION, SOLUTION INTRAMUSCULAR; INTRAVENOUS; SUBCUTANEOUS at 11:29

## 2022-04-07 RX ADMIN — Medication 5 MG: at 10:26

## 2022-04-07 RX ADMIN — FAMOTIDINE 20 MG: 20 TABLET ORAL at 09:46

## 2022-04-07 RX ADMIN — Medication 2 G: at 10:16

## 2022-04-07 RX ADMIN — PHENYLEPHRINE HYDROCHLORIDE 100 MCG: 10 INJECTION INTRAVENOUS at 10:26

## 2022-04-07 RX ADMIN — FENTANYL CITRATE 25 MCG: 50 INJECTION INTRAMUSCULAR; INTRAVENOUS at 11:08

## 2022-04-07 RX ADMIN — PHENYLEPHRINE HYDROCHLORIDE 100 MCG: 10 INJECTION INTRAVENOUS at 10:43

## 2022-04-07 RX ADMIN — SODIUM CHLORIDE 25 ML/HR: 9 INJECTION, SOLUTION INTRAVENOUS at 09:45

## 2022-04-07 RX ADMIN — HYDROMORPHONE HYDROCHLORIDE 0.5 MG: 2 INJECTION, SOLUTION INTRAMUSCULAR; INTRAVENOUS; SUBCUTANEOUS at 11:49

## 2022-04-07 RX ADMIN — FENTANYL CITRATE 25 MCG: 50 INJECTION INTRAMUSCULAR; INTRAVENOUS at 10:30

## 2022-04-07 RX ADMIN — LIDOCAINE HYDROCHLORIDE 80 MG: 20 INJECTION, SOLUTION EPIDURAL; INFILTRATION; INTRACAUDAL; PERINEURAL at 10:11

## 2022-04-07 RX ADMIN — PROPOFOL 150 MG: 10 INJECTION, EMULSION INTRAVENOUS at 10:11

## 2022-04-07 RX ADMIN — DEXAMETHASONE SODIUM PHOSPHATE 4 MG: 4 INJECTION, SOLUTION INTRAMUSCULAR; INTRAVENOUS at 10:15

## 2022-04-07 RX ADMIN — ONDANSETRON 4 MG: 2 INJECTION INTRAMUSCULAR; INTRAVENOUS at 10:15

## 2022-04-07 NOTE — DISCHARGE INSTRUCTIONS
INSTRUCTIONS FOR A-V FISTULA, GRAFT ACCESS, REVISION OR DECLOT    ACTIVITY  · As tolerated and as directed by your doctor. · Keep arm straight and raised above heart level for the next 24 hours. DIET  · Clear liquids until no nausea or vomiting; then light diet for the first day. · Advance to regular diet on second day, unless your doctor orders otherwise. · If nausea and vomiting continues, call your doctor. PAIN  · Take pain medication as directed by your doctor. · Call your doctor if pain is NOT relieved by the medication. · DO NOT take aspirin or blood thinners until directed by your doctor. MEDICATION INTERACTION:  During your procedure you potentially received a medication or medications which may reduce the effectiveness of oral contraceptives. Please consider other forms of contraception for 1 month following your procedure if you are currently using oral contraceptives as your primary form of birth control. In addition to this, we recommend continuing your oral contraceptive as prescribed, unless otherwise instructed by your physician, during this time    DRESSING CARE  · Keep your dressing clean and dry  · Can remove ace wrap the next day    CARE OF YOUR ACCESS  DO:  · Keep access area clean with soap and water daily after dressing has been removed. DO NOT:  · Wear tight sleeves, watches, belts or bracelets over graft. · Carry heavy bags across the graft. · Sleep on graft side. · Let your blood pressure be taken on graft side. · Let blood be drawn from your graft side. CALL YOUR DOCTOR IF  · Excessive bleeding that does not stop after holding mild pressure over area. · Temperature of 101 degrees F or above. · Redness, excessive swelling or bruising, and/or green or yellow, smelly discharge from incision   · Loss of sensation-cold, white, or blue fingers or toes.     After general anesthesia or intravenous sedation, for 24 hours or while taking prescription Narcotics:  · Limit your activities  · A responsible adult needs to be with you for the next 24 hours  · Do not drive and operate hazardous machinery  · Do not make important personal or business decisions  · Do not drink alcoholic beverages  · If you have not urinated within 8 hours after discharge, and you are experiencing discomfort from urinary retention, please go to the nearest ED. · If you have sleep apnea and have a CPAP machine, please use it for all naps and sleeping. · Please use caution when taking narcotics and any of your home medications that may cause drowsiness. *  Please give a list of your current medications to your Primary Care Provider. *  Please update this list whenever your medications are discontinued, doses are      changed, or new medications (including over-the-counter products) are added. *  Please carry medication information at all times in case of emergency situations. These are general instructions for a healthy lifestyle:  No smoking/ No tobacco products/ Avoid exposure to second hand smoke  Surgeon General's Warning:  Quitting smoking now greatly reduces serious risk to your health. Obesity, smoking, and sedentary lifestyle greatly increases your risk for illness  A healthy diet, regular physical exercise & weight monitoring are important for maintaining a healthy lifestyle    You may be retaining fluid if you have a history of heart failure or if you experience any of the following symptoms:  Weight gain of 3 pounds or more overnight or 5 pounds in a week, increased swelling in our hands or feet or shortness of breath while lying flat in bed. Please call your doctor as soon as you notice any of these symptoms; do not wait until your next office visit.

## 2022-04-07 NOTE — BRIEF OP NOTE
[address]   [address] [facility]. [address]  [phone] FAX: [phone]    Brief Op Note Template Note    Pre-Op Diagnosis: History of end stage renal disease [Z87.448]    Post-Op Diagnosis:  History of end stage renal disease [Z87.448]    Procedures: Procedure(s):  EXICISON OF LEFT ARM AV GRAFT    Surgeon: Chester Wills MD    Assistants: Surgeon(s): Pershing Fleischer, MD      Anesthesia:  General     Findings: Excision of removal of left upper arm graft    Tourniquet Time:  * No tourniquets in log *    Estimated Blood Loss:               Specimens:            Implants:  * No implants in log *    Complications: None               Signed: Chester Wills MD      Elements of this note have been dictated using speech recognition software. As a result, errors of speech recognition may have occurred.

## 2022-04-07 NOTE — ANESTHESIA PREPROCEDURE EVALUATION
Relevant Problems   No relevant active problems       Anesthetic History               Review of Systems / Medical History  Patient summary reviewed and pertinent labs reviewed    Pulmonary                Comments: On home O2  Mediastinal mass - right paratracheal node measuring 2.6 cm by CT 1/2022   Neuro/Psych         TIA     Cardiovascular    Hypertension              Exercise tolerance: >4 METS  Comments: TTE: 2015 SUMMARY:     -  Left ventricle: Systolic function was normal. Ejection fraction was  estimated in the range of 60 % to 65 %. There were no regional wall motion  abnormalities. There was mild concentric hypertrophy. Doppler parameters were  consistent with mild diastolic dysfunction (grade 1).    -  Right ventricle: There was mild pulmonary artery hypertension.     -  Atrial septum: Agitated saline contrast injection (bubble study) was  performed. There was no right-to-left shunt, at rest or induced by the   Valsalva  maneuver.    GI/Hepatic/Renal         Renal disease: ESRD and dialysis       Endo/Other    Diabetes: type 2    Obesity, cancer (Renal) and anemia     Other Findings            Physical Exam    Airway  Mallampati: II  TM Distance: > 6 cm  Neck ROM: normal range of motion   Mouth opening: Normal     Cardiovascular  Regular rate and rhythm,  S1 and S2 normal,  no murmur, click, rub, or gallop             Dental  No notable dental hx       Pulmonary  Breath sounds clear to auscultation               Abdominal         Other Findings            Anesthetic Plan    ASA: 3  Anesthesia type: general            Anesthetic plan and risks discussed with: Patient      LMA

## 2022-04-07 NOTE — ANESTHESIA POSTPROCEDURE EVALUATION
Procedure(s):  EXICISON OF LEFT ARM AV GRAFT. general    Anesthesia Post Evaluation      Multimodal analgesia: multimodal analgesia used between 6 hours prior to anesthesia start to PACU discharge  Patient location during evaluation: PACU  Patient participation: complete - patient participated  Level of consciousness: awake and alert  Pain management: adequate  Airway patency: patent  Anesthetic complications: no  Cardiovascular status: acceptable  Respiratory status: acceptable  Hydration status: acceptable  Post anesthesia nausea and vomiting:  none  Final Post Anesthesia Temperature Assessment:  Normothermia (36.0-37.5 degrees C)      INITIAL Post-op Vital signs:   Vitals Value Taken Time   /57 04/07/22 1212   Temp 36.6 °C (97.9 °F) 04/07/22 1201   Pulse 77 04/07/22 1219   Resp 14 04/07/22 1216   SpO2 95 % 04/07/22 1219   Vitals shown include unvalidated device data.

## 2022-04-07 NOTE — PROGRESS NOTES
Attempted pre-op visit, as requested. Initially, pt was changing into gown. Upon return, RN was talking with pt and OR staff transported her immediately following her prep. Chaplains to follow-up, as needed.     Amanda Gonzalez MDiv, [address] [place] [address]
Patient status post excision removal of a piece of left upper arm nonfunctional graft. There is no signs of infection. Patient follow-up in office in 2 weeks to remove sutures and also get a lower extremity vein mapping.   Lawyer Tam
yes

## 2022-04-07 NOTE — OP NOTES
[facility]  OPERATIVE REPORT    Name:  Wing Ledezma  MR#:  [id number]  :  1950  ACCOUNT #:  [de-identified]  DATE OF SERVICE:  2022    CLINICAL SERVICE:  Vascular Surgery. PREOPERATIVE DIAGNOSIS:  Left upper arm open, dehisced nonfunctional graft. POSTOPERATIVE DIAGNOSIS:  Left upper arm open, dehisced nonfunctional graft. PROCEDURE PERFORMED:  Excision of left upper arm arteriovenous graft. SURGEON:  Saqib Maddox MD    ASSISTANT:      ANESTHESIA:  General.    COMPLICATIONS:  None. SPECIMENS REMOVED:  None. IMPLANTS:      ESTIMATED BLOOD LOSS:      INDICATIONS FOR PROCEDURE:  This is a 43-year-old female with history of end-stage renal disease who is status post unsuccessful declot several weeks back. She presented with a dehisced open wound, exposed graft. We elected to proceed. PROCEDURE IN DETAIL:  After getting informed consent, the patient was brought to the operating room. Anesthesia was then induced. Preop antibiotics were given before skin incision. The patient was prepped and draped in normal sterile fashion. .  There was no evidence of any fluid or abscess. We made an incision about 2 cm proximal and distal.  We dissected down and got the graft. The graft was well incorporated. There was no fluid. We transected that portion and then suture ligated with 5-0 Prolene closing the inflow and outflow, although it was already occluded. Through the open incision, we dissected circumferentially around the other graft and pulled both pieces of the graft out in totality. There was no fluid. There was no abscess. There was no infection currently. We thoroughly irrigated out the wound with antibiotic solution. The proximal and distal incisions were closed with 2-0 Vicryls and 3-0 Vicryls. The skin was closed with 3-0 Vicryls and 4-0 Monocryl.   The middle portion of the wound, we irrigated out with antibiotic solution and reapproximated with 2-0 nylon.  The patient was extubated and returned to the PACU in stable condition.       Jessica Parry MD      [id number]  D:  04/07/2022 11:22  T:  04/07/2022 18:09  JOB #:  [id number]

## 2022-04-08 ENCOUNTER — HOME CARE VISIT (OUTPATIENT)
Dept: SCHEDULING | Facility: HOME HEALTH | Age: 72
End: 2022-04-08
Payer: MEDICARE

## 2022-04-08 PROCEDURE — G0299 HHS/HOSPICE OF RN EA 15 MIN: HCPCS

## 2022-04-08 PROCEDURE — 3331090001 HH PPS REVENUE CREDIT

## 2022-04-08 PROCEDURE — 3331090002 HH PPS REVENUE DEBIT

## 2022-04-09 VITALS
OXYGEN SATURATION: 90 % | SYSTOLIC BLOOD PRESSURE: 118 MMHG | DIASTOLIC BLOOD PRESSURE: 72 MMHG | RESPIRATION RATE: 16 BRPM | TEMPERATURE: 97.3 F | HEART RATE: 88 BPM

## 2022-04-09 PROCEDURE — 3331090002 HH PPS REVENUE DEBIT

## 2022-04-09 PROCEDURE — 3331090001 HH PPS REVENUE CREDIT

## 2022-04-10 PROCEDURE — 3331090001 HH PPS REVENUE CREDIT

## 2022-04-10 PROCEDURE — 3331090002 HH PPS REVENUE DEBIT

## 2022-04-11 PROCEDURE — 3331090002 HH PPS REVENUE DEBIT

## 2022-04-11 PROCEDURE — 3331090001 HH PPS REVENUE CREDIT

## 2022-04-12 ENCOUNTER — HOME CARE VISIT (OUTPATIENT)
Dept: SCHEDULING | Facility: HOME HEALTH | Age: 72
End: 2022-04-12
Payer: MEDICARE

## 2022-04-12 VITALS
OXYGEN SATURATION: 89 % | SYSTOLIC BLOOD PRESSURE: 112 MMHG | TEMPERATURE: 97.4 F | HEART RATE: 88 BPM | DIASTOLIC BLOOD PRESSURE: 74 MMHG | RESPIRATION RATE: 16 BRPM

## 2022-04-12 PROCEDURE — G0299 HHS/HOSPICE OF RN EA 15 MIN: HCPCS

## 2022-04-12 PROCEDURE — 3331090001 HH PPS REVENUE CREDIT

## 2022-04-12 PROCEDURE — 3331090002 HH PPS REVENUE DEBIT

## 2022-04-13 PROCEDURE — 3331090002 HH PPS REVENUE DEBIT

## 2022-04-13 PROCEDURE — 3331090001 HH PPS REVENUE CREDIT

## 2022-04-14 PROCEDURE — 3331090001 HH PPS REVENUE CREDIT

## 2022-04-14 PROCEDURE — 3331090002 HH PPS REVENUE DEBIT

## 2022-04-15 PROCEDURE — 3331090002 HH PPS REVENUE DEBIT

## 2022-04-15 PROCEDURE — 3331090001 HH PPS REVENUE CREDIT

## 2022-04-16 PROCEDURE — 3331090001 HH PPS REVENUE CREDIT

## 2022-04-16 PROCEDURE — 3331090002 HH PPS REVENUE DEBIT

## 2022-04-17 PROCEDURE — 3331090001 HH PPS REVENUE CREDIT

## 2022-04-17 PROCEDURE — 3331090002 HH PPS REVENUE DEBIT

## 2022-04-18 PROCEDURE — 3331090001 HH PPS REVENUE CREDIT

## 2022-04-18 PROCEDURE — 3331090002 HH PPS REVENUE DEBIT

## 2022-04-19 ENCOUNTER — HOME CARE VISIT (OUTPATIENT)
Dept: SCHEDULING | Facility: HOME HEALTH | Age: 72
End: 2022-04-19
Payer: MEDICARE

## 2022-04-19 VITALS
DIASTOLIC BLOOD PRESSURE: 62 MMHG | RESPIRATION RATE: 17 BRPM | SYSTOLIC BLOOD PRESSURE: 104 MMHG | TEMPERATURE: 97.7 F | OXYGEN SATURATION: 91 % | HEART RATE: 122 BPM

## 2022-04-19 PROCEDURE — 3331090001 HH PPS REVENUE CREDIT

## 2022-04-19 PROCEDURE — G0299 HHS/HOSPICE OF RN EA 15 MIN: HCPCS

## 2022-04-19 PROCEDURE — 3331090002 HH PPS REVENUE DEBIT

## 2022-04-20 PROCEDURE — 3331090002 HH PPS REVENUE DEBIT

## 2022-04-20 PROCEDURE — 3331090001 HH PPS REVENUE CREDIT

## 2022-04-21 PROCEDURE — 3331090001 HH PPS REVENUE CREDIT

## 2022-04-21 PROCEDURE — 3331090002 HH PPS REVENUE DEBIT

## 2022-04-22 PROCEDURE — 3331090001 HH PPS REVENUE CREDIT

## 2022-04-22 PROCEDURE — 3331090002 HH PPS REVENUE DEBIT

## 2022-04-23 PROCEDURE — 3331090001 HH PPS REVENUE CREDIT

## 2022-04-23 PROCEDURE — 3331090002 HH PPS REVENUE DEBIT

## 2022-04-24 PROCEDURE — 3331090001 HH PPS REVENUE CREDIT

## 2022-04-24 PROCEDURE — 3331090002 HH PPS REVENUE DEBIT

## 2022-04-25 PROCEDURE — 3331090001 HH PPS REVENUE CREDIT

## 2022-04-25 PROCEDURE — 3331090002 HH PPS REVENUE DEBIT

## 2022-04-26 PROCEDURE — 3331090002 HH PPS REVENUE DEBIT

## 2022-04-26 PROCEDURE — 3331090001 HH PPS REVENUE CREDIT

## 2022-04-27 PROCEDURE — 3331090002 HH PPS REVENUE DEBIT

## 2022-04-27 PROCEDURE — 3331090001 HH PPS REVENUE CREDIT

## 2022-04-28 ENCOUNTER — HOME CARE VISIT (OUTPATIENT)
Dept: SCHEDULING | Facility: HOME HEALTH | Age: 72
End: 2022-04-28
Payer: MEDICARE

## 2022-04-28 VITALS
TEMPERATURE: 97.7 F | SYSTOLIC BLOOD PRESSURE: 120 MMHG | HEART RATE: 80 BPM | RESPIRATION RATE: 16 BRPM | OXYGEN SATURATION: 94 % | DIASTOLIC BLOOD PRESSURE: 72 MMHG

## 2022-04-28 PROCEDURE — G0299 HHS/HOSPICE OF RN EA 15 MIN: HCPCS

## 2022-04-28 PROCEDURE — 400014 HH F/U

## 2022-04-28 PROCEDURE — 3331090002 HH PPS REVENUE DEBIT

## 2022-04-28 PROCEDURE — 3331090001 HH PPS REVENUE CREDIT

## 2022-04-29 PROCEDURE — 3331090001 HH PPS REVENUE CREDIT

## 2022-04-29 PROCEDURE — 3331090002 HH PPS REVENUE DEBIT

## 2022-04-30 PROCEDURE — 3331090002 HH PPS REVENUE DEBIT

## 2022-04-30 PROCEDURE — 3331090001 HH PPS REVENUE CREDIT

## 2022-05-01 PROCEDURE — 3331090002 HH PPS REVENUE DEBIT

## 2022-05-01 PROCEDURE — 3331090001 HH PPS REVENUE CREDIT

## 2022-05-02 PROCEDURE — 3331090001 HH PPS REVENUE CREDIT

## 2022-05-02 PROCEDURE — 3331090002 HH PPS REVENUE DEBIT

## 2022-05-03 ENCOUNTER — HOME CARE VISIT (OUTPATIENT)
Dept: SCHEDULING | Facility: HOME HEALTH | Age: 72
End: 2022-05-03
Payer: MEDICARE

## 2022-05-03 VITALS
OXYGEN SATURATION: 95 % | TEMPERATURE: 97.4 F | DIASTOLIC BLOOD PRESSURE: 68 MMHG | SYSTOLIC BLOOD PRESSURE: 118 MMHG | RESPIRATION RATE: 17 BRPM | HEART RATE: 88 BPM

## 2022-05-03 PROCEDURE — 3331090002 HH PPS REVENUE DEBIT

## 2022-05-03 PROCEDURE — G0299 HHS/HOSPICE OF RN EA 15 MIN: HCPCS

## 2022-05-03 PROCEDURE — 3331090001 HH PPS REVENUE CREDIT

## 2022-05-04 PROCEDURE — 3331090002 HH PPS REVENUE DEBIT

## 2022-05-04 PROCEDURE — 3331090001 HH PPS REVENUE CREDIT

## 2022-05-05 ENCOUNTER — HOSPITAL ENCOUNTER (OUTPATIENT)
Dept: PET IMAGING | Age: 72
Discharge: HOME | End: 2022-05-05
Payer: MEDICARE

## 2022-05-05 DIAGNOSIS — R59.0 MEDIASTINAL LYMPHADENOPATHY: ICD-10-CM

## 2022-05-05 DIAGNOSIS — C64.2 MALIGNANT NEOPLASM OF LEFT KIDNEY, EXCEPT RENAL PELVIS (HCC): ICD-10-CM

## 2022-05-05 DIAGNOSIS — C64.9 RENAL CELL CARCINOMA, UNSPECIFIED LATERALITY (HCC): ICD-10-CM

## 2022-05-05 DIAGNOSIS — N28.89 RENAL MASS, LEFT: ICD-10-CM

## 2022-05-05 DIAGNOSIS — J98.59 MEDIASTINAL MASS: ICD-10-CM

## 2022-05-05 LAB
GLUCOSE BLD STRIP.AUTO-MCNC: 92 MG/DL (ref 65–100)
SERVICE CMNT-IMP: NORMAL

## 2022-05-05 PROCEDURE — 74011000636 HC RX REV CODE- 636: Performed by: INTERNAL MEDICINE

## 2022-05-05 PROCEDURE — 82962 GLUCOSE BLOOD TEST: CPT

## 2022-05-05 PROCEDURE — A9552 F18 FDG: HCPCS

## 2022-05-05 PROCEDURE — 3331090001 HH PPS REVENUE CREDIT

## 2022-05-05 PROCEDURE — 3331090002 HH PPS REVENUE DEBIT

## 2022-05-05 RX ORDER — FLUDEOXYGLUCOSE F-18 200 MCI/ML
10 INJECTION INTRAVENOUS ONCE
Status: COMPLETED | OUTPATIENT
Start: 2022-05-05 | End: 2022-05-05

## 2022-05-05 RX ORDER — SODIUM CHLORIDE 0.9 % (FLUSH) 0.9 %
10 SYRINGE (ML) INJECTION
Status: COMPLETED | OUTPATIENT
Start: 2022-05-05 | End: 2022-05-05

## 2022-05-05 RX ADMIN — Medication 10 ML: at 12:35

## 2022-05-05 RX ADMIN — DIATRIZOATE MEGLUMINE AND DIATRIZOATE SODIUM 10 ML: 660; 100 LIQUID ORAL; RECTAL at 12:35

## 2022-05-05 RX ADMIN — FLUDEOXYGLUCOSE F-18 13.9 MILLICURIE: 200 INJECTION INTRAVENOUS at 12:35

## 2022-05-06 PROCEDURE — 3331090001 HH PPS REVENUE CREDIT

## 2022-05-06 PROCEDURE — 3331090002 HH PPS REVENUE DEBIT

## 2022-05-07 PROCEDURE — 3331090002 HH PPS REVENUE DEBIT

## 2022-05-07 PROCEDURE — 3331090001 HH PPS REVENUE CREDIT

## 2022-05-08 PROCEDURE — 3331090001 HH PPS REVENUE CREDIT

## 2022-05-08 PROCEDURE — 3331090002 HH PPS REVENUE DEBIT

## 2022-05-09 PROCEDURE — 3331090001 HH PPS REVENUE CREDIT

## 2022-05-09 PROCEDURE — 3331090002 HH PPS REVENUE DEBIT

## 2022-05-10 ENCOUNTER — HOME CARE VISIT (OUTPATIENT)
Dept: SCHEDULING | Facility: HOME HEALTH | Age: 72
End: 2022-05-10
Payer: MEDICARE

## 2022-05-10 VITALS
RESPIRATION RATE: 16 BRPM | DIASTOLIC BLOOD PRESSURE: 78 MMHG | TEMPERATURE: 97.6 F | OXYGEN SATURATION: 95 % | SYSTOLIC BLOOD PRESSURE: 126 MMHG | HEART RATE: 81 BPM

## 2022-05-10 PROCEDURE — 3331090002 HH PPS REVENUE DEBIT

## 2022-05-10 PROCEDURE — 3331090001 HH PPS REVENUE CREDIT

## 2022-05-10 PROCEDURE — G0299 HHS/HOSPICE OF RN EA 15 MIN: HCPCS

## 2022-05-11 PROCEDURE — 3331090001 HH PPS REVENUE CREDIT

## 2022-05-11 PROCEDURE — 3331090002 HH PPS REVENUE DEBIT

## 2022-05-12 PROCEDURE — 3331090002 HH PPS REVENUE DEBIT

## 2022-05-12 PROCEDURE — 3331090001 HH PPS REVENUE CREDIT

## 2022-05-13 ENCOUNTER — HOSPITAL ENCOUNTER (OUTPATIENT)
Dept: LAB | Age: 72
Discharge: HOME | End: 2022-05-13
Payer: MEDICARE

## 2022-05-13 DIAGNOSIS — C64.9 RENAL CELL CARCINOMA, UNSPECIFIED LATERALITY (HCC): ICD-10-CM

## 2022-05-13 LAB
ALBUMIN SERPL-MCNC: 3.7 G/DL (ref 3.2–4.6)
ALBUMIN/GLOB SERPL: 1 {RATIO} (ref 1.2–3.5)
ALP SERPL-CCNC: 95 U/L (ref 50–136)
ALT SERPL-CCNC: 13 U/L (ref 12–65)
ANION GAP SERPL CALC-SCNC: 4 MMOL/L (ref 7–16)
AST SERPL-CCNC: 19 U/L (ref 15–37)
BASOPHILS # BLD: 0.1 K/UL (ref 0–0.2)
BASOPHILS NFR BLD: 2 % (ref 0–2)
BILIRUB SERPL-MCNC: 0.9 MG/DL (ref 0.2–1.1)
BUN SERPL-MCNC: 11 MG/DL (ref 8–23)
CALCIUM SERPL-MCNC: 8.3 MG/DL (ref 8.3–10.4)
CHLORIDE SERPL-SCNC: 98 MMOL/L (ref 98–107)
CO2 SERPL-SCNC: 34 MMOL/L (ref 21–32)
CREAT SERPL-MCNC: 3.2 MG/DL (ref 0.6–1)
DIFFERENTIAL METHOD BLD: ABNORMAL
EOSINOPHIL # BLD: 0.1 K/UL (ref 0–0.8)
EOSINOPHIL NFR BLD: 2 % (ref 0.5–7.8)
ERYTHROCYTE [DISTWIDTH] IN BLOOD BY AUTOMATED COUNT: 17.1 % (ref 11.9–14.6)
GLOBULIN SER CALC-MCNC: 3.6 G/DL (ref 2.3–3.5)
GLUCOSE SERPL-MCNC: 107 MG/DL (ref 65–100)
HCT VFR BLD AUTO: 26.6 % (ref 35.8–46.3)
HGB BLD-MCNC: 9.5 G/DL (ref 11.7–15.4)
IMM GRANULOCYTES # BLD AUTO: 0 K/UL (ref 0–0.5)
IMM GRANULOCYTES NFR BLD AUTO: 1 % (ref 0–5)
LYMPHOCYTES # BLD: 0.7 K/UL (ref 0.5–4.6)
LYMPHOCYTES NFR BLD: 15 % (ref 13–44)
MCH RBC QN AUTO: 33.7 PG (ref 26.1–32.9)
MCHC RBC AUTO-ENTMCNC: 35.7 G/DL (ref 31.4–35)
MCV RBC AUTO: 94.3 FL (ref 79.6–97.8)
MONOCYTES # BLD: 0.5 K/UL (ref 0.1–1.3)
MONOCYTES NFR BLD: 12 % (ref 4–12)
NEUTS SEG # BLD: 3 K/UL (ref 1.7–8.2)
NEUTS SEG NFR BLD: 68 % (ref 43–78)
NRBC # BLD: 0.02 K/UL (ref 0–0.2)
PLATELET # BLD AUTO: 98 K/UL (ref 150–450)
PMV BLD AUTO: 10.9 FL (ref 9.4–12.3)
POTASSIUM SERPL-SCNC: 3.5 MMOL/L (ref 3.5–5.1)
PROT SERPL-MCNC: 7.3 G/DL (ref 6.3–8.2)
RBC # BLD AUTO: 2.82 M/UL (ref 4.05–5.2)
SODIUM SERPL-SCNC: 136 MMOL/L (ref 136–145)
WBC # BLD AUTO: 4.4 K/UL (ref 4.3–11.1)

## 2022-05-13 PROCEDURE — 3331090001 HH PPS REVENUE CREDIT

## 2022-05-13 PROCEDURE — 36415 COLL VENOUS BLD VENIPUNCTURE: CPT

## 2022-05-13 PROCEDURE — 3331090002 HH PPS REVENUE DEBIT

## 2022-05-13 PROCEDURE — 80053 COMPREHEN METABOLIC PANEL: CPT

## 2022-05-13 PROCEDURE — 85025 COMPLETE CBC W/AUTO DIFF WBC: CPT

## 2022-05-14 PROCEDURE — 3331090002 HH PPS REVENUE DEBIT

## 2022-05-14 PROCEDURE — 3331090001 HH PPS REVENUE CREDIT

## 2022-05-15 PROCEDURE — 3331090001 HH PPS REVENUE CREDIT

## 2022-05-15 PROCEDURE — 3331090002 HH PPS REVENUE DEBIT

## 2022-05-16 PROCEDURE — 3331090001 HH PPS REVENUE CREDIT

## 2022-05-16 PROCEDURE — 3331090002 HH PPS REVENUE DEBIT

## 2022-05-17 ENCOUNTER — HOME CARE VISIT (OUTPATIENT)
Dept: SCHEDULING | Facility: HOME HEALTH | Age: 72
End: 2022-05-17
Payer: MEDICARE

## 2022-05-17 VITALS
SYSTOLIC BLOOD PRESSURE: 126 MMHG | RESPIRATION RATE: 16 BRPM | OXYGEN SATURATION: 94 % | DIASTOLIC BLOOD PRESSURE: 76 MMHG | HEART RATE: 84 BPM | TEMPERATURE: 98.3 F

## 2022-05-17 PROCEDURE — 3331090001 HH PPS REVENUE CREDIT

## 2022-05-17 PROCEDURE — G0299 HHS/HOSPICE OF RN EA 15 MIN: HCPCS

## 2022-05-17 PROCEDURE — 3331090002 HH PPS REVENUE DEBIT

## 2022-05-18 PROCEDURE — 3331090002 HH PPS REVENUE DEBIT

## 2022-05-18 PROCEDURE — 3331090001 HH PPS REVENUE CREDIT

## 2022-05-19 PROCEDURE — 3331090001 HH PPS REVENUE CREDIT

## 2022-05-19 PROCEDURE — 3331090002 HH PPS REVENUE DEBIT

## 2022-05-20 PROCEDURE — 3331090001 HH PPS REVENUE CREDIT

## 2022-05-20 PROCEDURE — 3331090002 HH PPS REVENUE DEBIT

## 2022-05-21 PROCEDURE — 3331090002 HH PPS REVENUE DEBIT

## 2022-05-21 PROCEDURE — 3331090001 HH PPS REVENUE CREDIT

## 2022-05-22 PROCEDURE — 3331090001 HH PPS REVENUE CREDIT

## 2022-05-22 PROCEDURE — 3331090002 HH PPS REVENUE DEBIT

## 2022-05-23 PROCEDURE — 3331090002 HH PPS REVENUE DEBIT

## 2022-05-23 PROCEDURE — 3331090001 HH PPS REVENUE CREDIT

## 2022-05-24 ENCOUNTER — HOME CARE VISIT (OUTPATIENT)
Dept: HOME HEALTH SERVICES | Facility: HOME HEALTH | Age: 72
End: 2022-05-24
Payer: MEDICARE

## 2022-05-24 PROCEDURE — 3331090001 HH PPS REVENUE CREDIT

## 2022-05-24 PROCEDURE — G0299 HHS/HOSPICE OF RN EA 15 MIN: HCPCS

## 2022-05-24 PROCEDURE — 3331090002 HH PPS REVENUE DEBIT

## 2022-05-24 NOTE — PROGRESS NOTES
PLEASE CONTINUE TAKING ALL PRESCRIPTION MEDICATIONS UP TO THE DAY OF SURGERY UNLESS OTHERWISE DIRECTED BELOW. DISCONTINUE all vitamins and supplements 7 days prior to surgery. DISCONTINUE Non-Steriodal Anti-Inflammatory (NSAIDS) such as Advil and Aleve 5 days prior to surgery. Home Medications to take  the day of surgery    NONE           Home Medications   to Hold   NONE        Comments                 Please do not bring home medications with you on the day of surgery unless otherwise directed by your nurse. If you are instructed to bring home medications, please give them to your nurse as they will be administered by the nursing staff. If you have any questions, please call [address] [place] [address] [phone] or [address] [place] [address] [phone]. A copy of this note was provided to the patient for reference.

## 2022-05-24 NOTE — PROGRESS NOTES
Patient verified name and     Order for consent WAS NOT found in EHR and matches case posting; patient verified. Type 1B surgery, PHONE assessment complete. Labs per surgeon: PENDING ORDERS  Labs per anesthesia protocol: POTASSIUM , HGB, AND SQBS DOS---ORDERS PLACED IN CC  EKG: IN CC [address] [place] [address] approved surgical skin cleanser and instructions given per hospital policy. Patient provided with and instructed on educational handouts including Guide to Surgery, Pain Management, Hand Hygiene, Blood Transfusion Education, and [facility] Brochure. Patient answered medical/surgical history questions at their best of ability. All prior to admission medications documented in [facility]. Original medication prescription bottle WAS NOT visualized during patient appointment. Patient instructed to hold all vitamins 7 days prior to surgery and NSAIDS 5 days prior to surgery, patient verbalized understanding. Patient teach back successful and patient demonstrates knowledge of instructions.

## 2022-05-25 NOTE — PROGRESS NOTES
Called to inform patient change of surgery time, confirmed arrival time at [facility] 20 on 5/26

## 2022-05-26 NOTE — BRIEF OP NOTE
[place] [id number]  [phone] FAX: [phone]    Brief Op Note Template Note    Pre-Op Diagnosis: ESRD (end stage renal disease)    Post-Op Diagnosis:  * No post-op diagnosis entered *    Procedures: Procedure(s):  LEFT AV GRAFT THIGH    Surgeon: Juanjo Vincent MD    Assistants: Surgeon(s):  Junior Wagner MD      Anesthesia:  General     Findings: AVG graft place . [facility] Tourniquet Time:  * No tourniquets in log *    Estimated Blood Loss:               Specimens:            Implants:    Implant Name Type Inv. Item Serial No.  Lot No. LRB No. Used Action   GRAFT VASC L40CM ID6MM EPTFE STD WALLED NONRINGED STR GOR - [id number] Vascular grafts GRAFT VASC L40CM ID6MM EPTFE STD WALLED NONRINGED STR GOR [id number]  [employer]  Left 1 Implanted       Complications: None               Signed: Juanjo Vincent MD      Elements of this note have been dictated using speech recognition software. As a result, errors of speech recognition may have occurred.

## 2022-05-26 NOTE — ANESTHESIA PRE PROCEDURE
Department of Anesthesiology  Preprocedure Note       Name:  Camilo Pantoja   Age:  80 y.o.  :  1950                                          MRN:  [id number]         Date:  2022      Surgeon: Adriana Mendoza):  Marco Antonio Murdock MD    Procedure: Procedure(s):  LEFT AV GRAFT THIGH    Medications prior to admission:   Prior to Admission medications    Medication Sig Start Date End Date Taking?  Authorizing Provider   acetaminophen (TYLENOL) 500 MG tablet Take 500 mg by mouth every 6 hours as needed for Pain   Yes Historical Provider, MD   amLODIPine (NORVASC) 10 MG tablet Take 10 mg by mouth at bedtime   Yes Historical Provider, MD   lisinopril (PRINIVIL;ZESTRIL) 40 MG tablet Take 40 mg by mouth at bedtime   Yes Historical Provider, MD   omeprazole (PRILOSEC) 40 MG delayed release capsule Take 40 mg by mouth at bedtime   Yes Historical Provider, MD   OXYGEN Inhale into the lungs 2 LITERS PRN   Yes Historical Provider, MD   SITagliptin (JANUVIA) 50 MG tablet Take 50 mg by mouth daily   Yes Historical Provider, MD   sevelamer (RENVELA) 800 MG tablet Take 1 tablet by mouth 3 times daily (with meals) PT TAKES 5 TABS WITH MEALS-- AND 2 TABS WITH SNACKS   Yes Historical Provider, MD       Current medications:    Current Facility-Administered Medications   Medication Dose Route Frequency Provider Last Rate Last Admin    lidocaine PF 1 % injection 1 mL  1 mL IntraDERmal Once PRN ASMITA Marie MD        midazolam PF (VERSED) injection 2 mg  2 mg IntraVENous Once PRN L Priscilla Marie MD        0.9 % sodium chloride infusion   IntraVENous Continuous L Priscilla Marie MD 50 mL/hr at 22 0618 New Bag at 22 0618    0.9 % sodium chloride infusion   IntraVENous PRN Roetta Crea Standard, APRN - CNP        sodium chloride flush 0.9 % injection 5-40 mL  5-40 mL IntraVENous 2 times per day Roetta Crea Standard, APRN - CNP        sodium chloride flush 0.9 % injection 5-40 mL  5-40 mL IntraVENous PRN Radha TIAN Standard, APRN - CNP        ceFAZolin (ANCEF) 2000 mg in sterile water 20 mL IV syringe  2,000 mg IntraVENous Once Ledy Us MD           Allergies: Allergies   Allergen Reactions    Pcn [Penicillins] Other (See Comments)     PT STATES UNSURE OF RX    Vancomycin Rash       Problem List:    Patient Active Problem List   Diagnosis Code    Chronic renal failure, stage 5 ([facility] N18.5    Mediastinal adenopathy R59.0    Renal cell carcinoma ([facility] C64.9    Osteoarthritis of right knee M17.11    Renal mass N28.89    Severe obesity (BMI 35.0-39. 9) with comorbidity ([facility] E66.01    HTN (hypertension) I10    ESRD (end stage renal disease) ([facility] N18.6    DM (diabetes mellitus) ([facility]) E11.9    Small bowel obstruction ([facility]) K56.609    Severe back pain M54.9    Flank pain R10.9    Abdominal mass R19.00    Elevated troponin R77.8    Dizziness R42    Transient ischemic attack G45.9    Bilateral pleural effusion J90    Hypokalemia E87.6    Mediastinal mass J98.59    Acute respiratory failure with hypoxia ([facility]) J96.01    AVF (arteriovenous fistula) ([facility]) I77.0    Bacterial pneumonia J15.9    Hypotension I95.9    Total knee replacement status Z96.659    Type 2 diabetes mellitus with nephropathy ([facility]) E11.21       Past Medical History:        Diagnosis Date    Anemia     Arthritis     AVF (arteriovenous fistula) ([facility])     left    AVF (arteriovenous fistula) ([facility] 12/20/2016 12/6/16 ([facility]) Right AVF revision and thrombectomy    Degenerative joint disease     Diabetes ([facility]     type 2--- does not check sqbs at home    Enlarged lymph node     \"enlarging paratracheal node to 2.6 cm on CT chest\"    ESRD on hemodialysis ([facility] onset 2006    ESRD.  MWF dialysis at [place] [facility]    Hypertension     controlled with med    Mediastinal mass     being followed by dr ramírez--per pt-- states told by dr Nitesh Keller it wasnt cancer-- but plans to be rescanned 5/2022    Obesity     On home O2     2L QHS and PRN during day d/t \"lymph node in chest\"    Renal cancer ([address]     Dr Kodi Ramsey - renal cancer L nephrectomy---and radiation ---    Shortness of breath     swollen lymphnode in chest-- oxygen 2LPM    Transient ischemic attack 08/29/2015    no residual       Past Surgical History:        Procedure Laterality Date    BREAST SURGERY Right     cyst removed    COLONOSCOPY N/A 11/8/2018    COLONOSCOPY  BMI 36 performed by Kar Colin MD [facility]  9/14/2017    CT BIOPSY ABDOMEN RETROPERITONEUM 9/14/2017 [facility] CT SCAN    GI  08/06/2018    exploratory laparotomy    GI  06/01/2002    colon resection resulting in temporary colostomy reversal    GYN      mihir    IR INTRO CATH DIALYSIS CIRCUIT W BALLOON PTA  3/10/2020    IR INTRO CATH DIALYSIS CIRCUIT W BALLOON PTA  12/5/2019    IR INTRO CATH DIALYSIS CIRCUIT W BALLOON PTA  9/3/2019    IR INTRO CATH DIALYSIS CIRCUIT W BALLOON PTA  5/30/2019    IR INTRO CATH DIALYSIS CIRCUIT W BALLOON PTA  2/28/2019    IR THRMB/INFUSION DIAYSIS CIRCUIT Left 2021    IR TUNNELED CATHETER PLACEMENT GREATER THAN 5 YEARS  3/15/2022    IR TUNNELED CATHETER PLACEMENT GREATER THAN 5 YEARS  11/19/2021    IR TUNNELED CATHETER PLACEMENT GREATER THAN 5 YEARS  11/19/2021    IR TUNNELED CATHETER PLACEMENT GREATER THAN 5 YEARS 11/19/2021 [facility] SPECIALS    IR TUNNELED CATHETER PLACEMENT GREATER THAN 5 YEARS  3/15/2022    IR TUNNELED CATHETER PLACEMENT GREATER THAN 5 YEARS 3/15/2022 [facility] SPECIALS    OTHER SURGICAL HISTORY      dialysis fistula, several permcaths    UROLOGICAL SURGERY Left July 2015    nephrectomy    VASCULAR SURGERY Left 04/07/2022    LEFT ARM AV GRAFT    VASCULAR SURGERY Left 03/15/2022    declot    VASCULAR SURGERY Left 02/21/2022    Open thromboembolectomy of left upper arm graft.     VASCULAR SURGERY Right     AV graft- states no longer uses       Social History:    Social History     Tobacco Use    Smoking status: Never Smoker    Smokeless tobacco: Never Used   Substance Use Topics    Alcohol use: No                                Counseling given: Not Answered      Vital Signs (Current):   Vitals:    05/24/22 0841 05/26/22 0551   BP:  (!) 105/58   Pulse:  91   Resp:  20   Temp:  98.3 °F (36.8 °C)   TempSrc:  Oral   SpO2:  (!) 85%   Weight: 189 lb (85.7 kg) 187 lb 6.4 oz (85 kg)   Height: 5' 5\" (1.651 m) 5' 4\" (1.626 m)                                              BP Readings from Last 3 Encounters:   05/26/22 (!) 105/58   05/17/22 126/76   05/13/22 105/69       NPO Status: Time of last liquid consumption: 2000                        Time of last solid consumption: 2000                        Date of last liquid consumption: 05/25/22                        Date of last solid food consumption: 05/25/22    BMI:   Wt Readings from Last 3 Encounters:   05/26/22 187 lb 6.4 oz (85 kg)   05/13/22 185 lb 12.8 oz (84.3 kg)   04/26/22 187 lb (84.8 kg)     Body mass index is 32.17 kg/m².     CBC:   Lab Results   Component Value Date    WBC 4.4 05/13/2022    RBC 2.82 05/13/2022    HGB 9.5 05/13/2022    HCT 26.6 05/13/2022    MCV 94.3 05/13/2022    RDW 17.1 05/13/2022    PLT 98 05/13/2022       CMP:   Lab Results   Component Value Date     05/13/2022    K 3.5 05/13/2022    CL 98 05/13/2022    CO2 34 05/13/2022    BUN 11 05/13/2022    CREATININE 3.20 05/13/2022    GFRAA 18 05/13/2022    AGRATIO 1.0 05/13/2022    GLUCOSE 107 05/13/2022    PROT 7.3 05/13/2022    CALCIUM 8.3 05/13/2022    BILITOT 0.9 05/13/2022    ALKPHOS 95 05/13/2022    AST 19 05/13/2022    ALT 13 05/13/2022       POC Tests:   Recent Labs     05/26/22  0553 05/26/22  0558   POCGLU  --  105*   POCK 3.4*  --        Coags: No results found for: PROTIME, INR, APTT    HCG (If Applicable): No results found for: PREGTESTUR, PREGSERUM, HCG, HCGQUANT     ABGs: No results found for: PHART, PO2ART, RTT1BJT, XNF8YUV, BEART, E7GTDPWN     Type & Screen (If Applicable):  No results found for: LABABO, LABRH    Drug/Infectious Status (If Applicable):  No results found for: HIV, HEPCAB    COVID-19 Screening (If Applicable):   Lab Results   Component Value Date    COVID19 Not detected 02/22/2022           Anesthesia Evaluation  Patient summary reviewed and Nursing notes reviewed  Airway: Mallampati: II       Mouth opening: > = 3 FB   Dental: normal exam         Pulmonary: breath sounds clear to auscultation  (+) shortness of breath (with exertion): no interval change,                            ROS comment: Chronic hypoxia - requires 2L NC qhs and with activity   Cardiovascular:  Exercise tolerance: poor (<4 METS), Ambulates with cane  (+) hypertension:, hyperlipidemia        Rhythm: regular  Rate: normal                 ROS comment: Echo 2015 - normal EF, mild pulm HTN, no sig valve dz     Neuro/Psych:   (+) TIA (Remote),             GI/Hepatic/Renal:   (+) renal disease: ESRD and dialysis,          ROS comment: Obesity. Endo/Other:    (+) Diabeteswell controlled, , .                 Abdominal:             Vascular: Other Findings:           Anesthesia Plan      general     ASA 3       Induction: intravenous. Anesthetic plan and risks discussed with patient.                         Simone Ashton MD   5/26/2022

## 2022-05-26 NOTE — PERIOP NOTE
Discharge instructions reviewed with patient and \"God son\", Disha Tomiln. Both verbalized understanding. No questions or concerns. Papers with him. Paper prescription with Disha Tomlin. Unable to use e-signature pad d/t malfunction.

## 2022-05-26 NOTE — ANESTHESIA POSTPROCEDURE EVALUATION
Department of Anesthesiology  Postprocedure Note    Patient: Diana May  MRN: [id number]  YOB: 1950  Date of evaluation: 5/26/2022  Time:  9:54 AM     Procedure Summary     Date: 05/26/22 Room / Location: [facility] MAIN OR 09 / [facility] MAIN OR    Anesthesia Start: 0703 Anesthesia Stop: 8103    Procedure: LEFT AV GRAFT THIGH (Left Leg Upper) Diagnosis:       ESRD (end stage renal disease) ([facility]      (ESRD (end stage renal disease))    Providers: Annamaria Mejia MD Responsible Provider: Scar Nathan MD    Anesthesia Type: general ASA Status: 3          Anesthesia Type: No value filed. Be Phase I: Be Score: 8    Be Phase II:      Last vitals: Reviewed and per EMR flowsheets.        Anesthesia Post Evaluation    Patient location during evaluation: PACU  Patient participation: complete - patient participated  Level of consciousness: awake and alert  Airway patency: patent  Nausea & Vomiting: no nausea and no vomiting  Complications: no  Cardiovascular status: hemodynamically stable  Respiratory status: acceptable  Hydration status: euvolemic  Comments: Pt stable for discharge from PACU  Multimodal analgesia pain management approach

## 2022-05-26 NOTE — PERIOP NOTE
Karolyn Pacheco MD at bedside to update patient on surgical outcome, pain medication, follow up appointment. No new orders.

## 2022-05-26 NOTE — PERIOP NOTE
Report and transfer of care to me from Anusha Burns RN. Patient drowsy, but arouses to voice. VSS. SpO2 around 89-92% on 12 liters via simple mask.

## 2022-05-31 PROBLEM — E87.5 HYPERKALEMIA: Status: ACTIVE | Noted: 2022-01-01

## 2022-05-31 PROBLEM — R59.0 MEDIASTINAL ADENOPATHY: Status: RESOLVED | Noted: 2022-01-01 | Resolved: 2022-01-01

## 2022-05-31 PROBLEM — K56.609 SMALL BOWEL OBSTRUCTION (HCC): Status: RESOLVED | Noted: 2018-08-03 | Resolved: 2022-01-01

## 2022-05-31 PROBLEM — E66.01 SEVERE OBESITY (BMI 35.0-39.9) WITH COMORBIDITY (HCC): Status: ACTIVE | Noted: 2018-05-04

## 2022-05-31 PROBLEM — J15.9 BACTERIAL PNEUMONIA: Status: RESOLVED | Noted: 2021-01-01 | Resolved: 2022-01-01

## 2022-05-31 PROBLEM — J98.59 MEDIASTINAL MASS: Status: RESOLVED | Noted: 2022-01-01 | Resolved: 2022-01-01

## 2022-05-31 PROBLEM — J96.11 CHRONIC RESPIRATORY FAILURE WITH HYPOXIA (HCC): Chronic | Status: ACTIVE | Noted: 2021-01-01

## 2022-05-31 PROBLEM — T82.898A PROBLEM WITH DIALYSIS ACCESS (HCC): Status: ACTIVE | Noted: 2022-01-01

## 2022-05-31 PROBLEM — C64.9 RENAL CELL CARCINOMA (HCC): Chronic | Status: ACTIVE | Noted: 2017-09-12

## 2022-05-31 PROBLEM — R10.9 FLANK PAIN: Status: RESOLVED | Noted: 2017-09-14 | Resolved: 2022-01-01

## 2022-05-31 PROBLEM — M54.9 SEVERE BACK PAIN: Status: RESOLVED | Noted: 2021-01-01 | Resolved: 2022-01-01

## 2022-05-31 PROBLEM — J90 BILATERAL PLEURAL EFFUSION: Status: RESOLVED | Noted: 2022-01-01 | Resolved: 2022-01-01

## 2022-05-31 PROBLEM — J96.01 ACUTE RESPIRATORY FAILURE WITH HYPOXIA (HCC): Status: ACTIVE | Noted: 2021-01-01

## 2022-05-31 PROBLEM — E11.21 TYPE 2 DIABETES MELLITUS WITH NEPHROPATHY (HCC): Chronic | Status: ACTIVE | Noted: 2017-12-15

## 2022-05-31 PROBLEM — E87.6 HYPOKALEMIA: Status: RESOLVED | Noted: 2021-01-01 | Resolved: 2022-01-01

## 2022-05-31 PROBLEM — I95.9 HYPOTENSION: Status: ACTIVE | Noted: 2022-01-01

## 2022-05-31 PROBLEM — D69.6 THROMBOCYTOPENIA (HCC): Chronic | Status: ACTIVE | Noted: 2022-01-01

## 2022-05-31 PROBLEM — R77.8 ELEVATED TROPONIN: Status: RESOLVED | Noted: 2021-01-01 | Resolved: 2022-01-01

## 2022-05-31 PROBLEM — R19.00 ABDOMINAL MASS: Status: RESOLVED | Noted: 2017-09-12 | Resolved: 2022-01-01

## 2022-05-31 PROBLEM — E66.01 SEVERE OBESITY (BMI 35.0-39.9) WITH COMORBIDITY (HCC): Chronic | Status: ACTIVE | Noted: 2018-05-04

## 2022-05-31 PROBLEM — C64.9 RENAL CELL CARCINOMA (HCC): Status: ACTIVE | Noted: 2017-09-12

## 2022-05-31 PROBLEM — E87.1 HYPONATREMIA: Status: ACTIVE | Noted: 2022-01-01

## 2022-05-31 NOTE — ED TRIAGE NOTES
Pt arrives from home via [facility] Called to the home for weakness/fatigue. Pt is a dialysis pt. Went to dialysis yesterday and they were unable to pull anything off. New fistula placed in LLE on 5/26/22. Per pt she was 4 kg over her dry weight. When EMS arrived her BP was 62/30 - started slow fluids - last /52.

## 2022-05-31 NOTE — CONSULTS
[facility]  Nephrology consult    Admission Date:  5/31/2022    Admission Diagnosis  No admission diagnoses are documented for this encounter. We were consulted by Dr Adriana Barboza for management of ESRD    History of Present Illness: 81 y/o AA female, followed by our office for ESRD, on HD M-W-F at [facility]. She has been running via RIJ perm cath since LUE AVF became non-functioning. Left thigh AV graft placed on 5/26/22 by Dr Octavio Amezquita. She missed HD last Friday due to feeling bad. Her make up day was that following Saturday, she ran her entire treatment time, was hypotensive, unable to remove fluid. She was told then to stop all antihypertensive medications. She ran HD yesterday, her entire treatment time, was hypotensive, and once again unable to remove fluid. She left dialysis with 4L over her EDW. PMH also consist of DM II, Hyperphosphatemia, anemia, renal cell carcinoma s/p left nephrectomy, followed by Pulmonary for lung mass, and HTN. She is on home O2 at 2L NC as needed. She presented to the ER via EMS for hypotension and generalized \"feeling bad\". At time of assessment, she is on her home dose O2, c/o dyspnea, and is anxious and tearful. She denies CP, no n/v, no HA or dizziness. She denies fever, chills, no cough, and no diarrhea. CXR is negative. There is no urgent dialysis needs at this time. Plan for HD tomorrow. Midodrine ordered for hypotension. Past Medical History:   Diagnosis Date    Anemia     Arthritis     AVF (arteriovenous fistula) ([address]     left    AVF (arteriovenous fistula) ([address] 12/20/2016 12/6/16 ([facility]) Right AVF revision and thrombectomy    Degenerative joint disease     Diabetes ([address]     type 2--- does not check sqbs at home    Enlarged lymph node     \"enlarging paratracheal node to 2.6 cm on CT chest\"    ESRD on hemodialysis ([address] onset 2006    ESRD.  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  VASCULAR SURGERY Right     AV graft- states no longer uses    VASCULAR SURGERY Left 5/26/2022    LEFT AV GRAFT THIGH performed by Venson Rubinstein, MD at [facility] MAIN OR      Current Facility-Administered Medications   Medication Dose Route Frequency    0.9 % sodium chloride bolus  250 mL IntraVENous Once    levoFLOXacin (LEVAQUIN) 750 MG/150ML infusion 750 mg  750 mg IntraVENous NOW     Current Outpatient Medications   Medication Sig    acetaminophen (TYLENOL) 500 MG tablet Take 500 mg by mouth every 6 hours as needed for Pain    amLODIPine (NORVASC) 10 MG tablet Take 10 mg by mouth at bedtime    lisinopril (PRINIVIL;ZESTRIL) 40 MG tablet Take 40 mg by mouth at bedtime    omeprazole (PRILOSEC) 40 MG delayed release capsule Take 40 mg by mouth at bedtime    OXYGEN Inhale into the lungs 2 LITERS AS NEEDED FOR SOB    SITagliptin (JANUVIA) 50 MG tablet Take 50 mg by mouth daily    sevelamer (RENVELA) 800 MG tablet Take 1 tablet by mouth 3 times daily (with meals) PT TAKES 5 TABS WITH MEALS-- AND 2 TABS WITH SNACKS     Allergies   Allergen Reactions    Pcn [Penicillins] Other (See Comments)     PT STATES UNSURE OF RX    Vancomycin Rash      Social History     Tobacco Use    Smoking status: Never Smoker    Smokeless tobacco: Never Used   Substance Use Topics    Alcohol use: No      Family History   Problem Relation Age of Onset    Diabetes Mother     Heart Disease Mother     Hypertension Mother     Heart Disease Father     Hypertension Father     Post-op Cognitive Dysfunction Neg Hx     Pseudochol.  Deficiency Neg Hx     Delayed Awakening Neg Hx     Post-op Nausea/Vomiting Neg Hx     Emergence Delirium Neg Hx     Other Neg Hx     Malig Hypertherm Neg Hx         Review of Systems  Gen - no fever, no chills, appetite okay  HEENT - no sore throat, no decreased vision, no hearing loss  Neck - no neck mass  CV - no chest pain, no palpitation, no orthopnea  Lung - mild shortness of breath, no cough, no hemoptysis  Abd - no tenderness, no nausea/vomiting, no bloody stool  Ext - mild edema, no clubbing, no cyanosis  Musculoskeletal - no joint pain, no back pain  Neurologic - no headaches, no dizziness, no seizures  Psychiatric - mild anxiety, no depression  Skin - no rashes, no pupura  Genitourinary - no decreased urine output, no hematuria, no foamy urine    Objective:     Vitals:    05/31/22 1347 05/31/22 1357 05/31/22 1427 05/31/22 1430   BP: (!) 92/49 (!) 89/47 (!) 90/53 (!) 98/51   Pulse: 89 89 91    Resp: 18 18 21    Temp:       TempSrc:       SpO2:  99% (!) 84%      No intake or output data in the 24 hours ending 05/31/22 1502    Physical Exam  GEN :mild distress, alert and oriented  HEENT: anicteric sclerae, eomi. Oropharynx without lesions. Mucous membranes are moist.  Neck - supple without JVD, no thyromegaly. No lymphadenopathy. CV - regular rate and rhythm, no murmur, no rub  Lung - clear bilaterally, lungs expand symmetrically  Chest wall - normal appearance, RIJ perm cath  Abd - soft, nontender, bowel sounds present, no hepatosplenomegaly  Ext - no clubbing, no cyanosis, trace edema left leg, left thigh AV graph  Neurologic - nonfocal, mild anxiety  Skin - no rashes, no purpura, no ecchymoses  Psychiatric: Normal mood and affect. Data Review:   Recent Labs     05/31/22  1228   WBC 7.1   HGB 9.0*   HCT 26.4*   *     Recent Labs     05/31/22  1228   *   K 5.5*      CO2 23   BUN 32*     No results for input(s): PH, PCO2, PO2, PCO2 in the last 72 hours. CXR:   Impression   Stable cardiomegaly without acute process.   No pleural effusion or pneumothorax             Problem List:     Patient Active Problem List    Diagnosis Date Noted    Problem with dialysis access ([address] 05/31/2022    Hyperkalemia 05/31/2022    Hyponatremia 05/31/2022    Thrombocytopenia ([address] 05/31/2022    Chronic respiratory failure with hypoxia [facility] 11/12/2021    Severe obesity ([facility] 05/04/2018    Type 2 diabetes mellitus with nephropathy ([facility] 12/15/2017    Renal cell carcinoma ([facility] 09/12/2017    AVF (arteriovenous fistula) ([facility] 12/20/2016    Osteoarthritis of right knee 02/07/2013    HTN (hypertension) 02/07/2013    ESRD (end stage renal disease) on dialysis ([facility] 02/07/2013    Type 2 diabetes mellitus with hyperglycemia ([facility] 02/07/2013    Total knee replacement status 02/07/2013       Impression:    Plan:     1. ESRD- on HD M-W-F, HD needed for biochemical and volume control. No urgent needs at this time. Plan to run HD tomorrow and continue her current outpatient schedule. 2. Anemia- likely related to ESRD, near goal for CKD, continue Nephrovite, no urgent needs at this time. 3. Hypotension- with a hx of HTN, antihypertensives were stopped Friday last week. New left thigh AV graph could be playing a part. CXR negative, on baseline home O2, Start Midodrine 10 mg TID    4. Hyperkalemia- likley related to ESRD, expect to improve with HD tomorrow    5. Hypocalcemia- with low albumin, treat with vitamin d    6. HTN- hx of, off antihypertensives    7. Renal cell carcinoma- followed by Oncology    8. DM II- on SSI, managed by primary    9.  Hyperphosphatemia- hx of, on Renvela, continue        Leotha Grant, ACNP

## 2022-05-31 NOTE — H&P
Hospitalist History and Physical   Admit Date:  2022 12:09 PM   Name:  Amada Call   Age:  80 y.o. Sex:  female  :  1950   MRN:  [id number]   Room:  ER14/14    Presenting Complaint: Fatigue     Reason(s) for Admission: Hypotension [I95.9]     History of Present Illness: Amada Call is a 80 y.o. female with medical history of ESRD on HD //, RCC, obesity, DM2, who presented with fatigue. Pt has not been feeling well since her AVG surgery on . Constant, progressively worsening fatigue and malaise since then. She missed HD  due to feeling poorly. She only had a partial run of HD on  due to hypotension. PO intake is less than usual.  She also c/o SOB worsening over same duration. She also c/o pain at surgical sites but sites look to be healing well. No CP, fevers, cough, diarrhea. Review of Systems:  10 systems reviewed and negative except as noted in HPI. Assessment & Plan:       Hypotension  -discussed with nephrology. Pt is SOB but no CXR findings, on baseline o2 use satting 100%. Paradoxically seems like she needs some fluid despite not getting much dialysis last 5 days. Will await nephrology evaluation  -start midodrine for now  -got 250cc bolus in ER with marginal improvement  -got levaquin in ER; defer further abx to nephrology. May not need. Send blood cultures and follow results.   One from catheter access and one from peripheral      Hyperkalemia  -telemetry  -dialysis      Hyponatremia  -will correct with dialysis      Thrombocytopenia (HCC)  -stable, maybe slightly higher than baseline.    -daily CBC      Renal cell carcinoma ([address]  -noted, worsens prognosis      Severe obesity ([address]  -complicates management      HTN (hypertension)  -holding home BP meds for now      ESRD (end stage renal disease) on dialysis [facility])  -nephrology consulted  -has catheter for access right now  -recent AVG , site looks good, does not look infected or swollen Type 2 diabetes mellitus with hyperglycemia (HCC)  -ISS  -ADA diet      Chronic respiratory failure with hypoxia (HCC)  -currently on 2L which is baseline      Dispo/Discharge Planning:   -PPD/PT/OT ordered. Diet: ADULT DIET; Regular; 4 carb choices (60 gm/meal); Low Potassium (Less than 3000 mg/day); Low Phosphorus (Less than 1000 mg)  VTE ppx: SCDs  Code status: Full Code    Hospital Problems:  Principal Problem:    Hypotension  Active Problems:    Hyperkalemia    Hyponatremia    Thrombocytopenia (HCC)    Renal cell carcinoma (HCC)    Severe obesity (HCC)    HTN (hypertension)    ESRD (end stage renal disease) on dialysis (HCC)    Type 2 diabetes mellitus with hyperglycemia (HCC)    Chronic respiratory failure with hypoxia (HCC)  Resolved Problems:    * No resolved hospital problems. *       Past History:     Past Medical History:   Diagnosis Date    Anemia     Arthritis     [facility]) [facility]     left    AVF (arteriovenous fistula) ([facility] 12/20/2016 12/6/16 ([facility]) Right AVF revision and thrombectomy    Degenerative joint disease     Diabetes ([facility]     type 2--- does not check sqbs at home    Enlarged lymph node     \"enlarging paratracheal node to 2.6 cm on CT chest\"    ESRD on hemodialysis ([facility] onset 2006    ESRD.  MWF dialysis at [facility]    Hypertension     controlled with med    Mediastinal mass     being followed by dr ramírez--per pt-- states told by dr Annabel Ruiz it wasnt cancer-- but plans to be rescanned 5/2022    Obesity     On home O2     2L QHS and PRN during day d/t \"lymph node in chest\"    Renal cancer ([facility]     Dr David Perez - renal cancer L nephrectomy---and radiation ---    Shortness of breath     swollen lymphnode in chest-- oxygen 2LPM    Transient ischemic attack 08/29/2015    no residual     Past Surgical History:   Procedure Laterality Date    BREAST SURGERY Right     cyst removed    COLONOSCOPY N/A 11/8/2018    COLONOSCOPY  BMI 36 performed by Ronda Espinal MD MIKE at [facility] ENDOSCOPY    CT BIOPSY ABDOMEN RETROPERITONEUM  9/14/2017    CT BIOPSY ABDOMEN RETROPERITONEUM 9/14/2017 [facility] RADIOLOGY CT SCAN    GI  08/06/2018    exploratory laparotomy    GI  06/01/2002    colon resection resulting in temporary colostomy reversal    GYN      mihir    IR INTRO CATH DIALYSIS CIRCUIT W BALLOON PTA  3/10/2020    IR INTRO CATH DIALYSIS CIRCUIT W BALLOON PTA  12/5/2019    IR INTRO CATH DIALYSIS CIRCUIT W BALLOON PTA  9/3/2019    IR INTRO CATH DIALYSIS CIRCUIT W BALLOON PTA  5/30/2019    IR INTRO CATH DIALYSIS CIRCUIT W BALLOON PTA  2/28/2019    IR THRMB/INFUSION DIAYSIS CIRCUIT Left 2021    IR TUNNELED CATHETER PLACEMENT GREATER THAN 5 YEARS  3/15/2022    IR TUNNELED CATHETER PLACEMENT GREATER THAN 5 YEARS  11/19/2021    IR TUNNELED CATHETER PLACEMENT GREATER THAN 5 YEARS  11/19/2021    IR TUNNELED CATHETER PLACEMENT GREATER THAN 5 YEARS 11/19/2021 [facility] SPECIALS    IR TUNNELED CATHETER PLACEMENT GREATER THAN 5 YEARS  3/15/2022    IR TUNNELED CATHETER PLACEMENT GREATER THAN 5 YEARS 3/15/2022 [facility]OLOG[facility] SPECIALS    OTHER SURGICAL HISTORY      dialysis fistula, several permcaths    UROLOGICAL SURGERY Left July 2015    nephrectomy    VASCULAR SURGERY Left 04/07/2022    LEFT ARM AV GRAFT    VASCULAR SURGERY Left 03/15/2022    declot    VASCULAR SURGERY Left 02/21/2022    Open thromboembolectomy of left upper arm graft.     VASCULAR SURGERY Right     AV graft- states no longer uses    VASCULAR SURGERY Left 5/26/2022    LEFT AV GRAFT THIGH performed by Son Kc MD at [facility] MAIN OR      Allergies   Allergen Reactions    Pcn [Penicillins] Other (See Comments)     PT STATES UNSURE OF RX    Vancomycin Rash      Social History     Tobacco Use    Smoking status: Never Smoker    Smokeless tobacco: Never Used   Substance Use Topics    Alcohol use: No      Family History   Problem Relation Age of Onset    Diabetes Mother     Heart Disease Mother    Shaun Correia Hypertension Mother     Heart Disease Father     Hypertension Father     Post-op Cognitive Dysfunction Neg Hx     Pseudochol. Deficiency Neg Hx     Delayed Awakening Neg Hx     Post-op Nausea/Vomiting Neg Hx     Emergence Delirium Neg Hx     Other Neg Hx     Malig Hypertherm Neg Hx       Family history reviewed and negative except as noted above.       Immunization History   Administered Date(s) Administered    COVID-19, Pfizer Purple top, DILUTE for use, 12+ yrs, 30mcg/0.3mL dose 02/25/2021, 03/25/2021    PPD Test 02/08/2013, 08/03/2018    Td vaccine (adult) 07/01/2008    Tdap (Boostrix, Adacel) 03/30/2012     Prior to Admit Medications:  Current Outpatient Medications   Medication Instructions    acetaminophen (TYLENOL) 500 mg, Oral, EVERY 6 HOURS PRN    amLODIPine (NORVASC) 10 mg, Oral, Nightly    lisinopril (PRINIVIL;ZESTRIL) 40 mg, Oral, Nightly    omeprazole (PRILOSEC) 40 mg, Oral, Nightly    OXYGEN Inhalation, 2 LITERS AS NEEDED FOR SOB    sevelamer (RENVELA) 800 MG tablet 1 tablet, Oral, 3 TIMES DAILY WITH MEALS, PT TAKES 5 TABS WITH MEALS-- AND 2 TABS WITH SNACKS     SITagliptin (JANUVIA) 50 mg, Oral, DAILY         Objective:     Patient Vitals for the past 24 hrs:   Temp Pulse Resp BP SpO2   05/31/22 1517 -- 86 22 (!) 95/55 --   05/31/22 1457 -- 85 17 (!) 99/47 --   05/31/22 1447 -- 85 15 (!) 94/49 97 %   05/31/22 1430 -- -- -- (!) 98/51 --   05/31/22 1427 -- 91 21 (!) 90/53 (!) 84 %   05/31/22 1357 -- 89 18 (!) 89/47 99 %   05/31/22 1347 -- 89 18 (!) 92/49 --   05/31/22 1327 -- 88 16 (!) 96/52 100 %   05/31/22 1317 -- 91 18 (!) 92/50 99 %   05/31/22 1247 -- 91 18 (!) 90/45 98 %   05/31/22 1237 -- 90 17 (!) 90/51 95 %   05/31/22 1227 -- 89 18 -- --   05/31/22 1217 -- 87 18 (!) 85/47 99 %   05/31/22 1136 -- -- -- -- 90 %   05/31/22 1134 98.4 °F (36.9 °C) 96 20 104/89 (!) 89 %       Oxygen Therapy  SpO2: 97 %    Estimated body mass index is 32.17 kg/m² as calculated from the following: Height as of 5/26/22: 5' 4\" (1.626 m). Weight as of 5/26/22: 187 lb 6.4 oz (85 kg). No intake or output data in the 24 hours ending 05/31/22 1529      Physical Exam:    Blood pressure (!) 95/55, pulse 86, temperature 98.4 °F (36.9 °C), temperature source Oral, resp. rate 22, SpO2 97 %. General:    Well nourished. No overt distress  Head:  Normocephalic, atraumatic  Eyes:  Sclerae appear normal.  Pupils equally round. ENT:  Nares appear normal, no drainage. Moist oral mucosa  Neck:  No restricted ROM. Trachea midline   CV:   RRR. No jugular venous distension. Lungs:   Diminished anteriorly. Respirations even, unlabored  Abdomen:   Soft, nontender, nondistended. Extremities: No cyanosis or clubbing. No edema  AVG site LLE incisions c/d/i, look to be healing well  Skin:     No rashes and normal coloration. Warm and dry. Neuro:  CN II-XII grossly intact. Sensation intact. A&Ox3  Psych:  Normal mood and affect.       I have reviewed ordered lab tests and independently visualized imaging below:    Last 24hr Labs:  Recent Results (from the past 24 hour(s))   CBC    Collection Time: 05/31/22 12:28 PM   Result Value Ref Range    WBC 7.1 4.3 - 11.1 K/uL    RBC 2.74 (L) 4.05 - 5.2 M/uL    Hemoglobin 9.0 (L) 11.7 - 15.4 g/dL    Hematocrit 26.4 (L) 35.8 - 46.3 %    MCV 96.4 79.6 - 97.8 FL    MCH 32.8 26.1 - 32.9 PG    MCHC 34.1 31.4 - 35.0 g/dL    RDW 17.5 (H) 11.9 - 14.6 %    Platelets 028 (L) 435 - 450 K/uL    MPV 10.6 9.4 - 12.3 FL    nRBC 0.03 0.0 - 0.2 K/uL   Comprehensive Metabolic Panel    Collection Time: 05/31/22 12:28 PM   Result Value Ref Range    Sodium 134 (L) 136 - 145 mmol/L    Potassium 5.5 (H) 3.5 - 5.1 mmol/L    Chloride 101 98 - 107 mmol/L    CO2 23 21 - 32 mmol/L    Anion Gap 10 7 - 16 mmol/L    Glucose 129 (H) 65 - 100 mg/dL    BUN 32 (H) 8 - 23 MG/DL    CREATININE 5.30 (H) 0.6 - 1.0 MG/DL    GFR African American 10 (L) >60 ml/min/1.73m2    GFR Non- 9 (L) >60 ml/min/1.73m2    Calcium 7.3 (L) 8.3 - 10.4 MG/DL    Total Bilirubin 0.9 0.2 - 1.1 MG/DL    ALT 9 (L) 12 - 65 U/L    AST 52 (H) 15 - 37 U/L    Alk Phosphatase 87 50 - 136 U/L    Total Protein 6.5 6.3 - 8.2 g/dL    Albumin 2.6 (L) 3.2 - 4.6 g/dL    Globulin 3.9 (H) 2.3 - 3.5 g/dL    Albumin/Globulin Ratio 0.7 (L) 1.2 - 3.5     Lactate, Sepsis    Collection Time: 05/31/22 12:55 PM   Result Value Ref Range    Lactic Acid, Sepsis 1.1 0.4 - 2.0 MMOL/L       Other Studies:  XR CHEST PORTABLE    Result Date: 5/31/2022  EXAM: XR CHEST PORTABLE INDICATION: fatigue COMPARISON: Chest radiograph, 11/22/2021 FINDINGS: The cardiomediastinal silhouette is enlarged but stable. Right-sided hemodialysis catheter terminates in the right atrium. No pleural effusion or pneumothorax. No focal parenchymal process. Vascular stent in the right subclavian region and bilateral brachial regions. No acute osseous abnormality. Stable cardiomegaly without acute process. Echocardiogram:  No results found for this or any previous visit. Meds previously ordered:  Orders Placed This Encounter   Medications    0.9 % sodium chloride bolus    levoFLOXacin (LEVAQUIN) 750 MG/150ML infusion 750 mg     Order Specific Question:   Antimicrobial Indications     Answer: Other     Order Specific Question:   Other Abx Indication     Answer:   hypotension unknown origin    midodrine (PROAMATINE) tablet 10 mg       Signed:  Nidia Tsang MD    Part of this note may have been written by using a voice dictation software. The note has been proof read but may still contain some grammatical/other typographical errors.

## 2022-05-31 NOTE — ED NOTES
[facility] Emergency Department Provider Note                   PCP:                Lakshmi Jimenez MD               Age: 80 y.o. Sex: female       ICD-10-CM    1. Generalized weakness  R53.1    2. ESRD on hemodialysis (HCC)  N18.6     Z99.2    3. Hyperkalemia  E87.5    4. Hypotension, unspecified hypotension type  I95.9        DISPOSITION         New Prescriptions    No medications on file       Orders Placed This Encounter   Procedures    Blood Culture 1    Blood Culture 2    XR CHEST PORTABLE    CBC    Comprehensive Metabolic Panel    Lactate, Sepsis    Procalcitonin    Strict intake and output    Neurologic Status Assessment    EKG 12 Lead    Saline lock IV        MDM  Number of Diagnoses or Management Options  ESRD on hemodialysis [facility]): new, needed workup  Generalized weakness: new, needed workup  Hyperkalemia: new, needed workup  Hypotension, unspecified hypotension type: new, needed workup  Diagnosis management comments: Blood pressure currently 104/89. Will obtain sepsis work-up including chest x-ray. Pressure trending down to 89/47. Patient given 250 cc normal saline IV fluid bolus. Potassium 5.5. No hemolysis noted. X-ray with no obvious fluid overload. Patient requiring 2 L O2 via nasal cannula. Patient states she is now on a supplement O2 at baseline. Hx of Vanc, PCN allergy. Discussed with Pharmacist who recommends Levofloxacin. No leukocytosis. No elevated lactic acid. Hypotension likely secondary to hypovolemia. Case discussed with Dr. Sherrie Kim with nephrology. Requests inpatient consult order be placed. Hospitalist consulted for admission.           Amount and/or Complexity of Data Reviewed  Clinical lab tests: ordered and reviewed  Tests in the radiology section of CPT®: ordered and reviewed  Tests in the medicine section of CPT®: ordered and reviewed  Review and summarize past medical records: yes  Discuss the patient with other providers: yes  Independent visualization of images, tracings, or specimens: yes    Risk of Complications, Morbidity, and/or Mortality  Presenting problems: high  Diagnostic procedures: high  Management options: high  General comments: Results Include:    Recent Results (from the past 24 hour(s))  -CBC:   Collection Time: 05/31/22 12:28 PM       Result                      Value             Ref Range           WBC                         7.1               4.3 - 11.1 K*       RBC                         2.74 (L)          4.05 - 5.2 M*       Hemoglobin                  9.0 (L)           11.7 - 15.4 *       Hematocrit                  26.4 (L)          35.8 - 46.3 %       MCV                         96.4              79.6 - 97.8 *       MCH                         32.8              26.1 - 32.9 *       MCHC                        34.1              31.4 - 35.0 *       RDW                         17.5 (H)          11.9 - 14.6 %       Platelets                   138 (L)           150 - 450 K/*       MPV                         10.6              9.4 - 12.3 FL       nRBC                        0.03              0.0 - 0.2 K/*  -Comprehensive Metabolic Panel:   Collection Time: 05/31/22 12:28 PM       Result                      Value             Ref Range           Sodium                      134 (L)           136 - 145 mm*       Potassium                   5.5 (H)           3.5 - 5.1 mm*       Chloride                    101               98 - 107 mmo*       CO2                         23                21 - 32 mmol*       Anion Gap                   10                7 - 16 mmol/L       Glucose                     129 (H)           65 - 100 mg/*       BUN                         32 (H)            8 - 23 MG/DL        CREATININE                  5.30 (H)          0.6 - 1.0 MG*       GFR         10 (L)            >60 ml/min/1*       GFR Non- Americ*     9 (L)             >60 ml/min/1*       Calcium                     7.3 (L) 8.3 - 10.4 M*       Total Bilirubin             0.9               0.2 - 1.1 MG*       ALT                         9 (L)             12 - 65 U/L         AST                         52 (H)            15 - 37 U/L         Alk Phosphatase             87                50 - 136 U/L        Total Protein               6.5               6.3 - 8.2 g/*       Albumin                     2.6 (L)           3.2 - 4.6 g/*       Globulin                    3.9 (H)           2.3 - 3.5 g/*       Albumin/Globulin Ratio      0.7 (L)           1.2 - 3.5      -Lactate, Sepsis:   Collection Time: 05/31/22 12:55 PM       Result                      Value             Ref Range           Lactic Acid, Sepsis         1.1               0.4 - 2.0 MM*      Patient Progress  Patient progress: stable       Davie Rush is a 80 y.o. female who presents to the Emergency Department with chief complaint of fatigue/weakness. Chief Complaint   Patient presents with    Fatigue      55-year-old female with history of RCC, ESRD on HD Monday, Wednesday, Friday, anemia, obesity, HTN, chronic respiratory failure on 2 L O2 to nasal cannula nightly and as needed, type 2 diabetes mellitus, status post left thigh AV graft on 5/26/22 performed by Dr. Louise Oglesby who presents with complaint of worsening generalized weakness, fatigue, shortness of breath, swelling and pain to left thigh region. States that her last round of dialysis was on yesterday but they were unable to pull off any fluid. Denies fever, chills. Patient states that she last underwent dialysis on Saturday. Denies chest pain, abdominal pain, nausea, vomiting, rhinorrhea, nasal dressing, sore throat. Patient was initially noted to have blood pressure in field of 62/30. Gentle IV fluid hydration was initiated by EMS with blood pressure 103/52. The history is provided by the patient. No  was used. Review of Systems   Constitutional: Positive for fatigue.  Negative for chills, diaphoresis and fever. HENT: Negative for congestion and rhinorrhea. Eyes: Negative for visual disturbance. Respiratory: Positive for shortness of breath. Negative for cough and stridor. Cardiovascular: Positive for leg swelling. Negative for chest pain. Gastrointestinal: Negative for abdominal pain, diarrhea, nausea and vomiting. Genitourinary: Negative for flank pain and hematuria. Musculoskeletal: Negative for back pain, neck pain and neck stiffness. Skin: Positive for wound. Negative for color change. Neurological: Positive for weakness. Negative for dizziness, tremors, seizures, syncope, light-headedness, numbness and headaches. Hematological: Does not bruise/bleed easily. Psychiatric/Behavioral: Negative for confusion. Past Medical History:   Diagnosis Date    Anemia     Arthritis     AVF [facility]iovenous fistula) [facility] [address]     left    AVF (arteriovenous fistula) ([facility] [address] 12/20/2016 12/6/16 ([facility]) Right AVF revision and thrombectomy    Degenerative joint disease     Diabetes ([facility] [address]     type 2--- does not check sqbs at home    Enlarged lymph node     \"enlarging paratracheal node to 2.6 cm on CT chest\"    ESRD on hemodialysis ([address] onset 2006    ESRD.  MWF dialysis at [place] [facility]    Hypertension     controlled with med    Mediastinal mass     being followed by dr ramírez--per pt-- states told by dr Ely Sox it wasnt cancer-- but plans to be rescanned 5/2022    Obesity     On home O2     2L QHS and PRN during day d/t \"lymph node in chest\"    Renal cancer ([facility] [address]     Dr Kodi Ramsey - renal cancer L nephrectomy---and radiation ---    Shortness of breath     swollen lymphnode in chest-- oxygen 2LPM    Transient ischemic attack 08/29/2015    no residual        Past Surgical History:   Procedure Laterality Date    BREAST SURGERY Right     cyst removed    COLONOSCOPY N/A 11/8/2018    COLONOSCOPY  BMI 36 performed [facility] ENDOSCOPY    CT BIOPSY ABDOMEN RETROPERITONEUM  9/14/2017    CT BIOPSY ABDOMEN RETROPERITONEUM 9/14/2017 [facility]Y CT SCAN    GI  08/06/2018    exploratory laparotomy    GI  06/01/2002    colon resection resulting in temporary colostomy reversal    GYN      mihir    IR INTRO CATH DIALYSIS CIRCUIT W BALLOON PTA  3/10/2020    IR INTRO CATH DIALYSIS CIRCUIT W BALLOON PTA  12/5/2019    IR INTRO CATH DIALYSIS CIRCUIT W BALLOON PTA  9/3/2019    IR INTRO CATH DIALYSIS CIRCUIT W BALLOON PTA  5/30/2019    IR INTRO CATH DIALYSIS CIRCUIT W BALLOON PTA  2/28/2019    IR THRMB/INFUSION DIAYSIS CIRCUIT Left 2021    IR TUNNELED CATHETER PLACEMENT GREATER THAN 5 YEARS  3/15/2022    IR TUNNELED CATHETER PLACEMENT GREATER THAN 5 YEARS  11/19/2021    IR TUNNELED CATHETER PLACEMENT GREATER THAN 5 YEARS  11/19/2021    IR TUNNELED CATHETER PLACEMENT GREATER THAN 5 YEARS 11/19/2021 [facility] SPECIALS    IR TUNNELED CATHETER PLACEMENT GREATER THAN 5 YEARS  3/15/2022    IR TUNNELED CATHETER PLACEMENT GREATER THAN 5 YEARS 3/15/2022 [facility] SPECIALS    OTHER SURGICAL HISTORY      dialysis fistula, several permcaths    UROLOGICAL SURGERY Left July 2015    nephrectomy    VASCULAR SURGERY Left 04/07/2022    LEFT ARM AV GRAFT    VASCULAR SURGERY Left 03/15/2022    declot    VASCULAR SURGERY Left 02/21/2022    Open thromboembolectomy of left upper arm graft.  VASCULAR SURGERY Right     AV graft- states no longer uses    VASCULAR SURGERY Left 5/26/2022    LEFT AV GRAFT THIGH performed by Dariel [facility] at [facility] MAIN OR        Family History   Problem Relation Age of Onset    Diabetes Mother     Heart Disease Mother     Hypertension Mother     Heart Disease Father     Hypertension Father     Post-op Cognitive Dysfunction Neg Hx     Pseudochol.  Deficiency Neg Hx     Delayed Awakening Neg Hx     Post-op Nausea/Vomiting Neg Hx     Emergence Delirium Neg Hx     Other Neg Hx     Malig Hypertherm Neg Hx            Social Connections:  Frequency of Communication with Friends and Family: Not on file    Frequency of Social Gatherings with Friends and Family: Not on file    Attends [employer] Services: Not on file    Active Member of Clubs or Organizations: Not on file    Attends Club or Organization Meetings: Not on file    Marital Status: Not on file        Allergies   Allergen Reactions    Pcn [Penicillins] Other (See Comments)     PT STATES UNSURE OF RX    Vancomycin Rash        Vitals signs and nursing note reviewed. Patient Vitals for the past 4 hrs:   Temp Pulse Resp BP SpO2   05/31/22 1357 -- 89 18 (!) 89/47 99 %   05/31/22 1347 -- 89 18 (!) 92/49 --   05/31/22 1327 -- 88 16 (!) 96/52 100 %   05/31/22 1317 -- 91 18 (!) 92/50 99 %   05/31/22 1247 -- 91 18 (!) 90/45 98 %   05/31/22 1237 -- 90 17 (!) 90/51 95 %   05/31/22 1227 -- 89 18 -- --   05/31/22 1217 -- 87 18 (!) 85/47 99 %   05/31/22 1136 -- -- -- -- 90 %   05/31/22 1134 98.4 °F (36.9 °C) 96 20 104/89 (!) 89 %          Physical Exam  Vitals and nursing note reviewed. Constitutional:       Appearance: She is ill-appearing. HENT:      Head: Normocephalic. Nose: Nose normal.      Mouth/Throat:      Mouth: Mucous membranes are moist.   Eyes:      Extraocular Movements: Extraocular movements intact. Pupils: Pupils are equal, round, and reactive to light. Cardiovascular:      Rate and Rhythm: Normal rate. Pulses: Normal pulses. Pulmonary:      Comments: Tachypneic. Crackles noted to bilateral lung bases. Chest:      Comments: Right tunneled subclavian vascular catheter in place; c/d/i. Abdominal:      General: Bowel sounds are normal.      Palpations: Abdomen is soft. Tenderness: There is no abdominal tenderness. There is no guarding or rebound. Comments: Soft, nontender, nondistended. No rebound or guarding. No peritoneal signs. Musculoskeletal:         General: Swelling and tenderness present. Comments: Status post left thigh AV graft. Swelling noted to the region. No purulent drainage. No palpable cords. DP pulses 2+ and equal bilaterally. Skin:     General: Skin is warm. Findings: No bruising or lesion. Neurological:      General: No focal deficit present. Mental Status: She is alert and oriented to person, place, and time. Cranial Nerves: No cranial nerve deficit. Sensory: No sensory deficit. Motor: No weakness. Comments: No focal deficits. No meningismus. EKG 12 Lead    Date/Time: 5/31/2022 2:37 PM  Performed by: Noe Alpers., MD  Authorized by: Noe Alpers., MD     ECG reviewed by ED Physician in the absence of a cardiologist: yes    Rate:     ECG rate:  88    ECG rate assessment: normal    Rhythm:     Rhythm: sinus rhythm    Ectopy:     Ectopy: none    QRS:     QRS axis:  Right    QRS intervals:  Normal  Conduction:     Conduction: normal    ST segments:     ST segments:  Normal  T waves:     T waves: normal    Comments:      QT/QTc 443/536 ms         Labs Reviewed   CBC - Abnormal; Notable for the following components:       Result Value    RBC 2.74 (*)     Hemoglobin 9.0 (*)     Hematocrit 26.4 (*)     RDW 17.5 (*)     Platelets 795 (*)     All other components within normal limits   COMPREHENSIVE METABOLIC PANEL - Abnormal; Notable for the following components:    Sodium 134 (*)     Potassium 5.5 (*)     Glucose 129 (*)     BUN 32 (*)     CREATININE 5.30 (*)     GFR  10 (*)     GFR Non-African American 9 (*)     Calcium 7.3 (*)     ALT 9 (*)     AST 52 (*)     Albumin 2.6 (*)     Globulin 3.9 (*)     Albumin/Globulin Ratio 0.7 (*)     All other components within normal limits   CULTURE, BLOOD 1   CULTURE, BLOOD 1   LACTATE, SEPSIS   PROCALCITONIN   LACTATE, SEPSIS        XR CHEST PORTABLE   Final Result   Stable cardiomegaly without acute process.               NIH Stroke Scale  Interval: Baseline  Level of Consciousness (1a): Alert  LOC Questions (1b): Answers both correctly  LOC Commands (1c): Performs both tasks correctly  Best Gaze (2): Normal  Visual (3): No visual loss  Facial Palsy (4): Normal symmetrical movement  Motor Arm, Left (5a): No drift  Motor Arm, Right (5b): No drift  Motor Leg, Left (6a): No drift  Motor Leg, Right (6b): No drift  Limb Ataxia (7): Absent  Sensory (8): Normal  Best Language (9): No aphasia  Dysarthria (10): Normal  Extinction and Inattention (11): No abnormality  Total: 0                 Despite having hypotension, a standard fluid resuscitation of 30 mL/kg would harm the patient because the patient has Renal failure. Therefore, ordering a specific volume of 250 ml. ED Course as of 05/31/22 1432   Tue May 31, 2022   1328 CXR    FINDINGS:  The cardiomediastinal silhouette is enlarged but stable. Right-sided  hemodialysis catheter terminates in the right atrium. No pleural effusion or  pneumothorax. No focal parenchymal process. Vascular stent in the right  subclavian region and bilateral brachial regions. No acute osseous abnormality.        IMPRESSION:  Stable cardiomegaly without acute process. [DF]   1338 Potassium(!): 5.5 [DF]      ED Course User Index  [DF] Raina Rosas MD        Voice dictation software was used during the making of this note. This software is not perfect and grammatical and other typographical errors may be present. This note has not been completely proofread for errors.        Raina Rosas MD  05/31/22 1438

## 2022-05-31 NOTE — ED NOTES
Prefect serve dr because pt requested IV pain meds, messaged was read but no new orders.      Vladislav Chavira RN  05/31/22 [id number]

## 2022-05-31 NOTE — ACP (ADVANCE CARE PLANNING)
[facility] Hospital[facility]  At the heart of better care     Advance Care Planning   Admit Date:  2022 12:09 PM   Name:  Diana May   Age:  80 y.o. Sex:  female  :  1950   MRN:  [id number]   Room:  [place] [address]    Samantha Pires is able to make her own decisions:   yes      Patient / surrogate decision-maker directed: Full code        Patient or surrogate consented to discussion of the current conditions, workup, management plans, prognosis, and understand the risk for further deterioration. Time spent: 17 minutes in direct discussion (face to face and/or over phone).       Signed:  Nidia Tsang MD

## 2022-06-01 NOTE — ED NOTES
Pt given warm blankets and socks placed on pt because she was feeling cold      Osiris Enriquez RN  05/31/22 [id number]

## 2022-06-01 NOTE — PLAN OF CARE
Problem: Discharge Planning  Goal: Discharge to home or other facility with appropriate resources  Outcome: Progressing  Flowsheets (Taken 6/1/2022 0328)  Discharge to home or other facility with appropriate resources:   Identify barriers to discharge with patient and caregiver   Arrange for needed discharge resources and transportation as appropriate   Identify discharge learning needs (meds, wound care, etc)     Problem: Pain  Goal: Verbalizes/displays adequate comfort level or baseline comfort level  Outcome: Progressing     Problem: Skin/Tissue Integrity  Goal: Absence of new skin breakdown  Description: 1. Monitor for areas of redness and/or skin breakdown  2. Assess vascular access sites hourly  3. Every 4-6 hours minimum:  Change oxygen saturation probe site  4. Every 4-6 hours:  If on nasal continuous positive airway pressure, respiratory therapy assess nares and determine need for appliance change or resting period.   Outcome: Progressing     Problem: Safety - Adult  Goal: Free from fall injury  Outcome: Progressing     Problem: ABCDS Injury Assessment  Goal: Absence of physical injury  Outcome: Progressing

## 2022-06-01 NOTE — ED NOTES
NS bolus given for pts low, pt changed into a gown and repositioned in the bed trying to relieve the pt of pain to her left leg while awaiting pain medication order.       Rambo Parham RN  05/31/22 2053

## 2022-06-01 NOTE — CARE COORDINATION
Chart screened by  for discharge planning. Pt current with [facility] and goes to [facility] for HD MWF. This CM updated HD clinic to confirm that pt still in hospital at this time and will update when pt is discharged. Pt confirms [facility] and agreeable to have services resumed at discharge. No additional CM needs at this time. Will continue to follow and update as needed. 06/01/22 Anya Javed Children;Family Members;[place] [facility]   Discharge Planning   Type of Residence House   Living Arrangements Children;Family Members   Current Services Prior To Admission Oxygen Therapy   Potential Assistance Needed N/A   DME Ordered?  [facility]   Type of Home Care Services None   Patient expects to be discharged to: House   Condition of Participation: Discharge Planning   The Plan for Transition of Care is related to the following treatment goals: [facility] when medically stable

## 2022-06-01 NOTE — DIALYSIS
TRANSFER OUT- DIALYSIS    Hemodialysis treatment completed. PT ran 3.5 hours, without complications. Patient alert and VS stable  /52  P 67       1.9 Kg removed. Flushed both ports with 10 mL of NS.  CVC dressing clean, dry, and intact, tego caps intact, curos caps placed. Meds given: 0.    RBCs given during dialysis: 0    Patient to 202 after dialysis.

## 2022-06-01 NOTE — ED NOTES
Phone order from MD andres- to change existing med order to midodrine 10mg PO q8hr with a 10mg dose at this time, order for 500ml NS bolus slowly over 2 hours at this time. Nurse to change orders.       Warner Portillo RN  06/01/22 0000

## 2022-06-01 NOTE — PROGRESS NOTES
[facility]  Nephrology consult    Admission Date:  5/31/2022    Admission Diagnosis  Hyperkalemia [E87.5]  Hypotension [I95.9]  Generalized weakness [R53.1]  ESRD on hemodialysis ([address] [N18.6, Z99.2]  Hypotension, unspecified hypotension type [I95.9]    We were consulted by Dr Cecil Jones for management of ESRD    History of Present Illness: 79 y/o AA female, followed by our office for ESRD, on HD M-W-F at [facility]. She has been running via RIJ perm cath since LUE AVF became non-functioning. Left thigh AV graft placed on 5/26/22 by Dr Rock Clark. She missed HD last Friday due to feeling bad. Her make up day was that following Saturday, she ran her entire treatment time, was hypotensive, unable to remove fluid. She was told then to stop all antihypertensive medications. She ran HD yesterday, her entire treatment time, was hypotensive, and once again unable to remove fluid. She left dialysis with 4L over her EDW. PMH also consist of DM II, Hyperphosphatemia, anemia, renal cell carcinoma s/p left nephrectomy, followed by Pulmonary for lung mass, and HTN. She is on home O2 at 2L NC as needed. She presented to the ER via EMS for hypotension and generalized \"feeling bad\". At time of assessment, she is on her home dose O2, c/o dyspnea, and is anxious and tearful. She denies CP, no n/v, no HA or dizziness. She denies fever, chills, no cough, and no diarrhea. CXR is negative. There is no urgent dialysis needs at this time. Plan for HD tomorrow. Midodrine ordered for hypotension.    6/1/22 - seen on HD and tolerating well so far - HD needed for maintenance therapy - low BP due to recent leg loop graft placement resulting in altered hemodynamics - she reports much pain - treat with Norco 7.5 q 4 prn as BP is improved now    Past Medical History:   Diagnosis Date    Anemia     Arthritis     AVF (arteriovenous fistula) ([address]     left    AVF (arteriovenous fistula) ([address] 12/20/2016 12/6/16 ([facility]) Right AVF revision and thrombectomy    Degenerative joint disease     Diabetes ([facility]     type 2--- does not check sqbs at home    Enlarged lymph node     \"enlarging paratracheal node to 2.6 cm on CT chest\"    ESRD on hemodialysis ([facility] onset 2006    ESRD.  MWF dialysis at [facility]    Hypertension     controlled with med    Mediastinal mass     being followed by dr ramírez--per pt-- states told by dr Romelia Hutson it wasnt cancer-- but plans to be rescanned 5/2022    Obesity     On home O2     2L QHS and PRN during day d/t \"lymph node in chest\"    Renal cancer ([facility]     Dr José Horvath - renal cancer L nephrectomy---and radiation ---    Shortness of breath     swollen lymphnode in chest-- oxygen 2LPM    Transient ischemic attack 08/29/2015    no residual      Past Surgical History:   Procedure Laterality Date    BREAST SURGERY Right     cyst removed    COLONOSCOPY N/A 11/8/2018    COLONOSCOPY  BMI 36 performed by Claudia Glynn MD at [facility] ENDOSCOPY    CT BIOPSY ABDOMEN RETROPERITONEUM  9/14/2017    CT BIOPSY ABDOMEN RETROPERITONEUM 9/14/2017 [facility] RADIOLOGY CT SCAN    GI  08/06/2018    exploratory laparotomy    GI  06/01/2002    colon resection resulting in temporary colostomy reversal    GYN      mihir    IR INTRO CATH DIALYSIS CIRCUIT W BALLOON PTA  3/10/2020    IR INTRO CATH DIALYSIS CIRCUIT W BALLOON PTA  12/5/2019    IR INTRO CATH DIALYSIS CIRCUIT W BALLOON PTA  9/3/2019    IR INTRO CATH DIALYSIS CIRCUIT W BALLOON PTA  5/30/2019    IR INTRO CATH DIALYSIS CIRCUIT W BALLOON PTA  2/28/2019    IR THRMB/INFUSION DIAYSIS CIRCUIT Left 2021    IR TUNNELED CATHETER PLACEMENT GREATER THAN 5 YEARS  3/15/2022    IR TUNNELED CATHETER PLACEMENT GREATER THAN 5 YEARS  11/19/2021    IR TUNNELED CATHETER PLACEMENT GREATER THAN 5 YEARS  11/19/2021    IR TUNNELED CATHETER PLACEMENT GREATER THAN 5 YEARS 11/19/2021 [facility] SPECIALS    IR TUNNELED CATHETER PLACEMENT GREATER THAN 5 YEARS  3/15/2022    IR TUNNELED CATHETER PLACEMENT GREATER THAN 5 YEARS 3/15/2022 [facility] RAD[facility]OLOGY SPECIALS    OTHER SURGICAL HISTORY      dialysis fistula, several permcaths    UROLOGICAL SURGERY Left July 2015    nephrectomy    VASCULAR SURGERY Left 04/07/2022    LEFT ARM AV GRAFT    VASCULAR SURGERY Left 03/15/2022    declot    VASCULAR SURGERY Left 02/21/2022    Open thromboembolectomy of left upper arm graft.     VASCULAR SURGERY Right     AV graft- states no longer uses    VASCULAR SURGERY Left 5/26/2022    LEFT AV GRAFT THIGH performed by Jean Marie Mcgill MD at [facility] MAIN OR      Current Facility-Administered Medications   Medication Dose Route Frequency    midodrine (PROAMATINE) tablet 10 mg  10 mg Oral Q8H    [Held by provider] amLODIPine (NORVASC) tablet 10 mg  10 mg Oral Nightly    [Held by provider] lisinopril (PRINIVIL;ZESTRIL) tablet 40 mg  40 mg Oral Nightly    insulin lispro (HUMALOG) injection vial 0-8 Units  0-8 Units SubCUTAneous TID WC    insulin lispro (HUMALOG) injection vial 0-4 Units  0-4 Units SubCUTAneous Nightly    glucose chewable tablet 16 g  4 tablet Oral PRN    dextrose bolus 10% 125 mL  125 mL IntraVENous PRN    Or    dextrose bolus 10% 250 mL  250 mL IntraVENous PRN    glucagon (rDNA) injection 1 mg  1 mg IntraMUSCular PRN    dextrose 5 % solution  100 mL/hr IntraVENous PRN    sodium chloride flush 0.9 % injection 5-40 mL  5-40 mL IntraVENous 2 times per day    sodium chloride flush 0.9 % injection 5-40 mL  5-40 mL IntraVENous PRN    0.9 % sodium chloride infusion   IntraVENous PRN    potassium chloride (KLOR-CON M) extended release tablet 40 mEq  40 mEq Oral PRN    Or    potassium bicarb-citric acid (EFFER-K) effervescent tablet 40 mEq  40 mEq Oral PRN    Or    potassium chloride 10 mEq/100 mL IVPB (Peripheral Line)  10 mEq IntraVENous PRN    potassium chloride 10 mEq/100 mL IVPB (Peripheral Line)  10 mEq IntraVENous PRN    magnesium sulfate 2000 mg in 50 mL IVPB premix  2,000 mg IntraVENous PRN    ondansetron (ZOFRAN-ODT) disintegrating tablet 4 mg  4 mg Oral Q8H PRN    Or    ondansetron (ZOFRAN) injection 4 mg  4 mg IntraVENous Q6H PRN    polyethylene glycol (GLYCOLAX) packet 17 g  17 g Oral Daily PRN    bisacodyl (DULCOLAX) suppository 10 mg  10 mg Rectal Daily PRN    famotidine (PEPCID) tablet 10 mg  10 mg Oral BID PRN    aluminum & magnesium hydroxide-simethicone (MAALOX) 200-200-20 MG/5ML suspension 30 mL  30 mL Oral Q6H PRN    acetaminophen (TYLENOL) tablet 650 mg  650 mg Oral Q6H PRN    Or    acetaminophen (TYLENOL) suppository 650 mg  650 mg Rectal Q6H PRN    tuberculin injection 5 Units  5 Units IntraDERmal Once    sevelamer (RENVELA) tablet 800 mg  800 mg Oral TID WC    pantoprazole (PROTONIX) tablet 40 mg  40 mg Oral QAM AC    Nephro-Dipika TABS 1 tablet  1 tablet Oral Daily    oxyCODONE (ROXICODONE) immediate release tablet 10 mg  10 mg Oral Q4H PRN    oxyCODONE (ROXICODONE) immediate release tablet 5 mg  5 mg Oral Q4H PRN     Allergies   Allergen Reactions    Pcn [Penicillins] Other (See Comments)     PT STATES UNSURE OF RX    Vancomycin Rash      Social History     Tobacco Use    Smoking status: Never Smoker    Smokeless tobacco: Never Used   Substance Use Topics    Alcohol use: No      Family History   Problem Relation Age of Onset    Diabetes Mother     Heart Disease Mother     Hypertension Mother     Heart Disease Father     Hypertension Father     Post-op Cognitive Dysfunction Neg Hx     Pseudochol.  Deficiency Neg Hx     Delayed Awakening Neg Hx     Post-op Nausea/Vomiting Neg Hx     Emergence Delirium Neg Hx     Other Neg Hx     Malig Hypertherm Neg Hx         Review of Systems  Gen - no fever, no chills, appetite okay  HEENT - no sore throat, no decreased vision, no hearing loss  Neck - no neck mass  CV - no chest pain, no palpitation, no orthopnea  Lung - mild shortness of breath, no cough, no hemoptysis  Abd - no tenderness, no nausea/vomiting, no bloody stool  Ext - mild edema, no clubbing, no cyanosis  Musculoskeletal - no joint pain, no back pain  Neurologic - no headaches, no dizziness, no seizures  Psychiatric - mild anxiety, no depression  Skin - no rashes, no pupura  Genitourinary - no decreased urine output, no hematuria, no foamy urine    Objective:     Vitals:    06/01/22 0630 06/01/22 0658 06/01/22 0730 06/01/22 0800   BP: (!) 92/44 (!) 104/47 (!) 115/58 (!) 144/50   Pulse: 66 70 67 61   Resp:       Temp:       TempSrc:       SpO2:       Weight:       Height:           Intake/Output Summary (Last 24 hours) at 6/1/2022 0818  Last data filed at 6/1/2022 0430  Gross per 24 hour   Intake 740 ml   Output --   Net 740 ml       Physical Exam  GEN :no distress, alert and oriented  HEENT: anicteric sclerae, eomi. Oropharynx without lesions. Mucous membranes are moist.  Neck - supple without JVD, no thyromegaly. No lymphadenopathy. CV - regular rate and rhythm, no murmur, no rub  Lung - clear bilaterally, lungs expand symmetrically  Chest wall - normal appearance, RIJ perm cath  Abd - soft, nontender, bowel sounds present, no hepatosplenomegaly  Ext - no clubbing, no cyanosis, trace edema left leg, left thigh AV graph  Neurologic - nonfocal, mild anxiety  Skin - no rashes, no purpura, no ecchymoses  Psychiatric: Normal mood and affect. Data Review:   Recent Labs     05/31/22  1228 06/01/22  0712   WBC 7.1 PENDING   HGB 9.0* PENDING   HCT 26.4* PENDING   * PENDING     Recent Labs     05/31/22  1228   *   K 5.5*      CO2 23   BUN 32*     No results for input(s): PH, PCO2, PO2, PCO2 in the last 72 hours. CXR:   Impression   Stable cardiomegaly without acute process.   No pleural effusion or pneumothorax             Problem List:     Patient Active Problem List    Diagnosis Date Noted    Hyperkalemia 05/31/2022    Hyponatremia 05/31/2022    Thrombocytopenia ([facility] 05/31/2022    Hypotension 05/31/2022    [facility] with [facility] 11/12/2021    Severe obesity ([facility] [address] 05/04/2018    Type 2 diabetes mellitus with nephropathy ([facility] 12/15/2017    Renal cell carcinoma [facility] 09/12/2017    AVF (arteriovenous fistula) ([facility] 12/20/2016    Osteoarthritis of right knee 02/07/2013    HTN (hypertension) 02/07/2013    ESRD (end stage renal disease) [facility] dialysis [facility] 02/07/2013    Type 2 diabetes mellitus with hyperglycemia ([facility] 02/07/2013    Total knee replacement status 02/07/2013       Impression:    Plan:     1. ESRD- on HD M-W-F, HD needed for biochemical and volume control. No urgent needs at this time. HD today needed for maintenance therapy per current outpatient schedule. 2. Anemia- likely related to ESRD, near goal for CKD, continue Nephrovite, no urgent needs at this time. 3. Hypotension- with a hx of HTN, antihypertensives were stopped Friday last week. New left thigh AV graph could be playing a part. CXR negative, on baseline home O2, Start Midodrine 10 mg TID    4. Hyperkalemia- likley related to ESRD, expect to improve with HD tomorrow    5. Hypocalcemia- with low albumin, treat with vitamin d    6. HTN- hx of, off antihypertensives    7. Renal cell carcinoma- followed by Oncology    8. DM II- on SSI, managed by primary    9.  Hyperphosphatemia- hx of, on Renvela, continue

## 2022-06-01 NOTE — ED NOTES
Pt c\o that nobody placed pt in gown since she has been here and that her leg has been hurting and the only thing she was given was tylenol. Told to I would contact her doctor and see what I could get for her pain.       Osiris Enriquez RN  05/31/22 2052

## 2022-06-01 NOTE — DIALYSIS
TRANSFER IN - DIALYSIS    Received patient in dialysis unit  from 202 (unit) for ordered procedure. Consent verified for renal replacement therapy. Procedure explained to patient, opportunity for Q&A provided. Call light given. Patient alert and vital signs stable. BP92/31,  P69  , 3L O2 via NC. Hemodialysis initiated using right IJ catheter. Aspirated and flushed both ports without difficulty. Dressing clean, dry and intact. Machine settings per MD order. Will monitor during treatment.

## 2022-06-01 NOTE — PROGRESS NOTES
Hospitalist Progress Note   Admit Date:  2022 12:09 PM   Name:  Sofia Pires   Age:  80 y.o. Sex:  female  :  1950   MRN:  [id number]   Room:      Presenting Complaint: Fatigue  Reason(s) for Admission: Hyperkalemia [E87.5]  Hypotension [I95.9]  Generalized weakness [R53.1]  ESRD on hemodialysis ([address] [N18.6, Z99.2]  Hypotension, unspecified hypotension type [I95.9]     Hospital Course & Interval History:   Ms. Debra Hartman is a 81 y/o AAF with a h/o ESRD on HD (TTS), RCC, obesity, DM2 who was admitted to our service on  with hypotension. She has not been feeling well since her AVG surgery on  and noted progressive fatigue and malaise since then. She missed HD  due to feeling poorly. She only had a partial run of HD on  due to hypotension. She's had reduced PO intake. CXR w/o fluid overload. BP medications were held and started on midodrine. Dr. José Ybarra consulted for HD. Subjective/24hr Events (22): Eating lunch, sore \"all over\". C/o pain in L thigh where she had surgery, says PO meds have not helped. BP much improved. Tolerated HD today. No chest pain, N/V/D. Assessment & Plan:   # Hypotension   - Resolved. Likely BP meds with poor PO intake. Home meds held, started on midodrine which I have decreased to 5mg TID today . Titrate further/resume home meds as able. # HyperK   - Resolved with HD. # HypoNa   - Resolved. # Thrombocytopenia   - Baseline varies but today is within that range, no bleeding. Monitor. # Renal cell carcinoma   - Noted. # HTN   - Home meds held as above. # ESRD on HD   - TTS. Did run today . Per Dr. José Ybarra. # DM2   - SSI    # Chronic hypoxemic respiratory failure   - 2L NC O2. No issues. Discharge Planning: Pending. Diet:  ADULT DIET; Regular; 4 carb choices (60 gm/meal); Low Potassium (Less than 3000 mg/day);  Low Phosphorus (Less than 1000 mg)  DVT PPx:  SCDs  Code status: Full Code    Hospital Problems Last Modified POA    * (Principal) Hypotension 5/31/2022 Yes    Hyperkalemia 5/31/2022 Yes    Hyponatremia 5/31/2022 Yes    Thrombocytopenia (HCC) (Chronic) 5/31/2022 Yes    Renal cell carcinoma (HCC) (Chronic) 5/31/2022 Yes    Severe obesity ([facility] (Chronic) 5/31/2022 Yes    HTN (hypertension) (Chronic) 5/31/2022 Yes    ESRD (end stage renal disease) on dialysis [facility]) (Chronic) 5/31/2022 Yes    Type 2 diabetes mellitus with hyperglycemia (HCC) (Chronic) 5/31/2022 Yes    Chronic respiratory failure with hypoxia ([facility] (Chronic) 5/31/2022 Yes                Objective:     Patient Vitals for the past 24 hrs:   Temp Pulse Resp BP SpO2   06/01/22 1039 -- -- -- (!) 138/52 --   06/01/22 1034 -- 66 -- (!) 127/48 --   06/01/22 1000 -- 66 -- (!) 130/42 --   06/01/22 0930 -- -- -- (!) 122/49 --   06/01/22 0900 -- 62 -- (!) 111/42 --   06/01/22 0830 -- 64 -- (!) 112/49 --   06/01/22 0800 -- 61 -- (!) 144/50 --   06/01/22 0730 -- 67 -- (!) 115/58 --   06/01/22 0658 -- 70 -- (!) 104/47 --   06/01/22 0630 -- 66 -- (!) 92/44 --   06/01/22 0600 -- 71 -- (!) 82/45 --   06/01/22 0530 -- 73 -- (!) 83/44 --   06/01/22 0500 -- 68 -- (!) 89/44 --   06/01/22 0430 -- 71 -- (!) 100/57 --   06/01/22 0400 -- 70 -- (!) 95/45 --   06/01/22 0330 -- 67 -- (!) 88/44 99 %   06/01/22 0328 97.5 °F (36.4 °C) 89 18 (!) 96/48 100 %   06/01/22 0300 -- 72 18 (!) 100/41 98 %   06/01/22 0215 -- 76 22 (!) 102/44 96 %   06/01/22 0145 -- 76 22 (!) 88/41 98 %   06/01/22 0130 -- 76 18 (!) 96/50 98 %   06/01/22 0115 -- 74 17 (!) 97/51 98 %   06/01/22 0100 -- 75 19 (!) 92/50 98 %   06/01/22 0045 -- 76 15 (!) 81/47 97 %   06/01/22 0030 -- 76 18 (!) 76/44 100 %   05/31/22 2330 -- 77 13 (!) 81/43 97 %   05/31/22 2315 -- 78 17 (!) 77/44 97 %   05/31/22 2309 -- 77 16 (!) 76/44 98 %   05/31/22 2300 -- 78 19 (!) 75/48 98 %   05/31/22 2245 -- 77 18 (!) 82/45 98 %   05/31/22 2230 -- 78 24 (!) 76/40 97 %   05/31/22 2215 -- 77 14 (!) 95/44 96 %   05/31/22 2200 -- 77 17 (!) 94/46 98 %   05/31/22 2145 -- 76 15 (!) 91/44 98 %   05/31/22 2130 -- 76 16 (!) 91/45 98 %   05/31/22 2115 -- 75 17 (!) 90/43 100 %   05/31/22 2100 -- 75 18 (!) 86/35 100 %   05/31/22 2045 -- 75 16 (!) 96/45 100 %   05/31/22 2030 -- 74 15 (!) 89/29 100 %   05/31/22 2015 -- 75 17 (!) 94/47 100 %   05/31/22 2000 -- 76 18 (!) 96/51 --   05/31/22 1930 -- 84 19 (!) 76/48 98 %   05/31/22 1915 -- 80 16 (!) 86/46 96 %   05/31/22 1910 -- 80 17 (!) 87/47 96 %   05/31/22 1900 -- 80 18 (!) 88/45 99 %   05/31/22 1848 -- 81 18 (!) 94/46 98 %   05/31/22 1835 -- 87 22 -- 97 %   05/31/22 1825 -- 80 19 (!) 102/51 100 %   05/31/22 1815 -- 83 21 (!) 94/50 100 %   05/31/22 1755 -- 78 19 (!) 100/49 100 %   05/31/22 1745 -- 80 19 (!) 95/51 100 %   05/31/22 1735 -- 78 23 -- 98 %   05/31/22 1725 -- 81 19 (!) 92/53 100 %   05/31/22 1702 -- 82 17 (!) 99/52 99 %   05/31/22 1642 -- 83 16 (!) 95/47 100 %   05/31/22 1632 -- 82 14 (!) 93/48 100 %   05/31/22 1612 -- 84 15 (!) 104/47 100 %   05/31/22 1602 -- 84 20 (!) 91/55 100 %   05/31/22 1542 -- 86 19 93/67 100 %   05/31/22 1532 -- 85 19 (!) 95/49 95 %   05/31/22 1517 -- 86 22 (!) 95/55 --   05/31/22 1457 -- 85 17 (!) 99/47 --   05/31/22 1447 -- 85 15 (!) 94/49 97 %   05/31/22 1430 -- -- -- (!) 98/51 --   05/31/22 1427 -- 91 21 (!) 90/53 (!) 84 %   05/31/22 1357 -- 89 18 (!) 89/47 99 %   05/31/22 1347 -- 89 18 (!) 92/49 --   05/31/22 1327 -- 88 16 (!) 96/52 100 %   05/31/22 1317 -- 91 18 (!) 92/50 99 %   05/31/22 1247 -- 91 18 (!) 90/45 98 %   05/31/22 1237 -- 90 17 (!) 90/51 95 %       Estimated body mass index is 32.1 kg/m² as calculated from the following:    Height as of this encounter: 5' 4\" (1.626 m). Weight as of this encounter: 187 lb (84.8 kg).     Intake/Output Summary (Last 24 hours) at 6/1/2022 1232  Last data filed at 6/1/2022 1039  Gross per 24 hour   Intake 1140 ml   Output 2300 ml   Net -1160 ml         Physical Exam:   Blood pressure (!) 138/52, pulse 66, temperature 97.5 °F (36.4 °C), temperature source Oral, resp. rate 18, height 5' 4\" (1.626 m), weight 187 lb (84.8 kg), SpO2 99 %. General:    Well nourished. No overt distress. Obese. Tearful. Head:  Normocephalic, atraumatic  Eyes:  Sclerae appear normal. Pupils equally round. ENT:  Nares appear normal, no drainage. Moist oral mucosa  Neck:  No restricted ROM. Trachea midline. CV:   RRR. No m/r/g. No jugular venous distension. Lungs:   CTAB. No wheezing, rhonchi, or rales. Respirations even, unlabored. Abdomen: Bowel sounds present. Soft, nontender, nondistended. Extremities: No cyanosis or clubbing. Mild edema to surgical area of L thigh, minimal surrounding erythema from proximal incision site, no dehiscence or drainage otherwise; skin is indurated but not overtly erythematous. Skin:     No rashes and normal coloration. Warm and dry. Neuro:  CN II-XII grossly intact. Sensation intact. A&Ox3  Psych:  Normal mood and affect.       I have reviewed ordered lab tests and independently visualized imaging below:    Recent Labs:  Recent Results (from the past 48 hour(s))   CBC    Collection Time: 05/31/22 12:28 PM   Result Value Ref Range    WBC 7.1 4.3 - 11.1 K/uL    RBC 2.74 (L) 4.05 - 5.2 M/uL    Hemoglobin 9.0 (L) 11.7 - 15.4 g/dL    Hematocrit 26.4 (L) 35.8 - 46.3 %    MCV 96.4 79.6 - 97.8 FL    MCH 32.8 26.1 - 32.9 PG    MCHC 34.1 31.4 - 35.0 g/dL    RDW 17.5 (H) 11.9 - 14.6 %    Platelets 072 (L) 683 - 450 K/uL    MPV 10.6 9.4 - 12.3 FL    nRBC 0.03 0.0 - 0.2 K/uL   Comprehensive Metabolic Panel    Collection Time: 05/31/22 12:28 PM   Result Value Ref Range    Sodium 134 (L) 136 - 145 mmol/L    Potassium 5.5 (H) 3.5 - 5.1 mmol/L    Chloride 101 98 - 107 mmol/L    CO2 23 21 - 32 mmol/L    Anion Gap 10 7 - 16 mmol/L    Glucose 129 (H) 65 - 100 mg/dL    BUN 32 (H) 8 - 23 MG/DL    CREATININE 5.30 (H) 0.6 - 1.0 MG/DL    GFR African American 10 (L) >60 ml/min/1.73m2    GFR Non- 9 (L) >60 ml/min/1.73m2 Calcium 7.3 (L) 8.3 - 10.4 MG/DL    Total Bilirubin 0.9 0.2 - 1.1 MG/DL    ALT 9 (L) 12 - 65 U/L    AST 52 (H) 15 - 37 U/L    Alk Phosphatase 87 50 - 136 U/L    Total Protein 6.5 6.3 - 8.2 g/dL    Albumin 2.6 (L) 3.2 - 4.6 g/dL    Globulin 3.9 (H) 2.3 - 3.5 g/dL    Albumin/Globulin Ratio 0.7 (L) 1.2 - 3.5     Blood Culture 1    Collection Time: 05/31/22 12:55 PM    Specimen: Blood   Result Value Ref Range    Special Requests LEFT  FOREARM        Culture NO GROWTH AFTER 18 HOURS     Lactate, Sepsis    Collection Time: 05/31/22 12:55 PM   Result Value Ref Range    Lactic Acid, Sepsis 1.1 0.4 - 2.0 MMOL/L   Procalcitonin    Collection Time: 05/31/22 12:55 PM   Result Value Ref Range    Procalcitonin 0.65 (H) 0.00 - 0.49 ng/mL   POCT Glucose    Collection Time: 05/31/22  5:34 PM   Result Value Ref Range    POC Glucose 108 (H) 65 - 100 mg/dL    Performed by: Soy    Blood Culture 2    Collection Time: 05/31/22  6:17 PM    Specimen: Blood   Result Value Ref Range    Special Requests CENTRAL      Culture NO GROWTH AFTER 13 HOURS     POCT Glucose    Collection Time: 05/31/22  9:57 PM   Result Value Ref Range    POC Glucose 128 (H) 65 - 100 mg/dL    Performed by: Gerson Murray    POCT Glucose    Collection Time: 06/01/22  6:34 AM   Result Value Ref Range    POC Glucose 93 65 - 100 mg/dL    Performed by: Ryan Owusu    Hepatitis B Surface Antigen    Collection Time: 06/01/22  7:12 AM   Result Value Ref Range    Hepatitis B Surface Ag NONREACTIVE NR     Hemoglobin A1c    Collection Time: 06/01/22  7:12 AM   Result Value Ref Range    Hemoglobin A1C <3.5 (L) 4.20 - 6.30 %    eAG Cannot be calculated mg/dL   Basic Metabolic Panel w/ Reflex to MG    Collection Time: 06/01/22  7:12 AM   Result Value Ref Range    Sodium 136 136 - 145 mmol/L    Potassium 4.0 3.5 - 5.1 mmol/L    Chloride 101 98 - 107 mmol/L    CO2 27 21 - 32 mmol/L    Anion Gap 8 7 - 16 mmol/L    Glucose 93 65 - 100 mg/dL    BUN 46 (H) 8 - 23 MG/DL    CREATININE 6.30 (H) 0.6 - 1.0 MG/DL    GFR African American 8 (L) >60 ml/min/1.73m2    GFR Non- 7 (L) >60 ml/min/1.73m2    Calcium 7.3 (L) 8.3 - 10.4 MG/DL   CBC with Auto Differential    Collection Time: 06/01/22  7:12 AM   Result Value Ref Range    WBC 6.1 4.3 - 11.1 K/uL    RBC 2.57 (L) 4.05 - 5.2 M/uL    Hemoglobin 8.4 (L) 11.7 - 15.4 g/dL    Hematocrit 24.0 (L) 35.8 - 46.3 %    MCV 93.4 79.6 - 97.8 FL    MCH 32.7 26.1 - 32.9 PG    MCHC 35.0 31.4 - 35.0 g/dL    RDW 17.1 (H) 11.9 - 14.6 %    Platelets 220 (L) 683 - 450 K/uL    MPV 11.0 9.4 - 12.3 FL    nRBC 0.03 0.0 - 0.2 K/uL    Differential Type AUTOMATED      Seg Neutrophils 71 43 - 78 %    Lymphocytes 12 (L) 13 - 44 %    Monocytes 13 (H) 4.0 - 12.0 %    Eosinophils % 2 0.5 - 7.8 %    Basophils 1 0.0 - 2.0 %    Immature Granulocytes 1 0.0 - 5.0 %    Segs Absolute 4.3 1.7 - 8.2 K/UL    Absolute Lymph # 0.7 0.5 - 4.6 K/UL    Absolute Mono # 0.8 0.1 - 1.3 K/UL    Absolute Eos # 0.1 0.0 - 0.8 K/UL    Basophils Absolute 0.1 0.0 - 0.2 K/UL    Absolute Immature Granulocyte 0.1 0.0 - 0.5 K/UL   POCT Glucose    Collection Time: 06/01/22 10:31 AM   Result Value Ref Range    POC Glucose 100 65 - 100 mg/dL    Performed by: Roxanne    POCT Glucose    Collection Time: 06/01/22 11:59 AM   Result Value Ref Range    POC Glucose 91 65 - 100 mg/dL    Performed by: Ta          Other Studies:  XR CHEST PORTABLE    Result Date: 5/31/2022  EXAM: XR CHEST PORTABLE INDICATION: fatigue COMPARISON: Chest radiograph, 11/22/2021 FINDINGS: The cardiomediastinal silhouette is enlarged but stable. Right-sided hemodialysis catheter terminates in the right atrium. No pleural effusion or pneumothorax. No focal parenchymal process. Vascular stent in the right subclavian region and bilateral brachial regions. No acute osseous abnormality. Stable cardiomegaly without acute process.        Current Meds:  Current Facility-Administered Medications   Medication Dose Route Frequency    HYDROcodone-acetaminophen (NORCO) 7.5-325 MG per tablet 1 tablet  1 tablet Oral Q4H PRN    midodrine (PROAMATINE) tablet 5 mg  5 mg Oral Q8H    HYDROmorphone HCl PF (DILAUDID) injection 0.5 mg  0.5 mg IntraVENous Once    [Held by provider] amLODIPine (NORVASC) tablet 10 mg  10 mg Oral Nightly    [Held by provider] lisinopril (PRINIVIL;ZESTRIL) tablet 40 mg  40 mg Oral Nightly    insulin lispro (HUMALOG) injection vial 0-8 Units  0-8 Units SubCUTAneous TID WC    insulin lispro (HUMALOG) injection vial 0-4 Units  0-4 Units SubCUTAneous Nightly    glucose chewable tablet 16 g  4 tablet Oral PRN    dextrose bolus 10% 125 mL  125 mL IntraVENous PRN    Or    dextrose bolus 10% 250 mL  250 mL IntraVENous PRN    glucagon (rDNA) injection 1 mg  1 mg IntraMUSCular PRN    dextrose 5 % solution  100 mL/hr IntraVENous PRN    sodium chloride flush 0.9 % injection 5-40 mL  5-40 mL IntraVENous 2 times per day    sodium chloride flush 0.9 % injection 5-40 mL  5-40 mL IntraVENous PRN    0.9 % sodium chloride infusion   IntraVENous PRN    potassium chloride (KLOR-CON M) extended release tablet 40 mEq  40 mEq Oral PRN    Or    potassium bicarb-citric acid (EFFER-K) effervescent tablet 40 mEq  40 mEq Oral PRN    Or    potassium chloride 10 mEq/100 mL IVPB (Peripheral Line)  10 mEq IntraVENous PRN    potassium chloride 10 mEq/100 mL IVPB (Peripheral Line)  10 mEq IntraVENous PRN    magnesium sulfate 2000 mg in 50 mL IVPB premix  2,000 mg IntraVENous PRN    ondansetron (ZOFRAN-ODT) disintegrating tablet 4 mg  4 mg Oral Q8H PRN    Or    ondansetron (ZOFRAN) injection 4 mg  4 mg IntraVENous Q6H PRN    polyethylene glycol (GLYCOLAX) packet 17 g  17 g Oral Daily PRN    bisacodyl (DULCOLAX) suppository 10 mg  10 mg Rectal Daily PRN    famotidine (PEPCID) tablet 10 mg  10 mg Oral BID PRN    aluminum & magnesium hydroxide-simethicone (MAALOX) 200-200-20 MG/5ML suspension 30 mL  30 mL Oral Q6H PRN    acetaminophen (TYLENOL) tablet 650 mg  650 mg Oral Q6H PRN    Or    acetaminophen (TYLENOL) suppository 650 mg  650 mg Rectal Q6H PRN    tuberculin injection 5 Units  5 Units IntraDERmal Once    sevelamer (RENVELA) tablet 800 mg  800 mg Oral TID WC    pantoprazole (PROTONIX) tablet 40 mg  40 mg Oral QAM AC    Nephro-Dipika TABS 1 tablet  1 tablet Oral Daily    oxyCODONE (ROXICODONE) immediate release tablet 5 mg  5 mg Oral Q4H PRN       Signed:  Macho Hankins MD    Part of this note may have been written by using a voice dictation software. The note has been proof read but may still contain some grammatical/other typographical errors.

## 2022-06-01 NOTE — PROGRESS NOTES
Ms. Drake Glynn is sleepy from pain medicine. Asked if PT would come back. Try back later as time allows.   [facility], PT

## 2022-06-02 NOTE — PROGRESS NOTES
Patient has been up to the restroom with minimal assist twice today. She was dialyzed yesterday and produced 40 ml of urine today which is cloudy with sediment.

## 2022-06-02 NOTE — PROGRESS NOTES
Physical Therapy Note:    Attempted to see patient this PM for physical therapy evaluation session. Patient crying first attempt, reports severe pain L leg. Second attempt (post pain meds) patient reports leg still hurting too much to sit up. . Will follow and re-attempt as schedule permits/patient available.  Thank you,    MIKE Pacheco, PT     Rehab Caseload Tracker

## 2022-06-02 NOTE — PROGRESS NOTES
OCCUPATIONAL THERAPY Initial Assessment          Acknowledge Orders  Time  OT Charge Capture  Rehab Caseload Tracker      Onesimo Nice is a 80 y.o. female   PRIMARY DIAGNOSIS: Hypotension  Hyperkalemia [E87.5]  Hypotension [I95.9]  Generalized weakness [R53.1]  ESRD on hemodialysis ([facility] [N18.6, Z99.2]  Hypotension, unspecified hypotension type [I95.9]       Reason for Referral: Generalized Muscle Weakness (M62.81)  Difficulty in walking, Not elsewhere classified (R26.2)  Inpatient: Payor: Diann Hand / Plan: Ryan Hernandez / Product Type: *No Product type* /     ASSESSMENT:     REHAB RECOMMENDATIONS:   Recommendation to date pending progress:  [address] [place] [address]    Equipment:     None     ASSESSMENT:  Ms. Mars Jacob is a 80 y F with hx of ESRD, anemia, obesity, HTN, chronic respiratory failure. Pt admitted for hypotension. Pt received supine in bed moaning in pain. Pt c/o pain in LLE 10/10. RN present administering drugs for pain. Supine > sit SBA with increased time due to fatigue, weakness, and pain using bed rail for assistance. Pt reports her family helps her out of bed in mornings. Static sitting EOB F+/G balance. Donning socks sitting EOB requiring Mod A for dynamic sitting balance and donning L sock. STS CGA for balance safety precaution. Static standing at RW at EOB with F+ balance. Pt did not want to ambulate due to LLE pain. Sit > supine close SBA and increased time. In bed with HOB elevated doffing/donning hair cap. Ice placed on LLE. Pt requires continued skilled OT services due to performing functionally below baseline. Pt has deficits in strength, balance, activity tolerance, and performing ADLs.       [address] [facility] [address] [address] AM-PAC 6 Clicks Daily Activity Inpatient Short Form:    AM-PAC Daily Activity Inpatient   How much help for putting on and taking off regular lower body clothing?: A Lot  How much help for Bathing?: A Little  How much help for Toileting?: A Little  How much help for putting on and taking off regular upper body clothing?: None  How much help for taking care of personal grooming?: None  How much help for eating meals?: None  AM-PAC Inpatient Daily Activity Raw Score: 20  AM-PAC Inpatient ADL T-Scale Score : 42.03  ADL Inpatient CMS 0-100% Score: 38.32  ADL Inpatient CMS G-Code Modifier : CJ           SUBJECTIVE:     Ms. Debra Hartman states, \"I can bathe myself, toilet myself, and dress myself just fine. \"     Social/Functional Lives With: Family  Type of Home: House  Home Layout: Two level,Able to Live on Main level with bedroom/bathroom  Home Access: Level entry  Bathroom Equipment: 3-in-1 commode  Home Equipment: Cane,Walker, rolling  Receives Help From: Family  ADL Assistance: Independent    OBJECTIVE:     ADRIA / Froylan Matt / Figueroa Slot: IV    RESTRICTIONS/PRECAUTIONS:       PAIN: Lamar Baker / O2:   Pre Treatment:   Pain Assessment: 0-10  Pain Level: 10      Post Treatment: 10       Vitals   Vitals  BP: (!) 104/51  BP Location: Right leg      Oxygen            GROSS EVALUATION: INTACT IMPAIRED   (See Comments)   UE AROM [x] []   UE PROM [] []   Strength []  generally decreased     Posture / Balance [] Sitting - Static: Fair,+  Sitting - Dynamic: Fair,-  Standing - Static: Fair,+   Sensation [x]     Coordination [x]       Tone [x]       Edema [] NA    Activity Tolerance [] Patient limited by fatigue,Patient limited by pain     Hand Dominance R [] L []      COGNITION/  PERCEPTION: INTACT IMPAIRED   (See Comments)   Orientation [x]     Vision [x]     Hearing [x]     Cognition  [x]     Perception [x]       MOBILITY: I Mod I S SBA CGA Min Mod Max Total  NT x2 Comments:   Bed Mobility    Rolling [] [] [] [] [] [] [] [] [] [] []    Supine to Sit [] [] [] [x] [] [] [] [] [] [] []    Scooting [] [] [] [] [] [] [] [] [] [] []    Sit to Supine [] [] [] [x] [] [] [] [] [] [] []    Transfers    Sit to Stand [] [] [] [] [x] [] [] [] [] [] []    Bed to Chair [] [] [] [] [] [] [] [] [] [x] [] Stand to Sit [] [] [] [] [x] [] [] [] [] [] []    Tub/Shower [] [] [] [] [] [] [] [] [] [x] []     Toilet [] [] [] [] [] [] [] [] [] [x] []      [] [] [] [] [] [] [] [] [] [] []    I=Independent, Mod I=Modified Independent, S=Supervision/Setup, SBA=Standby Assistance, CGA=Contact Guard Assistance, Min=Minimal Assistance, Mod=Moderate Assistance, Max=Maximal Assistance, Total=Total Assistance, NT=Not Tested    ACTIVITIES OF DAILY LIVING: I Mod I S SBA CGA Min Mod Max Total NT Comments   BASIC ADLs:              Bathing/ Showering [] [] [] [] [] [] [] [] [] [x]    Toileting [] [] [] [] [] [] [] [] [] [x]    Upper Body Dressing [] [] [] [] [] [] [] [] [] [x]    Lower Body Dressing [] [] [] [] [] [] [] [] [] [x]    Feeding [] [] [] [] [] [] [] [] [] [x]    Grooming [] [] [x] [] [] [] [] [] [] []    Personal Device Care [] [] [] [] [] [] [] [] [] [x]    Functional Mobility [] [] [] [] [x] [] [] [] [] [] RW   I=Independent, Mod I=Modified Independent, S=Supervision/Setup, SBA=Standby Assistance, CGA=Contact Guard Assistance, Min=Minimal Assistance, Mod=Moderate Assistance, Max=Maximal Assistance, Total=Total Assistance, NT=Not Tested    PLAN:     FREQUENCY/DURATION   OT Plan of Care: 3 times/week for duration of hospital stay or until stated goals are met, whichever comes first.    ACUTE OCCUPATIONAL THERAPY GOALS:   (Developed with and agreed upon by patient and/or caregiver.)  1. Pt will complete LBD CGA with AE as needed. 2. Pt will complete toileting CGA with AE as needed. 3. Pt will complete UBD set up only. 4. Pt will tolerate 20- 25 minutes of OT treatment requiring 1-2 breaks as needed. 5. Pt will complete grooming tasks while standing at sink SBA. 6. Pt will complete functional mobility via RW supervision.      PROBLEM LIST:   (Skilled intervention is medically necessary to address:)  Decreased ADL/Functional Activities  Decreased Activity Tolerance  Decreased Balance  Decreased Gait Ability  Decreased

## 2022-06-02 NOTE — CONSULTS
History and Physical/Surgical Consult   Bola Pires    Admit date: 2022    MRN: [id number]     : 1950     Age: 80 y.o.          2022 12:38 PM    Subjective/HPI:   This patient is a 80 y.o. seen and evaluated at the request of Dr. Amaury Harris. She presented to the hospital for hyptension. She had a left thigh graft placed on 22. While admitted, she was noted to have pain, edema and erythema to the her new left thigh graft. No increase in WBC and no fever. PMH: arthritis, AVG, ESRD, DDD, DM, h/o renal cancer, on home O2 and TIA      Review of Systems  Constitutional: negative  Respiratory: negative  Cardiovascular: negative  Musculoskeletal:positive for left thigh pain  Past Medical History:   Diagnosis Date    Anemia     Arthritis     [facility]     left    AVF (arteriovenous fistula) ([facility] 2016 (S) Right AVF revision and thrombectomy    Degenerative joint disease     Diabetes ([address]     type 2--- does not check sqbs at home    Enlarged lymph node     \"enlarging paratracheal node to 2.6 cm on CT chest\"    ESRD on hemodialysis ([address] onset     ESRD.  MWF dialysis at [place] [facility]    Hypertension     controlled with med    Mediastinal mass     being followed by dr ramírez--per pt-- states told by dr Romelia Hutson it wasnt cancer-- but plans to be rescanned 2022    Obesity     On home O2     2L QHS and PRN during day d/t \"lymph node in chest\"    Renal cancer ([facility]     Dr José Horvath - renal cancer L nephrectomy---and radiation ---    Shortness of breath     swollen lymphnode in chest-- oxygen 2LPM    Transient ischemic attack 2015    no residual      Past Surgical History:   Procedure Laterality Date    BREAST SURGERY Right     cyst removed    COLONOSCOPY N/A 2018    COLONOSCOPY  BMI 36 performed by Claudia Glynn MD at [facility] ENDOSCOPY    CT BIOPSY ABDOMEN RETROPERITONEUM  2017    CT BIOPSY ABDOMEN RETROPERITONEUM 9/14/2017 [facility] CT SCAN    GI  08/06/2018    exploratory laparotomy    GI  06/01/2002    colon resection resulting in temporary colostomy reversal    GYN      mihir    IR INTRO CATH DIALYSIS CIRCUIT W BALLOON PTA  3/10/2020    IR INTRO CATH DIALYSIS CIRCUIT W BALLOON PTA  12/5/2019    IR INTRO CATH DIALYSIS CIRCUIT W BALLOON PTA  9/3/2019    IR INTRO CATH DIALYSIS CIRCUIT W BALLOON PTA  5/30/2019    IR INTRO CATH DIALYSIS CIRCUIT W BALLOON PTA  2/28/2019    IR THRMB/INFUSION DIAYSIS CIRCUIT Left 2021    IR TUNNELED CATHETER PLACEMENT GREATER THAN 5 YEARS  3/15/2022    IR TUNNELED CATHETER PLACEMENT GREATER THAN 5 YEARS  11/19/2021    IR TUNNELED CATHETER PLACEMENT GREATER THAN 5 YEARS  11/19/2021    IR TUNNELED CATHETER PLACEMENT GREATER THAN 5 YEARS 11/19/2021 [facility] SPECIALS    IR TUNNELED CATHETER PLACEMENT GREATER THAN 5 YEARS  3/15/2022    IR TUNNELED CATHETER PLACEMENT GREATER THAN 5 YEARS 3/15/2022 [facility] RAD[facility]OLOG[facility] SPECIALS    OTHER SURGICAL HISTORY      dialysis fistula, several permcaths    UROLOGICAL SURGERY Left July 2015    nephrectomy    VASCULAR SURGERY Left 04/07/2022    LEFT ARM AV GRAFT    VASCULAR SURGERY Left 03/15/2022    declot    VASCULAR SURGERY Left 02/21/2022    Open thromboembolectomy of left upper arm graft.     VASCULAR SURGERY Right     AV graft- states no longer uses    VASCULAR SURGERY Left 5/26/2022    LEFT AV GRAFT THIGH performed by Dante Hobson MD at [facility] MAIN OR      Allergies   Allergen Reactions    Pcn [Penicillins] Other (See Comments)     PT STATES UNSURE OF RX    Vancomycin Rash      Social History     Tobacco Use    Smoking status: Never Smoker    Smokeless tobacco: Never Used   Substance Use Topics    Alcohol use: No      Social History     Social History Narrative    Not on file     Family History   Problem Relation Age of Onset    Diabetes Mother     Heart Disease Mother     Hypertension Mother     Heart Disease Father    Blase Liming Hypertension Father     Post-op Cognitive Dysfunction Neg Hx     Pseudochol.  Deficiency Neg Hx     Delayed Awakening Neg Hx     Post-op Nausea/Vomiting Neg Hx     Emergence Delirium Neg Hx     Other Neg Hx     Malig Hypertherm Neg Hx       [unfilled]  Current Facility-Administered Medications   Medication Dose Route Frequency    nystatin (MYCOSTATIN) powder   Topical BID    midodrine (PROAMATINE) tablet 10 mg  10 mg Oral Q8H    Epoetin Martin-epbx (RETACRIT) injection 20,000 Units  20,000 Units SubCUTAneous Q7 Days    HYDROcodone-acetaminophen (NORCO) 7.5-325 MG per tablet 1 tablet  1 tablet Oral Q4H PRN    oxyCODONE (ROXICODONE) immediate release tablet 10 mg  10 mg Oral Q4H PRN    insulin lispro (HUMALOG) injection vial 0-8 Units  0-8 Units SubCUTAneous TID WC    insulin lispro (HUMALOG) injection vial 0-4 Units  0-4 Units SubCUTAneous Nightly    glucose chewable tablet 16 g  4 tablet Oral PRN    dextrose bolus 10% 125 mL  125 mL IntraVENous PRN    Or    dextrose bolus 10% 250 mL  250 mL IntraVENous PRN    glucagon (rDNA) injection 1 mg  1 mg IntraMUSCular PRN    dextrose 5 % solution  100 mL/hr IntraVENous PRN    sodium chloride flush 0.9 % injection 5-40 mL  5-40 mL IntraVENous 2 times per day    sodium chloride flush 0.9 % injection 5-40 mL  5-40 mL IntraVENous PRN    0.9 % sodium chloride infusion   IntraVENous PRN    potassium chloride (KLOR-CON M) extended release tablet 40 mEq  40 mEq Oral PRN    Or    potassium bicarb-citric acid (EFFER-K) effervescent tablet 40 mEq  40 mEq Oral PRN    Or    potassium chloride 10 mEq/100 mL IVPB (Peripheral Line)  10 mEq IntraVENous PRN    potassium chloride 10 mEq/100 mL IVPB (Peripheral Line)  10 mEq IntraVENous PRN    magnesium sulfate 2000 mg in 50 mL IVPB premix  2,000 mg IntraVENous PRN    ondansetron (ZOFRAN-ODT) disintegrating tablet 4 mg  4 mg Oral Q8H PRN    Or    ondansetron (ZOFRAN) injection 4 mg  4 mg IntraVENous Q6H PRN    polyethylene glycol (GLYCOLAX) packet 17 g  17 g Oral Daily PRN    bisacodyl (DULCOLAX) suppository 10 mg  10 mg Rectal Daily PRN    famotidine (PEPCID) tablet 10 mg  10 mg Oral BID PRN    aluminum & magnesium hydroxide-simethicone (MAALOX) 200-200-20 MG/5ML suspension 30 mL  30 mL Oral Q6H PRN    acetaminophen (TYLENOL) tablet 650 mg  650 mg Oral Q6H PRN    Or    acetaminophen (TYLENOL) suppository 650 mg  650 mg Rectal Q6H PRN    tuberculin injection 5 Units  5 Units IntraDERmal Once    sevelamer (RENVELA) tablet 800 mg  800 mg Oral TID WC    pantoprazole (PROTONIX) tablet 40 mg  40 mg Oral QAM AC    Nephro-Dipika TABS 1 tablet  1 tablet Oral Daily     Objective:     Vitals:    06/01/22 2331 06/02/22 0400 06/02/22 0710 06/02/22 1134   BP: (!) 94/53 (!) 109/45 (!) 104/51 (!) 121/57   Pulse: (!) 106 77 78 75   Resp: 18 22 18 19   Temp: 98.2 °F (36.8 °C) 97.7 °F (36.5 °C) 98.2 °F (36.8 °C) 98.6 °F (37 °C)   TempSrc: Oral Oral Oral Oral   SpO2: 97% 96% 97% 98%   Weight:       Height:         06/02 0701 - 06/02 1900  In: 325 [P.O.:325]  Out: -   05/31 1901 - 06/02 0700  In: 1580 [P.O.:680]  Out: 2300   Physical Exam:   Gen- the patient is well developed and in no acute distress  HEENT- PERRL, EOMI, no scleral icterus       nose without alar flaring or epistaxis                  oral muscosa moist without cyanosis  Neck- no JVD or retractions  Lungs- resp even/unlab   Heart- RRR   Abd- soft  Ext- warm without cyanosis. There is no lower leg edema. Bruit to left thigh AVG. Mild bruising and edema. No S/S of infection  Skin- no jaundice or rashes  Neuro- alert and oriented x 3. No gross sensorimotor deficits are present.      Data Review   Recent Labs     05/31/22  1228 06/01/22  0712 06/02/22  0646   WBC 7.1 6.1 6.5   HGB 9.0* 8.4* 8.5*   HCT 26.4* 24.0* 23.7*   * 127* 129*     Recent Labs     05/31/22  1228 06/01/22  0712 06/02/22  0646   * 136 134*   K 5.5* 4.0 3.6    101 99   CO2 23 27 30 BUN 32* 46* 34*       Assessment/Plan:    Patient is a 80year old female who we are asked to evaluate her recent left thigh AVG that was placed on 5/26/22. She was admitted for hypotension and we are asked to evaluate her thigh graft as she has some pain and edema. Her access is patent. No S/S of infection. She can follow-up with our office at her regularly scheduled surgical follow-up. Patient seen and examined with Dr. Jonathan Helton. 50 minutes of time was spent on this encounter with greater than 50% of time in direct patient contact including patient education, counseling, review of medical history and imaging, and medical decision making.        Ulysses Posey, NP

## 2022-06-02 NOTE — PLAN OF CARE
Problem: Discharge Planning  Goal: Discharge to home or other facility with appropriate resources  Outcome: Progressing  Flowsheets (Taken 6/2/2022 0800)  Discharge to home or other facility with appropriate resources:   Identify barriers to discharge with patient and caregiver   Arrange for needed discharge resources and transportation as appropriate   Identify discharge learning needs (meds, wound care, etc)   Arrange for interpreters to assist at discharge as needed   Refer to discharge planning if patient needs post-hospital services based on physician order or complex needs related to functional status, cognitive ability or social support system     Problem: Pain  Goal: Verbalizes/displays adequate comfort level or baseline comfort level  Outcome: Progressing     Problem: Skin/Tissue Integrity  Goal: Absence of new skin breakdown  Description: 1. Monitor for areas of redness and/or skin breakdown  2. Assess vascular access sites hourly  3. Every 4-6 hours minimum:  Change oxygen saturation probe site  4. Every 4-6 hours:  If on nasal continuous positive airway pressure, respiratory therapy assess nares and determine need for appliance change or resting period.   Outcome: Progressing     Problem: Safety - Adult  Goal: Free from fall injury  Outcome: Progressing     Problem: ABCDS Injury Assessment  Goal: Absence of physical injury  Outcome: Progressing     Problem: Chronic Conditions and Co-morbidities  Goal: Patient's chronic conditions and co-morbidity symptoms are monitored and maintained or improved  Outcome: Progressing  Flowsheets (Taken 6/2/2022 0800)  Care Plan - Patient's Chronic Conditions and Co-Morbidity Symptoms are Monitored and Maintained or Improved:   Monitor and assess patient's chronic conditions and comorbid symptoms for stability, deterioration, or improvement   Collaborate with multidisciplinary team to address chronic and comorbid conditions and prevent exacerbation or deterioration Update acute care plan with appropriate goals if chronic or comorbid symptoms are exacerbated and prevent overall improvement and discharge

## 2022-06-02 NOTE — PROGRESS NOTES
[facility]  Nephrology consult    Admission Date:  5/31/2022    Admission Diagnosis  Hyperkalemia [E87.5]  Hypotension [I95.9]  Generalized weakness [R53.1]  ESRD on hemodialysis ([address] [N18.6, Z99.2]  Hypotension, unspecified hypotension type [I95.9]    We were consulted by Dr Shiv Nathan for management of ESRD    History of Present Illness: 79 y/o AA female, followed by our office for ESRD, on HD M-W-F at [facility]. She has been running via RIJ perm cath since LUE AVF became non-functioning. Left thigh AV graft placed on 5/26/22 by Dr Ko Dunham. She missed HD last Friday due to feeling bad. Her make up day was that following Saturday, she ran her entire treatment time, was hypotensive, unable to remove fluid. She was told then to stop all antihypertensive medications. She ran HD yesterday, her entire treatment time, was hypotensive, and once again unable to remove fluid. She left dialysis with 4L over her EDW. PMH also consist of DM II, Hyperphosphatemia, anemia, renal cell carcinoma s/p left nephrectomy, followed by Pulmonary for lung mass, and HTN. She is on home O2 at 2L NC as needed. She presented to the ER via EMS for hypotension and generalized \"feeling bad\". At time of assessment, she is on her home dose O2, c/o dyspnea, and is anxious and tearful. She denies CP, no n/v, no HA or dizziness. She denies fever, chills, no cough, and no diarrhea. CXR is negative. There is no urgent dialysis needs at this time. Plan for HD tomorrow. Midodrine ordered for hypotension.    6/1/22 - seen on HD and tolerating well so far - HD needed for maintenance therapy - low BP due to recent leg loop graft placement resulting in altered hemodynamics - she reports much pain - treat with Norco 7.5 q 4 prn as BP is improved now  6/2/22 - still c/o pain, but Norco helps some- plan HD tomorrow - BP and clinically improving - no N/V, no CP, no dyspnea - plans for HD tomorrow discussed    Past Medical History:   Diagnosis Date    Anemia     Arthritis     [facility]F (arteriovenous [facility]) ([facility] [address]     left    AVF (arteriovenous fistula) ([facility] [address] 12/20/2016 12/6/16 ([facility]) Right AVF revision and thrombectomy    Degenerative joint disease     Diabetes ([facility] [address]     type 2--- does not check sqbs at home    Enlarged lymph node     \"enlarging paratracheal node to 2.6 cm on CT chest\"    ESRD on hemodialysis ([facility] [address] onset 2006    ESRD.  MWF dialysis at [place] [facility]    Hypertension     controlled with med    Mediastinal mass     being followed by dr ramírez--per pt-- states told by dr Tammy Back it wasnt cancer-- but plans to be rescanned 5/2022    Obesity     On home O2     2L QHS and PRN during day d/t \"lymph node in chest\"    Renal cancer ([facility] [address]     Dr Millard Dears - renal cancer L nephrectomy---and radiation ---    Shortness of breath     swollen lymphnode in chest-- oxygen 2LPM    Transient ischemic attack 08/29/2015    no residual      Past Surgical History:   Procedure Laterality Date    BREAST SURGERY Right     cyst removed    COLONOSCOPY N/A 11/8/2018    COLONOSCOPY  BMI 36 performed by Lewis Pimentel MD at [address] [place] [address] Se  9/14/2017    CT BIOPSY ABDOMEN RETROPERITONEUM 9/14/2017 [facility] CT SCAN    GI  08/06/2018    exploratory laparotomy    GI  06/01/2002    colon resection resulting in temporary colostomy reversal    GYN      mihir    IR INTRO CATH DIALYSIS CIRCUIT W BALLOON PTA  3/10/2020    IR INTRO CATH DIALYSIS CIRCUIT W BALLOON PTA  12/5/2019    IR INTRO CATH DIALYSIS CIRCUIT W BALLOON PTA  9/3/2019    IR INTRO CATH DIALYSIS CIRCUIT W BALLOON PTA  5/30/2019    IR INTRO CATH DIALYSIS CIRCUIT W BALLOON PTA  2/28/2019    IR THRMB/INFUSION DIAYSIS CIRCUIT Left 2021    IR TUNNELED CATHETER PLACEMENT GREATER THAN 5 YEARS  3/15/2022    IR TUNNELED CATHETER PLACEMENT GREATER THAN 5 YEARS  11/19/2021    IR TUNNELED CATHETER PLACEMENT GREATER THAN 5 YEARS  11/19/2021    IR TUNNELED CATHETER PLACEMENT GREATER THAN 5 YEARS 11/19/2021 [facility] RADIOLOGY SPECIALS    IR TUNNELED CATHETER PLACEMENT GREATER THAN 5 YEARS  3/15/2022    IR TUNNELED CATHETER PLACEMENT GREATER THAN 5 YEARS 3/15/2022 [facility] RADIOLOGY SPECIALS    OTHER SURGICAL HISTORY      dialysis fistula, several permcaths    UROLOGICAL SURGERY Left July 2015    nephrectomy    VASCULAR SURGERY Left 04/07/2022    LEFT ARM AV GRAFT    VASCULAR SURGERY Left 03/15/2022    declot    VASCULAR SURGERY Left 02/21/2022    Open thromboembolectomy of left upper arm graft.     VASCULAR SURGERY Right     AV graft- states no longer uses    VASCULAR SURGERY Left 5/26/2022    LEFT AV GRAFT THIGH performed by Uzma Latif MD at [facility] MAIN OR      Current Facility-Administered Medications   Medication Dose Route Frequency    nystatin (MYCOSTATIN) powder   Topical BID    HYDROcodone-acetaminophen (NORCO) 7.5-325 MG per tablet 1 tablet  1 tablet Oral Q4H PRN    midodrine (PROAMATINE) tablet 5 mg  5 mg Oral Q8H    oxyCODONE (ROXICODONE) immediate release tablet 10 mg  10 mg Oral Q4H PRN    [Held by provider] amLODIPine (NORVASC) tablet 10 mg  10 mg Oral Nightly    [Held by provider] lisinopril (PRINIVIL;ZESTRIL) tablet 40 mg  40 mg Oral Nightly    insulin lispro (HUMALOG) injection vial 0-8 Units  0-8 Units SubCUTAneous TID WC    insulin lispro (HUMALOG) injection vial 0-4 Units  0-4 Units SubCUTAneous Nightly    glucose chewable tablet 16 g  4 tablet Oral PRN    dextrose bolus 10% 125 mL  125 mL IntraVENous PRN    Or    dextrose bolus 10% 250 mL  250 mL IntraVENous PRN    glucagon (rDNA) injection 1 mg  1 mg IntraMUSCular PRN    dextrose 5 % solution  100 mL/hr IntraVENous PRN    sodium chloride flush 0.9 % injection 5-40 mL  5-40 mL IntraVENous 2 times per day    sodium chloride flush 0.9 % injection 5-40 mL  5-40 mL IntraVENous PRN    0.9 % sodium chloride infusion   IntraVENous PRN    potassium chloride (KLOR-CON M) extended release tablet 40 mEq  40 mEq Oral PRN    Or    potassium bicarb-citric acid (EFFER-K) effervescent tablet 40 mEq  40 mEq Oral PRN    Or    potassium chloride 10 mEq/100 mL IVPB (Peripheral Line)  10 mEq IntraVENous PRN    potassium chloride 10 mEq/100 mL IVPB (Peripheral Line)  10 mEq IntraVENous PRN    magnesium sulfate 2000 mg in 50 mL IVPB premix  2,000 mg IntraVENous PRN    ondansetron (ZOFRAN-ODT) disintegrating tablet 4 mg  4 mg Oral Q8H PRN    Or    ondansetron (ZOFRAN) injection 4 mg  4 mg IntraVENous Q6H PRN    polyethylene glycol (GLYCOLAX) packet 17 g  17 g Oral Daily PRN    bisacodyl (DULCOLAX) suppository 10 mg  10 mg Rectal Daily PRN    famotidine (PEPCID) tablet 10 mg  10 mg Oral BID PRN    aluminum & magnesium hydroxide-simethicone (MAALOX) 200-200-20 MG/5ML suspension 30 mL  30 mL Oral Q6H PRN    acetaminophen (TYLENOL) tablet 650 mg  650 mg Oral Q6H PRN    Or    acetaminophen (TYLENOL) suppository 650 mg  650 mg Rectal Q6H PRN    tuberculin injection 5 Units  5 Units IntraDERmal Once    sevelamer (RENVELA) tablet 800 mg  800 mg Oral TID WC    pantoprazole (PROTONIX) tablet 40 mg  40 mg Oral QAM AC    Nephro-Dipika TABS 1 tablet  1 tablet Oral Daily     Allergies   Allergen Reactions    Pcn [Penicillins] Other (See Comments)     PT STATES UNSURE OF RX    Vancomycin Rash      Social History     Tobacco Use    Smoking status: Never Smoker    Smokeless tobacco: Never Used   Substance Use Topics    Alcohol use: No      Family History   Problem Relation Age of Onset    Diabetes Mother     Heart Disease Mother     Hypertension Mother     Heart Disease Father     Hypertension Father     Post-op Cognitive Dysfunction Neg Hx     Pseudochol.  Deficiency Neg Hx     Delayed Awakening Neg Hx     Post-op Nausea/Vomiting Neg Hx     Emergence Delirium Neg Hx     Other Neg Hx     Malig Hypertherm Neg Hx         Review of Systems  Gen - no fever, no chills, appetite okay  HEENT - no sore throat, no decreased vision, no hearing loss  Neck - no neck mass  CV - no chest pain, no palpitation, no orthopnea  Lung - mild shortness of breath, no cough, no hemoptysis  Abd - no tenderness, no nausea/vomiting, no bloody stool  Ext - mild edema, no clubbing, no cyanosis  Musculoskeletal - no joint pain, no back pain  Neurologic - no headaches, no dizziness, no seizures  Psychiatric - mild anxiety, no depression  Skin - no rashes, no pupura  Genitourinary - no decreased urine output, no hematuria, no foamy urine    Objective:     Vitals:    06/01/22 2218 06/01/22 2331 06/02/22 0400 06/02/22 0710   BP:  (!) 94/53 (!) 109/45 (!) 104/51   Pulse:  (!) 106 77 78   Resp: 18 18 22 18   Temp:  98.2 °F (36.8 °C) 97.7 °F (36.5 °C) 98.2 °F (36.8 °C)   TempSrc:  Oral Oral Oral   SpO2:  97% 96% 97%   Weight:       Height:           Intake/Output Summary (Last 24 hours) at 6/2/2022 0944  Last data filed at 6/2/2022 0836  Gross per 24 hour   Intake 1165 ml   Output 2300 ml   Net -1135 ml       Physical Exam  GEN :no distress, alert and oriented  HEENT: anicteric sclerae, eomi. Oropharynx without lesions. Mucous membranes are moist.  Neck - supple without JVD, no thyromegaly. No lymphadenopathy. CV - regular rate and rhythm, no murmur, no rub  Lung - clear bilaterally, lungs expand symmetrically  Chest wall - normal appearance, RIJ perm cath  Abd - soft, nontender, bowel sounds present, no hepatosplenomegaly  Ext - no clubbing, no cyanosis, trace edema left leg, left thigh AV graph  Neurologic - nonfocal, mild anxiety  Skin - no rashes, no purpura, no ecchymoses  Psychiatric: Normal mood and affect.       Data Review:   Recent Labs     05/31/22  1228 06/01/22  0712 06/02/22  0646   WBC 7.1 6.1 6.5   HGB 9.0* 8.4* 8.5*   HCT 26.4* 24.0* 23.7*   * 127* 129*     Recent Labs     05/31/22  1228 06/01/22  0712 06/02/22  0646   * 136 134*   K 5.5* 4.0 3.6    101 99   CO2 23 27 30   BUN 32* 46* 34*     No results for input(s): PH, PCO2, PO2, PCO2 in the last 72 hours. CXR:   Impression   Stable cardiomegaly without acute process. No pleural effusion or pneumothorax             Problem List:     Patient Active Problem List    Diagnosis Date Noted    Hyperkalemia 05/31/2022    Hyponatremia 05/31/2022    Thrombocytopenia ([address] 05/31/2022    Hypotension 05/31/2022    Chronic respiratory failure with hypoxia ([address] 11/12/2021    Severe obesity ([address] 05/04/2018    Type 2 diabetes mellitus with nephropathy ([address] 12/15/2017    Renal cell carcinoma ([address] 09/12/2017    AVF (arteriovenous fistula) ([address] 12/20/2016    Osteoarthritis of right knee 02/07/2013    HTN (hypertension) 02/07/2013    ESRD (end stage renal disease) on dialysis ([address] 02/07/2013    Type 2 diabetes mellitus with hyperglycemia ([facility] 02/07/2013    Total knee replacement status 02/07/2013       Impression:    Plan:     1. ESRD- on HD M-W-F, HD needed for biochemical and volume control. No urgent needs at this time. Plan HD tomorrow  needed for maintenance therapy per current outpatient schedule. 2. Anemia- likely related to ESRD, near goal for CKD, continue Nephrovite, no urgent needs at this time. 3. Hypotension- with a hx of HTN, antihypertensives were stopped Friday last week. New left thigh AV graph could be playing a part. CXR negative, on baseline home O2, Start Midodrine 10 mg TID    4. Hyperkalemia- likley related to ESRD, expect to improve with HD tomorrow    5. Hypocalcemia- with low albumin, treat with vitamin D    6. HTN- hx of, off antihypertensives    7. Renal cell carcinoma- followed by Oncology    8. Pain - monitor response to Norco       8. DM II- on SSI, managed by primary    9.  Hyperphosphatemia- hx of, on Renvela, continue

## 2022-06-02 NOTE — PROGRESS NOTES
Hospitalist Progress Note   Admit Date:  2022 12:09 PM   Name:  Miriam Pires   Age:  80 y.o. Sex:  female  :  1950   MRN:  [id number]   Room:      Reason(s) for Admission: Hyperkalemia [E87.5]  Hypotension [I95.9]  Generalized weakness [R53.1]  ESRD on hemodialysis ([facility] [address] [N18.6, Z99.2]  Hypotension, unspecified hypotension type [I95.9]     Hospital Course & Interval History:   Ms. Daniel Onofre a 79 y/o AAF with a h/o ESRD on HD (TTS), RCC, obesity, DM2 who was admitted to our service on  with hypotension. She has not been feeling well since her AVG surgery on  and noted progressive fatigue and malaise since then. She missed HD - due to feeling poorly.  She only had a partial run of HD on  due to hypotension.  She's had reduced PO intake. CXR w/o fluid overload. BP medications were held and started on midodrine. Dr. Reyes Ahr consulted for HD which she had done on . Subjective/24hr Events (22): Resting comfortably, when she woke up she c/o L thigh pain. Seems to be minimizing pain meds overall despite her pain complaints. SBPs  past 12 hours. No chest pain or SOB. For HD tomorrow. Assessment & Plan:   # Hypotension              - Resolved. Likely BP meds with poor PO intake. Home meds remain held. Midodrine was decreased on  but SBPs then ran 80s-90s so I've increased it back to 10mg TID today . # L thigh pain   - In area of recent AVG placement on . She is afebrile w/o leukocytosis, no pulsatility or fluctuance/crepitus to the area. Mild bruising but Hb is stable from admission, but down about 2 points from surgery last week, small hematoma?  Spoke with Vascular team who will evaluate the area.     # HyperK              - Resolved with HD.     # HypoNa              - Resolved.     # Thrombocytopenia              - Stable, no bleeding.     # Renal cell carcinoma              - Noted.     # HTN              - Home meds held as above.     # ESRD on HD              - TTS. Did run today 6/1. Per Dr. Deepali Lock.     # DM2              - SSI     # Chronic hypoxemic respiratory failure              - 2L NC O2. No issues. Discharge Planning: Pending. Diet:  ADULT DIET; Regular; 4 carb choices (60 gm/meal); Low Potassium (Less than 3000 mg/day); Low Phosphorus (Less than 1000 mg)  DVT PPx: SCDs  Code status: Full Code    Hospital Problems           Last Modified POA    * (Principal) Hypotension 5/31/2022 Yes    Hyperkalemia 5/31/2022 Yes    Hyponatremia 5/31/2022 Yes    Thrombocytopenia (HCC) (Chronic) 5/31/2022 Yes    Renal cell carcinoma (HCC) (Chronic) 5/31/2022 Yes    Severe obesity (HCC) (Chronic) 5/31/2022 Yes    HTN (hypertension) (Chronic) 5/31/2022 Yes    ESRD (end stage renal disease) on dialysis [facility]) (Chronic) 5/31/2022 Yes    Type 2 diabetes mellitus with hyperglycemia (HCC) (Chronic) 5/31/2022 Yes    Chronic respiratory failure with hypoxia ([address] (Chronic) 5/31/2022 Yes                Objective:     Patient Vitals for the past 24 hrs:   Temp Pulse Resp BP SpO2   06/02/22 1134 98.6 °F (37 °C) 75 19 (!) 121/57 98 %   06/02/22 0710 98.2 °F (36.8 °C) 78 18 (!) 104/51 97 %   06/02/22 0400 97.7 °F (36.5 °C) 77 22 (!) 109/45 96 %   06/01/22 2331 98.2 °F (36.8 °C) (!) 106 18 (!) 94/53 97 %   06/01/22 2218 -- -- 18 -- --   06/01/22 2210 -- -- -- (!) 85/50 --   06/01/22 1947 97.7 °F (36.5 °C) 81 18 (!) 84/48 97 %   06/01/22 1749 -- -- 20 -- --   06/01/22 1545 98.4 °F (36.9 °C) 75 20 (!) 103/50 96 %   06/01/22 1251 -- -- 20 -- --   06/01/22 1200 97.6 °F (36.4 °C) 64 20 (!) 124/51 --       Estimated body mass index is 32.1 kg/m² as calculated from the following:    Height as of this encounter: 5' 4\" (1.626 m). Weight as of this encounter: 187 lb (84.8 kg).     Intake/Output Summary (Last 24 hours) at 6/2/2022 1148  Last data filed at 6/2/2022 0836  Gross per 24 hour   Intake 765 ml   Output --   Net 765 ml         Physical Exam:   Blood pressure (!) 121/57, pulse 75, temperature 98.6 °F (37 °C), temperature source Oral, resp. rate 19, height 5' 4\" (1.626 m), weight 187 lb (84.8 kg), SpO2 98 %. General:    Well nourished. No overt distress. Obese. Head:  Normocephalic, atraumatic  Eyes:  Sclerae appear normal. Pupils equally round. ENT:  Nares appear normal, no drainage. Moist oral mucosa  Neck:  No restricted ROM. Trachea midline. CV:   RRR. No m/r/g. No jugular venous distension. Lungs:   CTAB. No wheezing, rhonchi, or rales. Respirations even, unlabored. Abdomen: Bowel sounds present. Soft, nontender, nondistended. Extremities: No cyanosis or clubbing. L thigh -- two surgical incisions appear to be healing well, the superior incision has a small amount of erythema surrounding it, distal incision looks ok, however the skin in between indurated, no pulsatile masses or fluctuance/crepitus is noted. Skin:     No rashes and normal coloration. Warm and dry. R HD cath. Neuro:  CN II-XII grossly intact. Sensation intact. A&Ox3  Psych:  Normal mood and affect.       I have reviewed ordered lab tests and independently visualized imaging below:    Recent Labs:  Recent Results (from the past 48 hour(s))   CBC    Collection Time: 05/31/22 12:28 PM   Result Value Ref Range    WBC 7.1 4.3 - 11.1 K/uL    RBC 2.74 (L) 4.05 - 5.2 M/uL    Hemoglobin 9.0 (L) 11.7 - 15.4 g/dL    Hematocrit 26.4 (L) 35.8 - 46.3 %    MCV 96.4 79.6 - 97.8 FL    MCH 32.8 26.1 - 32.9 PG    MCHC 34.1 31.4 - 35.0 g/dL    RDW 17.5 (H) 11.9 - 14.6 %    Platelets 333 (L) 692 - 450 K/uL    MPV 10.6 9.4 - 12.3 FL    nRBC 0.03 0.0 - 0.2 K/uL   Comprehensive Metabolic Panel    Collection Time: 05/31/22 12:28 PM   Result Value Ref Range    Sodium 134 (L) 136 - 145 mmol/L    Potassium 5.5 (H) 3.5 - 5.1 mmol/L    Chloride 101 98 - 107 mmol/L    CO2 23 21 - 32 mmol/L    Anion Gap 10 7 - 16 mmol/L    Glucose 129 (H) 65 - 100 mg/dL    BUN 32 (H) 8 - 23 MG/DL    CREATININE 5.30 (H) 0.6 - 1.0 MG/DL    GFR  American 10 (L) >60 ml/min/1.73m2    GFR Non- 9 (L) >60 ml/min/1.73m2    Calcium 7.3 (L) 8.3 - 10.4 MG/DL    Total Bilirubin 0.9 0.2 - 1.1 MG/DL    ALT 9 (L) 12 - 65 U/L    AST 52 (H) 15 - 37 U/L    Alk Phosphatase 87 50 - 136 U/L    Total Protein 6.5 6.3 - 8.2 g/dL    Albumin 2.6 (L) 3.2 - 4.6 g/dL    Globulin 3.9 (H) 2.3 - 3.5 g/dL    Albumin/Globulin Ratio 0.7 (L) 1.2 - 3.5     Blood Culture 1    Collection Time: 05/31/22 12:55 PM    Specimen: Blood   Result Value Ref Range    Special Requests LEFT  FOREARM        Culture NO GROWTH 2 DAYS     Lactate, Sepsis    Collection Time: 05/31/22 12:55 PM   Result Value Ref Range    Lactic Acid, Sepsis 1.1 0.4 - 2.0 MMOL/L   Procalcitonin    Collection Time: 05/31/22 12:55 PM   Result Value Ref Range    Procalcitonin 0.65 (H) 0.00 - 0.49 ng/mL   POCT Glucose    Collection Time: 05/31/22  5:34 PM   Result Value Ref Range    POC Glucose 108 (H) 65 - 100 mg/dL    Performed by: Soy    Blood Culture 2    Collection Time: 05/31/22  6:17 PM    Specimen: Blood   Result Value Ref Range    Special Requests CENTRAL      Culture NO GROWTH 2 DAYS     POCT Glucose    Collection Time: 05/31/22  9:57 PM   Result Value Ref Range    POC Glucose 128 (H) 65 - 100 mg/dL    Performed by: Marino Landau    POCT Glucose    Collection Time: 06/01/22  6:34 AM   Result Value Ref Range    POC Glucose 93 65 - 100 mg/dL    Performed by: Juan F Jaimes    Hepatitis B Surface Antigen    Collection Time: 06/01/22  7:12 AM   Result Value Ref Range    Hepatitis B Surface Ag NONREACTIVE NR     Hemoglobin A1c    Collection Time: 06/01/22  7:12 AM   Result Value Ref Range    Hemoglobin A1C <3.5 (L) 4.20 - 6.30 %    eAG Cannot be calculated mg/dL   Basic Metabolic Panel w/ Reflex to MG    Collection Time: 06/01/22  7:12 AM   Result Value Ref Range    Sodium 136 136 - 145 mmol/L    Potassium 4.0 3.5 - 5.1 mmol/L    Chloride 101 98 - 107 mmol/L    CO2 27 21 - 32 mmol/L Anion Gap 8 7 - 16 mmol/L    Glucose 93 65 - 100 mg/dL    BUN 46 (H) 8 - 23 MG/DL    CREATININE 6.30 (H) 0.6 - 1.0 MG/DL    GFR African American 8 (L) >60 ml/min/1.73m2    GFR Non- 7 (L) >60 ml/min/1.73m2    Calcium 7.3 (L) 8.3 - 10.4 MG/DL   CBC with Auto Differential    Collection Time: 06/01/22  7:12 AM   Result Value Ref Range    WBC 6.1 4.3 - 11.1 K/uL    RBC 2.57 (L) 4.05 - 5.2 M/uL    Hemoglobin 8.4 (L) 11.7 - 15.4 g/dL    Hematocrit 24.0 (L) 35.8 - 46.3 %    MCV 93.4 79.6 - 97.8 FL    MCH 32.7 26.1 - 32.9 PG    MCHC 35.0 31.4 - 35.0 g/dL    RDW 17.1 (H) 11.9 - 14.6 %    Platelets 286 (L) 383 - 450 K/uL    MPV 11.0 9.4 - 12.3 FL    nRBC 0.03 0.0 - 0.2 K/uL    Differential Type AUTOMATED      Seg Neutrophils 71 43 - 78 %    Lymphocytes 12 (L) 13 - 44 %    Monocytes 13 (H) 4.0 - 12.0 %    Eosinophils % 2 0.5 - 7.8 %    Basophils 1 0.0 - 2.0 %    Immature Granulocytes 1 0.0 - 5.0 %    Segs Absolute 4.3 1.7 - 8.2 K/UL    Absolute Lymph # 0.7 0.5 - 4.6 K/UL    Absolute Mono # 0.8 0.1 - 1.3 K/UL    Absolute Eos # 0.1 0.0 - 0.8 K/UL    Basophils Absolute 0.1 0.0 - 0.2 K/UL    Absolute Immature Granulocyte 0.1 0.0 - 0.5 K/UL   POCT Glucose    Collection Time: 06/01/22 10:31 AM   Result Value Ref Range    POC Glucose 100 65 - 100 mg/dL    Performed by: Roxanne    POCT Glucose    Collection Time: 06/01/22 11:59 AM   Result Value Ref Range    POC Glucose 91 65 - 100 mg/dL    Performed by: Ta    POCT Glucose    Collection Time: 06/01/22  3:46 PM   Result Value Ref Range    POC Glucose 124 (H) 65 - 100 mg/dL    Performed by:  Ta    POCT Glucose    Collection Time: 06/01/22  9:12 PM   Result Value Ref Range    POC Glucose 137 (H) 65 - 100 mg/dL    Performed by: Matthew Harris    POCT Glucose    Collection Time: 06/02/22  6:11 AM   Result Value Ref Range    POC Glucose 139 (H) 65 - 100 mg/dL    Performed by: Jesus RUST Metabolic Panel w/ Reflex to [facility]    Collection Time: 06/02/22  6:46 AM   Result Value Ref Range    Sodium 134 (L) 136 - 145 mmol/L    Potassium 3.6 3.5 - 5.1 mmol/L    Chloride 99 98 - 107 mmol/L    CO2 30 21 - 32 mmol/L    Anion Gap 5 (L) 7 - 16 mmol/L    Glucose 145 (H) 65 - 100 mg/dL    BUN 34 (H) 8 - 23 MG/DL    CREATININE 5.30 (H) 0.6 - 1.0 MG/DL    GFR African American 10 (L) >60 ml/min/1.73m2    GFR Non- 9 (L) >60 ml/min/1.73m2    Calcium 7.7 (L) 8.3 - 10.4 MG/DL   CBC with Auto Differential    Collection Time: 06/02/22  6:46 AM   Result Value Ref Range    WBC 6.5 4.3 - 11.1 K/uL    RBC 2.53 (L) 4.05 - 5.2 M/uL    Hemoglobin 8.5 (L) 11.7 - 15.4 g/dL    Hematocrit 23.7 (L) 35.8 - 46.3 %    MCV 93.7 79.6 - 97.8 FL    MCH 33.6 (H) 26.1 - 32.9 PG    MCHC 35.9 (H) 31.4 - 35.0 g/dL    RDW 16.5 (H) 11.9 - 14.6 %    Platelets 853 (L) 800 - 450 K/uL    MPV 10.6 9.4 - 12.3 FL    nRBC 0.02 0.0 - 0.2 K/uL    Differential Type AUTOMATED      Seg Neutrophils 69 43 - 78 %    Lymphocytes 11 (L) 13 - 44 %    Monocytes 16 (H) 4.0 - 12.0 %    Eosinophils % 2 0.5 - 7.8 %    Basophils 1 0.0 - 2.0 %    Immature Granulocytes 1 0.0 - 5.0 %    Segs Absolute 4.5 1.7 - 8.2 K/UL    Absolute Lymph # 0.7 0.5 - 4.6 K/UL    Absolute Mono # 1.1 0.1 - 1.3 K/UL    Absolute Eos # 0.1 0.0 - 0.8 K/UL    Basophils Absolute 0.1 0.0 - 0.2 K/UL    Absolute Immature Granulocyte 0.0 0.0 - 0.5 K/UL   POCT Glucose    Collection Time: 06/02/22 11:33 AM   Result Value Ref Range    POC Glucose 123 (H) 65 - 100 mg/dL    Performed by: Sam Michel          Other Studies:  XR CHEST PORTABLE    Result Date: 5/31/2022  EXAM: XR CHEST PORTABLE INDICATION: fatigue COMPARISON: Chest radiograph, 11/22/2021 FINDINGS: The cardiomediastinal silhouette is enlarged but stable. Right-sided hemodialysis catheter terminates in the right atrium. No pleural effusion or pneumothorax. No focal parenchymal process.  Vascular stent in the right subclavian region and bilateral brachial regions. No acute osseous abnormality. Stable cardiomegaly without acute process.        Current Meds:  Current Facility-Administered Medications   Medication Dose Route Frequency    nystatin (MYCOSTATIN) powder   Topical BID    midodrine (PROAMATINE) tablet 10 mg  10 mg Oral Q8H    Epoetin Martin-epbx (RETACRIT) injection 20,000 Units  20,000 Units SubCUTAneous Q7 Days    HYDROcodone-acetaminophen (NORCO) 7.5-325 MG per tablet 1 tablet  1 tablet Oral Q4H PRN    oxyCODONE (ROXICODONE) immediate release tablet 10 mg  10 mg Oral Q4H PRN    insulin lispro (HUMALOG) injection vial 0-8 Units  0-8 Units SubCUTAneous TID WC    insulin lispro (HUMALOG) injection vial 0-4 Units  0-4 Units SubCUTAneous Nightly    glucose chewable tablet 16 g  4 tablet Oral PRN    dextrose bolus 10% 125 mL  125 mL IntraVENous PRN    Or    dextrose bolus 10% 250 mL  250 mL IntraVENous PRN    glucagon (rDNA) injection 1 mg  1 mg IntraMUSCular PRN    dextrose 5 % solution  100 mL/hr IntraVENous PRN    sodium chloride flush 0.9 % injection 5-40 mL  5-40 mL IntraVENous 2 times per day    sodium chloride flush 0.9 % injection 5-40 mL  5-40 mL IntraVENous PRN    0.9 % sodium chloride infusion   IntraVENous PRN    potassium chloride (KLOR-CON M) extended release tablet 40 mEq  40 mEq Oral PRN    Or    potassium bicarb-citric acid (EFFER-K) effervescent tablet 40 mEq  40 mEq Oral PRN    Or    potassium chloride 10 mEq/100 mL IVPB (Peripheral Line)  10 mEq IntraVENous PRN    potassium chloride 10 mEq/100 mL IVPB (Peripheral Line)  10 mEq IntraVENous PRN    magnesium sulfate 2000 mg in 50 mL IVPB premix  2,000 mg IntraVENous PRN    ondansetron (ZOFRAN-ODT) disintegrating tablet 4 mg  4 mg Oral Q8H PRN    Or    ondansetron (ZOFRAN) injection 4 mg  4 mg IntraVENous Q6H PRN    polyethylene glycol (GLYCOLAX) packet 17 g  17 g Oral Daily PRN    bisacodyl (DULCOLAX) suppository 10 mg  10 mg Rectal Daily PRN    famotidine (PEPCID) tablet 10 mg  10 mg Oral BID PRN    aluminum & magnesium hydroxide-simethicone (MAALOX) 200-200-20 MG/5ML suspension 30 mL  30 mL Oral Q6H PRN    acetaminophen (TYLENOL) tablet 650 mg  650 mg Oral Q6H PRN    Or    acetaminophen (TYLENOL) suppository 650 mg  650 mg Rectal Q6H PRN    tuberculin injection 5 Units  5 Units IntraDERmal Once    sevelamer (RENVELA) tablet 800 mg  800 mg Oral TID WC    pantoprazole (PROTONIX) tablet 40 mg  40 mg Oral QAM AC    Nephro-Dipika TABS 1 tablet  1 tablet Oral Daily       Signed:  [employer], MD    Part of this note may have been written by using a voice dictation software. The note has been proof read but may still contain some grammatical/other typographical errors.

## 2022-06-02 NOTE — PLAN OF CARE
Problem: Discharge Planning  Goal: Discharge to home or other facility with appropriate resources  Outcome: Progressing     Problem: Pain  Goal: Verbalizes/displays adequate comfort level or baseline comfort level  Outcome: Progressing     Problem: Skin/Tissue Integrity  Goal: Absence of new skin breakdown  Outcome: Progressing     Problem: Safety - Adult  Goal: Free from fall injury  Outcome: Progressing     Problem: ABCDS Injury Assessment  Goal: Absence of physical injury  Outcome: Progressing

## 2022-06-02 NOTE — PROGRESS NOTES
Patient seen and examined. Patient status post left thigh graft placement last week. Concern for access. Clinically graft is patent with Doppler signal medially and laterally. She has some edema. Graft is not infected this is regular postop swelling from surgical incision. Recommend ice packs. Follow-up was in 2 weeks for wound check.     Jesenia Carranza

## 2022-06-03 NOTE — DIALYSIS
TRANSFER OUT- DIALYSIS    Hemodialysis treatment completed. PT ran 4 hours, without complications. Patient ALERT and VS STABLE  BP (!) 106/40   Pulse 73   Temp 98.2 °F (36.8 °C)   Resp 18   Ht 5' 4\" (1.626 m)   Wt 187 lb (84.8 kg)   SpO2 100%   BMI 32.10 kg/m²      2 Kg removed. Flushed both ports with 10 mL of NS.  CVC dressing clean, dry, and intact, tego caps intact, curos caps placed. Meds given: SEE mAR. RBCs given during dialysis: 0    Patient to 202 after dialysis.

## 2022-06-03 NOTE — PROGRESS NOTES
Physical Therapy Note:    Attempted to see patient this AM for physical therapy evaluation session. This was my third attempt since yesterday. Patient became angry that someone was coming in right after dialysis when she feels tired. . Will follow and re-attempt as schedule permits/patient available.  Thank you,    MIKE Sanchez, PT     Rehab Caseload Tracker

## 2022-06-03 NOTE — DIALYSIS
TRANSFER IN - DIALYSIS    Received patient in dialysis unit  from 202 (unit) for ordered procedure. Consent verified for renal replacement therapy. Procedure explained to patient, opportunity for Q&A provided. Call light given. Patient ALERT and vital signs stable. BP (!) 94/54   Pulse 67   Temp 98.6 °F (37 °C) (Oral)   Resp 18   Ht 5' 4\" (1.626 m)   Wt 187 lb (84.8 kg)   SpO2 100%   BMI 32.10 kg/m²       Hemodialysis initiated using RIGHT CVC. Aspirated and flushed both ports without difficulty. Dressing clean, dry and intact. Machine settings per MD order. Heparin 1000 unit bolus and 500 units/hr. Will monitor during treatment.

## 2022-06-03 NOTE — PROGRESS NOTES
Patient has been to dialysis this am. She refused her bath after returning. Pain control has been an issue today. She was medicated with Oxycodone 10 mg PO but did not get adequate relief until I added Norco. Patient is now resting quietly. Midodrine still on board to improve hypotension.

## 2022-06-03 NOTE — PLAN OF CARE
Problem: Discharge Planning  Goal: Discharge to home or other facility with appropriate resources  6/2/2022 2201 by Margaret Molina RN  Outcome: Progressing  6/2/2022 1948 by Melchor Calero RN  Outcome: Progressing  Flowsheets  Taken 6/2/2022 1930 by Margaret Molina RN  Discharge to home or other facility with appropriate resources:   Identify barriers to discharge with patient and caregiver   Arrange for needed discharge resources and transportation as appropriate   Identify discharge learning needs (meds, wound care, etc)   Refer to discharge planning if patient needs post-hospital services based on physician order or complex needs related to functional status, cognitive ability or social support system  Taken 6/2/2022 0800 by Melchor Calero RN  Discharge to home or other facility with appropriate resources:   Identify barriers to discharge with patient and caregiver   Arrange for needed discharge resources and transportation as appropriate   Identify discharge learning needs (meds, wound care, etc)   Arrange for interpreters to assist at discharge as needed   Refer to discharge planning if patient needs post-hospital services based on physician order or complex needs related to functional status, cognitive ability or social support system     Problem: Pain  Goal: Verbalizes/displays adequate comfort level or baseline comfort level  6/2/2022 2201 by Margaret Molina RN  Outcome: Progressing  6/2/2022 1948 by Melchor Calero RN  Outcome: Progressing  Flowsheets (Taken 6/2/2022 1937 by Margaret Molina RN)  Verbalizes/displays adequate comfort level or baseline comfort level:   Encourage patient to monitor pain and request assistance   Assess pain using appropriate pain scale   Administer analgesics based on type and severity of pain and evaluate response   Implement non-pharmacological measures as appropriate and evaluate response   Consider cultural and social influences on pain and pain management   Notify Licensed Independent Practitioner if interventions unsuccessful or patient reports new pain     Problem: Skin/Tissue Integrity  Goal: Absence of new skin breakdown  Description: 1. Monitor for areas of redness and/or skin breakdown  2. Assess vascular access sites hourly  3. Every 4-6 hours minimum:  Change oxygen saturation probe site  4. Every 4-6 hours:  If on nasal continuous positive airway pressure, respiratory therapy assess nares and determine need for appliance change or resting period.   6/2/2022 2201 by Annamaria Chatterjee RN  Outcome: Progressing  6/2/2022 1948 by Emilia Lui RN  Outcome: Progressing     Problem: Safety - Adult  Goal: Free from fall injury  6/2/2022 2201 by Annamaria Chatterjee RN  Outcome: Progressing  6/2/2022 1948 by Emilia Lui RN  Outcome: Progressing  Flowsheets (Taken 6/2/2022 1930 by Annamaria Chatterjee RN)  Free From Fall Injury: Instruct family/caregiver on patient safety     Problem: ABCDS Injury Assessment  Goal: Absence of physical injury  6/2/2022 2201 by Annamaria Chatterjee RN  Outcome: Progressing  6/2/2022 1948 by Emilia Lui RN  Outcome: Progressing  Flowsheets (Taken 6/2/2022 1930 by Annamaria Chatterjee RN)  Absence of Physical Injury: Implement safety measures based on patient assessment     Problem: Chronic Conditions and Co-morbidities  Goal: Patient's chronic conditions and co-morbidity symptoms are monitored and maintained or improved  6/2/2022 2201 by Annamaria Chatterjee RN  Outcome: Progressing  6/2/2022 1948 by Emilia Lui RN  Outcome: Progressing  Flowsheets  Taken 6/2/2022 1930 by Annamaria Chatterjee RN  Care Plan - Patient's Chronic Conditions and Co-Morbidity Symptoms are Monitored and Maintained or Improved:   Monitor and assess patient's chronic conditions and comorbid symptoms for stability, deterioration, or improvement   Update acute care plan with appropriate goals if chronic or comorbid symptoms are exacerbated and prevent overall improvement and discharge  Taken 6/2/2022 0800 by Donn Ridley 34 - Patient's Chronic Conditions and Co-Morbidity Symptoms are Monitored and Maintained or Improved:   Monitor and assess patient's chronic conditions and comorbid symptoms for stability, deterioration, or improvement   Collaborate with multidisciplinary team to address chronic and comorbid conditions and prevent exacerbation or deterioration   Update acute care plan with appropriate goals if chronic or comorbid symptoms are exacerbated and prevent overall improvement and discharge

## 2022-06-03 NOTE — PROGRESS NOTES
Physical Therapy Note:    Attempted to see patient this PM for physical therapy evaluation session. Patient again angry, not wanting to do anything on a dialysis day. . Will follow and re-attempt as schedule permits/patient available. If she refuses tomorrow, will discontinue skilled PT.     MIKE Sanford, PT     Rehab Caseload Tracker

## 2022-06-03 NOTE — PROGRESS NOTES
[facility]  Nephrology consult    Admission Date:  5/31/2022    Admission Diagnosis  Hyperkalemia [E87.5]  Hypotension [I95.9]  Generalized weakness [R53.1]  ESRD on hemodialysis ([address] [N18.6, Z99.2]  Hypotension, unspecified hypotension type [I95.9]    We were consulted by Dr Elyse Freedman for management of ESRD    History of Present Illness: 79 y/o AA female, followed by our office for ESRD, on HD M-W-F at [facility]. She has been running via RIJ perm cath since LUE AVF became non-functioning. Left thigh AV graft placed on 5/26/22 by Dr Federico Luque. She missed HD last Friday due to feeling bad. Her make up day was that following Saturday, she ran her entire treatment time, was hypotensive, unable to remove fluid. She was told then to stop all antihypertensive medications. She ran HD yesterday, her entire treatment time, was hypotensive, and once again unable to remove fluid. She left dialysis with 4L over her EDW. PMH also consist of DM II, Hyperphosphatemia, anemia, renal cell carcinoma s/p left nephrectomy, followed by Pulmonary for lung mass, and HTN. She is on home O2 at 2L NC as needed. She presented to the ER via EMS for hypotension and generalized \"feeling bad\". At time of assessment, she is on her home dose O2, c/o dyspnea, and is anxious and tearful. She denies CP, no n/v, no HA or dizziness. She denies fever, chills, no cough, and no diarrhea. CXR is negative. There is no urgent dialysis needs at this time. Plan for HD tomorrow. Midodrine ordered for hypotension.    6/1/22 - seen on HD and tolerating well so far - HD needed for maintenance therapy - low BP due to recent leg loop graft placement resulting in altered hemodynamics - she reports much pain - treat with Norco 7.5 q 4 prn as BP is improved now  6/2/22 - still c/o pain, but Norco helps some- plan HD tomorrow - BP and clinically improving - no N/V, no CP, no dyspnea - plans for HD tomorrow discussed  6/3/22 - she tolerated HD fairly well - very anxious about low BP and feels she is not ready for discharge - was able to get OOB to chair and take a shower - no fever or chills     Past Medical History:   Diagnosis Date    Anemia     Arthritis     [facility]F [facility]arteriovenous fistula) [facility]     left    AVF (arteriovenous fistula) ([facility] 12/20/2016 12/6/16 ([facility]) Right AVF revision and thrombectomy    Degenerative joint disease     Diabetes ([facility]     type 2--- does not check sqbs at home    Enlarged lymph node     \"enlarging paratracheal node to 2.6 cm on CT chest\"    ESRD on hemodialysis ([facility] onset 2006    ESRD.  MWF dialysis at [place] [facility]    Hypertension     controlled with med    Mediastinal mass     being followed by dr ramírez--per pt-- states told by dr Siena Badillo it wasnt cancer-- but plans to be rescanned 5/2022    Obesity     On home O2     2L QHS and PRN during day d/t \"lymph node in chest\"    Renal cancer ([facility]     Dr Yari Rodriguez - renal cancer L nephrectomy---and radiation ---    Shortness of breath     swollen lymphnode in chest-- oxygen 2LPM    Transient ischemic attack 08/29/2015    no residual      Past Surgical History:   Procedure Laterality Date    BREAST SURGERY Right     cyst removed    COLONOSCOPY N/A 11/8/2018    COLONOSCOPY  BMI 36 performed by Venkata Blake MD at [address] [place] [address] Se  9/14/2017    CT BIOPSY ABDOMEN RETROPERITONEUM 9/14/2017 [facility] CT SCAN    GI  08/06/2018    exploratory laparotomy    GI  06/01/2002    colon resection resulting in temporary colostomy reversal    GYN      mihir    IR INTRO CATH DIALYSIS CIRCUIT W BALLOON PTA  3/10/2020    IR INTRO CATH DIALYSIS CIRCUIT W BALLOON PTA  12/5/2019    IR INTRO CATH DIALYSIS CIRCUIT W BALLOON PTA  9/3/2019    IR INTRO CATH DIALYSIS CIRCUIT W BALLOON PTA  5/30/2019    IR INTRO CATH DIALYSIS CIRCUIT W BALLOON PTA  2/28/2019    IR THRMB/INFUSION DIAYSIS CIRCUIT Left 2021    IR TUNNELED CATHETER PLACEMENT GREATER THAN 5 YEARS  3/15/2022    IR TUNNELED CATHETER PLACEMENT GREATER THAN 5 YEARS  11/19/2021    IR TUNNELED CATHETER PLACEMENT GREATER THAN 5 YEARS  11/19/2021    IR TUNNELED CATHETER PLACEMENT GREATER THAN 5 YEARS 11/19/2021 [facility] SPECIALS    IR TUNNELED CATHETER PLACEMENT GREATER THAN 5 YEARS  3/15/2022    IR TUNNELED CATHETER PLACEMENT GREATER THAN 5 YEARS 3/15/2022 [facility] SPECIALS    OTHER SURGICAL HISTORY      dialysis fistula, several permcaths    UROLOGICAL SURGERY Left July 2015    nephrectomy    VASCULAR SURGERY Left 04/07/2022    LEFT ARM AV GRAFT    VASCULAR SURGERY Left 03/15/2022    declot    VASCULAR SURGERY Left 02/21/2022    Open thromboembolectomy of left upper arm graft.     VASCULAR SURGERY Right     AV graft- states no longer uses    VASCULAR SURGERY Left 5/26/2022    LEFT AV GRAFT THIGH performed by Juju Cochran MD at [facility] MAIN OR      Current Facility-Administered Medications   Medication Dose Route Frequency    nystatin (MYCOSTATIN) powder   Topical BID    midodrine (PROAMATINE) tablet 10 mg  10 mg Oral Q8H    Epoetin Martin-epbx (RETACRIT) injection 20,000 Units  20,000 Units SubCUTAneous Q7 Days    HYDROcodone-acetaminophen (NORCO) 7.5-325 MG per tablet 1 tablet  1 tablet Oral Q4H PRN    oxyCODONE (ROXICODONE) immediate release tablet 10 mg  10 mg Oral Q4H PRN    insulin lispro (HUMALOG) injection vial 0-8 Units  0-8 Units SubCUTAneous TID WC    insulin lispro (HUMALOG) injection vial 0-4 Units  0-4 Units SubCUTAneous Nightly    glucose chewable tablet 16 g  4 tablet Oral PRN    dextrose bolus 10% 125 mL  125 mL IntraVENous PRN    Or    dextrose bolus 10% 250 mL  250 mL IntraVENous PRN    glucagon (rDNA) injection 1 mg  1 mg IntraMUSCular PRN    dextrose 5 % solution  100 mL/hr IntraVENous PRN    sodium chloride flush 0.9 % injection 5-40 mL  5-40 mL IntraVENous 2 times per day    sodium chloride flush 0.9 % injection 5-40 mL  5-40 mL IntraVENous PRN    0.9 % sodium chloride infusion   IntraVENous PRN    potassium chloride (KLOR-CON M) extended release tablet 40 mEq  40 mEq Oral PRN    Or    potassium bicarb-citric acid (EFFER-K) effervescent tablet 40 mEq  40 mEq Oral PRN    Or    potassium chloride 10 mEq/100 mL IVPB (Peripheral Line)  10 mEq IntraVENous PRN    potassium chloride 10 mEq/100 mL IVPB (Peripheral Line)  10 mEq IntraVENous PRN    magnesium sulfate 2000 mg in 50 mL IVPB premix  2,000 mg IntraVENous PRN    ondansetron (ZOFRAN-ODT) disintegrating tablet 4 mg  4 mg Oral Q8H PRN    Or    ondansetron (ZOFRAN) injection 4 mg  4 mg IntraVENous Q6H PRN    polyethylene glycol (GLYCOLAX) packet 17 g  17 g Oral Daily PRN    bisacodyl (DULCOLAX) suppository 10 mg  10 mg Rectal Daily PRN    famotidine (PEPCID) tablet 10 mg  10 mg Oral BID PRN    aluminum & magnesium hydroxide-simethicone (MAALOX) 200-200-20 MG/5ML suspension 30 mL  30 mL Oral Q6H PRN    acetaminophen (TYLENOL) tablet 650 mg  650 mg Oral Q6H PRN    Or    acetaminophen (TYLENOL) suppository 650 mg  650 mg Rectal Q6H PRN    sevelamer (RENVELA) tablet 800 mg  800 mg Oral TID WC    pantoprazole (PROTONIX) tablet 40 mg  40 mg Oral QAM AC    Nephro-Dipika TABS 1 tablet  1 tablet Oral Daily     Allergies   Allergen Reactions    Pcn [Penicillins] Other (See Comments)     PT STATES UNSURE OF RX    Vancomycin Rash      Social History     Tobacco Use    Smoking status: Never Smoker    Smokeless tobacco: Never Used   Substance Use Topics    Alcohol use: No      Family History   Problem Relation Age of Onset    Diabetes Mother     Heart Disease Mother     Hypertension Mother     Heart Disease Father     Hypertension Father     Post-op Cognitive Dysfunction Neg Hx     Pseudochol.  Deficiency Neg Hx     Delayed Awakening Neg Hx     Post-op Nausea/Vomiting Neg Hx     Emergence Delirium Neg Hx     Other Neg Hx     Malig Hypertherm Neg Hx         Review of Systems  Gen - no fever, no chills, appetite okay  HEENT - no sore throat, no decreased vision, no hearing loss  Neck - no neck mass  CV - no chest pain, no palpitation, no orthopnea  Lung - mild shortness of breath, no cough, no hemoptysis  Abd - no tenderness, no nausea/vomiting, no bloody stool  Ext - mild edema, no clubbing, no cyanosis  Musculoskeletal - no joint pain, no back pain  Neurologic - no headaches, no dizziness, no seizures  Psychiatric - mild anxiety, no depression  Skin - no rashes, no pupura  Genitourinary - no decreased urine output, no hematuria, no foamy urine    Objective:     Vitals:    06/03/22 1059 06/03/22 1124 06/03/22 1456 06/03/22 1656   BP: (!) 106/40 126/69 (!) 110/48    Pulse: 73 73 77    Resp: 18 19 19 19   Temp: 98.2 °F (36.8 °C) 98.9 °F (37.2 °C) 98.4 °F (36.9 °C)    TempSrc:  Temporal Temporal    SpO2: 100% 98% 95%    Weight:       Height:           Intake/Output Summary (Last 24 hours) at 6/3/2022 1846  Last data filed at 6/3/2022 1756  Gross per 24 hour   Intake 800 ml   Output 2000 ml   Net -1200 ml       Physical Exam  GEN :no distress, alert and oriented  HEENT: anicteric sclerae, eomi. Oropharynx without lesions. Mucous membranes are moist.  Neck - supple without JVD, no thyromegaly. No lymphadenopathy. CV - regular rate and rhythm, no murmur, no rub  Lung - clear bilaterally, lungs expand symmetrically  Chest wall - normal appearance, RIJ perm cath  Abd - soft, nontender, bowel sounds present, no hepatosplenomegaly  Ext - no clubbing, no cyanosis, trace edema left leg, left thigh AV graph  Neurologic - nonfocal, mild anxiety  Skin - no rashes, no purpura, no ecchymoses  Psychiatric: Normal mood and affect.       Data Review:   Recent Labs     06/01/22  0712 06/02/22  0646 06/03/22  1429   WBC 6.1 6.5 7.1   HGB 8.4* 8.5* 9.0*   HCT 24.0* 23.7* 25.1*   * 129* 145*     Recent Labs     06/01/22  0712 06/02/22  0646    134*   K 4.0 3.6    99   CO2 27 30   BUN 46* 34*     No results for input(s): PH, PCO2, PO2, PCO2 in the last 72 hours. CXR:   Impression   Stable cardiomegaly without acute process. No pleural effusion or pneumothorax             Problem List:     Patient Active Problem List    Diagnosis Date Noted    Generalized weakness     Hyperkalemia 05/31/2022    Hyponatremia 05/31/2022    Thrombocytopenia ([address] 05/31/2022    Hypotension 05/31/2022    Chronic respiratory failure with hypoxia ([address] 11/12/2021    Severe obesity ([address] 05/04/2018    Type 2 diabetes mellitus with nephropathy ([address] 12/15/2017    Renal cell carcinoma ([address] 09/12/2017    AVF (arteriovenous fistula) ([address] 12/20/2016    Osteoarthritis of right knee 02/07/2013    HTN (hypertension) 02/07/2013    ESRD (end stage renal disease) on dialysis ([address] 02/07/2013    Type 2 diabetes mellitus with hyperglycemia ([facility] 02/07/2013    Total knee replacement status 02/07/2013       Impression:    Plan:     1. ESRD- on HD M-W-F, HD needed for biochemical and volume control      2. Anemia- likely related to ESRD, near goal for CKD, continue Nephrovite, no urgent needs at this time - treated with LAKISHA    3. Hypotension- with a hx of HTN, antihypertensives were stopped Friday last week. New left thigh AV graph could be playing a part. CXR negative, on baseline home O2, improving off antihypertensive and with Midodrine 10 mg TID    4. Hyperkalemia- likley related to ESRD,   improved with HD      5. Hypocalcemia- with low albumin, continue vitamin D    6. HTN- hx of, off antihypertensives    7. Renal cell carcinoma- followed by Oncology    8. Pain - monitor response to Norco     8. DM II- on SSI, managed by primary    9.  Hyperphosphatemia- hx of, on Renvela, continue

## 2022-06-03 NOTE — PROGRESS NOTES
Hospitalist Progress Note   Admit Date:  2022 12:09 PM   Name:  Samantha Pires   Age:  80 y.o. Sex:  female  :  1950   MRN:  [id number]   Room:      Reason(s) for Admission: Hyperkalemia [E87.5]  Hypotension [I95.9]  Generalized weakness [R53.1]  ESRD on hemod[facility] [N18.6, Z99.2]  Hypotension, unspecified hypotension type [I95.9]     Hospital Course & Interval History:   Ms. Pires is a 72 y/o AAF with a h/o ESRD on HD (TTS), RCC, obesity, DM2 who was admitted to our service on  with hypotension. She has not been feeling well since her AVG surgery on  and noted progressive fatigue and malaise since then. She missed HD  due to feeling poorly.  She only had a partial run of HD on  due to hypotension.  She's had reduced PO intake. CXR w/o fluid overload. BP medications were held and started on midodrine. Dr. Florina Smith consulted for HD which she had done on . Vascular Surgery evaluated her L thigh given significant pain and felt the appearance was normal.    Subjective/24hr Events (22):  Up to edge of bed saying she wanted to get repositioned. S/p HD, said her BP dropped, feels tired, c/o SOB but on RA. L thigh still painful. No chest pain, N/V/D. Assessment & Plan:   # Hypotension              - Resolved. Likely BP meds with poor PO intake at home. Have d/c home BP medications and she remains on midodrine 10mg TID.    # L thigh pain              - In area of recent AVG placement on . Hb stable, afebrile. The area was evaluated by Vascular on  who felt her edema is normal post-op. Skin still fairly indurated, small amt of bruising, remains afebrile, will repeat CBC tomorrow.     # HyperK              - Resolved.     # HypoNa              - Resolved.     # Thrombocytopenia              - Stable, no bleeding.     # Renal cell carcinoma              - Noted.     # HTN              -  DC home meds with HoTN noted above.     # ESRD on HD              - MWF. Per Dr. Ely Arceo.     # DM2              - SSI     # Chronic hypoxemic respiratory failure              - 2L NC O2. No issues. Discharge Planning: Likely home this weekend. Diet:  ADULT DIET; Regular; 4 carb choices (60 gm/meal); Low Potassium (Less than 3000 mg/day);  Low Phosphorus (Less than 1000 mg)  DVT PPx:   Code status: Full Code    Hospital Problems           Last Modified POA    * (Principal) Hypotension 5/31/2022 Yes    Hyperkalemia 5/31/2022 Yes    Hyponatremia 5/31/2022 Yes    Thrombocytopenia (HCC) (Chronic) 5/31/2022 Yes    Generalized weakness 6/2/2022 Yes    Renal cell carcinoma (HCC) (Chronic) 5/31/2022 Yes    Severe obesity ([address] (Chronic) 5/31/2022 Yes    HTN (hypertension) (Chronic) 5/31/2022 Yes    ESRD (end stage renal disease) on dialysis [facility]) (Chronic) 5/31/2022 Yes    Type 2 diabetes mellitus with hyperglycemia (HCC) (Chronic) 5/31/2022 Yes    Chronic respiratory failure with hypoxia ([address] (Chronic) 5/31/2022 Yes                Objective:     Patient Vitals for the past 24 hrs:   Temp Pulse Resp BP SpO2   06/03/22 1124 98.9 °F (37.2 °C) 73 19 126/69 98 %   06/03/22 1059 98.2 °F (36.8 °C) 73 18 (!) 106/40 100 %   06/03/22 1002 -- 75 -- (!) 116/49 --   06/03/22 0930 -- 76 -- 123/65 --   06/03/22 0900 -- 63 -- (!) 98/52 --   06/03/22 0830 -- 63 -- (!) 108/44 --   06/03/22 0800 -- 70 -- (!) 110/50 --   06/03/22 0730 -- 67 -- (!) 99/56 --   06/03/22 0654 -- 67 18 (!) 94/54 100 %   06/03/22 0530 -- 64 -- (!) 102/51 --   06/03/22 0330 98.6 °F (37 °C) 66 19 (!) 108/53 98 %   06/03/22 0129 -- 71 -- (!) 98/54 --   06/02/22 2335 98 °F (36.7 °C) 75 18 (!) 87/49 100 %   06/02/22 2130 -- 75 -- (!) 86/49 --   06/02/22 1937 -- -- 18 -- --   06/02/22 1930 98.7 °F (37.1 °C) 76 18 (!) 85/42 99 %   06/02/22 1528 -- -- -- (!) 104/51 --   06/02/22 1508 98.6 °F (37 °C) 76 19 (!) 100/51 97 %   06/02/22 1341 -- -- 22 -- --       Estimated body mass index is 32.1 kg/m² as calculated from the following:    Height as of this encounter: 5' 4\" (1.626 m). Weight as of this encounter: 187 lb (84.8 kg). Intake/Output Summary (Last 24 hours) at 6/3/2022 1306  Last data filed at 6/3/2022 1059  Gross per 24 hour   Intake 800 ml   Output 2020 ml   Net -1220 ml         Physical Exam:   Blood pressure 126/69, pulse 73, temperature 98.9 °F (37.2 °C), temperature source Temporal, resp. rate 19, height 5' 4\" (1.626 m), weight 187 lb (84.8 kg), SpO2 98 %. General:    Well nourished. No overt distress. Obese. Head:  Normocephalic, atraumatic  Eyes:  Sclerae appear normal. Pupils equally round. ENT:  Nares appear normal, no drainage. Moist oral mucosa  Neck:  No restricted ROM. Trachea midline. CV:   RRR. No m/r/g. No jugular venous distension. Lungs:   CTAB. No wheezing, rhonchi, or rales. Respirations even, unlabored. Abdomen: Bowel sounds present. Soft, nontender, nondistended. Extremities: No cyanosis or clubbing. L mid thigh edema/induration, scant bruising. Skin:     No rashes and normal coloration. Warm and dry. Neuro:  CN II-XII grossly intact. Sensation intact. A&Ox3  Psych:  Normal mood and affect. I have reviewed ordered lab tests and independently visualized imaging below:    Recent Labs:  Recent Results (from the past 48 hour(s))   POCT Glucose    Collection Time: 06/01/22  3:46 PM   Result Value Ref Range    POC Glucose 124 (H) 65 - 100 mg/dL    Performed by:  Ta    POCT Glucose    Collection Time: 06/01/22  9:12 PM   Result Value Ref Range    POC Glucose 137 (H) 65 - 100 mg/dL    Performed by: [facility]    POCT Glucose    Collection Time: 06/02/22  6:11 AM   Result Value Ref Range    POC Glucose 139 (H) 65 - 100 mg/dL    Performed by: [facility]    Basic Metabolic Panel w/ Reflex to MG    Collection Time: 06/02/22  6:46 AM   Result Value Ref Range    Sodium 134 (L) 136 - 145 mmol/L    Potassium 3.6 3.5 - 5.1 mmol/L    Chloride 99 98 - 107 mmol/L    CO2 30 21 - 32 mmol/L    Anion Gap 5 (L) 7 - 16 mmol/L    Glucose 145 (H) 65 - 100 mg/dL    BUN 34 (H) 8 - 23 MG/DL    CREATININE 5.30 (H) 0.6 - 1.0 MG/DL    GFR African American 10 (L) >60 ml/min/1.73m2    GFR Non- 9 (L) >60 ml/min/1.73m2    Calcium 7.7 (L) 8.3 - 10.4 MG/DL   CBC with Auto Differential    Collection Time: 06/02/22  6:46 AM   Result Value Ref Range    WBC 6.5 4.3 - 11.1 K/uL    RBC 2.53 (L) 4.05 - 5.2 M/uL    Hemoglobin 8.5 (L) 11.7 - 15.4 g/dL    Hematocrit 23.7 (L) 35.8 - 46.3 %    MCV 93.7 79.6 - 97.8 FL    MCH 33.6 (H) 26.1 - 32.9 PG    MCHC 35.9 (H) 31.4 - 35.0 g/dL    RDW 16.5 (H) 11.9 - 14.6 %    Platelets 314 (L) 332 - 450 K/uL    MPV 10.6 9.4 - 12.3 FL    nRBC 0.02 0.0 - 0.2 K/uL    Differential Type AUTOMATED      Seg Neutrophils 69 43 - 78 %    Lymphocytes 11 (L) 13 - 44 %    Monocytes 16 (H) 4.0 - 12.0 %    Eosinophils % 2 0.5 - 7.8 %    Basophils 1 0.0 - 2.0 %    Immature Granulocytes 1 0.0 - 5.0 %    Segs Absolute 4.5 1.7 - 8.2 K/UL    Absolute Lymph # 0.7 0.5 - 4.6 K/UL    Absolute Mono # 1.1 0.1 - 1.3 K/UL    Absolute Eos # 0.1 0.0 - 0.8 K/UL    Basophils Absolute 0.1 0.0 - 0.2 K/UL    Absolute Immature Granulocyte 0.0 0.0 - 0.5 K/UL   POCT Glucose    Collection Time: 06/02/22 11:33 AM   Result Value Ref Range    POC Glucose 123 (H) 65 - 100 mg/dL    Performed by: Shawn    POCT Glucose    Collection Time: 06/02/22  3:07 PM   Result Value Ref Range    POC Glucose 144 (H) 65 - 100 mg/dL    Performed by: Shawn    POCT Glucose    Collection Time: 06/02/22  8:59 PM   Result Value Ref Range    POC Glucose 140 (H) 65 - 100 mg/dL    Performed by: Xochilt    POCT Glucose    Collection Time: 06/03/22  6:26 AM   Result Value Ref Range    POC Glucose 107 (H) 65 - 100 mg/dL    Performed by: Xochilt    POCT Glucose    Collection Time: 06/03/22 10:25 AM   Result Value Ref Range    POC Glucose 132 (H) 65 - 100 mg/dL    Performed by: Gavin Cody POCT Glucose    Collection Time: 06/03/22 11:26 AM   Result Value Ref Range    POC Glucose 101 (H) 65 - 100 mg/dL    Performed by: Shawn          Other Studies:  No results found.     Current Meds:  Current Facility-Administered Medications   Medication Dose Route Frequency    nystatin (MYCOSTATIN) powder   Topical BID    midodrine (PROAMATINE) tablet 10 mg  10 mg Oral Q8H    Epoetin Martin-epbx (RETACRIT) injection 20,000 Units  20,000 Units SubCUTAneous Q7 Days    HYDROcodone-acetaminophen (NORCO) 7.5-325 MG per tablet 1 tablet  1 tablet Oral Q4H PRN    oxyCODONE (ROXICODONE) immediate release tablet 10 mg  10 mg Oral Q4H PRN    insulin lispro (HUMALOG) injection vial 0-8 Units  0-8 Units SubCUTAneous TID WC    insulin lispro (HUMALOG) injection vial 0-4 Units  0-4 Units SubCUTAneous Nightly    glucose chewable tablet 16 g  4 tablet Oral PRN    dextrose bolus 10% 125 mL  125 mL IntraVENous PRN    Or    dextrose bolus 10% 250 mL  250 mL IntraVENous PRN    glucagon (rDNA) injection 1 mg  1 mg IntraMUSCular PRN    dextrose 5 % solution  100 mL/hr IntraVENous PRN    sodium chloride flush 0.9 % injection 5-40 mL  5-40 mL IntraVENous 2 times per day    sodium chloride flush 0.9 % injection 5-40 mL  5-40 mL IntraVENous PRN    0.9 % sodium chloride infusion   IntraVENous PRN    potassium chloride (KLOR-CON M) extended release tablet 40 mEq  40 mEq Oral PRN    Or    potassium bicarb-citric acid (EFFER-K) effervescent tablet 40 mEq  40 mEq Oral PRN    Or    potassium chloride 10 mEq/100 mL IVPB (Peripheral Line)  10 mEq IntraVENous PRN    potassium chloride 10 mEq/100 mL IVPB (Peripheral Line)  10 mEq IntraVENous PRN    magnesium sulfate 2000 mg in 50 mL IVPB premix  2,000 mg IntraVENous PRN    ondansetron (ZOFRAN-ODT) disintegrating tablet 4 mg  4 mg Oral Q8H PRN    Or    ondansetron (ZOFRAN) injection 4 mg  4 mg IntraVENous Q6H PRN    polyethylene glycol (GLYCOLAX) packet 17 g  17 g Oral Daily PRN    bisacodyl (DULCOLAX) suppository 10 mg  10 mg Rectal Daily PRN    famotidine (PEPCID) tablet 10 mg  10 mg Oral BID PRN    aluminum & magnesium hydroxide-simethicone (MAALOX) 200-200-20 MG/5ML suspension 30 mL  30 mL Oral Q6H PRN    acetaminophen (TYLENOL) tablet 650 mg  650 mg Oral Q6H PRN    Or    acetaminophen (TYLENOL) suppository 650 mg  650 mg Rectal Q6H PRN    sevelamer (RENVELA) tablet 800 mg  800 mg Oral TID WC    pantoprazole (PROTONIX) tablet 40 mg  40 mg Oral QAM AC    Nephro-Dipika TABS 1 tablet  1 tablet Oral Daily       Signed:  Corazon Lilly MD    Part of this note may have been written by using a voice dictation software. The note has been proof read but may still contain some grammatical/other typographical errors.

## 2022-06-04 PROBLEM — E87.1 HYPONATREMIA: Status: RESOLVED | Noted: 2022-01-01 | Resolved: 2022-01-01

## 2022-06-04 PROBLEM — I95.9 HYPOTENSION: Status: RESOLVED | Noted: 2022-01-01 | Resolved: 2022-01-01

## 2022-06-04 PROBLEM — E87.5 HYPERKALEMIA: Status: RESOLVED | Noted: 2022-01-01 | Resolved: 2022-01-01

## 2022-06-04 NOTE — PROGRESS NOTES
Main level with bedroom/bathroom  Home Access: Level entry  Bathroom Equipment: 3-in-1 commode  Home Equipment: Cane,Walker, rolling  Receives Help From: Family  ADL Assistance: Independent    OBJECTIVE:     PAIN: Veleria Caroli / O2: Syble Harsh / [place] / DRAINS:   Pre Treatment:   Pain Assessment: 0-10      Post Treatment: 0 Vitals        Oxygen      none    RESTRICTIONS/PRECAUTIONS:                    GROSS EVALUATION: Intact Impaired (Comments):   AROM [x]     PROM [x]    Strength [x]     Balance []   trunk sway   Posture [] Forward Head  Rounded Shoulders   Sensation [x]     Coordination [x]      Tone [x]     Edema [] B LE   Activity Tolerance [x]  decreased     []      COGNITION/  PERCEPTION: Intact Impaired (Comments):   Orientation [x]     Vision [x]     Hearing [x]     Cognition  [x]       MOBILITY: I Mod I S SBA CGA Min Mod Max Total  NT x2 Comments:   Bed Mobility    Rolling [] [] [] [] [] [] [] [] [] [x] []    Supine to Sit [] [] [] [] [] [] [] [] [] [x] []    Scooting [] [] [] [] [] [] [] [] [] [x] []    Sit to Supine [] [] [] [] [] [] [] [] [] [x] []    Transfers    Sit to Stand [] [] [] [] [x] [] [] [] [] [] []    Bed to Chair [] [] [] [] [] [] [] [] [] [x] []    Stand to Sit [] [] [] [] [x] [] [] [] [] [] []     [] [] [] [] [] [] [] [] [] [] []    I=Independent, Mod I=Modified Independent, S=Supervision, SBA=Standby Assistance, CGA=Contact Guard Assistance,   Min=Minimal Assistance, Mod=Moderate Assistance, Max=Maximal Assistance, Total=Total Assistance, NT=Not Tested    GAIT: I Mod I S SBA CGA Min Mod Max Total  NT x2 Comments:   Level of Assistance [] [] [] [] [x] [] [] [] [] [] []    Distance 25 feet    DME Gait Belt and Rolling Walker    Gait Quality Shuffling  and Trunk sway increased    Weightbearing Status      Stairs      I=Independent, Mod I=Modified Independent, S=Supervision, SBA=Standby Assistance, CGA=Contact Guard Assistance,   Min=Minimal Assistance, Mod=Moderate Assistance, Max=Maximal

## 2022-06-04 NOTE — PROGRESS NOTES
Hospitalist Progress Note   Admit Date:  2022 12:09 PM   Name:  Sofia Pires   Age:  80 y.o. Sex:  female  :  1950   MRN:  [id number]   Room:      Reason(s) for Admission: Hyperkalemia [E87.5]  Hypotension [I95.9]  Generalized weakness [R53.1]  ESRD on hemodialysis ([facility] [place] [address] [N18.6, Z99.2]  Hypotension, unspecified hypotension type [I95.9]     Hospital Course & Interval History:   Ms. Pires is a 72 y/o AAF with a h/o ESRD on HD (TTS), RCC, obesity, DM2 who was admitted to our service on  with hypotension. She has not been feeling well since her AVG surgery on  and noted progressive fatigue and malaise since then. She missed HD  due to feeling poorly.  She only had a partial run of HD on  due to hypotension.  She's had reduced PO intake. CXR w/o fluid overload. BP medications were held and started on midodrine. Dr. José Ybarra consulted for [address] [place] [address] she had done on . Vascular Surgery evaluated her L thigh given significant pain and felt the appearance was normal.    Subjective/24hr Events (22):  BP still intermittently low but overall it is improved. L thigh pain controlled. Hb is stable today. She is worried about her BP and prefers one more night for observation. No chest pain or SOB. Assessment & Plan:   # Hypotension              - Resolved. DC home BP medications and continue with midodrine 10mg TID.    # L thigh pain              - In area of recent AVG placement on . Hb stable, afebrile. The area was evaluated by Vascular on  who felt her edema is normal post-op. Afebrile, Hb is stable and pain is controlled.     # HyperK              - Resolved.     # HypoNa              - Resolved.     # Thrombocytopenia              - Stable, no bleeding.     # Renal cell carcinoma              - Noted.     # HTN              -  DC home meds with HoTN noted above.     # ESRD on HD              - MWF.  Per Dr. José Ybarra.     # DM2              - SSI     # Chronic hypoxemic respiratory failure              - 2L NC O2. Discharge Planning: Home tomorrow. She will call her family today to notify them of anticipated discharge tomorrow. Diet:  ADULT DIET; Regular; 4 carb choices (60 gm/meal); Low Potassium (Less than 3000 mg/day); Low Phosphorus (Less than 1000 mg)  DVT PPx: SCDs  Code status: Full Code    Hospital Problems           Last Modified POA    Thrombocytopenia (HCC) (Chronic) 6/4/2022 Yes    Generalized weakness 6/4/2022 Yes    Renal cell carcinoma (HCC) (Chronic) 6/4/2022 Yes    Severe obesity ([address] (Chronic) 6/4/2022 Yes    HTN (hypertension) (Chronic) 6/4/2022 Yes    ESRD (end stage renal disease) on dialysis [facility]) (Chronic) 6/4/2022 Yes    Type 2 diabetes mellitus with hyperglycemia ([facility] [address] (Chronic) 6/4/2022 Yes    Chronic respiratory failure with hypoxia ([facility] [address] (Chronic) 6/4/2022 Yes                Objective:     Patient Vitals for the past 24 hrs:   Temp Pulse Resp BP SpO2   06/04/22 0808 -- -- 16 -- --   06/04/22 0748 98.7 °F (37.1 °C) 65 14 (!) 110/54 99 %   06/04/22 0300 98.4 °F (36.9 °C) 62 16 (!) 99/47 94 %   06/03/22 2215 98 °F (36.7 °C) 71 18 (!) 100/48 93 %   06/03/22 1926 97.7 °F (36.5 °C) 74 18 (!) 81/51 98 %   06/03/22 1656 -- -- 19 -- --   06/03/22 1456 98.4 °F (36.9 °C) 77 19 (!) 110/48 95 %   06/03/22 1124 98.9 °F (37.2 °C) 73 19 126/69 98 %   06/03/22 1059 98.2 °F (36.8 °C) 73 18 (!) 106/40 100 %       Estimated body mass index is 34.55 kg/m² as calculated from the following:    Height as of this encounter: 5' 4\" (1.626 m). Weight as of this encounter: 201 lb 4.8 oz (91.3 kg). Intake/Output Summary (Last 24 hours) at 6/4/2022 1033  Last data filed at 6/4/2022 7719  Gross per 24 hour   Intake 1225 ml   Output 2000 ml   Net -775 ml         Physical Exam:   Blood pressure (!) 110/54, pulse 65, temperature 98.7 °F (37.1 °C), temperature source Oral, resp. rate 16, height 5' 4\" (1.626 m), weight 201 lb 4.8 oz (91.3 kg), SpO2 99 %.   General: Well nourished. No overt distress. Obese. Head:  Normocephalic, atraumatic  Eyes:  Sclerae appear normal. Pupils equally round. ENT:  Nares appear normal, no drainage. Moist oral mucosa  Neck:  No restricted ROM. Trachea midline. CV:   RRR. No m/r/g. No jugular venous distension. Lungs:   CTAB. No wheezing, rhonchi, or rales. Respirations even, unlabored. 2L NC O2. Abdomen: Bowel sounds present. Soft, nontender, nondistended. Extremities: No cyanosis or clubbing. L thigh surgical area with indurated skin, incisions appear to be healing well. Skin:     No rashes and normal coloration. Warm and dry. Neuro:  CN II-XII grossly intact. Sensation intact. A&Ox3  Psych:  Normal mood and affect.       I have reviewed ordered lab tests and independently visualized imaging below:    Recent Labs:  Recent Results (from the past 48 hour(s))   POCT Glucose    Collection Time: 06/02/22 11:33 AM   Result Value Ref Range    POC Glucose 123 (H) 65 - 100 mg/dL    Performed by: [employer]    POCT Glucose    Collection Time: 06/02/22  3:07 PM   Result Value Ref Range    POC Glucose 144 (H) 65 - 100 mg/dL    Performed by: [employer]    POCT Glucose    Collection Time: 06/02/22  8:59 PM   Result Value Ref Range    POC Glucose 140 (H) 65 - 100 mg/dL    Performed by: [employer]    POCT Glucose    Collection Time: 06/03/22  6:26 AM   Result Value Ref Range    POC Glucose 107 (H) 65 - 100 mg/dL    Performed by: [employer]    POCT Glucose    Collection Time: 06/03/22 10:25 AM   Result Value Ref Range    POC Glucose 132 (H) 65 - 100 mg/dL    Performed by: Demetra    POCT Glucose    Collection Time: 06/03/22 11:26 AM   Result Value Ref Range    POC Glucose 101 (H) 65 - 100 mg/dL    Performed by: [employer]    CBC with Auto Differential    Collection Time: 06/03/22  2:29 PM   Result Value Ref Range    WBC 7.1 4.3 - 11.1 K/uL    RBC 2.67 (L) 4.05 - 5.2 M/uL    Hemoglobin 9.0 (L) 11.7 - 15.4 g/dL    Hematocrit 25.1 (L) 35.8 - 46.3 %    MCV 94.0 79.6 - 97.8 FL    MCH 33.7 (H) 26.1 - 32.9 PG    MCHC 35.9 (H) 31.4 - 35.0 g/dL    RDW 17.0 (H) 11.9 - 14.6 %    Platelets 065 (L) 878 - 450 K/uL    MPV 10.3 9.4 - 12.3 FL    nRBC 0.00 0.0 - 0.2 K/uL    Differential Type AUTOMATED      Seg Neutrophils 76 43 - 78 %    Lymphocytes 9 (L) 13 - 44 %    Monocytes 12 4.0 - 12.0 %    Eosinophils % 2 0.5 - 7.8 %    Basophils 1 0.0 - 2.0 %    Immature Granulocytes 0 0.0 - 5.0 %    Segs Absolute 5.4 1.7 - 8.2 K/UL    Absolute Lymph # 0.6 0.5 - 4.6 K/UL    Absolute Mono # 0.9 0.1 - 1.3 K/UL    Absolute Eos # 0.1 0.0 - 0.8 K/UL    Basophils Absolute 0.1 0.0 - 0.2 K/UL    Absolute Immature Granulocyte 0.0 0.0 - 0.5 K/UL   COLLECTION COMMENT    Collection Time: 06/03/22  2:29 PM   Result Value Ref Range    Comment: PATIENT ON DIALYSIS    POCT Glucose    Collection Time: 06/03/22  2:59 PM   Result Value Ref Range    POC Glucose 121 (H) 65 - 100 mg/dL    Performed by: RadhaCT    POCT Glucose    Collection Time: 06/03/22  8:42 PM   Result Value Ref Range    POC Glucose 130 (H) 65 - 100 mg/dL    Performed by: BalajiCT    POCT Glucose    Collection Time: 06/04/22  6:32 AM   Result Value Ref Range    POC Glucose 109 (H) 65 - 100 mg/dL    Performed by: Saroj          Other Studies:  No results found.     Current Meds:  Current Facility-Administered Medications   Medication Dose Route Frequency    nystatin (MYCOSTATIN) powder   Topical BID    midodrine (PROAMATINE) tablet 10 mg  10 mg Oral Q8H    Epoetin Martin-epbx (RETACRIT) injection 20,000 Units  20,000 Units SubCUTAneous Q7 Days    HYDROcodone-acetaminophen (NORCO) 7.5-325 MG per tablet 1 tablet  1 tablet Oral Q4H PRN    oxyCODONE (ROXICODONE) immediate release tablet 10 mg  10 mg Oral Q4H PRN    insulin lispro (HUMALOG) injection vial 0-8 Units  0-8 Units SubCUTAneous TID WC    insulin lispro (HUMALOG) injection vial 0-4 Units  0-4 Units SubCUTAneous Nightly    glucose chewable tablet 16 g  4 tablet Oral PRN    dextrose bolus 10% 125 mL  125 mL IntraVENous PRN    Or    dextrose bolus 10% 250 mL  250 mL IntraVENous PRN    glucagon (rDNA) injection 1 mg  1 mg IntraMUSCular PRN    dextrose 5 % solution  100 mL/hr IntraVENous PRN    sodium chloride flush 0.9 % injection 5-40 mL  5-40 mL IntraVENous 2 times per day    sodium chloride flush 0.9 % injection 5-40 mL  5-40 mL IntraVENous PRN    0.9 % sodium chloride infusion   IntraVENous PRN    potassium chloride (KLOR-CON M) extended release tablet 40 mEq  40 mEq Oral PRN    Or    potassium bicarb-citric acid (EFFER-K) effervescent tablet 40 mEq  40 mEq Oral PRN    Or    potassium chloride 10 mEq/100 mL IVPB (Peripheral Line)  10 mEq IntraVENous PRN    potassium chloride 10 mEq/100 mL IVPB (Peripheral Line)  10 mEq IntraVENous PRN    magnesium sulfate 2000 mg in 50 mL IVPB premix  2,000 mg IntraVENous PRN    ondansetron (ZOFRAN-ODT) disintegrating tablet 4 mg  4 mg Oral Q8H PRN    Or    ondansetron (ZOFRAN) injection 4 mg  4 mg IntraVENous Q6H PRN    polyethylene glycol (GLYCOLAX) packet 17 g  17 g Oral Daily PRN    bisacodyl (DULCOLAX) suppository 10 mg  10 mg Rectal Daily PRN    famotidine (PEPCID) tablet 10 mg  10 mg Oral BID PRN    aluminum & magnesium hydroxide-simethicone (MAALOX) 200-200-20 MG/5ML suspension 30 mL  30 mL Oral Q6H PRN    acetaminophen (TYLENOL) tablet 650 mg  650 mg Oral Q6H PRN    Or    acetaminophen (TYLENOL) suppository 650 mg  650 mg Rectal Q6H PRN    sevelamer (RENVELA) tablet 800 mg  800 mg Oral TID WC    pantoprazole (PROTONIX) tablet 40 mg  40 mg Oral QAM AC    Nephro-Dipika TABS 1 tablet  1 tablet Oral Daily       Signed:  Maya Delgado MD    Part of this note may have been written by using a voice dictation software. The note has been proof read but may still contain some grammatical/other typographical errors.

## 2022-06-04 NOTE — PROGRESS NOTES
[facility]  Nephrology consult    Admission Date:  5/31/2022    Admission Diagnosis  Hyperkalemia [E87.5]  Hypotension [I95.9]  Generalized weakness [R53.1]  ESRD on hemodialysis ([address] [N18.6, Z99.2]  Hypotension, unspecified hypotension type [I95.9]    We were consulted by Dr Lyly Hart for management of ESRD    History of Present Illness: 79 y/o AA female, followed by our office for ESRD, on HD M-W-F at [facility]. She has been running via RIJ perm cath since LUE AVF became non-functioning. Left thigh AV graft placed on 5/26/22 by Dr Silvia Yuan. She missed HD last Friday due to feeling bad. Her make up day was that following Saturday, she ran her entire treatment time, was hypotensive, unable to remove fluid. She was told then to stop all antihypertensive medications. She ran HD yesterday, her entire treatment time, was hypotensive, and once again unable to remove fluid. She left dialysis with 4L over her EDW. PMH also consist of DM II, Hyperphosphatemia, anemia, renal cell carcinoma s/p left nephrectomy, followed by Pulmonary for lung mass, and HTN. She is on home O2 at 2L NC as needed. She presented to the ER via EMS for hypotension and generalized \"feeling bad\". At time of assessment, she is on her home dose O2, c/o dyspnea, and is anxious and tearful. She denies CP, no n/v, no HA or dizziness. She denies fever, chills, no cough, and no diarrhea. CXR is negative. There is no urgent dialysis needs at this time. Plan for HD tomorrow. Midodrine ordered for hypotension.    6/1/22 - seen on HD and tolerating well so far - HD needed for maintenance therapy - low BP due to recent leg loop graft placement resulting in altered hemodynamics - she reports much pain - treat with Norco 7.5 q 4 prn as BP is improved now  6/2/22 - still c/o pain, but Norco helps some- plan HD tomorrow - BP and clinically improving - no N/V, no CP, no dyspnea - plans for HD tomorrow discussed  6/3/22 - she tolerated HD fairly 2/28/2019    IR THRMB/INFUSION DIAYSIS CIRCUIT Left 2021    IR TUNNELED CATHETER PLACEMENT GREATER THAN 5 YEARS  3/15/2022    IR TUNNELED CATHETER PLACEMENT GREATER THAN 5 YEARS  11/19/2021    IR TUNNELED CATHETER PLACEMENT GREATER THAN 5 YEARS  11/19/2021    IR TUNNELED CATHETER PLACEMENT GREATER THAN 5 YEARS 11/19/2021 [facility]    IR TUNNELED CATHETER PLACEMENT GREATER THAN 5 YEARS  3/15/2022    IR TUNNELED CATHETER PLACEMENT GREATER THAN 5 YEARS 3/15/2022 [facility]    OTHER SURGICAL HISTORY      dialysis fistula, several permcaths    UROLOGICAL SURGERY Left July 2015    nephrectomy    VASCULAR SURGERY Left 04/07/2022    LEFT ARM AV GRAFT    VASCULAR SURGERY Left 03/15/2022    declot    VASCULAR SURGERY Left 02/21/2022    Open thromboembolectomy of left upper arm graft.     VASCULAR SURGERY Right     AV graft- states no longer uses    VASCULAR SURGERY Left 5/26/2022    LEFT AV GRAFT THIGH performed by Dante Hobson MD at [facility] MAIN OR      Current Facility-Administered Medications   Medication Dose Route Frequency    nystatin (MYCOSTATIN) powder   Topical BID    midodrine (PROAMATINE) tablet 10 mg  10 mg Oral Q8H    Epoetin Martin-epbx (RETACRIT) injection 20,000 Units  20,000 Units SubCUTAneous Q7 Days    HYDROcodone-acetaminophen (NORCO) 7.5-325 MG per tablet 1 tablet  1 tablet Oral Q4H PRN    oxyCODONE (ROXICODONE) immediate release tablet 10 mg  10 mg Oral Q4H PRN    insulin lispro (HUMALOG) injection vial 0-8 Units  0-8 Units SubCUTAneous TID WC    insulin lispro (HUMALOG) injection vial 0-4 Units  0-4 Units SubCUTAneous Nightly    glucose chewable tablet 16 g  4 tablet Oral PRN    dextrose bolus 10% 125 mL  125 mL IntraVENous PRN    Or    dextrose bolus 10% 250 mL  250 mL IntraVENous PRN    glucagon (rDNA) injection 1 mg  1 mg IntraMUSCular PRN    dextrose 5 % solution  100 mL/hr IntraVENous PRN    sodium chloride flush 0.9 % injection 5-40 mL  5-40 mL Delirium Neg Hx     Other Neg Hx     Malig Hypertherm Neg Hx         Review of Systems  Gen - no fever, no chills, appetite okay  HEENT - no sore throat, no decreased vision, no hearing loss  Neck - no neck mass  CV - no chest pain, no palpitation, no orthopnea  Lung - mild shortness of breath, no cough, no hemoptysis  Abd - no tenderness, no nausea/vomiting, no bloody stool  Ext - mild edema, no clubbing, no cyanosis  Musculoskeletal - no joint pain, no back pain  Neurologic - no headaches, no dizziness, no seizures  Psychiatric - mild anxiety, no depression  Skin - no rashes, no pupura  Genitourinary - no decreased urine output, no hematuria, no foamy urine    Objective:     Vitals:    06/03/22 2215 06/04/22 0300 06/04/22 0748 06/04/22 0808   BP: (!) 100/48 (!) 99/47 (!) 110/54    Pulse: 71 62 65    Resp: 18 16 14 16   Temp: 98 °F (36.7 °C) 98.4 °F (36.9 °C) 98.7 °F (37.1 °C)    TempSrc: Oral Oral Oral    SpO2: 93% 94% 99%    Weight:  201 lb 4.8 oz (91.3 kg)     Height:           Intake/Output Summary (Last 24 hours) at 6/4/2022 1036  Last data filed at 6/4/2022 4659  Gross per 24 hour   Intake 1225 ml   Output 2000 ml   Net -775 ml       Physical Exam  GEN :no distress, alert and oriented  HEENT: anicteric sclerae, eomi. Oropharynx without lesions. Mucous membranes are moist.  Neck - supple without JVD, no thyromegaly. No lymphadenopathy. CV - regular rate and rhythm, no murmur, no rub  Lung - clear bilaterally, lungs expand symmetrically  Chest wall - normal appearance, RIJ perm cath  Abd - soft, nontender, bowel sounds present, no hepatosplenomegaly  Ext - no clubbing, no cyanosis, trace edema left leg, left thigh AV graph  Neurologic - nonfocal, mild anxiety  Skin - no rashes, no purpura, no ecchymoses  Psychiatric: Normal mood and affect.       Data Review:   Recent Labs     06/02/22  0646 06/03/22  1429   WBC 6.5 7.1   HGB 8.5* 9.0*   HCT 23.7* 25.1*   * 145*     Recent Labs     06/02/22  0646 *   K 3.6   CL 99   CO2 30   BUN 34*     No results for input(s): PH, PCO2, PO2, PCO2 in the last 72 hours. CXR:   Impression   Stable cardiomegaly without acute process. No pleural effusion or pneumothorax             Problem List:     Patient Active Problem List    Diagnosis Date Noted    Generalized weakness     Thrombocytopenia ([address] 05/31/2022    Chronic respiratory failure with hypoxia ([address] 11/12/2021    Severe obesity ([address] 05/04/2018    Type 2 diabetes mellitus with nephropathy ([address] 12/15/2017    Renal cell carcinoma ([address] 09/12/2017    AVF (arteriovenous fistula) ([facility] [address] 12/20/2016    Osteoarthritis of right knee 02/07/2013    HTN (hypertension) 02/07/2013    ESRD (end stage renal disease) on dialysis ([facility] [address] 02/07/2013    Type 2 diabetes mellitus with hyperglycemia ([facility] [address] 02/07/2013    Total knee replacement status 02/07/2013       Impression:    Plan:     1. ESRD- on HD M-W-F, plan HD Monday for biochemical and volume control      2. Anemia- likely related to ESRD, near goal for CKD, continue Nephrovite, no urgent needs at this time - treated with LAKISHA    3. Hypotension- with a hx of HTN, antihypertensives were stopped Friday last week. New left thigh AV graph could be playing a part. CXR negative, on baseline home O2, improving off antihypertensive and with Midodrine 10 mg TID    4. Hyperkalemia- likley related to ESRD,   improved with HD      5. Hypocalcemia- with low albumin, continue vitamin D    6. HTN- hx of, off antihypertensives    7. Renal cell carcinoma- followed by Oncology    8. Pain - monitor response to Norco     8. DM II- on SSI, managed by primary    9.  Hyperphosphatemia- hx of, on Renvela, continue

## 2022-06-05 NOTE — DISCHARGE SUMMARY
Hospitalist Discharge Summary   Admit Date:  2022 12:09 PM   DC Note date: 2022  Name:  Viktoria Rojo   Age:  80 y.o. Sex:  female  :  1950   MRN:  [id number]   Room:    PCP:  Blanca Diaz MD    Presenting Complaint: Fatigue     Initial Admission Diagnosis: Hyperkalemia [E87.5]  Hypotension [I95.9]  Generalized weakness [R53.1]  ESRD on hemodialysis ([facility] [address] [N18.6, Z99.2]  Hypotension, unspecified hypotension type [I95.9]     Problem List for this Hospitalization (present on admission):    Principal Problem (Resolved):    Hypotension  Active Problems: Thrombocytopenia (HCC)    Generalized weakness    Renal cell carcinoma (HCC)    Severe obesity (HCC)    HTN (hypertension)    ESRD (end stage renal disease) on dialysis ([facility] [address]    Type 2 diabetes mellitus with hyperglycemia (HCC)    Chronic respiratory failure with hypoxia (HCC)  Resolved Problems:    Hyperkalemia    Hyponatremia    Did Patient have Sepsis (YES OR NO): No    Hospital Course:  Ms. Pires is a 72 y/o AAF with a h/o ESRD on HD (TTS), RCC, obesity, DM2 who was admitted to our service on  with hypotension. She has not been feeling well since her AVG surgery on  and noted progressive fatigue and malaise since then. She missed HD  due to feeling poorly.  She only had a partial run of HD on  due to hypotension.  She's had reduced PO intake. CXR did not show any fluid overload. BP medications were held and she was started on midodrine. Dr. Brock Ahn was consulted for HD. Vascular Surgery evaluated her L thigh given significant pain and felt the appearance was normal, and they will follow up with her in the office for routine post-op follow up. Her Hb has remained stable and she is afebrile. Her BP has improved and hypotension resolved with midodrine. She will resume home health.  I spoke to her daughter over the phone this morning and gave her an update on stopping BP medications, continuing midodrine and need for Vascular follow up. Her hospital course was otherwise unremarkable and she is medically stable for discharge home today. Disposition: Home with family  Diet: ADULT DIET; Regular; 4 carb choices (60 gm/meal); Low Potassium (Less than 3000 mg/day); Low Phosphorus (Less than 1000 mg)  Code Status: Full Code    Follow Ups:   Follow-up Information     [facility] Specialty: Home Health Care  Contact information:  [address] [facility] [address]                     Follow up labs/diagnostics (ultimately defer to outpatient provider):  N/A    Time spent in patient discharge and coordination 35 minutes. Plan was discussed with patient and daughter, RN. All questions answered. Patient was stable at time of discharge. Instructions given to call a physician or return if any concerns. Current Discharge Medication List      START taking these medications    Details   HYDROcodone-acetaminophen (NORCO) 7.5-325 MG per tablet Take 1 tablet by mouth every 4 hours as needed for Pain for up to 3 days.   Qty: 18 tablet, Refills: 0    Comments: Reduce doses taken as pain becomes manageable  Associated Diagnoses: Primary osteoarthritis of right knee      midodrine (PROAMATINE) 10 MG tablet Take 1 tablet by mouth every 8 hours  Qty: 90 tablet, Refills: 1         CONTINUE these medications which have NOT CHANGED    Details   acetaminophen (TYLENOL) 500 MG tablet Take 500 mg by mouth every 6 hours as needed for Pain      omeprazole (PRILOSEC) 40 MG delayed release capsule Take 40 mg by mouth at bedtime      OXYGEN Inhale into the lungs 2 LITERS AS NEEDED FOR SOB      SITagliptin (JANUVIA) 50 MG tablet Take 50 mg by mouth daily      sevelamer (RENVELA) 800 MG tablet Take 1 tablet by mouth 3 times daily (with meals) PT TAKES 5 TABS WITH MEALS-- AND 2 TABS WITH SNACKS         STOP taking these medications       amLODIPine (NORVASC) 10 MG tablet Comments:   Reason for Stopping: lisinopril (PRINIVIL;ZESTRIL) 40 MG tablet Comments:   Reason for Stopping:               Procedures done this admission:  * No surgery found *    Consults this admission:  IP CONSULT TO NEPHROLOGY  IP Pato HENRY/ [facility]    Echocardiogram results:  No results found for this or any previous visit. Diagnostic Imaging/Tests:   XR CHEST PORTABLE    Result Date: 5/31/2022  EXAM: XR CHEST PORTABLE INDICATION: fatigue COMPARISON: Chest radiograph, 11/22/2021 FINDINGS: The cardiomediastinal silhouette is enlarged but stable. Right-sided hemodialysis catheter terminates in the right atrium. No pleural effusion or pneumothorax. No focal parenchymal process. Vascular stent in the right subclavian region and bilateral brachial regions. No acute osseous abnormality. Stable cardiomegaly without acute process. Labs: Results:       BMP, Mg, Phos No results for input(s): NA, K, CL, CO2, AGAP, BUN, CREA, CA, GLU, MG, PHOS in the last 72 hours. CBC Recent Labs     06/03/22  1429   WBC 7.1   RBC 2.67*   HGB 9.0*   HCT 25.1*   *      LFT No results for input(s): ALT, TP, ALB, GLOB in the last 72 hours.     Invalid input(s): SGOT, TBIL, AP, AGRAT, GPT   Cardiac Testing No results found for: BNP, CPK, RCK1, CKMB   Coagulation Tests No results found for: INR, APTT   A1c No results found for: HBA1C   Lipid Panel No results found for: CHOL, CHOLPOCT, CHOLX, CHLST, CHOLV, 492598, HDL, HDLC, LDL, LDLC, 523609, VLDLC, VLDL, TGLX, TRIGL   Thyroid Panel No results found for: TSH, T4, FT4     Most Recent UA No results found for: MUCUS, UCOM       All Labs from Last 24 Hrs:  Recent Results (from the past 24 hour(s))   POCT Glucose    Collection Time: 06/04/22 11:36 AM   Result Value Ref Range    POC Glucose 147 (H) 65 - 100 mg/dL    Performed by: Juan Alberto    POCT Glucose    Collection Time: 06/04/22  4:02 PM   Result Value Ref Range    POC Glucose 124 (H) 65 - 100 mg/dL    Performed by: Pupils equally round. HENT:  Nares appear normal, no drainage. Moist mucous membranes  Neck:  No restricted ROM. Trachea midline  CV:   RRR. No m/r/g. No JVD  Lungs:   CTAB. No wheezing, rhonchi, or rales. Even, unlabored  Abdomen:   Soft, nontender, nondistended. Extremities: Warm and dry. No cyanosis or clubbing. Surgical incisions healing well, L anterior thigh skin indurated, no drainage or fluctuance/crepitus. Skin:     No rashes. Normal coloration  Neuro:  CN II-XII grossly intact. Psych:  Normal mood and affect. Signed:  Nahum Tripathi MD    Part of this note may have been written by using a voice dictation software. The note has been proof read but may still contain some grammatical/other typographical errors.

## 2022-06-05 NOTE — CARE COORDINATION
Pt is medically cleared for dc to home today. Referral submitted to [facility] [place] RN services at dc. CM spoke with [facility] to alert them to the referral and pt's dc to home today. No other dc needs or concerns identified or reported. CM remains available to assist as needed. 06/05/22 2330   Service Assessment   Can patient return to prior living arrangement Yes   Ability to make needs known: Good   Family able to assist with home care needs: Yes   Social/Functional History   Lives With Family   Type of [address] Two level; Able to Live on Main level with bedroom/bathroom   Home Access Level entry   [facility] [address];Walker, rolling   Receives Help From Family   ADL Assistance Independent   Occupation Retired   Discharge Planning   Current Services Prior To Admission Art HERNANDEZ Ordered? No   Potential Assistance Purchasing Medications No   Type of [address] At/After Discharge   Transition of Care Consult (CM Consult) [address] Provided? No   Mode of Transport at Discharge Other (see comment)  (family)   Confirm Follow Up Transport Family   Condition of Participation: Discharge Planning   The Plan for Transition of Care is related to the following treatment goals: Resume home health nursing services   The Patient and/or Patient Representative was provided with a Choice of Provider? Patient;Patient Representative   The Patient and/Or Patient Representative agree with the Discharge Plan? Yes   Freedom of Choice list was provided with basic dialogue that supports the patient's individualized plan of care/goals, treatment preferences, and shares the quality data associated with the providers?   No  (pt/family wishes to remain with current [place] agency)

## 2022-06-05 NOTE — PROGRESS NOTES
Good visit with the patient this morning   she was awake and alert and very friendly   thankful for our support

## 2022-06-06 NOTE — CARE COORDINATION
Care Transitions Outreach Attempt    Call within 2 business days of discharge: Yes   Attempted to reach patient for transitions of care follow up. Unable to reach patient. Patient: Moose Pires Patient : 1950 MRN: [id number]    Last Discharge [facility]       Complaint Diagnosis Description Type Department Provider    22 Fatigue Generalized weakness . .. ED to Hosp-Admission (Discharged) (ADMITTED) [id number] Sue Figueroa MD; Kevin Hurst .. Was this an external facility discharge?  No Discharge Facility: [facility]    Noted following upcoming appointments from discharge chart review:   [facility] follow up appointment(s):   Future Appointments   Date Time Provider Alea Lloyd   2022 To Be Determined CECIL Mueller   2022  8:45 AM Sadie Leahy MD [facility]   2022 To Be Determined CECIL Mueller   2022 To Be Determined CECIL Mueller   2022 To Be Determined CECIL Mueller   2022 To Be Determined CECIL Mueller   2022  1:29 AM [facility] CT [address] [facility]   11/15/2022  2:10 PM Martina [address] [place] [address]   11/15/2022  2:30 PM Brook Gomez MD [facility]     Non-BS follow up appointment(s): Dialysis-M,W, F

## 2022-06-14 PROBLEM — N18.4 CKD (CHRONIC KIDNEY DISEASE) STAGE 4, GFR 15-29 ML/MIN (HCC): Status: ACTIVE | Noted: 2022-01-01

## 2022-06-14 NOTE — PATIENT INSTRUCTIONS
Patient Education        Hemodialysis Vascular Access: Care Instructions  Overview  Hemodialysis, or dialysis, is the use of a machine to remove wastes from your blood. You need it if your kidneys are not able to remove wastes on their own. A dialysis access is the place in your arm, or sometimes in your leg, where adoctor creates a blood vessel that carries a large flow of blood. Your doctor creates an access during a minor surgery. You need to take care ofyour access to keep it working and to prevent infection. When you have dialysis, two needles are placed in this blood vessel and are connected to the dialysis machine. Your blood flows out of one needle and into the machine to be cleaned. Then your cleaned blood flows back into your body through the other needle. Sometimes a doctor makes a short-term access througha tube, called a catheter, placed in your neck, upper chest, or groin. Follow-up care is a key part of your treatment and safety. Be sure to make and go to all appointments, and call your doctor if you are having problems. It's also a good idea to know your test results and keep alist of the medicines you take. How can you care for yourself at home?  After your doctor creates an access, keep it dry for at least 2 days.  Squeeze a soft ball or other object as instructed after the access is placed. This will help blood flow through the access and help prevent blood clots.  After you have dialysis, check to see if the access bleeds or swells. Let your doctor know if your arm bleeds or swells.  Do not lift anything heavy with the arm that has the access.  Do not bump your arm.  Don't wear tight clothing or jewelry over the access.  Don't sleep with your access arm under your body.  Have blood drawn or blood pressure taken from your other arm.  Keep the access clean and dry.  Don't put cream or lotion on or near the access. When should you call for help?    Call your doctor now or seek immediate medical care if:     You have signs of infection, such as:  ? Increased pain, swelling, warmth, or redness around the access. ? Red streaks leading from the access. ? Pus draining from the access. ? A fever.      You do not feel a pulse in your access. Watch closely for changes in your health, and be sure to contact your doctor if:     You do not get better as expected. Where can you learn more? Go to [email or web address] org and sign in to your [facility] account. Enter L169 in the Particle Code box to learn more about \"Hemodialysis Vascular Access: Care Instructions. \"     If you do not have an account, please click on the \"Sign Up Now\" link. Current as of: September 8, 2021               Content Version: 13.2  © 2358-0274 [employer]. Care instructions adapted under license by [facility] ([facility]). If you have questions about a medical condition or this instruction, always ask your healthcare professional. [place] [address] any warranty or liability for your use of this information.

## 2022-06-14 NOTE — PROGRESS NOTES
[address]   [address] [facility] FAX: [phone]    Anjel Pires  : 1950    Chief Complaint:     History of Present Illness:   Patient follows up today for follow-up status post left thigh graft placement for dialysis access. She was seen in the hospital postop which you admitted for cardiac issues. CURRENT MEDICATIONS:  Current Outpatient Medications   Medication Sig Dispense Refill    oxyCODONE-acetaminophen (PERCOCET) 5-325 MG per tablet Take 1 tablet by mouth every 6 hours as needed for Pain for up to 7 days. Intended supply: 3 days. Take lowest dose possible to manage pain 30 tablet 0    naloxone 4 MG/0.1ML LIQD nasal spray 1 spray by Nasal route as needed for Opioid Reversal.      midodrine (PROAMATINE) 10 MG tablet Take 1 tablet by mouth every 8 hours 90 tablet 1    acetaminophen (TYLENOL) 500 MG tablet Take 500 mg by mouth every 6 hours as needed for Pain      omeprazole (PRILOSEC) 40 MG delayed release capsule Take 40 mg by mouth at bedtime      OXYGEN Inhale into the lungs 2 LITERS AS NEEDED FOR SOB      SITagliptin (JANUVIA) 50 MG tablet Take 50 mg by mouth daily      sevelamer (RENVELA) 800 MG tablet Take 4 tablets by mouth 3 times daily (with meals) PT TAKES 4 TABS WITH MEALS-- AND 1 TABS WITH SNACKS        No current facility-administered medications for this visit. Past Medical History:   Diagnosis Date    Anemia     Arthritis     AVF [facility]arteriovenous fistula) ([facility] [address]     left    AVF (arteriovenous fistula) ([facility] [address] 2016 (S) Right AVF revision and thrombectomy    Degenerative joint disease     Diabetes ([address]     type 2--- does not check sqbs at home    Enlarged lymph node     \"enlarging paratracheal node to 2.6 cm on CT chest\"    ESRD on hemodialysis ([address] onset     ESRD.  F dialysis at [place] dialysis    Hypertension     controlled with med    Mediastinal mass     being followed by dr Jade Hair pt-- states told by dr Santiago Aceves it wasnt cancer-- but plans to be rescanned 5/2022    Obesity     On home O2     2L QHS and PRN during day d/t \"lymph node in chest\"    Renal cancer ([facility] [address]     Dr Aurea Barcenas - renal cancer L nephrectomy---and radiation ---    Shortness of breath     swollen lymphnode in chest-- oxygen 2LPM    Transient ischemic attack 08/29/2015    no residual       Physical Examination:   BP: 103/69    Constitutional: she appears well-developed. No distress. HENT:   Head: Atraumatic. Eyes: Pupils are equal, round, and reactive to light. Neck: Normal range of motion. Cardiovascular: Regular rhythm. Pulmonary/Chest: Effort normal and breath sounds normal. No respiratory distress. Abdominal: Soft. Bowel sounds are normal. she exhibits no distension. There is no tenderness. There is no guarding. No hernia. Musculoskeletal: Normal range of motion. Neurological: She is alert. CN II- XII grossly intact   Vascular: Cameron over surgical site good Doppler signals    Imaging:    Duplex study showed graft is patent with postop hematoma measuring 10 x 3 cm surrounding course of the graft no evidence of any pseudoaneurysm      Recommendations/Plans:   Ms. Jesusita Bateman is a 80y.o. year old female end-stage renal disease status post left thigh graft placement with a large seroma. Would recommend just ice packs. We will give patient pain medication to follow-up in 1 month to reevaluate. No indication for any surgical drainage at this point in time. Shanna Stone MD    Elements of this note have been dictated using speech recognition software. As a result, errors of speech recognition may have occurred.     20 minutes of time was spent on this encounter including chart review, assessment, evaluation and coordination of patient care

## 2022-06-25 PROBLEM — M54.9 BACK PAIN: Status: ACTIVE | Noted: 2022-01-01

## 2022-06-25 NOTE — ED NOTES
TRANSFER - OUT REPORT:    Verbal report given to receiving nurse on Milly Canseco  being transferred to 6th floor for urgent transfer       Report consisted of patient's Situation, Background, Assessment and   Recommendations(SBAR). Information from the following report(s) Nurse Handoff Report was reviewed with the receiving nurse. Lines:   Peripheral IV 06/25/22 Right Hand (Active)   Site Assessment Clean, dry & intact 06/25/22 0300   Line Status Blood return noted 06/25/22 0300   Phlebitis Assessment No symptoms 06/25/22 0300   Infiltration Assessment 0 06/25/22 0300   Alcohol Cap Used No 06/25/22 0300   Dressing Status Clean, dry & intact; New dressing applied 06/25/22 0300   Dressing Type Transparent 06/25/22 0300   Dressing Intervention New 06/25/22 0300       Hemodialysis Central Access Right Subclavian (Active)        Opportunity for questions and clarification was provided.       Patient transported with: transport       Freda Lane RN  06/25/22 4821

## 2022-06-25 NOTE — PROGRESS NOTES
TRANSFER - IN REPORT:    Verbal report received from ConradRN(name) on Rosa Pires  being received from ED(unit). Report consisted of patients Situation, Background, Assessment and   Recommendations(SBAR). Information from the following report(s) SBAR was reviewed with the receiving nurse. Opportunity for questions and clarification was provided. Assessment will be completed upon patients arrival to unit and care assumed.

## 2022-06-25 NOTE — H&P
Admit date: 2022   Name:  Kelvin Gomez   Age:  80 y.o.   :  1950   MRN:  [id number]   PCP:  Mikie Méndez MD   Provider:  Kathy Horta MD      ASSESSMENT AND PLAN  80-year-old female with past medical history of hypertension, diabetes, end-stage renal disease on hemodialysis, now presents in the setting of left flank pain    1. Upper left thigh collection concerning of abscess versus hematoma  -Start ceftriaxone, Flagyl and linezolid  -Start IV fluids  -Obtain blood cultures  -Lactic acid flat  -Pain management  -Monitor H&H  -Monitor edema  -Currently no signs of compartment syndrome  -Vascular surgery consultation    2. Left flank pain likely related to left thigh collection  -Pain management  -CT abdomen and pelvis without acute findings on the left flank  -Monitor for now    3. End-stage renal on hemodialysis  -Nephrology consultation  -Hemodialysis as per nephrology recommendations    4. Diabetes  -Insulin sliding scale  -Blood sugar checks before meals and at bedtime    DVT prophylaxis with SCD    Full code    Patient aware of plan      CHIEF COMPLAINT:  Left flank pain      HISTORY OF PRESENT ILLNESS:  80-year-old female with past medical history of hypertension, diabetes, end-stage renal disease on hemodialysis, now presents in the setting of left flank pain. The patient states that she was in her usual state of health until yesterday when she started presenting with left flank pain associated with left-sided swelling of her lower extremity. Her symptoms did not improve so she decided to come to the emergency department. She recently had an AV graft placed in her left thigh. Otherwise she denies any fever or chills, no shortness of breath, no cough, no nausea vomiting or diarrhea. In the emergency department the patient was found to be tachycardic with radiologic findings concerning of upper left thigh fluid collection setting of abscess or hematoma. She was given IV Dilaudid. She will be admitted to the medical floors for further management. Past Medical History:   Diagnosis Date    Anemia     Arthritis     [facility]F [facility]arteriovenous fistula) ([facility]     left    AVF (arteriovenous fistula) ([facility] 12/20/2016 12/6/16 ([facility]) Right AVF revision and thrombectomy    Degenerative joint disease     Diabetes ([facility]     type 2--- does not check sqbs at home    Enlarged lymph node     \"enlarging paratracheal node to 2.6 cm on CT chest\"    ESRD on hemodialysis ([facility] onset 2006    ESRD.  MWF dialysis at [facility]    Hypertension     controlled with med    Mediastinal mass     being followed by dr ramírez--per pt-- states told by dr Micheline Foster it wasnt cancer-- but plans to be rescanned 5/2022    Obesity     On home O2     2L QHS and PRN during day d/t \"lymph node in chest\"    Renal cancer ([facility]     Dr Elyssa Gonzáles - renal cancer L nephrectomy---and radiation ---    Shortness of breath     swollen lymphnode in chest-- oxygen 2LPM    Transient ischemic attack 08/29/2015    no residual       Past Surgical History:   Procedure Laterality Date    BREAST SURGERY Right     cyst removed    COLONOSCOPY N/A 11/8/2018    COLONOSCOPY  BMI 36 performed by Celi Cobb MD at [address] [place] [address]  9/14/2017    CT BIOPSY ABDOMEN RETROPERITONEUM 9/14/2017 [facility] CT SCAN    GI  08/06/2018    exploratory laparotomy    GI  06/01/2002    colon resection resulting in temporary colostomy reversal    GYN      mihir    IR INTRO CATH DIALYSIS CIRCUIT W BALLOON PTA  3/10/2020    IR INTRO CATH DIALYSIS CIRCUIT W BALLOON PTA  12/5/2019    IR INTRO CATH DIALYSIS CIRCUIT W BALLOON PTA  9/3/2019    IR INTRO CATH DIALYSIS CIRCUIT W BALLOON PTA  5/30/2019    IR INTRO CATH DIALYSIS CIRCUIT W BALLOON PTA  2/28/2019    IR THRMB/INFUSION DIAYSIS CIRCUIT Left 2021    IR TUNNELED CATHETER PLACEMENT GREATER THAN 5 YEARS  3/15/2022    IR TUNNELED CATHETER PLACEMENT GREATER THAN 5 YEARS 11/19/2021    IR TUNNELED CATHETER PLACEMENT GREATER THAN 5 YEARS  11/19/2021    IR TUNNELED CATHETER PLACEMENT GREATER THAN 5 YEARS 11/19/2021 [facility]    IR TUNNELED CATHETER PLACEMENT GREATER THAN 5 YEARS  3/15/2022    IR TUNNELED CATHETER PLACEMENT GREATER THAN 5 YEARS 3/15/2022 [facility] SPECI[facility]    OTHER SURGICAL HISTORY      dialysis fistula, several permcaths    UROLOGICAL SURGERY Left July 2015    nephrectomy    VASCULAR SURGERY Left 04/07/2022    LEFT ARM AV GRAFT    VASCULAR SURGERY Left 03/15/2022    declot    VASCULAR SURGERY Left 02/21/2022    Open thromboembolectomy of left upper arm graft.  VASCULAR SURGERY Right     AV graft- states no longer uses    VASCULAR SURGERY Left 5/26/2022    LEFT AV GRAFT THIGH performed by Jessie Ibarra MD at [address] [place]:  Prior to Admission medications    Medication Sig Start Date End Date Taking?  Authorizing Provider   naloxone 4 MG/0.1ML LIQD nasal spray 1 spray by Nasal route as needed for Opioid Reversal.    Historical Provider, MD   midodrine (PROAMATINE) 10 MG tablet Take 1 tablet by mouth every 8 hours 6/5/22   Emeterio Orr MD   acetaminophen (TYLENOL) 500 MG tablet Take 500 mg by mouth every 6 hours as needed for Pain    Historical Provider, MD   omeprazole (PRILOSEC) 40 MG delayed release capsule Take 40 mg by mouth at bedtime    Historical Provider, MD   OXYGEN Inhale into the lungs 2 LITERS AS NEEDED FOR SOB    Historical Provider, MD   SITagliptin (JANUVIA) 50 MG tablet Take 50 mg by mouth daily    Historical Provider, MD   sevelamer (RENVELA) 800 MG tablet Take 4 tablets by mouth 3 times daily (with meals) PT TAKES 4 TABS WITH MEALS-- AND 1 TABS WITH SNACKS     Historical Provider, MD         REVIEW OF SYSTEMS:  14 ROS negative except from stated on HPI      Social History     Tobacco Use    Smoking status: Never Smoker    Smokeless tobacco: Never Used   Vaping Use    Vaping Use: Never used Substance Use Topics    Alcohol use: No    Drug use: No         Family History   Problem Relation Age of Onset    Diabetes Mother     Heart Disease Mother     Hypertension Mother     Heart Disease Father     Hypertension Father     Post-op Cognitive Dysfunction Neg Hx     Pseudochol.  Deficiency Neg Hx     Delayed Awakening Neg Hx     Post-op Nausea/Vomiting Neg Hx     Emergence Delirium Neg Hx     Other Neg Hx     Malig Hypertherm Neg Hx          Allergies   Allergen Reactions    Pcn [Penicillins] Other (See Comments)     PT STATES UNSURE OF RX    Vancomycin Rash         Vitals:    06/25/22 0715 06/25/22 0800 06/25/22 0948 06/25/22 1321   BP: 121/73 130/76 136/71 (!) 132/59   Pulse: (!) 107 (!) 101 (!) 104 (!) 103   Resp: 27 (!) 0 16 16   Temp:   97.5 °F (36.4 °C) 97.3 °F (36.3 °C)   TempSrc:   Axillary Oral   SpO2: 97% 98% 100% 100%   Weight:       Height:             PHYSICAL EXAM:  General: Alert, oriented, NAD  HEENT: NC/AT, EOM are intact  Neck: supple, no JVD  Cardiovascular: RRR, S1, S2, no murmurs  Respiratory: Lungs are clear, no wheezes or rales  Abdomen: Soft, NT, ND  Back: Left CVA tenderness, no paraspinal tenderness  Extremities: LE without pedal edema, no erythema  Neuro: A&O, CN are intact, no focal deficits  Skin: no rash or ulcers  Psych: good mood and affect      I have personally reviewed patients laboratory data showing  Lab Results   Component Value Date    WBC 8.2 06/25/2022    HGB 10.1 (L) 06/25/2022    HCT 29.6 (L) 06/25/2022    MCV 96.7 06/25/2022     06/25/2022     Lab Results   Component Value Date    TROPONINI <0.02 (L) 11/11/2019     Lab Results   Component Value Date    ANIONGAP 9 06/25/2022    CALCIUM 8.4 06/25/2022     (L) 06/25/2022    K 4.5 06/25/2022    CO2 28 06/25/2022    CL 98 06/25/2022    BUN 17 06/25/2022    CREATININE 5.00 (H) 06/25/2022         I have personally reviewed patients imaging showing  CT ABDOMEN PELVIS W IV CONTRAST Additional Contrast? None   Final Result   1. Upper left thigh bypass graft, not fully visualized on this study, with an   adjacent 7.9 x 7.5 cm complex fluid collection which could represent an abscess   or hematoma. There is a small amount of high density material along the inferior   margin of the fluid collection, making it difficult to exclude active bleeding. Dr. Halle Pepe verbally notified at 4:16 AM.   2. Persistent anasarca, with progressed mild ascites. 3. Prior left nephrectomy and atrophied right kidney. 4. Prominent sized hepatic veins and IVC in this patient with an enlarged right   heart, raising the possibility of right heart failure.                Likely length of stay more than 2 midnights

## 2022-06-25 NOTE — ED NOTES
Provided pt with warm blanket. Call light within reach. Lights turned off for patient comfort.      Andrew Chou RN  06/25/22 [id number]

## 2022-06-25 NOTE — ED PROVIDER NOTES
[facility] Emergency Department Provider Note                     PCP:                Farnaz Frias MD               Age: 80 y.o. Sex: female           ICD-10-CM    1. Soft tissue swelling  R22.9     Possible hematoma versus abscess at graft site   2. Acute left flank pain  R10.9    3. End stage renal disease on dialysis [facility])  N18.6     Z99.2        DISPOSITION Decision To Admit 06/25/2022 06:51:36 AM       New Prescriptions    No medications on file       MDM  Number of Diagnoses or Management Options  Diagnosis management comments: Patient's flank pain was improved after pain medicine. CT of the abdomen pelvis showed no acute abnormalities in the flank area of her pain. However radiologist was concerned because the scanner went down through the thigh her left thigh has a large swollen area and concerns for bleeding and hematoma and abscess. Radiologist advised vascular surgeon should be notified. Review of the patient's chart reveals that graft was placed in May patient was seen June 2 2 and documentation shows a swelling in this area. Patient was evaluated by vascular surgery that day and also followed up in the office. Plan is to reevaluated July 14 and if is not getting better surgical drainage may be necessary. Contacted vascular surgon (who was not familiar with the case) felt the safest thing to do would be admit her for antibiotics and he would come see her. Hospitalist notified to admit patient for IV antibiotics. Due to the patient's multiple drug allergies consultation with pharmacy for broad-spectrum antibiotics and MRSA coverage Linezolid was suggested.        Amount and/or Complexity of Data Reviewed  Clinical lab tests: ordered and reviewed  Tests in the radiology section of CPT®: ordered and reviewed  Tests in the medicine section of CPT®: ordered and reviewed  Decide to obtain previous medical records or to obtain history from someone other than the patient: yes  Review and summarize past medical records: yes  Independent visualization of images, tracings, or specimens: yes        Orders Placed This Encounter   Procedures    CT ABDOMEN PELVIS W IV CONTRAST Additional Contrast? None    CBC with Auto Differential    Comprehensive Metabolic Panel    Magnesium    Lipase        No follow-up provider specified. Esther Perez is a 80 y.o. female who presents to the Emergency Department with chief complaint of    Chief Complaint   Patient presents with    Flank Pain      10year-old female with history of end-stage renal disease on hemodialysis who is having severe left flank pain. Onset 3 hours ago. Patient states she has had a nephrectomy on the left side of due to renal cell carcinoma. Pain started 3 hours ago no fevers or chills no cough cold or flulike symptoms no injuries. The history is provided by the patient. Flank Pain  This is a new problem. The current episode started 3 to 5 hours ago. The problem occurs constantly. The problem has not changed since onset. Pertinent negatives include no chest pain and no shortness of breath. Nothing aggravates the symptoms. Nothing relieves the symptoms. She has tried nothing for the symptoms. Review of Systems   Constitutional: Negative. Negative for activity change, appetite change, chills, fatigue and fever. HENT: Negative. Eyes: Negative. Respiratory: Negative. Negative for shortness of breath. Cardiovascular: Negative. Negative for chest pain. Gastrointestinal: Negative. Endocrine: Negative. Genitourinary: Positive for flank pain. Neurological: Negative. Hematological: Negative. Psychiatric/Behavioral: Negative. All other systems reviewed and are negative. All other systems reviewed and are negative.       Past Medical History:   Diagnosis Date    Anemia     Arthritis     [facility]arterioven[facility]) ([facility]     left    AVF (arteriovenous fistula) ([facility] 12/20/2016 IR TUNNELED CATHETER PLACEMENT GREATER THAN 5 YEARS 3/15/2022 [facility] SPECIALS    OTHER SURGICAL HISTORY      dialysis fistula, several permcaths    UROLOGICAL SURGERY Left July 2015    nephrectomy    VASCULAR SURGERY Left 04/07/2022    LEFT ARM AV GRAFT    VASCULAR SURGERY Left 03/15/2022    declot    VASCULAR SURGERY Left 02/21/2022    Open thromboembolectomy of left upper arm graft.  VASCULAR SURGERY Right     AV graft- states no longer uses    VASCULAR SURGERY Left 5/26/2022    LEFT AV GRAFT THIGH performed by Irina Santiago MD at [facility] MAIN OR        Family History   Problem Relation Age of Onset    Diabetes Mother     Heart Disease Mother     Hypertension Mother     Heart Disease Father     Hypertension Father     Post-op Cognitive Dysfunction Neg Hx     Pseudochol. Deficiency Neg Hx     Delayed Awakening Neg Hx     Post-op Nausea/Vomiting Neg Hx     Emergence Delirium Neg Hx     Other Neg Hx     Malig Hypertherm Neg Hx            Social Connections:     Frequency of Communication with Friends and Family: Not on file    Frequency of Social Gatherings with Friends and Family: Not on file    Attends Druze Services: Not on file    Active Member of Clubs or Organizations: Not on file    Attends Club or Organization Meetings: Not on file    Marital Status: Not on file        Allergies   Allergen Reactions    Pcn [Penicillins] Other (See Comments)     PT STATES UNSURE OF RX    Vancomycin Rash        Vitals signs and nursing note reviewed. Patient Vitals for the past 4 hrs:   BP SpO2   06/25/22 0630 (!) 140/101 --   06/25/22 0500 125/74 97 %   06/25/22 0331 135/71 96 %          Physical Exam  Vitals and nursing note reviewed. Constitutional:       Appearance: Normal appearance. She is not ill-appearing. HENT:      Head: Normocephalic and atraumatic.       Right Ear: External ear normal.      Left Ear: External ear normal.      Nose: Nose normal.      Mouth/Throat: Mouth: Mucous membranes are moist.   Eyes:      Extraocular Movements: Extraocular movements intact. Conjunctiva/sclera: Conjunctivae normal.      Pupils: Pupils are equal, round, and reactive to light. Cardiovascular:      Rate and Rhythm: Normal rate and regular rhythm. Pulses: Normal pulses. Heart sounds: Normal heart sounds. Pulmonary:      Effort: Pulmonary effort is normal. No respiratory distress. Breath sounds: Normal breath sounds. Abdominal:      General: Bowel sounds are normal. There is no distension. Palpations: Abdomen is soft. Musculoskeletal:         General: No swelling or signs of injury. Normal range of motion. Cervical back: Normal range of motion. No rigidity. Skin:     General: Skin is warm and dry. Capillary Refill: Capillary refill takes less than 2 seconds. Neurological:      General: No focal deficit present. Mental Status: She is alert and oriented to person, place, and time. Mental status is at baseline. Psychiatric:         Attention and Perception: She is inattentive. Mood and Affect: Mood is anxious. Behavior: Behavior normal.         Thought Content: Thought content normal.         Judgment: Judgment is impulsive.           Procedures    Labs Reviewed   CBC WITH AUTO DIFFERENTIAL - Abnormal; Notable for the following components:       Result Value    RBC 3.06 (*)     Hemoglobin 10.1 (*)     Hematocrit 29.6 (*)     MCH 33.0 (*)     RDW 17.9 (*)     Lymphocytes 9 (*)     All other components within normal limits   COMPREHENSIVE METABOLIC PANEL - Abnormal; Notable for the following components:    Sodium 135 (*)     Glucose 146 (*)     CREATININE 5.00 (*)     GFR  11 (*)     GFR Non- 9 (*)     Total Bilirubin 1.9 (*)     AST 65 (*)     Globulin 4.2 (*)     Albumin/Globulin Ratio 0.9 (*)     All other components within normal limits   MAGNESIUM   LIPASE        CT ABDOMEN PELVIS W IV CONTRAST Additional Contrast? None   Final Result   1. Upper left thigh bypass graft, not fully visualized on this study, with an   adjacent 7.9 x 7.5 cm complex fluid collection which could represent an abscess   or hematoma. There is a small amount of high density material along the inferior   margin of the fluid collection, making it difficult to exclude active bleeding. Dr. Dulce Stein verbally notified at 4:16 AM.   2. Persistent anasarca, with progressed mild ascites. 3. Prior left nephrectomy and atrophied right kidney. 4. Prominent sized hepatic veins and IVC in this patient with an enlarged right   heart, raising the possibility of right heart failure. Jose Coma Scale  Eye Opening: Spontaneous  Best Verbal Response: Oriented  Best Motor Response: Obeys commands  Jose Coma Scale Score: 15                     CIWA Assessment  BP: (!) 140/101  Heart Rate: (!) 118                       Voice dictation software was used during the making of this note. This software is not perfect and grammatical and other typographical errors may be present. This note has not been completely proofread for errors.        Timothy Olsen MD  06/25/22 [id number]

## 2022-06-25 NOTE — FLOWSHEET NOTE
06/25/22 0944   Dual Clinician Skin Assessment   Dual Skin Assessment (4 Eyes) WDL   Skin Integumentary    Skin Integumentary (WDL) X   Skin Condition/Temp Warm;Dry   Skin Integrity Wound (see LDA); Incision (see LDA); Scars (comment); Excoriation  (Wound to L thigh, scar to LUE+abdomen,incision to Rt chest)   Wound Prevention/Protection Method Yes   Skin Fold Management No   Nails WDL   Location of Wound Prevention Sacrum   Orientation of Wound Prevention Posterior;Right;Left   Wound Offloading (Prevention Methods) Bed, pressure reduction mattress; Foam silicone;Elevate heels; Heel boots;Pillows;Repositioning;Turning   Dressing Present  No   Location LUE, LLE     Left upper extremity old HD access. Left thigh HD access swollen and hard. +2 pitting edema to this extremity. Scars to abdomen and groin area. Eliana Warren RN assessed dual skin.

## 2022-06-25 NOTE — CONSULTS
[facility]   [address] [place] [address] , [address], [address] [id number]  Phone: [phone]  Fax: [phone]    Date of visit: 6/25/2022    Referring physician: No referring provider defined for this encounter. Reason for referral:     Gaurang Pablo is a 80 y.o. female sent in consultation for   Chief Complaint   Patient presents with    Flank Pain       HPI: 10year-old female with history of end-stage renal disease on hemodialysis who is having severe left flank pain. Onset 3 hours ago. Patient states she has had a nephrectomy on the left side of due to renal cell carcinoma. Pain started 3 hours ago no fevers or chills no cough cold or flulike symptoms no injuries.   she had undergone placement of AVgraft in her left thigh. She reports swelling , pain and tenderness at incision and in proximal thigh for last week. She also reports pain on top of foot and swelling in foot. No fever or chills reported. ROS:    Constitutional: Negative for fever and chills. HENT: Negative for congestion and sore throat. Skin: Negative for rash and itching. Eyes: Negative for blurred vision and double vision. Respiratory: Negative for cough and shortness of breath. Cardiovascular: Negative for chest pain, palpitations, claudication and leg swelling. Gastrointestinal: Negative for nausea, vomiting and abdominal pain. Genitourinary: Negative for dysuria, hematuria and flank pain. Musculoskeletal: Negative for joint pain and falls. Neurological: Negative for dizziness, sensory change, focal weakness, loss of consciousness and headaches.      Medical history:   Past Medical History:   Diagnosis Date    Anemia     Arthritis     AVF (arteriovenous fistula) ([address]     left    AVF (arteriovenous fistula) ([address] 12/20/2016 12/6/16 ([facility]) Right AVF revision and thrombectomy    Degenerative joint disease     Diabetes ([address]     type 2--- does not check sqbs at home    Enlarged lymph node     \"enlarging paratracheal node to 2.6 cm on CT chest\"    ESRD on hemodialysis ([facility] onset 2006    ESRD.  MWF dialysis at [place] dialysis    Hypertension     controlled with med    Mediastinal mass     being followed by dr ramírez--per pt-- states told by dr Maxim Tolentino it wasnt cancer-- but plans to be rescanned 5/2022    Obesity     On home O2     2L QHS and PRN during day d/t \"lymph node in chest\"    Renal cancer ([facility]     Dr Mckeon Bonds - renal cancer L nephrectomy---and radiation ---    Shortness of breath     swollen lymphnode in chest-- oxygen 2LPM    Transient ischemic attack 08/29/2015    no residual       Surgical history:   Past Surgical History:   Procedure Laterality Date    BREAST SURGERY Right     cyst removed    COLONOSCOPY N/A 11/8/2018    COLONOSCOPY  BMI 36 performed by Marsha Stephen MD at [address] [place] [address]  9/14/2017    CT BIOPSY ABDOMEN RETROPERITONEUM 9/14/2017 [facility] CT SCAN    GI  08/06/2018    exploratory laparotomy    GI  06/01/2002    colon resection resulting in temporary colostomy reversal    GYN      mihir    IR INTRO CATH DIALYSIS CIRCUIT W BALLOON PTA  3/10/2020    IR INTRO CATH DIALYSIS CIRCUIT W BALLOON PTA  12/5/2019    IR INTRO CATH DIALYSIS CIRCUIT W BALLOON PTA  9/3/2019    IR INTRO CATH DIALYSIS CIRCUIT W BALLOON PTA  5/30/2019    IR INTRO CATH DIALYSIS CIRCUIT W BALLOON PTA  2/28/2019    IR THRMB/INFUSION DIAYSIS CIRCUIT Left 2021    IR TUNNELED CATHETER PLACEMENT GREATER THAN 5 YEARS  3/15/2022    IR TUNNELED CATHETER PLACEMENT GREATER THAN 5 YEARS  11/19/2021    IR TUNNELED CATHETER PLACEMENT GREATER THAN 5 YEARS  11/19/2021    IR TUNNELED CATHETER PLACEMENT GREATER THAN 5 YEARS 11/19/2021 [facility] SPE[facility]    IR TUNNELED CATHETER PLACEMENT GREATER THAN 5 YEARS  3/15/2022    IR TUNNELED CATHETER PLACEMENT GREATER THAN 5 YEARS 3/15/2022 [facility] SPECI[facility]    OTHER SURGICAL HISTORY      dialysis fistula, several permcaths    UROLOGICAL SURGERY Left July 2015    nephrectomy    VASCULAR SURGERY Left 04/07/2022    LEFT ARM AV GRAFT    VASCULAR SURGERY Left 03/15/2022    declot    VASCULAR SURGERY Left 02/21/2022    Open thromboembolectomy of left upper arm graft.  VASCULAR SURGERY Right     AV graft- states no longer uses    VASCULAR SURGERY Left 5/26/2022    LEFT AV GRAFT THIGH performed by Luis Mauro MD at [facility] MAIN OR       Allergies: Allergies   Allergen Reactions    Pcn [Penicillins] Other (See Comments)     PT STATES UNSURE OF RX    Vancomycin Rash       Current medications: [unfilled]    Social history:   Social History     Tobacco Use   Smoking Status Never Smoker   Smokeless Tobacco Never Used                               Social History     Substance and Sexual Activity   Alcohol Use No       Imaging: CT abdomen and pelvis showed the following:    - Pelvis: There is a double-limbed bypass graft in the upper left thigh. An   adjacent 7.9 x 7.5 cm complex fluid collection with no associated gas is seen in   the upper anterior thigh, which could be an abscess or hematoma.  A small amount   of high-density material along the inferior margin of the fluid collection       Vitals:     Physical exam:  /71   Pulse (!) 104   Temp 97.5 °F (36.4 °C) (Axillary)   Resp 16   Ht 5' 4\" (1.626 m)   Wt 200 lb (90.7 kg)   SpO2 100%   BMI 34.33 kg/m²     General Appearance:    Alert, cooperative, no distress, appears stated age   Head:    Normocephalic, without obvious abnormality, atraumatic   Eyes:    PERRL, conjunctiva/corneas clear, EOM's intact, fundi     benign, both eyes               Neck:   Supple, symmetrical, trachea midline, no adenopathy;     thyroid:  no enlargement/tenderness/nodules; no carotid    bruit or JVD       Lungs:     Clear to auscultation bilaterally, respirations unlabored   Chest Wall:    No tenderness or deformity    Heart:    Regular rate and rhythm, S1 and S2 normal, no murmur, rub   or gallop       Abdomen:     Soft, non-tender, bowel sounds active all four quadrants,     no masses, no organomegaly           Extremities:   Extremities normal, atraumatic,   Left thigh with induration, tenderness over proximal thigh at incision site. No drainage or fluctuence. Tenderness in distal thigh. Palpable tense hematoma. Pulses:   2+ and symmetric all extremities   Skin:   Skin color, texture, turgor normal, no rashes or lesions   Lymph nodes:   Cervical, supraclavicular, and axillary nodes normal   Vascular Exam:    Right:    LEFT:                      Radial: 2+    Radial: 2+         Brachial: 2+   Brachial: 2+          Femoral: 2+   Femoral: 2+         PT and DP biphasic                     No DP, biphasic PT          Carotid Bruit: No   Carotid Bruit: No    Impression: hematoma left groin at surgical incision with possible low grade bleeding and enhancemernt of CT  Concerned about graft becoming infected with this large hematoma. Plan: Will treat with IV antibiotics and reassess in am. If not improved will explore groin in 2010 [facility] MD Chet    Elements of this note have been dictated using speech recognition software. As a result, errors of speech recognition may have occurred.

## 2022-06-25 NOTE — ED TRIAGE NOTES
Pt to ED via [address] for left CVA pain where she had her kidney removed x6 yrs ago due to cancer. Pt states taking muscle relaxers and a heating pad with no relief. Pt is a dialysis pt and did dialyze today with no complications. VSS with EMS bp 127/80  spo2 95% on RA. Pt is unable to sit still in the bed due to the pain and continues to cry out for Tacho to take her pain.

## 2022-06-26 PROBLEM — R01.1 CARDIAC MURMUR: Status: ACTIVE | Noted: 2022-01-01

## 2022-06-26 PROBLEM — S80.12XA HEMATOMA OF LEFT LOWER LEG: Status: ACTIVE | Noted: 2022-01-01

## 2022-06-26 NOTE — PERIOP NOTE
TRANSFER - IN REPORT:    Verbal report received from 165 Bebeto Emanuel RN on [address]  being received from 636 for ordered procedure      Report consisted of patient's Situation, Background, Assessment and   Recommendations(SBAR). Information from the following report(s) MAR, Recent Results, Pre Procedure Checklist and Procedure Verification was reviewed with the receiving nurse. Opportunity for questions and clarification was provided. Assessment completed upon patient's arrival to unit and care assumed.

## 2022-06-26 NOTE — CONSULTS
RENAL H&P/CONSULT    Subjective:     Patient is a 79 y/o AA female, followed by our office for ESRD, on HD M-W-F at [facility] is admitted for left flank and groin swelling. She has been running via RIJ perm cath since her LUE AVF became non-functioning and a left thigh AV graft placed on 5/26/22 by Dr Biju Caruso. She has developed marked left leg edema and had profound hypotension post leg loop placement requiring Midodrine, but recently her BP was up to the degree that we stopped Midodrine last week. She has been off all antihypertensive medications. She ran HD Friday without problems. PMH also consist of DM II, Hyperphosphatemia, anemia, renal cell carcinoma s/p left nephrectomy, followed by Pulmonary for lung mass, and HTN. She is on home O2 at 2L NC as needed. She presented to the ER  now presents in the setting of left flank pain. The patient states that she was in her usual state of health until yesterday when she started presenting with left flank pain associated with left-sided swelling of her lower extremity. Her symptoms did not improve so she decided to come to the emergency department. No fever or chills, no shortness of breath, no cough, no nausea vomiting or diarrhea. She was evaluated by vascular surgery and underwent exploration forpossible abscess or hematoma and is on empiric IV antibiotics. Past Medical History:   Diagnosis Date    Anemia     Arthritis     AVF (arteriovenous fistula) ([address]     left    [facility]F [facility]) [facility] 12/20/2016 12/6/16 ([facility]) Right AVF revision and thrombectomy    Degenerative joint disease     Diabetes ([address]     type 2--- does not check sqbs at home    Enlarged lymph node     \"enlarging paratracheal node to 2.6 cm on CT chest\"    ESRD on hemodialysis ([address] onset 2006    ESRD.  MWF dialysis at [place] dialysis    Hypertension     controlled with med    Mediastinal mass     being followed by dr ramírez--per pt-- states told by dr Maura Rodgers it wasnt cancer-- but plans to be rescanned 5/2022    Obesity     On home O2     2L QHS and PRN during day d/t \"lymph node in chest\"    Renal cancer ([facility]     Dr Andrew Carmona - renal cancer L nephrectomy---and radiation ---    Shortness of breath     swollen lymphnode in chest-- oxygen 2LPM    Transient ischemic attack 08/29/2015    no residual      Past Surgical History:   Procedure Laterality Date    BREAST SURGERY Right     cyst removed    COLONOSCOPY N/A 11/8/2018    COLONOSCOPY  BMI 36 performed by Cynthia Godfrey MD at [address] [place] [address] [address]  9/14/2017    CT BIOPSY ABDOMEN RETROPERITONEUM 9/14/2017 [facility] CT SCAN    GI  08/06/2018    exploratory laparotomy    GI  06/01/2002    colon resection resulting in temporary colostomy reversal    GYN      mihir    IR INTRO CATH DIALYSIS CIRCUIT W BALLOON PTA  3/10/2020    IR INTRO CATH DIALYSIS CIRCUIT W BALLOON PTA  12/5/2019    IR INTRO CATH DIALYSIS CIRCUIT W BALLOON PTA  9/3/2019    IR INTRO CATH DIALYSIS CIRCUIT W BALLOON PTA  5/30/2019    IR INTRO CATH DIALYSIS CIRCUIT W BALLOON PTA  2/28/2019    IR THRMB/INFUSION DIAYSIS CIRCUIT Left 2021    IR TUNNELED CATHETER PLACEMENT GREATER THAN 5 YEARS  3/15/2022    IR TUNNELED CATHETER PLACEMENT GREATER THAN 5 YEARS  11/19/2021    IR TUNNELED CATHETER PLACEMENT GREATER THAN 5 YEARS  11/19/2021    IR TUNNELED CATHETER PLACEMENT GREATER THAN 5 YEARS 11/19/2021 [facility] SPECIALS    IR TUNNELED CATHETER PLACEMENT GREATER THAN 5 YEARS  3/15/2022    IR TUNNELED CATHETER PLACEMENT GREATER THAN 5 YEARS 3/15/2022 [facility] SPECIALS    OTHER SURGICAL HISTORY      dialysis fistula, several permcaths    UROLOGICAL SURGERY Left July 2015    nephrectomy    VASCULAR SURGERY Left 04/07/2022    LEFT ARM AV GRAFT    VASCULAR SURGERY Left 03/15/2022    declot    VASCULAR SURGERY Left 02/21/2022    Open thromboembolectomy of left upper arm graft.     VASCULAR SURGERY Right     AV graft- states no longer uses    VASCULAR SURGERY Left 5/26/2022    LEFT AV GRAFT THIGH performed by Colette Emanuel MD at [facility] MAIN OR      Prior to Admission medications    Medication Sig Start Date End Date Taking? Authorizing Provider   naloxone 4 MG/0.1ML LIQD nasal spray 1 spray by Nasal route as needed for Opioid Reversal.    Historical Provider, MD   midodrine (PROAMATINE) 10 MG tablet Take 1 tablet by mouth every 8 hours 6/5/22   Ludmila Pierce MD   acetaminophen (TYLENOL) 500 MG tablet Take 500 mg by mouth every 6 hours as needed for Pain    Historical Provider, MD   omeprazole (PRILOSEC) 40 MG delayed release capsule Take 40 mg by mouth at bedtime    Historical Provider, MD   OXYGEN Inhale into the lungs 2 LITERS AS NEEDED FOR SOB    Historical Provider, MD   SITagliptin (JANUVIA) 50 MG tablet Take 50 mg by mouth daily    Historical Provider, MD   sevelamer (RENVELA) 800 MG tablet Take 4 tablets by mouth 3 times daily (with meals) PT TAKES 4 TABS WITH MEALS-- AND 1 TABS WITH SNACKS     Historical Provider, MD     Allergies   Allergen Reactions    Pcn [Penicillins] Other (See Comments)     PT STATES UNSURE OF RX    Vancomycin Rash      Social History     Tobacco Use    Smoking status: Never Smoker    Smokeless tobacco: Never Used   Substance Use Topics    Alcohol use: No      Family History   Problem Relation Age of Onset    Diabetes Mother     Heart Disease Mother     Hypertension Mother     Heart Disease Father     Hypertension Father     Post-op Cognitive Dysfunction Neg Hx     Pseudochol.  Deficiency Neg Hx     Delayed Awakening Neg Hx     Post-op Nausea/Vomiting Neg Hx     Emergence Delirium Neg Hx     Other Neg Hx     Malig Hypertherm Neg Hx           Review of Systems    Constitutional: no fever, negative  Eyes: fair vision, negative  Ears, nose, mouth, throat, and face:fair hearing, negative  Respiratory: no asthma, negative  Cardiovascular:no chest pain, negative  Gastrointestinal:no diarrhea, negative  Genitourinary: no dysuria,negative  Hematologic/lymphatic: no bleeding tendency, negative  Neurological: no seizures, negative  Behvioral/Psych: no psych hospitalization positive for anxiety and depression  Endocrine: no goiter, negative      Objective:       BP (!) 158/82   Pulse 89   Temp 97.5 °F (36.4 °C) (Oral)   Resp 20   Ht 5' 4\" (1.626 m)   Wt 194 lb 11.2 oz (88.3 kg)   SpO2 96%   BMI 33.42 kg/m²     06/26 0701 - 06/26 1900  In: 200 [I.V.:200]  Out: 200   06/24 1901 - 06/26 0700  In: 100   Out: -     BP (!) 158/82   Pulse 89   Temp 97.5 °F (36.4 °C) (Oral)   Resp 20   Ht 5' 4\" (1.626 m)   Wt 194 lb 11.2 oz (88.3 kg)   SpO2 96%   BMI 33.42 kg/m²   General:  Alert, cooperative, no distress, appears stated age. Head:  Normocephalic, without obvious abnormality, atraumatic. Eyes:  Conjunctivae/corneas clear. EOMs intact. Ears:  Normal external ear canals both ears. Nose: Nares normal. Septum midline. Mucosa normal. No drainage or sinus tenderness. Throat: Lips, mucosa, and tongue normal. Teeth and gums normal.   Neck: Supple, symmetrical, trachea midline, no adenopathy, thyroid: no enlargement/tenderness/nodules, no JVD. Back:   Symmetric, no curvature. ROM normal. No CVA tenderness. Lungs:   Clear to auscultation bilaterally. Chest wall:  No tenderness or deformity. Heart:  Regular rate and rhythm, S1, S2 normal, no murmur,  rub or gallop. Abdomen:   Soft, non-tender. Bowel sounds normal. No masses,  No organomegaly. Extremities: Extremities no cyanosis or edema right, ++ edema left. Access: S/P tunneled HD catheter and S/P left groin loop graft with left groin swelling and left leg edema. Skin: Skin color, texture, turgor normal. No rashes or lesions. Lymph nodes: Cervical and supraclavicular nodes normal.   Neurologic: Grossly intact. No asterixis.            Data Review:     Recent Results (from the past 24 hour(s)) Castañeda (Morris)    POCT Glucose    Collection Time: 06/26/22  4:46 PM   Result Value Ref Range    POC Glucose 199 (H) 65 - 100 mg/dL    Performed by: Lee        CXR;   Results for orders placed during the hospital encounter of 05/31/22    XR CHEST PORTABLE    Narrative  EXAM: XR CHEST PORTABLE  INDICATION: fatigue  COMPARISON: Chest radiograph, 11/22/2021    FINDINGS:  The cardiomediastinal silhouette is enlarged but stable. Right-sided  hemodialysis catheter terminates in the right atrium. No pleural effusion or  pneumothorax. No focal parenchymal process. Vascular stent in the right  subclavian region and bilateral brachial regions. No acute osseous abnormality. Impression  Stable cardiomegaly without acute process. KUB:  Results for orders placed in visit on 08/03/18    XR ABDOMEN (KUB) (SINGLE AP VIEW)    Narrative  KUB supine views    History:  mechanical small bowel obstruction, lower abd ventral hernia, 78 years  Female    Comparison:  CT abdomen pelvis yesterday    Findings:  Esophageal tube appears to terminate in the first portion of the  duodenum. Oral contrast is seen throughout the colon. No free air is evident. The bowel gas pattern is nonspecific and there is no evidence of ileus or  obstruction. No suspicious renal or ureteral calculi are evident. Visualized  osseous structures unremarkable. Impression  Impression: No acute pathology identified. Renal US:  No results found for this or any previous visit. CT Abdomen  Results for orders placed during the hospital encounter of 06/25/22    CT ABDOMEN PELVIS W IV CONTRAST Additional Contrast? None    Narrative  EXAM: CT abdomen and pelvis with IV contrast.    INDICATION: Abdominal pain. COMPARISON: Prior PET/CT scan on May 5, 2022.     TECHNIQUE: Axial CT images of the abdomen and pelvis were obtained after the  intravenous injection of 100 mL Isovue 370 CT contrast. Radiation dose reduction  techniques were used for this study. Our CT scanners use one or all of the  following:  Automated exposure control, adjustment of the mA or kV according to  patient size, iterative reconstruction. FINDINGS:    - Liver: Within normal limits. - Gallbladder and bile ducts: Within normal limits. - Spleen: Within normal limits. - Urinary tract: The left kidney is absent. The right kidney is atrophied and  there is a 2.6 cm exophytic cyst off its lower pole. No right-sided  hydronephrosis is seen. The urinary bladder is collapsed. - Adrenals: Within normal limits. - Pancreas: Within normal limits. - Gastrointestinal tract: There is colonic diverticulosis. No definite acute  diverticulitis is seen, although ascites limits for evaluation of focal  inflammation.  - Retroperitoneum: Mild abdominal aortic atherosclerosis, with no aneurysmal  dilatation or dissection. The right heart is enlarged, and there is prominent  contrast reflux into the IVC, raising the possibility of right heart failure. - Peritoneal cavity and abdominal wall: There is progressed mild ascites. No  intra-abdominal abscess or free intraperitoneal air is seen. Edema in the  abdominal wall subcutaneous tissue has not significantly changed. - Pelvis: There is a double-limbed bypass graft in the upper left thigh. An  adjacent 7.9 x 7.5 cm complex fluid collection with no associated gas is seen in  the upper anterior thigh, which could be an abscess or hematoma. A small amount  of high-density material along the inferior margin of the fluid collection  raises the possibility of active bleeding.  - Spine/bones: No acute process. - Other comments: Small bilateral pleural effusions and rounded atelectasis in  the left lower lobe are unchanged. Impression  1. Upper left thigh bypass graft, not fully visualized on this study, with an  adjacent 7.9 x 7.5 cm complex fluid collection which could represent an abscess  or hematoma.  There is a small amount of high density material along the inferior  margin of the fluid collection, making it difficult to exclude active bleeding. Dr. Emma Garcia verbally notified at 4:16 AM.  2. Persistent anasarca, with progressed mild ascites. 3. Prior left nephrectomy and atrophied right kidney. 4. Prominent sized hepatic veins and IVC in this patient with an enlarged right  heart, raising the possibility of right heart failure. Principal Problem:    Hematoma of left lower leg  Active Problems:    Back pain    Cardiac murmur    ESRD (end stage renal disease) on dialysis ([facility])    Type 2 diabetes mellitus with hyperglycemia ([facility])    AVF (arteriovenous fistula) ([facility])  Resolved Problems:    * No resolved hospital problems. *      Assessment:     1. Dialysis access complication - management per vascular surgery    2. ESRD- on HD M-W-F, No urgent needs at this time. Plan to run HD tomorrow and continue her current outpatient schedule.     3. Hypertension- with a hx of HTN, nut all antihypertensives were stopped since she developed profound hypotension following new left thigh AV graph and she was treated with Midodrine which was stopped last week       4. Anemia- likely related to ESRD, at goal for CKD, continue Nephrovite,treat with LAKISHA      5. Hypocalcemia- with low albumin, treat with Vitamin D     6. Renal cell carcinoma- followed by Oncology     7. DM II- on SSI, managed by primary     8. Hyperphosphatemia- hx of, on Renvela, continue    9.  Hyponatremia - excessive free water intake      Plan:     As above    Alecia Flowers M.D.

## 2022-06-26 NOTE — OP NOTE
Operative Note      Patient: Rosa Pires  YOB: 1950  MRN: [id number]    Date of Procedure: 6/26/2022    Pre-Op Diagnosis: Left groin expanding  hematoma, rest pain left foot     Post-Op Diagnosis: same       Procedure:   1- exploration left groin, evacuation of large hematoma  2- culture of hematoma  3- repair of bleeding graft to artery anastomosis  4- angiogram left leg with interpretation  5- application of prevena wound [id number]    Surgeon(s):  Joi Garcia MD    Assistant:   * No surgical staff found *    Anesthesia: General    Estimated Blood Loss (mL): 990KE    Complications: none    Specimens: none    Implants:none      Drains: none    Findings:   1- large hematoma overlying graft to artery anastomosis  2- serous merky fluid in mid thigh  3- active bleed ing form heel of graft to artery anastomosis  4- patent SFA, popliteal, and tibial vessels with mild diffuse disease. Detailed Description of Procedure:   Patient was brought to OR and given GET. Left leg was prepped and draped. Timeout was taken and site verified. The left thigh was very indurated with significant hematoma in proximal thigh and tense skin. Groin incision was reopened and taken down thru subcutaneous tissues. A large hematoma was present measuring 10 cm extending distally and posteriorly with active bleeding noted from behind the hematoma. . The hematoma was evacuated as well as a moderate size seroma whith murky fluid which was present in mid thigh. Culture was taken. Once the hematoma was evacuated it became apparent that the bleeding was coming from the heel of the anastomosis and once identified , the bleeding was repaired with once 6-0 prolene suture. That controlled the bleeding. The graft was patent with good thrill . The wound was irrigated with 1 l of saline. Angiography was warranted to evaluate for steal syndrome in view of the patient's left foot pain.    The arterial graft was cannulated with 18 pita angiocath and the graft clamped. The arteriogram was performed showing patent vessels as noted above. No significant stenosis was seen. Thee access site was repaired with a hemostatic suture. The wound was am;ply irrigated and closed in layers    A wound vac prevena was applied due to the high risk incision to prevent dehiscence. She tolerated the procedure and was transported to  in stable condition.   Electronically signed by Marissa Howard MD on 6/26/2022 at 10:05 AM

## 2022-06-26 NOTE — ANESTHESIA POSTPROCEDURE EVALUATION
Department of Anesthesiology  Postprocedure Note    Patient: Frankie Allen  MRN: [id number]  YOB: 1950  Date of evaluation: 6/26/2022      Procedure Summary     Date: 06/26/22 Room / Location: [facility] MAIN OR 12 / [facility] MAIN OR    Anesthesia Start: 9844 Anesthesia Stop: 1020    Procedure: LEFT GROIN DEBRIDEMENT OF HEMATOMA / Sterling Pillion (Left Groin) Diagnosis:       Hematoma      (Left groin hematoma)    Providers: Corie Perez MD Responsible Provider: Carolina Tafoya MD    Anesthesia Type: general ASA Status: 4          Anesthesia Type: No value filed.     Be Phase I: Be Score: 10    Be Phase II:        Anesthesia Post Evaluation    Patient location during evaluation: PACU  Patient participation: complete - patient participated  Level of consciousness: awake and alert  Airway patency: patent  Nausea & Vomiting: no nausea and no vomiting  Complications: no  Cardiovascular status: hemodynamically stable  Respiratory status: acceptable  Hydration status: euvolemic  Multimodal analgesia pain management approach

## 2022-06-26 NOTE — PROGRESS NOTES
TRANSFER - IN REPORT:    Verbal report received from Edna S Sarthak Christianson on [address]  being received from PACU for routine post-op      Report consisted of patient's Situation, Background, Assessment and   Recommendations(SBAR). Information from the following report(s) Surgery Report was reviewed with the receiving nurse. Opportunity for questions and clarification was provided. Assessment completed upon patient's arrival to unit and care assumed.  Waiting on transport

## 2022-06-26 NOTE — PROGRESS NOTES
Hospitalist Progress Note   Admit Date:  2022  2:37 AM   Name:  Jie Pires   Age:  80 y.o. Sex:  female  :  1950   MRN:  [id number]   Room:  [facility]/    Presenting Complaint: Flank Pain     Reason(s) for Admission: Back pain [M54.9]  Soft tissue swelling [R22.9]  End stage renal disease on dialysis ([address] [N18.6, Z99.2]  Acute left flank pain [R10.9]     Hospital Course & Interval History:   73-year-old AA emale with past medical history of hypertension, diabetes, end-stage renal disease on hemodialysis, who presented to the ER c/o left thigh pain. The patient states that she was in her usual state of health until yesterday when she started presenting with left lateral abd wall and left thigh pain. Her symptoms did not improve so she decided to come to the emergency department. She recently had an AV graft placed in her left thigh (May 26th) as her previous AVF sites b/l UE have failed. In the emergency department the patient was found to be tachycardic with radiologic findings concerning of upper left thigh fluid collection setting of abscess or hematoma. She was given IV Dilaudid. Otherwise she denies any fever or chills, no shortness of breath, no cough, no nausea vomiting or diarrhea. Pt was admitted for further medical mgmt.      Subjective/24hr Events (22): \"I just got surgery in my leg (LLE); it still hurts and it's still swollen. \"  69y.o. AA female laying in bed c/o LLE pain    Admits to LLE pain, denies dyspnea, cough, wheezing, chest pain      Assessment & Plan:     Principal Problem:    Left groin hematoma  - appreciate surgery assistance, s/p evacuation today by Dr. Ramón Chou  - also noted was repair of bleeding AV graft with angiogram and placement of Prevent wound VAC to left groin incision  - cultures sent, cont empiric abx  - prn pain rx  - h/h ~stable, closely monitor    Active Problems:    Remote AVF placement LLE  - mgmt as above      ESRD  - HD M/W/F  - nephrology evaluation pending      Type 2 diabetes mellitus with hyperglycemia (HCC)  - cont sliding scale coverage as needed  - acceptable ranges      Hx of RCC  - s/p left nephrectomy  - pt reports to following with Dr. Kasandra Caruso routinely for annual PET scans      Cardiac murmur  - no echo on file; will check echo    AVF (arteriovenous fistula) ([address]      Debility  - PT/OT eval pending      Discharge Planning:    - anticipate at least 2 midnight hospitalization    Diet:  ADULT DIET; Regular; 4 carb choices (60 gm/meal); Low Sodium (2 gm); Low Potassium (Less than 3000 mg/day); Low Phosphorus (Less than 1000 mg)  DVT PPx: SCD  Code status: Full Code    Hospital Problems:  Principal Problem:    Back pain  Active Problems:    ESRD (end stage renal disease) on dialysis (HCC)    Type 2 diabetes mellitus with hyperglycemia (HCC)    AVF (arteriovenous fistula) ([facility])  Resolved Problems:    * No resolved hospital problems.  *      Objective:     Patient Vitals for the past 24 hrs:   Temp Pulse Resp BP SpO2   06/26/22 1151 97.3 °F (36.3 °C) 75 20 128/77 --   06/26/22 1119 -- 80 18 (!) 141/61 98 %   06/26/22 1114 -- 80 18 137/60 98 %   06/26/22 1109 97.8 °F (36.6 °C) 80 18 126/63 98 %   06/26/22 1104 -- 81 18 (!) 124/58 98 %   06/26/22 1054 -- 83 18 134/63 96 %   06/26/22 1049 -- 81 18 135/63 97 %   06/26/22 1044 -- 82 18 134/61 97 %   06/26/22 1039 -- 82 18 (!) 145/63 98 %   06/26/22 1034 -- 83 18 139/66 99 %   06/26/22 1029 -- 83 18 132/60 99 %   06/26/22 1024 -- 81 18 (!) 142/65 98 %   06/26/22 1021 -- 82 20 (!) 153/65 97 %   06/26/22 1019 97.8 °F (36.6 °C) 82 20 (!) 158/66 96 %   06/26/22 0639 -- -- -- -- 94 %   06/26/22 0637 98.3 °F (36.8 °C) 91 18 (!) 125/58 (!) 86 %   06/26/22 0519 98.2 °F (36.8 °C) 85 18 133/66 97 %   06/25/22 2353 97.9 °F (36.6 °C) 81 18 (!) 135/53 96 %   06/25/22 1947 98.4 °F (36.9 °C) (!) 110 16 139/65 94 %   06/25/22 1619 97.5 °F (36.4 °C) 91 12 (!) 148/71 100 %       Oxygen Therapy  SpO2: 98 %  Pulse via Oximetry: 100 beats per minute  Pulse Oximeter Device Mode: Continuous  Pulse Oximeter Device Location: Right  O2 Device: Nasal cannula  O2 Flow Rate (L/min): 3 L/min    Estimated body mass index is 33.42 kg/m² as calculated from the following:    Height as of this encounter: 5' 4\" (1.626 m). Weight as of this encounter: 194 lb 11.2 oz (88.3 kg). Intake/Output Summary (Last 24 hours) at 6/26/2022 1502  Last data filed at 6/26/2022 1020  Gross per 24 hour   Intake 200 ml   Output 200 ml   Net 0 ml         Physical Exam:     Blood pressure 128/77, pulse 75, temperature 97.3 °F (36.3 °C), temperature source Axillary, resp. rate 20, height 5' 4\" (1.626 m), weight 194 lb 11.2 oz (88.3 kg), SpO2 98 %. General:    Obese, well developed, in NAD. Head:  Normocephalic, atraumatic  Eyes:  Sclerae appear normal.  Pupils equally round. ENT:  Nares appear normal, no drainage. Moist oral mucosa  Neck:  No restricted ROM. Trachea midline   Chest:   Right anterior HD access without bleeding / oozing  CV:   RRR. Harsh pan systolic murmur  Lungs:   CTAB. No wheezing, rhonchi, or rales. Respirations even, unlabored  Abdomen: Bowel sounds present. Soft, nontender, nondistended. +BSx4  Extremities: Left medial thigh with vac in place, tender to palpation, harder to touch with associated edema; + mvmt x 4  Skin:     Warm and dry. Neuro:  CN II-XII grossly intact. Sensation intact. A&Ox3  Psych:  Normal mood and affect.       I have reviewed ordered lab tests and independently visualized imaging below:    Recent Labs:  Recent Results (from the past 48 hour(s))   CBC with Auto Differential    Collection Time: 06/25/22  2:59 AM   Result Value Ref Range    WBC 8.2 4.3 - 11.1 K/uL    RBC 3.06 (L) 4.05 - 5.2 M/uL    Hemoglobin 10.1 (L) 11.7 - 15.4 g/dL    Hematocrit 29.6 (L) 35.8 - 46.3 %    MCV 96.7 79.6 - 97.8 FL    MCH 33.0 (H) 26.1 - 32.9 PG    MCHC 34.1 31.4 - 35.0 g/dL    RDW 17.9 (H) 11.9 - 14.6 % Platelets 950 497 - 964 K/uL    MPV 10.9 9.4 - 12.3 FL    nRBC 0.03 0.0 - 0.2 K/uL    Differential Type AUTOMATED      Seg Neutrophils 78 43 - 78 %    Lymphocytes 9 (L) 13 - 44 %    Monocytes 10 4.0 - 12.0 %    Eosinophils % 1 0.5 - 7.8 %    Basophils 1 0.0 - 2.0 %    Immature Granulocytes 1 0.0 - 5.0 %    Segs Absolute 6.5 1.7 - 8.2 K/UL    Absolute Lymph # 0.8 0.5 - 4.6 K/UL    Absolute Mono # 0.8 0.1 - 1.3 K/UL    Absolute Eos # 0.0 0.0 - 0.8 K/UL    Basophils Absolute 0.1 0.0 - 0.2 K/UL    Absolute Immature Granulocyte 0.1 0.0 - 0.5 K/UL   Comprehensive Metabolic Panel    Collection Time: 06/25/22  2:59 AM   Result Value Ref Range    Sodium 135 (L) 136 - 145 mmol/L    Potassium 4.5 3.5 - 5.1 mmol/L    Chloride 98 98 - 107 mmol/L    CO2 28 21 - 32 mmol/L    Anion Gap 9 7 - 16 mmol/L    Glucose 146 (H) 65 - 100 mg/dL    BUN 17 8 - 23 MG/DL    CREATININE 5.00 (H) 0.6 - 1.0 MG/DL    GFR African American 11 (L) >60 ml/min/1.73m2    GFR Non- 9 (L) >60 ml/min/1.73m2    Calcium 8.4 8.3 - 10.4 MG/DL    Total Bilirubin 1.9 (H) 0.2 - 1.1 MG/DL    ALT 17 12 - 65 U/L    AST 65 (H) 15 - 37 U/L    Alk Phosphatase 105 50 - 136 U/L    Total Protein 8.1 6.3 - 8.2 g/dL    Albumin 3.9 3.2 - 4.6 g/dL    Globulin 4.2 (H) 2.3 - 3.5 g/dL    Albumin/Globulin Ratio 0.9 (L) 1.2 - 3.5     Magnesium    Collection Time: 06/25/22  2:59 AM   Result Value Ref Range    Magnesium 2.2 1.8 - 2.4 mg/dL   Lipase    Collection Time: 06/25/22  2:59 AM   Result Value Ref Range    Lipase 285 73 - 393 U/L   Hemoglobin A1c    Collection Time: 06/25/22  2:59 AM   Result Value Ref Range    Hemoglobin A1C 5.1 4.20 - 6.30 %    eAG 100 mg/dL   Lactic Acid    Collection Time: 06/25/22  9:53 AM   Result Value Ref Range    Lactic Acid, Plasma 1.8 0.4 - 2.0 MMOL/L   PREPARE RBC (CROSSMATCH), 2 Units    Collection Time: 06/25/22 10:30 AM   Result Value Ref Range    History Check Historical check performed    Culture, Blood 1    Collection Time: 06/25/22 10:51 AM    Specimen: Blood   Result Value Ref Range    Special Requests LEFT  FOREARM        Culture NO GROWTH 1 DAY     TYPE AND SCREEN    Collection Time: 06/25/22 11:00 AM   Result Value Ref Range    Crossmatch expiration date 06/28/2022,9507     ABO/Rh O NEGATIVE     Antibody Screen NEG     Unit Number [id number]     Product Code Blood Bank RC LR     Unit Divison 00     Dispense Status Blood Bank ALLOCATED     Crossmatch Result Compatible     Unit Number [id number]     Product Code Blood Bank RC LR     Unit Divison 00     Dispense Status Blood Bank ALLOCATED     Crossmatch Result Compatible    Culture, Blood 1    Collection Time: 06/25/22 11:02 AM    Specimen: Blood   Result Value Ref Range    Special Requests RIGHT  Antecubital        Culture NO GROWTH 1 DAY     POCT Glucose    Collection Time: 06/25/22  5:50 PM   Result Value Ref Range    POC Glucose 135 (H) 65 - 100 mg/dL    Performed by: Jojo    Hemoglobin    Collection Time: 06/25/22  7:06 PM   Result Value Ref Range    Hemoglobin 10.5 (L) 11.7 - 15.4 g/dL   POCT Glucose    Collection Time: 06/25/22  9:12 PM   Result Value Ref Range    POC Glucose 125 (H) 65 - 100 mg/dL    Performed by: Octavia Cooler    POCT Glucose    Collection Time: 06/26/22  6:08 AM   Result Value Ref Range    POC Glucose 123 (H) 65 - 100 mg/dL    Performed by: Sahara    CBC with Auto Differential    Collection Time: 06/26/22  7:08 AM   Result Value Ref Range    WBC 8.2 4.3 - 11.1 K/uL    RBC 3.01 (L) 4.05 - 5.2 M/uL    Hemoglobin 10.1 (L) 11.7 - 15.4 g/dL    Hematocrit 29.3 (L) 35.8 - 46.3 %    MCV 97.3 79.6 - 97.8 FL    MCH 33.6 (H) 26.1 - 32.9 PG    MCHC 34.5 31.4 - 35.0 g/dL    RDW 17.9 (H) 11.9 - 14.6 %    Platelets 840 (L) 759 - 450 K/uL    MPV 10.9 9.4 - 12.3 FL    nRBC 0.04 0.0 - 0.2 K/uL    Differential Type AUTOMATED      Seg Neutrophils 80 (H) 43 - 78 %    Lymphocytes 10 (L) 13 - 44 %    Monocytes 8 4.0 - 12.0 %    Eosinophils % 2 0.5 - 7.8 % Basophils 1 0.0 - 2.0 %    Immature Granulocytes 0 0.0 - 5.0 %    Segs Absolute 6.5 1.7 - 8.2 K/UL    Absolute Lymph # 0.8 0.5 - 4.6 K/UL    Absolute Mono # 0.7 0.1 - 1.3 K/UL    Absolute Eos # 0.1 0.0 - 0.8 K/UL    Basophils Absolute 0.1 0.0 - 0.2 K/UL    Absolute Immature Granulocyte 0.0 0.0 - 0.5 K/UL   Basic Metabolic Panel    Collection Time: 06/26/22  7:08 AM   Result Value Ref Range    Sodium 133 (L) 136 - 145 mmol/L    Potassium 4.1 3.5 - 5.1 mmol/L    Chloride 98 98 - 107 mmol/L    CO2 27 21 - 32 mmol/L    Anion Gap 8 7 - 16 mmol/L    Glucose 132 (H) 65 - 100 mg/dL    BUN 31 (H) 8 - 23 MG/DL    CREATININE 6.00 (H) 0.6 - 1.0 MG/DL    GFR African American 9 (L) >60 ml/min/1.73m2    GFR Non- 7 (L) >60 ml/min/1.73m2    Calcium 7.9 (L) 8.3 - 10.4 MG/DL   POCT Glucose    Collection Time: 06/26/22 12:35 PM   Result Value Ref Range    POC Glucose 145 (H) 65 - 100 mg/dL    Performed by: Castañeda (Morris)        Other Studies:  CT ABDOMEN PELVIS W IV CONTRAST Additional Contrast? None   Final Result   1. Upper left thigh bypass graft, not fully visualized on this study, with an   adjacent 7.9 x 7.5 cm complex fluid collection which could represent an abscess   or hematoma. There is a small amount of high density material along the inferior   margin of the fluid collection, making it difficult to exclude active bleeding. Dr. Machelle Rebolledo verbally notified at 4:16 AM.   2. Persistent anasarca, with progressed mild ascites. 3. Prior left nephrectomy and atrophied right kidney. 4. Prominent sized hepatic veins and IVC in this patient with an enlarged right   heart, raising the possibility of right heart failure.          IR ANGIOGRAM EXTREMITY LEFT    (Results Pending)       Current Meds:  Current Facility-Administered Medications   Medication Dose Route Frequency    HYDROmorphone HCl PF (DILAUDID) injection 1 mg  1 mg IntraVENous Q4H PRN    oxyCODONE-acetaminophen (PERCOCET) 5-325 MG per tablet 1 tablet  1 tablet Oral Q4H PRN    pantoprazole (PROTONIX) tablet 40 mg  40 mg Oral QAM AC    sevelamer (RENVELA) tablet 3,200 mg  3,200 mg Oral TID WC    sodium chloride flush 0.9 % injection 5-40 mL  5-40 mL IntraVENous 2 times per day    sodium chloride flush 0.9 % injection 5-40 mL  5-40 mL IntraVENous PRN    0.9 % sodium chloride infusion   IntraVENous PRN    ondansetron (ZOFRAN-ODT) disintegrating tablet 4 mg  4 mg Oral Q8H PRN    Or    ondansetron (ZOFRAN) injection 4 mg  4 mg IntraVENous Q6H PRN    polyethylene glycol (GLYCOLAX) packet 17 g  17 g Oral Daily PRN    acetaminophen (TYLENOL) tablet 650 mg  650 mg Oral Q6H PRN    Or    acetaminophen (TYLENOL) suppository 650 mg  650 mg Rectal Q6H PRN    HYDROmorphone HCl PF (DILAUDID) injection 0.5 mg  0.5 mg IntraVENous Q4H PRN    HYDROmorphone HCl PF (DILAUDID) injection 0.25 mg  0.25 mg IntraVENous Q4H PRN    cefTRIAXone (ROCEPHIN) 1000 mg IVPB in NS 50ml minibag  1,000 mg IntraVENous Q24H    metronidazole (FLAGYL) 500 mg in 0.9% NaCl 100 mL IVPB premix  500 mg IntraVENous Q12H    linezolid (ZYVOX) IVPB 600 mg  600 mg IntraVENous Q12H    0.9 % sodium chloride infusion   IntraVENous PRN    lidocaine 4 % external patch 1 patch  1 patch TransDERmal Daily    glucose chewable tablet 16 g  4 tablet Oral PRN    dextrose bolus 10% 125 mL  125 mL IntraVENous PRN    Or    dextrose bolus 10% 250 mL  250 mL IntraVENous PRN    glucagon injection 1 mg  1 mg IntraMUSCular PRN    dextrose 5 % solution  100 mL/hr IntraVENous PRN    insulin lispro (HUMALOG) injection vial 0-8 Units  0-8 Units SubCUTAneous TID WC    insulin lispro (HUMALOG) injection vial 0-4 Units  0-4 Units SubCUTAneous Nightly       Signed:  Kizzy Calvert, [address]    Part of this note may have been written by using a voice dictation software. The note has been proof read but may still contain some grammatical/other typographical errors.

## 2022-06-26 NOTE — ANESTHESIA PRE PROCEDURE
Department of Anesthesiology  Preprocedure Note       Name:  Dhaval Banda   Age:  80 y.o.  :  1950                                          MRN:  [id number]         Date:  2022      Surgeon: Dario Lopes):  Feliciano Portillo MD    Procedure: Procedure(s):  LEFT GROIN DEBRIDEMENT OF HEMATOMA / MIGEL    Medications prior to admission:   Prior to Admission medications    Medication Sig Start Date End Date Taking?  Authorizing Provider   naloxone 4 MG/0.1ML LIQD nasal spray 1 spray by Nasal route as needed for Opioid Reversal.    Historical Provider, MD   midodrine (PROAMATINE) 10 MG tablet Take 1 tablet by mouth every 8 hours 22   Allen Corona MD   acetaminophen (TYLENOL) 500 MG tablet Take 500 mg by mouth every 6 hours as needed for Pain    Historical Provider, MD   omeprazole (PRILOSEC) 40 MG delayed release capsule Take 40 mg by mouth at bedtime    Historical Provider, MD   OXYGEN Inhale into the lungs 2 LITERS AS NEEDED FOR SOB    Historical Provider, MD   SITagliptin (JANUVIA) 50 MG tablet Take 50 mg by mouth daily    Historical Provider, MD   sevelamer (RENVELA) 800 MG tablet Take 4 tablets by mouth 3 times daily (with meals) PT TAKES 4 TABS WITH MEALS-- AND 1 TABS WITH SNACKS     Historical Provider, MD       Current medications:    Current Facility-Administered Medications   Medication Dose Route Frequency Provider Last Rate Last Admin    lidocaine PF 1 % injection 1 mL  1 mL IntraDERmal Once PRN Nereyda Alvarado MD        lactated ringers infusion   IntraVENous Continuous Nereyda Alvarado MD        sodium chloride flush 0.9 % injection 5-40 mL  5-40 mL IntraVENous 2 times per day Nereyda Alvarado MD        sodium chloride flush 0.9 % injection 5-40 mL  5-40 mL IntraVENous PRN Nereyda Alvarado MD        0.9 % sodium chloride infusion   IntraVENous PRN Nereyda Alvarado MD 5 mL/hr at 22 0719 New Bag at 22 0719    pantoprazole (PROTONIX) tablet 40 mg  40 mg Oral KHADRA Haines Pop Huber MD   40 mg at 06/26/22 2622    sevelamer (RENVELA) tablet 3,200 mg  3,200 mg Oral TID WC Chelsy Anaya MD   3,200 mg at 06/25/22 1710    sodium chloride flush 0.9 % injection 5-40 mL  5-40 mL IntraVENous 2 times per day Chelsy Anaya MD   10 mL at 06/25/22 2224    sodium chloride flush 0.9 % injection 5-40 mL  5-40 mL IntraVENous PRN Chelsy Anaya MD        0.9 % sodium chloride infusion   IntraVENous PRN Chelsy Anaya MD        ondansetron (ZOFRAN-ODT) disintegrating tablet 4 mg  4 mg Oral Q8H PRN Chelsy Anaya MD        Or    ondansetron [facility]) injection 4 mg  4 mg IntraVENous Q6H PRN Chelsy Anaya MD        polyethylene glycol [facility]) packet 17 g  17 g Oral Daily PRN Chelsy Anaya MD        acetaminophen (TYLENOL) tablet 650 mg  650 mg Oral Q6H PRN Chelsy Anaya MD   650 mg at 06/25/22 1742    Or    acetaminophen (TYLENOL) suppository 650 mg  650 mg Rectal Q6H PRN Chelsy Anaya MD        HYDROmorphone HCl PF (DILAUDID) injection 0.5 mg  0.5 mg IntraVENous Q4H PRN Chelsy Anaya MD   0.5 mg at 06/26/22 0622    HYDROmorphone HCl PF (DILAUDID) injection 0.25 mg  0.25 mg IntraVENous Q4H PRN Chelsy Anaya MD        cefTRIAXone (ROCEPHIN) 1000 mg IVPB in NS 50ml minibag  1,000 mg IntraVENous Q24H Chelsy Anaya MD   Stopped at 06/25/22 1140    metronidazole (FLAGYL) 500 mg in 0.9% NaCl 100 mL IVPB premix  500 mg IntraVENous Q12H Chelsy Anaya MD   Stopped at 06/25/22 2224    linezolid (ZYVOX) IVPB 600 mg  600 mg IntraVENous Q12H Chelsy Anaya  mL/hr at 06/26/22 0711 600 mg at 06/26/22 0711    0.9 % sodium chloride infusion   IntraVENous PRN Jose Francisco Mcpherson MD        lidocaine 4 % external patch 1 patch  1 patch TransDERmal Daily Chelsy Anaya MD   1 patch at 06/25/22 1711    glucose chewable tablet 16 g  4 tablet Oral PRN Chelsy Anaya MD        dextrose bolus 10% 125 mL  125 mL IntraVENous PRN Mani Narvaez MD        Or    dextrose bolus 10% 250 mL  250 mL IntraVENous PRN Mani Narvaez MD        glucagon injection 1 mg  1 mg IntraMUSCular PRN Mani Narvaez MD        dextrose 5 % solution  100 mL/hr IntraVENous PRN Mani Narvaez MD        insulin lispro (HUMALOG) injection vial 0-8 Units  0-8 Units SubCUTAneous TID WC Mani Narvaez MD        insulin lispro (HUMALOG) injection vial 0-4 Units  0-4 Units SubCUTAneous Nightly Mani Narvaez MD           Allergies: Allergies   Allergen Reactions    Pcn [Penicillins] Other (See Comments)     PT STATES UNSURE OF RX    Vancomycin Rash       Problem List:    Patient Active Problem List   Diagnosis Code    Renal cell carcinoma (HCC) C64.9    Osteoarthritis of right knee M17.11    Severe obesity ([address] E66.01    HTN (hypertension) I10    ESRD (end stage renal disease) on dialysis ([address] N18.6, Z99.2    Type 2 diabetes mellitus with hyperglycemia ([address] E11.65    Chronic respiratory failure with hypoxia ([address] J96.11    AVF (arteriovenous fistula) ([facility]) I77.0    Total knee replacement status Z96.659    Type 2 diabetes mellitus with nephropathy ([facility]) E11.21    Thrombocytopenia ([facility]) D69.6    Generalized weakness R53.1    CKD (chronic kidney disease) stage 4, GFR 15-29 ml/min ([facility]) N18.4    Back pain M54.9       Past Medical History:        Diagnosis Date    Anemia     Arthritis     AVF (arteriovenous fistula) ([facility])     left    AVF (arteriovenous fistula) ([facility] 12/20/2016 12/6/16 ([facility]) Right AVF revision and thrombectomy    Degenerative joint disease     Diabetes ([address]     type 2--- does not check sqbs at home    Enlarged lymph node     \"enlarging paratracheal node to 2.6 cm on CT chest\"    ESRD on hemodialysis ([address] onset 2006    ESRD.  MWF dialysis at [facility]    Hypertension     controlled with med    Mediastinal mass     being followed by dr ramírez--per pt-- states told by dr Gates Press it wasnt cancer-- but plans to be rescanned 5/2022    Obesity     On home O2     2L QHS and PRN during day d/t \"lymph node in chest\"    Renal cancer ([facility]     Dr Opal Finley - renal cancer L nephrectomy---and radiation ---    Shortness of breath     swollen lymphnode in chest-- oxygen 2LPM    Transient ischemic attack 08/29/2015    no residual       Past Surgical History:        Procedure Laterality Date    BREAST SURGERY Right     cyst removed    COLONOSCOPY N/A 11/8/2018    COLONOSCOPY  BMI 36 performed by Clayton Beck MD at [address] [facility]  9/14/2017    CT BIOPSY ABDOMEN RETROPERITONEUM 9/14/2017 [facility] CT SCAN    GI  08/06/2018    exploratory laparotomy    GI  06/01/2002    colon resection resulting in temporary colostomy reversal    GYN      mihir    IR INTRO CATH DIALYSIS CIRCUIT W BALLOON PTA  3/10/2020    IR INTRO CATH DIALYSIS CIRCUIT W [facility] PTA  12/5/2019    IR INTRO CATH DIALYSIS CIRCUIT W [facility] PTA  9/3/2019    IR INTRO CATH DIALYSIS CIRCUIT W [facility] PTA  5/30/2019    IR INTRO CATH DIALYSIS CIRCUIT W [facility] PTA  2/28/2019    IR THRMB/INFUSION DIAYSIS CIRCUIT Left 2021    IR TUNNELED CATHETER PLACEMENT GREATER THAN 5 YEARS  3/15/2022    IR TUNNELED CATHETER PLACEMENT GREATER THAN 5 YEARS  11/19/2021    IR TUNNELED CATHETER PLACEMENT GREATER THAN 5 YEARS  11/19/2021    IR TUNNELED CATHETER PLACEMENT GREATER THAN 5 YEARS 11/19/2021 [facility] SPECIALS    IR TUNNELED CATHETER PLACEMENT GREATER THAN 5 YEARS  3/15/2022    IR TUNNELED CATHETER PLACEMENT GREATER THAN 5 YEARS 3/15/2022 [facility] SPECIALS    OTHER SURGICAL HISTORY      dialysis fistula, several permcaths    UROLOGICAL SURGERY Left July 2015    nephrectomy    VASCULAR SURGERY Left 04/07/2022    LEFT ARM AV GRAFT    VASCULAR SURGERY Left 03/15/2022    declot    VASCULAR SURGERY Left 02/21/2022    Open thromboembolectomy of left upper arm graft.     VASCULAR SURGERY Right     AV graft- states no longer uses    VASCULAR SURGERY Left 5/26/2022    LEFT AV GRAFT THIGH performed by John Oliva MD at [facility] MA[facility] OR       Social History:    Social History     Tobacco Use    Smoking status: Never Smoker    Smokeless tobacco: Never Used   Substance Use Topics    Alcohol use: No                                Counseling given: Not Answered      Vital Signs (Current):   Vitals:    06/25/22 2353 06/26/22 0519 06/26/22 0637 06/26/22 0639   BP: (!) 135/53 133/66 (!) 125/58    Pulse: 81 85 91    Resp: 18 18 18    Temp: 97.9 °F (36.6 °C) 98.2 °F (36.8 °C) 98.3 °F (36.8 °C)    TempSrc: Oral Oral Oral    SpO2: 96% 97% (!) 86% 94%   Weight:  194 lb 11.2 oz (88.3 kg)     Height:                                                  BP Readings from Last 3 Encounters:   06/26/22 (!) 125/58   06/21/22 119/69   06/21/22 118/74       NPO Status: Time of last liquid consumption: 1800                        Time of last solid consumption: 1800                        Date of last liquid consumption: 06/25/22                        Date of last solid food consumption: 06/25/22    BMI:   Wt Readings from Last 3 Encounters:   06/26/22 194 lb 11.2 oz (88.3 kg)   06/05/22 203 lb 9.6 oz (92.4 kg)   05/26/22 187 lb 6.4 oz (85 kg)     Body mass index is 33.42 kg/m².     CBC:   Lab Results   Component Value Date    WBC 8.2 06/26/2022    RBC 3.01 06/26/2022    HGB 10.1 06/26/2022    HCT 29.3 06/26/2022    MCV 97.3 06/26/2022    RDW 17.9 06/26/2022     06/26/2022       CMP:   Lab Results   Component Value Date     06/26/2022    K 4.1 06/26/2022    CL 98 06/26/2022    CO2 27 06/26/2022    BUN 31 06/26/2022    CREATININE 6.00 06/26/2022    GFRAA 9 06/26/2022    AGRATIO 1.0 05/13/2022    LABGLOM 7 06/26/2022    GLUCOSE 132 06/26/2022    PROT 8.1 06/25/2022    CALCIUM 7.9 06/26/2022    BILITOT 1.9 06/25/2022    ALKPHOS 105 06/25/2022    ALKPHOS 95 05/13/2022    AST 65 06/25/2022    ALT 17 06/25/2022 POC Tests:   Recent Labs     06/26/22  0608   POCGLU 123*       Coags: No results found for: PROTIME, INR, APTT    HCG (If Applicable): No results found for: PREGTESTUR, PREGSERUM, HCG, HCGQUANT     ABGs: No results found for: PHART, PO2ART, IDU1WEA, OBF1HJW, BEART, G5FQVAFF     Type & Screen (If Applicable):  No results found for: LABABO, LABRH    Drug/Infectious Status (If Applicable):  No results found for: HIV, HEPCAB    COVID-19 Screening (If Applicable):   Lab Results   Component Value Date    COVID19 Not detected 02/22/2022           Anesthesia Evaluation  Patient summary reviewed and Nursing notes reviewed  Airway: Mallampati: II  TM distance: >3 FB   Neck ROM: full  Mouth opening: > = 3 FB   Dental:    (+) edentulous      Pulmonary: breath sounds clear to auscultation  (+) shortness of breath (O2 (essentially continuously)): chronic,                             Cardiovascular:  Exercise tolerance: poor (<4 METS),   (+) hypertension:,         Rhythm: regular  Rate: normal                    Neuro/Psych:   (+) TIA,             GI/Hepatic/Renal:   (+) GERD:, renal disease (last dialyzed 2 days): ESRD and dialysis,           Endo/Other:    (+) DiabetesType II DM, well controlled, using insulin, . Abdominal:             Vascular: negative vascular ROS. Other Findings:           Anesthesia Plan      general     ASA 4       Induction: intravenous. Anesthetic plan and risks discussed with patient. Use of blood products discussed with patient whom consented to blood products.                      Baldomero Freeman MD   6/26/2022

## 2022-06-26 NOTE — PERIOP NOTE
TRANSFER - OUT REPORT:    Verbal report given to [facility] on [address]  being transferred to [facility] for routine post-op       Report consisted of patients Situation, Background, Assessment and   Recommendations(SBAR). Information from the following report(s) Nurse Handoff Report, Adult Overview, Surgery Report, Intake/Output and MAR was reviewed with the receiving nurse. Lines:   Peripheral IV 06/25/22 Right Hand (Active)   Site Assessment Clean, dry & intact 06/26/22 1109   Line Status Flushed 06/26/22 1109   Line Care Connections checked and tightened 06/26/22 1109   Phlebitis Assessment No symptoms 06/26/22 1109   Infiltration Assessment 0 06/26/22 1109   Alcohol Cap Used No 06/26/22 1109   Dressing Status Clean, dry & intact 06/26/22 1109   Dressing Type Transparent 06/26/22 1109   Dressing Intervention New 06/25/22 0300       Peripheral IV 06/26/22 Right;Posterior Wrist (Active)   Site Assessment Clean, dry & intact 06/26/22 1109   Line Status Infusing 06/26/22 [address] Connections checked and tightened 06/26/22 1109   Phlebitis Assessment No symptoms 06/26/22 1109   Infiltration Assessment 0 06/26/22 1109   Alcohol Cap Used No 06/26/22 1109   Dressing Status New dressing applied;Clean, dry & intact 06/26/22 1109   Dressing Type Transparent 06/26/22 1109   Dressing Intervention New 06/26/22 0720        Opportunity for questions and clarification was provided. Patient transported with:   O2 @ 3 liters    VTE prophylaxis orders have been written for Qoniac. Patient and family given floor number and nurses name. Family updated re: pt status after security code verified. Please place SCD's per Yesenia Antonio.

## 2022-06-26 NOTE — PROGRESS NOTES
Call from staff to visit patient     She was anxious    Provided calming presence along with her wonderful nurse    Provided prayer at the bedside    Will follow up with patient as her care continues

## 2022-06-26 NOTE — PROGRESS NOTES
Pt taken to Pre Op by RN and Tech. Pt's antibiotics Rocephin, Flagyl, and Zyvox taken with pt to PreOp per PreOp Nurse request. Pt was medicated for pain prior to going to PreOp. Report was given to [address].

## 2022-06-26 NOTE — PROGRESS NOTES
TRANSFER - OUT REPORT:    Verbal report given to [facility] RN on [address]  being transferred to PreOp for ordered procedure       Report consisted of patient's Situation, Background, Assessment and   Recommendations(SBAR). Information from the following report(s) Nurse Handoff Report was reviewed with the receiving nurse. Lines:   Peripheral IV 06/25/22 Right Hand (Active)   Site Assessment Clean, dry & intact 06/26/22 0320   Line Status Infusing 06/26/22 0320   Line Care Connections checked and tightened 06/26/22 0320   Phlebitis Assessment No symptoms 06/26/22 0320   Infiltration Assessment 0 06/26/22 0320   Alcohol Cap Used No 06/26/22 0320   Dressing Status Clean, dry & intact 06/26/22 0320   Dressing Type Transparent 06/26/22 0320   Dressing Intervention New 06/25/22 0300       Hemodialysis Central Access Right Subclavian (Active)   Continued need for line? Yes 06/26/22 0320   Site Assessment Clean, dry & intact 06/26/22 0320   CVC Lumen Status Flushed; Infusing 06/03/22 0654   Venous Lumen Status Capped 06/26/22 0320   Arterial Lumen Status Capped 06/26/22 0320   Alcohol Cap Used No 06/03/22 0654   Line Care Connections checked and tightened; Chlorhexidine wipes;Ports disinfected 06/03/22 0654   Dressing Type 4 X 4;Transparent 06/26/22 0320   Date of Last Dressing Change 06/01/22 06/03/22 0654   Dressing Status Clean, dry & intact 06/26/22 0320   Dressing Intervention Dressing changed 06/03/22 0654        Opportunity for questions and clarification was provided.       Patient transported with: RN, and Tech

## 2022-06-27 NOTE — CARE COORDINATION
Chart reviewed by CM. Patient known to CM from previous care and recently discharged 6/5 with [address] Patient readmitted through ED with Hematoma of left lower leg. Readmission assessment completed. Patient lives with her daughter Veena England [phone]. According to the patient's daughter, the patient is independent with completing ADL's and drives. Patient is able to ambulate with the use of an assistive device such as a walker. Home oxygen confirmed and supplied by [facility] Patient [facility] MWF @5:00am.     Preferred Pharmacy: [facility] [address]. No challenges voiced with obtaining medications in the community. Discharge planning: Daughter anticipates the patient returning home with family and [address]  13. resumed. Therapy evaluation pending. No additional discharge needs identified or voiced at this time. CM continues to follow care plan.          06/27/22 3496   Service Assessment   Patient Orientation Alert and Oriented   Cognition Alert   History Provided By Child/Family   Support Systems Children;Family Members   PCP Verified by CM Yes   Last Visit to PCP Within last 3 months   Prior Functional Level Independent in ADLs/IADLs   Can patient return to prior living arrangement Yes   Ability to make needs known: Other (see comment)  (unable to assess, patient off the floor.)   Family able to assist with home care needs: Yes   Social/Functional History   Lives With Family;Daughter   Home Layout Two level   [address]; Walker, standard  (walking stick.)   Receives Help From [email or web address]   ADL Assistance Independent   Active  Yes   Mode of Transportation Car   Discharge Planning   Potential Assistance Purchasing Medications No   Patient expects to be discharged to: [facility] Discharge   Transition of Care Consult (CM Consult) [facility] Discharge Home [facility] Information Provided? No   Mode of Transport at Discharge Other (see comment)  (Family.)   Confirm Follow Up Transport Self   Condition of Participation: Discharge Planning   The Plan for Transition of Care is related to the following treatment goals: Return to baseline.

## 2022-06-27 NOTE — DIALYSIS
TRANSFER OUT- DIALYSIS    Hemodialysis treatment completed. PT ran 3.5 hours, without complications. Patient alert and VS stable  /43  P 78       3 Kg removed. Flushed both ports with 10 mL of NS.  CVC dressing clean, dry, and intact, tego caps intact, curos caps placed. Meds given: 00. RBCs given during dialysis: 0    Patient to 636 after dialysis.

## 2022-06-27 NOTE — PROGRESS NOTES
[address] FAX: [phone]         VASCULAR SURGERY FLOOR PROGRESS NOTE    Admit Date: 2022  POD: 1 Day Post-Op    Procedure:  Procedure(s):  LEFT GROIN DEBRIDEMENT OF HEMATOMA / MIGEL    Subjective:     Patient has no new complaints. Objective:     Vitals:  Blood pressure (!) 108/59, pulse 88, temperature 97.5 °F (36.4 °C), temperature source Oral, resp. rate 17, height 5' 4\" (1.626 m), weight 199 lb 6.4 oz (90.4 kg), SpO2 96 %. Temp (24hrs), Av.8 °F (36.6 °C), Min:97.3 °F (36.3 °C), Max:98.2 °F (36.8 °C)      Intake / Output:    Intake/Output Summary (Last 24 hours) at 2022 0706  Last data filed at 2022 1020  Gross per 24 hour   Intake 200 ml   Output 200 ml   Net 0 ml       Physical Exam:    Constitutional: she appears well-developed. No distress. HENT:   Head: Atraumatic. Eyes: Pupils are equal, round, and reactive to light. Neck: Normal range of motion. Cardiovascular: Regular rhythm. Pulmonary/Chest: Effort normal and breath sounds normal. No respiratory distress. Abdominal: Soft. Bowel sounds are normal. she exhibits no distension. There is no tenderness. There is no guarding. No hernia. Musculoskeletal: Normal range of motion. Neurological: She is alert.  CN II- XII grossly intact  Vascular: Wound VAC in place minimal output    Labs:   Recent Labs     22  0708 22  0438   HGB 10.1* 8.4*   WBC 8.2 10.7   K 4.1 4.4       Data Review     Assessment:     Patient Active Problem List    Diagnosis Date Noted    Hematoma of left lower leg 2022    Cardiac murmur 2022    Back pain 2022    CKD (chronic kidney disease) stage 4, GFR 15-29 ml/min ([facility]) 2022    Generalized weakness     Thrombocytopenia ([facility] 2022    Chronic respiratory failure with hypoxia ([facility] 2021    Severe obesity ([facility] 2018    Type 2 diabetes mellitus with nephropathy ([facility] 12/15/2017

## 2022-06-27 NOTE — CONSULTS
RENAL H&P/CONSULT    Subjective:     Patient is a 79 y/o AA female, followed by our office for ESRD, on HD M-W-F at [facility] is admitted for left flank and groin swelling. She has been running via RIJ perm cath since her LUE AVF became non-functioning and a left thigh AV graft placed on 5/26/22 by Dr Maya Hung. She has developed marked left leg edema and had profound hypotension post leg loop placement requiring Midodrine, but recently her BP was up to the degree that we stopped Midodrine last week. She has been off all antihypertensive medications. She ran HD Friday without problems. PMH also consist of DM II, Hyperphosphatemia, anemia, renal cell carcinoma s/p left nephrectomy, followed by Pulmonary for lung mass, and HTN. She is on home O2 at 2L NC as needed. She presented to the ER  now presents in the setting of left flank pain. The patient states that she was in her usual state of health until yesterday when she started presenting with left flank pain associated with left-sided swelling of her lower extremity. Her symptoms did not improve so she decided to come to the emergency department. No fever or chills, no shortness of breath, no cough, no nausea vomiting or diarrhea. She was evaluated by vascular surgery and underwent exploration forpossible abscess or hematoma and is on empiric IV antibiotics. 6/27/22  -much anxiety and tearful c/o pain with reduced narcotics - no N/V, no CP, no dyspnea     Past Medical History:   Diagnosis Date    Anemia     Arthritis     AVF (arteriovenous fistula) (HCC)     left    AVF (arteriovenous fistula) ([address] 12/20/2016 12/6/16 ([facility]) Right AVF revision and thrombectomy    Degenerative joint disease     Diabetes ([address]     type 2--- does not check sqbs at home    Enlarged lymph node     \"enlarging paratracheal node to 2.6 cm on CT chest\"    ESRD on hemodialysis ([address] onset 2006    ESRD.  MWF dialysis at [place] [facility]    Hypertension     controlled with med    Mediastinal mass     being followed by dr ramírez--per pt-- states told by dr Berry Neff it wasnt cancer-- but plans to be rescanned 5/2022    Obesity     On home O2     2L QHS and PRN during day d/t \"lymph node in chest\"    Renal cancer [facility]     Dr Ruben Leach - renal cancer L nephrectomy---and radiation ---    Shortness of breath     swollen lymphnode in chest-- oxygen 2LPM    Transient ischemic attack 08/29/2015    no residual      Past Surgical History:   Procedure Laterality Date    BREAST SURGERY Right     cyst removed    COLONOSCOPY N/A 11/8/2018    COLONOSCOPY  BMI 36 performed by Wilber Carey MD at [address] [place] [address] Se  9/14/2017    CT BIOPSY ABDOMEN RETROPERITONEUM 9/14/2017 [facility] CT SCAN    GI  08/06/2018    exploratory laparotomy    GI  06/01/2002    colon resection resulting in temporary colostomy reversal    GYN      mihir    IR INTRO CATH DIALYSIS CIRCUIT W BALLOON PTA  3/10/2020    IR INTRO CATH DIALYSIS CIRCUIT W BALLOON PTA  12/5/2019    IR INTRO CATH DIALYSIS CIRCUIT W BALLOON PTA  9/3/2019    IR INTRO CATH DIALYSIS CIRCUIT W BALLOON PTA  5/30/2019    IR INTRO CATH DIALYSIS CIRCUIT W BALLOON PTA  2/28/2019    IR THRMB/INFUSION DIAYSIS CIRCUIT Left 2021    IR TUNNELED CATHETER PLACEMENT GREATER THAN 5 YEARS  3/15/2022    IR TUNNELED CATHETER PLACEMENT GREATER THAN 5 YEARS  11/19/2021    IR TUNNELED CATHETER PLACEMENT GREATER THAN 5 YEARS  11/19/2021    IR TUNNELED CATHETER PLACEMENT GREATER THAN 5 YEARS 11/19/2021 [facility] SPECI[facility]    IR TUNNELED CATHETER PLACEMENT GREATER THAN 5 YEARS  3/15/2022    IR TUNNELED CATHETER PLACEMENT GREATER THAN 5 YEARS 3/15/2022 [facility] SPECI[facility]    OTHER SURGICAL HISTORY      dialysis fistula, several permcaths    UROLOGICAL SURGERY Left July 2015    nephrectomy    VASCULAR SURGERY Left 04/07/2022    LEFT ARM AV GRAFT    VASCULAR SURGERY Left 03/15/2022    declot    VASCULAR SURGERY Left 02/21/2022    Open thromboembolectomy of left upper arm graft.  VASCULAR SURGERY Right     AV graft- states no longer uses    VASCULAR SURGERY Left 5/26/2022    LEFT AV GRAFT THIGH performed by Lis Montez MD at [address] [place] [address]      Prior to Admission medications    Medication Sig Start Date End Date Taking? Authorizing Provider   naloxone 4 MG/0.1ML LIQD nasal spray 1 spray by Nasal route as needed for Opioid Reversal.    Historical Provider, MD   midodrine (PROAMATINE) 10 MG tablet Take 1 tablet by mouth every 8 hours 6/5/22   Supa Basestt MD   acetaminophen (TYLENOL) 500 MG tablet Take 500 mg by mouth every 6 hours as needed for Pain    Historical Provider, MD   omeprazole (PRILOSEC) 40 MG delayed release capsule Take 40 mg by mouth at bedtime    Historical Provider, MD   OXYGEN Inhale into the lungs 2 LITERS AS NEEDED FOR SOB    Historical Provider, MD   SITagliptin (JANUVIA) 50 MG tablet Take 50 mg by mouth daily    Historical Provider, MD   sevelamer (RENVELA) 800 MG tablet Take 4 tablets by mouth 3 times daily (with meals) PT TAKES 4 TABS WITH MEALS-- AND 1 TABS WITH SNACKS     Historical Provider, MD     Allergies   Allergen Reactions    Pcn [Penicillins] Other (See Comments)     PT STATES UNSURE OF RX    Vancomycin Rash      Social History     Tobacco Use    Smoking status: Never Smoker    Smokeless tobacco: Never Used   Substance Use Topics    Alcohol use: No      Family History   Problem Relation Age of Onset    Diabetes Mother     Heart Disease Mother     Hypertension Mother     Heart Disease Father     Hypertension Father     Post-op Cognitive Dysfunction Neg Hx     Pseudochol.  Deficiency Neg Hx     Delayed Awakening Neg Hx     Post-op Nausea/Vomiting Neg Hx     Emergence Delirium Neg Hx     Other Neg Hx     Malig Hypertherm Neg Hx           Review of Systems    Constitutional: no fever, negative  Eyes: fair vision, negative  Ears, nose, mouth, throat, and face:fair hearing, negative  Respiratory: no asthma, negative  Cardiovascular:no chest pain, negative  Gastrointestinal:no diarrhea, negative  Genitourinary: no dysuria,negative  Hematologic/lymphatic: no bleeding tendency, negative  Neurological: no seizures, negative  Behvioral/Psych: no psych hospitalization positive for anxiety and depression  Endocrine: no goiter, negative      Objective:       BP (!) 108/59   Pulse 88   Temp 97.5 °F (36.4 °C) (Oral)   Resp 17   Ht 5' 4\" (1.626 m)   Wt 199 lb 6.4 oz (90.4 kg)   SpO2 96%   BMI 34.23 kg/m²     No intake/output data recorded. 06/25 0701 - 06/26 1900  In: 200 [I.V.:200]  Out: 200     BP (!) 108/59   Pulse 88   Temp 97.5 °F (36.4 °C) (Oral)   Resp 17   Ht 5' 4\" (1.626 m)   Wt 199 lb 6.4 oz (90.4 kg)   SpO2 96%   BMI 34.23 kg/m²   General:  Alert, cooperative, no distress, appears stated age. Head:  Normocephalic, without obvious abnormality, atraumatic. Eyes:  Conjunctivae/corneas clear. EOMs intact. Throat: Lips, mucosa, and tongue normal. Teeth and gums normal.   Back:   Symmetric, no curvature. ROM normal. No CVA tenderness. Lungs:   Clear to auscultation bilaterally. Heart:  Regular rate and rhythm, S1, S2 normal, no murmur,  rub or gallop. Abdomen:   Soft, non-tender. Bowel sounds normal. No masses,  No organomegaly. Extremities: Extremities no cyanosis or edema right, ++ edema left. Access: S/P tunneled HD catheter and S/P left groin loop graft with left groin swelling and left leg edema. Skin: Skin color, texture, turgor normal. No rashes or lesions. Neurologic: Grossly intact. No asterixis.            Data Review:     Recent Results (from the past 24 hour(s))   CBC with Auto Differential    Collection Time: 06/26/22  7:08 AM   Result Value Ref Range    WBC 8.2 4.3 - 11.1 K/uL    RBC 3.01 (L) 4.05 - 5.2 M/uL    Hemoglobin 10.1 (L) 11.7 - 15.4 g/dL    Hematocrit 29.3 (L) 35.8 - 46.3 %    MCV 97.3 79.6 - 97.8 FL    MCH 33.6 (H) 26.1 - 32.9 PG    MCHC 34.5 31.4 - 35.0 g/dL    RDW 17.9 (H) 11.9 - 14.6 %    Platelets 119 (L) 281 - 450 K/uL    MPV 10.9 9.4 - 12.3 FL    nRBC 0.04 0.0 - 0.2 K/uL    Differential Type AUTOMATED      Seg Neutrophils 80 (H) 43 - 78 %    Lymphocytes 10 (L) 13 - 44 %    Monocytes 8 4.0 - 12.0 %    Eosinophils % 2 0.5 - 7.8 %    Basophils 1 0.0 - 2.0 %    Immature Granulocytes 0 0.0 - 5.0 %    Segs Absolute 6.5 1.7 - 8.2 K/UL    Absolute Lymph # 0.8 0.5 - 4.6 K/UL    Absolute Mono # 0.7 0.1 - 1.3 K/UL    Absolute Eos # 0.1 0.0 - 0.8 K/UL    Basophils Absolute 0.1 0.0 - 0.2 K/UL    Absolute Immature Granulocyte 0.0 0.0 - 0.5 K/UL   Basic Metabolic Panel    Collection Time: 06/26/22  7:08 AM   Result Value Ref Range    Sodium 133 (L) 136 - 145 mmol/L    Potassium 4.1 3.5 - 5.1 mmol/L    Chloride 98 98 - 107 mmol/L    CO2 27 21 - 32 mmol/L    Anion Gap 8 7 - 16 mmol/L    Glucose 132 (H) 65 - 100 mg/dL    BUN 31 (H) 8 - 23 MG/DL    CREATININE 6.00 (H) 0.6 - 1.0 MG/DL    GFR African American 9 (L) >60 ml/min/1.73m2    GFR Non- 7 (L) >60 ml/min/1.73m2    Calcium 7.9 (L) 8.3 - 10.4 MG/DL   POCT Glucose    Collection Time: 06/26/22 12:35 PM   Result Value Ref Range    POC Glucose 145 (H) 65 - 100 mg/dL    Performed by: Castañeda (Morris)    POCT Glucose    Collection Time: 06/26/22  4:46 PM   Result Value Ref Range    POC Glucose 199 (H) 65 - 100 mg/dL    Performed by: [facility]    POCT Glucose    Collection Time: 06/26/22  8:47 PM   Result Value Ref Range    POC Glucose 182 (H) 65 - 100 mg/dL    Performed by: Cristina    CBC with Auto Differential    Collection Time: 06/27/22  4:38 AM   Result Value Ref Range    WBC 10.7 4.3 - 11.1 K/uL    RBC 2.55 (L) 4.05 - 5.2 M/uL    Hemoglobin 8.4 (L) 11.7 - 15.4 g/dL    Hematocrit 24.5 (L) 35.8 - 46.3 %    MCV 96.1 79.6 - 97.8 FL    MCH 32.9 26.1 - 32.9 PG    MCHC 34.3 31.4 - 35.0 g/dL    RDW 17.6 (H) 11.9 - 14.6 %    Platelets 037 (L) 721 - 450 K/uL    MPV 11.5 throughout the colon. No free air is evident. The bowel gas pattern is nonspecific and there is no evidence of ileus or  obstruction. No suspicious renal or ureteral calculi are evident. Visualized  osseous structures unremarkable. Impression  Impression: No acute pathology identified. Renal US:  No results found for this or any previous visit. CT Abdomen  Results for orders placed during the hospital encounter of 06/25/22    CT ABDOMEN PELVIS W IV CONTRAST Additional Contrast? None    Narrative  EXAM: CT abdomen and pelvis with IV contrast.    INDICATION: Abdominal pain. COMPARISON: Prior PET/CT scan on May 5, 2022. TECHNIQUE: Axial CT images of the abdomen and pelvis were obtained after the  intravenous injection of 100 mL Isovue 370 CT contrast. Radiation dose reduction  techniques were used for this study. Our CT scanners use one or all of the  following:  Automated exposure control, adjustment of the mA or kV according to  patient size, iterative reconstruction. FINDINGS:    - Liver: Within normal limits. - Gallbladder and bile ducts: Within normal limits. - Spleen: Within normal limits. - Urinary tract: The left kidney is absent. The right kidney is atrophied and  there is a 2.6 cm exophytic cyst off its lower pole. No right-sided  hydronephrosis is seen. The urinary bladder is collapsed. - Adrenals: Within normal limits. - Pancreas: Within normal limits. - Gastrointestinal tract: There is colonic diverticulosis. No definite acute  diverticulitis is seen, although ascites limits for evaluation of focal  inflammation.  - Retroperitoneum: Mild abdominal aortic atherosclerosis, with no aneurysmal  dilatation or dissection. The right heart is enlarged, and there is prominent  contrast reflux into the IVC, raising the possibility of right heart failure. - Peritoneal cavity and abdominal wall: There is progressed mild ascites.  No  intra-abdominal abscess or free intraperitoneal air is continue Nephrovite,treat with LAKISHA      5. Hypocalcemia- with low albumin, treat with Vitamin D     6. Renal cell carcinoma- followed by Oncology     7. DM II- on SSI, managed by primary     8. Hyperphosphatemia- hx of, on Renvela, continue    9. Hyponatremia - excessive free water intake    10.  Pain - increased Dilaudid back to 0.5 mg from 0.25 mg q 4 hours PRN      Plan:     As above    Harry Valdivia M.D.

## 2022-06-27 NOTE — PROGRESS NOTES
The nurse explain to pt that the Dilaudid 0.5mg decrease to 0.25mg and received second dose at 5:45am. Pt also have 1mg but only send from central pharmacy. Pt seems upset and cry about why she can't have the same dose of dilaudid. The nurse contact pharmacy for verified and explain to the pt. Pt wants to receive 1mg immediately; however, nurse explained he can't give too much dilaudid too close together. Pt remained upset and cry  Charge nurse aware the situation.

## 2022-06-27 NOTE — DIALYSIS
TRANSFER IN - DIALYSIS    Received patient in dialysis unit  from [facility] (unit) for ordered procedure. Consent verified for renal replacement therapy. Procedure explained to patient, opportunity for Q&A provided. Call light given. Patient alert and vital signs stable. /85,  P86  , 3L O2 via NC. Hemodialysis initiated using right chest catheter. Aspirated and flushed both ports without difficulty. Dressing clean, dry and intact. Machine settings per MD order. Will monitor during treatment.

## 2022-06-27 NOTE — PLAN OF CARE
Problem: Discharge Planning  Goal: Discharge to home or other facility with appropriate resources  Outcome: Progressing     Problem: Pain  Goal: Verbalizes/displays adequate comfort level or baseline comfort level  Outcome: Progressing     Problem: Safety - Adult  Goal: Free from fall injury  Outcome: Progressing     Problem: Chronic Conditions and Co-morbidities  Goal: Patient's chronic conditions and co-morbidity symptoms are monitored and maintained or improved  Outcome: Progressing     Problem: ABCDS Injury Assessment  Goal: Absence of physical injury  Outcome: Progressing

## 2022-06-27 NOTE — PROGRESS NOTES
hgb drop from 10.1 ~> 8.4  - suspect from hematoma / evacuation with seepage noted at site of wound vac  - closely monitor; not on hep / lovenox / [facility] / ASA  - f/u AM CBC; transfuse if hgb <7.0      ESRD  - HD M/W/F  - appreciate nephrology assistance      Type 2 diabetes mellitus with hyperglycemia (HCC)  - cont sliding scale coverage as needed  - acceptable ranges      Hx of RCC  - s/p left nephrectomy  - pt reports to following with Dr. Justine Matta routinely for annual PET scans      Cardiac murmur  - echo pending      Debility  - pt refused PT/OT today; will need assessment to determine discharge needs      Discharge Planning:    - anticipate at least 2 midnight hospitalization    Diet:  ADULT DIET; Regular; 4 carb choices (60 gm/meal); Low Sodium (2 gm); Low Potassium (Less than 3000 mg/day); Low Phosphorus (Less than 1000 mg)  DVT PPx: SCD  Code status: Full Code    Hospital Problems:  Principal Problem:    Hematoma of left lower leg  Active Problems:    Back pain    Cardiac murmur    ESRD (end stage renal disease) on dialysis ([facility] [address]    Type 2 diabetes mellitus with hyperglycemia (HCC)    AVF (arteriovenous fistula) (HCC)  Resolved Problems:    * No resolved hospital problems.  *      Objective:     Patient Vitals for the past 24 hrs:   Temp Pulse Resp BP SpO2   06/27/22 1330 -- 81 -- (!) 128/43 --   06/27/22 1300 -- 88 -- 119/60 --   06/27/22 1230 -- 92 -- 137/63 --   06/27/22 1159 -- 86 -- (!) 125/58 --   06/27/22 0848 -- -- 18 -- --   06/27/22 0812 98.5 °F (36.9 °C) 76 -- 98/60 99 %   06/27/22 0554 -- -- 17 -- --   06/27/22 0403 97.5 °F (36.4 °C) 88 18 (!) 108/59 96 %   06/27/22 0154 -- -- 17 -- --   06/27/22 0124 -- -- 18 -- --   06/27/22 0014 98.2 °F (36.8 °C) 89 19 104/64 96 %   06/26/22 2114 -- -- 17 -- --   06/26/22 2044 -- -- 16 -- --   06/26/22 1915 98.2 °F (36.8 °C) 90 19 (!) 160/80 94 %   06/26/22 1530 97.5 °F (36.4 °C) 89 20 (!) 158/82 96 %       Oxygen Therapy  SpO2: 99 %  Pulse via Oximetry: 100 beats per minute  Pulse Oximeter Device Mode: Intermittent  Pulse Oximeter Device Location: Right  O2 Device: Nasal cannula  O2 Flow Rate (L/min): 3 L/min    Estimated body mass index is 34.23 kg/m² as calculated from the following:    Height as of this encounter: 5' 4\" (1.626 m). Weight as of this encounter: 199 lb 6.4 oz (90.4 kg). No intake or output data in the 24 hours ending 06/27/22 1358      Physical Exam:     Blood pressure (!) 128/43, pulse 81, temperature 98.5 °F (36.9 °C), temperature source Oral, resp. rate 18, height 5' 4\" (1.626 m), weight 199 lb 6.4 oz (90.4 kg), SpO2 99 %. General:    Obese, well developed, in NAD. Head:  Normocephalic, atraumatic  Eyes:  Sclerae appear normal.  Pupils equally round. ENT:  Nares appear normal, no drainage. Moist oral mucosa  Neck:  No restricted ROM. Trachea midline   Chest:   Right anterior HD access without bleeding / oozing  CV:   RRR. Harsh pan systolic murmur  Lungs:   CTAB. No wheezing, rhonchi, or rales. Respirations even, unlabored  Abdomen: Bowel sounds present. Soft, nontender, nondistended. +BSx4  Extremities: Left medial thigh with vac in place, mild red-brown seepage noted, tender to palpation, harder to touch with associated edema; + mvmt x 4  Skin:     Warm and dry. Neuro:  CN II-XII grossly intact. Sensation intact.   A&Ox3  Psych:  Upset this AM.      I have reviewed ordered lab tests and independently visualized imaging below:    Recent Labs:  Recent Results (from the past 48 hour(s))   POCT Glucose    Collection Time: 06/25/22  5:50 PM   Result Value Ref Range    POC Glucose 135 (H) 65 - 100 mg/dL    Performed by: oJjo    Hemoglobin    Collection Time: 06/25/22  7:06 PM   Result Value Ref Range    Hemoglobin 10.5 (L) 11.7 - 15.4 g/dL   POCT Glucose    Collection Time: 06/25/22  9:12 PM   Result Value Ref Range    POC Glucose 125 (H) 65 - 100 mg/dL    Performed by: Augustus Richards    POCT Glucose    Collection Time: 06/26/22 6:08 AM   Result Value Ref Range    POC Glucose 123 (H) 65 - 100 mg/dL    Performed by: Sahara    CBC with Auto Differential    Collection Time: 06/26/22  7:08 AM   Result Value Ref Range    WBC 8.2 4.3 - 11.1 K/uL    RBC 3.01 (L) 4.05 - 5.2 M/uL    Hemoglobin 10.1 (L) 11.7 - 15.4 g/dL    Hematocrit 29.3 (L) 35.8 - 46.3 %    MCV 97.3 79.6 - 97.8 FL    MCH 33.6 (H) 26.1 - 32.9 PG    MCHC 34.5 31.4 - 35.0 g/dL    RDW 17.9 (H) 11.9 - 14.6 %    Platelets 445 (L) 341 - 450 K/uL    MPV 10.9 9.4 - 12.3 FL    nRBC 0.04 0.0 - 0.2 K/uL    Differential Type AUTOMATED      Seg Neutrophils 80 (H) 43 - 78 %    Lymphocytes 10 (L) 13 - 44 %    Monocytes 8 4.0 - 12.0 %    Eosinophils % 2 0.5 - 7.8 %    Basophils 1 0.0 - 2.0 %    Immature Granulocytes 0 0.0 - 5.0 %    Segs Absolute 6.5 1.7 - 8.2 K/UL    Absolute Lymph # 0.8 0.5 - 4.6 K/UL    Absolute Mono # 0.7 0.1 - 1.3 K/UL    Absolute Eos # 0.1 0.0 - 0.8 K/UL    Basophils Absolute 0.1 0.0 - 0.2 K/UL    Absolute Immature Granulocyte 0.0 0.0 - 0.5 K/UL   Basic Metabolic Panel    Collection Time: 06/26/22  7:08 AM   Result Value Ref Range    Sodium 133 (L) 136 - 145 mmol/L    Potassium 4.1 3.5 - 5.1 mmol/L    Chloride 98 98 - 107 mmol/L    CO2 27 21 - 32 mmol/L    Anion Gap 8 7 - 16 mmol/L    Glucose 132 (H) 65 - 100 mg/dL    BUN 31 (H) 8 - 23 MG/DL    CREATININE 6.00 (H) 0.6 - 1.0 MG/DL    GFR African American 9 (L) >60 ml/min/1.73m2    GFR Non- 7 (L) >60 ml/min/1.73m2    Calcium 7.9 (L) 8.3 - 10.4 MG/DL   Culture, Wound    Collection Time: 06/26/22 10:08 AM    Specimen: Surgical   Result Value Ref Range    Special Requests L GROIN HEMATOMA DEBRIDEMENT     Gram stain 0 TO 1 WBC'S SEEN PER OIF     Gram stain NO DEFINITE ORGANISM SEEN      Culture        No growth after short period of incubation. Further results to follow after overnight incubation.    POCT Glucose    Collection Time: 06/26/22 12:35 PM   Result Value Ref Range    POC Glucose 145 (H) 65 - 100 mg/dL    Performed by: Castañeda (Morris)    POCT Glucose    Collection Time: 06/26/22  4:46 PM   Result Value Ref Range    POC Glucose 199 (H) 65 - 100 mg/dL    Performed by: Merlin Solorio    POCT Glucose    Collection Time: 06/26/22  8:47 PM   Result Value Ref Range    POC Glucose 182 (H) 65 - 100 mg/dL    Performed by: Cristina    CBC with Auto Differential    Collection Time: 06/27/22  4:38 AM   Result Value Ref Range    WBC 10.7 4.3 - 11.1 K/uL    RBC 2.55 (L) 4.05 - 5.2 M/uL    Hemoglobin 8.4 (L) 11.7 - 15.4 g/dL    Hematocrit 24.5 (L) 35.8 - 46.3 %    MCV 96.1 79.6 - 97.8 FL    MCH 32.9 26.1 - 32.9 PG    MCHC 34.3 31.4 - 35.0 g/dL    RDW 17.6 (H) 11.9 - 14.6 %    Platelets 087 (L) 196 - 450 K/uL    MPV 11.5 9.4 - 12.3 FL    nRBC 0.06 0.0 - 0.2 K/uL    Differential Type AUTOMATED      Seg Neutrophils 82 (H) 43 - 78 %    Lymphocytes 8 (L) 13 - 44 %    Monocytes 9 4.0 - 12.0 %    Eosinophils % 0 (L) 0.5 - 7.8 %    Basophils 0 0.0 - 2.0 %    Immature Granulocytes 1 0.0 - 5.0 %    Segs Absolute 8.8 (H) 1.7 - 8.2 K/UL    Absolute Lymph # 0.8 0.5 - 4.6 K/UL    Absolute Mono # 1.0 0.1 - 1.3 K/UL    Absolute Eos # 0.0 0.0 - 0.8 K/UL    Basophils Absolute 0.0 0.0 - 0.2 K/UL    Absolute Immature Granulocyte 0.1 0.0 - 0.5 K/UL   Basic Metabolic Panel    Collection Time: 06/27/22  4:38 AM   Result Value Ref Range    Sodium 135 (L) 136 - 145 mmol/L    Potassium 4.4 3.5 - 5.1 mmol/L    Chloride 96 (L) 98 - 107 mmol/L    CO2 28 21 - 32 mmol/L    Anion Gap 11 7 - 16 mmol/L    Glucose 173 (H) 65 - 100 mg/dL    BUN 43 (H) 8 - 23 MG/DL    CREATININE 7.20 (H) 0.6 - 1.0 MG/DL    GFR African American 7 (L) >60 ml/min/1.73m2    GFR Non- 6 (L) >60 ml/min/1.73m2    Calcium 7.4 (L) 8.3 - 10.4 MG/DL   POCT Glucose    Collection Time: 06/27/22  8:20 AM   Result Value Ref Range    POC Glucose 220 (H) 65 - 100 mg/dL    Performed by: CrossMistyPCA    Transthoracic echocardiogram (TTE) complete with contrast, bubble, strain, and 3D PRN    Collection Time: 06/27/22 10:45 AM   Result Value Ref Range    LA Minor Axis 4.5 cm    LA Major [place] 5.9 cm    LA Area 2C 12.7 cm2    LA Area 4C 10.6 cm2    LA Diameter 2.5 cm    AV Mean Velocity 0.7 m/s    AV Mean Gradient 3 mmHg    AV VTI 19.1 cm    AV Peak Velocity 1.1 m/s    AV Peak Gradient 5 mmHg    AV Area by VTI 2.3 cm2    AV Area by Peak Velocity 1.9 cm2    Aortic Root 3.1 cm    Ascending Aorta 2.9 cm    IVC Proxmal 2.0 cm    IVSd 1.2 (A) 0.6 - 0.9 cm    LVIDd 3.3 (A) 3.9 - 5.3 cm    LVIDs 2.1 cm    LVOT Diameter 1.8 cm    LVOT Mean Gradient 1 mmHg    LVOT VTI 17.5 cm    LVOT Peak Velocity 0.9 m/s    LVOT Peak Gradient 3 mmHg    LVPWd 1.1 (A) 0.6 - 0.9 cm    LV E' Lateral Velocity 10 cm/s    LV E' Septal Velocity 8 cm/s    LVOT Area 2.5 cm2    LVOT SV 44.5 ml    MV E Wave Deceleration Time 192.0 ms    MV A Velocity 0.81 m/s    MV E Velocity 0.93 m/s    RVIDd 3.3 cm    RV Basal Dimension 5.6 cm    RV Longitudinal Dimension 9.0 cm    RV Mid Dimension 4.9 cm    TAPSE 1.6 (A) 1.7 cm    TR Max Velocity 4.53 m/s    TR Peak Gradient 82 mmHg    Body Surface Area 2.02 m2    Fractional Shortening 2D 36 28 - 44 %    LVIDd Index 1.69 cm/m2    LVIDs Index 1.08 cm/m2    LV RWT Ratio 0.67     LV Mass 2D 116.8 67 - 162 g    LV Mass 2D Index 59.9 43 - 95 g/m2    MV E/A 1.15     E/E' Ratio (Averaged) 10.46     E/E' Lateral 9.30     E/E' Septal 11.63     LVOT Stroke Volume Index 22.8 mL/m2    LA Size Index 1.28 cm/m2    LA/AO Root Ratio 0.81     Ao Root Index 1.59 cm/m2    Ascending Aorta Index 1.49 cm/m2    AV Velocity Ratio 0.82     LVOT:AV VTI Index 0.92     DEVONTE/BSA VTI 1.2 cm2/m2    DEVONTE/BSA Peak Velocity 1.0 cm2/m2    Est. RA Pressure 8 mmHg    RVSP 90 mmHg   POCT Glucose    Collection Time: 06/27/22 11:41 AM   Result Value Ref Range    POC Glucose 233 (H) 65 - 100 mg/dL    Performed by: Rocio        Other Studies:  IR ANGIOGRAM EXTREMITY LEFT   Final Result      CT ABDOMEN NaCl 100 mL IVPB premix  500 mg IntraVENous Q12H    linezolid (ZYVOX) IVPB 600 mg  600 mg IntraVENous Q12H    0.9 % sodium chloride infusion   IntraVENous PRN    lidocaine 4 % external patch 1 patch  1 patch TransDERmal Daily    glucose chewable tablet 16 g  4 tablet Oral PRN    dextrose bolus 10% 125 mL  125 mL IntraVENous PRN    Or    dextrose bolus 10% 250 mL  250 mL IntraVENous PRN    glucagon injection 1 mg  1 mg IntraMUSCular PRN    dextrose 5 % solution  100 mL/hr IntraVENous PRN    insulin lispro (HUMALOG) injection vial 0-8 Units  0-8 Units SubCUTAneous TID WC    insulin lispro (HUMALOG) injection vial 0-4 Units  0-4 Units SubCUTAneous Nightly       Signed:  Keyur Quiles    Part of this note may have been written by using a voice dictation software. The note has been proof read but may still contain some grammatical/other typographical errors.

## 2022-06-28 NOTE — PROGRESS NOTES
PHYSICAL THERAPY Initial Assessment  (Link to Caseload Tracking: PT Visit Days : 1  Acknowledge Orders  Time In/Out  PT Charge Capture  Rehab Caseload Tracker    Isabel Morris is a 80 y.o. female   PRIMARY DIAGNOSIS: Hematoma of left lower leg  Back pain [M54.9]  Soft tissue swelling [R22.9]  End stage renal disease on dialysis ([facility] [address] [N18.6, Z99.2]  Acute left flank pain [R10.9]  Procedure(s) (LRB):  LEFT GROIN DEBRIDEMENT OF HEMATOMA / Lisabeth Hurter (Left)  2 Days Post-Op  Reason for Referral: Generalized Muscle Weakness (M62.81)  Difficulty in walking, Not elsewhere classified (R26.2)  Other abnormalities of gait and mobility (R26.89)  Inpatient: Payor: Simone Wu / Plan: Luiza Stain / Product Type: *No Product type* /     ASSESSMENT:     REHAB RECOMMENDATIONS:   Recommendation to date pending progress:  [address] [place] [address] Therapy    Equipment:     To Be Determined     ASSESSMENT:  Ms. Laurey Apley is a 80year old F who presents s/p L groin debridement of hematoma and wound vac placement. No activity restrictions per vascular. This date pt performs mobility including bed mobility, sitting balance activities, sit <> stand transfers, standing balance activities, and ambulation in room with CGA, BUE support at . Pt does have pain at incision/wound site with position changes, but overall is doing well. She is on 3L O2, did have one episode of subjective SOB, but SpO2 was 95%. Pt presents as functioning below her baseline, with deficits in mobility including transfers, gait, balance, and activity tolerance. Pt will benefit from skilled therapy services to address stated deficits to promote return to highest level of function, independence, and safety. Will continue to follow.      [address] [facility] [address] [address] AM-PAC 6 Clicks Basic Mobility Inpatient Short Form  AM-PAC Mobility Inpatient   How much difficulty turning over in bed?: A Little  How much difficulty sitting down on / standing up from a chair with arms?: A Little  How much difficulty moving from lying on back to sitting on side of bed?: A Little  How much help from another person moving to and from a bed to a chair?: None  How much help from another person needed to walk in hospital room?: A Little  How much help from another person for climbing 3-5 steps with a railing?: A Little  AM-PAC Inpatient Mobility Raw Score : 19  AM-PAC Inpatient T-Scale Score : 45.44  Mobility Inpatient CMS 0-100% Score: 41.77  Mobility Inpatient CMS G-Code Modifier : CK    SUBJECTIVE:   Ms. Bebe Almonte states, \"Tell Dr. Leonila Ott he should've been in here when I was using the bathroom, then they could see I move\"     Social/Functional Lives With: [address]: Two level  Home Equipment: Oxygen,Walker, standard (walking stick.)  Receives Help From: [employer]  ADL Assistance: Independent  Active : Yes  Mode of Transportation: Car    OBJECTIVE:     PAIN: [place] Cousin / O2: Amaury Tapia / Daniel Peterson / Drew [place]win:   Pre Treatment:   Pain Assessment: 0-10  Pain Level: 2  Pain Location: Leg  Pain Orientation: Left  Pain Descriptors: Sore      Post Treatment: 6/10 Vitals        Oxygen  O2 Therapy: Oxygen  O2 Flow Rate (L/min): 2 L/min  SpO2: 95 %   IV    RESTRICTIONS/PRECAUTIONS:  Restrictions/Precautions: Fall Risk                 GROSS EVALUATION: Intact Impaired (Comments):   AROM [x]     PROM [x]    Strength []  generally weak, but functional   Balance [] Sitting - Static: Good  Sitting - Dynamic: Good  Standing - Static: Fair,+  Standing - Dynamic: Fair   Posture [] Forward Head  Rounded Shoulders   Sensation [x]     Coordination [x]      Tone []     Edema []    Activity Tolerance []  limited by LE pain    []      COGNITION/  PERCEPTION: Intact Impaired (Comments):   Orientation [x]     Vision [x]     Hearing [x]     Cognition  [x]       MOBILITY: I Mod I S SBA CGA Min Mod Max Total  NT x2 Comments:   Bed Mobility    Rolling [] [] [] [] [x] [] [] [] [] [] []    Supine to Sit [] [] [] [] [x] [x] [] [] [] [] []    Scooting [] [] [] [] [x] [] [] [] [] [] []    Sit to Supine [] [] [] [] [x] [x] [] [] [] [] []    Transfers    Sit to Stand [] [] [] [] [x] [] [] [] [] [] []    Bed to Chair [] [] [] [] [x] [] [] [] [] [] []    Stand to Sit [] [] [] [] [x] [] [] [] [] [] []    I=Independent, Mod I=Modified Independent, S=Supervision, SBA=Standby Assistance, CGA=Contact Guard Assistance,   Min=Minimal Assistance, Mod=Moderate Assistance, Max=Maximal Assistance, Total=Total Assistance, NT=Not Tested    GAIT: I Mod I S SBA CGA Min Mod Max Total  NT x2 Comments:   Level of Assistance [] [] [] [] [x] [] [] [] [] [] []    Distance 25 feet    DME Rolling Walker    Gait Quality Antalgic, Decreased step clearance, Decreased step length, Decreased stance, Shuffling  and Trunk sway increased    Weightbearing Status Restrictions/Precautions  Restrictions/Precautions: Fall Risk    Stairs      I=Independent, Mod I=Modified Independent, S=Supervision, SBA=Standby Assistance, CGA=Contact Guard Assistance,   Min=Minimal Assistance, Mod=Moderate Assistance, Max=Maximal Assistance, Total=Total Assistance, NT=Not Tested    PLAN:   ACUTE PHYSICAL THERAPY GOALS:   (Developed with and agreed upon by patient and/or caregiver.)  (1.) Brandt Alvarez will move from supine to sit and sit to supine , scoot up and down and roll side to side with MODIFIED INDEPENDENCE within 7 treatment day(s). (2.) Rosa Pires will transfer from bed to chair and chair to bed with MODIFIED INDEPENDENCE using the least restrictive device within 7 treatment day(s). (3.) Rosa Pires will ambulate with MODIFIED INDEPENDENCE for 200 feet with the least restrictive device within 7 treatment day(s). (4.) Rosa Pires will perform standing static and dynamic balance activities x 25 minutes with MODIFIED INDEPENDENCE to improve safety within 7 treatment day(s).   (5.) Rosa Pires will perform therapeutic exercises x 20 min for HEP with INDEPENDENCE to improve strength, endurance, and functional mobility within 7 treatment day(s). FREQUENCY AND DURATION: 3 times/week for duration of hospital stay or until stated goals are met, whichever comes first.    THERAPY PROGNOSIS: Good    PROBLEM LIST:   (Skilled intervention is medically necessary to address:)  Decreased Activity Tolerance  Decreased Balance  Decreased Coordination  Decreased Gait Ability  Decreased Safety Awareness  Decreased Strength  Decreased Transfer Abilities INTERVENTIONS PLANNED:   (Benefits and precautions of physical therapy have been discussed with the patient.)  Self Care Training  Therapeutic Activity  Therapeutic Exercise/HEP  Neuromuscular Re-education  Gait Training  Education       TREATMENT:   EVALUATION: MODERATE COMPLEXITY: (Untimed Charge)    TREATMENT:   Therapeutic Activity (39 Minutes): Therapeutic activity included Rolling, Supine to Sit, Sit to Supine, Scooting, Transfer Training, Ambulation on level ground, Sitting balance  and Standing balance to improve functional Activity tolerance, Balance, Coordination, Mobility and Strength.     TREATMENT GRID:  N/A    AFTER TREATMENT PRECAUTIONS: Bed, Bed/Chair Locked, Call light within reach, Needs within reach and RN notified    INTERDISCIPLINARY COLLABORATION:  RN/ PCT, PT/ PTA and OT/ AMADOR    EDUCATION: Education Given To: Patient  Education Provided: Role of Therapy;Plan of Care;Transfer Training  Education Outcome: Verbalized understanding;Continued education needed    TIME IN/OUT:  Time In: [address]  Time Out: [address] [facility]  Minutes: [address]

## 2022-06-28 NOTE — PROGRESS NOTES
RENAL H&P/CONSULT    Subjective:     Patient is a 79 y/o AA female, followed by our office for ESRD, on HD M-W-F at [facility] is admitted for left flank and groin swelling. She has been running via RIJ perm cath since her LUE AVF became non-functioning and a left thigh AV graft placed on 5/26/22 by Dr Glenna Wray. She has developed marked left leg edema and had profound hypotension post leg loop placement requiring Midodrine, but recently her BP was up to the degree that we stopped Midodrine last week. She has been off all antihypertensive medications. She ran HD Friday without problems. PMH also consist of DM II, Hyperphosphatemia, anemia, renal cell carcinoma s/p left nephrectomy, followed by Pulmonary for lung mass, and HTN. She is on home O2 at 2L NC as needed. She presented to the ER  now presents in the setting of left flank pain. The patient states that she was in her usual state of health until yesterday when she started presenting with left flank pain associated with left-sided swelling of her lower extremity. Her symptoms did not improve so she decided to come to the emergency department. No fever or chills, no shortness of breath, no cough, no nausea vomiting or diarrhea. She was evaluated by vascular surgery and underwent exploration forpossible abscess or hematoma and is on empiric IV antibiotics.    6/27/22  -much anxiety and tearful c/o pain with reduced narcotics - no N/V, no CP, no dyspnea   6/28/22 - reports better pain control with increased dosing of Dilaudid -  not so anxious anymore - plans for dialysis tomorrow discussed - no urgent needs - no N/V, no CP, no dyspnea     Past Medical History:   Diagnosis Date    Anemia     Arthritis     AVF (arteriovenous fistula) (HCC)     left    AVF (arteriovenous fistula) ([facility] 12/20/2016 12/6/16 ([facility]) Right AVF revision and thrombectomy    Degenerative joint disease     Diabetes ([address]     type 2--- does not check sqbs at home    Enlarged lymph node     \"enlarging paratracheal node to 2.6 cm on CT chest\"    ESRD on hemodialysis ([facility] [address] onset 2006    ESRD.  MWF dialysis at [facility]    Hypertension     controlled with med    Mediastinal mass     being followed by dr ramírez--per pt-- states told by dr Derek Amin it wasnt cancer-- but plans to be rescanned 5/2022    Obesity     On home O2     2L QHS and PRN during day d/t \"lymph node in chest\"    Renal cancer ([facility] [address]     Dr Alessandro Carbajal - renal cancer L nephrectomy---and radiation ---    Shortness of breath     swollen lymphnode in chest-- oxygen 2LPM    Transient ischemic attack 08/29/2015    no residual      Past Surgical History:   Procedure Laterality Date    BREAST SURGERY Right     cyst removed    COLONOSCOPY N/A 11/8/2018    COLONOSCOPY  BMI 36 performed by Antoni Antonio MD at [address] [place] [address]  9/14/2017    CT BIOPSY ABDOMEN RETROPERITONEUM 9/14/2017 [facility] CT SCAN    GI  08/06/2018    exploratory laparotomy    GI  06/01/2002    colon resection resulting in temporary colostomy reversal    GROIN SURGERY Left 6/26/2022    LEFT GROIN DEBRIDEMENT OF HEMATOMA / MIGEL performed by Estrellita Cardozo MD at [facility] MAIN OR    GYN      mihir    IR INTRO CATH DIALYSIS CIRCUIT W BALLOON PTA  3/10/2020    IR INTRO CATH DIALYSIS CIRCUIT W BALLOON PTA  12/5/2019    IR INTRO CATH DIALYSIS CIRCUIT W BALLOON PTA  9/3/2019    IR INTRO CATH DIALYSIS CIRCUIT W BALLOON PTA  5/30/2019    IR INTRO CATH DIALYSIS CIRCUIT W BALLOON PTA  2/28/2019    IR THRMB/INFUSION DIAYSIS CIRCUIT Left 2021    IR TUNNELED CATHETER PLACEMENT GREATER THAN 5 YEARS  3/15/2022    IR TUNNELED CATHETER PLACEMENT GREATER THAN 5 YEARS  11/19/2021    IR TUNNELED CATHETER PLACEMENT GREATER THAN 5 YEARS  11/19/2021    IR TUNNELED CATHETER PLACEMENT GREATER THAN 5 YEARS 11/19/2021 [facility] SPECIALS    IR TUNNELED CATHETER PLACEMENT GREATER THAN 5 YEARS  3/15/2022    IR TUNNELED CATHETER PLACEMENT GREATER THAN 5 YEARS 3/15/2022 [facility] SPECIALS    OTHER SURGICAL HISTORY      dialysis fistula, several permcaths    UROLOGICAL SURGERY Left July 2015    nephrectomy    VASCULAR SURGERY Left 04/07/2022    LEFT ARM AV GRAFT    VASCULAR SURGERY Left 03/15/2022    declot    VASCULAR SURGERY Left 02/21/2022    Open thromboembolectomy of left upper arm graft.  VASCULAR SURGERY Right     AV graft- states no longer uses    VASCULAR SURGERY Left 5/26/2022    LEFT AV GRAFT THIGH performed by Jin Flowers [facility] at [facility]      Prior to Admission medications    Medication Sig Start Date End Date Taking? Authorizing Provider   naloxone 4 MG/0.1ML LIQD nasal spray 1 spray by Nasal route as needed for Opioid Reversal.    Historical Provider, MD   midodrine (PROAMATINE) 10 MG tablet Take 1 tablet by mouth every 8 hours 6/5/22   Nilesh Reyes MD   acetaminophen (TYLENOL) 500 MG tablet Take 500 mg by mouth every 6 hours as needed for Pain    Historical Provider, MD   omeprazole (PRILOSEC) 40 MG delayed release capsule Take 40 mg by mouth at bedtime    Historical Provider, MD   OXYGEN Inhale into the lungs 2 LITERS AS NEEDED FOR SOB    Historical Provider, MD   SITagliptin (JANUVIA) 50 MG tablet Take 50 mg by mouth daily    Historical Provider, MD   sevelamer (RENVELA) 800 MG tablet Take 4 tablets by mouth 3 times daily (with meals) PT TAKES 4 TABS WITH MEALS-- AND 1 TABS WITH SNACKS     Historical Provider, MD     Allergies   Allergen Reactions    Pcn [Penicillins] Other (See Comments)     PT STATES UNSURE OF RX    Vancomycin Rash      Social History     Tobacco Use    Smoking status: Never Smoker    Smokeless tobacco: Never Used   Substance Use Topics    Alcohol use: No      Family History   Problem Relation Age of Onset    Diabetes Mother     Heart Disease Mother     Hypertension Mother     Heart Disease Father     Hypertension Father     Post-op Cognitive Dysfunction Neg Hx     Pseudochol. Deficiency Neg Hx     Delayed Awakening Neg Hx     Post-op Nausea/Vomiting Neg Hx     Emergence Delirium Neg Hx     Other Neg Hx     Malig Hypertherm Neg Hx           Review of Systems    Constitutional: no fever, negative  Eyes: fair vision, negative  Ears, nose, mouth, throat, and face:fair hearing, negative  Respiratory: no asthma, negative  Cardiovascular:no chest pain, negative  Gastrointestinal:no diarrhea, negative  Genitourinary: no dysuria,negative  Hematologic/lymphatic: no bleeding tendency, negative  Neurological: no seizures, negative  Behvioral/Psych: no psych hospitalization positive for anxiety and depression  Endocrine: no goiter, negative      Objective:       /61   Pulse 95   Temp 98.4 °F (36.9 °C) (Oral)   Resp 18   Ht 5' 4\" (1.626 m)   Wt 199 lb 9.6 oz (90.5 kg)   SpO2 93%   BMI 34.26 kg/m²     No intake/output data recorded. 06/26 0701 - 06/27 1900  In: 800 [I.V.:200]  Out: 3800     /61   Pulse 95   Temp 98.4 °F (36.9 °C) (Oral)   Resp 18   Ht 5' 4\" (1.626 m)   Wt 199 lb 9.6 oz (90.5 kg)   SpO2 93%   BMI 34.26 kg/m²   General:  Alert, cooperative, no distress, appears stated age. Head:  Normocephalic, without obvious abnormality, atraumatic. Eyes:  Conjunctivae/corneas clear. EOMs intact. Throat: Lips, mucosa, and tongue normal. Teeth and gums normal.   Back:   Symmetric, no curvature. ROM normal. No CVA tenderness. Lungs:   Clear to auscultation bilaterally. Heart:  Regular rate and rhythm, S1, S2 normal, no murmur,  rub or gallop. Abdomen:   Soft, non-tender. Bowel sounds normal. No masses,  No organomegaly. Extremities: Extremities no cyanosis or edema right, ++ edema left. Access: S/P tunneled HD catheter and S/P left groin loop graft with left groin swelling and left leg edema. Skin: Skin color, texture, turgor normal. No rashes or lesions. Neurologic: Grossly intact. No asterixis.            Data Review:     Recent Results (from the past 24 hour(s))   POCT Glucose    Collection Time: 06/27/22  8:20 AM   Result Value Ref Range    POC Glucose 220 (H) 65 - 100 mg/dL    Performed by: [facility]    Transthoracic echocardiogram (TTE) complete with contrast, bubble, strain, and 3D PRN    Collection Time: 06/27/22 10:45 AM   Result Value Ref Range    LA Minor Axis 4.5 cm    LA Major [place] 5.9 cm    LA Area 2C 12.7 cm2    LA Area 4C 10.6 cm2    LA Diameter 2.5 cm    AV Mean Velocity 0.7 m/s    AV Mean Gradient 3 mmHg    AV VTI 19.1 cm    AV Peak Velocity 1.1 m/s    AV Peak Gradient 5 mmHg    AV Area by VTI 2.3 cm2    AV Area by Peak Velocity 1.9 cm2    Aortic Root 3.1 cm    Ascending Aorta 2.9 cm    IVC Proxmal 2.0 cm    IVSd 1.2 (A) 0.6 - 0.9 cm    LVIDd 3.3 (A) 3.9 - 5.3 cm    LVIDs 2.1 cm    LVOT Diameter 1.8 cm    LVOT Mean Gradient 1 mmHg    LVOT VTI 17.5 cm    LVOT Peak Velocity 0.9 m/s    LVOT Peak Gradient 3 mmHg    LVPWd 1.1 (A) 0.6 - 0.9 cm    LV E' Lateral Velocity 10 cm/s    LV E' Septal Velocity 8 cm/s    LVOT Area 2.5 cm2    LVOT SV 44.5 ml    MV E Wave Deceleration Time 192.0 ms    MV A Velocity 0.81 m/s    MV E Velocity 0.93 m/s    RVIDd 3.3 cm    RV Basal Dimension 5.6 cm    RV Longitudinal Dimension 9.0 cm    RV Mid Dimension 4.9 cm    TAPSE 1.6 (A) 1.7 cm    TR Max Velocity 4.53 m/s    TR Peak Gradient 82 mmHg    Body Surface Area 2.02 m2    Fractional Shortening 2D 36 28 - 44 %    LVIDd Index 1.69 cm/m2    LVIDs Index 1.08 cm/m2    LV RWT Ratio 0.67     LV Mass 2D 116.8 67 - 162 g    LV Mass 2D Index 59.9 43 - 95 g/m2    MV E/A 1.15     E/E' Ratio (Averaged) 10.46     E/E' Lateral 9.30     E/E' Septal 11.63     LVOT Stroke Volume Index 22.8 mL/m2    LA Size Index 1.28 cm/m2    LA/AO Root Ratio 0.81     Ao Root Index 1.59 cm/m2    Ascending Aorta Index 1.49 cm/m2    AV Velocity Ratio 0.82     LVOT:AV VTI Index 0.92     DEVONTE/BSA VTI 1.2 cm2/m2    DEVONTE/BSA Peak Velocity 1.0 cm2/m2    Est. RA Pressure 8 mmHg    RVSP 90 mmHg   POCT Glucose    Collection Time: 06/27/22 11:41 AM   Result Value Ref Range    POC Glucose 233 (H) 65 - 100 mg/dL    Performed by: [facility]    POCT Glucose    Collection Time: 06/27/22  5:03 PM   Result Value Ref Range    POC Glucose 108 (H) 65 - 100 mg/dL    Performed by: [facility]    POCT Glucose    Collection Time: 06/27/22  8:43 PM   Result Value Ref Range    POC Glucose 155 (H) 65 - 100 mg/dL    Performed by: Bin Munoz    CBC with Auto Differential    Collection Time: 06/28/22  5:35 AM   Result Value Ref Range    WBC 9.0 4.3 - 11.1 K/uL    RBC 2.47 (L) 4.05 - 5.2 M/uL    Hemoglobin 8.2 (L) 11.7 - 15.4 g/dL    Hematocrit 24.3 (L) 35.8 - 46.3 %    MCV 98.4 (H) 79.6 - 97.8 FL    MCH 33.2 (H) 26.1 - 32.9 PG    MCHC 33.7 31.4 - 35.0 g/dL    RDW 18.0 (H) 11.9 - 14.6 %    Platelets 960 (L) 611 - 450 K/uL    MPV 10.9 9.4 - 12.3 FL    nRBC 0.03 0.0 - 0.2 K/uL    Differential Type AUTOMATED      Seg Neutrophils 85 (H) 43 - 78 %    Lymphocytes 8 (L) 13 - 44 %    Monocytes 5 4.0 - 12.0 %    Eosinophils % 1 0.5 - 7.8 %    Basophils 1 0.0 - 2.0 %    Immature Granulocytes 0 0.0 - 5.0 %    Segs Absolute 7.7 1.7 - 8.2 K/UL    Absolute Lymph # 0.7 0.5 - 4.6 K/UL    Absolute Mono # 0.4 0.1 - 1.3 K/UL    Absolute Eos # 0.1 0.0 - 0.8 K/UL    Basophils Absolute 0.1 0.0 - 0.2 K/UL    Absolute Immature Granulocyte 0.0 0.0 - 0.5 K/UL       CXR;   Results for orders placed during the hospital encounter of 05/31/22    XR CHEST PORTABLE    Narrative  EXAM: XR CHEST PORTABLE  INDICATION: fatigue  COMPARISON: Chest radiograph, 11/22/2021    FINDINGS:  The cardiomediastinal silhouette is enlarged but stable. Right-sided  hemodialysis catheter terminates in the right atrium. No pleural effusion or  pneumothorax. No focal parenchymal process. Vascular stent in the right  subclavian region and bilateral brachial regions. No acute osseous abnormality. Impression  Stable cardiomegaly without acute process. KUB:  Results for orders placed in visit on 08/03/18    XR ABDOMEN (KUB) (SINGLE AP VIEW)    Narrative  KUB supine views    History:  mechanical small bowel obstruction, lower abd ventral hernia, 78 years  Female    Comparison:  CT abdomen pelvis yesterday    Findings:  Esophageal tube appears to terminate in the first portion of the  duodenum. Oral contrast is seen throughout the colon. No free air is evident. The bowel gas pattern is nonspecific and there is no evidence of ileus or  obstruction. No suspicious renal or ureteral calculi are evident. Visualized  osseous structures unremarkable. Impression  Impression: No acute pathology identified. Renal US:  No results found for this or any previous visit. CT Abdomen  Results for orders placed during the hospital encounter of 06/25/22    CT ABDOMEN PELVIS W IV CONTRAST Additional Contrast? None    Narrative  EXAM: CT abdomen and pelvis with IV contrast.    INDICATION: Abdominal pain. COMPARISON: Prior PET/CT scan on May 5, 2022. TECHNIQUE: Axial CT images of the abdomen and pelvis were obtained after the  intravenous injection of 100 mL Isovue 370 CT contrast. Radiation dose reduction  techniques were used for this study. Our CT scanners use one or all of the  following:  Automated exposure control, adjustment of the mA or kV according to  patient size, iterative reconstruction. FINDINGS:    - Liver: Within normal limits. - Gallbladder and bile ducts: Within normal limits. - Spleen: Within normal limits. - Urinary tract: The left kidney is absent. The right kidney is atrophied and  there is a 2.6 cm exophytic cyst off its lower pole. No right-sided  hydronephrosis is seen. The urinary bladder is collapsed. - Adrenals: Within normal limits. - Pancreas: Within normal limits. - Gastrointestinal tract: There is colonic diverticulosis.  No definite acute  diverticulitis is seen, although ascites limits for evaluation of focal  inflammation.  - Retroperitoneum: Mild abdominal aortic atherosclerosis, with no aneurysmal  dilatation or dissection. The right heart is enlarged, and there is prominent  contrast reflux into the IVC, raising the possibility of right heart failure. - Peritoneal cavity and abdominal wall: There is progressed mild ascites. No  intra-abdominal abscess or free intraperitoneal air is seen. Edema in the  abdominal wall subcutaneous tissue has not significantly changed. - Pelvis: There is a double-limbed bypass graft in the upper left thigh. An  adjacent 7.9 x 7.5 cm complex fluid collection with no associated gas is seen in  the upper anterior thigh, which could be an abscess or hematoma. A small amount  of high-density material along the inferior margin of the fluid collection  raises the possibility of active bleeding.  - Spine/bones: No acute process. - Other comments: Small bilateral pleural effusions and rounded atelectasis in  the left lower lobe are unchanged. Impression  1. Upper left thigh bypass graft, not fully visualized on this study, with an  adjacent 7.9 x 7.5 cm complex fluid collection which could represent an abscess  or hematoma. There is a small amount of high density material along the inferior  margin of the fluid collection, making it difficult to exclude active bleeding. Dr. Dayton Guerrier verbally notified at 4:16 AM.  2. Persistent anasarca, with progressed mild ascites. 3. Prior left nephrectomy and atrophied right kidney. 4. Prominent sized hepatic veins and IVC in this patient with an enlarged right  heart, raising the possibility of right heart failure. Principal Problem:    Hematoma of left lower leg  Active Problems:    Back pain    Cardiac murmur    ESRD (end stage renal disease) on dialysis ([facility])    Type 2 diabetes mellitus with hyperglycemia ([facility])    AVF (arteriovenous fistula) ([facility])  Resolved Problems:    * No resolved hospital problems. *      Assessment:     1. Dialysis access complication - management per vascular surgery    2. ESRD- on HD M-W-F -  Plan to run HD later  today       3. Hypertension- with a hx of HTN, nut all antihypertensives were stopped since she developed profound hypotension following new left thigh AV graph and she was treated with Midodrine which was stopped last week       4. Anemia- likely related to ESRD, , continue Nephrovite,treat with LAKISHA - significant decrease in Hb noted, monitor serially     5. Hypocalcemia- with low albumin, treat with Vitamin D     6. Renal cell carcinoma- followed by Oncology     7. DM II- on SSI, managed by primary     8. Hyperphosphatemia- hx of, on Renvela, continue    9. Hyponatremia - excessive free water intake    10.  Pain - increased Dilaudid back to 0.5 mg from 0.25 mg q 4 hours PRN      Plan:     As above    Hailey Stringer M.D.

## 2022-06-28 NOTE — PROGRESS NOTES
[address] FAX: [phone]         VASCULAR SURGERY FLOOR PROGRESS NOTE    Admit Date: 2022  POD: 2 Days Post-Op    Procedure:  Procedure(s):  LEFT GROIN DEBRIDEMENT OF HEMATOMA / MIGEL    Subjective:     Patient has no new complaints. Objective:     Vitals:  Blood pressure 101/61, pulse 95, temperature 98.4 °F (36.9 °C), temperature source Oral, resp. rate 15, height 5' 4\" (1.626 m), weight 199 lb 9.6 oz (90.5 kg), SpO2 93 %. Temp (24hrs), Av.1 °F (36.7 °C), Min:97.7 °F (36.5 °C), Max:98.5 °F (36.9 °C)      Intake / Output:    Intake/Output Summary (Last 24 hours) at 2022 0713  Last data filed at 2022 1558  Gross per 24 hour   Intake 600 ml   Output 3600 ml   Net -3000 ml       Physical Exam:    Constitutional: she appears well-developed. No distress. HENT:   Head: Atraumatic. Eyes: Pupils are equal, round, and reactive to light. Neck: Normal range of motion. Cardiovascular: Regular rhythm. Pulmonary/Chest: Effort normal and breath sounds normal. No respiratory distress. Abdominal: Soft. Bowel sounds are normal. she exhibits no distension. There is no tenderness. There is no guarding. No hernia. Musculoskeletal: Normal range of motion. Neurological: She is alert.  CN II- XII grossly intact  Vascular: Palpable thrill and bruit on left thigh graft minimal output through [employer] [facility]    Labs:   Recent Labs     22  0438 22  0535   HGB 8.4* 8.2*   WBC 10.7 9.0   K 4.4 3.7       Data Review     Assessment:     Patient Active Problem List    Diagnosis Date Noted    Hematoma of left lower leg 2022    Cardiac murmur 2022    Back pain 2022    CKD (chronic kidney disease) stage 4, GFR 15-29 ml/min ([facility]) 2022    Generalized weakness     Thrombocytopenia ([address] 2022    Chronic respiratory failure with hypoxia ([address] 2021    Severe obesity ([facility] 2018    Type 2 [facility]ropathy [facility] 12/15/2017    Renal cell carcinoma [facility] 09/12/2017    AVF [facility]arteriovenous fistula) [facility] 12/20/2016    Osteoarthritis of right knee 02/07/2013    HTN (hypertension) 02/07/2013    ESRD (end stage renal disease) [facility] 02/07/2013    Type 2 diabetes mellitus with hyperglycemia ([facility] 02/07/2013    Total knee replacement status 02/07/2013       Plan/Recommendations/Medical Decision Making:     Status post evacuation of hematoma and left thigh graft washout.  -Clinically the swelling has decreased we will plan to keep incisional VAC on for another 2 days  -Patient has no activity restrictions  -Ordered ice pack to the left thigh to decrease the swelling    Elements of this note have been dictated using speech recognition software. As a result, errors of speech recognition may have occurred.

## 2022-06-28 NOTE — PROGRESS NOTES
Hospitalist Progress Note   Admit Date:  2022  2:37 AM   Name:  Ming Pires   Age:  80 y.o. Sex:  female  :  1950   MRN:  [id number]   Room:  [facility]/    Presenting Complaint: Flank Pain     Reason(s) for Admission: Back pain [M54.9]  Soft tissue swelling [R22.9]  End stage renal disease on dialysis ([address] [N18.6, Z99.2]  Acute left flank pain [R10.9]     Hospital Course & Interval History:   71-year-old AA emale with past medical history of hypertension, diabetes, end-stage renal disease on hemodialysis, who presented to the ER c/o left thigh pain. The patient states that she was in her usual state of health until yesterday when she started presenting with left lateral abd wall and left thigh pain. Her symptoms did not improve so she decided to come to the emergency department. She recently had an AV graft placed in her left thigh (May 26th) as her previous AVF sites b/l UE have failed. In the emergency department the patient was found to be tachycardic with radiologic findings concerning of upper left thigh fluid collection setting of abscess or hematoma. She was given IV Dilaudid. Otherwise she denies any fever or chills, no shortness of breath, no cough, no nausea vomiting or diarrhea. Pt was admitted for further medical mgmt. Subjective/24hr Events (22): \"It still hurts in my leg. \"  71y.o. AA female sitting up in bed c/o persistent LLE pain    Admits to LLE pain, denies dyspnea, cough, wheezing, chest pain      Assessment & Plan:     Principal Problem:    Left groin hematoma  - POD#2 s/p evacuation left LE hematoma; vac in place  - per Dr. Nena Parkinson keep incisional vac in place for 2 more days  - dc all antibiotics and monitor; cultures so far negative from hematoma    Active Problems:    Episodic hypotension  - suspect from IV dilaudid / pain rx  - gentle IVF; decrease dilaudid dose, prn oxycodone  - parameters to hold pain rx if SBP <100mmhg      Remote AVF placement LLE  - mgmt as above  - ongoing HD via trialysis cath right anterior chest      Anemia  - from hematoma evacuation and ongoing wound vac   - hgb ~stable 8.4 ~> 8.2  - closely monitor      ESRD  - HD M/W/F  - appreciate nephrology assistance      Type 2 diabetes mellitus with hyperglycemia (HCC)  - cont sliding scale coverage as needed  - acceptable ranges      Hx of RCC  - s/p left nephrectomy  - pt reports to following with Dr. Bhavna Ray routinely for annual PET scans      Tricuspid regurg / cardiac murmur / secondary pulmon HTN  - as noted on 6/27/2022 echo   - cont supplemental O2      Debility  - pt refused PT/OT 6/27/2022; no restrictions with activity per [facility] surgery  - PT re-evaluated today, recommending home health on dc      Discharge Planning:    - incisional vac x 2 more days; if remains clinically stable, anticipate dc home with home health    Diet:  ADULT DIET; Regular; 4 carb choices (60 gm/meal); Low Sodium (2 gm); Low Potassium (Less than 3000 mg/day); Low Phosphorus (Less than 1000 mg)  DVT PPx: SCD  Code status: Full Code    Hospital Problems:  Principal Problem:    Hematoma of left lower leg  Active Problems:    Back pain    Cardiac murmur    ESRD (end stage renal disease) on dialysis ([facility] [address]    Type 2 diabetes mellitus with hyperglycemia (HCC)    AVF (arteriovenous fistula) (HCC)  Resolved Problems:    * No resolved hospital problems.  *      Objective:     Patient Vitals for the past 24 hrs:   Temp Pulse Resp BP SpO2   06/28/22 1549 97.3 °F (36.3 °C) 93 18 (!) 100/45 100 %   06/28/22 1530 -- -- -- -- 95 %   06/28/22 1220 -- -- 18 -- --   06/28/22 1117 98.1 °F (36.7 °C) 90 18 (!) 84/43 99 %   06/28/22 0746 98.1 °F (36.7 °C) 93 18 90/62 97 %   06/28/22 0639 -- -- 15 -- --   06/28/22 0609 -- -- 18 -- --   06/28/22 0439 98.4 °F (36.9 °C) 95 19 101/61 93 %   06/28/22 0204 -- -- 16 -- --   06/28/22 0134 -- 89 19 (!) 110/50 --   06/27/22 2338 -- 88 -- (!) 109/54 --   06/27/22 2314 97.7 °F (36.5 °C) 92 19 (!) 87/55 95 %   06/27/22 2200 -- -- 15 -- --   06/27/22 2130 -- -- 19 -- --   06/27/22 1922 98.4 °F (36.9 °C) 93 19 101/60 95 %   06/27/22 1713 97.9 °F (36.6 °C) 91 20 (!) 142/57 98 %       Oxygen Therapy  SpO2: 95 %  Pulse via Oximetry: 100 beats per minute  Pulse Oximeter Device Mode: Intermittent  Pulse Oximeter Device Location: Right  O2 Device: None (Room air)  Skin Assessment: Clean, dry, & intact  O2 Flow Rate (L/min): 2 L/min    Estimated body mass index is 34.26 kg/m² as calculated from the following:    Height as of this encounter: 5' 4\" (1.626 m). Weight as of this encounter: 199 lb 9.6 oz (90.5 kg). Intake/Output Summary (Last 24 hours) at 6/28/2022 1534  Last data filed at 6/27/2022 1558  Gross per 24 hour   Intake 600 ml   Output 3600 ml   Net -3000 ml         Physical Exam:     Blood pressure (!) 100/45, pulse 93, temperature 97.3 °F (36.3 °C), resp. rate 18, height 5' 4\" (1.626 m), weight 199 lb 9.6 oz (90.5 kg), SpO2 100 %. General:    Obese, well developed, in NAD. Head:  Normocephalic, atraumatic  Eyes:  Sclerae appear normal.  Pupils equally round. ENT:  Nares appear normal, no drainage. Moist oral mucosa  Neck:  No restricted ROM. Trachea midline   Chest:   Right anterior HD access without bleeding / oozing  CV:   RRR. Harsh pan systolic murmur  Lungs:   CTAB. No wheezing, rhonchi, or rales. Respirations even, unlabored  Abdomen: Bowel sounds present. Soft, nontender, nondistended. +BSx4  Extremities: Left medial thigh with vac in place, mild red-brown seepage noted, tender to palpation with associated edema, no erythema; + mvmt x 4  Skin:     Warm and dry. Neuro:  CN II-XII grossly intact. Sensation intact. A&Ox3  Psych:  Mood stable.       I have reviewed ordered lab tests and independently visualized imaging below:    Recent Labs:  Recent Results (from the past 48 hour(s))   POCT Glucose    Collection Time: 06/26/22  4:46 PM   Result Value Ref Range    POC Glucose 199 (H) 65 - 100 mg/dL    Performed by: Jannette Arias    POCT Glucose    Collection Time: 06/26/22  8:47 PM   Result Value Ref Range    POC Glucose 182 (H) 65 - 100 mg/dL    Performed by: Cristina    CBC with Auto Differential    Collection Time: 06/27/22  4:38 AM   Result Value Ref Range    WBC 10.7 4.3 - 11.1 K/uL    RBC 2.55 (L) 4.05 - 5.2 M/uL    Hemoglobin 8.4 (L) 11.7 - 15.4 g/dL    Hematocrit 24.5 (L) 35.8 - 46.3 %    MCV 96.1 79.6 - 97.8 FL    MCH 32.9 26.1 - 32.9 PG    MCHC 34.3 31.4 - 35.0 g/dL    RDW 17.6 (H) 11.9 - 14.6 %    Platelets 286 (L) 390 - 450 K/uL    MPV 11.5 9.4 - 12.3 FL    nRBC 0.06 0.0 - 0.2 K/uL    Differential Type AUTOMATED      Seg Neutrophils 82 (H) 43 - 78 %    Lymphocytes 8 (L) 13 - 44 %    Monocytes 9 4.0 - 12.0 %    Eosinophils % 0 (L) 0.5 - 7.8 %    Basophils 0 0.0 - 2.0 %    Immature Granulocytes 1 0.0 - 5.0 %    Segs Absolute 8.8 (H) 1.7 - 8.2 K/UL    Absolute Lymph # 0.8 0.5 - 4.6 K/UL    Absolute Mono # 1.0 0.1 - 1.3 K/UL    Absolute Eos # 0.0 0.0 - 0.8 K/UL    Basophils Absolute 0.0 0.0 - 0.2 K/UL    Absolute Immature Granulocyte 0.1 0.0 - 0.5 K/UL   Basic Metabolic Panel    Collection Time: 06/27/22  4:38 AM   Result Value Ref Range    Sodium 135 (L) 136 - 145 mmol/L    Potassium 4.4 3.5 - 5.1 mmol/L    Chloride 96 (L) 98 - 107 mmol/L    CO2 28 21 - 32 mmol/L    Anion Gap 11 7 - 16 mmol/L    Glucose 173 (H) 65 - 100 mg/dL    BUN 43 (H) 8 - 23 MG/DL    CREATININE 7.20 (H) 0.6 - 1.0 MG/DL    GFR African American 7 (L) >60 ml/min/1.73m2    GFR Non- 6 (L) >60 ml/min/1.73m2    Calcium 7.4 (L) 8.3 - 10.4 MG/DL   POCT Glucose    Collection Time: 06/27/22  8:20 AM   Result Value Ref Range    POC Glucose 220 (H) 65 - 100 mg/dL    Performed by: [facility]    Transthoracic echocardiogram (TTE) complete with contrast, bubble, strain, and 3D PRN    Collection Time: 06/27/22 10:45 AM   Result Value Ref Range    LA Minor Axis 4.5 cm    LA Major [place] 5.9 cm    LA Area 2C 12.7 cm2    LA Area 4C 10.6 cm2    LA Diameter 2.5 cm    AV Mean Velocity 0.7 m/s    AV Mean Gradient 3 mmHg    AV VTI 19.1 cm    AV Peak Velocity 1.1 m/s    AV Peak Gradient 5 mmHg    AV Area by VTI 2.3 cm2    AV Area by Peak Velocity 1.9 cm2    Aortic Root 3.1 cm    Ascending Aorta 2.9 cm    IVC Proxmal 2.0 cm    IVSd 1.2 (A) 0.6 - 0.9 cm    LVIDd 3.3 (A) 3.9 - 5.3 cm    LVIDs 2.1 cm    LVOT Diameter 1.8 cm    LVOT Mean Gradient 1 mmHg    LVOT VTI 17.5 cm    LVOT Peak Velocity 0.9 m/s    LVOT Peak Gradient 3 mmHg    LVPWd 1.1 (A) 0.6 - 0.9 cm    LV E' Lateral Velocity 10 cm/s    LV E' Septal Velocity 8 cm/s    LVOT Area 2.5 cm2    LVOT SV 44.5 ml    MV E Wave Deceleration Time 192.0 ms    MV A Velocity 0.81 m/s    MV E Velocity 0.93 m/s    RVIDd 3.3 cm    RV Basal Dimension 5.6 cm    RV Longitudinal Dimension 9.0 cm    RV Mid Dimension 4.9 cm    TAPSE 1.6 (A) 1.7 cm    TR Max Velocity 4.53 m/s    TR Peak Gradient 82 mmHg    Body Surface Area 2.02 m2    Fractional Shortening 2D 36 28 - 44 %    LVIDd Index 1.69 cm/m2    LVIDs Index 1.08 cm/m2    LV RWT Ratio 0.67     LV Mass 2D 116.8 67 - 162 g    LV Mass 2D Index 59.9 43 - 95 g/m2    MV E/A 1.15     E/E' Ratio (Averaged) 10.46     E/E' Lateral 9.30     E/E' Septal 11.63     LVOT Stroke Volume Index 22.8 mL/m2    LA Size Index 1.28 cm/m2    LA/AO Root Ratio 0.81     Ao Root Index 1.59 cm/m2    Ascending Aorta Index 1.49 cm/m2    AV Velocity Ratio 0.82     LVOT:AV VTI Index 0.92     DEVONTE/BSA VTI 1.2 cm2/m2    DEVONTE/BSA Peak Velocity 1.0 cm2/m2    Est. RA Pressure 8 mmHg    RVSP 90 mmHg   POCT Glucose    Collection Time: 06/27/22 11:41 AM   Result Value Ref Range    POC Glucose 233 (H) 65 - 100 mg/dL    Performed by: [facility]    POCT Glucose    Collection Time: 06/27/22  5:03 PM   Result Value Ref Range    POC Glucose 108 (H) 65 - 100 mg/dL    Performed by: [facility]    POCT Glucose    Collection Time: 06/27/22  8:43 PM   Result Value Ref Range    POC Glucose 155 (H) 65 - 100 mg/dL    Performed by: Radhika    CBC with Auto Differential    Collection Time: 06/28/22  5:35 AM   Result Value Ref Range    WBC 9.0 4.3 - 11.1 K/uL    RBC 2.47 (L) 4.05 - 5.2 M/uL    Hemoglobin 8.2 (L) 11.7 - 15.4 g/dL    Hematocrit 24.3 (L) 35.8 - 46.3 %    MCV 98.4 (H) 79.6 - 97.8 FL    MCH 33.2 (H) 26.1 - 32.9 PG    MCHC 33.7 31.4 - 35.0 g/dL    RDW 18.0 (H) 11.9 - 14.6 %    Platelets 436 (L) 859 - 450 K/uL    MPV 10.9 9.4 - 12.3 FL    nRBC 0.03 0.0 - 0.2 K/uL    Differential Type AUTOMATED      Seg Neutrophils 85 (H) 43 - 78 %    Lymphocytes 8 (L) 13 - 44 %    Monocytes 5 4.0 - 12.0 %    Eosinophils % 1 0.5 - 7.8 %    Basophils 1 0.0 - 2.0 %    Immature Granulocytes 0 0.0 - 5.0 %    Segs Absolute 7.7 1.7 - 8.2 K/UL    Absolute Lymph # 0.7 0.5 - 4.6 K/UL    Absolute Mono # 0.4 0.1 - 1.3 K/UL    Absolute Eos # 0.1 0.0 - 0.8 K/UL    Basophils Absolute 0.1 0.0 - 0.2 K/UL    Absolute Immature Granulocyte 0.0 0.0 - 0.5 K/UL   Basic Metabolic Panel    Collection Time: 06/28/22  5:35 AM   Result Value Ref Range    Sodium 134 (L) 136 - 145 mmol/L    Potassium 3.7 3.5 - 5.1 mmol/L    Chloride 96 (L) 98 - 107 mmol/L    CO2 29 21 - 32 mmol/L    Anion Gap 9 7 - 16 mmol/L    Glucose 107 (H) 65 - 100 mg/dL    BUN 24 (H) 8 - 23 MG/DL    CREATININE 4.70 (H) 0.6 - 1.0 MG/DL    GFR African American 12 (L) >60 ml/min/1.73m2    GFR Non- 10 (L) >60 ml/min/1.73m2    Calcium 7.9 (L) 8.3 - 10.4 MG/DL   POCT Glucose    Collection Time: 06/28/22  7:48 AM   Result Value Ref Range    POC Glucose 119 (H) 65 - 100 mg/dL    Performed by: Lee    POCT Glucose    Collection Time: 06/28/22 11:15 AM   Result Value Ref Range    POC Glucose 179 (H) 65 - 100 mg/dL    Performed by: Peggy Nguyen        Other Studies:  IR ANGIOGRAM EXTREMITY LEFT   Final Result      CT ABDOMEN PELVIS W IV CONTRAST Additional Contrast? None   Final Result   1.  Upper left thigh bypass graft, not fully visualized on this study, with an   adjacent 7.9 x 7.5 cm complex fluid collection which could represent an abscess   or hematoma. There is a small amount of high density material along the inferior   margin of the fluid collection, making it difficult to exclude active bleeding. Dr. Suzan Minor verbally notified at 4:16 AM.   2. Persistent anasarca, with progressed mild ascites. 3. Prior left nephrectomy and atrophied right kidney. 4. Prominent sized hepatic veins and IVC in this patient with an enlarged right   heart, raising the possibility of right heart failure.              Current Meds:  Current Facility-Administered Medications   Medication Dose Route Frequency    0.9 % sodium chloride infusion   IntraVENous Continuous    oxyCODONE (ROXICODONE) immediate release tablet 5 mg  5 mg Oral Q4H PRN    HYDROmorphone HCl PF (DILAUDID) injection 0.5 mg  0.5 mg IntraVENous Q4H PRN    Epoetin Martin-epbx (RETACRIT) injection 20,000 Units  20,000 Units SubCUTAneous Q7 Days    pantoprazole (PROTONIX) tablet 40 mg  40 mg Oral QAM AC    sevelamer (RENVELA) tablet 3,200 mg  3,200 mg Oral TID WC    sodium chloride flush 0.9 % injection 5-40 mL  5-40 mL IntraVENous 2 times per day    sodium chloride flush 0.9 % injection 5-40 mL  5-40 mL IntraVENous PRN    0.9 % sodium chloride infusion   IntraVENous PRN    ondansetron (ZOFRAN-ODT) disintegrating tablet 4 mg  4 mg Oral Q8H PRN    Or    ondansetron (ZOFRAN) injection 4 mg  4 mg IntraVENous Q6H PRN    polyethylene glycol (GLYCOLAX) packet 17 g  17 g Oral Daily PRN    acetaminophen (TYLENOL) tablet 650 mg  650 mg Oral Q6H PRN    Or    acetaminophen (TYLENOL) suppository 650 mg  650 mg Rectal Q6H PRN    0.9 % sodium chloride infusion   IntraVENous PRN    lidocaine 4 % external patch 1 patch  1 patch TransDERmal Daily    glucose chewable tablet 16 g  4 tablet Oral PRN    dextrose bolus 10% 125 mL  125 mL IntraVENous PRN    Or    dextrose bolus 10% 250 mL  250 mL IntraVENous PRN    glucagon injection 1 mg  1 mg IntraMUSCular PRN    dextrose 5 % solution  100 mL/hr IntraVENous PRN    insulin lispro (HUMALOG) injection vial 0-8 Units  0-8 Units SubCUTAneous TID WC    insulin lispro (HUMALOG) injection vial 0-4 Units  0-4 Units SubCUTAneous Nightly       Signed:  Keyur Quiles    Part of this note may have been written by using a voice dictation software. The note has been proof read but may still contain some grammatical/other typographical errors.

## 2022-06-28 NOTE — PROGRESS NOTES
OCCUPATIONAL THERAPY Initial Assessment, Daily Note and PM       OT Visit Days: 1  Acknowledge Orders  Time  OT Charge Capture  Rehab Caseload Tracker      Zuleyka Lipscomb is a 80 y.o. female   PRIMARY DIAGNOSIS: Hematoma of left lower leg  Back pain [M54.9]  Soft tissue swelling [R22.9]  End stage renal disease on dialysis ([facility] [address] [N18.6, Z99.2]  Acute left flank pain [R10.9]  Procedure(s) (LRB):  LEFT GROIN DEBRIDEMENT OF HEMATOMA / Judcheng Kand (Left)  2 Days Post-Op  Reason for Referral: Generalized Muscle Weakness (M62.81)  Other lack of cordination (R27.8)  History of falling (Z91.81)  Inpatient: Payor: Mario Camilo / Plan: Tan Molina / Product Type: *No Product type* /     ASSESSMENT:     REHAB RECOMMENDATIONS:   Recommendation to date pending progress:  [address] [place] [address] Therapy    Equipment:     To Be Determined     ASSESSMENT:  Ms. Marquita Leone presents to the hospital with hematoma of L LE, back pain, and ESRD. Pt currently has wound vac in place to L groin after debridement of hematoma. Pt reports pain is more controlled today and pt was willing to participate with therapy with encouragement. Pt moves slow and needs frequent rest breaks. Pt wears 2 L of O2 at home but is currently on 3 L. Pt reports she has lots of family support at home and she is never alone. Pt was CGA/min A with all bed mobility, functional transfers, and functional mobility. Pt did use RW during session. Pt demonstrates poor activity tolerance and needs rest breaks. Pt c/o shortness of breath but O2 level stable throughout on 3 L. Pt did report increased pain with standing/functional mobility. Pt encouraged to sit up in the chair but pt felt like she needed to go back to the bed. Pt did reports falls after going home from last admission.  Pt doesn't want to go to rehab at NH but is agreeable to working with home health therapy as long as they don't come on her days she has dialysis (she asks this for therapy in the hospital as well). Pt is currently functioning below baseline and will benefit from OT services to address stated goals and plan of care.       [address] [facility] [address] [id number] AM-PAC 6 Clicks Daily Activity Inpatient Short Form:    AM-PAC Daily Activity Inpatient   How much help for putting on and taking off regular lower body clothing?: A Lot  How much help for Bathing?: A Lot  How much help for Toileting?: A Lot  How much help for putting on and taking off regular upper body clothing?: A Little  How much help for taking care of personal grooming?: A Little  How much help for eating meals?: A Little  AM-PAC Inpatient Daily Activity Raw Score: 15  AM-PAC Inpatient ADL T-Scale Score : 34.69  ADL Inpatient CMS 0-100% Score: 56.46  ADL Inpatient CMS G-Code Modifier : CK           SUBJECTIVE:     Ms. Dion Nicholson states, \"I was getting home health but it was like speaking to deaf ears because I told them not to come on the days when I go to dialysis\"     Social/Functional Lives With: Family,Daughter  Home Layout: Two level  Home Equipment: Oxygen,Walker, standard (walking stick.)  Receives Help From: [employer]  ADL Assistance: Independent  Active : Yes  Mode of Transportation: Car    OBJECTIVE:     Milo Party / Willy Garcia / Silvia [place]: IV and nasal cannula    RESTRICTIONS/PRECAUTIONS:  Restrictions/Precautions: Fall Risk    PAIN: VITALS / O2:   Pre Treatment:   Pain Assessment: 0-10  Pain Level: 6  Pain Location: Leg  Pain Orientation: Left      Post Treatment: same       Vitals          Oxygen            GROSS EVALUATION: INTACT IMPAIRED   (See Comments)   UE AROM [] []generally decreased but appears functional    UE PROM [] []   Strength []  generally decreased      Posture / Balance []  fair+ sitting; fair standing balance   Sensation []  grossly intact   Coordination []  generally decreased      Tone [x]       Edema []    Activity Tolerance []   limited due to fatigue and pain   Hand Dominance R [x] L []      COGNITION/  PERCEPTION: INTACT IMPAIRED   (See Comments)   Orientation []  appears appropriate    Vision [x]     Hearing [x]     Cognition  []  occasional cues for safety   Perception [x]       MOBILITY: I Mod I S SBA CGA Min Mod Max Total  NT x2 Comments:   Bed Mobility    Rolling [] [] [] [] [x] [] [] [] [] [] []    Supine to Sit [] [] [] [] [x] [x] [] [] [] [] []    Scooting [] [] [] [] [x] [] [] [] [] [] []    Sit to Supine [] [] [] [] [x] [x] [] [] [] [] []    Transfers    Sit to Stand [] [] [] [] [x] [x] [] [] [] [] []    Bed to Chair [] [] [] [] [] [x] [] [] [] [] []    Stand to Sit [] [] [] [] [] [x] [] [] [] [] []    Tub/Shower [] [] [] [] [] [] [] [] [] [x] []     Toilet [] [] [] [] [] [] [] [] [] [x] []      [] [] [] [] [] [] [] [] [] [] []    I=Independent, Mod I=Modified Independent, S=Supervision/Setup, SBA=Standby Assistance, CGA=Contact Guard Assistance, Min=Minimal Assistance, Mod=Moderate Assistance, Max=Maximal Assistance, Total=Total Assistance, NT=Not Tested    ACTIVITIES OF DAILY LIVING: I Mod I S SBA CGA Min Mod Max Total NT Comments   BASIC ADLs:              Upper Body Bathing  [] [] [] [] [] [] [] [] [] [x]    Lower Body Bathing [] [] [] [] [] [] [] [] [] [x]    Toileting [] [] [] [] [] [] [] [] [] [x]    Upper Body Dressing [] [] [] [] [] [] [] [] [] [x]    Lower Body Dressing [] [] [] [] [] [] [] [] [] [x]    Feeding [] [] [] [] [] [] [] [] [] [x]    Grooming [] [] [] [] [] [] [] [] [] [x]    Personal Device Care [] [] [] [] [] [] [] [] [] [x]    Functional Mobility [] [] [] [] [] [x] [] [] [] [] Using RW    I=Independent, Mod I=Modified Independent, S=Supervision/Setup, SBA=Standby Assistance, CGA=Contact Guard Assistance, Min=Minimal Assistance, Mod=Moderate Assistance, Max=Maximal Assistance, Total=Total Assistance, NT=Not Tested    PLAN:     FREQUENCY/DURATION   OT Plan of Care: 3 times/week for duration of hospital stay or until stated goals are met, whichever comes first.    ACUTE OCCUPATIONAL THERAPY GOALS:   (Developed with and agreed upon by patient and/or caregiver.)  1. Patient will complete lower body bathing and dressing with minimal assistance and adaptive equipment as needed. 2. Patient will complete toileting with minimal assistance. 3. Patient will tolerate 23 minutes of OT treatment with 1-2 rest breaks to increase activity tolerance for ADLs. 4. Patient will complete functional transfers with modified independence and adaptive equipment as needed. 5. Patient will complete functional mobility for household distances with supervision and appropriate safety awareness. Timeframe: 7 visits         PROBLEM LIST:   (Skilled intervention is medically necessary to address:)  Decreased ADL/Functional Activities  Decreased Activity Tolerance  Decreased AROM/PROM  Decreased Balance  Decreased Coordination  Decreased Gait Ability  Decreased Safety Awareness  Decreased Strength  Decreased Transfer Abilities  Increased Pain   INTERVENTIONS PLANNED:  (Benefits and precautions of occupational therapy have been discussed with the patient.)  Self Care Training  Therapeutic Activity  Therapeutic Exercise/HEP  Neuromuscular Re-education  Gait Training  Manual Therapy  Education         TREATMENT:     EVALUATION: LOW COMPLEXITY: (Untimed Charge)    TREATMENT:   Co-Treatment PT/OT necessary due to patient's decreased overall endurance/tolerance levels, as well as need for high level skilled assistance to complete functional transfers/mobility and functional tasks  Neuromuscular Re-education (38 Minutes): Neuromuscular Re-education included Balance Training, Coordination training, Postural training, Sitting balance training and Standing balance training to improve Balance, Coordination, Functional Mobility and Postural Control.     TREATMENT GRID:  N/A    AFTER TREATMENT PRECAUTIONS: Bed, Bed/Chair Locked, Call light within reach, Needs within reach and RN notified    INTERDISCIPLINARY COLLABORATION:  RN/ PCT, PT/ PTA and OT/ AMADOR    EDUCATION:  Education Given To: Patient  Education Provided: Role of Therapy;Plan of Care  Education Method: Verbal  Education Outcome: Verbalized understanding    TOTAL TREATMENT DURATION AND TIME:  Time In: 4798  Time Out: Manjinder 159  Minutes: [address], OT

## 2022-06-29 NOTE — PROGRESS NOTES
RENAL H&P/CONSULT    Subjective:     Patient is a 79 y/o AA female, followed by our office for ESRD, on HD M-W-F at [facility] is admitted for left flank and groin swelling. She has been running via RIJ perm cath since her LUE AVF became non-functioning and a left thigh AV graft placed on 5/26/22 by Dr Flako Mcdonald. She has developed marked left leg edema and had profound hypotension post leg loop placement requiring Midodrine, but recently her BP was up to the degree that we stopped Midodrine last week. She has been off all antihypertensive medications. She ran HD Friday without problems. PMH also consist of DM II, Hyperphosphatemia, anemia, renal cell carcinoma s/p left nephrectomy, followed by Pulmonary for lung mass, and HTN. She is on home O2 at 2L NC as needed. She presented to the ER  now presents in the setting of left flank pain. The patient states that she was in her usual state of health until yesterday when she started presenting with left flank pain associated with left-sided swelling of her lower extremity. Her symptoms did not improve so she decided to come to the emergency department. No fever or chills, no shortness of breath, no cough, no nausea vomiting or diarrhea. She was evaluated by vascular surgery and underwent exploration forpossible abscess or hematoma and is on empiric IV antibiotics.    6/27/22  -much anxiety and tearful c/o pain with reduced narcotics - no N/V, no CP, no dyspnea   6/28/22 - reports better pain control with increased dosing of Dilaudid -  not so anxious anymore - plans for dialysis tomorrow discussed - no urgent needs - no N/V, no CP, no dyspnea   6/29/22 - alert and appears more comfortable - appetite is improved - appreciate Dr. Flako Mcdonald input - plan for HD later today -     Past Medical History:   Diagnosis Date    Anemia     Arthritis     AVF (arteriovenous fistula) ([address]     left    AVF (arteriovenous fistula) ([address] 12/20/2016 12/6/16 ([facility]) Right AVF revision and thrombectomy    Degenerative joint disease     Diabetes ([facility]     type 2--- does not check sqbs at home    Enlarged lymph node     \"enlarging paratracheal node to 2.6 cm on CT chest\"    ESRD on hemodialysis ([facility] onset 2006    ESRD.  MWF dialysis at [facility]    Hypertension     controlled with med    Mediastinal mass     being followed by dr ramírez--per pt-- states told by dr Abbey Hinojosa it wasnt cancer-- but plans to be rescanned 5/2022    Obesity     On home O2     2L QHS and PRN during day d/t \"lymph node in chest\"    Renal cancer ([facility]     Dr Tiara Fong - renal cancer L nephrectomy---and radiation ---    Shortness of breath     swollen lymphnode in chest-- oxygen 2LPM    Transient ischemic attack 08/29/2015    no residual      Past Surgical History:   Procedure Laterality Date    BREAST SURGERY Right     cyst removed    COLONOSCOPY N/A 11/8/2018    COLONOSCOPY  BMI 36 performed by Serjio Pena MD at [address] [place] [address]  9/14/2017    CT BIOPSY ABDOMEN RETROPERITONEUM 9/14/2017 [facility]Y CT SCAN    GI  08/06/2018    exploratory laparotomy    GI  06/01/2002    colon resection resulting in temporary colostomy reversal    GROIN SURGERY Left 6/26/2022    LEFT GROIN DEBRIDEMENT OF HEMATOMA / MIGEL performed by Marissa Cristobal MD at [facility] MAIN OR    GYN      mihir    IR INTRO CATH DIALYSIS CIRCUIT W BALLOON PTA  3/10/2020    IR INTRO CATH DIALYSIS CIRCUIT W BALLOON PTA  12/5/2019    IR INTRO CATH DIALYSIS CIRCUIT W BALLOON PTA  9/3/2019    IR INTRO CATH DIALYSIS CIRCUIT W BALLOON PTA  5/30/2019    IR INTRO CATH DIALYSIS CIRCUIT W BALLOON PTA  2/28/2019    IR THRMB/INFUSION DIAYSIS CIRCUIT Left 2021    IR TUNNELED CATHETER PLACEMENT GREATER THAN 5 YEARS  3/15/2022    IR TUNNELED CATHETER PLACEMENT GREATER THAN 5 YEARS  11/19/2021    IR TUNNELED CATHETER PLACEMENT GREATER THAN 5 YEARS  11/19/2021    IR TUNNELED CATHETER PLACEMENT GREATER THAN 5 YEARS 11/19/2021 D RADIOLOGY SPECIALS    IR TUNNELED CATHETER PLACEMENT GREATER THAN 5 YEARS  3/15/2022    IR TUNNELED CATHETER PLACEMENT GREATER THAN 5 YEARS 3/15/2022 D RADIOLOGY SPECIALS    OTHER SURGICAL HISTORY      dialysis fistula, several permcaths    UROLOGICAL SURGERY Left July 2015    nephrectomy    VASCULAR SURGERY Left 04/07/2022    LEFT ARM AV GRAFT    VASCULAR SURGERY Left 03/15/2022    declot    VASCULAR SURGERY Left 02/21/2022    Open thromboembolectomy of left upper arm graft.  VASCULAR SURGERY Right     AV graft- states no longer uses    VASCULAR SURGERY Left 5/26/2022    LEFT AV GRAFT THIGH performed by Hamilton Pressley MD at [address] [facility]      Prior to Admission medications    Medication Sig Start Date End Date Taking?  Authorizing Provider   naloxone 4 MG/0.1ML LIQD nasal spray 1 spray by Nasal route as needed for Opioid Reversal.    Historical Provider, MD   midodrine (PROAMATINE) 10 MG tablet Take 1 tablet by mouth every 8 hours 6/5/22   [place], MD   acetaminophen (TYLENOL) 500 MG tablet Take 500 mg by mouth every 6 hours as needed for Pain    Historical Provider, MD   omeprazole (PRILOSEC) 40 MG delayed release capsule Take 40 mg by mouth at bedtime    Historical Provider, MD   OXYGEN Inhale into the lungs 2 LITERS AS NEEDED FOR SOB    Historical Provider, MD   SITagliptin (JANUVIA) 50 MG tablet Take 50 mg by mouth daily    Historical Provider, MD   sevelamer (RENVELA) 800 MG tablet Take 4 tablets by mouth 3 times daily (with meals) PT TAKES 4 TABS WITH MEALS-- AND 1 TABS WITH SNACKS     Historical Provider, MD     Allergies   Allergen Reactions    Pcn [Penicillins] Other (See Comments)     PT STATES UNSURE OF RX    Vancomycin Rash      Social History     Tobacco Use    Smoking status: Never Smoker    Smokeless tobacco: Never Used   Substance Use Topics    Alcohol use: No      Family History   Problem Relation Age of Onset    Diabetes Mother     Heart Disease Mother  Hypertension Mother     Heart Disease Father     Hypertension Father     Post-op Cognitive Dysfunction Neg Hx     Pseudochol. Deficiency Neg Hx     Delayed Awakening Neg Hx     Post-op Nausea/Vomiting Neg Hx     Emergence Delirium Neg Hx     Other Neg Hx     Malig Hypertherm Neg Hx           Review of Systems    Constitutional: no fever, negative  Eyes: fair vision, negative  Ears, nose, mouth, throat, and face:fair hearing, negative  Respiratory: no asthma, negative  Cardiovascular:no chest pain, negative  Gastrointestinal:no diarrhea, negative  Genitourinary: no dysuria,negative  Hematologic/lymphatic: no bleeding tendency, negative  Neurological: no seizures, negative  Behvioral/Psych: no psych hospitalization positive for anxiety and depression  Endocrine: no goiter, negative      Objective:       BP (!) 143/72   Pulse 90   Temp 97.9 °F (36.6 °C) (Oral)   Resp 18   Ht 5' 4\" (1.626 m)   Wt 206 lb (93.4 kg)   SpO2 99%   BMI 35.36 kg/m²     No intake/output data recorded. 06/27 1901 - 06/29 0700  In: 2465.3 [P.O.:240; I.V.:2225.3]  Out: -     BP (!) 143/72   Pulse 90   Temp 97.9 °F (36.6 °C) (Oral)   Resp 18   Ht 5' 4\" (1.626 m)   Wt 206 lb (93.4 kg)   SpO2 99%   BMI 35.36 kg/m²   General:  Alert, cooperative, no distress, appears stated age. Head:  Normocephalic, without obvious abnormality, atraumatic. Eyes:  Conjunctivae/corneas clear. EOMs intact. Throat: Lips, mucosa, and tongue normal. Teeth and gums normal.   Back:   Symmetric, no curvature. ROM normal. No CVA tenderness. Lungs:   Clear to auscultation bilaterally. Heart:  Regular rate and rhythm, S1, S2 normal, no murmur,  rub or gallop. Abdomen:   Soft, non-tender. No masses,  No organomegaly. Extremities: Extremities no cyanosis or edema right, ++ edema left. Access: S/P tunneled HD catheter and S/P left groin loop graft with left groin swelling and left leg edema.  - drain with wound vac in place   Skin: Skin color, texture, turgor normal. No rashes or lesions. Neurologic: Grossly intact. No asterixis.            Data Review:     Recent Results (from the past 24 hour(s))   POCT Glucose    Collection Time: 06/28/22 11:15 AM   Result Value Ref Range    POC Glucose 179 (H) 65 - 100 mg/dL    Performed by: Digna Williamson    POCT Glucose    Collection Time: 06/28/22  3:48 PM   Result Value Ref Range    POC Glucose 138 (H) 65 - 100 mg/dL    Performed by: Digna Williamson    POCT Glucose    Collection Time: 06/28/22  9:21 PM   Result Value Ref Range    POC Glucose 139 (H) 65 - 100 mg/dL    Performed by: Kirsty    CBC with Auto Differential    Collection Time: 06/29/22  5:36 AM   Result Value Ref Range    WBC 7.8 4.3 - 11.1 K/uL    RBC 2.55 (L) 4.05 - 5.2 M/uL    Hemoglobin 8.4 (L) 11.7 - 15.4 g/dL    Hematocrit 24.9 (L) 35.8 - 46.3 %    MCV 97.6 79.6 - 97.8 FL    MCH 32.9 26.1 - 32.9 PG    MCHC 33.7 31.4 - 35.0 g/dL    RDW 17.8 (H) 11.9 - 14.6 %    Platelets 421 (L) 772 - 450 K/uL    MPV 11.2 9.4 - 12.3 FL    nRBC 0.02 0.0 - 0.2 K/uL    Differential Type AUTOMATED      Seg Neutrophils 84 (H) 43 - 78 %    Lymphocytes 7 (L) 13 - 44 %    Monocytes 7 4.0 - 12.0 %    Eosinophils % 2 0.5 - 7.8 %    Basophils 0 0.0 - 2.0 %    Immature Granulocytes 0 0.0 - 5.0 %    Segs Absolute 6.5 1.7 - 8.2 K/UL    Absolute Lymph # 0.6 0.5 - 4.6 K/UL    Absolute Mono # 0.5 0.1 - 1.3 K/UL    Absolute Eos # 0.2 0.0 - 0.8 K/UL    Basophils Absolute 0.0 0.0 - 0.2 K/UL    Absolute Immature Granulocyte 0.0 0.0 - 0.5 K/UL   Basic Metabolic Panel    Collection Time: 06/29/22  5:36 AM   Result Value Ref Range    Sodium 135 (L) 136 - 145 mmol/L    Potassium 4.2 3.5 - 5.1 mmol/L    Chloride 96 (L) 98 - 107 mmol/L    CO2 27 21 - 32 mmol/L    Anion Gap 12 7 - 16 mmol/L    Glucose 102 (H) 65 - 100 mg/dL    BUN 34 (H) 8 - 23 MG/DL    CREATININE 5.70 (H) 0.6 - 1.0 MG/DL    GFR African American 9 (L) >60 ml/min/1.73m2    GFR Non- 8 (L) >60 dose reduction  techniques were used for this study. Our CT scanners use one or all of the  following:  Automated exposure control, adjustment of the mA or kV according to  patient size, iterative reconstruction. FINDINGS:    - Liver: Within normal limits. - Gallbladder and bile ducts: Within normal limits. - Spleen: Within normal limits. - Urinary tract: The left kidney is absent. The right kidney is atrophied and  there is a 2.6 cm exophytic cyst off its lower pole. No right-sided  hydronephrosis is seen. The urinary bladder is collapsed. - Adrenals: Within normal limits. - Pancreas: Within normal limits. - Gastrointestinal tract: There is colonic diverticulosis. No definite acute  diverticulitis is seen, although ascites limits for evaluation of focal  inflammation.  - Retroperitoneum: Mild abdominal aortic atherosclerosis, with no aneurysmal  dilatation or dissection. The right heart is enlarged, and there is prominent  contrast reflux into the IVC, raising the possibility of right heart failure. - Peritoneal cavity and abdominal wall: There is progressed mild ascites. No  intra-abdominal abscess or free intraperitoneal air is seen. Edema in the  abdominal wall subcutaneous tissue has not significantly changed. - Pelvis: There is a double-limbed bypass graft in the upper left thigh. An  adjacent 7.9 x 7.5 cm complex fluid collection with no associated gas is seen in  the upper anterior thigh, which could be an abscess or hematoma. A small amount  of high-density material along the inferior margin of the fluid collection  raises the possibility of active bleeding.  - Spine/bones: No acute process. - Other comments: Small bilateral pleural effusions and rounded atelectasis in  the left lower lobe are unchanged. Impression  1. Upper left thigh bypass graft, not fully visualized on this study, with an  adjacent 7.9 x 7.5 cm complex fluid collection which could represent an abscess  or hematoma.  There is a small amount of high density material along the inferior  margin of the fluid collection, making it difficult to exclude active bleeding. Dr. Eula Freed verbally notified at 4:16 AM.  2. Persistent anasarca, with progressed mild ascites. 3. Prior left nephrectomy and atrophied right kidney. 4. Prominent sized hepatic veins and IVC in this patient with an enlarged right  heart, raising the possibility of right heart failure. Principal Problem:    Hematoma of left lower leg  Active Problems:    Back pain    Cardiac murmur    ESRD (end stage renal disease) on dialysis ([facility])    Type 2 diabetes mellitus with hyperglycemia ([facility])    AVF (arteriovenous fistula) ([facility])  Resolved Problems:    * No resolved hospital problems. *      Assessment:     1. Dialysis access complication - management per vascular surgery    2. ESRD- on HD M-W-F -  Plan to run HD later today       3. Hypertension- with a hx of HTN, nut all antihypertensives were stopped since she developed profound hypotension following new left thigh AV graph and she was treated with Midodrine which was stopped last week       4. Anemia- likely related to ESRD, continue Nephrovite, treat with LAKISHA - significant decrease in Hb noted, monitor serially     5. Hypocalcemia- with low albumin, treat with Vitamin D     6. Renal cell carcinoma - followed by Oncology     7. DM II- on SSI, managed by primary     8. Hyperphosphatemia- hx of, on Renvela, continue    9. Hyponatremia - excessive free water intake    10.  Pain - improving control       Plan:     As above    Roxy Blankenship M.D.

## 2022-06-29 NOTE — PLAN OF CARE
Problem: Discharge Planning  Goal: Discharge to home or other facility with appropriate resources  Outcome: Progressing  Flowsheets (Taken 6/28/2022 1957)  Discharge to home or other facility with appropriate resources: Identify barriers to discharge with patient and caregiver     Problem: Pain  Goal: Verbalizes/displays adequate comfort level or baseline comfort level  Outcome: Progressing     Problem: Safety - Adult  Goal: Free from fall injury  Outcome: Progressing  Flowsheets (Taken 6/28/2022 1957)  Free From Fall Injury: Instruct family/caregiver on patient safety     Problem: Chronic Conditions and Co-morbidities  Goal: Patient's chronic conditions and co-morbidity symptoms are monitored and maintained or improved  Outcome: Progressing  Flowsheets (Taken 6/28/2022 1957)  Care Plan - Patient's Chronic Conditions and Co-Morbidity Symptoms are Monitored and Maintained or Improved: Monitor and assess patient's chronic conditions and comorbid symptoms for stability, deterioration, or improvement     Problem: ABCDS Injury Assessment  Goal: Absence of physical injury  Outcome: Progressing  Flowsheets (Taken 6/28/2022 1957)  Absence of Physical Injury: Implement safety measures based on patient assessment     Problem: Respiratory - Adult  Goal: Achieves optimal ventilation and oxygenation  Outcome: Progressing     Problem: Cardiovascular - Adult  Goal: Maintains optimal cardiac output and hemodynamic stability  Outcome: Progressing  Goal: Absence of cardiac dysrhythmias or at baseline  Outcome: Progressing     Problem: Skin/Tissue Integrity - Adult  Goal: Skin integrity remains intact  Outcome: Progressing  Flowsheets (Taken 6/28/2022 1957)  Skin Integrity Remains Intact: Monitor for areas of redness and/or skin breakdown  Goal: Incisions, wounds, or drain sites healing without S/S of infection  Outcome: Progressing  Goal: Oral mucous membranes remain intact  Outcome: Progressing     Problem: Musculoskeletal -

## 2022-06-29 NOTE — PROGRESS NOTES
Patient seen and examined no acute issues overnight. Left thigh area edema much improved. Incisional VAC minimal output. I will tentatively plan to remove incisional VAC on Friday. From vascular standpoint patient can be plan to be discharged on Friday and follow-up in my office in 2 weeks for a wound check. Continue to keep ice to decrease the edema which is improved. She has no activity restrictions.     Kristy Mullen

## 2022-06-29 NOTE — FLOWSHEET NOTE
TRANSFER OUT- DIALYSIS    Hemodialysis treatment completed. PT ran 4 hours, without complications. Patient alert and VS stable  BP (!) 180/69   Pulse 90   Temp 97.9 °F (36.6 °C) (Oral)   Resp 18   Ht 5' 4\" (1.626 m)   Wt 206 lb (93.4 kg)   SpO2 99%   BMI 35.36 kg/m²   3Kg removed. Flushed both ports with 10 mL of NS.  CVC dressing clean, dry, and intact, tego caps intact, curos caps placed. Meds given:see Mar  RBCs given during dialysis: 0    Patient to 636 after dialysis.

## 2022-06-29 NOTE — PROGRESS NOTES
Prevena wound vac alarming that it is full. Notified Dr. Arash Contreras. Order received to connect wound vac to continuous wall suction at 30 mm Hg, and if that will not work, then to take the wound vac off.

## 2022-06-29 NOTE — PROGRESS NOTES
Hospitalist Progress Note   Admit Date:  2022  2:37 AM   Name:  Jeramy Pires   Age:  80 y.o. Sex:  female  :  1950   MRN:  [id number]   Room:  [facility]/    Presenting Complaint: Flank Pain     Reason(s) for Admission: Back pain [M54.9]  Soft tissue swelling [R22.9]  End stage renal disease on dialysis ([address] [N18.6, Z99.2]  Acute left flank pain [R10.9]     Hospital Course & Interval History:   67-year-old AA emale with past medical history of hypertension, diabetes, end-stage renal disease on hemodialysis, who presented to the ER c/o left thigh pain. The patient states that she was in her usual state of health until yesterday when she started presenting with left lateral abd wall and left thigh pain. Her symptoms did not improve so she decided to come to the emergency department. She recently had an AV graft placed in her left thigh (May 26th) as her previous AVF sites b/l UE have failed. In the emergency department the patient was found to be tachycardic with radiologic findings concerning of upper left thigh fluid collection setting of abscess or hematoma. She was given IV Dilaudid. Otherwise she denies any fever or chills, no shortness of breath, no cough, no nausea vomiting or diarrhea. Pt was admitted for further medical mgmt.     Subjective/24hr Events (22):   Endorses LLE pain managed with current analgesia, denies dyspnea, cough, wheezing, chest pain      Assessment & Plan:     Principal Problem:    Left groin hematoma  -S/P evacuation left LE hematoma; vac in place  - per Dr. Oneida Mcmillan keep incisional vac until Friday  - All abx d/cd; cultures NGTD     Active Problems:    Episodic hypotension(Resolved)  - suspect from IV dilaudid / pain rx  - decreased dilaudid dose, prn oxycodone  - parameters to hold pain rx if SBP <100mmhg      Remote AVF placement LLE  - mgmt as above  - ongoing HD via trialysis cath right anterior chest      Anemia  - from hematoma evacuation and ongoing wound vac   - hgb ~stable 8.4 ~> 8.2  - Patient receiving Epo      ESRD  - HD M/W/F  - appreciate nephrology assistance      Type 2 diabetes mellitus with hyperglycemia (HCC)  - cont sliding scale coverage as needed  - acceptable ranges      Hx of RCC  - s/p left nephrectomy  - pt reports to following with Dr. Live Juan routinely for annual PET scans      Tricuspid regurg / cardiac murmur / secondary pulmon HTN  - as noted on 6/27/2022 echo   - cont supplemental O2      Debility  - home health on dc      Discharge Planning:    - incisional vac x 2 more days; if remains clinically stable, anticipate dc home with home health    Diet:  ADULT DIET; Regular; 4 carb choices (60 gm/meal); Low Sodium (2 gm); Low Potassium (Less than 3000 mg/day); Low Phosphorus (Less than 1000 mg)  DVT PPx: SCD  Code status: Full Code    Hospital Problems:  Principal Problem:    Hematoma of left lower leg  Active Problems:    Back pain    Cardiac murmur    ESRD (end stage renal disease) on dialysis ([facility] [address]    Type 2 diabetes mellitus with hyperglycemia (HCC)    AVF (arteriovenous fistula) (HCC)  Resolved Problems:    * No resolved hospital problems.  *      Objective:     Patient Vitals for the past 24 hrs:   Temp Pulse Resp BP SpO2   06/29/22 1500 -- 90 -- (!) 180/69 --   06/29/22 1430 -- 91 -- (!) 170/56 --   06/29/22 1400 -- 92 -- (!) 163/70 --   06/29/22 1300 -- 99 -- (!) 156/109 --   06/29/22 1230 -- 78 -- (!) 149/43 --   06/29/22 1200 -- -- -- (!) 153/46 --   06/29/22 1130 -- 86 -- (!) 153/54 --   06/29/22 0732 97.9 °F (36.6 °C) 90 -- (!) 143/72 99 %   06/29/22 0342 97.9 °F (36.6 °C) 88 18 (!) 92/52 98 %   06/28/22 2341 98.1 °F (36.7 °C) 93 18 (!) 100/55 93 %   06/28/22 2121 -- 93 -- (!) 115/53 --   06/28/22 1931 97.9 °F (36.6 °C) 92 18 (!) 98/55 100 %       Oxygen Therapy  SpO2: 99 %  Pulse via Oximetry: 100 beats per minute  Pulse Oximeter Device Mode: Intermittent  Pulse Oximeter Device Location: Right  O2 Device: Nasal cannula  Skin Assessment: Clean, dry, & intact  O2 Flow Rate (L/min): 2 L/min    Estimated body mass index is 35.36 kg/m² as calculated from the following:    Height as of this encounter: 5' 4\" (1.626 m). Weight as of this encounter: 206 lb (93.4 kg). Intake/Output Summary (Last 24 hours) at 6/29/2022 1556  Last data filed at 6/29/2022 1500  Gross per 24 hour   Intake 3065.28 ml   Output 3600 ml   Net -534.72 ml         Physical Exam:     Blood pressure (!) 180/69, pulse 90, temperature 97.9 °F (36.6 °C), temperature source Oral, resp. rate 18, height 5' 4\" (1.626 m), weight 206 lb (93.4 kg), SpO2 99 %. General:    Obese, well developed, in NAD. Head:  Normocephalic, atraumatic  Eyes:  Sclerae appear normal.  Pupils equally round. ENT:  Nares appear normal, no drainage. Moist oral mucosa  Neck:  No restricted ROM. Trachea midline   Chest:   Right anterior HD access without bleeding / oozing  CV:   RRR. Harsh pan systolic murmur  Lungs:   CTAB. No wheezing, rhonchi, or rales. Respirations even, unlabored  Abdomen: Bowel sounds present. Soft, nontender, nondistended. +BSx4  Extremities: Left medial thigh with vac in place, mild red-brown seepage noted, tender to palpation with associated edema, no erythema; + mvmt x 4  Skin:     Warm and dry. Neuro:  CN II-XII grossly intact. Sensation intact. A&Ox3  Psych:  Mood stable.       I have reviewed ordered lab tests and independently visualized imaging below:    Recent Labs:  Recent Results (from the past 48 hour(s))   POCT Glucose    Collection Time: 06/27/22  5:03 PM   Result Value Ref Range    POC Glucose 108 (H) 65 - 100 mg/dL    Performed by: Rocio    POCT Glucose    Collection Time: 06/27/22  8:43 PM   Result Value Ref Range    POC Glucose 155 (H) 65 - 100 mg/dL    Performed by: Marc Talamantes    CBC with Auto Differential    Collection Time: 06/28/22  5:35 AM   Result Value Ref Range    WBC 9.0 4.3 - 11.1 K/uL    RBC 2.47 (L) 4.05 - 5.2 M/uL    Hemoglobin 8.2 (L) 11.7 - 15.4 g/dL    Hematocrit 24.3 (L) 35.8 - 46.3 %    MCV 98.4 (H) 79.6 - 97.8 FL    MCH 33.2 (H) 26.1 - 32.9 PG    MCHC 33.7 31.4 - 35.0 g/dL    RDW 18.0 (H) 11.9 - 14.6 %    Platelets 460 (L) 156 - 450 K/uL    MPV 10.9 9.4 - 12.3 FL    nRBC 0.03 0.0 - 0.2 K/uL    Differential Type AUTOMATED      Seg Neutrophils 85 (H) 43 - 78 %    Lymphocytes 8 (L) 13 - 44 %    Monocytes 5 4.0 - 12.0 %    Eosinophils % 1 0.5 - 7.8 %    Basophils 1 0.0 - 2.0 %    Immature Granulocytes 0 0.0 - 5.0 %    Segs Absolute 7.7 1.7 - 8.2 K/UL    Absolute Lymph # 0.7 0.5 - 4.6 K/UL    Absolute Mono # 0.4 0.1 - 1.3 K/UL    Absolute Eos # 0.1 0.0 - 0.8 K/UL    Basophils Absolute 0.1 0.0 - 0.2 K/UL    Absolute Immature Granulocyte 0.0 0.0 - 0.5 K/UL   Basic Metabolic Panel    Collection Time: 06/28/22  5:35 AM   Result Value Ref Range    Sodium 134 (L) 136 - 145 mmol/L    Potassium 3.7 3.5 - 5.1 mmol/L    Chloride 96 (L) 98 - 107 mmol/L    CO2 29 21 - 32 mmol/L    Anion Gap 9 7 - 16 mmol/L    Glucose 107 (H) 65 - 100 mg/dL    BUN 24 (H) 8 - 23 MG/DL    CREATININE 4.70 (H) 0.6 - 1.0 MG/DL    GFR African American 12 (L) >60 ml/min/1.73m2    GFR Non- 10 (L) >60 ml/min/1.73m2    Calcium 7.9 (L) 8.3 - 10.4 MG/DL   POCT Glucose    Collection Time: 06/28/22  7:48 AM   Result Value Ref Range    POC Glucose 119 (H) 65 - 100 mg/dL    Performed by: Peggy Nguyen    POCT Glucose    Collection Time: 06/28/22 11:15 AM   Result Value Ref Range    POC Glucose 179 (H) 65 - 100 mg/dL    Performed by: Peggy Nguyen    POCT Glucose    Collection Time: 06/28/22  3:48 PM   Result Value Ref Range    POC Glucose 138 (H) 65 - 100 mg/dL    Performed by: Peggy Nguyen    POCT Glucose    Collection Time: 06/28/22  9:21 PM   Result Value Ref Range    POC Glucose 139 (H) 65 - 100 mg/dL    Performed by: Kirsty    CBC with Auto Differential    Collection Time: 06/29/22  5:36 AM   Result Value Ref Range WBC 7.8 4.3 - 11.1 K/uL    RBC 2.55 (L) 4.05 - 5.2 M/uL    Hemoglobin 8.4 (L) 11.7 - 15.4 g/dL    Hematocrit 24.9 (L) 35.8 - 46.3 %    MCV 97.6 79.6 - 97.8 FL    MCH 32.9 26.1 - 32.9 PG    MCHC 33.7 31.4 - 35.0 g/dL    RDW 17.8 (H) 11.9 - 14.6 %    Platelets 144 (L) 011 - 450 K/uL    MPV 11.2 9.4 - 12.3 FL    nRBC 0.02 0.0 - 0.2 K/uL    Differential Type AUTOMATED      Seg Neutrophils 84 (H) 43 - 78 %    Lymphocytes 7 (L) 13 - 44 %    Monocytes 7 4.0 - 12.0 %    Eosinophils % 2 0.5 - 7.8 %    Basophils 0 0.0 - 2.0 %    Immature Granulocytes 0 0.0 - 5.0 %    Segs Absolute 6.5 1.7 - 8.2 K/UL    Absolute Lymph # 0.6 0.5 - 4.6 K/UL    Absolute Mono # 0.5 0.1 - 1.3 K/UL    Absolute Eos # 0.2 0.0 - 0.8 K/UL    Basophils Absolute 0.0 0.0 - 0.2 K/UL    Absolute Immature Granulocyte 0.0 0.0 - 0.5 K/UL   Basic Metabolic Panel    Collection Time: 06/29/22  5:36 AM   Result Value Ref Range    Sodium 135 (L) 136 - 145 mmol/L    Potassium 4.2 3.5 - 5.1 mmol/L    Chloride 96 (L) 98 - 107 mmol/L    CO2 27 21 - 32 mmol/L    Anion Gap 12 7 - 16 mmol/L    Glucose 102 (H) 65 - 100 mg/dL    BUN 34 (H) 8 - 23 MG/DL    CREATININE 5.70 (H) 0.6 - 1.0 MG/DL    GFR African American 9 (L) >60 ml/min/1.73m2    GFR Non- 8 (L) >60 ml/min/1.73m2    Calcium 7.8 (L) 8.3 - 10.4 MG/DL   POCT Glucose    Collection Time: 06/29/22  7:31 AM   Result Value Ref Range    POC Glucose 121 (H) 65 - 100 mg/dL    Performed by: Rocio        Other Studies:  IR ANGIOGRAM EXTREMITY LEFT   Final Result      CT ABDOMEN PELVIS W IV CONTRAST Additional Contrast? None   Final Result   1. Upper left thigh bypass graft, not fully visualized on this study, with an   adjacent 7.9 x 7.5 cm complex fluid collection which could represent an abscess   or hematoma. There is a small amount of high density material along the inferior   margin of the fluid collection, making it difficult to exclude active bleeding.    Dr. Rakel Millard verbally notified at 4:16 AM.   2. Persistent anasarca, with progressed mild ascites. 3. Prior left nephrectomy and atrophied right kidney. 4. Prominent sized hepatic veins and IVC in this patient with an enlarged right   heart, raising the possibility of right heart failure.              Current Meds:  Current Facility-Administered Medications   Medication Dose Route Frequency    oxyCODONE (ROXICODONE) immediate release tablet 5 mg  5 mg Oral Q4H PRN    HYDROmorphone HCl PF (DILAUDID) injection 0.5 mg  0.5 mg IntraVENous Q4H PRN    Epoetin Martin-epbx (RETACRIT) injection 20,000 Units  20,000 Units SubCUTAneous Q7 Days    pantoprazole (PROTONIX) tablet 40 mg  40 mg Oral QAM AC    sevelamer (RENVELA) tablet 3,200 mg  3,200 mg Oral TID WC    sodium chloride flush 0.9 % injection 5-40 mL  5-40 mL IntraVENous 2 times per day    sodium chloride flush 0.9 % injection 5-40 mL  5-40 mL IntraVENous PRN    0.9 % sodium chloride infusion   IntraVENous PRN    ondansetron (ZOFRAN-ODT) disintegrating tablet 4 mg  4 mg Oral Q8H PRN    Or    ondansetron (ZOFRAN) injection 4 mg  4 mg IntraVENous Q6H PRN    polyethylene glycol (GLYCOLAX) packet 17 g  17 g Oral Daily PRN    acetaminophen (TYLENOL) tablet 650 mg  650 mg Oral Q6H PRN    Or    acetaminophen (TYLENOL) suppository 650 mg  650 mg Rectal Q6H PRN    0.9 % sodium chloride infusion   IntraVENous PRN    lidocaine 4 % external patch 1 patch  1 patch TransDERmal Daily    glucose chewable tablet 16 g  4 tablet Oral PRN    dextrose bolus 10% 125 mL  125 mL IntraVENous PRN    Or    dextrose bolus 10% 250 mL  250 mL IntraVENous PRN    glucagon injection 1 mg  1 mg IntraMUSCular PRN    dextrose 5 % solution  100 mL/hr IntraVENous PRN    insulin lispro (HUMALOG) injection vial 0-8 Units  0-8 Units SubCUTAneous TID WC    insulin lispro (HUMALOG) injection vial 0-4 Units  0-4 Units SubCUTAneous Nightly       Signed:  Mario Thrasher, APRN - CNP    Part of this note may have been written by using a voice dictation software. The note has been proof read but may still contain some grammatical/other typographical errors.

## 2022-06-29 NOTE — FLOWSHEET NOTE
TRANSFER IN - DIALYSIS    Received patient in dialysis unit  from [facility] (unit) for ordered procedure. Consent verified for renal replacement therapy. Procedure explained to patient, opportunity for Q&A provided. Call light given. Patient ALERTX3 and vital signs stable. BP (!) 143/72   Pulse 90   Temp 97.9 °F (36.6 °C) (Oral)   Resp 18   Ht 5' 4\" (1.626 m)   Wt 206 lb (93.4 kg)   SpO2 99%   BMI 35.36 kg/m²       Hemodialysis initiated using RIGHT CVC. Aspirated and flushed both ports without difficulty. Dressing clean, dry and intact. Machine settings per MD order. Heparin 1000 unit bolus and 500 units/hr. Will monitor during treatment.

## 2022-06-30 NOTE — PROGRESS NOTES
RENAL H&P/CONSULT    Subjective:     Patient is a 79 y/o AA female, followed by our office for ESRD, on HD M-W-F at [facility] is admitted for left flank and groin swelling. She has been running via RIJ perm cath since her LUE AVF became non-functioning and a left thigh AV graft placed on 5/26/22 by Dr Mikael Ruiz. She has developed marked left leg edema and had profound hypotension post leg loop placement requiring Midodrine, but recently her BP was up to the degree that we stopped Midodrine last week. She has been off all antihypertensive medications. She ran HD Friday without problems. PMH also consist of DM II, Hyperphosphatemia, anemia, renal cell carcinoma s/p left nephrectomy, followed by Pulmonary for lung mass, and HTN. She is on home O2 at 2L NC as needed. She presented to the ER  now presents in the setting of left flank pain. The patient states that she was in her usual state of health until yesterday when she started presenting with left flank pain associated with left-sided swelling of her lower extremity. Her symptoms did not improve so she decided to come to the emergency department. No fever or chills, no shortness of breath, no cough, no nausea vomiting or diarrhea. She was evaluated by vascular surgery and underwent exploration forpossible abscess or hematoma and is on empiric IV antibiotics.    6/27/22  -much anxiety and tearful c/o pain with reduced narcotics - no N/V, no CP, no dyspnea   6/28/22 - reports better pain control with increased dosing of Dilaudid -  not so anxious anymore - plans for dialysis tomorrow discussed - no urgent needs - no N/V, no CP, no dyspnea   6/29/22 - alert and appears more comfortable - appetite is improved - appreciate Dr. Mikael Ruiz input - plan for HD later today -   6/30/22 - drain has been removed - feels better -no BN/V, no CP, no dyspnea - appetite is good - plans for HD tomorrow discussed    Past Medical History:   Diagnosis Date    Anemia     Arthritis  AVF (arteriovenous fistula) (HCC)     left    AVF (arteriovenous fistula) ([facility] 12/20/2016 12/6/16 ([facility]) Right AVF revision and thrombectomy    Degenerative joint disease     Diabetes ([facility]     type 2--- does not check sqbs at home    Enlarged lymph node     \"enlarging paratracheal node to 2.6 cm on CT chest\"    ESRD on hemodialysis ([facility] onset 2006    ESRD.  MWF dialysis at [facility]    Hypertension     controlled with med    Mediastinal mass     being followed by dr ramírez--per pt-- states told by dr Renee Ann it wasnt cancer-- but plans to be rescanned 5/2022    Obesity     On home O2     2L QHS and PRN during day d/t \"lymph node in chest\"    Renal cancer ([facility]     Dr Beverly Hamilton - renal cancer L nephrectomy---and radiation ---    Shortness of breath     swollen lymphnode in chest-- oxygen 2LPM    Transient ischemic attack 08/29/2015    no residual      Past Surgical History:   Procedure Laterality Date    BREAST SURGERY Right     cyst removed    COLONOSCOPY N/A 11/8/2018    COLONOSCOPY  BMI 36 performed by Nahomy Ang MD at [address] [place] [address]  9/14/2017    CT BIOPSY ABDOMEN RETROPERITONEUM 9/14/2017 [facility] CT SCAN    GI  08/06/2018    exploratory laparotomy    GI  06/01/2002    colon resection resulting in temporary colostomy reversal    GROIN SURGERY Left 6/26/2022    LEFT GROIN DEBRIDEMENT OF HEMATOMA / MIGEL performed by Milo Durham MD at [facility] MAIN OR    GYN      mihir    IR INTRO CATH DIALYSIS CIRCUIT W BALLOON PTA  3/10/2020    IR INTRO CATH DIALYSIS CIRCUIT W BALLOON PTA  12/5/2019    IR INTRO CATH DIALYSIS CIRCUIT W BALLOON PTA  9/3/2019    IR INTRO CATH DIALYSIS CIRCUIT W BALLOON PTA  5/30/2019    IR INTRO CATH DIALYSIS CIRCUIT W BALLOON PTA  2/28/2019    IR THRMB/INFUSION DIAYSIS CIRCUIT Left 2021    IR TUNNELED CATHETER PLACEMENT GREATER THAN 5 YEARS  3/15/2022    IR TUNNELED CATHETER PLACEMENT GREATER THAN 5 YEARS  11/19/2021    IR TUNNELED CATHETER PLACEMENT GREATER THAN 5 YEARS  11/19/2021    IR TUNNELED CATHETER PLACEMENT GREATER THAN 5 YEARS 11/19/2021 [facility]    IR TUNNELED CATHETER PLACEMENT GREATER THAN 5 YEARS  3/15/2022    IR TUNNELED CATHETER PLACEMENT GREATER THAN 5 YEARS 3/15/2022 [facility] SPECI[facility]    OTHER SURGICAL HISTORY      dialysis fistula, several permcaths    UROLOGICAL SURGERY Left July 2015    nephrectomy    VASCULAR SURGERY Left 04/07/2022    LEFT ARM AV GRAFT    VASCULAR SURGERY Left 03/15/2022    declot    VASCULAR SURGERY Left 02/21/2022    Open thromboembolectomy of left upper arm graft.  VASCULAR SURGERY Right     AV graft- states no longer uses    VASCULAR SURGERY Left 5/26/2022    LEFT AV GRAFT THIGH [facility] [address] [facility]      Prior to Admission medications    Medication Sig Start Date End Date Taking?  Authorizing Provider   naloxone 4 MG/0.1ML LIQD nasal spray 1 spray by Nasal route as needed for Opioid Reversal.    Historical Provider, MD   midodrine (PROAMATINE) 10 MG tablet Take 1 tablet by mouth every 8 hours 6/5/22   Gris Huffman MD   acetaminophen (TYLENOL) 500 MG tablet Take 500 mg by mouth every 6 hours as needed for Pain    Historical Provider, MD   omeprazole (PRILOSEC) 40 MG delayed release capsule Take 40 mg by mouth at bedtime    Historical Provider, MD   OXYGEN Inhale into the lungs 2 LITERS AS NEEDED FOR SOB    Historical Provider, MD   SITagliptin (JANUVIA) 50 MG tablet Take 50 mg by mouth daily    Historical Provider, MD   sevelamer (RENVELA) 800 MG tablet Take 4 tablets by mouth 3 times daily (with meals) PT TAKES 4 TABS WITH MEALS-- AND 1 TABS WITH SNACKS     Historical Provider, MD     Allergies   Allergen Reactions    Pcn [Penicillins] Other (See Comments)     PT STATES UNSURE OF RX    Vancomycin Rash      Social History     Tobacco Use    Smoking status: Never Smoker    Smokeless tobacco: Never Used   Substance Use Topics  Alcohol use: No      Family History   Problem Relation Age of Onset    Diabetes Mother     Heart Disease Mother     Hypertension Mother     Heart Disease Father     Hypertension Father     Post-op Cognitive Dysfunction Neg Hx     Pseudochol. Deficiency Neg Hx     Delayed Awakening Neg Hx     Post-op Nausea/Vomiting Neg Hx     Emergence Delirium Neg Hx     Other Neg Hx     Malig Hypertherm Neg Hx           Review of Systems    Constitutional: no fever, negative  Eyes: fair vision, negative  Ears, nose, mouth, throat, and face:fair hearing, negative  Respiratory: no asthma, negative  Cardiovascular:no chest pain, negative  Gastrointestinal:no diarrhea, negative  Genitourinary: no dysuria,negative  Hematologic/lymphatic: no bleeding tendency, negative  Neurological: no seizures, negative  Behvioral/Psych: no psych hospitalization positive for anxiety and depression  Endocrine: no goiter, negative      Objective:       BP (!) 120/57   Pulse 91   Temp 98.2 °F (36.8 °C) (Axillary)   Resp 16   Ht 5' 4\" (1.626 m)   Wt 205 lb 14.6 oz (93.4 kg)   SpO2 97%   BMI 35.34 kg/m²     No intake/output data recorded. 06/28 1901 - 06/30 0700  In: 3065.3 [P.O.:240; I.V.:2225.3]  Out: 3600     BP (!) 120/57   Pulse 91   Temp 98.2 °F (36.8 °C) (Axillary)   Resp 16   Ht 5' 4\" (1.626 m)   Wt 205 lb 14.6 oz (93.4 kg)   SpO2 97%   BMI 35.34 kg/m²   General:  Alert, cooperative, no distress, appears stated age. Head:  Normocephalic, without obvious abnormality, atraumatic. Eyes:  Conjunctivae/corneas clear. EOMs intact. Throat: Lips, mucosa, and tongue normal. Teeth and gums normal.   Lungs:   Clear to auscultation bilaterally. Heart:  Regular rate and rhythm, S1, S2 normal, no murmur,  rub or gallop. Abdomen:   Soft, non-tender. No masses,  No organomegaly. Extremities: Extremities no cyanosis or edema right, ++ edema left.    Access: S/P tunneled HD catheter and S/P left groin loop graft with left groin swelling and left leg edema    Skin: Skin color, texture, turgor normal. No rashes or lesions. Neurologic:  No asterixis.            Data Review:     Recent Results (from the past 24 hour(s))   POCT Glucose    Collection Time: 06/29/22  4:08 PM   Result Value Ref Range    POC Glucose 130 (H) 65 - 100 mg/dL    Performed by: Rocio    POCT Glucose    Collection Time: 06/29/22  9:14 PM   Result Value Ref Range    POC Glucose 163 (H) 65 - 100 mg/dL    Performed by: Kirsty    CBC with Auto Differential    Collection Time: 06/30/22  6:00 AM   Result Value Ref Range    WBC 6.5 4.3 - 11.1 K/uL    RBC 2.59 (L) 4.05 - 5.2 M/uL    Hemoglobin 8.7 (L) 11.7 - 15.4 g/dL    Hematocrit 25.6 (L) 35.8 - 46.3 %    MCV 98.8 (H) 79.6 - 97.8 FL    MCH 33.6 (H) 26.1 - 32.9 PG    MCHC 34.0 31.4 - 35.0 g/dL    RDW 18.5 (H) 11.9 - 14.6 %    Platelets 141 (L) 300 - 450 K/uL    MPV 11.3 9.4 - 12.3 FL    nRBC 0.03 0.0 - 0.2 K/uL    Differential Type AUTOMATED      Seg Neutrophils 79 (H) 43 - 78 %    Lymphocytes 10 (L) 13 - 44 %    Monocytes 7 4.0 - 12.0 %    Eosinophils % 3 0.5 - 7.8 %    Basophils 1 0.0 - 2.0 %    Immature Granulocytes 0 0.0 - 5.0 %    Segs Absolute 5.1 1.7 - 8.2 K/UL    Absolute Lymph # 0.7 0.5 - 4.6 K/UL    Absolute Mono # 0.5 0.1 - 1.3 K/UL    Absolute Eos # 0.2 0.0 - 0.8 K/UL    Basophils Absolute 0.1 0.0 - 0.2 K/UL    Absolute Immature Granulocyte 0.0 0.0 - 0.5 K/UL   Basic Metabolic Panel    Collection Time: 06/30/22  6:00 AM   Result Value Ref Range    Sodium 139 136 - 145 mmol/L    Potassium 3.8 3.5 - 5.1 mmol/L    Chloride 101 98 - 107 mmol/L    CO2 31 21 - 32 mmol/L    Anion Gap 7 7 - 16 mmol/L    Glucose 120 (H) 65 - 100 mg/dL    BUN 20 8 - 23 MG/DL    CREATININE 4.00 (H) 0.6 - 1.0 MG/DL    GFR African American 14 (L) >60 ml/min/1.73m2    GFR Non- 12 (L) >60 ml/min/1.73m2    Calcium 8.1 (L) 8.3 - 10.4 MG/DL   POCT Glucose    Collection Time: 06/30/22  7:04 AM   Result Value Ref Range    POC Glucose 111 (H) 65 - 100 mg/dL    Performed by: Irina Lau;   Results for orders placed during the hospital encounter of 05/31/22    XR CHEST PORTABLE    Narrative  EXAM: XR CHEST PORTABLE  INDICATION: fatigue  COMPARISON: Chest radiograph, 11/22/2021    FINDINGS:  The cardiomediastinal silhouette is enlarged but stable. Right-sided  hemodialysis catheter terminates in the right atrium. No pleural effusion or  pneumothorax. No focal parenchymal process. Vascular stent in the right  subclavian region and bilateral brachial regions. No acute osseous abnormality. Impression  Stable cardiomegaly without acute process. KUB:  Results for orders placed in visit on 08/03/18    XR ABDOMEN (KUB) (SINGLE AP VIEW)    Narrative  KUB supine views    History:  mechanical small bowel obstruction, lower abd ventral hernia, 78 years  Female    Comparison:  CT abdomen pelvis yesterday    Findings:  Esophageal tube appears to terminate in the first portion of the  duodenum. Oral contrast is seen throughout the colon. No free air is evident. The bowel gas pattern is nonspecific and there is no evidence of ileus or  obstruction. No suspicious renal or ureteral calculi are evident. Visualized  osseous structures unremarkable. Impression  Impression: No acute pathology identified. Renal US:  No results found for this or any previous visit. CT Abdomen  Results for orders placed during the hospital encounter of 06/25/22    CT ABDOMEN PELVIS W IV CONTRAST Additional Contrast? None    Narrative  EXAM: CT abdomen and pelvis with IV contrast.    INDICATION: Abdominal pain. COMPARISON: Prior PET/CT scan on May 5, 2022. TECHNIQUE: Axial CT images of the abdomen and pelvis were obtained after the  intravenous injection of 100 mL Isovue 370 CT contrast. Radiation dose reduction  techniques were used for this study.   Our CT scanners use one or all of the  following:  Automated exposure control, adjustment of the mA or kV according to  patient size, iterative reconstruction. FINDINGS:    - Liver: Within normal limits. - Gallbladder and bile ducts: Within normal limits. - Spleen: Within normal limits. - Urinary tract: The left kidney is absent. The right kidney is atrophied and  there is a 2.6 cm exophytic cyst off its lower pole. No right-sided  hydronephrosis is seen. The urinary bladder is collapsed. - Adrenals: Within normal limits. - Pancreas: Within normal limits. - Gastrointestinal tract: There is colonic diverticulosis. No definite acute  diverticulitis is seen, although ascites limits for evaluation of focal  inflammation.  - Retroperitoneum: Mild abdominal aortic atherosclerosis, with no aneurysmal  dilatation or dissection. The right heart is enlarged, and there is prominent  contrast reflux into the IVC, raising the possibility of right heart failure. - Peritoneal cavity and abdominal wall: There is progressed mild ascites. No  intra-abdominal abscess or free intraperitoneal air is seen. Edema in the  abdominal wall subcutaneous tissue has not significantly changed. - Pelvis: There is a double-limbed bypass graft in the upper left thigh. An  adjacent 7.9 x 7.5 cm complex fluid collection with no associated gas is seen in  the upper anterior thigh, which could be an abscess or hematoma. A small amount  of high-density material along the inferior margin of the fluid collection  raises the possibility of active bleeding.  - Spine/bones: No acute process. - Other comments: Small bilateral pleural effusions and rounded atelectasis in  the left lower lobe are unchanged. Impression  1. Upper left thigh bypass graft, not fully visualized on this study, with an  adjacent 7.9 x 7.5 cm complex fluid collection which could represent an abscess  or hematoma.  There is a small amount of high density material along the inferior  margin of the fluid collection, making it difficult

## 2022-06-30 NOTE — FLOWSHEET NOTE
06/29/22 2342   Vital Signs   BP (!) 84/47   BP Location Right lower arm   MAP (Calculated) 59.33   Patient Position Supine     Hospitalist notified, see new orders.

## 2022-06-30 NOTE — PROGRESS NOTES
attempted to visit. Patient was sleeping at time and did not wake to name being called.  is available to follow up as needed.   Signed by  Miguel Ángel Jesus M.Div.

## 2022-06-30 NOTE — DISCHARGE SUMMARY
Hospitalist Discharge Summary   Admit Date:  2022  2:37 AM   DC Note date: 2022  Name:  Michael Hurtado   Age:  80 y.o. Sex:  female  :  1950   MRN:  [id number]   Room:  [facility]  PCP:  Birder Severin, MD    Presenting Complaint: Flank Pain    Initial Admission Diagnosis: Back pain [M54.9]  Soft tissue swelling [R22.9]  End stage renal disease on dialysis ([address] [N18.6, Z99.2]  Acute left flank pain [R10.9]     Problem List for this Hospitalization:  Did Patient have Sepsis (YES OR NO):     Hospital Course:  71-year-old AA emale with past medical history of hypertension, diabetes, end-stage renal disease on hemodialysis, who presented to the ER c/o left thigh pain. The patient states that she was in her usual state of health until yesterday when she started presenting with left lateral abd wall and left thigh pain.  Her symptoms did not improve so she decided to come to the emergency department. Muriel Beverly recently had an AV graft placed in her left thigh (May 26th) as her previous AVF sites b/l UE have failed. In the emergency department the patient was found to be tachycardic with radiologic findings concerning of upper left thigh fluid collection setting of abscess or hematoma.  She was given IV Dilaudid.  Otherwise she denies any fever or chills, no shortness of breath, no cough, no nausea vomiting or diarrhea. Pt was admitted for further medical mgmt. CT ABD/PEL :  1. Upper left thigh bypass graft, not fully visualized on this study, with an   adjacent 7.9 x 7.5 cm complex fluid collection which could represent an abscess   or hematoma. There is a small amount of high density material along the inferior   margin of the fluid collection, making it difficult to exclude active bleeding. Dr. Red Conrad verbally notified at 4:16 AM.   2. Persistent anasarca, with progressed mild ascites. 3. Prior left nephrectomy and atrophied right kidney.    4. Prominent sized hepatic veins and IVC in this patient with an enlarged right   heart, raising the possibility of right heart failure        Vascular consulted and patient underwent evacuation of hematoma with temporary wound vac placement by Dr. Saniya Miller 6/26. Nephrology consulted and patient continued dialysis while hospitalized. Antibiotics discontinued per surgery recs. All cultures remained negative. Episodic hypotension treated with midodrine. Wound vac removed and patient tolerated well. Denied pain, subjective fevers, N/V, chills. Patient/labs stable for discharge and patient discharged home with [facility] and order for vascular f/u    Disposition: Home with   Diet: ADULT DIET; Regular; 4 carb choices (60 gm/meal); Low Sodium (2 gm); Low Potassium (Less than 3000 mg/day); Low Phosphorus (Less than 1000 mg)  Code Status: Full Code     Follow Up Orders:  Dr. Arash Contreras in 2 weeks      Follow up labs/diagnostics (ultimately defer to outpatient provider):      Time spent in patient discharge and coordination 41 minutes. Plan was discussed with patient. All questions answered. Patient was stable at time of discharge. Instructions given to call a physician or return if any concerns.     Discharge Info:      Medication List      START taking these medications    Epoetin Martin-epbx 16960 UNIT/ML Soln injection  Commonly known as: RETACRIT  Inject 1 mL into the skin every 7 days To be given at dialysis  Start taking on: July 3, 2022        CONTINUE taking these medications    acetaminophen 500 MG tablet  Commonly known as: TYLENOL     Januvia 50 MG tablet  Generic drug: SITagliptin     midodrine 10 MG tablet  Commonly known as: PROAMATINE  Take 1 tablet by mouth every 8 hours     naloxone 4 MG/0.1ML Liqd nasal spray     omeprazole 40 MG delayed release capsule  Commonly known as: PRILOSEC     OXYGEN     Renvela 800 MG tablet  Generic drug: sevelamer           Where to Get Your Medications      Information about where to get these medications is not yet available Ask your nurse or doctor about these medications  · Epoetin Martin-epbx 03959 UNIT/ML Soln injection         Procedures done this admission:  Procedure(s):  LEFT GROIN [address] / [place] this admission:  IP CONSULT TO VASCULAR SURGERY  IP CONSULT  [address]    Echocardiogram/EKG results:       No results found for this or any previous visit (from the past 4464 hour(s)). Diagnostic Imaging/Tests:   CT ABDOMEN PELVIS W IV CONTRAST Additional Contrast? None    Result Date: 6/25/2022  EXAM: CT abdomen and pelvis with IV contrast. INDICATION: Abdominal pain. COMPARISON: Prior PET/CT scan on May 5, 2022. TECHNIQUE: Axial CT images of the abdomen and pelvis were obtained after the intravenous injection of 100 mL Isovue 370 CT contrast. Radiation dose reduction techniques were used for this study. Our CT scanners use one or all of the following:  Automated exposure control, adjustment of the mA or kV according to patient size, iterative reconstruction. FINDINGS: - Liver: Within normal limits. - Gallbladder and bile ducts: Within normal limits. - Spleen: Within normal limits. - Urinary tract: The left kidney is absent. The right kidney is atrophied and there is a 2.6 cm exophytic cyst off its lower pole. No right-sided hydronephrosis is seen. The urinary bladder is collapsed. - Adrenals: Within normal limits. - Pancreas: Within normal limits. - Gastrointestinal tract: There is colonic diverticulosis. No definite acute diverticulitis is seen, although ascites limits for evaluation of focal inflammation. - Retroperitoneum: Mild abdominal aortic atherosclerosis, with no aneurysmal dilatation or dissection. The right heart is enlarged, and there is prominent contrast reflux into the IVC, raising the possibility of right heart failure. - Peritoneal cavity and abdominal wall: There is progressed mild ascites. No intra-abdominal abscess or free intraperitoneal air is seen. Edema in the abdominal wall subcutaneous tissue has not significantly changed. - Pelvis: There is a double-limbed bypass graft in the upper left thigh. An adjacent 7.9 x 7.5 cm complex fluid collection with no associated gas is seen in the upper anterior thigh, which could be an abscess or hematoma. A small amount of high-density material along the inferior margin of the fluid collection raises the possibility of active bleeding. - Spine/bones: No acute process. - Other comments: Small bilateral pleural effusions and rounded atelectasis in the left lower lobe are unchanged. 1. Upper left thigh bypass graft, not fully visualized on this study, with an adjacent 7.9 x 7.5 cm complex fluid collection which could represent an abscess or hematoma. There is a small amount of high density material along the inferior margin of the fluid collection, making it difficult to exclude active bleeding. Dr. Tian Payton verbally notified at 4:16 AM. 2. Persistent anasarca, with progressed mild ascites. 3. Prior left nephrectomy and atrophied right kidney. 4. Prominent sized hepatic veins and IVC in this patient with an enlarged right heart, raising the possibility of right heart failure. IR ANGIOGRAM EXTREMITY LEFT    Result Date: 6/27/2022  Radiology exam is complete. No Radiologist dictation. Please follow up with ordering provider. Labs: Results:       BMP, Mg, Phos Recent Labs     06/28/22  0535 06/29/22  0536 06/30/22  0600   * 135* 139   K 3.7 4.2 3.8   CL 96* 96* 101   CO2 29 27 31   BUN 24* 34* 20      CBC Recent Labs     06/28/22  0535 06/29/22  0536 06/30/22  0600   WBC 9.0 7.8 6.5   RBC 2.47* 2.55* 2.59*   HGB 8.2* 8.4* 8.7*   HCT 24.3* 24.9* 25.6*   * 113* 121*      LFT No results for input(s): ALT, TP, ALB, GLOB in the last 72 hours.     Invalid input(s): SGOT, TBIL, AP, AGRAT, GPT   Cardiac Testing No results found for: BNP, CPK, RCK1, CKMB   Coagulation Tests No results found for: INR, APTT A1c No results found for: HBA1C   Lipid Panel No results found for: CHOL, CHOLPOCT, CHOLX, CHLST, CHOLV, 206986, HDL, HDLC, LDL, LDLC, 742937, VLDLC, VLDL, TGLX, TRIGL   Thyroid Panel No results found for: TSH, T4, FT4     Most Recent UA No results found for: MUCUS, UCOM       All Labs from Last 24 Hrs:  Recent Results (from the past 24 hour(s))   POCT Glucose    Collection Time: 06/29/22  4:08 PM   Result Value Ref Range    POC Glucose 130 (H) 65 - 100 mg/dL    Performed by: Rocio    POCT Glucose    Collection Time: 06/29/22  9:14 PM   Result Value Ref Range    POC Glucose 163 (H) 65 - 100 mg/dL    Performed by: Kirsty    CBC with Auto Differential    Collection Time: 06/30/22  6:00 AM   Result Value Ref Range    WBC 6.5 4.3 - 11.1 K/uL    RBC 2.59 (L) 4.05 - 5.2 M/uL    Hemoglobin 8.7 (L) 11.7 - 15.4 g/dL    Hematocrit 25.6 (L) 35.8 - 46.3 %    MCV 98.8 (H) 79.6 - 97.8 FL    MCH 33.6 (H) 26.1 - 32.9 PG    MCHC 34.0 31.4 - 35.0 g/dL    RDW 18.5 (H) 11.9 - 14.6 %    Platelets 553 (L) 167 - 450 K/uL    MPV 11.3 9.4 - 12.3 FL    nRBC 0.03 0.0 - 0.2 K/uL    Differential Type AUTOMATED      Seg Neutrophils 79 (H) 43 - 78 %    Lymphocytes 10 (L) 13 - 44 %    Monocytes 7 4.0 - 12.0 %    Eosinophils % 3 0.5 - 7.8 %    Basophils 1 0.0 - 2.0 %    Immature Granulocytes 0 0.0 - 5.0 %    Segs Absolute 5.1 1.7 - 8.2 K/UL    Absolute Lymph # 0.7 0.5 - 4.6 K/UL    Absolute Mono # 0.5 0.1 - 1.3 K/UL    Absolute Eos # 0.2 0.0 - 0.8 K/UL    Basophils Absolute 0.1 0.0 - 0.2 K/UL    Absolute Immature Granulocyte 0.0 0.0 - 0.5 K/UL   Basic Metabolic Panel    Collection Time: 06/30/22  6:00 AM   Result Value Ref Range    Sodium 139 136 - 145 mmol/L    Potassium 3.8 3.5 - 5.1 mmol/L    Chloride 101 98 - 107 mmol/L    CO2 31 21 - 32 mmol/L    Anion Gap 7 7 - 16 mmol/L    Glucose 120 (H) 65 - 100 mg/dL    BUN 20 8 - 23 MG/DL    CREATININE 4.00 (H) 0.6 - 1.0 MG/DL    GFR African American 14 (L) >60 ml/min/1.73m2 GFR Non-African American 12 (L) >60 ml/min/1.73m2    Calcium 8.1 (L) 8.3 - 10.4 MG/DL   POCT Glucose    Collection Time: 06/30/22  7:04 AM   Result Value Ref Range    POC Glucose 111 (H) 65 - 100 mg/dL    Performed by: Cisco Ryan    POCT Glucose    Collection Time: 06/30/22 10:55 AM   Result Value Ref Range    POC Glucose 146 (H) 65 - 100 mg/dL    Performed by: Cisco Ryan        Current Med List in Hospital:   Current Facility-Administered Medications   Medication Dose Route Frequency    oxyCODONE (ROXICODONE) immediate release tablet 5 mg  5 mg Oral Q4H PRN    HYDROmorphone HCl PF (DILAUDID) injection 0.5 mg  0.5 mg IntraVENous Q4H PRN    Epoetin Martin-epbx (RETACRIT) injection 20,000 Units  20,000 Units SubCUTAneous Q7 Days    pantoprazole (PROTONIX) tablet 40 mg  40 mg Oral QAM AC    sevelamer (RENVELA) tablet 3,200 mg  3,200 mg Oral TID WC    sodium chloride flush 0.9 % injection 5-40 mL  5-40 mL IntraVENous 2 times per day    sodium chloride flush 0.9 % injection 5-40 mL  5-40 mL IntraVENous PRN    0.9 % sodium chloride infusion   IntraVENous PRN    ondansetron (ZOFRAN-ODT) disintegrating tablet 4 mg  4 mg Oral Q8H PRN    Or    ondansetron (ZOFRAN) injection 4 mg  4 mg IntraVENous Q6H PRN    polyethylene glycol (GLYCOLAX) packet 17 g  17 g Oral Daily PRN    acetaminophen (TYLENOL) tablet 650 mg  650 mg Oral Q6H PRN    Or    acetaminophen (TYLENOL) suppository 650 mg  650 mg Rectal Q6H PRN    0.9 % sodium chloride infusion   IntraVENous PRN    lidocaine 4 % external patch 1 patch  1 patch TransDERmal Daily    glucose chewable tablet 16 g  4 tablet Oral PRN    dextrose bolus 10% 125 mL  125 mL IntraVENous PRN    Or    dextrose bolus 10% 250 mL  250 mL IntraVENous PRN    glucagon injection 1 mg  1 mg IntraMUSCular PRN    dextrose 5 % solution  100 mL/hr IntraVENous PRN    insulin lispro (HUMALOG) injection vial 0-8 Units  0-8 Units SubCUTAneous TID WC    insulin lispro (HUMALOG) injection vial 0-4 Units  0-4 Units SubCUTAneous Nightly       Allergies   Allergen Reactions    Pcn [Penicillins] Other (See Comments)     PT STATES UNSURE OF RX    Vancomycin Rash     Immunization History   Administered Date(s) Administered    COVID-19, PFIZER PURPLE top, DILUTE for use, (age 15 y+), 30mcg/0.3mL 02/25/2021, 03/25/2021    PPD Test 02/08/2013, 08/03/2018    Td vaccine (adult) 07/01/2008    Tdap (Boostrix, Adacel) 03/30/2012       Recent Vital Data:  Patient Vitals for the past 24 hrs:   Temp Pulse Resp BP SpO2   06/30/22 0726 98.2 °F (36.8 °C) 91 16 (!) 120/57 97 %   06/30/22 0529 -- -- 18 -- --   06/30/22 0459 -- -- 20 -- --   06/30/22 0422 98.1 °F (36.7 °C) 85 18 (!) 138/59 98 %   06/30/22 0048 -- 93 -- (!) 104/54 97 %   06/29/22 2342 98.1 °F (36.7 °C) 97 18 (!) 84/47 96 %   06/29/22 2138 -- -- 16 -- --   06/29/22 2108 -- -- 20 -- --   06/29/22 2051 -- 98 -- (!) 110/55 100 %   06/29/22 1919 99.9 °F (37.7 °C) 98 18 (!) 98/55 100 %   06/29/22 1820 -- -- 18 -- --   06/29/22 1640 98.4 °F (36.9 °C) 95 18 (!) 103/50 100 %   06/29/22 1500 -- 90 -- (!) 180/69 --   06/29/22 1430 -- 91 -- (!) 170/56 --   06/29/22 1400 -- 92 -- (!) 163/70 --         Estimated body mass index is 35.34 kg/m² as calculated from the following:    Height as of this encounter: 5' 4\" (1.626 m). Weight as of this encounter: 205 lb 14.6 oz (93.4 kg). Intake/Output Summary (Last 24 hours) at 6/30/2022 1315  Last data filed at 6/30/2022 0910  Gross per 24 hour   Intake 720 ml   Output 3600 ml   Net -2880 ml         Physical Exam:    General:    Well nourished. No overt distress  Head:  Normocephalic, atraumatic  Eyes:  Sclerae appear normal.  Pupils equally round. HENT:  Nares appear normal, no drainage. Moist mucous membranes  Neck:  No restricted ROM. Trachea midline  CV:   RRR. Murmur. No JVD  Lungs:   CTAB. No wheezing, rhonchi, or rales. Even, unlabored  Abdomen:   Soft, nontender, nondistended. Extremities: Warm and dry. No cyanosis or clubbing. No edema. Skin:     No rashes. Normal coloration; surgical wound in left groin area  Neuro:  CN II-XII grossly intact. Psych:  Normal mood and affect. Signed:  SUNSHINE Lim CNP    Part of this note may have been written by using a voice dictation software. The note has been proof read but may still contain some grammatical/other typographical errors.

## 2022-06-30 NOTE — CARE COORDINATION
Patient is medically stable for discharge. CM met with patient to discuss discharge needs. Patient voiced concerns with resuming transportation for outpatient HD. CM offered to contact [employer] to confirm transportation will be resumed as normally schedule due to patient discharging from hospital. Patient was adamant that she did not want this CM to contact transport service or home dialysis clinic to assist. Patient stated, \" I will call and handle it myself. \" Patient is not pleased with discharge but agreed to contact family to transport. Patient discharged home with [facility] and to resume HD as scheduled. 06/27/22 1353   Service Assessment   Patient Orientation Alert and Oriented   Cognition Alert   History Provided By Child/Family   Support Systems Children;Family Members   PCP Verified by CM Yes   Last Visit to PCP Within last 3 months   Prior Functional Level Independent in ADLs/IADLs   Can patient return to prior living arrangement Yes   Ability to make needs known: Other (see comment)  (unable to assess, patient off the floor.)   Family able to assist with home care needs: Yes   Social/Functional History   Lives With Family;Daughter   Home Layout Two level   [address]; Walker, standard  (walking stick.)   Receives Help From [facility]   ADL Assistance Independent   Active  Yes   Mode of Transportation Car   Discharge Planning   Potential Assistance Purchasing Medications No   Patient expects to be discharged to: [address] Discharge   Transition of Care Consult (CM Consult) [address] Provided?  No   Mode of Transport at Discharge Other (see comment)  (Family.)   Confirm Follow Up Transport Self   Condition of Participation: Discharge Planning   The Plan for Transition of Care is related to the following treatment goals: Return to baseline.

## 2022-06-30 NOTE — PROGRESS NOTES
[address] FAX: [phone]         VASCULAR SURGERY FLOOR PROGRESS NOTE    Admit Date: 2022  POD: 4 Days Post-Op    Procedure:  Procedure(s):  LEFT GROIN DEBRIDEMENT OF HEMATOMA / MIGEL    Subjective:     Patient has no new complaints. Objective:     Vitals:  Blood pressure (!) 138/59, pulse 85, temperature 98.1 °F (36.7 °C), temperature source Oral, resp. rate 18, height 5' 4\" (1.626 m), weight 205 lb 14.6 oz (93.4 kg), SpO2 98 %. Temp (24hrs), Av.5 °F (36.9 °C), Min:97.9 °F (36.6 °C), Max:99.9 °F (37.7 °C)      Intake / Output:    Intake/Output Summary (Last 24 hours) at 2022 0720  Last data filed at 2022 1500  Gross per 24 hour   Intake 600 ml   Output 3600 ml   Net -3000 ml       Physical Exam:    Constitutional: she appears well-developed. No distress. HENT:   Head: Atraumatic. Eyes: Pupils are equal, round, and reactive to light. Neck: Normal range of motion. Cardiovascular: Regular rhythm. Pulmonary/Chest: Effort normal and breath sounds normal. No respiratory distress. Abdominal: Soft. Bowel sounds are normal. she exhibits no distension. There is no tenderness. There is no guarding. No hernia. Musculoskeletal: Normal range of motion. Neurological: She is alert. CN II- XII grossly intact  Vascular: Incision intact mild edema    Labs:   Recent Labs     22  0535 22  0535 22  0536 22  0600   HGB 8.2*   < > 8.4* 8.7*   WBC 9.0   < > 7.8 6.5   K 3.7  --  4.2  --     < > = values in this interval not displayed.        Data Review     Assessment:     Patient Active Problem List    Diagnosis Date Noted    Hematoma of left lower leg 2022    Cardiac murmur 2022    Back pain 2022    CKD (chronic kidney disease) stage 4, GFR 15-29 ml/min ([facility]) 2022    Generalized weakness     Thrombocytopenia ([facility] 2022    Chronic respiratory failure with hypoxia ([facility] 11/12/2021    Severe obesity ([facility] 05/04/2018    Type 2 diabetes mellitus with nephropathy ([facility] 12/15/2017    Renal cell carcinoma ([facility] 09/12/2017    AVF (arteriovenous fistula) ([facility] 12/20/2016    Osteoarthritis of right knee 02/07/2013    HTN (hypertension) 02/07/2013    ESRD (end stage renal disease) on dialysis ([facility] 02/07/2013    Type 2 diabetes mellitus with hyperglycemia ([facility] 02/07/2013    Total knee replacement status 02/07/2013       Plan/Recommendations/Medical Decision Making:     Status post evacuation of left thigh hematoma  -I remove incisional VAC this morning  -Can get incision wet recommend continue to keep a dry dressing on at all times. She has-no activity restrictions from [place] standpoint can be discharged follow-up in office in 2 weeks to remove sutures. Elements of this note have been dictated using speech recognition software. As a result, errors of speech recognition may have occurred.

## 2022-06-30 NOTE — PROGRESS NOTES
Pt is alert and oriented x4. Pt in bed, resting. Bed in low position, wheels locked, call light within reach, instructed to call with any needs. Hourly rounds completed this shift. NAD noted. Pt has c/o pain, treated per MAR. IV is SL, and dressing is clean, dry, and intact. Pt had BM x1. Pt's L thigh wound vac remains connected to continuous wall suction per order. Report to be given to day shift nurse.

## 2022-07-01 NOTE — ED NOTES
Patient called this RN into room, states \"morphine doesn't work for me, Arelia Sine had it so much it just doesn't work. \" EDUARDA Franco notified     Ayad Jules RN  07/01/22 1011

## 2022-07-01 NOTE — ED TRIAGE NOTES
Pt arrives to lobby screaming \"HELP ME! \" When asked what her problem was, she explained that she believes they \"pulled me up to fast at dialysis\" resulting in her back pain.   Apparently they pulled her up out of her wheelchair too fast.

## 2022-07-01 NOTE — ED NOTES
Patient crying and unable to sit in bed.  Reports pain is worse, EDUARDA Tao December notified     [facility]  07/01/22 [id number]

## 2022-07-01 NOTE — ED NOTES
Patient ambulatory with walker to home. No prescriptions given, discharge instructions reviewed.      Kiki Gan RN  07/01/22 3748

## 2022-07-01 NOTE — ED PROVIDER NOTES
[facility] Emergency Department Provider Note                   PCP:                Joanne Lutz MD               Age: 80 y.o. Sex: female       ICD-10-CM    1. Flank pain  R10.9        DISPOSITION         New Prescriptions    No medications on file       Orders Placed This Encounter   Procedures    CT CHEST WO CONTRAST        MDM  Number of Diagnoses or Management Options  Flank pain  Diagnosis management comments: Patient is apparently well-known to social work. She was just discharged here yesterday very much against her will. She felt as if she was appropriate for discharge. Furthermore looking through her records, one of her previous admitting diagnoses was this left flank/back pain. She had a work-up that did not reveal any definite cause of her flank and back pain. Today CT chest demonstrates no acute abnormality, no rib fractures. Patient was given a milligrams of morphine here, continually asking for other pain medications by name such as Dilaudid. Do not see a cause for her symptoms. Vitals improved since being here. Amount and/or Complexity of Data Reviewed  Tests in the radiology section of CPT®: ordered and reviewed  Discussion of test results with the performing providers: yes  Review and summarize past medical records: yes  Independent visualization of images, tracings, or specimens: yes    Risk of Complications, Morbidity, and/or Mortality  Presenting problems: moderate  Diagnostic procedures: low  Management options: low    Patient Progress  Patient progress: improved       Luigi Fan is a 80 y.o. female who presents to the Emergency Department with chief complaint of    Chief Complaint   Patient presents with    Back Pain      72-year-old female who presents with chief complaint of back pain. She arrives the lobby screaming help me, when questioned by triage nurse she states they pulled me up too quickly from a wheelchair at dialysis.   She is complaining of some left sided posterior rib pain. Unable to obtain meaningful history as patient continues to moan in pain and will not answer questions. All other systems reviewed and are negative. Review of Systems   Unable to perform ROS: Acuity of condition   Musculoskeletal: Positive for back pain. Past Medical History:   Diagnosis Date    Anemia     Arthritis     [facility] [facility]     left    AVF (arteriovenous fistula) ([facility] [address] 12/20/2016 12/6/16 ([facility]) Right AVF revision and thrombectomy    Degenerative joint disease     Diabetes ([facility] [address]     type 2--- does not check sqbs at home    Enlarged lymph node     \"enlarging paratracheal node to 2.6 cm on CT chest\"    ESRD on hemodialysis ([facility] [address] onset 2006    ESRD.  MWF dialysis at [place] dialysis    Hypertension     controlled with med    Mediastinal mass     being followed by dr ramírez--per pt-- states told by dr Travis Barboza it wasnt cancer-- but plans to be rescanned 5/2022    Obesity     On home O2     2L QHS and PRN during day d/t \"lymph node in chest\"    Renal cancer ([facility] [address]     Dr Julia Jenkins - renal cancer L nephrectomy---and radiation ---    Shortness of breath     swollen lymphnode in chest-- oxygen 2LPM    Transient ischemic attack 08/29/2015    no residual        Past Surgical History:   Procedure Laterality Date    BREAST SURGERY Right     cyst removed    COLONOSCOPY N/A 11/8/2018    COLONOSCOPY  BMI 36 performed by Shayan Castaneda MD at [address] [place] [address]  9/14/2017    CT BIOPSY ABDOMEN RETROPERITONEUM 9/14/2017 [facility] CT SCAN    GI  08/06/2018    exploratory laparotomy    GI  06/01/2002    colon resection resulting in temporary colostomy reversal    GROIN SURGERY Left 6/26/2022    LEFT GROIN DEBRIDEMENT OF HEMATOMA / MIGEL performed by Kelli [facility]ning MD at [facility] MAIN OR    GYN      mihir    IR INTRO CATH DIALYSIS CIRCUIT W BALLOON PTA  3/10/2020    IR INTRO CATH DIALYSIS CIRCUIT W BALLOON PTA Allergen Reactions    Pcn [Penicillins] Other (See Comments)     PT STATES UNSURE OF RX    Vancomycin Rash        Vitals signs and nursing note reviewed. Patient Vitals for the past 4 hrs:   Temp Pulse Resp BP SpO2   07/01/22 1030 -- (!) 103 -- (!) 147/85 100 %   07/01/22 1015 -- (!) 107 -- 136/88 98 %   07/01/22 0945 -- (!) 114 -- (!) 148/105 100 %   07/01/22 0835 98.3 °F (36.8 °C) (!) 110 16 (!) 141/77 98 %          Physical Exam  Vitals and nursing note reviewed. Constitutional:       General: She is in acute distress. Appearance: She is obese. HENT:      Head: Normocephalic and atraumatic. Nose: Nose normal.      Mouth/Throat:      Mouth: Mucous membranes are moist.   Eyes:      Pupils: Pupils are equal, round, and reactive to light. Cardiovascular:      Rate and Rhythm: Tachycardia present. Abdominal:      General: Abdomen is flat. Palpations: Abdomen is soft. Musculoskeletal:        Back:    Neurological:      Mental Status: She is alert. Procedures    Labs Reviewed - No data to display     CT CHEST WO CONTRAST   Final Result   1. Small bilateral pleural effusions with round atelectasis at the left lung   base. 2. No evidence of rib fracture. Jose Coma Scale  Eye Opening: Spontaneous  Best Verbal Response: Oriented  Best Motor Response: Obeys commands  Jose Coma Scale Score: 15               ED Course as of 07/01/22 1049   Fri Jul 01, 2022   1008 Reportedly told the nurse \"had a known you were given the morphine I would have told you not to bother because I been getting morphine so many times it does not work for me anymore\" [MO]   [phone] At disposition patient remarks \"i'm Michael Pierce start going to [employer] give me pain meds here. \" Patient had received 4mg motphine IM after my initial eval and then had followed it up with a subsequent 4mg morphine IV. Patient's pain was addressed.  At this point I do feel there is a malingering/drug seeking

## 2022-07-12 NOTE — PATIENT INSTRUCTIONS
pillow anytime you sit or lie down during the next 3 days. Try to keep it above the level of your heart. This will help reduce swelling. When should you call for help? Call your doctor now or seek immediate medical care if:     You have new pain, or the pain gets worse.      The skin near the wound is cold or pale or changes color.      You have tingling, weakness, or numbness near the wound.      The wound starts to bleed, and blood soaks through the bandage. Oozing small amounts of blood is normal.      You have symptoms of infection, such as:  ? Increased pain, swelling, warmth, or redness. ? Red streaks leading from the wound. ? Pus draining from the wound. ? A fever. Watch closely for changes in your health, and be sure to contact your doctor if:     You do not get better as expected. Where can you learn more? Go to [email or web address] org and sign in to your [employer] account. Enter  in the [employer] box to learn more about \"Wound Check: Care Instructions. \"     If you do not have an account, please click on the \"Sign Up Now\" link. Current as of: March 9, 2022               Content Version: 13.3  © 2006-2022 [employer]. Care instructions adapted under license by [facility] If you have questions about a medical condition or this instruction, always ask your healthcare professional. [place] [address] any warranty or liability for your use of this information.

## 2022-07-12 NOTE — PROGRESS NOTES
[address] FAX: [phone]    Katy Pires  : 1950    Chief Complaint:     History of Present Illness:   Patient follows up today for his post evacuation hematoma and fistulogram.  Patient still on dialysis right chest tunneled catheter. CURRENT MEDICATIONS:  Current Outpatient Medications   Medication Sig Dispense Refill    oxyCODONE-acetaminophen (PERCOCET) 5-325 MG per tablet Take 1 tablet by mouth every 4 hours as needed for Pain.  Epoetin Martin-epbx (RETACRIT) 41945 UNIT/ML SOLN injection Inject 1 mL into the skin every 7 days To be given at dialysis 3.3 mL 0    naloxone 4 MG/0.1ML LIQD nasal spray 1 spray by Nasal route as needed for Opioid Reversal. Use 1 spray intranasally, then discard. Repeat with new spray every 2 min as needed for opioid overdose symptoms, alternating nostrils.  midodrine (PROAMATINE) 10 MG tablet Take 1 tablet by mouth every 8 hours 90 tablet 1    acetaminophen (TYLENOL) 500 MG tablet Take 500 mg by mouth every 6 hours as needed for Pain (for breakthrough pain )      omeprazole (PRILOSEC) 40 MG delayed release capsule Take 40 mg by mouth at bedtime      OXYGEN Inhale into the lungs 2 LITERS AS NEEDED FOR SOB      SITagliptin (JANUVIA) 50 MG tablet Take 50 mg by mouth daily      sevelamer (RENVELA) 800 MG tablet Take 4 tablets by mouth 3 times daily (with meals) PT TAKES 4 TABS WITH MEALS-- AND 1 TABS WITH SNACKS        No current facility-administered medications for this visit.        Past Medical History:   Diagnosis Date    Anemia     Arthritis     AVF (arteriovenous fistula) ([address]     left    AVF (arteriovenous fistula) ([address] 2016 (S) Right AVF revision and thrombectomy    Degenerative joint disease     Diabetes ([address]     type 2--- does not check sqbs at home    Enlarged lymph node     \"enlarging paratracheal node to 2.6 cm on CT chest\"    ESRD on hemodialysis ([facility] [address] onset 2006    ESRD. MWF dialysis at [place] dialysis    Hypertension     controlled with med    Mediastinal mass     being followed by dr ramírez--per pt-- states told by dr Humera Coffey it wasnt cancer-- but plans to be rescanned 5/2022    Obesity     On home O2     2L QHS and PRN during day d/t \"lymph node in chest\"    Renal cancer ([facility] [address]     Dr Damaris Aguilera - renal cancer L nephrectomy---and radiation ---    Shortness of breath     swollen lymphnode in chest-- oxygen 2LPM    Transient ischemic attack 08/29/2015    no residual       Physical Examination:   Height: 5' 4\" (1.626 m), Weight: 205 lb (93 kg), BP: 123/61    Constitutional: she appears well-developed. No distress. HENT:   Head: Atraumatic. Eyes: Pupils are equal, round, and reactive to light. Neck: Normal range of motion. Cardiovascular: Regular rhythm. Pulmonary/Chest: Effort normal and breath sounds normal. No respiratory distress. Abdominal: Soft. Bowel sounds are normal. she exhibits no distension. There is no tenderness. There is no guarding. No hernia. Musculoskeletal: Normal range of motion. Neurological: She is alert. CN II- XII grossly intact   Vascular: Palpable thrill and bruit    Imaging:          Recommendations/Plans:   Ms. Gaurang Pablo is a 80y.o. year old female status post creation of left thigh graft placement postop hematoma. Hematoma is resolving the area still edematous however is patent. We will remove sutures today. We will wait at least another month for swelling to go down. For access    Betina Flowers MD    Elements of this note have been dictated using speech recognition software. As a result, errors of speech recognition may have occurred.     20 minutes of time was spent on this encounter including chart review, assessment, evaluation and coordination of patient care

## 2022-07-29 NOTE — ED NOTES
I have reviewed discharge instructions with the patient. The patient verbalized understanding. Patient left ED via Discharge Method: stretcher to [facility] with [facility] service. Opportunity for questions and clarification provided. Patient given 0 scripts. To continue your aftercare when you leave the hospital, you may receive an automated call from our care team to check in on how you are doing. This is a free service and part of our promise to provide the best care and service to meet your aftercare needs.  If you have questions, or wish to unsubscribe from this service please call [phone]. Thank you for Choosing our [facility] Emergency Department.         Dontae Maldonado RN  07/29/22 0664

## 2022-07-29 NOTE — ED NOTES
Julianna Rosario MD stated that patient is clear to go to dialysis with a blood pressure of 92/65 and a MAP of 75.     Galina Bejarano RN  07/29/22 1827

## 2022-07-29 NOTE — ED TRIAGE NOTES
Patient a 81 y/o Female reports to the ED wwith C/o AMS and Hypotension. EMS states a BP of 96/48. Patient was waiting to do to dialysis outside for approx 1 hour but Dialysis is not till tomorrow. Patient is able to answer all questions correctly except for the year. Patient has 2 old ports in each arm and a current chest port. Seems to be in the process of establishing a left leg port for dialysis. Patient has a history of COPD, Hypertension and dialysis.

## 2022-07-29 NOTE — PROGRESS NOTES
Patient was handed over to my colleague from Dr. Alia Leyva. Patient is currently awaiting ride transportation to dialysis at 5 AM.  It has been reported low blood pressures. There is no cause of her hypotension here. Patient's blood pressures remained labile here for me. She had blood pressures in the 100s. At time of discharge her to her right her blood pressure was greater than a MAP of 65. Patient understands that her blood pressures been low here. She is in no acute distress she will be sent to dialysis.

## 2022-07-29 NOTE — ED PROVIDER NOTES
[employer] Emergency Department Provider Note                   PCP:                Joanne Lutz MD               Age: 80 y.o. Sex: female       ICD-10-CM    1. Hypotension, unspecified hypotension type  I95.9       2. Transient alteration of awareness  R40.4           DISPOSITION Discharge - Pending Orders Complete 07/29/2022 02:29:49 AM       MDM  Number of Diagnoses or Management Options  Hypotension, unspecified hypotension type  Transient alteration of awareness  Diagnosis management comments: I wore appropriate PPE throughout this patient's ED visit. rBennon Brooks MD, 10:14 PM      2:29 AM  Work-up unremarkable. No sign of infection. Review of old chart shows that blood pressure ranges between 507-903 systolic. Due for dialysis this morning at 5 AM.  Plan to discharge directly to dialysis if blood pressure improves. Amount and/or Complexity of Data Reviewed  Clinical lab tests: ordered and reviewed  Tests in the radiology section of CPT®: ordered and reviewed  Tests in the medicine section of CPT®: reviewed and ordered  Review and summarize past medical records: yes  Independent visualization of images, tracings, or specimens: yes         Orders Placed This Encounter   Procedures    COVID-19, Rapid    Blood Culture 1    Blood Culture 2    XR CHEST PORTABLE    CT Head W/O Contrast    CBC with Auto Differential    CMP    Lactic Acid Now and in 2 Hours    TSH with Reflex    T4, Free    Pulse Oximetry    POCT Glucose    POCT Glucose    EKG 12 Lead    Saline lock IV        Daphne Pires is a 80 y.o. female who presents to the Emergency Department with chief complaint of    Chief Complaint   Patient presents with    Hypotension      80-year-old female with a history of end-stage renal disease on dialysis Monday Wednesday Friday, diabetes, renal cancer with left nephrectomy, hypertension, TIA presents with confusion.   Family found her sitting outside waiting for dialysis which is not until tomorrow. They believe she was outside for approximately an hour. Upon EMS arrival, blood pressure was 96/48. Patient admits to a cough for the past few days with generalized weakness. She denies chest pain, shortness of breath, abdominal pain, nausea, vomiting, diarrhea. No documented fevers. Does not make urine. She has a tunneled dialysis catheter in her right chest.  She has failed fistula/graft and bilateral arms. She has a new fistula placed in her left thigh that is awaiting maturation. All other systems reviewed and are negative. Review of Systems   Constitutional:  Positive for fatigue. Negative for fever. HENT:  Negative for facial swelling. Eyes:  Negative for visual disturbance. Respiratory:  Positive for cough. Negative for shortness of breath. Cardiovascular:  Positive for leg swelling. Negative for chest pain. Gastrointestinal:  Negative for abdominal pain, diarrhea and vomiting. Musculoskeletal:  Negative for joint swelling. Skin:  Negative for rash. Neurological:  Negative for speech difficulty. Psychiatric/Behavioral:  Positive for confusion. All other systems reviewed and are negative. Past Medical History:   Diagnosis Date    Anemia     Arthritis     AVF (arteriovenous fistula) (HCC)     left    AVF (arteriovenous fistula) ([facility] [address] 12/20/2016 12/6/16 ([facility]) Right AVF revision and thrombectomy    Degenerative joint disease     Diabetes ([address]     type 2--- does not check sqbs at home    Enlarged lymph node     \"enlarging paratracheal node to 2.6 cm on CT chest\"    ESRD on hemodialysis ([address] onset 2006    ESRD.  MWF dialysis at [place] [facility]    Hypertension     controlled with med    Mediastinal mass     being followed by dr ramírez--per pt-- states told by dr Kate Manley it wasnt cancer-- but plans to be rescanned 5/2022    Obesity     On home O2     2L QHS and PRN during day d/t \"lymph node in chest\"    Renal cancer ([facility] [address]     Dr Sujata Arvizu - renal cancer L nephrectomy---and radiation ---    Shortness of breath     swollen lymphnode in chest-- oxygen 2LPM    Transient ischemic attack 08/29/2015    no residual        Past Surgical History:   Procedure Laterality Date    BREAST SURGERY Right     cyst removed    COLONOSCOPY N/A 11/8/2018    COLONOSCOPY  BMI 36 performed by Nahomy Ang MD at [facility] ENDOSCOPY    CT BIOPSY ABDOMEN RETROPERITONEUM  9/14/2017    CT BIOPSY ABDOMEN RETROPERITONEUM 9/14/2017 [facility] RADIOLOGY CT SCAN    GI  08/06/2018    exploratory laparotomy    GI  06/01/2002    colon resection resulting in temporary colostomy reversal    GROIN SURGERY Left 6/26/2022    LEFT GROIN DEBRIDEMENT OF HEMATOMA / MIGEL performed by Milo Durham MD at [address]    IR INTRO [address] PTA  3/10/2020    IR INTRO CATH DIALYSIS CIRCUIT W BALLOON PTA  12/5/2019    IR INTRO CATH DIALYSIS CIRCUIT W [place] PTA  9/3/2019    IR INTRO CATH DIALYSIS CIRCUIT W [place] PTA  5/30/2019    IR INTRO CATH DIALYSIS CIRCUIT W BALLOON PTA  2/28/2019    IR THRMB/INFUSION DIAYSIS CIRCUIT Left 2021    IR TUNNELED CATHETER PLACEMENT GREATER THAN 5 YEARS  3/15/2022    IR TUNNELED CATHETER PLACEMENT GREATER THAN 5 YEARS  11/19/2021    IR TUNNELED CATHETER PLACEMENT GREATER THAN 5 YEARS  11/19/2021    IR TUNNELED CATHETER PLACEMENT GREATER THAN 5 YEARS 11/19/2021 [facility] RADIOLOGY SPECIALS    IR TUNNELED CATHETER PLACEMENT GREATER THAN 5 YEARS  3/15/2022    IR TUNNELED CATHETER PLACEMENT GREATER THAN 5 YEARS 3/15/2022 [facility] RADIOLOGY SPECIALS    OTHER SURGICAL HISTORY      dialysis fistula, several permcaths    UROLOGICAL SURGERY Left July 2015    nephrectomy    VASCULAR SURGERY Left 04/07/2022    LEFT ARM AV GRAFT    VASCULAR SURGERY Left 03/15/2022    declot    VASCULAR SURGERY Left 02/21/2022    Open thromboembolectomy of left upper arm graft.     VASCULAR SURGERY Right     AV graft- states no longer uses    VASCULAR SURGERY Left 5/26/2022    LEFT AV GRAFT THIGH performed by Romana Noguera MD at [facility] MAIN OR        Family History   Problem Relation Age of Onset    Diabetes Mother     Heart Disease Mother     Hypertension Mother     Heart Disease Father     Hypertension Father     Post-op Cognitive Dysfunction Neg Hx     Pseudochol. Deficiency Neg Hx     Delayed Awakening Neg Hx     Post-op Nausea/Vomiting Neg Hx     Emergence Delirium Neg Hx     Other Neg Hx     Malig Hypertherm Neg Hx         Social History     Socioeconomic History    Marital status:    Tobacco Use    Smoking status: Never    Smokeless tobacco: Never   Vaping Use    Vaping Use: Never used   Substance and Sexual Activity    Alcohol use: No    Drug use: No        Allergies: Penicillins and Vancomycin    Previous Medications    ACETAMINOPHEN (TYLENOL) 500 MG TABLET    Take 500 mg by mouth every 6 hours as needed for Pain (for breakthrough pain )    EPOETIN PATRICIA-EPBX (RETACRIT) 60885 UNIT/ML SOLN INJECTION    Inject 1 mL into the skin every 7 days To be given at dialysis    MIDODRINE (PROAMATINE) 10 MG TABLET    Take 1 tablet by mouth every 8 hours    NALOXONE 4 MG/0.1ML LIQD NASAL SPRAY    1 spray by Nasal route as needed for Opioid Reversal. Use 1 spray intranasally, then discard. Repeat with new spray every 2 min as needed for opioid overdose symptoms, alternating nostrils. OMEPRAZOLE (PRILOSEC) 40 MG DELAYED RELEASE CAPSULE    Take 40 mg by mouth at bedtime    OXYCODONE-ACETAMINOPHEN (PERCOCET) 5-325 MG PER TABLET    Take 1 tablet by mouth every 4 hours as needed for Pain. OXYGEN    Inhale into the lungs 2 LITERS AS NEEDED FOR SOB VIA NASAL CANNULA    SEVELAMER (RENVELA) 800 MG TABLET    Take 3 tablets by mouth 3 times daily (with meals) PT TAKES 3 TABS WITH MEALS-- AND 1 TABS WITH SNACKS     SITAGLIPTIN (JANUVIA) 50 MG TABLET    Take 50 mg by mouth daily        Vitals signs and nursing note reviewed.    Patient Vitals for the past 4 hrs:   Pulse Resp BP SpO2   07/29/22 0223 -- -- -- 96 % 22 0219 99 -- (!) 80/48 --   22 0215 100 -- (!) 85/51 --   22 0200 100 -- 88/69 95 %   22 0145 100 -- (!) 84/58 95 %   22 0130 99 -- (!) 86/54 100 %   22 0126 99 16 (!) 87/57 93 %   22 0115 98 -- (!) 90/59 95 %   22 0030 97 -- 95/64 94 %   22 2339 -- 18 (!) 88/58 95 %   22 2335 98 -- -- --   22 2321 -- -- -- 94 %   22 2320 98 19 (!) 81/56 --   22 2249 99 19 107/67 93 %          Physical Exam  Vitals and nursing note reviewed. Constitutional:       Appearance: Normal appearance. She is well-developed. HENT:      Head: Normocephalic and atraumatic. Nose: Nose normal.      Mouth/Throat:      Mouth: Mucous membranes are moist.   Eyes:      Extraocular Movements: Extraocular movements intact. Pupils: Pupils are equal, round, and reactive to light. Cardiovascular:      Rate and Rhythm: Normal rate and regular rhythm. Heart sounds: Murmur heard. Pulmonary:      Effort: Pulmonary effort is normal. No respiratory distress. Breath sounds: Normal breath sounds. Chest:       Abdominal:      General: Abdomen is flat. There is no distension. Musculoskeletal:         General: Normal range of motion. Right lower le+ Edema present. Left lower le+ Edema present. Legs:    Skin:     General: Skin is warm and dry. Coloration: Skin is pale. Neurological:      General: No focal deficit present. Mental Status: She is oriented to person, place, and time. She is lethargic. Cranial Nerves: Cranial nerves are intact. Sensory: Sensation is intact. Motor: Motor function is intact.    Psychiatric:         Mood and Affect: Mood normal.        Procedures    ED EKG Interpretation  EKG was interpreted in the absence of a cardiologist.    Rate: Rate: Normal  EKG Interpretation: EKG Interpretation: sinus rhythm, low voltage, RAD  ST Segments: Nonspecific ST segments - NO STEMI    Labs Reviewed   CBC WITH AUTO DIFFERENTIAL - Abnormal; Notable for the following components:       Result Value    RBC 3.03 (*)     Hemoglobin 9.9 (*)     Hematocrit 28.9 (*)     RDW 17.1 (*)     Platelets 84 (*)     MPV 12.4 (*)     Seg Neutrophils 89 (*)     Lymphocytes 5 (*)     Eosinophils % 0 (*)     Segs Absolute 8.6 (*)     All other components within normal limits   COMPREHENSIVE METABOLIC PANEL - Abnormal; Notable for the following components:    Sodium 133 (*)     Chloride 95 (*)     BUN 38 (*)     Creatinine 5.40 (*)     GFR  10 (*)     GFR Non-African American 8 (*)     Calcium 7.6 (*)     Total Bilirubin 1.6 (*)     Globulin 3.8 (*)     Albumin/Globulin Ratio 0.9 (*)     All other components within normal limits   TSH WITH REFLEX - Abnormal; Notable for the following components:    TSH w Free Thyroid if Abnormal 4.42 (*)     All other components within normal limits   POCT GLUCOSE - Abnormal; Notable for the following components:    POC Glucose 107 (*)     All other components within normal limits   POCT GLUCOSE - Normal   COVID-19, RAPID   CULTURE, BLOOD 1   CULTURE, BLOOD 1   LACTIC ACID   LACTIC ACID   T4, FREE        CT Head W/O Contrast   Final Result   No CT evidence of acute intracranial abnormality. XR CHEST PORTABLE   Final Result   No acute findings in the chest                                  Voice dictation software was used during the making of this note. This software is not perfect and grammatical and other typographical errors may be present. This note has not been completely proofread for errors.      Zion Ortiz MD  07/29/22 [id number]

## 2022-08-03 PROBLEM — R65.10 SIRS (SYSTEMIC INFLAMMATORY RESPONSE SYNDROME) (HCC): Status: ACTIVE | Noted: 2022-01-01

## 2022-08-03 NOTE — ED PROVIDER NOTES
minutes in direct patient care, documentation, review of labs/xrays/old records, discussion with Staff, patient . The time involved in the performance of separately reportable procedures was not counted toward critical care time. Allyn Montgomery MD; 8/3/2022 @12:31 AM  ===================================================================        )    Patient Progress  Patient progress: (Blood pressure improved, patient remains in serious and guarded condition)       Orders Placed This Encounter   Procedures    Culture, Blood 1    Culture, Blood 1    XR CHEST PORTABLE    CBC with Auto Differential    Comprehensive Metabolic Panel    Lactic Acid    Procalcitonin    MEASURE RECTAL TEMPERATURE    Misc nursing order (specify)    EKG 12 Lead    Insert peripheral IV        Barby Pires is a 80 y.o. female who presents to the Emergency Department with chief complaint of    Chief Complaint   Patient presents with    Hypotension    Extremity Weakness      58-year-old female with end-stage renal disease presents with hypotension and left-sided mid back pain. Her back pain is chronic since nephrectomy. Pain is sharp and worse with movement without radiation. Blood pressure usually runs in the 90s to 120s. She states that dialysis on Monday they did not pull much fluid off because of low blood pressure. She denies fevers chills cough or congestion. She denies chest pain, trouble breathing, abdominal pain or numbness tingling or weakness. She denies melena or hematochezia. All other systems reviewed and are negative. Review of Systems   Constitutional:  Negative for chills and fever. HENT:  Negative for congestion and rhinorrhea. Respiratory:  Negative for cough and shortness of breath. Cardiovascular:  Negative for chest pain and leg swelling. Gastrointestinal:  Negative for abdominal pain, diarrhea, nausea and vomiting. Endocrine: Negative for polydipsia and polyuria.    Genitourinary:  Negative for dysuria, frequency and hematuria. Musculoskeletal:  Positive for back pain. Negative for myalgias. Skin:  Negative for color change and rash. Neurological:  Negative for weakness and numbness. Psychiatric/Behavioral:  Negative for confusion. All other systems reviewed and are negative. Past Medical History:   Diagnosis Date    Anemia     Arthritis     AVF (arteriovenous fistula) (HCC)     left    AVF (arteriovenous fistula) ([facility] [address] 12/20/2016 12/6/16 ([facility]) Right AVF revision and thrombectomy    Degenerative joint disease     Diabetes ([facility] [address]     type 2--- does not check sqbs at home    Enlarged lymph node     \"enlarging paratracheal node to 2.6 cm on CT chest\"    ESRD on hemodialysis ([facility] [address] onset 2006    ESRD.  MWF dialysis at [place] [facility]    Hypertension     controlled with med    Mediastinal mass     being followed by dr ramírez--per pt-- states told by dr Renee Ann it wasnt cancer-- but plans to be rescanned 5/2022    Obesity     On home O2     2L QHS and PRN during day d/t \"lymph node in chest\"    Renal cancer ([facility] [address]     Dr Paul Slider - renal cancer L nephrectomy---and radiation ---    Shortness of breath     swollen lymphnode in chest-- oxygen 2LPM    Transient ischemic attack 08/29/2015    no residual        Past Surgical History:   Procedure Laterality Date    BREAST SURGERY Right     cyst removed    COLONOSCOPY N/A 11/8/2018    COLONOSCOPY  BMI 36 performed by Nahomy Ang MD at [address] [place] [address]  9/14/2017    CT BIOPSY ABDOMEN RETROPERITONEUM 9/14/2017 [facility] RADIOLOGY CT SCAN    GI  08/06/2018    exploratory laparotomy    GI  06/01/2002    colon resection resulting in temporary colostomy reversal    GROIN SURGERY Left 6/26/2022    LEFT GROIN DEBRIDEMENT OF HEMATOMA / MIGEL performed by Milo Durham MD at [facility] MAIN OR    GYN      mihir    IR INTRO [address] PTA  3/10/2020    IR INTRO CATH DIALYSIS CIRCUIT W [address] PTA  12/5/2019    IR INTRO CATH DIALYSIS CIRCUIT [address] PTA  9/3/2019    IR INTRO CATH DIALYSIS CIRCUIT [address] PTA  5/30/2019    IR INTRO CATH DIALYSIS CIRCUIT W [address] PTA  2/28/2019    IR THRMB/INFUSION DIAYSIS CIRCUIT Left 2021    IR TUNNELED CATHETER PLACEMENT GREATER THAN 5 YEARS  3/15/2022    IR TUNNELED CATHETER PLACEMENT GREATER THAN 5 YEARS  11/19/2021    IR TUNNELED CATHETER PLACEMENT GREATER THAN 5 YEARS  11/19/2021    IR TUNNELED CATHETER PLACEMENT GREATER THAN 5 YEARS 11/19/2021 [facility] RADIOLOGY SPECIALS    IR TUNNELED CATHETER PLACEMENT GREATER THAN 5 YEARS  3/15/2022    IR TUNNELED CATHETER PLACEMENT GREATER THAN 5 YEARS 3/15/2022 [facility] RADIOLOGY SPECIALS    OTHER SURGICAL HISTORY      dialysis fistula, several permcaths    UROLOGICAL SURGERY Left July 2015    nephrectomy    VASCULAR SURGERY Left 04/07/2022    LEFT ARM AV GRAFT    VASCULAR SURGERY Left 03/15/2022    declot    VASCULAR SURGERY Left 02/21/2022    Open thromboembolectomy of left upper arm graft. VASCULAR SURGERY Right     AV graft- states no longer uses    VASCULAR SURGERY Left 5/26/2022    LEFT AV GRAFT THIGH performed by Rosaura Payne MD at [facility] MAIN OR        Family History   Problem Relation Age of Onset    Diabetes Mother     Heart Disease Mother     Hypertension Mother     Heart Disease Father     Hypertension Father     Post-op Cognitive Dysfunction Neg Hx     Pseudochol. Deficiency Neg Hx     Delayed Awakening Neg Hx     Post-op Nausea/Vomiting Neg Hx     Emergence Delirium Neg Hx     Other Neg Hx     Malig Hypertherm Neg Hx         Social History     Socioeconomic History    Marital status:     Tobacco Use    Smoking status: Never    Smokeless tobacco: Never   Vaping Use    Vaping Use: Never used   Substance and Sexual Activity    Alcohol use: No    Drug use: No        Allergies: Penicillins and Vancomycin    Previous Medications    ACETAMINOPHEN (TYLENOL) 500 MG TABLET    Take 500 mg by mouth every 6 hours as needed for Pain (for breakthrough pain )    EPOETIN PATRICIA-EPBX (RETACRIT) 95685 UNIT/ML SOLN INJECTION    Inject 1 mL into the skin every 7 days To be given at dialysis    MIDODRINE (PROAMATINE) 10 MG TABLET    Take 1 tablet by mouth every 8 hours    NALOXONE 4 MG/0.1ML LIQD NASAL SPRAY    1 spray by Nasal route as needed for Opioid Reversal. Use 1 spray intranasally, then discard. Repeat with new spray every 2 min as needed for opioid overdose symptoms, alternating nostrils. OMEPRAZOLE (PRILOSEC) 40 MG DELAYED RELEASE CAPSULE    Take 40 mg by mouth at bedtime    OXYCODONE-ACETAMINOPHEN (PERCOCET) 5-325 MG PER TABLET    Take 1 tablet by mouth every 4 hours as needed for Pain. OXYGEN    Inhale into the lungs 2 LITERS AS NEEDED FOR SOB VIA NASAL CANNULA    SEVELAMER (RENVELA) 800 MG TABLET    Take 3 tablets by mouth 3 times daily (with meals) PT TAKES 3 TABS WITH MEALS-- AND 1 TABS WITH SNACKS     SITAGLIPTIN (JANUVIA) 50 MG TABLET    Take 50 mg by mouth daily        Vitals signs and nursing note reviewed. Patient Vitals for the past 4 hrs:   Temp Pulse Resp BP SpO2   08/03/22 0015 -- -- 15 (!) 97/53 --   08/03/22 0013 -- 98 -- -- --   08/02/22 2349 -- 98 28 (!) 86/50 --   08/02/22 2315 -- (!) 101 18 (!) 89/54 --   08/02/22 2300 98 °F (36.7 °C) -- -- -- --   08/02/22 2210 97.5 °F (36.4 °C) 97 24 (!) 92/55 96 %          Physical Exam  Vitals and nursing note reviewed. Constitutional:       Appearance: Normal appearance. She is obese. Ill appearance: Chronically ill-appearing. HENT:      Head: Normocephalic and atraumatic. Nose: Nose normal.      Mouth/Throat:      Mouth: Mucous membranes are moist.   Eyes:      Conjunctiva/sclera: Conjunctivae normal.      Pupils: Pupils are equal, round, and reactive to light. Neck:      Comments: No obvious JVD  Cardiovascular:      Rate and Rhythm: Normal rate and regular rhythm. Pulses: Normal pulses. Heart sounds: Normal heart sounds.       Comments: Dialysis port in the right chest is present  Pulmonary:      Effort: Pulmonary effort is normal.      Breath sounds: Normal breath sounds. Abdominal:      General: There is no distension. Palpations: Abdomen is soft. Tenderness: There is no abdominal tenderness. There is no guarding or rebound. Musculoskeletal:         General: Tenderness (Moderate left-sided thoracic paraspinous tenderness and pain here with movement, reproducible of her described pain) present. Normal range of motion. Cervical back: Neck supple. Right lower leg: No edema. Left lower leg: No edema. Comments: Fistula/graft in right and left upper extremity have no palpable thrill and are nonfunctional per patient. New graft in left thigh is nontender. I cannot visualize it due to her leg tights but she denies any pain or tenderness and says the area is looked well. Skin:     General: Skin is warm and dry. Neurological:      Mental Status: She is alert and oriented to person, place, and time.    Psychiatric:         Behavior: Behavior normal.        Procedures    ED EKG Interpretation  EKG was interpreted in the absence of a cardiologist.    Rate: Rate: Normal  EKG Interpretation: EKG Interpretation: sinus rhythm  ST Segments: Nonspecific ST segments - NO STEMI  Low voltage precordial leads, probable right ventricular hypertrophy, prolonged QT interval    Labs Reviewed   COMPREHENSIVE METABOLIC PANEL - Abnormal; Notable for the following components:       Result Value    Glucose 159 (*)     BUN 26 (*)     Creatinine 5.10 (*)     GFR  11 (*)     GFR Non-African American 9 (*)     Calcium 7.8 (*)     Total Bilirubin 1.6 (*)     AST 40 (*)     Globulin 3.9 (*)     Albumin/Globulin Ratio 0.9 (*)     All other components within normal limits   LACTIC ACID - Abnormal; Notable for the following components:    Lactic Acid, Plasma 3.5 (*)     All other components within normal limits   CULTURE, BLOOD 1   CULTURE, BLOOD 1   PROCALCITONIN CBC WITH AUTO DIFFERENTIAL        XR CHEST PORTABLE   Final Result   Findings suggest CHF. Voice dictation software was used during the making of this note. This software is not perfect and grammatical and other typographical errors may be present. This note has not been completely proofread for errors.      Mona Soto MD  08/02/22 [id number]       Mona Soto MD  08/03/22 [id number]

## 2022-08-03 NOTE — H&P
L nephrectomy---and radiation ---    Shortness of breath     swollen lymphnode in chest-- oxygen 2LPM    Transient ischemic attack 08/29/2015    no residual       Past Surgical History:   Procedure Laterality Date    BREAST SURGERY Right     cyst removed    COLONOSCOPY N/A 11/8/2018    COLONOSCOPY  BMI 36 performed by Nica Reed MD at [facility] ENDOSCOPY    CT BIOPSY ABDOMEN RETROPERITONEUM  9/14/2017    CT BIOPSY ABDOMEN RETROPERITONEUM 9/14/2017 [facility] RADIOLOGY CT SCAN    GI  08/06/2018    exploratory laparotomy    GI  06/01/2002    colon resection resulting in temporary colostomy reversal    GROIN SURGERY Left 6/26/2022    LEFT GROIN DEBRIDEMENT OF HEMATOMA / MIGEL performed by Laney Spain MD at [facility]    IR INTRO [address] PTA  3/10/2020    IR INTRO CATH DIALYSIS CIRCUIT W BALLOON PTA  12/5/2019    IR INTRO CATH DIALYSIS CIRCUIT W BALLOON PTA  9/3/2019    IR INTRO CATH DIALYSIS CIRCUIT W BALLOON PTA  5/30/2019    IR INTRO CATH DIALYSIS CIRCUIT W BALLOON PTA  2/28/2019    IR THRMB/INFUSION DIAYSIS CIRCUIT Left 2021    IR TUNNELED CATHETER PLACEMENT GREATER THAN 5 YEARS  3/15/2022    IR TUNNELED CATHETER PLACEMENT GREATER THAN 5 YEARS  11/19/2021    IR TUNNELED CATHETER PLACEMENT GREATER THAN 5 YEARS  11/19/2021    IR TUNNELED CATHETER PLACEMENT GREATER THAN 5 YEARS 11/19/2021 [facility] SPECIALS    IR TUNNELED CATHETER PLACEMENT GREATER THAN 5 YEARS  3/15/2022    IR TUNNELED CATHETER PLACEMENT GREATER THAN 5 YEARS 3/15/2022 [facility] SPECIALS    OTHER SURGICAL HISTORY      dialysis fistula, several permcaths    UROLOGICAL SURGERY Left July 2015    nephrectomy    VASCULAR SURGERY Left 04/07/2022    LEFT ARM AV GRAFT    VASCULAR SURGERY Left 03/15/2022    declot    VASCULAR SURGERY Left 02/21/2022    Open thromboembolectomy of left upper arm graft.     VASCULAR SURGERY Right     AV graft- states no longer uses    VASCULAR SURGERY Left 5/26/2022    LEFT AV GRAFT THIGH performed by Juan Luis Watt MD at [facility] MAIN OR        Social History     Tobacco Use    Smoking status: Never    Smokeless tobacco: Never   Substance Use Topics    Alcohol use: No      Social History     Substance and Sexual Activity   Drug Use No       Family History   Problem Relation Age of Onset    Diabetes Mother     Heart Disease Mother     Hypertension Mother     Heart Disease Father     Hypertension Father     Post-op Cognitive Dysfunction Neg Hx     Pseudochol. Deficiency Neg Hx     Delayed Awakening Neg Hx     Post-op Nausea/Vomiting Neg Hx     Emergence Delirium Neg Hx     Other Neg Hx     Malig Hypertherm Neg Hx           Immunization History   Administered Date(s) Administered    COVID-19, PFIZER PURPLE top, DILUTE for use, (age 15 y+), 30mcg/0.3mL 02/25/2021, 03/25/2021    PPD Test 02/08/2013, 08/03/2018    Td vaccine (adult) 07/01/2008    Tdap (Boostrix, Adacel) 03/30/2012     Allergies   Allergen Reactions    Penicillins Other (See Comments) and Rash     PT STATES UNSURE OF RX  Tolerated cefazolin    Vancomycin Rash     Prior to Admit Medications:  Current Outpatient Medications   Medication Instructions    acetaminophen (TYLENOL) 500 mg, Oral, EVERY 6 HOURS PRN    Epoetin Martin-epbx (RETACRIT) 20,000 Units, SubCUTAneous, EVERY 7 DAYS, To be given at dialysis    midodrine (PROAMATINE) 10 mg, Oral, EVERY 8 HOURS    naloxone 4 MG/0.1ML LIQD nasal spray 1 spray, Nasal, PRN, Use 1 spray intranasally, then discard. Repeat with new spray every 2 min as needed for opioid overdose symptoms, alternating nostrils.      omeprazole (PRILOSEC) 40 mg, Oral, Nightly    oxyCODONE-acetaminophen (PERCOCET) 5-325 MG per tablet 1 tablet, Oral, EVERY 4 HOURS PRN    OXYGEN Inhalation, 2 LITERS AS NEEDED FOR SOB VIA NASAL CANNULA    sevelamer (RENVELA) 800 MG tablet 3 tablets, Oral, 3 TIMES DAILY WITH MEALS, PT TAKES 3 TABS WITH MEALS-- AND 1 TABS WITH SNACKS     SITagliptin (JANUVIA) 50 mg, Oral, DAILY         Objective: Patient Vitals for the past 24 hrs:   Temp Pulse Resp BP SpO2   08/03/22 0230 -- -- -- (!) 93/57 --   08/03/22 0215 -- -- -- 93/71 --   08/03/22 0205 -- -- -- (!) 90/39 --   08/03/22 0139 -- 98 20 -- --   08/03/22 0137 -- 99 22 -- --   08/03/22 0115 -- (!) 122 20 (!) 75/49 97 %   08/03/22 0045 -- (!) 101 24 (!) 95/52 --   08/03/22 0015 -- -- 15 (!) 97/53 --   08/03/22 0013 -- 98 -- -- --   08/02/22 2349 -- 98 28 (!) 86/50 --   08/02/22 2315 -- (!) 101 18 (!) 89/54 --   08/02/22 2300 98 °F (36.7 °C) -- -- -- --   08/02/22 2215 -- -- -- (!) 98/40 96 %   08/02/22 2210 97.5 °F (36.4 °C) 97 24 (!) 92/55 96 %       Oxygen Therapy  SpO2: 97 %  Pulse via Oximetry: 123 beats per minute  O2 Device: Nasal cannula  O2 Flow Rate (L/min): 2 L/min    Estimated body mass index is 34.33 kg/m² as calculated from the following:    Height as of this encounter: 5' 4\" (1.626 m). Weight as of this encounter: 200 lb (90.7 kg). No intake or output data in the 24 hours ending 08/03/22 0240      Physical Exam:    Blood pressure (!) 93/57, pulse 98, temperature 98 °F (36.7 °C), temperature source Rectal, resp. rate 20, height 5' 4\" (1.626 m), weight 200 lb (90.7 kg), SpO2 97 %. General:    Well nourished. Obese. Appears chronically ill. Head:  Normocephalic, atraumatic  Eyes:  Sclerae appear normal.  Pupils equally round. ENT:  Nares appear normal, no drainage. Moist oral mucosa  Neck:  No restricted ROM. Trachea midline   CV:   RRR. No m/r/g. No jugular venous distension. Chest:              Dialysis catheter in right upper chest.  No visible surrounding erythema or tenderness  Lungs:   CTAB. No wheezing, rhonchi, or rales. Symmetric expansion. Abdomen: Bowel sounds present. Soft, nontender, nondistended. Extremities: No cyanosis or clubbing. No edema  Skin:     No rashes and normal coloration. Warm and dry. Neuro:  CN II-XII grossly intact. Sensation intact. A&Ox3  Psych:  Normal mood and affect.       I have personally reviewed labs and tests showing:  Recent Labs:  Recent Results (from the past 24 hour(s))   CBC with Auto Differential    Collection Time: 08/02/22 11:02 PM   Result Value Ref Range    WBC 7.0 4.3 - 11.1 K/uL    RBC 2.82 (L) 4.05 - 5.2 M/uL    Hemoglobin 9.2 (L) 11.7 - 15.4 g/dL    Hematocrit 27.7 (L) 35.8 - 46.3 %    MCV 98.2 (H) 79.6 - 97.8 FL    MCH 32.6 26.1 - 32.9 PG    MCHC 33.2 31.4 - 35.0 g/dL    RDW 17.7 (H) 11.9 - 14.6 %    Platelets 98 (L) 742 - 450 K/uL    MPV 13.1 (H) 9.4 - 12.3 FL    nRBC 0.00 0.0 - 0.2 K/uL    Differential Type AUTOMATED      Seg Neutrophils 82 (H) 43 - 78 %    Lymphocytes 8 (L) 13 - 44 %    Monocytes 8 4.0 - 12.0 %    Eosinophils % 1 0.5 - 7.8 %    Basophils 1 0.0 - 2.0 %    Immature Granulocytes 0 0.0 - 5.0 %    Segs Absolute 5.8 1.7 - 8.2 K/UL    Absolute Lymph # 0.6 0.5 - 4.6 K/UL    Absolute Mono # 0.5 0.1 - 1.3 K/UL    Absolute Eos # 0.1 0.0 - 0.8 K/UL    Basophils Absolute 0.1 0.0 - 0.2 K/UL    Absolute Immature Granulocyte 0.0 0.0 - 0.5 K/UL   Comprehensive Metabolic Panel    Collection Time: 08/02/22 11:02 PM   Result Value Ref Range    Sodium 140 136 - 145 mmol/L    Potassium 3.8 3.5 - 5.1 mmol/L    Chloride 103 98 - 107 mmol/L    CO2 26 21 - 32 mmol/L    Anion Gap 11 7 - 16 mmol/L    Glucose 159 (H) 65 - 100 mg/dL    BUN 26 (H) 8 - 23 MG/DL    Creatinine 5.10 (H) 0.6 - 1.0 MG/DL    GFR African American 11 (L) >60 ml/min/1.73m2    GFR Non- 9 (L) >60 ml/min/1.73m2    Calcium 7.8 (L) 8.3 - 10.4 MG/DL    Total Bilirubin 1.6 (H) 0.2 - 1.1 MG/DL    ALT 18 12 - 65 U/L    AST 40 (H) 15 - 37 U/L    Alk Phosphatase 103 50 - 136 U/L    Total Protein 7.3 6.3 - 8.2 g/dL    Albumin 3.4 3.2 - 4.6 g/dL    Globulin 3.9 (H) 2.3 - 3.5 g/dL    Albumin/Globulin Ratio 0.9 (L) 1.2 - 3.5     Lactic Acid    Collection Time: 08/02/22 11:02 PM   Result Value Ref Range    Lactic Acid, Plasma 3.5 (H) 0.4 - 2.0 MMOL/L   Procalcitonin    Collection Time: 08/02/22 11:02 PM Result Value Ref Range    Procalcitonin 0.37 0.00 - 0.49 ng/mL   COVID-19, Rapid    Collection Time: 08/03/22 12:54 AM    Specimen: Nasopharyngeal   Result Value Ref Range    Source NASAL      SARS-CoV-2, Rapid Not detected NOTD         I have personally reviewed imaging studies showing:  XR CHEST PORTABLE    Result Date: 8/2/2022  EXAM: XR CHEST PORTABLE HISTORY: hypotension. TECHNIQUE: Frontal chest. COMPARISON: 7/28/2022 FINDINGS: There is mild cardiomegaly. There is pulmonary vascular congestion/edema. There is no pleural effusion, or pneumothorax. No significant osseous abnormalities are observed. Stable right-sided CVC and right vascular stent. Findings suggest CHF. Echocardiogram:  06/25/22    TRANSTHORACIC ECHOCARDIOGRAM (TTE) COMPLETE (CONTRAST/BUBBLE/3D PRN) 06/27/2022  3:25 PM (Final)    Interpretation Summary  Formatting of this result is different from the original.      Left Ventricle: Normal left ventricular systolic function with a visually estimated EF of 60 - 65%. Left ventricle size is normal. Normal wall thickness. \" D\" shaped septum c/w volume and pressure overload. Normal wall motion. Normal diastolic function. Right Ventricle: Right ventricle is severely dilated. Normal wall thickness. Severely reduced systolic function. Tricuspid Valve: Valve structure is normal. Severe annular dilation. Severe regurgitation with a centrally directed jet. Severely elevated RVSP. Right Atrium: Right atrium is severely dilated. Technical qualifiers: Color flow Doppler was performed and pulse wave and/or continuous wave Doppler was performed.     Signed by: Usha Carreon MD on 6/27/2022  3:25 PM      Meds previously ordered:  Orders Placed This Encounter   Medications    lactated ringers bolus    acetaminophen (TYLENOL) tablet 650 mg    cefepime (MAXIPIME) 2,000 mg in sodium chloride 0.9 % 50 mL IVPB mini-bag     Order Specific Question:   Antimicrobial Indications     Answer:   Sepsis of Unknown Etiology    DISCONTD: vancomycin (VANCOCIN) 2,250 mg in dextrose 5 % 250 mL IVPB     Order Specific Question:   Antimicrobial Indications     Answer:   Sepsis of Unknown Etiology    vancomycin (VANCOCIN) 1750 mg in sodium chloride 0.9 % 500 mL IVPB     Order Specific Question:   Antimicrobial Indications     Answer:   Sepsis of Unknown Etiology           Signed:  Matha Brunner, MD    Part of this note may have been written by using a voice dictation software. The note has been proof read but may still contain some grammatical/other typographical errors.

## 2022-08-03 NOTE — PROGRESS NOTES
Hemodialysis Rounding Note    Subjective: Yanick Swan is a 80 y.o.  who presents with SIRS (systemic inflammatory response syndrome) (HCC) [R65.10]. The patient is dialyzing utilizing the following method:Intermittent Hemodialysis  Artificial Kidney Dialysis Machine No.: [id number]   hours Dialyze Hours: 4   blood flow rate Blood Flow Rate (ml/min): 350 ml/min   dialysate rate    ultrafiltration Ultrafiltration Rate (ml/hr): 0 ml/hr   Heparin is  used during the dialysis treatment. Complaints dyspnea.      Current Facility-Administered Medications   Medication Dose Route Frequency    midodrine (PROAMATINE) tablet 10 mg  10 mg Oral TID WC    pantoprazole (PROTONIX) tablet 40 mg  40 mg Oral QAM AC    sevelamer (RENVELA) tablet 2,400 mg  2,400 mg Oral TID WC    alogliptin (NESINA) tablet 6.25 mg  6.25 mg Oral Daily    sodium chloride flush 0.9 % injection 5-40 mL  5-40 mL IntraVENous 2 times per day    sodium chloride flush 0.9 % injection 5-40 mL  5-40 mL IntraVENous PRN    0.9 % sodium chloride infusion   IntraVENous PRN    ondansetron (ZOFRAN-ODT) disintegrating tablet 4 mg  4 mg Oral Q8H PRN    Or    ondansetron (ZOFRAN) injection 4 mg  4 mg IntraVENous Q6H PRN    polyethylene glycol (GLYCOLAX) packet 17 g  17 g Oral Daily PRN    aluminum & magnesium hydroxide-simethicone (MAALOX) 200-200-20 MG/5ML suspension 30 mL  30 mL Oral Q6H PRN    acetaminophen (TYLENOL) tablet 650 mg  650 mg Oral Q6H PRN    Or    acetaminophen (TYLENOL) suppository 650 mg  650 mg Rectal Q6H PRN    heparin (porcine) injection 5,000 Units  5,000 Units SubCUTAneous 3 times per day    [START ON 8/4/2022] cefepime (MAXIPIME) 1,000 mg in sodium chloride 0.9 % 50 mL IVPB mini-bag  1,000 mg IntraVENous Q24H    heparin (porcine) injection 5,000 Units  5,000 Units IntraVENous PRN     Current Outpatient Medications   Medication Sig    oxyCODONE-acetaminophen (PERCOCET) 5-325 MG per tablet Take 1 tablet by mouth every 4 hours as needed for Pain. Epoetin Martin-epbx (RETACRIT) 59769 UNIT/ML SOLN injection Inject 1 mL into the skin every 7 days To be given at dialysis    naloxone 4 MG/0.1ML LIQD nasal spray 1 spray by Nasal route as needed for Opioid Reversal. Use 1 spray intranasally, then discard. Repeat with new spray every 2 min as needed for opioid overdose symptoms, alternating nostrils. midodrine (PROAMATINE) 10 MG tablet Take 1 tablet by mouth every 8 hours    acetaminophen (TYLENOL) 500 MG tablet Take 500 mg by mouth every 6 hours as needed for Pain (for breakthrough pain )    omeprazole (PRILOSEC) 40 MG delayed release capsule Take 40 mg by mouth at bedtime    OXYGEN Inhale into the lungs 2 LITERS AS NEEDED FOR SOB VIA NASAL CANNULA    SITagliptin (JANUVIA) 50 MG tablet Take 50 mg by mouth daily    sevelamer (RENVELA) 800 MG tablet Take 3 tablets by mouth 3 times daily (with meals) PT TAKES 3 TABS WITH MEALS-- AND 1 TABS WITH SNACKS        No intake/output data recorded. No intake/output data recorded.     Recent Results (from the past 24 hour(s))   CBC with Auto Differential    Collection Time: 08/02/22 11:02 PM   Result Value Ref Range    WBC 7.0 4.3 - 11.1 K/uL    RBC 2.82 (L) 4.05 - 5.2 M/uL    Hemoglobin 9.2 (L) 11.7 - 15.4 g/dL    Hematocrit 27.7 (L) 35.8 - 46.3 %    MCV 98.2 (H) 79.6 - 97.8 FL    MCH 32.6 26.1 - 32.9 PG    MCHC 33.2 31.4 - 35.0 g/dL    RDW 17.7 (H) 11.9 - 14.6 %    Platelets 98 (L) 124 - 450 K/uL    MPV 13.1 (H) 9.4 - 12.3 FL    nRBC 0.00 0.0 - 0.2 K/uL    Differential Type AUTOMATED      Seg Neutrophils 82 (H) 43 - 78 %    Lymphocytes 8 (L) 13 - 44 %    Monocytes 8 4.0 - 12.0 %    Eosinophils % 1 0.5 - 7.8 %    Basophils 1 0.0 - 2.0 %    Immature Granulocytes 0 0.0 - 5.0 %    Segs Absolute 5.8 1.7 - 8.2 K/UL    Absolute Lymph # 0.6 0.5 - 4.6 K/UL    Absolute Mono # 0.5 0.1 - 1.3 K/UL    Absolute Eos # 0.1 0.0 - 0.8 K/UL    Basophils Absolute 0.1 0.0 - 0.2 K/UL    Absolute Immature Granulocyte 0.0 0.0 - 0.5 K/UL   Comprehensive Metabolic Panel    Collection Time: 08/02/22 11:02 PM   Result Value Ref Range    Sodium 140 136 - 145 mmol/L    Potassium 3.8 3.5 - 5.1 mmol/L    Chloride 103 98 - 107 mmol/L    CO2 26 21 - 32 mmol/L    Anion Gap 11 7 - 16 mmol/L    Glucose 159 (H) 65 - 100 mg/dL    BUN 26 (H) 8 - 23 MG/DL    Creatinine 5.10 (H) 0.6 - 1.0 MG/DL    GFR African American 11 (L) >60 ml/min/1.73m2    GFR Non- 9 (L) >60 ml/min/1.73m2    Calcium 7.8 (L) 8.3 - 10.4 MG/DL    Total Bilirubin 1.6 (H) 0.2 - 1.1 MG/DL    ALT 18 12 - 65 U/L    AST 40 (H) 15 - 37 U/L    Alk Phosphatase 103 50 - 136 U/L    Total Protein 7.3 6.3 - 8.2 g/dL    Albumin 3.4 3.2 - 4.6 g/dL    Globulin 3.9 (H) 2.3 - 3.5 g/dL    Albumin/Globulin Ratio 0.9 (L) 1.2 - 3.5     Culture, Blood 1    Collection Time: 08/02/22 11:02 PM    Specimen: Blood   Result Value Ref Range    Special Requests NO SPECIAL REQUESTS  LEFT  FOREARM        Culture NO GROWTH AFTER 6 HOURS     Lactic Acid    Collection Time: 08/02/22 11:02 PM   Result Value Ref Range    Lactic Acid, Plasma 3.5 (H) 0.4 - 2.0 MMOL/L   Procalcitonin    Collection Time: 08/02/22 11:02 PM   Result Value Ref Range    Procalcitonin 0.37 0.00 - 0.49 ng/mL   EKG 12 Lead    Collection Time: 08/02/22 11:09 PM   Result Value Ref Range    Ventricular Rate 99 BPM    Atrial Rate 99 BPM    P-R Interval 155 ms    QRS Duration 87 ms    Q-T Interval 395 ms    QTc Calculation (Bazett) 507 ms    P Axis 71 degrees    R Axis 145 degrees    T Axis 28 degrees    Diagnosis Sinus rhythm    COVID-19, Rapid    Collection Time: 08/03/22 12:54 AM    Specimen: Nasopharyngeal   Result Value Ref Range    Source NASAL      SARS-CoV-2, Rapid Not detected NOTD     Lactic Acid    Collection Time: 08/03/22  6:19 AM   Result Value Ref Range    Lactic Acid, Plasma 2.3 (H) 0.4 - 2.0 MMOL/L         Review of Systems  Pertinent items are noted in HPI. .    Objective:     Blood pressure (!) 92/55, pulse 93, temperature 98 °F (36.7 °C), resp. rate 20, height 5' 4\" (1.626 m), weight 200 lb (90.7 kg), SpO2 97 %. Temp (24hrs), Av.8 °F (36.6 °C), Min:97.5 °F (36.4 °C), Max:98 °F (36.7 °C)      Physical Exam:   See consult       Assessment:     End Stage Renal Disease:  Patient is not tolerating dialysis treatment well. The patient has experienced hypotension requiring  Midodrine and decrease in temperature . Additionally the patient has experienced normal dialysis treatment during dialysis. Dry weight   same. Anemia:    Recent Labs     22  2302   HCT 27.7*   HGB 9.2*       Renal Metabolic Bone Disease:  No results for input(s): CA, ALB, PTH in the last 72 hours. Invalid input(s): PO4                     Treatment:  PO4 binders, Cinacaleat, Vitamin D  Hypertension: controlled    Access: adequate monitoring/no changes    Principal Problem:    SIRS (systemic inflammatory response syndrome) (HCC)  Active Problems:    Hypotension    ESRD (end stage renal disease) on dialysis ([facility] [address]    Type 2 diabetes mellitus with nephropathy ([facility] [address]  Resolved Problems:    * No resolved hospital problems.  *         Plan:     Continue scheduled dialysis

## 2022-08-03 NOTE — PROGRESS NOTES
TRANSFER - IN REPORT:    Verbal report received from CECIL Blum on [address]  being received from ED for routine progression of patient care      Report consisted of patient's Situation, Background, Assessment and   Recommendations(SBAR). Information from the following report(s) Nurse Handoff Report was reviewed with the receiving nurse. Opportunity for questions and clarification was provided. Assessment completed upon patient's arrival to unit and care assumed.

## 2022-08-03 NOTE — PROGRESS NOTES
Patient with hx of ESRD on HD admitted after midnight for hypotension and chronic back pain. Pt given small fluid bolus and started on empiric antibiotics secondary to elevated lactic acid and hypotension. No source of infection noted. Reports feeling fatigue. No focal complaints. AAOx3 NAD, cvs rrr s1 s2 no mrg,  Lungs sl diminished w/o rhonchi, wheezing, rales, LE no edema, neuro non focal.     BP's have improved with midodrine, holding antihypertensives. Tolerated HD today with plans to repeat HD tomorrow. May be able to DC tomorrow if BP stable and tolerating HD  Check Procal in AM and consider DC Atbx if unremarkable and cultures remain neg.

## 2022-08-03 NOTE — CONSULTS
RENAL H&P/CONSULT    Subjective:     Patient is a 79 y/o AA female, followed by our office for ESRD, on HD M-W-F at [facility]. She has been running via RIJ perm cath since LUE AVF became non-functioning. Left thigh AV graft placed on 5/26/22 by Dr Asuncion Darby. PMH also consist of DM II, Hyperphosphatemia, anemia, renal cell carcinoma s/p left nephrectomy, followed by Pulmonary for lung mass, and HTN. She is on home O2 at 2L NC as needed. She presented to the ER via EMS for hypotension and generalized \"feeling bad\". She has been confused and reports back pain and has dyspnea and edema. BP has been low and we'll plan for HD today with Midodrine for hypotension. She is being treated empirically with IV antibiotics for suspected SIRS. Past Medical History:   Diagnosis Date    Anemia     Arthritis     AVF (arteriovenous fistula) ([facility])     left    AVF (arteriovenous fistula) ([facility] 12/20/2016 12/6/16 ([facility]) Right AVF revision and thrombectomy    Degenerative joint disease     Diabetes ([address]     type 2--- does not check sqbs at home    Enlarged lymph node     \"enlarging paratracheal node to 2.6 cm on CT chest\"    ESRD on hemodialysis ([address] onset 2006    ESRD.  MWF dialysis at [facility]    Hypertension     controlled with med    Mediastinal mass     being followed by dr ramírez--per pt-- states told by dr Judson Garg it wasnt cancer-- but plans to be rescanned 5/2022    Obesity     On home O2     2L QHS and PRN during day d/t \"lymph node in chest\"    Renal cancer ([facility] [address]     Dr Yuridia Alexander - renal cancer L nephrectomy---and radiation ---    Shortness of breath     swollen lymphnode in chest-- oxygen 2LPM    Transient ischemic attack 08/29/2015    no residual      Past Surgical History:   Procedure Laterality Date    BREAST SURGERY Right     cyst removed    COLONOSCOPY N/A 11/8/2018    COLONOSCOPY  BMI 36 performed by Viola Hamm MD at [address] [place] [address]  9/14/2017    CT BIOPSY ABDOMEN RETROPERITONEUM 9/14/2017 [facility] RADIOLOGY CT SCAN    GI  08/06/2018    exploratory laparotomy    GI  06/01/2002    colon resection resulting in temporary colostomy reversal    GROIN SURGERY Left 6/26/2022    LEFT GROIN DEBRIDEMENT OF HEMATOMA / MIGEL performed by Neptali Allan MD at [facility] MAIN OR    GYN      mihir    IR INTRO [address] PTA  3/10/2020    IR INTRO CATH DIALYSIS CIRCUIT W BALLOON PTA  12/5/2019    IR INTRO CATH DIALYSIS CIRCUIT W [facility]ALLOON PTA  9/3/2019    IR INTRO CATH DIALYSIS CIRCUIT W [facility]ALLOON PTA  5/30/2019    IR INTRO CATH DIALYSIS CIRCUIT W BALLOON PTA  2/28/2019    IR THRMB/INFUSION DIAYSIS CIRCUIT Left 2021    IR TUNNELED CATHETER PLACEMENT GREATER THAN 5 YEARS  3/15/2022    IR TUNNELED CATHETER PLACEMENT GREATER THAN 5 YEARS  11/19/2021    IR TUNNELED CATHETER PLACEMENT GREATER THAN 5 YEARS  11/19/2021    IR TUNNELED CATHETER PLACEMENT GREATER THAN 5 YEARS 11/19/2021 [facility] SPECIALS    IR TUNNELED CATHETER PLACEMENT GREATER THAN 5 YEARS  3/15/2022    IR TUNNELED CATHETER PLACEMENT GREATER THAN 5 YEARS 3/15/2022 [facility] RAD[facility]OLOG[facility] SPECIALS    OTHER SURGICAL HISTORY      dialysis fistula, several permcaths    UROLOGICAL SURGERY Left July 2015    nephrectomy    VASCULAR SURGERY Left 04/07/2022    LEFT ARM AV GRAFT    VASCULAR SURGERY Left 03/15/2022    declot    VASCULAR SURGERY Left 02/21/2022    Open thromboembolectomy of left upper arm graft. VASCULAR SURGERY Right     AV graft- states no longer uses    VASCULAR SURGERY Left 5/26/2022    LEFT AV GRAFT THIGH performed by Mony Velasquez MD at [facility]      Prior to Admission medications    Medication Sig Start Date End Date Taking? Authorizing Provider   oxyCODONE-acetaminophen (PERCOCET) 5-325 MG per tablet Take 1 tablet by mouth every 4 hours as needed for Pain.     Historical Provider, MD   Epoetin Martin-epbx (RETACRIT) 75177 UNIT/ML SOLN injection Inject 1 mL into the skin every 7 days To be given at dialysis 7/3/22 Rashmi Wallace, APRN - CNP   naloxone 4 MG/0.1ML LIQD nasal spray 1 spray by Nasal route as needed for Opioid Reversal. Use 1 spray intranasally, then discard. Repeat with new spray every 2 min as needed for opioid overdose symptoms, alternating nostrils. Historical Provider, MD   midodrine (PROAMATINE) 10 MG tablet Take 1 tablet by mouth every 8 hours 6/5/22   Edmundo Gutierrez MD   acetaminophen (TYLENOL) 500 MG tablet Take 500 mg by mouth every 6 hours as needed for Pain (for breakthrough pain )    Historical Provider, MD   omeprazole (PRILOSEC) 40 MG delayed release capsule Take 40 mg by mouth at bedtime    Historical Provider, MD   OXYGEN Inhale into the lungs 2 LITERS AS NEEDED FOR SOB VIA NASAL CANNULA    Historical Provider, MD   SITagliptin (JANUVIA) 50 MG tablet Take 50 mg by mouth daily    Historical Provider, MD   sevelamer (RENVELA) 800 MG tablet Take 3 tablets by mouth 3 times daily (with meals) PT TAKES 3 TABS WITH MEALS-- AND 1 TABS WITH SNACKS     Historical Provider, MD     Allergies   Allergen Reactions    Penicillins Other (See Comments) and Rash     PT STATES UNSURE OF RX  Tolerated cefazolin    Vancomycin Rash      Social History     Tobacco Use    Smoking status: Never    Smokeless tobacco: Never   Substance Use Topics    Alcohol use: No      Family History   Problem Relation Age of Onset    Diabetes Mother     Heart Disease Mother     Hypertension Mother     Heart Disease Father     Hypertension Father     Post-op Cognitive Dysfunction Neg Hx     Pseudochol. Deficiency Neg Hx     Delayed Awakening Neg Hx     Post-op Nausea/Vomiting Neg Hx     Emergence Delirium Neg Hx     Other Neg Hx     Malig Hypertherm Neg Hx           Review of Systems    Unobtainable due to patient confusion       Objective:       BP (!) 92/52   Pulse 89   Temp 98 °F (36.7 °C) (Rectal)   Resp 20   Ht 5' 4\" (1.626 m)   Wt 200 lb (90.7 kg)   SpO2 91%   BMI 34.33 kg/m²     No intake/output data recorded.   No intake/output data recorded. BP (!) 92/52   Pulse 89   Temp 98 °F (36.7 °C) (Rectal)   Resp 20   Ht 5' 4\" (1.626 m)   Wt 200 lb (90.7 kg)   SpO2 91%   BMI 34.33 kg/m²   General:  Alert, confused, mild to moderate distress, appears stated age. Head:  Normocephalic, without obvious abnormality, atraumatic. Eyes:  Conjunctivae/corneas clear. EOMs intact. Positive for periorbital edema. Ears:  Normal external ear canals both ears. Nose: Nares normal. Septum midline. Mucosa normal. No drainage or sinus tenderness. Throat: Lips, mucosa, and tongue normal. Teeth and gums normal.   Neck: Supple, symmetrical, trachea midline, no JVD. Lungs: Few rhonchi to auscultation bilaterally. Heart:  Regular rate and rhythm, S1, S2 normal, no murmur,  rub or gallop. Abdomen:   Soft, non-tender. Bowel sounds normal. No masses,  No organomegaly. No renal bruit. Extremities: Extremities - + edema. Access: PC in place   Skin: Skin color, texture, turgor normal. No rashes or lesions. Lymph nodes: Cervical and supraclavicular nodes normal.   Neurologic: No asterixis.          Data Review:     Recent Results (from the past 24 hour(s))   CBC with Auto Differential    Collection Time: 08/02/22 11:02 PM   Result Value Ref Range    WBC 7.0 4.3 - 11.1 K/uL    RBC 2.82 (L) 4.05 - 5.2 M/uL    Hemoglobin 9.2 (L) 11.7 - 15.4 g/dL    Hematocrit 27.7 (L) 35.8 - 46.3 %    MCV 98.2 (H) 79.6 - 97.8 FL    MCH 32.6 26.1 - 32.9 PG    MCHC 33.2 31.4 - 35.0 g/dL    RDW 17.7 (H) 11.9 - 14.6 %    Platelets 98 (L) 870 - 450 K/uL    MPV 13.1 (H) 9.4 - 12.3 FL    nRBC 0.00 0.0 - 0.2 K/uL    Differential Type AUTOMATED      Seg Neutrophils 82 (H) 43 - 78 %    Lymphocytes 8 (L) 13 - 44 %    Monocytes 8 4.0 - 12.0 %    Eosinophils % 1 0.5 - 7.8 %    Basophils 1 0.0 - 2.0 %    Immature Granulocytes 0 0.0 - 5.0 %    Segs Absolute 5.8 1.7 - 8.2 K/UL    Absolute Lymph # 0.6 0.5 - 4.6 K/UL    Absolute Mono # 0.5 0.1 - 1.3 K/UL    Absolute Eos # 0.1 0.0 - 0.8 K/UL    Basophils Absolute 0.1 0.0 - 0.2 K/UL    Absolute Immature Granulocyte 0.0 0.0 - 0.5 K/UL   Comprehensive Metabolic Panel    Collection Time: 08/02/22 11:02 PM   Result Value Ref Range    Sodium 140 136 - 145 mmol/L    Potassium 3.8 3.5 - 5.1 mmol/L    Chloride 103 98 - 107 mmol/L    CO2 26 21 - 32 mmol/L    Anion Gap 11 7 - 16 mmol/L    Glucose 159 (H) 65 - 100 mg/dL    BUN 26 (H) 8 - 23 MG/DL    Creatinine 5.10 (H) 0.6 - 1.0 MG/DL    GFR African American 11 (L) >60 ml/min/1.73m2    GFR Non- 9 (L) >60 ml/min/1.73m2    Calcium 7.8 (L) 8.3 - 10.4 MG/DL    Total Bilirubin 1.6 (H) 0.2 - 1.1 MG/DL    ALT 18 12 - 65 U/L    AST 40 (H) 15 - 37 U/L    Alk Phosphatase 103 50 - 136 U/L    Total Protein 7.3 6.3 - 8.2 g/dL    Albumin 3.4 3.2 - 4.6 g/dL    Globulin 3.9 (H) 2.3 - 3.5 g/dL    Albumin/Globulin Ratio 0.9 (L) 1.2 - 3.5     Culture, Blood 1    Collection Time: 08/02/22 11:02 PM    Specimen: Blood   Result Value Ref Range    Special Requests NO SPECIAL REQUESTS  LEFT  FOREARM        Culture NO GROWTH AFTER 6 HOURS     Lactic Acid    Collection Time: 08/02/22 11:02 PM   Result Value Ref Range    Lactic Acid, Plasma 3.5 (H) 0.4 - 2.0 MMOL/L   Procalcitonin    Collection Time: 08/02/22 11:02 PM   Result Value Ref Range    Procalcitonin 0.37 0.00 - 0.49 ng/mL   EKG 12 Lead    Collection Time: 08/02/22 11:09 PM   Result Value Ref Range    Ventricular Rate 99 BPM    Atrial Rate 99 BPM    P-R Interval 155 ms    QRS Duration 87 ms    Q-T Interval 395 ms    QTc Calculation (Bazett) 507 ms    P Axis 71 degrees    R Axis 145 degrees    T Axis 28 degrees    Diagnosis Sinus rhythm    COVID-19, Rapid    Collection Time: 08/03/22 12:54 AM    Specimen: Nasopharyngeal   Result Value Ref Range    Source NASAL      SARS-CoV-2, Rapid Not detected NOTD     Lactic Acid    Collection Time: 08/03/22  6:19 AM   Result Value Ref Range    Lactic Acid, Plasma 2.3 (H) 0.4 - 2.0 MMOL/L       CXR; Results for orders placed during the hospital encounter of 08/02/22    XR CHEST PORTABLE    Narrative  EXAM: XR CHEST PORTABLE    HISTORY: hypotension. TECHNIQUE: Frontal chest.    COMPARISON: 7/28/2022    FINDINGS:  There is mild cardiomegaly. There is pulmonary vascular congestion/edema. There is no pleural effusion, or pneumothorax. No significant osseous abnormalities are observed. Stable right-sided CVC and right vascular stent. Impression  Findings suggest CHF.     KUB:  Results for orders placed in visit on 08/03/18    XR ABDOMEN (KUB) (SINGLE AP VIEW)    Narrative  KUB supine views    History:  mechanical small bowel obstruction, lower abd ventral hernia, 78 years  Female    Comparison:  CT abdomen pelvis yesterday    Findings:  Esophageal tube appears to terminate in the first portion of the  duodenum. Oral contrast is seen throughout the colon. No free air is evident. The bowel gas pattern is nonspecific and there is no evidence of ileus or  obstruction. No suspicious renal or ureteral calculi are evident. Visualized  osseous structures unremarkable. Impression  Impression: No acute pathology identified. Renal US:  No results found for this or any previous visit. CT Abdomen  Results for orders placed during the hospital encounter of 06/25/22    CT ABDOMEN PELVIS W IV CONTRAST Additional Contrast? None    Narrative  EXAM: CT abdomen and pelvis with IV contrast.    INDICATION: Abdominal pain. COMPARISON: Prior PET/CT scan on May 5, 2022. TECHNIQUE: Axial CT images of the abdomen and pelvis were obtained after the  intravenous injection of 100 mL Isovue 370 CT contrast. Radiation dose reduction  techniques were used for this study. Our CT scanners use one or all of the  following:  Automated exposure control, adjustment of the mA or kV according to  patient size, iterative reconstruction. FINDINGS:    - Liver: Within normal limits.   - Gallbladder and bile ducts: Within normal limits. - Spleen: Within normal limits. - Urinary tract: The left kidney is absent. The right kidney is atrophied and  there is a 2.6 cm exophytic cyst off its lower pole. No right-sided  hydronephrosis is seen. The urinary bladder is collapsed. - Adrenals: Within normal limits. - Pancreas: Within normal limits. - Gastrointestinal tract: There is colonic diverticulosis. No definite acute  diverticulitis is seen, although ascites limits for evaluation of focal  inflammation.  - Retroperitoneum: Mild abdominal aortic atherosclerosis, with no aneurysmal  dilatation or dissection. The right heart is enlarged, and there is prominent  contrast reflux into the IVC, raising the possibility of right heart failure. - Peritoneal cavity and abdominal wall: There is progressed mild ascites. No  intra-abdominal abscess or free intraperitoneal air is seen. Edema in the  abdominal wall subcutaneous tissue has not significantly changed. - Pelvis: There is a double-limbed bypass graft in the upper left thigh. An  adjacent 7.9 x 7.5 cm complex fluid collection with no associated gas is seen in  the upper anterior thigh, which could be an abscess or hematoma. A small amount  of high-density material along the inferior margin of the fluid collection  raises the possibility of active bleeding.  - Spine/bones: No acute process. - Other comments: Small bilateral pleural effusions and rounded atelectasis in  the left lower lobe are unchanged. Impression  1. Upper left thigh bypass graft, not fully visualized on this study, with an  adjacent 7.9 x 7.5 cm complex fluid collection which could represent an abscess  or hematoma. There is a small amount of high density material along the inferior  margin of the fluid collection, making it difficult to exclude active bleeding. Dr. Jaiden Elder verbally notified at 4:16 AM.  2. Persistent anasarca, with progressed mild ascites.   3. Prior left nephrectomy and atrophied right kidney. 4. Prominent sized hepatic veins and IVC in this patient with an enlarged right  heart, raising the possibility of right heart failure. Principal Problem:    SIRS (systemic inflammatory response syndrome) (HCC)  Active Problems:    Hypotension    ESRD (end stage renal disease) on dialysis ([facility] [address]    Type 2 diabetes mellitus with nephropathy ([facility] [address]  Resolved Problems:    * No resolved hospital problems. *      Assessment:     1. ESRD- on HD M-W-F, HD needed for biochemical and volume control. No urgent needs at this time. Plan to run HD again tomorrow due to hypotension now allowing for sufficient fluid removal     2. Anemia- likely related to ESRD, near goal for CKD, continue Nephrovite, and LAKISHA     3. Hypotension- with a hx of HTN, antihypertensives were stopped -  Altered hemodynamics due to left thigh AV graph likely causes hypotension - continue Midodrine 10 mg TID     4. Fluid overload - HD today and tomorrow for volume control      5. Hypocalcemia- with low albumin, treat with vitamin D     6. HTN- hx of, off antihypertensives     7. Renal cell carcinoma- followed by Oncology     8. DM II- on SSI, managed by primary     9.  Hyperphosphatemia- hx of, on Renvela, continue      Plan:     As above    Yogi Browne M.D.

## 2022-08-03 NOTE — ACP (ADVANCE CARE PLANNING)
Advance Care Planning   Healthcare Decision Maker:    Primary Decision Maker: Nakia Pagan [facility] - [phone]    Click here to complete Healthcare Decision Makers including selection of the Healthcare Decision Maker Relationship (ie \"Primary\"). Today we documented Decision Maker(s) consistent with Legal Next of Kin hierarchy.

## 2022-08-03 NOTE — ED NOTES
Unable to collect x2 set of blood cultures, nurse to let MD know.  MD to state that its jes Jansenaine Hong, CECIL  08/02/22 [id number]

## 2022-08-03 NOTE — ED TRIAGE NOTES
Patient arrived to ED via EMS from home with daughter. Patient complains of weakness, hypotension, and generalized pains. Patient reports going to tx yesterday but they didn't pull any fluid due to hypotension. Patient does not make urine    Patient does not display any immediate signs of distress at this time.

## 2022-08-03 NOTE — ED NOTES
TRANSFER - OUT REPORT:    Verbal report given to 7th floor RN on Calleen Rambomer  being transferred to 6th for routine progression of patient care       Report consisted of patient's Situation, Background, Assessment and   Recommendations(SBAR). Information from the following report(s) ED SBAR was reviewed with the receiving nurse. Lines:   Peripheral IV 08/02/22 Left Forearm (Active)       Hemodialysis Central Access Right Subclavian (Active)   Continued need for line? Yes 08/03/22 1120   Site Assessment Clean, dry & intact 08/03/22 1120   CVC Lumen Status Blood return noted; Flushed; Infusing 08/03/22 1120   Venous Lumen Status Brisk blood return;Flushed; Infusing 08/03/22 1120   Arterial Lumen Status Brisk blood return;Flushed; Infusing 08/03/22 1120   Alcohol Cap Used Yes 08/03/22 1120   Line Care Connections checked and tightened;Ports disinfected 08/03/22 1120   Dressing Type 4 X 4;Transparent 08/03/22 1120   Date of Last Dressing Change 08/03/22 08/03/22 1120   Dressing Status Clean, dry & intact 08/03/22 1120   Dressing Intervention New;Removed;Dressing changed 08/03/22 1120   Dressing Change Due 08/10/22 08/03/22 1120        Opportunity for questions and clarification was provided.       Patient transported with:  Glenna Savage RN  08/03/22 [id number]

## 2022-08-04 NOTE — DIALYSIS
TRANSFER IN - DIALYSIS    Received patient in dialysis unit  from [facility] (unit) for ordered procedure. Consent verified for renal replacement therapy. Procedure explained to patient, opportunity for Q&A provided. Call light given. Patient a/o x2 and vital signs hypotensive, tachycardic. BP99/49,  P118  , 3L O2 via NC. Hemodialysis initiated using R CVC. Aspirated and flushed both ports without difficulty. Dressing clean, dry and intact. Machine settings per MD order. Heparin 1000 unit bolus and 500 units/hr. Will monitor during treatment.

## 2022-08-04 NOTE — PROGRESS NOTES
Hospitalist Progress Note   Admit Date:  2022 10:03 PM   Name:  Vicenta Pires   Age:  80 y.o. Sex:  female  :  1950   MRN:  [id number]   Room:      Presenting Complaint: Hypotension and Extremity Weakness     Reason(s) for Admission: End stage renal disease on dialysis ([facility] [address] [N18.6, Z99.2]  SIRS (systemic inflammatory response syndrome) (HCC) [R65.10]  Elevated lactic acid level [R79.89]  Hypotension, unspecified hypotension type [I95.9]     Hospital Course & Interval History:   Maroc A Dougherty is a 81-year-old female with end-stage renal disease presents with hypotension and left-sided mid back pain. Her back pain is chronic since nephrectomy. Pain is sharp and worse with movement without radiation. Blood pressure usually runs in the 90s to 120s. She states that at  dialysis on Monday they did not pull much fluid off because of low blood pressure. She denies fevers chills cough or congestion. She denies chest pain, trouble breathing, abdominal pain or numbness tingling or weakness. She denies melena or hematochezia. History and physical not consistent with septic shock or infection. Laboratory data showed normal white blood cell count 7.0. She did however have an elevated lactic acid level at 3.5. There is no obvious source of infection. She does not make urine, and chest x-ray is consistent with CHF. In the emergency room, due to low blood pressure, she was given gentle IV hydration for hypotension. She did not have fever. But she was tachycardic     Cefepime and vancomycin ordered given penicillin allergy   ER physician asked hospitalist to admit. 30 cc/kg bolus is not being given due to history of end-stage renal disease and some findings on chest x-ray suggestive of mild fluid overload. Fluids were slowed down earlier. Subjective/24hr Events (22):    Patient reports she is \"feeling fine\" however, RN has paged me earlier reporting patient c/o generalized pain and moaning. Patient is awake and alert. Daughter is in the room. Daughter reports patient has been falling at home and has been slightly confused for the past 2 weeks. No fever, chills, nausea or vomiting reported. Assessment & Plan:     # SIRS/ Hypotension on admission  - no evidence of sepsis  - BP has improved, procal low  - cont midodrine  - will discontinue empiric antibiotics   - follow blood cultures - neg to date    # ESRD  - HD as per nephrology    # DM2 with nephropathy  - cont Nesina (januvia substitue)  - continue SSI    # hx of RCC  - s/p nephrectomy    # confusion/falls at home  - check MRI brain  - Check orthostatics  - PT/OT evals, place PPD    # Chronic pain  - patient has had intermittent 1 week supplies of narcotics  - percocet 5mg x 30 tabs filled 5July  - will defer any further unless demonstrates w/d s/s    # Chronic hypoxic resp failure  - continue home oxygen - 2-3L      Discharge Planning:  pending, PT/OT consulted. ?STR      Diet:  ADULT DIET; Regular; 4 carb choices (60 gm/meal)  DVT PPx:hep subq  Code status: Full Code    Hospital Problems:  Principal Problem:    SIRS (systemic inflammatory response syndrome) (HCC)  Active Problems:    Hypotension    ESRD (end stage renal disease) on dialysis ([facility] [address]    Type 2 diabetes mellitus with nephropathy ([facility] [address]  Resolved Problems:    * No resolved hospital problems.  *      Objective:   Patient Vitals for the past 24 hrs:   Temp Pulse Resp BP SpO2   08/04/22 1537 98.4 °F (36.9 °C) 95 19 (!) 151/71 95 %   08/04/22 1115 97.5 °F (36.4 °C) 95 20 (!) 145/61 95 %   08/04/22 1026 -- 98 -- (!) 120/52 --   08/04/22 1000 -- 93 -- (!) 135/58 --   08/04/22 0930 -- 91 -- 102/74 --   08/04/22 0900 -- 53 -- (!) 137/39 --   08/04/22 0830 -- (!) 120 -- 117/75 --   08/04/22 0800 -- (!) 116 -- 120/60 --   08/04/22 0731 -- (!) 118 -- (!) 99/49 --   08/04/22 0527 97.7 °F (36.5 °C) (!) 117 20 (!) 91/43 96 %   08/04/22 0011 97.5 °F (36.4 °C) 95 18 (!) 84/51 95 %   08/03/22 1957 97.7 °F (36.5 °C) 99 20 (!) 122/59 94 %   08/03/22 1730 -- -- -- 105/60 100 %   08/03/22 1653 -- -- -- (!) 102/51 94 %   08/03/22 1635 -- 97 -- -- 97 %   08/03/22 1630 -- -- -- (!) 157/144 94 %       Oxygen Therapy  SpO2: 95 %  Pulse via Oximetry: 99 beats per minute  O2 Device: Nasal cannula  FiO2 : 28 %  O2 Flow Rate (L/min): 2 L/min    Estimated body mass index is 34.33 kg/m² as calculated from the following:    Height as of this encounter: 5' 4\" (1.626 m). Weight as of this encounter: 200 lb (90.7 kg). Intake/Output Summary (Last 24 hours) at 8/4/2022 1623  Last data filed at 8/4/2022 1026  Gross per 24 hour   Intake 600 ml   Output 2100 ml   Net -1500 ml         Physical Exam:   Blood pressure (!) 151/71, pulse 95, temperature 98.4 °F (36.9 °C), temperature source Oral, resp. rate 19, height 5' 4\" (1.626 m), weight 200 lb (90.7 kg), SpO2 95 %. General:    Well nourished. Aox2, confused   Head:  Normocephalic, atraumatic  Eyes:  Sclerae appear normal.  Pupils equally round. ENT:  Nares appear normal, no drainage. Moist oral mucosa  Neck:  No restricted ROM. Trachea midline   CV:   RRR. No m/r/g. No jugular venous distension. Lungs:   diminished No wheezing, rhonchi, or rales. Symmetric expansion. Abdomen: Bowel sounds present. Soft, nontender, nondistended. Extremities: No cyanosis or clubbing. No edema  Skin:     No rashes and normal coloration. Warm and dry. Neuro:  CN II-XII grossly intact. Sensation intact. A&Ox2  Psych:  Normal mood and affect.       I have personally reviewed labs and tests showing:  Recent Labs:  Recent Results (from the past 48 hour(s))   CBC with Auto Differential    Collection Time: 08/02/22 11:02 PM   Result Value Ref Range    WBC 7.0 4.3 - 11.1 K/uL    RBC 2.82 (L) 4.05 - 5.2 M/uL    Hemoglobin 9.2 (L) 11.7 - 15.4 g/dL    Hematocrit 27.7 (L) 35.8 - 46.3 %    MCV 98.2 (H) 79.6 - 97.8 FL    MCH 32.6 26.1 - 32.9 PG    MCHC 33.2 31.4 - 35.0 g/dL    RDW 17.7 (H) 11.9 - 14.6 %    Platelets 98 (L) 709 - 450 K/uL    MPV 13.1 (H) 9.4 - 12.3 FL    nRBC 0.00 0.0 - 0.2 K/uL    Differential Type AUTOMATED      Seg Neutrophils 82 (H) 43 - 78 %    Lymphocytes 8 (L) 13 - 44 %    Monocytes 8 4.0 - 12.0 %    Eosinophils % 1 0.5 - 7.8 %    Basophils 1 0.0 - 2.0 %    Immature Granulocytes 0 0.0 - 5.0 %    Segs Absolute 5.8 1.7 - 8.2 K/UL    Absolute Lymph # 0.6 0.5 - 4.6 K/UL    Absolute Mono # 0.5 0.1 - 1.3 K/UL    Absolute Eos # 0.1 0.0 - 0.8 K/UL    Basophils Absolute 0.1 0.0 - 0.2 K/UL    Absolute Immature Granulocyte 0.0 0.0 - 0.5 K/UL   Comprehensive Metabolic Panel    Collection Time: 08/02/22 11:02 PM   Result Value Ref Range    Sodium 140 136 - 145 mmol/L    Potassium 3.8 3.5 - 5.1 mmol/L    Chloride 103 98 - 107 mmol/L    CO2 26 21 - 32 mmol/L    Anion Gap 11 7 - 16 mmol/L    Glucose 159 (H) 65 - 100 mg/dL    BUN 26 (H) 8 - 23 MG/DL    Creatinine 5.10 (H) 0.6 - 1.0 MG/DL    GFR African American 11 (L) >60 ml/min/1.73m2    GFR Non- 9 (L) >60 ml/min/1.73m2    Calcium 7.8 (L) 8.3 - 10.4 MG/DL    Total Bilirubin 1.6 (H) 0.2 - 1.1 MG/DL    ALT 18 12 - 65 U/L    AST 40 (H) 15 - 37 U/L    Alk Phosphatase 103 50 - 136 U/L    Total Protein 7.3 6.3 - 8.2 g/dL    Albumin 3.4 3.2 - 4.6 g/dL    Globulin 3.9 (H) 2.3 - 3.5 g/dL    Albumin/Globulin Ratio 0.9 (L) 1.2 - 3.5     Culture, Blood 1    Collection Time: 08/02/22 11:02 PM    Specimen: Blood   Result Value Ref Range    Special Requests NO SPECIAL REQUESTS  LEFT  FOREARM        Culture NO GROWTH 2 DAYS     Lactic Acid    Collection Time: 08/02/22 11:02 PM   Result Value Ref Range    Lactic Acid, Plasma 3.5 (H) 0.4 - 2.0 MMOL/L   Procalcitonin    Collection Time: 08/02/22 11:02 PM   Result Value Ref Range    Procalcitonin 0.37 0.00 - 0.49 ng/mL   EKG 12 Lead    Collection Time: 08/02/22 11:09 PM   Result Value Ref Range    Ventricular Rate 99 BPM    Atrial Rate 99 BPM    P-R Interval 155 ms QRS Duration 87 ms    Q-T Interval 395 ms    QTc Calculation (Bazett) 507 ms    P Axis 71 degrees    R Axis 145 degrees    T Axis 28 degrees    Diagnosis Sinus rhythm    COVID-19, Rapid    Collection Time: 08/03/22 12:54 AM    Specimen: Nasopharyngeal   Result Value Ref Range    Source NASAL      SARS-CoV-2, Rapid Not detected NOTD     Lactic Acid    Collection Time: 08/03/22  6:19 AM   Result Value Ref Range    Lactic Acid, Plasma 2.3 (H) 0.4 - 2.0 MMOL/L   Culture, Blood 1    Collection Time: 08/03/22  6:20 AM    Specimen: Blood   Result Value Ref Range    Special Requests NO SPECIAL REQUESTS  LEFT  FOREARM        Culture NO GROWTH 1 DAY     POCT Glucose    Collection Time: 08/03/22  8:47 PM   Result Value Ref Range    POC Glucose 123 (H) 65 - 100 mg/dL    Performed by: Maria Elena    Lactic Acid    Collection Time: 08/03/22 11:45 PM   Result Value Ref Range    Lactic Acid, Plasma 2.4 (H) 0.4 - 2.0 MMOL/L   CBC with Auto Differential    Collection Time: 08/03/22 11:45 PM   Result Value Ref Range    WBC 7.4 4.3 - 11.1 K/uL    RBC 3.11 (L) 4.05 - 5.2 M/uL    Hemoglobin 10.2 (L) 11.7 - 15.4 g/dL    Hematocrit 29.8 (L) 35.8 - 46.3 %    MCV 95.8 79.6 - 97.8 FL    MCH 32.8 26.1 - 32.9 PG    MCHC 34.2 31.4 - 35.0 g/dL    RDW 17.2 (H) 11.9 - 14.6 %    Platelets 89 (L) 076 - 450 K/uL    MPV 13.0 (H) 9.4 - 12.3 FL    nRBC 0.00 0.0 - 0.2 K/uL    Differential Type AUTOMATED      Seg Neutrophils 85 (H) 43 - 78 %    Lymphocytes 5 (L) 13 - 44 %    Monocytes 6 4.0 - 12.0 %    Eosinophils % 2 0.5 - 7.8 %    Basophils 2 0.0 - 2.0 %    Immature Granulocytes 1 0.0 - 5.0 %    Segs Absolute 6.3 1.7 - 8.2 K/UL    Absolute Lymph # 0.4 (L) 0.5 - 4.6 K/UL    Absolute Mono # 0.5 0.1 - 1.3 K/UL    Absolute Eos # 0.1 0.0 - 0.8 K/UL    Basophils Absolute 0.1 0.0 - 0.2 K/UL    Absolute Immature Granulocyte 0.1 0.0 - 0.5 K/UL   Basic Metabolic Panel w/ Reflex to MG    Collection Time: 08/04/22  4:03 AM   Result Value Ref Range    Sodium 138 136 - 145 mmol/L    Potassium 3.5 3.5 - 5.1 mmol/L    Chloride 101 98 - 107 mmol/L    CO2 25 21 - 32 mmol/L    Anion Gap 12 7 - 16 mmol/L    Glucose 128 (H) 65 - 100 mg/dL    BUN 24 (H) 8 - 23 MG/DL    Creatinine 4.70 (H) 0.6 - 1.0 MG/DL    GFR African American 12 (L) >60 ml/min/1.73m2    GFR Non- 10 (L) >60 ml/min/1.73m2    Calcium 8.2 (L) 8.3 - 10.4 MG/DL   CBC with Auto Differential    Collection Time: 08/04/22  4:03 AM   Result Value Ref Range    WBC 6.5 4.3 - 11.1 K/uL    RBC 2.93 (L) 4.05 - 5.2 M/uL    Hemoglobin 9.7 (L) 11.7 - 15.4 g/dL    Hematocrit 27.8 (L) 35.8 - 46.3 %    MCV 94.9 79.6 - 97.8 FL    MCH 33.1 (H) 26.1 - 32.9 PG    MCHC 34.9 31.4 - 35.0 g/dL    RDW 17.1 (H) 11.9 - 14.6 %    Platelets 87 (L) 879 - 450 K/uL    MPV 13.3 (H) 9.4 - 12.3 FL    nRBC 0.00 0.0 - 0.2 K/uL    Differential Type AUTOMATED      Seg Neutrophils 81 (H) 43 - 78 %    Lymphocytes 10 (L) 13 - 44 %    Monocytes 6 4.0 - 12.0 %    Eosinophils % 1 0.5 - 7.8 %    Basophils 2 0.0 - 2.0 %    Immature Granulocytes 1 0.0 - 5.0 %    Segs Absolute 5.3 1.7 - 8.2 K/UL    Absolute Lymph # 0.6 0.5 - 4.6 K/UL    Absolute Mono # 0.4 0.1 - 1.3 K/UL    Absolute Eos # 0.1 0.0 - 0.8 K/UL    Basophils Absolute 0.1 0.0 - 0.2 K/UL    Absolute Immature Granulocyte 0.0 0.0 - 0.5 K/UL   Procalcitonin    Collection Time: 08/04/22  4:03 AM   Result Value Ref Range    Procalcitonin 0.36 0.00 - 0.49 ng/mL   Magnesium    Collection Time: 08/04/22  4:03 AM   Result Value Ref Range    Magnesium 2.1 1.8 - 2.4 mg/dL   POCT Glucose    Collection Time: 08/04/22  6:19 AM   Result Value Ref Range    POC Glucose 126 (H) 65 - 100 mg/dL    Performed by: Maria Elena    POCT Glucose    Collection Time: 08/04/22 11:04 AM   Result Value Ref Range    POC Glucose 100 65 - 100 mg/dL    Performed by: Stephen    POCT Glucose    Collection Time: 08/04/22  3:38 PM   Result Value Ref Range    POC Glucose 127 (H) 65 - 100 mg/dL    Performed by: Poornima Candelaria I have personally reviewed imaging studies showing: Other Studies:  XR CHEST PORTABLE   Final Result   Findings suggest CHF. MRI BRAIN WO CONTRAST    (Results Pending)       Current Meds:  Current Facility-Administered Medications   Medication Dose Route Frequency    midodrine (PROAMATINE) tablet 10 mg  10 mg Oral TID WC    pantoprazole (PROTONIX) tablet 40 mg  40 mg Oral QAM AC    sevelamer (RENVELA) tablet 2,400 mg  2,400 mg Oral TID WC    alogliptin (NESINA) tablet 6.25 mg  6.25 mg Oral Daily    sodium chloride flush 0.9 % injection 5-40 mL  5-40 mL IntraVENous 2 times per day    sodium chloride flush 0.9 % injection 5-40 mL  5-40 mL IntraVENous PRN    0.9 % sodium chloride infusion   IntraVENous PRN    ondansetron (ZOFRAN-ODT) disintegrating tablet 4 mg  4 mg Oral Q8H PRN    Or    ondansetron (ZOFRAN) injection 4 mg  4 mg IntraVENous Q6H PRN    polyethylene glycol (GLYCOLAX) packet 17 g  17 g Oral Daily PRN    aluminum & magnesium hydroxide-simethicone (MAALOX) 200-200-20 MG/5ML suspension 30 mL  30 mL Oral Q6H PRN    acetaminophen (TYLENOL) tablet 650 mg  650 mg Oral Q6H PRN    Or    acetaminophen (TYLENOL) suppository 650 mg  650 mg Rectal Q6H PRN    heparin (porcine) injection 5,000 Units  5,000 Units SubCUTAneous 3 times per day    cefepime (MAXIPIME) 1,000 mg in sodium chloride 0.9 % 50 mL IVPB mini-bag  1,000 mg IntraVENous Q24H    heparin (porcine) injection 5,000 Units  5,000 Units IntraVENous PRN    Epoetin Martin-epbx (RETACRIT) injection 20,000 Units  20,000 Units SubCUTAneous Q7 Days    temazepam (RESTORIL) capsule 15 mg  15 mg Oral Nightly PRN       Signed:  Liam Keller DO    Part of this note may have been written by using a voice dictation software. The note has been proof read but may still contain some grammatical/other typographical errors.

## 2022-08-04 NOTE — PROGRESS NOTES
Hemodialysis Rounding Note    Subjective: Marvel Pepe is a 80 y.o.  who presents with End stage renal disease on dialysis ([facility] [address] [N18.6, Z99.2]  SIRS (systemic inflammatory response syndrome) (HCC) [R65.10]  Elevated lactic acid level [R79.89]  Hypotension, unspecified hypotension type [I95.9]. The patient is dialyzing utilizing the following method:Intermittent Hemodialysis  Artificial Kidney Dialysis Machine No.: [id number]   hours Dialyze Hours: 3.5   blood flow rate Blood Flow Rate (ml/min): 350 ml/min   dialysate rate    ultrafiltration Ultrafiltration Rate (ml/hr): 820 ml/hr   Heparin is  used during the dialysis treatment.     Complaints pain, left side    Current Facility-Administered Medications   Medication Dose Route Frequency    midodrine (PROAMATINE) tablet 10 mg  10 mg Oral TID WC    pantoprazole (PROTONIX) tablet 40 mg  40 mg Oral QAM AC    sevelamer (RENVELA) tablet 2,400 mg  2,400 mg Oral TID WC    alogliptin (NESINA) tablet 6.25 mg  6.25 mg Oral Daily    sodium chloride flush 0.9 % injection 5-40 mL  5-40 mL IntraVENous 2 times per day    sodium chloride flush 0.9 % injection 5-40 mL  5-40 mL IntraVENous PRN    0.9 % sodium chloride infusion   IntraVENous PRN    ondansetron (ZOFRAN-ODT) disintegrating tablet 4 mg  4 mg Oral Q8H PRN    Or    ondansetron (ZOFRAN) injection 4 mg  4 mg IntraVENous Q6H PRN    polyethylene glycol (GLYCOLAX) packet 17 g  17 g Oral Daily PRN    aluminum & magnesium hydroxide-simethicone (MAALOX) 200-200-20 MG/5ML suspension 30 mL  30 mL Oral Q6H PRN    acetaminophen (TYLENOL) tablet 650 mg  650 mg Oral Q6H PRN    Or    acetaminophen (TYLENOL) suppository 650 mg  650 mg Rectal Q6H PRN    heparin (porcine) injection 5,000 Units  5,000 Units SubCUTAneous 3 times per day    cefepime (MAXIPIME) 1,000 mg in sodium chloride 0.9 % 50 mL IVPB mini-bag  1,000 mg IntraVENous Q24H    heparin (porcine) injection 5,000 Units  5,000 Units IntraVENous PRN    Epoetin with Auto Differential    Collection Time: 22  4:03 AM   Result Value Ref Range    WBC 6.5 4.3 - 11.1 K/uL    RBC 2.93 (L) 4.05 - 5.2 M/uL    Hemoglobin 9.7 (L) 11.7 - 15.4 g/dL    Hematocrit 27.8 (L) 35.8 - 46.3 %    MCV 94.9 79.6 - 97.8 FL    MCH 33.1 (H) 26.1 - 32.9 PG    MCHC 34.9 31.4 - 35.0 g/dL    RDW 17.1 (H) 11.9 - 14.6 %    Platelets 87 (L) 902 - 450 K/uL    MPV 13.3 (H) 9.4 - 12.3 FL    nRBC 0.00 0.0 - 0.2 K/uL    Differential Type AUTOMATED      Seg Neutrophils 81 (H) 43 - 78 %    Lymphocytes 10 (L) 13 - 44 %    Monocytes 6 4.0 - 12.0 %    Eosinophils % 1 0.5 - 7.8 %    Basophils 2 0.0 - 2.0 %    Immature Granulocytes 1 0.0 - 5.0 %    Segs Absolute 5.3 1.7 - 8.2 K/UL    Absolute Lymph # 0.6 0.5 - 4.6 K/UL    Absolute Mono # 0.4 0.1 - 1.3 K/UL    Absolute Eos # 0.1 0.0 - 0.8 K/UL    Basophils Absolute 0.1 0.0 - 0.2 K/UL    Absolute Immature Granulocyte 0.0 0.0 - 0.5 K/UL   Procalcitonin    Collection Time: 22  4:03 AM   Result Value Ref Range    Procalcitonin 0.36 0.00 - 0.49 ng/mL   Magnesium    Collection Time: 22  4:03 AM   Result Value Ref Range    Magnesium 2.1 1.8 - 2.4 mg/dL   POCT Glucose    Collection Time: 22  6:19 AM   Result Value Ref Range    POC Glucose 126 (H) 65 - 100 mg/dL    Performed by: Maria Elena              Objective:     Blood pressure (!) 137/39, pulse 53, temperature 97.7 °F (36.5 °C), temperature source Axillary, resp. rate 20, height 5' 4\" (1.626 m), weight 200 lb (90.7 kg), SpO2 96 %. Temp (24hrs), Av.7 °F (36.5 °C), Min:97.5 °F (36.4 °C), Max:98 °F (36.7 °C)      Physical Exam:   Lungs: CTAB  Heart: S1S2, no m/r/g  Abdomen: soft, +BS  Extremities: no edema  Access: RIJ perm cath      Assessment:     End Stage Renal Disease:  Patient is tolerating dialysis treatment well. .  Additionally the patient has experienced normal dialysis treatment during dialysis. Dry weight   same.     Anemia:    Recent Labs     22  0403   HCT 27.8*   HGB 9.7* Renal Metabolic Bone Disease:  No results for input(s): CA, ALB, PTH in the last 72 hours. Invalid input(s): PO4                     Treatment:  PO4 binders, Cinacaleat, Vitamin D  Hypertension:  fair BP control, hypotensive at times    Access: adequate monitoring/no changes    Principal Problem:    SIRS (systemic inflammatory response syndrome) (HCC)  Active Problems:    Hypotension    ESRD (end stage renal disease) on dialysis ([facility] [address]    Type 2 diabetes mellitus with nephropathy ([facility] [address]  Resolved Problems:    * No resolved hospital problems.  *         Plan:     Continue scheduled dialysis    Ladonna Celis, ACNP

## 2022-08-04 NOTE — PROGRESS NOTES
Pt becoming agitated , not keeping her oxygen on and yelling out. Sister at bedside. Sister and nurse attempted to calm pt down, but pt continues to get upset. Will continue to monitor.

## 2022-08-05 NOTE — PROGRESS NOTES
cyst removed    COLONOSCOPY N/A 11/8/2018    COLONOSCOPY  BMI 36 performed by Sourav Hand MD at [facility] ENDOSCOPY    CT BIOPSY ABDOMEN RETROPERITONEUM  9/14/2017    CT BIOPSY ABDOMEN RETROPERITONEUM 9/14/2017 [facility] RADIOLOGY CT SCAN    GI  08/06/2018    exploratory laparotomy    GI  06/01/2002    colon resection resulting in temporary colostomy reversal    GROIN SURGERY Left 6/26/2022    LEFT GROIN DEBRIDEMENT OF HEMATOMA / MIGEL performed by Felice Sanchez MD at [facility]    IR INTRO [address] PTA  3/10/2020    IR INTRO CATH DIALYSIS CIRCUIT W BALLOON PTA  12/5/2019    IR INTRO CATH DIALYSIS CIRCUIT W BALLOON PTA  9/3/2019    IR INTRO CATH DIALYSIS CIRCUIT W BALLOON PTA  5/30/2019    IR INTRO CATH DIALYSIS CIRCUIT W BALLOON PTA  2/28/2019    IR THRMB/INFUSION DIAYSIS CIRCUIT Left 2021    IR TUNNELED CATHETER PLACEMENT GREATER THAN 5 YEARS  3/15/2022    IR TUNNELED CATHETER PLACEMENT GREATER THAN 5 YEARS  11/19/2021    IR TUNNELED CATHETER PLACEMENT GREATER THAN 5 YEARS  11/19/2021    IR TUNNELED CATHETER PLACEMENT GREATER THAN 5 YEARS 11/19/2021 [facility] SPECIALS    IR TUNNELED CATHETER PLACEMENT GREATER THAN 5 YEARS  3/15/2022    IR TUNNELED CATHETER PLACEMENT GREATER THAN 5 YEARS 3/15/2022 [facility] RAD[facility] SPECIALS    OTHER SURGICAL HISTORY      dialysis fistula, several permcaths    UROLOGICAL SURGERY Left July 2015    nephrectomy    VASCULAR SURGERY Left 04/07/2022    LEFT ARM AV GRAFT    VASCULAR SURGERY Left 03/15/2022    declot    VASCULAR SURGERY Left 02/21/2022    Open thromboembolectomy of left upper arm graft. VASCULAR SURGERY Right     AV graft- states no longer uses    VASCULAR SURGERY Left 5/26/2022    LEFT AV GRAFT THIGH performed by Annamaria Hyman MD at [facility]      Prior to Admission medications    Medication Sig Start Date End Date Taking?  Authorizing Provider   oxyCODONE-acetaminophen (PERCOCET) 5-325 MG per tablet Take 1 tablet by mouth every 4 hours as needed for Pain. Historical Provider, MD   Epoetin Martin-epbx (RETACRIT) 72934 UNIT/ML SOLN injection Inject 1 mL into the skin every 7 days To be given at dialysis 7/3/22   SUNSHINE Cochran CNP   naloxone 4 MG/0.1ML LIQD nasal spray 1 spray by Nasal route as needed for Opioid Reversal. Use 1 spray intranasally, then discard. Repeat with new spray every 2 min as needed for opioid overdose symptoms, alternating nostrils. Historical Provider, MD   midodrine (PROAMATINE) 10 MG tablet Take 1 tablet by mouth every 8 hours 6/5/22   Belle Sevilla MD   acetaminophen (TYLENOL) 500 MG tablet Take 500 mg by mouth every 6 hours as needed for Pain (for breakthrough pain )    Historical Provider, MD   omeprazole (PRILOSEC) 40 MG delayed release capsule Take 40 mg by mouth at bedtime    Historical Provider, MD   OXYGEN Inhale into the lungs 2 LITERS AS NEEDED FOR SOB VIA NASAL CANNULA    Historical Provider, MD   SITagliptin (JANUVIA) 50 MG tablet Take 50 mg by mouth daily    Historical Provider, MD   sevelamer (RENVELA) 800 MG tablet Take 3 tablets by mouth 3 times daily (with meals) PT TAKES 3 TABS WITH MEALS-- AND 1 TABS WITH SNACKS     Historical Provider, MD     Allergies   Allergen Reactions    Penicillins Other (See Comments) and Rash     PT STATES UNSURE OF RX  Tolerated cefazolin    Vancomycin Rash      Social History     Tobacco Use    Smoking status: Never    Smokeless tobacco: Never   Substance Use Topics    Alcohol use: No      Family History   Problem Relation Age of Onset    Diabetes Mother     Heart Disease Mother     Hypertension Mother     Heart Disease Father     Hypertension Father     Post-op Cognitive Dysfunction Neg Hx     Pseudochol.  Deficiency Neg Hx     Delayed Awakening Neg Hx     Post-op Nausea/Vomiting Neg Hx     Emergence Delirium Neg Hx     Other Neg Hx     Malig Hypertherm Neg Hx           Review of Systems    Negative except for what is mention above in HPI      Objective:       BP (!) 141/48   Pulse 99   Temp 98.6 °F (37 °C) (Oral)   Resp 20   Ht 5' 4\" (1.626 m)   Wt 200 lb (90.7 kg)   SpO2 98%   BMI 34.33 kg/m²     No intake/output data recorded. 08/03 1901 - 08/05 0700  In: 600   Out: 2100     BP (!) 141/48   Pulse 99   Temp 98.6 °F (37 °C) (Oral)   Resp 20   Ht 5' 4\" (1.626 m)   Wt 200 lb (90.7 kg)   SpO2 98%   BMI 34.33 kg/m²   General:  Alert, confused, mild to moderate distress, appears stated age. Head:  Normocephalic, without obvious abnormality, atraumatic. Neck: Supple, symmetrical, trachea midline, no JVD. Lungs:   Clear to auscultation bilaterally. Heart:  Regular rate and rhythm, S1, S2 normal, no murmur,  rub or gallop. Abdomen:   Soft, non-tender. Bowel sounds normal. No masses,  No organomegaly. No renal bruit. Extremities: Extremities no edema. Access:  RIJ Perm Cath   Skin: Skin color, texture, turgor normal. No rashes or lesions. Lymph nodes: Cervical and supraclavicular nodes normal.   Neurologic: No asterixis.          Data Review:     Recent Results (from the past 24 hour(s))   POCT Glucose    Collection Time: 08/04/22 11:04 AM   Result Value Ref Range    POC Glucose 100 65 - 100 mg/dL    Performed by: AnheloCT    POCT Glucose    Collection Time: 08/04/22  3:38 PM   Result Value Ref Range    POC Glucose 127 (H) 65 - 100 mg/dL    Performed by: AnheloCT    POCT Glucose    Collection Time: 08/04/22  8:28 PM   Result Value Ref Range    POC Glucose 138 (H) 65 - 100 mg/dL    Performed by: Celeste    Comprehensive Metabolic Panel    Collection Time: 08/05/22  4:10 AM   Result Value Ref Range    Sodium 136 136 - 145 mmol/L    Potassium 3.9 3.5 - 5.1 mmol/L    Chloride 99 98 - 107 mmol/L    CO2 28 21 - 32 mmol/L    Anion Gap 9 7 - 16 mmol/L    Glucose 120 (H) 65 - 100 mg/dL    BUN 20 8 - 23 MG/DL    Creatinine 4.00 (H) 0.6 - 1.0 MG/DL    GFR African American 14 (L) >60 ml/min/1.73m2    GFR Non-African American 12 (L) >60 ml/min/1.73m2    Calcium 8.2 (L) 8.3 - 10.4 MG/DL    Total Bilirubin 2.2 (H) 0.2 - 1.1 MG/DL    ALT 21 12 - 65 U/L    AST 31 15 - 37 U/L    Alk Phosphatase 108 50 - 136 U/L    Total Protein 7.6 6.3 - 8.2 g/dL    Albumin 3.4 3.2 - 4.6 g/dL    Globulin 4.2 (H) 2.3 - 3.5 g/dL    Albumin/Globulin Ratio 0.8 (L) 1.2 - 3.5     CBC with Auto Differential    Collection Time: 08/05/22  4:10 AM   Result Value Ref Range    WBC 6.3 4.3 - 11.1 K/uL    RBC 3.04 (L) 4.05 - 5.2 M/uL    Hemoglobin 9.8 (L) 11.7 - 15.4 g/dL    Hematocrit 29.2 (L) 35.8 - 46.3 %    MCV 96.1 79.6 - 97.8 FL    MCH 32.2 26.1 - 32.9 PG    MCHC 33.6 31.4 - 35.0 g/dL    RDW 17.3 (H) 11.9 - 14.6 %    Platelets 85 (L) 671 - 450 K/uL    MPV 12.2 9.4 - 12.3 FL    nRBC 0.00 0.0 - 0.2 K/uL    Differential Type AUTOMATED      Seg Neutrophils 77 43 - 78 %    Lymphocytes 12 (L) 13 - 44 %    Monocytes 8 4.0 - 12.0 %    Eosinophils % 1 0.5 - 7.8 %    Basophils 1 0.0 - 2.0 %    Immature Granulocytes 1 0.0 - 5.0 %    Segs Absolute 4.8 1.7 - 8.2 K/UL    Absolute Lymph # 0.8 0.5 - 4.6 K/UL    Absolute Mono # 0.5 0.1 - 1.3 K/UL    Absolute Eos # 0.1 0.0 - 0.8 K/UL    Basophils Absolute 0.1 0.0 - 0.2 K/UL    Absolute Immature Granulocyte 0.0 0.0 - 0.5 K/UL   POCT Glucose    Collection Time: 08/05/22  6:25 AM   Result Value Ref Range    POC Glucose 122 (H) 65 - 100 mg/dL    Performed by: Celeste        CXR;   Results for orders placed during the hospital encounter of 08/02/22    XR CHEST PORTABLE    Narrative  EXAM: XR CHEST PORTABLE    HISTORY: hypotension. TECHNIQUE: Frontal chest.    COMPARISON: 7/28/2022    FINDINGS:  There is mild cardiomegaly. There is pulmonary vascular congestion/edema. There is no pleural effusion, or pneumothorax. No significant osseous abnormalities are observed. Stable right-sided CVC and right vascular stent.     Impression  Findings suggest CHF.     KUB:  Results for orders placed in visit on 08/03/18    XR ABDOMEN (KUB) (SINGLE AP VIEW)    Narrative  KUB supine views    History:  mechanical small bowel obstruction, lower abd ventral hernia, 78 years  Female    Comparison:  CT abdomen pelvis yesterday    Findings:  Esophageal tube appears to terminate in the first portion of the  duodenum. Oral contrast is seen throughout the colon. No free air is evident. The bowel gas pattern is nonspecific and there is no evidence of ileus or  obstruction. No suspicious renal or ureteral calculi are evident. Visualized  osseous structures unremarkable. Impression  Impression: No acute pathology identified. Renal US:  No results found for this or any previous visit. CT Abdomen  Results for orders placed during the hospital encounter of 06/25/22    CT ABDOMEN PELVIS W IV CONTRAST Additional Contrast? None    Narrative  EXAM: CT abdomen and pelvis with IV contrast.    INDICATION: Abdominal pain. COMPARISON: Prior PET/CT scan on May 5, 2022. TECHNIQUE: Axial CT images of the abdomen and pelvis were obtained after the  intravenous injection of 100 mL Isovue 370 CT contrast. Radiation dose reduction  techniques were used for this study. Our CT scanners use one or all of the  following:  Automated exposure control, adjustment of the mA or kV according to  patient size, iterative reconstruction. FINDINGS:    - Liver: Within normal limits. - Gallbladder and bile ducts: Within normal limits. - Spleen: Within normal limits. - Urinary tract: The left kidney is absent. The right kidney is atrophied and  there is a 2.6 cm exophytic cyst off its lower pole. No right-sided  hydronephrosis is seen. The urinary bladder is collapsed. - Adrenals: Within normal limits. - Pancreas: Within normal limits. - Gastrointestinal tract: There is colonic diverticulosis.  No definite acute  diverticulitis is seen, although ascites limits for evaluation of focal  inflammation.  - Retroperitoneum: Mild abdominal aortic atherosclerosis, with no aneurysmal  dilatation or dissection. The right heart is enlarged, and there is prominent  contrast reflux into the IVC, raising the possibility of right heart failure. - Peritoneal cavity and abdominal wall: There is progressed mild ascites. No  intra-abdominal abscess or free intraperitoneal air is seen. Edema in the  abdominal wall subcutaneous tissue has not significantly changed. - Pelvis: There is a double-limbed bypass graft in the upper left thigh. An  adjacent 7.9 x 7.5 cm complex fluid collection with no associated gas is seen in  the upper anterior thigh, which could be an abscess or hematoma. A small amount  of high-density material along the inferior margin of the fluid collection  raises the possibility of active bleeding.  - Spine/bones: No acute process. - Other comments: Small bilateral pleural effusions and rounded atelectasis in  the left lower lobe are unchanged. Impression  1. Upper left thigh bypass graft, not fully visualized on this study, with an  adjacent 7.9 x 7.5 cm complex fluid collection which could represent an abscess  or hematoma. There is a small amount of high density material along the inferior  margin of the fluid collection, making it difficult to exclude active bleeding. Dr. Ramakrishna Flores verbally notified at 4:16 AM.  2. Persistent anasarca, with progressed mild ascites. 3. Prior left nephrectomy and atrophied right kidney. 4. Prominent sized hepatic veins and IVC in this patient with an enlarged right  heart, raising the possibility of right heart failure. Principal Problem:    SIRS (systemic inflammatory response syndrome) (HCC)  Active Problems:    Hypotension    Renal cell carcinoma (HCC)    ESRD (end stage renal disease) on dialysis (HCC)    Chronic respiratory failure with hypoxia (HCC)    Type 2 diabetes mellitus with nephropathy ([facility] [address]  Resolved Problems:    * No resolved hospital problems. *      Assessment:     1.  ESRD- on HD M-W-F, HD needed for biochemical and volume control. No urgent needs at this time.  -ran HD last two days, plan for next HD tomorrow then continue outpatient schedule     2. Anemia- likely related to ESRD, near goal for CKD, continue Nephrovite, and LAKISHA     3. Hypotension- with a hx of HTN, antihypertensives were stopped -  Altered hemodynamics due to left thigh AV graph likely causes hypotension   - continue Midodrine 10 mg TID     4. Fluid overload - improved with HD for volume control      5. Hypocalcemia- with low albumin, treat with vitamin D     6. HTN- hx of, off antihypertensives     7. Renal cell carcinoma- followed by Oncology     8. DM II- on SSI, managed by primary     9.  Hyperphosphatemia- hx of, on Renvela, continue      Plan:     As above    ARMIDA Maki

## 2022-08-05 NOTE — CARE COORDINATION
Chart review complete. Pt having vascular procedure done Monday. PT/OT evals complete with the recommendation for STR at dc. CM will follow up with pt/family to discuss this recommendation. Pt's insurance will require precert. PPD ordered in the event the pt/family is agreeable to STR. CM following.

## 2022-08-05 NOTE — PROGRESS NOTES
OCCUPATIONAL THERAPY Initial Assessment, Daily Note, and AM       OT Visit Days: 1  Acknowledge Orders  Time  OT Charge Capture  Rehab Caseload Tracker      Marvel Pepe is a 80 y.o. female   PRIMARY DIAGNOSIS: SIRS (systemic inflammatory response syndrome) ([facility] [address]  [facility] [N18.6, Z99.2]  SIRS (systemic inflammatory response syndrome) ([facility]) [R65.10]  Elevated lactic acid level [R79.89]  Hypotension, unspecified hypotension type [I95.9]       Reason for Referral: Generalized Muscle Weakness (M62.81)  Other lack of cordination (R27.8)  Difficulty in walking, Not elsewhere classified (R26.2)  Other abnormalities of gait and mobility (R26.89)  Repeated Falls (R29.6)  History of falling (Z91.81)  Dizziness and Giddiness (R42)  Inpatient: Payor: [facility] / Plan: [employer] / Product Type: *No Product type* /     ASSESSMENT:     REHAB RECOMMENDATIONS:   Recommendation to date pending progress:  Setting:  Short-term Rehab    Equipment:    To Be Determined     ASSESSMENT:  Ms. Carlota Harper is a 81 YO R dominant AAF admitted from home with above with low BPs and back pain. Admits to several falls. ESRD with HD MWF. Lives with daughter and gets assistance with LB ADLs, uses RW for ambulation and reports toilets herself. Uses 3.5L NC at home 24/7. No driving and not able to help with IADLs per pt report. Today, pt found supine, moaning and reported having trouble breathing. Decreased responsive and alertness, altered mental status noted. Unable to give name. Bumped up O2 to 5L and max A x2 up to sitting on EOB. Encouraged purse lip breathing, sats found to be in 60s. Bumped O2 to 15L and alerted RN. Changed to humidity and HFNC at 6L before sats were up to low 90s. Pt sat for several minutes on EOB with OT sitting beside her. Max A to mook socks. Once more alert and able to communicate with therapists, more agreeable to activity. B UEs are WFLs for basic self care tasks. / O2:   Pre Treatment: no complaint of pain         Post Treatment: no complaint of pain       Vitals          Oxygen changed out to HFNC 6L with humidity            GROSS EVALUATION: INTACT IMPAIRED   (See Comments)   UE AROM [x] []   UE PROM [] []   Strength [x]       Posture / Balance []  Fair sitting and standing   Sensation [x]     Coordination [x]       Tone []       Edema []    Activity Tolerance [] Patient limited by fatigue, Treatment limited secondary to decreased cognition     Hand Dominance R [x] L []      COGNITION/  PERCEPTION: INTACT IMPAIRED   (See Comments)   Orientation []  Initially AMS, could not even say her name, but with O2 levels better after several minutes, more oriented   Vision [x]     Hearing [x]     Cognition  []     Perception []       MOBILITY: I Mod I S SBA CGA Min Mod Max Total  NT x2 Comments:   Bed Mobility    Rolling [] [] [] [] [] [] [] [x] [] [] []    Supine to Sit [] [] [] [] [] [] [] [x] [] [] [x]    Scooting [] [] [] [] [] [] [x] [] [] [] []    Sit to Supine [] [] [] [] [] [] [] [] [] [x] [] Up to recliner   Transfers    Sit to Stand [] [] [] [] [] [] [x] [] [] [] [] SPT to recliner   Bed to Chair [] [] [] [] [] [] [x] [x] [] [] []    Stand to Sit [] [] [] [] [] [] [x] [] [] [] []    Tub/Shower [] [] [] [] [] [] [] [] [] [x] []     Toilet [] [] [] [] [] [] [] [] [] [x] []      [] [] [] [] [] [] [] [] [] [] []    I=Independent, Mod I=Modified Independent, S=Supervision/Setup, SBA=Standby Assistance, CGA=Contact Guard Assistance, Min=Minimal Assistance, Mod=Moderate Assistance, Max=Maximal Assistance, Total=Total Assistance, NT=Not Tested    ACTIVITIES OF DAILY LIVING: I Mod I S SBA CGA Min Mod Max Total NT Comments   BASIC ADLs:              Upper Body Bathing  [] [] [] [] [] [] [] [] [] [x]    Lower Body Bathing [] [] [] [] [] [] [] [] [] [x]    Toileting [] [] [] [] [] [] [] [x] [] []    Upper Body Dressing [] [] [] [] [] [x] [] [] [] [] gown   Lower Body Dressing [] [] [] [] [] [] [] [] [x] [] socks   Feeding [] [] [x] [] [] [] [] [] [] []    Grooming [] [] [] [] [] [x] [] [] [] []    Personal Device Care [] [] [] [] [] [] [] [] [] []    Functional Mobility [] [] [] [] [] [] [x] [] [] []    I=Independent, Mod I=Modified Independent, S=Supervision/Setup, SBA=Standby Assistance, CGA=Contact Guard Assistance, Min=Minimal Assistance, Mod=Moderate Assistance, Max=Maximal Assistance, Total=Total Assistance, NT=Not Tested    PLAN:     FREQUENCY/DURATION   OT Plan of Care: 3 times/week for duration of hospital stay or until stated goals are met, whichever comes first.    ACUTE OCCUPATIONAL THERAPY GOALS:   (Developed with and agreed upon by patient and/or caregiver.)  1) Patient will complete lower body bathing and dressing with min A and adaptive equipment as needed. 2) Patient will complete toileting with min A. 3) Patient will complete functional transfers with min A and adaptive equipment as needed. 4) Patient will tolerate at least 23 minutes of OT activity with as needed rest breaks while maintaining O2 sats >90%. 5) Patient will verbalize at least 3 energy conservation technique to utilize during ADL/IADL. 6) Patient will complete grooming tasks in supported sitting after setup.    Timeframe: 7 visits        PROBLEM LIST:   (Skilled intervention is medically necessary to address:)  Decreased ADL/Functional Activities  Decreased Activity Tolerance  Decreased AROM/PROM  Decreased Balance  Decreased Cognition  Decreased Coordination  Decreased Gait Ability  Decreased Safety Awareness  Decreased Strength  Decreased Transfer Abilities   INTERVENTIONS PLANNED:  (Benefits and precautions of occupational therapy have been discussed with the patient.)  Self Care Training  Therapeutic Activity  Therapeutic Exercise/HEP  Neuromuscular Re-education  Manual Therapy  Education         TREATMENT:     EVALUATION: MODERATE COMPLEXITY: (Untimed Charge)    TREATMENT:   Co-Treatment PT/OT necessary due to patient's decreased overall endurance/tolerance levels, as well as need for high level skilled assistance to complete functional transfers/mobility and functional tasks  Self Care (42 minutes): Patient participated in toileting, upper body dressing, lower body dressing, self feeding, and grooming ADLs in supported sitting, unsupported sitting, standing, and supine with moderate visual, verbal, manual, and tactile cueing to increase independence, decrease assistance required, increase activity tolerance, and increase safety awareness. Patient also participated in functional mobility, functional transfer, energy conservation, and adaptive equipment training to increase independence, decrease assistance required, increase activity tolerance, and increase safety awareness. TREATMENT GRID:  N/A    AFTER TREATMENT PRECAUTIONS: Bed/Chair Locked, Call light within reach, Chair, Needs within reach, and RN notified    INTERDISCIPLINARY COLLABORATION:  RN/ PCT, PT/ PTA, OT/ AMADOR, and RN Case Manager/      EDUCATION:  Education Given To: Patient  Education Provided: Role of Therapy;Plan of Care;Home Exercise Program;Precautions; ADL Adaptive Strategies;Transfer Training;Energy Conservation;Orientation;Equipment; Fall Prevention Strategies    TOTAL TREATMENT DURATION AND TIME:  Time In: 0932  Time Out: 1014  Minutes: 43    PIETRO KAY, WOJCIECH Kay, MS, OTR/L

## 2022-08-05 NOTE — PROGRESS NOTES
Hospitalist Progress Note   Admit Date:  2022 10:03 PM   Name:  Vicenta Pires   Age:  80 y.o. Sex:  female  :  1950   MRN:  [id number]   Room:  [facility]/    Presenting Complaint: Hypotension and Extremity Weakness    Reason(s) for Admission: End stage renal disease on dialysis ([facility] [address] [N18.6, Z99.2]  SIRS (systemic inflammatory response syndrome) (HCC) [R65.10]  Elevated lactic acid level [R79.89]  Hypotension, unspecified hypotension type [I95.9]     Hospital Course & Interval History:   Marco A Dougherty is a 81-year-old female with end-stage renal disease presents with hypotension and left-sided mid back pain. Her back pain is chronic since nephrectomy. Labs were normal except elevated lactic. She was given gentle IVF for hypotension. No fever or tachycardia. Empiric Cefepime/Vanc. Bcx obtained    Subjective/24hr Events (22):  Endorses left-sided back pain. States \"It always bothers me\". Denies abdominal/chest pain, N/V    ROS:  10 systems reviewed and negative except as noted above. Assessment & Plan:   SIRS/ Hypotension on admission  - no evidence of sepsis  - BP has improved  - cont midodrine  - Empiric antibiotics discontinued  - Bcx remain negative     ESRD  - HD as per nephrology  -Plans to legate thigh graft on Monday     DM2 with nephropathy  - cont Nesina (januvia substitue)  - continue SSI  -CBG within acceptable range     hx of RCC  - s/p nephrectomy     confusion/falls at home  - MRI brain pending  - Check orthostatics  - PT/OT pending, PPD     Chronic pain  - Prn analgesia      Chronic hypoxic resp failure  - continue home oxygen - 2-3L       Discharge Planning:      Dispo pending     Diet:  ADULT DIET;  Regular; 4 carb choices (60 gm/meal)  DVT PPx: Heparin  Code status: FULL CODE        Objective:   Patient Vitals for the past 24 hrs:   Temp Pulse Resp BP SpO2   22 0727 98.6 °F (37 °C) 99 20 (!) 141/48 98 %   22 0352 97.5 °F (36.4 °C) (!) 114 18 116/60 90 % 08/05/22 0100 -- (!) 103 -- (!) 106/46 --   08/04/22 2303 97.5 °F (36.4 °C) (!) 101 18 (!) 97/36 92 %   08/04/22 1932 97.5 °F (36.4 °C) (!) 104 18 (!) 119/59 96 %   08/04/22 1537 98.4 °F (36.9 °C) 95 19 (!) 151/71 95 %   08/04/22 1115 97.5 °F (36.4 °C) 95 20 (!) 145/61 95 %   08/04/22 1026 -- 98 -- (!) 120/52 --         Estimated body mass index is 34.33 kg/m² as calculated from the following:    Height as of this encounter: 5' 4\" (1.626 m). Weight as of this encounter: 200 lb (90.7 kg). Intake/Output Summary (Last 24 hours) at 8/5/2022 1002  Last data filed at 8/4/2022 1026  Gross per 24 hour   Intake 600 ml   Output 2100 ml   Net -1500 ml         Physical Exam:     Blood pressure (!) 141/48, pulse 99, temperature 98.6 °F (37 °C), temperature source Oral, resp. rate 20, height 5' 4\" (1.626 m), weight 200 lb (90.7 kg), SpO2 98 %. General:    Well nourished. No overt distress  Head:  Normocephalic, atraumatic  Eyes:  Sclerae appear normal.  Pupils equally round. ENT:  Nares appear normal, no drainage. Moist oral mucosa  Neck:  No restricted ROM. Trachea midline   CV:   RRR. No m/r/g. No jugular venous distension. Lungs:   CTAB. No wheezing, rhonchi, or rales. Respirations even, unlabored  Abdomen: Bowel sounds present. Soft, nontender, nondistended. Extremities: No cyanosis or clubbing. No edema  Skin:     No rashes and normal coloration. Warm and dry. Neuro:  CN II-XII grossly intact. Sensation intact. A&Ox 1-2  Psych:  Normal mood and affect.       I have reviewed ordered lab tests and independently visualized imaging below:    Recent Labs:  Recent Results (from the past 48 hour(s))   POCT Glucose    Collection Time: 08/03/22  8:47 PM   Result Value Ref Range    POC Glucose 123 (H) 65 - 100 mg/dL    Performed by: Maria Elena    Lactic Acid    Collection Time: 08/03/22 11:45 PM   Result Value Ref Range    Lactic Acid, Plasma 2.4 (H) 0.4 - 2.0 MMOL/L   CBC with Auto Differential Collection Time: 08/03/22 11:45 PM   Result Value Ref Range    WBC 7.4 4.3 - 11.1 K/uL    RBC 3.11 (L) 4.05 - 5.2 M/uL    Hemoglobin 10.2 (L) 11.7 - 15.4 g/dL    Hematocrit 29.8 (L) 35.8 - 46.3 %    MCV 95.8 79.6 - 97.8 FL    MCH 32.8 26.1 - 32.9 PG    MCHC 34.2 31.4 - 35.0 g/dL    RDW 17.2 (H) 11.9 - 14.6 %    Platelets 89 (L) 372 - 450 K/uL    MPV 13.0 (H) 9.4 - 12.3 FL    nRBC 0.00 0.0 - 0.2 K/uL    Differential Type AUTOMATED      Seg Neutrophils 85 (H) 43 - 78 %    Lymphocytes 5 (L) 13 - 44 %    Monocytes 6 4.0 - 12.0 %    Eosinophils % 2 0.5 - 7.8 %    Basophils 2 0.0 - 2.0 %    Immature Granulocytes 1 0.0 - 5.0 %    Segs Absolute 6.3 1.7 - 8.2 K/UL    Absolute Lymph # 0.4 (L) 0.5 - 4.6 K/UL    Absolute Mono # 0.5 0.1 - 1.3 K/UL    Absolute Eos # 0.1 0.0 - 0.8 K/UL    Basophils Absolute 0.1 0.0 - 0.2 K/UL    Absolute Immature Granulocyte 0.1 0.0 - 0.5 K/UL   Basic Metabolic Panel w/ Reflex to MG    Collection Time: 08/04/22  4:03 AM   Result Value Ref Range    Sodium 138 136 - 145 mmol/L    Potassium 3.5 3.5 - 5.1 mmol/L    Chloride 101 98 - 107 mmol/L    CO2 25 21 - 32 mmol/L    Anion Gap 12 7 - 16 mmol/L    Glucose 128 (H) 65 - 100 mg/dL    BUN 24 (H) 8 - 23 MG/DL    Creatinine 4.70 (H) 0.6 - 1.0 MG/DL    GFR African American 12 (L) >60 ml/min/1.73m2    GFR Non- 10 (L) >60 ml/min/1.73m2    Calcium 8.2 (L) 8.3 - 10.4 MG/DL   CBC with Auto Differential    Collection Time: 08/04/22  4:03 AM   Result Value Ref Range    WBC 6.5 4.3 - 11.1 K/uL    RBC 2.93 (L) 4.05 - 5.2 M/uL    Hemoglobin 9.7 (L) 11.7 - 15.4 g/dL    Hematocrit 27.8 (L) 35.8 - 46.3 %    MCV 94.9 79.6 - 97.8 FL    MCH 33.1 (H) 26.1 - 32.9 PG    MCHC 34.9 31.4 - 35.0 g/dL    RDW 17.1 (H) 11.9 - 14.6 %    Platelets 87 (L) 924 - 450 K/uL    MPV 13.3 (H) 9.4 - 12.3 FL    nRBC 0.00 0.0 - 0.2 K/uL    Differential Type AUTOMATED      Seg Neutrophils 81 (H) 43 - 78 %    Lymphocytes 10 (L) 13 - 44 %    Monocytes 6 4.0 - 12.0 %    Eosinophils % 1 0.5 - 7.8 %    Basophils 2 0.0 - 2.0 %    Immature Granulocytes 1 0.0 - 5.0 %    Segs Absolute 5.3 1.7 - 8.2 K/UL    Absolute Lymph # 0.6 0.5 - 4.6 K/UL    Absolute Mono # 0.4 0.1 - 1.3 K/UL    Absolute Eos # 0.1 0.0 - 0.8 K/UL    Basophils Absolute 0.1 0.0 - 0.2 K/UL    Absolute Immature Granulocyte 0.0 0.0 - 0.5 K/UL   Procalcitonin    Collection Time: 08/04/22  4:03 AM   Result Value Ref Range    Procalcitonin 0.36 0.00 - 0.49 ng/mL   Magnesium    Collection Time: 08/04/22  4:03 AM   Result Value Ref Range    Magnesium 2.1 1.8 - 2.4 mg/dL   POCT Glucose    Collection Time: 08/04/22  6:19 AM   Result Value Ref Range    POC Glucose 126 (H) 65 - 100 mg/dL    Performed by: Maria Elena    POCT Glucose    Collection Time: 08/04/22 11:04 AM   Result Value Ref Range    POC Glucose 100 65 - 100 mg/dL    Performed by: [email or web address]    POCT Glucose    Collection Time: 08/04/22  3:38 PM   Result Value Ref Range    POC Glucose 127 (H) 65 - 100 mg/dL    Performed by: [employer]    POCT Glucose    Collection Time: 08/04/22  8:28 PM   Result Value Ref Range    POC Glucose 138 (H) 65 - 100 mg/dL    Performed by: Celeste    Comprehensive Metabolic Panel    Collection Time: 08/05/22  4:10 AM   Result Value Ref Range    Sodium 136 136 - 145 mmol/L    Potassium 3.9 3.5 - 5.1 mmol/L    Chloride 99 98 - 107 mmol/L    CO2 28 21 - 32 mmol/L    Anion Gap 9 7 - 16 mmol/L    Glucose 120 (H) 65 - 100 mg/dL    BUN 20 8 - 23 MG/DL    Creatinine 4.00 (H) 0.6 - 1.0 MG/DL    GFR African American 14 (L) >60 ml/min/1.73m2    GFR Non- 12 (L) >60 ml/min/1.73m2    Calcium 8.2 (L) 8.3 - 10.4 MG/DL    Total Bilirubin 2.2 (H) 0.2 - 1.1 MG/DL    ALT 21 12 - 65 U/L    AST 31 15 - 37 U/L    Alk Phosphatase 108 50 - 136 U/L    Total Protein 7.6 6.3 - 8.2 g/dL    Albumin 3.4 3.2 - 4.6 g/dL    Globulin 4.2 (H) 2.3 - 3.5 g/dL    Albumin/Globulin Ratio 0.8 (L) 1.2 - 3.5     CBC with Auto Differential    Collection Time: 08/05/22  4:10 AM   Result Value Ref Range    WBC 6.3 4.3 - 11.1 K/uL    RBC 3.04 (L) 4.05 - 5.2 M/uL    Hemoglobin 9.8 (L) 11.7 - 15.4 g/dL    Hematocrit 29.2 (L) 35.8 - 46.3 %    MCV 96.1 79.6 - 97.8 FL    MCH 32.2 26.1 - 32.9 PG    MCHC 33.6 31.4 - 35.0 g/dL    RDW 17.3 (H) 11.9 - 14.6 %    Platelets 85 (L) 703 - 450 K/uL    MPV 12.2 9.4 - 12.3 FL    nRBC 0.00 0.0 - 0.2 K/uL    Differential Type AUTOMATED      Seg Neutrophils 77 43 - 78 %    Lymphocytes 12 (L) 13 - 44 %    Monocytes 8 4.0 - 12.0 %    Eosinophils % 1 0.5 - 7.8 %    Basophils 1 0.0 - 2.0 %    Immature Granulocytes 1 0.0 - 5.0 %    Segs Absolute 4.8 1.7 - 8.2 K/UL    Absolute Lymph # 0.8 0.5 - 4.6 K/UL    Absolute Mono # 0.5 0.1 - 1.3 K/UL    Absolute Eos # 0.1 0.0 - 0.8 K/UL    Basophils Absolute 0.1 0.0 - 0.2 K/UL    Absolute Immature Granulocyte 0.0 0.0 - 0.5 K/UL   POCT Glucose    Collection Time: 08/05/22  6:25 AM   Result Value Ref Range    POC Glucose 122 (H) 65 - 100 mg/dL    Performed by: Celeste            Other Studies:    Current Meds:  Current Facility-Administered Medications   Medication Dose Route Frequency    traMADol (ULTRAM) tablet 50 mg  50 mg Oral Q6H PRN    LORazepam (ATIVAN) tablet 0.5 mg  0.5 mg Oral Q12H PRN    insulin lispro (HUMALOG) injection vial 0-8 Units  0-8 Units SubCUTAneous TID WC    insulin lispro (HUMALOG) injection vial 0-4 Units  0-4 Units SubCUTAneous Nightly    temazepam (RESTORIL) capsule 15 mg  15 mg Oral Nightly PRN    midodrine (PROAMATINE) tablet 10 mg  10 mg Oral TID WC    pantoprazole (PROTONIX) tablet 40 mg  40 mg Oral QAM AC    sevelamer (RENVELA) tablet 2,400 mg  2,400 mg Oral TID WC    alogliptin (NESINA) tablet 6.25 mg  6.25 mg Oral Daily    sodium chloride flush 0.9 % injection 5-40 mL  5-40 mL IntraVENous 2 times per day    sodium chloride flush 0.9 % injection 5-40 mL  5-40 mL IntraVENous PRN    0.9 % sodium chloride infusion   IntraVENous PRN    ondansetron (ZOFRAN-ODT) disintegrating tablet 4 mg  4 mg Oral Q8H PRN    Or    ondansetron (ZOFRAN) injection 4 mg  4 mg IntraVENous Q6H PRN    polyethylene glycol (GLYCOLAX) packet 17 g  17 g Oral Daily PRN    aluminum & magnesium hydroxide-simethicone (MAALOX) 200-200-20 MG/5ML suspension 30 mL  30 mL Oral Q6H PRN    acetaminophen (TYLENOL) tablet 650 mg  650 mg Oral Q6H PRN    Or    acetaminophen (TYLENOL) suppository 650 mg  650 mg Rectal Q6H PRN    heparin (porcine) injection 5,000 Units  5,000 Units SubCUTAneous 3 times per day    heparin (porcine) injection 5,000 Units  5,000 Units IntraVENous PRN    Epoetin Martin-epbx (RETACRIT) injection 20,000 Units  20,000 Units SubCUTAneous Q7 Days       Signed:  SUNSHINE oFnseca - CNP    Part of this note may have been written by using a voice dictation software. The note has been proof read but may still contain some grammatical/other typographical errors.

## 2022-08-05 NOTE — PROGRESS NOTES
PHYSICAL THERAPY Initial Assessment, Daily Note, and AM  (Link to Caseload Tracking: PT Visit Days : 1  Acknowledge Orders  Time In/Out  PT Charge Capture  Rehab Caseload Tracker    Samira Pino is a 80 y.o. female   PRIMARY DIAGNOSIS: SIRS (systemic inflammatory response syndrome) ([facility] [address]  End stage renal disease on dialysis ([facility] [address] [N18.6, Z99.2]  SIRS (systemic inflammatory response syndrome) (HCC) [R65.10]  Elevated lactic acid level [R79.89]  Hypotension, unspecified hypotension type [I95.9]       Reason for Referral: Generalized Muscle Weakness (M62.81)  Difficulty in walking, Not elsewhere classified (R26.2)  Other abnormalities of gait and mobility (R26.89)  Repeated Falls (R29.6)  History of falling (Z91.81)  Inpatient: Payor: Abhi Acosta / Plan: [employer] / Product Type: *No Product type* /     ASSESSMENT:     REHAB RECOMMENDATIONS:   Recommendation to date pending progress:  Setting:  Short-term Rehab    Equipment:    To Be Determined     ASSESSMENT:  Ms. Marion Frey is a 69year old F who presents to hospital with hypotension, back pain. Reports of several falls at home. ESRD of HD M/W/F. Upon entering pt lying flat, moaning, having difficulty breathing. Decreased responsiveness and alertness. Unable to answer orientation questions. Assisted Max Ax2 to sit EOB, O2 increased with RN notified and brought to bedside to assess pt condition. SpO2 found to be in the 60's. Increased O2 to 15L with RN at bedside; switched to humidified HFNC. Assistance to maintain seated balance EOB. Alertness and ability to participate in therapy then improved. Sit <> stand, pivot to bedside chair. Pt presents as functioning below her baseline, with deficits in mobility including transfers, gait, balance, and activity tolerance. Pt will benefit from skilled therapy services to address stated deficits to promote return to highest level of function, independence, and safety. Will continue to follow.      Brooke Dalal [facility] 6 Clicks Basic Mobility Inpatient Short Form       SUBJECTIVE:   Ms. Kristie Valverde states, \"I don't want to go to rehab; my nephew  there\"     Social/Functional Lives With: Daughter  Type of Home: House  Home Layout: Two level  Home Access: Stairs to enter with rails  Entrance Stairs - Number of Steps: none  and none to reach bedroom in home  Home Equipment: Oxygen, Walker, standard (home oxygen provided by [facility])  Receives Help From: Family  ADL Assistance: Independent  Ambulation Assistance: Needs assistance  Transfer Assistance: Independent  Active : No  Patient's  Info: grand daughter  Mode of Transportation: Car  Occupation: Retired  Lives with daughter and gets assistance with LB ADLs, uses RW for ambulation and reports toilets herself. Uses 3.5L NC at home . No driving and not able to help with IADLs per pt report. OBJECTIVE:     PAIN: VITALS / O2: PRECAUTION / Epi Im / DRAINS:   Pre Treatment:   Pain Assessment: None - Denies Pain      Post Treatment: 0/10 Vitals        Oxygen   High flow nasal cannula. 6L O2   None    RESTRICTIONS/PRECAUTIONS:  Restrictions/Precautions: Fall Risk                 GROSS EVALUATION: Intact Impaired (Comments):   AROM [x]     PROM [x]    Strength []  Generalized weakness   Balance [] Sitting - Static: Fair  Sitting - Dynamic: Fair, -  Standing - Static: Fair, -  Standing - Dynamic: Fair, -   Posture [] Forward Head  Rounded Shoulders   Sensation []     Coordination [x]      Tone []     Edema []    Activity Tolerance []  Limited; fatigues quickly;  Requiring increased supplemental O2    []      COGNITION/  PERCEPTION: Intact Impaired (Comments):   Orientation []  Initially AMS, could not even say her name, but with O2 levels better after several minutes, more oriented   Vision [x]     Hearing [x]     Cognition  []       MOBILITY: I Mod I S SBA CGA Min Mod Max Total  NT x2 Comments:   Bed Mobility    Rolling [] [] [] [] [] [] [] [x] [] [] [x]    Supine to Sit [] [] [] [] [] [] [] [x] [] [] [x]    Scooting [] [] [] [] [] [] [x] [] [] [] [x]    Sit to Supine [] [] [] [] [] [] [] [] [] [] []    Transfers    Sit to Stand [] [] [] [] [] [] [x] [] [] [] [x]    Bed to Chair [] [] [] [] [] [] [x] [] [] [] [x]    Stand to Sit [] [] [] [] [] [] [x] [] [] [] [x]     [] [] [] [] [] [] [] [] [] [] []    I=Independent, Mod I=Modified Independent, S=Supervision, SBA=Standby Assistance, CGA=Contact Guard Assistance,   Min=Minimal Assistance, Mod=Moderate Assistance, Max=Maximal Assistance, Total=Total Assistance, NT=Not Tested    GAIT: I Mod I S SBA CGA Min Mod Max Total  NT x2 Comments:   Level of Assistance [] [] [] [] [] [] [] [] [] [x] []    Distance   feet    DME N/A    Gait Quality N/A    Weightbearing Status      Stairs      I=Independent, Mod I=Modified Independent, S=Supervision, SBA=Standby Assistance, CGA=Contact Guard Assistance,   Min=Minimal Assistance, Mod=Moderate Assistance, Max=Maximal Assistance, Total=Total Assistance, NT=Not Tested    PLAN:   ACUTE PHYSICAL THERAPY GOALS:   (Developed with and agreed upon by patient and/or caregiver.)  (1.) Isabel Morris will move from supine to sit and sit to supine , scoot up and down and roll side to side with MODIFIED INDEPENDENCE within 7 treatment day(s). (2.) Rosa Pires will transfer from bed to chair and chair to bed with STAND BY ASSIST using the least restrictive device within 7 treatment day(s). (3.) Rosa Pires will ambulate with STAND BY ASSIST for 200 feet with the least restrictive device within 7 treatment day(s). (4.) Rosa Pires will perform standing static and dynamic balance activities x 25 minutes with STAND BY ASSIST to improve safety within 7 treatment day(s). (5.) Rosa Pires will perform therapeutic exercises x 20 min for HEP with INDEPENDENCE to improve strength, endurance, and functional mobility within 7 treatment day(s).        FREQUENCY AND DURATION: 3 times/week for duration of hospital stay or until stated goals are met, whichever comes first.    THERAPY PROGNOSIS: Good    PROBLEM LIST:   (Skilled intervention is medically necessary to address:)  Decreased Activity Tolerance  Decreased Balance  Decreased Coordination  Decreased Gait Ability  Decreased Safety Awareness  Decreased Strength  Decreased Transfer Abilities INTERVENTIONS PLANNED:   (Benefits and precautions of physical therapy have been discussed with the patient.)  Therapeutic Activity  Therapeutic Exercise/HEP  Neuromuscular Re-education  Gait Training  Education       TREATMENT:   EVALUATION: MODERATE COMPLEXITY: (Untimed Charge)    TREATMENT:   Co-Treatment PT/OT necessary due to patient's decreased overall endurance/tolerance levels, as well as need for high level skilled assistance to complete functional transfers/mobility and functional tasks  Therapeutic Activity (38 Minutes): Therapeutic activity included Supine to Sit, Scooting, Lateral Scooting, Transfer Training, Sitting balance , and Standing balance to improve functional Activity tolerance, Balance, Coordination, Mobility, and Strength. TREATMENT GRID:  N/A    AFTER TREATMENT PRECAUTIONS: Alarm Activated, Bed/Chair Locked, Call light within reach, Chair, Needs within reach, and RN notified    INTERDISCIPLINARY COLLABORATION:  RN/ PCT, PT/ PTA, and OT/ AMADOR    EDUCATION: Education Given To: Patient  Education Provided: Role of Therapy;Plan of Care;Precautions;Transfer Training; Fall Prevention Strategies  Education Outcome: Verbalized understanding;Continued education needed    TIME IN/OUT:  Time In: 0932  Time Out: [place]  Minutes: 0490 [place], [place]

## 2022-08-05 NOTE — CONSULTS
[address] FAX: [phone]    Treva Pires  1950    Chief Complaint   Patient presents with    Hypotension    Extremity Weakness           HPI   Ms. Ashely Rodgers is a 80y.o. year old female who presents with concerns of worsening heart failure. Patient with a patent left thigh graft.     Current Facility-Administered Medications   Medication Dose Route Frequency Provider Last Rate Last Admin    traMADol (ULTRAM) tablet 50 mg  50 mg Oral Q6H PRN Carl Marin, APRN - CNP        LORazepam (ATIVAN) tablet 0.5 mg  0.5 mg Oral Q12H PRN Carl Marin, APRN - CNP        insulin lispro (HUMALOG) injection vial 0-8 Units  0-8 Units SubCUTAneous TID  Lima Thurston DO        insulin lispro (HUMALOG) injection vial 0-4 Units  0-4 Units SubCUTAneous Nightly Lima Thurston DO        temazepam (RESTORIL) capsule 15 mg  15 mg Oral Nightly PRN Nay Ireland MD   15 mg at 08/04/22 2250    midodrine (PROAMATINE) tablet 10 mg  10 mg Oral TID  Anton Sabillon MD   10 mg at 08/05/22 1238    pantoprazole (PROTONIX) tablet 40 mg  40 mg Oral QAM  Anton Sabillon MD   40 mg at 08/04/22 0549    sevelamer (RENVELA) tablet 2,400 mg  2,400 mg Oral TID  Anton Sabillon MD   2,400 mg at 08/05/22 1238    alogliptin (NESINA) tablet 6.25 mg  6.25 mg Oral Daily Anton Sabillon MD   6.25 mg at 08/05/22 0847    sodium chloride flush 0.9 % injection 5-40 mL  5-40 mL IntraVENous 2 times per day Anton Sabillon MD   5 mL at 08/05/22 0844    sodium chloride flush 0.9 % injection 5-40 mL  5-40 mL IntraVENous PRN Anton Sabillon MD        0.9 % sodium chloride infusion   IntraVENous PRN Anton Sabillon MD        ondansetron (ZOFRAN-ODT) disintegrating tablet 4 mg  4 mg Oral Q8H PRN Anton Sabillon MD        Or    ondansetron [facility]) injection 4 mg  4 mg IntraVENous Q6H PRN Anton Sabillon MD        polyethylene glycol (GLYCOLAX) packet 17 g  17 g Oral Daily PRN Shoaib Khoury Cora Lee MD        aluminum & magnesium hydroxide-simethicone (MAALOX) 200-200-20 MG/5ML suspension 30 mL  30 mL Oral Q6H PRN Lauren Arias MD        acetaminophen (TYLENOL) tablet 650 mg  650 mg Oral Q6H PRN Lauren Arias MD   650 mg at 08/04/22 4145    Or    acetaminophen (TYLENOL) suppository 650 mg  650 mg Rectal Q6H PRN Lauren Arias MD        heparin (porcine) injection 5,000 Units  5,000 Units SubCUTAneous 3 times per day Lauren Arias MD   5,000 Units at 08/04/22 0549    heparin (porcine) injection 5,000 Units  5,000 Units IntraVENous PRN Albina Ortiz MD   5,000 Units at 08/03/22 1131    Epoetin Martin-epbx (RETACRIT) injection 20,000 Units  20,000 Units SubCUTAneous Q7 Days Albina Ortiz MD   20,000 Units at 08/03/22 1631     Allergies   Allergen Reactions    Penicillins Other (See Comments) and Rash     PT STATES UNSURE OF RX  Tolerated cefazolin    Vancomycin Rash     Past Medical History:   Diagnosis Date    Anemia     Arthritis     AVF (arteriovenous fistula) ([facility]     left    AVF (arteriovenous fistula) ([facility] 12/20/2016 12/6/16 (S) Right AVF revision and thrombectomy    Degenerative joint disease     Diabetes ([facility]     type 2--- does not check sqbs at home    Enlarged lymph node     \"enlarging paratracheal node to 2.6 cm on CT chest\"    ESRD on hemodialysis ([facility] onset 2006    ESRD.  MWF dialysis at [place] [facility]    Hypertension     controlled with med    Mediastinal mass     being followed by dr ramírez--per pt-- states told by dr Olman Moore it wasnt cancer-- but plans to be rescanned 5/2022    Obesity     On home O2     2L QHS and PRN during day d/t \"lymph node in chest\"    Renal cancer ([facility]     Dr Ron Silva - renal cancer L nephrectomy---and radiation ---    Shortness of breath     swollen lymphnode in chest-- oxygen 2LPM    Transient ischemic attack 08/29/2015    no residual     Family History   Problem Relation Age of Onset    Diabetes Mother     Heart Disease Mother     Hypertension Mother Heart Disease Father     Hypertension Father     Post-op Cognitive Dysfunction Neg Hx     Pseudochol. Deficiency Neg Hx     Delayed Awakening Neg Hx     Post-op Nausea/Vomiting Neg Hx     Emergence Delirium Neg Hx     Other Neg Hx     Malig Hypertherm Neg Hx      Past Surgical History:   Procedure Laterality Date    BREAST SURGERY Right     cyst removed    COLONOSCOPY N/A 11/8/2018    COLONOSCOPY  BMI 36 performed by Aidee Owen MD at [facility] ENDOSCOPY    CT BIOPSY ABDOMEN RETROPERITONEUM  9/14/2017    CT BIOPSY ABDOMEN RETROPERITONEUM 9/14/2017 [facility] RADIOLOGY CT SCAN    GI  08/06/2018    exploratory laparotomy    GI  06/01/2002    colon resection resulting in temporary colostomy reversal    GROIN SURGERY Left 6/26/2022    LEFT GROIN DEBRIDEMENT OF HEMATOMA / MIGEL performed by Godfrey Contreras MD at [facility]      mihir    IR INTRO [address] PTA  3/10/2020    IR INTRO CATH DIALYSIS CIRCUIT W [place] PTA  12/5/2019    IR INTRO CATH DIALYSIS CIRCUIT W [place] PTA  9/3/2019    IR INTRO CATH DIALYSIS CIRCUIT W [place] PTA  5/30/2019    IR INTRO CATH DIALYSIS CIRCUIT W [place] PTA  2/28/2019    IR THRMB/INFUSION DIAYSIS CIRCUIT Left 2021    IR TUNNELED CATHETER PLACEMENT GREATER THAN 5 YEARS  3/15/2022    IR TUNNELED CATHETER PLACEMENT GREATER THAN 5 YEARS  11/19/2021    IR TUNNELED CATHETER PLACEMENT GREATER THAN 5 YEARS  11/19/2021    IR TUNNELED CATHETER PLACEMENT GREATER THAN 5 YEARS 11/19/2021 [facility] SPECIALS    IR TUNNELED CATHETER PLACEMENT GREATER THAN 5 YEARS  3/15/2022    IR TUNNELED CATHETER PLACEMENT GREATER THAN 5 YEARS 3/15/2022 [facility] SPECIALS    OTHER SURGICAL HISTORY      dialysis fistula, several permcaths    UROLOGICAL SURGERY Left July 2015    nephrectomy    VASCULAR SURGERY Left 04/07/2022    LEFT ARM AV GRAFT    VASCULAR SURGERY Left 03/15/2022    declot    VASCULAR SURGERY Left 02/21/2022    Open thromboembolectomy of left upper arm graft.     VASCULAR SURGERY Right     AV graft- states no longer uses    VASCULAR SURGERY Left 5/26/2022    LEFT AV GRAFT THIGH performed by Mitchell Osborn MD at [facility] MAIN OR     Social History     Tobacco Use    Smoking status: Never    Smokeless tobacco: Never   Substance Use Topics    Alcohol use: No        Review of Systems  Constitutional: Negative for fever and chills. HENT: Negative for congestion and sore throat. Skin: Negative for rash and itching. Eyes: Negative for blurred vision and double vision. Respiratory: Negative for cough and shortness of breath. Cardiovascular: Negative for chest pain, palpitations, claudication and leg swelling. Gastrointestinal: Negative for nausea, vomiting and abdominal pain. Genitourinary: Negative for dysuria, hematuria and flank pain. Musculoskeletal: Negative for joint pain and falls. Neurological: Negative for dizziness, sensory change, focal weakness, loss of consciousness and headaches. PHYSICAL EXAM   [unfilled]     Constitutional: she appears well-developed. No distress. HENT: Hearing intact. Head: Atraumatic. Eyes: Pupils are equal, round, and reactive to light. Neck: Normal range of motion. Cardiovascular: Regular rhythm. Pulmonary/Chest: Effort normal and breath sounds normal. No respiratory distress. Abdominal: Soft. Bowel sounds are normal. she exhibits no distension. There is no tenderness. There is no guarding. No hernia. Musculoskeletal: Normal range of motion. Neurological: She is alert. CN II- XII grossly intact  Skin: Warm and dry. Vascular: Thrill and bruit left thigh graft      Imaging Results      ASSESSMENT AND PLAN   Ms. Brandt Alvarez is a 80y.o. year old female end-stage renal disease with presumed worsening heart failure from high output left thigh graft. We will plan on Monday to ligate the graft. Patient would need to be n.p.o. after midnight.     Elements of this note have been dictated using speech recognition software. As a result, errors of speech recognition may have occurred. 50 minutes of time was spent on this consult with greater than 50% of time spent face-to-face with the patient discussing the disease process, education and treatment options.

## 2022-08-06 NOTE — PROGRESS NOTES
Hemodialysis Rounding Note    Subjective: Ashely Rodgers is a 80 y.o.  who presents with End stage renal disease on dialysis ([facility] [N18.6, Z99.2]  SIRS (systemic inflammatory response syndrome) (HCC) [R65.10]  Elevated lactic acid level [R79.89]  Hypotension, unspecified hypotension type [I95.9]. The patient is dialyzing utilizing the following method:Intermittent Hemodialysis  Artificial Kidney Dialysis Machine No.: [id number]   hours Dialyze Hours: 4   blood flow rate Blood Flow Rate (ml/min): 350 ml/min   dialysate rate    ultrafiltration Ultrafiltration Rate (ml/hr): 900 ml/hr   Heparin is  used during the dialysis treatment. Complaints  drowsy, oriented to self only, no signs of distress, on 2L NC, sat 95% . FSBS 131 this AM. More alert after giving orange juice.     Current Facility-Administered Medications   Medication Dose Route Frequency    traMADol (ULTRAM) tablet 50 mg  50 mg Oral Q6H PRN    LORazepam (ATIVAN) tablet 0.5 mg  0.5 mg Oral Q12H PRN    insulin lispro (HUMALOG) injection vial 0-8 Units  0-8 Units SubCUTAneous TID WC    insulin lispro (HUMALOG) injection vial 0-4 Units  0-4 Units SubCUTAneous Nightly    temazepam (RESTORIL) capsule 15 mg  15 mg Oral Nightly PRN    midodrine (PROAMATINE) tablet 10 mg  10 mg Oral TID WC    pantoprazole (PROTONIX) tablet 40 mg  40 mg Oral QAM AC    sevelamer (RENVELA) tablet 2,400 mg  2,400 mg Oral TID WC    alogliptin (NESINA) tablet 6.25 mg  6.25 mg Oral Daily    sodium chloride flush 0.9 % injection 5-40 mL  5-40 mL IntraVENous 2 times per day    sodium chloride flush 0.9 % injection 5-40 mL  5-40 mL IntraVENous PRN    0.9 % sodium chloride infusion   IntraVENous PRN    ondansetron (ZOFRAN-ODT) disintegrating tablet 4 mg  4 mg Oral Q8H PRN    Or    ondansetron (ZOFRAN) injection 4 mg  4 mg IntraVENous Q6H PRN    polyethylene glycol (GLYCOLAX) packet 17 g  17 g Oral Daily PRN    aluminum & magnesium hydroxide-simethicone (MAALOX) 194-018-20 MG/5ML suspension 30 mL  30 mL Oral Q6H PRN    acetaminophen (TYLENOL) tablet 650 mg  650 mg Oral Q6H PRN    Or    acetaminophen (TYLENOL) suppository 650 mg  650 mg Rectal Q6H PRN    heparin (porcine) injection 5,000 Units  5,000 Units SubCUTAneous 3 times per day    heparin (porcine) injection 5,000 Units  5,000 Units IntraVENous PRN    Epoetin Martin-epbx (RETACRIT) injection 20,000 Units  20,000 Units SubCUTAneous Q7 Days       No intake/output data recorded.  1901 -  0700  In: 100 [P.O.:100]  Out: -     Recent Results (from the past 24 hour(s))   POCT Glucose    Collection Time: 22 11:01 AM   Result Value Ref Range    POC Glucose 128 (H) 65 - 100 mg/dL    Performed by: [email or web address]    POCT Glucose    Collection Time: 22  4:50 PM   Result Value Ref Range    POC Glucose 136 (H) 65 - 100 mg/dL    Performed by: [email or web address]    POCT Glucose    Collection Time: 22  8:57 PM   Result Value Ref Range    POC Glucose 140 (H) 65 - 100 mg/dL    Performed by: Alex(PCT)SNE    POCT Glucose    Collection Time: 22  6:25 AM   Result Value Ref Range    POC Glucose 131 (H) 65 - 100 mg/dL    Performed by: Alex(PCT)SNE              Objective:     Blood pressure (!) 123/31, pulse (!) 101, temperature 97.5 °F (36.4 °C), temperature source Axillary, resp. rate 18, height 5' 4\" (1.626 m), weight 200 lb (90.7 kg), SpO2 96 %. Temp (24hrs), Av.9 °F (36.6 °C), Min:97.5 °F (36.4 °C), Max:98.5 °F (36.9 °C)      Physical Exam:   Lungs: CTAB  Heart: S1S2, no m/r/g  Abdomen: soft, +BS  Extremities: no edema      Assessment:     End Stage Renal Disease:  Patient is tolerating dialysis treatment well. .  Additionally the patient has experienced normal dialysis treatment during dialysis. Dry weight   same. Anemia:    Recent Labs     22  0410   HCT 29.2*   HGB 9.8*       Renal Metabolic Bone Disease:  No results for input(s): CA, ALB, PTH in the last 72 hours.     Invalid input(s): PO4                     Treatment:  PO4 binders, Cinacaleat, Vitamin D  Hypertension: controlled    Access: adequate monitoring/no changes    Principal Problem:    SIRS (systemic inflammatory response syndrome) (HCC)  Active Problems:    Hypotension    Renal cell carcinoma (HCC)    ESRD (end stage renal disease) on dialysis (HCC)    Chronic respiratory failure with hypoxia (HCC)    Type 2 diabetes mellitus with nephropathy ([facility]  Resolved Problems:    * No resolved hospital problems.  *         Plan:     Continue scheduled dialysis    ARMIDA Carr

## 2022-08-06 NOTE — PROGRESS NOTES
Hospitalist Progress Note   Admit Date:  2022 10:03 PM   Name:  Pamela Pires   Age:  80 y.o. Sex:  female  :  1950   MRN:  [id number]   Room:  [facility]/    Presenting Complaint: Hypotension and Extremity Weakness     Reason(s) for Admission: End stage renal disease on dialysis ([facility] [N18.6, Z99.2]  SIRS (systemic inflammatory response syndrome) (HCC) [R65.10]  Elevated lactic acid level [R79.89]  Hypotension, unspecified hypotension type [I95.9]     Hospital Course & Interval History:   Isabel Morris is a 50-year-old female with a PMH of ESRD on HD who presents with hypotension and L-sided mid back pain. Her back pain is chronic since nephrectomy. Labs were normal except elevated lactic. She was given gentle IVF for hypotension. No fever or tachycardia. The patient was admitted to the Hospitalist service with Nephrology consulted. Vascular surgery was consulted to ligate the patient's L thigh AV graft as it may be causing hypotension. Subjective/24hr Events (22): No AEO. The patient had HD today with 1.9 kg removed. MRI pending. Cultures remain negative. The patient will need to be NPO tomorrow night. Assessment & Plan:     SIRS/ Hypotension on admission  - no evidence of sepsis  - Possibly due to L thigh AV graft  - Vascular surgery consulted  - cont midodrine  - Empiric antibiotics discontinued  - Bcx negative x 3 days     ESRD  - HD as per nephrology     DM2 with nephropathy  - cont Nesina (Januvia substitute)  - continue SSI  - Monitor CBG      hx of RCC  - s/p nephrectomy     confusion/falls at home  - MRI brain pending  - Check orthostatics  - PT/OT recommend STR     Chronic pain  - PRN analgesia      Chronic hypoxic resp failure  - continue home oxygen 2-3 Lpm      Discharge Planning: > 4 midnights      Diet:  ADULT DIET;  Regular; 4 carb choices (60 gm/meal)  Diet NPO  DVT PPx: heparin  Code status: Full Code    Hospital Problems:  Principal Problem:    SIRS (systemic inflammatory response syndrome) (HCC)  Active Problems:    Hypotension    Renal cell carcinoma (HCC)    ESRD (end stage renal disease) on dialysis (HCC)    Chronic respiratory failure with hypoxia (HCC)    Type 2 diabetes mellitus with nephropathy ([facility]  Resolved Problems:    * No resolved hospital problems. *      Objective:   Patient Vitals for the past 24 hrs:   Temp Pulse Resp BP SpO2   08/06/22 1012 -- 51 -- (!) 146/107 --   08/06/22 0933 -- (!) 103 -- (!) 133/58 --   08/06/22 0903 -- (!) 101 -- (!) 123/31 --   08/06/22 0831 -- 100 -- (!) 100/53 --   08/06/22 0803 -- 100 -- (!) 97/46 --   08/06/22 0702 -- 97 -- 134/61 --   08/06/22 0343 97.5 °F (36.4 °C) 99 18 121/61 96 %   08/05/22 2356 -- -- -- (!) 138/56 --   08/05/22 2354 98.5 °F (36.9 °C) 98 20 (!) 112/56 99 %   08/05/22 1939 97.7 °F (36.5 °C) 100 19 91/65 100 %   08/05/22 1650 97.9 °F (36.6 °C) 97 17 (!) 139/47 97 %       Oxygen Therapy  SpO2: 96 %  Pulse via Oximetry: 99 beats per minute  Pulse Oximeter Device Mode: Intermittent  O2 Device: Nasal cannula  Skin Assessment: Clean, dry, & intact  Skin Protection for O2 Device: No  FiO2 : 28 %  O2 Flow Rate (L/min): 2 L/min    Estimated body mass index is 34.33 kg/m² as calculated from the following:    Height as of this encounter: 5' 4\" (1.626 m). Weight as of this encounter: 200 lb (90.7 kg). Intake/Output Summary (Last 24 hours) at 8/6/2022 1126  Last data filed at 8/6/2022 1012  Gross per 24 hour   Intake 700 ml   Output 2434 ml   Net -1734 ml         Physical Exam:   Blood pressure (!) 146/107, pulse 51, temperature 97.5 °F (36.4 °C), temperature source Axillary, resp. rate 18, height 5' 4\" (1.626 m), weight 200 lb (90.7 kg), SpO2 96 %. General:    NAD   Head:  Normocephalic, atraumatic  Eyes:  Sclerae appear normal.  Pupils equally round. ENT:  Nares appear normal, no drainage. Moist oral mucosa  Neck:  No restricted ROM. Trachea midline   CV:   RRR. No m/r/g.    Lungs:   No wheezing. Symmetric expansion. On 3 L NC. Abdomen:   Soft, nondistended. Extremities: No cyanosis or clubbing. Skin:     No rashes and normal coloration. Warm and dry. Neuro:  CN II-XII grossly intact. A&Ox3  Psych:  Normal mood and affect.       I have personally reviewed labs and tests showing:  Recent Labs:  Recent Results (from the past 48 hour(s))   POCT Glucose    Collection Time: 08/04/22  3:38 PM   Result Value Ref Range    POC Glucose 127 (H) 65 - 100 mg/dL    Performed by: Stephen    POCT Glucose    Collection Time: 08/04/22  8:28 PM   Result Value Ref Range    POC Glucose 138 (H) 65 - 100 mg/dL    Performed by: Celeste    Comprehensive Metabolic Panel    Collection Time: 08/05/22  4:10 AM   Result Value Ref Range    Sodium 136 136 - 145 mmol/L    Potassium 3.9 3.5 - 5.1 mmol/L    Chloride 99 98 - 107 mmol/L    CO2 28 21 - 32 mmol/L    Anion Gap 9 7 - 16 mmol/L    Glucose 120 (H) 65 - 100 mg/dL    BUN 20 8 - 23 MG/DL    Creatinine 4.00 (H) 0.6 - 1.0 MG/DL    GFR African American 14 (L) >60 ml/min/1.73m2    GFR Non- 12 (L) >60 ml/min/1.73m2    Calcium 8.2 (L) 8.3 - 10.4 MG/DL    Total Bilirubin 2.2 (H) 0.2 - 1.1 MG/DL    ALT 21 12 - 65 U/L    AST 31 15 - 37 U/L    Alk Phosphatase 108 50 - 136 U/L    Total Protein 7.6 6.3 - 8.2 g/dL    Albumin 3.4 3.2 - 4.6 g/dL    Globulin 4.2 (H) 2.3 - 3.5 g/dL    Albumin/Globulin Ratio 0.8 (L) 1.2 - 3.5     CBC with Auto Differential    Collection Time: 08/05/22  4:10 AM   Result Value Ref Range    WBC 6.3 4.3 - 11.1 K/uL    RBC 3.04 (L) 4.05 - 5.2 M/uL    Hemoglobin 9.8 (L) 11.7 - 15.4 g/dL    Hematocrit 29.2 (L) 35.8 - 46.3 %    MCV 96.1 79.6 - 97.8 FL    MCH 32.2 26.1 - 32.9 PG    MCHC 33.6 31.4 - 35.0 g/dL    RDW 17.3 (H) 11.9 - 14.6 %    Platelets 85 (L) 512 - 450 K/uL    MPV 12.2 9.4 - 12.3 FL    nRBC 0.00 0.0 - 0.2 K/uL    Differential Type AUTOMATED      Seg Neutrophils 77 43 - 78 %    Lymphocytes 12 (L) 13 - 44 % Monocytes 8 4.0 - 12.0 %    Eosinophils % 1 0.5 - 7.8 %    Basophils 1 0.0 - 2.0 %    Immature Granulocytes 1 0.0 - 5.0 %    Segs Absolute 4.8 1.7 - 8.2 K/UL    Absolute Lymph # 0.8 0.5 - 4.6 K/UL    Absolute Mono # 0.5 0.1 - 1.3 K/UL    Absolute Eos # 0.1 0.0 - 0.8 K/UL    Basophils Absolute 0.1 0.0 - 0.2 K/UL    Absolute Immature Granulocyte 0.0 0.0 - 0.5 K/UL   POCT Glucose    Collection Time: 08/05/22  6:25 AM   Result Value Ref Range    POC Glucose 122 (H) 65 - 100 mg/dL    Performed by: Celeste    POCT Glucose    Collection Time: 08/05/22 11:01 AM   Result Value Ref Range    POC Glucose 128 (H) 65 - 100 mg/dL    Performed by: [employer]    POCT Glucose    Collection Time: 08/05/22  4:50 PM   Result Value Ref Range    POC Glucose 136 (H) 65 - 100 mg/dL    Performed by: [employer]    POCT Glucose    Collection Time: 08/05/22  8:57 PM   Result Value Ref Range    POC Glucose 140 (H) 65 - 100 mg/dL    Performed by: Alex([facility])GRETEL    POCT Glucose    Collection Time: 08/06/22  6:25 AM   Result Value Ref Range    POC Glucose 131 (H) 65 - 100 mg/dL    Performed by: Alex(PCT)GRETEL        I have personally reviewed imaging studies showing: Other Studies:  XR CHEST PORTABLE   Final Result   Findings suggest CHF.          MRI BRAIN WO CONTRAST    (Results Pending)       Current Meds:  Current Facility-Administered Medications   Medication Dose Route Frequency    traMADol (ULTRAM) tablet 50 mg  50 mg Oral Q6H PRN    LORazepam (ATIVAN) tablet 0.5 mg  0.5 mg Oral Q12H PRN    insulin lispro (HUMALOG) injection vial 0-8 Units  0-8 Units SubCUTAneous TID WC    insulin lispro (HUMALOG) injection vial 0-4 Units  0-4 Units SubCUTAneous Nightly    temazepam (RESTORIL) capsule 15 mg  15 mg Oral Nightly PRN    midodrine (PROAMATINE) tablet 10 mg  10 mg Oral TID WC    pantoprazole (PROTONIX) tablet 40 mg  40 mg Oral QAM AC    sevelamer (RENVELA) tablet 2,400 mg  2,400 mg Oral TID WC    alogliptin (NESINA) tablet 6.25 mg  6.25 mg Oral Daily    sodium chloride flush 0.9 % injection 5-40 mL  5-40 mL IntraVENous 2 times per day    sodium chloride flush 0.9 % injection 5-40 mL  5-40 mL IntraVENous PRN    0.9 % sodium chloride infusion   IntraVENous PRN    ondansetron (ZOFRAN-ODT) disintegrating tablet 4 mg  4 mg Oral Q8H PRN    Or    ondansetron (ZOFRAN) injection 4 mg  4 mg IntraVENous Q6H PRN    polyethylene glycol (GLYCOLAX) packet 17 g  17 g Oral Daily PRN    aluminum & magnesium hydroxide-simethicone (MAALOX) 200-200-20 MG/5ML suspension 30 mL  30 mL Oral Q6H PRN    acetaminophen (TYLENOL) tablet 650 mg  650 mg Oral Q6H PRN    Or    acetaminophen (TYLENOL) suppository 650 mg  650 mg Rectal Q6H PRN    heparin (porcine) injection 5,000 Units  5,000 Units SubCUTAneous 3 times per day    heparin (porcine) injection 5,000 Units  5,000 Units IntraVENous PRN    Epoetin Martin-epbx (RETACRIT) injection 20,000 Units  20,000 Units SubCUTAneous Q7 Days       Signed:  Lora Mancini, APRN - CNP    Part of this note may have been written by using a voice dictation software. The note has been proof read but may still contain some grammatical/other typographical errors.

## 2022-08-06 NOTE — DIALYSIS
TRANSFER IN - DIALYSIS    Received patient in dialysis unit  from [facility] (unit) for ordered procedure. Consent verified for renal replacement therapy. Procedure explained to patient, opportunity for Q&A provided. Call light given. Patient alert and vital signs stable. /61,  P97  , 2L O2 via NC. Hemodialysis initiated using right chest catheter. Aspirated and flushed both ports without difficulty. Dressing clean, dry and intact. Machine settings per MD order. Heparin 1000 unit bolus and 500 units/hr. Will monitor during treatment.

## 2022-08-06 NOTE — PLAN OF CARE
Problem: Discharge Planning  Goal: Discharge to home or other facility with appropriate resources  Outcome: Progressing  Flowsheets (Taken 8/5/2022 1943)  Discharge to home or other facility with appropriate resources:   Identify barriers to discharge with patient and caregiver   Arrange for needed discharge resources and transportation as appropriate   Identify discharge learning needs (meds, wound care, etc)

## 2022-08-06 NOTE — DIALYSIS
TRANSFER OUT- DIALYSIS    Hemodialysis treatment completed. PT ran 3 hours, without complications. Patient alert and VS stable  /107  P 51       1.9 Kg removed. Flushed both ports with 10 mL of NS.  CVC dressing clean, dry, and intact, tego caps intact, curos caps placed. Meds given: Midodrine. RBCs given during dialysis: NO    Patient to 701 after dialysis.

## 2022-08-07 NOTE — PROGRESS NOTES
Hospitalist Progress Note   Admit Date:  2022 10:03 PM   Name:  Vesna Pires   Age:  80 y.o. Sex:  female  :  1950   MRN:  [id number]   Room:  [facility]/    Presenting Complaint: Hypotension and Extremity Weakness     Reason(s) for Admission: End stage renal disease on dialysis ([facility] [address] [N18.6, Z99.2]  SIRS (systemic inflammatory response syndrome) (HCC) [R65.10]  Elevated lactic acid level [R79.89]  Hypotension, unspecified hypotension type [I95.9]     Hospital Course & Interval History:   Vannesa Desai is a 20-year-old female with a PMH of ESRD on HD who presents with hypotension and L-sided mid back pain. Her back pain is chronic since nephrectomy. Labs were normal except elevated lactic. She was given gentle IVF for hypotension. No fever or tachycardia. The patient was admitted to the Hospitalist service with Nephrology consulted. Vascular surgery was consulted to ligate the patient's L thigh AV graft as it may be causing hypotension. Subjective/24hr Events (22): No AEO. Planning for HD tomorrow. MRI pending. Cultures remain negative. NPO past midnight for vascular procedure tomorrow. Assessment & Plan:     SIRS / Hypotension on admission  - no evidence of sepsis  - Possibly due to L thigh AV graft  - Vascular surgery consulted  - cont midodrine  - Empiric antibiotics discontinued  - Bcx negative x 4 days     ESRD  - HD as per nephrology     DM2 with nephropathy  - cont Nesina (Januvia substitute)  - continue SSI  - Monitor CBG      hx of RCC  - s/p nephrectomy     confusion/falls at home  - MRI brain pending  - Check orthostatics  - PT/OT recommend STR     Chronic pain  - PRN analgesia      Chronic hypoxic resp failure  - continue home oxygen 2-3 Lpm      Discharge Planning: > 3 midnights      Diet:  Diet NPO  ADULT DIET;  Regular; 4 carb choices (60 gm/meal)  DVT PPx: heparin  Code status: Full Code    Hospital Problems:  Principal Problem:    SIRS (systemic inflammatory response syndrome) (HCC)  Active Problems:    Hypotension    Renal cell carcinoma (HCC)    ESRD (end stage renal disease) on dialysis ([facility]    Chronic respiratory failure with hypoxia (HCC)    Type 2 diabetes mellitus with nephropathy ([facility]  Resolved Problems:    * No resolved hospital problems. *      Objective:   Patient Vitals for the past 24 hrs:   Temp Pulse Resp BP SpO2   08/07/22 0816 98.6 °F (37 °C) (!) 106 16 (!) 132/25 100 %   08/07/22 0255 99.1 °F (37.3 °C) (!) 105 18 105/61 91 %   08/07/22 0039 98.8 °F (37.1 °C) (!) 116 18 (!) 109/43 93 %   08/06/22 2149 -- 92 -- (!) 83/48 --   08/06/22 1952 97.5 °F (36.4 °C) 93 -- (!) 75/43 96 %   08/06/22 1555 98.6 °F (37 °C) (!) 102 18 100/69 99 %   08/06/22 1200 98.2 °F (36.8 °C) (!) 119 16 (!) 125/59 --         Oxygen Therapy  SpO2: 100 %  Pulse via Oximetry: 99 beats per minute  Pulse Oximeter Device Mode: Intermittent  O2 Device: Nasal cannula  Skin Assessment: Clean, dry, & intact  Skin Protection for O2 Device: No  FiO2 : 28 %  O2 Flow Rate (L/min): 2 L/min    Estimated body mass index is 34.33 kg/m² as calculated from the following:    Height as of this encounter: 5' 4\" (1.626 m). Weight as of this encounter: 200 lb (90.7 kg). No intake or output data in the 24 hours ending 08/07/22 1103        Physical Exam:   Blood pressure (!) 132/25, pulse (!) 106, temperature 98.6 °F (37 °C), temperature source Oral, resp. rate 16, height 5' 4\" (1.626 m), weight 200 lb (90.7 kg), SpO2 100 %. General:    NAD, lethargic  Head:  Normocephalic, atraumatic  Eyes:  Sclerae appear normal.  Pupils equally round. ENT:  Nares appear normal, no drainage. Moist oral mucosa  Neck:  No restricted ROM. Trachea midline   CV:   RRR. No m/r/g. Lungs: No wheezing. Symmetric expansion. On 3 L NC. Abdomen:   Soft, nondistended. Extremities: No cyanosis or clubbing. Skin:     No rashes and normal coloration. Warm and dry. Neuro:  CN II-XII grossly intact. Alert  Psych:  Normal mood       I have personally reviewed labs and tests showing:  Recent Labs:  Recent Results (from the past 48 hour(s))   POCT Glucose    Collection Time: 08/05/22  4:50 PM   Result Value Ref Range    POC Glucose 136 (H) 65 - 100 mg/dL    Performed by: ZulyyPCT    POCT Glucose    Collection Time: 08/05/22  8:57 PM   Result Value Ref Range    POC Glucose 140 (H) 65 - 100 mg/dL    Performed by: Alex(PCT)SNE    POCT Glucose    Collection Time: 08/06/22  6:25 AM   Result Value Ref Range    POC Glucose 131 (H) 65 - 100 mg/dL    Performed by: Alex(PCT)SNE    POCT Glucose    Collection Time: 08/06/22 12:15 PM   Result Value Ref Range    POC Glucose 97 65 - 100 mg/dL    Performed by: PritionHannahPCT    POCT Glucose    Collection Time: 08/06/22  3:56 PM   Result Value Ref Range    POC Glucose 145 (H) 65 - 100 mg/dL    Performed by: JuanjoseHumansizedonHannahPCT    POCT Glucose    Collection Time: 08/06/22  9:22 PM   Result Value Ref Range    POC Glucose 116 (H) 65 - 100 mg/dL    Performed by: AbhilashinJeremyCareCompanion    POCT Glucose    Collection Time: 08/07/22 12:47 AM   Result Value Ref Range    POC Glucose 150 (H) 65 - 100 mg/dL    Performed by: KelinJeremyCareCompanion    POCT Glucose    Collection Time: 08/07/22  6:19 AM   Result Value Ref Range    POC Glucose 162 (H) 65 - 100 mg/dL    Performed by: Laura Capone        I have personally reviewed imaging studies showing: Other Studies:  XR CHEST PORTABLE   Final Result   Findings suggest CHF.          MRI BRAIN WO CONTRAST    (Results Pending)       Current Meds:  Current Facility-Administered Medications   Medication Dose Route Frequency    oxyCODONE (ROXICODONE) immediate release tablet 5 mg  5 mg Oral Q6H PRN    traMADol (ULTRAM) tablet 50 mg  50 mg Oral Q6H PRN    insulin lispro (HUMALOG) injection vial 0-8 Units  0-8 Units SubCUTAneous TID WC    insulin lispro (HUMALOG) injection vial 0-4 Units  0-4 Units SubCUTAneous Nightly    temazepam (RESTORIL) capsule 15 mg  15 mg Oral Nightly PRN    midodrine (PROAMATINE) tablet 10 mg  10 mg Oral TID WC    pantoprazole (PROTONIX) tablet 40 mg  40 mg Oral QAM AC    sevelamer (RENVELA) tablet 2,400 mg  2,400 mg Oral TID WC    alogliptin (NESINA) tablet 6.25 mg  6.25 mg Oral Daily    sodium chloride flush 0.9 % injection 5-40 mL  5-40 mL IntraVENous 2 times per day    sodium chloride flush 0.9 % injection 5-40 mL  5-40 mL IntraVENous PRN    0.9 % sodium chloride infusion   IntraVENous PRN    ondansetron (ZOFRAN-ODT) disintegrating tablet 4 mg  4 mg Oral Q8H PRN    Or    ondansetron (ZOFRAN) injection 4 mg  4 mg IntraVENous Q6H PRN    polyethylene glycol (GLYCOLAX) packet 17 g  17 g Oral Daily PRN    acetaminophen (TYLENOL) tablet 650 mg  650 mg Oral Q6H PRN    Or    acetaminophen (TYLENOL) suppository 650 mg  650 mg Rectal Q6H PRN    heparin (porcine) injection 5,000 Units  5,000 Units SubCUTAneous 3 times per day    heparin (porcine) injection 5,000 Units  5,000 Units IntraVENous PRN    Epoetin Martin-epbx (RETACRIT) injection 20,000 Units  20,000 Units SubCUTAneous Q7 Days       Signed:  SUNSHINE Holbrook - CNP    Part of this note may have been written by using a voice dictation software. The note has been proof read but may still contain some grammatical/other typographical errors.

## 2022-08-07 NOTE — PROGRESS NOTES
US Guided PIV access-   Skin was cleaned and disinfected prior to IV puncture. Ultrasound was used to find the vein which was compressible and does not have any ultrasound features of an artery or nerve bundle. Under real-time ultrasound guidance peripheral access was obtained in the left forearm using 22 G 1.00\" Peripheral IV catheter . Blood return was present and IV flushed without difficulty with no clinical signs of infiltration. IV dressing applied and there were no immediate complications noted and patient tolerated the procedure well.

## 2022-08-07 NOTE — PROGRESS NOTES
RENAL H&P/CONSULT    Subjective:     Patient is a 79 y/o AA female, followed by our office for ESRD, on HD M-W-F at [facility]. She has been running via RIJ perm cath since LUE AVF became non-functioning. Left thigh AV graft placed on 5/26/22 by Dr Vanita Oro. PMH also consist of DM II, Hyperphosphatemia, anemia, renal cell carcinoma s/p left nephrectomy, followed by Pulmonary for lung mass, and HTN. She is on home O2 at 2L NC as needed. She presented to the ER via EMS for hypotension and generalized \"feeling bad\". She has been confused and reports back pain and has dyspnea and edema. BP has been low and we'll plan for HD today with Midodrine for hypotension. She is being treated empirically with IV antibiotics for suspected SIRS.   8/4/22- see HD note  8/5/22- alert/oriented, lying in bed, no signs of distress, on 4L NC, c/o dyspnea at times, denies CP, n/v, HA, or dizziness. HD last two days, no HD today, run tomorrow. 8/6/22- see HD note  8/7/22- drowsy, oriented x3, head of bed elevated, no signs of distress, on 2L NC, denies pain, dyspnea, n/v, HA or dizziness. Spoke with her daughter over the phone while in room, informed her of planned procedure with Dr Vanita Oro tomorrow. Past Medical History:   Diagnosis Date    Anemia     Arthritis     AVF (arteriovenous fistula) (HCC)     left    AVF (arteriovenous fistula) ([address] 12/20/2016 12/6/16 ([facility]) Right AVF revision and thrombectomy    Degenerative joint disease     Diabetes ([address]     type 2--- does not check sqbs at home    Enlarged lymph node     \"enlarging paratracheal node to 2.6 cm on CT chest\"    ESRD on hemodialysis ([address] onset 2006    ESRD.  MWF dialysis at [place]    Hypertension     controlled with med    Mediastinal mass     being followed by dr ramírez--per pt-- states told by dr Kate Manley it wasnt cancer-- but plans to be rescanned 5/2022    Obesity     On home O2     2L QHS and PRN during day d/t \"lymph node in chest\"    Renal cancer [facility])     Dr Gil Herrera - renal cancer L nephrectomy---and radiation ---    Shortness of breath     swollen lymphnode in chest-- oxygen 2LPM    Transient ischemic attack 08/29/2015    no residual      Past Surgical History:   Procedure Laterality Date    BREAST SURGERY Right     cyst removed    COLONOSCOPY N/A 11/8/2018    COLONOSCOPY  BMI 36 performed by Melva Orozco MD at [facility] ENDOSCOPY    CT BIOPSY ABDOMEN RETROPERITONEUM  9/14/2017    CT BIOPSY ABDOMEN RETROPERITONEUM 9/14/2017 [facility] RADIOLOGY CT SCAN    GI  08/06/2018    exploratory laparotomy    GI  06/01/2002    colon resection resulting in temporary colostomy reversal    GROIN SURGERY Left 6/26/2022    LEFT GROIN DEBRIDEMENT OF HEMATOMA / MIGEL performed by Bubba Kirkland MD at [facility]    IR INTRO [address] PTA  3/10/2020    IR INTRO CATH DIALYSIS CIRCUIT W BALLOON PTA  12/5/2019    IR INTRO CATH DIALYSIS CIRCUIT W BALLOON PTA  9/3/2019    IR INTRO CATH DIALYSIS CIRCUIT W BALLOON PTA  5/30/2019    IR INTRO CATH DIALYSIS CIRCUIT W BALLOON PTA  2/28/2019    IR THRMB/INFUSION DIAYSIS CIRCUIT Left 2021    IR TUNNELED CATHETER PLACEMENT GREATER THAN 5 YEARS  3/15/2022    IR TUNNELED CATHETER PLACEMENT GREATER THAN 5 YEARS  11/19/2021    IR TUNNELED CATHETER PLACEMENT GREATER THAN 5 YEARS  11/19/2021    IR TUNNELED CATHETER PLACEMENT GREATER THAN 5 YEARS 11/19/2021 [facility] RADIOLOGY SPECIALS    IR TUNNELED CATHETER PLACEMENT GREATER THAN 5 YEARS  3/15/2022    IR TUNNELED CATHETER PLACEMENT GREATER THAN 5 YEARS 3/15/2022 [facility] RADIOLOGY SPECIALS    OTHER SURGICAL HISTORY      dialysis fistula, several permcaths    UROLOGICAL SURGERY Left July 2015    nephrectomy    VASCULAR SURGERY Left 04/07/2022    LEFT ARM AV GRAFT    VASCULAR SURGERY Left 03/15/2022    declot    VASCULAR SURGERY Left 02/21/2022    Open thromboembolectomy of left upper arm graft.     VASCULAR SURGERY Right     AV graft- states no longer uses    VASCULAR SURGERY Left 5/26/2022    LEFT AV GRAFT THIGH performed by Stew Cruz MD at [facility] MAIN OR      Prior to Admission medications    Medication Sig Start Date End Date Taking? Authorizing Provider   oxyCODONE-acetaminophen (PERCOCET) 5-325 MG per tablet Take 1 tablet by mouth every 4 hours as needed for Pain. Historical Provider, MD   Epoetin Martin-epbx (RETACRIT) 04653 UNIT/ML SOLN injection Inject 1 mL into the skin every 7 days To be given at dialysis 7/3/22   SUNSHINE Schilling - CNP   naloxone 4 MG/0.1ML LIQD nasal spray 1 spray by Nasal route as needed for Opioid Reversal. Use 1 spray intranasally, then discard. Repeat with new spray every 2 min as needed for opioid overdose symptoms, alternating nostrils.      Historical Provider, MD   midodrine (PROAMATINE) 10 MG tablet Take 1 tablet by mouth every 8 hours 6/5/22   Zandra Milan MD   acetaminophen (TYLENOL) 500 MG tablet Take 500 mg by mouth every 6 hours as needed for Pain (for breakthrough pain )    Historical Provider, MD   omeprazole (PRILOSEC) 40 MG delayed release capsule Take 40 mg by mouth at bedtime    Historical Provider, MD   OXYGEN Inhale into the lungs 2 LITERS AS NEEDED FOR SOB VIA NASAL CANNULA    Historical Provider, MD   SITagliptin (JANUVIA) 50 MG tablet Take 50 mg by mouth daily    Historical Provider, MD   sevelamer (RENVELA) 800 MG tablet Take 3 tablets by mouth 3 times daily (with meals) PT TAKES 3 TABS WITH MEALS-- AND 1 TABS WITH SNACKS     Historical Provider, MD     Allergies   Allergen Reactions    Penicillins Other (See Comments) and Rash     PT STATES UNSURE OF RX  Tolerated cefazolin    Vancomycin Rash      Social History     Tobacco Use    Smoking status: Never    Smokeless tobacco: Never   Substance Use Topics    Alcohol use: No      Family History   Problem Relation Age of Onset    Diabetes Mother     Heart Disease Mother     Hypertension Mother     Heart Disease Father     Hypertension Father     Post-op Cognitive Dysfunction Neg Hx     Pseudochol. Deficiency Neg Hx     Delayed Awakening Neg Hx     Post-op Nausea/Vomiting Neg Hx     Emergence Delirium Neg Hx     Other Neg Hx     Malig Hypertherm Neg Hx           Review of Systems    Negative except for what is mention above in HPI      Objective:       BP (!) 132/25   Pulse (!) 106   Temp 98.6 °F (37 °C) (Oral)   Resp 16   Ht 5' 4\" (1.626 m)   Wt 200 lb (90.7 kg)   SpO2 100%   BMI 34.33 kg/m²     No intake/output data recorded. 08/05 1901 - 08/07 0700  In: 700 [P.O.:100]  Out: 2434     BP (!) 132/25   Pulse (!) 106   Temp 98.6 °F (37 °C) (Oral)   Resp 16   Ht 5' 4\" (1.626 m)   Wt 200 lb (90.7 kg)   SpO2 100%   BMI 34.33 kg/m²   General:  Drowsy, oriented x3, no distress, appears stated age. Head:  Normocephalic, without obvious abnormality, atraumatic. Neck: Supple, symmetrical, trachea midline, no JVD. Lungs:   Clear to auscultation bilaterally. Heart:  Regular rate and rhythm, S1, S2 normal, no murmur,  rub or gallop. Abdomen:   Soft, non-tender. Bowel sounds normal. No masses,  No organomegaly. No renal bruit. Extremities: Extremities no edema. Access:  RIJ Perm Cath   Skin: Skin color, texture, turgor normal. No rashes or lesions. Lymph nodes: Cervical and supraclavicular nodes normal.   Neurologic: No asterixis.          Data Review:     Recent Results (from the past 24 hour(s))   POCT Glucose    Collection Time: 08/06/22 12:15 PM   Result Value Ref Range    POC Glucose 97 65 - 100 mg/dL    Performed by: VijayFineEye Color SolutionsCT    POCT Glucose    Collection Time: 08/06/22  3:56 PM   Result Value Ref Range    POC Glucose 145 (H) 65 - 100 mg/dL    Performed by: VijayhPCT    POCT Glucose    Collection Time: 08/06/22  9:22 PM   Result Value Ref Range    POC Glucose 116 (H) 65 - 100 mg/dL    Performed by: Maximo    POCT Glucose    Collection Time: 08/07/22 12:47 AM   Result Value Ref Range    POC Glucose 150 (H) 65 - 100 mg/dL    Performed by: Maximo    POCT Glucose    Collection Time: 08/07/22  6:19 AM   Result Value Ref Range    POC Glucose 162 (H) 65 - 100 mg/dL    Performed by: Malu Jon;   Results for orders placed during the hospital encounter of 08/02/22    XR CHEST PORTABLE    Narrative  EXAM: XR CHEST PORTABLE    HISTORY: hypotension. TECHNIQUE: Frontal chest.    COMPARISON: 7/28/2022    FINDINGS:  There is mild cardiomegaly. There is pulmonary vascular congestion/edema. There is no pleural effusion, or pneumothorax. No significant osseous abnormalities are observed. Stable right-sided CVC and right vascular stent. Impression  Findings suggest CHF.     KUB:  Results for orders placed in visit on 08/03/18    XR ABDOMEN (KUB) (SINGLE AP VIEW)    Narrative  KUB supine views    History:  mechanical small bowel obstruction, lower abd ventral hernia, 78 years  Female    Comparison:  CT abdomen pelvis yesterday    Findings:  Esophageal tube appears to terminate in the first portion of the  duodenum. Oral contrast is seen throughout the colon. No free air is evident. The bowel gas pattern is nonspecific and there is no evidence of ileus or  obstruction. No suspicious renal or ureteral calculi are evident. Visualized  osseous structures unremarkable. Impression  Impression: No acute pathology identified. Renal US:  No results found for this or any previous visit. CT Abdomen  Results for orders placed during the hospital encounter of 06/25/22    CT ABDOMEN PELVIS W IV CONTRAST Additional Contrast? None    Narrative  EXAM: CT abdomen and pelvis with IV contrast.    INDICATION: Abdominal pain. COMPARISON: Prior PET/CT scan on May 5, 2022. TECHNIQUE: Axial CT images of the abdomen and pelvis were obtained after the  intravenous injection of 100 mL Isovue 370 CT contrast. Radiation dose reduction  techniques were used for this study.   Our CT scanners use one or all of the  following:  Automated exposure control, adjustment of the mA or kV according to  patient size, iterative reconstruction. FINDINGS:    - Liver: Within normal limits. - Gallbladder and bile ducts: Within normal limits. - Spleen: Within normal limits. - Urinary tract: The left kidney is absent. The right kidney is atrophied and  there is a 2.6 cm exophytic cyst off its lower pole. No right-sided  hydronephrosis is seen. The urinary bladder is collapsed. - Adrenals: Within normal limits. - Pancreas: Within normal limits. - Gastrointestinal tract: There is colonic diverticulosis. No definite acute  diverticulitis is seen, although ascites limits for evaluation of focal  inflammation.  - Retroperitoneum: Mild abdominal aortic atherosclerosis, with no aneurysmal  dilatation or dissection. The right heart is enlarged, and there is prominent  contrast reflux into the IVC, raising the possibility of right heart failure. - Peritoneal cavity and abdominal wall: There is progressed mild ascites. No  intra-abdominal abscess or free intraperitoneal air is seen. Edema in the  abdominal wall subcutaneous tissue has not significantly changed. - Pelvis: There is a double-limbed bypass graft in the upper left thigh. An  adjacent 7.9 x 7.5 cm complex fluid collection with no associated gas is seen in  the upper anterior thigh, which could be an abscess or hematoma. A small amount  of high-density material along the inferior margin of the fluid collection  raises the possibility of active bleeding.  - Spine/bones: No acute process. - Other comments: Small bilateral pleural effusions and rounded atelectasis in  the left lower lobe are unchanged. Impression  1. Upper left thigh bypass graft, not fully visualized on this study, with an  adjacent 7.9 x 7.5 cm complex fluid collection which could represent an abscess  or hematoma.  There is a small amount of high density material along the inferior  margin of the fluid collection, making it difficult to exclude active bleeding. Dr. Ina Ruiz verbally notified at 4:16 AM.  2. Persistent anasarca, with progressed mild ascites. 3. Prior left nephrectomy and atrophied right kidney. 4. Prominent sized hepatic veins and IVC in this patient with an enlarged right  heart, raising the possibility of right heart failure. Principal Problem:    SIRS (systemic inflammatory response syndrome) (HCC)  Active Problems:    Hypotension    Renal cell carcinoma (HCC)    ESRD (end stage renal disease) on dialysis (HCC)    Chronic respiratory failure with hypoxia (HCC)    Type 2 diabetes mellitus with nephropathy ([facility]  Resolved Problems:    * No resolved hospital problems. *      Assessment:     1. ESRD- on HD M-W-F, HD needed for biochemical and volume control. No urgent needs at this time.  -tolerating HD, continue outpatient schedule     2. Anemia- likely related to ESRD, near goal for CKD, continue Nephrovite, and LAKISHA, will monitor     3. Hypotension- with a hx of HTN, antihypertensives were stopped -  Altered hemodynamics due to left thigh AV graph likely causes hypotension   - continue Midodrine 10 mg TID, planned procedure on left thigh graph tomorrow with Dr Vanita Oro     4. Fluid overload - improved with HD for volume control      5. Hypocalcemia- with low albumin, treat with vitamin D     6. HTN- hx of, off antihypertensives     7. Renal cell carcinoma- followed by Oncology     8. DM II- on SSI, managed by primary     9.  Hyperphosphatemia- hx of, on Renvela, continue      Plan:     As above    ARMIDA Tavarez

## 2022-08-07 NOTE — PROGRESS NOTES
Blood pressure continues to be 80s/40s. Hospitalist, Dr. Mary Davies, on floor and notified. Patient dialyzed today. No new orders per Dr. Mary Davies. Will continue current plan of care.

## 2022-08-08 NOTE — ANESTHESIA PRE PROCEDURE
Department of Anesthesiology  Preprocedure Note       Name:  Hina Angeles   Age:  80 y.o.  :  1950                                          MRN:  [id number]         Date:  2022      Surgeon: Katheryn Pereira):  Ivory Najjar, MD    Procedure: Procedure(s):  LEFT THIGH ARTERIO VENOUS GRAFT Ligation    Medications prior to admission:   Prior to Admission medications    Medication Sig Start Date End Date Taking? Authorizing Provider   oxyCODONE-acetaminophen (PERCOCET) 5-325 MG per tablet Take 1 tablet by mouth every 4 hours as needed for Pain. Historical Provider, MD   Epoetin Martin-epbx (RETACRIT) 72729 UNIT/ML SOLN injection Inject 1 mL into the skin every 7 days To be given at dialysis 7/3/22   SUNSHINE Cochran - CNP   naloxone 4 MG/0.1ML LIQD nasal spray 1 spray by Nasal route as needed for Opioid Reversal. Use 1 spray intranasally, then discard. Repeat with new spray every 2 min as needed for opioid overdose symptoms, alternating nostrils. Historical Provider, MD   midodrine (PROAMATINE) 10 MG tablet Take 1 tablet by mouth every 8 hours 22   Belle Sevilla MD   acetaminophen (TYLENOL) 500 MG tablet Take 500 mg by mouth every 6 hours as needed for Pain (for breakthrough pain )    Historical Provider, MD   omeprazole (PRILOSEC) 40 MG delayed release capsule Take 40 mg by mouth at bedtime    Historical Provider, MD   OXYGEN Inhale into the lungs 2 LITERS AS NEEDED FOR SOB VIA NASAL CANNULA    Historical Provider, MD   SITagliptin (JANUVIA) 50 MG tablet Take 50 mg by mouth daily    Historical Provider, MD   sevelamer (RENVELA) 800 MG tablet Take 3 tablets by mouth 3 times daily (with meals) PT TAKES 3 TABS WITH MEALS-- AND 1 TABS WITH SNACKS     Historical Provider, MD       Current medications:    No current facility-administered medications for this visit. No current outpatient medications on file.      Facility-Administered Medications Ordered in Other Visits   Medication Dose Route Frequency Provider Last Rate Last Admin    lidocaine 1 % injection 1 mL  1 mL IntraDERmal Once PRN Milagro Nolasco MD        fentaNYL (SUBLIMAZE) injection 100 mcg  100 mcg IntraVENous Once PRN Milagro Nolasco MD        lactated ringers infusion   IntraVENous Continuous Milagro Nolasco MD        sodium chloride flush 0.9 % injection 5-40 mL  5-40 mL IntraVENous 2 times per day Milagro Nolasco MD        sodium chloride flush 0.9 % injection 5-40 mL  5-40 mL IntraVENous PRN Milagro Nolasco MD        0.9 % sodium chloride infusion   IntraVENous PRN Milagro Nolasco MD        midazolam PF (VERSED) injection 2 mg  2 mg IntraVENous Once PRN Milagro Nolasco MD        oxyCODONE (ROXICODONE) immediate release tablet 5 mg  5 mg Oral Q6H PRN Esther Richard, APRN - CNP        traMADol Page Marilee) tablet 50 mg  50 mg Oral Q6H PRN Esther Richard, APRN - CNP        insulin lispro (HUMALOG) injection vial 0-8 Units  0-8 Units SubCUTAneous TID  Estrella Smiling, DO        insulin lispro (HUMALOG) injection vial 0-4 Units  0-4 Units SubCUTAneous Nightly Estrella Smiling, DO        temazepam (RESTORIL) capsule 15 mg  15 mg Oral Nightly PRN Nenita Reyes MD   15 mg at 08/07/22 2235    midodrine (PROAMATINE) tablet 10 mg  10 mg Oral TID  Jayne Holland MD   10 mg at 08/07/22 1636    pantoprazole (PROTONIX) tablet 40 mg  40 mg Oral QAM  Jayne Holland MD   40 mg at 08/08/22 0519    sevelamer (RENVELA) tablet 2,400 mg  2,400 mg Oral TID  Jayne Holland MD   2,400 mg at 08/07/22 1636    alogliptin (NESINA) tablet 6.25 mg  6.25 mg Oral Daily Jayne Holland MD   6.25 mg at 08/07/22 0902    sodium chloride flush 0.9 % injection 5-40 mL  5-40 mL IntraVENous 2 times per day Jayne Holland MD   10 mL at 08/07/22 0904    sodium chloride flush 0.9 % injection 5-40 mL  5-40 mL IntraVENous PRN Jayne Holland MD        0.9 % sodium chloride infusion   IntraVENous PRN Jayne Holland MD        ondansetron (ZOFRAN-ODT) disintegrating tablet 4 mg  4 mg Oral Q8H PRN Denver Parks, MD        Or    ondansetron [facility]) injection 4 mg  4 mg IntraVENous Q6H PRN Denver Parks, MD        polyethylene glycol [facility]) packet 17 g  17 g Oral Daily PRN Denver Parks, MD        acetaminophen (TYLENOL) tablet 650 mg  650 mg Oral Q6H PRN Denver Parks, MD   650 mg at 08/05/22 1943    Or    acetaminophen (TYLENOL) suppository 650 mg  650 mg Rectal Q6H PRN Denver Parks, MD        heparin (porcine) injection 5,000 Units  5,000 Units SubCUTAneous 3 times per day Denver Parks, MD   5,000 Units at 08/07/22 1301    heparin (porcine) injection 5,000 Units  5,000 Units IntraVENous PRN Jayden Parham MD   5,000 Units at 08/06/22 9126    Epoetin Martin-epbx (RETACRIT) injection 20,000 Units  20,000 Units SubCUTAneous Q7 Days Jayden Parham MD   20,000 Units at 08/03/22 1631       Allergies: Allergies   Allergen Reactions    Penicillins Other (See Comments) and Rash     PT STATES UNSURE OF RX  Tolerated cefazolin    Vancomycin Rash       Problem List:    Patient Active Problem List   Diagnosis Code    Renal cell carcinoma ([facility]) C64.9    Osteoarthritis of right knee M17.11    Severe obesity (formerly [facility]) E66.01    HTN (hypertension) I10    ESRD (end stage renal disease) on dialysis ([address] N18.6, Z99.2    Type 2 diabetes mellitus with hyperglycemia ([facility] E11.65    Chronic respiratory failure with hypoxia ([facility] J96.11    AVF (arteriovenous fistula) (formerly [facility]) I77.0    Total knee replacement status Z96.659    Type 2 diabetes mellitus with nephropathy ([facility]) E11.21    Thrombocytopenia ([facility]) D69.6    Hypotension I95.9    Generalized weakness R53.1    CKD (chronic kidney disease) stage 4, GFR 15-29 ml/min ([facility]) N18.4    Back pain M54.9    Hematoma of left lower leg S80. 12XA    Cardiac murmur R01.1    SIRS (systemic inflammatory response syndrome) (formerly [facility]) R65.10       Past Medical History:        Diagnosis Date    Anemia     Arthritis     AVF (arteriovenous fistula) ([facility]     left    AVF (arteriovenous fistula) ([facility] 12/20/2016 12/6/16 ([facility]) Right AVF revision and thrombectomy    Degenerative joint disease     Diabetes ([facility]     type 2--- does not check sqbs at home    Enlarged lymph node     \"enlarging paratracheal node to 2.6 cm on CT chest\"    ESRD on hemodialysis ([facility] onset 2006    ESRD.  MWF dialysis at [place] [facility]    Hypertension     controlled with med    Mediastinal mass     being followed by dr ramírez--per pt-- states told by dr Ninoska Stephens it wasnt cancer-- but plans to be rescanned 5/2022    Obesity     On home O2     2L QHS and PRN during day d/t \"lymph node in chest\"    Renal cancer ([facility]     Dr Halina Cook - renal cancer L nephrectomy---and radiation ---    Shortness of breath     swollen lymphnode in chest-- oxygen 2LPM    Transient ischemic attack 08/29/2015    no residual       Past Surgical History:        Procedure Laterality Date    BREAST SURGERY Right     cyst removed    COLONOSCOPY N/A 11/8/2018    COLONOSCOPY  BMI 36 performed by Heath Perez MD at [address] [place] [address]  9/14/2017    CT BIOPSY ABDOMEN RETROPERITONEUM 9/14/2017 [facility] CT SCAN    GI  08/06/2018    exploratory laparotomy    GI  06/01/2002    colon resection resulting in temporary colostomy reversal    GROIN SURGERY Left 6/26/2022    LEFT GROIN DEBRIDEMENT OF HEMATOMA / MIGEL performed by Isabela Duarte MD at [facility] MAIN OR    GYN      mihir    IR INTRO CATH DIALYSIS CIRCUIT W BALLOON PTA  3/10/2020    IR INTRO CATH DIALYSIS CIRCUIT W BALLOON PTA  12/5/2019    IR INTRO CATH DIALYSIS CIRCUIT W BALLOON PTA  9/3/2019    IR INTRO CATH DIALYSIS CIRCUIT W BALLOON PTA  5/30/2019    IR INTRO CATH DIALYSIS CIRCUIT W BALLOON PTA  2/28/2019    IR THRMB/INFUSION DIAYSIS CIRCUIT Left 2021    IR TUNNELED CATHETER PLACEMENT GREATER THAN 5 YEARS  3/15/2022    IR TUNNELED CATHETER PLACEMENT GREATER THAN 5 YEARS  11/19/2021    IR TUNNELED CATHETER PLACEMENT GREATER THAN 5 YEARS  11/19/2021    IR TUNNELED CATHETER PLACEMENT GREATER THAN 5 YEARS 11/19/2021 [facility] SPECIALS    IR TUNNELED CATHETER PLACEMENT GREATER THAN 5 YEARS  3/15/2022    IR TUNNELED CATHETER PLACEMENT GREATER THAN 5 YEARS 3/15/2022 [facility] SPECIALS    OTHER SURGICAL HISTORY      dialysis fistula, several permcaths    UROLOGICAL SURGERY Left July 2015    nephrectomy    VASCULAR SURGERY Left 04/07/2022    LEFT ARM AV GRAFT    VASCULAR SURGERY Left 03/15/2022    declot    VASCULAR SURGERY Left 02/21/2022    Open thromboembolectomy of left upper arm graft.  VASCULAR SURGERY Right     AV graft- states no longer uses    VASCULAR SURGERY Left 5/26/2022    LEFT AV GRAFT THIGH performed by Muna Bejarano MD at [facility] MAIN OR       Social History:    Social History     Tobacco Use    Smoking status: Never    Smokeless tobacco: Never   Substance Use Topics    Alcohol use: No                                Counseling given: Not Answered      Vital Signs (Current): There were no vitals filed for this visit.                                            BP Readings from Last 3 Encounters:   08/08/22 108/69   08/02/22 (!) 96/54   07/29/22 105/71       NPO Status:                                                                                 BMI:   Wt Readings from Last 3 Encounters:   08/02/22 200 lb (90.7 kg)   07/28/22 194 lb (88 kg)   07/12/22 205 lb (93 kg)     There is no height or weight on file to calculate BMI.    CBC:   Lab Results   Component Value Date/Time    WBC 10.7 08/08/2022 03:18 AM    RBC 3.00 08/08/2022 03:18 AM    HGB 9.9 08/08/2022 03:18 AM    HCT 29.2 08/08/2022 03:18 AM    MCV 97.3 08/08/2022 03:18 AM    RDW 17.8 08/08/2022 03:18 AM    PLT 98 08/08/2022 03:18 AM       CMP:   Lab Results   Component Value Date/Time     08/08/2022 03:18 AM    K 4.2 08/08/2022 03:18 AM     08/08/2022 03:18 AM    CO2 26 08/08/2022 03:18 AM    BUN 35 08/08/2022 03:18 AM    CREATININE 5.30 08/08/2022 03:18 AM    GFRAA 10 08/08/2022 03:18 AM    AGRATIO 1.0 05/13/2022 03:15 PM    LABGLOM 9 08/08/2022 03:18 AM    GLUCOSE 133 08/08/2022 03:18 AM    PROT 7.6 08/05/2022 04:10 AM    CALCIUM 8.5 08/08/2022 03:18 AM    BILITOT 2.2 08/05/2022 04:10 AM    ALKPHOS 108 08/05/2022 04:10 AM    ALKPHOS 95 05/13/2022 03:15 PM    AST 31 08/05/2022 04:10 AM    ALT 21 08/05/2022 04:10 AM       POC Tests:   Recent Labs     08/08/22  0543   POCGLU 119*       Coags: No results found for: PROTIME, INR, APTT    HCG (If Applicable): No results found for: PREGTESTUR, PREGSERUM, HCG, HCGQUANT     ABGs: No results found for: PHART, PO2ART, QTI9VRP, EWV9EBQ, BEART, H9BIQSDP     Type & Screen (If Applicable):  No results found for: LABABO, LABRH    Drug/Infectious Status (If Applicable):  No results found for: HIV, HEPCAB    COVID-19 Screening (If Applicable):   Lab Results   Component Value Date/Time    COVID19 Not detected 08/03/2022 12:54 AM           Anesthesia Evaluation  Patient summary reviewed and Nursing notes reviewed no history of anesthetic complications:   Airway: Mallampati: II  TM distance: >3 FB   Neck ROM: full  Mouth opening: > = 3 FB   Dental:    (+) edentulous      Pulmonary: breath sounds clear to auscultation  (+) shortness of breath (O2 (essentially continuously)): chronic,                             Cardiovascular:  Exercise tolerance: poor (<4 METS),   (+) hypertension:,         Rhythm: regular  Rate: normal                 ROS comment: TTE 6/2022:     Left Ventricle: Normal left ventricular systolic function with a visually estimated EF of 60 - 65%. Left ventricle size is normal. Normal wall thickness. \" D\" shaped septum c/w volume and pressure overload. Normal wall motion. Normal diastolic function.   Right Ventricle: Right ventricle is severely dilated. Normal wall thickness. Severely reduced systolic function.   Severe TR       Neuro/Psych:   (+) TIA, GI/Hepatic/Renal:   (+) GERD:, renal disease: ESRD and dialysis,          ROS comment: Hx nephrectomy for RCC. Endo/Other:    (+) DiabetesType II DM, well controlled, using insulin, . Abdominal:             Vascular: negative vascular ROS. Other Findings: Able to answer name appropriately but lethargic and slow to answer most questions. Anesthesia Plan      general     ASA 4       Induction: intravenous. Anesthetic plan and risks discussed with patient (spoke with daughter over phone this AM as well. ). Use of blood products discussed with patient whom consented to blood products.                      Nicholas Lopez MD   8/8/2022

## 2022-08-08 NOTE — PLAN OF CARE
Problem: Discharge Planning  Goal: Discharge to home or other facility with appropriate resources  Outcome: Progressing  Flowsheets (Taken 8/7/2022 2004)  Discharge to home or other facility with appropriate resources: Identify barriers to discharge with patient and caregiver     Problem: Skin/Tissue Integrity  Goal: Absence of new skin breakdown  Description: 1. Monitor for areas of redness and/or skin breakdown  2. Assess vascular access sites hourly  3. Every 4-6 hours minimum:  Change oxygen saturation probe site  4. Every 4-6 hours:  If on nasal continuous positive airway pressure, respiratory therapy assess nares and determine need for appliance change or resting period. Outcome: Progressing     Problem: Safety - Adult  Goal: Free from fall injury  Outcome: Progressing  Flowsheets (Taken 8/7/2022 2004)  Free From Fall Injury: Instruct family/caregiver on patient safety     Problem: Pain  Goal: Verbalizes/displays adequate comfort level or baseline comfort level  Outcome: Progressing     Problem: Confusion  Goal: Confusion, delirium, dementia, or psychosis is improved or at baseline  Description: INTERVENTIONS:  1. Assess for possible contributors to thought disturbance, including medications, impaired vision or hearing, underlying metabolic abnormalities, dehydration, psychiatric diagnoses, and notify attending LIP  2. [place] high risk fall precautions, as indicated  3. Provide frequent short contacts to provide reality reorientation, refocusing and direction  4. Decrease environmental stimuli, including noise as appropriate  5. Monitor and intervene to maintain adequate nutrition, hydration, elimination, sleep and activity  6. If unable to ensure safety without constant attention obtain sitter and review sitter guidelines with assigned personnel  7.  Initiate Psychosocial CNS and Spiritual Care consult, as indicated  Outcome: Progressing

## 2022-08-08 NOTE — FLOWSHEET NOTE
08/08/22 0314 08/08/22 0340   Vital Signs   Temp 98.2 °F (36.8 °C)  --    Temp Source Axillary  --    Heart Rate (!) 122 (!) 121   Heart Rate Source Monitor  --    Resp 18  --    BP (!) 95/44 123/71   BP Location Right leg Right leg   MAP (Calculated) 61 88.33   Patient Position Supine  --    Level of Consciousness Alert (0)  --    MEWS Score 4  --      Hospitalist notified of vitals above. States to just monitor for now.

## 2022-08-08 NOTE — OP NOTE
[facility]  OPERATIVE REPORT    Name:  Oziel Serrano  MR#:  [id number]  :  1950  ACCOUNT #:  [de-identified]  DATE OF SERVICE:  2022    CLINICAL SERVICE:  Vascular Surgery    PREOPERATIVE DIAGNOSIS:  High-functioning left thigh graft causing worsening congestive heart failure. POSTOPERATIVE DIAGNOSIS:  High-functioning left thigh graft causing worsening congestive heart failure. PROCEDURE PERFORMED:  Ultrasound-guided access of right common femoral artery    SURGEON:  Steffen Espinoza. Biju Caruso MD    ASSISTANT:    ANESTHESIA:  General.    COMPLICATIONS:  Procedure aborted secondary to hypotension. SPECIMENS REMOVED:     IMPLANTS:      ESTIMATED BLOOD LOSS:      PROCEDURE:  After induction, I was called to the room as the patient's systolic blood pressure dropped into the 30s. The patient had already been intubated. At that time, ultrasound was used to identify the right common femoral artery over the femoral head. I accessed it with a micropuncture technique and upsized to a 5-Polish sheath, and put for arterial flow. Systolic blood pressure was still in the 70s. Secondary to the instability, we canceled the procedure. The patient was taken to the ICU in critical condition.         Delisa Conrad MD      [id number]  D:  2022 9:02  T:  2022 15:04  JOB #:  [id number]

## 2022-08-08 NOTE — PROGRESS NOTES
RENAL H&P/CONSULT    Subjective:     Patient is a 79 y/o AA female, followed by our office for ESRD, on HD M-W-F at [facility]. She has been running via RIJ perm cath since LUE AVF became non-functioning. Left thigh AV graft placed on 5/26/22 by Dr Josh Rey. PMH also consist of DM II, Hyperphosphatemia, anemia, renal cell carcinoma s/p left nephrectomy, followed by Pulmonary for lung mass, and HTN. She is on home O2 at 2L NC as needed. She presented to the ER via EMS for hypotension and generalized \"feeling bad\". She has been confused and reports back pain and has dyspnea and edema. BP has been low and we'll plan for HD today with Midodrine for hypotension. She is being treated empirically with IV antibiotics for suspected SIRS.   8/4/22- see HD note  8/5/22- alert/oriented, lying in bed, no signs of distress, on 4L NC, c/o dyspnea at times, denies CP, n/v, HA, or dizziness. HD last two days, no HD today, run tomorrow. 8/6/22- see HD note  8/7/22- drowsy, oriented x3, head of bed elevated, no signs of distress, on 2L NC, denies pain, dyspnea, n/v, HA or dizziness. Spoke with her daughter over the phone while in room, informed her of planned procedure with Dr Josh Rey tomorrow. 8/8/22- went to OR this morning, left thigh graft ligation canceled due to hypotension, was given Epi, intubated, art line placed, transferred to CCU. No sedation, on Levo. No signs of distress. Past Medical History:   Diagnosis Date    Anemia     Arthritis     AVF (arteriovenous fistula) (HCC)     left    AVF (arteriovenous fistula) ([address] 12/20/2016 12/6/16 ([facility]) Right AVF revision and thrombectomy    Degenerative joint disease     Diabetes ([address]     type 2--- does not check sqbs at home    Enlarged lymph node     \"enlarging paratracheal node to 2.6 cm on CT chest\"    ESRD on hemodialysis ([address] onset 2006    ESRD.  MWF dialysis at [facility]    Hypertension     controlled with med    Mediastinal mass     being followed by dr ramírez--per pt-- states told by dr Luann Foster it wasnt cancer-- but plans to be rescanned 5/2022    Obesity     On home O2     2L QHS and PRN during day d/t \"lymph node in chest\"    Renal cancer ([facility]     Dr Caleb Interiano - renal cancer L nephrectomy---and radiation ---    Shortness of breath     swollen lymphnode in chest-- oxygen 2LPM    Transient ischemic attack 08/29/2015    no residual      Past Surgical History:   Procedure Laterality Date    BREAST SURGERY Right     cyst removed    COLONOSCOPY N/A 11/8/2018    COLONOSCOPY  BMI 36 performed by Nica Reed MD at [facility]  9/14/2017    CT BIOPSY ABDOMEN RETROPERITONEUM 9/14/2017 [facility] CT SCAN    GI  08/06/2018    exploratory laparotomy    GI  06/01/2002    colon resection resulting in temporary colostomy reversal    GROIN SURGERY Left 6/26/2022    LEFT GROIN DEBRIDEMENT OF HEMATOMA / MIGEL performed by Laney Spain MD at [address] [place] [address]      mihir    IR INTRO [address] PTA  3/10/2020    IR INTRO CATH DIALYSIS CIRCUIT W [facility]ALLOON PTA  12/5/2019    IR INTRO CATH DIALYSIS CIRCUIT W [facility]ALLOON PTA  9/3/2019    IR INTRO CATH DIALYSIS CIRCUIT W [facility] PTA  5/30/2019    IR INTRO CATH DIALYSIS CIRCUIT W BALLOON PTA  2/28/2019    IR THRMB/INFUSION DIAYSIS CIRCUIT Left 2021    IR TUNNELED CATHETER PLACEMENT GREATER THAN 5 YEARS  3/15/2022    IR TUNNELED CATHETER PLACEMENT GREATER THAN 5 YEARS  11/19/2021    IR TUNNELED CATHETER PLACEMENT GREATER THAN 5 YEARS  11/19/2021    IR TUNNELED CATHETER PLACEMENT GREATER THAN 5 YEARS 11/19/2021 [facility] SPECIALS    IR TUNNELED CATHETER PLACEMENT GREATER THAN 5 YEARS  3/15/2022    IR TUNNELED CATHETER PLACEMENT GREATER THAN 5 YEARS 3/15/2022 [facility] SPECIALS    OTHER SURGICAL HISTORY      dialysis fistula, several permcaths    UROLOGICAL SURGERY Left July 2015    nephrectomy    VASCULAR SURGERY Left 04/07/2022    LEFT ARM AV GRAFT    VASCULAR SURGERY Left 03/15/2022    declot    VASCULAR SURGERY Left 02/21/2022    Open thromboembolectomy of left upper arm graft. VASCULAR SURGERY Right     AV graft- states no longer uses    VASCULAR SURGERY Left 5/26/2022    LEFT AV GRAFT THIGH performed by Daysi Gabriel MD at [address] [place] [address]      Prior to Admission medications    Medication Sig Start Date End Date Taking? Authorizing Provider   oxyCODONE-acetaminophen (PERCOCET) 5-325 MG per tablet Take 1 tablet by mouth every 4 hours as needed for Pain. Historical Provider, MD   Epoetin Martin-epbx (RETACRIT) 87780 UNIT/ML SOLN injection Inject 1 mL into the skin every 7 days To be given at dialysis 7/3/22   SUNSHINE Bro - CNP   naloxone 4 MG/0.1ML LIQD nasal spray 1 spray by Nasal route as needed for Opioid Reversal. Use 1 spray intranasally, then discard. Repeat with new spray every 2 min as needed for opioid overdose symptoms, alternating nostrils. Historical Provider, MD   midodrine (PROAMATINE) 10 MG tablet Take 1 tablet by mouth every 8 hours 6/5/22   Jade King MD   acetaminophen (TYLENOL) 500 MG tablet Take 500 mg by mouth every 6 hours as needed for Pain (for breakthrough pain )    Historical Provider, MD   omeprazole (PRILOSEC) 40 MG delayed release capsule Take 40 mg by mouth at bedtime    Historical Provider, MD   OXYGEN Inhale into the lungs 2 LITERS AS NEEDED FOR SOB VIA NASAL CANNULA    Historical Provider, MD   SITagliptin (JANUVIA) 50 MG tablet Take 50 mg by mouth daily    Historical Provider, MD   sevelamer (RENVELA) 800 MG tablet Take 3 tablets by mouth 3 times daily (with meals) PT TAKES 3 TABS WITH MEALS-- AND 1 TABS WITH SNACKS     Historical Provider, MD     Allergies   Allergen Reactions    Penicillins Other (See Comments) and Rash     PT STATES UNSURE OF RX  Tolerated cefazolin    Vancomycin Rash      Social History     Tobacco Use    Smoking status: Never    Smokeless tobacco: Never   Substance Use Topics    Alcohol use:  No Function Panel    Collection Time: 08/08/22  3:18 AM   Result Value Ref Range    Sodium 133 (L) 136 - 145 mmol/L    Potassium 4.2 3.5 - 5.1 mmol/L    Chloride 102 98 - 107 mmol/L    CO2 26 21 - 32 mmol/L    Anion Gap 5 (L) 7 - 16 mmol/L    Glucose 133 (H) 65 - 100 mg/dL    BUN 35 (H) 8 - 23 MG/DL    Creatinine 5.30 (H) 0.6 - 1.0 MG/DL    GFR African American 10 (L) >60 ml/min/1.73m2    GFR Non- 9 (L) >60 ml/min/1.73m2    Calcium 8.5 8.3 - 10.4 MG/DL    Phosphorus 2.9 2.3 - 3.7 MG/DL    Albumin 3.4 3.2 - 4.6 g/dL   CBC    Collection Time: 08/08/22  3:18 AM   Result Value Ref Range    WBC 10.7 4.3 - 11.1 K/uL    RBC 3.00 (L) 4.05 - 5.2 M/uL    Hemoglobin 9.9 (L) 11.7 - 15.4 g/dL    Hematocrit 29.2 (L) 35.8 - 46.3 %    MCV 97.3 79.6 - 97.8 FL    MCH 33.0 (H) 26.1 - 32.9 PG    MCHC 33.9 31.4 - 35.0 g/dL    RDW 17.8 (H) 11.9 - 14.6 %    Platelets 98 (L) 960 - 450 K/uL    MPV 13.4 (H) 9.4 - 12.3 FL    nRBC 0.10 0.0 - 0.2 K/uL   POCT Glucose    Collection Time: 08/08/22  5:43 AM   Result Value Ref Range    POC Glucose 119 (H) 65 - 100 mg/dL    Performed by: Polina)    POCT Blood Gas & Electrolytes    Collection Time: 08/08/22  8:34 AM   Result Value Ref Range    pH, Arterial, POC 7.32 (L) 7.35 - 7.45      pCO2, Arterial, POC 47.7 (H) 35 - 45 MMHG    pO2, Arterial, POC 79 75 - 100 MMHG    POC Sodium 139 136 - 145 MMOL/L    POC Potassium 5.0 3.5 - 5.1 MMOL/L    POC Ionized Calcium 1.06 (L) 1.12 - 1.32 mmol/L    POC Glucose 203 (H) 65 - 100 MG/DL    BASE DEFICIT (POC) 1.8 mmol/L    HCO3, Mixed 24.6 22 - 26 MMOL/L    POC TCO2 25 (H) 13 - 23 MMOL/L    POC O2 SAT 94 %    Source ARTERIAL      Performed by: Don     POC GFR  Cannot be calculated >60 ml/min/1.73m2    Glomerular Filtration Rate, POC Cannot be calculated >60 ml/min/1.73m2   POC Blood, Cord, Arterial    Collection Time: 08/08/22  9:55 AM   Result Value Ref Range    DEVICE ADULT VENT      FIO2 100 %    pH, Arterial, POC 7.38 7.35 - 7.45      pCO2, Arterial, POC 39.0 35 - 45 MMHG    pO2, Arterial,  (H) 75 - 100 MMHG    HCO3, Mixed 23.2 22 - 26 MMOL/L    SO2c, Arterial, POC 97.8 95 - 98 %    BASE DEFICIT (POC) 1.7 mmol/L    Mode CPAP/PS      POC PEEP 8 cmH2O    POC Joshua's Test NOT APPLICABLE      Site DRAWN FROM ARTERIAL LINE      Specimen type: ARTERIAL      Performed by: Jimena [place]     RESPIRATORY COMMENT: ve7.17        CXR;   Results for orders placed during the hospital encounter of 08/02/22    XR CHEST PORTABLE    Narrative  EXAM: XR CHEST PORTABLE    HISTORY: hypotension. TECHNIQUE: Frontal chest.    COMPARISON: 7/28/2022    FINDINGS:  There is mild cardiomegaly. There is pulmonary vascular congestion/edema. There is no pleural effusion, or pneumothorax. No significant osseous abnormalities are observed. Stable right-sided CVC and right vascular stent. Impression  Findings suggest CHF.     KUB:  Results for orders placed in visit on 08/03/18    XR ABDOMEN (KUB) (SINGLE AP VIEW)    Narrative  KUB supine views    History:  mechanical small bowel obstruction, lower abd ventral hernia, 78 years  Female    Comparison:  CT abdomen pelvis yesterday    Findings:  Esophageal tube appears to terminate in the first portion of the  duodenum. Oral contrast is seen throughout the colon. No free air is evident. The bowel gas pattern is nonspecific and there is no evidence of ileus or  obstruction. No suspicious renal or ureteral calculi are evident. Visualized  osseous structures unremarkable. Impression  Impression: No acute pathology identified. Renal US:  No results found for this or any previous visit. CT Abdomen  Results for orders placed during the hospital encounter of 06/25/22    CT ABDOMEN PELVIS W IV CONTRAST Additional Contrast? None    Narrative  EXAM: CT abdomen and pelvis with IV contrast.    INDICATION: Abdominal pain.     COMPARISON: Prior PET/CT scan on May 5, 2022.    TECHNIQUE: Axial CT images of the abdomen and pelvis were obtained after the  intravenous injection of 100 mL Isovue 370 CT contrast. Radiation dose reduction  techniques were used for this study. Our CT scanners use one or all of the  following:  Automated exposure control, adjustment of the mA or kV according to  patient size, iterative reconstruction. FINDINGS:    - Liver: Within normal limits. - Gallbladder and bile ducts: Within normal limits. - Spleen: Within normal limits. - Urinary tract: The left kidney is absent. The right kidney is atrophied and  there is a 2.6 cm exophytic cyst off its lower pole. No right-sided  hydronephrosis is seen. The urinary bladder is collapsed. - Adrenals: Within normal limits. - Pancreas: Within normal limits. - Gastrointestinal tract: There is colonic diverticulosis. No definite acute  diverticulitis is seen, although ascites limits for evaluation of focal  inflammation.  - Retroperitoneum: Mild abdominal aortic atherosclerosis, with no aneurysmal  dilatation or dissection. The right heart is enlarged, and there is prominent  contrast reflux into the IVC, raising the possibility of right heart failure. - Peritoneal cavity and abdominal wall: There is progressed mild ascites. No  intra-abdominal abscess or free intraperitoneal air is seen. Edema in the  abdominal wall subcutaneous tissue has not significantly changed. - Pelvis: There is a double-limbed bypass graft in the upper left thigh. An  adjacent 7.9 x 7.5 cm complex fluid collection with no associated gas is seen in  the upper anterior thigh, which could be an abscess or hematoma. A small amount  of high-density material along the inferior margin of the fluid collection  raises the possibility of active bleeding.  - Spine/bones: No acute process. - Other comments: Small bilateral pleural effusions and rounded atelectasis in  the left lower lobe are unchanged. Impression  1.  Upper left thigh bypass graft, not fully visualized on this study, with an  adjacent 7.9 x 7.5 cm complex fluid collection which could represent an abscess  or hematoma. There is a small amount of high density material along the inferior  margin of the fluid collection, making it difficult to exclude active bleeding. Dr. Adrian Graft verbally notified at 4:16 AM.  2. Persistent anasarca, with progressed mild ascites. 3. Prior left nephrectomy and atrophied right kidney. 4. Prominent sized hepatic veins and IVC in this patient with an enlarged right  heart, raising the possibility of right heart failure. Principal Problem:    SIRS (systemic inflammatory response syndrome) (HCC)  Active Problems:    Hypotension    Renal cell carcinoma (HCC)    ESRD (end stage renal disease) on dialysis (HCC)    Chronic respiratory failure with hypoxia (HCC)    Type 2 diabetes mellitus with nephropathy ([facility]  Resolved Problems:    * No resolved hospital problems. *      Assessment:     1. ESRD- on HD M-W-F, HD needed for biochemical and volume control. No urgent needs at this time. - intubated, transferred to CCU, no HD today, SLED tomorrow     2. Anemia- likely related to ESRD, near goal for CKD, continue Nephrovite, and LAKISHA, will monitor     3. Hypotension- with a hx of HTN, antihypertensives were stopped -  Altered hemodynamics due to left thigh AV graph likely causes hypotension   - left thigh graph cancelled this AM due to hypotension, transferred from OR to CCU, started on Levophed     4. Fluid overload - improved with HD for volume control      5. Hypocalcemia- with low albumin, treat with vitamin D     6. HTN- hx of, off antihypertensives due to hypotension     7. Renal cell carcinoma- followed by Oncology     8. DM II- on SSI, managed by primary     9. Hyperphosphatemia- hx of, on Renvela, continue    10.  Respiratory distress- intubated, transferred from OR to CCU, managed by Pulmonary       Plan:     As above    ARMIDA Rene

## 2022-08-08 NOTE — PROGRESS NOTES
PT Discharge Note:  Patient is being discharged from PT at this time due to a decline in medical status with subsequent transfer to ICU. Please reorder PT when patient is medicallyl stable and would benefit from our services. Thank you,  Mary Jo Benson.  SHARONA Rodriguez

## 2022-08-08 NOTE — PROGRESS NOTES
Pt starting to get uncomfortably on ventilator, stacking breaths and taking large volumes. Brit Fernandez RT switched pt over to CPAP and pt given 45 minutes on trial. Pt unable to follow commands but very restless, eyes open, and coughing on ETT. ABG obtained and results below:    7.42 pH  38.6 pCO2  267.4 pO2  HCO3 25.2    Dr. Archibald Area updated and orders given for extubation.

## 2022-08-08 NOTE — PROGRESS NOTES
Hospitalist Progress Note   Admit Date:  2022 10:03 PM   Name:  Justin Pires   Age:  80 y.o. Sex:  female  :  1950   MRN:  [id number]   Room:  [facility]/    Presenting Complaint: Hypotension and Extremity Weakness     Reason(s) for Admission: End stage renal disease on dialysis ([facility] [N18.6, Z99.2]  SIRS (systemic inflammatory response syndrome) (HCC) [R65.10]  Elevated lactic acid level [R79.89]  Hypotension, unspecified hypotension type [I95.9]     Hospital Course & Interval History:   Zuleyka Lipscomb is a 67-year-old female with a PMH of ESRD on HD who presents with hypotension and L-sided mid back pain. Her back pain is chronic since nephrectomy. Labs were normal except elevated lactic. She was given gentle IVF for hypotension. No fever or tachycardia. The patient was admitted to the Hospitalist service with Nephrology consulted. Vascular surgery was consulted to ligate the patient's L thigh AV graft as it may be causing hypotension. Subjective/24hr Events (22): Patient was taken to the OR early this morning for thigh graft ligation. She became hypotensive and required pressors. The surgical procedure was canceled and she was taken to critical care. ABG was obtained and she was later extubated.     Assessment & Plan:     SIRS / Hypotension on admission  - no evidence of sepsis  - Possibly due to L thigh AV graft  - Vascular surgery consulted  - cont midodrine  - Empiric antibiotics discontinued  - Bcx finalized negative     Shock  - Occurred in OR prior to procedure on Aug/08  - Patient was already intubated for surgery then apparent SBP 30s; arterial line placed and found with SBP 70s  - Requiring pressor  - Procedure cancelled  - Transferred to critical care unit from [address] on board    ESRD  - HD as per nephrology     DM2 with nephropathy  - cont Nesina (Januvia substitute)  - continue SSI  - Monitor CBG      hx of RCC  - s/p nephrectomy     confusion/falls at home  - MRI brain pending  - Check orthostatics  - PT/OT recommend STR     Chronic pain  - PRN analgesia      Chronic hypoxic resp failure  - continue home oxygen 2-3 Lpm      Discharge Planning: Pending      Diet:  Diet NPO  DVT PPx: heparin  Code status: Full Code    Hospital Problems:  Principal Problem:    SIRS (systemic inflammatory response syndrome) (HCC)  Active Problems:    Hypotension    Renal cell carcinoma (HCC)    ESRD (end stage renal disease) on dialysis ([facility]    Chronic respiratory failure with hypoxia (HCC)    Type 2 diabetes mellitus with nephropathy ([facility]  Resolved Problems:    * No resolved hospital problems.  *      Objective:   Patient Vitals for the past 24 hrs:   Temp Pulse Resp BP SpO2   08/08/22 1514 -- (!) 118 11 -- 97 %   08/08/22 1500 -- (!) 117 12 -- 98 %   08/08/22 1400 -- (!) 116 12 -- 97 %   08/08/22 1340 -- (!) 116 12 -- 94 %   08/08/22 1300 -- (!) 119 19 -- 94 %   08/08/22 1200 98.3 °F (36.8 °C) 100 14 (!) 149/74 99 %   08/08/22 1045 -- 92 14 -- --   08/08/22 1030 -- 96 16 -- --   08/08/22 1015 -- 98 14 -- --   08/08/22 1009 -- -- (!) 40 -- --   08/08/22 1000 98.4 °F (36.9 °C) (!) 104 (!) 53 (!) 162/63 --   08/08/22 0945 -- 100 29 -- --   08/08/22 0930 (!) 37.8 °F (3.2 °C) 93 30 (!) 117/54 --   08/08/22 0612 98 °F (36.7 °C) (!) 117 16 108/69 100 %   08/08/22 0340 -- (!) 121 -- 123/71 --   08/08/22 0314 98.2 °F (36.8 °C) (!) 122 18 (!) 95/44 97 %   08/08/22 0001 98.6 °F (37 °C) (!) 103 20 115/60 96 %   08/07/22 1952 98.4 °F (36.9 °C) (!) 106 20 (!) 106/50 93 %   08/07/22 1618 98.6 °F (37 °C) (!) 106 16 (!) 142/66 92 %         Oxygen Therapy  SpO2: 97 %  Pulse Oximetry Type: Continuous  Pulse via Oximetry: 120 beats per minute  Pulse Oximeter Device Mode: Other (Comment)  O2 Device: Nasal cannula  Skin Assessment: Clean, dry, & intact  Skin Protection for O2 Device: No  FiO2 : 98 %  O2 Flow Rate (L/min): 4 L/min  Blood Gas  Performed?: Yes  Complications #1: None  Post-procedure #1: Standard  Specimen Status #1: Point of care  How Tolerated?: Tolerated well    Estimated body mass index is 34.33 kg/m² as calculated from the following:    Height as of this encounter: 5' 4\" (1.626 m). Weight as of this encounter: 200 lb (90.7 kg). Intake/Output Summary (Last 24 hours) at 8/8/2022 1537  Last data filed at 8/8/2022 0856  Gross per 24 hour   Intake --   Output 0 ml   Net 0 ml           Physical Exam:   Blood pressure (!) 149/74, pulse (!) 118, temperature 98.3 °F (36.8 °C), temperature source Axillary, resp. rate 11, height 5' 4\" (1.626 m), weight 200 lb (90.7 kg), SpO2 97 %. General:    NAD, drowsy  Head:  Normocephalic, atraumatic  Eyes:  Sclerae appear normal.  Pupils equally round. ENT:  Nares appear normal, no drainage. Moist oral mucosa  Neck:  No restricted ROM. Trachea midline   CV:   RRR. No m/r/g. Lungs: No wheezing. Symmetric expansion. On 4 L NC. Abdomen:   Soft, nondistended. Extremities: No cyanosis or clubbing. Skin:     No rashes and normal coloration. Neuro:  CN II-XII grossly intact.   Drowsy    I have personally reviewed labs and tests showing:  Recent Labs:  Recent Results (from the past 48 hour(s))   POCT Glucose    Collection Time: 08/06/22  3:56 PM   Result Value Ref Range    POC Glucose 145 (H) 65 - 100 mg/dL    Performed by: Clyde    POCT Glucose    Collection Time: 08/06/22  9:22 PM   Result Value Ref Range    POC Glucose 116 (H) 65 - 100 mg/dL    Performed by: [facility]    POCT Glucose    Collection Time: 08/07/22 12:47 AM   Result Value Ref Range    POC Glucose 150 (H) 65 - 100 mg/dL    Performed by: [facility]    POCT Glucose    Collection Time: 08/07/22  6:19 AM   Result Value Ref Range    POC Glucose 162 (H) 65 - 100 mg/dL    Performed by: [facility]    POCT Glucose    Collection Time: 08/07/22 11:57 AM   Result Value Ref Range    POC Glucose 170 (H) 65 - 100 mg/dL    Performed by: [facility]    POCT Glucose    Collection Time: 08/07/22  4:18 PM   Result Value Ref Range    POC Glucose 151 (H) 65 - 100 mg/dL    Performed by: Clyde    POCT Glucose    Collection Time: 08/07/22  8:10 PM   Result Value Ref Range    POC Glucose 159 (H) 65 - 100 mg/dL    Performed by: Grant(AS)    Renal Function Panel    Collection Time: 08/08/22  3:18 AM   Result Value Ref Range    Sodium 133 (L) 136 - 145 mmol/L    Potassium 4.2 3.5 - 5.1 mmol/L    Chloride 102 98 - 107 mmol/L    CO2 26 21 - 32 mmol/L    Anion Gap 5 (L) 7 - 16 mmol/L    Glucose 133 (H) 65 - 100 mg/dL    BUN 35 (H) 8 - 23 MG/DL    Creatinine 5.30 (H) 0.6 - 1.0 MG/DL    GFR African American 10 (L) >60 ml/min/1.73m2    GFR Non- 9 (L) >60 ml/min/1.73m2    Calcium 8.5 8.3 - 10.4 MG/DL    Phosphorus 2.9 2.3 - 3.7 MG/DL    Albumin 3.4 3.2 - 4.6 g/dL   CBC    Collection Time: 08/08/22  3:18 AM   Result Value Ref Range    WBC 10.7 4.3 - 11.1 K/uL    RBC 3.00 (L) 4.05 - 5.2 M/uL    Hemoglobin 9.9 (L) 11.7 - 15.4 g/dL    Hematocrit 29.2 (L) 35.8 - 46.3 %    MCV 97.3 79.6 - 97.8 FL    MCH 33.0 (H) 26.1 - 32.9 PG    MCHC 33.9 31.4 - 35.0 g/dL    RDW 17.8 (H) 11.9 - 14.6 %    Platelets 98 (L) 932 - 450 K/uL    MPV 13.4 (H) 9.4 - 12.3 FL    nRBC 0.10 0.0 - 0.2 K/uL   POCT Glucose    Collection Time: 08/08/22  5:43 AM   Result Value Ref Range    POC Glucose 119 (H) 65 - 100 mg/dL    Performed by: Grant(AS)    POCT Blood Gas & Electrolytes    Collection Time: 08/08/22  8:34 AM   Result Value Ref Range    pH, Arterial, POC 7.32 (L) 7.35 - 7.45      pCO2, Arterial, POC 47.7 (H) 35 - 45 MMHG    pO2, Arterial, POC 79 75 - 100 MMHG    POC Sodium 139 136 - 145 MMOL/L    POC Potassium 5.0 3.5 - 5.1 MMOL/L    POC Ionized Calcium 1.06 (L) 1.12 - 1.32 mmol/L    POC Glucose 203 (H) 65 - 100 MG/DL    BASE DEFICIT (POC) 1.8 mmol/L    HCO3, Mixed 24.6 22 - 26 MMOL/L    POC TCO2 25 (H) 13 - 23 MMOL/L    POC O2 SAT 94 % Source ARTERIAL      Performed by: Don     POC GFR  Cannot be calculated >60 ml/min/1.73m2    Glomerular Filtration Rate, POC Cannot be calculated >60 ml/min/1.73m2   POC Blood, Cord, Arterial    Collection Time: 08/08/22  9:55 AM   Result Value Ref Range    DEVICE ADULT VENT      FIO2 100 %    pH, Arterial, POC 7.38 7.35 - 7.45      pCO2, Arterial, POC 39.0 35 - 45 MMHG    pO2, Arterial,  (H) 75 - 100 MMHG    HCO3, Mixed 23.2 22 - 26 MMOL/L    SO2c, Arterial, POC 97.8 95 - 98 %    BASE DEFICIT (POC) 1.7 mmol/L    Mode CPAP/PS      POC PEEP 8 cmH2O    POC Joshua's Test NOT APPLICABLE      Site DRAWN FROM ARTERIAL LINE      Specimen type: ARTERIAL      Performed by: Johan Denton     RESPIRATORY COMMENT: ve7.17    POCT Glucose    Collection Time: 08/08/22 10:08 AM   Result Value Ref Range    POC Glucose 136 (H) 65 - 100 mg/dL    Performed by: Devante    POCT Blood Gas & Electrolytes    Collection Time: 08/08/22 12:11 PM   Result Value Ref Range    pH, Arterial, POC 7.42 7.35 - 7.45      pCO2, Arterial, POC 38.6 35 - 45 MMHG    pO2, Arterial,  (H) 75 - 100 MMHG    POC Sodium 140 136 - 145 MMOL/L    POC Potassium 4.0 3.5 - 5.1 MMOL/L    POC Ionized Calcium 0.98 (L) 1.12 - 1.32 mmol/L    POC Glucose 155 (H) 65 - 100 MG/DL    Base Excess 0.8 mmol/L    HCO3, Mixed 25.2 22 - 26 MMOL/L    POC TCO2 25 (H) 13 - 23 MMOL/L    POC O2  %    Source ARTERIAL      Site DRAWN FROM ARTERIAL LINE      POC Joshua's Test NOT APPLICABLE      DEVICE ADULT VENT      Mode CPAP/PS      FIO2 80 %    POC PEEP/CPA 8      Performed by: Ashwin     POC GFR  Cannot be calculated >60 ml/min/1.73m2    Glomerular Filtration Rate, POC Cannot be calculated >60 ml/min/1.73m2    RESPIRATORY COMMENT: ve8.7    Transthoracic echocardiogram (TTE) complete with contrast, bubble, strain, and 3D PRN    Collection Time: 08/08/22 12:37 PM   Result Value Ref Range    LV EDV A4C 30 mL Current Meds:  Current Facility-Administered Medications   Medication Dose Route Frequency    glucose chewable tablet 16 g  4 tablet Oral PRN    dextrose bolus 10% 125 mL  125 mL IntraVENous PRN    Or    dextrose bolus 10% 250 mL  250 mL IntraVENous PRN    glucagon (rDNA) injection 1 mg  1 mg SubCUTAneous PRN    dextrose 10 % infusion   IntraVENous Continuous PRN    fentaNYL (SUBLIMAZE) injection 50 mcg  50 mcg IntraVENous Q1H PRN    midazolam PF (VERSED) injection 1 mg  1 mg IntraVENous Q2H PRN    norepinephrine (LEVOPHED) 16 mg in sodium chloride 0.9 % 250 mL infusion  1-100 mcg/min IntraVENous Continuous    oxyCODONE (ROXICODONE) immediate release tablet 5 mg  5 mg Oral Q6H PRN    traMADol (ULTRAM) tablet 50 mg  50 mg Oral Q6H PRN    insulin lispro (HUMALOG) injection vial 0-8 Units  0-8 Units SubCUTAneous TID WC    insulin lispro (HUMALOG) injection vial 0-4 Units  0-4 Units SubCUTAneous Nightly    temazepam (RESTORIL) capsule 15 mg  15 mg Oral Nightly PRN    midodrine (PROAMATINE) tablet 10 mg  10 mg Oral TID WC    pantoprazole (PROTONIX) tablet 40 mg  40 mg Oral QAM AC    sevelamer (RENVELA) tablet 2,400 mg  2,400 mg Oral TID WC    alogliptin (NESINA) tablet 6.25 mg  6.25 mg Oral Daily    sodium chloride flush 0.9 % injection 5-40 mL  5-40 mL IntraVENous 2 times per day    sodium chloride flush 0.9 % injection 5-40 mL  5-40 mL IntraVENous PRN    0.9 % sodium chloride infusion   IntraVENous PRN    ondansetron (ZOFRAN-ODT) disintegrating tablet 4 mg  4 mg Oral Q8H PRN    Or    ondansetron (ZOFRAN) injection 4 mg  4 mg IntraVENous Q6H PRN    polyethylene glycol (GLYCOLAX) packet 17 g  17 g Oral Daily PRN    acetaminophen (TYLENOL) tablet 650 mg  650 mg Oral Q6H PRN    Or    acetaminophen (TYLENOL) suppository 650 mg  650 mg Rectal Q6H PRN    heparin (porcine) injection 5,000 Units  5,000 Units SubCUTAneous 3 times per day    heparin (porcine) injection 5,000 Units  5,000 Units IntraVENous PRN    Epoetin

## 2022-08-08 NOTE — ANESTHESIA PROCEDURE NOTES
Airway  Date/Time: 8/8/2022 8:05 AM  Urgency: elective    Airway not difficult    General Information and Staff    Patient location during procedure: OR  Resident/CRNA: SUNSHINE Cortez - CRNA  Performed: resident/CRNA     Indications and Patient Condition  Indications for airway management: anesthesia  Spontaneous Ventilation: absent  Sedation level: deep  Preoxygenated: yes  Patient position: sniffing  MILS not maintained throughout  Mask difficulty assessment: vent by bag mask + OA or adjuvant +/- NMBA    Final Airway Details  Final airway type: endotracheal airway      Successful airway: ETT  Cuffed: yes   Successful intubation technique: direct laryngoscopy  Facilitating devices/methods: intubating stylet  Endotracheal tube insertion site: oral  Blade: Gusman  Blade size: #3  ETT size (mm): 7.0  Cormack-Lehane Classification: grade I - full view of glottis  Placement verified by: chest auscultation and capnometry   Measured from: lips  ETT to lips (cm): 23  Number of attempts at approach: 1  Ventilation between attempts: bag mask

## 2022-08-08 NOTE — CONSULTS
discard. Repeat with new spray every 2 min as needed for opioid overdose symptoms, alternating nostrils. omeprazole (PRILOSEC) 40 mg, Oral, Nightly    oxyCODONE-acetaminophen (PERCOCET) 5-325 MG per tablet 1 tablet, Oral, EVERY 4 HOURS PRN    OXYGEN Inhalation, 2 LITERS AS NEEDED FOR SOB VIA NASAL CANNULA    sevelamer (RENVELA) 800 MG tablet 3 tablets, Oral, 3 TIMES DAILY WITH MEALS, PT TAKES 3 TABS WITH MEALS-- AND 1 TABS WITH SNACKS     SITagliptin (JANUVIA) 50 mg, Oral, DAILY      Past Medical History:   Diagnosis Date    Anemia     Arthritis     AVF (arteriovenous fistula) (HCC)     left    AVF (arteriovenous fistula) ([facility] 12/20/2016 12/6/16 ([facility]) Right AVF revision and thrombectomy    Degenerative joint disease     Diabetes ([facility]     type 2--- does not check sqbs at home    Enlarged lymph node     \"enlarging paratracheal node to 2.6 cm on CT chest\"    ESRD on hemodialysis ([facility] onset 2006    ESRD.  MWF dialysis at [facility]    Hypertension     controlled with med    Mediastinal mass     being followed by dr ramírez--per pt-- states told by dr Rabia Barton it wasnt cancer-- but plans to be rescanned 5/2022    Obesity     On home O2     2L QHS and PRN during day d/t \"lymph node in chest\"    Renal cancer ([facility]     Dr Lev Vazquez - renal cancer L nephrectomy---and radiation ---    Shortness of breath     swollen lymphnode in chest-- oxygen 2LPM    Transient ischemic attack 08/29/2015    no residual     Past Surgical History:   Procedure Laterality Date    BREAST SURGERY Right     cyst removed    COLONOSCOPY N/A 11/8/2018    COLONOSCOPY  BMI 36 performed by Rema Washington MD at [address] [place] [address]  9/14/2017    CT BIOPSY ABDOMEN RETROPERITONEUM 9/14/2017 [facility] CT SCAN    GI  08/06/2018    exploratory laparotomy    GI  06/01/2002    colon resection resulting in temporary colostomy reversal    GROIN SURGERY Left 6/26/2022    LEFT GROIN DEBRIDEMENT OF HEMATOMA / MIGEL performed by Davion Durán MD at [facility] MAIN OR    GYN      mihir    IR INTRO [address] PTA  3/10/2020    IR INTRO CATH DIALYSIS CIRCUIT [address] [place] PTA  12/5/2019    IR INTRO CATH DIALYSIS CIRCUIT [facility] [place] PTA  9/3/2019    IR INTRO CATH DIALYSIS CIRCUIT [facility] [place] PTA  5/30/2019    IR INTRO CATH DIALYSIS CIRCUIT [facility] [place] PTA  2/28/2019    IR THRMB/INFUSION DIAYSIS CIRCUIT Left 2021    IR TUNNELED CATHETER PLACEMENT GREATER THAN 5 YEARS  3/15/2022    IR TUNNELED CATHETER PLACEMENT GREATER THAN 5 YEARS  11/19/2021    IR TUNNELED CATHETER PLACEMENT GREATER THAN 5 YEARS  11/19/2021    IR TUNNELED CATHETER PLACEMENT GREATER THAN 5 YEARS 11/19/2021 [facility] SPECIALS    IR TUNNELED CATHETER PLACEMENT GREATER THAN 5 YEARS  3/15/2022    IR TUNNELED CATHETER PLACEMENT GREATER THAN 5 YEARS 3/15/2022 [facility] SPECIALS    OTHER SURGICAL HISTORY      dialysis fistula, several permcaths    UROLOGICAL SURGERY Left July 2015    nephrectomy    VASCULAR SURGERY Left 04/07/2022    LEFT ARM AV GRAFT    VASCULAR SURGERY Left 03/15/2022    declot    VASCULAR SURGERY Left 02/21/2022    Open thromboembolectomy of left upper arm graft. VASCULAR SURGERY Right     AV graft- states no longer uses    VASCULAR SURGERY Left 5/26/2022    LEFT AV GRAFT THIGH performed by Harpreet Deshpande MD at [facility] MAIN OR     Social History     Socioeconomic History    Marital status:       Spouse name: Not on file    Number of children: Not on file    Years of education: Not on file    Highest education level: Not on file   Occupational History    Not on file   Tobacco Use    Smoking status: Never    Smokeless tobacco: Never   Vaping Use    Vaping Use: Never used   Substance and Sexual Activity    Alcohol use: No    Drug use: No    Sexual activity: Not on file   Other Topics Concern    Not on file   Social History Narrative    Not on file     Social Determinants of Health     Financial Resource Strain: Not on file   Food Insecurity: Not on file   Transportation Needs: Not on file   Physical Activity: Not on file   Stress: Not on file   Social Connections: Not on file   Intimate Partner Violence: Not on file   Housing Stability: Not on file     Family History   Problem Relation Age of Onset    Diabetes Mother     Heart Disease Mother     Hypertension Mother     Heart Disease Father     Hypertension Father     Post-op Cognitive Dysfunction Neg Hx     Pseudochol. Deficiency Neg Hx     Delayed Awakening Neg Hx     Post-op Nausea/Vomiting Neg Hx     Emergence Delirium Neg Hx     Other Neg Hx     Malig Hypertherm Neg Hx      Allergies   Allergen Reactions    Penicillins Other (See Comments) and Rash     PT STATES UNSURE OF RX  Tolerated cefazolin    Vancomycin Rash     Objective:   Blood pressure 108/69, pulse (!) 117, temperature 98 °F (36.7 °C), temperature source Temporal, resp. rate 16, height 5' 4\" (1.626 m), weight 200 lb (90.7 kg), SpO2 100 %. Intake/Output Summary (Last 24 hours) at 8/8/2022 0845  Last data filed at 8/7/2022 1156  Gross per 24 hour   Intake 50 ml   Output --   Net 50 ml     PHYSICAL EXAM   Constitutional:  mechanically vented and sedated  EENMT:  Sclera clear, pupils equal, oral mucosa moist  Respiratory: congested on vent PS  Cardiovascular:  RRR without M,G,R  Gastrointestinal: soft and non-tender; with positive bowel sounds. Musculoskeletal: warm without cyanosis. There is no lower extremity edema.   Skin:  no jaundice or rashes, no wounds   Neurologic: sedated  Psychiatric:  vented and sedated    CXR:  8/2      CT Chest: 7/1      Recent Labs     08/08/22  0318   WBC 10.7   HGB 9.9*   HCT 29.2*   PLT 98*   *   K 4.2      CO2 26   BUN 35*   PHOS 2.9     ECHO: 06/25/22    TRANSTHORACIC ECHOCARDIOGRAM (TTE) COMPLETE (CONTRAST/BUBBLE/3D PRN) 06/27/2022  3:25 PM (Final)    Interpretation Summary  Formatting of this result is different from the original.      Left Ventricle: Normal left ventricular systolic function with a visually estimated EF of 60 - 65%. Left ventricle size is normal. Normal wall thickness. \" D\" shaped septum c/w volume and pressure overload. Normal wall motion. Normal diastolic function. Right Ventricle: Right ventricle is severely dilated. Normal wall thickness. Severely reduced systolic function. Tricuspid Valve: Valve structure is normal. Severe annular dilation. Severe regurgitation with a centrally directed jet. Severely elevated RVSP. Right Atrium: Right atrium is severely dilated. Technical qualifiers: Color flow Doppler was performed and pulse wave and/or continuous wave Doppler was performed. Signed by: Mamie Craven MD on 6/27/2022  3:25 PM    MICRO: No results for input(s): CULTURE in the last 72 hours. Assessment and Plan:  (Medical Decision Making)     Assessment and plan to follow per MD.       Full Code    Thank you very much for this referral.  We appreciate the opportunity to participate in this patient's care. Will follow along with above stated plan. In this split/shared evaluation I performed performed a medically appropriate history and exam, counseled and educated the patient and/or family member, ordered medications, tests or procedures, documented information in EMR, and coordinated care. which accounted for 20 clinical time. SUNSHINE Avila - CNP       In this split/shared evaluation I performed reviewed the patients's H&P, available images, labs, cultures. , discussed case in detail with NPP, performed a medically appropriate history and exam, counseled and educated the patient and/or family member, ordered and/or reviewed medications, tests or procedures, documented information in EMR, independently interpreted images, and coordinated care. which accounted for 24 minutes clinical time. Impression:   Patient with ESRD on HD, what appears to be [place] [address] based on last TTE, chronic respiatory failure, and hypotension.  Concern that some of her hypotension issues were due to L thigh AV graft. To OR today for ligation but became hypotensive with induction. Currently on no pressors and BP normal. Arrived intubated. Unable to  sat. Will check CXR for ett positioning and ABG. If sat is acceptable will begin weaning Fio2 with plan to attempt extubation. Will repeat TTE now as well.  Wonder if may benefit from [place] workup but will need to see what her baseline looks like first.     Benjamin Santoyo MD

## 2022-08-08 NOTE — PERIOP NOTE
TRANSFER - OUT REPORT:    Verbal report given to Jacobo Gold on [address]  being transferred to CCU for change in patient condition ( )       Report consisted of patient's Situation, Background, Assessment and   Recommendations(SBAR). Information from the following report(s) Nurse Handoff Report, Surgery Report, and Event Log was reviewed with the receiving nurse. Lines:   Peripheral IV 08/08/22 Left;Posterior Forearm (Active)   Site Assessment Clean, dry & intact 08/08/22 0618   Line Status Blood return noted; Infusing 08/08/22 0618   Line Care Cap changed 08/08/22 0057   Phlebitis Assessment No symptoms 08/08/22 0618   Infiltration Assessment 0 08/08/22 0618   Alcohol Cap Used Yes 08/08/22 0057   Dressing Status Clean, dry & intact 08/08/22 0618   Dressing Type Transparent 08/08/22 0618       Hemodialysis Central Access Right Subclavian (Active)   Continued need for line? Yes 08/07/22 0730   Site Assessment Clean, dry & intact 08/07/22 1516   CVC Lumen Status Blood return noted; Flushed; Infusing 08/03/22 1120   Venous Lumen Status Blood return noted; Flushed; Infusing 08/06/22 0702   Arterial Lumen Status Blood return noted; Flushed; Infusing 08/06/22 0702   Alcohol Cap Used Yes 08/03/22 1120   Line Care Connections checked and tightened;Ports disinfected;Line pulled back 08/06/22 6215   Dressing Type Antimicrobial;Transparent 08/06/22 0702   Date of Last Dressing Change 08/03/22 08/06/22 0702   Dressing Status Other (Comment) 08/06/22 0343   Dressing Intervention Other (Comment) 08/06/22 0343   Dressing Change Due 08/10/22 08/03/22 1120        Opportunity for questions and clarification was provided.       Patient transported with:  Monitor, O2 @ 15lpm, and VINICIO [place]

## 2022-08-08 NOTE — ANESTHESIA POSTPROCEDURE EVALUATION
Department of Anesthesiology  Postprocedure Note    Patient: Eufemia Pires  MRN: [id number]  YOB: 1950  Date of evaluation: 8/8/2022      Procedure Summary     Date: 08/08/22 Room / Location: [facility] MAIN OR 09 / [facility] MAIN OR    Anesthesia Start: 5641 Anesthesia Stop: 2546    Procedure: LEFT THIGH ARTERIO VENOUS GRAFT Ligation (Left) Diagnosis:       End stage renal disease ([address]      (End stage renal disease ([address] [N18.6])    Providers: Farrukh Espinoza MD Responsible Provider: Army Ya MD    Anesthesia Type: General ASA Status: 4          Anesthesia Type: General    Be Phase I: Be Score: 8    Be Phase II:        Anesthesia Post Evaluation    Patient location during evaluation: ICU  Patient participation: complete - patient cannot participate  Level of consciousness: sleepy but conscious  Pain score: 1  Airway patency: patent  Nausea & Vomiting: no vomiting  Complications: no  Cardiovascular status: hemodynamically stable  Respiratory status: acceptable, ventilator and intubated  Hydration status: euvolemic  Comments: Handoff given to ICU nurse and primary team. Patient off pressors, opening eyes spontaneously, still intubated but only requiring pressure support currently. Discussed intraoperative course with ICU team. Will call patient's daughter and discuss case.

## 2022-08-08 NOTE — PROGRESS NOTES
Received patient from [place] staff, placed on PS as patient was breathing spontaneously, vent check complete; patient has a #7. 0 ET tube secured at the 21 at the lip. Patient is not sedated. Patient is not able to follow commands. Breath sounds are coarse. Trachea is midline, Negative for subcutaneous air, and chest excursion is symmetric. Patient is also unknown for cyanosis (sat probe is not picking up currently) and is Negative for pitting edema. All alarms are set and audible. Resuscitation bag is  at the head of the bed.  connected to nurses call bell system. Ventilator Settings  Mode FIO2 Rate Tidal Volume Pressure PEEP I:E Ratio      28 %                    Peak airway pressure:     Minute ventilation:       ABG: No results for input(s): PH, PCO2, PO2, HCO3 in the last 72 hours.       Yaa Rojas RCP

## 2022-08-08 NOTE — PROGRESS NOTES
Respiratory Mechanics completed and are as follows:     Patient extubated to a HFNC 40 @ 50%. Patient is able to communicate and is negative for stridor. Breath sounds are coarse. No complications with extubation.      Marylene Blind, RCP

## 2022-08-08 NOTE — PROGRESS NOTES
Patient initially scheduled for a left thigh graft ligation with the indications being worsening pulmonary and cardiac function with a high functioning left thigh graft. I was called into the OR as patient blood pressure was in the 30s. I placed a right femoral art line, patient persisted to be hypotensive. I canceled the case. I discussed with the intensivist and patient's primary nephrologist Dr. Jeanmarie Ga the situation. Patient be transferred to the ICU for further care. I did notify patient's daughter Ms. Kirkpatrick, [phone], her heart her overall condition.     Madiha Manley

## 2022-08-08 NOTE — PROGRESS NOTES
SPEECH PATHOLOGY NOTE    Speech therapy consult received and appreciated. Patient extubated earlier this afternoon. She remains drowsy and unable to maintain adequate alertness for po intake. Will follow up at later time.      EPHRAIM Reyna, CCC-SLP  Speech Language Pathologist  Acute Rehabilitation Services  Contact: Dee

## 2022-08-08 NOTE — PROGRESS NOTES
TRANSFER - OUT REPORT:    Verbal report given to CECIL Miller  on [address]  being transferred to Pre-op for ordered procedure       Report consisted of patient's Situation, Background, Assessment and   Recommendations(SBAR). Information from the following report(s) Nurse Handoff Report, Intake/Output, and MAR was reviewed with the receiving nurse. Lines:   Peripheral IV 08/08/22 Left;Posterior Forearm (Active)   Site Assessment Clean, dry & intact 08/08/22 0057   Line Status Flushed;Capped 08/08/22 0057   Line Care Cap changed 08/08/22 0057   Phlebitis Assessment No symptoms 08/08/22 0057   Infiltration Assessment 0 08/08/22 0057   Alcohol Cap Used Yes 08/08/22 0057   Dressing Status Clean, dry & intact 08/08/22 0057   Dressing Type Transparent 08/08/22 0057       Hemodialysis Central Access Right Subclavian (Active)   Continued need for line? Yes 08/07/22 0730   Site Assessment Clean, dry & intact 08/07/22 1516   CVC Lumen Status Blood return noted; Flushed; Infusing 08/03/22 1120   Venous Lumen Status Blood return noted; Flushed; Infusing 08/06/22 0702   Arterial Lumen Status Blood return noted; Flushed; Infusing 08/06/22 0702   Alcohol Cap Used Yes 08/03/22 1120   Line Care Connections checked and tightened;Ports disinfected;Line pulled back 08/06/22 8881   Dressing Type Antimicrobial;Transparent 08/06/22 0702   Date of Last Dressing Change 08/03/22 08/06/22 0702   Dressing Status Other (Comment) 08/06/22 0343   Dressing Intervention Other (Comment) 08/06/22 0343   Dressing Change Due 08/10/22 08/03/22 1120        Opportunity for questions and clarification was provided.       Patient transported with:  Registered Nurse

## 2022-08-08 NOTE — CARE COORDINATION
Chart reviewed s/p tx to ICU from OR after canceled case for left thigh arterio venous graft per Dr. Flako Mcdonald after being on 7th floor. Pt now intubated/vent -100% fio2 per RT. Levo gtt. PPD already placed for potential rehab needs. Will need PT/OT when stable per MD. CM following. LOS 5 days.

## 2022-08-08 NOTE — BRIEF OP NOTE
Waqas Mcbride, Amanda Julian. [address] FAX: [phone]    Brief Op Note Template Note    Pre-Op Diagnosis: End stage renal disease ([facility] [N18.6]    Post-Op Diagnosis:  * No post-op diagnosis entered *    Procedures: Procedure(s):  LEFT THIGH ARTERIO VENOUS GRAFT Ligation    Surgeon: Valentina Sandra MD    Assistants: Surgeon(s):  Colette Emanuel MD      Anesthesia:  General     Findings: Patient procedure canceled secondary to instability with hypotension    Tourniquet Time:  * No tourniquets in log *    Estimated Blood Loss:               Specimens:            Implants:  * No implants in log *    Complications: None               Signed: Valentina Sandra MD      Elements of this note have been dictated using speech recognition software. As a result, errors of speech recognition may have occurred.

## 2022-08-08 NOTE — PROGRESS NOTES
[address]   [address] [facility] [address] FAX: [phone]         VASCULAR SURGERY FLOOR PROGRESS NOTE    Admit Date: 2022  POD: Day of Surgery    Procedure:  Procedure(s):  LEFT THIGH ARTERIO VENOUS GRAFT Ligation    Subjective:     Patient has no new complaints. Objective:     Vitals:  Blood pressure 108/69, pulse (!) 117, temperature 98 °F (36.7 °C), temperature source Temporal, resp. rate 16, height 5' 4\" (1.626 m), weight 200 lb (90.7 kg), SpO2 100 %. Temp (24hrs), Av.5 °F (36.9 °C), Min:98 °F (36.7 °C), Max:99.3 °F (37.4 °C)      Intake / Output:    Intake/Output Summary (Last 24 hours) at 2022 0643  Last data filed at 2022 1156  Gross per 24 hour   Intake 100 ml   Output --   Net 100 ml       Physical Exam:    Constitutional: she appears well-developed. No distress. HENT:   Head: Atraumatic. Eyes: Pupils are equal, round, and reactive to light. Neck: Normal range of motion. Cardiovascular: Regular rhythm. Pulmonary/Chest: Effort normal and breath sounds normal. No respiratory distress. Abdominal: Soft. Bowel sounds are normal. she exhibits no distension. There is no tenderness. There is no guarding. No hernia. Musculoskeletal: Normal range of motion. Neurological: She is alert.  CN II- XII grossly intact  Vascular:  thrill bruit  Labs:   Recent Labs     22  0318   HGB 9.9*   WBC 10.7   K 4.2       Data Review     Assessment:     Patient Active Problem List    Diagnosis Date Noted    SIRS (systemic inflammatory response syndrome) ([facility] 2022    Hematoma of left lower leg 2022    Cardiac murmur 2022    Back pain 2022    CKD (chronic kidney disease) stage 4, GFR 15-29 ml/min ([facility]) 2022    Generalized weakness     Thrombocytopenia ([address] 2022    Hypotension 2022    Chronic respiratory failure with hypoxia ([address] 2021    Severe obesity ([facility] 2018    Type 2 diabetes mellitus with nephropathy ([facility] 12/15/2017    Renal cell carcinoma ([facility] 09/12/2017    AVF (arteriovenous fistula) ([facility] 12/20/2016    Osteoarthritis of right knee 02/07/2013    HTN (hypertension) 02/07/2013    ESRD (end stage renal disease) on dialysis ([facility] 02/07/2013    Type 2 diabetes mellitus with hyperglycemia [facility] 02/07/2013    Total knee replacement status 02/07/2013       Plan/Recommendations/Medical Decision Making:     Plan for ligation of left thigh AVG    Elements of this note have been dictated using speech recognition software. As a result, errors of speech recognition may have occurred.

## 2022-08-08 NOTE — PROGRESS NOTES
PT Daily Note:  Attempted to see patient for physical therapy this morning but patient was off the floor in surgery. Will check back on patient at a later date/time if schedule permits. Thank you,  Aaron Motley.  SHARONA Rodriguez

## 2022-08-08 NOTE — PROGRESS NOTES
Pt arrived from OR with CRNA x3. Very lethargic, doesn't follow commands. Normotensive, MAP in the upper 50's-60's (pt baseline per CRNA). ABG, CXR ordered. Unable to obtain oxygen saturation with multiple oxygen probes in multiple locations - MD aware and ok with going off ABG results.

## 2022-08-08 NOTE — PROGRESS NOTES
Occupational Therapy Note:  Therapist is discharging patient from OT at this time due to decline in medical status and transfer to ICU. Please reconsult OT when MD deems patient appropriate for continued services. Thank you.   Amber Maldonado, OTR/L

## 2022-08-09 PROBLEM — A41.9 SEPTIC SHOCK (HCC): Status: ACTIVE | Noted: 2022-01-01

## 2022-08-09 PROBLEM — G93.41 ACUTE METABOLIC ENCEPHALOPATHY: Status: ACTIVE | Noted: 2022-01-01

## 2022-08-09 PROBLEM — R65.21 SEPTIC SHOCK (HCC): Status: ACTIVE | Noted: 2022-01-01

## 2022-08-09 NOTE — PROGRESS NOTES
Hemodialysis Rounding Note    Subjective: Reji Lema is a 80 y.o.  who presents with End stage renal disease on dialysis ([facility] [N18.6, Z99.2]  SIRS (systemic inflammatory response syndrome) (HCC) [R65.10]  Elevated lactic acid level [R79.89]  Hypotension, unspecified hypotension type [I95.9]. The patient is dialyzing utilizing the following method:Intermittent Hemodialysis  Artificial Kidney Dialysis Machine No.: [id number]   hours Dialyze Hours: 8   blood flow rate Blood Flow Rate (ml/min): 150 ml/min   dialysate rate    ultrafiltration Ultrafiltration Rate (ml/hr): 100 ml/hr   Heparin is  used during the dialysis treatment. Complaints none.    Extubated yesterday, on Levophed for BP support    Current Facility-Administered Medications   Medication Dose Route Frequency    alcohol 62% (NOZIN) nasal  1 ampule  1 ampule Topical Daily    [START ON 8/10/2022] cefepime (MAXIPIME) 1,000 mg in sodium chloride 0.9 % 50 mL IVPB mini-bag  1,000 mg IntraVENous Q24H    vancomycin (VANCOCIN) 500 mg in sodium chloride 0.9 % 100 mL IVPB (mini-bag)  500 mg IntraVENous Once    vancomycin (VANCOCIN) intermittent dosing (placeholder)   Other RX Placeholder    glucose chewable tablet 16 g  4 tablet Oral PRN    dextrose bolus 10% 125 mL  125 mL IntraVENous PRN    Or    dextrose bolus 10% 250 mL  250 mL IntraVENous PRN    glucagon (rDNA) injection 1 mg  1 mg SubCUTAneous PRN    dextrose 10 % infusion   IntraVENous Continuous PRN    fentaNYL (SUBLIMAZE) injection 50 mcg  50 mcg IntraVENous Q1H PRN    midazolam PF (VERSED) injection 1 mg  1 mg IntraVENous Q2H PRN    norepinephrine (LEVOPHED) 16 mg in sodium chloride 0.9 % 250 mL infusion  1-100 mcg/min IntraVENous Continuous    dexmedetomidine (PRECEDEX) 400 mcg in sodium chloride 0.9 % 100 mL infusion  0.1-1.5 mcg/kg/hr IntraVENous Continuous    oxyCODONE (ROXICODONE) immediate release tablet 5 mg  5 mg Oral Q6H PRN    traMADol (ULTRAM) tablet 50 mg  50 mg Oral Q6H PRN    insulin lispro (HUMALOG) injection vial 0-8 Units  0-8 Units SubCUTAneous TID WC    insulin lispro (HUMALOG) injection vial 0-4 Units  0-4 Units SubCUTAneous Nightly    temazepam (RESTORIL) capsule 15 mg  15 mg Oral Nightly PRN    midodrine (PROAMATINE) tablet 10 mg  10 mg Oral TID WC    pantoprazole (PROTONIX) tablet 40 mg  40 mg Oral QAM AC    sevelamer (RENVELA) tablet 2,400 mg  2,400 mg Oral TID WC    alogliptin (NESINA) tablet 6.25 mg  6.25 mg Oral Daily    sodium chloride flush 0.9 % injection 5-40 mL  5-40 mL IntraVENous 2 times per day    sodium chloride flush 0.9 % injection 5-40 mL  5-40 mL IntraVENous PRN    0.9 % sodium chloride infusion   IntraVENous PRN    ondansetron (ZOFRAN-ODT) disintegrating tablet 4 mg  4 mg Oral Q8H PRN    Or    ondansetron (ZOFRAN) injection 4 mg  4 mg IntraVENous Q6H PRN    polyethylene glycol (GLYCOLAX) packet 17 g  17 g Oral Daily PRN    acetaminophen (TYLENOL) tablet 650 mg  650 mg Oral Q6H PRN    Or    acetaminophen (TYLENOL) suppository 650 mg  650 mg Rectal Q6H PRN    heparin (porcine) injection 5,000 Units  5,000 Units SubCUTAneous 3 times per day    heparin (porcine) injection 5,000 Units  5,000 Units IntraVENous PRN    Epoetin Martin-epbx (RETACRIT) injection 20,000 Units  20,000 Units SubCUTAneous Q7 Days       08/09 0701 - 08/09 1900  In: 300   Out: 542   08/07 1901 - 08/09 0700  In: 27.8 [I.V.:27.8]  Out: 0     Recent Results (from the past 24 hour(s))   POCT Glucose    Collection Time: 08/08/22  4:09 PM   Result Value Ref Range    POC Glucose 149 (H) 65 - 100 mg/dL    Performed by: Romana    POCT Glucose    Collection Time: 08/08/22  8:27 PM   Result Value Ref Range    POC Glucose 184 (H) 65 - 100 mg/dL    Performed by:  Ankur    CBC with Auto Differential    Collection Time: 08/09/22  3:16 AM   Result Value Ref Range    WBC 19.5 (H) 4.3 - 11.1 K/uL    RBC 3.01 (L) 4.05 - 5.2 M/uL    Hemoglobin 9.8 (L) 11.7 - 15.4 g/dL    Hematocrit 28.9 (L) 35.8 - 46.3 %    MCV 96.0 79.6 - 97.8 FL    MCH 32.6 26.1 - 32.9 PG    MCHC 33.9 31.4 - 35.0 g/dL    RDW 18.2 (H) 11.9 - 14.6 %    Platelets 663 (L) 828 - 450 K/uL    MPV 12.3 9.4 - 12.3 FL    nRBC 0.16 0.0 - 0.2 K/uL    Differential Type AUTOMATED      Seg Neutrophils 89 (H) 43 - 78 %    Lymphocytes 4 (L) 13 - 44 %    Monocytes 6 4.0 - 12.0 %    Eosinophils % 0 (L) 0.5 - 7.8 %    Basophils 0 0.0 - 2.0 %    Immature Granulocytes 1 0.0 - 5.0 %    Segs Absolute 17.4 (H) 1.7 - 8.2 K/UL    Absolute Lymph # 0.7 0.5 - 4.6 K/UL    Absolute Mono # 1.1 0.1 - 1.3 K/UL    Absolute Eos # 0.0 0.0 - 0.8 K/UL    Basophils Absolute 0.1 0.0 - 0.2 K/UL    Absolute Immature Granulocyte 0.2 0.0 - 0.5 K/UL   Renal Function Panel    Collection Time: 22  3:16 AM   Result Value Ref Range    Sodium 138 136 - 145 mmol/L    Potassium 4.8 3.5 - 5.1 mmol/L    Chloride 101 98 - 107 mmol/L    CO2 20 (L) 21 - 32 mmol/L    Anion Gap 17 (H) 7 - 16 mmol/L    Glucose 160 (H) 65 - 100 mg/dL    BUN 45 (H) 8 - 23 MG/DL    Creatinine 6.30 (H) 0.6 - 1.0 MG/DL    GFR African American 8 (L) >60 ml/min/1.73m2    GFR Non- 7 (L) >60 ml/min/1.73m2    Calcium 8.4 8.3 - 10.4 MG/DL    Phosphorus 4.3 (H) 2.3 - 3.7 MG/DL    Albumin 3.8 3.2 - 4.6 g/dL   POCT Glucose    Collection Time: 22  6:45 AM   Result Value Ref Range    POC Glucose 140 (H) 65 - 100 mg/dL    Performed by: Ankur    POCT Glucose    Collection Time: 22 12:01 PM   Result Value Ref Range    POC Glucose 130 (H) 65 - 100 mg/dL    Performed by: Jose R Araiza              Objective:     Blood pressure (!) 80/30, pulse (!) 104, temperature 98.4 °F (36.9 °C), temperature source Axillary, resp. rate (!) 35, height 5' 4\" (1.626 m), weight 200 lb (90.7 kg), SpO2 93 %.   Temp (24hrs), Av.8 °F (37.1 °C), Min:98 °F (36.7 °C), Max:99.6 °F (37.6 °C)      Physical Exam:   Lungs: CTAB  Heart: S1S2, no m/r/g  Abdomen: soft, +BS  Extremities: no edema    Assessment: End Stage Renal Disease:  Patient is tolerating dialysis treatment well. .  Additionally the patient has experienced normal dialysis treatment during dialysis. Dry weight   same. Anemia:    Recent Labs     08/09/22  0316   HCT 28.9*   HGB 9.8*       Renal Metabolic Bone Disease:  No results for input(s): CA, ALB, PTH in the last 72 hours. Invalid input(s): PO4                     Treatment:  PO4 binders, Cinacaleat, Vitamin D  Hypertension: controlled    Access: adequate monitoring/no changes    Principal Problem:    SIRS (systemic inflammatory response syndrome) (HCC)  Active Problems:    Hypotension    Renal cell carcinoma (HCC)    ESRD (end stage renal disease) on dialysis (HCC)    Chronic respiratory failure with hypoxia (HCC)    Type 2 diabetes mellitus with nephropathy ([facility]  Resolved Problems:    * No resolved hospital problems.  *         Plan:     Continue scheduled dialysis    ARMIDA Schmidt

## 2022-08-09 NOTE — PROGRESS NOTES
2020: femstop removed no s/sx of hematoma noted, will continue to monitor. 2029: SBP continues to remains well under 90, Levo gtt titrated up to 45 mcg/min. Informed Dr. Kade Valdivia about the accuracy of the NIBP. Albumin admistered per order. 2056:pt became increasingly agitated, precedex administered per order    2100: femstop applied to right groin, noted the formation of a small hematoma    2245: pt is resting comfortably, r groin assessed femstop removed at this time. 2312:  precedex decreased   2350: precedex paused, NIBP's continue to flutuate, levophed remains at 45 mcg/min.

## 2022-08-09 NOTE — PROGRESS NOTES
Formerly [facility], [facility] Critical Care Note[de-identified] 8/9/2022  Cj Pires  Admission Date: 8/2/2022     Length of Stay: 6 days    Background: 80 y.o. 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 Required thoracentesis of pleural effusions- 2/2022, transudate. Last echo 6/25 with EF 60-65%, RV severely dilated with elevated RVSP. HPI obtained from chart due to patient status. Presented to ER with hypotension and mid back pain on 8/3. She did not have normal fluid removal in HD on Monday due to hypotension per patient. Chronically on midodrine OP. In ER given gentle hydration, started on Vanc/cefepime. WBC 7, lactic 3.5. Procal was low as abx were discontinued, Bcx NG. Patient has L thigh AV graft and Vasc consulted for ligation with thoughts this may be contributing to hypotension. This AM she was scheduled for surgery, she was intubated for surgery and was found to have SBP in 30s. Required multiple pushes of vasopressors and briefly Epi gtt. Was drowsy prior to induction per Anesthesia. She received propfol bolus, fentanyl push and versed push. Case was cancelled and we were consulted for vent management and ICU care. .    Notable PMH:  has a past medical history of Anemia, Arthritis, AVF (arteriovenous fistula) ([address] AVF (arteriovenous fistula) ([address] Degenerative joint disease, Diabetes ([address] Enlarged lymph node, ESRD on hemodialysis ([address] Hypertension, Mediastinal mass, Obesity, On home O2, Renal cancer ([address] Shortness of breath, and Transient ischemic attack. 24 Hour events: Pt was brought to ICU intubated after failed attempt at graft ligation due to severe hypotension. Pt was extubated yesterday afternoon. This morning she is down to 0.2 precedex. Levophed down to 10 from 25. Pt is somewhat agitated, refusing to cooperate with staff.      WBC jumped to 19.5. Afebrile overnight. ROS: unable to obtain due to pt condition. Lines: (insertion date)     Hemodialysis Central Access Right Subclavian (Active)       Hemodialysis Fistula/Graft Arteriovenous vein graft Left Thigh (Active)     Drips: current dose (range)  Dose (mcg/kg/min) Propofol : *90 mg  Dose (mcg/kg/hr) Dexmedetomidine: 0.2 mcg/kg/hr  Dose (mcg/min) Epinephrine: 0 mcg/min  Dose (mcg/min) Norepinephrine: 25 mcg/min (bp 122/60)     Pertinent Exam:         Blood pressure 122/60, pulse (!) 126, temperature 99.6 °F (37.6 °C), temperature source Axillary, resp. rate (!) 42, height 5' 4\" (1.626 m), weight 200 lb (90.7 kg), SpO2 95 %. Intake/Output Summary (Last 24 hours) at 8/9/2022 0800  Last data filed at 8/8/2022 1820  Gross per 24 hour   Intake 27.82 ml   Output 0 ml   Net 27.82 ml     Constitutional:  agitated  EENMT:  Sclera clear, pupils equal, oral mucosa moist  Respiratory: refusing exam  Cardiovascular:  refusing exam  Gastrointestinal:  soft with no tenderness; positive bowel sounds present  Musculoskeletal:  warm with no cyanosis, no lower extremity edema  Skin:  no jaundice or ecchymosis  Neurologic: moving all 4. Verbal.   Psychiatric: angry and mildly agitated. CXR:   No new imaging    Recent Labs     08/08/22 0318 08/09/22 0316   WBC 10.7 19.5*   HGB 9.9* 9.8*   HCT 29.2* 28.9*   PLT 98* 120*     Recent Labs     08/08/22 0318 08/09/22 0316   * 138   K 4.2 4.8    101   CO2 26 20*   GLUCOSE 133* 160*   BUN 35* 45*   CREATININE 5.30* 6.30*   PHOS 2.9 4.3*     No results for input(s): TROPHS, NTPROBNP, CRP, ESR in the last 72 hours.   Recent Labs     08/08/22 0318 08/09/22 0316   GLUCOSE 133* 160*      ECHO: 08/02/22    TRANSTHORACIC ECHOCARDIOGRAM (TTE) COMPLETE (CONTRAST/BUBBLE/3D PRN) 08/08/2022 12:57 PM (Final)    Interpretation Summary  Formatting of this result is different from the original.      Left Ventricle: Normal left ventricular systolic function with a visually estimated EF of 55 - 60%. Left ventricle size is normal. Normal wall thickness. Normal wall motion. Abnormal diastolic function. Right Ventricle: Right ventricle is severely dilated. Severely reduced systolic function. Mitral Valve: Mild paravalvular regurgitation. Tricuspid Valve: Valve structure is normal. Severe regurgitation with a centrally directed jet. Tricuspid annulus is severely dilated leading to mild coaptation. This is likely due to severely dilated right ventricle. No stenosis noted. Severely elevated RVSP. The estimated RVSP is 72 mmHg. Left Atrium: Left atrium is mildly dilated. Right Atrium: Right atrium is moderately dilated. Technical qualifiers: Procedure performed with the patient in a supine position, color flow Doppler was performed and pulse wave and/or continuous wave Doppler was performed. Signed by: Jhony Hussein MD on 8/8/2022 12:57 PM    Microbiology:   No results for input(s): CULTURE in the last 72 hours. Ventilator Settings Ideal body weight: 54.7 kg (120 lb 9.5 oz)  Adjusted ideal body weight: 69.1 kg (152 lb 5.7 oz)  Mode FIO2 Rate Tidal Volume Pressure        94 %                  Peak airway pressure:     Minute ventilation:    ABG:  Recent Labs     08/08/22  0834 08/08/22  0955 08/08/22  1211   PHAPOC 7.32* 7.38 7.42   NYS6IXWT 47.7* 39.0 38.6   JR3WCGE 79 102* 267*   RZA2CON 24.6 23.2 25.2   BE  --   --  0.8     Assessment and Plan:  (Medical Decision Making)   Impression: 80 y.o. female with ESRD, hypotension, became more hypotensive during induction of anesthesia for graft ligation. Procedure aborted. Hakeem Saucedo NEURO:   Sedation: precedex drip. Wean as tolerated  Analgesia: prn  CV:   Volume Status: appears euvolemic. Due to dialysis today. Shock: cardiogenic vs related to most recent graft. On pressors. Trying to gradually wean off. SBP often in 90's. TTE in past suggested PH.  TTE repeated yesterday with normal EF but severe RV dilation, RVSP 72. Unsure of functional baseline but if she improves and can be discharged can see and workup PH as outpt. PULM:   Acute hypoxemic respiratory failure:  on 7L NC. Last CXR releatively clear. Repeating today. RENAL:  ESRD: hd today if can tolerate per nephrology. GI:   Nutrition: NPO for now. When more cooperative will assess if pt needs speech eval.    HEME:   Anemia: mild. Monitor. Thrombocytopenia: mild. Monitor. Anticoagulation: heparin prophy  ID:   Leukocytosis: no clear source. Starting vanc and cefepime empirically today. Will obtain blood cultures if able. ENDO:   DM: per primary team  Skin: no decub, turns, preventive care  Prophy: heparin, protonix. Full Code    The patient is critically ill with respiratory failure, circulatory failure and requires high complexity decision making for assessment and support including frequent ventilator adjustment , frequent evaluation and titration of therapies , application of advanced monitoring technologies and extensive interpretation of multiple databases    Cumulative time devoted to patient care services by me for day of service is 34 mins.     Dylan Figueroa MD

## 2022-08-09 NOTE — PROGRESS NOTES
Patient seen and examined. Patient status post aborted procedure yesterday secondary to hypotension. Patient still in ICU on pressors. From vascular standpoint not plan on any surgical dimension till hemodynamically stable. We will sign off please call for questions or concerns.     Cecile Madrigal

## 2022-08-09 NOTE — FLOWSHEET NOTE
8 HR CRRT  initiated using right cvc. Aspirated and flushed both ports without difficulty. Machine settings per MD order. 250 UFR  300 DFR  150 BFR  4K Bath. Reported to primary nurse. Dialysis nurse available as needed.

## 2022-08-09 NOTE — INTERDISCIPLINARY ROUNDS
Multi-D Rounds/Checklist (leapfrog):  Lines: can any be removed?: None      Hemodialysis Central Access Right Subclavian (Active)       Hemodialysis Fistula/Graft Arteriovenous vein graft Left Thigh (Active)     DVT Prophylaxis: Ordered  Vent: N/A  Nutrition Ordered/appropriate: Per Primary Team  Bowel Movement last 2 days: No: per primary  Can antibiotics or other drugs be stopped? Is Nozin performed: Yes/End Date set  Consults needed: None  A: Is pain control adequate? (has PRNs? Stop drip?) Yes  B: Sedation break and SBT? N/A  C: Is sedation choice appropriate? N/A  D: Delirium/CAM-ICU? Yes  E: Mobility goals/appropriateness? Yes  F: Family update and plan? daughter is primary contact and is being updated daily by primary attending and nursing staff.     Elis Helm, SUNSHINE - CNP

## 2022-08-09 NOTE — PROGRESS NOTES
VANCO DAILY FOLLOW UP RENAL INSUFFICIENCY PATIENT   [address] [facility] Pharmacokinetic Monitoring Service - Vancomycin    Consulting Provider: Abdullahi Patel   Indication: sepsis of unknown etiology  Target Concentration: Pre-Dialysis Concentration 21-24 mg/L  Day of Therapy: 1  Additional Antimicrobials: cefepime    Pertinent Laboratory Values: Wt Readings from Last 1 Encounters:   08/02/22 200 lb (90.7 kg)     Temp Readings from Last 1 Encounters:   08/09/22 98 °F (36.7 °C)     Recent Labs     08/08/22  0318 08/09/22  0316   BUN 35* 45*   CREATININE 5.30* 6.30*   WBC 10.7 19.5*       No results found for: Debbie Adjutant    MRSA Nasal Swab: N/A. Non-respiratory infection. .    Assessment:  Date:  Dose/Freq Admin Times Level/Time:   08/09 1750 mg  500 mg 1026  (2000)    08/10    Rd @ 0600                       Plan:  Concentration-guided dosing due to intermittent hemodialysis  Loaded with vancomycin 1750 mg x1, then will redose with 500 mg after completion of 8 hr CRRT  Vancomycin concentration ordered for 08/10 @ 0600    Pharmacy will continue to monitor patient and adjust therapy as indicated      Thank you for the consult,  Eva Jimenez, Pharmacy Resident    I have reviewed the patient with the pharmacy resident and agree with the plan as stated above.   Nathan Michael, PharmD, BCPS  Clinical Pharmacist

## 2022-08-09 NOTE — PROGRESS NOTES
SPEECH PATHOLOGY NOTE    RN reports patient remains slow to respond this morning. Currently running dialysis. Will defer evaluation until later this PM in hopes for improved alertness/responsiveness later today.      EPHRAIM Etienne, CCC-SLP  Speech Language Pathologist  Acute Rehabilitation Services  Contact: Dee

## 2022-08-09 NOTE — PROGRESS NOTES
po trials for potential diet advancement    GENERAL    Chart Reviewed: Yes  Subjective: Patient alert and wearing bilateral mitts. Asking when she can go home. Currently running dialysis  Behavior/Cognition: Alert;Confused; Agitated  Patient Position: Upright in bed  Communication Observation: Functional  Follows Directions: Simple        O2 Device: Nasal cannula        History of Present Injury/Illness: Ms. Lory Mcclure  has a past medical history of Anemia, Arthritis, AVF (arteriovenous fistula) ([address] AVF (arteriovenous fistula) ([address] Degenerative joint disease, Diabetes ([address] Enlarged lymph node, ESRD on hemodialysis ([address] Hypertension, Mediastinal mass, Obesity, On home O2, Renal cancer ([address] Shortness of breath, and Transient ischemic attack. . She also  has a past surgical history that includes IR TUNNELED CVC PLACE WO SQ PORT/PUMP > 5 YEARS (3/15/2022); IR THRMB/INFUSION DIALYSIS CIRCUIT (Left, 2021); other surgical history; gi (08/06/2018); gi (06/01/2002); IR TUNNELED CVC PLACE WO SQ PORT/PUMP > 5 YEARS (11/19/2021); IR GUIDED INTRO CATH DIALYSIS CIRCUIT W BALLON ANGIOPLASTY (3/10/2020); IR GUIDED INTRO CATH DIALYSIS CIRCUIT W [place] ANGIOPLASTY (12/5/2019); IR GUIDED INTRO CATH DIALYSIS CIRCUIT W [place] ANGIOPLASTY (9/3/2019); IR GUIDED INTRO CATH DIALYSIS CIRCUIT W [place] ANGIOPLASTY (5/30/2019); IR GUIDED INTRO CATH DIALYSIS CIRCUIT W BALLON ANGIOPLASTY (2/28/2019); Urological Surgery (Left, July 2015); Colonoscopy (N/A, 11/8/2018); gyn; Breast surgery (Right); CT BIOPSY ABDOMEN RETROPERITONEUM (9/14/2017); IR TUNNELED CVC PLACE WO SQ PORT/PUMP > 5 YEARS (11/19/2021); IR TUNNELED CVC PLACE WO SQ PORT/PUMP > 5 YEARS (3/15/2022); vascular surgery (Left, 04/07/2022); vascular surgery (Left, 03/15/2022); vascular surgery (Left, 02/21/2022); vascular surgery (Right); vascular surgery (Left, 5/26/2022); Groin Surgery (Left, 6/26/2022); and Dialysis fistula creation (Left, 8/8/2022).     Prior Dysphagia History: No prior dysphagia history noted in chart     Current Diet : NPO  Current Liquid Diet : NPO    Pain:   Patient does not c/o pain                                          OBJECTIVE    Oral Motor Assessment      Oral Motor   Labial: No impairment  Dentition: Upper dentures (Lower dentures at bedside. Refused to wear- repeatedly pushed them out of her mouth with tongue)  Oral Hygiene: Moist;Clean  Lingual: No impairment  Velum: No Impairment  Mandible: No impairment           Baseline Vocal Quality: Normal    Oropharyngeal Phase:     Assessment Method(s): Observation;Palpation  Patient Position: Upright in bed  Vocal Quality: No Impairment  Consistency Presented: Mixed consistency;Pureed; Thin  How Presented: SLP-fed/Presented;Straw;Successive Swallows;Spoon  Bolus Acceptance: No impairment  Bolus Formation/Control: Impaired  Type of Impairment: Delayed;Mastication  Propulsion: No impairment  Oral Residue: None  Initiation of Swallow: No impairment  Laryngeal Elevation: Functional  Aspiration Signs/Symptoms: None  Pharyngeal Phase Characteristics: No impairment, issues, or problems        Oral Phase - Comment: mild-moderate oral stage dysphagia due to missing dentition and prolonged oral prep  Pharyngeal Phase: Unremarkable    PLAN    Duration/Frequency: Continue to follow patient 2x/week for duration of hospitalization and/or until goals met    Dysphagia Outcome and Severity Scale (CHAVO)  Dysphagia Outcome Severity Scale: Level 4: Mild moderate dysphagia- Intermittent supervision/cueing. One - two diet consistencies restricted  Interpretation of Tool: The Dysphagia Outcome and Severity Scale (CHAVO) is a simple, easy-to-use, 7-point scale developed to systematically rate the functional severity of dysphagia based on objective assessment and make recommendations for diet level, independence level, and type of nutrition.   Normal(7), Functional(6), Mild(5), Mild-Moderate(4), Moderate(3), Moderate-Severe(2), Severe(1)    Speech Therapy Prognosis  Prognosis: Good  Prognosis Considerations: Medical Prognosis; Potential;Previous Level of Function    Education: Patient, RN     Patient Education Response: Verbalizes understanding, Needs reinforcement    Current Medications:   No current facility-administered medications on file prior to encounter. Current Outpatient Medications on File Prior to Encounter   Medication Sig Dispense Refill    oxyCODONE-acetaminophen (PERCOCET) 5-325 MG per tablet Take 1 tablet by mouth every 4 hours as needed for Pain.  Epoetin Martin-epbx (RETACRIT) 51308 UNIT/ML SOLN injection Inject 1 mL into the skin every 7 days To be given at dialysis 3.3 mL 0    naloxone 4 MG/0.1ML LIQD nasal spray 1 spray by Nasal route as needed for Opioid Reversal. Use 1 spray intranasally, then discard. Repeat with new spray every 2 min as needed for opioid overdose symptoms, alternating nostrils.        midodrine (PROAMATINE) 10 MG tablet Take 1 tablet by mouth every 8 hours 90 tablet 1    acetaminophen (TYLENOL) 500 MG tablet Take 500 mg by mouth every 6 hours as needed for Pain (for breakthrough pain )      omeprazole (PRILOSEC) 40 MG delayed release capsule Take 40 mg by mouth at bedtime      OXYGEN Inhale into the lungs 2 LITERS AS NEEDED FOR SOB VIA NASAL CANNULA      SITagliptin (JANUVIA) 50 MG tablet Take 50 mg by mouth daily      sevelamer (RENVELA) 800 MG tablet Take 3 tablets by mouth 3 times daily (with meals) PT TAKES 3 TABS WITH MEALS-- AND 1 TABS WITH SNACKS          PRECAUTIONS/ALLERGIES: Penicillins and Vancomycin   Safety Devices in place: Yes  Type of devices: Left in bed, Nurse notified  Restraints Initially in Place: Yes  Restraints: Bilateral mitts    Therapy Time  SLP Individual Minutes  Time In: 4073  Time Out: [facility]  Minutes: [address] [facility], SLP  8/9/2022 1:58 PM

## 2022-08-09 NOTE — PROGRESS NOTES
Hospitalist Progress Note   Admit Date:  2022 10:03 PM   Name:  Danyell Pires   Age:  80 y.o. Sex:  female  :  1950   MRN:  [id number]   Room:  [address] [place] [address]    Presenting Complaint: Hypotension and Extremity Weakness     Reason(s) for Admission: End stage renal disease on dialysis ([facility] [address] [N18.6, Z99.2]  SIRS (systemic inflammatory response syndrome) (HCC) [R65.10]  Elevated lactic acid level [R79.89]  Hypotension, unspecified hypotension type [I95.9]     Hospital Course & Interval History:   Sherryle Morgans is a 78-year-old female with a PMH of ESRD on HD who presents with hypotension and L-sided mid back pain. Her back pain is chronic since nephrectomy. Labs were normal except elevated lactic. She was given gentle IVF for hypotension. No fever or tachycardia. The patient was admitted to the Hospitalist service with Nephrology consulted. Vascular surgery was consulted to ligate the patient's L thigh AV graft as it may be causing hypotension. Subjective/24hr Events (22): Patient examined at bedside. No acute overnight events but transferred to ICU yesterday after scheduled ligation in the OR with vascular surgery was canceled after noticed very low BP's on arterial line. At the bedside she is confused and mumbles. She does not respond appropriately to questions but does not seem to be in any kind of distress. Unable to gather further history. 10 point ROS unable to be obtained due to clinical condition. Assessment & Plan:     # ? Septic shock vs ?distributive shock vs ?hypovolemic shock  - unclear source  - follow cultures  - vancomycin with pharmacy to dose  - cefepime/Flagyl empirically   - wean off Levophed  - repeat CXR  - IVF resuscitation  - patient has been on midodrine    # Acute metabolic encephalopathy  - likely multifactorial (sepsis, anesthesia, etc)  - management as above  - maintain normoglycemia   - avoid benzos and opioids  - nonpharmacologic interventions ESRD  - HD as per nephrology     DM2 with nephropathy  - cont Nesina (Januvia substitute)  - continue SSI  - Monitor CBG      hx of RCC  - s/p nephrectomy     confusion/falls at home  - MRI brain pending  - Check orthostatics  - PT/OT recommend STR     Chronic pain  - PRN analgesia      Chronic hypoxic resp failure  - continue home oxygen 2-3 Lpm      Discharge Planning: Patient critically ill and will remain in the ICU for now. Patient is critically ill. Without intervention, there is a high probability of acute organ impairment or life-threatening deterioration in the patient's condition from: septic/distributive shock  Critical care interventions: pressors  Total critical care time spent: 33 minutes. Time is indicative of direct patient attendance at bedside and on the patient's floor nearby. Includes time spent at bedside performing history and exam, performing chart review, discussing findings and treatment plan with patient and/or family, discussing patient with nursing staff, consultants and colleagues, and ordering/reviewing pertinent laboratory and radiographic evaluations. Time excludes procedures. CPT:  69205: First 30-74 minutes  89962: Each block of 30 min. beyond 76       Diet:  ADULT DIET; Dysphagia - Minced and Moist  DVT PPx: heparin  Code status: Full Code    Hospital Problems:  Principal Problem:    SIRS (systemic inflammatory response syndrome) (HCC)  Active Problems:    Hypotension    Septic shock (HCC)    Acute metabolic encephalopathy    Renal cell carcinoma (HCC)    ESRD (end stage renal disease) on dialysis (HCC)    Chronic respiratory failure with hypoxia (HCC)    Type 2 diabetes mellitus with nephropathy ([facility] [address]  Resolved Problems:    * No resolved hospital problems.  *      Objective:   Patient Vitals for the past 24 hrs:   Temp Pulse Resp BP SpO2   08/09/22 1542 -- -- -- (!) 92/51 --   08/09/22 1530 97.7 °F (36.5 °C) (!) 101 17 (!) 92/51 93 %   08/09/22 1400 -- (!) 104 -- (!) 1930 98.9 °F (37.2 °C) -- -- -- --   08/08/22 1915 -- (!) 107 15 (!) 77/28 94 %   08/08/22 1900 -- (!) 109 14 (!) 66/34 91 %   08/08/22 1830 -- (!) 125 21 (!) 86/37 98 %   08/08/22 1820 -- (!) 124 20 (!) 87/59 97 %   08/08/22 1810 -- (!) 124 24 (!) 63/30 97 %   08/08/22 1800 -- (!) 123 22 (!) 65/28 92 %   08/08/22 1750 -- (!) 123 14 -- 98 %   08/08/22 1745 -- (!) 123 23 (!) 121/56 (!) 74 %   08/08/22 1730 -- (!) 122 12 (!) 72/24 94 %   08/08/22 1720 -- (!) 124 17 (!) 78/56 91 %   08/08/22 1700 98.3 °F (36.8 °C) (!) 122 18 (!) 112/53 91 %   08/08/22 1600 -- (!) 123 20 -- 94 %       Oxygen Therapy  SpO2: 93 %  Pulse Oximetry Type: Continuous  Pulse via Oximetry: 102 beats per minute  Pulse Oximeter Device Mode: Other (Comment)  O2 Device: High flow nasal cannula  Skin Assessment: Clean, dry, & intact  Skin Protection for O2 Device: No  FiO2 : 94 %  O2 Flow Rate (L/min): 10 L/min  Blood Gas  Performed?: Yes  Complications #1: None  Post-procedure #1: Standard  Specimen Status #1: Point of care  How Tolerated?: Tolerated well    Estimated body mass index is 34.33 kg/m² as calculated from the following:    Height as of this encounter: 5' 4\" (1.626 m). Weight as of this encounter: 200 lb (90.7 kg). Intake/Output Summary (Last 24 hours) at 8/9/2022 1551  Last data filed at 8/9/2022 1542  Gross per 24 hour   Intake 327.82 ml   Output 792 ml   Net -464.18 ml         Physical Exam:   Blood pressure (!) 92/51, pulse (!) 101, temperature 97.7 °F (36.5 °C), temperature source Axillary, resp. rate 17, height 5' 4\" (1.626 m), weight 200 lb (90.7 kg), SpO2 93 %. General:    NAD, drowsy and mumbles  Head:  Normocephalic, atraumatic  Eyes:  Sclerae appear normal.  Pupils equally round. ENT:  Nares appear normal, no drainage. Moist oral mucosa  Neck:  No restricted ROM. Trachea midline   CV:   RRR. Lungs: No wheezing. Symmetric expansion. Abdomen:   Soft, nondistended. Extremities: No cyanosis or clubbing.   Skin: No rashes and normal coloration. Neuro:  CN II-XII grossly intact.   Drowsy    I have personally reviewed labs and tests showing:  Recent Labs:  Recent Results (from the past 48 hour(s))   POCT Glucose    Collection Time: 08/07/22  4:18 PM   Result Value Ref Range    POC Glucose 151 (H) 65 - 100 mg/dL    Performed by: Clyde    POCT Glucose    Collection Time: 08/07/22  8:10 PM   Result Value Ref Range    POC Glucose 159 (H) 65 - 100 mg/dL    Performed by: Grant(AS)    Renal Function Panel    Collection Time: 08/08/22  3:18 AM   Result Value Ref Range    Sodium 133 (L) 136 - 145 mmol/L    Potassium 4.2 3.5 - 5.1 mmol/L    Chloride 102 98 - 107 mmol/L    CO2 26 21 - 32 mmol/L    Anion Gap 5 (L) 7 - 16 mmol/L    Glucose 133 (H) 65 - 100 mg/dL    BUN 35 (H) 8 - 23 MG/DL    Creatinine 5.30 (H) 0.6 - 1.0 MG/DL    GFR African American 10 (L) >60 ml/min/1.73m2    GFR Non- 9 (L) >60 ml/min/1.73m2    Calcium 8.5 8.3 - 10.4 MG/DL    Phosphorus 2.9 2.3 - 3.7 MG/DL    Albumin 3.4 3.2 - 4.6 g/dL   CBC    Collection Time: 08/08/22  3:18 AM   Result Value Ref Range    WBC 10.7 4.3 - 11.1 K/uL    RBC 3.00 (L) 4.05 - 5.2 M/uL    Hemoglobin 9.9 (L) 11.7 - 15.4 g/dL    Hematocrit 29.2 (L) 35.8 - 46.3 %    MCV 97.3 79.6 - 97.8 FL    MCH 33.0 (H) 26.1 - 32.9 PG    MCHC 33.9 31.4 - 35.0 g/dL    RDW 17.8 (H) 11.9 - 14.6 %    Platelets 98 (L) 630 - 450 K/uL    MPV 13.4 (H) 9.4 - 12.3 FL    nRBC 0.10 0.0 - 0.2 K/uL   POCT Glucose    Collection Time: 08/08/22  5:43 AM   Result Value Ref Range    POC Glucose 119 (H) 65 - 100 mg/dL    Performed by: Polina)    POCT Blood Gas & Electrolytes    Collection Time: 08/08/22  8:34 AM   Result Value Ref Range    pH, Arterial, POC 7.32 (L) 7.35 - 7.45      pCO2, Arterial, POC 47.7 (H) 35 - 45 MMHG    pO2, Arterial, POC 79 75 - 100 MMHG    POC Sodium 139 136 - 145 MMOL/L    POC Potassium 5.0 3.5 - 5.1 MMOL/L    POC Ionized Calcium 1.06 (L) 1.12 - 1.32 mmol/L    POC Glucose 203 (H) 65 - 100 MG/DL    BASE DEFICIT (POC) 1.8 mmol/L    HCO3, Mixed 24.6 22 - 26 MMOL/L    POC TCO2 25 (H) 13 - 23 MMOL/L    POC O2 SAT 94 %    Source ARTERIAL      Performed by: Don     POC GFR  Cannot be calculated >60 ml/min/1.73m2    Glomerular Filtration Rate, POC Cannot be calculated >60 ml/min/1.73m2   POC Blood, Cord, Arterial    Collection Time: 08/08/22  9:55 AM   Result Value Ref Range    DEVICE ADULT VENT      FIO2 100 %    pH, Arterial, POC 7.38 7.35 - 7.45      pCO2, Arterial, POC 39.0 35 - 45 MMHG    pO2, Arterial,  (H) 75 - 100 MMHG    HCO3, Mixed 23.2 22 - 26 MMOL/L    SO2c, Arterial, POC 97.8 95 - 98 %    BASE DEFICIT (POC) 1.7 mmol/L    Mode CPAP/PS      POC PEEP 8 cmH2O    POC Joshua's Test NOT APPLICABLE      Site DRAWN FROM ARTERIAL LINE      Specimen type: ARTERIAL      Performed by: Jimena Gilles     RESPIRATORY COMMENT: [id number]    POCT Glucose    Collection Time: 08/08/22 10:08 AM   Result Value Ref Range    POC Glucose 136 (H) 65 - 100 mg/dL    Performed by: Devante    POCT Blood Gas & Electrolytes    Collection Time: 08/08/22 12:11 PM   Result Value Ref Range    pH, Arterial, POC 7.42 7.35 - 7.45      pCO2, Arterial, POC 38.6 35 - 45 MMHG    pO2, Arterial,  (H) 75 - 100 MMHG    POC Sodium 140 136 - 145 MMOL/L    POC Potassium 4.0 3.5 - 5.1 MMOL/L    POC Ionized Calcium 0.98 (L) 1.12 - 1.32 mmol/L    POC Glucose 155 (H) 65 - 100 MG/DL    Base Excess 0.8 mmol/L    HCO3, Mixed 25.2 22 - 26 MMOL/L    POC TCO2 25 (H) 13 - 23 MMOL/L    POC O2  %    Source ARTERIAL      Site DRAWN FROM ARTERIAL LINE      POC Joshua's Test NOT APPLICABLE      DEVICE ADULT VENT      Mode CPAP/PS      FIO2 80 %    POC PEEP/CPA 8      Performed by: Ashwin     POC GFR  Cannot be calculated >60 ml/min/1.73m2    Glomerular Filtration Rate, POC Cannot be calculated >60 ml/min/1.73m2    RESPIRATORY COMMENT: ve8.7 mg/dL    Performed by: Romana    POCT Glucose    Collection Time: 08/08/22  8:27 PM   Result Value Ref Range    POC Glucose 184 (H) 65 - 100 mg/dL    Performed by: Ankur    CBC with Auto Differential    Collection Time: 08/09/22  3:16 AM   Result Value Ref Range    WBC 19.5 (H) 4.3 - 11.1 K/uL    RBC 3.01 (L) 4.05 - 5.2 M/uL    Hemoglobin 9.8 (L) 11.7 - 15.4 g/dL    Hematocrit 28.9 (L) 35.8 - 46.3 %    MCV 96.0 79.6 - 97.8 FL    MCH 32.6 26.1 - 32.9 PG    MCHC 33.9 31.4 - 35.0 g/dL    RDW 18.2 (H) 11.9 - 14.6 %    Platelets 299 (L) 865 - 450 K/uL    MPV 12.3 9.4 - 12.3 FL    nRBC 0.16 0.0 - 0.2 K/uL    Differential Type AUTOMATED      Seg Neutrophils 89 (H) 43 - 78 %    Lymphocytes 4 (L) 13 - 44 %    Monocytes 6 4.0 - 12.0 %    Eosinophils % 0 (L) 0.5 - 7.8 %    Basophils 0 0.0 - 2.0 %    Immature Granulocytes 1 0.0 - 5.0 %    Segs Absolute 17.4 (H) 1.7 - 8.2 K/UL    Absolute Lymph # 0.7 0.5 - 4.6 K/UL    Absolute Mono # 1.1 0.1 - 1.3 K/UL    Absolute Eos # 0.0 0.0 - 0.8 K/UL    Basophils Absolute 0.1 0.0 - 0.2 K/UL    Absolute Immature Granulocyte 0.2 0.0 - 0.5 K/UL   Renal Function Panel    Collection Time: 08/09/22  3:16 AM   Result Value Ref Range    Sodium 138 136 - 145 mmol/L    Potassium 4.8 3.5 - 5.1 mmol/L    Chloride 101 98 - 107 mmol/L    CO2 20 (L) 21 - 32 mmol/L    Anion Gap 17 (H) 7 - 16 mmol/L    Glucose 160 (H) 65 - 100 mg/dL    BUN 45 (H) 8 - 23 MG/DL    Creatinine 6.30 (H) 0.6 - 1.0 MG/DL    GFR African American 8 (L) >60 ml/min/1.73m2    GFR Non- 7 (L) >60 ml/min/1.73m2    Calcium 8.4 8.3 - 10.4 MG/DL    Phosphorus 4.3 (H) 2.3 - 3.7 MG/DL    Albumin 3.8 3.2 - 4.6 g/dL   POCT Glucose    Collection Time: 08/09/22  6:45 AM   Result Value Ref Range    POC Glucose 140 (H) 65 - 100 mg/dL    Performed by:  Ankur    POCT Glucose    Collection Time: 08/09/22 12:01 PM   Result Value Ref Range    POC Glucose 130 (H) 65 - 100 mg/dL    Performed by: Jacquelyn Chacon I have personally reviewed imaging studies showing: Other Studies:  XR CHEST PORTABLE   Final Result   1. Status post tracheal extubation. 2.  Hazy opacities both lung bases, could reflect atelectasis, infiltrates, or   effusions. 3.  Pulmonary vascular congestion. XR CHEST PORTABLE   Final Result   ETT in good position. Otherwise no acute abnormality. XR CHEST PORTABLE   Final Result   Findings suggest CHF.          MRI BRAIN WO CONTRAST    (Results Pending)       Current Meds:  Current Facility-Administered Medications   Medication Dose Route Frequency    alcohol 62% (NOZIN) nasal  1 ampule  1 ampule Topical Daily    [START ON 8/10/2022] cefepime (MAXIPIME) 1,000 mg in sodium chloride 0.9 % 50 mL IVPB mini-bag  1,000 mg IntraVENous Q24H    vancomycin (VANCOCIN) 500 mg in sodium chloride 0.9 % 100 mL IVPB (mini-bag)  500 mg IntraVENous Once    vancomycin (VANCOCIN) intermittent dosing (placeholder)   Other RX Placeholder    metronidazole (FLAGYL) 500 mg in 0.9% NaCl 100 mL IVPB premix  500 mg IntraVENous Q12H    glucose chewable tablet 16 g  4 tablet Oral PRN    dextrose bolus 10% 125 mL  125 mL IntraVENous PRN    Or    dextrose bolus 10% 250 mL  250 mL IntraVENous PRN    glucagon (rDNA) injection 1 mg  1 mg SubCUTAneous PRN    dextrose 10 % infusion   IntraVENous Continuous PRN    fentaNYL (SUBLIMAZE) injection 50 mcg  50 mcg IntraVENous Q1H PRN    midazolam PF (VERSED) injection 1 mg  1 mg IntraVENous Q2H PRN    norepinephrine (LEVOPHED) 16 mg in sodium chloride 0.9 % 250 mL infusion  1-100 mcg/min IntraVENous Continuous    dexmedetomidine (PRECEDEX) 400 mcg in sodium chloride 0.9 % 100 mL infusion  0.1-1.5 mcg/kg/hr IntraVENous Continuous    oxyCODONE (ROXICODONE) immediate release tablet 5 mg  5 mg Oral Q6H PRN    traMADol (ULTRAM) tablet 50 mg  50 mg Oral Q6H PRN    insulin lispro (HUMALOG) injection vial 0-8 Units  0-8 Units SubCUTAneous TID WC    insulin lispro (HUMALOG) injection vial 0-4 Units  0-4 Units SubCUTAneous Nightly    temazepam (RESTORIL) capsule 15 mg  15 mg Oral Nightly PRN    midodrine (PROAMATINE) tablet 10 mg  10 mg Oral TID WC    pantoprazole (PROTONIX) tablet 40 mg  40 mg Oral QAM AC    sevelamer (RENVELA) tablet 2,400 mg  2,400 mg Oral TID WC    alogliptin (NESINA) tablet 6.25 mg  6.25 mg Oral Daily    sodium chloride flush 0.9 % injection 5-40 mL  5-40 mL IntraVENous 2 times per day    sodium chloride flush 0.9 % injection 5-40 mL  5-40 mL IntraVENous PRN    0.9 % sodium chloride infusion   IntraVENous PRN    ondansetron (ZOFRAN-ODT) disintegrating tablet 4 mg  4 mg Oral Q8H PRN    Or    ondansetron (ZOFRAN) injection 4 mg  4 mg IntraVENous Q6H PRN    polyethylene glycol (GLYCOLAX) packet 17 g  17 g Oral Daily PRN    acetaminophen (TYLENOL) tablet 650 mg  650 mg Oral Q6H PRN    Or    acetaminophen (TYLENOL) suppository 650 mg  650 mg Rectal Q6H PRN    heparin (porcine) injection 5,000 Units  5,000 Units SubCUTAneous 3 times per day    heparin (porcine) injection 5,000 Units  5,000 Units IntraVENous PRN    Epoetin Martin-epbx (RETACRIT) injection 20,000 Units  20,000 Units SubCUTAneous Q7 Days       Signed:  JAY FERREIRA DO    Part of this note may have been written by using a voice dictation software. The note has been proof read but may still contain some grammatical/other typographical errors.

## 2022-08-10 PROBLEM — J96.21 ACUTE ON CHRONIC RESPIRATORY FAILURE WITH HYPOXIA (HCC): Status: ACTIVE | Noted: 2021-01-01

## 2022-08-10 NOTE — PROGRESS NOTES
LTG: Patient will tolerate least restrictive oral diet without overt s/sx of airway compromise. STG: Patient will consume minced/moist diet and thin liquids without overt s/sx of airway compromise. STG: Patient will participate in ongoing po trials with SLP in attempt to identify least restrictive oral diet. STG: Patient will participate in modified barium swallow study to objectively assess swallow function as medically indicated. SPEECH LANGUAGE PATHOLOGY: DYSPHAGIA  Daily Note #1    NAME: Vicenta Pires  : 1950  MRN: [id number]    ADMISSION DATE: 2022  PRIMARY DIAGNOSIS: Septic shock ([facility] [address]  End stage renal disease on dialysis ([facility] [address] [N18.6, Z99.2]  SIRS (systemic inflammatory response syndrome) (HCC) [R65.10]  Elevated lactic acid level [R79.89]  Hypotension, unspecified hypotension type [I95.9]    ICD-10: Treatment Diagnosis: R13.11 Dysphagia, Oral Phase    RECOMMENDATIONS   Diet:  Diet Solids Recommendation: Minced & Moist  Liquid Consistency Recommendation: Thin    Medications: Crushed in puree as able     Recommendations: Assistance with meals; Therapeutic feeds with SLP only; Dysphagia treatment     Compensatory Swallowing Strategies: Alternate solids and liquids;Eat/Feed slowly;Upright as possible for all oral intake;Assist feed; Check for pocketing of food on the Left;Small bites/sips     Therapeutic Intervention:Patient/Family education;Diet tolerance monitoring     Patient continues to require skilled intervention: Yes  D/C Recommendations: Ongoing speech therapy is recommended during this hospitalization       ASSESSMENT    Dysphagia Diagnosis: Moderate oral stage dysphagia  Dysphagia Impression : Moderate oral dysphagia with perseverative chewing and poor oral clearance with chewable trials. Pharyngeal swallow remains unremarkable. More significant oral phase dysphagia today despite dentures in place. Mental status impacting as she is not verbal at time of session; only moaning. Perseverative chewing with soft solids and pocketing bolus in cheeks. Repeated sips of thin liquids and moderate verbal cues required for eventual oral clearing (after ~ 5 minutes of chewing a single bolus). Pharyngeal swallow remains unremarkable. Functional oral clearance with minced/moist textures. Recommend continue minced/moist diet and thin liquids. Medications crushed in puree. 1:1 assistance with meals, and please hold intake if she is not clearing oral cavity appropriately. Decline in functional status appears to be related to decline in mental status compared to yesterday. Speech to continue following. GENERAL    Chart Reviewed: Yes  Subjective: Patient awake, but not communicating verbally. Only moaning in response to questions. Dentures in place this morning  Behavior/Cognition: Alert;Confused           O2 Device: Opti Flow        History of Present Injury/Illness: Ms. Khari Cox  has a past medical history of Anemia, Arthritis, AVF (arteriovenous fistula) ([address] AVF (arteriovenous fistula) ([address] Degenerative joint disease, Diabetes ([address] Enlarged lymph node, ESRD on hemodialysis ([address] Hypertension, Mediastinal mass, Obesity, On home O2, Renal cancer ([address] Shortness of breath, and Transient ischemic attack. . She also  has a past surgical history that includes IR TUNNELED CVC PLACE WO SQ PORT/PUMP > 5 YEARS (3/15/2022); IR THRMB/INFUSION DIALYSIS CIRCUIT (Left, 2021); other surgical history; gi (08/06/2018); gi (06/01/2002); IR TUNNELED CVC PLACE WO SQ PORT/PUMP > 5 YEARS (11/19/2021); IR GUIDED INTRO CATH DIALYSIS CIRCUIT W BALLON ANGIOPLASTY (3/10/2020); IR GUIDED INTRO CATH DIALYSIS CIRCUIT W [place] ANGIOPLASTY (12/5/2019); IR GUIDED INTRO CATH DIALYSIS CIRCUIT W [place] ANGIOPLASTY (9/3/2019); IR GUIDED INTRO CATH DIALYSIS CIRCUIT W [place] ANGIOPLASTY (5/30/2019); IR GUIDED INTRO CATH DIALYSIS CIRCUIT W BALLON ANGIOPLASTY (2/28/2019); Urological Surgery (Left, July 2015);  Colonoscopy (N/A, 11/8/2018); gyn; Breast surgery (Right); CT BIOPSY ABDOMEN RETROPERITONEUM (9/14/2017); IR TUNNELED CVC PLACE WO SQ PORT/PUMP > 5 YEARS (11/19/2021); IR TUNNELED CVC PLACE WO SQ PORT/PUMP > 5 YEARS (3/15/2022); vascular surgery (Left, 04/07/2022); vascular surgery (Left, 03/15/2022); vascular surgery (Left, 02/21/2022); vascular surgery (Right); vascular surgery (Left, 5/26/2022); Groin Surgery (Left, 6/26/2022); and Dialysis fistula creation (Left, 8/8/2022). Prior Dysphagia History: No prior dysphagia history noted in chart     Current Diet : NPO  Current Liquid Diet : NPO    Pain:   Patient does not appear in pain           OBJECTIVE    Oropharyngeal Phase:   Patient seen for diet tolerance and po trials for potential diet advancement. She is fully awake during session, but non-verbal and poor command following. Mildly prolonged, but functional oral clearance with minced/moist textures. Thin liquids consumed by serial straw sips without overt difficulty. Small bites of soft solids also presented. She immediately began masticating bolus which she continued to chew for >5 minutes. When liquid wash was presented in attempt to clear oral cavity, she would pocket bolus into cheek then bring it back into her mouth for additional mastication. Finger sweep attempted by clinician, but she bit down on finger. Repeated liquid wash eventually effective in clearing oral cavity. PLAN    Duration/Frequency: Continue to follow patient 3x/week for duration of hospitalization and/or until goals met    Dysphagia Outcome and Severity Scale (CHAVO)  Dysphagia Outcome Severity Scale: Level 3: Moderate dysphagia- Total assisstance, supervision or strategies.  Two or more diet consistencies restricted  Interpretation of Tool: The Dysphagia Outcome and Severity Scale (CHAVO) is a simple, easy-to-use, 7-point scale developed to systematically rate the functional severity of dysphagia based on objective assessment and make recommendations for diet level, independence level, and type of nutrition. Normal(7), Functional(6), Mild(5), Mild-Moderate(4), Moderate(3), Moderate-Severe(2), Severe(1)    Speech Therapy Prognosis  Prognosis: Good  Prognosis Considerations: Potential;Previous Level of Function;Participation Level    Education: Patient, RN     Patient Education Response: Needs reinforcement, No evidence of learning    Current Medications:   No current facility-administered medications on file prior to encounter. Current Outpatient Medications on File Prior to Encounter   Medication Sig Dispense Refill    oxyCODONE-acetaminophen (PERCOCET) 5-325 MG per tablet Take 1 tablet by mouth every 4 hours as needed for Pain.  Epoetin Martin-epbx (RETACRIT) 97135 UNIT/ML SOLN injection Inject 1 mL into the skin every 7 days To be given at dialysis 3.3 mL 0    naloxone 4 MG/0.1ML LIQD nasal spray 1 spray by Nasal route as needed for Opioid Reversal. Use 1 spray intranasally, then discard. Repeat with new spray every 2 min as needed for opioid overdose symptoms, alternating nostrils.        midodrine (PROAMATINE) 10 MG tablet Take 1 tablet by mouth every 8 hours 90 tablet 1    acetaminophen (TYLENOL) 500 MG tablet Take 500 mg by mouth every 6 hours as needed for Pain (for breakthrough pain )      omeprazole (PRILOSEC) 40 MG delayed release capsule Take 40 mg by mouth at bedtime      OXYGEN Inhale into the lungs 2 LITERS AS NEEDED FOR SOB VIA NASAL CANNULA      SITagliptin (JANUVIA) 50 MG tablet Take 50 mg by mouth daily      sevelamer (RENVELA) 800 MG tablet Take 3 tablets by mouth 3 times daily (with meals) PT TAKES 3 TABS WITH MEALS-- AND 1 TABS WITH SNACKS          PRECAUTIONS/ALLERGIES: Penicillins and Vancomycin   Safety Devices in place: Yes  Type of devices: Left in bed, Call light within reach, Nurse notified  Restraints Initially in Place: Yes  Restraints: Bilateral mitts    Therapy Time  SLP Individual Minutes  Time In:

## 2022-08-10 NOTE — PROGRESS NOTES
BP obtained; 60/23. MD notified, instructed to turn Levo to 10. Pt's orientation unchanged, ok to take BP q1hr, weaning precedex as appropriate.

## 2022-08-10 NOTE — PROGRESS NOTES
Nursing staff unable to obtain pt's BP, multiple locations and manual BP attempted. MD aware and at bedside to assess pt. Pt able to answer some orientation questions and arousable. Orders received for Albumin. Nursing staff instructed to continue attempting BP and notify MD when BP obtained; do not titrate levo until BP reading available. Staff instructed ok to take BP less frequently (Q 1hr) unless pt shows clinical signs of BP changes.

## 2022-08-10 NOTE — PROGRESS NOTES
Hospitalist Progress Note   Admit Date:  2022 10:03 PM   Name:  Jie Pires   Age:  80 y.o. Sex:  female  :  1950   MRN:  [id number]   Room:  [address] [place] [address]    Presenting Complaint: Hypotension and Extremity Weakness     Reason(s) for Admission: End stage renal disease on dialysis ([facility] [N18.6, Z99.2]  SIRS (systemic inflammatory response syndrome) (HCC) [R65.10]  Elevated lactic acid level [R79.89]  Hypotension, unspecified hypotension type [I95.9]     Hospital Course & Interval History:     Jessica Boo is a 71-year-old female with a PMH of ESRD on HD who presents with hypotension and L-sided mid back pain. Her back pain is chronic since nephrectomy. Labs were normal except elevated lactic. She was given gentle IVF for hypotension. No fever or tachycardia. The patient was admitted to the Hospitalist service with Nephrology consulted. Vascular surgery was consulted to ligate the patient's L thigh AV graft as it may be causing hypotension. She was transferred to ICU on  due to low BP on arterial line while in OR for ligation of L thigh AV graft by Vascular surgery. Subjective/24hr Events (08/10/22): Patient is seen and examined at bedside. No acute events noted overnight. Patient continues to be on Levophed drip and low BP of systolics of 24V. Unclear whether it is accurate reading given patient's vasculopathy. On 100% Airvo. Low dose precedex gtt. 10 point ROS unable to be obtained due to clinical condition. Assessment & Plan:     Shock - unclear at this time - ? Septic shock vs ?distributive shock vs ?hypovolemic shock  08/10/22: BP continues to be low and requiring Levophed. Unclear of the accuracy of BP measurements given vasculopathy with multiple graft sites. - continue with levophen and wean to maintain MAP > 65.  - follow up BCX(8/3) with no growth. Recheck Bcx today.    - continue on cefepime, flagyl and vancomycin empirically  - midodrine    Leukocytosis  WBC 19.5 on 8/9. Was normal prior  - abx as above  - recheck tomorrow    Acute metabolic encephalopathy  likely multifactorial (sepsis, anesthesia, etc)  - management as above  - maintain normoglycemia   - avoid benzos and opioids  - nonpharmacologic interventions   - MRI pending    ESRD  - HD as per nephrology     DM2 with nephropathy  - cont Nesina (Januvia substitute)  - continue SSI  - Monitor CBG      hx of RCC  - s/p nephrectomy     confusion/falls at home  - MRI brain pending  - Check orthostatics  - PT/OT recommend STR     Chronic pain  - PRN analgesia      Acute on Chronic hypoxic resp failure  Normally on home oxygen 2-3 Lpm. Intubated for OR on 8/8. Case cancelled due to hypotension. Extubated after being transferred to ICU but has been requiring Airvo to maintain saturation.  - O2 supplement with Airvo        Discharge Planning: Patient critically ill and will remain in the ICU for now. Patient is critically ill. Without intervention, there is a high probability of acute organ impairment or life-threatening deterioration in the patient's condition from: septic/distributive shock  Critical care interventions: pressors, Airvo  Total critical care time spent: 35minutes. Time is indicative of direct patient attendance at bedside and on the patient's floor nearby. Includes time spent at bedside performing history and exam, performing chart review, discussing findings and treatment plan with patient and/or family, discussing patient with nursing staff, consultants and colleagues, and ordering/reviewing pertinent laboratory and radiographic evaluations. Time excludes procedures. CPT:  30132: First 30-74 minutes  33658: Each block of 30 min. beyond 76       Diet:  ADULT DIET;  Dysphagia - Minced and Moist  DVT PPx: heparin  Code status: Full Code    Hospital Problems:  Principal Problem:    Septic shock ([facility] [facility]  Active Problems:    Hypotension    Acute metabolic encephalopathy    Renal cell carcinoma (HCC)    Severe obesity (HCC)    ESRD (end stage renal disease) on dialysis ([facility]    Chronic respiratory failure with hypoxia (HCC)    Type 2 diabetes mellitus with nephropathy ([facility]  Resolved Problems:    * No resolved hospital problems.  *      Objective:   Patient Vitals for the past 24 hrs:   Temp Pulse Resp BP SpO2   08/10/22 1716 -- (!) 122 -- (!) 83/51 99 %   08/10/22 1645 -- (!) 120 19 (!) 87/41 97 %   08/10/22 1245 -- (!) 135 20 (!) 91/55 100 %   08/10/22 1133 -- (!) 109 16 -- 92 %   08/10/22 1100 -- (!) 110 24 (!) 90/50 --   08/10/22 1000 -- (!) 109 24 (!) 81/51 91 %   08/10/22 0900 -- (!) 112 24 (!) 82/28 97 %   08/10/22 0830 -- (!) 113 23 -- 92 %   08/10/22 0800 98.9 °F (37.2 °C) (!) 110 17 (!) 81/26 93 %   08/10/22 0700 98.9 °F (37.2 °C) (!) 135 16 (!) 101/37 98 %   08/10/22 0528 -- (!) 108 16 -- 93 %   08/10/22 0515 -- (!) 107 17 -- 99 %   08/10/22 0501 -- (!) 110 17 (!) 108/37 99 %   08/10/22 0500 -- (!) 112 19 -- 98 %   08/10/22 0445 -- (!) 109 17 -- 100 %   08/10/22 0430 -- (!) 122 15 -- 100 %   08/10/22 0424 -- (!) 133 16 (!) 80/35 100 %   08/10/22 0415 -- (!) 133 15 -- 100 %   08/10/22 0401 -- (!) 104 15 (!) 77/28 100 %   08/10/22 0400 -- (!) 104 17 -- 100 %   08/10/22 0345 -- (!) 103 15 -- 100 %   08/10/22 0330 -- (!) 103 21 -- 100 %   08/10/22 0315 -- (!) 102 16 -- 99 %   08/10/22 0301 98.2 °F (36.8 °C) (!) 101 15 (!) 77/26 99 %   08/10/22 0300 -- (!) 101 15 -- 99 %   08/10/22 0245 -- (!) 101 15 -- 98 %   08/10/22 0230 -- 100 14 -- 98 %   08/10/22 0214 -- 100 15 (!) 85/50 99 %   08/10/22 0200 -- 98 16 (!) 60/23 99 %   08/10/22 0145 -- 99 18 -- 98 %   08/10/22 0130 -- 97 17 -- 99 %   08/10/22 0115 -- 96 20 -- (!) 87 %   08/10/22 0103 -- (!) 104 16 (!) 60/23 90 %   08/10/22 0100 -- (!) 105 20 -- (!) 68 %   08/10/22 0052 -- (!) 102 -- (!) 66/28 95 %   08/10/22 0051 -- -- 18 -- --   08/10/22 0045 -- (!) 102 17 -- 91 %   08/10/22 0030 -- (!) 101 21 -- 100 % 08/10/22 0015 -- 100 28 -- 95 %   08/10/22 0000 -- (!) 105 -- -- (!) 84 %   08/09/22 2345 -- 100 17 -- (!) 84 %   08/09/22 2330 -- 94 12 -- 91 %   08/09/22 2315 -- (!) 122 17 -- 95 %   08/09/22 2300 98.8 °F (37.1 °C) (!) 122 18 -- (!) 88 %   08/09/22 2245 -- (!) 121 17 -- 98 %   08/09/22 2230 -- (!) 122 27 -- --   08/09/22 2224 -- (!) 123 23 -- 96 %   08/09/22 2215 -- (!) 122 (!) 42 -- 93 %   08/09/22 2205 -- (!) 122 (!) 33 -- 94 %   08/09/22 2200 -- (!) 123 27 -- 93 %   08/09/22 2158 -- (!) 122 20 -- 90 %   08/09/22 2149 -- (!) 121 21 -- 95 %   08/09/22 2148 -- (!) 121 19 -- 91 %   08/09/22 2145 -- (!) 122 19 -- (!) 85 %   08/09/22 2139 -- (!) 123 24 -- 100 %   08/09/22 2135 -- (!) 122 20 -- 97 %   08/09/22 2131 -- (!) 122 20 -- 96 %   08/09/22 2130 -- (!) 122 20 -- 95 %   08/09/22 2124 -- (!) 123 18 -- 96 %   08/09/22 2117 -- (!) 121 23 -- 100 %   08/09/22 2115 -- (!) 121 17 -- 98 %   08/09/22 2105 -- (!) 122 15 -- 100 %   08/09/22 2100 -- (!) 123 18 -- 97 %   08/09/22 2051 -- 98 17 -- 91 %   08/09/22 2045 -- (!) 101 16 -- (!) 75 %   08/09/22 2030 -- 98 28 -- (!) 80 %   08/09/22 2025 -- 99 23 -- 92 %   08/09/22 2018 -- 96 30 -- 94 %   08/09/22 2015 -- 96 18 -- --   08/09/22 2000 -- 96 16 -- --   08/09/22 1955 -- 100 18 -- --   08/09/22 1952 -- 98 15 -- 100 %   08/09/22 1947 -- 95 23 -- 98 %   08/09/22 1945 -- 93 17 -- --   08/09/22 1927 -- (!) 121 20 -- 90 %   08/09/22 1925 -- (!) 121 25 -- 93 %   08/09/22 1913 -- (!) 121 24 -- 93 %   08/09/22 1903 97.4 °F (36.3 °C) (!) 121 21 -- 93 %   08/09/22 1900 -- (!) 120 18 -- (!) 86 %   08/09/22 1845 -- (!) 123 18 (!) 92/55 --   08/09/22 1800 -- (!) 124 16 -- 100 %   08/09/22 1730 -- 98 15 (!) 89/30 91 %       Oxygen Therapy  SpO2: 99 %  Pulse Oximetry Type: Continuous  Pulse via Oximetry: 121 beats per minute  Pulse Oximeter Device Mode: Continuous  O2 Device: Heated high flow cannula  Skin Assessment: Clean, dry, & intact  Skin Protection for O2 Device: No  FiO2 : 90 %  O2 Flow Rate (L/min): 50 L/min  Blood Gas  Performed?: Yes  Complications #1: None  Post-procedure #1: Standard  Specimen Status #1: Point of care  How Tolerated?: Tolerated well    Estimated body mass index is 34.33 kg/m² as calculated from the following:    Height as of this encounter: 5' 4\" (1.626 m). Weight as of this encounter: 200 lb (90.7 kg). Intake/Output Summary (Last 24 hours) at 8/10/2022 1729  Last data filed at 8/10/2022 0702  Gross per 24 hour   Intake 3228.46 ml   Output 150 ml   Net 3078.46 ml         Physical Exam:   Blood pressure (!) 83/51, pulse (!) 122, temperature 98.9 °F (37.2 °C), temperature source Axillary, resp. rate 19, height 5' 4\" (1.626 m), weight 200 lb (90.7 kg), SpO2 99 %. General:    NAD, drowsy and mumbles  Head:  Normocephalic, atraumatic  Eyes:  Sclerae appear normal.  Pupils equally round. ENT:  Nares appear normal, no drainage. Moist oral mucosa  Neck:  No restricted ROM. Trachea midline   CV:   RRR. Lungs: No wheezing. Symmetric expansion. Abdomen:   Soft, nondistended. Extremities: No cyanosis or clubbing. Skin:     No rashes and normal coloration. Neuro:  CN II-XII grossly intact. Drowsy    I have personally reviewed labs and tests showing:  Recent Labs:  Recent Results (from the past 48 hour(s))   POCT Glucose    Collection Time: 08/08/22  8:27 PM   Result Value Ref Range    POC Glucose 184 (H) 65 - 100 mg/dL    Performed by:  Ankur    CBC with Auto Differential    Collection Time: 08/09/22  3:16 AM   Result Value Ref Range    WBC 19.5 (H) 4.3 - 11.1 K/uL    RBC 3.01 (L) 4.05 - 5.2 M/uL    Hemoglobin 9.8 (L) 11.7 - 15.4 g/dL    Hematocrit 28.9 (L) 35.8 - 46.3 %    MCV 96.0 79.6 - 97.8 FL    MCH 32.6 26.1 - 32.9 PG    MCHC 33.9 31.4 - 35.0 g/dL    RDW 18.2 (H) 11.9 - 14.6 %    Platelets 491 (L) 714 - 450 K/uL    MPV 12.3 9.4 - 12.3 FL    nRBC 0.16 0.0 - 0.2 K/uL    Differential Type AUTOMATED      Seg Neutrophils 89 (H) 43 - 78 % Lymphocytes 4 (L) 13 - 44 %    Monocytes 6 4.0 - 12.0 %    Eosinophils % 0 (L) 0.5 - 7.8 %    Basophils 0 0.0 - 2.0 %    Immature Granulocytes 1 0.0 - 5.0 %    Segs Absolute 17.4 (H) 1.7 - 8.2 K/UL    Absolute Lymph # 0.7 0.5 - 4.6 K/UL    Absolute Mono # 1.1 0.1 - 1.3 K/UL    Absolute Eos # 0.0 0.0 - 0.8 K/UL    Basophils Absolute 0.1 0.0 - 0.2 K/UL    Absolute Immature Granulocyte 0.2 0.0 - 0.5 K/UL   Renal Function Panel    Collection Time: 08/09/22  3:16 AM   Result Value Ref Range    Sodium 138 136 - 145 mmol/L    Potassium 4.8 3.5 - 5.1 mmol/L    Chloride 101 98 - 107 mmol/L    CO2 20 (L) 21 - 32 mmol/L    Anion Gap 17 (H) 7 - 16 mmol/L    Glucose 160 (H) 65 - 100 mg/dL    BUN 45 (H) 8 - 23 MG/DL    Creatinine 6.30 (H) 0.6 - 1.0 MG/DL    GFR African American 8 (L) >60 ml/min/1.73m2    GFR Non- 7 (L) >60 ml/min/1.73m2    Calcium 8.4 8.3 - 10.4 MG/DL    Phosphorus 4.3 (H) 2.3 - 3.7 MG/DL    Albumin 3.8 3.2 - 4.6 g/dL   POCT Glucose    Collection Time: 08/09/22  6:45 AM   Result Value Ref Range    POC Glucose 140 (H) 65 - 100 mg/dL    Performed by:  Ankur    POCT Glucose    Collection Time: 08/09/22 12:01 PM   Result Value Ref Range    POC Glucose 130 (H) 65 - 100 mg/dL    Performed by: Kyra Christian    POCT Glucose    Collection Time: 08/09/22  5:15 PM   Result Value Ref Range    POC Glucose 167 (H) 65 - 100 mg/dL    Performed by: Kyra Christian    POCT Glucose    Collection Time: 08/09/22  9:54 PM   Result Value Ref Range    POC Glucose 197 (H) 65 - 100 mg/dL    Performed by: Jj Koehler    Lactic Acid    Collection Time: 08/10/22 12:56 AM   Result Value Ref Range    Lactic Acid, Plasma 4.0 (HH) 0.4 - 2.0 MMOL/L   Vancomycin Level, Random    Collection Time: 08/10/22 12:58 AM   Result Value Ref Range    Vancomycin Rm 17.5 UG/ML   POCT Glucose    Collection Time: 08/10/22  7:36 AM   Result Value Ref Range    POC Glucose 177 (H) 65 - 100 mg/dL    Performed by: Kyra Christian    POCT Glucose (ULTRAM) tablet 50 mg  50 mg Oral Q6H PRN    insulin lispro (HUMALOG) injection vial 0-8 Units  0-8 Units SubCUTAneous TID WC    insulin lispro (HUMALOG) injection vial 0-4 Units  0-4 Units SubCUTAneous Nightly    temazepam (RESTORIL) capsule 15 mg  15 mg Oral Nightly PRN    midodrine (PROAMATINE) tablet 10 mg  10 mg Oral TID WC    pantoprazole (PROTONIX) tablet 40 mg  40 mg Oral QAM AC    sevelamer (RENVELA) tablet 2,400 mg  2,400 mg Oral TID WC    alogliptin (NESINA) tablet 6.25 mg  6.25 mg Oral Daily    sodium chloride flush 0.9 % injection 5-40 mL  5-40 mL IntraVENous 2 times per day    sodium chloride flush 0.9 % injection 5-40 mL  5-40 mL IntraVENous PRN    0.9 % sodium chloride infusion   IntraVENous PRN    ondansetron (ZOFRAN-ODT) disintegrating tablet 4 mg  4 mg Oral Q8H PRN    Or    ondansetron (ZOFRAN) injection 4 mg  4 mg IntraVENous Q6H PRN    polyethylene glycol (GLYCOLAX) packet 17 g  17 g Oral Daily PRN    acetaminophen (TYLENOL) tablet 650 mg  650 mg Oral Q6H PRN    Or    acetaminophen (TYLENOL) suppository 650 mg  650 mg Rectal Q6H PRN    heparin (porcine) injection 5,000 Units  5,000 Units SubCUTAneous 3 times per day    heparin (porcine) injection 5,000 Units  5,000 Units IntraVENous PRN    Epoetin Martin-epbx (RETACRIT) injection 20,000 Units  20,000 Units SubCUTAneous Q7 Days       Signed:  Verona Abrams MD    Part of this note may have been written by using a voice dictation software. The note has been proof read but may still contain some grammatical/other typographical errors.

## 2022-08-10 NOTE — PROGRESS NOTES
[facility] Critical Care Note[de-identified] 8/10/2022  Cyn Pires  Admission Date: 8/2/2022     Length of Stay: 7 days    Background: 80 y.o. 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 Required thoracentesis of pleural effusions- 2/2022, transudate. Last echo 6/25 with EF 60-65%, RV severely dilated with elevated RVSP. HPI obtained from chart due to patient status. Presented to ER with hypotension and mid back pain on 8/3. She did not have normal fluid removal in HD on Monday due to hypotension per patient. Chronically on midodrine OP. In ER given gentle hydration, started on Vanc/cefepime. WBC 7, lactic 3.5. Procal was low as abx were discontinued, Bcx NG. Patient has L thigh AV graft and Vasc consulted for ligation with thoughts this may be contributing to hypotension. This AM she was scheduled for surgery, she was intubated for surgery and was found to have SBP in 30s. Required multiple pushes of vasopressors and briefly Epi gtt. Was drowsy prior to induction per Anesthesia. She received propfol bolus, fentanyl push and versed push. Case was cancelled and we were consulted for vent management and ICU care. .    Notable PMH:  has a past medical history of Anemia, Arthritis, AVF (arteriovenous fistula) ([address] AVF (arteriovenous fistula) ([address] Degenerative joint disease, Diabetes ([address] Enlarged lymph node, ESRD on hemodialysis ([address] Hypertension, Mediastinal mass, Obesity, On home O2, Renal cancer ([address] Shortness of breath, and Transient ischemic attack. 24 Hour events: pt remains on levophed, bp in 53J systolic but not sure of accuracy, currently on 100% airvo, on 0.1 of precedex    ROS: unable to obtain/negative except as listed elsewhere.      Lines: (insertion date)     Hemodialysis Central Access Right Subclavian (Active)       Hemodialysis Fistula/Graft Arteriovenous vein graft Left Thigh (Active)     Drips: current dose (range)  Dose (mcg/kg/min) Propofol : *90 mg  Dose (mcg/kg/hr) Dexmedetomidine: 0.1 mcg/kg/hr  Dose (mcg/min) Epinephrine: 0 mcg/min  Dose (mcg/min) Norepinephrine: 15 mcg/min (bp 101/37)     Pertinent Exam:         Blood pressure (!) 81/51, pulse (!) 109, temperature 98.9 °F (37.2 °C), temperature source Axillary, resp. rate 24, height 5' 4\" (1.626 m), weight 200 lb (90.7 kg), SpO2 91 %. Intake/Output Summary (Last 24 hours) at 8/10/2022 1041  Last data filed at 8/10/2022 0702  Gross per 24 hour   Intake 3228.46 ml   Output 1152 ml   Net 2076.46 ml     Constitutional:  await on airvo  EENMT:  Sclera clear, pupils equal, oral mucosa moist  Respiratory: some crackles  Cardiovascular:  RRR  Gastrointestinal:  soft with no tenderness; positive bowel sounds present  Musculoskeletal:  warm with no cyanosis, no lower extremity edema  Skin:  no jaundice or ecchymosis  Neurologic:alert, but will not answer questions-nurses say she will talk if she gets mad  Psychiatric: calm    CXR: none today    Recent Labs     08/08/22 0318 08/09/22 0316   WBC 10.7 19.5*   HGB 9.9* 9.8*   HCT 29.2* 28.9*   PLT 98* 120*     Recent Labs     08/08/22 0318 08/09/22 0316   * 138   K 4.2 4.8    101   CO2 26 20*   GLUCOSE 133* 160*   BUN 35* 45*   CREATININE 5.30* 6.30*   PHOS 2.9 4.3*     No results for input(s): TROPHS, NTPROBNP, CRP, ESR in the last 72 hours. Recent Labs     08/08/22 0318 08/09/22 0316   GLUCOSE 133* 160*      ECHO: 08/02/22    TRANSTHORACIC ECHOCARDIOGRAM (TTE) COMPLETE (CONTRAST/BUBBLE/3D PRN) 08/08/2022 12:57 PM (Final)    Interpretation Summary  Formatting of this result is different from the original.      Left Ventricle: Normal left ventricular systolic function with a visually estimated EF of 55 - 60%. Left ventricle size is normal. Normal wall thickness. Normal wall motion. Abnormal diastolic function.     Right Ventricle: Right ventricle is severely dilated. Severely reduced systolic function. Mitral Valve: Mild paravalvular regurgitation. Tricuspid Valve: Valve structure is normal. Severe regurgitation with a centrally directed jet. Tricuspid annulus is severely dilated leading to mild coaptation. This is likely due to severely dilated right ventricle. No stenosis noted. Severely elevated RVSP. The estimated RVSP is 72 mmHg. Left Atrium: Left atrium is mildly dilated. Right Atrium: Right atrium is moderately dilated. Technical qualifiers: Procedure performed with the patient in a supine position, color flow Doppler was performed and pulse wave and/or continuous wave Doppler was performed. Signed by: Sukhi Nelson MD on 8/8/2022 12:57 PM    Microbiology:   No results for input(s): CULTURE in the last 72 hours. Ventilator Settings Ideal body weight: 54.7 kg (120 lb 9.5 oz)  Adjusted ideal body weight: 69.1 kg (152 lb 5.7 oz)  Mode FIO2 Rate Tidal Volume Pressure        94 %                  Peak airway pressure:     Minute ventilation:    ABG:  Recent Labs     08/08/22  0834 08/08/22  0955 08/08/22  1211   PHAPOC 7.32* 7.38 7.42   [id number] 47.7* 39.0 38.6   [id number] 79 102* 267*   MHC1LMF 24.6 23.2 25.2   BE  --   --  0.8     Assessment and Plan:  (Medical Decision Making)     Impression: 80 y.o. female with ESRD, hypotension, became more hypotensive during induction of anesthesia for graft ligation. Procedure aborted. Akbar Wagner NEURO:  Sedation: precedex drip. Wean as tolerated-on minimal amount  Analgesia: prn  CV:  Volume Status: appears euvolemic. Dialysis yesterday was ok  Shock: cardiogenic vs related to most recent graft. On pressors. Trying to gradually wean off. SBP often in 90's. TTE in past suggested PH. TTE repeated yesterday with normal EF but severe RV dilation, RVSP 72. Unsure of functional baseline but if she improves and can be discharged can see and workup PH as outpt.    PULM:  Acute hypoxemic respiratory failure:  on airvo 100%   RENAL:  ESRD:  per nephrology. GI:   Nutrition: NPO for now. When more cooperative will assess if pt needs speech eval.    HEME:   Anemia: mild. Monitor. Thrombocytopenia: mild. Monitor. Anticoagulation: heparin prophy  ID:   Leukocytosis: no clear source. Starting vanc and cefepime empiricallyday 2Will obtain blood cultures if able. ENDO:   DM: per primary team  Skin: no decub, turns, preventive care  Prophy: heparin, protonix. Full Code    The patient is critically ill with respiratory failure, circulatory failure and requires high complexity decision making for assessment and support including frequent ventilator adjustment , frequent evaluation and titration of therapies , application of advanced monitoring technologies and extensive interpretation of multiple databases    Cumulative time devoted to patient care services by me for day of service is 34  mins.     Veronica Rojas MD

## 2022-08-10 NOTE — PROGRESS NOTES
SPEECH PATHOLOGY NOTE    Patient too drowsy for po intake this morning. Will follow up later today when she is more alert. Rns in agreement with plan.      Jose Grissom MSP, CCC-SLP  Speech Language Pathologist  Acute Rehabilitation Services  Contact: Dee

## 2022-08-10 NOTE — PROGRESS NOTES
RENAL H&P/CONSULT    Subjective:     Patient is a 81 y/o AA female, followed by our office for ESRD, on HD M-W-F at [facility]. She has been running via RIJ perm cath since LUE AVF became non-functioning. Left thigh AV graft placed on 5/26/22 by Dr Bijan Serrano. PMH also consist of DM II, Hyperphosphatemia, anemia, renal cell carcinoma s/p left nephrectomy, followed by Pulmonary for lung mass, and HTN. She is on home O2 at 2L NC as needed. She presented to the ER via EMS for hypotension and generalized \"feeling bad\". She has been confused and reports back pain and has dyspnea and edema. BP has been low and we'll plan for HD today with Midodrine for hypotension. She is being treated empirically with IV antibiotics for suspected SIRS.   8/4/22- see HD note  8/5/22- alert/oriented, lying in bed, no signs of distress, on 4L NC, c/o dyspnea at times, denies CP, n/v, HA, or dizziness. HD last two days, no HD today, run tomorrow. 8/6/22- see HD note  8/7/22- drowsy, oriented x3, head of bed elevated, no signs of distress, on 2L NC, denies pain, dyspnea, n/v, HA or dizziness. Spoke with her daughter over the phone while in room, informed her of planned procedure with Dr Bijan Serrano tomorrow. 8/8/22- went to OR this morning, left thigh graft ligation canceled due to hypotension, was given Epi, intubated, art line placed, transferred to CCU. No sedation, on Levo. No signs of distress.   8/9/22 - see HD Note  8/10/22 - resting comfortably on re-breather mask - tolerated SLED well last night - plan SLED again tomorrow - working on stabilizing patient for procedure to shut down the AV Graft     Past Medical History:   Diagnosis Date    Anemia     Arthritis     AVF (arteriovenous fistula) ([address]     left    AVF (arteriovenous fistula) ([address] 12/20/2016 12/6/16 ([facility]) Right AVF revision and thrombectomy    Degenerative joint disease     Diabetes ([address]     type 2--- does not check sqbs at home    Enlarged lymph node     \"enlarging paratracheal node to 2.6 cm on CT chest\"    ESRD on hemodialysis [facility]) onset 2006    ESRD.  MWF dialysis at [facility]    Hypertension     controlled with med    Mediastinal mass     being followed by dr ramírez--per pt-- states told by dr Laura Cedillo it wasnt cancer-- but plans to be rescanned 5/2022    Obesity     On home O2     2L QHS and PRN during day d/t \"lymph node in chest\"    Renal cancer ([address]     Dr Rehan White - renal cancer L nephrectomy---and radiation ---    Shortness of breath     swollen lymphnode in chest-- oxygen 2LPM    Transient ischemic attack 08/29/2015    no residual      Past Surgical History:   Procedure Laterality Date    BREAST SURGERY Right     cyst removed    COLONOSCOPY N/A 11/8/2018    COLONOSCOPY  BMI 36 performed by Tj Trujillo MD at [address] [place] [address]  9/14/2017    CT BIOPSY ABDOMEN RETROPERITONEUM 9/14/2017 [facility] CT SCAN    DIALYSIS FISTULA CREATION Left 8/8/2022    LEFT THIGH ARTERIO VENOUS GRAFT Ligation performed by Jannette Lebron MD at [facility] MAIN OR    GI  08/06/2018    exploratory laparotomy    GI  06/01/2002    colon resection resulting in temporary colostomy reversal    GROIN SURGERY Left 6/26/2022    LEFT GROIN DEBRIDEMENT OF HEMATOMA / MIGEL performed by Tanya Gómez MD at [facility] MAIN OR    GYN      mihir    IR INTRO CATH DIALYSIS CIRCUIT W BALLOON PTA  3/10/2020    IR INTRO CATH DIALYSIS CIRCUIT W BALLOON PTA  12/5/2019    IR INTRO CATH DIALYSIS CIRCUIT W BALLOON PTA  9/3/2019    IR INTRO CATH DIALYSIS CIRCUIT W BALLOON PTA  5/30/2019    IR INTRO CATH DIALYSIS CIRCUIT W BALLOON PTA  2/28/2019    IR THRMB/INFUSION DIAYSIS CIRCUIT Left 2021    IR TUNNELED CATHETER PLACEMENT GREATER THAN 5 YEARS  3/15/2022    IR TUNNELED CATHETER PLACEMENT GREATER THAN 5 YEARS  11/19/2021    IR TUNNELED CATHETER PLACEMENT GREATER THAN 5 YEARS  11/19/2021    IR TUNNELED CATHETER PLACEMENT GREATER THAN 5 YEARS 11/19/2021 [facility] SPECIALS    IR TUNNELED CATHETER PLACEMENT GREATER THAN 5 YEARS  3/15/2022    IR TUNNELED CATHETER PLACEMENT GREATER THAN 5 YEARS 3/15/2022 [facility] SPECIALS    OTHER SURGICAL HISTORY      dialysis fistula, several permcaths    UROLOGICAL SURGERY Left July 2015    nephrectomy    VASCULAR SURGERY Left 04/07/2022    LEFT ARM AV GRAFT    VASCULAR SURGERY Left 03/15/2022    declot    VASCULAR SURGERY Left 02/21/2022    Open thromboembolectomy of left upper arm graft. VASCULAR SURGERY Right     AV graft- states no longer uses    VASCULAR SURGERY Left 5/26/2022    LEFT AV GRAFT THIGH performed by Gayle Obregon MD at [address] [facility]      Prior to Admission medications    Medication Sig Start Date End Date Taking? Authorizing Provider   oxyCODONE-acetaminophen (PERCOCET) 5-325 MG per tablet Take 1 tablet by mouth every 4 hours as needed for Pain. Historical Provider, MD   Epoetin Martin-epbx (RETACRIT) 30842 UNIT/ML SOLN injection Inject 1 mL into the skin every 7 days To be given at dialysis 7/3/22   SUNSHINE Whitman - CNP   naloxone 4 MG/0.1ML LIQD nasal spray 1 spray by Nasal route as needed for Opioid Reversal. Use 1 spray intranasally, then discard. Repeat with new spray every 2 min as needed for opioid overdose symptoms, alternating nostrils.      Historical Provider, MD   midodrine (PROAMATINE) 10 MG tablet Take 1 tablet by mouth every 8 hours 6/5/22   Shaun Acosta MD   acetaminophen (TYLENOL) 500 MG tablet Take 500 mg by mouth every 6 hours as needed for Pain (for breakthrough pain )    Historical Provider, MD   omeprazole (PRILOSEC) 40 MG delayed release capsule Take 40 mg by mouth at bedtime    Historical Provider, MD   OXYGEN Inhale into the lungs 2 LITERS AS NEEDED FOR SOB VIA NASAL CANNULA    Historical Provider, MD   SITagliptin (JANUVIA) 50 MG tablet Take 50 mg by mouth daily    Historical Provider, MD   sevelamer (RENVELA) 800 MG tablet Take 3 tablets by mouth 3 times daily (with meals) PT TAKES 3 TABS WITH MEALS-- AND 1 TABS WITH SNACKS     Historical Provider, MD     Allergies   Allergen Reactions    Penicillins Other (See Comments) and Rash     PT STATES UNSURE OF RX  Tolerated cefazolin    Vancomycin Rash      Social History     Tobacco Use    Smoking status: Never    Smokeless tobacco: Never   Substance Use Topics    Alcohol use: No      Family History   Problem Relation Age of Onset    Diabetes Mother     Heart Disease Mother     Hypertension Mother     Heart Disease Father     Hypertension Father     Post-op Cognitive Dysfunction Neg Hx     Pseudochol. Deficiency Neg Hx     Delayed Awakening Neg Hx     Post-op Nausea/Vomiting Neg Hx     Emergence Delirium Neg Hx     Other Neg Hx     Malig Hypertherm Neg Hx           Review of Systems    Unable to obtain due to patient's condition       Objective:       BP (!) 101/37   Pulse (!) 135   Temp 98.2 °F (36.8 °C) (Axillary)   Resp 16   Ht 5' 4\" (1.626 m)   Wt 200 lb (90.7 kg)   SpO2 98%   BMI 34.33 kg/m²     08/10 0701 - 08/10 1900  In: 1543.6 [I.V.:211.9]  Out: -   08/08 1901 - 08/10 0700  In: 1984.9 [P.O.:50; I.V.:734.9]  Out: 1152     BP (!) 101/37   Pulse (!) 135   Temp 98.2 °F (36.8 °C) (Axillary)   Resp 16   Ht 5' 4\" (1.626 m)   Wt 200 lb (90.7 kg)   SpO2 98%   BMI 34.33 kg/m²   General:  Ion re-breather mask - mild distress, appears stated age. Head:  Normocephalic, without obvious abnormality, atraumatic. Neck: Supple, symmetrical, trachea midline, no JVD. Lungs:   Coarse to auscultation bilaterally. Heart:  Regular rate and rhythm, S1, S2 normal, no murmur,  rub or gallop. Abdomen:   Soft, non-tender. Bowel sounds normal.   Extremities: Extremities no edema. Access:  [facility], left thigh graft is open   Skin: Skin color, texture, turgor normal. No rashes or lesions.    Neurologic: Chava asterixis         Data Review:     Recent Results (from the past 24 hour(s))   POCT Glucose    Collection Time: 08/09/22 12:01 PM   Result Value Ref Range    POC Glucose 130 (H) 65 - 100 mg/dL    Performed by: Tran Dolan    POCT Glucose    Collection Time: 08/09/22  5:15 PM   Result Value Ref Range    POC Glucose 167 (H) 65 - 100 mg/dL    Performed by: Tran Dolan    POCT Glucose    Collection Time: 08/09/22  9:54 PM   Result Value Ref Range    POC Glucose 197 (H) 65 - 100 mg/dL    Performed by: Yoanna Meredith    Lactic Acid    Collection Time: 08/10/22 12:56 AM   Result Value Ref Range    Lactic Acid, Plasma 4.0 (HH) 0.4 - 2.0 MMOL/L   Vancomycin Level, Random    Collection Time: 08/10/22 12:58 AM   Result Value Ref Range    Vancomycin Rm 17.5 UG/ML   POCT Glucose    Collection Time: 08/10/22  7:36 AM   Result Value Ref Range    POC Glucose 177 (H) 65 - 100 mg/dL    Performed by: Brandy Samano;   Results for orders placed during the hospital encounter of 08/02/22    XR CHEST PORTABLE    Narrative  EXAM: XR CHEST PORTABLE    HISTORY: hypotension. TECHNIQUE: Frontal chest.    COMPARISON: 7/28/2022    FINDINGS:  There is mild cardiomegaly. There is pulmonary vascular congestion/edema. There is no pleural effusion, or pneumothorax. No significant osseous abnormalities are observed. Stable right-sided CVC and right vascular stent. Impression  Findings suggest CHF.     KUB:  Results for orders placed in visit on 08/03/18    XR ABDOMEN (KUB) (SINGLE AP VIEW)    Narrative  KUB supine views    History:  mechanical small bowel obstruction, lower abd ventral hernia, 78 years  Female    Comparison:  CT abdomen pelvis yesterday    Findings:  Esophageal tube appears to terminate in the first portion of the  duodenum. Oral contrast is seen throughout the colon. No free air is evident. The bowel gas pattern is nonspecific and there is no evidence of ileus or  obstruction. No suspicious renal or ureteral calculi are evident. Visualized  osseous structures unremarkable. Impression  Impression: No acute pathology identified.      Renal US:  No results found for this or any previous visit. CT Abdomen  Results for orders placed during the hospital encounter of 06/25/22    CT ABDOMEN PELVIS W IV CONTRAST Additional Contrast? None    Narrative  EXAM: CT abdomen and pelvis with IV contrast.    INDICATION: Abdominal pain. COMPARISON: Prior PET/CT scan on May 5, 2022. TECHNIQUE: Axial CT images of the abdomen and pelvis were obtained after the  intravenous injection of 100 mL Isovue 370 CT contrast. Radiation dose reduction  techniques were used for this study. Our CT scanners use one or all of the  following:  Automated exposure control, adjustment of the mA or kV according to  patient size, iterative reconstruction. FINDINGS:    - Liver: Within normal limits. - Gallbladder and bile ducts: Within normal limits. - Spleen: Within normal limits. - Urinary tract: The left kidney is absent. The right kidney is atrophied and  there is a 2.6 cm exophytic cyst off its lower pole. No right-sided  hydronephrosis is seen. The urinary bladder is collapsed. - Adrenals: Within normal limits. - Pancreas: Within normal limits. - Gastrointestinal tract: There is colonic diverticulosis. No definite acute  diverticulitis is seen, although ascites limits for evaluation of focal  inflammation.  - Retroperitoneum: Mild abdominal aortic atherosclerosis, with no aneurysmal  dilatation or dissection. The right heart is enlarged, and there is prominent  contrast reflux into the IVC, raising the possibility of right heart failure. - Peritoneal cavity and abdominal wall: There is progressed mild ascites. No  intra-abdominal abscess or free intraperitoneal air is seen. Edema in the  abdominal wall subcutaneous tissue has not significantly changed. - Pelvis: There is a double-limbed bypass graft in the upper left thigh. An  adjacent 7.9 x 7.5 cm complex fluid collection with no associated gas is seen in  the upper anterior thigh, which could be an abscess or hematoma. A small amount  of high-density material along the inferior margin of the fluid collection  raises the possibility of active bleeding.  - Spine/bones: No acute process. - Other comments: Small bilateral pleural effusions and rounded atelectasis in  the left lower lobe are unchanged. Impression  1. Upper left thigh bypass graft, not fully visualized on this study, with an  adjacent 7.9 x 7.5 cm complex fluid collection which could represent an abscess  or hematoma. There is a small amount of high density material along the inferior  margin of the fluid collection, making it difficult to exclude active bleeding. Dr. Halle Pepe verbally notified at 4:16 AM.  2. Persistent anasarca, with progressed mild ascites. 3. Prior left nephrectomy and atrophied right kidney. 4. Prominent sized hepatic veins and IVC in this patient with an enlarged right  heart, raising the possibility of right heart failure. Principal Problem:    Septic shock ([address]  Active Problems:    Hypotension    Acute metabolic encephalopathy    Renal cell carcinoma (HCC)    Severe obesity (HCC)    ESRD (end stage renal disease) on dialysis (HCC)    Chronic respiratory failure with hypoxia (HCC)    Type 2 diabetes mellitus with nephropathy ([address]  Resolved Problems:    * No resolved hospital problems. *      Assessment:     1. ESRD- on HD M-W-F, HD needed for biochemical and volume control. No urgent needs at this time. - plan SLED tomorrow     2. Anemia- likely related to ESRD, near goal for CKD, continue Nephrovite, and LAKISHA, will monitor     3. Hypotension- with a hx of HTN, antihypertensives were stopped -  Altered hemodynamics due to left thigh AV graph likely causes hypotension   - left thigh graph cancelled this AM due to hypotension - treated with  Levophed     4. Fluid overload - improved with SLED for volume control      5. Hypocalcemia- with low albumin, treat with vitamin D     6.  HTN- hx of, off antihypertensives due to hypotension 7. Renal cell carcinoma- followed by Oncology     8. DM II- on SSI, managed by primary     9. Hyperphosphatemia- hx of, on Renvela, continue    10.  Respiratory distress- i transferred from OR to CCU, managed by Pulmonary       Plan:     As above

## 2022-08-10 NOTE — PROGRESS NOTES
VANCO DAILY FOLLOW UP RENAL INSUFFICIENCY PATIENT   [address] [facility] Pharmacokinetic Monitoring Service - Vancomycin    Consulting Provider: Kev Ortiz   Indication: sepsis of unknown etiology  Target Concentration: Pre-Dialysis Concentration 21-24 mg/L  Day of Therapy: 2  Additional Antimicrobials: cefepime    Pertinent Laboratory Values: Wt Readings from Last 1 Encounters:   08/02/22 200 lb (90.7 kg)     Temp Readings from Last 1 Encounters:   08/10/22 98.9 °F (37.2 °C) (Axillary)     Recent Labs     08/08/22  0318 08/09/22  0316 08/10/22  0056   BUN 35* 45*  --    CREATININE 5.30* 6.30*  --    WBC 10.7 19.5*  --    LACACIDPL  --   --  4.0*       Lab Results   Component Value Date/Time    VANCORANDOM 17.5 08/10/2022 12:58 AM       MRSA Nasal Swab: N/A. Non-respiratory infection. .    Assessment:  Date:  Dose/Freq Admin Times Level/Time:   08/09 1750 mg  500 mg 1026  2004    08/10    Rd @ 0600 = 17.5                       Plan:  Concentration-guided dosing due to intermittent hemodialysis  No dose needed today, redose after HD tomorrow  Vancomycin concentration ordered for 08/10 @ 0600    Pharmacy will continue to monitor patient and adjust therapy as indicated      Thank you for the consult,  Abbey Manrique, [facility]

## 2022-08-10 NOTE — INTERDISCIPLINARY ROUNDS
Interdisciplinary team rounds were held 8/10/2022 with the following team members:Care Management, Nursing, Nurse Practitioner, Palliative Care, Pastoral Care, Pharmacy, Physician, Respiratory Therapy, and Clinical Coordinator and the patient. Plan of care discussed. See clinical pathway and/or care plan for interventions and desired outcomes.

## 2022-08-10 NOTE — PROGRESS NOTES
[facility] attempted to visit PT, but PT was asleep. [facility] prayed silently for PT, PT's Family, and Staff as chart states PT is [address] Luz Barger M.Div.

## 2022-08-10 NOTE — CARE COORDINATION
Chart reviewed and pt discussed in am IDR. Continues CCU. Currently Airvo 100%. Precedex and levo gtts. SLED per Nephrology. CM following. LOS 7 days.

## 2022-08-10 NOTE — PROGRESS NOTES
A follow up visit was made to the patient. Emotional support, spiritual presence and   prayer were provided for the patient.       Manpreet Hughes, [address] [facility], [facility]

## 2022-08-10 NOTE — INTERDISCIPLINARY ROUNDS
Multi-D Rounds/Checklist (leapfrog):  Lines: can any be removed?: None      Hemodialysis Central Access Right Subclavian (Active)       Hemodialysis Fistula/Graft Arteriovenous vein graft Left Thigh (Active)     DVT Prophylaxis: Ordered  Vent: N/A  Nutrition Ordered/appropriate: Ordered  Bowel Movement last 2 days: Yes  Can antibiotics or other drugs be stopped? Is Nozin performed: Yes/End Date set  Consults needed: None  A: Is pain control adequate? (has PRNs? Stop drip?) Yes  B: Sedation break and SBT? N/A  C: Is sedation choice appropriate? N/A  D: Delirium/CAM-ICU? Yes  E: Mobility goals/appropriateness? Yes  F: Family update and plan? daughter is primary contact and is being updated daily by primary attending and nursing staff.     Gerry Wilkes, APRN - CNP

## 2022-08-11 PROBLEM — J96.11 CHRONIC RESPIRATORY FAILURE WITH HYPOXIA (HCC): Status: ACTIVE | Noted: 2022-01-01

## 2022-08-11 NOTE — PROGRESS NOTES
Patient placed on V60. Connected to the Nurse Call System and Continuous Pulse Oximetry utilizing Vital Sync. Alarms are activated and nurse has been notified. Documentation completed.   Spo2 100%

## 2022-08-11 NOTE — PROGRESS NOTES
[facility] Critical Care Note[de-identified] 8/11/2022  Dylan Peterson Pires  Admission Date: 8/2/2022     Length of Stay: 8 days    Background: 80 y.o. 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 Required thoracentesis of pleural effusions- 2/2022, transudate. Last echo 6/25 with EF 60-65%, RV severely dilated with elevated RVSP. HPI obtained from chart due to patient status. Presented to ER with hypotension and mid back pain on 8/3. She did not have normal fluid removal in HD on Monday due to hypotension per patient. Chronically on midodrine OP. In ER given gentle hydration, started on Vanc/cefepime. WBC 7, lactic 3.5. Procal was low as abx were discontinued, Bcx NG. Patient has L thigh AV graft and Vasc consulted for ligation with thoughts this may be contributing to hypotension. This AM she was scheduled for surgery, she was intubated for surgery and was found to have SBP in 30s. Required multiple pushes of vasopressors and briefly Epi gtt. Was drowsy prior to induction per Anesthesia. She received propfol bolus, fentanyl push and versed push. Case was cancelled and we were consulted for vent management and ICU care. Talked to daughter and states that all of the patient's issues clearly started with the placement of her last fistula. Prior to that she was completely independent and living alone. Notable PMH:  has a past medical history of Anemia, Arthritis, AVF (arteriovenous fistula) ([address] AVF (arteriovenous fistula) ([address] Degenerative joint disease, Diabetes ([address] Enlarged lymph node, ESRD on hemodialysis ([address] Hypertension, Mediastinal mass, Obesity, On home O2, Renal cancer ([address] Shortness of breath, and Transient ischemic attack. 24 Hour events: Pt continues to need levophed at 20mcg. MAP's in the 30's. Groggy but able to answer some questions. This morning tachy to the 130's as well. Pt is on bipap 80% due to sats in the 60's earlier. Daughter is her POA. ROS: unable to obtain due to pt condition. Lines: (insertion date)     Hemodialysis Central Access Right Subclavian (Active)       Hemodialysis Fistula/Graft Arteriovenous vein graft Left Thigh (Active)     Drips: current dose (range)  Dose (mcg/kg/min) Propofol : *90 mg  Dose (mcg/kg/hr) Dexmedetomidine: 0 mcg/kg/hr (per nursing misc)  Dose (mcg/min) Epinephrine: 0 mcg/min  Dose (mcg/min) Norepinephrine: 20 mcg/min (bp 56/21)     Pertinent Exam:         Blood pressure 108/78, pulse (!) 137, temperature 97.6 °F (36.4 °C), temperature source Axillary, resp. rate 22, height 5' 4\" (1.626 m), weight 200 lb (90.7 kg), SpO2 100 %. Intake/Output Summary (Last 24 hours) at 8/11/2022 0755  Last data filed at 8/10/2022 1856  Gross per 24 hour   Intake 363.23 ml   Output --   Net 363.23 ml     Constitutional:  groggy, able to awaken, open eyes, but drifts back off  EENMT:  Sclera clear, pupils equal, oral mucosa moist  Respiratory: CTA B, no /r/r  Cardiovascular:  Tachy, regular, harsh holosystolic murmur   Gastrointestinal:  soft with no tenderness; positive bowel sounds present  Musculoskeletal:  warm with no cyanosis, trace lower extremity edema in B thighs  Skin:  no jaundice or ecchymosis  Neurologic: moving all 4. groggys   Psychiatric: calm    CXR:   8/11/22      Recent Labs     08/09/22 0316 08/11/22  0246   WBC 19.5* 12.6*   HGB 9.8* 10.2*   HCT 28.9* 29.5*   * 98*   PROCAL  --  18.36*     Recent Labs     08/09/22  0316 08/11/22  0246    138   K 4.8 4.5    101   CO2 20* 25   GLUCOSE 160* 293*   BUN 45* 30*   CREATININE 6.30* 4.60*   PHOS 4.3*  --      No results for input(s): TROPHS, NTPROBNP, CRP, ESR in the last 72 hours.   Recent Labs     08/09/22  0316 08/11/22  0246   GLUCOSE 160* 293*      ECHO: 08/02/22    TRANSTHORACIC ECHOCARDIOGRAM (TTE) COMPLETE (CONTRAST/BUBBLE/3D PRN) 08/08/2022 12:57 PM (Final)    Interpretation Summary  Formatting of this result is different from the original.      Left Ventricle: Normal left ventricular systolic function with a visually estimated EF of 55 - 60%. Left ventricle size is normal. Normal wall thickness. Normal wall motion. Abnormal diastolic function. Right Ventricle: Right ventricle is severely dilated. Severely reduced systolic function. Mitral Valve: Mild paravalvular regurgitation. Tricuspid Valve: Valve structure is normal. Severe regurgitation with a centrally directed jet. Tricuspid annulus is severely dilated leading to mild coaptation. This is likely due to severely dilated right ventricle. No stenosis noted. Severely elevated RVSP. The estimated RVSP is 72 mmHg. Left Atrium: Left atrium is mildly dilated. Right Atrium: Right atrium is moderately dilated. Technical qualifiers: Procedure performed with the patient in a supine position, color flow Doppler was performed and pulse wave and/or continuous wave Doppler was performed. Signed by: Jacqui Tan MD on 8/8/2022 12:57 PM    Microbiology:   No results for input(s): CULTURE in the last 72 hours. Ventilator Settings Ideal body weight: 54.7 kg (120 lb 9.5 oz)  Adjusted ideal body weight: 69.1 kg (152 lb 5.7 oz)  Mode FIO2 Rate Tidal Volume Pressure        80 %                  Peak airway pressure:     Minute ventilation:    ABG:  Recent Labs     08/08/22  0834 08/08/22  0955 08/08/22  1211   PHAPOC 7.32* 7.38 7.42   IVR9AART 47.7* 39.0 38.6   [id number] 79 102* 267*   IRE4ALA 24.6 23.2 25.2   BE  --   --  0.8     Assessment and Plan:  (Medical Decision Making)   Impression: 80 y.o. female with ESRD, hypotension, became more hypotensive during induction of anesthesia for graft ligation. Procedure aborted. Ongoing high pressor needs. Last TTE with severe RV dysfunction.     NEURO:   Sedation: minimize sedation  Analgesia: prn  CV:   Volume Status: appears mildly volume overloaded on exam but hemodynamics suggest otherwise. Will give IVF bolus to see if she responds. Current condition may be driven by RV failure based on latest TTE. Will check d dimer to make sure no high concern for PE (unlikely as TTE in June looked much the same). Unable to tolerate sildenafil/tadalafil. No ERA's available. Shock: cardiogenic vs related to most recent graft vs septic On pressors. Try to transition to consuelo today with worsening tachycardia. PULM:   Acute hypoxemic respiratory failure:  on 80% FiO2. CXR relatively clear. Check d dimer today. RENAL:  ESRD: may need to UF without pulling fluid for now. GI:   Nutrition: NPO for now. When more cooperative will assess if pt needs speech eval.    HEME:   Anemia: mild. Monitor. Thrombocytopenia: mild. Monitor. Anticoagulation: heparin prophy  ID:   Leukocytosis: no clear source. Cont vanc and cefepime. F/u blood cultures   ENDO:   DM: per primary team  Skin: no decub, turns, preventive care  Prophy: heparin, protonix. Full Code    Will try to call family after rounds. The patient is critically ill with respiratory failure, circulatory failure and requires high complexity decision making for assessment and support including frequent ventilator adjustment , frequent evaluation and titration of therapies , application of advanced monitoring technologies and extensive interpretation of multiple databases    Cumulative time devoted to patient care services by me for day of service is 37 mins.     Hernan Joshi MD

## 2022-08-11 NOTE — PROCEDURES
Procedure:  Femoral Arterial Line Insertion  CPT - [id number]    Indication:  hypotension    Time Out done and Correct patient for procedure. Groin Region Sterilized with Chlorhexedine. Patient given lidocaine 1% for local anesthesia. Using sterile Technique right femoral arterial line placed without complication. Note resultant arterial waveform. Patient tolerated procedure well. Line sutured in place.  Estimated Blood loss: <20cc    Madeline Phalen, MD

## 2022-08-11 NOTE — PROGRESS NOTES
RT called to room for patient with sats in 60's. Patient placed on BiPap 20/10 rate 20 100% for initial settings. Will reevaluate.

## 2022-08-11 NOTE — PROGRESS NOTES
Hospitalist Progress Note   Admit Date:  2022 10:03 PM   Name:  Lisette Pires   Age:  80 y.o. Sex:  female  :  1950   MRN:  [id number]   Room:  [facility]/    Presenting Complaint: Hypotension and Extremity Weakness     Reason(s) for Admission: End stage renal disease on dialysis ([facility] [address] [N18.6, Z99.2]  SIRS (systemic inflammatory response syndrome) (HCC) [R65.10]  Elevated lactic acid level [R79.89]  Hypotension, unspecified hypotension type [I95.9]     Hospital Course & Interval History:     Joan Sandoval is a 61-year-old female with a PMH of ESRD on HD who presents with hypotension and L-sided mid back pain. Her back pain is chronic since nephrectomy. Labs were normal except elevated lactic. She was given gentle IVF for hypotension. No fever or tachycardia. The patient was admitted to the Hospitalist service with Nephrology consulted. Vascular surgery was consulted to ligate the patient's L thigh AV graft as it may be causing hypotension. She was transferred to ICU on  due to low BP on arterial line while in OR for ligation of L thigh AV graft by Vascular surgery. Subjective/24hr Events (22): Patient is seen and examined at bedside. No acute events noted overnight. Continues to require high dose of levophed to maintain MAP. In the AM, she is groggy and lethargic but arousable. Able to answer some questions. Due to desaturation early in the AM, she was transitioned to bipap from [place]. 10 point ROS was difficulty to obtained due to clinical condition. Assessment & Plan:     Shock - unclear at this time - ? Septic shock vs ?distributive shock vs ?hypovolemic shock  22: BP continues to be low with MAP below 60s. Requiring 20mcg of Levophed  - Intensivist on board for pressor management. - follow up BCX(8/3) with no growth. Recheck Bcx on 8/10 without growth.    - continue on cefepime, flagyl and vancomycin empirically  - midodrine TID    Acute on Chronic hypoxic resp failure  Normally on home oxygen 2-3 Lpm. Intubated for OR on 8/8. Case cancelled due to hypotension. Extubated after being transferred to ICU but has been requiring Airvo to maintain saturation. 08/11/22:continues to require high O2 support. Switched to Bipap in AM due to desaturation while on airvo. Echo with high RVSP. Patient was later intubated due to worsening respiratory failure on Bipap. - pulmonary on board for ventilator management. High Flow L thigh AVF  Underwent IR guided embolization for L thigh AVF today. Leukocytosis  08/11/22: persistent leukocytosis. 12.6 today. - abx as above    Acute metabolic encephalopathy  likely multifactorial (sepsis, anesthesia, etc)  - management as above  - maintain normoglycemia   - avoid benzos and opioids  - nonpharmacologic interventions   - MRI pending    ESRD  - HD as per nephrology     DM2 with nephropathy  - cont Nesina (Januvia substitute)  - continue SSI  - Monitor CBG      hx of RCC  - s/p nephrectomy     confusion/falls at home  - MRI brain pending  - Check orthostatics  - PT/OT recommend STR     Chronic pain  - PRN analgesia         Discharge Planning: Patient critically ill and will remain in the ICU for now. Diet:  ADULT DIET; Dysphagia - Minced and Moist  DVT PPx: heparin  Code status: Full Code    Hospital Problems:  Principal Problem:    Septic shock ([facility] [address]  Active Problems:    Hypotension    Acute metabolic encephalopathy    Chronic respiratory failure with hypoxia (HCC)    Renal cell carcinoma (HCC)    Severe obesity (HCC)    End stage renal disease on dialysis ([address]    Acute on chronic respiratory failure with hypoxia (HCC)    Type 2 diabetes mellitus with nephropathy ([facility] [address]  Resolved Problems:    * No resolved hospital problems.  *      Objective:   Patient Vitals for the past 24 hrs:   Temp Pulse Resp BP SpO2   08/11/22 1532 -- (!) 111 -- -- --   08/11/22 1527 -- (!) 110 -- -- --   08/11/22 1522 -- (!) 133 -- -- -- 08/11/22 1517 -- (!) 187 -- -- --   08/11/22 1512 -- (!) 116 -- -- --   08/11/22 1507 -- (!) 125 -- -- --   08/11/22 1502 -- (!) 124 -- -- (!) 77 %   08/11/22 1457 -- (!) 136 -- -- --   08/11/22 1452 -- (!) 114 -- -- --   08/11/22 1447 -- (!) 116 -- (!) 30/16 (!) 32 %   08/11/22 1442 -- (!) 116 -- -- --   08/11/22 1437 -- (!) 117 -- -- --   08/11/22 1432 -- (!) 117 -- -- --   08/11/22 1430 -- (!) 117 -- -- (!) 74 %   08/11/22 1410 -- (!) 105 24 (!) 126/49 --   08/11/22 1405 -- (!) 108 24 (!) 59/25 --   08/11/22 1400 -- (!) 108 14 (!) 176/106 --   08/11/22 1355 -- (!) 104 (!) 41 (!) 84/59 --   08/11/22 1350 -- (!) 101 19 (!) 99/55 100 %   08/11/22 1345 -- (!) 104 21 132/63 100 %   08/11/22 1340 -- (!) 112 (!) 7 (!) 51/25 93 %   08/11/22 1335 -- (!) 105 11 (!) 60/24 94 %   08/11/22 1330 -- (!) 102 22 (!) 61/28 (!) 80 %   08/11/22 1315 -- (!) 114 27 (!) 111/50 97 %   08/11/22 1300 -- (!) 117 28 (!) 77/35 --   08/11/22 1245 -- (!) 119 (!) 32 (!) 82/41 (!) 88 %   08/11/22 1230 -- (!) 117 (!) 44 (!) 84/36 92 %   08/11/22 1215 -- (!) 121 (!) 37 (!) 110/39 94 %   08/11/22 1200 -- (!) 122 25 (!) 163/108 95 %   08/11/22 1145 -- (!) 123 (!) 31 (!) 193/86 95 %   08/11/22 1130 -- (!) 120 (!) 33 (!) 149/70 100 %   08/11/22 1115 -- (!) 118 (!) 40 (!) 152/89 90 %   08/11/22 1100 -- (!) 113 (!) 36 (!) 73/29 100 %   08/11/22 1045 -- (!) 132 20 (!) 61/33 100 %   08/11/22 1030 -- (!) 132 16 (!) 61/30 100 %   08/11/22 1015 -- (!) 133 16 (!) 53/27 100 %   08/11/22 1000 -- (!) 135 17 (!) 51/25 (!) 81 %   08/11/22 0945 -- (!) 135 20 -- (!) 87 %   08/11/22 0930 -- (!) 136 18 -- 98 %   08/11/22 0915 -- (!) 138 18 -- 99 %   08/11/22 0901 -- (!) 138 18 (!) 67/47 98 %   08/11/22 0900 -- (!) 138 16 -- 98 %   08/11/22 0813 -- (!) 117 21 (!) 81/31 100 %   08/11/22 0731 -- -- 22 -- --   08/11/22 0721 -- (!) 137 19 -- 100 %   08/11/22 0711 99.9 °F (37.7 °C) (!) 135 18 108/78 100 %   08/11/22 0645 -- (!) 135 18 -- 100 %   08/11/22 0609 -- (!) 137 19 -- 91 %   08/11/22 0606 -- (!) 137 20 -- 90 %   08/11/22 0558 -- (!) 137 21 -- 90 %   08/11/22 0551 -- (!) 137 24 -- 93 %   08/11/22 0540 -- (!) 137 17 -- 95 %   08/11/22 0502 -- (!) 137 19 (!) 54/24 97 %   08/11/22 0457 -- (!) 137 20 -- 92 %   08/11/22 0427 -- (!) 135 17 -- (!) 82 %   08/11/22 0426 -- (!) 135 19 -- (!) 73 %   08/11/22 0423 -- (!) 138 20 -- 91 %   08/11/22 0421 -- (!) 138 14 (!) 122/57 --   08/11/22 0402 -- (!) 138 (!) 9 -- 100 %   08/11/22 0401 -- (!) 138 14 -- 97 %   08/11/22 0357 -- (!) 138 15 (!) 52/26 --   08/11/22 0347 -- (!) 115 27 -- --   08/11/22 0310 -- (!) 115 (!) 37 -- 96 %   08/11/22 0306 97.6 °F (36.4 °C) (!) 115 28 (!) 51/19 100 %   08/11/22 0302 -- -- 12 -- --   08/11/22 0301 -- (!) 115 -- (!) 51/19 --   08/11/22 0243 -- (!) 115 -- -- 92 %   08/11/22 0242 -- -- 26 -- --   08/11/22 0226 -- (!) 117 20 -- 92 %   08/11/22 0203 -- (!) 114 18 -- 95 %   08/11/22 0202 -- (!) 114 24 (!) 57/24 90 %   08/11/22 0159 -- (!) 114 30 -- 90 %   08/11/22 0153 -- (!) 114 30 -- 90 %   08/11/22 0110 -- (!) 115 20 -- 96 %   08/11/22 0102 -- -- 13 -- --   08/11/22 0101 -- (!) 114 -- (!) 54/20 --   08/11/22 0039 -- (!) 118 (!) 40 -- 99 %   08/11/22 0026 -- (!) 117 26 -- (!) 81 %   08/11/22 0020 -- (!) 117 18 -- (!) 74 %   08/11/22 0005 -- (!) 118 21 (!) 52/28 96 %   08/10/22 2350 -- (!) 119 30 -- (!) 71 %   08/10/22 2335 -- (!) 118 28 -- 99 %   08/10/22 2324 -- (!) 118 23 -- 97 %   08/10/22 2320 -- (!) 118 23 -- 95 %   08/10/22 2317 -- (!) 119 29 -- 97 %   08/10/22 2305 98.8 °F (37.1 °C) (!) 119 20 (!) 53/25 97 %   08/10/22 2250 -- (!) 118 -- -- 97 %   08/10/22 2235 -- (!) 119 -- -- 100 %   08/10/22 2220 -- (!) 115 28 -- (!) 81 %   08/10/22 2206 -- (!) 113 25 -- 97 %   08/10/22 2205 -- (!) 113 23 -- 95 %   08/10/22 2201 -- (!) 113 24 (!) 47/27 99 %   08/10/22 2134 -- (!) 112 16 (!) 56/21 (!) 86 %   08/10/22 2106 -- (!) 113 24 (!) 80/48 (!) 87 %   08/10/22 2031 -- (!) 119 (!) 32 (!) 159/85 94 %   08/10/22 2000 -- (!) 117 17 (!) 157/79 90 %   08/1950 -- (!) 117 17 -- 93 %   08/10/22 1905 100 °F (37.8 °C) (!) 138 19 (!) 81/25 96 %   08/10/22 1716 -- (!) 122 -- (!) 83/51 99 %   08/10/22 1645 -- (!) 120 19 (!) 87/41 97 %       Oxygen Therapy  SpO2: (!) 77 %  Pulse Oximetry Type: Continuous  Pulse via Oximetry: 118 beats per minute  Pulse Oximeter Device Mode: Continuous  O2 Device: PAP (positive airway pressure)  Skin Assessment: Clean, dry, & intact  Skin Protection for O2 Device: No  FiO2 : 100 %  O2 Flow Rate (L/min): 50 L/min  Blood Gas  Performed?: Yes  Joshua's Test #1: Collateral flow confirmed  Site #1: Right Radial  Site Prepped #1: Yes  Number of Attempts #1: 1  Pressure Held #1: Yes  Complications #1: None  Post-procedure #1: Standard  Specimen Status #1: Point of care  How Tolerated?: Tolerated well    Estimated body mass index is 34.33 kg/m² as calculated from the following:    Height as of this encounter: 5' 4\" (1.626 m). Weight as of this encounter: 200 lb (90.7 kg). Intake/Output Summary (Last 24 hours) at 8/11/2022 1636  Last data filed at 8/10/2022 1856  Gross per 24 hour   Intake 363.23 ml   Output --   Net 363.23 ml         Physical Exam:   Blood pressure (!) 30/16, pulse (!) 111, temperature 99.9 °F (37.7 °C), temperature source Axillary, resp. rate 24, height 5' 4\" (1.626 m), weight 200 lb (90.7 kg), SpO2 (!) 77 %. General:    NAD, drowsy and mumbles  Head:  Normocephalic, atraumatic  Eyes:  Sclerae appear normal.  Pupils equally round. ENT:  Nares appear normal, no drainage. Moist oral mucosa  Neck:  No restricted ROM. Trachea midline   CV:   Tachycardic, regular rhythm. Lungs: No wheezing. Symmetric expansion. On Bibap  Abdomen:   Soft, nondistended. Extremities: No cyanosis or clubbing. Skin:     No rashes and normal coloration. Neuro:  Not following commands.  Drowsy    I have personally reviewed labs and tests showing:  Recent Labs:  Recent Results (from the past 48 hour(s))   POCT Glucose    Collection Time: 08/09/22  5:15 PM   Result Value Ref Range    POC Glucose 167 (H) 65 - 100 mg/dL    Performed by: Brett Xiao    POCT Glucose    Collection Time: 08/09/22  9:54 PM   Result Value Ref Range    POC Glucose 197 (H) 65 - 100 mg/dL    Performed by: David East    Culture, Blood 1    Collection Time: 08/10/22 12:20 AM    Specimen: Blood   Result Value Ref Range    Special Requests RIGHT  HAND        Culture NO GROWTH 1 DAY     Culture, Blood 1    Collection Time: 08/10/22 12:56 AM    Specimen: Blood   Result Value Ref Range    Special Requests LEFT  ARM        Culture NO GROWTH 1 DAY     Lactic Acid    Collection Time: 08/10/22 12:56 AM   Result Value Ref Range    Lactic Acid, Plasma 4.0 (HH) 0.4 - 2.0 MMOL/L   Vancomycin Level, Random    Collection Time: 08/10/22 12:58 AM   Result Value Ref Range    Vancomycin Rm 17.5 UG/ML   POCT Glucose    Collection Time: 08/10/22  7:36 AM   Result Value Ref Range    POC Glucose 177 (H) 65 - 100 mg/dL    Performed by: Brett Xiao    POCT Glucose    Collection Time: 08/10/22 11:49 AM   Result Value Ref Range    POC Glucose 182 (H) 65 - 100 mg/dL    Performed by: Brett Xiao    POCT Glucose    Collection Time: 08/10/22  5:09 PM   Result Value Ref Range    POC Glucose 191 (H) 65 - 100 mg/dL    Performed by: CowdenLaurelBSN    POCT Glucose    Collection Time: 08/10/22  8:41 PM   Result Value Ref Range    POC Glucose 209 (H) 65 - 100 mg/dL    Performed by: David East    CBC with Auto Differential    Collection Time: 08/11/22  2:46 AM   Result Value Ref Range    WBC 12.6 (H) 4.3 - 11.1 K/uL    RBC 3.08 (L) 4.05 - 5.2 M/uL    Hemoglobin 10.2 (L) 11.7 - 15.4 g/dL    Hematocrit 29.5 (L) 35.8 - 46.3 %    MCV 95.8 79.6 - 97.8 FL    MCH 33.1 (H) 26.1 - 32.9 PG    MCHC 34.6 31.4 - 35.0 g/dL    RDW 18.8 (H) 11.9 - 14.6 %    Platelets 98 (L) 028 - 450 K/uL    MPV 11.5 9.4 - 12.3 FL    nRBC 0.10 0.0 - 0.2 K/uL    Differential Type AUTOMATED Seg Neutrophils 91 (H) 43 - 78 %    Lymphocytes 4 (L) 13 - 44 %    Monocytes 4 4.0 - 12.0 %    Eosinophils % 0 (L) 0.5 - 7.8 %    Basophils 1 0.0 - 2.0 %    Immature Granulocytes 1 0.0 - 5.0 %    Segs Absolute 11.4 (H) 1.7 - 8.2 K/UL    Absolute Lymph # 0.5 0.5 - 4.6 K/UL    Absolute Mono # 0.5 0.1 - 1.3 K/UL    Absolute Eos # 0.0 0.0 - 0.8 K/UL    Basophils Absolute 0.1 0.0 - 0.2 K/UL    Absolute Immature Granulocyte 0.1 0.0 - 0.5 K/UL   Basic Metabolic Panel w/ Reflex to MG    Collection Time: 08/11/22  2:46 AM   Result Value Ref Range    Sodium 138 136 - 145 mmol/L    Potassium 4.5 3.5 - 5.1 mmol/L    Chloride 101 98 - 107 mmol/L    CO2 25 21 - 32 mmol/L    Anion Gap 12 7 - 16 mmol/L    Glucose 293 (H) 65 - 100 mg/dL    BUN 30 (H) 8 - 23 MG/DL    Creatinine 4.60 (H) 0.6 - 1.0 MG/DL    GFR African American 12 (L) >60 ml/min/1.73m2    GFR Non- 10 (L) >60 ml/min/1.73m2    Calcium 8.7 8.3 - 10.4 MG/DL   Procalcitonin    Collection Time: 08/11/22  2:46 AM   Result Value Ref Range    Procalcitonin 18.36 (H) 0.00 - 0.49 ng/mL   Venous Blood Gas, POC    Collection Time: 08/11/22  4:47 AM   Result Value Ref Range    DEVICE BIPAP MASK      FIO2 60 %    PH, VENOUS (POC) 7.39 7.32 - 7.42      PCO2, Sheila, POC 38.1 (L) 41 - 51 MMHG    PO2, VENOUS (POC) 37 mmHg    HCO3, Venous 23.2 23 - 28 MMOL/L    SO2, VENOUS (POC) 69.8 65 - 88 %    Base Deficit, Venous 1.6 mmol/L    Mode BILEVEL      POC TIDAL VOLUME 845 ml    POC PEEP 10 cmH2O    IPAP/PIP/High PEEP 20      POC Pressure Support 10 cmH2O    POC Joshua's Test Positive      Respiratory Rate 20      Site RIGHT RADIAL      Specimen type: VENOUS BLOOD      Performed by: Solange     CRITICAL VALUE READ BACK DROLDHAM     RESPIRATORY COMMENT: [email or web address]    POCT Glucose    Collection Time: 08/11/22  7:40 AM   Result Value Ref Range    POC Glucose 233 (H) 65 - 100 mg/dL    Performed by: Julio C    D-Dimer, Quantitative    Collection Time: 08/11/22 10:06 AM Result Value Ref Range    D-Dimer, Quant 16.53 (H) <0.56 ug/ml(FEU)   POCT Glucose    Collection Time: 08/11/22 12:00 PM   Result Value Ref Range    POC Glucose 201 (H) 65 - 100 mg/dL    Performed by: Julio C    POC Blood, Cord, Arterial    Collection Time: 08/11/22  2:11 PM   Result Value Ref Range    DEVICE ADULT VENT      FIO2 100 %    pH, Arterial, POC 7.27 (L) 7.35 - 7.45      pCO2, Arterial, POC 42.1 35 - 45 MMHG    pO2, Arterial, POC 39 (LL) 75 - 100 MMHG    HCO3, Mixed 19.5 (L) 22 - 26 MMOL/L    SO2c, Arterial, POC 66.1 (L) 95 - 98 %    BASE DEFICIT (POC) 7.0 mmol/L    Mode Pressure regulated volume control      POC TIDAL VOLUME 480 ml    POC PEEP 8 cmH2O    MEAN AIRWAY PRESSURE 19 cmH2O    IPAP/PIP/High PEEP 36      Site LEFT FEMORAL      Specimen type: ARTERIAL      Performed by: Niru     CRITICAL VALUE READ BACK ERINRN     RESPIRATORY COMMENT: 0    POC Blood, Cord, Arterial    Collection Time: 08/11/22  4:08 PM   Result Value Ref Range    DEVICE ADULT VENT      FIO2 100 %    pH, Arterial, POC 7.18 (LL) 7.35 - 7.45      pCO2, Arterial, POC 26.7 (L) 35 - 45 MMHG    pO2, Arterial,  (H) 75 - 100 MMHG    HCO3, Mixed 10.1 (L) 22 - 26 MMOL/L    SO2c, Arterial, POC 96.4 95 - 98 %    BASE DEFICIT (POC) 16.8 mmol/L    Mode Pressure regulated volume control      POC TIDAL VOLUME 480 ml    POC PEEP 12 cmH2O    MEAN AIRWAY PRESSURE 22 cmH2O    IPAP/PIP/High PEEP 34      POC Joshua's Test Positive      Inspiratory Time 0.90 sec    Site DRAWN FROM ARTERIAL LINE      Specimen type: ARTERIAL      Performed by: [address] [place] [address]        I have personally reviewed imaging studies showing: Other Studies:  XR CHEST 1 VIEW   Final Result   Unchanged bilateral lung edema or infiltrates. XR CHEST PORTABLE   Final Result   1. Status post tracheal extubation. 2.  Hazy opacities both lung bases, could reflect atelectasis, infiltrates, or   effusions.    3. Pulmonary vascular congestion. XR CHEST PORTABLE   Final Result   ETT in good position. Otherwise no acute abnormality. XR CHEST PORTABLE   Final Result   Findings suggest CHF.          IR VASC EMBOLIZE OCCLUDE ARTERY    (Results Pending)   XR CHEST PORTABLE    (Results Pending)       Current Meds:  Current Facility-Administered Medications   Medication Dose Route Frequency    phenylephrine (ELIZABETH-SYNEPHRINE) 50 mg/250 mL infusion   mcg/min IntraVENous Continuous    haloperidol lactate (HALDOL) injection 5 mg  5 mg IntraMUSCular Q6H PRN    haloperidol lactate (HALDOL) 5 MG/ML injection        fentaNYL (SUBLIMAZE) 1,000 mcg in sodium chloride 0.9% 100 mL infusion   mcg/hr IntraVENous Continuous    succinylcholine (ANECTINE) injection 200 mg  200 mg IntraVENous Once    vasopressin (VASOSTRICT) 20 units in dextrose 5% 100 mL infusion  0.01-0.1 Units/min IntraVENous Continuous    EPINEPHrine (EPINEPHrine HCL) 5 mg in sodium chloride 0.9 % 250 mL infusion  1-20 mcg/min IntraVENous Continuous    heparin infusion 2 units/mL in 0.9% NaCl   Irrigation Continuous PRN    amiodarone (CORDARONE) 150 mg in dextrose 5 % 100 mL bolus    Continuous PRN    alcohol 62% (NOZIN) nasal  1 ampule  1 ampule Topical Daily    cefepime (MAXIPIME) 1,000 mg in sodium chloride 0.9 % 50 mL IVPB mini-bag  1,000 mg IntraVENous Q24H    vancomycin (VANCOCIN) intermittent dosing (placeholder)   Other RX Placeholder    metronidazole (FLAGYL) 500 mg in 0.9% NaCl 100 mL IVPB premix  500 mg IntraVENous Q12H    glucose chewable tablet 16 g  4 tablet Oral PRN    dextrose bolus 10% 125 mL  125 mL IntraVENous PRN    Or    dextrose bolus 10% 250 mL  250 mL IntraVENous PRN    glucagon (rDNA) injection 1 mg  1 mg SubCUTAneous PRN    dextrose 10 % infusion   IntraVENous Continuous PRN    fentaNYL (SUBLIMAZE) injection 50 mcg  50 mcg IntraVENous Q1H PRN    midazolam PF (VERSED) injection 1 mg  1 mg IntraVENous Q2H PRN norepinephrine (LEVOPHED) 16 mg in sodium chloride 0.9 % 250 mL infusion  1-100 mcg/min IntraVENous Continuous    dexmedetomidine (PRECEDEX) 400 mcg in sodium chloride 0.9 % 100 mL infusion  0.1-1.5 mcg/kg/hr IntraVENous Continuous    oxyCODONE (ROXICODONE) immediate release tablet 5 mg  5 mg Oral Q6H PRN    traMADol (ULTRAM) tablet 50 mg  50 mg Oral Q6H PRN    insulin lispro (HUMALOG) injection vial 0-8 Units  0-8 Units SubCUTAneous TID WC    insulin lispro (HUMALOG) injection vial 0-4 Units  0-4 Units SubCUTAneous Nightly    temazepam (RESTORIL) capsule 15 mg  15 mg Oral Nightly PRN    midodrine (PROAMATINE) tablet 10 mg  10 mg Oral TID WC    pantoprazole (PROTONIX) tablet 40 mg  40 mg Oral QAM AC    sevelamer (RENVELA) tablet 2,400 mg  2,400 mg Oral TID WC    alogliptin (NESINA) tablet 6.25 mg  6.25 mg Oral Daily    sodium chloride flush 0.9 % injection 5-40 mL  5-40 mL IntraVENous 2 times per day    sodium chloride flush 0.9 % injection 5-40 mL  5-40 mL IntraVENous PRN    0.9 % sodium chloride infusion   IntraVENous PRN    ondansetron (ZOFRAN-ODT) disintegrating tablet 4 mg  4 mg Oral Q8H PRN    Or    ondansetron (ZOFRAN) injection 4 mg  4 mg IntraVENous Q6H PRN    polyethylene glycol (GLYCOLAX) packet 17 g  17 g Oral Daily PRN    acetaminophen (TYLENOL) tablet 650 mg  650 mg Oral Q6H PRN    Or    acetaminophen (TYLENOL) suppository 650 mg  650 mg Rectal Q6H PRN    heparin (porcine) injection 5,000 Units  5,000 Units SubCUTAneous 3 times per day    heparin (porcine) injection 5,000 Units  5,000 Units IntraVENous PRN    Epoetin Martin-epbx (RETACRIT) injection 20,000 Units  20,000 Units SubCUTAneous Q7 Days       Signed:  Dino Victor MD    Part of this note may have been written by using a voice dictation software. The note has been proof read but may still contain some grammatical/other typographical errors.

## 2022-08-11 NOTE — PROGRESS NOTES
RENAL H&P/CONSULT    Subjective:     Patient is a 81 y/o AA female, followed by our office for ESRD, on HD M-W-F at [facility]. She has been running via RIJ perm cath since LUE AVF became non-functioning. Left thigh AV graft placed on 5/26/22 by Dr Hans Milan. PMH also consist of DM II, Hyperphosphatemia, anemia, renal cell carcinoma s/p left nephrectomy, followed by Pulmonary for lung mass, and HTN. She is on home O2 at 2L NC as needed. She presented to the ER via EMS for hypotension and generalized \"feeling bad\". She has been confused and reports back pain and has dyspnea and edema. BP has been low and we'll plan for HD today with Midodrine for hypotension. She is being treated empirically with IV antibiotics for suspected SIRS.   8/4/22- see HD note  8/5/22- alert/oriented, lying in bed, no signs of distress, on 4L NC, c/o dyspnea at times, denies CP, n/v, HA, or dizziness. HD last two days, no HD today, run tomorrow. 8/6/22- see HD note  8/7/22- drowsy, oriented x3, head of bed elevated, no signs of distress, on 2L NC, denies pain, dyspnea, n/v, HA or dizziness. Spoke with her daughter over the phone while in room, informed her of planned procedure with Dr Hans Milan tomorrow. 8/8/22- went to OR this morning, left thigh graft ligation canceled due to hypotension, was given Epi, intubated, art line placed, transferred to CCU. No sedation, on Levo. No signs of distress.   8/9/22 - see HD Note  8/10/22 - resting comfortably on re-breather mask - tolerated SLED well last night - plan SLED again tomorrow - working on stabilizing patient for procedure to shut down the AV Graft   8/11/22 - on two pressors now with third pressor being added and patient about to be intubated - discussed with Dr. Kinza Oleary and Dr. Hans Milan - embolization of AV graft with percutaneous approach with hope for improved hemodynamcis from high cardiac output CHF     Past Medical History:   Diagnosis Date    Anemia     Arthritis     AVF (arteriovenous fistula) (HCC)     left    AVF (arteriovenous fistula) ([facility] 12/20/2016 12/6/16 ([facility]) Right AVF revision and thrombectomy    Degenerative joint disease     Diabetes ([facility]     type 2--- does not check sqbs at home    Enlarged lymph node     \"enlarging paratracheal node to 2.6 cm on CT chest\"    ESRD on hemodialysis ([facility] onset 2006    ESRD.  MWF dialysis at [facility]    Hypertension     controlled with med    Mediastinal mass     being followed by dr ramírez--per pt-- states told by dr Max Barnes it wasnt cancer-- but plans to be rescanned 5/2022    Obesity     On home O2     2L QHS and PRN during day d/t \"lymph node in chest\"    Renal cancer ([facility]     Dr Dank Harris - renal cancer L nephrectomy---and radiation ---    Shortness of breath     swollen lymphnode in chest-- oxygen 2LPM    Transient ischemic attack 08/29/2015    no residual      Past Surgical History:   Procedure Laterality Date    BREAST SURGERY Right     cyst removed    COLONOSCOPY N/A 11/8/2018    COLONOSCOPY  BMI 36 performed by Cecilia Reyes MD at [address] [place] [address]  9/14/2017    CT BIOPSY ABDOMEN RETROPERITONEUM 9/14/2017 [facility] RADIOLOGY CT SCAN    DIALYSIS FISTULA CREATION Left 8/8/2022    LEFT THIGH ARTERIO VENOUS GRAFT Ligation performed by Muna Bejarano MD at [facility] MAIN OR    GI  08/06/2018    exploratory laparotomy    GI  06/01/2002    colon resection resulting in temporary colostomy reversal    GROIN SURGERY Left 6/26/2022    LEFT GROIN DEBRIDEMENT OF HEMATOMA / MIGEL performed by Tami Bonner MD at [address] [place] [address]    IR INTRO [address] PTA  3/10/2020    IR INTRO CATH DIALYSIS CIRCUIT W BALLOON PTA  12/5/2019    IR INTRO CATH DIALYSIS CIRCUIT W [place] PTA  9/3/2019    IR INTRO CATH DIALYSIS CIRCUIT W [place]ALL[place] PTA  5/30/2019    IR INTRO CATH DIALYSIS CIRCUIT W [place] PTA  2/28/2019    IR THRMB/INFUSION DIAYSIS CIRCUIT Left 2021    IR TUNNELED CATHETER PLACEMENT GREATER THAN 5 YEARS  3/15/2022    IR TUNNELED CATHETER PLACEMENT GREATER THAN 5 YEARS  11/19/2021    IR TUNNELED CATHETER PLACEMENT GREATER THAN 5 YEARS  11/19/2021    IR TUNNELED CATHETER PLACEMENT GREATER THAN 5 YEARS 11/19/2021 [facility] SPECIALS    IR TUNNELED CATHETER PLACEMENT GREATER THAN 5 YEARS  3/15/2022    IR TUNNELED CATHETER PLACEMENT GREATER THAN 5 YEARS 3/15/2022 [facility] SPECIALS    OTHER SURGICAL HISTORY      dialysis fistula, several permcaths    UROLOGICAL SURGERY Left July 2015    nephrectomy    VASCULAR SURGERY Left 04/07/2022    LEFT ARM AV GRAFT    VASCULAR SURGERY Left 03/15/2022    declot    VASCULAR SURGERY Left 02/21/2022    Open thromboembolectomy of left upper arm graft. VASCULAR SURGERY Right     AV graft- states no longer uses    VASCULAR SURGERY Left 5/26/2022    LEFT AV GRAFT THIGH performed by Johan Josue MD at [facility]      Prior to Admission medications    Medication Sig Start Date End Date Taking? Authorizing Provider   oxyCODONE-acetaminophen (PERCOCET) 5-325 MG per tablet Take 1 tablet by mouth every 4 hours as needed for Pain. Historical Provider, MD   Epoetin Martin-epbx (RETACRIT) 12036 UNIT/ML SOLN injection Inject 1 mL into the skin every 7 days To be given at dialysis 7/3/22   SUNSHINE Fonseca - CNP   naloxone 4 MG/0.1ML LIQD nasal spray 1 spray by Nasal route as needed for Opioid Reversal. Use 1 spray intranasally, then discard. Repeat with new spray every 2 min as needed for opioid overdose symptoms, alternating nostrils.      Historical Provider, MD   midodrine (PROAMATINE) 10 MG tablet Take 1 tablet by mouth every 8 hours 6/5/22   Mir Ahn MD   acetaminophen (TYLENOL) 500 MG tablet Take 500 mg by mouth every 6 hours as needed for Pain (for breakthrough pain )    Historical Provider, MD   omeprazole (PRILOSEC) 40 MG delayed release capsule Take 40 mg by mouth at bedtime    Historical Provider, MD   OXYGEN Inhale into the lungs 2 LITERS AS NEEDED FOR SOB VIA NASAL CANNULA    Historical Provider, MD   SITagliptin (JANUVIA) 50 MG tablet Take 50 mg by mouth daily    Historical Provider, MD   sevelamer (RENVELA) 800 MG tablet Take 3 tablets by mouth 3 times daily (with meals) PT TAKES 3 TABS WITH MEALS-- AND 1 TABS WITH SNACKS     Historical Provider, MD     Allergies   Allergen Reactions    Penicillins Other (See Comments) and Rash     PT STATES UNSURE OF RX  Tolerated cefazolin    Vancomycin Rash      Social History     Tobacco Use    Smoking status: Never    Smokeless tobacco: Never   Substance Use Topics    Alcohol use: No      Family History   Problem Relation Age of Onset    Diabetes Mother     Heart Disease Mother     Hypertension Mother     Heart Disease Father     Hypertension Father     Post-op Cognitive Dysfunction Neg Hx     Pseudochol. Deficiency Neg Hx     Delayed Awakening Neg Hx     Post-op Nausea/Vomiting Neg Hx     Emergence Delirium Neg Hx     Other Neg Hx     Malig Hypertherm Neg Hx           Review of Systems    Unable to obtain due to patient's condition       Objective:       BP (!) 111/50   Pulse (!) 114   Temp 99.9 °F (37.7 °C) (Axillary)   Resp 27   Ht 5' 4\" (1.626 m)   Wt 200 lb (90.7 kg)   SpO2 97%   BMI 34.33 kg/m²     No intake/output data recorded. 08/09 1901 - 08/11 0700  In: 1916.8 [I.V.:435.1]  Out: -     BP (!) 111/50   Pulse (!) 114   Temp 99.9 °F (37.7 °C) (Axillary)   Resp 27   Ht 5' 4\" (1.626 m)   Wt 200 lb (90.7 kg)   SpO2 97%   BMI 34.33 kg/m²   General:  Imoderate distress, appears stated age. Head:  Normocephalic, without obvious abnormality, atraumatic. Neck: Supple, symmetrical, trachea midline, no JVD. Lungs:   Coarse to auscultation bilaterally. Heart:  Regular rate and rhythm, S1, S2 normal, no murmur,  rub or gallop. Abdomen:   Soft, non-tender. Bowel sounds normal.   Extremities: Extremities no edema.    Access:  [facility], left thigh graft is open   Skin: Skin color, texture, turgor normal. No rashes or lesions.    Neurologic: Chava asterixis         Data Review:     Recent Results (from the past 24 hour(s))   POCT Glucose    Collection Time: 08/10/22  5:09 PM   Result Value Ref Range    POC Glucose 191 (H) 65 - 100 mg/dL    Performed by: CowdenLaurelBSN    POCT Glucose    Collection Time: 08/10/22  8:41 PM   Result Value Ref Range    POC Glucose 209 (H) 65 - 100 mg/dL    Performed by: David Woodall    CBC with Auto Differential    Collection Time: 08/11/22  2:46 AM   Result Value Ref Range    WBC 12.6 (H) 4.3 - 11.1 K/uL    RBC 3.08 (L) 4.05 - 5.2 M/uL    Hemoglobin 10.2 (L) 11.7 - 15.4 g/dL    Hematocrit 29.5 (L) 35.8 - 46.3 %    MCV 95.8 79.6 - 97.8 FL    MCH 33.1 (H) 26.1 - 32.9 PG    MCHC 34.6 31.4 - 35.0 g/dL    RDW 18.8 (H) 11.9 - 14.6 %    Platelets 98 (L) 785 - 450 K/uL    MPV 11.5 9.4 - 12.3 FL    nRBC 0.10 0.0 - 0.2 K/uL    Differential Type AUTOMATED      Seg Neutrophils 91 (H) 43 - 78 %    Lymphocytes 4 (L) 13 - 44 %    Monocytes 4 4.0 - 12.0 %    Eosinophils % 0 (L) 0.5 - 7.8 %    Basophils 1 0.0 - 2.0 %    Immature Granulocytes 1 0.0 - 5.0 %    Segs Absolute 11.4 (H) 1.7 - 8.2 K/UL    Absolute Lymph # 0.5 0.5 - 4.6 K/UL    Absolute Mono # 0.5 0.1 - 1.3 K/UL    Absolute Eos # 0.0 0.0 - 0.8 K/UL    Basophils Absolute 0.1 0.0 - 0.2 K/UL    Absolute Immature Granulocyte 0.1 0.0 - 0.5 K/UL   Basic Metabolic Panel w/ Reflex to MG    Collection Time: 08/11/22  2:46 AM   Result Value Ref Range    Sodium 138 136 - 145 mmol/L    Potassium 4.5 3.5 - 5.1 mmol/L    Chloride 101 98 - 107 mmol/L    CO2 25 21 - 32 mmol/L    Anion Gap 12 7 - 16 mmol/L    Glucose 293 (H) 65 - 100 mg/dL    BUN 30 (H) 8 - 23 MG/DL    Creatinine 4.60 (H) 0.6 - 1.0 MG/DL    GFR African American 12 (L) >60 ml/min/1.73m2    GFR Non- 10 (L) >60 ml/min/1.73m2    Calcium 8.7 8.3 - 10.4 MG/DL   Procalcitonin    Collection Time: 08/11/22  2:46 AM   Result Value Ref Range    Procalcitonin 18.36 (H) 0.00 - 0.49 ng/mL   Venous Blood Gas, POC    Collection Time: 08/11/22  4:47 AM   Result Value Ref Range    DEVICE BIPAP MASK      FIO2 60 %    PH, VENOUS (POC) 7.39 7.32 - 7.42      PCO2, Sheila, POC 38.1 (L) 41 - 51 MMHG    PO2, VENOUS (POC) 37 mmHg    HCO3, Venous 23.2 23 - 28 MMOL/L    SO2, VENOUS (POC) 69.8 65 - 88 %    Base Deficit, Venous 1.6 mmol/L    Mode BILEVEL      POC TIDAL VOLUME 845 ml    POC PEEP 10 cmH2O    IPAP/PIP/High PEEP 20      POC Pressure Support 10 cmH2O    POC Joshua's Test Positive      Respiratory Rate 20      Site RIGHT RADIAL      Specimen type: VENOUS BLOOD      Performed by: Solange     CRITICAL VALUE READ BACK DROLDHAM     RESPIRATORY COMMENT: ve15.6    POCT Glucose    Collection Time: 08/11/22  7:40 AM   Result Value Ref Range    POC Glucose 233 (H) 65 - 100 mg/dL    Performed by: Julio C    D-Dimer, Quantitative    Collection Time: 08/11/22 10:06 AM   Result Value Ref Range    D-Dimer, Quant 16.53 (H) <0.56 ug/ml(FEU)   POCT Glucose    Collection Time: 08/11/22 12:00 PM   Result Value Ref Range    POC Glucose 201 (H) 65 - 100 mg/dL    Performed by: Julio C        CXR;   Results for orders placed during the hospital encounter of 08/02/22    XR CHEST PORTABLE    Narrative  EXAM: XR CHEST PORTABLE    HISTORY: hypotension. TECHNIQUE: Frontal chest.    COMPARISON: 7/28/2022    FINDINGS:  There is mild cardiomegaly. There is pulmonary vascular congestion/edema. There is no pleural effusion, or pneumothorax. No significant osseous abnormalities are observed. Stable right-sided CVC and right vascular stent.     Impression  Findings suggest CHF.     KUB:  Results for orders placed in visit on 08/03/18    XR ABDOMEN (KUB) (SINGLE AP VIEW)    Narrative  KUB supine views    History:  mechanical small bowel obstruction, lower abd ventral hernia, 78 years  Female    Comparison:  CT abdomen pelvis yesterday    Findings:  Esophageal tube appears to terminate in the first portion of the  duodenum. Oral contrast is seen throughout the colon. No free air is evident. The bowel gas pattern is nonspecific and there is no evidence of ileus or  obstruction. No suspicious renal or ureteral calculi are evident. Visualized  osseous structures unremarkable. Impression  Impression: No acute pathology identified. Renal US:  No results found for this or any previous visit. CT Abdomen  Results for orders placed during the hospital encounter of 06/25/22    CT ABDOMEN PELVIS W IV CONTRAST Additional Contrast? None    Narrative  EXAM: CT abdomen and pelvis with IV contrast.    INDICATION: Abdominal pain. COMPARISON: Prior PET/CT scan on May 5, 2022. TECHNIQUE: Axial CT images of the abdomen and pelvis were obtained after the  intravenous injection of 100 mL Isovue 370 CT contrast. Radiation dose reduction  techniques were used for this study. Our CT scanners use one or all of the  following:  Automated exposure control, adjustment of the mA or kV according to  patient size, iterative reconstruction. FINDINGS:    - Liver: Within normal limits. - Gallbladder and bile ducts: Within normal limits. - Spleen: Within normal limits. - Urinary tract: The left kidney is absent. The right kidney is atrophied and  there is a 2.6 cm exophytic cyst off its lower pole. No right-sided  hydronephrosis is seen. The urinary bladder is collapsed. - Adrenals: Within normal limits. - Pancreas: Within normal limits. - Gastrointestinal tract: There is colonic diverticulosis. No definite acute  diverticulitis is seen, although ascites limits for evaluation of focal  inflammation.  - Retroperitoneum: Mild abdominal aortic atherosclerosis, with no aneurysmal  dilatation or dissection. The right heart is enlarged, and there is prominent  contrast reflux into the IVC, raising the possibility of right heart failure. - Peritoneal cavity and abdominal wall: There is progressed mild ascites. No  intra-abdominal abscess or free intraperitoneal air is seen. Edema in the  abdominal wall subcutaneous tissue has not significantly changed. - Pelvis: There is a double-limbed bypass graft in the upper left thigh. An  adjacent 7.9 x 7.5 cm complex fluid collection with no associated gas is seen in  the upper anterior thigh, which could be an abscess or hematoma. A small amount  of high-density material along the inferior margin of the fluid collection  raises the possibility of active bleeding.  - Spine/bones: No acute process. - Other comments: Small bilateral pleural effusions and rounded atelectasis in  the left lower lobe are unchanged. Impression  1. Upper left thigh bypass graft, not fully visualized on this study, with an  adjacent 7.9 x 7.5 cm complex fluid collection which could represent an abscess  or hematoma. There is a small amount of high density material along the inferior  margin of the fluid collection, making it difficult to exclude active bleeding. Dr. Red Conrad verbally notified at 4:16 AM.  2. Persistent anasarca, with progressed mild ascites. 3. Prior left nephrectomy and atrophied right kidney. 4. Prominent sized hepatic veins and IVC in this patient with an enlarged right  heart, raising the possibility of right heart failure. Principal Problem:    Septic shock ([address]  Active Problems:    Hypotension    Acute metabolic encephalopathy    Chronic respiratory failure with hypoxia (HCC)    Renal cell carcinoma (HCC)    Severe obesity (HCC)    ESRD (end stage renal disease) on dialysis (HCC)    Acute on chronic respiratory failure with hypoxia (HCC)    Type 2 diabetes mellitus with nephropathy ([address]  Resolved Problems:    * No resolved hospital problems. *      Assessment:     1. ESRD- on HD M-W-F, HD needed for biochemical and volume control. No urgent needs at this time. - deferred SLED from today til tomorrow - will evaluate in am     2.  Anemia- likely related to ESRD, near goal for CKD, continue Nephrovite, and LAKISHA, will monitor     3. Hypotension- with a hx of HTN, antihypertensives were stopped -  Altered hemodynamics due to left thigh AV graph likely causes hypotension   - left thigh graph embolization today      4. Fluid overload - plan SLED for volume control      5. Hypocalcemia- with low albumin, treat with vitamin D     6. HTN- hx of, off antihypertensives due to hypotension     7. Renal cell carcinoma- followed by Oncology     8. DM II- on SSI, managed by primary     9. Hyperphosphatemia- hx of, on Renvela, continue    10.  Respiratory distress-  managed by Pulmonary       Plan:     As above

## 2022-08-11 NOTE — PRE SEDATION
Sedation Pre-Procedure Note    Patient Name: Maxim Pires   YOB: 1950  Room/Bed: [address] [place] [address]  Medical Record Number: [id number]  Date: 8/11/2022   Time: 2:04 PM       Indication:  High output fistula. Heart failure. ESRD. Critically ill. Consent: I have discussed with the patient's daughter on the telephone the indication, alternatives, and the possible risks and/or complications of the planned procedure and the anesthesia methods. She appears to understand and agree to proceed. Vital Signs:   Vitals:    08/11/22 1315   BP: (!) 111/50   Pulse: (!) 114   Resp: 27   Temp:    SpO2: 97%       Past Medical History:   has a past medical history of Anemia, Arthritis, AVF (arteriovenous fistula) ([address] AVF (arteriovenous fistula) ([address] Degenerative joint disease, Diabetes ([address] Enlarged lymph node, ESRD on hemodialysis ([address] Hypertension, Mediastinal mass, Obesity, On home O2, Renal cancer ([address] Shortness of breath, and Transient ischemic attack. Past Surgical History:   has a past surgical history that includes IR TUNNELED CVC PLACE WO SQ PORT/PUMP > 5 YEARS (3/15/2022); IR THRMB/INFUSION DIALYSIS CIRCUIT (Left, 2021); other surgical history; gi (08/06/2018); gi (06/01/2002); IR TUNNELED CVC PLACE WO SQ PORT/PUMP > 5 YEARS (11/19/2021); IR GUIDED INTRO CATH DIALYSIS CIRCUIT W [place] ANGIOPLASTY (3/10/2020); IR GUIDED INTRO CATH DIALYSIS CIRCUIT [facility] [place] ANGIOPLASTY (12/5/2019); IR GUIDED INTRO CATH DIALYSIS CIRCUIT W [place] ANGIOPLASTY (9/3/2019); IR GUIDED INTRO CATH DIALYSIS CIRCUIT W [place] ANGIOPLASTY (5/30/2019); IR GUIDED INTRO CATH DIALYSIS CIRCUIT W [place] ANGIOPLASTY (2/28/2019); Urological Surgery (Left, July 2015); Colonoscopy (N/A, 11/8/2018); gyn; Breast surgery (Right); CT BIOPSY ABDOMEN RETROPERITONEUM (9/14/2017); IR TUNNELED CVC PLACE WO SQ PORT/PUMP > 5 YEARS (11/19/2021);  IR TUNNELED CVC PLACE WO SQ PORT/PUMP > 5 YEARS (3/15/2022); vascular surgery (Left, 04/07/2022); vascular surgery (Left, 03/15/2022); vascular surgery (Left, 02/21/2022); vascular surgery (Right); vascular surgery (Left, 5/26/2022); Groin Surgery (Left, 6/26/2022); and Dialysis fistula creation (Left, 8/8/2022). Medications:   Scheduled Meds:    haloperidol lactate        succinylcholine  200 mg IntraVENous Once    alcohol 62%  1 ampule Topical Daily    cefepime  1,000 mg IntraVENous Q24H    vancomycin (VANCOCIN) intermittent dosing (placeholder)   Other RX Placeholder    metroNIDAZOLE  500 mg IntraVENous Q12H    insulin lispro  0-8 Units SubCUTAneous TID WC    insulin lispro  0-4 Units SubCUTAneous Nightly    midodrine  10 mg Oral TID WC    pantoprazole  40 mg Oral QAM AC    sevelamer  2,400 mg Oral TID WC    alogliptin  6.25 mg Oral Daily    sodium chloride flush  5-40 mL IntraVENous 2 times per day    heparin (porcine)  5,000 Units SubCUTAneous 3 times per day    epoetin martin-epbx  20,000 Units SubCUTAneous Q7 Days     Continuous Infusions:    phenylephrine 300 mcg/min (08/11/22 1340)    fentaNYL      vasopressin 0.04 Units/min (08/11/22 1339)    dextrose      norepinephrine 30 mcg/min (08/11/22 1341)    dexmedetomidine Stopped (08/11/22 1329)    sodium chloride       PRN Meds: haloperidol lactate, glucose, dextrose bolus **OR** dextrose bolus, glucagon (rDNA), dextrose, fentaNYL, midazolam, oxyCODONE, traMADol, temazepam, sodium chloride flush, sodium chloride, ondansetron **OR** ondansetron, polyethylene glycol, acetaminophen **OR** acetaminophen, heparin (porcine)  Home Meds:   Prior to Admission medications    Medication Sig Start Date End Date Taking? Authorizing Provider   oxyCODONE-acetaminophen (PERCOCET) 5-325 MG per tablet Take 1 tablet by mouth every 4 hours as needed for Pain.     Historical Provider, MD   Epoetin Martin-epbx (RETACRIT) 14288 UNIT/ML SOLN injection Inject 1 mL into the skin every 7 days To be given at dialysis 7/3/22   SUNSHINE Hung - CNP   naloxone 4 MG/0.1ML LIQD nasal spray 1 spray by Nasal route as needed for Opioid Reversal. Use 1 spray intranasally, then discard. Repeat with new spray every 2 min as needed for opioid overdose symptoms, alternating nostrils. Historical Provider, MD   midodrine (PROAMATINE) 10 MG tablet Take 1 tablet by mouth every 8 hours 6/5/22   Delisa Ward MD   acetaminophen (TYLENOL) 500 MG tablet Take 500 mg by mouth every 6 hours as needed for Pain (for breakthrough pain )    Historical Provider, MD   omeprazole (PRILOSEC) 40 MG delayed release capsule Take 40 mg by mouth at bedtime    Historical Provider, MD   OXYGEN Inhale into the lungs 2 LITERS AS NEEDED FOR SOB VIA NASAL CANNULA    Historical Provider, MD   SITagliptin (JANUVIA) 50 MG tablet Take 50 mg by mouth daily    Historical Provider, MD   sevelamer (RENVELA) 800 MG tablet Take 3 tablets by mouth 3 times daily (with meals) PT TAKES 3 TABS WITH MEALS-- AND 1 TABS WITH SNACKS     Historical Provider, MD       Pre-Sedation Documentation and Exam:   Vital signs have been reviewed (see flow sheet for vitals). Mallampati Airway Assessment:  the patient is currently intubated    ASA Classification:  E Status - the procedure is performed on an Emergency basis    Sedation/ Anesthesia Plan:  To be determined                                                                                      Electronically signed by Roberto Valencia MD on 8/11/2022 at 2:04 PM

## 2022-08-11 NOTE — PROGRESS NOTES
Pt's O2 sat dropped to the 60's and did not rebound, Pt has 4 beat run of Vtach. MD and RT called to bedside, stat cxr and abg obtained. Pt placed on bipap and precedex gtt stopped for decrease in alertness. Nursing staff instructed to continue to keep Levo at 20. Pt's sat's recovered to the 90's, pt arousable and oriented to self and year.

## 2022-08-11 NOTE — PROGRESS NOTES
Pulmonary update note:  Accompanied patient to IR for embolization procedure. Pt did poorly throughout the procedure. Maxed on levo, vaso. Chemo. Unable to  BP through most of the procedure. Sats dropped as well, as low as the 30's at one point though without a good wave form. Bag mask ventilation used throughout much of the procedure with temporary jump in sat to the 80's but once again down to the 40's. At one point patient went into SVT, narrow complex with HR in the 180's. Given 150mg IV amio. Now back in CC. BP reading well. Sat probe still not. Getting abg and CXR. Additional critical care time spent with patient: 60 minutes for a total of 97 minutes of critical care time face to face.      Duke Davis MD

## 2022-08-11 NOTE — PROCEDURES
Procedure: Emergent intubation (CPT 24608)    Indication: hypotension and acute respiratory failure with hypoxia    Anesthesia:  Fentanyl 50mcg, Etomidate 20mg     After assessing the airway, the patient underwent preoxygenation with 100% FiO2 for 5 min. Fentanyl and etomidate were given sequentially. After adequate sedation intubation was performed. A 3.0 MAC blade laryngoscope was used to visualize the epiglottis and vocal chords. After positive identification of the vocal chords, a 8.0 ET tube was placed into the trachea with direct visualization. The tube was seen passing through the vocal chords without difficulty. CO2 colorimetry was employed immediately to verify tube in airway with appropriate color change indicating detection of CO2. Water vapor was seen within the ET tube, and auscultation of the abdomen revealed no bubbling sounds. Auscultation and inspection of the chest after intubation showed symmetric chest excursion and symmetric air entry bilaterally. The tube was secured at 23cm at the lip. Chest X-ray has been ordered and is pending. The patient has been placed on a mechanical ventilator. There were no complications.     Demond Juarez MD

## 2022-08-11 NOTE — PROGRESS NOTES
SPEECH PATHOLOGY NOTE    Speech therapy attempt this morning. Patient currently on Bipap. Will follow up for ongoing dysphagia management at later time. ADDENDUM: Patient remains on Bipap this afternoon. Will follow up once respiratory status improves.     EPHRAIM Edmondson, CCC-SLP  Speech Language Pathologist  [facility]  Contact: Dee

## 2022-08-11 NOTE — PROGRESS NOTES
Pt alert and oriented to self, time, and place, asking for food and requesting to go home. Pt reports no feelings of dizziness, confusion, or lightheadedness. MD at bedside to review pt condition, Nurse instructed not to titrate and to keep Levo at 20 unless pt demonstrates change in mental status/ clinical symptoms.

## 2022-08-11 NOTE — PROGRESS NOTES
Patient placed on V60. Connected to the Nurse Call System and Continuous Pulse Oximetry utilizing Vital Sync. Alarms are activated and nurse has been notified. Documentation completed.

## 2022-08-11 NOTE — PROGRESS NOTES
RT anf Dr Nghia Doran at bedside for )2 support vis Ambu bag with Peep. ETT suction for scant amount. Unable to calculate a nibp. Epinephrine gtt ordered.

## 2022-08-11 NOTE — INTERDISCIPLINARY ROUNDS
Multi-D Rounds/Checklist (leapfrog):  Lines: can any be removed?: None      Hemodialysis Central Access Right Subclavian (Active)       Hemodialysis Fistula/Graft Arteriovenous vein graft Left Thigh (Active)     DVT Prophylaxis: Ordered  Vent: N/A  Nutrition Ordered/appropriate: Ordered  Bowel Movement last 2 days: Yes  Can antibiotics or other drugs be stopped? Is Nozin performed: Yes/End Date set  Consults needed: None  A: Is pain control adequate? (has PRNs? Stop drip?) Yes  B: Sedation break and SBT? N/A  C: Is sedation choice appropriate? N/A  D: Delirium/CAM-ICU? Yes  E: Mobility goals/appropriateness? Yes  F: Family update and plan? daughter is primary contact and is being updated daily by primary attending and nursing staff.     Reyes Lai, APRN - CNP

## 2022-08-11 NOTE — BRIEF OP NOTE
Brief Postoperative Note      Patient: Ming Pires  YOB: 1950  MRN: [id number]    Date of Procedure: 8/8/2022    Pre-Op Diagnosis: End stage renal disease ([facility] [address] [N18.6]. High flow Left Thigh AVF. Heart Failure. Post-Op Diagnosis: Same       AVF occlusion    Assistant:  * No surgical staff found *    Anesthesia: General    Estimated Blood Loss (mL): less than 50     Complications: None    Specimens:   * No specimens in log *    Implants:  * No implants in log *Vascular Plug and Coils      Drains: * No LDAs found *    Findings: AVF occluded at the venous end.       Electronically signed by Lex Garrison MD on 8/11/2022 at 3:36 PM

## 2022-08-11 NOTE — PROGRESS NOTES
ICU staff at bedside. Assumed care for transport back to room. TRANSFER - OUT REPORT:    Verbal report given to [facility] RN on [address]  being transferred to CCU for routine progression of patient care       Report consisted of patient's Situation, Background, Assessment and   Recommendations(SBAR). Information from the following report(s) Nurse Handoff Report, Surgery Report, MAR, and Event Log was reviewed with the receiving nurse. Lines:   Peripheral IV 08/08/22 Left;Posterior Forearm (Active)   Site Assessment Clean, dry & intact 08/11/22 0711   Line Status Infusing 08/11/22 0711   Line Care Connections checked and tightened 08/11/22 0711   Phlebitis Assessment No symptoms 08/11/22 0711   Infiltration Assessment 0 08/11/22 0711   Alcohol Cap Used Yes 08/11/22 0711   Dressing Status Clean, dry & intact 08/11/22 0711   Dressing Type Transparent 08/11/22 0711       Hemodialysis Central Access Right Subclavian (Active)   Continued need for line? Yes 08/11/22 0841   Site Assessment Clean, dry & intact 08/11/22 0841   CVC Lumen Status Capped 08/11/22 0711   Venous Lumen Status Capped 08/11/22 0841   Arterial Lumen Status Infusing 08/11/22 0841   Alcohol Cap Used Yes 08/11/22 0841   Line Care Other (Comment) 08/11/22 0841   Dressing Type Antimicrobial;Sterile dressing, transparent 08/11/22 0841   Date of Last Dressing Change 08/11/22 08/11/22 0841   Dressing Status New dressing applied;Clean, dry & intact 08/11/22 0841   Dressing Intervention New;Dressing changed 08/11/22 0841   Dressing Change Due 08/10/22 08/09/22 0730        Opportunity for questions and clarification was provided.       Patient transported with:  Monitor, O2 @ vent lpm, Registered Nurse, and [employer]

## 2022-08-12 PROBLEM — R79.89 ELEVATED LACTIC ACID LEVEL: Status: ACTIVE | Noted: 2022-01-01

## 2022-08-12 NOTE — INTERDISCIPLINARY ROUNDS
Multi-D Rounds/Checklist (leapfrog):  Lines: can any be removed?: None  ETT (adult) (Active)     NG/OG/NJ/NE Tube Orogastric 16 fr Center mouth (Active)     Arterial Line 22 Right Femoral (Active)       Hemodialysis Central Access Right Subclavian (Active)       Hemodialysis Fistula/Graft Arteriovenous vein graft Left Thigh (Active)     DVT Prophylaxis: Ordered  Vent: HOB elevated? Yes ;  mL/k ; Vent day 1  Nutrition Ordered/appropriate: Contraindicated-    Bowel Movement last 2 days: Yes  Can antibiotics or other drugs be stopped? Is Nozin performed: Yes/End Date set  Consults needed: None  A: Is pain control adequate? (has PRNs? Stop drip?) Yes  B: Sedation break and SBT? Yes  C: Is sedation choice appropriate? Yes  D: Delirium/CAM-ICU? Yes  E: Mobility goals/appropriateness? Yes  F: Family update and plan? daughter is primary contact and is being updated daily by primary attending and nursing staff.     Renetta Belle, APRN - CNP

## 2022-08-12 NOTE — PROGRESS NOTES
This is a follow-up visit to the patient, providing a spiritual presence, emotional support and prayer. The patient appears to be resting.     Mercedes Lentz, [address] [facility], [facility]

## 2022-08-12 NOTE — PROGRESS NOTES
MD aware of no SpO2 readings thus far for shift. Multiple attempts made with different probes and attempts to warm areas where probe was applied.

## 2022-08-12 NOTE — PROGRESS NOTES
Hospitalist Progress Note   Admit Date:  2022 10:03 PM   Name:  Wilfrido Pires   Age:  80 y.o. Sex:  female  :  1950   MRN:  [id number]   Room:  [facility]/    Presenting Complaint: Hypotension and Extremity Weakness     Reason(s) for Admission: End stage renal disease on dialysis ([facility] [address] [N18.6, Z99.2]  SIRS (systemic inflammatory response syndrome) (HCC) [R65.10]  Elevated lactic acid level [R79.89]  Hypotension, unspecified hypotension type [I95.9]     Hospital Course & Interval History:     Samira Pino is a 18-year-old female with a PMH of ESRD on HD who presents with hypotension and L-sided mid back pain. Her back pain is chronic since nephrectomy. Labs were normal except elevated lactic. She was given gentle IVF for hypotension. No fever or tachycardia. The patient was admitted to the Hospitalist service with Nephrology consulted. Vascular surgery was consulted to ligate the patient's L thigh AV graft as it may be causing hypotension. She was transferred to ICU on  due to low BP on arterial line while in OR for ligation of L thigh AV graft by Vascular surgery. Subjective/24hr Events (22): Patient is seen and examined at bedside. Remained intubated and on multiple pressors to maintain MAP. 10 point ROS was unable to obtain due to clinical condition. Assessment & Plan:     Shock - unclear at this time - ? Septic shock vs ?distributive shock vs ?hypovolemic shock  22: now requiring multiple pressor support to maintain MAP.  - Management as per Intensivist.  - follow up BCX(8/3) with no growth. Recheck Bcx on 8/10 without growth. - continue on cefepime, flagyl and vancomycin empirically  - midodrine TID    Acute on Chronic hypoxic resp failure  Normally on home oxygen 2-3 Lpm. Intubated for OR on . Case cancelled due to hypotension. Extubated after being transferred to ICU but has been requiring Airvo to maintain saturation.   22: Intubated 8/11.  Management as per Intensivist    High Flow L thigh AVF  S/p Underwent IR guided embolization for L thigh AVF 8/11. Leukocytosis  08/12/22: worsening leukocytosis. Now up to 20.0  - abx as above    Acute metabolic encephalopathy  likely multifactorial (sepsis, anesthesia, etc)  - management as above  - maintain normoglycemia   - avoid benzos and opioids  - nonpharmacologic interventions   - MRI pending    ESRD  - HD as per nephrology     DM2 with nephropathy  - cont Nesina (Januvia substitute)  - continue SSI  - Monitor CBG      hx of RCC  - s/p nephrectomy     confusion/falls at home  - MRI brain pending  - Check orthostatics  - PT/OT recommend STR     Chronic pain  - PRN analgesia         Discharge Planning: Patient critically ill with worsening prognosis. [facility] as she is mechanically ventilated and on multiple pressor support. Diet:  Diet NPO  DVT PPx: heparin  Code status: Full Code    Hospital Problems:  Principal Problem:    Septic shock ([facility] [address]  Active Problems:    Hypotension    Acute metabolic encephalopathy    Chronic respiratory failure with hypoxia (HCC)    Renal cell carcinoma (HCC)    Severe obesity (HCC)    End stage renal disease on dialysis ([facility] [address]    Acute on chronic respiratory failure with hypoxia (HCC)    Type 2 diabetes mellitus with nephropathy ([facility] [address]  Resolved Problems:    * No resolved hospital problems.  *      Objective:   Patient Vitals for the past 24 hrs:   Temp Pulse Resp BP SpO2   08/12/22 0730 -- 99 26 -- --   08/12/22 0715 99.2 °F (37.3 °C) 100 28 -- --   08/12/22 0700 -- 99 29 (!) 42/14 --   08/12/22 0645 -- 98 24 -- --   08/12/22 0630 -- 100 20 -- --   08/12/22 0615 -- 99 19 -- --   08/12/22 0600 -- 95 23 (!) 40/25 --   08/12/22 0545 -- 96 24 -- --   08/12/22 0530 -- 98 26 -- --   08/12/22 0515 -- 97 29 -- --   08/12/22 0500 -- 100 26 -- --   08/12/22 0445 -- 98 28 -- --   08/12/22 0430 -- 100 22 -- --   08/12/22 0415 -- (!) 101 28 -- --   08/12/22 0401 -- -- (!) 34 -- --   08/12/22 0400 -- (!) 104 11 (!) 60/32 --   08/12/22 0349 -- 91 (!) 0 -- --   08/12/22 0345 -- 91 -- -- --   08/12/22 0330 -- 92 -- -- --   08/12/22 0315 -- 93 -- -- 90 %   08/12/22 0300 98.1 °F (36.7 °C) 89 28 (!) 60/24 90 %   08/12/22 0245 -- 92 -- -- 95 %   08/12/22 0236 -- 92 (!) 0 -- 91 %   08/12/22 0230 -- 91 16 -- 92 %   08/12/22 0215 -- 93 -- -- 93 %   08/12/22 0200 -- (!) 123 -- (!) 61/25 92 %   08/12/22 0145 -- (!) 123 -- -- 95 %   08/12/22 0130 -- (!) 123 -- -- 94 %   08/12/22 0115 -- (!) 123 -- -- 93 %   08/12/22 0100 -- 96 13 (!) 60/45 90 %   08/12/22 0045 -- (!) 123 -- -- 91 %   08/12/22 0030 -- (!) 123 12 -- --   08/12/22 0027 -- (!) 122 21 -- --   08/12/22 0015 -- (!) 123 14 -- 100 %   08/12/22 0000 -- (!) 124 -- (!) 60/23 98 %   08/11/22 2352 -- (!) 124 18 -- 100 %   08/11/22 2345 -- (!) 125 16 -- 100 %   08/11/22 2330 -- (!) 122 28 -- 100 %   08/11/22 2315 -- (!) 122 17 -- 100 %   08/11/22 2303 98.6 °F (37 °C) (!) 122 20 (!) 52/17 95 %   08/11/22 2300 -- (!) 122 17 -- 94 %   08/11/22 2245 -- (!) 123 10 -- 98 %   08/11/22 2230 -- (!) 124 18 -- 98 %   08/11/22 2215 -- (!) 124 12 -- 100 %   08/11/22 2203 -- (!) 124 17 (!) 60/22 100 %   08/11/22 2200 -- (!) 124 15 -- 100 %   08/11/22 2145 -- (!) 124 12 -- 97 %   08/11/22 2130 -- (!) 126 16 -- 100 %   08/11/22 2115 -- -- (!) 32 -- --   08/11/22 2104 -- (!) 125 26 133/88 98 %   08/11/22 2100 -- (!) 125 17 -- 100 %   08/11/22 2045 -- (!) 123 28 -- 100 %   08/11/22 2037 -- (!) 123 (!) 0 -- 100 %   08/11/22 2030 -- (!) 121 -- -- 100 %   08/11/22 2015 -- (!) 124 -- -- 100 %   08/11/22 2000 -- (!) 124 23 (!) 57/19 100 %   08/11/22 1946 -- -- 13 -- --   08/11/22 1945 -- (!) 124 -- -- 100 %   08/11/22 1930 -- (!) 124 19 -- 100 %   08/11/22 1918 -- -- 11 -- --   08/11/22 1915 -- (!) 124 -- -- 99 %   08/11/22 1901 99.2 °F (37.3 °C) (!) 124 12 (!) 102/45 100 %   08/11/22 1900 -- (!) 124 -- (!) 73/28 99 %   08/11/22 1806 -- (!) 109 23 -- 100 %   08/11/22 1800 -- (!) 109 23 (!) 87/30 100 %   08/11/22 1745 -- (!) 109 14 -- 100 %   08/11/22 1730 -- (!) 110 19 -- 100 %   08/11/22 1715 -- (!) 112 21 -- 100 %   08/11/22 1700 97.6 °F (36.4 °C) (!) 115 19 (!) 127/58 (!) 80 %   08/11/22 1645 -- (!) 109 26 -- 92 %   08/11/22 1630 -- (!) 109 (!) 37 -- --   08/11/22 1615 -- (!) 110 (!) 47 (!) 201/77 --   08/11/22 1600 -- (!) 109 (!) 31 (!) 176/76 --   08/11/22 1545 -- (!) 109 (!) 73 (!) 162/91 --   08/11/22 1532 -- (!) 111 -- -- --   08/11/22 1530 -- (!) 110 -- -- (!) 42 %   08/11/22 1527 -- (!) 110 -- -- --   08/11/22 1522 -- (!) 133 -- -- --   08/11/22 1517 -- (!) 187 -- -- --   08/11/22 1515 -- (!) 115 -- -- (!) 61 %   08/11/22 1512 -- (!) 116 -- -- --   08/11/22 1507 -- (!) 125 -- -- --   08/11/22 1502 -- (!) 124 -- -- (!) 77 %   08/11/22 1500 -- (!) 118 -- -- (!) 75 %   08/11/22 1457 -- (!) 136 -- -- --   08/11/22 1452 -- (!) 114 -- -- --   08/11/22 1447 -- (!) 116 -- (!) 30/16 (!) 32 %   08/11/22 1445 -- (!) 116 -- -- (!) 38 %   08/11/22 1442 -- (!) 116 -- -- --   08/11/22 1437 -- (!) 117 -- -- --   08/11/22 1432 -- (!) 117 -- -- --   08/11/22 1430 -- (!) 117 -- -- (!) 74 %   08/11/22 1410 -- (!) 105 24 (!) 126/49 --   08/11/22 1405 -- (!) 108 24 (!) 59/25 --   08/11/22 1400 -- (!) 108 14 (!) 176/106 --   08/11/22 1355 -- (!) 104 (!) 41 (!) 84/59 --   08/11/22 1353 -- (!) 104 (!) 35 -- 100 %   08/11/22 1350 -- (!) 101 19 (!) 99/55 100 %   08/11/22 1345 -- (!) 104 21 132/63 100 %   08/11/22 1340 -- (!) 112 (!) 7 (!) 51/25 93 %   08/11/22 1335 -- (!) 105 11 (!) 60/24 94 %   08/11/22 1330 -- (!) 102 22 (!) 61/28 (!) 80 %   08/11/22 1315 -- (!) 114 27 (!) 111/50 97 %   08/11/22 1300 -- (!) 117 28 (!) 77/35 --   08/11/22 1245 -- (!) 119 (!) 32 (!) 82/41 (!) 88 %   08/11/22 1230 -- (!) 117 (!) 44 (!) 84/36 92 %   08/11/22 1215 -- (!) 121 (!) 37 (!) 110/39 94 %   08/11/22 1200 -- (!) 122 25 (!) 163/108 95 %   08/11/22 1145 -- (!) 123 (!) 31 (!) 193/86 95 %   08/11/22 1130 -- (!) 120 (!) 33 (!) 149/70 100 %   08/11/22 1115 -- (!) 118 (!) 40 (!) 152/89 90 %   08/11/22 1100 -- (!) 113 (!) 36 (!) 73/29 100 %   08/11/22 1045 -- (!) 132 20 (!) 61/33 100 %   08/11/22 1030 -- (!) 132 16 (!) 61/30 100 %   08/11/22 1015 -- (!) 133 16 (!) 53/27 100 %   08/11/22 1000 -- (!) 135 17 (!) 51/25 (!) 81 %   08/11/22 0945 -- (!) 135 20 -- (!) 87 %   08/11/22 0930 -- (!) 136 18 -- 98 %   08/11/22 0915 -- (!) 138 18 -- 99 %   08/11/22 0901 -- (!) 138 18 (!) 67/47 98 %   08/11/22 0900 -- (!) 138 16 -- 98 %       Oxygen Therapy  SpO2: 90 %  Pulse Oximetry Type: Continuous  Pulse via Oximetry: 93 beats per minute  Pulse Oximeter Device Mode: Continuous  O2 Device: Ventilator  Skin Assessment: Clean, dry, & intact  Skin Protection for O2 Device: Yes  Orientation: Bilateral  Location: Cheek  FiO2 : 100 %  O2 Flow Rate (L/min): 50 L/min  Blood Gas  Performed?: Yes  Joshua's Test #1: Not assessed  Site #1: Arterial line  Site Prepped #1: Yes  Number of Attempts #1: 1  Pressure Held #1: Yes  Complications #1: None  Post-procedure #1: Standard  Specimen Status #1: Point of care  How Tolerated?: Tolerated well  Vent Mode: AC/PRVC    Estimated body mass index is 34.33 kg/m² as calculated from the following:    Height as of this encounter: 5' 4\" (1.626 m). Weight as of this encounter: 200 lb (90.7 kg). Intake/Output Summary (Last 24 hours) at 8/12/2022 0848  Last data filed at 8/12/2022 0738  Gross per 24 hour   Intake 4353.44 ml   Output 0 ml   Net 4353.44 ml         Physical Exam:   Blood pressure (!) 42/14, pulse 99, temperature 99.2 °F (37.3 °C), temperature source Axillary, resp. rate 26, height 5' 4\" (1.626 m), weight 200 lb (90.7 kg), SpO2 90 %. General:    Sedated. Head:  Normocephalic, atraumatic  Eyes:  Sclerae appear normal.  Pupils equally round. ENT:  Nares appear normal, no drainage. Moist oral mucosa  Neck:  No restricted ROM.   Trachea midline   CV: Tachycardic, regular rhythm. Lungs: No wheezing. Symmetric expansion. Intubated and mechanically ventilated. Abdomen:   Soft, nondistended. Extremities: No cyanosis or clubbing. Skin:     No rashes and normal coloration. Neuro:  sedated.      I have personally reviewed labs and tests showing:  Recent Labs:  Recent Results (from the past 48 hour(s))   POCT Glucose    Collection Time: 08/10/22 11:49 AM   Result Value Ref Range    POC Glucose 182 (H) 65 - 100 mg/dL    Performed by: Alo Cooper    POCT Glucose    Collection Time: 08/10/22  5:09 PM   Result Value Ref Range    POC Glucose 191 (H) 65 - 100 mg/dL    Performed by: CowdenLaurelBSN    POCT Glucose    Collection Time: 08/10/22  8:41 PM   Result Value Ref Range    POC Glucose 209 (H) 65 - 100 mg/dL    Performed by: Corinne Bias    CBC with Auto Differential    Collection Time: 08/11/22  2:46 AM   Result Value Ref Range    WBC 12.6 (H) 4.3 - 11.1 K/uL    RBC 3.08 (L) 4.05 - 5.2 M/uL    Hemoglobin 10.2 (L) 11.7 - 15.4 g/dL    Hematocrit 29.5 (L) 35.8 - 46.3 %    MCV 95.8 79.6 - 97.8 FL    MCH 33.1 (H) 26.1 - 32.9 PG    MCHC 34.6 31.4 - 35.0 g/dL    RDW 18.8 (H) 11.9 - 14.6 %    Platelets 98 (L) 105 - 450 K/uL    MPV 11.5 9.4 - 12.3 FL    nRBC 0.10 0.0 - 0.2 K/uL    Differential Type AUTOMATED      Seg Neutrophils 91 (H) 43 - 78 %    Lymphocytes 4 (L) 13 - 44 %    Monocytes 4 4.0 - 12.0 %    Eosinophils % 0 (L) 0.5 - 7.8 %    Basophils 1 0.0 - 2.0 %    Immature Granulocytes 1 0.0 - 5.0 %    Segs Absolute 11.4 (H) 1.7 - 8.2 K/UL    Absolute Lymph # 0.5 0.5 - 4.6 K/UL    Absolute Mono # 0.5 0.1 - 1.3 K/UL    Absolute Eos # 0.0 0.0 - 0.8 K/UL    Basophils Absolute 0.1 0.0 - 0.2 K/UL    Absolute Immature Granulocyte 0.1 0.0 - 0.5 K/UL   Basic Metabolic Panel w/ Reflex to MG    Collection Time: 08/11/22  2:46 AM   Result Value Ref Range    Sodium 138 136 - 145 mmol/L    Potassium 4.5 3.5 - 5.1 mmol/L    Chloride 101 98 - 107 mmol/L    CO2 25 21 - 32 mmol/L    Anion Gap 12 7 - 16 mmol/L    Glucose 293 (H) 65 - 100 mg/dL    BUN 30 (H) 8 - 23 MG/DL    Creatinine 4.60 (H) 0.6 - 1.0 MG/DL    GFR African American 12 (L) >60 ml/min/1.73m2    GFR Non- 10 (L) >60 ml/min/1.73m2    Calcium 8.7 8.3 - 10.4 MG/DL   Procalcitonin    Collection Time: 08/11/22  2:46 AM   Result Value Ref Range    Procalcitonin 18.36 (H) 0.00 - 0.49 ng/mL   Venous Blood Gas, POC    Collection Time: 08/11/22  4:47 AM   Result Value Ref Range    DEVICE BIPAP MASK      FIO2 60 %    PH, VENOUS (POC) 7.39 7.32 - 7.42      PCO2, Notrees, POC 38.1 (L) 41 - 51 MMHG    PO2, VENOUS (POC) 37 mmHg    HCO3, Venous 23.2 23 - 28 MMOL/L    SO2, VENOUS (POC) 69.8 65 - 88 %    Base Deficit, Venous 1.6 mmol/L    Mode BILEVEL      POC TIDAL VOLUME 845 ml    POC PEEP 10 cmH2O    IPAP/PIP/High PEEP 20      POC Pressure Support 10 cmH2O    POC Joshua's Test Positive      Respiratory Rate 20      Site RIGHT RADIAL      Specimen type: VENOUS BLOOD      Performed by: Solange     CRITICAL VALUE READ BACK [facility]     RESPIRATORY COMMENT: [email or web address]    POCT Glucose    Collection Time: 08/11/22  7:40 AM   Result Value Ref Range    POC Glucose 233 (H) 65 - 100 mg/dL    Performed by: Julio C    D-Dimer, Quantitative    Collection Time: 08/11/22 10:06 AM   Result Value Ref Range    D-Dimer, Quant 16.53 (H) <0.56 ug/ml(FEU)   POCT Glucose    Collection Time: 08/11/22 12:00 PM   Result Value Ref Range    POC Glucose 201 (H) 65 - 100 mg/dL    Performed by: Julio C    POC Blood, Cord, Arterial    Collection Time: 08/11/22  2:11 PM   Result Value Ref Range    DEVICE ADULT VENT      FIO2 100 %    pH, Arterial, POC 7.27 (L) 7.35 - 7.45      pCO2, Arterial, POC 42.1 35 - 45 MMHG    pO2, Arterial, POC 39 (LL) 75 - 100 MMHG    HCO3, Mixed 19.5 (L) 22 - 26 MMOL/L    SO2c, Arterial, POC 66.1 (L) 95 - 98 %    BASE DEFICIT (POC) 7.0 mmol/L    Mode Pressure regulated volume control      POC TIDAL VOLUME 480 ml    POC PEEP 8 cmH2O    MEAN AIRWAY PRESSURE 19 cmH2O    IPAP/PIP/High PEEP 36      Site LEFT FEMORAL      Specimen type: ARTERIAL      Performed by: Niru     CRITICAL VALUE READ BACK KATTY     RESPIRATORY COMMENT: 0    POC Blood, Cord, Arterial    Collection Time: 08/11/22  4:08 PM   Result Value Ref Range    DEVICE ADULT VENT      FIO2 100 %    pH, Arterial, POC 7.18 (LL) 7.35 - 7.45      pCO2, Arterial, POC 26.7 (L) 35 - 45 MMHG    pO2, Arterial,  (H) 75 - 100 MMHG    HCO3, Mixed 10.1 (L) 22 - 26 MMOL/L    SO2c, Arterial, POC 96.4 95 - 98 %    BASE DEFICIT (POC) 16.8 mmol/L    Mode Pressure regulated volume control      POC TIDAL VOLUME 480 ml    POC PEEP 12 cmH2O    MEAN AIRWAY PRESSURE 22 cmH2O    IPAP/PIP/High PEEP 34      POC Joshua's Test Positive      Inspiratory Time 0.90 sec    Site DRAWN FROM ARTERIAL LINE      Specimen type: ARTERIAL      Performed by: Niru     CRITICAL VALUE READ BACK MITA    POCT Glucose    Collection Time: 08/11/22  5:18 PM   Result Value Ref Range    POC Glucose 151 (H) 65 - 100 mg/dL    Performed by: Julio C    POCT Glucose    Collection Time: 08/11/22  7:44 PM   Result Value Ref Range    POC Glucose 168 (H) 65 - 100 mg/dL    Performed by: Carlos Latif    POC Blood, Cord, Arterial    Collection Time: 08/11/22  9:02 PM   Result Value Ref Range    DEVICE ADULT VENT      FIO2 100 %    pH, Arterial, POC 7.31 (L) 7.35 - 7.45      pCO2, Arterial, POC 29.4 (L) 35 - 45 MMHG    pO2, Arterial,  (H) 75 - 100 MMHG    HCO3, Mixed 14.6 (L) 22 - 26 MMOL/L    SO2c, Arterial, POC 97.7 95 - 98 %    BASE DEFICIT (POC) 10.5 mmol/L    Mode Volume Control      POC TIDAL VOLUME 480 ml    POC PEEP 12 cmH2O    MEAN AIRWAY PRESSURE 18 cmH2O    IPAP/PIP/High PEEP 25      POC Joshua's Test NOT APPLICABLE      Respiratory Rate 28      Site DRAWN FROM ARTERIAL LINE      Specimen type: ARTERIAL      Performed by: Solange     RESPIRATORY COMMENT: VE13.1 POC Blood, Cord, Arterial    Collection Time: 08/12/22  3:02 AM   Result Value Ref Range    DEVICE ADULT VENT      FIO2 100 %    pH, Arterial, POC 7.35 7.35 - 7.45      pCO2, Arterial, POC 28.5 (L) 35 - 45 MMHG    pO2, Arterial,  (H) 75 - 100 MMHG    HCO3, Mixed 15.8 (L) 22 - 26 MMOL/L    SO2c, Arterial, POC 99.2 (H) 95 - 98 %    BASE DEFICIT (POC) 8.7 mmol/L    Mode Volume Control      POC TIDAL VOLUME 480 ml    POC PEEP 12 cmH2O    MEAN AIRWAY PRESSURE 24 cmH2O    IPAP/PIP/High PEEP 41      POC Joshua's Test NOT APPLICABLE      Respiratory Rate 28      Site DRAWN FROM ARTERIAL LINE      Specimen type: ARTERIAL      Performed by: Solange     RESPIRATORY COMMENT: [id number]    Basic Metabolic Panel w/ Reflex to MG    Collection Time: 08/12/22  3:24 AM   Result Value Ref Range    Sodium 137 136 - 145 mmol/L    Potassium 4.7 3.5 - 5.1 mmol/L    Chloride 102 98 - 107 mmol/L    CO2 16 (L) 21 - 32 mmol/L    Anion Gap 19 (H) 7 - 16 mmol/L    Glucose 154 (H) 65 - 100 mg/dL    BUN 38 (H) 8 - 23 MG/DL    Creatinine 5.10 (H) 0.6 - 1.0 MG/DL    GFR African American 11 (L) >60 ml/min/1.73m2    GFR Non- 9 (L) >60 ml/min/1.73m2    Calcium 7.9 (L) 8.3 - 10.4 MG/DL   CBC with Auto Differential    Collection Time: 08/12/22  3:24 AM   Result Value Ref Range    WBC 20.0 (H) 4.3 - 11.1 K/uL    RBC 2.57 (L) 4.05 - 5.2 M/uL    Hemoglobin 8.5 (L) 11.7 - 15.4 g/dL    Hematocrit 25.1 (L) 35.8 - 46.3 %    MCV 97.7 79.6 - 97.8 FL    MCH 33.1 (H) 26.1 - 32.9 PG    MCHC 33.9 31.4 - 35.0 g/dL    RDW 19.1 (H) 11.9 - 14.6 %    Platelets 78 (L) 774 - 450 K/uL    nRBC 0.30 (H) 0.0 - 0.2 K/uL    Seg Neutrophils 87 (H) 43 - 78 %    Lymphocytes 5 (L) 13 - 44 %    Monocytes 3 (L) 4.0 - 12.0 %    Eosinophils % 0 (L) 0.5 - 7.8 %    Basophils 0 0.0 - 2.0 %    Immature Granulocytes 5 0.0 - 5.0 %    Segs Absolute 17.4 (H) 1.7 - 8.2 K/UL    Absolute Lymph # 1.0 0.5 - 4.6 K/UL    Absolute Mono # 0.6 0.1 - 1.3 K/UL    Absolute Eos # 0.0 0.0 - 0.8 K/UL    Basophils Absolute 0.0 0.0 - 0.2 K/UL    Absolute Immature Granulocyte 1.0 (H) 0.0 - 0.5 K/UL    RBC Comment OCCASIONAL  POIKILOCYTOSIS        WBC Comment Result Confirmed By Smear      Platelet Comment DECREASED      Differential Type AUTOMATED     POCT Glucose    Collection Time: 08/12/22  8:24 AM   Result Value Ref Range    POC Glucose 87 65 - 100 mg/dL    Performed by: Julio C        I have personally reviewed imaging studies showing: Other Studies:  XR ABDOMEN (KUB) (SINGLE AP VIEW)   Final Result   Orogastric tube is well-positioned. XR CHEST PORTABLE   Final Result   1. Appropriately positioned endotracheal tube. 2. Worsening bilateral pulmonary infiltrates. IR VASC EMBOLIZE OCCLUDE ARTERY   Final Result   Dialysis fistulagram demonstrates patency with outflow into a   narrowed proximal femoral vein. Complete occlusion of the venous limb of the AV   graft following vascular plug and metal coil placement. Flow within the deep   venous system should be preserved. PLAN:  The patient has been returned to the intensive care unit in critical, but   unchanged, hemodynamic condition. _______________________________________________________________      PROCEDURE SUMMARY:   - Access of dialysis fistula with ultrasound guidance   -Limited Dialysis fistulagram    - Additional procedure(s): Vascular plug and coil embolization of the venous   limb of the AV graft      PROCEDURE DETAILS:      Pre-procedure   Consent:  Informed oral consent for the procedure was obtained from the   patient's daughter via the telephone after explanation of risks (including, but   not limitted to: Hemorrhage, Infection, Vascular Injury) benefits and   alternatives. Her daughter understands that the patient is critically ill and   this procedure is being performed as a last ditch effort to occlude the hi flow   AV fistula. Her questions were answered to their satisfaction.   She flow.  There is an end to side anastomosis with the proximal left   femoral vein. This has resulted in tethering the and narrowing of the vein at   the anastomosis. There is good anterograde flow into the common femoral vein. A small amount of contrast refluxes down the femoral vein. Small amount of   contrast is visible in the profunda femoral vein terminus. AV graft embolization   Catheter position for embolization:  Venous limb, 2 cm proximal to the venous   anastomosis. Embolization intent:  Complete AV graft obstruction   Embolic(s):  Amplatzer plug, Gareth coils   Angiographic endpoint:  Complete stasis (static contrast column for at least 5   heartbeats)      Final fistulagram   Final fistulagram was performed. Findings: Complete cessation of flow within the venous limb of the graft. Contrast does reflux through the arterial anastomosis confirming patency of the   left common femoral artery and superficial femoral artery. Final fistula palpation: Not performed      Fluoroscopy   Radiographic image of the chest was obtained, confirming the tip of the   endotracheal tube to be well above the gabi. Closure   The sheath was removed and hemostasis was achieved with pursestring suture and   manual compression. Perclose devices were deployed, but the knots did not slide   down and therefore these sutures were removed. A sterile dressing was applied. Contrast   Contrast agent: Isovue-300   Contrast volume (mL): 40      Radiation Dose   Reference Air Kerma (Ka,r) :  197 mGy   Dose Area Product/Kerma Area Product (DAP/JUSTIN/PKA) :  3610.19 cGy-cm2   Fluoroscopy Exposure Time Minutes:Seconds: 3.4 minutes   Fluorographic Images/Angiograms:  3 AV fistulograms      Additional Details   Specimens removed: None   Estimated blood loss (mL): Less than 10   Standardized report: [email or web address]      Attestation   Signer name: Tiffany Mcclain M.D.    I attest that I performed the entire procedure. I reviewed the stored images and   agree with the report as written. XR CHEST 1 VIEW   Final Result   Unchanged bilateral lung edema or infiltrates. XR CHEST PORTABLE   Final Result   1. Status post tracheal extubation. 2.  Hazy opacities both lung bases, could reflect atelectasis, infiltrates, or   effusions. 3.  Pulmonary vascular congestion. XR CHEST PORTABLE   Final Result   ETT in good position. Otherwise no acute abnormality. XR CHEST PORTABLE   Final Result   Findings suggest CHF.              Current Meds:  Current Facility-Administered Medications   Medication Dose Route Frequency    midodrine (PROAMATINE) tablet 10 mg  10 mg Orogastric TID WC    pantoprazole (PROTONIX) 40 mg in sodium chloride (PF) 10 mL injection  40 mg IntraVENous Daily    acetaminophen (TYLENOL) tablet 650 mg  650 mg Per G Tube Q6H PRN    Or    acetaminophen (TYLENOL) suppository 650 mg  650 mg Rectal Q6H PRN    glucose chewable tablet 16 g  4 tablet Per G Tube PRN    ondansetron (ZOFRAN-ODT) disintegrating tablet 4 mg  4 mg Per G Tube Q8H PRN    Or    ondansetron (ZOFRAN) injection 4 mg  4 mg IntraVENous Q6H PRN    oxyCODONE (ROXICODONE) immediate release tablet 5 mg  5 mg Per G Tube Q6H PRN    polyethylene glycol (GLYCOLAX) packet 17 g  17 g Per G Tube Daily PRN    temazepam (RESTORIL) capsule 15 mg  15 mg Per G Tube Nightly PRN    traMADol (ULTRAM) tablet 50 mg  50 mg Per G Tube Q6H PRN    phenylephrine (ELIZABETH-SYNEPHRINE) 50 mg/250 mL infusion   mcg/min IntraVENous Continuous    haloperidol lactate (HALDOL) injection 5 mg  5 mg IntraMUSCular Q6H PRN    fentaNYL (SUBLIMAZE) 1,000 mcg in sodium chloride 0.9% 100 mL infusion   mcg/hr IntraVENous Continuous    succinylcholine (ANECTINE) injection 200 mg  200 mg IntraVENous Once    vasopressin (VASOSTRICT) 20 units in dextrose 5% 100 mL infusion  0.01-0.1 Units/min IntraVENous Continuous    EPINEPHrine (EPINEPHrine HCL) 5 mg in sodium chloride 0.9 % 250 mL infusion  1-20 mcg/min IntraVENous Continuous    heparin infusion 2 units/mL in 0.9% NaCl   Irrigation Continuous PRN    amiodarone (CORDARONE) 150 mg in dextrose 5 % 100 mL bolus    Continuous PRN    sodium bicarbonate 150 mEq in dextrose 5 % 1,000 mL infusion   IntraVENous Continuous    alcohol 62% (NOZIN) nasal  1 ampule  1 ampule Topical Daily    cefepime (MAXIPIME) 1,000 mg in sodium chloride 0.9 % 50 mL IVPB mini-bag  1,000 mg IntraVENous Q24H    vancomycin (VANCOCIN) intermittent dosing (placeholder)   Other RX Placeholder    metronidazole (FLAGYL) 500 mg in 0.9% NaCl 100 mL IVPB premix  500 mg IntraVENous Q12H    dextrose bolus 10% 125 mL  125 mL IntraVENous PRN    Or    dextrose bolus 10% 250 mL  250 mL IntraVENous PRN    glucagon (rDNA) injection 1 mg  1 mg SubCUTAneous PRN    dextrose 10 % infusion   IntraVENous Continuous PRN    fentaNYL (SUBLIMAZE) injection 50 mcg  50 mcg IntraVENous Q1H PRN    midazolam PF (VERSED) injection 1 mg  1 mg IntraVENous Q2H PRN    norepinephrine (LEVOPHED) 16 mg in sodium chloride 0.9 % 250 mL infusion  1-100 mcg/min IntraVENous Continuous    dexmedetomidine (PRECEDEX) 400 mcg in sodium chloride 0.9 % 100 mL infusion  0.1-1.5 mcg/kg/hr IntraVENous Continuous    insulin lispro (HUMALOG) injection vial 0-8 Units  0-8 Units SubCUTAneous TID WC    insulin lispro (HUMALOG) injection vial 0-4 Units  0-4 Units SubCUTAneous Nightly    sodium chloride flush 0.9 % injection 5-40 mL  5-40 mL IntraVENous 2 times per day    sodium chloride flush 0.9 % injection 5-40 mL  5-40 mL IntraVENous PRN    0.9 % sodium chloride infusion   IntraVENous PRN    heparin (porcine) injection 5,000 Units  5,000 Units SubCUTAneous 3 times per day    heparin (porcine) injection 5,000 Units  5,000 Units IntraVENous PRN    Epoetin Martin-epbx (RETACRIT) injection 20,000 Units  20,000 Units SubCUTAneous Q7 Days       Signed:  Alejandro Wilks MD    Part of this note may

## 2022-08-12 NOTE — PROGRESS NOTES
08/11/22 2037   Patient Observation   Heart Rate (!) 123   Resp (!) 0   SpO2 100 %   Breath Sounds   Right Upper Lobe Diminished   Right Middle Lobe Diminished   Right Lower Lobe Diminished   Left Upper Lobe Diminished   Left Lower Lobe Diminished   Vent Information   Vent Mode AC/VC   Ventilator Settings   FiO2  100 %   Insp Time (sec) 0.9 sec   Resp Rate (Set) 28 bmp   PEEP/CPAP (cmH2O) 12   Vt (Set, mL) 480 mL   Vent Patient Data (Readings)   Vt Spont (mL) 510 mL   Peak Inspiratory Pressure (cmH2O) 40 cmH2O   Rate Measured 28 br/min   Minute Volume (L/min) 14.3 Liters   Mean Airway Pressure (cmH2O) 24 cmH20   Inspiratory Time 0.9 sec   Vent Alarm Settings   Low Pressure (cmH2O) 5 cmH2O   Low Minute Volume (lpm) 2 L/min   High Minute Volumn (lpm) 22 L/min   High Exhaled Vt (ml) 1500 mL   RR High (bpm) 55 br/min   RR Low (bpm) 8   Apnea (secs) 20 secs

## 2022-08-12 NOTE — PROGRESS NOTES
[facility] Critical Care Note[de-identified] 8/12/2022  Dottie Pires  Admission Date: 8/2/2022     Length of Stay: 9 days    Background: 80 y.o. 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 Required thoracentesis of pleural effusions- 2/2022, transudate. Last echo 6/25 with EF 60-65%, RV severely dilated with elevated RVSP. HPI obtained from chart due to patient status. Presented to ER with hypotension and mid back pain on 8/3. She did not have normal fluid removal in HD on Monday due to hypotension per patient. Chronically on midodrine OP. In ER given gentle hydration, started on Vanc/cefepime. WBC 7, lactic 3.5. Procal was low as abx were discontinued, Bcx NG. Patient has L thigh AV graft and Vasc consulted for ligation with thoughts this may be contributing to hypotension. This AM she was scheduled for surgery, she was intubated for surgery and was found to have SBP in 30s. Required multiple pushes of vasopressors and briefly Epi gtt. Was drowsy prior to induction per Anesthesia. She received propfol bolus, fentanyl push and versed push. Case was cancelled and we were consulted for vent management and ICU care. Talked to daughter and states that all of the patient's issues clearly started with the placement of her last fistula. Prior to that she was completely independent and living alone. Notable PMH:  has a past medical history of Anemia, Arthritis, AVF (arteriovenous fistula) ([address] AVF (arteriovenous fistula) ([address] Degenerative joint disease, Diabetes ([address] Enlarged lymph node, ESRD on hemodialysis ([address] Hypertension, Mediastinal mass, Obesity, On home O2, Renal cancer ([address] Shortness of breath, and Transient ischemic attack. 24 Hour events: Pt was intubated yesterday to facilitate her going to IR for fistula embolization. On vaso and consuelo. Levo was weaned off overnight. Started bicarb drip yesterday. ABG with corrected pH but still low bicarb. Lactate ordered and pending. ROS: unable to obtain due to pt condition. Lines: (insertion date)     Hemodialysis Central Access Right Subclavian (Active)       Hemodialysis Fistula/Graft Arteriovenous vein graft Left Thigh (Active)     Drips: current dose (range)  Dose (mcg/kg/min) Propofol : *90 mg  Dose (mcg/kg/hr) Dexmedetomidine: 0 mcg/kg/hr  Dose (mcg/hr) Fentanyl: 50 mcg/hr  Dose (mcg/min) Epinephrine: 0 mcg/min  Dose (mcg/min) Norepinephrine: 0 mcg/min (99/44)  Dose (units/hr) Vasopressin: 3.6 Units/hr  Dose (mcg/min) Phenylephrine : 200 mcg/min (96/40)  Dose (units/hr) Heparin: 2000 Units/hr  Dose (mg/min) Amiodarone: *150 mg     Pertinent Exam:         Blood pressure (!) 42/14, pulse 99, temperature 99.2 °F (37.3 °C), temperature source Axillary, resp. rate 26, height 5' 4\" (1.626 m), weight 200 lb (90.7 kg), SpO2 90 %. Intake/Output Summary (Last 24 hours) at 8/12/2022 0804  Last data filed at 8/12/2022 0738  Gross per 24 hour   Intake 4353.44 ml   Output 0 ml   Net 4353.44 ml     Constitutional:  intubated, off sedation but unresponsive.    EENMT:  Sclera clear, pupils equal, oral mucosa moist  Respiratory: CTA B, no /r/r  Cardiovascular:  Tachy, regular, harsh holosystolic murmur   Gastrointestinal:  soft with no tenderness; positive bowel sounds present  Musculoskeletal:  warm with no cyanosis, trace lower extremity edema in B thighs  Skin:  no jaundice or ecchymosis  Neurologic: unresponsive  Psychiatric: unresponsive    CXR:   8/11/22      Recent Labs     08/11/22 0246 08/12/22  0324   WBC 12.6* 20.0*   HGB 10.2* 8.5*   HCT 29.5* 25.1*   PLT 98* 78*   PROCAL 18.36*  --      Recent Labs     08/11/22  0246 08/12/22  0324    137   K 4.5 4.7    102   CO2 25 16*   GLUCOSE 293* 154*   BUN 30* 38*   CREATININE 4.60* 5.10*     No results for input(s): TROPHS, NTPROBNP, CRP, ESR in the last 72 hours. Recent Labs     08/11/22  0246 08/12/22  0324   GLUCOSE 293* 154*      ECHO: 08/02/22    TRANSTHORACIC ECHOCARDIOGRAM (TTE) COMPLETE (CONTRAST/BUBBLE/3D PRN) 08/08/2022 12:57 PM (Final)    Interpretation Summary  Formatting of this result is different from the original.      Left Ventricle: Normal left ventricular systolic function with a visually estimated EF of 55 - 60%. Left ventricle size is normal. Normal wall thickness. Normal wall motion. Abnormal diastolic function. Right Ventricle: Right ventricle is severely dilated. Severely reduced systolic function. Mitral Valve: Mild paravalvular regurgitation. Tricuspid Valve: Valve structure is normal. Severe regurgitation with a centrally directed jet. Tricuspid annulus is severely dilated leading to mild coaptation. This is likely due to severely dilated right ventricle. No stenosis noted. Severely elevated RVSP. The estimated RVSP is 72 mmHg. Left Atrium: Left atrium is mildly dilated. Right Atrium: Right atrium is moderately dilated. Technical qualifiers: Procedure performed with the patient in a supine position, color flow Doppler was performed and pulse wave and/or continuous wave Doppler was performed.     Signed by: Jayden Brice MD on 8/8/2022 12:57 PM    Microbiology:   Recent Labs     08/10/22  0020 08/10/22  0056   CULTURE NO GROWTH 1 DAY NO GROWTH 1 DAY     Ventilator Settings Ideal body weight: 54.7 kg (120 lb 9.5 oz)  Adjusted ideal body weight: 69.1 kg (152 lb 5.7 oz)  Mode FIO2 Rate Tidal Volume Pressure   AC/VC    100 %  28 bmp     480 mL   12     Peak airway pressure:     Minute ventilation:    ABG:  Recent Labs     08/11/22  1608 08/11/22  2102 08/12/22  0302   PHAPOC 7.18* 7.31* 7.35   [id number] 26.7* 29.4* 28.5*   [id number] 103* 107* 149*   [id number] 10.1* 14.6* 15.8*     Assessment and Plan:  (Medical Decision Making)   Impression: 80 y.o. female with ESRD, hypotension, became more hypotensive during

## 2022-08-12 NOTE — PROGRESS NOTES
Unable to obtain sat reading on pt from 320 to now. Multiple sites, probes, and cords tried. Attempted to warm pt's skin, no improvement. Pt's morning abg @ 330 showed pO2 of 149.

## 2022-08-12 NOTE — PROGRESS NOTES
08/12/22 0820   Adult IBW   Height 5' 4\" (1.626 m)   IBW/kg (Calculated) 54.7   Breath Sounds   Right Upper Lobe Diminished   Right Middle Lobe Diminished   Right Lower Lobe Diminished   Left Upper Lobe Diminished   Left Lower Lobe Diminished   Vent Information   Vent Mode AC/PRVC   Ventilator Settings   FiO2  100 %   Insp Time (sec) 0.9 sec   Resp Rate (Set) 28 bmp   PEEP/CPAP (cmH2O) 12   Vt (Set, mL) 480 mL   Vent Patient Data (Readings)   Vt Mandatory Exp (mL) 508 mL   Peak Inspiratory Pressure (cmH2O) 33 cmH2O   Rate Measured 30 br/min   Minute Volume (L/min) 15.5 Liters   Mean Airway Pressure (cmH2O) 23 cmH20   Plateau Pressure (cm H2O) 30 cm H2O   Inspiratory Time 0.9 sec   I:E Ratio 1:1.4   I Time/ I Time % 0.9 s   Insp Rise Time (%) 3 %   Vent Alarm Settings   Low Pressure (cmH2O) 5 cmH2O   Low Minute Volume (lpm) 2 L/min   High Minute Volumn (lpm) 22 L/min   High Exhaled Vt (ml) 1500 mL   RR High (bpm) 55 br/min   RR Low (bpm) 8   Apnea (secs) 20 secs   Airway Clearance   Suction ET Tube   Suction Device Inline suction catheter   Sputum Method Obtained Endotracheal   Sputum Color/Odor Tan   Sputum Consistency Thick  (lavaged)   ETT (adult)   Placement Date/Time: 08/11/22 0153   Mask Ventilation: Oral airway  Airway Type: Cuffed  Airway Tube Size: 8 mm  Laryngoscope: Mac  Blade Size: 4  Location: Oral  Insertion attempts: 1  Secured At: 23 cm  Measured From: Teeth  Cuff Pressure: 26 cm H2O   Secured At 23 cm   Measured From Teeth   ETT Placement Center   Secured By Commercial tube martinez   Site Assessment Dry   Cuff Pressure   (mov)   Tie/Martinez Changed No   B: Both Spontaneous Awakening and Breathing Trials   Was Patient Receiving Mechanical Ventilation Yes   Weaning Parameters   PaO2/FiO2 Ratio 149   Respiratory   Respiratory (WDL) X   Ventilator check complete; patient has a #8. 0 ET tube secured at the 23 at the teeth. Patient is  sedated. Patient is not able to follow commands.   Breath sounds are diminished. Trachea is midline, Negative for subcutaneous air, and chest excursion is symmetric. Patient is also Negative for cyanosis and is Negative for pitting edema. All alarms are set and audible. Resuscitation bag is  at the head of the bed.       Ventilator Settings  Mode FIO2 Rate Tidal Volume Pressure PEEP I:E Ratio   AC/PRVC  100 %   28     480       12    1:1.4      Peak airway pressure:     Minute ventilation:         Finley Dolin, RCP

## 2022-08-12 NOTE — PROGRESS NOTES
RENAL H&P/CONSULT    Subjective:     Patient is a 81 y/o AA female, followed by our office for ESRD, on HD M-W-F at [facility]. She has been running via RIJ perm cath since LUE AVF became non-functioning. Left thigh AV graft placed on 5/26/22 by Dr Lucia Early. PMH also consist of DM II, Hyperphosphatemia, anemia, renal cell carcinoma s/p left nephrectomy, followed by Pulmonary for lung mass, and HTN. She is on home O2 at 2L NC as needed. She presented to the ER via EMS for hypotension and generalized \"feeling bad\". She has been confused and reports back pain and has dyspnea and edema. BP has been low and we'll plan for HD today with Midodrine for hypotension. She is being treated empirically with IV antibiotics for suspected SIRS.   8/4/22- see HD note  8/5/22- alert/oriented, lying in bed, no signs of distress, on 4L NC, c/o dyspnea at times, denies CP, n/v, HA, or dizziness. HD last two days, no HD today, run tomorrow. 8/6/22- see HD note  8/7/22- drowsy, oriented x3, head of bed elevated, no signs of distress, on 2L NC, denies pain, dyspnea, n/v, HA or dizziness. Spoke with her daughter over the phone while in room, informed her of planned procedure with Dr Lucia Early tomorrow. 8/8/22- went to OR this morning, left thigh graft ligation canceled due to hypotension, was given Epi, intubated, art line placed, transferred to CCU. No sedation, on Levo. No signs of distress.   8/9/22 - see HD Note  8/10/22 - resting comfortably on re-breather mask - tolerated SLED well last night - plan SLED again tomorrow - working on stabilizing patient for procedure to shut down the AV Graft   8/11/22 - on two pressors now with third pressor being added and patient about to be intubated - discussed with Dr. Corry Paulino and Dr. Lucia Early - embolization of AV graft with percutaneous approach with hope for improved hemodynamcis from high cardiac output CHF   8/12/22 - patient is intubated on ventilator - markedly edematous, good O2 sat, but still requiring pressors - discussed with family at bedside for a long time, but her daughter who is decision maker is not here - will plan for dialysis tomorrow    Past Medical History:   Diagnosis Date    Anemia     Arthritis     AVF (arteriovenous fistula) ([address]     left    AVF (arteriovenous fistula) ([address] 12/20/2016 12/6/16 ([facility]) Right AVF revision and thrombectomy    Degenerative joint disease     Diabetes ([address]     type 2--- does not check sqbs at home    Enlarged lymph node     \"enlarging paratracheal node to 2.6 cm on CT chest\"    ESRD on hemodialysis ([address] onset 2006    ESRD.  MWF dialysis at [facility]    Hypertension     controlled with med    Mediastinal mass     being followed by dr ramírez--per pt-- states told by dr Rabia Barton it wasnt cancer-- but plans to be rescanned 5/2022    Obesity     On home O2     2L QHS and PRN during day d/t \"lymph node in chest\"    Renal cancer [facility]     Dr eLv Vazquez - renal cancer L nephrectomy---and radiation ---    Shortness of breath     swollen lymphnode in chest-- oxygen 2LPM    Transient ischemic attack 08/29/2015    no residual      Past Surgical History:   Procedure Laterality Date    BREAST SURGERY Right     cyst removed    COLONOSCOPY N/A 11/8/2018    COLONOSCOPY  BMI 36 performed by Rema Washington MD at [address] [place] [address]  9/14/2017    CT BIOPSY ABDOMEN RETROPERITONEUM 9/14/2017 [facility] RADIOLOGY CT SCAN    DIALYSIS FISTULA CREATION Left 8/8/2022    LEFT THIGH ARTERIO VENOUS GRAFT Ligation performed by Juan Luis Watt MD at [facility] MAIN OR    GI  08/06/2018    exploratory laparotomy    GI  06/01/2002    colon resection resulting in temporary colostomy reversal    GROIN SURGERY Left 6/26/2022    LEFT GROIN DEBRIDEMENT OF HEMATOMA / MIGEL performed by Twyla Iniguez MD at [facility] MAIN OR    GYN      mihir    IR INTRO [address] PTA  3/10/2020    IR INTRO CATH DIALYSIS CIRCUIT W BALLOON PTA  12/5/2019    IR INTRO CATH DIALYSIS CIRCUIT W [facility]ALLOON PTA  9/3/2019    IR INTRO CATH DIALYSIS CIRCUIT W [place]ALL[facility] PTA  5/30/2019    IR INTRO CATH DIALYSIS CIRCUIT W [facility]ALLOON PTA  2/28/2019    IR THRMB/INFUSION DIAYSIS CIRCUIT Left 2021    IR TUNNELED CATHETER PLACEMENT GREATER THAN 5 YEARS  3/15/2022    IR TUNNELED CATHETER PLACEMENT GREATER THAN 5 YEARS  11/19/2021    IR TUNNELED CATHETER PLACEMENT GREATER THAN 5 YEARS  11/19/2021    IR TUNNELED CATHETER PLACEMENT GREATER THAN 5 YEARS 11/19/2021 [facility] SPECIALS    IR TUNNELED CATHETER PLACEMENT GREATER THAN 5 YEARS  3/15/2022    IR TUNNELED CATHETER PLACEMENT GREATER THAN 5 YEARS 3/15/2022 [facility] RAD[facility]OLOG[facility] SPECIALS    IR VASC EMBOLIZE OCCLUDE ARTERY  8/11/2022    IR VASC EMBOLIZE OCCLUDE ARTERY 8/11/2022 Fatmata Ballesteros MD D RADIOLOGY SPECIALS    OTHER SURGICAL HISTORY      dialysis fistula, several permcaths    UROLOGICAL SURGERY Left July 2015    nephrectomy    VASCULAR SURGERY Left 04/07/2022    LEFT ARM AV GRAFT    VASCULAR SURGERY Left 03/15/2022    declot    VASCULAR SURGERY Left 02/21/2022    Open thromboembolectomy of left upper arm graft. VASCULAR SURGERY Right     AV graft- states no longer uses    VASCULAR SURGERY Left 5/26/2022    LEFT AV GRAFT THIGH performed by Joyce Sosa MD at [address] [place] [address]      Prior to Admission medications    Medication Sig Start Date End Date Taking? Authorizing Provider   oxyCODONE-acetaminophen (PERCOCET) 5-325 MG per tablet Take 1 tablet by mouth every 4 hours as needed for Pain. Historical Provider, MD   Epoetin Martin-epbx (RETACRIT) 15081 UNIT/ML SOLN injection Inject 1 mL into the skin every 7 days To be given at dialysis 7/3/22   SUNSHINE Olvera - CNP   naloxone 4 MG/0.1ML LIQD nasal spray 1 spray by Nasal route as needed for Opioid Reversal. Use 1 spray intranasally, then discard. Repeat with new spray every 2 min as needed for opioid overdose symptoms, alternating nostrils.      Historical Provider, MD irahetarine (PROAMATINE) 10 MG tablet Take 1 tablet by mouth every 8 hours 6/5/22   Monroe Kan MD   acetaminophen (TYLENOL) 500 MG tablet Take 500 mg by mouth every 6 hours as needed for Pain (for breakthrough pain )    Historical Provider, MD   omeprazole (PRILOSEC) 40 MG delayed release capsule Take 40 mg by mouth at bedtime    Historical Provider, MD   OXYGEN Inhale into the lungs 2 LITERS AS NEEDED FOR SOB VIA NASAL CANNULA    Historical Provider, MD   SITagliptin (JANUVIA) 50 MG tablet Take 50 mg by mouth daily    Historical Provider, MD   sevelamer (RENVELA) 800 MG tablet Take 3 tablets by mouth 3 times daily (with meals) PT TAKES 3 TABS WITH MEALS-- AND 1 TABS WITH SNACKS     Historical Provider, MD     Allergies   Allergen Reactions    Penicillins Other (See Comments) and Rash     PT STATES UNSURE OF RX  Tolerated cefazolin    Vancomycin Rash      Social History     Tobacco Use    Smoking status: Never    Smokeless tobacco: Never   Substance Use Topics    Alcohol use: No      Family History   Problem Relation Age of Onset    Diabetes Mother     Heart Disease Mother     Hypertension Mother     Heart Disease Father     Hypertension Father     Post-op Cognitive Dysfunction Neg Hx     Pseudochol.  Deficiency Neg Hx     Delayed Awakening Neg Hx     Post-op Nausea/Vomiting Neg Hx     Emergence Delirium Neg Hx     Other Neg Hx     Malig Hypertherm Neg Hx           Review of Systems    Unable to obtain due to patient's condition       Objective:       BP (!) 39/19   Pulse 88   Temp 98.2 °F (36.8 °C) (Axillary)   Resp 30   Ht 5' 4\" (1.626 m)   Wt 208 lb (94.3 kg)   SpO2 (!) 71%   BMI 35.70 kg/m²     08/12 0701 - 08/12 1900  In: 3236.5 [I.V.:2934.8]  Out: 0   08/10 1901 - 08/12 0700  In: 1117 [I.V.:1117]  Out: 0     BP (!) 39/19   Pulse 88   Temp 98.2 °F (36.8 °C) (Axillary)   Resp 30   Ht 5' 4\" (1.626 m)   Wt 208 lb (94.3 kg)   SpO2 (!) 71%   BMI 35.70 kg/m²   General:  Intubated on ventilator with increased respiratory rate -  appears stated age. Head:  Normocephalic, without obvious abnormality, atraumatic. Positive for periorbital edema    Neck: Supple, symmetrical, trachea midline, no JVD. Lungs:   Rhonchi to auscultation bilaterally. Heart:  Regular rate and rhythm, S1, S2 normal, no murmur,  rub or gallop. Abdomen:   Soft, non-tender. Bowel sounds normal.   Extremities: Extremities no edema. Access:  [facility] Cath, left thigh graft is occluded   Skin: Skin color, texture, turgor normal. No rashes or lesions.    Neurologic: Chava asterixis         Data Review:     Recent Results (from the past 24 hour(s))   POCT Glucose    Collection Time: 08/11/22  5:18 PM   Result Value Ref Range    POC Glucose 151 (H) 65 - 100 mg/dL    Performed by: Julio C    POCT Glucose    Collection Time: 08/11/22  7:44 PM   Result Value Ref Range    POC Glucose 168 (H) 65 - 100 mg/dL    Performed by: Sammy Hernandez    POC Blood, Cord, Arterial    Collection Time: 08/11/22  9:02 PM   Result Value Ref Range    DEVICE ADULT VENT      FIO2 100 %    pH, Arterial, POC 7.31 (L) 7.35 - 7.45      pCO2, Arterial, POC 29.4 (L) 35 - 45 MMHG    pO2, Arterial,  (H) 75 - 100 MMHG    HCO3, Mixed 14.6 (L) 22 - 26 MMOL/L    SO2c, Arterial, POC 97.7 95 - 98 %    BASE DEFICIT (POC) 10.5 mmol/L    Mode Volume Control      POC TIDAL VOLUME 480 ml    POC PEEP 12 cmH2O    MEAN AIRWAY PRESSURE 18 cmH2O    IPAP/PIP/High PEEP 25      POC Joshua's Test NOT APPLICABLE      Respiratory Rate 28      Site DRAWN FROM ARTERIAL LINE      Specimen type: ARTERIAL      Performed by: Solange     RESPIRATORY COMMENT: VE13.1    POC Blood, Cord, Arterial    Collection Time: 08/12/22  3:02 AM   Result Value Ref Range    DEVICE ADULT VENT      FIO2 100 %    pH, Arterial, POC 7.35 7.35 - 7.45      pCO2, Arterial, POC 28.5 (L) 35 - 45 MMHG    pO2, Arterial,  (H) 75 - 100 MMHG    HCO3, Mixed 15.8 (L) 22 - 26 MMOL/L    SO2c, Arterial, POC 99.2 (H) 95 - 98 %    BASE DEFICIT (POC) 8.7 mmol/L    Mode Volume Control      POC TIDAL VOLUME 480 ml    POC PEEP 12 cmH2O    MEAN AIRWAY PRESSURE 24 cmH2O    IPAP/PIP/High PEEP 41      POC Joshua's Test NOT APPLICABLE      Respiratory Rate 28      Site DRAWN FROM ARTERIAL LINE      Specimen type: ARTERIAL      Performed by: Solange     RESPIRATORY COMMENT: EU76.4    Basic Metabolic Panel w/ Reflex to MG    Collection Time: 08/12/22  3:24 AM   Result Value Ref Range    Sodium 137 136 - 145 mmol/L    Potassium 4.7 3.5 - 5.1 mmol/L    Chloride 102 98 - 107 mmol/L    CO2 16 (L) 21 - 32 mmol/L    Anion Gap 19 (H) 7 - 16 mmol/L    Glucose 154 (H) 65 - 100 mg/dL    BUN 38 (H) 8 - 23 MG/DL    Creatinine 5.10 (H) 0.6 - 1.0 MG/DL    GFR African American 11 (L) >60 ml/min/1.73m2    GFR Non- 9 (L) >60 ml/min/1.73m2    Calcium 7.9 (L) 8.3 - 10.4 MG/DL   CBC with Auto Differential    Collection Time: 08/12/22  3:24 AM   Result Value Ref Range    WBC 20.0 (H) 4.3 - 11.1 K/uL    RBC 2.57 (L) 4.05 - 5.2 M/uL    Hemoglobin 8.5 (L) 11.7 - 15.4 g/dL    Hematocrit 25.1 (L) 35.8 - 46.3 %    MCV 97.7 79.6 - 97.8 FL    MCH 33.1 (H) 26.1 - 32.9 PG    MCHC 33.9 31.4 - 35.0 g/dL    RDW 19.1 (H) 11.9 - 14.6 %    Platelets 78 (L) 671 - 450 K/uL    nRBC 0.30 (H) 0.0 - 0.2 K/uL    Seg Neutrophils 87 (H) 43 - 78 %    Lymphocytes 5 (L) 13 - 44 %    Monocytes 3 (L) 4.0 - 12.0 %    Eosinophils % 0 (L) 0.5 - 7.8 %    Basophils 0 0.0 - 2.0 %    Immature Granulocytes 5 0.0 - 5.0 %    Segs Absolute 17.4 (H) 1.7 - 8.2 K/UL    Absolute Lymph # 1.0 0.5 - 4.6 K/UL    Absolute Mono # 0.6 0.1 - 1.3 K/UL    Absolute Eos # 0.0 0.0 - 0.8 K/UL    Basophils Absolute 0.0 0.0 - 0.2 K/UL    Absolute Immature Granulocyte 1.0 (H) 0.0 - 0.5 K/UL    RBC Comment OCCASIONAL  POIKILOCYTOSIS        WBC Comment Result Confirmed By Smear      Platelet Comment DECREASED      Differential Type AUTOMATED     POCT Glucose    Collection Time: 08/12/22  8:24 AM   Result Value Ref Range    POC Glucose 87 65 - 100 mg/dL    Performed by: Julio C    Lactic Acid    Collection Time: 08/12/22  8:47 AM   Result Value Ref Range    Lactic Acid, Plasma 19.7 (HH) 0.4 - 2.0 MMOL/L   CBC    Collection Time: 08/12/22  9:12 AM   Result Value Ref Range    WBC 17.2 (H) 4.3 - 11.1 K/uL    RBC 2.39 (L) 4.05 - 5.2 M/uL    Hemoglobin 7.7 (L) 11.7 - 15.4 g/dL    Hematocrit 23.5 (L) 35.8 - 46.3 %    MCV 98.3 (H) 79.6 - 97.8 FL    MCH 32.2 26.1 - 32.9 PG    MCHC 32.8 31.4 - 35.0 g/dL    RDW 18.8 (H) 11.9 - 14.6 %    Platelets 62 (L) 594 - 450 K/uL    MPV Unable to calculate. Recommend adding IPF.  9.4 - 12.3 FL    nRBC 0.41 (H) 0.0 - 0.2 K/uL   APTT    Collection Time: 08/12/22  9:12 AM   Result Value Ref Range    PTT 53.1 (H) 24.1 - 35.1 SEC   Protime-INR    Collection Time: 08/12/22  9:12 AM   Result Value Ref Range    Protime 29.7 (H) 12.6 - 14.5 sec    INR 2.7     POCT Glucose    Collection Time: 08/12/22 11:08 AM   Result Value Ref Range    POC Glucose 68 65 - 100 mg/dL    Performed by: Julio C    POC Blood, Cord, Arterial    Collection Time: 08/12/22  2:40 PM   Result Value Ref Range    DEVICE ADULT VENT      FIO2 100 %    pH, Arterial, POC 7.11 (LL) 7.35 - 7.45      pCO2, Arterial, POC 27.6 (L) 35 - 45 MMHG    pO2, Arterial,  (H) 75 - 100 MMHG    HCO3, Mixed 8.8 (L) 22 - 26 MMOL/L    SO2c, Arterial, POC 96.8 95 - 98 %    BASE DEFICIT (POC) 19.1 mmol/L    Mode Pressure regulated volume control      POC TIDAL VOLUME 480 ml    POC PEEP 10 cmH2O    MEAN AIRWAY PRESSURE 22 cmH2O    IPAP/PIP/High PEEP 32      Respiratory Rate 31      Site DRAWN FROM ARTERIAL LINE      Specimen type: ARTERIAL      Performed by: Brianna Valenzuela     CRITICAL VALUE READ BACK DEVYN     RESPIRATORY COMMENT: [id number]    Anti-Xa, Unfractionated Heparin    Collection Time: 08/12/22  3:29 PM   Result Value Ref Range    Anti-XA Unfrac Heparin 0.56 0.3 - 0.7 IU/mL       CXR;   Results for orders placed during the hospital encounter of 08/02/22    XR CHEST PORTABLE    Narrative  EXAM: XR CHEST PORTABLE    HISTORY: hypotension. TECHNIQUE: Frontal chest.    COMPARISON: 7/28/2022    FINDINGS:  There is mild cardiomegaly. There is pulmonary vascular congestion/edema. There is no pleural effusion, or pneumothorax. No significant osseous abnormalities are observed. Stable right-sided CVC and right vascular stent. Impression  Findings suggest CHF.     KUB:  Results for orders placed in visit on 08/03/18    XR ABDOMEN (KUB) (SINGLE AP VIEW)    Narrative  KUB supine views    History:  mechanical small bowel obstruction, lower abd ventral hernia, 78 years  Female    Comparison:  CT abdomen pelvis yesterday    Findings:  Esophageal tube appears to terminate in the first portion of the  duodenum. Oral contrast is seen throughout the colon. No free air is evident. The bowel gas pattern is nonspecific and there is no evidence of ileus or  obstruction. No suspicious renal or ureteral calculi are evident. Visualized  osseous structures unremarkable. Impression  Impression: No acute pathology identified. Renal US:  No results found for this or any previous visit. CT Abdomen  Results for orders placed during the hospital encounter of 06/25/22    CT ABDOMEN PELVIS W IV CONTRAST Additional Contrast? None    Narrative  EXAM: CT abdomen and pelvis with IV contrast.    INDICATION: Abdominal pain. COMPARISON: Prior PET/CT scan on May 5, 2022. TECHNIQUE: Axial CT images of the abdomen and pelvis were obtained after the  intravenous injection of 100 mL Isovue 370 CT contrast. Radiation dose reduction  techniques were used for this study. Our CT scanners use one or all of the  following:  Automated exposure control, adjustment of the mA or kV according to  patient size, iterative reconstruction. FINDINGS:    - Liver: Within normal limits. - Gallbladder and bile ducts: Within normal limits.   - Spleen: Within normal limits. - Urinary tract: The left kidney is absent. The right kidney is atrophied and  there is a 2.6 cm exophytic cyst off its lower pole. No right-sided  hydronephrosis is seen. The urinary bladder is collapsed. - Adrenals: Within normal limits. - Pancreas: Within normal limits. - Gastrointestinal tract: There is colonic diverticulosis. No definite acute  diverticulitis is seen, although ascites limits for evaluation of focal  inflammation.  - Retroperitoneum: Mild abdominal aortic atherosclerosis, with no aneurysmal  dilatation or dissection. The right heart is enlarged, and there is prominent  contrast reflux into the IVC, raising the possibility of right heart failure. - Peritoneal cavity and abdominal wall: There is progressed mild ascites. No  intra-abdominal abscess or free intraperitoneal air is seen. Edema in the  abdominal wall subcutaneous tissue has not significantly changed. - Pelvis: There is a double-limbed bypass graft in the upper left thigh. An  adjacent 7.9 x 7.5 cm complex fluid collection with no associated gas is seen in  the upper anterior thigh, which could be an abscess or hematoma. A small amount  of high-density material along the inferior margin of the fluid collection  raises the possibility of active bleeding.  - Spine/bones: No acute process. - Other comments: Small bilateral pleural effusions and rounded atelectasis in  the left lower lobe are unchanged. Impression  1. Upper left thigh bypass graft, not fully visualized on this study, with an  adjacent 7.9 x 7.5 cm complex fluid collection which could represent an abscess  or hematoma. There is a small amount of high density material along the inferior  margin of the fluid collection, making it difficult to exclude active bleeding. Dr. Dulce Stein verbally notified at 4:16 AM.  2. Persistent anasarca, with progressed mild ascites. 3. Prior left nephrectomy and atrophied right kidney.   4. Prominent sized hepatic veins and IVC in this patient with an enlarged right  heart, raising the possibility of right heart failure. Principal Problem:    Septic shock ([address]  Active Problems:    Hypotension    Acute metabolic encephalopathy    Chronic respiratory failure with hypoxia (HCC)    Elevated lactic acid level    Renal cell carcinoma (HCC)    Severe obesity (HCC)    End stage renal disease on dialysis (HCC)    Acute on chronic respiratory failure with hypoxia (HCC)    Type 2 diabetes mellitus with nephropathy ([address]  Resolved Problems:    * No resolved hospital problems. *      Assessment:     1. ESRD- on HD M-W-F, HD needed for biochemical and volume control. No urgent needs at this time. - plan initiation of dialysis tomorrow due to continued hemodynamic instability today -      2. Anemia- likely related to ESRD, on Nephrovite, and LAKISHA, may need transfusion for declining Hb     3. Hypotension- with a hx of HTN, antihypertensives were stopped -  Altered hemodynamics due to left thigh AV graph likely causes hypotension   - left thigh graph embolization yesterday was technically successful, but unfortunately poor hemodynamics persist     4. Fluid overload - plan CRRT for volume and biochemical control      5. Hypocalcemia- with low albumin, treat with vitamin D     6. HTN- hx of, off antihypertensives due to hypotension     7. Renal cell carcinoma- followed by Oncology     8. DM II- on SSI, managed by primary     9. Hyperphosphatemia- hx of, on Renvela, continue    10. Respiratory failure -  managed by Pulmonary with adequate saturation on ventilator    11.  Code status - family wishes continued full support at this time      Plan:     As above - critically ill - greater than 60 minutes including discussed with family

## 2022-08-12 NOTE — CARE COORDINATION
Chart reviewed and pt discussed in am IDR. Continues CCU intubated/vent -100% fio2. Heparin, consuelo, vaso, bicarb. IR yesterday for embolization per MD notes. CM will follow. LOS 9 days.

## 2022-08-12 NOTE — PROGRESS NOTES
Off going RN reports that Dr Yoel Barraza had just spoke with family regarding DNR status; daughter confirmed  that she will provide decision tonight. Unable to obtain oxygen saturation for 48 hrs.

## 2022-08-12 NOTE — PROGRESS NOTES
SPEECH PATHOLOGY NOTE    Patient intubated for procedure on 8/12/22. She remains intubated this morning. Speech therapy to sign off until medical status improves. Please consider reconsult at later time/date when appropriate to resume speech therapy services.      EPHRAIM Santiago, CCC-SLP  Speech Language Pathologist  Acute Rehabilitation Services  Contact: Dee

## 2022-08-12 NOTE — PROGRESS NOTES
[facility] DAILY FOLLOW UP RENAL INSUFFICIENCY PATIENT   [address] Pharmacokinetic Monitoring Service - Vancomycin    Consulting Provider: Vero Logan   Indication: sepsis of unknown etiology  Target Concentration: Pre-Dialysis Concentration 21-24 mg/L  Day of Therapy: 4  Additional Antimicrobials: cefepime    Pertinent Laboratory Values: Wt Readings from Last 1 Encounters:   08/12/22 208 lb (94.3 kg)     Temp Readings from Last 1 Encounters:   08/12/22 98.2 °F (36.8 °C) (Axillary)     Recent Labs     08/10/22  0056 08/11/22  0246 08/12/22  0324 08/12/22  0847 08/12/22  0912   BUN  --  30* 38*  --   --    CREATININE  --  4.60* 5.10*  --   --    WBC  --  12.6* 20.0*  --  17.2*   PROCAL  --  18.36*  --   --   --    LACACIDPL 4.0*  --   --  19.7*  --        Lab Results   Component Value Date/Time    VANCORANDOM 17.5 08/10/2022 12:58 AM       MRSA Nasal Swab: N/A. Non-respiratory infection. .    Assessment:  Date:  Dose/Freq Admin Times Level/Time:   8/09 1750 mg  500 mg 1026  2004    8/10    Rd @ 0600 = 17.5   8/11 8/12              Plan:  Concentration-guided dosing due to intermittent hemodialysis  No dose needed today, redose after next HD   Repeat vancomycin concentrations ordered as clinically indicated  Pharmacy will continue to monitor patient and adjust therapy as indicated    Thank you for the consult,  Akiko Up, [facility]

## 2022-08-13 NOTE — PROGRESS NOTES
CH received notice PT had . Family was grieving appropriately at bedside. [address] [place] [address] expressed CH's condolences. PT's August Albright introduced self to [address] [place] [address]. [address] [place] [address] thanked  for providing care for Family. [address] [place] [address] checked for unmet needs. [address] [place] [address] provided hospitality and offered additional support if needed. Rev. ALTAGRACIA SantoDiv.

## 2022-08-13 NOTE — PROGRESS NOTES
Ventilator check complete; patient has a #8. 0 ET tube secured at the 23 at the teeth. Patient is  sedated. Patient is not able to follow commands. Breath sounds are diminished. Trachea is midline, Negative for subcutaneous air, and chest excursion is symmetric. Patient is also Negative for cyanosis and is Negative for pitting edema. All alarms are set and audible. Resuscitation bag is  at the head of the bed.       Ventilator Settings  Mode FIO2 Rate Tidal Volume Pressure PEEP I:E Ratio   AC/PRVC  100 %   28 bpm 480 ml      10 cmh2o   1:1.4        Sneha Montanez RCP

## 2022-08-14 ENCOUNTER — HOME CARE VISIT (OUTPATIENT)
Dept: HOME HEALTH SERVICES | Facility: HOME HEALTH | Age: 72
End: 2022-08-14
Payer: MEDICARE

## 2022-08-14 PROCEDURE — 1090000001 HH PPS REVENUE CREDIT

## 2022-08-14 PROCEDURE — 1090000002 HH PPS REVENUE DEBIT

## 2022-08-15 LAB
BACTERIA SPEC CULT: NORMAL
BACTERIA SPEC CULT: NORMAL
SERVICE CMNT-IMP: NORMAL
SERVICE CMNT-IMP: NORMAL

## 2022-08-25 NOTE — PROGRESS NOTES
Physician Progress Note      PATIENT:               Debbie Skaggs  CSN #:                  [id number]  :                       1950  ADMIT DATE:       2022 10:03 PM  [address] DATE:        2022 12:22 AM  RESPONDING  PROVIDER #:        Ellen Marques MD          QUERY TEXT:    Patient admitted with hypotension and elevated LA . Noted documentation of   Sepsis and  not consistent with sepsis. If possible, please document in progress notes and discharge summary if you   are evaluating and /or treating any of the following: The medical record reflects the following:  Risk Factors: on admit documentation, not consistent w/sepsis    PN   ? Septic shock? Clinical Indicators: Lactic Acid 2.3, WBC normal on admit, increased to 19.5   on    BP in ED 95/54, 92/55, 89/54  then down again on  82/38  with   tachycardia 93 - 117  No infection site noted  Treatment: Levophed started on ,   500 ml NS bolus,  &  1.5L NS   bolus   LR bolus 500 ml   Maxipime & VAnco IV started on 8/3  Kizzy Angeles, BSN  [phone]  Options provided:  -- Sepsis POA confirmed  -- Sepsis not POA confirmed  -- Sepsis ruled out  -- Other - I will add my own diagnosis  -- Disagree - Not applicable / Not valid  -- Disagree - Clinically unable to determine / Unknown  -- Refer to Clinical Documentation Reviewer    PROVIDER RESPONSE TEXT:    Sepsis was on the differential and was treated in case was present as the   patient was so ill. However cultures were negative and picture was most   consistent with cardiogenic shock with high output cardiac failure. Query created by: Sam Ren on 2022 2:38 PM      QUERY TEXT:    Patient admitted with hypotension. Noted documentation of fluid overload per   nephrology and  CHF per Op note of High functioning thigh graft causing   worsening CHF.   If possible, please document in progress notes and discharge summary if you   are evaluating and /or treating any of the following: The medical record reflects the following:  Risk Factors: ESRD  Clinical Indicators: CXR consistent with CHF, no BNP, Echo LV nl, EF 55%, RV   severely dilated with decreased systolic fx  Treatment: CRRT, plan SLED  no diuretics given  Thanks, LELAND Gonzalez  Options provided:  -- Fluid overload confirmed and CHF ruled out  -- CHF confirmed and Fluid overload ruled out  -- Fluid overload and CHF confirmed  -- Other - I will add my own diagnosis  -- Disagree - Not applicable / Not valid  -- Disagree - Clinically unable to determine / Unknown  -- Refer to Clinical Documentation Reviewer    PROVIDER RESPONSE TEXT:    After study,Fluid overload and CHF confirmed.     Query created by: Karla Garcia on 8/23/2022 3:08 PM      Electronically signed by:  Andrew Kwon MD 8/25/2022 8:31 AM

## 2022-10-12 NOTE — DISCHARGE SUMMARY
Hospitalist Discharge Summary for  Patient   Admit Date:  2022 10:03 PM   DC Note date: 10/11/2022  Name:  Sherryle Morgans   Age:  80 y.o. Sex:  female  :  1950   MRN:  [id number]   Room:  [address] [place] [address]  PCP:  Mahamed Wilks MD    Presenting Complaint: Hypotension and Extremity Weakness     Initial Admission Diagnosis: End stage renal disease on dialysis ([address] [N18.6, Z99.2]  SIRS (systemic inflammatory response syndrome) (HCC) [R65.10]  Elevated lactic acid level [R79.89]  Hypotension, unspecified hypotension type [I95.9]     Problem List for this Hospitalization (present on admission):    Principal Problem:    Septic shock ([address]  Active Problems:    Hypotension    Acute metabolic encephalopathy    Chronic respiratory failure with hypoxia (HCC)    Elevated lactic acid level    Renal cell carcinoma (HCC)    Severe obesity (HCC)    End stage renal disease on dialysis ([address]    Acute on chronic respiratory failure with hypoxia ([address]    Type 2 diabetes mellitus with nephropathy ([address]  Resolved Problems:    * No resolved hospital problems. [facility] Course: Sherryle Morgans is a 76-year-old female with a PMH of ESRD on HD who presents with hypotension and L-sided mid back pain. Her back pain is chronic since nephrectomy. Labs were normal except elevated lactic. She was given gentle IVF for hypotension. No fever or tachycardia. The patient was admitted to the Hospitalist service with Nephrology consulted. Vascular surgery was consulted to ligate the patient's L thigh AV graft as it may be causing hypotension. She was transferred to ICU on  due to low BP on arterial line while in OR for ligation of L thigh AV graft by Vascular surgery. Patient's condition continues to worsen requiring pressor support. She was intubated on  for IR embolization of fistula. Patient  on  as per 's note.     Time of Death:    at 0022    Cause of Death:   Cardiogenic shock    Time spent in patient discharge work and death certification 15 minutes. Procedures done this admission:  Procedure(s):  LEFT THIGH ARTERIO VENOUS GRAFT Ligation    Consults this admission:  IP CONSULT TO NEPHROLOGY  IP CONSULT TO PHARMACY    Echocardiogram results:  08/02/22    TRANSTHORACIC ECHOCARDIOGRAM (TTE) COMPLETE (CONTRAST/BUBBLE/3D PRN) 08/08/2022 12:57 PM (Final)    Interpretation Summary    Left Ventricle: Normal left ventricular systolic function with a visually estimated EF of 55 - 60%. Left ventricle size is normal. Normal wall thickness. Normal wall motion. Abnormal diastolic function. Right Ventricle: Right ventricle is severely dilated. Severely reduced systolic function. Mitral Valve: Mild paravalvular regurgitation. Tricuspid Valve: Valve structure is normal. Severe regurgitation with a centrally directed jet. Tricuspid annulus is severely dilated leading to mild coaptation. This is likely due to severely dilated right ventricle. No stenosis noted. Severely elevated RVSP. The estimated RVSP is 72 mmHg. Left Atrium: Left atrium is mildly dilated. Right Atrium: Right atrium is moderately dilated. Technical qualifiers: Procedure performed with the patient in a supine position, color flow Doppler was performed and pulse wave and/or continuous wave Doppler was performed. Signed by: Sulma Diaz MD on 8/8/2022 12:57 PM      Diagnostic Imaging/Tests:   XR CHEST PORTABLE    Result Date: 8/2/2022  EXAM: XR CHEST PORTABLE HISTORY: hypotension. TECHNIQUE: Frontal chest. COMPARISON: 7/28/2022 FINDINGS: There is mild cardiomegaly. There is pulmonary vascular congestion/edema. There is no pleural effusion, or pneumothorax. No significant osseous abnormalities are observed. Stable right-sided CVC and right vascular stent. Findings suggest CHF.          Labs: Results:       BMP, Mg, Phos No results for input(s): NA, K, CL, CO2, AGAP, BUN, CREA, CA, GLU, MG, PHOS in the last 72 hours. CBC No results for input(s): WBC, RBC, HGB, HCT, PLT, MONOS, MICHAELA in the last 72 hours. Invalid input(s): GRANS, LYMPH, EOS, BASOS, IG, ANEU, ABL, ABM, ABB, AIG   LFT No results for input(s): ALT, TP, ALB, GLOB in the last 72 hours. Invalid input(s): SGOT, TBIL, AP, AGRAT, GPT   Cardiac Testing No results found for: BNP, CPK, RCK1, CKMB   Coagulation Tests Lab Results   Component Value Date/Time    INR 2.7 08/12/2022 09:12 AM    APTT 53.1 08/12/2022 09:12 AM      A1c No results found for: HBA1C   Lipid Panel No results found for: CHOL, CHOLPOCT, CHOLX, CHLST, CHOLV, 344466, HDL, HDLC, LDL, LDLC, 675498, VLDLC, VLDL, TGLX, TRIGL   Thyroid Panel No results found for: TSH, T4, FT4     Most Recent UA No results found for: MUCUS, UCOM       All Labs from Last 24 Hrs:  No results found for this or any previous visit (from the past 24 hour(s)). Allergies   Allergen Reactions    Penicillins Other (See Comments) and Rash     PT STATES UNSURE OF RX  Tolerated cefazolin    Vancomycin Rash     Immunization History   Administered Date(s) Administered    COVID-19, PFIZER PURPLE top, DILUTE for use, (age 15 y+), 30mcg/0.3mL 02/25/2021, 03/25/2021    PPD Test 02/08/2013, 08/03/2018    Td vaccine (adult) 07/01/2008    Tdap (Boostrix, Adacel) 03/30/2012       Recent Vital Data:  No data found.     Oxygen Therapy  SpO2:  (unable for >36 hrs as reported by offgoing nurse)  Pulse Oximetry Type: Continuous  Pulse via Oximetry: 76 beats per minute  Pulse Oximeter Device Mode: Continuous  O2 Device: Ventilator  Skin Assessment: Clean, dry, & intact  Skin Protection for O2 Device: Yes  Orientation: Bilateral  Location: Cheek  FiO2 : 100 %  O2 Flow Rate (L/min): 50 L/min  Blood Gas  Performed?: Yes  Joshua's Test #1: Not assessed  Site #1: Arterial line  Site Prepped #1: Yes  Number of Attempts #1: 1  Pressure Held #1: Yes  Complications #1: None  Post-procedure #1: Standard  Specimen Status #1: Point of care  How Tolerated?: Tolerated well  Vent Mode: AC/PRVC    Estimated body mass index is 35.7 kg/m² as calculated from the following:    Height as of this encounter: 5' 4\" (1.626 m). Weight as of this encounter: 208 lb (94.3 kg). No intake or output data in the 24 hours ending 10/11/22 2323      Discharge Exam: Not done as  Patient was pronounced by Dr. Nova Hernandez. Please see note on 8/13. Signed:  Milo Roy MD    Part of this note may have been written by using a voice dictation software. The note has been proof read but may still contain some grammatical/other typographical errors.

## 2023-01-31 NOTE — PROGRESS NOTES
Discharge medications reviewed with the patient and appropriate educational materials and side effects teaching were provided. Discharge packet provided to patient. Patient discharged with family after pt verbalized understanding of discharge instructions. If a colonoscopy was performed you might notice a few drops of blood on your underwear or you might see blood on the toilet paper after you use the bathroom. This is caused by irritation to the bowel during the procedure and is not a problem. However if you have any more heavy bleeding (more than 2 tablespoons of bright red blood) contact your doctor right away.

## 2023-06-12 NOTE — PROGRESS NOTES
Occupational Therapy Note:    OT order received and chart reviewed. OT attempted to see pt today. Pt in dialysis. OT will see at a later time/date as permitted.        Thank you,    Merry Horvath OTR/L No

## 2025-06-17 NOTE — PROGRESS NOTES
BSR received  patient is in dialysis at this time New Primary Care Physician Line for [facility] - [phone]

## (undated) DEVICE — PVC URETHRAL CATHETER: Brand: DOVER

## (undated) DEVICE — INTRODUCER SHTH 8FR CANN L5.5CM DIL TIP 35MM BLU TUNGSTEN

## (undated) DEVICE — FOGARTY ARTERIAL EMBOLECTOMY CATHETER 4F 80CM: Brand: FOGARTY

## (undated) DEVICE — SUTURE VCRL SZ 3-0 L27IN ABSRB UD L26MM SH 1/2 CIR J416H

## (undated) DEVICE — KENDALL RADIOLUCENT FOAM MONITORING ELECTRODE RECTANGULAR SHAPE: Brand: KENDALL

## (undated) DEVICE — ADAPTER MON OD15X22MM ID15MM STD LUER FIT W/ LIFESAVER

## (undated) DEVICE — THROMBECTOMY-EMBOLECTOMY: Brand: MEDLINE INDUSTRIES, INC.

## (undated) DEVICE — SYR 5ML 1/5 GRAD LL NSAF LF --

## (undated) DEVICE — CONTAINER PREFIL FRMLN 40ML --

## (undated) DEVICE — 2000CC GUARDIAN II: Brand: GUARDIAN

## (undated) DEVICE — AV FISTULA: Brand: MEDLINE INDUSTRIES, INC.

## (undated) DEVICE — APPLIER CLP L9.375IN APER 2.1MM CLS L3.8MM 20 SM TI CLP

## (undated) DEVICE — INTRODUCER SHTH 5FR CANN L11CM DIL TIP 25MM GRY TUNGSTEN

## (undated) DEVICE — STOCKINETTE,DOUBLE PLY,6X48,STERILE: Brand: MEDLINE

## (undated) DEVICE — Device: Brand: BALLOON

## (undated) DEVICE — GOWN,PREVENTION PLUS,XLN/XL,ST,24/CS: Brand: MEDLINE

## (undated) DEVICE — CANNULA NSL ORAL AD FOR CAPNOFLEX CO2 O2 AIRLFE

## (undated) DEVICE — SUTURE PROL SZ 5-0 L24IN NONABSORBABLE BLU L13MM C-1 3/8 M8725

## (undated) DEVICE — ADHESIVE SKIN CLSR 1ML TISS HI VISC EXOFIN

## (undated) DEVICE — SPONGE: SPECIALTY PEANUT XR 100/CS: Brand: MEDICAL ACTION INDUSTRIES

## (undated) DEVICE — CONNECTOR TBNG OD5-7MM O2 END DISP

## (undated) DEVICE — STERILE POLYISOPRENE POWDER-FREE SURGICAL GLOVES: Brand: PROTEXIS

## (undated) DEVICE — (D)STRIP SKN CLSR 0.5X4IN WHT --

## (undated) DEVICE — CANISTER, RIGID, 2000CC: Brand: MEDLINE INDUSTRIES, INC.

## (undated) DEVICE — SINGLE USE SUCTION VALVE MAJ-209: Brand: SINGLE USE SUCTION VALVE (STERILE)

## (undated) DEVICE — SUTURE PROL SZ 6-0 L24IN NONABSORBABLE BLU BV-1 L9.3MM 3/8 M8805

## (undated) DEVICE — SUTURE VCRL SZ 2-0 L27IN ABSRB VLT L36MM CT-1 1/2 CIR J339H

## (undated) DEVICE — SINGLE USE ASPIRATION NEEDLE: Brand: SINGLE USE ASPIRATION NEEDLE

## (undated) DEVICE — SURGICAL PROCEDURE PACK BASIC ST FRANCIS

## (undated) DEVICE — NDL PRT INJ NSAF BLNT 18GX1.5 --

## (undated) DEVICE — SHEET, T, LAPAROTOMY, STERILE: Brand: MEDLINE

## (undated) DEVICE — REM POLYHESIVE ADULT PATIENT RETURN ELECTRODE: Brand: VALLEYLAB

## (undated) DEVICE — Device

## (undated) DEVICE — 1200 GUARD II KIT W/5MM TUBE W/O VAC TUBE: Brand: GUARDIAN

## (undated) DEVICE — MOUTHPIECE ENDOSCP 20X27MM --

## (undated) DEVICE — SET EXTN L6IN W/ S STL CLMP

## (undated) DEVICE — SYRINGE ANGIO CNTRST DEL 20 CC POLYCARB DK GRN MEDALLION

## (undated) DEVICE — GOWN,REINFORCED,POLY,AURORA,XXLARGE,STR: Brand: MEDLINE

## (undated) DEVICE — KENDALL SCD EXPRESS SLEEVES, KNEE LENGTH, MEDIUM: Brand: KENDALL SCD

## (undated) DEVICE — KIT THORCENT 8FR L5IN POLYUR W/ 18/22/25GA NDL 3 W STPCOCK

## (undated) DEVICE — SUTURE MCRYL SZ 4-0 L27IN ABSRB UD L19MM PS-2 1/2 CIR PRIM Y426H

## (undated) DEVICE — SINGLE USE BIOPSY VALVE MAJ-210: Brand: SINGLE USE BIOPSY VALVE (STERILE)

## (undated) DEVICE — SUTURE VCRL SZ 2-0 L36IN ABSRB UD L36MM CT-1 1/2 CIR J945H

## (undated) DEVICE — SOLUTION IRRIG 1000ML H2O STRL BLT

## (undated) DEVICE — BRONCHOSCOPY PACK: Brand: MEDLINE INDUSTRIES, INC.

## (undated) DEVICE — GEL MEDC ULTRASOUND 5L -- REPLACED BY 326862

## (undated) DEVICE — BLOCK BITE AD 60FR W/ VELC STRP ADDRESSES MOST PT AND

## (undated) DEVICE — SYR 10ML LUER LOK 1/5ML GRAD --

## (undated) DEVICE — INTENDED TO BE USED TO OCCLUDE, RETRACT AND IDENTIFY ARTERIES, VEINS, TENDONS AND NERVES IN SURGICAL PROCEDURES: Brand: STERION®  VESSEL LOOP

## (undated) DEVICE — SYR 3ML LL TIP 1/10ML GRAD --

## (undated) DEVICE — 3M™ IOBAN™ 2 ANTIMICROBIAL INCISE DRAPE 6650EZ: Brand: IOBAN™ 2

## (undated) DEVICE — SOLUTION IRRIG 1000ML 09% SOD CHL USP PIC PLAS CONTAINER

## (undated) DEVICE — FORCEPS BX L240CM JAW DIA2.8MM L CAP W/ NDL MIC MESH TOOTH

## (undated) DEVICE — (D)PREP SKN CHLRAPRP APPL 26ML -- CONVERT TO ITEM 371833

## (undated) DEVICE — ABSORBENT, WATERPROOF, BACTERIA PROOF FILM DRESSING: Brand: OPSITE POST OP 9.5X8.5CM CTN 20

## (undated) DEVICE — SUTURE ETHBND EXCEL SZ 1 L30IN NONABSORBABLE GRN L36MM CT-1 X425H

## (undated) DEVICE — TAPE UMB 1/8X30IN MP COT WHT --

## (undated) DEVICE — TRAY PREP DRY W/ PREM GLV 2 APPL 6 SPNG 2 UNDPD 1 OVERWRAP

## (undated) DEVICE — SOLUTION IV 1000ML 0.9% SOD CHL

## (undated) DEVICE — NEEDLE HYPO 21GA L1.5IN GRN POLYPR HUB S STL THN WALL IV

## (undated) DEVICE — SOL INJ SOD CL0.9% 10ML PREFIL --

## (undated) DEVICE — MASTISOL ADHESIVE LIQ 2/3ML

## (undated) DEVICE — MEDI-VAC YANKAUER SUCTION HANDLE W/BULBOUS TIP: Brand: CARDINAL HEALTH